# Patient Record
Sex: FEMALE | Race: WHITE | NOT HISPANIC OR LATINO | Employment: OTHER | URBAN - METROPOLITAN AREA
[De-identification: names, ages, dates, MRNs, and addresses within clinical notes are randomized per-mention and may not be internally consistent; named-entity substitution may affect disease eponyms.]

---

## 2017-03-09 ENCOUNTER — APPOINTMENT (OUTPATIENT)
Dept: WOMENS IMAGING | Facility: HOSPITAL | Age: 77
End: 2017-03-09

## 2017-03-09 PROCEDURE — G0202 SCR MAMMO BI INCL CAD: HCPCS | Performed by: RADIOLOGY

## 2017-03-09 PROCEDURE — 77063 BREAST TOMOSYNTHESIS BI: CPT | Performed by: RADIOLOGY

## 2018-03-13 ENCOUNTER — APPOINTMENT (OUTPATIENT)
Dept: WOMENS IMAGING | Facility: HOSPITAL | Age: 78
End: 2018-03-13

## 2018-03-13 PROCEDURE — 77063 BREAST TOMOSYNTHESIS BI: CPT | Performed by: RADIOLOGY

## 2018-03-13 PROCEDURE — 77067 SCR MAMMO BI INCL CAD: CPT | Performed by: RADIOLOGY

## 2019-03-25 ENCOUNTER — APPOINTMENT (OUTPATIENT)
Dept: WOMENS IMAGING | Facility: HOSPITAL | Age: 79
End: 2019-03-25

## 2019-03-25 PROCEDURE — 77067 SCR MAMMO BI INCL CAD: CPT | Performed by: RADIOLOGY

## 2019-03-25 PROCEDURE — 77063 BREAST TOMOSYNTHESIS BI: CPT | Performed by: RADIOLOGY

## 2019-10-22 ENCOUNTER — OFFICE VISIT (OUTPATIENT)
Dept: FAMILY MEDICINE CLINIC | Facility: CLINIC | Age: 79
End: 2019-10-22

## 2019-10-22 VITALS
WEIGHT: 145.5 LBS | OXYGEN SATURATION: 94 % | TEMPERATURE: 98 F | BODY MASS INDEX: 24.84 KG/M2 | DIASTOLIC BLOOD PRESSURE: 78 MMHG | SYSTOLIC BLOOD PRESSURE: 138 MMHG | HEIGHT: 64 IN | HEART RATE: 95 BPM

## 2019-10-22 DIAGNOSIS — J40 BRONCHITIS: Primary | ICD-10-CM

## 2019-10-22 PROCEDURE — 99214 OFFICE O/P EST MOD 30 MIN: CPT | Performed by: FAMILY MEDICINE

## 2019-10-22 RX ORDER — MULTIVITAMIN/IRON/FOLIC ACID 18MG-0.4MG
1 TABLET ORAL DAILY
COMMUNITY
End: 2019-11-20

## 2019-10-22 RX ORDER — PREDNISONE 20 MG/1
20 TABLET ORAL 2 TIMES DAILY
Qty: 10 TABLET | Refills: 0 | Status: SHIPPED | OUTPATIENT
Start: 2019-10-22 | End: 2019-10-27

## 2019-10-22 RX ORDER — IPRATROPIUM BROMIDE AND ALBUTEROL SULFATE 2.5; .5 MG/3ML; MG/3ML
3 SOLUTION RESPIRATORY (INHALATION) ONCE
Status: DISCONTINUED | OUTPATIENT
Start: 2019-10-22 | End: 2020-04-13

## 2019-10-22 RX ORDER — METOPROLOL SUCCINATE 25 MG/1
TABLET, EXTENDED RELEASE ORAL
COMMUNITY
Start: 2019-10-18 | End: 2020-08-14 | Stop reason: SDUPTHER

## 2019-10-22 RX ORDER — MULTIVIT-MINS NO.7/FOLIC ACID 1 MG
1 CAPSULE ORAL DAILY
Refills: 0 | COMMUNITY
Start: 2019-07-16 | End: 2019-11-20

## 2019-10-22 RX ORDER — CEFDINIR 300 MG/1
300 CAPSULE ORAL 2 TIMES DAILY
Qty: 20 CAPSULE | Refills: 0 | Status: SHIPPED | OUTPATIENT
Start: 2019-10-22 | End: 2019-11-20

## 2019-10-22 NOTE — PROGRESS NOTES
Subjective   Darlene Pfeiffer is a 79 y.o. female.     Chief Complaint   Patient presents with   • Cough     some productive        Here for cough and congestion.  She has a history of lung cancer and has been stable to follow-up.  She recently went to the [facility] for productive cough he was treated with doxycycline.  A couple days after finishing her antibiotic her cough became productive again.  She denies any fever or chills.  She still is not smoking.  She has an appointment to follow-up for mild office with her routine blood work soon.  Eyes any chest pain or shortness of breath.           The following portions of the patient's history were reviewed and updated as appropriate: allergies, current medications, past family history, past medical history, past social history, past surgical history and problem list.    Past Medical History:   Diagnosis Date   • Hypertension        Past Surgical History:   Procedure Laterality Date   • CHOLECYSTECTOMY     • LUNG SURGERY     • MOUTH SURGERY         Family History   Problem Relation Age of Onset   • No Known Problems Mother    • No Known Problems Father        Social History     Socioeconomic History   • Marital status: Unknown     Spouse name: Not on file   • Number of children: Not on file   • Years of education: Not on file   • Highest education level: Not on file   Tobacco Use   • Smoking status: Never Smoker   • Smokeless tobacco: Never Used   Substance and Sexual Activity   • Alcohol use: Yes     Comment: Socially    • Drug use: No   • Sexual activity: Defer         Current Outpatient Medications:   •  amLODIPine (NORVASC) 10 MG tablet, Take 5 mg by mouth., Disp: , Rfl:   •  cetirizine (ZyrTEC) 10 MG tablet, Take 10 mg by mouth., Disp: , Rfl:   •  cholecalciferol (VITAMIN D3) 1000 units tablet, Take 1,000 Units by mouth Daily., Disp: , Rfl:   •  hydroCHLOROthiazide (HYDRODIURIL) 25 MG tablet, , Disp: , Rfl:   •  metoprolol succinate XL (TOPROL-XL) 25 MG 24 hr  "tablet, , Disp: , Rfl:   •  Multiple Vitamins-Minerals (CENTRUM ADULTS) tablet, Take  by mouth., Disp: , Rfl:   •  Multiple Vitamins-Minerals (CENTRUM ADULTS) tablet, Take 1 tablet by mouth Daily., Disp: , Rfl:   •  Multiple Vitamins-Minerals (V-C FORTE) capsule, Take 1 capsule by mouth Daily., Disp: , Rfl: 0  •  omeprazole (PriLOSEC) 40 MG capsule, Take 40 mg by mouth., Disp: , Rfl:   •  pravastatin (PRAVACHOL) 80 MG tablet, Take 80 mg by mouth., Disp: , Rfl:   •  cefdinir (OMNICEF) 300 MG capsule, Take 1 capsule by mouth 2 (Two) Times a Day., Disp: 20 capsule, Rfl: 0  •  predniSONE (DELTASONE) 20 MG tablet, Take 1 tablet by mouth 2 (Two) Times a Day for 5 days., Disp: 10 tablet, Rfl: 0  •  tiotropium bromide-olodaterol (STIOLTO RESPIMAT) 2.5-2.5 MCG/ACT aerosol solution inhaler, Inhale 2 puffs Daily., Disp: 4 g, Rfl: 0    Current Facility-Administered Medications:   •  ipratropium-albuterol (DUO-NEB) nebulizer solution 3 mL, 3 mL, Nebulization, Once, Kehrer, Meredith Lea, MD    Review of Systems    Objective   Vitals:    10/22/19 1424   BP: 138/78   Pulse: 95   Temp: 98 °F (36.7 °C)   SpO2: 94%   Weight: 66 kg (145 lb 8 oz)   Height: 162.6 cm (64\")     Body mass index is 24.98 kg/m².  Physical Exam   Constitutional: She is oriented to person, place, and time. She appears well-developed and well-nourished.   HENT:   Head: Normocephalic and atraumatic.   Eyes: Conjunctivae and EOM are normal. Pupils are equal, round, and reactive to light.   Neck: Neck supple. No thyromegaly present.   Cardiovascular: Normal rate and regular rhythm.   No murmur heard.  Pulmonary/Chest: Effort normal. She has decreased breath sounds. She has no wheezes. She has rhonchi in the right upper field and the left upper field. She has no rales.   Abdominal: Soft.   Musculoskeletal: Normal range of motion.   Neurological: She is alert and oriented to person, place, and time. No cranial nerve deficit.   Skin: Skin is warm and dry. "   Psychiatric: She has a normal mood and affect.   Nursing note and vitals reviewed.        Assessment/Plan   Darlene was seen today for cough.    Diagnoses and all orders for this visit:    Bronchitis  -     XR Chest 2 View; Future  -     ipratropium-albuterol (DUO-NEB) nebulizer solution 3 mL  -     cefdinir (OMNICEF) 300 MG capsule; Take 1 capsule by mouth 2 (Two) Times a Day.  -     predniSONE (DELTASONE) 20 MG tablet; Take 1 tablet by mouth 2 (Two) Times a Day for 5 days.  -     tiotropium bromide-olodaterol (STIOLTO RESPIMAT) 2.5-2.5 MCG/ACT aerosol solution inhaler; Inhale 2 puffs Daily.    Other orders  -     Cancel: Fluarix/Fluzone/Afluria/FluLaval ([id number])      Patient had a little bit less rhonchi after her nebulizer treatment and said she felt better.         Patient Instructions   Keep using the albuterol as needed.  Make sure to use the alto every day until gone.  Follow-up pending results of chest x-ray and response to medication.

## 2019-10-22 NOTE — PATIENT INSTRUCTIONS
Keep using the albuterol as needed.  Make sure to use the alto every day until gone.  Follow-up pending results of chest x-ray and response to medication.

## 2019-10-24 DIAGNOSIS — J40 BRONCHITIS: ICD-10-CM

## 2019-11-20 ENCOUNTER — OFFICE VISIT (OUTPATIENT)
Dept: FAMILY MEDICINE CLINIC | Facility: CLINIC | Age: 79
End: 2019-11-20

## 2019-11-20 VITALS
BODY MASS INDEX: 24.38 KG/M2 | HEART RATE: 83 BPM | OXYGEN SATURATION: 96 % | WEIGHT: 142.8 LBS | TEMPERATURE: 98.5 F | HEIGHT: 64 IN | SYSTOLIC BLOOD PRESSURE: 130 MMHG | DIASTOLIC BLOOD PRESSURE: 72 MMHG

## 2019-11-20 DIAGNOSIS — R73.09 ABNORMAL GLUCOSE: ICD-10-CM

## 2019-11-20 DIAGNOSIS — J44.9 CHRONIC OBSTRUCTIVE PULMONARY DISEASE, UNSPECIFIED COPD TYPE (HCC): ICD-10-CM

## 2019-11-20 DIAGNOSIS — J40 BRONCHITIS: ICD-10-CM

## 2019-11-20 DIAGNOSIS — I10 ESSENTIAL HYPERTENSION: Primary | ICD-10-CM

## 2019-11-20 DIAGNOSIS — E78.5 HYPERLIPIDEMIA, UNSPECIFIED HYPERLIPIDEMIA TYPE: ICD-10-CM

## 2019-11-20 DIAGNOSIS — K21.9 GASTROESOPHAGEAL REFLUX DISEASE WITHOUT ESOPHAGITIS: ICD-10-CM

## 2019-11-20 LAB
ALBUMIN SERPL-MCNC: 4.5 G/DL (ref 3.5–5.2)
ALBUMIN/GLOB SERPL: 2.4 G/DL
ALP SERPL-CCNC: 69 U/L (ref 39–117)
ALT SERPL-CCNC: 15 U/L (ref 1–33)
AST SERPL-CCNC: 21 U/L (ref 1–32)
BASOPHILS # BLD AUTO: 0.03 10*3/MM3 (ref 0–0.2)
BASOPHILS NFR BLD AUTO: 0.4 % (ref 0–1.5)
BILIRUB SERPL-MCNC: 0.5 MG/DL (ref 0.2–1.2)
BUN SERPL-MCNC: 13 MG/DL (ref 8–23)
BUN/CREAT SERPL: 18.3 (ref 7–25)
CALCIUM SERPL-MCNC: 9.5 MG/DL (ref 8.6–10.5)
CHLORIDE SERPL-SCNC: 100 MMOL/L (ref 98–107)
CHOLEST SERPL-MCNC: 211 MG/DL (ref 0–200)
CO2 SERPL-SCNC: 31.6 MMOL/L (ref 22–29)
CREAT SERPL-MCNC: 0.71 MG/DL (ref 0.57–1)
EOSINOPHIL # BLD AUTO: 0 10*3/MM3 (ref 0–0.4)
EOSINOPHIL NFR BLD AUTO: 0 % (ref 0.3–6.2)
ERYTHROCYTE [DISTWIDTH] IN BLOOD BY AUTOMATED COUNT: 13.2 % (ref 12.3–15.4)
GLOBULIN SER CALC-MCNC: 1.9 GM/DL
GLUCOSE SERPL-MCNC: 107 MG/DL (ref 65–99)
HBA1C MFR BLD: 6.1 % (ref 4.8–5.6)
HCT VFR BLD AUTO: 43.1 % (ref 34–46.6)
HDLC SERPL-MCNC: 65 MG/DL (ref 40–60)
HGB BLD-MCNC: 14 G/DL (ref 12–15.9)
IMM GRANULOCYTES # BLD AUTO: 0.01 10*3/MM3 (ref 0–0.05)
IMM GRANULOCYTES NFR BLD AUTO: 0.1 % (ref 0–0.5)
LDLC SERPL CALC-MCNC: 125 MG/DL (ref 0–100)
LYMPHOCYTES # BLD AUTO: 2.56 10*3/MM3 (ref 0.7–3.1)
LYMPHOCYTES NFR BLD AUTO: 37.4 % (ref 19.6–45.3)
MCH RBC QN AUTO: 29.8 PG (ref 26.6–33)
MCHC RBC AUTO-ENTMCNC: 32.5 G/DL (ref 31.5–35.7)
MCV RBC AUTO: 91.7 FL (ref 79–97)
MONOCYTES # BLD AUTO: 0.4 10*3/MM3 (ref 0.1–0.9)
MONOCYTES NFR BLD AUTO: 5.8 % (ref 5–12)
NEUTROPHILS # BLD AUTO: 3.85 10*3/MM3 (ref 1.7–7)
NEUTROPHILS NFR BLD AUTO: 56.3 % (ref 42.7–76)
NRBC BLD AUTO-RTO: 0 /100 WBC (ref 0–0.2)
PLATELET # BLD AUTO: 311 10*3/MM3 (ref 140–450)
POTASSIUM SERPL-SCNC: 3.8 MMOL/L (ref 3.5–5.2)
PROT SERPL-MCNC: 6.4 G/DL (ref 6–8.5)
RBC # BLD AUTO: 4.7 10*6/MM3 (ref 3.77–5.28)
SODIUM SERPL-SCNC: 142 MMOL/L (ref 136–145)
TRIGL SERPL-MCNC: 104 MG/DL (ref 0–150)
VLDLC SERPL CALC-MCNC: 20.8 MG/DL
WBC # BLD AUTO: 6.85 10*3/MM3 (ref 3.4–10.8)

## 2019-11-20 PROCEDURE — 99214 OFFICE O/P EST MOD 30 MIN: CPT | Performed by: FAMILY MEDICINE

## 2019-11-20 RX ORDER — INFLUENZA A VIRUS A/MICHIGAN/45/2015 X-275 (H1N1) ANTIGEN (FORMALDEHYDE INACTIVATED), INFLUENZA A VIRUS A/SINGAPORE/INFIMH-16-0019/2016 IVR-186 (H3N2) ANTIGEN (FORMALDEHYDE INACTIVATED), AND INFLUENZA B VIRUS B/MARYLAND/15/2016 BX-69A (A B/COLORADO/6/2017-LIKE VIRUS) ANTIGEN (FORMALDEHYDE INACTIVATED) 60; 60; 60 UG/.5ML; UG/.5ML; UG/.5ML
INJECTION, SUSPENSION INTRAMUSCULAR
Refills: 0 | COMMUNITY
Start: 2019-10-22 | End: 2020-04-13

## 2019-11-20 NOTE — PROGRESS NOTES
Subjective   Darlene Pfeiffer is a 79 y.o. female.     Chief Complaint   Patient presents with   • Hypertension   • Hyperlipidemia        Patient here for fasting blood pressure and cholesterol check.  As far as her bronchitis last month she is feeling much better on the Stiolto and admits she wants to stay on it for her COPD.  She is compliant with her medications and has no complaints today.  She does continue with some cough but it is improved.           The following portions of the patient's history were reviewed and updated as appropriate: allergies, current medications, past family history, past medical history, past social history, past surgical history and problem list.    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 from [facility]   • History of squamous cell carcinoma     Tongue   • Hyperlipidemia    • Hyperpigmentation    • Hypertension     since early 60's   • Impacted cerumen of both ears    • Influenza    • Low vitamin B12 level    • Lower back pain    • Menopause    • Need for influenza vaccination    • Other screening mammogram    • Thyromegaly    • Urinary pain    • UTI (urinary tract infection)    • Vitamin D deficiency        Past Surgical History:   Procedure Laterality Date   • CHOLECYSTECTOMY  1999   • GLOSSECTOMY PARTIAL      less than one half tongue   • LUNG SURGERY     • ORAL LESION EXCISION/BIOPSY      June and August 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   • TUBAL ABDOMINAL LIGATION  1970       Family History   Problem Relation Age of Onset    • Ovarian cancer Mother          at age 27   • No Known Problems Father    • Ovarian cancer Sister    • Colon cancer Brother    • No Known Problems Other        Social History     Socioeconomic History   • Marital status: Unknown     Spouse name: Not on file   • Number of children: Not on file   • Years of education: Not on file   • Highest education level: Not on file   Tobacco Use   • Smoking status: Former Smoker     Last attempt to quit: 2015     Years since quittin.8   • Smokeless tobacco: Never Used   • Tobacco comment: less than a pack per day, no smoking since , Quit 2015   Substance and Sexual Activity   • Alcohol use: Yes     Comment: Socially -A few times a month   • Drug use: No   • Sexual activity: Defer         Current Outpatient Medications:   •  cetirizine (ZyrTEC) 10 MG tablet, Take 10 mg by mouth., Disp: , Rfl:   •  cholecalciferol (VITAMIN D3) 1000 units tablet, Take 1,000 Units by mouth Daily., Disp: , Rfl:   •  FLUZONE HIGH-DOSE 0.5 ML suspension prefilled syringe injection, ADM 0.5ML IM UTD, Disp: , Rfl: 0  •  hydroCHLOROthiazide (HYDRODIURIL) 25 MG tablet, , Disp: , Rfl:   •  magnesium oxide (MAGOX) 400 (241.3 Mg) MG tablet tablet, Take 400 mg by mouth Daily., Disp: , Rfl:   •  metoprolol succinate XL (TOPROL-XL) 25 MG 24 hr tablet, , Disp: , Rfl:   •  Multiple Vitamins-Minerals (CENTRUM ADULTS) tablet, Take  by mouth., Disp: , Rfl:   •  omeprazole (PriLOSEC) 40 MG capsule, Take 40 mg by mouth., Disp: , Rfl:   •  pravastatin (PRAVACHOL) 80 MG tablet, Take 80 mg by mouth., Disp: , Rfl:   •  tiotropium bromide-olodaterol (STIOLTO RESPIMAT) 2.5-2.5 MCG/ACT aerosol solution inhaler, Inhale 2 puffs Daily., Disp: 12 g, Rfl: 3  •  amLODIPine (NORVASC) 10 MG tablet, Take 5 mg by mouth., Disp: , Rfl:     Current Facility-Administered Medications:   •  ipratropium-albuterol (DUO-NEB) nebulizer solution 3 mL, 3 mL, Nebulization, Once, Kehrer, Meredith Lea, MD    Review of Systems  "  Constitutional: Negative for chills, fatigue and fever.   HENT: Negative for congestion, rhinorrhea and sore throat.    Respiratory: Positive for cough. Negative for shortness of breath.    Cardiovascular: Negative for chest pain and leg swelling.   Gastrointestinal: Negative for abdominal pain.   Endocrine: Negative for polydipsia and polyuria.   Genitourinary: Negative for dysuria.   Musculoskeletal: Negative for arthralgias and myalgias.   Skin: Negative for rash.   Neurological: Negative for dizziness.   Hematological: Does not bruise/bleed easily.   Psychiatric/Behavioral: Negative for sleep disturbance.       Objective   Vitals:    11/20/19 0855   BP: 130/72   Pulse: 83   Temp: 98.5 °F (36.9 °C)   SpO2: 96%   Weight: 64.8 kg (142 lb 12.8 oz)   Height: 162.6 cm (64\")     Body mass index is 24.51 kg/m².  Physical Exam   Constitutional: She is oriented to person, place, and time. She appears well-developed and well-nourished.   HENT:   Head: Normocephalic and atraumatic.   Eyes: Conjunctivae and EOM are normal. Pupils are equal, round, and reactive to light.   Neck: Neck supple. No thyromegaly present.   Cardiovascular: Normal rate and regular rhythm.   No murmur heard.  Pulmonary/Chest: Effort normal and breath sounds normal. She has no wheezes.   Abdominal: Soft.   Musculoskeletal: Normal range of motion.   Neurological: She is alert and oriented to person, place, and time. No cranial nerve deficit.   Skin: Skin is warm and dry.   Psychiatric: She has a normal mood and affect.         Assessment/Plan   Darlene was seen today for hypertension and hyperlipidemia.    Diagnoses and all orders for this visit:    Essential hypertension  -     CBC & Differential  -     Comprehensive Metabolic Panel    Hyperlipidemia, unspecified hyperlipidemia type  -     Lipid Panel    Gastroesophageal reflux disease without esophagitis    Bronchitis  -     tiotropium bromide-olodaterol (STIOLTO RESPIMAT) 2.5-2.5 MCG/ACT aerosol " solution inhaler; Inhale 2 puffs Daily.    Chronic obstructive pulmonary disease, unspecified COPD type (CMS/[facility])    Abnormal glucose  -     Hemoglobin A1c               There are no Patient Instructions on file for this visit.

## 2019-11-26 ENCOUNTER — TELEPHONE (OUTPATIENT)
Dept: FAMILY MEDICINE CLINIC | Facility: CLINIC | Age: 79
End: 2019-11-26

## 2019-11-26 RX ORDER — PREDNISONE 20 MG/1
20 TABLET ORAL 2 TIMES DAILY
Qty: 10 TABLET | Refills: 0 | Status: SHIPPED | OUTPATIENT
Start: 2019-11-26 | End: 2020-01-13

## 2019-11-26 NOTE — TELEPHONE ENCOUNTER
Patient called stated she was seen on 11/20/19. She has been having a severe cough, she has tried mucinex, and has been using her inhaler. She is taking zyrtec. She states the cough is horrible. She didn't know if something could be sent in or if she needed to come in. Please advise.

## 2019-11-26 NOTE — TELEPHONE ENCOUNTER
Called patient to discuss with her.  It seems more of just her COPD than infection.  I sent in a 5-day burst of prednisone for her to try and she will call me back with an update if she does not improve.

## 2019-12-19 ENCOUNTER — OFFICE VISIT (OUTPATIENT)
Dept: FAMILY MEDICINE CLINIC | Facility: CLINIC | Age: 79
End: 2019-12-19

## 2019-12-19 VITALS
TEMPERATURE: 98.4 F | BODY MASS INDEX: 24.48 KG/M2 | DIASTOLIC BLOOD PRESSURE: 74 MMHG | HEART RATE: 95 BPM | SYSTOLIC BLOOD PRESSURE: 130 MMHG | OXYGEN SATURATION: 95 % | HEIGHT: 64 IN | WEIGHT: 143.4 LBS

## 2019-12-19 DIAGNOSIS — H61.23 BILATERAL IMPACTED CERUMEN: ICD-10-CM

## 2019-12-19 DIAGNOSIS — J06.9 VIRAL UPPER RESPIRATORY TRACT INFECTION: Primary | ICD-10-CM

## 2019-12-19 DIAGNOSIS — R14.1 GAS PAIN: ICD-10-CM

## 2019-12-19 DIAGNOSIS — B35.1 ONYCHOMYCOSIS OF NAIL OF DIGIT OF HAND: ICD-10-CM

## 2019-12-19 DIAGNOSIS — J02.9 SORE THROAT: ICD-10-CM

## 2019-12-19 LAB
EXPIRATION DATE: NORMAL
EXPIRATION DATE: NORMAL
FLUAV AG NPH QL: NEGATIVE
FLUBV AG NPH QL: NEGATIVE
INTERNAL CONTROL: NORMAL
INTERNAL CONTROL: NORMAL
Lab: NORMAL
Lab: NORMAL
S PYO AG THROAT QL: NEGATIVE

## 2019-12-19 PROCEDURE — 99213 OFFICE O/P EST LOW 20 MIN: CPT | Performed by: FAMILY MEDICINE

## 2019-12-19 PROCEDURE — 87804 INFLUENZA ASSAY W/OPTIC: CPT | Performed by: FAMILY MEDICINE

## 2019-12-19 PROCEDURE — 87880 STREP A ASSAY W/OPTIC: CPT | Performed by: FAMILY MEDICINE

## 2019-12-19 RX ORDER — IPRATROPIUM BROMIDE 42 UG/1
2 SPRAY, METERED NASAL 4 TIMES DAILY
Qty: 15 ML | Refills: 0 | Status: SHIPPED | OUTPATIENT
Start: 2019-12-19 | End: 2022-06-27

## 2019-12-19 RX ORDER — GUAIFENESIN 600 MG/1
1200 TABLET, EXTENDED RELEASE ORAL 2 TIMES DAILY
COMMUNITY
End: 2020-09-28

## 2019-12-19 RX ORDER — TERBINAFINE HYDROCHLORIDE 250 MG/1
250 TABLET ORAL DAILY
Qty: 42 TABLET | Refills: 0 | Status: SHIPPED | OUTPATIENT
Start: 2019-12-19 | End: 2020-01-30

## 2019-12-19 RX ORDER — AMOXICILLIN 500 MG/1
1000 CAPSULE ORAL 2 TIMES DAILY
Qty: 28 CAPSULE | Refills: 0 | Status: SHIPPED | OUTPATIENT
Start: 2019-12-19 | End: 2020-01-13

## 2019-12-19 NOTE — PROGRESS NOTES
Subjective   Darlene Pfeiffer is a 79 y.o. female.     Chief Complaint   Patient presents with   • Sore Throat   • URI   • Nasal Congestion   • Ear Fullness   • Neck Pain        Patient presents with cold cough and congestion for the past couple days with sore throat.  She is worried getting worse.  She has ear pressure.  She does not feel like it is down in her chest yet.  She had manicure the other day and there is still worried about what looks like a fungal infection in her fingernail.  She wants to treated after the holidays so she can leave off her manicure for period of time.  She denies any other symptoms today.  Her LFTs were normal back in November.         The following portions of the patient's history were reviewed and updated as appropriate: allergies, current medications, past family history, past medical history, past social history, past surgical history and problem list.    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 from [facility]   • History of squamous cell carcinoma     Tongue   • Hyperlipidemia    • Hyperpigmentation    • Hypertension     since early 60's   • Impacted cerumen of both ears    • Influenza    • Low vitamin B12 level    • Lower back pain    • Menopause    • Need for influenza vaccination    • Other screening mammogram    • Thyromegaly    • Urinary pain    • UTI (urinary tract infection)    • Vitamin D deficiency        Past Surgical History:   Procedure Laterality Date   • CHOLECYSTECTOMY  1999   • GLOSSECTOMY PARTIAL       less than one half tongue   • LUNG SURGERY     • ORAL LESION EXCISION/BIOPSY       and 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   • TUBAL ABDOMINAL LIGATION  1970       Family History   Problem Relation Age of Onset   • Ovarian cancer Mother          at age 27   • No Known Problems Father    • Ovarian cancer Sister    • Colon cancer Brother    • No Known Problems Other        Social History     Socioeconomic History   • Marital status: Unknown     Spouse name: Not on file   • Number of children: Not on file   • Years of education: Not on file   • Highest education level: Not on file   Tobacco Use   • Smoking status: Former Smoker     Last attempt to quit: 2015     Years since quittin.8   • Smokeless tobacco: Never Used   • Tobacco comment: less than a pack per day, no smoking since , Quit 2015   Substance and Sexual Activity   • Alcohol use: Yes     Comment: Socially -A few times a month   • Drug use: No   • Sexual activity: Defer         Current Outpatient Medications:   •  amLODIPine (NORVASC) 10 MG tablet, Take 5 mg by mouth., Disp: , Rfl:   •  cetirizine (ZyrTEC) 10 MG tablet, Take 10 mg by mouth., Disp: , Rfl:   •  cholecalciferol (VITAMIN D3) 1000 units tablet, Take 1,000 Units by mouth Daily., Disp: , Rfl:   •  FLUZONE HIGH-DOSE 0.5 ML suspension prefilled syringe injection, ADM 0.5ML IM UTD, Disp: , Rfl: 0  •  guaiFENesin (MUCINEX) 600 MG 12 hr tablet, Take 1,200 mg by mouth 2 (Two) Times a Day., Disp: , Rfl:   •  hydroCHLOROthiazide (HYDRODIURIL) 25 MG tablet, , Disp: , Rfl:   •  magnesium oxide (MAGOX) 400 (241.3 Mg) MG tablet tablet, Take 400 mg by mouth Daily., Disp: , Rfl:   •  metoprolol succinate XL (TOPROL-XL) 25 MG 24 hr tablet, , Disp: , Rfl:   •  Multiple Vitamins-Minerals (CENTRUM ADULTS) tablet, Take  by mouth., Disp: , Rfl:   •  omeprazole (PriLOSEC) 40 MG capsule, Take 40 mg by mouth., Disp: , Rfl:   •  pravastatin (PRAVACHOL) 80 MG tablet, Take 80 mg by mouth., Disp: , Rfl:  "  •  predniSONE (DELTASONE) 20 MG tablet, Take 1 tablet by mouth 2 (Two) Times a Day., Disp: 10 tablet, Rfl: 0  •  tiotropium bromide-olodaterol (STIOLTO RESPIMAT) 2.5-2.5 MCG/ACT aerosol solution inhaler, Inhale 2 puffs Daily., Disp: 12 g, Rfl: 3  •  amoxicillin (AMOXIL) 500 MG capsule, Take 2 capsules by mouth 2 (Two) Times a Day., Disp: 28 capsule, Rfl: 0  •  ipratropium (ATROVENT) 0.06 % nasal spray, 2 sprays into the nostril(s) as directed by provider 4 (Four) Times a Day., Disp: 15 mL, Rfl: 0  •  terbinafine (lamiSIL) 250 MG tablet, Take 1 tablet by mouth Daily for 42 days., Disp: 42 tablet, Rfl: 0    Current Facility-Administered Medications:   •  ipratropium-albuterol (DUO-NEB) nebulizer solution 3 mL, 3 mL, Nebulization, Once, Kehrer, Meredith Lea, MD    Review of Systems   Constitutional: Negative for chills, fatigue and fever.   HENT: Positive for congestion, ear pain, rhinorrhea and sore throat.    Respiratory: Negative for cough and shortness of breath.    Cardiovascular: Negative for chest pain and leg swelling.   Gastrointestinal: Negative for abdominal pain.   Endocrine: Negative for polydipsia and polyuria.   Genitourinary: Negative for dysuria.   Musculoskeletal: Positive for neck pain. Negative for arthralgias and myalgias.   Skin: Negative for rash.   Neurological: Negative for dizziness.   Hematological: Does not bruise/bleed easily.   Psychiatric/Behavioral: Negative for sleep disturbance.       Objective   Vitals:    12/19/19 1357   BP: 130/74   Pulse: 95   Temp: 98.4 °F (36.9 °C)   SpO2: 95%   Weight: 65 kg (143 lb 6.4 oz)   Height: 162.6 cm (64\")     Body mass index is 24.61 kg/m².  Physical Exam   Constitutional: She is oriented to person, place, and time. She appears well-developed and well-nourished.   HENT:   Head: Normocephalic and atraumatic.   Right Ear: Tympanic membrane and ear canal normal. cerumen impaction is present.  Left Ear: Tympanic membrane and ear canal normal. An impacted " cerumen is present.  TMs normal after removing cerumen from bilateral ears.   Eyes: Pupils are equal, round, and reactive to light. Conjunctivae and EOM are normal.   Neck: Neck supple. No thyromegaly present.   Cardiovascular: Normal rate and regular rhythm.   No murmur heard.  Pulmonary/Chest: Effort normal and breath sounds normal. She has no wheezes.   Abdominal: Soft.   Musculoskeletal: Normal range of motion.   Neurological: She is alert and oriented to person, place, and time. No cranial nerve deficit.   Skin: Skin is warm and dry.   Psychiatric: She has a normal mood and affect.       Office Visit on 12/19/2019   Component Date Value Ref Range Status   • Rapid Strep A Screen 12/19/2019 Negative  Negative, VALID, INVALID, Not Performed Final   • Internal Control 12/19/2019 Passed  Passed Final   • Lot Number 12/19/2019 [id number]   Final   • Expiration Date 12/19/2019 03/27/2022   Final   • Rapid Influenza A Ag 12/19/2019 Negative  Negative Final   • Rapid Influenza B Ag 12/19/2019 Negative  Negative Final   • Internal Control 12/19/2019 Passed  Passed Final   • Lot Number 12/19/2019 [id number]   Final   • Expiration Date 12/19/2019 12/11/2021   Final          Assessment/Plan   Darlene was seen today for sore throat, uri, nasal congestion, ear fullness and neck pain.    Diagnoses and all orders for this visit:    Viral upper respiratory tract infection  -     amoxicillin (AMOXIL) 500 MG capsule; Take 2 capsules by mouth 2 (Two) Times a Day.  -     ipratropium (ATROVENT) 0.06 % nasal spray; 2 sprays into the nostril(s) as directed by provider 4 (Four) Times a Day.    Sore throat  -     POCT rapid strep A  -     POCT Influenza A/B    Bilateral impacted cerumen    Gas pain    Onychomycosis of nail of digit of hand  -     terbinafine (lamiSIL) 250 MG tablet; Take 1 tablet by mouth Daily for 42 days.               Patient Instructions   Try Phazyme, Beano or GasX for stomach.    Use Debrox every month for ears.

## 2020-01-13 ENCOUNTER — OFFICE VISIT (OUTPATIENT)
Dept: FAMILY MEDICINE CLINIC | Facility: CLINIC | Age: 80
End: 2020-01-13

## 2020-01-13 VITALS
TEMPERATURE: 98.2 F | WEIGHT: 145 LBS | DIASTOLIC BLOOD PRESSURE: 64 MMHG | HEIGHT: 64 IN | SYSTOLIC BLOOD PRESSURE: 128 MMHG | OXYGEN SATURATION: 94 % | BODY MASS INDEX: 24.75 KG/M2 | HEART RATE: 87 BPM

## 2020-01-13 DIAGNOSIS — J44.9 CHRONIC OBSTRUCTIVE PULMONARY DISEASE, UNSPECIFIED COPD TYPE (HCC): Primary | ICD-10-CM

## 2020-01-13 PROCEDURE — 99213 OFFICE O/P EST LOW 20 MIN: CPT | Performed by: FAMILY MEDICINE

## 2020-01-13 RX ORDER — PREDNISONE 20 MG/1
20 TABLET ORAL DAILY
Qty: 10 TABLET | Refills: 0 | Status: SHIPPED | OUTPATIENT
Start: 2020-01-13 | End: 2020-03-11 | Stop reason: SDUPTHER

## 2020-01-13 NOTE — PROGRESS NOTES
Carlos Pfeiffer is a 79 y.o. female.     Chief Complaint   Patient presents with   • Cough        Patient with COPD presents for persistent cough and wheezing after getting over an upper respiratory infection last month.  She is not had any fever or chills.  Her cough is productive with clear sputum.  She denies any chest pain or shortness of breath.         The following portions of the patient's history were reviewed and updated as appropriate: allergies, current medications, past family history, past medical history, past social history, past surgical history and problem list.    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 from [facility]   • History of squamous cell carcinoma     Tongue   • Hyperlipidemia    • Hyperpigmentation    • Hypertension     since early 60's   • Impacted cerumen of both ears    • Influenza    • Low vitamin B12 level    • Lower back pain    • Menopause    • Need for influenza vaccination    • Other screening mammogram    • Thyromegaly    • Urinary pain    • UTI (urinary tract infection)    • Vitamin D deficiency        Past Surgical History:   Procedure Laterality Date   • CHOLECYSTECTOMY     • GLOSSECTOMY PARTIAL      less than one half tongue   • LUNG SURGERY     • ORAL LESION EXCISION/BIOPSY       and 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   • TUBAL ABDOMINAL LIGATION         Family History   Problem Relation Age of Onset   • Ovarian cancer Mother          at age 27   • No Known Problems Father    •  Ovarian cancer Sister    • Colon cancer Brother    • No Known Problems Other        Social History     Socioeconomic History   • Marital status: Unknown     Spouse name: Not on file   • Number of children: Not on file   • Years of education: Not on file   • Highest education level: Not on file   Tobacco Use   • Smoking status: Former Smoker     Last attempt to quit: 2015     Years since quittin.9   • Smokeless tobacco: Never Used   • Tobacco comment: less than a pack per day, no smoking since , Quit 2015   Substance and Sexual Activity   • Alcohol use: Yes     Comment: Socially -A few times a month   • Drug use: No   • Sexual activity: Defer         Current Outpatient Medications:   •  amLODIPine (NORVASC) 10 MG tablet, Take 5 mg by mouth., Disp: , Rfl:   •  cetirizine (ZyrTEC) 10 MG tablet, Take 10 mg by mouth., Disp: , Rfl:   •  cholecalciferol (VITAMIN D3) 1000 units tablet, Take 1,000 Units by mouth Daily., Disp: , Rfl:   •  FLUZONE HIGH-DOSE 0.5 ML suspension prefilled syringe injection, ADM 0.5ML IM UTD, Disp: , Rfl: 0  •  guaiFENesin (MUCINEX) 600 MG 12 hr tablet, Take 1,200 mg by mouth 2 (Two) Times a Day., Disp: , Rfl:   •  hydroCHLOROthiazide (HYDRODIURIL) 25 MG tablet, , Disp: , Rfl:   •  ipratropium (ATROVENT) 0.06 % nasal spray, 2 sprays into the nostril(s) as directed by provider 4 (Four) Times a Day., Disp: 15 mL, Rfl: 0  •  magnesium oxide (MAGOX) 400 (241.3 Mg) MG tablet tablet, Take 400 mg by mouth Daily., Disp: , Rfl:   •  metoprolol succinate XL (TOPROL-XL) 25 MG 24 hr tablet, , Disp: , Rfl:   •  Multiple Vitamins-Minerals (CENTRUM ADULTS) tablet, Take  by mouth., Disp: , Rfl:   •  omeprazole (PriLOSEC) 40 MG capsule, Take 40 mg by mouth., Disp: , Rfl:   •  pravastatin (PRAVACHOL) 80 MG tablet, Take 80 mg by mouth., Disp: , Rfl:   •  terbinafine (lamiSIL) 250 MG tablet, Take 1 tablet by mouth Daily for 42 days., Disp: 42 tablet, Rfl: 0  •  tiotropium bromide-olodaterol (STIOLTO  "RESPIMAT) 2.5-2.5 MCG/ACT aerosol solution inhaler, Inhale 2 puffs Daily., Disp: 12 g, Rfl: 3  •  predniSONE (DELTASONE) 20 MG tablet, Take 1 tablet by mouth Daily., Disp: 10 tablet, Rfl: 0    Current Facility-Administered Medications:   •  ipratropium-albuterol (DUO-NEB) nebulizer solution 3 mL, 3 mL, Nebulization, Once, Kehrer, Meredith Lea, MD    Review of Systems   Constitutional: Negative for chills, fatigue and fever.   HENT: Negative for congestion, rhinorrhea and sore throat.    Respiratory: Positive for cough and wheezing. Negative for shortness of breath.    Cardiovascular: Negative for chest pain and leg swelling.   Gastrointestinal: Negative for abdominal pain.   Endocrine: Negative for polydipsia and polyuria.   Genitourinary: Negative for dysuria.   Musculoskeletal: Negative for arthralgias and myalgias.   Skin: Negative for rash.   Neurological: Negative for dizziness.   Hematological: Does not bruise/bleed easily.   Psychiatric/Behavioral: Negative for sleep disturbance.       Objective   Vitals:    01/13/20 1542   BP: 128/64   Pulse: 87   Temp: 98.2 °F (36.8 °C)   SpO2: 94%   Weight: 65.8 kg (145 lb)   Height: 162.6 cm (64\")     Body mass index is 24.89 kg/m².  Physical Exam   Constitutional: She is oriented to person, place, and time. She appears well-developed and well-nourished.   HENT:   Head: Normocephalic and atraumatic.   Eyes: Pupils are equal, round, and reactive to light. Conjunctivae and EOM are normal.   Neck: Neck supple. No thyromegaly present.   Cardiovascular: Normal rate and regular rhythm.   No murmur heard.  Pulmonary/Chest: Effort normal. She has decreased breath sounds. She has no wheezes. She has no rhonchi. She has no rales.   Abdominal: Soft.   Musculoskeletal: Normal range of motion.   Neurological: She is alert and oriented to person, place, and time. No cranial nerve deficit.   Skin: Skin is warm and dry.   Psychiatric: She has a normal mood and affect. "         Assessment/Plan   Darlene was seen today for cough.    Diagnoses and all orders for this visit:    Chronic obstructive pulmonary disease, unspecified COPD type (CMS/HCC)  -     predniSONE (DELTASONE) 20 MG tablet; Take 1 tablet by mouth Daily.               There are no Patient Instructions on file for this visit.

## 2020-03-11 ENCOUNTER — OFFICE VISIT (OUTPATIENT)
Dept: FAMILY MEDICINE CLINIC | Facility: CLINIC | Age: 80
End: 2020-03-11

## 2020-03-11 VITALS
SYSTOLIC BLOOD PRESSURE: 142 MMHG | OXYGEN SATURATION: 98 % | BODY MASS INDEX: 24.75 KG/M2 | WEIGHT: 145 LBS | TEMPERATURE: 98.3 F | HEART RATE: 68 BPM | HEIGHT: 64 IN | DIASTOLIC BLOOD PRESSURE: 68 MMHG

## 2020-03-11 DIAGNOSIS — J44.9 CHRONIC OBSTRUCTIVE PULMONARY DISEASE, UNSPECIFIED COPD TYPE (HCC): ICD-10-CM

## 2020-03-11 DIAGNOSIS — J30.1 SEASONAL ALLERGIC RHINITIS DUE TO POLLEN: Primary | ICD-10-CM

## 2020-03-11 PROCEDURE — 99213 OFFICE O/P EST LOW 20 MIN: CPT | Performed by: FAMILY MEDICINE

## 2020-03-11 RX ORDER — PREDNISONE 20 MG/1
20 TABLET ORAL 2 TIMES DAILY
Qty: 10 TABLET | Refills: 0 | Status: SHIPPED | OUTPATIENT
Start: 2020-03-11 | End: 2020-04-13

## 2020-03-11 RX ORDER — MONTELUKAST SODIUM 10 MG/1
10 TABLET ORAL NIGHTLY
Qty: 30 TABLET | Refills: 2 | Status: SHIPPED | OUTPATIENT
Start: 2020-03-11 | End: 2020-05-21 | Stop reason: SDUPTHER

## 2020-03-11 NOTE — PROGRESS NOTES
Subjective   Darlene Pfeiffer is a 79 y.o. female.     Chief Complaint   Patient presents with   • Cough     sneezing, watery eyes        Patient complains of increased congestion drainage and coughing over the past couple weeks.  She is taking all of her allergy medicines and is just not getting any relief.         The following portions of the patient's history were reviewed and updated as appropriate: allergies, current medications, past family history, past medical history, past social history, past surgical history and problem list.    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 from [facility]   • History of squamous cell carcinoma     Tongue   • Hyperlipidemia    • Hyperpigmentation    • Hypertension     since early 60's   • Impacted cerumen of both ears    • Influenza    • Low vitamin B12 level    • Lower back pain    • Menopause    • Need for influenza vaccination    • Other screening mammogram    • Thyromegaly    • Urinary pain    • UTI (urinary tract infection)    • Vitamin D deficiency        Past Surgical History:   Procedure Laterality Date   • CHOLECYSTECTOMY     • GLOSSECTOMY PARTIAL      less than one half tongue   • LUNG SURGERY     • ORAL LESION EXCISION/BIOPSY       and 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   • TUBAL ABDOMINAL LIGATION         Family History   Problem Relation Age of Onset   • Ovarian cancer Mother          at age 27   • No Known Problems Father    • Ovarian cancer Sister    • Colon cancer Brother    • No  Known Problems Other        Social History     Socioeconomic History   • Marital status:      Spouse name: Not on file   • Number of children: Not on file   • Years of education: Not on file   • Highest education level: Not on file   Tobacco Use   • Smoking status: Former Smoker     Last attempt to quit: 2015     Years since quittin.1   • Smokeless tobacco: Never Used   • Tobacco comment: less than a pack per day, no smoking since , Quit 2015   Substance and Sexual Activity   • Alcohol use: Yes     Comment: Socially -A few times a month   • Drug use: No   • Sexual activity: Defer         Current Outpatient Medications:   •  amLODIPine (NORVASC) 10 MG tablet, Take 5 mg by mouth., Disp: , Rfl:   •  cetirizine (ZyrTEC) 10 MG tablet, Take 10 mg by mouth., Disp: , Rfl:   •  cholecalciferol (VITAMIN D3) 1000 units tablet, Take 1,000 Units by mouth Daily., Disp: , Rfl:   •  FLUZONE HIGH-DOSE 0.5 ML suspension prefilled syringe injection, ADM 0.5ML IM UTD, Disp: , Rfl: 0  •  guaiFENesin (MUCINEX) 600 MG 12 hr tablet, Take 1,200 mg by mouth 2 (Two) Times a Day., Disp: , Rfl:   •  hydroCHLOROthiazide (HYDRODIURIL) 25 MG tablet, , Disp: , Rfl:   •  ipratropium (ATROVENT) 0.06 % nasal spray, 2 sprays into the nostril(s) as directed by provider 4 (Four) Times a Day., Disp: 15 mL, Rfl: 0  •  magnesium oxide (MAGOX) 400 (241.3 Mg) MG tablet tablet, Take 400 mg by mouth Daily., Disp: , Rfl:   •  metoprolol succinate XL (TOPROL-XL) 25 MG 24 hr tablet, , Disp: , Rfl:   •  Multiple Vitamins-Minerals (CENTRUM ADULTS) tablet, Take  by mouth., Disp: , Rfl:   •  omeprazole (PriLOSEC) 40 MG capsule, Take 40 mg by mouth., Disp: , Rfl:   •  pravastatin (PRAVACHOL) 80 MG tablet, Take 80 mg by mouth., Disp: , Rfl:   •  tiotropium bromide-olodaterol (STIOLTO RESPIMAT) 2.5-2.5 MCG/ACT aerosol solution inhaler, Inhale 2 puffs Daily., Disp: 12 g, Rfl: 3  •  montelukast (SINGULAIR) 10 MG tablet, Take 1 tablet by mouth Every  "Night., Disp: 30 tablet, Rfl: 2  •  predniSONE (DELTASONE) 20 MG tablet, Take 1 tablet by mouth 2 (Two) Times a Day., Disp: 10 tablet, Rfl: 0    Current Facility-Administered Medications:   •  ipratropium-albuterol (DUO-NEB) nebulizer solution 3 mL, 3 mL, Nebulization, Once, Kehrer, Meredith Lea, MD    Review of Systems   Constitutional: Negative for chills, fatigue and fever.   HENT: Positive for rhinorrhea. Negative for congestion and sore throat.    Eyes: Positive for discharge.   Respiratory: Positive for cough and wheezing. Negative for shortness of breath.    Cardiovascular: Negative for chest pain and leg swelling.   Gastrointestinal: Negative for abdominal pain.   Endocrine: Negative for polydipsia and polyuria.   Genitourinary: Negative for dysuria.   Musculoskeletal: Negative for arthralgias and myalgias.   Skin: Negative for rash.   Neurological: Negative for dizziness.   Hematological: Does not bruise/bleed easily.   Psychiatric/Behavioral: Negative for sleep disturbance.       Objective   Vitals:    03/11/20 1122   BP: 142/68   Pulse: 68   Temp: 98.3 °F (36.8 °C)   SpO2: 98%   Weight: 65.8 kg (145 lb)   Height: 162.6 cm (64\")     Body mass index is 24.89 kg/m².  Physical Exam   Constitutional: She is oriented to person, place, and time. She appears well-developed and well-nourished.   HENT:   Head: Normocephalic and atraumatic.   Right Ear: Tympanic membrane and ear canal normal.   Left Ear: Tympanic membrane and ear canal normal.   Nose: Rhinorrhea present. No mucosal edema.   Postnasal drainage and cobblestoning   Eyes: Pupils are equal, round, and reactive to light. Conjunctivae and EOM are normal.   Neck: Neck supple. No thyromegaly present.   Cardiovascular: Normal rate and regular rhythm.   No murmur heard.  Pulmonary/Chest: Effort normal. She has decreased breath sounds. She has wheezes.   Few end expiratory wheezes   Abdominal: Soft.   Musculoskeletal: Normal range of motion.   Neurological: She " is alert and oriented to person, place, and time. No cranial nerve deficit.   Skin: Skin is warm and dry.   Psychiatric: She has a normal mood and affect.         Assessment/Plan   Darlene was seen today for cough.    Diagnoses and all orders for this visit:    Seasonal allergic rhinitis due to pollen  -     montelukast (SINGULAIR) 10 MG tablet; Take 1 tablet by mouth Every Night.    Chronic obstructive pulmonary disease, unspecified COPD type (CMS/HCC)  -     predniSONE (DELTASONE) 20 MG tablet; Take 1 tablet by mouth 2 (Two) Times a Day.               Patient Instructions   Start the steroids and the montelukast today.  You will know after the steroids run out if the montelukast is helping your allergies along with the cetirizine.  Keep routine check up.    Allergic Rhinitis, Adult  Allergic rhinitis is an allergic reaction that affects the mucous membrane inside the nose. It causes sneezing, a runny or stuffy nose, and the feeling of mucus going down the back of the throat (postnasal drip). Allergic rhinitis can be mild to severe.  There are two types of allergic rhinitis:  · Seasonal. This type is also called hay fever. It happens only during certain seasons.  · Perennial. This type can happen at any time of the year.  What are the causes?  This condition happens when the body's defense system (immune system) responds to certain harmless substances called allergens as though they were germs.   Seasonal allergic rhinitis is triggered by pollen, which can come from grasses, trees, and weeds. Perennial allergic rhinitis may be caused by:  · House dust mites.  · Pet dander.  · Mold spores.  What are the signs or symptoms?  Symptoms of this condition include:  · Sneezing.  · Runny or stuffy nose (nasal congestion).  · Postnasal drip.  · Itchy nose.  · Tearing of the eyes.  · Trouble sleeping.  · Daytime sleepiness.  How is this diagnosed?  This condition may be diagnosed based on:  · Your medical history.  · A physical  exam.  · Tests to check for related conditions, such as:  ? Asthma.  ? Pink eye.  ? Ear infection.  ? Upper respiratory infection.  · Tests to find out which allergens trigger your symptoms. These may include skin or blood tests.  How is this treated?  There is no cure for this condition, but treatment can help control symptoms. Treatment may include:  · Taking medicines that block allergy symptoms, such as antihistamines. Medicine may be given as a shot, nasal spray, or pill.  · Avoiding the allergen.  · Desensitization. This treatment involves getting ongoing shots until your body becomes less sensitive to the allergen. This treatment may be done if other treatments do not help.  · If taking medicine and avoiding the allergen does not work, new, stronger medicines may be prescribed.  Follow these instructions at home:  · Find out what you are allergic to. Common allergens include smoke, dust, and pollen.  · Avoid the things you are allergic to. These are some things you can do to help avoid allergens:  ? Replace carpet with wood, tile, or vinyl emelia. Carpet can trap dander and dust.  ? Do not smoke. Do not allow smoking in your home.  ? Change your heating and air conditioning filter at least once a month.  ? During allergy season:  § Keep windows closed as much as possible.  § Plan outdoor activities when pollen counts are lowest. This is usually during the evening hours.  § When coming indoors, change clothing and shower before sitting on furniture or bedding.  · Take over-the-counter and prescription medicines only as told by your health care provider.  · Keep all follow-up visits as told by your health care provider. This is important.  Contact a health care provider if:  · You have a fever.  · You develop a persistent cough.  · You make whistling sounds when you breathe (you wheeze).  · Your symptoms interfere with your normal daily activities.  Get help right away if:  · You have shortness of  breath.  Summary  · This condition can be managed by taking medicines as directed and avoiding allergens.  · Contact your health care provider if you develop a persistent cough or fever.  · During allergy season, keep windows closed as much as possible.  This information is not intended to replace advice given to you by your health care provider. Make sure you discuss any questions you have with your health care provider.  Document Released: 09/12/2002 Document Revised: 01/25/2018 Document Reviewed: 01/25/2018  [employer] Interactive Patient Education © 2020 [employer]

## 2020-03-11 NOTE — PATIENT INSTRUCTIONS
Start the steroids and the montelukast today.  You will know after the steroids run out if the montelukast is helping your allergies along with the cetirizine.  Keep routine check up.    Allergic Rhinitis, Adult  Allergic rhinitis is an allergic reaction that affects the mucous membrane inside the nose. It causes sneezing, a runny or stuffy nose, and the feeling of mucus going down the back of the throat (postnasal drip). Allergic rhinitis can be mild to severe.  There are two types of allergic rhinitis:  · Seasonal. This type is also called hay fever. It happens only during certain seasons.  · Perennial. This type can happen at any time of the year.  What are the causes?  This condition happens when the body's defense system (immune system) responds to certain harmless substances called allergens as though they were germs.   Seasonal allergic rhinitis is triggered by pollen, which can come from grasses, trees, and weeds. Perennial allergic rhinitis may be caused by:  · House dust mites.  · Pet dander.  · Mold spores.  What are the signs or symptoms?  Symptoms of this condition include:  · Sneezing.  · Runny or stuffy nose (nasal congestion).  · Postnasal drip.  · Itchy nose.  · Tearing of the eyes.  · Trouble sleeping.  · Daytime sleepiness.  How is this diagnosed?  This condition may be diagnosed based on:  · Your medical history.  · A physical exam.  · Tests to check for related conditions, such as:  ? Asthma.  ? Pink eye.  ? Ear infection.  ? Upper respiratory infection.  · Tests to find out which allergens trigger your symptoms. These may include skin or blood tests.  How is this treated?  There is no cure for this condition, but treatment can help control symptoms. Treatment may include:  · Taking medicines that block allergy symptoms, such as antihistamines. Medicine may be given as a shot, nasal spray, or pill.  · Avoiding the allergen.  · Desensitization. This treatment involves getting ongoing shots until  your body becomes less sensitive to the allergen. This treatment may be done if other treatments do not help.  · If taking medicine and avoiding the allergen does not work, new, stronger medicines may be prescribed.  Follow these instructions at home:  · Find out what you are allergic to. Common allergens include smoke, dust, and pollen.  · Avoid the things you are allergic to. These are some things you can do to help avoid allergens:  ? Replace carpet with wood, tile, or vinyl emelia. Carpet can trap dander and dust.  ? Do not smoke. Do not allow smoking in your home.  ? Change your heating and air conditioning filter at least once a month.  ? During allergy season:  § Keep windows closed as much as possible.  § Plan outdoor activities when pollen counts are lowest. This is usually during the evening hours.  § When coming indoors, change clothing and shower before sitting on furniture or bedding.  · Take over-the-counter and prescription medicines only as told by your health care provider.  · Keep all follow-up visits as told by your health care provider. This is important.  Contact a health care provider if:  · You have a fever.  · You develop a persistent cough.  · You make whistling sounds when you breathe (you wheeze).  · Your symptoms interfere with your normal daily activities.  Get help right away if:  · You have shortness of breath.  Summary  · This condition can be managed by taking medicines as directed and avoiding allergens.  · Contact your health care provider if you develop a persistent cough or fever.  · During allergy season, keep windows closed as much as possible.  This information is not intended to replace advice given to you by your health care provider. Make sure you discuss any questions you have with your health care provider.  Document Released: 09/12/2002 Document Revised: 01/25/2018 Document Reviewed: 01/25/2018  [employer] Interactive Patient Education © 2020 [employer]

## 2020-04-13 ENCOUNTER — OFFICE VISIT (OUTPATIENT)
Dept: FAMILY MEDICINE CLINIC | Facility: CLINIC | Age: 80
End: 2020-04-13

## 2020-04-13 DIAGNOSIS — J44.1 COPD EXACERBATION (HCC): Primary | ICD-10-CM

## 2020-04-13 PROCEDURE — 99442 PR PHYS/QHP TELEPHONE EVALUATION 11-20 MIN: CPT | Performed by: FAMILY MEDICINE

## 2020-04-13 RX ORDER — DOXYCYCLINE HYCLATE 100 MG
100 TABLET ORAL 2 TIMES DAILY
Qty: 20 TABLET | Refills: 0 | Status: SHIPPED | OUTPATIENT
Start: 2020-04-13 | End: 2020-05-21

## 2020-04-13 RX ORDER — ALBUTEROL SULFATE 90 UG/1
2 AEROSOL, METERED RESPIRATORY (INHALATION) EVERY 4 HOURS PRN
COMMUNITY
End: 2020-08-24

## 2020-04-13 RX ORDER — BENZONATATE 200 MG/1
200 CAPSULE ORAL 3 TIMES DAILY PRN
Qty: 30 CAPSULE | Refills: 0 | Status: SHIPPED | OUTPATIENT
Start: 2020-04-13 | End: 2020-05-21 | Stop reason: SDUPTHER

## 2020-04-13 RX ORDER — PREDNISONE 20 MG/1
20 TABLET ORAL 2 TIMES DAILY
Qty: 10 TABLET | Refills: 0 | Status: SHIPPED | OUTPATIENT
Start: 2020-04-13 | End: 2020-05-21

## 2020-04-13 NOTE — PROGRESS NOTES
You have chosen to receive care through a telephone visit. Do you consent to use a telephone visit for your medical care today? YES  This visit has been rescheduled as a phone visit to comply with patient safety concerns in accordance with CDC recommendations. Total time of discussion was 15 minutes.    Carlos Pfeiffer is a 79 y.o. female.     Chief Complaint   Patient presents with   • Cough     X1 WEEK, CAUSES URINARY INCONTINENCE, NOT SLEEPING        Patient requests a telephone visit due to the global pandemic.  She has a history of COPD and lung cancer.  She is compliant with taking her Stiolto and using her albuterol as needed.  She has had increasing cough with clear sputum and increased wheezing for the past couple of weeks.  She has tried all her home medications and is just not getting any better.  She denies any fever or chills but is fatigued and the cough is interfering with her sleep.  Patient denies being around any ill contacts and has been practicing social isolation for the last several weeks.         The following portions of the patient's history were reviewed and updated as appropriate: allergies, current medications, past family history, past medical history, past social history, past surgical history and problem list.    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 from [facility]   • History of squamous cell carcinoma     Tongue   • Hyperlipidemia    • Hyperpigmentation    • Hypertension     since early 60's   • Impacted  cerumen of both ears    • Influenza    • Low vitamin B12 level    • Lower back pain    • Menopause    • Need for influenza vaccination    • Other screening mammogram    • Thyromegaly    • Urinary pain    • UTI (urinary tract infection)    • Vitamin D deficiency        Past Surgical History:   Procedure Laterality Date   • CHOLECYSTECTOMY     • GLOSSECTOMY PARTIAL      less than one half tongue   • LUNG SURGERY     • ORAL LESION EXCISION/BIOPSY       and 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   • TUBAL ABDOMINAL LIGATION         Family History   Problem Relation Age of Onset   • Ovarian cancer Mother          at age 27   • No Known Problems Father    • Ovarian cancer Sister    • Colon cancer Brother    • No Known Problems Other        Social History     Socioeconomic History   • Marital status:      Spouse name: Not on file   • Number of children: Not on file   • Years of education: Not on file   • Highest education level: Not on file   Tobacco Use   • Smoking status: Former Smoker     Last attempt to quit: 2015     Years since quittin.2   • Smokeless tobacco: Never Used   • Tobacco comment: less than a pack per day, no smoking since , Quit 2015   Substance and Sexual Activity   • Alcohol use: Yes     Comment: Socially -A few times a month   • Drug use: No   • Sexual activity: Defer         Current Outpatient Medications:   •  albuterol sulfate  (90 Base) MCG/ACT inhaler, Inhale 2 puffs Every 4 (Four) Hours As Needed., Disp: , Rfl:   •  amLODIPine (NORVASC) 5 MG tablet, Take 5 mg by mouth., Disp: , Rfl:   •  cetirizine (ZyrTEC) 10 MG tablet, Take 10 mg by mouth., Disp: , Rfl:   •  cholecalciferol (VITAMIN D3) 1000 units tablet, Take 1,000 Units by mouth Daily., Disp: , Rfl:   •  guaiFENesin (MUCINEX) 600 MG 12 hr tablet, Take 1,200 mg by mouth 2 (Two) Times a Day., Disp: , Rfl:   •  hydroCHLOROthiazide (HYDRODIURIL) 25 MG tablet, , Disp: , Rfl:   •  ipratropium (ATROVENT)  0.06 % nasal spray, 2 sprays into the nostril(s) as directed by provider 4 (Four) Times a Day., Disp: 15 mL, Rfl: 0  •  magnesium oxide (MAGOX) 400 (241.3 Mg) MG tablet tablet, Take 400 mg by mouth Daily., Disp: , Rfl:   •  metoprolol succinate XL (TOPROL-XL) 25 MG 24 hr tablet, , Disp: , Rfl:   •  montelukast (SINGULAIR) 10 MG tablet, Take 1 tablet by mouth Every Night., Disp: 30 tablet, Rfl: 2  •  Multiple Vitamins-Minerals (CENTRUM ADULTS) tablet, Take  by mouth., Disp: , Rfl:   •  omeprazole (PriLOSEC) 40 MG capsule, Take 40 mg by mouth., Disp: , Rfl:   •  pravastatin (PRAVACHOL) 80 MG tablet, Take 80 mg by mouth., Disp: , Rfl:   •  tiotropium bromide-olodaterol (STIOLTO RESPIMAT) 2.5-2.5 MCG/ACT aerosol solution inhaler, Inhale 2 puffs Daily., Disp: 12 g, Rfl: 3  No current facility-administered medications for this visit.     Review of Systems   Constitutional: Positive for fatigue. Negative for chills and fever.   HENT: Positive for postnasal drip. Negative for congestion, rhinorrhea and sore throat.    Respiratory: Positive for cough. Negative for shortness of breath.    Cardiovascular: Negative for chest pain and leg swelling.   Gastrointestinal: Negative for abdominal pain.   Endocrine: Negative for polydipsia and polyuria.   Genitourinary: Negative for dysuria.   Musculoskeletal: Negative for arthralgias and myalgias.   Skin: Negative for rash.   Neurological: Negative for dizziness.   Hematological: Does not bruise/bleed easily.   Psychiatric/Behavioral: Positive for sleep disturbance.       Objective   There were no vitals filed for this visit.  There is no height or weight on file to calculate BMI.  Physical Exam  Visit was conducted as a telephone visit.  Patient was clear in speech and did not sound in distress.    Assessment/Plan   Darlene was seen today for cough.    Diagnoses and all orders for this visit:    COPD exacerbation (CMS/[facility])               Patient Instructions   Let me know if you do not  get better in the next week or so.

## 2020-05-21 ENCOUNTER — OFFICE VISIT (OUTPATIENT)
Dept: FAMILY MEDICINE CLINIC | Facility: CLINIC | Age: 80
End: 2020-05-21

## 2020-05-21 VITALS
BODY MASS INDEX: 24.41 KG/M2 | OXYGEN SATURATION: 94 % | HEART RATE: 47 BPM | WEIGHT: 143 LBS | SYSTOLIC BLOOD PRESSURE: 134 MMHG | HEIGHT: 64 IN | DIASTOLIC BLOOD PRESSURE: 60 MMHG | TEMPERATURE: 96.6 F

## 2020-05-21 DIAGNOSIS — Z11.59 NEED FOR HEPATITIS C SCREENING TEST: ICD-10-CM

## 2020-05-21 DIAGNOSIS — I10 ESSENTIAL HYPERTENSION: Primary | ICD-10-CM

## 2020-05-21 DIAGNOSIS — J44.1 COPD EXACERBATION (HCC): ICD-10-CM

## 2020-05-21 DIAGNOSIS — R73.09 ABNORMAL GLUCOSE: ICD-10-CM

## 2020-05-21 DIAGNOSIS — J44.9 CHRONIC OBSTRUCTIVE PULMONARY DISEASE, UNSPECIFIED COPD TYPE (HCC): ICD-10-CM

## 2020-05-21 DIAGNOSIS — E78.5 HYPERLIPIDEMIA, UNSPECIFIED HYPERLIPIDEMIA TYPE: ICD-10-CM

## 2020-05-21 DIAGNOSIS — J30.1 SEASONAL ALLERGIC RHINITIS DUE TO POLLEN: ICD-10-CM

## 2020-05-21 DIAGNOSIS — Z12.31 VISIT FOR SCREENING MAMMOGRAM: ICD-10-CM

## 2020-05-21 DIAGNOSIS — Z85.118 H/O: LUNG CANCER: ICD-10-CM

## 2020-05-21 DIAGNOSIS — K21.9 GASTROESOPHAGEAL REFLUX DISEASE WITHOUT ESOPHAGITIS: ICD-10-CM

## 2020-05-21 PROCEDURE — G0439 PPPS, SUBSEQ VISIT: HCPCS | Performed by: FAMILY MEDICINE

## 2020-05-21 RX ORDER — BENZONATATE 200 MG/1
200 CAPSULE ORAL 3 TIMES DAILY PRN
Qty: 30 CAPSULE | Refills: 2 | Status: SHIPPED | OUTPATIENT
Start: 2020-05-21 | End: 2020-09-28

## 2020-05-21 RX ORDER — MONTELUKAST SODIUM 10 MG/1
10 TABLET ORAL NIGHTLY
Qty: 90 TABLET | Refills: 3 | OUTPATIENT
Start: 2020-05-21 | End: 2021-04-23

## 2020-05-21 NOTE — PROGRESS NOTES
The ABCs of the Annual Wellness Visit  Subsequent Medicare Wellness Visit    Chief Complaint   Patient presents with   • Medicare Wellness-subsequent     SHE DOES HER C-SCOPE WITH [employer], MAMMOGRAM 2019. SHE IS DUE FOR A CXR FOR FOLLOW UP LUNG CANCER OF THE LEFT LUNG       Subjective   History of Present Illness:  Darlene Pfeiffer is a 79 y.o. female who presents for a Subsequent Medicare Wellness Visit.  Patient presents for her Medicare wellness without complaints.  She is compliant with her blood pressure and cholesterol medicine.  Her COPD has been doing better since she started the Singulair.  Her inhaler use has been stable.    HEALTH RISK ASSESSMENT    Recent Hospitalizations:  No hospitalization(s) within the last year.    Current Medical Providers:  Patient Care Team:  Kehrer, Meredith Lea, MD as PCP - General (Family Medicine)    Smoking Status:  Social History     Tobacco Use   Smoking Status Former Smoker   • Last attempt to quit: 2015   • Years since quittin.3   Smokeless Tobacco Never Used   Tobacco Comment    less than a pack per day, no smoking since , Quit 2015       Alcohol Consumption:  Social History     Substance and Sexual Activity   Alcohol Use Yes    Comment: Socially -A few times a month       Depression Screen:   PHQ-2/PHQ-9 Depression Screening 2020   Little interest or pleasure in doing things 0   Feeling down, depressed, or hopeless 0   Trouble falling or staying asleep, or sleeping too much 1   Feeling tired or having little energy 1   Poor appetite or overeating 0   Feeling bad about yourself - or that you are a failure or have let yourself or your family down 0   Trouble concentrating on things, such as reading the newspaper or watching television 0   Moving or speaking so slowly that other people could have noticed. Or the opposite - being so fidgety or restless that you have been moving around a lot more than usual 0   Thoughts that you would be better off dead, or  of hurting yourself in some way 0   Total Score 2       Fall Risk Screen:  JOY Fall Risk Assessment was completed, and patient is at LOW risk for falls.Assessment completed on:5/21/2020    Health Habits and Functional and Cognitive Screening:  Functional & Cognitive Status 5/21/2020   Do you have difficulty preparing food and eating? No   Do you have difficulty bathing yourself, getting dressed or grooming yourself? No   Do you have difficulty using the toilet? No   Do you have difficulty moving around from place to place? No   Do you have trouble with steps or getting out of a bed or a chair? No   Current Diet Well Balanced Diet   Dental Exam Up to date   Eye Exam Up to date   Exercise (times per week) 4 times per week   Current Exercise Activities Include Gardening   Do you need help using the phone?  No   Are you deaf or do you have serious difficulty hearing?  No   Do you need help with transportation? No   Do you need help shopping? No   Do you need help preparing meals?  No   Do you need help with housework?  No   Do you need help with laundry? No   Do you need help taking your medications? No   Do you need help managing money? No   Do you ever drive or ride in a car without wearing a seat belt? No   Have you felt unusual stress, anger or loneliness in the last month? No   Who do you live with? Spouse   If you need help, do you have trouble finding someone available to you? No   Have you been bothered in the last four weeks by sexual problems? No   Do you have difficulty concentrating, remembering or making decisions? No         Does the patient have evidence of cognitive impairment? No    Asprin use counseling:Does not need ASA (and currently is not on it)    Age-appropriate Screening Schedule:  Refer to the list below for future screening recommendations based on patient's age, sex and/or medical conditions. Orders for these recommended tests are listed in the plan section. The patient has been provided  with a written plan.    Health Maintenance   Topic Date Due   • ZOSTER VACCINE (1 of 2) 07/07/1992   • INFLUENZA VACCINE  08/01/2020   • LIPID PANEL  11/20/2020   • COLONOSCOPY  03/19/2024   • TDAP/TD VACCINES (2 - Td) 11/10/2026          The following portions of the patient's history were reviewed and updated as appropriate: allergies, current medications, past family history, past medical history, past social history, past surgical history and problem list.    Outpatient Medications Prior to Visit   Medication Sig Dispense Refill   • albuterol sulfate  (90 Base) MCG/ACT inhaler Inhale 2 puffs Every 4 (Four) Hours As Needed.     • amLODIPine (NORVASC) 5 MG tablet Take 5 mg by mouth.     • cetirizine (ZyrTEC) 10 MG tablet Take 10 mg by mouth.     • cholecalciferol (VITAMIN D3) 1000 units tablet Take 1,000 Units by mouth Daily.     • guaiFENesin (MUCINEX) 600 MG 12 hr tablet Take 1,200 mg by mouth 2 (Two) Times a Day.     • hydroCHLOROthiazide (HYDRODIURIL) 25 MG tablet      • ipratropium (ATROVENT) 0.06 % nasal spray 2 sprays into the nostril(s) as directed by provider 4 (Four) Times a Day. 15 mL 0   • magnesium oxide (MAGOX) 400 (241.3 Mg) MG tablet tablet Take 400 mg by mouth Daily.     • metoprolol succinate XL (TOPROL-XL) 25 MG 24 hr tablet      • Multiple Vitamins-Minerals (CENTRUM ADULTS) tablet Take  by mouth.     • omeprazole (PriLOSEC) 40 MG capsule Take 40 mg by mouth.     • pravastatin (PRAVACHOL) 80 MG tablet Take 80 mg by mouth.     • tiotropium bromide-olodaterol (STIOLTO RESPIMAT) 2.5-2.5 MCG/ACT aerosol solution inhaler Inhale 2 puffs Daily. 12 g 3   • benzonatate (TESSALON) 200 MG capsule Take 1 capsule by mouth 3 (Three) Times a Day As Needed for Cough. 30 capsule 0   • montelukast (SINGULAIR) 10 MG tablet Take 1 tablet by mouth Every Night. 30 tablet 2   • doxycycline (VIBRAMYICN) 100 MG tablet Take 1 tablet by mouth 2 (Two) Times a Day. 20 tablet 0   • predniSONE (DELTASONE) 20 MG tablet  "Take 1 tablet by mouth 2 (Two) Times a Day. 10 tablet 0     No facility-administered medications prior to visit.        Patient Active Problem List   Diagnosis   • Hypertension   • Hyperlipidemia   • Esophageal reflux   • Chronic obstructive pulmonary disease (CMS/HCC)   • Seasonal allergic rhinitis due to pollen   • Abnormal glucose       Advanced Care Planning:  ACP discussion was held with the patient during this visit. Patient has an advance directive (not in EMR), copy requested.    Review of Systems   Constitutional: Negative for chills, fatigue and fever.   HENT: Negative for congestion, rhinorrhea and sore throat.    Respiratory: Positive for cough. Negative for shortness of breath.    Cardiovascular: Negative for chest pain and leg swelling.   Gastrointestinal: Negative for abdominal pain.   Endocrine: Negative for polydipsia and polyuria.   Genitourinary: Negative for dysuria.   Musculoskeletal: Negative for arthralgias and myalgias.   Skin: Negative for rash.   Neurological: Negative for dizziness.   Hematological: Does not bruise/bleed easily.   Psychiatric/Behavioral: Negative for sleep disturbance.       Compared to one year ago, the patient feels her physical health is the same.  Compared to one year ago, the patient feels her mental health is the same.    Reviewed chart for potential of high risk medication in the elderly: yes  Reviewed chart for potential of harmful drug interactions in the elderly:yes    Objective         Vitals:    05/21/20 0838   BP: 134/60   Pulse: (!) 47   Temp: 96.6 °F (35.9 °C)   SpO2: 94%   Weight: 64.9 kg (143 lb)   Height: 162.6 cm (64\")       Body mass index is 24.55 kg/m².  Discussed the patient's BMI with her. The BMI is in the acceptable range.    Physical Exam   Constitutional: She is oriented to person, place, and time. She appears well-developed and well-nourished.   HENT:   Head: Normocephalic and atraumatic.   Mouth/Throat: Oropharynx is clear and moist.   Eyes: " Pupils are equal, round, and reactive to light. EOM are normal.   Neck: Neck supple. No thyromegaly present.   Cardiovascular: Normal rate and regular rhythm.   No murmur heard.  Pulmonary/Chest: Effort normal. She has decreased breath sounds. She has no wheezes.   Abdominal: Soft. Bowel sounds are normal. There is no tenderness.   Musculoskeletal: Normal range of motion. She exhibits no edema.   Neurological: She is alert and oriented to person, place, and time.   Skin: Skin is warm and dry.   Psychiatric: She has a normal mood and affect.             Assessment/Plan   Medicare Risks and Personalized Health Plan  CMS Preventative Services Quick Reference      The above risks/problems have been discussed with the patient.  Pertinent information has been shared with the patient in the After Visit Summary.  Follow up plans and orders are seen below in the Assessment/Plan Section.    Diagnoses and all orders for this visit:    1. Essential hypertension (Primary)  -     CBC & Differential  -     Comprehensive Metabolic Panel  -     TSH    2. COPD exacerbation (CMS/HCC)  -     benzonatate (TESSALON) 200 MG capsule; Take 1 capsule by mouth 3 (Three) Times a Day As Needed for Cough.  Dispense: 30 capsule; Refill: 2    3. Hyperlipidemia, unspecified hyperlipidemia type  -     Lipid Panel    4. Chronic obstructive pulmonary disease, unspecified COPD type (CMS/HCC)    5. Seasonal allergic rhinitis due to pollen  -     montelukast (Singulair) 10 MG tablet; Take 1 tablet by mouth Every Night.  Dispense: 90 tablet; Refill: 3    6. Gastroesophageal reflux disease without esophagitis    7. Abnormal glucose  -     Hemoglobin A1c    8. Visit for screening mammogram  -     Mammo Screening Bilateral With CAD; Future    9. Need for hepatitis C screening test  -     Hepatitis C Antibody    10. H/O: lung cancer  -     XR Chest 2 View; Future      Follow Up:  No follow-ups on file.     An After Visit Summary and PPPS were given to the  patient.

## 2020-05-22 DIAGNOSIS — Z85.118 H/O: LUNG CANCER: ICD-10-CM

## 2020-05-22 DIAGNOSIS — Z12.31 VISIT FOR SCREENING MAMMOGRAM: ICD-10-CM

## 2020-05-22 LAB
ALBUMIN SERPL-MCNC: 4.5 G/DL (ref 3.5–5.2)
ALBUMIN/GLOB SERPL: 2.3 G/DL
ALP SERPL-CCNC: 76 U/L (ref 39–117)
ALT SERPL-CCNC: 14 U/L (ref 1–33)
AST SERPL-CCNC: 19 U/L (ref 1–32)
BASOPHILS # BLD AUTO: 0.03 10*3/MM3 (ref 0–0.2)
BASOPHILS NFR BLD AUTO: 0.4 % (ref 0–1.5)
BILIRUB SERPL-MCNC: 0.5 MG/DL (ref 0.2–1.2)
BUN SERPL-MCNC: 12 MG/DL (ref 8–23)
BUN/CREAT SERPL: 17.9 (ref 7–25)
CALCIUM SERPL-MCNC: 9.6 MG/DL (ref 8.6–10.5)
CHLORIDE SERPL-SCNC: 99 MMOL/L (ref 98–107)
CHOLEST SERPL-MCNC: 214 MG/DL (ref 0–200)
CO2 SERPL-SCNC: 34 MMOL/L (ref 22–29)
CREAT SERPL-MCNC: 0.67 MG/DL (ref 0.57–1)
EOSINOPHIL # BLD AUTO: 0 10*3/MM3 (ref 0–0.4)
EOSINOPHIL NFR BLD AUTO: 0 % (ref 0.3–6.2)
ERYTHROCYTE [DISTWIDTH] IN BLOOD BY AUTOMATED COUNT: 13 % (ref 12.3–15.4)
GLOBULIN SER CALC-MCNC: 2 GM/DL
GLUCOSE SERPL-MCNC: 110 MG/DL (ref 65–99)
HBA1C MFR BLD: 6.07 % (ref 4.8–5.6)
HCT VFR BLD AUTO: 42.2 % (ref 34–46.6)
HCV AB S/CO SERPL IA: <0.1 S/CO RATIO (ref 0–0.9)
HDLC SERPL-MCNC: 59 MG/DL (ref 40–60)
HGB BLD-MCNC: 14.2 G/DL (ref 12–15.9)
IMM GRANULOCYTES # BLD AUTO: 0.01 10*3/MM3 (ref 0–0.05)
IMM GRANULOCYTES NFR BLD AUTO: 0.1 % (ref 0–0.5)
LDLC SERPL CALC-MCNC: 128 MG/DL (ref 0–100)
LYMPHOCYTES # BLD AUTO: 2.56 10*3/MM3 (ref 0.7–3.1)
LYMPHOCYTES NFR BLD AUTO: 37.4 % (ref 19.6–45.3)
MCH RBC QN AUTO: 30.4 PG (ref 26.6–33)
MCHC RBC AUTO-ENTMCNC: 33.6 G/DL (ref 31.5–35.7)
MCV RBC AUTO: 90.4 FL (ref 79–97)
MONOCYTES # BLD AUTO: 0.5 10*3/MM3 (ref 0.1–0.9)
MONOCYTES NFR BLD AUTO: 7.3 % (ref 5–12)
NEUTROPHILS # BLD AUTO: 3.75 10*3/MM3 (ref 1.7–7)
NEUTROPHILS NFR BLD AUTO: 54.8 % (ref 42.7–76)
NRBC BLD AUTO-RTO: 0 /100 WBC (ref 0–0.2)
PLATELET # BLD AUTO: 252 10*3/MM3 (ref 140–450)
POTASSIUM SERPL-SCNC: 3.9 MMOL/L (ref 3.5–5.2)
PROT SERPL-MCNC: 6.5 G/DL (ref 6–8.5)
RBC # BLD AUTO: 4.67 10*6/MM3 (ref 3.77–5.28)
SODIUM SERPL-SCNC: 142 MMOL/L (ref 136–145)
TRIGL SERPL-MCNC: 134 MG/DL (ref 0–150)
TSH SERPL DL<=0.005 MIU/L-ACNC: 4.22 UIU/ML (ref 0.27–4.2)
VLDLC SERPL CALC-MCNC: 26.8 MG/DL (ref 5–40)
WBC # BLD AUTO: 6.85 10*3/MM3 (ref 3.4–10.8)

## 2020-05-27 ENCOUNTER — TELEPHONE (OUTPATIENT)
Dept: FAMILY MEDICINE CLINIC | Facility: CLINIC | Age: 80
End: 2020-05-27

## 2020-05-27 DIAGNOSIS — Z85.118 H/O: LUNG CANCER: Primary | ICD-10-CM

## 2020-05-27 DIAGNOSIS — R91.8 ABNORMAL FINDINGS ON DIAGNOSTIC IMAGING OF LUNG: ICD-10-CM

## 2020-05-27 NOTE — TELEPHONE ENCOUNTER
Geraldine nurse  with [facility] called stated that on the CXR order it was ordered with diagnosis Hx of lung cancer, they can not accept this diagnosis. They are unable to accept any diagnosis with history of. Can you please put a different diagnosis on the x-ray so it can be faxed to Geraldine at [phone]. Geraldine call back number is [phone].

## 2020-05-27 NOTE — TELEPHONE ENCOUNTER
After reviewing the CXR from 5/21/20 it was compared to a CXR the patient had 4/2019, and not to the CXR she had on 10/22/19. I spoke with [facility] they will do an addendum on it and should be completed by today.

## 2020-05-28 DIAGNOSIS — R05.9 COUGH: Primary | ICD-10-CM

## 2020-05-28 NOTE — TELEPHONE ENCOUNTER
Order printed.  It looks like she does need a CT scan of her chest for an evolving right lower lobe infiltrate versus scar tissue.  Please make sure where patient wants to have her CT scan done and I can put in an order.

## 2020-05-29 DIAGNOSIS — R05.9 COUGH: ICD-10-CM

## 2020-05-29 DIAGNOSIS — Z85.118 H/O: LUNG CANCER: ICD-10-CM

## 2020-05-29 DIAGNOSIS — R91.8 ABNORMAL FINDINGS ON DIAGNOSTIC IMAGING OF LUNG: ICD-10-CM

## 2020-06-02 ENCOUNTER — TELEPHONE (OUTPATIENT)
Dept: FAMILY MEDICINE CLINIC | Facility: CLINIC | Age: 80
End: 2020-06-02

## 2020-06-02 NOTE — TELEPHONE ENCOUNTER
PT CALLED ON THE ON CALL SERVICE AND STATED SHE IS SUPPOSE TO HAVE TEST DONE ON Thursday BUT HASN'T HEARD ANYTHING ABOUT IT. PLEASE ADVISE PT.

## 2020-06-02 NOTE — TELEPHONE ENCOUNTER
PATIENT CALLED AND STATES SHE IS TO HAVE A CT ON HER LUNGS AT [facility] AND NO ONE HAS CALLED HER WITH AN APPOINTMENT. PLEASE CALL AND ADVISE [phone].    SHE IS UPSET THAT NO ONE HAS CALLED HER YET  ADVISED HER TO CALL [facility]  AND SHE STATED THAT IS DR.KEHRER JOB. SHE WASN'T GOING TO CALL.

## 2020-06-08 ENCOUNTER — APPOINTMENT (OUTPATIENT)
Dept: WOMENS IMAGING | Facility: HOSPITAL | Age: 80
End: 2020-06-08

## 2020-06-08 PROCEDURE — 77067 SCR MAMMO BI INCL CAD: CPT | Performed by: RADIOLOGY

## 2020-06-08 PROCEDURE — 77063 BREAST TOMOSYNTHESIS BI: CPT | Performed by: RADIOLOGY

## 2020-08-14 RX ORDER — PRAVASTATIN SODIUM 80 MG/1
80 TABLET ORAL NIGHTLY
Qty: 90 TABLET | Refills: 0 | Status: SHIPPED | OUTPATIENT
Start: 2020-08-14 | End: 2020-08-26 | Stop reason: SDUPTHER

## 2020-08-14 RX ORDER — METOPROLOL SUCCINATE 25 MG/1
25 TABLET, EXTENDED RELEASE ORAL DAILY
Qty: 90 TABLET | Refills: 0 | Status: SHIPPED | OUTPATIENT
Start: 2020-08-14 | End: 2020-08-26 | Stop reason: SDUPTHER

## 2020-08-14 RX ORDER — HYDROCHLOROTHIAZIDE 25 MG/1
25 TABLET ORAL DAILY
Qty: 90 TABLET | Refills: 0 | Status: SHIPPED | OUTPATIENT
Start: 2020-08-14 | End: 2020-08-26 | Stop reason: SDUPTHER

## 2020-08-14 RX ORDER — PRAVASTATIN SODIUM 80 MG/1
TABLET ORAL
Qty: 90 TABLET | OUTPATIENT
Start: 2020-08-14

## 2020-08-14 RX ORDER — HYDROCHLOROTHIAZIDE 25 MG/1
TABLET ORAL
Qty: 90 TABLET | OUTPATIENT
Start: 2020-08-14

## 2020-08-14 RX ORDER — METOPROLOL SUCCINATE 25 MG/1
TABLET, EXTENDED RELEASE ORAL
Qty: 90 TABLET | OUTPATIENT
Start: 2020-08-14

## 2020-08-14 RX ORDER — AMLODIPINE BESYLATE 5 MG/1
5 TABLET ORAL DAILY
Qty: 90 TABLET | Refills: 0 | Status: SHIPPED | OUTPATIENT
Start: 2020-08-14 | End: 2020-08-26 | Stop reason: SDUPTHER

## 2020-08-14 RX ORDER — AMLODIPINE BESYLATE 10 MG/1
TABLET ORAL
Qty: 45 TABLET | OUTPATIENT
Start: 2020-08-14

## 2020-08-24 ENCOUNTER — OFFICE VISIT (OUTPATIENT)
Dept: FAMILY MEDICINE CLINIC | Facility: CLINIC | Age: 80
End: 2020-08-24

## 2020-08-24 VITALS
HEIGHT: 64 IN | HEART RATE: 81 BPM | OXYGEN SATURATION: 98 % | TEMPERATURE: 96.9 F | DIASTOLIC BLOOD PRESSURE: 66 MMHG | SYSTOLIC BLOOD PRESSURE: 120 MMHG | WEIGHT: 145.4 LBS | BODY MASS INDEX: 24.82 KG/M2

## 2020-08-24 DIAGNOSIS — M19.042 PRIMARY OSTEOARTHRITIS OF BOTH HANDS: ICD-10-CM

## 2020-08-24 DIAGNOSIS — J40 BRONCHITIS: ICD-10-CM

## 2020-08-24 DIAGNOSIS — M19.041 PRIMARY OSTEOARTHRITIS OF BOTH HANDS: ICD-10-CM

## 2020-08-24 DIAGNOSIS — J44.1 COPD EXACERBATION (HCC): Primary | ICD-10-CM

## 2020-08-24 PROCEDURE — 99214 OFFICE O/P EST MOD 30 MIN: CPT | Performed by: FAMILY MEDICINE

## 2020-08-24 RX ORDER — PREDNISONE 20 MG/1
20 TABLET ORAL 2 TIMES DAILY
Qty: 10 TABLET | Refills: 0 | Status: SHIPPED | OUTPATIENT
Start: 2020-08-24 | End: 2020-09-28

## 2020-08-24 RX ORDER — ALBUTEROL SULFATE 90 UG/1
2 AEROSOL, METERED RESPIRATORY (INHALATION) EVERY 4 HOURS PRN
Qty: 18 G
Start: 2020-08-24 | End: 2021-04-29 | Stop reason: SDUPTHER

## 2020-08-24 NOTE — PATIENT INSTRUCTIONS
Make sure to use the albuterol every 4 hours as needed for cough.  Let me know if the Voltaren helps and we can send it to [facility] mail-in pharmacy.

## 2020-08-26 RX ORDER — METOPROLOL SUCCINATE 25 MG/1
25 TABLET, EXTENDED RELEASE ORAL DAILY
Qty: 90 TABLET | Refills: 0 | Status: SHIPPED | OUTPATIENT
Start: 2020-08-26 | End: 2020-11-11

## 2020-08-26 RX ORDER — AMLODIPINE BESYLATE 5 MG/1
5 TABLET ORAL DAILY
Qty: 90 TABLET | Refills: 0 | Status: SHIPPED | OUTPATIENT
Start: 2020-08-26 | End: 2020-11-11

## 2020-08-26 RX ORDER — PRAVASTATIN SODIUM 80 MG/1
80 TABLET ORAL NIGHTLY
Qty: 90 TABLET | Refills: 0 | Status: SHIPPED | OUTPATIENT
Start: 2020-08-26 | End: 2020-11-11

## 2020-08-26 RX ORDER — HYDROCHLOROTHIAZIDE 25 MG/1
25 TABLET ORAL DAILY
Qty: 90 TABLET | Refills: 0 | Status: SHIPPED | OUTPATIENT
Start: 2020-08-26 | End: 2020-11-11

## 2020-09-28 ENCOUNTER — OFFICE VISIT (OUTPATIENT)
Dept: FAMILY MEDICINE CLINIC | Facility: CLINIC | Age: 80
End: 2020-09-28

## 2020-09-28 VITALS
HEART RATE: 78 BPM | SYSTOLIC BLOOD PRESSURE: 128 MMHG | OXYGEN SATURATION: 96 % | HEIGHT: 64 IN | WEIGHT: 146.8 LBS | DIASTOLIC BLOOD PRESSURE: 80 MMHG | TEMPERATURE: 96.8 F | BODY MASS INDEX: 25.06 KG/M2

## 2020-09-28 DIAGNOSIS — Z20.822 SUSPECTED COVID-19 VIRUS INFECTION: ICD-10-CM

## 2020-09-28 DIAGNOSIS — J18.9 PNEUMONIA OF LEFT LOWER LOBE DUE TO INFECTIOUS ORGANISM: Primary | ICD-10-CM

## 2020-09-28 DIAGNOSIS — J44.1 COPD EXACERBATION (HCC): ICD-10-CM

## 2020-09-28 PROCEDURE — 99214 OFFICE O/P EST MOD 30 MIN: CPT | Performed by: FAMILY MEDICINE

## 2020-09-28 RX ORDER — PREDNISONE 20 MG/1
20 TABLET ORAL 2 TIMES DAILY
Qty: 10 TABLET | Refills: 0 | Status: SHIPPED | OUTPATIENT
Start: 2020-09-28 | End: 2020-12-01

## 2020-09-28 RX ORDER — DOXYCYCLINE 100 MG/1
100 CAPSULE ORAL 2 TIMES DAILY
Qty: 14 CAPSULE | Refills: 0 | Status: SHIPPED | OUTPATIENT
Start: 2020-09-28 | End: 2020-12-21

## 2020-09-28 NOTE — PROGRESS NOTES
Subjective   Darlene Pfeiffer is a 80 y.o. female.     Chief Complaint   Patient presents with   • Cough     X 4 days   • Nasal Congestion   • Shortness of Breath        Sick for the past 4-5 days.  Getting worse.  Has had a lot of cough.  It is nonproductive.    She did go to a birthday party over the weekend.    No ill contacts that she is aware of.    She did get exposed to a lot of dust last week as well.    Cough  This is a chronic problem. Associated symptoms include postnasal drip, shortness of breath and wheezing. Pertinent negatives include no chest pain, chills, ear pain, fever, headaches, myalgias, rash, rhinorrhea or sore throat.   Shortness of Breath  Associated symptoms include wheezing. Pertinent negatives include no abdominal pain, chest pain, ear pain, fever, headaches, leg swelling, rash, rhinorrhea or sore throat.          The following portions of the patient's history were reviewed and updated as appropriate: allergies, current medications, past family history, past medical history, past social history, past surgical history and problem list.    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 from [facility]   • History of squamous cell carcinoma     Tongue   • Hyperlipidemia    • Hyperpigmentation    • Hypertension     since early 60's   • Impacted cerumen of both ears    • Influenza    • Low vitamin B12 level    • Lower back pain    • Menopause    • Need for influenza vaccination    • Other screening mammogram    • Thyromegaly    • Urinary  pain    • UTI (urinary tract infection)    • Vitamin D deficiency        Past Surgical History:   Procedure Laterality Date   • CHOLECYSTECTOMY     • GLOSSECTOMY PARTIAL      less than one half tongue   • LUNG SURGERY     • ORAL LESION EXCISION/BIOPSY       and 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   • TUBAL ABDOMINAL LIGATION         Family History   Problem Relation Age of Onset   • Ovarian cancer Mother          at age 27   • No Known Problems Father    • Ovarian cancer Sister    • Colon cancer Brother    • No Known Problems Other        Social History     Socioeconomic History   • Marital status:      Spouse name: Not on file   • Number of children: Not on file   • Years of education: Not on file   • Highest education level: Not on file   Tobacco Use   • Smoking status: Former Smoker     Quit date: 2015     Years since quittin.6   • Smokeless tobacco: Never Used   • Tobacco comment: less than a pack per day, no smoking since , Quit 2015   Substance and Sexual Activity   • Alcohol use: Yes     Comment: Socially -A few times a month   • Drug use: No   • Sexual activity: Defer         Current Outpatient Medications:   •  albuterol sulfate  (90 Base) MCG/ACT inhaler, Inhale 2 puffs Every 4 (Four) Hours As Needed for Wheezing., Disp: 18 g, Rfl:   •  amLODIPine (NORVASC) 5 MG tablet, Take 1 tablet by mouth Daily., Disp: 90 tablet, Rfl: 0  •  cetirizine (ZyrTEC) 10 MG tablet, Take 10 mg by mouth., Disp: , Rfl:   •  cholecalciferol (VITAMIN D3) 1000 units tablet, Take 1,000 Units by mouth Daily., Disp: , Rfl:   •  diclofenac (VOLTAREN) 1 % gel gel, Apply 4 g topically to the appropriate area as directed 4 (Four) Times a Day As Needed (pain)., Disp: 100 g, Rfl: 0  •  hydroCHLOROthiazide (HYDRODIURIL) 25 MG tablet, Take 1 tablet by mouth Daily., Disp: 90 tablet, Rfl: 0  •  ipratropium (ATROVENT) 0.06 % nasal spray, 2 sprays into the nostril(s) as directed by provider 4 (Four)  "Times a Day., Disp: 15 mL, Rfl: 0  •  metoprolol succinate XL (TOPROL-XL) 25 MG 24 hr tablet, Take 1 tablet by mouth Daily., Disp: 90 tablet, Rfl: 0  •  montelukast (Singulair) 10 MG tablet, Take 1 tablet by mouth Every Night., Disp: 90 tablet, Rfl: 3  •  Multiple Vitamins-Minerals (CENTRUM ADULTS) tablet, Take  by mouth., Disp: , Rfl:   •  omeprazole (PriLOSEC) 40 MG capsule, Take 40 mg by mouth., Disp: , Rfl:   •  pravastatin (PRAVACHOL) 80 MG tablet, Take 1 tablet by mouth Every Night., Disp: 90 tablet, Rfl: 0  •  tiotropium bromide-olodaterol (Stiolto Respimat) 2.5-2.5 MCG/ACT aerosol solution inhaler, Inhale 2 puffs Daily., Disp: 8 g, Rfl: 0  •  doxycycline (MONODOX) 100 MG capsule, Take 1 capsule by mouth 2 (Two) Times a Day., Disp: 14 capsule, Rfl: 0  •  predniSONE (DELTASONE) 20 MG tablet, Take 1 tablet by mouth 2 (Two) Times a Day., Disp: 10 tablet, Rfl: 0    Review of Systems   Constitutional: Negative for chills, fatigue and fever.   HENT: Positive for congestion and postnasal drip. Negative for ear pain, rhinorrhea and sore throat.    Respiratory: Positive for cough, shortness of breath and wheezing.    Cardiovascular: Negative for chest pain and leg swelling.   Gastrointestinal: Negative for abdominal pain.   Endocrine: Negative for polydipsia and polyuria.   Genitourinary: Negative for dysuria.   Musculoskeletal: Negative for arthralgias and myalgias.   Skin: Negative for rash.   Neurological: Negative for dizziness and headaches.   Hematological: Does not bruise/bleed easily.   Psychiatric/Behavioral: Negative for sleep disturbance.       Objective   Vitals:    09/28/20 1016   BP: 128/80   Pulse: 78   Temp: 96.8 °F (36 °C)   SpO2: 96%   Weight: 66.6 kg (146 lb 12.8 oz)   Height: 162.6 cm (64\")     Body mass index is 25.2 kg/m².  Physical Exam  Constitutional:       General: She is not in acute distress.     Appearance: She is well-developed.   HENT:      Head: Normocephalic and atraumatic.      Right " Ear: Tympanic membrane, ear canal and external ear normal.      Left Ear: Tympanic membrane, ear canal and external ear normal.      Nose: Congestion present.      Mouth/Throat:      Mouth: Mucous membranes are moist.      Pharynx: Oropharynx is clear. No oropharyngeal exudate or posterior oropharyngeal erythema.   Eyes:      Conjunctiva/sclera: Conjunctivae normal.      Pupils: Pupils are equal, round, and reactive to light.   Neck:      Musculoskeletal: Neck supple.      Thyroid: No thyromegaly.   Cardiovascular:      Rate and Rhythm: Normal rate and regular rhythm.      Heart sounds: No murmur.   Pulmonary:      Effort: Pulmonary effort is normal.      Breath sounds: Examination of the left-lower field reveals rhonchi and rales. Decreased breath sounds, rhonchi and rales present. No wheezing.   Abdominal:      General: Bowel sounds are normal.      Palpations: Abdomen is soft.      Tenderness: There is no abdominal tenderness.   Musculoskeletal: Normal range of motion.   Skin:     General: Skin is warm and dry.   Neurological:      Mental Status: She is alert and oriented to person, place, and time.           Assessment/Plan   Darlene was seen today for cough, nasal congestion and shortness of breath.    Diagnoses and all orders for this visit:    Pneumonia of left lower lobe due to infectious organism  -     CBC & Differential  -     Cancel: Basic Metabolic Panel  -     XR Chest 2 View; Future  -     COVID-19,[facility] ROUTINE, NP/OP SWAB IN TRANSPORT MEDIA OR ESWAB 72 HR TAT - Swab, Oropharynx; Future  -     doxycycline (MONODOX) 100 MG capsule; Take 1 capsule by mouth 2 (Two) Times a Day.  -     Comprehensive Metabolic Panel    COPD exacerbation (CMS/HCC)  -     XR Chest 2 View; Future  -     predniSONE (DELTASONE) 20 MG tablet; Take 1 tablet by mouth 2 (Two) Times a Day.    Suspected COVID-19 virus infection  -     CBC & Differential  -     Cancel: Basic Metabolic Panel  -     XR Chest 2 View; Future  -      COVID-19,[facility] ROUTINE, NP/OP SWAB IN TRANSPORT MEDIA OR ESWAB 72 HR TAT - Swab, Oropharynx; Future  -     QUESTIONNAIRE SERIES  -     Comprehensive Metabolic Panel               Patient Instructions   Push fluids and rest.  Follow up pending test results.

## 2020-09-29 DIAGNOSIS — J18.9 PNEUMONIA OF LEFT LOWER LOBE DUE TO INFECTIOUS ORGANISM: ICD-10-CM

## 2020-09-29 DIAGNOSIS — J44.1 COPD EXACERBATION (HCC): ICD-10-CM

## 2020-09-29 DIAGNOSIS — Z20.822 SUSPECTED COVID-19 VIRUS INFECTION: ICD-10-CM

## 2020-09-29 LAB
ALBUMIN SERPL-MCNC: 4.3 G/DL (ref 3.5–5.2)
ALBUMIN/GLOB SERPL: 2.5 G/DL
ALP SERPL-CCNC: 90 U/L (ref 39–117)
ALT SERPL-CCNC: 13 U/L (ref 1–33)
AST SERPL-CCNC: 19 U/L (ref 1–32)
BASOPHILS # BLD AUTO: 0.04 10*3/MM3 (ref 0–0.2)
BASOPHILS NFR BLD AUTO: 0.6 % (ref 0–1.5)
BILIRUB SERPL-MCNC: 0.2 MG/DL (ref 0–1.2)
BUN SERPL-MCNC: 16 MG/DL (ref 8–23)
BUN/CREAT SERPL: 20.8 (ref 7–25)
CALCIUM SERPL-MCNC: 9.1 MG/DL (ref 8.6–10.5)
CHLORIDE SERPL-SCNC: 98 MMOL/L (ref 98–107)
CO2 SERPL-SCNC: 30 MMOL/L (ref 22–29)
CREAT SERPL-MCNC: 0.77 MG/DL (ref 0.57–1)
EOSINOPHIL # BLD AUTO: 0 10*3/MM3 (ref 0–0.4)
EOSINOPHIL NFR BLD AUTO: 0 % (ref 0.3–6.2)
ERYTHROCYTE [DISTWIDTH] IN BLOOD BY AUTOMATED COUNT: 12.5 % (ref 12.3–15.4)
GLOBULIN SER CALC-MCNC: 1.7 GM/DL
GLUCOSE SERPL-MCNC: 104 MG/DL (ref 65–99)
HCT VFR BLD AUTO: 42.1 % (ref 34–46.6)
HGB BLD-MCNC: 13.9 G/DL (ref 12–15.9)
IMM GRANULOCYTES # BLD AUTO: 0.01 10*3/MM3 (ref 0–0.05)
IMM GRANULOCYTES NFR BLD AUTO: 0.2 % (ref 0–0.5)
LYMPHOCYTES # BLD AUTO: 2.02 10*3/MM3 (ref 0.7–3.1)
LYMPHOCYTES NFR BLD AUTO: 32.7 % (ref 19.6–45.3)
MCH RBC QN AUTO: 30.4 PG (ref 26.6–33)
MCHC RBC AUTO-ENTMCNC: 33 G/DL (ref 31.5–35.7)
MCV RBC AUTO: 92.1 FL (ref 79–97)
MONOCYTES # BLD AUTO: 0.46 10*3/MM3 (ref 0.1–0.9)
MONOCYTES NFR BLD AUTO: 7.5 % (ref 5–12)
NEUTROPHILS # BLD AUTO: 3.64 10*3/MM3 (ref 1.7–7)
NEUTROPHILS NFR BLD AUTO: 59 % (ref 42.7–76)
NRBC BLD AUTO-RTO: 0 /100 WBC (ref 0–0.2)
PLATELET # BLD AUTO: 240 10*3/MM3 (ref 140–450)
POTASSIUM SERPL-SCNC: 3.4 MMOL/L (ref 3.5–5.2)
PROT SERPL-MCNC: 6 G/DL (ref 6–8.5)
RBC # BLD AUTO: 4.57 10*6/MM3 (ref 3.77–5.28)
SODIUM SERPL-SCNC: 140 MMOL/L (ref 136–145)
WBC # BLD AUTO: 6.17 10*3/MM3 (ref 3.4–10.8)

## 2020-11-11 ENCOUNTER — LAB REQUISITION (OUTPATIENT)
Dept: LAB | Facility: HOSPITAL | Age: 80
End: 2020-11-11

## 2020-11-11 DIAGNOSIS — Z00.00 ENCOUNTER FOR GENERAL ADULT MEDICAL EXAMINATION WITHOUT ABNORMAL FINDINGS: ICD-10-CM

## 2020-11-11 RX ORDER — PRAVASTATIN SODIUM 80 MG/1
TABLET ORAL
Qty: 90 TABLET | Refills: 1 | Status: SHIPPED | OUTPATIENT
Start: 2020-11-11 | End: 2020-12-03

## 2020-11-11 RX ORDER — AMLODIPINE BESYLATE 5 MG/1
TABLET ORAL
Qty: 90 TABLET | Refills: 1 | Status: SHIPPED | OUTPATIENT
Start: 2020-11-11 | End: 2021-06-02

## 2020-11-11 RX ORDER — HYDROCHLOROTHIAZIDE 25 MG/1
TABLET ORAL
Qty: 90 TABLET | Refills: 1 | Status: SHIPPED | OUTPATIENT
Start: 2020-11-11 | End: 2021-06-02

## 2020-11-11 RX ORDER — METOPROLOL SUCCINATE 25 MG/1
TABLET, EXTENDED RELEASE ORAL
Qty: 90 TABLET | Refills: 1 | Status: SHIPPED | OUTPATIENT
Start: 2020-11-11 | End: 2021-06-02

## 2020-11-18 ENCOUNTER — LAB REQUISITION (OUTPATIENT)
Dept: LAB | Facility: HOSPITAL | Age: 80
End: 2020-11-18

## 2020-11-18 DIAGNOSIS — Z00.00 ENCOUNTER FOR GENERAL ADULT MEDICAL EXAMINATION WITHOUT ABNORMAL FINDINGS: ICD-10-CM

## 2020-11-18 PROCEDURE — U0004 COV-19 TEST NON-CDC HGH THRU: HCPCS | Performed by: OPHTHALMOLOGY

## 2020-11-19 LAB — SARS-COV-2 RNA RESP QL NAA+PROBE: NOT DETECTED

## 2020-12-01 ENCOUNTER — OFFICE VISIT (OUTPATIENT)
Dept: FAMILY MEDICINE CLINIC | Facility: CLINIC | Age: 80
End: 2020-12-01

## 2020-12-01 VITALS
HEIGHT: 64 IN | BODY MASS INDEX: 24.24 KG/M2 | SYSTOLIC BLOOD PRESSURE: 126 MMHG | OXYGEN SATURATION: 95 % | WEIGHT: 142 LBS | TEMPERATURE: 96.4 F | DIASTOLIC BLOOD PRESSURE: 78 MMHG | HEART RATE: 75 BPM

## 2020-12-01 DIAGNOSIS — J44.9 CHRONIC OBSTRUCTIVE PULMONARY DISEASE, UNSPECIFIED COPD TYPE (HCC): ICD-10-CM

## 2020-12-01 DIAGNOSIS — I10 ESSENTIAL HYPERTENSION: Primary | ICD-10-CM

## 2020-12-01 DIAGNOSIS — R79.89 ABNORMAL TSH: ICD-10-CM

## 2020-12-01 DIAGNOSIS — E78.5 HYPERLIPIDEMIA, UNSPECIFIED HYPERLIPIDEMIA TYPE: ICD-10-CM

## 2020-12-01 DIAGNOSIS — R73.09 ABNORMAL GLUCOSE: ICD-10-CM

## 2020-12-01 PROCEDURE — 99213 OFFICE O/P EST LOW 20 MIN: CPT | Performed by: FAMILY MEDICINE

## 2020-12-01 NOTE — PROGRESS NOTES
Subjective   Darlene Pfeiffer is a 80 y.o. female.     Chief Complaint   Patient presents with   • COPD   • Hypertension        Patient presents for routine checkup on her hypertension dyslipidemia and COPD.    She has not had any more exacerbations of her COPD since September.  She has not been exposed to Covid.    She has had some ill family members and has been able to keep her distance and not get sick.  She is compliant with her routine medications and is fasting this afternoon.  She has had some abnormal glucoses we have been following in the past and her last thyroid panel was abnormal.  She has no new complaints today.           The following portions of the patient's history were reviewed and updated as appropriate: allergies, current medications, past family history, past medical history, past social history, past surgical history and problem list.    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 from [facility]   • History of squamous cell carcinoma     Tongue   • Hyperlipidemia    • Hyperpigmentation    • Hypertension     since early 60's   • Impacted cerumen of both ears    • Influenza    • Low vitamin B12 level    • Lower back pain    • Menopause    • Need for influenza vaccination    • Other screening mammogram    • Thyromegaly    • Urinary pain    • UTI (urinary tract infection)    • Vitamin D deficiency        Past Surgical History:   Procedure Laterality Date   • CHOLECYSTECTOMY  1999   • EYE SURGERY  11/24/2020    Took a muscle out of right eye    • GLOSSECTOMY PARTIAL      less than one half tongue   • LUNG SURGERY     • ORAL LESION EXCISION/BIOPSY       and 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   • TUBAL ABDOMINAL LIGATION  1970       Family History   Problem Relation Age of Onset   • Ovarian cancer Mother          at age 27   • No Known Problems Father    • Ovarian cancer Sister    • Colon cancer Brother    • No Known Problems Other        Social History     Socioeconomic History   • Marital status:      Spouse name: Not on file   • Number of children: Not on file   • Years of education: Not on file   • Highest education level: Not on file   Tobacco Use   • Smoking status: Former Smoker     Quit date: 2015     Years since quittin.8   • Smokeless tobacco: Never Used   • Tobacco comment: less than a pack per day, no smoking since , Quit 2015   Substance and Sexual Activity   • Alcohol use: Yes     Comment: Socially -A few times a month   • Drug use: No   • Sexual activity: Defer         Current Outpatient Medications:   •  albuterol sulfate  (90 Base) MCG/ACT inhaler, Inhale 2 puffs Every 4 (Four) Hours As Needed for Wheezing., Disp: 18 g, Rfl:   •  amLODIPine (NORVASC) 5 MG tablet, TAKE 1 TABLET EVERY DAY, Disp: 90 tablet, Rfl: 1  •  cetirizine (ZyrTEC) 10 MG tablet, Take 10 mg by mouth., Disp: , Rfl:   •  cholecalciferol (VITAMIN D3) 1000 units tablet, Take 1,000 Units by mouth Daily., Disp: , Rfl:   •  diclofenac (VOLTAREN) 1 % gel gel, Apply 4 g topically to the appropriate area as directed 4 (Four) Times a Day As Needed (pain)., Disp: 100 g, Rfl: 0  •  doxycycline (MONODOX) 100 MG capsule, Take 1 capsule by mouth 2 (Two) Times a Day., Disp: 14 capsule, Rfl: 0  •  hydroCHLOROthiazide (HYDRODIURIL) 25 MG tablet, TAKE 1 TABLET EVERY DAY, Disp: 90 tablet, Rfl: 1  •  ipratropium (ATROVENT) 0.06 % nasal spray, 2 sprays into the nostril(s) as directed by provider 4 (Four) Times a Day., Disp: 15 mL, Rfl: 0  •  metoprolol  "succinate XL (TOPROL-XL) 25 MG 24 hr tablet, TAKE 1 TABLET EVERY DAY, Disp: 90 tablet, Rfl: 1  •  montelukast (Singulair) 10 MG tablet, Take 1 tablet by mouth Every Night., Disp: 90 tablet, Rfl: 3  •  Multiple Vitamins-Minerals (CENTRUM ADULTS) tablet, Take  by mouth., Disp: , Rfl:   •  omeprazole (PriLOSEC) 40 MG capsule, Take 40 mg by mouth., Disp: , Rfl:   •  pravastatin (PRAVACHOL) 80 MG tablet, TAKE 1 TABLET EVERY NIGHT, Disp: 90 tablet, Rfl: 1  •  tiotropium bromide-olodaterol (Stiolto Respimat) 2.5-2.5 MCG/ACT aerosol solution inhaler, Inhale 2 puffs Daily., Disp: 8 g, Rfl: 0    Review of Systems   Constitutional: Negative for chills, fatigue and fever.   HENT: Negative for congestion, rhinorrhea and sore throat.    Respiratory: Negative for cough and shortness of breath.    Cardiovascular: Negative for chest pain and leg swelling.   Gastrointestinal: Negative for abdominal pain.   Endocrine: Negative for polydipsia and polyuria.   Genitourinary: Negative for dysuria.   Musculoskeletal: Negative for arthralgias and myalgias.   Skin: Negative for rash.   Neurological: Negative for dizziness.   Hematological: Does not bruise/bleed easily.   Psychiatric/Behavioral: Negative for sleep disturbance.       Objective   Vitals:    12/01/20 1536   BP: 126/78   Pulse: 75   Temp: 96.4 °F (35.8 °C)   SpO2: 95%   Weight: 64.4 kg (142 lb)   Height: 162.6 cm (64\")     Body mass index is 24.37 kg/m².  Physical Exam  Constitutional:       General: She is not in acute distress.     Appearance: Normal appearance. She is well-developed.   HENT:      Head: Normocephalic and atraumatic.      Right Ear: Tympanic membrane, ear canal and external ear normal.      Left Ear: Tympanic membrane, ear canal and external ear normal.      Mouth/Throat:      Mouth: Mucous membranes are moist.      Pharynx: Oropharynx is clear.   Eyes:      Conjunctiva/sclera: Conjunctivae normal.      Pupils: Pupils are equal, round, and reactive to light. "   Neck:      Musculoskeletal: Neck supple.      Thyroid: No thyromegaly.   Cardiovascular:      Rate and Rhythm: Normal rate and regular rhythm.      Heart sounds: No murmur.   Pulmonary:      Effort: Pulmonary effort is normal.      Breath sounds: Decreased breath sounds present. No wheezing.   Abdominal:      General: Bowel sounds are normal.      Palpations: Abdomen is soft.      Tenderness: There is no abdominal tenderness.   Musculoskeletal: Normal range of motion.   Lymphadenopathy:      Cervical: No cervical adenopathy.   Skin:     General: Skin is warm and dry.   Neurological:      Mental Status: She is alert and oriented to person, place, and time.   Psychiatric:         Mood and Affect: Mood normal.         Behavior: Behavior normal.           Assessment/Plan   Diagnoses and all orders for this visit:    1. Essential hypertension (Primary)  -     CBC & Differential  -     TSH  -     T3, Free  -     T4, Free  -     Comprehensive Metabolic Panel    2. Hyperlipidemia, unspecified hyperlipidemia type  -     Lipid Panel    3. Chronic obstructive pulmonary disease, unspecified COPD type (CMS/[facility])    4. Abnormal glucose  -     Hemoglobin A1c    5. Abnormal TSH  -     TSH  -     T3, Free  -     T4, Free               There are no Patient Instructions on file for this visit.

## 2020-12-02 LAB
ALBUMIN SERPL-MCNC: 4.7 G/DL (ref 3.7–4.7)
ALBUMIN/GLOB SERPL: 2.2 {RATIO} (ref 1.2–2.2)
ALP SERPL-CCNC: 78 IU/L (ref 39–117)
ALT SERPL-CCNC: 13 IU/L (ref 0–32)
AST SERPL-CCNC: 20 IU/L (ref 0–40)
BASOPHILS # BLD AUTO: 0 X10E3/UL (ref 0–0.2)
BASOPHILS NFR BLD AUTO: 1 %
BILIRUB SERPL-MCNC: 0.5 MG/DL (ref 0–1.2)
BUN SERPL-MCNC: 17 MG/DL (ref 8–27)
BUN/CREAT SERPL: 26 (ref 12–28)
CALCIUM SERPL-MCNC: 10 MG/DL (ref 8.7–10.3)
CHLORIDE SERPL-SCNC: 99 MMOL/L (ref 96–106)
CHOLEST SERPL-MCNC: 254 MG/DL (ref 100–199)
CO2 SERPL-SCNC: 29 MMOL/L (ref 20–29)
CREAT SERPL-MCNC: 0.66 MG/DL (ref 0.57–1)
EOSINOPHIL # BLD AUTO: 0.2 X10E3/UL (ref 0–0.4)
EOSINOPHIL NFR BLD AUTO: 2 %
ERYTHROCYTE [DISTWIDTH] IN BLOOD BY AUTOMATED COUNT: 12.8 % (ref 11.7–15.4)
GLOBULIN SER CALC-MCNC: 2.1 G/DL (ref 1.5–4.5)
GLUCOSE SERPL-MCNC: 99 MG/DL (ref 65–99)
HBA1C MFR BLD: 6 % (ref 4.8–5.6)
HCT VFR BLD AUTO: 44.3 % (ref 34–46.6)
HDLC SERPL-MCNC: 66 MG/DL
HGB BLD-MCNC: 14.2 G/DL (ref 11.1–15.9)
IMM GRANULOCYTES # BLD AUTO: 0 X10E3/UL (ref 0–0.1)
IMM GRANULOCYTES NFR BLD AUTO: 0 %
LDLC SERPL CALC-MCNC: 173 MG/DL (ref 0–99)
LYMPHOCYTES # BLD AUTO: 3 X10E3/UL (ref 0.7–3.1)
LYMPHOCYTES NFR BLD AUTO: 45 %
MCH RBC QN AUTO: 29.1 PG (ref 26.6–33)
MCHC RBC AUTO-ENTMCNC: 32.1 G/DL (ref 31.5–35.7)
MCV RBC AUTO: 91 FL (ref 79–97)
MONOCYTES # BLD AUTO: 0.4 X10E3/UL (ref 0.1–0.9)
MONOCYTES NFR BLD AUTO: 6 %
NEUTROPHILS # BLD AUTO: 3.1 X10E3/UL (ref 1.4–7)
NEUTROPHILS NFR BLD AUTO: 46 %
PLATELET # BLD AUTO: 230 X10E3/UL (ref 150–450)
POTASSIUM SERPL-SCNC: 3.8 MMOL/L (ref 3.5–5.2)
PROT SERPL-MCNC: 6.8 G/DL (ref 6–8.5)
RBC # BLD AUTO: 4.88 X10E6/UL (ref 3.77–5.28)
SODIUM SERPL-SCNC: 142 MMOL/L (ref 134–144)
T3FREE SERPL-MCNC: 3.3 PG/ML (ref 2–4.4)
T4 FREE SERPL-MCNC: 1.13 NG/DL (ref 0.82–1.77)
TRIGL SERPL-MCNC: 88 MG/DL (ref 0–149)
TSH SERPL DL<=0.005 MIU/L-ACNC: 3.08 UIU/ML (ref 0.45–4.5)
VLDLC SERPL CALC-MCNC: 15 MG/DL (ref 5–40)
WBC # BLD AUTO: 6.7 X10E3/UL (ref 3.4–10.8)

## 2020-12-03 DIAGNOSIS — E78.5 HYPERLIPIDEMIA, UNSPECIFIED HYPERLIPIDEMIA TYPE: Primary | ICD-10-CM

## 2020-12-03 RX ORDER — ATORVASTATIN CALCIUM 20 MG/1
20 TABLET, FILM COATED ORAL NIGHTLY
Qty: 90 TABLET | Refills: 3 | Status: SHIPPED | OUTPATIENT
Start: 2020-12-03 | End: 2021-04-06 | Stop reason: SDUPTHER

## 2020-12-14 ENCOUNTER — OFFICE VISIT (OUTPATIENT)
Dept: FAMILY MEDICINE CLINIC | Facility: CLINIC | Age: 80
End: 2020-12-14

## 2020-12-14 VITALS
HEART RATE: 83 BPM | HEIGHT: 64 IN | DIASTOLIC BLOOD PRESSURE: 64 MMHG | SYSTOLIC BLOOD PRESSURE: 110 MMHG | OXYGEN SATURATION: 96 % | RESPIRATION RATE: 18 BRPM | WEIGHT: 142.2 LBS | TEMPERATURE: 96.9 F | BODY MASS INDEX: 24.28 KG/M2

## 2020-12-14 DIAGNOSIS — J44.1 COPD EXACERBATION (HCC): Primary | ICD-10-CM

## 2020-12-14 PROCEDURE — 99214 OFFICE O/P EST MOD 30 MIN: CPT | Performed by: FAMILY MEDICINE

## 2020-12-14 RX ORDER — DEXTROMETHORPHAN HYDROBROMIDE AND PROMETHAZINE HYDROCHLORIDE 15; 6.25 MG/5ML; MG/5ML
5 SYRUP ORAL 4 TIMES DAILY PRN
Qty: 180 ML | Refills: 0 | OUTPATIENT
Start: 2020-12-14 | End: 2021-04-23

## 2020-12-14 RX ORDER — AMOXICILLIN 500 MG/1
1000 CAPSULE ORAL 2 TIMES DAILY
Qty: 28 CAPSULE | Refills: 0 | Status: SHIPPED | OUTPATIENT
Start: 2020-12-14 | End: 2020-12-21

## 2020-12-14 RX ORDER — PREDNISONE 20 MG/1
20 TABLET ORAL 2 TIMES DAILY
Qty: 10 TABLET | Refills: 0 | Status: SHIPPED | OUTPATIENT
Start: 2020-12-14 | End: 2020-12-21

## 2020-12-14 NOTE — PATIENT INSTRUCTIONS
Push fluids and rest.  Use your inhalers as needed.  Make sure to call me if any severe headache, fever, chills, or shortness of breath.

## 2020-12-14 NOTE — PROGRESS NOTES
Subjective   Darlene Pfeiffer is a 80 y.o. female.     Chief Complaint   Patient presents with   • Cough        Patient presents complaining of some cough over the past several days.  She does have longstanding history of COPD.  She has not had any fever chills headaches or muscle aches.  She does have a little bit of an earache with it.  She denies any sore throat.  Her  came home with a cough a few days ago.    They do not know of any ill contacts.  He has not had any fever either.         The following portions of the patient's history were reviewed and updated as appropriate: allergies, current medications, past family history, past medical history, past social history, past surgical history and problem list.    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 from [facility]   • History of squamous cell carcinoma     Tongue   • Hyperlipidemia    • Hyperpigmentation    • Hypertension     since early 60's   • Impacted cerumen of both ears    • Influenza    • Low vitamin B12 level    • Lower back pain    • Menopause    • Need for influenza vaccination    • Other screening mammogram    • Thyromegaly    • Urinary pain    • UTI (urinary tract infection)    • Vitamin D deficiency        Past Surgical History:   Procedure Laterality Date   • CHOLECYSTECTOMY  1999   • EYE SURGERY  11/24/2020    Took a muscle out of right eye   • GLOSSECTOMY PARTIAL      less than one half tongue   • LUNG SURGERY     • ORAL LESION EXCISION/BIOPSY      June and August 2015-removal  of oral cancer   • TUBAL ABDOMINAL LIGATION         Family History   Problem Relation Age of Onset   • Ovarian cancer Mother          at age 27   • No Known Problems Father    • Ovarian cancer Sister    • Colon cancer Brother    • No Known Problems Other        Social History     Socioeconomic History   • Marital status:      Spouse name: Not on file   • Number of children: Not on file   • Years of education: Not on file   • Highest education level: Not on file   Tobacco Use   • Smoking status: Former Smoker     Quit date: 2015     Years since quittin.8   • Smokeless tobacco: Never Used   • Tobacco comment: less than a pack per day, no smoking since , Quit 2015   Substance and Sexual Activity   • Alcohol use: Yes     Comment: Socially -A few times a month   • Drug use: No   • Sexual activity: Defer         Current Outpatient Medications:   •  albuterol sulfate  (90 Base) MCG/ACT inhaler, Inhale 2 puffs Every 4 (Four) Hours As Needed for Wheezing., Disp: 18 g, Rfl:   •  amLODIPine (NORVASC) 5 MG tablet, TAKE 1 TABLET EVERY DAY, Disp: 90 tablet, Rfl: 1  •  atorvastatin (LIPITOR) 20 MG tablet, Take 1 tablet by mouth Every Night., Disp: 90 tablet, Rfl: 3  •  cetirizine (ZyrTEC) 10 MG tablet, Take 10 mg by mouth., Disp: , Rfl:   •  cholecalciferol (VITAMIN D3) 1000 units tablet, Take 1,000 Units by mouth Daily., Disp: , Rfl:   •  diclofenac (VOLTAREN) 1 % gel gel, Apply 4 g topically to the appropriate area as directed 4 (Four) Times a Day As Needed (pain)., Disp: 100 g, Rfl: 0  •  doxycycline (MONODOX) 100 MG capsule, Take 1 capsule by mouth 2 (Two) Times a Day., Disp: 14 capsule, Rfl: 0  •  hydroCHLOROthiazide (HYDRODIURIL) 25 MG tablet, TAKE 1 TABLET EVERY DAY, Disp: 90 tablet, Rfl: 1  •  ipratropium (ATROVENT) 0.06 % nasal spray, 2 sprays into the nostril(s) as directed by provider 4 (Four) Times a Day., Disp: 15 mL, Rfl: 0  •  metoprolol succinate XL (TOPROL-XL) 25 MG 24 hr  "tablet, TAKE 1 TABLET EVERY DAY, Disp: 90 tablet, Rfl: 1  •  montelukast (Singulair) 10 MG tablet, Take 1 tablet by mouth Every Night., Disp: 90 tablet, Rfl: 3  •  Multiple Vitamins-Minerals (CENTRUM ADULTS) tablet, Take  by mouth., Disp: , Rfl:   •  omeprazole (PriLOSEC) 40 MG capsule, Take 40 mg by mouth., Disp: , Rfl:   •  tiotropium bromide-olodaterol (Stiolto Respimat) 2.5-2.5 MCG/ACT aerosol solution inhaler, Inhale 2 puffs Daily., Disp: 8 g, Rfl: 0  •  amoxicillin (AMOXIL) 500 MG capsule, Take 2 capsules by mouth 2 (Two) Times a Day for 7 days., Disp: 28 capsule, Rfl: 0  •  predniSONE (DELTASONE) 20 MG tablet, Take 1 tablet by mouth 2 (Two) Times a Day., Disp: 10 tablet, Rfl: 0  •  promethazine-dextromethorphan (PROMETHAZINE-DM) 6.25-15 MG/5ML syrup, Take 5 mL by mouth 4 (Four) Times a Day As Needed for Cough., Disp: 180 mL, Rfl: 0    Review of Systems   Constitutional: Negative for chills, fatigue and fever.   HENT: Positive for ear pain. Negative for congestion, postnasal drip, rhinorrhea and sore throat.    Respiratory: Positive for cough and wheezing. Negative for shortness of breath.    Cardiovascular: Negative for chest pain and leg swelling.   Gastrointestinal: Negative for abdominal pain and diarrhea.   Endocrine: Negative for polydipsia and polyuria.   Genitourinary: Negative for dysuria.   Musculoskeletal: Negative for arthralgias and myalgias.   Skin: Negative for rash.   Neurological: Negative for dizziness and headaches.   Hematological: Does not bruise/bleed easily.   Psychiatric/Behavioral: Negative for sleep disturbance.       Objective   Vitals:    12/14/20 1442   BP: 110/64   BP Location: Left arm   Patient Position: Sitting   Cuff Size: Adult   Pulse: 83   Resp: 18   Temp: 96.9 °F (36.1 °C)   TempSrc: Temporal   SpO2: 96%   Weight: 64.5 kg (142 lb 3.2 oz)   Height: 162.6 cm (64.02\")     Body mass index is 24.4 kg/m².  Physical Exam  Constitutional:       General: She is not in acute " distress.     Appearance: Normal appearance. She is well-developed.   HENT:      Head: Normocephalic and atraumatic.      Right Ear: Tympanic membrane, ear canal and external ear normal.      Left Ear: Tympanic membrane, ear canal and external ear normal.      Mouth/Throat:      Mouth: Mucous membranes are moist.      Pharynx: Oropharynx is clear.   Eyes:      Conjunctiva/sclera: Conjunctivae normal.      Pupils: Pupils are equal, round, and reactive to light.   Neck:      Musculoskeletal: Neck supple.      Thyroid: No thyromegaly.   Cardiovascular:      Rate and Rhythm: Normal rate and regular rhythm.      Heart sounds: No murmur.   Pulmonary:      Effort: Pulmonary effort is normal.      Breath sounds: Decreased breath sounds and wheezing present. No rhonchi or rales.   Abdominal:      General: Bowel sounds are normal.      Palpations: Abdomen is soft.      Tenderness: There is no abdominal tenderness.   Musculoskeletal: Normal range of motion.   Lymphadenopathy:      Cervical: No cervical adenopathy.   Skin:     General: Skin is warm and dry.   Neurological:      Mental Status: She is alert and oriented to person, place, and time.   Psychiatric:         Mood and Affect: Mood normal.         Behavior: Behavior normal.           Assessment/Plan   Diagnoses and all orders for this visit:    1. COPD exacerbation (CMS/[facility]) (Primary)  -     predniSONE (DELTASONE) 20 MG tablet; Take 1 tablet by mouth 2 (Two) Times a Day.  Dispense: 10 tablet; Refill: 0  -     promethazine-dextromethorphan (PROMETHAZINE-DM) 6.25-15 MG/5ML syrup; Take 5 mL by mouth 4 (Four) Times a Day As Needed for Cough.  Dispense: 180 mL; Refill: 0  -     amoxicillin (AMOXIL) 500 MG capsule; Take 2 capsules by mouth 2 (Two) Times a Day for 7 days.  Dispense: 28 capsule; Refill: 0               Patient Instructions   Push fluids and rest.  Use your inhalers as needed.  Make sure to call me if any severe headache, fever, chills, or shortness of  breath.

## 2020-12-16 ENCOUNTER — TELEPHONE (OUTPATIENT)
Dept: FAMILY MEDICINE CLINIC | Facility: CLINIC | Age: 80
End: 2020-12-16

## 2020-12-16 DIAGNOSIS — E78.5 HYPERLIPIDEMIA, UNSPECIFIED HYPERLIPIDEMIA TYPE: ICD-10-CM

## 2020-12-16 RX ORDER — ATORVASTATIN CALCIUM 20 MG/1
20 TABLET, FILM COATED ORAL NIGHTLY
Qty: 90 TABLET | Refills: 3 | Status: CANCELLED | OUTPATIENT
Start: 2020-12-16

## 2020-12-16 NOTE — TELEPHONE ENCOUNTER
Called pt and let her know that she will let [facility] know when she needs a refill and they will send it to us electronically and then we will fill it from there. The voiced understanding.

## 2020-12-16 NOTE — TELEPHONE ENCOUNTER
Caller: Darlene Pfeiffer    Relationship: Self    Best call back number: [phone]   Medication needed:   Requested Prescriptions     Pending Prescriptions Disp Refills   • atorvastatin (LIPITOR) 20 MG tablet 90 tablet 3     Sig: Take 1 tablet by mouth Every Night.       When do you need the refill by: ROUTINE   What details did the patient provide when requesting the medication:  PATIENT IS ASKING THAT THIS BE FILLED AT [facility] IN THE FUTURE INSTEAD OF HER MAIL IN PHARMACY.DOES NOT NEED TO BE FILLED BUT PLEASE SEND  RX THERE.    Does the patient have less than a 3 day supply:  [] Yes  [x] No    What is the patient's preferred pharmacy: [facility] #[id number] - [facility] [address] - [phone]  - [phone] FX

## 2020-12-21 ENCOUNTER — OFFICE VISIT (OUTPATIENT)
Dept: FAMILY MEDICINE CLINIC | Facility: CLINIC | Age: 80
End: 2020-12-21

## 2020-12-21 ENCOUNTER — TRANSCRIBE ORDERS (OUTPATIENT)
Dept: ADMINISTRATIVE | Facility: HOSPITAL | Age: 80
End: 2020-12-21

## 2020-12-21 ENCOUNTER — TELEPHONE (OUTPATIENT)
Dept: FAMILY MEDICINE CLINIC | Facility: CLINIC | Age: 80
End: 2020-12-21

## 2020-12-21 DIAGNOSIS — U07.1 CLINICAL DIAGNOSIS OF SEVERE ACUTE RESPIRATORY SYNDROME CORONAVIRUS 2 (SARS-COV-2) DISEASE: Primary | ICD-10-CM

## 2020-12-21 DIAGNOSIS — J06.9 UPPER RESPIRATORY TRACT INFECTION DUE TO COVID-19 VIRUS: Primary | ICD-10-CM

## 2020-12-21 DIAGNOSIS — U07.1 UPPER RESPIRATORY TRACT INFECTION DUE TO COVID-19 VIRUS: Primary | ICD-10-CM

## 2020-12-21 DIAGNOSIS — J44.9 CHRONIC OBSTRUCTIVE PULMONARY DISEASE, UNSPECIFIED COPD TYPE (HCC): ICD-10-CM

## 2020-12-21 PROCEDURE — 99213 OFFICE O/P EST LOW 20 MIN: CPT | Performed by: FAMILY MEDICINE

## 2020-12-21 RX ORDER — EPINEPHRINE 1 MG/ML
0.3 INJECTION, SOLUTION, CONCENTRATE INTRAVENOUS AS NEEDED
Status: CANCELLED | OUTPATIENT
Start: 2020-12-22

## 2020-12-21 RX ORDER — METHYLPREDNISOLONE SODIUM SUCCINATE 125 MG/2ML
125 INJECTION, POWDER, LYOPHILIZED, FOR SOLUTION INTRAMUSCULAR; INTRAVENOUS AS NEEDED
Status: CANCELLED | OUTPATIENT
Start: 2020-12-22

## 2020-12-21 RX ORDER — DIPHENHYDRAMINE HYDROCHLORIDE 50 MG/ML
50 INJECTION INTRAMUSCULAR; INTRAVENOUS AS NEEDED
Status: CANCELLED | OUTPATIENT
Start: 2020-12-22

## 2020-12-21 RX ORDER — SODIUM CHLORIDE 9 MG/ML
250 INJECTION, SOLUTION INTRAVENOUS ONCE
Status: CANCELLED | OUTPATIENT
Start: 2020-12-22

## 2020-12-21 NOTE — TELEPHONE ENCOUNTER
[facility] pharmacy has the Covid monoclonal antibody infusion in stock,  You have to put an order in for this.  I called to schedule this but am waiting a return call because they have not been trained to schedule these.  BUT you have to put the order in first.

## 2020-12-21 NOTE — PATIENT INSTRUCTIONS
Will check on availability of monoclonal antibodies.  Verbal consent received from patient.  Form completed.

## 2020-12-21 NOTE — PROGRESS NOTES
This visit has been rescheduled as a phone visit to comply with patient safety concerns in accordance with CDC recommendations. Total time of discussion was 15 minutes.    Carlos Pfeiffer is a 80 y.o. female.     Chief Complaint   Patient presents with   • Covid-19 Home Monitoring Phone Call        Patient presents for a telephone visit during global pandemic.   Last week when I saw her she really felt like just a mild COPD exacerbation.  She does admit that 3 days prior to her office visit her  started with cold symptoms.    She started feeling bad the next day which would have been on December 12.  Patient started feeling a lot worse yesterday and so they went to the rapid testing site in [place] where she tested positive for Covid  She has finished up her steroids that I gave her last week and is still in the antibiotics.  She is just not feeling better.  She has had low-grade fever fatigue headache cough muscle aches.  They were wondering if there was any available preventative treatment and had seen a story about monoclonal antibodies.  Patient aware that this is not absolute and there is not great evidence for this.         The following portions of the patient's history were reviewed and updated as appropriate: allergies, current medications, past family history, past medical history, past social history, past surgical history and problem list.    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 from [facility]   • History of  squamous cell carcinoma     Tongue   • Hyperlipidemia    • Hyperpigmentation    • Hypertension     since early 60's   • Impacted cerumen of both ears    • Influenza    • Low vitamin B12 level    • Lower back pain    • Menopause    • Need for influenza vaccination    • Other screening mammogram    • Thyromegaly    • Urinary pain    • UTI (urinary tract infection)    • Vitamin D deficiency        Past Surgical History:   Procedure Laterality Date   • CHOLECYSTECTOMY     • EYE SURGERY  2020    Took a muscle out of right eye   • GLOSSECTOMY PARTIAL      less than one half tongue   • LUNG SURGERY     • ORAL LESION EXCISION/BIOPSY       and 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   • TUBAL ABDOMINAL LIGATION         Family History   Problem Relation Age of Onset   • Ovarian cancer Mother          at age 27   • No Known Problems Father    • Ovarian cancer Sister    • Colon cancer Brother    • No Known Problems Other        Social History     Socioeconomic History   • Marital status:      Spouse name: Not on file   • Number of children: Not on file   • Years of education: Not on file   • Highest education level: Not on file   Tobacco Use   • Smoking status: Former Smoker     Quit date: 2015     Years since quittin.8   • Smokeless tobacco: Never Used   • Tobacco comment: less than a pack per day, no smoking since , Quit 2015   Substance and Sexual Activity   • Alcohol use: Yes     Comment: Socially -A few times a month   • Drug use: No   • Sexual activity: Defer         Current Outpatient Medications:   •  albuterol sulfate  (90 Base) MCG/ACT inhaler, Inhale 2 puffs Every 4 (Four) Hours As Needed for Wheezing., Disp: 18 g, Rfl:   •  amLODIPine (NORVASC) 5 MG tablet, TAKE 1 TABLET EVERY DAY, Disp: 90 tablet, Rfl: 1  •  atorvastatin (LIPITOR) 20 MG tablet, Take 1 tablet by mouth Every Night., Disp: 90 tablet, Rfl: 3  •  cetirizine (ZyrTEC) 10 MG tablet, Take 10 mg by mouth.,  Disp: , Rfl:   •  cholecalciferol (VITAMIN D3) 1000 units tablet, Take 1,000 Units by mouth Daily., Disp: , Rfl:   •  diclofenac (VOLTAREN) 1 % gel gel, Apply 4 g topically to the appropriate area as directed 4 (Four) Times a Day As Needed (pain)., Disp: 100 g, Rfl: 0  •  hydroCHLOROthiazide (HYDRODIURIL) 25 MG tablet, TAKE 1 TABLET EVERY DAY, Disp: 90 tablet, Rfl: 1  •  ipratropium (ATROVENT) 0.06 % nasal spray, 2 sprays into the nostril(s) as directed by provider 4 (Four) Times a Day., Disp: 15 mL, Rfl: 0  •  metoprolol succinate XL (TOPROL-XL) 25 MG 24 hr tablet, TAKE 1 TABLET EVERY DAY, Disp: 90 tablet, Rfl: 1  •  montelukast (Singulair) 10 MG tablet, Take 1 tablet by mouth Every Night., Disp: 90 tablet, Rfl: 3  •  Multiple Vitamins-Minerals (CENTRUM ADULTS) tablet, Take  by mouth., Disp: , Rfl:   •  omeprazole (PriLOSEC) 40 MG capsule, Take 40 mg by mouth., Disp: , Rfl:   •  promethazine-dextromethorphan (PROMETHAZINE-DM) 6.25-15 MG/5ML syrup, Take 5 mL by mouth 4 (Four) Times a Day As Needed for Cough., Disp: 180 mL, Rfl: 0  •  tiotropium bromide-olodaterol (Stiolto Respimat) 2.5-2.5 MCG/ACT aerosol solution inhaler, Inhale 2 puffs Daily., Disp: 8 g, Rfl: 0  •  amoxicillin (AMOXIL) 500 MG capsule, Take 2 capsules by mouth 2 (Two) Times a Day for 7 days., Disp: 28 capsule, Rfl: 0    Review of Systems   Constitutional: Positive for fatigue and fever. Negative for chills.   HENT: Positive for congestion, postnasal drip and sore throat. Negative for rhinorrhea.    Respiratory: Positive for cough and wheezing. Negative for shortness of breath.    Cardiovascular: Negative for chest pain and leg swelling.   Gastrointestinal: Positive for nausea. Negative for abdominal pain and diarrhea.   Endocrine: Negative for polydipsia and polyuria.   Genitourinary: Negative for dysuria.   Musculoskeletal: Positive for myalgias. Negative for arthralgias.   Skin: Negative for rash.   Neurological: Positive for headaches. Negative  for dizziness.   Hematological: Does not bruise/bleed easily.   Psychiatric/Behavioral: Negative for sleep disturbance.       Objective   There were no vitals filed for this visit.  There is no height or weight on file to calculate BMI.  Physical Exam      Assessment/Plan   Diagnoses and all orders for this visit:    1. Upper respiratory tract infection due to COVID-19 virus (Primary)    2. Chronic obstructive pulmonary disease, unspecified COPD type (CMS/HCC)               Patient Instructions   Will check on availability of monoclonal antibodies.  Verbal consent received from patient.  Form completed.

## 2020-12-21 NOTE — TELEPHONE ENCOUNTER
Patient started feeling worse after her visit last week and tested positive for Covid yesterday Wilbert.  We need to get that positive test documented so I can try to get her monoclonal antibody infusion.  The phone number is [phone].  I already did a telephone consultation with the patient if you could put in an appointment somewhere on my schedule and arrive her so I can document.  Once we get this positive test results then I will have somebody call and check with the pharmacy to see if the drug is available.

## 2020-12-21 NOTE — TELEPHONE ENCOUNTER
I called pt and informed her that I am awaiting a return call from [facility] to schedule the covid monoclonal antibody infusion.  Will call pt back when it is scheduled

## 2020-12-22 ENCOUNTER — HOSPITAL ENCOUNTER (OUTPATIENT)
Dept: INFUSION THERAPY | Facility: HOSPITAL | Age: 80
Discharge: HOME | End: 2020-12-22
Admitting: FAMILY MEDICINE

## 2020-12-22 VITALS
DIASTOLIC BLOOD PRESSURE: 58 MMHG | RESPIRATION RATE: 20 BRPM | SYSTOLIC BLOOD PRESSURE: 133 MMHG | TEMPERATURE: 98.2 F | OXYGEN SATURATION: 97 % | HEART RATE: 77 BPM

## 2020-12-22 DIAGNOSIS — U07.1 CLINICAL DIAGNOSIS OF SEVERE ACUTE RESPIRATORY SYNDROME CORONAVIRUS 2 (SARS-COV-2) DISEASE: Primary | ICD-10-CM

## 2020-12-22 PROCEDURE — 96365 THER/PROPH/DIAG IV INF INIT: CPT

## 2020-12-22 PROCEDURE — M0239 BAMLANIVIMAB-XXXX INFUSION: HCPCS | Performed by: FAMILY MEDICINE

## 2020-12-22 PROCEDURE — 96366 THER/PROPH/DIAG IV INF ADDON: CPT

## 2020-12-22 PROCEDURE — 25010000006 BAMLANIVIMAB 700 MG/20ML SOLUTION 20 ML VIAL: Performed by: FAMILY MEDICINE

## 2020-12-22 RX ORDER — EPINEPHRINE 1 MG/ML
0.3 INJECTION, SOLUTION, CONCENTRATE INTRAVENOUS AS NEEDED
Status: DISCONTINUED | OUTPATIENT
Start: 2020-12-22 | End: 2020-12-24 | Stop reason: HOSPADM

## 2020-12-22 RX ORDER — METHYLPREDNISOLONE SODIUM SUCCINATE 125 MG/2ML
125 INJECTION, POWDER, LYOPHILIZED, FOR SOLUTION INTRAMUSCULAR; INTRAVENOUS AS NEEDED
Status: DISCONTINUED | OUTPATIENT
Start: 2020-12-22 | End: 2020-12-24 | Stop reason: HOSPADM

## 2020-12-22 RX ORDER — SODIUM CHLORIDE 9 MG/ML
250 INJECTION, SOLUTION INTRAVENOUS ONCE
Status: CANCELLED | OUTPATIENT
Start: 2020-12-22

## 2020-12-22 RX ORDER — DIPHENHYDRAMINE HYDROCHLORIDE 50 MG/ML
50 INJECTION INTRAMUSCULAR; INTRAVENOUS AS NEEDED
Status: CANCELLED | OUTPATIENT
Start: 2020-12-22

## 2020-12-22 RX ORDER — METHYLPREDNISOLONE SODIUM SUCCINATE 125 MG/2ML
125 INJECTION, POWDER, LYOPHILIZED, FOR SOLUTION INTRAMUSCULAR; INTRAVENOUS AS NEEDED
Status: CANCELLED | OUTPATIENT
Start: 2020-12-22

## 2020-12-22 RX ORDER — SODIUM CHLORIDE 9 MG/ML
250 INJECTION, SOLUTION INTRAVENOUS ONCE
Status: COMPLETED | OUTPATIENT
Start: 2020-12-22 | End: 2020-12-22

## 2020-12-22 RX ORDER — EPINEPHRINE 1 MG/ML
0.3 INJECTION, SOLUTION, CONCENTRATE INTRAVENOUS AS NEEDED
Status: CANCELLED | OUTPATIENT
Start: 2020-12-22

## 2020-12-22 RX ORDER — DIPHENHYDRAMINE HYDROCHLORIDE 50 MG/ML
50 INJECTION INTRAMUSCULAR; INTRAVENOUS AS NEEDED
Status: DISCONTINUED | OUTPATIENT
Start: 2020-12-22 | End: 2020-12-24 | Stop reason: HOSPADM

## 2020-12-22 RX ADMIN — SODIUM CHLORIDE 250 ML: 9 INJECTION, SOLUTION INTRAVENOUS at 10:16

## 2020-12-22 RX ADMIN — SODIUM CHLORIDE 700 MG: 9 INJECTION, SOLUTION INTRAVENOUS at 09:14

## 2020-12-22 NOTE — PROGRESS NOTES
Pt tolerated infusion with no adverse reaction. PT was given AVS. Educated about drug and covid follow up. Pt ambulated out of hospital with steady gait and no acute distress.

## 2020-12-29 ENCOUNTER — TELEPHONE (OUTPATIENT)
Dept: FAMILY MEDICINE CLINIC | Facility: CLINIC | Age: 80
End: 2020-12-29

## 2020-12-29 NOTE — TELEPHONE ENCOUNTER
DELETE AFTER REVIEWING: Telephone encounter to be sent to the clinical pool   Hub staff attempted to follow warm transfer process and was unsuccessful     Caller: Darlene Pfeiffer S    Relationship to patient: Self    Best call back number: [phone]    Patient is needing: patient called in stating she is congested and feels like her she is stopped in her head. Was wondering if Dr. Kehrer could suggest an over the counter cough medicine for he. Please call and advise.  Patient stated if she doesn't answer the phone to just please leave her a message.

## 2020-12-29 NOTE — TELEPHONE ENCOUNTER
Not sure she is wanting a cough medicine or a decongestant?  It was not really clear in the statement from the hub.  I see that she has a prescription cough syrup, promethazine DM, on her med list.  Therefore, if she is needing a decongestant I would suggest Flonase nasal spray.  She may also use Mucinex plain OTC.  Hope that helps??

## 2020-12-29 NOTE — TELEPHONE ENCOUNTER
Pt seen Dr. Kehrer on 12/21/2020 for URI. Please advise if there is something she can take OTC for cough.  Please advise

## 2021-01-13 ENCOUNTER — OFFICE VISIT (OUTPATIENT)
Dept: FAMILY MEDICINE CLINIC | Facility: CLINIC | Age: 81
End: 2021-01-13

## 2021-01-13 ENCOUNTER — TELEPHONE (OUTPATIENT)
Dept: FAMILY MEDICINE CLINIC | Facility: CLINIC | Age: 81
End: 2021-01-13

## 2021-01-13 VITALS
HEIGHT: 63 IN | HEART RATE: 60 BPM | TEMPERATURE: 97.5 F | DIASTOLIC BLOOD PRESSURE: 68 MMHG | OXYGEN SATURATION: 92 % | WEIGHT: 140.2 LBS | SYSTOLIC BLOOD PRESSURE: 124 MMHG | BODY MASS INDEX: 24.84 KG/M2

## 2021-01-13 DIAGNOSIS — J44.9 CHRONIC OBSTRUCTIVE PULMONARY DISEASE, UNSPECIFIED COPD TYPE: ICD-10-CM

## 2021-01-13 DIAGNOSIS — U07.1 COVID-19 VIRUS INFECTION: ICD-10-CM

## 2021-01-13 DIAGNOSIS — R05.9 COUGH: ICD-10-CM

## 2021-01-13 DIAGNOSIS — J44.9 CHRONIC OBSTRUCTIVE PULMONARY DISEASE, UNSPECIFIED COPD TYPE (HCC): ICD-10-CM

## 2021-01-13 DIAGNOSIS — U07.1 COVID-19 VIRUS INFECTION: Primary | ICD-10-CM

## 2021-01-13 PROCEDURE — 99214 OFFICE O/P EST MOD 30 MIN: CPT | Performed by: FAMILY MEDICINE

## 2021-01-13 RX ORDER — FLUTICASONE PROPIONATE 50 MCG
2 SPRAY, SUSPENSION (ML) NASAL AS NEEDED
COMMUNITY

## 2021-01-13 RX ORDER — GUAIFENESIN, PSEUDOEPHEDRINE HYDROCHLORIDE 600; 60 MG/1; MG/1
1 TABLET, EXTENDED RELEASE ORAL EVERY 12 HOURS
COMMUNITY
End: 2021-04-23

## 2021-01-13 NOTE — PATIENT INSTRUCTIONS
Expect cough to linger for a few more weeks.  Take the strong cough syrup at nighttime to get some sleep.    Use the other one during the day if needed.    Make sure to use your inhalers as needed.

## 2021-01-13 NOTE — PROGRESS NOTES
"Chief Complaint  COPD and Cough    Subjective          Darlene Pfeiffer presents to [facility] for   Lingering cough since having COVID.  No fever.  Had some fatigue at the beginning.   Diagnosed on 12/15/2020.  [place] steroids, ABx, and monoclonal abs.   Has been using his inhalers.      Objective   Vital Signs:   /68   Pulse 60   Temp 97.5 °F (36.4 °C)   Ht 160 cm (63\")   Wt 63.6 kg (140 lb 3.2 oz)   SpO2 92%   BMI 24.84 kg/m²     Physical Exam  Constitutional:       General: She is not in acute distress.     Appearance: Normal appearance. She is well-developed.   HENT:      Head: Normocephalic and atraumatic.      Right Ear: Tympanic membrane, ear canal and external ear normal.      Left Ear: Tympanic membrane, ear canal and external ear normal.      Mouth/Throat:      Mouth: Mucous membranes are moist.      Pharynx: Oropharynx is clear.   Eyes:      Conjunctiva/sclera: Conjunctivae normal.      Pupils: Pupils are equal, round, and reactive to light.   Neck:      Musculoskeletal: Neck supple.      Thyroid: No thyromegaly.   Cardiovascular:      Rate and Rhythm: Normal rate and regular rhythm.      Heart sounds: No murmur.   Pulmonary:      Effort: Pulmonary effort is normal.      Breath sounds: Normal breath sounds. No wheezing.   Abdominal:      General: Bowel sounds are normal.      Palpations: Abdomen is soft.      Tenderness: There is no abdominal tenderness.   Musculoskeletal: Normal range of motion.   Lymphadenopathy:      Cervical: No cervical adenopathy.   Skin:     General: Skin is warm and dry.   Neurological:      Mental Status: She is alert and oriented to person, place, and time.   Psychiatric:         Mood and Affect: Mood normal.         Behavior: Behavior normal.        Result Review :                 Assessment and Plan    Problem List Items Addressed This Visit        Pulmonary and Pneumonias    Chronic obstructive pulmonary disease (CMS/HCC)    Relevant " Medications    pseudoephedrine-guaifenesin (MUCINEX D)  MG per 12 hr tablet    fluticasone (FLONASE) 50 MCG/ACT nasal spray    HYDROcod Polst-CPM Polst ER (Tussionex Pennkinetic ER) 10-8 MG/5ML ER suspension      Other Visit Diagnoses     COVID-19 virus infection    -  Primary    Relevant Medications    HYDROcod Polst-CPM Polst ER (Tussionex Pennkinetic ER) 10-8 MG/5ML ER suspension    Cough        Relevant Medications    HYDROcod Polst-CPM Polst ER (Tussionex Pennkinetic ER) 10-8 MG/5ML ER suspension          Follow Up   No follow-ups on file.  Patient was given instructions and counseling regarding her condition or for health maintenance advice. Please see specific information pulled into the AVS if appropriate.     Expect cough to linger for a few more weeks.  Take the strong cough syrup at nighttime to get some sleep.    Use the other one during the day if needed.    Make sure to use your inhalers as needed.

## 2021-01-14 ENCOUNTER — TELEPHONE (OUTPATIENT)
Dept: FAMILY MEDICINE CLINIC | Facility: CLINIC | Age: 81
End: 2021-01-14

## 2021-01-14 NOTE — TELEPHONE ENCOUNTER
Pharmacy sent a fax stating pts HYDROCODONE/CHLORPHEN ER was denied.  I tried to run a PA, and it would not accept the medication.  No alternative was given from pharmacy  Please advise

## 2021-01-14 NOTE — TELEPHONE ENCOUNTER
A lot of Medicare plans do not pay for any cough and cold medicines.  Please see if hers cover any.  A less expensive option might be Phenergan with codeine.  I would be glad to send that in.

## 2021-02-17 ENCOUNTER — LAB REQUISITION (OUTPATIENT)
Dept: LAB | Facility: HOSPITAL | Age: 81
End: 2021-02-17

## 2021-02-17 DIAGNOSIS — Z00.00 ENCOUNTER FOR GENERAL ADULT MEDICAL EXAMINATION WITHOUT ABNORMAL FINDINGS: ICD-10-CM

## 2021-02-17 LAB — SARS-COV-2 ORF1AB RESP QL NAA+PROBE: NOT DETECTED

## 2021-02-17 PROCEDURE — U0005 INFEC AGEN DETEC AMPLI PROBE: HCPCS | Performed by: OPHTHALMOLOGY

## 2021-02-17 PROCEDURE — U0004 COV-19 TEST NON-CDC HGH THRU: HCPCS | Performed by: OPHTHALMOLOGY

## 2021-03-15 ENCOUNTER — BULK ORDERING (OUTPATIENT)
Dept: CASE MANAGEMENT | Facility: OTHER | Age: 81
End: 2021-03-15

## 2021-03-15 DIAGNOSIS — Z23 IMMUNIZATION DUE: ICD-10-CM

## 2021-04-06 DIAGNOSIS — E78.5 HYPERLIPIDEMIA, UNSPECIFIED HYPERLIPIDEMIA TYPE: ICD-10-CM

## 2021-04-06 RX ORDER — ATORVASTATIN CALCIUM 20 MG/1
20 TABLET, FILM COATED ORAL NIGHTLY
Qty: 90 TABLET | Refills: 3 | Status: SHIPPED | OUTPATIENT
Start: 2021-04-06 | End: 2022-04-28

## 2021-04-06 NOTE — TELEPHONE ENCOUNTER
Caller: Darlene Pfeiffer    Relationship: Self    Best call back number: [phone]    Medication needed:   Requested Prescriptions     Pending Prescriptions Disp Refills   • atorvastatin (LIPITOR) 20 MG tablet 90 tablet 3     Sig: Take 1 tablet by mouth Every Night.       When do you need the refill by: 4/9/2021  What additional details did the patient provide when requesting the medication: [facility]     Does the patient have less than a 3 day supply:  [] Yes  [x] No    What is the patient's preferred pharmacy: [facility] #[id number] [facility] [address] - [phone]  - [phone] FX

## 2021-04-29 ENCOUNTER — OFFICE VISIT (OUTPATIENT)
Dept: FAMILY MEDICINE CLINIC | Facility: CLINIC | Age: 81
End: 2021-04-29

## 2021-04-29 VITALS
HEART RATE: 79 BPM | TEMPERATURE: 97.8 F | WEIGHT: 136.2 LBS | SYSTOLIC BLOOD PRESSURE: 128 MMHG | HEIGHT: 63 IN | BODY MASS INDEX: 24.13 KG/M2 | OXYGEN SATURATION: 95 % | DIASTOLIC BLOOD PRESSURE: 74 MMHG

## 2021-04-29 DIAGNOSIS — J44.1 COPD EXACERBATION (HCC): Primary | ICD-10-CM

## 2021-04-29 DIAGNOSIS — J06.9 UPPER RESPIRATORY TRACT INFECTION, UNSPECIFIED TYPE: ICD-10-CM

## 2021-04-29 PROCEDURE — 99214 OFFICE O/P EST MOD 30 MIN: CPT | Performed by: FAMILY MEDICINE

## 2021-04-29 RX ORDER — ALBUTEROL SULFATE 90 UG/1
2 AEROSOL, METERED RESPIRATORY (INHALATION) EVERY 4 HOURS PRN
Qty: 18 G | Refills: 2 | Status: SHIPPED | OUTPATIENT
Start: 2021-04-29 | End: 2022-09-26 | Stop reason: SDUPTHER

## 2021-04-29 RX ORDER — DEXTROMETHORPHAN HYDROBROMIDE AND PROMETHAZINE HYDROCHLORIDE 15; 6.25 MG/5ML; MG/5ML
5 SYRUP ORAL 4 TIMES DAILY PRN
Qty: 180 ML | Refills: 0 | Status: SHIPPED | OUTPATIENT
Start: 2021-04-29 | End: 2021-12-08 | Stop reason: SDUPTHER

## 2021-04-29 RX ORDER — PREDNISONE 20 MG/1
20 TABLET ORAL 2 TIMES DAILY
Qty: 10 TABLET | Refills: 0 | Status: SHIPPED | OUTPATIENT
Start: 2021-04-29 | End: 2021-05-04

## 2021-04-29 NOTE — PROGRESS NOTES
"Chief Complaint  Cough    Subjective          Darlene Pfeiffer presents to [facility]  Started last Thursday with bad ST.  Went to urgent care.  Was given an antibiotic.  Had very bad runny nose.  Now with bad cough.  Unsure if abx if helping at all.  Throat feels fine now.        Objective   Vital Signs:   /74   Pulse 79   Temp 97.8 °F (36.6 °C)   Ht 160 cm (63\")   Wt 61.8 kg (136 lb 3.2 oz)   SpO2 95%   BMI 24.13 kg/m²     Physical Exam  Constitutional:       General: She is not in acute distress.     Appearance: Normal appearance. She is well-developed.   HENT:      Head: Normocephalic and atraumatic.      Right Ear: Tympanic membrane, ear canal and external ear normal.      Left Ear: Tympanic membrane, ear canal and external ear normal.      Mouth/Throat:      Mouth: Mucous membranes are moist.      Pharynx: Oropharynx is clear. Posterior oropharyngeal erythema present. No oropharyngeal exudate.   Eyes:      Conjunctiva/sclera: Conjunctivae normal.      Pupils: Pupils are equal, round, and reactive to light.   Neck:      Thyroid: No thyromegaly.   Cardiovascular:      Rate and Rhythm: Normal rate and regular rhythm.      Heart sounds: No murmur heard.     Pulmonary:      Effort: Pulmonary effort is normal.      Breath sounds: Decreased breath sounds and wheezing present. No rhonchi or rales.   Abdominal:      General: Bowel sounds are normal.      Palpations: Abdomen is soft.      Tenderness: There is no abdominal tenderness.   Musculoskeletal:         General: Normal range of motion.      Cervical back: Neck supple.   Lymphadenopathy:      Cervical: No cervical adenopathy.   Skin:     General: Skin is warm and dry.   Neurological:      Mental Status: She is alert and oriented to person, place, and time.   Psychiatric:         Mood and Affect: Mood normal.         Behavior: Behavior normal.        Result Review :                 Assessment and Plan    Diagnoses and all " orders for this visit:    1. COPD exacerbation (CMS/[facility]) (Primary)  -     predniSONE (DELTASONE) 20 MG tablet; Take 1 tablet by mouth 2 (Two) Times a Day for 5 days.  Dispense: 10 tablet; Refill: 0  -     albuterol sulfate  (90 Base) MCG/ACT inhaler; Inhale 2 puffs Every 4 (Four) Hours As Needed for Wheezing.  Dispense: 18 g; Refill: 2  -     promethazine-dextromethorphan (PROMETHAZINE-DM) 6.25-15 MG/5ML syrup; Take 5 mL by mouth 4 (Four) Times a Day As Needed for Cough.  Dispense: 180 mL; Refill: 0    2. Upper respiratory tract infection, unspecified type    COPD exacerbation-finish out amoxicillin and add in prednisone, use the albuterol as needed and get back on your Spiriva daily.  Samples were given today.  Follow-up if no improvement.    Follow Up   No follow-ups on file.  Patient was given instructions and counseling regarding her condition or for health maintenance advice. Please see specific information pulled into the AVS if appropriate.

## 2021-05-05 ENCOUNTER — OFFICE VISIT (OUTPATIENT)
Dept: FAMILY MEDICINE CLINIC | Facility: CLINIC | Age: 81
End: 2021-05-05

## 2021-05-05 ENCOUNTER — TELEPHONE (OUTPATIENT)
Dept: FAMILY MEDICINE CLINIC | Facility: CLINIC | Age: 81
End: 2021-05-05

## 2021-05-05 DIAGNOSIS — J44.1 COPD EXACERBATION (HCC): Primary | ICD-10-CM

## 2021-05-05 PROCEDURE — 99213 OFFICE O/P EST LOW 20 MIN: CPT | Performed by: FAMILY MEDICINE

## 2021-05-05 RX ORDER — PREDNISONE 20 MG/1
TABLET ORAL
Qty: 11 TABLET | Refills: 0 | Status: SHIPPED | OUTPATIENT
Start: 2021-05-05 | End: 2021-05-12

## 2021-05-05 NOTE — TELEPHONE ENCOUNTER
Caller: Darlene Pfeiffer    Relationship: Self    Best call back number: [phone]    What is the best time to reach you: ANYTIME    Who are you requesting to speak with (clinical staff, provider,  specific staff member): PROVIDER    What was the call regarding: PATIENT CALLED STATING SHE WAS IN LAST WEEK FOR SINUS INFECTION. PATIENT HAS COMPLETED HER ANTIBIOTICS AND STEROIDS AND PATIENT IS STILL COUGHING UP YELLOW AND GREEN CONGESTION. PATIENT WOULD LIKE A CALL BACK FROM DR. KEHRER.    Do you require a callback: YES

## 2021-05-05 NOTE — PROGRESS NOTES
You have chosen to receive care through a telephone visit. Do you consent to use a telephone visit for your medical care today? Yes    Chief Complaint  Cough    Subjective          Darlene Pfeiffer presents to [facility]  Patient presents for telephone visit to follow-up on her COPD exacerbation.  She did well with a 5-day burst of prednisone but then after doing some yard work over the weekend she started having bad coughing and wheezing again.  She denies any fever.  Cough is not productive.  She is using her inhalers as she should.    She denies any chest pain or worsening shortness of breath.      Objective   Vital Signs:   There were no vitals taken for this visit.    Physical Exam telephone visit  Result Review :                 Assessment and Plan    Diagnoses and all orders for this visit:    1. COPD exacerbation (CMS/[facility]) (Primary)  -     predniSONE (DELTASONE) 20 MG tablet; Take 1 tablet by mouth 2 (Two) Times a Day for 4 days, THEN 1 tablet Daily for 3 days.  Dispense: 11 tablet; Refill: 0    COPD exacerbation-we will do another week of steroids, make sure to let me know if symptoms worsen and will get chest x-ray    Follow Up   No follow-ups on file.  Patient was given instructions and counseling regarding her condition or for health maintenance advice. Please see specific information pulled into the AVS if appropriate.

## 2021-06-02 RX ORDER — HYDROCHLOROTHIAZIDE 25 MG/1
TABLET ORAL
Qty: 90 TABLET | Refills: 1 | Status: SHIPPED | OUTPATIENT
Start: 2021-06-02 | End: 2021-11-30

## 2021-06-02 RX ORDER — METOPROLOL SUCCINATE 25 MG/1
TABLET, EXTENDED RELEASE ORAL
Qty: 90 TABLET | Refills: 1 | Status: SHIPPED | OUTPATIENT
Start: 2021-06-02 | End: 2021-11-30

## 2021-06-02 RX ORDER — AMLODIPINE BESYLATE 5 MG/1
TABLET ORAL
Qty: 90 TABLET | Refills: 1 | Status: SHIPPED | OUTPATIENT
Start: 2021-06-02 | End: 2021-11-30

## 2021-06-08 ENCOUNTER — OFFICE VISIT (OUTPATIENT)
Dept: FAMILY MEDICINE CLINIC | Facility: CLINIC | Age: 81
End: 2021-06-08

## 2021-06-08 VITALS
HEART RATE: 75 BPM | BODY MASS INDEX: 24.27 KG/M2 | DIASTOLIC BLOOD PRESSURE: 64 MMHG | TEMPERATURE: 98 F | OXYGEN SATURATION: 95 % | SYSTOLIC BLOOD PRESSURE: 128 MMHG | HEIGHT: 63 IN | WEIGHT: 137 LBS

## 2021-06-08 DIAGNOSIS — I10 ESSENTIAL HYPERTENSION: Primary | Chronic | ICD-10-CM

## 2021-06-08 DIAGNOSIS — R73.09 ABNORMAL GLUCOSE: ICD-10-CM

## 2021-06-08 DIAGNOSIS — Z85.118 H/O: LUNG CANCER: ICD-10-CM

## 2021-06-08 DIAGNOSIS — J44.9 CHRONIC OBSTRUCTIVE PULMONARY DISEASE, UNSPECIFIED COPD TYPE (HCC): Chronic | ICD-10-CM

## 2021-06-08 DIAGNOSIS — E78.5 HYPERLIPIDEMIA, UNSPECIFIED HYPERLIPIDEMIA TYPE: Chronic | ICD-10-CM

## 2021-06-08 PROCEDURE — G0439 PPPS, SUBSEQ VISIT: HCPCS | Performed by: FAMILY MEDICINE

## 2021-06-08 PROCEDURE — 99213 OFFICE O/P EST LOW 20 MIN: CPT | Performed by: FAMILY MEDICINE

## 2021-06-08 RX ORDER — MONTELUKAST SODIUM 10 MG/1
10 TABLET ORAL NIGHTLY
COMMUNITY
End: 2021-12-08 | Stop reason: SDUPTHER

## 2021-06-08 RX ORDER — PREDNISONE 20 MG/1
20 TABLET ORAL 2 TIMES DAILY
Qty: 10 TABLET | Refills: 0 | Status: SHIPPED | OUTPATIENT
Start: 2021-06-08 | End: 2021-06-13

## 2021-06-08 RX ORDER — PREDNISONE 20 MG/1
20 TABLET ORAL 2 TIMES DAILY
Qty: 10 TABLET | Refills: 0 | Status: SHIPPED | OUTPATIENT
Start: 2021-06-08 | End: 2021-06-08 | Stop reason: SDUPTHER

## 2021-06-08 NOTE — PATIENT INSTRUCTIONS
Look into Shingrix (shingles vaccine) coverage at the pharmacy.  Make sure to get back on the montelukast and Spiriva for COPD maintenance.  We will do a burst of steroids today.        Medicare Wellness  Personal Prevention Plan of Service     Date of Office Visit:  2021  Encounter Provider:  Meredith Lea Kehrer, MD  Place of Service:  [facility]  Patient Name: Darlene Pfeiffer  :  1940    As part of the Medicare Wellness portion of your visit today, we are providing you with this personalized preventive plan of services (PPPS). This plan is based upon recommendations of the [place] Preventive Services Task Force (USPSTF) and the Advisory Committee on Immunization Practices (ACIP).    This lists the preventive care services that should be considered, and provides dates of when you are due. Items listed as completed are up-to-date and do not require any further intervention.    Health Maintenance   Topic Date Due   • ZOSTER VACCINE (1 of 2) Never done   • ANNUAL WELLNESS VISIT  2021   • DXA SCAN  2022 (Originally 2021)   • INFLUENZA VACCINE  2021   • LIPID PANEL  2021   • COLORECTAL CANCER SCREENING  2024   • TDAP/TD VACCINES (2 - Td or Tdap) 11/10/2026   • HEPATITIS C SCREENING  Completed   • COVID-19 Vaccine  Completed   • Pneumococcal Vaccine 65+  Completed       Orders Placed This Encounter   Procedures   • CT Chest With Contrast     Standing Status:   Future     Standing Expiration Date:   2022     Order Specific Question:   Release to patient     Answer:   Immediate   • TSH     Order Specific Question:   Release to patient     Answer:   Immediate   • Lipid Panel   • Comprehensive Metabolic Panel     Order Specific Question:   Release to patient     Answer:   Immediate   • POC Glycosylated Hemoglobin (Hb A1C)     Order Specific Question:   Release to patient     Answer:   Immediate   • CBC & Differential     Order Specific  Question:   Manual Differential     Answer:   No       No follow-ups on file.

## 2021-06-08 NOTE — PROGRESS NOTES
The ABCs of the Annual Wellness Visit  Subsequent Medicare Wellness Visit    Chief Complaint   Patient presents with   • Medicare Wellness-subsequent       Subjective   History of Present Illness:  Darlene Pfeiffer is a 80 y.o. female who presents for a Subsequent Medicare Wellness Visit. Has  Not been taking the spiriva or montelukast.  Needs a refill of the Tussionex to get some sleep at night occasionally.  Just finished last bowel from January few days ago.  Still coughing all night at times.  The Spiriva was really expensive so she did not stay on it.  She has not had any fever or chills recently.  Chest has a lot of chest congestion.  Last chest x-ray was negative.  She is due for CT scan to follow-up her lung cancer.  She is compliant with her blood pressure medication and cholesterol medication.    HEALTH RISK ASSESSMENT    Recent Hospitalizations:  No hospitalization(s) within the last year.    Current Medical Providers:  Patient Care Team:  Kehrer, Meredith Lea, MD as PCP - General (Family Medicine)    Smoking Status:  Social History     Tobacco Use   Smoking Status Former Smoker   • Quit date: 2015   • Years since quittin.3   Smokeless Tobacco Never Used   Tobacco Comment    less than a pack per day, no smoking since , Quit 2015       Alcohol Consumption:  Social History     Substance and Sexual Activity   Alcohol Use Yes    Comment: Socially -A few times a month       Depression Screen:   PHQ-2/PHQ-9 Depression Screening 2021   Little interest or pleasure in doing things 0   Feeling down, depressed, or hopeless 0   Trouble falling or staying asleep, or sleeping too much -   Feeling tired or having little energy -   Poor appetite or overeating -   Feeling bad about yourself - or that you are a failure or have let yourself or your family down -   Trouble concentrating on things, such as reading the newspaper or watching television -   Moving or speaking so slowly that other people could have  noticed. Or the opposite - being so fidgety or restless that you have been moving around a lot more than usual -   Thoughts that you would be better off dead, or of hurting yourself in some way -   Total Score 0       Fall Risk Screen:  JOY Fall Risk Assessment was completed, and patient is at LOW risk for falls.Assessment completed on:6/8/2021    Health Habits and Functional and Cognitive Screening:  Functional & Cognitive Status 6/8/2021   Do you have difficulty preparing food and eating? No   Do you have difficulty bathing yourself, getting dressed or grooming yourself? No   Do you have difficulty using the toilet? No   Do you have difficulty moving around from place to place? No   Do you have trouble with steps or getting out of a bed or a chair? No   Current Diet Well Balanced Diet   Dental Exam Up to date   Eye Exam Up to date   Exercise (times per week) 2 times per week   Current Exercise Activities Include Yard Work   Do you need help using the phone?  No   Are you deaf or do you have serious difficulty hearing?  No   Do you need help with transportation? No   Do you need help shopping? No   Do you need help preparing meals?  No   Do you need help with housework?  No   Do you need help with laundry? No   Do you need help taking your medications? No   Do you need help managing money? No   Do you ever drive or ride in a car without wearing a seat belt? No   Have you felt unusual stress, anger or loneliness in the last month? -   Who do you live with? -   If you need help, do you have trouble finding someone available to you? -   Have you been bothered in the last four weeks by sexual problems? -   Do you have difficulty concentrating, remembering or making decisions? -         Does the patient have evidence of cognitive impairment? No    Asprin use counseling:Does not need ASA (and currently is not on it)    Age-appropriate Screening Schedule:  Refer to the list below for future screening recommendations  based on patient's age, sex and/or medical conditions. Orders for these recommended tests are listed in the plan section. The patient has been provided with a written plan.    Health Maintenance   Topic Date Due   • ZOSTER VACCINE (1 of 2) Never done   • DXA SCAN  06/30/2022 (Originally 5/30/2021)   • INFLUENZA VACCINE  08/01/2021   • LIPID PANEL  12/01/2021   • TDAP/TD VACCINES (2 - Td or Tdap) 11/10/2026          The following portions of the patient's history were reviewed and updated as appropriate: allergies, current medications, past family history, past medical history, past social history, past surgical history and problem list.    Outpatient Medications Prior to Visit   Medication Sig Dispense Refill   • albuterol sulfate  (90 Base) MCG/ACT inhaler Inhale 2 puffs Every 4 (Four) Hours As Needed for Wheezing. 18 g 2   • amLODIPine (NORVASC) 5 MG tablet TAKE 1 TABLET EVERY DAY 90 tablet 1   • atorvastatin (LIPITOR) 20 MG tablet Take 1 tablet by mouth Every Night. 90 tablet 3   • B Complex-C-E-Zn (b complex-C-E-zinc) tablet Take 1 tablet by mouth Daily.     • cetirizine (ZyrTEC) 10 MG tablet Take 10 mg by mouth.     • cholecalciferol (VITAMIN D3) 1000 units tablet Take 1,000 Units by mouth Daily.     • fluticasone (FLONASE) 50 MCG/ACT nasal spray 2 sprays into the nostril(s) as directed by provider Daily.     • hydroCHLOROthiazide (HYDRODIURIL) 25 MG tablet TAKE 1 TABLET EVERY DAY 90 tablet 1   • metoprolol succinate XL (TOPROL-XL) 25 MG 24 hr tablet TAKE 1 TABLET EVERY DAY 90 tablet 1   • montelukast (SINGULAIR) 10 MG tablet Take 10 mg by mouth Every Night.     • Multiple Vitamins-Minerals (CENTRUM ADULTS) tablet Take  by mouth.     • omeprazole (PriLOSEC) 40 MG capsule Take 40 mg by mouth.     • promethazine-dextromethorphan (PROMETHAZINE-DM) 6.25-15 MG/5ML syrup Take 5 mL by mouth 4 (Four) Times a Day As Needed for Cough. 180 mL 0   • Tiotropium Bromide Monohydrate (SPIRIVA RESPIMAT) 1.25 MCG/ACT  aerosol solution inhaler Inhale 2 puffs Daily.     • ipratropium (ATROVENT) 0.06 % nasal spray 2 sprays into the nostril(s) as directed by provider 4 (Four) Times a Day. 15 mL 0     No facility-administered medications prior to visit.       Patient Active Problem List   Diagnosis   • Hypertension   • Hyperlipidemia   • Esophageal reflux   • Chronic obstructive pulmonary disease (CMS/HCC)   • Seasonal allergic rhinitis due to pollen   • Abnormal glucose   • Clinical diagnosis of severe acute respiratory syndrome coronavirus 2 (SARS-CoV-2) disease       Advanced Care Planning:  ACP discussion was held with the patient during this visit. Patient has an advance directive (not in EMR), copy requested.    Review of Systems   Constitutional: Negative for chills, fatigue and fever.   HENT: Negative for congestion, ear pain, rhinorrhea and sore throat.    Eyes: Negative for pain and redness.   Respiratory: Positive for cough. Negative for chest tightness and wheezing.    Cardiovascular: Negative for chest pain and leg swelling.   Gastrointestinal: Negative for abdominal pain, constipation, diarrhea, nausea and vomiting.   Endocrine: Negative for polydipsia and polyphagia.   Genitourinary: Negative for dysuria and hematuria.   Musculoskeletal: Negative for arthralgias and myalgias.   Skin: Negative for color change and rash.   Allergic/Immunologic: Negative.    Neurological: Negative for dizziness, weakness, light-headedness and headaches.   Hematological: Negative.    Psychiatric/Behavioral: Negative for confusion, dysphoric mood and sleep disturbance.       Compared to one year ago, the patient feels her physical health is the same.  Compared to one year ago, the patient feels her mental health is better.    Reviewed chart for potential of high risk medication in the elderly: yes  Reviewed chart for potential of harmful drug interactions in the elderly:yes    Objective         Vitals:    06/08/21 0837   BP: 128/64   Pulse:  "75   Temp: 98 °F (36.7 °C)   SpO2: 95%   Weight: 62.1 kg (137 lb)   Height: 160 cm (62.99\")       Body mass index is 24.27 kg/m².  Discussed the patient's BMI with her. The BMI is in the acceptable range.    Physical Exam  Vitals and nursing note reviewed.   Constitutional:       General: She is not in acute distress.     Appearance: Normal appearance. She is well-developed.   HENT:      Head: Normocephalic and atraumatic.      Right Ear: Tympanic membrane, ear canal and external ear normal.      Left Ear: Tympanic membrane, ear canal and external ear normal.      Nose: Nose normal.      Mouth/Throat:      Mouth: Mucous membranes are moist.      Pharynx: Oropharynx is clear. No oropharyngeal exudate or posterior oropharyngeal erythema.   Eyes:      Conjunctiva/sclera: Conjunctivae normal.      Pupils: Pupils are equal, round, and reactive to light.   Neck:      Thyroid: No thyromegaly.   Cardiovascular:      Rate and Rhythm: Normal rate and regular rhythm.      Heart sounds: No murmur heard.     Pulmonary:      Effort: Pulmonary effort is normal.      Breath sounds: Decreased breath sounds and wheezing present. No rhonchi or rales.      Comments: Few scattered wheezes  Abdominal:      General: Abdomen is flat. Bowel sounds are normal. There is no distension.      Palpations: Abdomen is soft. There is no mass.      Tenderness: There is no abdominal tenderness.      Hernia: No hernia is present.   Musculoskeletal:         General: No swelling. Normal range of motion.      Cervical back: Normal range of motion and neck supple.      Right lower leg: No edema.      Left lower leg: No edema.   Lymphadenopathy:      Cervical: No cervical adenopathy.   Skin:     General: Skin is warm and dry.      Capillary Refill: Capillary refill takes less than 2 seconds.      Findings: No rash.   Neurological:      General: No focal deficit present.      Mental Status: She is alert and oriented to person, place, and time.      Cranial " Nerves: No cranial nerve deficit.   Psychiatric:         Mood and Affect: Mood normal.         Behavior: Behavior normal.               Assessment/Plan   Medicare Risks and Personalized Health Plan  CMS Preventative Services Quick Reference  Immunizations Discussed/Encouraged (specific immunizations; Shingrix )  Lung Cancer Risk    The above risks/problems have been discussed with the patient.  Pertinent information has been shared with the patient in the After Visit Summary.  Follow up plans and orders are seen below in the Assessment/Plan Section.    Diagnoses and all orders for this visit:    1. Essential hypertension (Primary)  -     TSH  -     Lipid Panel  -     Comprehensive Metabolic Panel  -     CBC & Differential    2. Hyperlipidemia, unspecified hyperlipidemia type  -     Lipid Panel  -     Comprehensive Metabolic Panel    3. Chronic obstructive pulmonary disease, unspecified COPD type (CMS/[facility])  -     HYDROcod Polst-CPM Polst ER (Tussionex Pennkinetic ER) 10-8 MG/5ML ER suspension; Take 5 mL by mouth Every 12 (Twelve) Hours As Needed for Cough.  Dispense: 120 mL; Refill: 0    4. Abnormal glucose  -     Cancel: POC Glycosylated Hemoglobin (Hb A1C)    5. H/O: lung cancer  -     CT Chest With Contrast; Future    Other orders  -     Tiotropium Bromide Monohydrate (SPIRIVA RESPIMAT) 1.25 MCG/ACT aerosol solution inhaler; Inhale 2 puffs Daily.  Dispense: 5 each; Refill: 0  -     predniSONE (DELTASONE) 20 MG tablet; Take 1 tablet by mouth 2 (Two) Times a Day for 5 days.  Dispense: 10 tablet; Refill: 0    Hypertension-controlled, continue current medications get monitoring labs  Hyperlipidemia-monitoring labs today  COPD-slight exacerbation we will do a prednisone burst and refill the hydrocodone cough syrup for bad cough at night  Abnormal glucose A1c not obtained by staff  History of lung cancer we will get a CT scan of chest     follow Up:  Return in about 6 months (around 12/8/2021) for Recheck BP.     An  After Visit Summary and PPPS were given to the patient.                ambulatory

## 2021-06-08 NOTE — TELEPHONE ENCOUNTER
Was sent to [facility] pharmacy, please resend to [facility] [place].  Will cancel prescriptions at [facility]

## 2021-06-09 ENCOUNTER — APPOINTMENT (OUTPATIENT)
Dept: WOMENS IMAGING | Facility: HOSPITAL | Age: 81
End: 2021-06-09

## 2021-06-09 LAB
ALBUMIN SERPL-MCNC: 4.5 G/DL (ref 3.5–5.2)
ALBUMIN/GLOB SERPL: 2.4 G/DL
ALP SERPL-CCNC: 95 U/L (ref 39–117)
ALT SERPL-CCNC: 14 U/L (ref 1–33)
AST SERPL-CCNC: 19 U/L (ref 1–32)
BASOPHILS # BLD AUTO: 0.04 10*3/MM3 (ref 0–0.2)
BASOPHILS NFR BLD AUTO: 0.5 % (ref 0–1.5)
BILIRUB SERPL-MCNC: 0.5 MG/DL (ref 0–1.2)
BUN SERPL-MCNC: 14 MG/DL (ref 8–23)
BUN/CREAT SERPL: 23 (ref 7–25)
CALCIUM SERPL-MCNC: 9.9 MG/DL (ref 8.6–10.5)
CHLORIDE SERPL-SCNC: 103 MMOL/L (ref 98–107)
CHOLEST SERPL-MCNC: 182 MG/DL (ref 0–200)
CO2 SERPL-SCNC: 32.5 MMOL/L (ref 22–29)
CREAT SERPL-MCNC: 0.61 MG/DL (ref 0.57–1)
EOSINOPHIL # BLD AUTO: 0 10*3/MM3 (ref 0–0.4)
EOSINOPHIL NFR BLD AUTO: 0 % (ref 0.3–6.2)
ERYTHROCYTE [DISTWIDTH] IN BLOOD BY AUTOMATED COUNT: 13.3 % (ref 12.3–15.4)
GLOBULIN SER CALC-MCNC: 1.9 GM/DL
GLUCOSE SERPL-MCNC: 112 MG/DL (ref 65–99)
HCT VFR BLD AUTO: 46.1 % (ref 34–46.6)
HDLC SERPL-MCNC: 59 MG/DL (ref 40–60)
HGB BLD-MCNC: 15 G/DL (ref 12–15.9)
IMM GRANULOCYTES # BLD AUTO: 0.01 10*3/MM3 (ref 0–0.05)
IMM GRANULOCYTES NFR BLD AUTO: 0.1 % (ref 0–0.5)
LDLC SERPL CALC-MCNC: 102 MG/DL (ref 0–100)
LYMPHOCYTES # BLD AUTO: 3.12 10*3/MM3 (ref 0.7–3.1)
LYMPHOCYTES NFR BLD AUTO: 39.7 % (ref 19.6–45.3)
MCH RBC QN AUTO: 30.1 PG (ref 26.6–33)
MCHC RBC AUTO-ENTMCNC: 32.5 G/DL (ref 31.5–35.7)
MCV RBC AUTO: 92.4 FL (ref 79–97)
MONOCYTES # BLD AUTO: 0.56 10*3/MM3 (ref 0.1–0.9)
MONOCYTES NFR BLD AUTO: 7.1 % (ref 5–12)
NEUTROPHILS # BLD AUTO: 4.13 10*3/MM3 (ref 1.7–7)
NEUTROPHILS NFR BLD AUTO: 52.6 % (ref 42.7–76)
NRBC BLD AUTO-RTO: 0 /100 WBC (ref 0–0.2)
PLATELET # BLD AUTO: 289 10*3/MM3 (ref 140–450)
POTASSIUM SERPL-SCNC: 3.8 MMOL/L (ref 3.5–5.2)
PROT SERPL-MCNC: 6.4 G/DL (ref 6–8.5)
RBC # BLD AUTO: 4.99 10*6/MM3 (ref 3.77–5.28)
SODIUM SERPL-SCNC: 145 MMOL/L (ref 136–145)
TRIGL SERPL-MCNC: 120 MG/DL (ref 0–150)
TSH SERPL DL<=0.005 MIU/L-ACNC: 3.98 UIU/ML (ref 0.27–4.2)
VLDLC SERPL CALC-MCNC: 21 MG/DL (ref 5–40)
WBC # BLD AUTO: 7.86 10*3/MM3 (ref 3.4–10.8)

## 2021-06-09 PROCEDURE — 77067 SCR MAMMO BI INCL CAD: CPT | Performed by: RADIOLOGY

## 2021-06-09 PROCEDURE — 77063 BREAST TOMOSYNTHESIS BI: CPT | Performed by: RADIOLOGY

## 2021-06-16 DIAGNOSIS — Z85.118 H/O: LUNG CANCER: ICD-10-CM

## 2021-06-17 DIAGNOSIS — R91.1 LUNG NODULE: Primary | ICD-10-CM

## 2021-06-17 DIAGNOSIS — Z85.118 H/O: LUNG CANCER: ICD-10-CM

## 2021-07-01 ENCOUNTER — OFFICE VISIT (OUTPATIENT)
Dept: OTHER | Facility: HOSPITAL | Age: 81
End: 2021-07-01

## 2021-07-01 VITALS
HEART RATE: 104 BPM | WEIGHT: 136.5 LBS | TEMPERATURE: 97.1 F | BODY MASS INDEX: 24.19 KG/M2 | OXYGEN SATURATION: 96 % | DIASTOLIC BLOOD PRESSURE: 75 MMHG | SYSTOLIC BLOOD PRESSURE: 157 MMHG | RESPIRATION RATE: 18 BRPM

## 2021-07-01 DIAGNOSIS — R91.1 LUNG NODULE: Primary | ICD-10-CM

## 2021-07-01 PROCEDURE — 99204 OFFICE O/P NEW MOD 45 MIN: CPT | Performed by: THORACIC SURGERY (CARDIOTHORACIC VASCULAR SURGERY)

## 2021-07-01 NOTE — PROGRESS NOTES
Subjective   Patient ID: Darlene Pfeiffer is a 80 y.o. female is being seen for consultation today at the request of Meredith Lea Kehrer, MD    History of Present Illness  Dear Colleague,  Darlene Pfeiffer was seen in the lung care center [facility] July 1, 2021 as part of our multidisciplinary thoracic oncology clinic.  I have reviewed her history her x-rays and have examined her.  Thank you for asking us to participate in the care of Ms. Pfeiffer.    Patient is a 80-year-old female.  Patient has been smoking since age 18.  She is smoked at most 1 pack of cigarettes per day.  In May 2012 she underwent a left upper lobectomy for carcinoma of the lung.  In 2015 she was diagnosed with a carcinoma of the tongue and underwent radiation and surgical treatment.  She stopped smoking in 2015.  She continues to have problems with COPD and wheezing.  She has allergies with postnasal drip.  Recent x-rays show a new pulmonary nodule.  She has been referred here for further evaluation of this nodule.    The following portions of the patient's history were reviewed and updated as appropriate: allergies, current medications, past family history, past medical history, past social history, past surgical history and problem list.  Review of Systems   Constitutional: Negative.   HENT: Positive for sore throat.    Eyes: Negative.    Cardiovascular: Negative.    Respiratory: Positive for cough and wheezing.    Endocrine: Negative.    Hematologic/Lymphatic: Negative.    Skin: Negative.    Musculoskeletal: Positive for arthritis and muscle cramps.   Gastrointestinal: Positive for constipation and diarrhea.   Genitourinary: Positive for urgency.   Neurological: Negative.    Psychiatric/Behavioral: Negative.    Allergic/Immunologic: Positive for environmental allergies.   All other systems reviewed and are negative.    Patient Active Problem List   Diagnosis   • Hypertension   • Hyperlipidemia   • Esophageal reflux   •  Chronic obstructive pulmonary disease (CMS/HCC)   • Seasonal allergic rhinitis due to pollen   • Abnormal glucose   • Clinical diagnosis of severe acute respiratory syndrome coronavirus 2 (SARS-CoV-2) disease     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 from [facility]   • History of squamous cell carcinoma     Tongue   • Hyperlipidemia    • Hyperpigmentation    • Hypertension     since early 60s   • Impacted cerumen of both ears    • Influenza    • Low vitamin B12 level    • Lower back pain    • Lung cancer (CMS/HCC)    • Menopause    • Need for influenza vaccination    • Other screening mammogram    • Thyromegaly    • Urinary pain    • UTI (urinary tract infection)    • Vitamin D deficiency      Past Surgical History:   Procedure Laterality Date   • CHOLECYSTECTOMY     • EYE SURGERY  2020    Took a muscle out of right eye   • GLOSSECTOMY PARTIAL      less than one half tongue   • LUNG SURGERY     • ORAL LESION EXCISION/BIOPSY       and 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   • TUBAL ABDOMINAL LIGATION       Family History   Problem Relation Age of Onset   • Ovarian cancer Mother          at age 27   • No Known Problems Father    • Ovarian cancer Sister    • Colon cancer Brother    • No Known Problems Other      Social History     Socioeconomic History   • Marital status:      Spouse name: Not on file   • Number of children: Not on file   • Years of education: Not on file   • Highest education level: Not on file   Tobacco  Use   • Smoking status: Former Smoker     Quit date: 2015     Years since quittin.4   • Smokeless tobacco: Never Used   • Tobacco comment: less than a pack per day, no smoking since , Quit 2015   Substance and Sexual Activity   • Alcohol use: Yes     Comment: Socially -A few times a month   • Drug use: No   • Sexual activity: Defer       Current Outpatient Medications:   •  albuterol sulfate  (90 Base) MCG/ACT inhaler, Inhale 2 puffs Every 4 (Four) Hours As Needed for Wheezing., Disp: 18 g, Rfl: 2  •  amLODIPine (NORVASC) 5 MG tablet, TAKE 1 TABLET EVERY DAY, Disp: 90 tablet, Rfl: 1  •  atorvastatin (LIPITOR) 20 MG tablet, Take 1 tablet by mouth Every Night., Disp: 90 tablet, Rfl: 3  •  B Complex-C-E-Zn (b complex-C-E-zinc) tablet, Take 1 tablet by mouth Daily., Disp: , Rfl:   •  cetirizine (ZyrTEC) 10 MG tablet, Take 10 mg by mouth., Disp: , Rfl:   •  cholecalciferol (VITAMIN D3) 1000 units tablet, Take 1,000 Units by mouth Daily., Disp: , Rfl:   •  fluticasone (FLONASE) 50 MCG/ACT nasal spray, 2 sprays into the nostril(s) as directed by provider Daily., Disp: , Rfl:   •  hydroCHLOROthiazide (HYDRODIURIL) 25 MG tablet, TAKE 1 TABLET EVERY DAY, Disp: 90 tablet, Rfl: 1  •  HYDROcod Polst-CPM Polst ER (Tussionex Pennkinetic ER) 10-8 MG/5ML ER suspension, Take 5 mL by mouth Every 12 (Twelve) Hours As Needed for Cough., Disp: 120 mL, Rfl: 0  •  ipratropium (ATROVENT) 0.06 % nasal spray, 2 sprays into the nostril(s) as directed by provider 4 (Four) Times a Day., Disp: 15 mL, Rfl: 0  •  metoprolol succinate XL (TOPROL-XL) 25 MG 24 hr tablet, TAKE 1 TABLET EVERY DAY, Disp: 90 tablet, Rfl: 1  •  montelukast (SINGULAIR) 10 MG tablet, Take 10 mg by mouth Every Night., Disp: , Rfl:   •  Multiple Vitamins-Minerals (CENTRUM ADULTS) tablet, Take  by mouth., Disp: , Rfl:   •  omeprazole (PriLOSEC) 40 MG capsule, Take 40 mg by mouth., Disp: , Rfl:   •  promethazine-dextromethorphan (PROMETHAZINE-DM) 6.25-15  MG/5ML syrup, Take 5 mL by mouth 4 (Four) Times a Day As Needed for Cough., Disp: 180 mL, Rfl: 0  •  Tiotropium Bromide Monohydrate (SPIRIVA RESPIMAT) 1.25 MCG/ACT aerosol solution inhaler, Inhale 2 puffs Daily., Disp: 5 each, Rfl: 0  Allergies   Allergen Reactions   • Sulfa Antibiotics Rash        Objective   Vitals:    07/01/21 0851   BP: 157/75   Pulse: 104   Resp: 18   Temp: 97.1 °F (36.2 °C)   SpO2: 96%     Physical Exam  Constitutional:       Appearance: Normal appearance. She is well-developed.   HENT:      Head: Normocephalic.   Eyes:      General: Lids are normal.      Conjunctiva/sclera: Conjunctivae normal.      Pupils: Pupils are equal, round, and reactive to light.   Neck:      Thyroid: No thyroid mass or thyromegaly.      Vascular: No carotid bruit, hepatojugular reflux or JVD.      Trachea: Trachea normal.   Cardiovascular:      Rate and Rhythm: Normal rate and regular rhythm.  No extrasystoles are present.     Chest Wall: PMI is not displaced.      Pulses: Normal pulses.      Heart sounds: Normal heart sounds, S1 normal and S2 normal.   Pulmonary:      Effort: Pulmonary effort is normal.      Breath sounds: Normal breath sounds.   Abdominal:      General: Bowel sounds are normal.      Palpations: Abdomen is soft. There is no mass.      Tenderness: There is no abdominal tenderness.      Hernia: No hernia is present.   Musculoskeletal:         General: Normal range of motion.      Cervical back: Normal range of motion and neck supple.   Skin:     General: Skin is warm and dry.   Neurological:      Mental Status: She is alert and oriented to person, place, and time.      Cranial Nerves: No cranial nerve deficit.      Sensory: No sensory deficit.      Deep Tendon Reflexes: Reflexes are normal and symmetric.   Psychiatric:         Speech: Speech normal.         Behavior: Behavior normal.         Thought Content: Thought content normal.         Judgment: Judgment normal.       Independent Review of  Radiographic Studies:    CT scan of the chest performed at [facility] on June 16, 2021 was independently reviewed.  There are postsurgical changes in the left chest from the left upper lobectomy.  There is an 8 mm irregular nodule in the medial superior segment of the right lower lobe.  There are diffuse changes of emphysema with fibrotic changes in the periphery.  There are no suspicious hilar or mediastinal lymph nodes.  No pleural effusion.      Assessment/Plan   Assessment:  This lesion is suspicious for a new malignancy.  This could certainly represent a second lung primary or a metastatic lesion from her tongue cancer.  Patient appears to be a poor candidate for surgical resection.  She will need pulmonary function tests to assess her operability.  I have ordered a CT PET scan to better characterize this nodule and to plan for biopsy.  Once the studies are completed her case will be discussed in our multidisciplinary thoracic oncology conference.    Plan:  CT PET scan to complete staging.  Pulmonary function test to assess operability.  Case to be presented at multidisciplinary thoracic oncology conference once studies are completed.  I will keep you informed of her progress.  Thank you for allowing us to participate in the care of Ms. Pfeiffer.    Diagnoses and all orders for this visit:    Lung nodule  -     Pulmonary Function Test; Future

## 2021-07-07 NOTE — PATIENT INSTRUCTIONS
Pt seen by Dr. Hernandez and will be scheduled for PFTS, PET scan and will be discussed at thoracic conference. Pt given instructions for PET and PFTs. .  Pt instructed to call nurse navigator with any questions or concerns. Pt given contact cards for Dr. Hernandez and nurse navigator.

## 2021-07-13 ENCOUNTER — HOSPITAL ENCOUNTER (OUTPATIENT)
Dept: PET IMAGING | Facility: HOSPITAL | Age: 81
Discharge: HOME | End: 2021-07-13

## 2021-07-13 DIAGNOSIS — R91.1 LUNG NODULE: ICD-10-CM

## 2021-07-13 LAB — GLUCOSE BLDC GLUCOMTR-MCNC: 113 MG/DL (ref 70–130)

## 2021-07-13 PROCEDURE — 82962 GLUCOSE BLOOD TEST: CPT

## 2021-07-13 PROCEDURE — A9552 F18 FDG: HCPCS | Performed by: THORACIC SURGERY (CARDIOTHORACIC VASCULAR SURGERY)

## 2021-07-13 PROCEDURE — 0 FLUDEOXYGLUCOSE F18 SOLUTION: Performed by: THORACIC SURGERY (CARDIOTHORACIC VASCULAR SURGERY)

## 2021-07-13 PROCEDURE — 78815 PET IMAGE W/CT SKULL-THIGH: CPT

## 2021-07-13 RX ADMIN — FLUDEOXYGLUCOSE F18 1 DOSE: 300 INJECTION INTRAVENOUS at 08:51

## 2021-07-20 ENCOUNTER — MDT ASSESSMENT (OUTPATIENT)
Dept: OTHER | Facility: HOSPITAL | Age: 81
End: 2021-07-20

## 2021-07-20 NOTE — PROGRESS NOTES
MULTIDISCIPLINARY TREATMENT PLANNING CONFERENCE  DATE: 7/22/2021      SPECIALTY: Thoracic Conference    PRESENTER: Arias Hernandez MD    SITE: Right Lung         Please discuss clinical/working stage, TNM, Stage Group, National Treatment Guidelines, and Prognostic Indicators.       CONFERENCE SUMMARY:  CT PET scan was reviewed with Dr. Addison of radiology.  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  Nodular thickening along the left upper mediastinum in the bed of the previous left upper lobectomy is concerning for recurrent lung cancer.  Lesion in the right lower lobe is PET positive and could represent a new primary or metastases from either the lung or the tongue.  There is a suspicious area in the L1 vertebral body that needs to be evaluated further but could represent metastatic disease.    Based on these findings the patient will be referred back to Dr. Deon Caballero at the [facility] for evaluation of the abnormal PET scan of the tongue.  She will be scheduled for CT directed biopsy of the left mediastinal nodular area that is PET positive.  MRI of the lumbar spine has been ordered to evaluate the L1 lesion.  Once all of this information is complete her case will be represented to the conference for further discussion.                          AJ[facility] STAGE: Staging is pending further work-up        REFERRAL SUPPORT   Psychosocial Assessment:  []                Clinical Trials:  []                Genetic Testing:  [x]                Geriatric Assessment:  []                Smoking Cessation:  []                Nutrition Assessment:  []                Social Work Evaluation/Barriers to Care:  []                Behavioral Oncology Evaluation:  []                Palliative Care:  []        EVIDENCE BASED NATIONAL TREATMENT GUIDELINES:  []                   SOCIAL HISTORY:   reports that she quit smoking about 6 years ago. She has never  used smokeless tobacco. She reports current alcohol use. She reports that she does not use drugs.                  PAST MEDICAL HISTORY:   has a past medical history of Abnormal glucose, Abnormal TSH, Allergic rhinitis, Bilateral leg and foot pain, Dysuria, Esophageal reflux, Excessive cerumen in ear canal, Fever and chills, Hematuria, High risk medication use, History of adenocarcinoma of lung, History of anemia, History of carcinoma in situ of skin, History of lung cancer, History of mammogram, History of oral hairy leukoplakia, History of squamous cell carcinoma, Hyperlipidemia, Hyperpigmentation, Hypertension, Impacted cerumen of both ears, Influenza, Low vitamin B12 level, Lower back pain, Lung cancer (CMS/HCC), Menopause, Need for influenza vaccination, Other screening mammogram, Thyromegaly, Urinary pain, UTI (urinary tract infection), and Vitamin D deficiency.                  PAST SURGICAL HISTORY:  @                 IMAGING:  NM Pet Skull Base To Mid Thigh    Result Date: 7/14/2021  1.  Ill-defined intensely FDG avid nodular soft tissue thickening in the left aspect of the mediastinum within the operative bed of the left upper lobectomy. Additionally, there is a 1 cm hypermetabolic pulmonary nodule right lower lobe. While findings are likely malignant, patient history makes the etiology uncertain. While findings within the left aspect the mediastinum are most suggestive of malignant recurrence of the patient's prior primary lung neoplasm, the right lower lobe nodule may represent metastatic disease related to patient's prior oropharyngeal cancer, the patient's prior lung cancer versus a new primary lung malignancy. 2.  Asymmetric thickening and FDG uptake involving the right base of tongue which given patient history of right oropharyngeal cancer raises concern for neoplastic recurrence and direct visualization is recommended. 3.  Subtle focal area of increased FDG uptake within the L1 vertebral body which  is indeterminate. Further evaluation with lumbar spine MRI with and without contrast recommended to exclude metastatic disease. 4.  Sub-6 mm pulmonary nodules within the right lung and subcentimeter hypodense hepatic lesion which are below PET resolution and remain indeterminate. Continued attention on follow-up is recommended. 5.  Indeterminate left renal lesion measuring 1.2 cm. Follow-up with multiphase CT abdomen with and without contrast in 6 months is recommended to exclude neoplasm. 6.  Other findings as above.  This report was finalized on 7/14/2021 8:32 AM by Dr. Quintin Townsend M.D.                      SURGICAL PROCEDURE / PATHOLOGY:  CT directed biopsy of the left mediastinal mass has been scheduled.                 Labs & Biomarkers: Pending completion of biopsy    PFTs **PENDING**

## 2021-07-22 ENCOUNTER — OFFICE VISIT (OUTPATIENT)
Dept: OTHER | Facility: HOSPITAL | Age: 81
End: 2021-07-22

## 2021-07-22 ENCOUNTER — TRANSCRIBE ORDERS (OUTPATIENT)
Dept: ADMINISTRATIVE | Facility: HOSPITAL | Age: 81
End: 2021-07-22

## 2021-07-22 VITALS
RESPIRATION RATE: 16 BRPM | BODY MASS INDEX: 24.41 KG/M2 | HEIGHT: 63 IN | SYSTOLIC BLOOD PRESSURE: 146 MMHG | OXYGEN SATURATION: 95 % | DIASTOLIC BLOOD PRESSURE: 61 MMHG | WEIGHT: 137.8 LBS | HEART RATE: 79 BPM

## 2021-07-22 VITALS
HEIGHT: 63 IN | RESPIRATION RATE: 16 BRPM | SYSTOLIC BLOOD PRESSURE: 146 MMHG | HEART RATE: 79 BPM | WEIGHT: 137.8 LBS | BODY MASS INDEX: 24.41 KG/M2 | OXYGEN SATURATION: 95 % | DIASTOLIC BLOOD PRESSURE: 61 MMHG

## 2021-07-22 DIAGNOSIS — Z20.822 ENCOUNTER FOR PREPROCEDURE SCREENING LABORATORY TESTING FOR COVID-19: Primary | ICD-10-CM

## 2021-07-22 DIAGNOSIS — R94.8 ABNORMAL POSITRON EMISSION TOMOGRAPHY (PET) SCAN: ICD-10-CM

## 2021-07-22 DIAGNOSIS — R91.1 LUNG NODULE: Primary | ICD-10-CM

## 2021-07-22 DIAGNOSIS — C34.90 MALIGNANT NEOPLASM OF LUNG, UNSPECIFIED LATERALITY, UNSPECIFIED PART OF LUNG (HCC): Primary | ICD-10-CM

## 2021-07-22 DIAGNOSIS — Z01.812 ENCOUNTER FOR PREPROCEDURE SCREENING LABORATORY TESTING FOR COVID-19: Primary | ICD-10-CM

## 2021-07-22 PROCEDURE — 99214 OFFICE O/P EST MOD 30 MIN: CPT | Performed by: THORACIC SURGERY (CARDIOTHORACIC VASCULAR SURGERY)

## 2021-07-22 PROCEDURE — 99204 OFFICE O/P NEW MOD 45 MIN: CPT | Performed by: INTERNAL MEDICINE

## 2021-07-22 NOTE — PROGRESS NOTES
Subjective   Patient ID: Darlene Pfeiffer is a 81 y.o. female is here today for follow-up.    History of Present Illness  Dear Colleague,  Darlene Pfeiffer was seen in the lung care center at [facility] today July 22, 2021 as part of our multidisciplinary thoracic oncology clinic.  Since her initial evaluation she has undergone a CT PET scan and pulmonary function tests to evaluate a 8 mm nodule in the superior segment of the right lower lobe.  Patient is here today to discuss the results of the studies and to formulate a treatment plan.  Her case was discussed in detail during our multidisciplinary thoracic oncology conference this morning.    The following portions of the patient's history were reviewed and updated as appropriate: allergies, current medications, past family history, past medical history, past social history, past surgical history and problem list.  Review of Systems   Constitutional: Negative.   HENT: Positive for sore throat.    Eyes: Negative.    Cardiovascular: Negative.    Respiratory: Positive for cough and wheezing.    Endocrine: Negative.    Hematologic/Lymphatic: Negative.    Skin: Negative.    Musculoskeletal: Positive for arthritis and muscle cramps.   Gastrointestinal: Positive for constipation and diarrhea.   Genitourinary: Positive for urgency.   Neurological: Negative.    Psychiatric/Behavioral: Negative.    Allergic/Immunologic: Positive for environmental allergies.   All other systems reviewed and are negative.    Patient Active Problem List   Diagnosis   • Hypertension   • Hyperlipidemia   • Esophageal reflux   • Chronic obstructive pulmonary disease (CMS/HCC)   • Seasonal allergic rhinitis due to pollen   • Abnormal glucose   • Clinical diagnosis of severe acute respiratory syndrome coronavirus 2 (SARS-CoV-2) disease     Past Medical History:   Diagnosis Date   • Abnormal glucose    • Abnormal TSH    • Allergic rhinitis    • Bilateral leg and foot pain    • Dysuria    •  Esophageal reflux    • Excessive cerumen in ear canal    • Fever and chills    • Hematuria    • High risk medication use    • History of adenocarcinoma of lung    • History of anemia    • History of carcinoma in situ of skin    • History of lung cancer    • History of mammogram    • 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 from [facility]   • History of squamous cell carcinoma     Tongue   • Hyperlipidemia    • Hyperpigmentation    • Hypertension     since early 60's   • Impacted cerumen of both ears    • Influenza    • Low vitamin B12 level    • Lower back pain    • Lung cancer (CMS/HCC)    • Menopause    • Need for influenza vaccination    • Other screening mammogram    • Thyromegaly    • Urinary pain    • UTI (urinary tract infection)    • Vitamin D deficiency      Past Surgical History:   Procedure Laterality Date   • CHOLECYSTECTOMY     • EYE SURGERY  2020    Took a muscle out of right eye   • GLOSSECTOMY PARTIAL      less than one half tongue   • LUNG SURGERY     • ORAL LESION EXCISION/BIOPSY       and 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   • TUBAL ABDOMINAL LIGATION       Family History   Problem Relation Age of Onset   • Ovarian cancer Mother          at age 27   • No Known Problems Father    • Ovarian cancer Sister    • Colon cancer Brother    • No Known Problems Other      Social History     Socioeconomic History   • Marital status:      Spouse name: Not on file   • Number of children: Not on file   • Years of education: Not on file   • Highest education level: Not on file   Tobacco Use   • Smoking status: Former Smoker     Quit date: 2015     Years since quittin.4   • Smokeless tobacco: Never Used   • Tobacco comment: less than a pack per day, no smoking since , Quit 2015   Substance and Sexual Activity   • Alcohol use: Yes     Comment: Socially -A few times a month   • Drug use: No   • Sexual activity: Defer       Current  Outpatient Medications:   •  albuterol sulfate  (90 Base) MCG/ACT inhaler, Inhale 2 puffs Every 4 (Four) Hours As Needed for Wheezing., Disp: 18 g, Rfl: 2  •  amLODIPine (NORVASC) 5 MG tablet, TAKE 1 TABLET EVERY DAY, Disp: 90 tablet, Rfl: 1  •  atorvastatin (LIPITOR) 20 MG tablet, Take 1 tablet by mouth Every Night., Disp: 90 tablet, Rfl: 3  •  B Complex-C-E-Zn (b complex-C-E-zinc) tablet, Take 1 tablet by mouth Daily., Disp: , Rfl:   •  cetirizine (ZyrTEC) 10 MG tablet, Take 10 mg by mouth., Disp: , Rfl:   •  cholecalciferol (VITAMIN D3) 1000 units tablet, Take 1,000 Units by mouth Daily., Disp: , Rfl:   •  fluticasone (FLONASE) 50 MCG/ACT nasal spray, 2 sprays into the nostril(s) as directed by provider Daily., Disp: , Rfl:   •  hydroCHLOROthiazide (HYDRODIURIL) 25 MG tablet, TAKE 1 TABLET EVERY DAY, Disp: 90 tablet, Rfl: 1  •  HYDROcod Polst-CPM Polst ER (Tussionex Pennkinetic ER) 10-8 MG/5ML ER suspension, Take 5 mL by mouth Every 12 (Twelve) Hours As Needed for Cough., Disp: 120 mL, Rfl: 0  •  ipratropium (ATROVENT) 0.06 % nasal spray, 2 sprays into the nostril(s) as directed by provider 4 (Four) Times a Day., Disp: 15 mL, Rfl: 0  •  metoprolol succinate XL (TOPROL-XL) 25 MG 24 hr tablet, TAKE 1 TABLET EVERY DAY, Disp: 90 tablet, Rfl: 1  •  montelukast (SINGULAIR) 10 MG tablet, Take 10 mg by mouth Every Night., Disp: , Rfl:   •  Multiple Vitamins-Minerals (CENTRUM ADULTS) tablet, Take  by mouth., Disp: , Rfl:   •  omeprazole (PriLOSEC) 40 MG capsule, Take 40 mg by mouth., Disp: , Rfl:   •  promethazine-dextromethorphan (PROMETHAZINE-DM) 6.25-15 MG/5ML syrup, Take 5 mL by mouth 4 (Four) Times a Day As Needed for Cough., Disp: 180 mL, Rfl: 0  •  Tiotropium Bromide Monohydrate (SPIRIVA RESPIMAT) 1.25 MCG/ACT aerosol solution inhaler, Inhale 2 puffs Daily., Disp: 5 each, Rfl: 0  Allergies   Allergen Reactions   • Sulfa Antibiotics Rash        Objective   Vitals:    07/22/21 0809   BP: 146/61   Pulse: 79    Resp: 16   SpO2: 95%     Physical Exam  Constitutional:       Appearance: Normal appearance. She is well-developed.   HENT:      Head: Normocephalic.   Eyes:      General: Lids are normal.      Conjunctiva/sclera: Conjunctivae normal.      Pupils: Pupils are equal, round, and reactive to light.   Neck:      Thyroid: No thyroid mass or thyromegaly.      Vascular: No carotid bruit, hepatojugular reflux or JVD.      Trachea: Trachea normal.   Cardiovascular:      Rate and Rhythm: Normal rate and regular rhythm.  No extrasystoles are present.     Chest Wall: PMI is not displaced.      Pulses: Normal pulses.      Heart sounds: Normal heart sounds, S1 normal and S2 normal.   Pulmonary:      Effort: Pulmonary effort is normal.      Breath sounds: Normal breath sounds.   Abdominal:      General: Bowel sounds are normal.      Palpations: Abdomen is soft. There is no mass.      Tenderness: There is no abdominal tenderness.      Hernia: No hernia is present.   Musculoskeletal:         General: Normal range of motion.      Cervical back: Normal range of motion and neck supple.   Skin:     General: Skin is warm and dry.   Neurological:      Mental Status: She is alert and oriented to person, place, and time.      Cranial Nerves: No cranial nerve deficit.      Sensory: No sensory deficit.      Deep Tendon Reflexes: Reflexes are normal and symmetric.   Psychiatric:         Speech: Speech normal.         Behavior: Behavior normal.         Thought Content: Thought content normal.         Judgment: Judgment normal.       Independent Review of Radiographic Studies:    CT PET scan I was independently reviewed and discussed in detail with Dr. Addison of radiology during our multidisciplinary conference this morning.  There is an ill-defined nodular thickening along the left aspect of the mediastinum near the operative bed of the previous left upper lobectomy.  This is intensely FDG avid.  The 8 mm nodule in the superior segment of the  right lower lobe is also markedly hypermetabolic.  There is asymmetric thickening and increased uptake involving the right base of the tongue.  There is a subtle area of increased uptake within the L1 vertebral body.    Pulmonary function tests:    FVC is 2.10 which is 81% of predicted  FEV1 is 1.44 which is 75% of predicted  FEV1 FVC ratio 69  Diffusion capacity DLCO is 11.7 which is 54% of predicted    Assessment/Plan   Assessment/Plan  PET scan findings are concerning for recurrent tongue cancer which is unlikely and possible recurrent or metastatic lung cancer which is more likely.  Patient will be referred back to her ENT physician Dr. Deon Caballero at [facility] for his evaluation of the tongue abnormality.  She will have an MRI of the lumbar spine to further assess the uptake in the L1 vertebral body.  We will plan for a CT directed biopsy of the PET positive area in the left mediastinum.  Chose to biopsy this area because it is larger and not within the lung so the likelihood of pneumothorax would be less.  The 8 mm nodule in the right lower lobe has high potential for pneumothorax.  Once this information is available the case will be represented at our multidisciplinary conference for further discussion about treatment options.    I have discussed all this in detail with the patient and her .  I have answered all their questions to their satisfaction.  They understand this plan and agreed to proceed.      I spent 40minutes preparing for this session, reviewing studies, ordering tests and referrals and documenting this visit.    Diagnoses and all orders for this visit:    Lung nodule  -     CT Biopsy Lung Mediastinum Left; Future  -     Tissue Pathology Exam; Standing    Abnormal positron emission tomography (PET) scan  -     Ambulatory Referral to ENT (Otolaryngology)  -     MRI lumbar spine w wo contrast; Future  -     CT Biopsy Lung Mediastinum Left; Future

## 2021-07-22 NOTE — PROGRESS NOTES
Subjective Patient concerned about findings, hopes to start some type of treatment if needed.    REASON FOR CONSULTATION:  Provide an opinion on any further workup or treatment                             REQUESTING PHYSICIAN:      RECORDS OBTAINED:  Records of the patients history including those obtained from the referring provider were reviewed and summarized in detail.    HISTORY OF PRESENT ILLNESS:  The patient is a 81 y.o. year old female who is here for an opinion about the above issue.    History of Present Illness       The patient is a 81-year-old female with the below medical history including chronic obstructive pulmonary disease who was seen in the [facility] multidisciplinary thoracic oncology clinic July 1, 2021.  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      She had undergone a CT of the chest at [facility] 6/16/2021 showing postsurgical changes in the left chest from right upper lobectomy, 8 mm irregular nodule medial segment of the right lower lobe and diffuse changes of emphysema with fibrotic changes in the periphery, no suspicious hilar or mediastinal nodes, no pleural effusion.  New finding was suspicious for new malignancy with the patient felt to be a poor candidate for surgical resection.  A PET CT and PFTs were requested thereafter.  The PET/CT demonstrated ill-defined FDG avid soft tissue thickening left aspect mediastinum within the operative bed, 1 cm hypermetabolic pulmonary nodule right lower lobe and asymmetric thickening patchy uptake involving the right base of tongue.  There is subtle uptake within the L1 vertebral body which is indeterminate and a sub-6 mm pulmonary nodule of right lung and subcentimeter hypodense hepatic lesion which was below PET resolution.        The patient's case was discussed in thoracic conference 7/22/2021 with consideration of the patient undergoing L1 vertebral body MRI, confirmatory biopsy left mediastinal and assessment by ENT (Dr. Caballero) who had worked with the patient previously.  She, incidentally, indicates that radiation therapy was given apparently intraoperatively at her recent surgery?  She still has issues with difficulty chewing and swallowing post procedures and was to be followed up with Dr. Caballero in the near future as planned.    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 from [facility]   • History of squamous cell carcinoma     Tongue   • Hyperlipidemia    • Hyperpigmentation    • Hypertension     since early 60's   • Impacted cerumen of both ears    • Influenza    • Low vitamin B12 level    • Lower back pain    • Lung cancer (CMS/HCC)    • Menopause    • Need for influenza vaccination    • Other screening mammogram    • Thyromegaly    • Urinary pain    • UTI (urinary tract infection)    • Vitamin D deficiency         Past Surgical History:   Procedure Laterality Date   • CHOLECYSTECTOMY  1999   • EYE SURGERY  11/24/2020    Took a muscle out of right eye   • GLOSSECTOMY PARTIAL      less than one half tongue   • LUNG SURGERY     • ORAL LESION EXCISION/BIOPSY      June and August 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   • TUBAL ABDOMINAL LIGATION  1970        Current Outpatient Medications on File Prior to Visit   Medication Sig Dispense Refill   • albuterol sulfate  (90 Base)  MCG/ACT inhaler Inhale 2 puffs Every 4 (Four) Hours As Needed for Wheezing. 18 g 2   • amLODIPine (NORVASC) 5 MG tablet TAKE 1 TABLET EVERY DAY 90 tablet 1   • atorvastatin (LIPITOR) 20 MG tablet Take 1 tablet by mouth Every Night. 90 tablet 3   • B Complex-C-E-Zn (b complex-C-E-zinc) tablet Take 1 tablet by mouth Daily.     • cetirizine (ZyrTEC) 10 MG tablet Take 10 mg by mouth.     • cholecalciferol (VITAMIN D3) 1000 units tablet Take 1,000 Units by mouth Daily.     • fluticasone (FLONASE) 50 MCG/ACT nasal spray 2 sprays into the nostril(s) as directed by provider Daily.     • hydroCHLOROthiazide (HYDRODIURIL) 25 MG tablet TAKE 1 TABLET EVERY DAY 90 tablet 1   • HYDROcod Polst-CPM Polst ER (Tussionex Pennkinetic ER) 10-8 MG/5ML ER suspension Take 5 mL by mouth Every 12 (Twelve) Hours As Needed for Cough. 120 mL 0   • ipratropium (ATROVENT) 0.06 % nasal spray 2 sprays into the nostril(s) as directed by provider 4 (Four) Times a Day. 15 mL 0   • metoprolol succinate XL (TOPROL-XL) 25 MG 24 hr tablet TAKE 1 TABLET EVERY DAY 90 tablet 1   • montelukast (SINGULAIR) 10 MG tablet Take 10 mg by mouth Every Night.     • Multiple Vitamins-Minerals (CENTRUM ADULTS) tablet Take  by mouth.     • omeprazole (PriLOSEC) 40 MG capsule Take 40 mg by mouth.     • promethazine-dextromethorphan (PROMETHAZINE-DM) 6.25-15 MG/5ML syrup Take 5 mL by mouth 4 (Four) Times a Day As Needed for Cough. 180 mL 0   • Tiotropium Bromide Monohydrate (SPIRIVA RESPIMAT) 1.25 MCG/ACT aerosol solution inhaler Inhale 2 puffs Daily. 5 each 0     No current facility-administered medications on file prior to visit.        ALLERGIES:    Allergies   Allergen Reactions   • Sulfa Antibiotics Rash        Social History     Socioeconomic History   • Marital status:      Spouse name: Not on file   • Number of children: Not on file   • Years of education: Not on file   • Highest education level: Not on file   Tobacco Use   • Smoking status: Former Smoker     " Quit date: 2015     Years since quittin.4   • Smokeless tobacco: Never Used   • Tobacco comment: less than a pack per day, no smoking since , Quit 2015   Substance and Sexual Activity   • Alcohol use: Yes     Comment: Socially -A few times a month   • Drug use: No   • Sexual activity: Defer        Family History   Problem Relation Age of Onset   • Ovarian cancer Mother          at age 27   • No Known Problems Father    • Ovarian cancer Sister    • Colon cancer Brother    • No Known Problems Other         Review of Systems   Constitutional: Positive for fatigue.   HENT: Positive for dental problem and sore throat.    Eyes: Negative.    Respiratory: Positive for cough and wheezing.    Gastrointestinal: Positive for constipation and diarrhea.   Genitourinary: Positive for frequency and urgency.   Musculoskeletal: Positive for arthralgias and myalgias.   Allergic/Immunologic: Positive for environmental allergies.   Neurological: Negative.    Hematological: Negative.    Psychiatric/Behavioral: Negative.       Objective     Vitals:    21 0833   BP: 146/61   Pulse: 79   Resp: 16   SpO2: 95%  Comment: room air   Weight: 62.5 kg (137 lb 12.8 oz)   Height: 160 cm (62.99\")   PainSc: 0-No pain     No flowsheet data found.       Pulmonary function tests:     FVC is 2.10 which is 81% of predicted  FEV1 is 1.44 which is 75% of predicted  FEV1 FVC ratio 69  Diffusion capacity DLCO is 11.7 which is 54% of predicted      Physical Exam  Constitutional:       Appearance: Normal appearance. She is normal weight.   HENT:      Head: Normocephalic and atraumatic.      Nose: Nose normal.      Mouth/Throat:      Mouth: Mucous membranes are moist.      Pharynx: Oropharynx is clear.      Comments: Malocclusion noted  Eyes:      Extraocular Movements: Extraocular movements intact.   Cardiovascular:      Rate and Rhythm: Normal rate and regular rhythm.      Pulses: Normal pulses.      Heart sounds: Normal heart " sounds.   Pulmonary:      Effort: Pulmonary effort is normal.      Breath sounds: Normal breath sounds.      Comments: Distant breath sounds bilateral bases  Abdominal:      General: Abdomen is flat. Bowel sounds are normal.      Palpations: Abdomen is soft.   Musculoskeletal:         General: Normal range of motion.      Cervical back: Normal range of motion and neck supple.   Skin:     General: Skin is warm and dry.   Neurological:      General: No focal deficit present.      Mental Status: She is alert and oriented to person, place, and time.   Psychiatric:         Mood and Affect: Mood normal.         Behavior: Behavior normal.           RECENT LABS:  Hematology WBC   Date Value Ref Range Status   06/08/2021 7.86 3.40 - 10.80 10*3/mm3 Final     RBC   Date Value Ref Range Status   06/08/2021 4.99 3.77 - 5.28 10*6/mm3 Final     Hemoglobin   Date Value Ref Range Status   06/08/2021 15.0 12.0 - 15.9 g/dL Final     Hematocrit   Date Value Ref Range Status   06/08/2021 46.1 34.0 - 46.6 % Final     Platelets   Date Value Ref Range Status   06/08/2021 289 140 - 450 10*3/mm3 Final          Assessment/Plan      81-year-old female with medical history including COPD, long-term tobacco use, status post previous left upper lobectomy for carcinoma lung in May 2015, carcinoma tongue ongoing radiation therapy and surgical therapy through ENT-Dr. Caballero-including 2016 with wide local excision of buccal lesion.  During follow-up a CT scan of the chest June 16, 2021 demonstrates postsurgical changes, 8 mm irregular nodule medial segment of right lower lobe and diffuse changes of emphysema.  She is seen in thoracic clinic with findings suspicious for new malignancy and concern of the patient being a poor operative candidate.  Subsequent PET CT and PFTs demonstrated ill-defined avidity and soft tissue left aspect of the mediastinum, 1 cm hypermetabolic pulmonary nodule and asymmetric thickening involving the right base of tongue as  well as subtle uptake in the L1 vertebral body.  Patient's case was discussed in thoracic clinic and plans were made to request an MRI of the lumbar spine, obtain a biopsy of the mediastinal involvement of the left had the patient reviewed by ENT.  The patient is seen with her  7/22/2021 in thoracic clinic and we reviewed these findings as well as having her assessed via liquid biopsy to determine potential molecular status quickly.  She is agreeable to do so.    Plan:  *Ambulatory referral to ENT    *MRI lumbar spine with contrast    *CT biopsy lung mediastinum on the left    *Patient agreeable to present to office today for liquid biopsy-guardant 360    *MD follow-up 2-4 weeks

## 2021-07-24 ENCOUNTER — HOSPITAL ENCOUNTER (OUTPATIENT)
Dept: MRI IMAGING | Facility: HOSPITAL | Age: 81
Discharge: HOME | End: 2021-07-24
Admitting: THORACIC SURGERY (CARDIOTHORACIC VASCULAR SURGERY)

## 2021-07-24 DIAGNOSIS — R94.8 ABNORMAL POSITRON EMISSION TOMOGRAPHY (PET) SCAN: ICD-10-CM

## 2021-07-24 PROCEDURE — 82565 ASSAY OF CREATININE: CPT

## 2021-07-24 PROCEDURE — A9577 INJ MULTIHANCE: HCPCS | Performed by: THORACIC SURGERY (CARDIOTHORACIC VASCULAR SURGERY)

## 2021-07-24 PROCEDURE — 72158 MRI LUMBAR SPINE W/O & W/DYE: CPT

## 2021-07-24 PROCEDURE — 0 GADOBENATE DIMEGLUMINE 529 MG/ML SOLUTION: Performed by: THORACIC SURGERY (CARDIOTHORACIC VASCULAR SURGERY)

## 2021-07-24 RX ADMIN — GADOBENATE DIMEGLUMINE 12 ML: 529 INJECTION, SOLUTION INTRAVENOUS at 14:33

## 2021-07-28 ENCOUNTER — LAB (OUTPATIENT)
Dept: LAB | Facility: HOSPITAL | Age: 81
End: 2021-07-28

## 2021-07-28 DIAGNOSIS — Z20.822 ENCOUNTER FOR PREPROCEDURE SCREENING LABORATORY TESTING FOR COVID-19: ICD-10-CM

## 2021-07-28 DIAGNOSIS — Z01.812 ENCOUNTER FOR PREPROCEDURE SCREENING LABORATORY TESTING FOR COVID-19: ICD-10-CM

## 2021-07-28 LAB
CREAT BLDA-MCNC: 0.7 MG/DL (ref 0.6–1.3)
SARS-COV-2 ORF1AB RESP QL NAA+PROBE: DETECTED

## 2021-07-28 PROCEDURE — U0004 COV-19 TEST NON-CDC HGH THRU: HCPCS

## 2021-07-28 PROCEDURE — C9803 HOPD COVID-19 SPEC COLLECT: HCPCS

## 2021-07-28 PROCEDURE — U0005 INFEC AGEN DETEC AMPLI PROBE: HCPCS

## 2021-07-29 ENCOUNTER — TELEPHONE (OUTPATIENT)
Dept: FAMILY MEDICINE CLINIC | Facility: CLINIC | Age: 81
End: 2021-07-29

## 2021-07-29 ENCOUNTER — TRANSCRIBE ORDERS (OUTPATIENT)
Dept: ADMINISTRATIVE | Facility: HOSPITAL | Age: 81
End: 2021-07-29

## 2021-07-29 DIAGNOSIS — Z01.812 ENCOUNTER FOR PREPROCEDURE SCREENING LABORATORY TESTING FOR COVID-19: Primary | ICD-10-CM

## 2021-07-29 DIAGNOSIS — Z20.822 ENCOUNTER FOR PREPROCEDURE SCREENING LABORATORY TESTING FOR COVID-19: Primary | ICD-10-CM

## 2021-07-30 ENCOUNTER — HOSPITAL ENCOUNTER (OUTPATIENT)
Dept: CT IMAGING | Facility: HOSPITAL | Age: 81
Discharge: HOME | End: 2021-07-30

## 2021-08-06 ENCOUNTER — APPOINTMENT (OUTPATIENT)
Dept: LAB | Facility: HOSPITAL | Age: 81
End: 2021-08-06

## 2021-08-11 ENCOUNTER — LAB (OUTPATIENT)
Dept: LAB | Facility: HOSPITAL | Age: 81
End: 2021-08-11

## 2021-08-11 DIAGNOSIS — Z20.822 ENCOUNTER FOR PREPROCEDURE SCREENING LABORATORY TESTING FOR COVID-19: ICD-10-CM

## 2021-08-11 DIAGNOSIS — Z01.812 ENCOUNTER FOR PREPROCEDURE SCREENING LABORATORY TESTING FOR COVID-19: ICD-10-CM

## 2021-08-11 LAB — SARS-COV-2 ORF1AB RESP QL NAA+PROBE: NOT DETECTED

## 2021-08-11 PROCEDURE — C9803 HOPD COVID-19 SPEC COLLECT: HCPCS

## 2021-08-11 PROCEDURE — U0005 INFEC AGEN DETEC AMPLI PROBE: HCPCS

## 2021-08-11 PROCEDURE — U0004 COV-19 TEST NON-CDC HGH THRU: HCPCS

## 2021-08-13 ENCOUNTER — HOSPITAL ENCOUNTER (OUTPATIENT)
Dept: GENERAL RADIOLOGY | Facility: HOSPITAL | Age: 81
Discharge: HOME | End: 2021-08-13

## 2021-08-13 ENCOUNTER — HOSPITAL ENCOUNTER (OUTPATIENT)
Dept: CT IMAGING | Facility: HOSPITAL | Age: 81
Discharge: HOME | End: 2021-08-13

## 2021-08-13 VITALS
BODY MASS INDEX: 23.05 KG/M2 | TEMPERATURE: 98.2 F | RESPIRATION RATE: 16 BRPM | SYSTOLIC BLOOD PRESSURE: 120 MMHG | HEART RATE: 77 BPM | DIASTOLIC BLOOD PRESSURE: 52 MMHG | OXYGEN SATURATION: 96 % | HEIGHT: 64 IN | WEIGHT: 135 LBS

## 2021-08-13 DIAGNOSIS — R94.8 ABNORMAL POSITRON EMISSION TOMOGRAPHY (PET) SCAN: ICD-10-CM

## 2021-08-13 DIAGNOSIS — R91.1 LUNG NODULE: ICD-10-CM

## 2021-08-13 LAB
BASOPHILS # BLD AUTO: 0.02 10*3/MM3 (ref 0–0.2)
BASOPHILS NFR BLD AUTO: 0.4 % (ref 0–1.5)
DEPRECATED RDW RBC AUTO: 42.1 FL (ref 37–54)
EOSINOPHIL # BLD AUTO: 0 10*3/MM3 (ref 0–0.4)
EOSINOPHIL NFR BLD AUTO: 0 % (ref 0.3–6.2)
ERYTHROCYTE [DISTWIDTH] IN BLOOD BY AUTOMATED COUNT: 12.8 % (ref 12.3–15.4)
HCT VFR BLD AUTO: 41.5 % (ref 34–46.6)
HGB BLD-MCNC: 13.9 G/DL (ref 12–15.9)
IMM GRANULOCYTES # BLD AUTO: 0.01 10*3/MM3 (ref 0–0.05)
IMM GRANULOCYTES NFR BLD AUTO: 0.2 % (ref 0–0.5)
INR PPP: 1 (ref 0.8–1.2)
LYMPHOCYTES # BLD AUTO: 2.48 10*3/MM3 (ref 0.7–3.1)
LYMPHOCYTES NFR BLD AUTO: 43.7 % (ref 19.6–45.3)
MCH RBC QN AUTO: 30.5 PG (ref 26.6–33)
MCHC RBC AUTO-ENTMCNC: 33.5 G/DL (ref 31.5–35.7)
MCV RBC AUTO: 91 FL (ref 79–97)
MONOCYTES # BLD AUTO: 0.45 10*3/MM3 (ref 0.1–0.9)
MONOCYTES NFR BLD AUTO: 7.9 % (ref 5–12)
NEUTROPHILS NFR BLD AUTO: 2.72 10*3/MM3 (ref 1.7–7)
NEUTROPHILS NFR BLD AUTO: 47.8 % (ref 42.7–76)
NRBC BLD AUTO-RTO: 0 /100 WBC (ref 0–0.2)
PLATELET # BLD AUTO: 190 10*3/MM3 (ref 140–450)
PMV BLD AUTO: 10.4 FL (ref 6–12)
PROTHROMBIN TIME: 11.7 SECONDS (ref 12.8–15.2)
RBC # BLD AUTO: 4.56 10*6/MM3 (ref 3.77–5.28)
WBC # BLD AUTO: 5.68 10*3/MM3 (ref 3.4–10.8)

## 2021-08-13 PROCEDURE — 25010000002 FENTANYL CITRATE (PF) 50 MCG/ML SOLUTION: Performed by: RADIOLOGY

## 2021-08-13 PROCEDURE — 88342 IMHCHEM/IMCYTCHM 1ST ANTB: CPT | Performed by: THORACIC SURGERY (CARDIOTHORACIC VASCULAR SURGERY)

## 2021-08-13 PROCEDURE — 88341 IMHCHEM/IMCYTCHM EA ADD ANTB: CPT

## 2021-08-13 PROCEDURE — 85025 COMPLETE CBC W/AUTO DIFF WBC: CPT | Performed by: RADIOLOGY

## 2021-08-13 PROCEDURE — 88360 TUMOR IMMUNOHISTOCHEM/MANUAL: CPT

## 2021-08-13 PROCEDURE — 71045 X-RAY EXAM CHEST 1 VIEW: CPT

## 2021-08-13 PROCEDURE — 25010000002 MIDAZOLAM PER 1 MG: Performed by: RADIOLOGY

## 2021-08-13 PROCEDURE — 88305 TISSUE EXAM BY PATHOLOGIST: CPT | Performed by: THORACIC SURGERY (CARDIOTHORACIC VASCULAR SURGERY)

## 2021-08-13 PROCEDURE — 25010000003 LIDOCAINE 1 % SOLUTION: Performed by: RADIOLOGY

## 2021-08-13 PROCEDURE — 85610 PROTHROMBIN TIME: CPT

## 2021-08-13 PROCEDURE — 88341 IMHCHEM/IMCYTCHM EA ADD ANTB: CPT | Performed by: THORACIC SURGERY (CARDIOTHORACIC VASCULAR SURGERY)

## 2021-08-13 PROCEDURE — 99152 MOD SED SAME PHYS/QHP 5/>YRS: CPT

## 2021-08-13 RX ORDER — SODIUM CHLORIDE 9 MG/ML
25 INJECTION, SOLUTION INTRAVENOUS ONCE
Status: COMPLETED | OUTPATIENT
Start: 2021-08-13 | End: 2021-08-13

## 2021-08-13 RX ORDER — SODIUM CHLORIDE 0.9 % (FLUSH) 0.9 %
10 SYRINGE (ML) INJECTION AS NEEDED
Status: DISCONTINUED | OUTPATIENT
Start: 2021-08-13 | End: 2021-08-14 | Stop reason: HOSPADM

## 2021-08-13 RX ORDER — LIDOCAINE HYDROCHLORIDE 10 MG/ML
20 INJECTION, SOLUTION INFILTRATION; PERINEURAL ONCE
Status: COMPLETED | OUTPATIENT
Start: 2021-08-13 | End: 2021-08-13

## 2021-08-13 RX ORDER — MIDAZOLAM HYDROCHLORIDE 1 MG/ML
INJECTION INTRAMUSCULAR; INTRAVENOUS
Status: COMPLETED | OUTPATIENT
Start: 2021-08-13 | End: 2021-08-13

## 2021-08-13 RX ORDER — FENTANYL CITRATE 50 UG/ML
INJECTION, SOLUTION INTRAMUSCULAR; INTRAVENOUS
Status: COMPLETED | OUTPATIENT
Start: 2021-08-13 | End: 2021-08-13

## 2021-08-13 RX ORDER — SODIUM CHLORIDE 0.9 % (FLUSH) 0.9 %
3 SYRINGE (ML) INJECTION EVERY 12 HOURS SCHEDULED
Status: DISCONTINUED | OUTPATIENT
Start: 2021-08-13 | End: 2021-08-14 | Stop reason: HOSPADM

## 2021-08-13 RX ADMIN — FENTANYL CITRATE 50 MCG: 50 INJECTION INTRAMUSCULAR; INTRAVENOUS at 08:12

## 2021-08-13 RX ADMIN — Medication 10 ML: at 07:59

## 2021-08-13 RX ADMIN — LIDOCAINE HYDROCHLORIDE 20 ML: 10 INJECTION, SOLUTION INFILTRATION; PERINEURAL at 08:09

## 2021-08-13 RX ADMIN — FENTANYL CITRATE 50 MCG: 50 INJECTION INTRAMUSCULAR; INTRAVENOUS at 08:06

## 2021-08-13 RX ADMIN — SODIUM CHLORIDE 25 ML/HR: 9 INJECTION, SOLUTION INTRAVENOUS at 07:58

## 2021-08-13 RX ADMIN — MIDAZOLAM 1 MG: 1 INJECTION INTRAMUSCULAR; INTRAVENOUS at 08:12

## 2021-08-13 RX ADMIN — MIDAZOLAM 1 MG: 1 INJECTION INTRAMUSCULAR; INTRAVENOUS at 08:05

## 2021-08-13 NOTE — NURSING NOTE
Patient taken by wheelchair to patient discharge to meet her .    Protective goggles and mask in place with all patient interactions today

## 2021-08-13 NOTE — NURSING NOTE
Patient arrived for CT Left Lung Mediastinum Biopsy.    Protective goggles and mask in place with all patient interactions today

## 2021-08-13 NOTE — POST-PROCEDURE NOTE
POST PROCEDURE NOTE    Procedure: ct mediastinal lesion biopsy    Pre-Procedure Diagnosis: mediastinal lesion    Post-procedure Diagnosis: same    Findings: technically successful ct guided left mediastinal lesion biopsy    Complications: no immediate    Blood loss: none    Specimen Removed: 18 gauge core    Disposition:   Expected discharge home

## 2021-08-13 NOTE — NURSING NOTE
Dr. Gonzalez called and chest xray looks okay. Patient may leave. Called her  to arrange .      Protective goggles and mask in place with all patient interactions today

## 2021-08-13 NOTE — DISCHARGE INSTRUCTIONS
Discharge Instructions after receiving Moderate Sedation  These instructions provide you with information about caring for yourself after your procedure. Your health care provider may also give you more specific instructions. Your treatment has been planned according to current medical practices, but problems sometimes occur. Call your physican or the Radiology Nurses ([phone]) if you have any problems or questions after your procedure.    What can I expect after the procedure?  After your procedure, you may:  Feel sleepy or light headed for several hours.  Feel clumsy, dizzy or have poor balance for several hours.  Feel forgetful about what happened after the procedure.  Have poor judgment for several hours.  Feel nauseous or vomit.    For at least 24 hours after the procedure:  Have a responsible adult stay with you or frequently check on you until you are awake and alert.   Rest as needed.    Do not:  Participate in activities in which you could fall or become injured.  Drive or operate heavy machinery  Take sleeping pills or medicines that cause drowsiness.  Make important decisions or sign legal documents.  Take care of children on your own.    Eating and drinking: Clear liquids then progress slowly to a normal diet.  If you vomit, drink water, juice, or soup when you can drink without vomiting.  Make sure you have little or no nausea before eating solid foods.    General instructions: Take over-the-counter and prescription medicines only as told by your health care provider .If you have sleep apnea, surgery and certain medicines can increase your risk for breathing problems. Follow instructions from your health care provider about wearing your sleep device: If you smoke, do not smoke without supervision.  Keep all follow-up visits as told by your health care provider. This is important.    Contact your physician if:  You continue to keep feeling nauseous or you keep vomiting. You continue to feel  light-headed, develop a fever or a rash.  Get help right away if you have trouble breathing    I acknowledge the above instructions:    Patient/ Responsible person _________________________________Date ____________Time ____________    Witnessed by____________________________________________ Date_____________ Time____________                EDUCATION /DISCHARGE INSTRUCTIONS  CT/US guided biopsy:  A biopsy is a procedure done to remove tissue for further analysis.  Before images are taken to locate the target area.  Images can be obtained using ultrasound, CT or MRI.  A physician will clean your skin with antiseptic soap, place a sterile towel around the site and administer a local anesthetic to numb the area.  The physician will then insert a special needle.  Sometimes images are taken of the needle after it is inserted to ensure the needle is in the correct area to be biopsied.   A sample is obtained and sent to the laboratory for study.  Occasionally the laboratory is unable to make a diagnosis from the sample and the procedure may need to be repeated.  Within a week the radiologist will send a report to your physician.  A pathologist will also examine the tissue and send a report.    Risks of the procedure include but are not limited to:   *  Bleeding    *  Infection   *  Puncture of surrounding organs *  Death     *  Lung collapse if the biopsy is near the chest which may require insertion of a      chest tube to re-inflate the lung if severe.    Benefits of the procedure:  Using x-ray helps to locate the area that requires a biopsy. The procedure is less invasive than a surgical procedure, there are no large incisions and it does not require anesthesia.    Alternatives to the procedure:  A biopsy can be performed surgically.  Risks of a surgical biopsy include exposure to anesthesia, infection, excessive bleeding and injury to abdominal organs.  A benefit of surgical biopsy is the ability to see the area to be  biopsied and remove of a larger piece of tissue.    THIS EDUCATION INFORMATION WAS REVIEWED PRIOR TO PROCEDURE AND CONSENT. Patient initials__________________Time___________________    Post Procedure:    *  Expect the biopsy site may be tender up to one week.    *  Rest today (no pushing pulling or straining).   *  Slowly increase activity tomorrow.    *  If you received sedation do not drive for 24 hours.   *  Keep dressing clean and dry.   *  Leave dressing on puncture site for 24 hours.    *  You may shower when dressing removed.  Call your doctor if experiencing:   *  Signs of infection such as redness, swelling, excessive pain and / or foul        smelling drainage from the puncture site.   *  Chills or fever over 101 degrees (by mouth).   *  Unrelieved pain.   *  Any new or severe symptoms.   *  If experiencing sudden / severe shortness of breath or chest pain go to the       nearest emergency room.   Following the procedure:     Follow-up with the ordering physician as directed.    Continue to take other medications as directed by your physician unless    otherwise instructed.       If you have any concerns please call the Radiology Nurses Desk at [phone]. 7am-10pm  You are the most important factor in your recovery.  Follow the above instructions carefully.

## 2021-08-17 ENCOUNTER — MDT ASSESSMENT (OUTPATIENT)
Dept: OTHER | Facility: HOSPITAL | Age: 81
End: 2021-08-17

## 2021-08-17 DIAGNOSIS — C34.90 MALIGNANT NEOPLASM OF LUNG, UNSPECIFIED LATERALITY, UNSPECIFIED PART OF LUNG (HCC): Primary | ICD-10-CM

## 2021-08-17 NOTE — PROGRESS NOTES
MULTIDISCIPLINARY TREATMENT PLANNING CONFERENCE  DATE: 8/19/2021       SPECIALTY: Thoracic Conference    PRESENTER: Arias Hernandez MD    SITE: Left Lung         Please discuss clinical/working stage, TNM, Stage Group, National Treatment Guidelines, and Prognostic Indicators.       CONFERENCE SUMMARY:  Case was represented to conference after further studies.  MRI of the lumbar spine showed no evidence of metastatic disease.  CT directed biopsy of the soft tissue mass in the left mediastinum showed invasive moderately differentiated adenocarcinoma.  Extensive discussion about the staging of the patient's cancer.  Final consensus was that she has a primary lung cancer in the superior segment of the right lower lobe which is stage I.  The lesion in the left mediastinum showed no evidence of yesy tissue and therefore was considered to be a stage I lung cancer.  It appears that this lady has to primary lung cancers.  Both cancers will be treated with stereotactic radiation therapy.                          AJCC STAGE: Right lower lobe lesion is a stage I.  Left chest lesion is a stage I.        REFERRAL SUPPORT   Psychosocial Assessment:  []                Clinical Trials:  []                Genetic Testing:  []                Geriatric Assessment:  []                Smoking Cessation:  []                Nutrition Assessment:  []                Social Work Evaluation/Barriers to Care:  []                Behavioral Oncology Evaluation:  []                Palliative Care:  []      EVIDENCE BASED NATIONAL TREATMENT GUIDELINES:  [x]                   SOCIAL HISTORY:   reports that she quit smoking about 6 years ago. She has never used smokeless tobacco. She reports current alcohol use. She reports that she does not use drugs.                  PAST MEDICAL HISTORY:   has a past medical history of Abnormal glucose, Abnormal TSH, Allergic rhinitis, Bilateral leg and foot pain, Dysuria, Esophageal reflux, Excessive cerumen in  ear canal, Fever and chills, Hematuria, High risk medication use, History of adenocarcinoma of lung, History of anemia, History of carcinoma in situ of skin, History of lung cancer, History of mammogram, History of oral hairy leukoplakia, History of squamous cell carcinoma, Hyperlipidemia, Hyperpigmentation, Hypertension, Impacted cerumen of both ears, Influenza, Low vitamin B12 level, Lower back pain, Lung cancer (CMS/HCC), Menopause, Need for influenza vaccination, Other screening mammogram, Thyromegaly, Urinary pain, UTI (urinary tract infection), and Vitamin D deficiency.                  PAST SURGICAL HISTORY:  @                 IMAGING:  XR Chest 1 View    Result Date: 8/13/2021  No evidence of pneumothorax  This report was finalized on 8/13/2021 9:50 AM by Dr. Kodak Gonzalez M.D.      CT Biopsy Lung Mediastinum    Result Date: 8/13/2021  Technically successful CT guided mediastinal nodule biopsy.  Moderate sedation was provided under my direct supervision using 2 mg IV Versed and 100 mcg IV Fentanyl. The patient was independently monitored by a trained Department of Radiology RN using automated blood pressure, EKG, and pulse oximetry. My total intra-service time was 13 minutes.      Radiation dose reduction techniques were utilized, including automated exposure control and exposure modulation based on body size.  This report was finalized on 8/13/2021 8:46 AM by Dr. Kodak Gonzalez M.D.      NM Pet Skull Base To Mid Thigh    Result Date: 7/14/2021  1.  Ill-defined intensely FDG avid nodular soft tissue thickening in the left aspect of the mediastinum within the operative bed of the left upper lobectomy. Additionally, there is a 1 cm hypermetabolic pulmonary nodule right lower lobe. While findings are likely malignant, patient history makes the etiology uncertain. While findings within the left aspect the mediastinum are most suggestive of malignant recurrence of the patient's prior primary lung neoplasm, the  right lower lobe nodule may represent metastatic disease related to patient's prior oropharyngeal cancer, the patient's prior lung cancer versus a new primary lung malignancy. 2.  Asymmetric thickening and FDG uptake involving the right base of tongue which given patient history of right oropharyngeal cancer raises concern for neoplastic recurrence and direct visualization is recommended. 3.  Subtle focal area of increased FDG uptake within the L1 vertebral body which is indeterminate. Further evaluation with lumbar spine MRI with and without contrast recommended to exclude metastatic disease. 4.  Sub-6 mm pulmonary nodules within the right lung and subcentimeter hypodense hepatic lesion which are below PET resolution and remain indeterminate. Continued attention on follow-up is recommended. 5.  Indeterminate left renal lesion measuring 1.2 cm. Follow-up with multiphase CT abdomen with and without contrast in 6 months is recommended to exclude neoplasm. 6.  Other findings as above.  This report was finalized on 7/14/2021 8:32 AM by Dr. Quintin Townsend M.D.      MRI Lumbar Spine With & Without Contrast    Result Date: 7/26/2021  1. No evidence of metastatic disease. 2. Multilevel degenerative disease as described with no evidence of a disc herniation or compression fracture. Degenerative disease consists predominantly of facet degenerative disease at L3-L4, L4-L5, and loss of disc height and disc desiccation at L5-S1, and to a lesser extent L4-L5. Mild neural foraminal compromise is present on the left at L3-L4 secondary to facet hypertrophy and extension of a small disc osteophyte complex into the neural foramen. See above.  This report was finalized on 7/26/2021 10:59 AM by Dr. Jordan Merritt M.D.                      SURGICAL PROCEDURE / PATHOLOGY:      8/13/2021: [id number] ([facility])    Final Diagnosis  1. Lung/Mediastinum, Left Upper, Core Biopsy:  A. Invasive moderately differentiated adenocarcinoma.                    Labs & Biomarkers:    PFTs    FVC is 2.10 which is 81% of predicted  FEV1 is 1.44 which is 75% of predicted  FEV1 FVC ratio is 69  DLCO is 11.7 which is 54% of predicted

## 2021-08-19 ENCOUNTER — APPOINTMENT (OUTPATIENT)
Dept: LAB | Facility: HOSPITAL | Age: 81
End: 2021-08-19

## 2021-08-19 ENCOUNTER — OFFICE VISIT (OUTPATIENT)
Dept: SURGERY | Facility: CLINIC | Age: 81
End: 2021-08-19

## 2021-08-19 ENCOUNTER — TELEPHONE (OUTPATIENT)
Dept: ONCOLOGY | Facility: CLINIC | Age: 81
End: 2021-08-19

## 2021-08-19 VITALS
SYSTOLIC BLOOD PRESSURE: 122 MMHG | OXYGEN SATURATION: 96 % | HEART RATE: 68 BPM | BODY MASS INDEX: 23.22 KG/M2 | HEIGHT: 64 IN | WEIGHT: 136 LBS | DIASTOLIC BLOOD PRESSURE: 72 MMHG

## 2021-08-19 DIAGNOSIS — C34.31 MALIGNANT NEOPLASM OF LOWER LOBE OF RIGHT LUNG (HCC): Primary | ICD-10-CM

## 2021-08-19 PROCEDURE — 99213 OFFICE O/P EST LOW 20 MIN: CPT | Performed by: THORACIC SURGERY (CARDIOTHORACIC VASCULAR SURGERY)

## 2021-08-19 NOTE — TELEPHONE ENCOUNTER
Caller: BAKARI STAHL    Relationship: SELF    Best call back number: [phone]    What is the best time to reach you: ANY    Who are you requesting to speak with (clinical staff, provider,  specific staff member): MATT    Do you know the name of the person who called: MATT    What was the call regarding: PT MISSED A CALL FROM MATT ASKING HER TO COME IN FOR HER LABS TODAY OR TOMORROW INSTEAD OF Monday. PT IS CALLING BACK    Do you require a callback: YES

## 2021-08-19 NOTE — PROGRESS NOTES
"Chief Complaint  No chief complaint on file.     Lung nodule    Carlos Pfeiffer presents to [facility] THORACIC SURGERY  History of Present Illness     81-year-old  female seen in the lung care center at [facility] today August 19, 2021 as part of our multidisciplinary thoracic oncology clinic.  Since her initial evaluation she is undergone an MRI of her lumbar spine and a CT directed needle biopsy of a left lung mass.  She tolerated the biopsy without problems.  There were no complications.  She has had no pleuritic pain and no shortness of breath and no hemoptysis.  Her case was discussed once again in detail during our multidisciplinary thoracic oncology conference this morning.  She is here today to discuss the results of her studies and to formulate a treatment plan.    Objective   Vital Signs:   /72 (BP Location: Left arm, Patient Position: Sitting, Cuff Size: Adult)   Pulse 68   Ht 162.6 cm (64\")   Wt 61.7 kg (136 lb)   SpO2 96%   BMI 23.34 kg/m²     Physical Exam     Neck: There is no cervical supraclavicular adenopathy.    Pulmonary: Lungs are clear with equal breath sounds bilaterally.  Good air movement in all lung fields.    Cardiac: Regular rate and rhythm.  No murmur or gallop.  No dependent edema.      Result Review :   The following data was reviewed by: Arias Hernandez III, MD on 08/19/2021:    MRI of the lumbar spine performed July 22, 2021 was independently reviewed.  There is diffuse degenerative changes throughout the entire lumbar spine.  There is no evidence of metastatic disease.    Pathology:          Clinical Information    Tissue diagnosis PET positive lesion in patient with previous lobectomy for lung cancer   Final Diagnosis   1. Lung/Mediastinum, Left Upper, Core Biopsy:                 A. Invasive moderately differentiated adenocarcinoma.     [facility]/kds   Electronically signed by Newton Rivera MD on 8/16/2021 at " 1241   Comment    The patient's chart is reviewed.  The history of a tongue cancer unspecified type is noted.  The morphology and immunoprofile consistent with an adenocarcinoma of lung primary.  PD-L1 testing will be ordered and that result will follow in an addendum.     [facility]/s       Assessment and Plan    Diagnoses and all orders for this visit:    1. Malignant neoplasm of lower lobe of right lung (CMS/HCC) (Primary)  -     Ambulatory Referral to Radiation Oncology      I spent 25 minutes caring for Darlene on this date of service. This time includes time spent by me in the following activities:preparing for the visit, reviewing tests, performing a medically appropriate examination and/or evaluation , counseling and educating the patient/family/caregiver, ordering medications, tests, or procedures, referring and communicating with other health care professionals  and documenting information in the medical record  Follow Up     Extensive discussion of the work-up thus far shows 2 separate lesions.  A nodule in the superior segment of the right lower lobe and a mass in the upper portion of the left chest.  The mass in the upper portion of the left chest has been biopsy is proven to be an adenocarcinoma.  The consensus opinion was that these 2 lesions represent synchronous new primaries.  These are both stage I lung cancers.  Given the patient's age and previous pulmonary resection did not feel that further surgery was in her best interest.  We have recommended primary stereotactic radiation therapy to each of these lesions.  I have discussed this with the patient and her  in detail.  I have answered all of their questions.  She understands this plan and agrees.    Patient will be referred to radiation oncology for their evaluation and treatment.  We will keep you informed of her progress.  Thank you for allowing us to participate in the care of Ms. Pfeiffer.      Return for Patient will follow up with  oncology.  Patient was given instructions and counseling regarding her condition or for health maintenance advice. Please see specific information pulled into the AVS if appropriate.

## 2021-08-21 NOTE — PROGRESS NOTES
Subjective     Arias Hernandez III, MD     Diagnosis Plan   1. Malignant neoplasm of upper lobe of left lung (CMS/HCC)       Chief complaint: solitary hypermetabolic lung nodules, 1 in each lung, left lung nodule positive for adenocarcinoma.     I am seeing the patient today at the request of Dr. Arias Hernandez to evaluate the patient for SBRT to a solitary lung nodule in each lung.                             Ms. Pfeiffer is a very pleasant 80-year-old white female from [facility] who has a history of a left upper lobectomy in May 2012 for lung cancer.  She has been followed since that time with iterative imaging and on a recent scan 6/16/2021, a CT scan of the chest done at the [facility], demonstrated a new 8 mm nodule in the superior segment of the right lower lobe compared to 1 year prior.    PET scan on 7/13/2021  described 2 lesions, the first being 1.8 x 2.0 cm with an SUV of 10.9 in the left upper lobe abutting the lumpectomy bed.  The second nodule was in the contralateral right lower lobe, superior aspect, measuring 1.0 cm with an SUV of 3.6.  There  was also described sub-6 mm nodules in the right upper and right lower lobes, indeterminate.  Note was made  a subtle focal area of uptake in L1, which on subsequent MRI of the L-spine was seen to be free of evidence for metastatic disease.    Diagnosis was established at a CT-guided biopsy of the left lung lesion on 8/13/2021 returning positive for invasive grade 2 adenocarcinoma with with a PD-L1 of 40%.  The patient has a prior history of squamous cell carcinoma the head neck and after discussion it was proposed that these are likely synchronous stage I primary lung cancers  We are asked to evaluate the patient for SBRT to these 2 lesions.    Review of Systems   Constitutional: Negative.    Respiratory: Positive for cough. Negative for shortness of breath, wheezing and stridor.    Cardiovascular: Negative.    Gastrointestinal: Negative.     Endocrine: Negative.    Genitourinary: Negative.    Neurological: Negative.          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 [employer]   • History of snoring    • History of squamous cell carcinoma     Tongue   • Hyperlipidemia    • Hyperpigmentation    • Hypertension     since early 60s   • Impacted cerumen of both ears    • Influenza    • Low vitamin B12 level    • Lower back pain    • Lung cancer (CMS/HCC)    • Menopause    • Need for influenza vaccination    • Other screening mammogram    • Thyromegaly    • Urinary pain    • UTI (urinary tract infection)    • Vitamin D deficiency          Past Surgical History:   Procedure Laterality Date   • CHOLECYSTECTOMY     • COLONOSCOPY     • EYE SURGERY  2020    Took a muscle out of right eye   • GLOSSECTOMY PARTIAL      less than one half tongue   • LUNG SURGERY     • ORAL LESION EXCISION/BIOPSY       and 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   • TUBAL ABDOMINAL LIGATION           Social History     Socioeconomic History   • Marital status:      Spouse name: Not on file   • Number of children: Not on file   • Years of education: Not on file   • Highest education level: Not on file   Tobacco Use   • Smoking status: Former Smoker     Quit date: 2015     Years since quittin.5   • Smokeless tobacco: Never Used   • Tobacco comment: less than a pack per day, no smoking since , Quit 2015   Substance and Sexual Activity   •  Alcohol use: Yes     Comment: Socially -A few times a month   • Drug use: No   • Sexual activity: Defer         Family History   Problem Relation Age of Onset   • Ovarian cancer Mother          at age 27   • No Known Problems Father    • Ovarian cancer Sister    • Colon cancer Brother    • No Known Problems Other           Objective    Physical Exam Awake, alert, spontaneous, no apparent distress. Moving air well.    Current Outpatient Medications on File Prior to Visit   Medication Sig Dispense Refill   • albuterol sulfate  (90 Base) MCG/ACT inhaler Inhale 2 puffs Every 4 (Four) Hours As Needed for Wheezing. 18 g 2   • amLODIPine (NORVASC) 5 MG tablet TAKE 1 TABLET EVERY DAY 90 tablet 1   • atorvastatin (LIPITOR) 20 MG tablet Take 1 tablet by mouth Every Night. 90 tablet 3   • B Complex-C-E-Zn (b complex-C-E-zinc) tablet Take 1 tablet by mouth Daily.     • cetirizine (ZyrTEC) 10 MG tablet Take 10 mg by mouth.     • cholecalciferol (VITAMIN D3) 1000 units tablet Take 1,000 Units by mouth Daily.     • fluticasone (FLONASE) 50 MCG/ACT nasal spray 2 sprays into the nostril(s) as directed by provider Daily.     • hydroCHLOROthiazide (HYDRODIURIL) 25 MG tablet TAKE 1 TABLET EVERY DAY 90 tablet 1   • HYDROcod Polst-CPM Polst ER (Tussionex Pennkinetic ER) 10-8 MG/5ML ER suspension Take 5 mL by mouth Every 12 (Twelve) Hours As Needed for Cough. 120 mL 0   • ipratropium (ATROVENT) 0.06 % nasal spray 2 sprays into the nostril(s) as directed by provider 4 (Four) Times a Day. 15 mL 0   • metoprolol succinate XL (TOPROL-XL) 25 MG 24 hr tablet TAKE 1 TABLET EVERY DAY 90 tablet 1   • montelukast (SINGULAIR) 10 MG tablet Take 10 mg by mouth Every Night.     • Multiple Vitamins-Minerals (CENTRUM ADULTS) tablet Take  by mouth.     • omeprazole (PriLOSEC) 40 MG capsule Take 40 mg by mouth.     • promethazine-dextromethorphan (PROMETHAZINE-DM) 6.25-15 MG/5ML syrup Take 5 mL by mouth 4 (Four) Times a Day As Needed for  Cough. 180 mL 0   • Tiotropium Bromide Monohydrate (SPIRIVA RESPIMAT) 1.25 MCG/ACT aerosol solution inhaler Inhale 2 puffs Daily. 5 each 0     No current facility-administered medications on file prior to visit.       ALLERGIES:    Allergies   Allergen Reactions   • Sulfa Antibiotics Rash       LMP  (LMP Unknown)      No flowsheet data found.      Assessment/Plan   We will plan a likely dose of 50 Gray in 5 fractions on a QOD basis to each lesion with SBRT.  I went over the details of the proposed course and they had many good questions which I believe were answered to their satisfaction.  Planning was started with immobilization CT simulation in our department today we should be able to get her course of therapy started in approximately 1 week.      [facility] ECOG Status: (0) Fully active, able to carry on all predisease performance without restriction       I spent greater than 45 minutes in face-to-face time with the patient to include simulation and 30 minutes that time was spent in counseling and coordination of care to include discussion of indications, goals, logistics, alternatives,  risks both common and rare, as well as surveillance and potential outcomes.      Moise Mcdonough MD

## 2021-08-23 ENCOUNTER — LAB (OUTPATIENT)
Dept: LAB | Facility: HOSPITAL | Age: 81
End: 2021-08-23

## 2021-08-23 ENCOUNTER — CONSULT (OUTPATIENT)
Dept: RADIATION ONCOLOGY | Facility: HOSPITAL | Age: 81
End: 2021-08-23

## 2021-08-23 ENCOUNTER — APPOINTMENT (OUTPATIENT)
Dept: RADIATION ONCOLOGY | Facility: HOSPITAL | Age: 81
End: 2021-08-23

## 2021-08-23 ENCOUNTER — OFFICE VISIT (OUTPATIENT)
Dept: ONCOLOGY | Facility: CLINIC | Age: 81
End: 2021-08-23

## 2021-08-23 VITALS
DIASTOLIC BLOOD PRESSURE: 76 MMHG | SYSTOLIC BLOOD PRESSURE: 177 MMHG | BODY MASS INDEX: 23.17 KG/M2 | WEIGHT: 135 LBS | OXYGEN SATURATION: 95 % | HEART RATE: 64 BPM

## 2021-08-23 VITALS
WEIGHT: 137.8 LBS | DIASTOLIC BLOOD PRESSURE: 74 MMHG | BODY MASS INDEX: 23.52 KG/M2 | HEIGHT: 64 IN | RESPIRATION RATE: 18 BRPM | SYSTOLIC BLOOD PRESSURE: 154 MMHG | HEART RATE: 77 BPM | OXYGEN SATURATION: 96 %

## 2021-08-23 DIAGNOSIS — C34.90 MALIGNANT NEOPLASM OF LUNG, UNSPECIFIED LATERALITY, UNSPECIFIED PART OF LUNG (HCC): ICD-10-CM

## 2021-08-23 DIAGNOSIS — C34.12 MALIGNANT NEOPLASM OF UPPER LOBE OF LEFT LUNG (HCC): Primary | ICD-10-CM

## 2021-08-23 LAB
ALBUMIN SERPL-MCNC: 4.2 G/DL (ref 3.5–5.2)
ALBUMIN/GLOB SERPL: 2.2 G/DL (ref 1.1–2.4)
ALP SERPL-CCNC: 70 U/L (ref 38–116)
ALT SERPL W P-5'-P-CCNC: 12 U/L (ref 0–33)
ANION GAP SERPL CALCULATED.3IONS-SCNC: 12.1 MMOL/L (ref 5–15)
AST SERPL-CCNC: 21 U/L (ref 0–32)
BASOPHILS # BLD AUTO: 0.03 10*3/MM3 (ref 0–0.2)
BASOPHILS NFR BLD AUTO: 0.5 % (ref 0–1.5)
BILIRUB SERPL-MCNC: 0.3 MG/DL (ref 0.2–1.2)
BUN SERPL-MCNC: 12 MG/DL (ref 6–20)
BUN/CREAT SERPL: 17.6 (ref 7.3–30)
CALCIUM SPEC-SCNC: 9.3 MG/DL (ref 8.5–10.2)
CHLORIDE SERPL-SCNC: 102 MMOL/L (ref 98–107)
CO2 SERPL-SCNC: 28.9 MMOL/L (ref 22–29)
CREAT SERPL-MCNC: 0.68 MG/DL (ref 0.6–1.1)
DEPRECATED RDW RBC AUTO: 42.6 FL (ref 37–54)
EOSINOPHIL # BLD AUTO: 0 10*3/MM3 (ref 0–0.4)
EOSINOPHIL NFR BLD AUTO: 0 % (ref 0.3–6.2)
ERYTHROCYTE [DISTWIDTH] IN BLOOD BY AUTOMATED COUNT: 12.9 % (ref 12.3–15.4)
GFR SERPL CREATININE-BSD FRML MDRD: 83 ML/MIN/1.73
GLOBULIN UR ELPH-MCNC: 1.9 GM/DL (ref 1.8–3.5)
GLUCOSE SERPL-MCNC: 94 MG/DL (ref 74–124)
HCT VFR BLD AUTO: 42.5 % (ref 34–46.6)
HGB BLD-MCNC: 13.8 G/DL (ref 12–15.9)
IMM GRANULOCYTES # BLD AUTO: 0.01 10*3/MM3 (ref 0–0.05)
IMM GRANULOCYTES NFR BLD AUTO: 0.2 % (ref 0–0.5)
LYMPHOCYTES # BLD AUTO: 2.57 10*3/MM3 (ref 0.7–3.1)
LYMPHOCYTES NFR BLD AUTO: 46.1 % (ref 19.6–45.3)
MCH RBC QN AUTO: 29.8 PG (ref 26.6–33)
MCHC RBC AUTO-ENTMCNC: 32.5 G/DL (ref 31.5–35.7)
MCV RBC AUTO: 91.8 FL (ref 79–97)
MONOCYTES # BLD AUTO: 0.47 10*3/MM3 (ref 0.1–0.9)
MONOCYTES NFR BLD AUTO: 8.4 % (ref 5–12)
NEUTROPHILS NFR BLD AUTO: 2.5 10*3/MM3 (ref 1.7–7)
NEUTROPHILS NFR BLD AUTO: 44.8 % (ref 42.7–76)
NRBC BLD AUTO-RTO: 0 /100 WBC (ref 0–0.2)
PLATELET # BLD AUTO: 201 10*3/MM3 (ref 140–450)
PMV BLD AUTO: 9.7 FL (ref 6–12)
POTASSIUM SERPL-SCNC: 3.3 MMOL/L (ref 3.5–4.7)
PROT SERPL-MCNC: 6.1 G/DL (ref 6.3–8)
RBC # BLD AUTO: 4.63 10*6/MM3 (ref 3.77–5.28)
SODIUM SERPL-SCNC: 143 MMOL/L (ref 134–145)
WBC # BLD AUTO: 5.58 10*3/MM3 (ref 3.4–10.8)

## 2021-08-23 PROCEDURE — 99204 OFFICE O/P NEW MOD 45 MIN: CPT | Performed by: RADIOLOGY

## 2021-08-23 PROCEDURE — 80053 COMPREHEN METABOLIC PANEL: CPT

## 2021-08-23 PROCEDURE — 99214 OFFICE O/P EST MOD 30 MIN: CPT | Performed by: INTERNAL MEDICINE

## 2021-08-23 PROCEDURE — 85025 COMPLETE CBC W/AUTO DIFF WBC: CPT

## 2021-08-23 PROCEDURE — 36415 COLL VENOUS BLD VENIPUNCTURE: CPT

## 2021-08-23 PROCEDURE — 77334 RADIATION TREATMENT AID(S): CPT

## 2021-08-23 PROCEDURE — 77263 THER RADIOLOGY TX PLNG CPLX: CPT | Performed by: RADIOLOGY

## 2021-08-23 NOTE — PROGRESS NOTES
Subjective Discussed plans overall beginning with radiation therapy                                                                                                              REASON FOR FOLLOW-UP: Potential recurrent lung cancer.    History of Present Illness       The patient is a 81-year-old female with the below medical history including chronic obstructive pulmonary disease who was seen in the Psychiatric multidisciplinary thoracic oncology clinic July 1, 2021.  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      She had undergone a CT of the chest at [facility] 6/16/2021 showing postsurgical changes in the left chest from right upper lobectomy, 8 mm irregular nodule medial segment of the right lower lobe and diffuse changes of emphysema with fibrotic changes in the periphery, no suspicious hilar or mediastinal nodes, no pleural effusion.  New finding was suspicious for new malignancy with the patient felt to be a poor candidate for surgical resection.  A PET CT and PFTs were requested thereafter.  The PET/CT demonstrated ill-defined FDG avid soft tissue thickening left aspect mediastinum within the operative bed, 1 cm hypermetabolic pulmonary nodule right lower lobe and asymmetric thickening patchy uptake involving the right base of tongue.  There is subtle uptake within the L1 vertebral body which is indeterminate and a sub-6 mm pulmonary nodule of right lung and subcentimeter hypodense hepatic lesion which was below PET resolution.       The patient's case was discussed in thoracic conference 7/22/2021 with consideration of the patient undergoing L1 vertebral body MRI, confirmatory biopsy left mediastinal and assessment by ENT (Dr. Caballero) who had worked with the patient previously.  She,  incidentally, indicates that radiation therapy was given apparently intraoperatively at her recent surgery?  She still has issues with difficulty chewing and swallowing post procedures and was to be followed up with Dr. Caballero in the near future as planned.                                                            She was seen in the thoracic clinic on the same day with plans to proceed with MRI of the lumbar spine, biopsy left mediastinal nodular area of potential disease and follow-up of her ENT assessment as well as undergo a guardant 360 assessment in our office.  She underwent the biopsy 8/13/2021 found to be consistent with invasive moderately differentiated adenocarcinoma with PD-L1 TPS score 40%.  MRI of the lumbar spine revealed no evidence of metastatic disease though the patient did have multilevel degenerative disease throughout the lumbar spine.  She had an office follow-up with Dr. 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  She underwent laryngoscopy 8/18/2021 with right base of tongue without evidence of lesions or masses in the region.     She was seen by Dr. Hernandez 8/19/2021 and, her findings, had been presented in lung conference.  Her findings were consistent with 2 separate lesions including a nodule in the superior segment of the right lower lobe and a mass in the upper portion of the left chest with the left chest lesion biopsy positive for adenocarcinoma.  The consensus was that these were to separate-synchronous primaries.  Stereotactic radiation each lesions were felt to be the appropriate way to proceed and the patient was referred to radiation therapy.     The patient is seen in office 8/23/2021 agreeable to the radiation therapy as well as additional assessments including Caris molecular assessment of her biopsy.  Again her guardant 360 is currently pending and we would  follow her after she completes radiation therapy with subsequent scans.            Past Medical History:   Diagnosis Date   • Abnormal glucose    • Abnormal TSH    • Allergic rhinitis    • Bilateral leg and foot pain    • Cancer of floor of mouth (CMS/HCC) 2014   • Dysuria    • Esophageal reflux    • Excessive cerumen in ear canal    • Fever and chills    • Hematuria    • High risk medication use    • History of adenocarcinoma of lung    • History of anemia    • History of carcinoma in situ of skin    • History of lung cancer    • History of mammogram    • 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 from [facility]   • History of snoring    • History of squamous cell carcinoma     Tongue   • Hyperlipidemia    • Hyperpigmentation    • Hypertension     since early 60s   • Impacted cerumen of both ears    • Influenza    • Low vitamin B12 level    • Lower back pain    • Lung cancer (CMS/HCC)    • Menopause    • Need for influenza vaccination    • Other screening mammogram    • Thyromegaly    • Urinary pain    • UTI (urinary tract infection)    • Vitamin D deficiency         Past Surgical History:   Procedure Laterality Date   • CHOLECYSTECTOMY  1999   • COLONOSCOPY     • EYE SURGERY  11/24/2020    Took a muscle out of right eye   • GLOSSECTOMY PARTIAL      less than one half tongue   • LUNG SURGERY  2012   • ORAL LESION EXCISION/BIOPSY      June and August 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   • TUBAL ABDOMINAL LIGATION  1970        Current Outpatient Medications on File Prior to Visit   Medication Sig Dispense Refill   • albuterol sulfate  (90 Base) MCG/ACT inhaler Inhale 2 puffs Every 4 (Four) Hours As Needed for Wheezing. 18 g 2   • amLODIPine (NORVASC) 5 MG tablet TAKE 1 TABLET EVERY DAY 90 tablet 1   • atorvastatin (LIPITOR) 20 MG tablet Take 1 tablet by mouth Every Night. 90 tablet 3   • B Complex-C-E-Zn (b complex-C-E-zinc) tablet Take 1 tablet by mouth Daily.     • cetirizine  (ZyrTEC) 10 MG tablet Take 10 mg by mouth.     • cholecalciferol (VITAMIN D3) 1000 units tablet Take 1,000 Units by mouth Daily.     • fluticasone (FLONASE) 50 MCG/ACT nasal spray 2 sprays into the nostril(s) as directed by provider Daily.     • hydroCHLOROthiazide (HYDRODIURIL) 25 MG tablet TAKE 1 TABLET EVERY DAY 90 tablet 1   • HYDROcod Polst-CPM Polst ER (Tussionex Pennkinetic ER) 10-8 MG/5ML ER suspension Take 5 mL by mouth Every 12 (Twelve) Hours As Needed for Cough. 120 mL 0   • ipratropium (ATROVENT) 0.06 % nasal spray 2 sprays into the nostril(s) as directed by provider 4 (Four) Times a Day. 15 mL 0   • metoprolol succinate XL (TOPROL-XL) 25 MG 24 hr tablet TAKE 1 TABLET EVERY DAY 90 tablet 1   • montelukast (SINGULAIR) 10 MG tablet Take 10 mg by mouth Every Night.     • Multiple Vitamins-Minerals (CENTRUM ADULTS) tablet Take  by mouth.     • omeprazole (PriLOSEC) 40 MG capsule Take 40 mg by mouth.     • promethazine-dextromethorphan (PROMETHAZINE-DM) 6.25-15 MG/5ML syrup Take 5 mL by mouth 4 (Four) Times a Day As Needed for Cough. 180 mL 0   • Tiotropium Bromide Monohydrate (SPIRIVA RESPIMAT) 1.25 MCG/ACT aerosol solution inhaler Inhale 2 puffs Daily. 5 each 0     No current facility-administered medications on file prior to visit.        ALLERGIES:    Allergies   Allergen Reactions   • Sulfa Antibiotics Rash        Social History     Socioeconomic History   • Marital status:      Spouse name: Not on file   • Number of children: Not on file   • Years of education: Not on file   • Highest education level: Not on file   Tobacco Use   • Smoking status: Former Smoker     Quit date: 2015     Years since quittin.5   • Smokeless tobacco: Never Used   • Tobacco comment: less than a pack per day, no smoking since , Quit 2015   Substance and Sexual Activity   • Alcohol use: Yes     Comment: Socially -A few times a month   • Drug use: No   • Sexual activity: Defer     Family History   Problem  "Relation Age of Onset   • Ovarian cancer Mother          at age 27   • No Known Problems Father    • Ovarian cancer Sister    • Colon cancer Brother    • No Known Problems Other         Review of Systems   Constitutional: Positive for fatigue.   HENT: Positive for dental problem and sore throat.    Eyes: Negative.    Respiratory: Positive for cough and wheezing.    Gastrointestinal: Positive for constipation and diarrhea.   Genitourinary: Positive for frequency and urgency.   Musculoskeletal: Positive for arthralgias and myalgias.   Allergic/Immunologic: Positive for environmental allergies.   Neurological: Negative.    Hematological: Negative.    Psychiatric/Behavioral: Negative.       Objective     Vitals:    21 0843   BP: 154/74   Pulse: 77   Resp: 18   SpO2: 96%   Weight: 62.5 kg (137 lb 12.8 oz)   Height: 162.6 cm (64\")   PainSc: 0-No pain     Current Status 2021   ECOG score 0          Pulmonary function tests:     FVC is 2.10 which is 81% of predicted  FEV1 is 1.44 which is 75% of predicted  FEV1 FVC ratio 69  Diffusion capacity DLCO is 11.7 which is 54% of predicted      Physical Exam  Constitutional:       Appearance: Normal appearance. She is normal weight.   HENT:      Head: Normocephalic and atraumatic.      Nose: Nose normal.      Mouth/Throat:      Mouth: Mucous membranes are moist.      Pharynx: Oropharynx is clear.      Comments: Malocclusion noted  Eyes:      Extraocular Movements: Extraocular movements intact.   Cardiovascular:      Rate and Rhythm: Normal rate and regular rhythm.      Pulses: Normal pulses.      Heart sounds: Normal heart sounds.   Pulmonary:      Effort: Pulmonary effort is normal.      Breath sounds: Normal breath sounds.      Comments: Distant breath sounds bilateral bases  Abdominal:      General: Abdomen is flat. Bowel sounds are normal.      Palpations: Abdomen is soft.   Musculoskeletal:         General: Normal range of motion.      Cervical back: Normal " range of motion and neck supple.   Skin:     General: Skin is warm and dry.   Neurological:      General: No focal deficit present.      Mental Status: She is alert and oriented to person, place, and time.   Psychiatric:         Mood and Affect: Mood normal.         Behavior: Behavior normal.           RECENT LABS:  Hematology WBC   Date Value Ref Range Status   08/23/2021 5.58 3.40 - 10.80 10*3/mm3 Final   06/08/2021 7.86 3.40 - 10.80 10*3/mm3 Final     RBC   Date Value Ref Range Status   08/23/2021 4.63 3.77 - 5.28 10*6/mm3 Final   06/08/2021 4.99 3.77 - 5.28 10*6/mm3 Final     Hemoglobin   Date Value Ref Range Status   08/23/2021 13.8 12.0 - 15.9 g/dL Final     Hematocrit   Date Value Ref Range Status   08/23/2021 42.5 34.0 - 46.6 % Final     Platelets   Date Value Ref Range Status   08/23/2021 201 140 - 450 10*3/mm3 Final          Assessment/Plan      81-year-old female with medical history including COPD, long-term tobacco use, status post previous left upper lobectomy for carcinoma lung in May 2015, carcinoma tongue ongoing radiation therapy and surgical therapy through ENT-Dr. Caballero-including 2016 with wide local excision of buccal lesion.  During follow-up a CT scan of the chest June 16, 2021 demonstrates postsurgical changes, 8 mm irregular nodule medial segment of right lower lobe and diffuse changes of emphysema.  She is seen in thoracic clinic with findings suspicious for new malignancy and concern of the patient being a poor operative candidate.  Subsequent PET CT and PFTs demonstrated ill-defined avidity and soft tissue left aspect of the mediastinum, 1 cm hypermetabolic pulmonary nodule and asymmetric thickening involving the right base of tongue as well as subtle uptake in the L1 vertebral body.  This patient's case was discussed in thoracic clinic and plans were made to request an MRI of the lumbar spine, obtain a biopsy of the mediastinal involvement of the left had the patient reviewed by  ENT.  The patient is seen with her  7/22/2021 in thoracic clinic and we reviewed these findings as well as having her assessed via liquid biopsy to determine potential molecular status quickly.           She underwent the biopsy 8/13/2021 found to be consistent with invasive moderately differentiated adenocarcinoma with PD-L1 TPS score 40%.  MRI of the lumbar spine revealed no evidence of metastatic disease though the patient did have multilevel degenerative disease throughout the lumbar spine.  She had an office follow-up with Dr. Caballero-history of a [id number] SCCa of the rightt retromolar trigone who is s/p WLE, buccal fat pad flap and SLN biopsy that was negative, margins were negative.  She underwent laryngoscopy 8/18/2021 with right base of tongue without evidence of lesions or masses in the region.     She was seen by Dr. Hernandez 8/19/2021 and, her findings, had been presented in lung conference.  Her findings were consistent with 2 separate lesions including a nodule in the superior segment of the right lower lobe and a mass in the upper portion of the left chest with the left chest lesion biopsy positive for adenocarcinoma.  The consensus was that these were to separate-synchronous primaries.  Stereotactic radiation each lesions were felt to be the appropriate way to proceed and the patient was referred to radiation therapy.      At the time of this dictation her guardant 360 is not yet available.  These issues are discussed with the patient in some detail 8/23/2021 so that we can have an overall longer-term plan.  This includes a Caris molecular assessment of her recent biopsy as well as her PD-L1 positive findings.    Plan:  *Caris molecular intelligence ([facility]) of her recent biopsy    *Await guardant 360 results    *Patient will be seen by radiation therapy with consultation now scheduled 8/23/2021    *MD follow-up 4 weeks to review above and likely schedule for subsequent scans if not already  scheduled.

## 2021-08-23 NOTE — PATIENT INSTRUCTIONS
Pt seen by Dr. Hernandez and was referred to radiation. She has appointment with Dr. Escobar and Dr. Mcdonough on 08/23. Pt instructed to call nurse navigator with any questions or concerns. Pt given contact cards for Dr. Hernandez and nurse navigator.

## 2021-08-27 PROCEDURE — 77470 SPECIAL RADIATION TREATMENT: CPT | Performed by: RADIOLOGY

## 2021-08-30 PROCEDURE — 77301 RADIOTHERAPY DOSE PLAN IMRT: CPT | Performed by: RADIOLOGY

## 2021-08-30 PROCEDURE — 77300 RADIATION THERAPY DOSE PLAN: CPT | Performed by: RADIOLOGY

## 2021-08-30 PROCEDURE — 77338 DESIGN MLC DEVICE FOR IMRT: CPT | Performed by: RADIOLOGY

## 2021-08-30 PROCEDURE — 77293 RESPIRATOR MOTION MGMT SIMUL: CPT | Performed by: RADIOLOGY

## 2021-08-31 ENCOUNTER — RADIATION ONCOLOGY WEEKLY ASSESSMENT (OUTPATIENT)
Dept: RADIATION ONCOLOGY | Facility: HOSPITAL | Age: 81
End: 2021-08-31

## 2021-08-31 DIAGNOSIS — C34.12 MALIGNANT NEOPLASM OF UPPER LOBE OF LEFT LUNG (HCC): Primary | ICD-10-CM

## 2021-08-31 PROCEDURE — FACE2FACE: Performed by: RADIOLOGY

## 2021-08-31 PROCEDURE — 77373 STRTCTC BDY RAD THER TX DLVR: CPT | Performed by: RADIOLOGY

## 2021-08-31 PROCEDURE — 77435 SBRT MANAGEMENT: CPT | Performed by: RADIOLOGY

## 2021-08-31 RX ORDER — HYDROCODONE BITARTRATE AND ACETAMINOPHEN 5; 325 MG/1; MG/1
1 TABLET ORAL EVERY 6 HOURS PRN
Qty: 60 TABLET | Refills: 0 | Status: SHIPPED | OUTPATIENT
Start: 2021-08-31 | End: 2022-04-06

## 2021-08-31 NOTE — PROGRESS NOTES
Physician Stereotactic Management Note    Diagnosis:   Malignant neoplasm of upper lobe of left lung (CMS/HCC)    Doing well pretreatment, no problems.    Treatment Number and Dose:      Treatment #1/5      Dose: 10/50 Gy       Treating 2 solitary lesions, left upper lobe and right lower lobe    Notes on treatment course, films, medical progress and plan:    Patient was placed in treatment position and CBCT images were acquired for target localization. Adjustments were made to ensure the set up agreed with the computerized stereotactic plan. All images were approved prior to treatment starting.     The patient did well, tolerated treatment without difficulty. No problems or questions.     Subjective     I was personally present at the machine for the set up and delivery of the above treatment. The Medical Physicist was also in attendance during patient set up, verification and treatment delivery to ensure the parameters agreed with the treatment plan.     I provided direct supervision of the patient's set up, treatment parameters and image guidance. I provided corrections and approval of the above prior to the treatment, in real time. I was present for any adjustments required due to patient motion, target movement or equipment issues.    The ongoing images for localization, tumor tracking, gating and beam's eye view localization images will also be approved.     Problem added:  None  Medications added:   None  Ancillary referrals made: None    I have reviewed the current medications, allergies and problem list in the patients EMR.

## 2021-09-01 ENCOUNTER — APPOINTMENT (OUTPATIENT)
Dept: RADIATION ONCOLOGY | Facility: HOSPITAL | Age: 81
End: 2021-09-01

## 2021-09-01 DIAGNOSIS — C34.12 MALIGNANT NEOPLASM OF UPPER LOBE OF LEFT LUNG (HCC): Primary | ICD-10-CM

## 2021-09-01 RX ORDER — HYDROCODONE BITARTRATE AND ACETAMINOPHEN 5; 325 MG/1; MG/1
1 TABLET ORAL EVERY 6 HOURS PRN
Qty: 30 TABLET | Refills: 0 | Status: SHIPPED | OUTPATIENT
Start: 2021-09-01 | End: 2022-04-06

## 2021-09-02 LAB
LAB AP CASE REPORT: NORMAL
LAB AP CLINICAL INFORMATION: NORMAL
LAB AP DIAGNOSIS COMMENT: NORMAL
LAB AP SPECIAL STAINS: NORMAL
Lab: NORMAL
Lab: NORMAL
PATH REPORT.ADDENDUM SPEC: NORMAL
PATH REPORT.FINAL DX SPEC: NORMAL
PATH REPORT.GROSS SPEC: NORMAL
REF LAB TEST METHOD: NORMAL

## 2021-09-03 ENCOUNTER — RADIATION ONCOLOGY WEEKLY ASSESSMENT (OUTPATIENT)
Dept: RADIATION ONCOLOGY | Facility: HOSPITAL | Age: 81
End: 2021-09-03

## 2021-09-03 DIAGNOSIS — C34.12 MALIGNANT NEOPLASM OF UPPER LOBE OF LEFT LUNG (HCC): Primary | ICD-10-CM

## 2021-09-03 PROCEDURE — 77373 STRTCTC BDY RAD THER TX DLVR: CPT | Performed by: RADIOLOGY

## 2021-09-03 NOTE — PROGRESS NOTES
Physician Stereotactic Management Note    Diagnosis:   Malignant neoplasm of upper lobe of left lung (CMS/HCC)    Doing well pretreatment, no problems.    Treatment Number and Dose:fx 2/5 to left upper lobe 20/50Gy  fx 2/5 left lower lobe 20/50Gy    Notes on treatment course, films, medical progress and plan:    Patient was placed in treatment position and CBCT images were acquired for target localization. Adjustments were made to ensure the set up agreed with the computerized stereotactic plan. All images were approved prior to treatment starting.     The patient did well, tolerated treatment without difficulty. No problems or questions.     Subjective     I was personally present at the machine for the set up and delivery of the above treatment. The Medical Physicist was also in attendance during patient set up, verification and treatment delivery to ensure the parameters agreed with the treatment plan.     I provided direct supervision of the patient's set up, treatment parameters and image guidance. I provided corrections and approval of the above prior to the treatment, in real time. I was present for any adjustments required due to patient motion, target movement or equipment issues.    The ongoing images for localization, tumor tracking, gating and beam's eye view localization images will also be approved.     Problem added:  None  Medications added:   None  Ancillary referrals made: None    I have reviewed the current medications, allergies and problem list in the patients EMR.

## 2021-09-07 ENCOUNTER — RADIATION ONCOLOGY WEEKLY ASSESSMENT (OUTPATIENT)
Dept: RADIATION ONCOLOGY | Facility: HOSPITAL | Age: 81
End: 2021-09-07

## 2021-09-07 DIAGNOSIS — C34.12 MALIGNANT NEOPLASM OF UPPER LOBE OF LEFT LUNG (HCC): Primary | ICD-10-CM

## 2021-09-07 PROCEDURE — 77373 STRTCTC BDY RAD THER TX DLVR: CPT | Performed by: RADIOLOGY

## 2021-09-07 PROCEDURE — 77336 RADIATION PHYSICS CONSULT: CPT | Performed by: RADIOLOGY

## 2021-09-07 NOTE — PROGRESS NOTES
Physician Weekly Management Note    Diagnosis:     Diagnosis Plan   1. Malignant neoplasm of upper lobe of left lung (CMS/HCC)         RT Details:  Treatment #3/5    SBRT to solitary nodule in each lung    Notes on Treatment course, Films, Medical progress:  Demonstrating good tolerance, denies cough, shortness of breath, or chest pain.  Plan follow-up PET scan in 10 weeks      [facility] ECOG Status: (0) Fully active, able to carry on all predisease performance without restriction      Weekly Management:  Medication reviewed?   Yes  New medications given?   No  Problemlist reviewed?   Yes  Problem added?   No      Technical aspects reviewed:  Weekly OBI approved?   Yes  Weekly port films approved?   Yes  Change requests noted on port film?   No  Patient setup and plan reviewed?   Yes    Chart Reviewed:  Continue current treatment plan?   Yes  Treatment plan change requested?   No  CBC reviewed?   No  Concurrent Chemo?   No    Objective     Toxicities:   As above     Review of Systems   As above    There were no vitals filed for this visit.    Current Status 8/23/2021   ECOG score 0       Physical Exam  As above      Problem Summary List    Diagnosis:     Diagnosis Plan   1. Malignant neoplasm of upper lobe of left lung (CMS/HCC)       Pathology:       Past Medical History:   Diagnosis Date   • Abnormal glucose    • Abnormal TSH    • Allergic rhinitis    • Bilateral leg and foot pain    • Cancer of floor of mouth (CMS/HCC) 2014   • Dysuria    • Esophageal reflux    • Excessive cerumen in ear canal    • Fever and chills    • Hematuria    • High risk medication use    • History of adenocarcinoma of lung    • History of anemia    • History of carcinoma in situ of skin    • History of lung cancer    • History of mammogram    • 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 from [facility]   • History of snoring    • History of squamous cell carcinoma     Tongue   • Hyperlipidemia    •  Hyperpigmentation    • Hypertension     since early 60s   • Impacted cerumen of both ears    • Influenza    • Low vitamin B12 level    • Lower back pain    • Lung cancer (CMS/HCC)    • Menopause    • Need for influenza vaccination    • Other screening mammogram    • Thyromegaly    • Urinary pain    • UTI (urinary tract infection)    • Vitamin D deficiency          Past Surgical History:   Procedure Laterality Date   • CHOLECYSTECTOMY  1999   • COLONOSCOPY     • EYE SURGERY  11/24/2020    Took a muscle out of right eye   • GLOSSECTOMY PARTIAL      less than one half tongue   • LUNG SURGERY  2012   • ORAL LESION EXCISION/BIOPSY      June and August 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   • TUBAL ABDOMINAL LIGATION  1970         Current Outpatient Medications on File Prior to Visit   Medication Sig Dispense Refill   • albuterol sulfate  (90 Base) MCG/ACT inhaler Inhale 2 puffs Every 4 (Four) Hours As Needed for Wheezing. 18 g 2   • amLODIPine (NORVASC) 5 MG tablet TAKE 1 TABLET EVERY DAY 90 tablet 1   • atorvastatin (LIPITOR) 20 MG tablet Take 1 tablet by mouth Every Night. 90 tablet 3   • B Complex-C-E-Zn (b complex-C-E-zinc) tablet Take 1 tablet by mouth Daily.     • cetirizine (ZyrTEC) 10 MG tablet Take 10 mg by mouth.     • cholecalciferol (VITAMIN D3) 1000 units tablet Take 1,000 Units by mouth Daily.     • fluticasone (FLONASE) 50 MCG/ACT nasal spray 2 sprays into the nostril(s) as directed by provider Daily.     • hydroCHLOROthiazide (HYDRODIURIL) 25 MG tablet TAKE 1 TABLET EVERY DAY 90 tablet 1   • HYDROcod Polst-CPM Polst ER (Tussionex Pennkinetic ER) 10-8 MG/5ML ER suspension Take 5 mL by mouth Every 12 (Twelve) Hours As Needed for Cough. 120 mL 0   • HYDROcodone-acetaminophen (Norco) 5-325 MG per tablet Take 1 tablet by mouth Every 6 (Six) Hours As Needed for Moderate Pain  (1-2 p.o. every 4 to 6 hours). 60 tablet 0   • HYDROcodone-acetaminophen (Norco) 5-325 MG per tablet Take 1 tablet by mouth Every 6 (Six)  Hours As Needed for Moderate Pain . 30 tablet 0   • ipratropium (ATROVENT) 0.06 % nasal spray 2 sprays into the nostril(s) as directed by provider 4 (Four) Times a Day. 15 mL 0   • metoprolol succinate XL (TOPROL-XL) 25 MG 24 hr tablet TAKE 1 TABLET EVERY DAY 90 tablet 1   • montelukast (SINGULAIR) 10 MG tablet Take 10 mg by mouth Every Night.     • Multiple Vitamins-Minerals (CENTRUM ADULTS) tablet Take  by mouth.     • omeprazole (PriLOSEC) 40 MG capsule Take 40 mg by mouth.     • promethazine-dextromethorphan (PROMETHAZINE-DM) 6.25-15 MG/5ML syrup Take 5 mL by mouth 4 (Four) Times a Day As Needed for Cough. 180 mL 0   • Tiotropium Bromide Monohydrate (SPIRIVA RESPIMAT) 1.25 MCG/ACT aerosol solution inhaler Inhale 2 puffs Daily. 5 each 0     No current facility-administered medications on file prior to visit.       Allergies   Allergen Reactions   • Sulfa Antibiotics Rash         Primary care MD:    Kehrer, Meredith Lea, MD    Oncologist:  Patient Care Team:  Henry Escobar MD as Consulting Physician (Hematology and Oncology)  Moise Mcdonough MD as Consulting Physician (Radiation Oncology)       Seen and approved by:  Moise Mcdonough MD  09/07/2021

## 2021-09-10 ENCOUNTER — RADIATION ONCOLOGY WEEKLY ASSESSMENT (OUTPATIENT)
Dept: RADIATION ONCOLOGY | Facility: HOSPITAL | Age: 81
End: 2021-09-10

## 2021-09-10 DIAGNOSIS — C34.12 MALIGNANT NEOPLASM OF UPPER LOBE OF LEFT LUNG (HCC): Primary | ICD-10-CM

## 2021-09-10 PROCEDURE — 77373 STRTCTC BDY RAD THER TX DLVR: CPT | Performed by: RADIOLOGY

## 2021-09-10 PROCEDURE — FACE2FACE: Performed by: RADIOLOGY

## 2021-09-10 NOTE — PROGRESS NOTES
Physician Stereotactic Management Note    Diagnosis:   Malignant neoplasm of upper lobe of left lung (CMS/HCC)    Doing well pretreatment, no problems.    Treatment Number and Dose:   SBRT to solitary lesions, 1 in each lung.     Treatment #4/5     Notes on treatment course, films, medical progress and plan:    Patient was placed in treatment position and CBCT images were acquired for target localization. Adjustments were made to ensure the set up agreed with the computerized stereotactic plan. All images were approved prior to treatment starting.     The patient did well, tolerated treatment without difficulty. No problems or questions.     Subjective     I was personally present at the machine for the set up and delivery of the above treatment. The Medical Physicist was also in attendance during patient set up, verification and treatment delivery to ensure the parameters agreed with the treatment plan.     I provided direct supervision of the patient's set up, treatment parameters and image guidance. I provided corrections and approval of the above prior to the treatment, in real time. I was present for any adjustments required due to patient motion, target movement or equipment issues.    The ongoing images for localization, tumor tracking, gating and beam's eye view localization images will also be approved.     Problem added:  None  Medications added:   None  Ancillary referrals made: None    I have reviewed the current medications, allergies and problem list in the patients EMR.

## 2021-09-13 ENCOUNTER — RADIATION ONCOLOGY WEEKLY ASSESSMENT (OUTPATIENT)
Dept: RADIATION ONCOLOGY | Facility: HOSPITAL | Age: 81
End: 2021-09-13

## 2021-09-13 ENCOUNTER — DOCUMENTATION (OUTPATIENT)
Dept: RADIATION ONCOLOGY | Facility: HOSPITAL | Age: 81
End: 2021-09-13

## 2021-09-13 DIAGNOSIS — C34.12 MALIGNANT NEOPLASM OF UPPER LOBE OF LEFT LUNG (HCC): Primary | ICD-10-CM

## 2021-09-13 PROCEDURE — 77373 STRTCTC BDY RAD THER TX DLVR: CPT | Performed by: RADIOLOGY

## 2021-09-13 NOTE — PROGRESS NOTES
Physician Stereotactic Management Note    Diagnosis:   No diagnosis found.    Doing well pretreatment, no problems.    Treatment Number and Dose: Treatment #5/5.  Plan repeat PET scan in 10 weeks.    Notes on treatment course, films, medical progress and plan:    Patient was placed in treatment position and CBCT images were acquired for target localization. Adjustments were made to ensure the set up agreed with the computerized stereotactic plan. All images were approved prior to treatment starting.     The patient did well, tolerated treatment without difficulty. No problems or questions.     Subjective     I was personally present at the machine for the set up and delivery of the above treatment. The Medical Physicist was also in attendance during patient set up, verification and treatment delivery to ensure the parameters agreed with the treatment plan.     I provided direct supervision of the patient's set up, treatment parameters and image guidance. I provided corrections and approval of the above prior to the treatment, in real time. I was present for any adjustments required due to patient motion, target movement or equipment issues.    The ongoing images for localization, tumor tracking, gating and beam's eye view localization images will also be approved.     Problem added:  None  Medications added:   None  Ancillary referrals made: None    I have reviewed the current medications, allergies and problem list in the patients EMR.

## 2021-09-13 NOTE — PROGRESS NOTES
Physician Weekly Management Note    Diagnosis:     Diagnosis Plan   1. Malignant neoplasm of upper lobe of left lung (CMS/HCC)         RT Details:  Treatment #5/5          SBRT to solitary lesion, 1 in each lung    Notes on Treatment course, Films, Medical progress:  Demonstrating good tolerance.  Finishes today.  We will see back in follow-up with a PET scan in approximately 10 weeks.      [facility] Status: (0) Fully active, able to carry on all predisease performance without restriction      Weekly Management:  Medication reviewed?   Yes  New medications given?   No  Problemlist reviewed?   Yes  Problem added?   No      Technical aspects reviewed:  Weekly OBI approved?   Yes  Weekly port films approved?   Yes  Change requests noted on port film?   No  Patient setup and plan reviewed?   Yes    Chart Reviewed:  Continue current treatment plan?   Yes  Treatment plan change requested?   No  CBC reviewed?   No  Concurrent Chemo?   No    Objective     Toxicities:   As above     Review of Systems   As above    There were no vitals filed for this visit.    Current Status 8/23/2021   ECOG score 0       Physical Exam  As above      Problem Summary List    Diagnosis:     Diagnosis Plan   1. Malignant neoplasm of upper lobe of left lung (CMS/HCC)       Pathology:       Past Medical History:   Diagnosis Date   • Abnormal glucose    • Abnormal TSH    • Allergic rhinitis    • Bilateral leg and foot pain    • Cancer of floor of mouth (CMS/HCC) 2014   • Dysuria    • Esophageal reflux    • Excessive cerumen in ear canal    • Fever and chills    • Hematuria    • High risk medication use    • History of adenocarcinoma of lung    • History of anemia    • History of carcinoma in situ of skin    • History of lung cancer    • History of mammogram    • 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 from [facility]   • History of snoring    • History of squamous cell carcinoma     Tongue   •  Hyperlipidemia    • Hyperpigmentation    • Hypertension     since early 60s   • Impacted cerumen of both ears    • Influenza    • Low vitamin B12 level    • Lower back pain    • Lung cancer (CMS/HCC)    • Menopause    • Need for influenza vaccination    • Other screening mammogram    • Thyromegaly    • Urinary pain    • UTI (urinary tract infection)    • Vitamin D deficiency          Past Surgical History:   Procedure Laterality Date   • CHOLECYSTECTOMY  1999   • COLONOSCOPY     • EYE SURGERY  11/24/2020    Took a muscle out of right eye   • GLOSSECTOMY PARTIAL      less than one half tongue   • LUNG SURGERY  2012   • ORAL LESION EXCISION/BIOPSY      June and August 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   • TUBAL ABDOMINAL LIGATION  1970         Current Outpatient Medications on File Prior to Visit   Medication Sig Dispense Refill   • albuterol sulfate  (90 Base) MCG/ACT inhaler Inhale 2 puffs Every 4 (Four) Hours As Needed for Wheezing. 18 g 2   • amLODIPine (NORVASC) 5 MG tablet TAKE 1 TABLET EVERY DAY 90 tablet 1   • atorvastatin (LIPITOR) 20 MG tablet Take 1 tablet by mouth Every Night. 90 tablet 3   • B Complex-C-E-Zn (b complex-C-E-zinc) tablet Take 1 tablet by mouth Daily.     • cetirizine (ZyrTEC) 10 MG tablet Take 10 mg by mouth.     • cholecalciferol (VITAMIN D3) 1000 units tablet Take 1,000 Units by mouth Daily.     • fluticasone (FLONASE) 50 MCG/ACT nasal spray 2 sprays into the nostril(s) as directed by provider Daily.     • hydroCHLOROthiazide (HYDRODIURIL) 25 MG tablet TAKE 1 TABLET EVERY DAY 90 tablet 1   • HYDROcod Polst-CPM Polst ER (Tussionex Pennkinetic ER) 10-8 MG/5ML ER suspension Take 5 mL by mouth Every 12 (Twelve) Hours As Needed for Cough. 120 mL 0   • HYDROcodone-acetaminophen (Norco) 5-325 MG per tablet Take 1 tablet by mouth Every 6 (Six) Hours As Needed for Moderate Pain  (1-2 p.o. every 4 to 6 hours). 60 tablet 0   • HYDROcodone-acetaminophen (Norco) 5-325 MG per tablet Take 1 tablet by  mouth Every 6 (Six) Hours As Needed for Moderate Pain . 30 tablet 0   • ipratropium (ATROVENT) 0.06 % nasal spray 2 sprays into the nostril(s) as directed by provider 4 (Four) Times a Day. 15 mL 0   • metoprolol succinate XL (TOPROL-XL) 25 MG 24 hr tablet TAKE 1 TABLET EVERY DAY 90 tablet 1   • montelukast (SINGULAIR) 10 MG tablet Take 10 mg by mouth Every Night.     • Multiple Vitamins-Minerals (CENTRUM ADULTS) tablet Take  by mouth.     • omeprazole (PriLOSEC) 40 MG capsule Take 40 mg by mouth.     • promethazine-dextromethorphan (PROMETHAZINE-DM) 6.25-15 MG/5ML syrup Take 5 mL by mouth 4 (Four) Times a Day As Needed for Cough. 180 mL 0   • Tiotropium Bromide Monohydrate (SPIRIVA RESPIMAT) 1.25 MCG/ACT aerosol solution inhaler Inhale 2 puffs Daily. 5 each 0     No current facility-administered medications on file prior to visit.       Allergies   Allergen Reactions   • Sulfa Antibiotics Rash        Primary care MD:    Kehrer, Meredith Lea, MD    Oncologist:  Patient Care Team:  Henry Escobar MD as Consulting Physician (Hematology and Oncology)  Moise Mcdonough MD as Consulting Physician (Radiation Oncology)       Seen and approved by:  Moise Mcdonough MD  09/13/2021

## 2021-09-14 NOTE — PROGRESS NOTES
RADIATION THERAPY TREATMENT SUMMARY  Patient: Darlene Pfeiffer  YOB: 1940  Diagnosis:  Malignant neoplasm of upper lobe of left lung (CMS/HCC)       Darlene Pfeiffer has recently completed the course of radiation therapy prescribed for the above-mentioned diagnosis.  Below, please find the specifics of the course of treatment delivered:    Radiation Details:    Tx Start Date  8/31/2021   Tx End date  9/13/2021   Intent   Curative  Total Treatments 5    Prescription Name SBRT R LUNG50  Site   Lung, Right  Primary/Boost  Primary  Dose Per Fraction 1000 cGy/Frac  Number of Fractions 5  Prescribe To  Volume PTV R LUNG  5000 cGy  1000 cGy/Frac  Mode    Photon  Technique  SBRT VMAT  Frequency  3 TIMES A WEEK  Energy   6FFF        Tx Start Date  8/31/2021   Tx End date  9/13/2021   Intent   Curative  Total Treatments 5    Prescription Name SBRT L LUNG50  Site   Lung, Left  Primary/Boost  Primary  Dose Per Fraction 1000 cGy/Frac  Number of Fractions 5  Prescribe To  Volume PTV LLUNG2  5000 cGy  1000 cGy/Frac  Mode    Photon  Technique  SBRT VMAT  Frequency  3 TIMES A WEEK  Energy   6FFF             Tolerance and Toxicities: she tolerated the treatments well , requiring only the planned breaks between treatments. she completed the treatments in 13 elapsed days.    Follow-up Plans: We will see back in follow-up with a PET scan in approximately 10 weeks. I have also made a referral to our Survivorship Clinic.

## 2021-09-23 ENCOUNTER — LAB (OUTPATIENT)
Dept: LAB | Facility: HOSPITAL | Age: 81
End: 2021-09-23

## 2021-09-23 ENCOUNTER — OFFICE VISIT (OUTPATIENT)
Dept: ONCOLOGY | Facility: CLINIC | Age: 81
End: 2021-09-23

## 2021-09-23 VITALS
SYSTOLIC BLOOD PRESSURE: 130 MMHG | BODY MASS INDEX: 23.29 KG/M2 | TEMPERATURE: 97.8 F | OXYGEN SATURATION: 97 % | RESPIRATION RATE: 18 BRPM | HEART RATE: 76 BPM | WEIGHT: 136.4 LBS | DIASTOLIC BLOOD PRESSURE: 70 MMHG | HEIGHT: 64 IN

## 2021-09-23 DIAGNOSIS — C34.90 MALIGNANT NEOPLASM OF LUNG, UNSPECIFIED LATERALITY, UNSPECIFIED PART OF LUNG (HCC): Primary | ICD-10-CM

## 2021-09-23 DIAGNOSIS — C34.12 MALIGNANT NEOPLASM OF UPPER LOBE OF LEFT LUNG (HCC): Primary | ICD-10-CM

## 2021-09-23 LAB
BASOPHILS # BLD AUTO: 0.04 10*3/MM3 (ref 0–0.2)
BASOPHILS NFR BLD AUTO: 0.4 % (ref 0–1.5)
DEPRECATED RDW RBC AUTO: 45.5 FL (ref 37–54)
EOSINOPHIL # BLD AUTO: 0 10*3/MM3 (ref 0–0.4)
EOSINOPHIL NFR BLD AUTO: 0 % (ref 0.3–6.2)
ERYTHROCYTE [DISTWIDTH] IN BLOOD BY AUTOMATED COUNT: 13.9 % (ref 12.3–15.4)
HCT VFR BLD AUTO: 41.5 % (ref 34–46.6)
HGB BLD-MCNC: 13.7 G/DL (ref 12–15.9)
IMM GRANULOCYTES # BLD AUTO: 0.1 10*3/MM3 (ref 0–0.05)
IMM GRANULOCYTES NFR BLD AUTO: 1.1 % (ref 0–0.5)
LYMPHOCYTES # BLD AUTO: 1.68 10*3/MM3 (ref 0.7–3.1)
LYMPHOCYTES NFR BLD AUTO: 18.5 % (ref 19.6–45.3)
MCH RBC QN AUTO: 29.8 PG (ref 26.6–33)
MCHC RBC AUTO-ENTMCNC: 33 G/DL (ref 31.5–35.7)
MCV RBC AUTO: 90.4 FL (ref 79–97)
MONOCYTES # BLD AUTO: 0.57 10*3/MM3 (ref 0.1–0.9)
MONOCYTES NFR BLD AUTO: 6.3 % (ref 5–12)
NEUTROPHILS NFR BLD AUTO: 6.69 10*3/MM3 (ref 1.7–7)
NEUTROPHILS NFR BLD AUTO: 73.7 % (ref 42.7–76)
NRBC BLD AUTO-RTO: 0 /100 WBC (ref 0–0.2)
PLATELET # BLD AUTO: 186 10*3/MM3 (ref 140–450)
PMV BLD AUTO: 9.8 FL (ref 6–12)
RBC # BLD AUTO: 4.59 10*6/MM3 (ref 3.77–5.28)
WBC # BLD AUTO: 9.08 10*3/MM3 (ref 3.4–10.8)

## 2021-09-23 PROCEDURE — 85025 COMPLETE CBC W/AUTO DIFF WBC: CPT

## 2021-09-23 PROCEDURE — 36415 COLL VENOUS BLD VENIPUNCTURE: CPT

## 2021-09-23 PROCEDURE — 99214 OFFICE O/P EST MOD 30 MIN: CPT | Performed by: INTERNAL MEDICINE

## 2021-09-23 NOTE — PROGRESS NOTES
Subjective Patient status post radiation therapy, feeling well                                                                                                       REASON FOR FOLLOW-UP: Potential recurrent lung cancer.    History of Present Illness       The patient is a 81-year-old female with the below medical history including chronic obstructive pulmonary disease who was seen in the [facility] multidisciplinary thoracic oncology clinic July 1, 2021.  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      She had undergone a CT of the chest at [facility] 6/16/2021 showing postsurgical changes in the left chest from right upper lobectomy, 8 mm irregular nodule medial segment of the right lower lobe and diffuse changes of emphysema with fibrotic changes in the periphery, no suspicious hilar or mediastinal nodes, no pleural effusion.  New finding was suspicious for new malignancy with the patient felt to be a poor candidate for surgical resection.  A PET CT and PFTs were requested thereafter.  The PET/CT demonstrated ill-defined FDG avid soft tissue thickening left aspect mediastinum within the operative bed, 1 cm hypermetabolic pulmonary nodule right lower lobe and asymmetric thickening patchy uptake involving the right base of tongue.  There is subtle uptake within the L1 vertebral body which is indeterminate and a sub-6 mm pulmonary nodule of right lung and subcentimeter hypodense hepatic lesion which was below PET resolution.       The patient's case was discussed in thoracic conference 7/22/2021 with consideration of the patient undergoing L1 vertebral body MRI, confirmatory biopsy left mediastinal and assessment by ENT (Dr. Caballero) who had worked with the patient previously.  She, incidentally,  indicates that radiation therapy was given apparently intraoperatively at her recent surgery?  She still has issues with difficulty chewing and swallowing post procedures and was to be followed up with Dr. Caballero in the near future as planned.                                                            She was seen in the thoracic clinic on the same day with plans to proceed with MRI of the lumbar spine, biopsy left mediastinal nodular area of potential disease and follow-up of her ENT assessment as well as undergo a guardant 360 assessment in our office.  She underwent the biopsy 8/13/2021 found to be consistent with invasive moderately differentiated adenocarcinoma with PD-L1 TPS score 40%.  MRI of the lumbar spine revealed no evidence of metastatic disease though the patient did have multilevel degenerative disease throughout the lumbar spine.  She had an office follow-up with Dr. 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  She underwent laryngoscopy 8/18/2021 with right base of tongue without evidence of lesions or masses in the region.     She was seen by Dr. Hernandez 8/19/2021 and, her findings, had been presented in lung conference.  Her findings were consistent with 2 separate lesions including a nodule in the superior segment of the right lower lobe and a mass in the upper portion of the left chest with the left chest lesion biopsy positive for adenocarcinoma.  The consensus was that these were to separate-synchronous primaries.  Stereotactic radiation each lesions were felt to be the appropriate way to proceed and the patient was referred to radiation therapy.     The patient is seen in office 8/23/2021 agreeable to the radiation therapy as well as additional assessments including Luis Armandois molecular assessment of her biopsy.  Again her guardant 360 is currently pending and we would follow her after  she completes radiation therapy with subsequent scans.      She was able to proceed with SBRT to the right lung at primary #1 with 5 treatments at 1000 cGy per fraction in the left lung primary similarly at 1000 cGy per fraction x5.  Her treatment ranged between 8/31-9/13/2021.  She is now scheduled for follow-up 11/23/2021.    The patient subsequent genomic testing is discussed with her as she is is seen back 9/23/2021.  Her guardant 360 did not determine any new abnormalities but her Caris-MI profile-does reveal sensitivity to immunotherapy as well as a BRAF mutation.  This could be extremely important as we follow the patient from this point.  Fortunately she is feeling extremely well and quite pleased about her treatments.      Past Medical History:   Diagnosis Date   • Abnormal glucose    • Abnormal TSH    • Allergic rhinitis    • Bilateral leg and foot pain    • Cancer of floor of mouth (CMS/HCC) 2014   • Dysuria    • Esophageal reflux    • Excessive cerumen in ear canal    • Fever and chills    • Hematuria    • High risk medication use    • History of adenocarcinoma of lung    • History of anemia    • History of carcinoma in situ of skin    • History of lung cancer    • History of mammogram    • 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 from [facility]   • History of snoring    • History of squamous cell carcinoma     Tongue   • Hyperlipidemia    • Hyperpigmentation    • Hypertension     since early 60s   • Impacted cerumen of both ears    • Influenza    • Low vitamin B12 level    • Lower back pain    • Lung cancer (CMS/HCC)    • Menopause    • Need for influenza vaccination    • Other screening mammogram    • Thyromegaly    • Urinary pain    • UTI (urinary tract infection)    • Vitamin D deficiency         Past Surgical History:   Procedure Laterality Date   • CHOLECYSTECTOMY  1999   • COLONOSCOPY     • EYE SURGERY  11/24/2020    Took a muscle out of right eye   • GLOSSECTOMY  PARTIAL      less than one half tongue   • LUNG SURGERY  2012   • ORAL LESION EXCISION/BIOPSY      June and August 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   • TUBAL ABDOMINAL LIGATION  1970        Current Outpatient Medications on File Prior to Visit   Medication Sig Dispense Refill   • albuterol sulfate  (90 Base) MCG/ACT inhaler Inhale 2 puffs Every 4 (Four) Hours As Needed for Wheezing. 18 g 2   • amLODIPine (NORVASC) 5 MG tablet TAKE 1 TABLET EVERY DAY 90 tablet 1   • atorvastatin (LIPITOR) 20 MG tablet Take 1 tablet by mouth Every Night. 90 tablet 3   • B Complex-C-E-Zn (b complex-C-E-zinc) tablet Take 1 tablet by mouth Daily.     • cetirizine (ZyrTEC) 10 MG tablet Take 10 mg by mouth.     • cholecalciferol (VITAMIN D3) 1000 units tablet Take 1,000 Units by mouth Daily.     • fluticasone (FLONASE) 50 MCG/ACT nasal spray 2 sprays into the nostril(s) as directed by provider Daily.     • hydroCHLOROthiazide (HYDRODIURIL) 25 MG tablet TAKE 1 TABLET EVERY DAY 90 tablet 1   • HYDROcod Polst-CPM Polst ER (Tussionex Pennkinetic ER) 10-8 MG/5ML ER suspension Take 5 mL by mouth Every 12 (Twelve) Hours As Needed for Cough. 120 mL 0   • HYDROcodone-acetaminophen (Norco) 5-325 MG per tablet Take 1 tablet by mouth Every 6 (Six) Hours As Needed for Moderate Pain  (1-2 p.o. every 4 to 6 hours). 60 tablet 0   • HYDROcodone-acetaminophen (Norco) 5-325 MG per tablet Take 1 tablet by mouth Every 6 (Six) Hours As Needed for Moderate Pain . 30 tablet 0   • ipratropium (ATROVENT) 0.06 % nasal spray 2 sprays into the nostril(s) as directed by provider 4 (Four) Times a Day. 15 mL 0   • metoprolol succinate XL (TOPROL-XL) 25 MG 24 hr tablet TAKE 1 TABLET EVERY DAY 90 tablet 1   • montelukast (SINGULAIR) 10 MG tablet Take 10 mg by mouth Every Night.     • Multiple Vitamins-Minerals (CENTRUM ADULTS) tablet Take  by mouth.     • omeprazole (PriLOSEC) 40 MG capsule Take 40 mg by mouth.     • promethazine-dextromethorphan (PROMETHAZINE-DM)  "6.25-15 MG/5ML syrup Take 5 mL by mouth 4 (Four) Times a Day As Needed for Cough. 180 mL 0   • Tiotropium Bromide Monohydrate (SPIRIVA RESPIMAT) 1.25 MCG/ACT aerosol solution inhaler Inhale 2 puffs Daily. 5 each 0     No current facility-administered medications on file prior to visit.        ALLERGIES:    Allergies   Allergen Reactions   • Sulfa Antibiotics Rash        Social History     Socioeconomic History   • Marital status:      Spouse name: Not on file   • Number of children: Not on file   • Years of education: Not on file   • Highest education level: Not on file   Tobacco Use   • Smoking status: Former Smoker     Quit date: 2015     Years since quittin.6   • Smokeless tobacco: Never Used   • Tobacco comment: less than a pack per day, no smoking since , Quit 2015   Substance and Sexual Activity   • Alcohol use: Yes     Comment: Socially -A few times a month   • Drug use: No   • Sexual activity: Defer     Family History   Problem Relation Age of Onset   • Ovarian cancer Mother          at age 27   • No Known Problems Father    • Ovarian cancer Sister    • Colon cancer Brother    • No Known Problems Other         Review of Systems   HENT: Positive for dental problem and sore throat.    Eyes: Negative.    Respiratory: Negative for cough and wheezing.    Gastrointestinal: Positive for constipation and diarrhea.   Genitourinary: Positive for frequency and urgency.   Musculoskeletal: Positive for arthralgias and myalgias.   Allergic/Immunologic: Positive for environmental allergies.   Neurological: Negative.    Hematological: Negative.    Psychiatric/Behavioral: Negative.       Objective     Vitals:    21 1551   BP: 130/70   Pulse: 76   Resp: 18   Temp: 97.8 °F (36.6 °C)   TempSrc: Temporal   SpO2: 97%   Weight: 61.9 kg (136 lb 6.4 oz)   Height: 162.6 cm (64\")   PainSc: 0-No pain     Current Status 2021   ECOG score 0          Pulmonary function tests:     FVC is 2.10 which is " 81% of predicted  FEV1 is 1.44 which is 75% of predicted  FEV1 FVC ratio 69  Diffusion capacity DLCO is 11.7 which is 54% of predicted      Physical Exam  Constitutional:       Appearance: Normal appearance. She is normal weight.   HENT:      Head: Normocephalic and atraumatic.      Nose: Nose normal.      Mouth/Throat:      Mouth: Mucous membranes are moist.      Pharynx: Oropharynx is clear.      Comments: Malocclusion noted  Eyes:      Extraocular Movements: Extraocular movements intact.   Cardiovascular:      Rate and Rhythm: Normal rate and regular rhythm.      Pulses: Normal pulses.      Heart sounds: Normal heart sounds.   Pulmonary:      Effort: Pulmonary effort is normal.      Breath sounds: Normal breath sounds.      Comments: Distant breath sounds bilateral bases  Abdominal:      General: Abdomen is flat. Bowel sounds are normal.      Palpations: Abdomen is soft.   Musculoskeletal:         General: Normal range of motion.      Cervical back: Normal range of motion and neck supple.   Skin:     General: Skin is warm and dry.   Neurological:      General: No focal deficit present.      Mental Status: She is alert and oriented to person, place, and time.   Psychiatric:         Mood and Affect: Mood normal.         Behavior: Behavior normal.           RECENT LABS:  Hematology WBC   Date Value Ref Range Status   09/23/2021 9.08 3.40 - 10.80 10*3/mm3 Final   06/08/2021 7.86 3.40 - 10.80 10*3/mm3 Final     RBC   Date Value Ref Range Status   09/23/2021 4.59 3.77 - 5.28 10*6/mm3 Final   06/08/2021 4.99 3.77 - 5.28 10*6/mm3 Final     Hemoglobin   Date Value Ref Range Status   09/23/2021 13.7 12.0 - 15.9 g/dL Final     Hematocrit   Date Value Ref Range Status   09/23/2021 41.5 34.0 - 46.6 % Final     Platelets   Date Value Ref Range Status   09/23/2021 186 140 - 450 10*3/mm3 Final          Assessment/Plan      81-year-old female with medical history including COPD, long-term tobacco use, status post previous left  upper lobectomy for carcinoma lung in May 2015, carcinoma tongue ongoing radiation therapy and surgical therapy through ENT-Dr. Caballero-including 2016 with wide local excision of buccal lesion.  During follow-up a CT scan of the chest June 16, 2021 demonstrates postsurgical changes, 8 mm irregular nodule medial segment of right lower lobe and diffuse changes of emphysema.  She is seen in thoracic clinic with findings suspicious for new malignancy and concern of the patient being a poor operative candidate.  Subsequent PET CT and PFTs demonstrated ill-defined avidity and soft tissue left aspect of the mediastinum, 1 cm hypermetabolic pulmonary nodule and asymmetric thickening involving the right base of tongue as well as subtle uptake in the L1 vertebral body.  This patient's case was discussed in thoracic clinic and plans were made to request an MRI of the lumbar spine, obtain a biopsy of the mediastinal involvement of the left had the patient reviewed by ENT.  The patient is seen with her  7/22/2021 in thoracic clinic and we reviewed these findings as well as having her assessed via liquid biopsy to determine potential molecular status quickly.           She underwent the biopsy 8/13/2021 found to be consistent with invasive moderately differentiated adenocarcinoma with PD-L1 TPS score 40%.  MRI of the lumbar spine revealed no evidence of metastatic disease though the patient did have multilevel degenerative disease throughout the lumbar spine.  She had an office follow-up with Dr. Caballero-history of a [id number] SCCa of the rightt retromolar trigone who is s/p WLE, buccal fat pad flap and SLN biopsy that was negative, margins were negative.  She underwent laryngoscopy 8/18/2021 with right base of tongue without evidence of lesions or masses in the region.     She was seen by Dr. Hernandez 8/19/2021 and, her findings, had been presented in lung conference.  Her findings were consistent with 2 separate lesions  including a nodule in the superior segment of the right lower lobe and a mass in the upper portion of the left chest with the left chest lesion biopsy positive for adenocarcinoma.  The consensus was that these were to separate-synchronous primaries.  Stereotactic radiation each lesions were felt to be the appropriate way to proceed and the patient was referred to radiation therapy.      At the time of this dictation her guardant 360 is not yet available.  These issues are discussed with the patient in some detail 8/23/2021 so that we can have an overall longer-term plan.  This includes a Caris molecular assessment of her recent biopsy as well as her PD-L1 positive findings.    The patient was referred for radiation therapy and she was able to proceed with SBRT to the right lung at primary #1 with 5 treatments at 1000 cGy per fraction in the left lung primary similarly at 1000 cGy per fraction x5.  Her treatment ranged between 8/31-9/13/2021.  She is now scheduled for follow-up 11/23/2021.    Additionally genomic testing is discussed with her as she is is seen back 9/23/2021.  Her guardant 360 did not determine any new abnormalities but her Caris-MI profile-does reveal sensitivity to immunotherapy as well as a BRAF mutation.    Plan:  *Plan reassessment here at [facility] just after PET/CT is performed in late November.  *Patient also wishes an additional dental assessment?  This had previously been done at [facility] and perhaps we can have this further assessed at [facility] or further tertiary center.

## 2021-11-23 ENCOUNTER — HOSPITAL ENCOUNTER (OUTPATIENT)
Dept: PET IMAGING | Facility: HOSPITAL | Age: 81
Discharge: HOME | End: 2021-11-23

## 2021-11-23 DIAGNOSIS — C34.12 MALIGNANT NEOPLASM OF UPPER LOBE OF LEFT LUNG (HCC): ICD-10-CM

## 2021-11-23 LAB — GLUCOSE BLDC GLUCOMTR-MCNC: 103 MG/DL (ref 70–130)

## 2021-11-23 PROCEDURE — 0 FLUDEOXYGLUCOSE F18 SOLUTION: Performed by: RADIOLOGY

## 2021-11-23 PROCEDURE — 82962 GLUCOSE BLOOD TEST: CPT

## 2021-11-23 PROCEDURE — A9552 F18 FDG: HCPCS | Performed by: RADIOLOGY

## 2021-11-23 PROCEDURE — 78815 PET IMAGE W/CT SKULL-THIGH: CPT

## 2021-11-23 RX ADMIN — FLUDEOXYGLUCOSE F18 1 DOSE: 300 INJECTION INTRAVENOUS at 08:40

## 2021-11-24 DIAGNOSIS — C34.12 MALIGNANT NEOPLASM OF UPPER LOBE OF LEFT LUNG (HCC): Primary | ICD-10-CM

## 2021-11-30 RX ORDER — AMLODIPINE BESYLATE 5 MG/1
TABLET ORAL
Qty: 90 TABLET | Refills: 1 | Status: SHIPPED | OUTPATIENT
Start: 2021-11-30 | End: 2022-06-22

## 2021-11-30 RX ORDER — HYDROCHLOROTHIAZIDE 25 MG/1
TABLET ORAL
Qty: 90 TABLET | Refills: 1 | Status: SHIPPED | OUTPATIENT
Start: 2021-11-30 | End: 2022-06-22

## 2021-11-30 RX ORDER — METOPROLOL SUCCINATE 25 MG/1
TABLET, EXTENDED RELEASE ORAL
Qty: 90 TABLET | Refills: 1 | Status: SHIPPED | OUTPATIENT
Start: 2021-11-30 | End: 2022-06-22

## 2021-12-01 ENCOUNTER — LAB (OUTPATIENT)
Dept: LAB | Facility: HOSPITAL | Age: 81
End: 2021-12-01

## 2021-12-01 ENCOUNTER — OFFICE VISIT (OUTPATIENT)
Dept: ONCOLOGY | Facility: CLINIC | Age: 81
End: 2021-12-01

## 2021-12-01 VITALS
TEMPERATURE: 97.5 F | SYSTOLIC BLOOD PRESSURE: 165 MMHG | RESPIRATION RATE: 18 BRPM | OXYGEN SATURATION: 93 % | BODY MASS INDEX: 23.12 KG/M2 | WEIGHT: 135.4 LBS | DIASTOLIC BLOOD PRESSURE: 66 MMHG | HEART RATE: 78 BPM | HEIGHT: 64 IN

## 2021-12-01 DIAGNOSIS — C34.12 MALIGNANT NEOPLASM OF UPPER LOBE OF LEFT LUNG (HCC): ICD-10-CM

## 2021-12-01 DIAGNOSIS — C34.12 MALIGNANT NEOPLASM OF UPPER LOBE OF LEFT LUNG (HCC): Primary | ICD-10-CM

## 2021-12-01 LAB
ALBUMIN SERPL-MCNC: 4.3 G/DL (ref 3.5–5.2)
ALBUMIN/GLOB SERPL: 1.8 G/DL (ref 1.1–2.4)
ALP SERPL-CCNC: 83 U/L (ref 38–116)
ALT SERPL W P-5'-P-CCNC: 11 U/L (ref 0–33)
ANION GAP SERPL CALCULATED.3IONS-SCNC: 9.9 MMOL/L (ref 5–15)
AST SERPL-CCNC: 19 U/L (ref 0–32)
BASOPHILS # BLD AUTO: 0.03 10*3/MM3 (ref 0–0.2)
BASOPHILS NFR BLD AUTO: 0.6 % (ref 0–1.5)
BILIRUB SERPL-MCNC: 0.4 MG/DL (ref 0.2–1.2)
BUN SERPL-MCNC: 18 MG/DL (ref 6–20)
BUN/CREAT SERPL: 25.7 (ref 7.3–30)
CALCIUM SPEC-SCNC: 9.9 MG/DL (ref 8.5–10.2)
CHLORIDE SERPL-SCNC: 101 MMOL/L (ref 98–107)
CO2 SERPL-SCNC: 31.1 MMOL/L (ref 22–29)
CREAT SERPL-MCNC: 0.7 MG/DL (ref 0.6–1.1)
DEPRECATED RDW RBC AUTO: 44.7 FL (ref 37–54)
EOSINOPHIL # BLD AUTO: 0 10*3/MM3 (ref 0–0.4)
EOSINOPHIL NFR BLD AUTO: 0 % (ref 0.3–6.2)
ERYTHROCYTE [DISTWIDTH] IN BLOOD BY AUTOMATED COUNT: 13.2 % (ref 12.3–15.4)
GFR SERPL CREATININE-BSD FRML MDRD: 81 ML/MIN/1.73
GLOBULIN UR ELPH-MCNC: 2.4 GM/DL (ref 1.8–3.5)
GLUCOSE SERPL-MCNC: 104 MG/DL (ref 74–124)
HCT VFR BLD AUTO: 40.5 % (ref 34–46.6)
HGB BLD-MCNC: 13.3 G/DL (ref 12–15.9)
IMM GRANULOCYTES # BLD AUTO: 0.01 10*3/MM3 (ref 0–0.05)
IMM GRANULOCYTES NFR BLD AUTO: 0.2 % (ref 0–0.5)
LYMPHOCYTES # BLD AUTO: 1.42 10*3/MM3 (ref 0.7–3.1)
LYMPHOCYTES NFR BLD AUTO: 26.6 % (ref 19.6–45.3)
MCH RBC QN AUTO: 30 PG (ref 26.6–33)
MCHC RBC AUTO-ENTMCNC: 32.8 G/DL (ref 31.5–35.7)
MCV RBC AUTO: 91.4 FL (ref 79–97)
MONOCYTES # BLD AUTO: 0.47 10*3/MM3 (ref 0.1–0.9)
MONOCYTES NFR BLD AUTO: 8.8 % (ref 5–12)
NEUTROPHILS NFR BLD AUTO: 3.41 10*3/MM3 (ref 1.7–7)
NEUTROPHILS NFR BLD AUTO: 63.8 % (ref 42.7–76)
NRBC BLD AUTO-RTO: 0 /100 WBC (ref 0–0.2)
PLATELET # BLD AUTO: 199 10*3/MM3 (ref 140–450)
PMV BLD AUTO: 9.9 FL (ref 6–12)
POTASSIUM SERPL-SCNC: 3.8 MMOL/L (ref 3.5–4.7)
PROT SERPL-MCNC: 6.7 G/DL (ref 6.3–8)
RBC # BLD AUTO: 4.43 10*6/MM3 (ref 3.77–5.28)
SODIUM SERPL-SCNC: 142 MMOL/L (ref 134–145)
WBC NRBC COR # BLD: 5.34 10*3/MM3 (ref 3.4–10.8)

## 2021-12-01 PROCEDURE — 85025 COMPLETE CBC W/AUTO DIFF WBC: CPT

## 2021-12-01 PROCEDURE — 36415 COLL VENOUS BLD VENIPUNCTURE: CPT

## 2021-12-01 PROCEDURE — 80053 COMPREHEN METABOLIC PANEL: CPT

## 2021-12-01 PROCEDURE — 99214 OFFICE O/P EST MOD 30 MIN: CPT | Performed by: INTERNAL MEDICINE

## 2021-12-01 RX ORDER — CLOBETASOL PROPIONATE 0.5 MG/G
CREAM TOPICAL
COMMUNITY
Start: 2021-10-05 | End: 2023-03-08

## 2021-12-01 NOTE — PROGRESS NOTES
Subjective Patient generally feeling improved                                                                                                                               REASON FOR FOLLOW-UP: Potential recurrent lung cancer.    History of Present Illness       The patient is a 81-year-old female with the below medical history including chronic obstructive pulmonary disease who was seen in the [facility] multidisciplinary thoracic oncology clinic July 1, 2021.  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      She had undergone a CT of the chest at [facility] 6/16/2021 showing postsurgical changes in the left chest from right upper lobectomy, 8 mm irregular nodule medial segment of the right lower lobe and diffuse changes of emphysema with fibrotic changes in the periphery, no suspicious hilar or mediastinal nodes, no pleural effusion.  New finding was suspicious for new malignancy with the patient felt to be a poor candidate for surgical resection.  A PET CT and PFTs were requested thereafter.  The PET/CT demonstrated ill-defined FDG avid soft tissue thickening left aspect mediastinum within the operative bed, 1 cm hypermetabolic pulmonary nodule right lower lobe and asymmetric thickening patchy uptake involving the right base of tongue.  There is subtle uptake within the L1 vertebral body which is indeterminate and a sub-6 mm pulmonary nodule of right lung and subcentimeter hypodense hepatic lesion which was below PET resolution.       The patient's case was discussed in thoracic conference 7/22/2021 with consideration of the patient undergoing L1 vertebral body MRI, confirmatory biopsy left mediastinal and assessment by ENT (Dr. Caballero) who had worked with the patient previously.  She, incidentally,  indicates that radiation therapy was given apparently intraoperatively at her recent surgery?  She still has issues with difficulty chewing and swallowing post procedures and was to be followed up with Dr. Caballero in the near future as planned.                                                            She was seen in the thoracic clinic on the same day with plans to proceed with MRI of the lumbar spine, biopsy left mediastinal nodular area of potential disease and follow-up of her ENT assessment as well as undergo a guardant 360 assessment in our office.  She underwent the biopsy 8/13/2021 found to be consistent with invasive moderately differentiated adenocarcinoma with PD-L1 TPS score 40%.  MRI of the lumbar spine revealed no evidence of metastatic disease though the patient did have multilevel degenerative disease throughout the lumbar spine.  She had an office follow-up with Dr. 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  She underwent laryngoscopy 8/18/2021 with right base of tongue without evidence of lesions or masses in the region.     She was seen by Dr. Hernandez 8/19/2021 and, her findings, had been presented in lung conference.  Her findings were consistent with 2 separate lesions including a nodule in the superior segment of the right lower lobe and a mass in the upper portion of the left chest with the left chest lesion biopsy positive for adenocarcinoma.  The consensus was that these were to separate-synchronous primaries.  Stereotactic radiation each lesions were felt to be the appropriate way to proceed and the patient was referred to radiation therapy.     The patient is seen in office 8/23/2021 agreeable to the radiation therapy as well as additional assessments including Luis Armandois molecular assessment of her biopsy.  Again her guardant 360 is currently pending and we would follow her after  she completes radiation therapy with subsequent scans.      She was able to proceed with SBRT to the right lung at primary #1 with 5 treatments at 1000 cGy per fraction in the left lung primary similarly at 1000 cGy per fraction x5.  Her treatment ranged between 8/31-9/13/2021.  She is now scheduled for follow-up 11/23/2021.    The patient subsequent genomic testing is discussed with her as she is is seen back 9/23/2021.  Her guardant 360 did not determine any new abnormalities but her Caris-MI profile-does reveal sensitivity to immunotherapy as well as a BRAF mutation.  This could be extremely important as we follow the patient from this point.  Fortunately she is feeling extremely well and quite pleased about her treatments.    Patient had subsequent PET/CT 11/23/2021 demonstrates decrease in size and avidity of the previously noted intensely FDG avid nodules left lobectomy operative site and right lower lobe.  Surrounding groundglass and pulmonary opacification is consistent with postradiation changes, a few new subcentimeter pulmonary nodules are present in the right upper lobe and indeterminant, stable subcentimeter hepatic lesion is below PET resolution no other findings of FDG-avid disease are seen in neck abdomen or pelvis.  The patient seen in office 12/1/2021 with a relatively good performance status stating she is not having any new symptoms and very pleased about her results on her PET/CT.  She realizes we'll have to follow this further but subsequent scans in 6 months.  She is also hoping to see an oral surgeon that previously helped her-Dr. Wu.  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 from [facility]   • History of snoring    • History of squamous cell carcinoma     Tongue   • Hyperlipidemia    • Hyperpigmentation    • Hypertension     since early 60s   • Impacted cerumen of both ears    • Influenza    • Low vitamin B12 level    • Lower back pain    • Lung cancer (HCC)    • Menopause    • Need for influenza vaccination    • Other screening mammogram    • Thyromegaly    • Urinary pain    • UTI (urinary tract infection)    • Vitamin D deficiency         Past Surgical History:   Procedure Laterality Date   • CHOLECYSTECTOMY  1999   • COLONOSCOPY     • EYE SURGERY  11/24/2020    Took a muscle out of right eye   • GLOSSECTOMY PARTIAL      less than one half tongue   • LUNG SURGERY  2012   • ORAL LESION EXCISION/BIOPSY      June and August 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   • TUBAL ABDOMINAL LIGATION  1970        Current Outpatient Medications on File Prior to Visit   Medication Sig Dispense Refill   • albuterol sulfate  (90 Base) MCG/ACT inhaler Inhale 2 puffs Every 4 (Four) Hours As Needed for Wheezing. 18 g 2   • amLODIPine (NORVASC) 5 MG tablet TAKE 1 TABLET EVERY DAY 90 tablet 1   • atorvastatin (LIPITOR) 20 MG tablet Take 1 tablet by mouth Every Night. 90 tablet 3   • B Complex-C-E-Zn (b complex-C-E-zinc) tablet Take 1 tablet by mouth Daily.     • cetirizine (ZyrTEC) 10 MG tablet Take 10 mg by mouth.     • cholecalciferol (VITAMIN D3) 1000 units tablet Take 1,000 Units by mouth Daily.     • ciclopirox (LOPROX) 0.77 % cream APPLY SPARINGLY TO THE CRUSTED AREAS TWICE DAILY     • clobetasol (TEMOVATE) 0.05 % cream APPLY TOPICALLY TO THE AFFECTED AREA TWICE DAILY AS NEEDED     • fluticasone (FLONASE) 50 MCG/ACT nasal spray 2 sprays into the nostril(s) as directed by provider Daily.     • hydroCHLOROthiazide  (HYDRODIURIL) 25 MG tablet TAKE 1 TABLET EVERY DAY 90 tablet 1   • HYDROcod Polst-CPM Polst ER (Tussionex Pennkinetic ER) 10-8 MG/5ML ER suspension Take 5 mL by mouth Every 12 (Twelve) Hours As Needed for Cough. 120 mL 0   • HYDROcodone-acetaminophen (Norco) 5-325 MG per tablet Take 1 tablet by mouth Every 6 (Six) Hours As Needed for Moderate Pain  (1-2 p.o. every 4 to 6 hours). 60 tablet 0   • HYDROcodone-acetaminophen (Norco) 5-325 MG per tablet Take 1 tablet by mouth Every 6 (Six) Hours As Needed for Moderate Pain . 30 tablet 0   • ipratropium (ATROVENT) 0.06 % nasal spray 2 sprays into the nostril(s) as directed by provider 4 (Four) Times a Day. 15 mL 0   • metoprolol succinate XL (TOPROL-XL) 25 MG 24 hr tablet TAKE 1 TABLET EVERY DAY 90 tablet 1   • montelukast (SINGULAIR) 10 MG tablet Take 10 mg by mouth Every Night.     • Multiple Vitamins-Minerals (CENTRUM ADULTS) tablet Take  by mouth.     • omeprazole (PriLOSEC) 40 MG capsule Take 40 mg by mouth.     • promethazine-dextromethorphan (PROMETHAZINE-DM) 6.25-15 MG/5ML syrup Take 5 mL by mouth 4 (Four) Times a Day As Needed for Cough. 180 mL 0   • Tiotropium Bromide Monohydrate (SPIRIVA RESPIMAT) 1.25 MCG/ACT aerosol solution inhaler Inhale 2 puffs Daily. 5 each 0     No current facility-administered medications on file prior to visit.        ALLERGIES:    Allergies   Allergen Reactions   • Sulfa Antibiotics Rash        Social History     Socioeconomic History   • Marital status:    Tobacco Use   • Smoking status: Former Smoker     Quit date: 2015     Years since quittin.8   • Smokeless tobacco: Never Used   • Tobacco comment: less than a pack per day, no smoking since , Quit 2015   Substance and Sexual Activity   • Alcohol use: Yes     Comment: Socially -A few times a month   • Drug use: No   • Sexual activity: Defer     Family History   Problem Relation Age of Onset   • Ovarian cancer Mother          at age 27   • No Known Problems  "Father    • Ovarian cancer Sister    • Colon cancer Brother    • No Known Problems Other         Review of Systems   HENT: Positive for dental problem and sore throat.    Eyes: Negative.    Respiratory: Negative for cough and wheezing.    Gastrointestinal: Positive for constipation and diarrhea.   Genitourinary: Positive for frequency and urgency.   Musculoskeletal: Positive for arthralgias and myalgias.   Allergic/Immunologic: Positive for environmental allergies.   Neurological: Negative.    Hematological: Negative.    Psychiatric/Behavioral: Negative.       Objective     Vitals:    12/01/21 1512   BP: 165/66   Pulse: 78   Resp: 18   Temp: 97.5 °F (36.4 °C)   TempSrc: Temporal   SpO2: 93%   Weight: 61.4 kg (135 lb 6.4 oz)   Height: 162.6 cm (64.02\")   PainSc: 0-No pain     Current Status 12/1/2021   ECOG score 0          Pulmonary function tests:     FVC is 2.10 which is 81% of predicted  FEV1 is 1.44 which is 75% of predicted  FEV1 FVC ratio 69  Diffusion capacity DLCO is 11.7 which is 54% of predicted      Physical Exam  Constitutional:       Appearance: Normal appearance. She is normal weight.   HENT:      Head: Normocephalic and atraumatic.      Nose: Nose normal.      Mouth/Throat:      Mouth: Mucous membranes are moist.      Pharynx: Oropharynx is clear.      Comments: Malocclusion noted  Eyes:      Extraocular Movements: Extraocular movements intact.   Cardiovascular:      Rate and Rhythm: Normal rate and regular rhythm.      Pulses: Normal pulses.      Heart sounds: Normal heart sounds.   Pulmonary:      Effort: Pulmonary effort is normal.      Breath sounds: Normal breath sounds.      Comments: Distant breath sounds bilateral bases  Abdominal:      General: Abdomen is flat. Bowel sounds are normal.      Palpations: Abdomen is soft.   Musculoskeletal:         General: Normal range of motion.      Cervical back: Normal range of motion and neck supple.   Skin:     General: Skin is warm and dry. "   Neurological:      General: No focal deficit present.      Mental Status: She is alert and oriented to person, place, and time.   Psychiatric:         Mood and Affect: Mood normal.         Behavior: Behavior normal.           RECENT LABS:  Hematology WBC   Date Value Ref Range Status   12/01/2021 5.34 3.40 - 10.80 10*3/mm3 Final   06/08/2021 7.86 3.40 - 10.80 10*3/mm3 Final     RBC   Date Value Ref Range Status   12/01/2021 4.43 3.77 - 5.28 10*6/mm3 Final   06/08/2021 4.99 3.77 - 5.28 10*6/mm3 Final     Hemoglobin   Date Value Ref Range Status   12/01/2021 13.3 12.0 - 15.9 g/dL Final     Hematocrit   Date Value Ref Range Status   12/01/2021 40.5 34.0 - 46.6 % Final     Platelets   Date Value Ref Range Status   12/01/2021 199 140 - 450 10*3/mm3 Final          Assessment/Plan      81-year-old female with medical history including COPD, long-term tobacco use, status post previous left upper lobectomy for carcinoma lung in May 2015, carcinoma tongue ongoing radiation therapy and surgical therapy through ENT-Dr. Caballero-including 2016 with wide local excision of buccal lesion.  During follow-up a CT scan of the chest June 16, 2021 demonstrates postsurgical changes, 8 mm irregular nodule medial segment of right lower lobe and diffuse changes of emphysema.  She is seen in thoracic clinic with findings suspicious for new malignancy and concern of the patient being a poor operative candidate.  Subsequent PET CT and PFTs demonstrated ill-defined avidity and soft tissue left aspect of the mediastinum, 1 cm hypermetabolic pulmonary nodule and asymmetric thickening involving the right base of tongue as well as subtle uptake in the L1 vertebral body.  This patient's case was discussed in thoracic clinic and plans were made to request an MRI of the lumbar spine, obtain a biopsy of the mediastinal involvement of the left had the patient reviewed by ENT.  The patient is seen with her  7/22/2021 in thoracic clinic and we  reviewed these findings as well as having her assessed via liquid biopsy to determine potential molecular status quickly.           She underwent the biopsy 8/13/2021 found to be consistent with invasive moderately differentiated adenocarcinoma with PD-L1 TPS score 40%.  MRI of the lumbar spine revealed no evidence of metastatic disease though the patient did have multilevel degenerative disease throughout the lumbar spine.  She had an office follow-up with Dr. Caballero-history of a [id number] SCCa of the rightt retromolar trigone who is s/p WLE, buccal fat pad flap and SLN biopsy that was negative, margins were negative.  She underwent laryngoscopy 8/18/2021 with right base of tongue without evidence of lesions or masses in the region.     She was seen by Dr. Hernandez 8/19/2021 and, her findings, had been presented in lung conference.  Her findings were consistent with 2 separate lesions including a nodule in the superior segment of the right lower lobe and a mass in the upper portion of the left chest with the left chest lesion biopsy positive for adenocarcinoma.  The consensus was that these were to separate-synchronous primaries.  Stereotactic radiation each lesions were felt to be the appropriate way to proceed and the patient was referred to radiation therapy.      At the time of this dictation her guardant 360 is not yet available.  These issues are discussed with the patient in some detail 8/23/2021 so that we can have an overall longer-term plan.  This includes a Caris molecular assessment of her recent biopsy as well as her PD-L1 positive findings.    The patient was referred for radiation therapy and she was able to proceed with SBRT to the right lung at primary #1 with 5 treatments at 1000 cGy per fraction in the left lung primary similarly at 1000 cGy per fraction x5.  Her treatment ranged between 8/31-9/13/2021.  She is now scheduled for follow-up 11/23/2021.    Additionally genomic testing is discussed with  her as she is is seen back 9/23/2021.  Her guardant 360 did not determine any new abnormalities but her Caris-MI profile-does reveal sensitivity to immunotherapy as well as a BRAF mutation.    Patient had subsequent PET/CT 11/23/2021 demonstrates decrease in size and avidity of the previously noted intensely FDG avid nodules left lobectomy operative site and right lower lobe.  Surrounding groundglass and pulmonary opacification is consistent with postradiation changes, a few new subcentimeter pulmonary nodules are present in the right upper lobe and indeterminant, stable subcentimeter hepatic lesion is below PET resolution no other findings of FDG-avid disease are seen in neck abdomen or pelvis.  The patient seen in office 12/1/2021 with a relatively good performance status stating she is not having any new symptoms and very pleased about her results on her PET/CT.  She realizes we'll have to follow this further but subsequent scans in 6 months.  She is also hoping to see an oral surgeon that previously helped her-Dr. Wu.    Plan:  *At this point follow-up will be necessary with CT chest abdomen pelvis in 23 weeks, CBC CMP    *24 weeks MD    *Request follow-up with Dr. Wu ongoing.  A copy of this note will be sent to to him.

## 2021-12-08 ENCOUNTER — OFFICE VISIT (OUTPATIENT)
Dept: FAMILY MEDICINE CLINIC | Facility: CLINIC | Age: 81
End: 2021-12-08

## 2021-12-08 VITALS — SYSTOLIC BLOOD PRESSURE: 140 MMHG | DIASTOLIC BLOOD PRESSURE: 68 MMHG

## 2021-12-08 DIAGNOSIS — I10 ESSENTIAL HYPERTENSION: Primary | ICD-10-CM

## 2021-12-08 DIAGNOSIS — J44.9 CHRONIC OBSTRUCTIVE PULMONARY DISEASE, UNSPECIFIED COPD TYPE (HCC): ICD-10-CM

## 2021-12-08 DIAGNOSIS — E78.5 HYPERLIPIDEMIA, UNSPECIFIED HYPERLIPIDEMIA TYPE: ICD-10-CM

## 2021-12-08 PROCEDURE — 99214 OFFICE O/P EST MOD 30 MIN: CPT | Performed by: FAMILY MEDICINE

## 2021-12-08 RX ORDER — MONTELUKAST SODIUM 10 MG/1
10 TABLET ORAL NIGHTLY
Qty: 90 TABLET | Refills: 3 | Status: SHIPPED | OUTPATIENT
Start: 2021-12-08 | End: 2022-07-05 | Stop reason: SDUPTHER

## 2021-12-08 RX ORDER — DEXTROMETHORPHAN HYDROBROMIDE AND PROMETHAZINE HYDROCHLORIDE 15; 6.25 MG/5ML; MG/5ML
5 SYRUP ORAL 4 TIMES DAILY PRN
Qty: 180 ML | Refills: 1 | Status: SHIPPED | OUTPATIENT
Start: 2021-12-08 | End: 2022-07-21

## 2021-12-08 NOTE — PROGRESS NOTES
Chief Complaint  Hypertension and Hyperlipidemia    Subjective          Darlene Pfeiffer presents to [facility]  Patient presents for routine follow-up on her hypertension and dyslipidemia.  She is compliant with her medications and has no significant complaints today.  Her COPD has been stable and she needs refill medication.  She likes to have the hydrocodone cough syrup at home for when she has severe cough and the promethazine helps as well.  She has done well after her radiation and biopsy of her last lung cancer.    She is up-to-date with her oncologist.        Objective   Vital Signs:   /68     Physical Exam  Constitutional:       General: She is not in acute distress.     Appearance: Normal appearance. She is well-developed.   HENT:      Head: Normocephalic and atraumatic.      Right Ear: Tympanic membrane, ear canal and external ear normal.      Left Ear: Tympanic membrane, ear canal and external ear normal.      Mouth/Throat:      Mouth: Mucous membranes are moist.      Pharynx: Oropharynx is clear.   Eyes:      Conjunctiva/sclera: Conjunctivae normal.      Pupils: Pupils are equal, round, and reactive to light.   Neck:      Thyroid: No thyromegaly.   Cardiovascular:      Rate and Rhythm: Normal rate and regular rhythm.      Heart sounds: No murmur heard.      Pulmonary:      Effort: Pulmonary effort is normal.      Breath sounds: Normal breath sounds. No wheezing.   Abdominal:      General: Bowel sounds are normal.      Palpations: Abdomen is soft.      Tenderness: There is no abdominal tenderness.   Musculoskeletal:         General: Normal range of motion.      Cervical back: Neck supple.   Lymphadenopathy:      Cervical: No cervical adenopathy.   Skin:     General: Skin is warm and dry.   Neurological:      Mental Status: She is alert and oriented to person, place, and time.   Psychiatric:         Mood and Affect: Mood normal.         Behavior: Behavior normal.         Result Review :                 Assessment and Plan    Diagnoses and all orders for this visit:    1. Essential hypertension (Primary)  -     Comprehensive Metabolic Panel    2. Hyperlipidemia, unspecified hyperlipidemia type  -     Lipid Panel  -     Comprehensive Metabolic Panel    3. Chronic obstructive pulmonary disease, unspecified COPD type (HCC)  -     montelukast (SINGULAIR) 10 MG tablet; Take 1 tablet by mouth Every Night.  Dispense: 90 tablet; Refill: 3  -     HYDROcod Polst-CPM Polst ER (Tussionex Pennkinetic ER) 10-8 MG/5ML ER suspension; Take 5 mL by mouth Every 12 (Twelve) Hours As Needed for Cough.  Dispense: 120 mL; Refill: 0  -     promethazine-dextromethorphan (PROMETHAZINE-DM) 6.25-15 MG/5ML syrup; Take 5 mL by mouth 4 (Four) Times a Day As Needed for Cough.  Dispense: 180 mL; Refill: 1        Follow Up   Return in about 6 months (around 6/8/2022) for Medicare Wellness.  Patient was given instructions and counseling regarding her condition or for health maintenance advice. Please see specific information pulled into the AVS if appropriate.

## 2021-12-09 LAB
ALBUMIN SERPL-MCNC: 4.4 G/DL (ref 3.7–4.7)
ALBUMIN/GLOB SERPL: 2.2 {RATIO} (ref 1.2–2.2)
ALP SERPL-CCNC: 101 IU/L (ref 44–121)
ALT SERPL-CCNC: 12 IU/L (ref 0–32)
AST SERPL-CCNC: 21 IU/L (ref 0–40)
BILIRUB SERPL-MCNC: 0.8 MG/DL (ref 0–1.2)
BUN SERPL-MCNC: 18 MG/DL (ref 8–27)
BUN/CREAT SERPL: 28 (ref 12–28)
CALCIUM SERPL-MCNC: 9.5 MG/DL (ref 8.7–10.3)
CHLORIDE SERPL-SCNC: 100 MMOL/L (ref 96–106)
CHOLEST SERPL-MCNC: 171 MG/DL (ref 100–199)
CO2 SERPL-SCNC: 27 MMOL/L (ref 20–29)
CREAT SERPL-MCNC: 0.65 MG/DL (ref 0.57–1)
GLOBULIN SER CALC-MCNC: 2 G/DL (ref 1.5–4.5)
GLUCOSE SERPL-MCNC: 100 MG/DL (ref 65–99)
HDLC SERPL-MCNC: 54 MG/DL
LDLC SERPL CALC-MCNC: 99 MG/DL (ref 0–99)
POTASSIUM SERPL-SCNC: 3.6 MMOL/L (ref 3.5–5.2)
PROT SERPL-MCNC: 6.4 G/DL (ref 6–8.5)
SODIUM SERPL-SCNC: 143 MMOL/L (ref 134–144)
TRIGL SERPL-MCNC: 101 MG/DL (ref 0–149)
VLDLC SERPL CALC-MCNC: 18 MG/DL (ref 5–40)

## 2021-12-13 ENCOUNTER — TELEPHONE (OUTPATIENT)
Dept: FAMILY MEDICINE CLINIC | Facility: CLINIC | Age: 81
End: 2021-12-13

## 2021-12-13 DIAGNOSIS — J44.9 CHRONIC OBSTRUCTIVE PULMONARY DISEASE, UNSPECIFIED COPD TYPE (HCC): Primary | ICD-10-CM

## 2021-12-13 RX ORDER — PREDNISONE 20 MG/1
20 TABLET ORAL 2 TIMES DAILY
Qty: 10 TABLET | Refills: 0 | Status: SHIPPED | OUTPATIENT
Start: 2021-12-13 | End: 2021-12-18

## 2021-12-13 NOTE — TELEPHONE ENCOUNTER
Patient is stating that she is able to sleep when she takes the cough meds at night.  She is stating that her nose is running and she can feel the drainage in the back of her throat.  She is stating that her eyes are watering and coughs all day long even with the cough meds and just needs something called in to take care of it.

## 2021-12-13 NOTE — TELEPHONE ENCOUNTER
Please clarify.  I refilled her cough medicine last week so she would have to call every time she needed it.  Does she feel like she is having an exacerbation of her COPD?  I am fine giving her a little burst of steroids and a short course of antibiotics if that is the case.  She is just getting off finishing lung cancer treatment.

## 2021-12-13 NOTE — TELEPHONE ENCOUNTER
Caller: Darlene Pfeiffer    Relationship: Self    Best call back number:  [phone] (H)    What medication are you requesting: SOMETHING TO TREAT THE FOLLOWING     What are your current symptoms: PROGRESSING COUGH, DRAINAGE, CONGESTION    How long have you been experiencing symptoms: SINCE LAST WEEK     Have you had these symptoms before:  [x] Yes  [] No    Have you been treated for these symptoms before:  [x] Yes  [] No    If a prescription is needed, what is your preferred pharmacy and phone number:      [facility] #[id number] - [place] [address] - [phone]  - [phone]     Additional notes:      PATIENT WAS IN OFFICE LAST WEEK 12/08/21 TO GET MEDICATION FOR THESE SYMPTOMS AND SHE NEEDS SOMETHING DIFFERENT CALLED IN SINCE HER SYMPTOMS ARE PROGRESSING.    PLEASE ADVISE PATIENT

## 2022-02-15 ENCOUNTER — TELEPHONE (OUTPATIENT)
Dept: FAMILY MEDICINE CLINIC | Facility: CLINIC | Age: 82
End: 2022-02-15

## 2022-02-15 NOTE — PROGRESS NOTES
Subjective   Darlene Pfeiffer is a 80 y.o. female.     Chief Complaint   Patient presents with   • Cough     Ongoing, never goes away        Patient presents complaining of increased cough and wheezing over the past few days.  She denies any fever chills nausea vomiting.  She has not been using the Stiolto every day.  Has the albuterol to use if needed.  She stopped the Stiolto because of cough.         The following portions of the patient's history were reviewed and updated as appropriate: allergies, current medications, past family history, past medical history, past social history, past surgical history and problem list.    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 from [facility]   • History of squamous cell carcinoma     Tongue   • Hyperlipidemia    • Hyperpigmentation    • Hypertension     since early 60's   • Impacted cerumen of both ears    • Influenza    • Low vitamin B12 level    • Lower back pain    • Menopause    • Need for influenza vaccination    • Other screening mammogram    • Thyromegaly    • Urinary pain    • UTI (urinary tract infection)    • Vitamin D deficiency        Past Surgical History:   Procedure Laterality Date   • CHOLECYSTECTOMY     • GLOSSECTOMY PARTIAL      less than one half tongue   • LUNG SURGERY     • ORAL LESION EXCISION/BIOPSY       and 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   • TUBAL ABDOMINAL LIGATION         Family History   Problem Relation Age of Onset   • Ovarian cancer Mother          at age 27   •  No Known Problems Father    • Ovarian cancer Sister    • Colon cancer Brother    • No Known Problems Other        Social History     Socioeconomic History   • Marital status:      Spouse name: Not on file   • Number of children: Not on file   • Years of education: Not on file   • Highest education level: Not on file   Tobacco Use   • Smoking status: Former Smoker     Last attempt to quit: 2015     Years since quittin.5   • Smokeless tobacco: Never Used   • Tobacco comment: less than a pack per day, no smoking since , Quit 2015   Substance and Sexual Activity   • Alcohol use: Yes     Comment: Socially -A few times a month   • Drug use: No   • Sexual activity: Defer         Current Outpatient Medications:   •  albuterol sulfate  (90 Base) MCG/ACT inhaler, Inhale 2 puffs Every 4 (Four) Hours As Needed for Wheezing., Disp: 18 g, Rfl:   •  amLODIPine (NORVASC) 5 MG tablet, Take 1 tablet by mouth Daily., Disp: 90 tablet, Rfl: 0  •  benzonatate (TESSALON) 200 MG capsule, Take 1 capsule by mouth 3 (Three) Times a Day As Needed for Cough., Disp: 30 capsule, Rfl: 2  •  cetirizine (ZyrTEC) 10 MG tablet, Take 10 mg by mouth., Disp: , Rfl:   •  cholecalciferol (VITAMIN D3) 1000 units tablet, Take 1,000 Units by mouth Daily., Disp: , Rfl:   •  guaiFENesin (MUCINEX) 600 MG 12 hr tablet, Take 1,200 mg by mouth 2 (Two) Times a Day., Disp: , Rfl:   •  hydroCHLOROthiazide (HYDRODIURIL) 25 MG tablet, Take 1 tablet by mouth Daily., Disp: 90 tablet, Rfl: 0  •  ipratropium (ATROVENT) 0.06 % nasal spray, 2 sprays into the nostril(s) as directed by provider 4 (Four) Times a Day., Disp: 15 mL, Rfl: 0  •  magnesium oxide (MAGOX) 400 (241.3 Mg) MG tablet tablet, Take 400 mg by mouth Daily., Disp: , Rfl:   •  metoprolol succinate XL (TOPROL-XL) 25 MG 24 hr tablet, Take 1 tablet by mouth Daily., Disp: 90 tablet, Rfl: 0  •  montelukast (Singulair) 10 MG tablet, Take 1 tablet by mouth Every Night., Disp: 90 tablet, Rfl:  "3  •  Multiple Vitamins-Minerals (CENTRUM ADULTS) tablet, Take  by mouth., Disp: , Rfl:   •  omeprazole (PriLOSEC) 40 MG capsule, Take 40 mg by mouth., Disp: , Rfl:   •  pravastatin (PRAVACHOL) 80 MG tablet, Take 1 tablet by mouth Every Night., Disp: 90 tablet, Rfl: 0  •  tiotropium bromide-olodaterol (Stiolto Respimat) 2.5-2.5 MCG/ACT aerosol solution inhaler, Inhale 2 puffs Daily., Disp: 8 g, Rfl: 0  •  diclofenac (VOLTAREN) 1 % gel gel, Apply 4 g topically to the appropriate area as directed 4 (Four) Times a Day As Needed (pain)., Disp: 100 g, Rfl: 0  •  predniSONE (DELTASONE) 20 MG tablet, Take 1 tablet by mouth 2 (Two) Times a Day., Disp: 10 tablet, Rfl: 0    Review of Systems   Constitutional: Negative for chills, fatigue and fever.   HENT: Negative for congestion, rhinorrhea and sore throat.    Respiratory: Positive for cough and wheezing. Negative for shortness of breath.    Cardiovascular: Negative for chest pain and leg swelling.   Gastrointestinal: Negative for abdominal pain.   Endocrine: Negative for polydipsia and polyuria.   Genitourinary: Negative for dysuria.   Musculoskeletal: Negative for arthralgias and myalgias.   Skin: Negative for rash.   Neurological: Negative for dizziness.   Hematological: Does not bruise/bleed easily.   Psychiatric/Behavioral: Negative for sleep disturbance.       Objective   Vitals:    08/24/20 1005   BP: 120/66   Pulse: 81   Temp: 96.9 °F (36.1 °C)   SpO2: 98%   Weight: 66 kg (145 lb 6.4 oz)   Height: 162.6 cm (64\")     Body mass index is 24.96 kg/m².  Physical Exam   Constitutional: She is oriented to person, place, and time. She appears well-developed and well-nourished.   HENT:   Head: Normocephalic and atraumatic.   Eyes: Pupils are equal, round, and reactive to light. Conjunctivae and EOM are normal.   Neck: Neck supple. No thyromegaly present.   Cardiovascular: Normal rate and regular rhythm.   No murmur heard.  Pulmonary/Chest: Effort normal. She has decreased " breath sounds. She has wheezes.   Abdominal: Soft.   Musculoskeletal: Normal range of motion.   Neurological: She is alert and oriented to person, place, and time. No cranial nerve deficit.   Skin: Skin is warm and dry.   Psychiatric: She has a normal mood and affect.         Assessment/Plan   Darlene was seen today for cough.    Diagnoses and all orders for this visit:    COPD exacerbation (CMS/[facility])  -     predniSONE (DELTASONE) 20 MG tablet; Take 1 tablet by mouth 2 (Two) Times a Day.  -     albuterol sulfate  (90 Base) MCG/ACT inhaler; Inhale 2 puffs Every 4 (Four) Hours As Needed for Wheezing.    Bronchitis  -     tiotropium bromide-olodaterol (Stiolto Respimat) 2.5-2.5 MCG/ACT aerosol solution inhaler; Inhale 2 puffs Daily.    Primary osteoarthritis of both hands  -     diclofenac (VOLTAREN) 1 % gel gel; Apply 4 g topically to the appropriate area as directed 4 (Four) Times a Day As Needed (pain).               Patient Instructions   Make sure to use the albuterol every 4 hours as needed for cough.  Let me know if the Voltaren helps and we can send it to [facility] mail-in pharmacy.     Dosing Month 2 (Required For Cumulative Dosing): 60mg Daily

## 2022-02-15 NOTE — TELEPHONE ENCOUNTER
PATIENT CALLED STATING THAT SHE STARTED COUGHING OVER THE WEEKEND, AND IS COUGHING UP YELLOW MUCUS.    SHE ISN'T SURE IF DR. KEHRER WOULD WANT TO SEE HER FOR AN APPOINTMENT, OR IF SHE'D BE WILLING TO SEND A MEDICATION OVER.    PLEASE ADVISE  [phone]     [facility] #[id number] - [facility] [address] - [phone]  - [phone]   [phone]

## 2022-02-16 ENCOUNTER — OFFICE VISIT (OUTPATIENT)
Dept: FAMILY MEDICINE CLINIC | Facility: CLINIC | Age: 82
End: 2022-02-16

## 2022-02-16 VITALS
SYSTOLIC BLOOD PRESSURE: 130 MMHG | BODY MASS INDEX: 22.47 KG/M2 | HEART RATE: 100 BPM | OXYGEN SATURATION: 98 % | TEMPERATURE: 98.6 F | DIASTOLIC BLOOD PRESSURE: 76 MMHG | HEIGHT: 64 IN | WEIGHT: 131.6 LBS

## 2022-02-16 DIAGNOSIS — J06.9 UPPER RESPIRATORY TRACT INFECTION, UNSPECIFIED TYPE: ICD-10-CM

## 2022-02-16 DIAGNOSIS — J44.1 COPD WITH EXACERBATION: Primary | ICD-10-CM

## 2022-02-16 PROCEDURE — 99214 OFFICE O/P EST MOD 30 MIN: CPT | Performed by: FAMILY MEDICINE

## 2022-02-16 RX ORDER — BUDESONIDE, GLYCOPYRROLATE, AND FORMOTEROL FUMARATE 160; 9; 4.8 UG/1; UG/1; UG/1
2 AEROSOL, METERED RESPIRATORY (INHALATION) 2 TIMES DAILY
Qty: 1 EACH | Refills: 0 | COMMUNITY
Start: 2022-02-16 | End: 2022-04-06

## 2022-02-16 RX ORDER — DOXYCYCLINE HYCLATE 100 MG/1
100 CAPSULE ORAL 2 TIMES DAILY
Qty: 20 CAPSULE | Refills: 0 | Status: SHIPPED | OUTPATIENT
Start: 2022-02-16 | End: 2022-04-06

## 2022-02-16 NOTE — PROGRESS NOTES
"Chief Complaint  Cough    Subjective          Darlene Pfeiffer presents to [facility]  Patient of Dr. Kehrer's is here today with a new problem.  She started about 4 days ago with cough and increased mucus production.  She has noticed some yellow sputum.  She has not had a fever chills.  She has dyspnea or chest pain.  She has had intermittent coughing in the past but not for a while.  She has had COVID 2 years ago.  And she is fully immunized.      Objective   Vital Signs:   /76   Pulse 100   Temp 98.6 °F (37 °C)   Ht 162.6 cm (64\")   Wt 59.7 kg (131 lb 9.6 oz)   SpO2 98%   BMI 22.59 kg/m²     Physical Exam  Vitals and nursing note reviewed.   Constitutional:       Appearance: Normal appearance. She is well-developed and normal weight.   HENT:      Head: Normocephalic and atraumatic.      Right Ear: External ear normal.      Left Ear: External ear normal.      Nose: Nose normal.      Mouth/Throat:      Pharynx: Posterior oropharyngeal erythema present.   Eyes:      General: No scleral icterus.     Conjunctiva/sclera: Conjunctivae normal.   Cardiovascular:      Rate and Rhythm: Normal rate and regular rhythm.      Heart sounds: Normal heart sounds.   Pulmonary:      Effort: Pulmonary effort is normal.      Breath sounds: Rhonchi (Throughout) present.      Comments: Good air movement throughout  Musculoskeletal:      Cervical back: Normal range of motion and neck supple.      Right lower leg: No edema.      Left lower leg: No edema.   Lymphadenopathy:      Cervical: No cervical adenopathy.   Skin:     General: Skin is warm and dry.      Findings: No rash.   Neurological:      Mental Status: She is alert and oriented to person, place, and time.   Psychiatric:         Mood and Affect: Mood normal.         Behavior: Behavior normal.         Thought Content: Thought content normal.         Judgment: Judgment normal.        Result Review :   The following data was reviewed by: Sharon" Fanny Foy,  on 02/16/2022:  Common labs    Common Labsle 9/23/21 12/1/21 12/1/21 12/8/21 12/8/21     1501 1501 0940 0940   Glucose   104  100 (A)   BUN   18  18   Creatinine   0.70  0.65   eGFR Non  Am   81  85   eGFR African Am     98   Sodium   142  143   Potassium   3.8  3.6   Chloride   101  100   Calcium   9.9  9.5   Total Protein     6.4   Albumin   4.30  4.4   Total Bilirubin   0.4  0.8   Alkaline Phosphatase   83  101   AST (SGOT)   19  21   ALT (SGPT)   11  12   WBC 9.08 5.34      Hemoglobin 13.7 13.3      Hematocrit 41.5 40.5      Platelets 186 199      Total Cholesterol    171    Triglycerides    101    HDL Cholesterol    54    LDL Cholesterol     99    (A) Abnormal value       Comments are available for some flowsheets but are not being displayed.           TSH    TSH 6/8/21   TSH 3.980                     Assessment and Plan    Diagnoses and all orders for this visit:    1. COPD with exacerbation (HCC) (Primary)  -     Budeson-Glycopyrrol-Formoterol (Breztri Aerosphere) 160-9-4.8 MCG/ACT aerosol inhaler; Inhale 2 puffs 2 (Two) Times a Day.  Dispense: 1 each; Refill: 0  -     doxycycline (VIBRAMYCIN) 100 MG capsule; Take 1 capsule by mouth 2 (Two) Times a Day.  Dispense: 20 capsule; Refill: 0    2. Upper respiratory tract infection, unspecified type    Patient of Dr. Kehrer's is here today with a acute COPD exacerbation and URI.  I have advised her to stop the Spiriva and use the Breztri in its place until she is feeling better.  A sample was dispensed.  Patient was advised to rinse her mouth after use to prevent thrush.  Doxycycline also prescribed.  Patient was advised to monitor her symptoms closely and return if symptoms worsen or persist longer than 2 weeks.      Follow Up   Return if symptoms worsen or fail to improve.  Patient was given instructions and counseling regarding her condition or for health maintenance advice. Please see specific information pulled into the AVS if appropriate.

## 2022-04-06 ENCOUNTER — OFFICE VISIT (OUTPATIENT)
Dept: FAMILY MEDICINE CLINIC | Facility: CLINIC | Age: 82
End: 2022-04-06

## 2022-04-06 VITALS
HEART RATE: 79 BPM | SYSTOLIC BLOOD PRESSURE: 124 MMHG | DIASTOLIC BLOOD PRESSURE: 60 MMHG | WEIGHT: 130.6 LBS | TEMPERATURE: 98.1 F | OXYGEN SATURATION: 97 % | BODY MASS INDEX: 22.3 KG/M2 | HEIGHT: 64 IN

## 2022-04-06 DIAGNOSIS — H61.23 BILATERAL IMPACTED CERUMEN: ICD-10-CM

## 2022-04-06 DIAGNOSIS — M19.041 PRIMARY OSTEOARTHRITIS OF BOTH HANDS: ICD-10-CM

## 2022-04-06 DIAGNOSIS — M19.042 PRIMARY OSTEOARTHRITIS OF BOTH HANDS: ICD-10-CM

## 2022-04-06 DIAGNOSIS — J44.1 COPD WITH EXACERBATION: Primary | ICD-10-CM

## 2022-04-06 PROCEDURE — 99214 OFFICE O/P EST MOD 30 MIN: CPT | Performed by: FAMILY MEDICINE

## 2022-04-06 RX ORDER — PREDNISONE 20 MG/1
20 TABLET ORAL 2 TIMES DAILY
Qty: 10 TABLET | Refills: 0 | Status: SHIPPED | OUTPATIENT
Start: 2022-04-06 | End: 2022-04-11

## 2022-04-06 NOTE — PROGRESS NOTES
"Chief Complaint  Cough and Hand Pain    Subjective          Darlene Pfeiffer presents to [facility]  History of Present Illness  COPD  The patient had an appointment with Dr. Foy approximately 7 weeks ago for COPD exacerbation and states she still has a cough. She denies wheezing. Darlene states she is using her inhalers. She states Mucinex helps, but she ran out. She denies feeling better after finishing her antibiotic course prescribed by Dr. Foy. The patient takes Singulair and Zyrtec. Darlene denies known ill contacts.     Bilateral hand pain  The patient is complaining about finger pain on her bilateral hands. She also complains of her fingers swelling. Darlene states the Voltaren gel only slightly helps her pain. She denies taking pain pills.     Review of systems:  A review of systems has been completed and positive findings are noted in the HPI.     Objective   Vital Signs:   /60   Pulse 79   Temp 98.1 °F (36.7 °C)   Ht 162.6 cm (64\")   Wt 59.2 kg (130 lb 9.6 oz)   SpO2 97%   BMI 22.42 kg/m²     BMI is within normal parameters. No follow-up required.      Physical Exam  Constitutional:       General: She is not in acute distress.     Appearance: Normal appearance. She is well-developed.   HENT:      Head: Normocephalic and atraumatic.      Right Ear: Tympanic membrane, ear canal and external ear normal. There is impacted cerumen.      Left Ear: Tympanic membrane, ear canal and external ear normal. There is impacted cerumen.      Ears:      Comments: Moderate cerumen impaction in bilateral TMS, greater on the right.      Mouth/Throat:      Mouth: Mucous membranes are moist.      Pharynx: Oropharynx is clear.   Eyes:      Conjunctiva/sclera: Conjunctivae normal.      Pupils: Pupils are equal, round, and reactive to light.   Neck:      Thyroid: No thyromegaly.   Cardiovascular:      Rate and Rhythm: Normal rate and regular rhythm.      Heart sounds: No murmur " heard.  Pulmonary:      Effort: Pulmonary effort is normal.      Breath sounds: Decreased air movement present. Wheezing present. No rales.   Abdominal:      General: Bowel sounds are normal.      Palpations: Abdomen is soft.      Tenderness: There is no abdominal tenderness.   Musculoskeletal:         General: Normal range of motion.      Right hand: Swelling present.      Left hand: Swelling present.      Cervical back: Neck supple.      Comments: Arthritic swelling in bilateral fingers.   Lymphadenopathy:      Cervical: No cervical adenopathy.   Skin:     General: Skin is warm and dry.   Neurological:      Mental Status: She is alert and oriented to person, place, and time.   Psychiatric:         Mood and Affect: Mood normal.         Behavior: Behavior normal.        Result Review :                 Assessment and Plan    Diagnoses and all orders for this visit:    1. COPD with exacerbation (HCC) (Primary)  -     predniSONE (DELTASONE) 20 MG tablet; Take 1 tablet by mouth 2 (Two) Times a Day for 5 days.  Dispense: 10 tablet; Refill: 0    2. Primary osteoarthritis of both hands    3. Bilateral impacted cerumen    1. COPD with exacerbation  • I do not believe she needs antibiotics at this current time. I prescribed a 5 day course of prednisone.     2. Osteoarthritis of bilateral hands  • Recommendations discussed.     3. Bilateral impacted cerumen.   • Ear irrigation performed in office today.       Follow Up   No follow-ups on file.  Patient was given instructions and counseling regarding her condition or for health maintenance advice. Please see specific information pulled into the AVS if appropriate.     Transcribed from ambient dictation for Meredith Lea Kehrer, MD by JAIME ALVAREZ.  04/06/22   17:57 EDT    Patient verbalized consent to the visit recording.  I have personally performed the services described in this document as transcribed by the above individual, and it is both accurate and complete.   Meredith Lea Kehrer, MD  4/7/2022  12:22 EDT

## 2022-04-28 DIAGNOSIS — E78.5 HYPERLIPIDEMIA, UNSPECIFIED HYPERLIPIDEMIA TYPE: ICD-10-CM

## 2022-04-28 RX ORDER — ATORVASTATIN CALCIUM 20 MG/1
20 TABLET, FILM COATED ORAL NIGHTLY
Qty: 90 TABLET | Refills: 3 | Status: SHIPPED | OUTPATIENT
Start: 2022-04-28 | End: 2022-07-05 | Stop reason: SDUPTHER

## 2022-06-08 ENCOUNTER — HOSPITAL ENCOUNTER (OUTPATIENT)
Dept: PET IMAGING | Facility: HOSPITAL | Age: 82
Discharge: HOME | End: 2022-06-08
Admitting: INTERNAL MEDICINE

## 2022-06-08 DIAGNOSIS — C34.12 MALIGNANT NEOPLASM OF UPPER LOBE OF LEFT LUNG: ICD-10-CM

## 2022-06-08 LAB — CREAT BLDA-MCNC: 0.6 MG/DL (ref 0.6–1.3)

## 2022-06-08 PROCEDURE — 71260 CT THORAX DX C+: CPT

## 2022-06-08 PROCEDURE — 0 DIATRIZOATE MEGLUMINE & SODIUM PER 1 ML: Performed by: INTERNAL MEDICINE

## 2022-06-08 PROCEDURE — 25010000002 IOPAMIDOL 61 % SOLUTION: Performed by: INTERNAL MEDICINE

## 2022-06-08 PROCEDURE — 70491 CT SOFT TISSUE NECK W/DYE: CPT

## 2022-06-08 PROCEDURE — 74177 CT ABD & PELVIS W/CONTRAST: CPT

## 2022-06-08 PROCEDURE — 82565 ASSAY OF CREATININE: CPT

## 2022-06-08 RX ADMIN — IOPAMIDOL 85 ML: 612 INJECTION, SOLUTION INTRAVENOUS at 09:04

## 2022-06-08 RX ADMIN — DIATRIZOATE MEGLUMINE AND DIATRIZOATE SODIUM 30 ML: 660; 100 LIQUID ORAL; RECTAL at 08:41

## 2022-06-09 ENCOUNTER — TELEPHONE (OUTPATIENT)
Dept: ONCOLOGY | Facility: CLINIC | Age: 82
End: 2022-06-09

## 2022-06-09 ENCOUNTER — TELEPHONE (OUTPATIENT)
Dept: NEUROSURGERY | Facility: CLINIC | Age: 82
End: 2022-06-09

## 2022-06-09 DIAGNOSIS — I67.1 SACCULAR ANEURYSM: ICD-10-CM

## 2022-06-09 DIAGNOSIS — C34.12 MALIGNANT NEOPLASM OF UPPER LOBE OF LEFT LUNG: Primary | ICD-10-CM

## 2022-06-09 NOTE — TELEPHONE ENCOUNTER
Referral placed to Neurosurgery.       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  She sees Dr. Escobar on 6/15/2022 to discuss the scans.Maybe we can order and schedule and discuss with patient when she comes in.     Thank you

## 2022-06-10 ENCOUNTER — TELEPHONE (OUTPATIENT)
Dept: NEUROSURGERY | Facility: CLINIC | Age: 82
End: 2022-06-10

## 2022-06-10 ENCOUNTER — OFFICE VISIT (OUTPATIENT)
Dept: FAMILY MEDICINE CLINIC | Facility: CLINIC | Age: 82
End: 2022-06-10

## 2022-06-10 VITALS
HEART RATE: 74 BPM | OXYGEN SATURATION: 97 % | SYSTOLIC BLOOD PRESSURE: 124 MMHG | HEIGHT: 64 IN | BODY MASS INDEX: 22.06 KG/M2 | WEIGHT: 129.2 LBS | DIASTOLIC BLOOD PRESSURE: 72 MMHG | TEMPERATURE: 98.1 F

## 2022-06-10 DIAGNOSIS — J44.9 CHRONIC OBSTRUCTIVE PULMONARY DISEASE, UNSPECIFIED COPD TYPE: ICD-10-CM

## 2022-06-10 DIAGNOSIS — I67.1 SACCULAR ANEURYSM: ICD-10-CM

## 2022-06-10 DIAGNOSIS — Z00.00 MEDICARE ANNUAL WELLNESS VISIT, SUBSEQUENT: Primary | ICD-10-CM

## 2022-06-10 DIAGNOSIS — E78.5 HYPERLIPIDEMIA, UNSPECIFIED HYPERLIPIDEMIA TYPE: ICD-10-CM

## 2022-06-10 DIAGNOSIS — I10 ESSENTIAL HYPERTENSION: ICD-10-CM

## 2022-06-10 DIAGNOSIS — R91.1 LUNG NODULE: ICD-10-CM

## 2022-06-10 PROCEDURE — 1170F FXNL STATUS ASSESSED: CPT | Performed by: FAMILY MEDICINE

## 2022-06-10 PROCEDURE — 1159F MED LIST DOCD IN RCRD: CPT | Performed by: FAMILY MEDICINE

## 2022-06-10 PROCEDURE — G0439 PPPS, SUBSEQ VISIT: HCPCS | Performed by: FAMILY MEDICINE

## 2022-06-10 RX ORDER — CHLORHEXIDINE GLUCONATE 0.12 MG/ML
15 RINSE ORAL 2 TIMES DAILY
COMMUNITY
Start: 2022-05-12

## 2022-06-10 RX ORDER — HYDROCODONE BITARTRATE AND ACETAMINOPHEN 5; 325 MG/1; MG/1
1 TABLET ORAL EVERY 6 HOURS PRN
COMMUNITY
Start: 2022-02-21 | End: 2022-06-27

## 2022-06-10 NOTE — PROGRESS NOTES
The ABCs of the Annual Wellness Visit  Subsequent Medicare Wellness Visit    Chief Complaint   Patient presents with   • Medicare Wellness-subsequent   • Hypertension   • COPD      Subjective    History of Present Illness:  Darlene Pfeiffer is a 81 y.o. female who presents for a Subsequent Medicare Wellness Visit.  Patient presents for her Medicare wellness visit as well as follow-up on her hypertension dyslipidemia and COPD.  She is up-to-date with her oncologist and just had a scan this week.  They saw a new aneurysm in her brain and referred her to neurosurgery.  She thought it was the oncology office calling yesterday and did not answer the phone.  We will give her the information and help her get set up with that.  She has no symptoms.  She has follow-up with her oncologist next week.  She has no acute complaints today.    The following portions of the patient's history were reviewed and   updated as appropriate: allergies, current medications, past family history, past medical history, past social history, past surgical history and problem list.    Compared to one year ago, the patient feels her physical   health is better.    Compared to one year ago, the patient feels her mental   health is the same.    Recent Hospitalizations:  She was not admitted to the hospital during the last year.       Current Medical Providers:  Patient Care Team:  Kehrer, Meredith Lea, MD as PCP - General (Family Medicine)  Linker, Arias BROWN III, MD as Referring Physician (Thoracic Surgery)  Henry Escobar MD as Consulting Physician (Hematology and Oncology)  Moise Mcdonough MD as Consulting Physician (Radiation Oncology)    Outpatient Medications Prior to Visit   Medication Sig Dispense Refill   • albuterol sulfate  (90 Base) MCG/ACT inhaler Inhale 2 puffs Every 4 (Four) Hours As Needed for Wheezing. 18 g 2   • amLODIPine (NORVASC) 5 MG tablet TAKE 1 TABLET EVERY DAY 90 tablet 1   • atorvastatin (LIPITOR) 20 MG tablet  TAKE 1 TABLET BY MOUTH EVERY NIGHT 90 tablet 3   • B Complex-C-E-Zn (b complex-C-E-zinc) tablet Take 1 tablet by mouth Daily.     • cetirizine (ZyrTEC) 10 MG tablet Take 10 mg by mouth.     • chlorhexidine (PERIDEX) 0.12 % solution RINSE WITH 15 ML BY MOUTH FOR 30 SECONDS AND SPIT OUT EVERY MORNING AND EVERY EVENING AFTER BRUSHING     • cholecalciferol (VITAMIN D3) 1000 units tablet Take 1,000 Units by mouth Daily.     • ciclopirox (LOPROX) 0.77 % cream APPLY SPARINGLY TO THE CRUSTED AREAS TWICE DAILY     • clobetasol (TEMOVATE) 0.05 % cream APPLY TOPICALLY TO THE AFFECTED AREA TWICE DAILY AS NEEDED     • hydroCHLOROthiazide (HYDRODIURIL) 25 MG tablet TAKE 1 TABLET EVERY DAY 90 tablet 1   • HYDROcod Polst-CPM Polst ER (Tussionex Pennkinetic ER) 10-8 MG/5ML ER suspension Take 5 mL by mouth Every 12 (Twelve) Hours As Needed for Cough. 120 mL 0   • HYDROcodone-acetaminophen (NORCO) 5-325 MG per tablet Take 1 tablet by mouth Every 6 (Six) Hours As Needed.     • metoprolol succinate XL (TOPROL-XL) 25 MG 24 hr tablet TAKE 1 TABLET EVERY DAY 90 tablet 1   • montelukast (SINGULAIR) 10 MG tablet Take 1 tablet by mouth Every Night. 90 tablet 3   • promethazine-dextromethorphan (PROMETHAZINE-DM) 6.25-15 MG/5ML syrup Take 5 mL by mouth 4 (Four) Times a Day As Needed for Cough. 180 mL 1   • Tiotropium Bromide Monohydrate (SPIRIVA RESPIMAT) 1.25 MCG/ACT aerosol solution inhaler Inhale 2 puffs Daily. 5 each 0   • omeprazole (PriLOSEC) 40 MG capsule Take 40 mg by mouth.     • fluticasone (FLONASE) 50 MCG/ACT nasal spray 2 sprays into the nostril(s) as directed by provider Daily.     • ipratropium (ATROVENT) 0.06 % nasal spray 2 sprays into the nostril(s) as directed by provider 4 (Four) Times a Day. 15 mL 0   • Multiple Vitamins-Minerals (CENTRUM ADULTS) tablet Take  by mouth.       No facility-administered medications prior to visit.       Opioid medication/s are on active medication list.  and I have evaluated her active  "treatment plan and pain score trends (see table).  There were no vitals filed for this visit.  I have reviewed the chart for potential of high risk medication and harmful drug interactions in the elderly.            Aspirin is not on active medication list.  Aspirin use is not indicated based on review of current medical condition/s. Risk of harm outweighs potential benefits.  .    Patient Active Problem List   Diagnosis   • Hypertension   • Hyperlipidemia   • Esophageal reflux   • Chronic obstructive pulmonary disease (HCC)   • Seasonal allergic rhinitis due to pollen   • Abnormal glucose   • Clinical diagnosis of severe acute respiratory syndrome coronavirus 2 (SARS-CoV-2) disease   • Lung cancer (HCC)     Advance Care Planning  Advance Directive is not on file.  ACP discussion was held with the patient during this visit. Patient does not have an advance directive, declines further assistance.    Review of Systems   Constitutional: Negative for chills, fatigue and fever.   HENT: Negative for congestion, ear pain and sore throat.    Eyes: Negative for visual disturbance.   Respiratory: Negative for cough and shortness of breath.    Cardiovascular: Negative for chest pain, palpitations and leg swelling.   Gastrointestinal: Negative for diarrhea, nausea and vomiting.   Endocrine: Negative.    Genitourinary: Negative for dysuria and frequency.   Skin: Negative.    Psychiatric/Behavioral: Negative for dysphoric mood and sleep disturbance. The patient is not nervous/anxious.         Objective    Vitals:    06/10/22 0831   BP: 124/72   Pulse: 74   Temp: 98.1 °F (36.7 °C)   SpO2: 97%   Weight: 58.6 kg (129 lb 3.2 oz)   Height: 162.6 cm (64\")     Estimated body mass index is 22.18 kg/m² as calculated from the following:    Height as of this encounter: 162.6 cm (64\").    Weight as of this encounter: 58.6 kg (129 lb 3.2 oz).    BMI is within normal parameters. No other follow-up for BMI required.      Does the patient have " evidence of cognitive impairment? No    Physical Exam            HEALTH RISK ASSESSMENT    Smoking Status:  Social History     Tobacco Use   Smoking Status Former Smoker   • Quit date: 2015   • Years since quittin.3   Smokeless Tobacco Never Used   Tobacco Comment    less than a pack per day, no smoking since , Quit 2015     Alcohol Consumption:  Social History     Substance and Sexual Activity   Alcohol Use Yes    Comment: Socially -A few times a month     Fall Risk Screen:    JOY Fall Risk Assessment was completed, and patient is at LOW risk for falls.Assessment completed on:6/10/2022    Depression Screening:  PHQ-2/PHQ-9 Depression Screening 6/10/2022   Retired PHQ-9 Total Score -   Retired Total Score -   Little Interest or Pleasure in Doing Things 0-->not at all   Feeling Down, Depressed or Hopeless 0-->not at all   PHQ-9: Brief Depression Severity Measure Score 0       Health Habits and Functional and Cognitive Screening:  Functional & Cognitive Status 6/10/2022   Do you have difficulty preparing food and eating? No   Do you have difficulty bathing yourself, getting dressed or grooming yourself? No   Do you have difficulty using the toilet? No   Do you have difficulty moving around from place to place? No   Do you have trouble with steps or getting out of a bed or a chair? No   Current Diet Well Balanced Diet   Dental Exam Up to date   Eye Exam Up to date   Exercise (times per week) 3 times per week   Current Exercises Include Walking;Gardening   Current Exercise Activities Include -   Do you need help using the phone?  No   Are you deaf or do you have serious difficulty hearing?  No   Do you need help with transportation? No   Do you need help shopping? No   Do you need help preparing meals?  No   Do you need help with housework?  No   Do you need help with laundry? No   Do you need help taking your medications? No   Do you need help managing money? No   Do you ever drive or ride in a car  without wearing a seat belt? No   Have you felt unusual stress, anger or loneliness in the last month? -   Who do you live with? -   If you need help, do you have trouble finding someone available to you? -   Have you been bothered in the last four weeks by sexual problems? -   Do you have difficulty concentrating, remembering or making decisions? -       Age-appropriate Screening Schedule:  Refer to the list below for future screening recommendations based on patient's age, sex and/or medical conditions. Orders for these recommended tests are listed in the plan section. The patient has been provided with a written plan.    Health Maintenance   Topic Date Due   • ZOSTER VACCINE (1 of 2) Never done   • DXA SCAN  06/30/2022 (Originally 5/30/2021)   • INFLUENZA VACCINE  08/01/2022   • LIPID PANEL  12/08/2022   • TDAP/TD VACCINES (2 - Td or Tdap) 11/10/2026              Assessment & Plan   CMS Preventative Services Quick Reference  Risk Factors Identified During Encounter  None Identified  The above risks/problems have been discussed with the patient.  Follow up actions/plans if indicated are seen below in the Assessment/Plan Section.  Pertinent information has been shared with the patient in the After Visit Summary.    Diagnoses and all orders for this visit:    1. Medicare annual wellness visit, subsequent (Primary)    2. Essential hypertension  -     TSH  -     Lipid Panel  -     CBC & Differential  -     Comprehensive Metabolic Panel    3. Hyperlipidemia, unspecified hyperlipidemia type  -     Lipid Panel  -     Comprehensive Metabolic Panel    4. Chronic obstructive pulmonary disease, unspecified COPD type (HCC)    5. Lung nodule    6. Saccular aneurysm    Hypertension-controlled, get monitoring labs today  Hyperlipidemia-check labs today  COPD-stable  Lung nodule- continue follow up  Saccular aneurysm-we will give her information that is in her chart for her neurosurgery referral    Follow Up:   Return in about 6  months (around 12/10/2022) for Recheck BP check.     An After Visit Summary and PPPS were made available to the patient.

## 2022-06-10 NOTE — PROGRESS NOTES
Subjective   Patient ID: Darlene Pfeiffer is a 81 y.o. female is being seen for consultation today at the request of Henry Escobar MD for a saccular aneurysm.   CTA neck done on 6/8/22.  Today pt reports occasional headaches, light headed-ness and dizziness.  She aslo feels off balance at times.    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  She has no family history of cerebral aneurysms or subarachnoid hemorrhage.  She was a previous smoker but quit in 2015.  She reports no significant headaches at this time.      The following portions of the patient's history were reviewed and updated as appropriate:   She  has a past medical history of Abnormal glucose, Abnormal TSH, Allergic rhinitis, Bilateral leg and foot pain, Cancer of floor of mouth (HCC) (2014), Dysuria, Esophageal reflux, Excessive cerumen in ear canal, Fever and chills, Hematuria, High risk medication use, History of adenocarcinoma of lung, History of anemia, History of carcinoma in situ of skin, History of lung cancer, History of mammogram, History of oral hairy leukoplakia, History of snoring, History of squamous cell carcinoma, Hyperlipidemia, Hyperpigmentation, Hypertension, Impacted cerumen of both ears, Influenza, Low vitamin B12 level, Lower back pain, Lung cancer (HCC), Menopause, Need for influenza vaccination, Other screening mammogram, Thyromegaly, Urinary pain, UTI (urinary tract infection), and Vitamin D deficiency.  She does not have any pertinent problems on file.  She  has a past surgical history that includes Lung surgery (2012); Cholecystectomy (1999); Oral Lesion Excision/Biopsy; Partial glossectomy; Tubal ligation (1970); Eye surgery (11/24/2020); and Colonoscopy.  Her family history includes Colon  cancer in her brother; No Known Problems in her father and another family member; Ovarian cancer in her mother and sister.  She  reports that she quit smoking about 7 years ago. She has never used smokeless tobacco. She reports current alcohol use. She reports that she does not use drugs.  Current Outpatient Medications   Medication Sig Dispense Refill   • albuterol sulfate  (90 Base) MCG/ACT inhaler Inhale 2 puffs Every 4 (Four) Hours As Needed for Wheezing. 18 g 2   • amLODIPine (NORVASC) 5 MG tablet TAKE 1 TABLET EVERY DAY 90 tablet 1   • atorvastatin (LIPITOR) 20 MG tablet TAKE 1 TABLET BY MOUTH EVERY NIGHT 90 tablet 3   • B Complex-C-E-Zn (b complex-C-E-zinc) tablet Take 1 tablet by mouth Daily.     • cetirizine (ZyrTEC) 10 MG tablet Take 10 mg by mouth.     • chlorhexidine (PERIDEX) 0.12 % solution RINSE WITH 15 ML BY MOUTH FOR 30 SECONDS AND SPIT OUT EVERY MORNING AND EVERY EVENING AFTER BRUSHING     • cholecalciferol (VITAMIN D3) 1000 units tablet Take 1,000 Units by mouth Daily.     • ciclopirox (LOPROX) 0.77 % cream APPLY SPARINGLY TO THE CRUSTED AREAS TWICE DAILY     • clobetasol (TEMOVATE) 0.05 % cream APPLY TOPICALLY TO THE AFFECTED AREA TWICE DAILY AS NEEDED     • fluticasone (FLONASE) 50 MCG/ACT nasal spray 2 sprays into the nostril(s) as directed by provider Daily.     • hydroCHLOROthiazide (HYDRODIURIL) 25 MG tablet TAKE 1 TABLET EVERY DAY 90 tablet 1   • HYDROcod Polst-CPM Polst ER (Tussionex Pennkinetic ER) 10-8 MG/5ML ER suspension Take 5 mL by mouth Every 12 (Twelve) Hours As Needed for Cough. 120 mL 0   • HYDROcodone-acetaminophen (NORCO) 5-325 MG per tablet Take 1 tablet by mouth Every 6 (Six) Hours As Needed.     • ipratropium (ATROVENT) 0.06 % nasal spray 2 sprays into the nostril(s) as directed by provider 4 (Four) Times a Day. 15 mL 0   • metoprolol succinate XL (TOPROL-XL) 25 MG 24 hr tablet TAKE 1 TABLET EVERY DAY 90 tablet 1   • montelukast (SINGULAIR) 10 MG tablet Take 1 tablet  by mouth Every Night. 90 tablet 3   • promethazine-dextromethorphan (PROMETHAZINE-DM) 6.25-15 MG/5ML syrup Take 5 mL by mouth 4 (Four) Times a Day As Needed for Cough. 180 mL 1   • Tiotropium Bromide Monohydrate (SPIRIVA RESPIMAT) 1.25 MCG/ACT aerosol solution inhaler Inhale 2 puffs Daily. 5 each 0   • clopidogrel (Plavix) 75 MG tablet Take 1 tablet by mouth Daily for 210 days. Start 5 days before procedure. 30 tablet 6     No current facility-administered medications for this visit.     She is allergic to sulfa antibiotics..    Review of Systems   Constitutional: Negative for chills and fever.   HENT: Positive for tinnitus. Negative for ear pain.    Eyes: Negative for pain and visual disturbance.   Respiratory: Positive for cough and shortness of breath.    Cardiovascular: Negative for chest pain and palpitations.   Gastrointestinal: Negative for abdominal pain and nausea.   Genitourinary: Negative for difficulty urinating and enuresis.   Musculoskeletal: Positive for gait problem (off balance).   Skin: Negative for rash.   Neurological: Positive for dizziness (occasional), weakness (occasional) and light-headedness. Negative for tremors, seizures, syncope, speech difficulty, numbness and headaches.   Psychiatric/Behavioral: Negative for confusion and decreased concentration.       Objective    Vitals:    06/23/22 0943   BP: 150/76   Pulse: 82   Resp: 16   Temp: 97.2 °F (36.2 °C)   SpO2: 92%     Body mass index is 22.83 kg/m².    Physical Exam  Vitals and nursing note reviewed.   Constitutional:       General: She is not in acute distress.     Appearance: She is normal weight.   HENT:      Head: Normocephalic.      Nose: Nose normal.      Mouth/Throat:      Mouth: Mucous membranes are moist.   Eyes:      Extraocular Movements: Extraocular movements intact.      Conjunctiva/sclera: Conjunctivae normal.      Pupils: Pupils are equal, round, and reactive to light.   Cardiovascular:      Rate and Rhythm: Normal rate.    Pulmonary:      Effort: Pulmonary effort is normal.   Musculoskeletal:         General: Normal range of motion.      Cervical back: Normal range of motion.   Skin:     General: Skin is warm and dry.   Neurological:      General: No focal deficit present.      Mental Status: She is alert and oriented to person, place, and time.      Cranial Nerves: No cranial nerve deficit.      Sensory: No sensory deficit.      Motor: No weakness.      Coordination: Coordination normal.      Gait: Gait normal.   Psychiatric:         Mood and Affect: Mood normal.         Behavior: Behavior normal.         Thought Content: Thought content normal.         Judgment: Judgment normal.       Neurologic Exam     Mental Status   Oriented to person, place, and time.     Cranial Nerves     CN III, IV, VI   Pupils are equal, round, and reactive to light.      Assessment & Plan   Independent Review of Radiographic Studies:   The CT of the neck demonstrates an 11 mm likely right posterior communicating artery aneurysm.  No evidence to suggest any rupture or subarachnoid hemorrhage.  Medical Decision Makin-year-old female who was incidentally found to have an 11 mm left posterior communicating artery aneurysm which is of significant concern and has a 5-year rupture risk of 18% by the PHASES score.  Endovascular treatment was discussed with the patient including the risks, alternatives and procedure.  Patient expressed understanding and wishes to proceed with treatment.  She was advised that the choice of flow diversion versus coil or WEB embolization would not be able to be determined until conventional angiographic images available.  She will be started on Plavix and aspirin in case of needing stent placement.  She is to begin taking her antiplatelets 5 days before her procedure at which time a P2Y12 will be assessed.  She is aware that general endotracheal anesthesia is required.  All questions were answered and the patient wishes to  proceed as soon as possible.  Patient is to continue her Lipitor as well as blood pressure control  Diagnoses and all orders for this visit:    1. Cerebral aneurysm, nonruptured (Primary)  -     Case Request; Standing  -     COVID PRE-OP / PRE-PROCEDURE SCREENING ORDER (NO ISOLATION) - Swab, Nasopharynx; Future  -     CBC & Differential; Future  -     Basic Metabolic Panel; Future  -     P2Y12 Platelet Inhibition; Future  -     Case Request    2. Primary hypertension    3. Hyperlipidemia, unspecified hyperlipidemia type    Other orders  -     Follow Anesthesia Guidelines / Protocol; Future  -     Obtain Informed Consent; Future  -     Provide NPO Instructions to Patient; Future  -     clopidogrel (Plavix) 75 MG tablet; Take 1 tablet by mouth Daily for 210 days. Start 5 days before procedure.  Dispense: 30 tablet; Refill: 6      Return in about 2 weeks (around 7/7/2022) for Cerebral Angiogram (DSA) with possible embolization.

## 2022-06-11 LAB
ALBUMIN SERPL-MCNC: 4.3 G/DL (ref 3.7–4.7)
ALBUMIN/GLOB SERPL: 2.3 {RATIO} (ref 1.2–2.2)
ALP SERPL-CCNC: 82 IU/L (ref 44–121)
ALT SERPL-CCNC: 8 IU/L (ref 0–32)
AST SERPL-CCNC: 17 IU/L (ref 0–40)
BASOPHILS # BLD AUTO: 0 X10E3/UL (ref 0–0.2)
BASOPHILS NFR BLD AUTO: 0 %
BILIRUB SERPL-MCNC: 0.7 MG/DL (ref 0–1.2)
BUN SERPL-MCNC: 15 MG/DL (ref 8–27)
BUN/CREAT SERPL: 25 (ref 12–28)
CALCIUM SERPL-MCNC: 9.4 MG/DL (ref 8.7–10.3)
CHLORIDE SERPL-SCNC: 105 MMOL/L (ref 96–106)
CHOLEST SERPL-MCNC: 210 MG/DL (ref 100–199)
CO2 SERPL-SCNC: 26 MMOL/L (ref 20–29)
CREAT SERPL-MCNC: 0.6 MG/DL (ref 0.57–1)
EGFRCR SERPLBLD CKD-EPI 2021: 91 ML/MIN/1.73
EOSINOPHIL # BLD AUTO: 0 X10E3/UL (ref 0–0.4)
EOSINOPHIL NFR BLD AUTO: 0 %
ERYTHROCYTE [DISTWIDTH] IN BLOOD BY AUTOMATED COUNT: 13.1 % (ref 11.7–15.4)
GLOBULIN SER CALC-MCNC: 1.9 G/DL (ref 1.5–4.5)
GLUCOSE SERPL-MCNC: 97 MG/DL (ref 65–99)
HCT VFR BLD AUTO: 43.3 % (ref 34–46.6)
HDLC SERPL-MCNC: 57 MG/DL
HGB BLD-MCNC: 14.2 G/DL (ref 11.1–15.9)
IMM GRANULOCYTES # BLD AUTO: 0 X10E3/UL (ref 0–0.1)
IMM GRANULOCYTES NFR BLD AUTO: 0 %
LDLC SERPL CALC-MCNC: 140 MG/DL (ref 0–99)
LYMPHOCYTES # BLD AUTO: 1.8 X10E3/UL (ref 0.7–3.1)
LYMPHOCYTES NFR BLD AUTO: 33 %
MCH RBC QN AUTO: 29.6 PG (ref 26.6–33)
MCHC RBC AUTO-ENTMCNC: 32.8 G/DL (ref 31.5–35.7)
MCV RBC AUTO: 90 FL (ref 79–97)
MONOCYTES # BLD AUTO: 0.4 X10E3/UL (ref 0.1–0.9)
MONOCYTES NFR BLD AUTO: 7 %
NEUTROPHILS # BLD AUTO: 3.2 X10E3/UL (ref 1.4–7)
NEUTROPHILS NFR BLD AUTO: 60 %
PLATELET # BLD AUTO: 217 X10E3/UL (ref 150–450)
POTASSIUM SERPL-SCNC: 3.9 MMOL/L (ref 3.5–5.2)
PROT SERPL-MCNC: 6.2 G/DL (ref 6–8.5)
RBC # BLD AUTO: 4.8 X10E6/UL (ref 3.77–5.28)
SODIUM SERPL-SCNC: 143 MMOL/L (ref 134–144)
TRIGL SERPL-MCNC: 75 MG/DL (ref 0–149)
TSH SERPL DL<=0.005 MIU/L-ACNC: 4.06 UIU/ML (ref 0.45–4.5)
VLDLC SERPL CALC-MCNC: 13 MG/DL (ref 5–40)
WBC # BLD AUTO: 5.4 X10E3/UL (ref 3.4–10.8)

## 2022-06-15 ENCOUNTER — OFFICE VISIT (OUTPATIENT)
Dept: ONCOLOGY | Facility: CLINIC | Age: 82
End: 2022-06-15

## 2022-06-15 ENCOUNTER — LAB (OUTPATIENT)
Dept: LAB | Facility: HOSPITAL | Age: 82
End: 2022-06-15

## 2022-06-15 VITALS
TEMPERATURE: 97.8 F | HEIGHT: 64 IN | RESPIRATION RATE: 16 BRPM | OXYGEN SATURATION: 95 % | WEIGHT: 130.4 LBS | SYSTOLIC BLOOD PRESSURE: 137 MMHG | DIASTOLIC BLOOD PRESSURE: 76 MMHG | HEART RATE: 70 BPM | BODY MASS INDEX: 22.26 KG/M2

## 2022-06-15 DIAGNOSIS — C34.12 MALIGNANT NEOPLASM OF UPPER LOBE OF LEFT LUNG: ICD-10-CM

## 2022-06-15 DIAGNOSIS — C34.12 MALIGNANT NEOPLASM OF UPPER LOBE OF LEFT LUNG: Primary | ICD-10-CM

## 2022-06-15 LAB
ALBUMIN SERPL-MCNC: 4.3 G/DL (ref 3.5–5.2)
ALBUMIN/GLOB SERPL: 2 G/DL
ALP SERPL-CCNC: 78 U/L (ref 39–117)
ALT SERPL W P-5'-P-CCNC: 12 U/L (ref 1–33)
ANION GAP SERPL CALCULATED.3IONS-SCNC: 9 MMOL/L (ref 5–15)
AST SERPL-CCNC: 14 U/L (ref 1–32)
BASOPHILS # BLD AUTO: 0.03 10*3/MM3 (ref 0–0.2)
BASOPHILS NFR BLD AUTO: 0.4 % (ref 0–1.5)
BILIRUB SERPL-MCNC: 0.4 MG/DL (ref 0–1.2)
BUN SERPL-MCNC: 18 MG/DL (ref 8–23)
BUN/CREAT SERPL: 28.6 (ref 7–25)
CALCIUM SPEC-SCNC: 9.7 MG/DL (ref 8.6–10.5)
CHLORIDE SERPL-SCNC: 103 MMOL/L (ref 98–107)
CO2 SERPL-SCNC: 31 MMOL/L (ref 22–29)
CREAT SERPL-MCNC: 0.63 MG/DL (ref 0.57–1)
DEPRECATED RDW RBC AUTO: 45.7 FL (ref 37–54)
EGFRCR SERPLBLD CKD-EPI 2021: 90.4 ML/MIN/1.73
EOSINOPHIL # BLD AUTO: 0 10*3/MM3 (ref 0–0.4)
EOSINOPHIL NFR BLD AUTO: 0 % (ref 0.3–6.2)
ERYTHROCYTE [DISTWIDTH] IN BLOOD BY AUTOMATED COUNT: 13.4 % (ref 12.3–15.4)
GLOBULIN UR ELPH-MCNC: 2.1 GM/DL
GLUCOSE SERPL-MCNC: 88 MG/DL (ref 65–99)
HCT VFR BLD AUTO: 41.9 % (ref 34–46.6)
HGB BLD-MCNC: 13.6 G/DL (ref 12–15.9)
IMM GRANULOCYTES # BLD AUTO: 0.01 10*3/MM3 (ref 0–0.05)
IMM GRANULOCYTES NFR BLD AUTO: 0.1 % (ref 0–0.5)
LYMPHOCYTES # BLD AUTO: 1.72 10*3/MM3 (ref 0.7–3.1)
LYMPHOCYTES NFR BLD AUTO: 24.9 % (ref 19.6–45.3)
MCH RBC QN AUTO: 30.1 PG (ref 26.6–33)
MCHC RBC AUTO-ENTMCNC: 32.5 G/DL (ref 31.5–35.7)
MCV RBC AUTO: 92.7 FL (ref 79–97)
MONOCYTES # BLD AUTO: 0.45 10*3/MM3 (ref 0.1–0.9)
MONOCYTES NFR BLD AUTO: 6.5 % (ref 5–12)
NEUTROPHILS NFR BLD AUTO: 4.7 10*3/MM3 (ref 1.7–7)
NEUTROPHILS NFR BLD AUTO: 68.1 % (ref 42.7–76)
NRBC BLD AUTO-RTO: 0 /100 WBC (ref 0–0.2)
PLATELET # BLD AUTO: 188 10*3/MM3 (ref 140–450)
PMV BLD AUTO: 10 FL (ref 6–12)
POTASSIUM SERPL-SCNC: 4.1 MMOL/L (ref 3.5–5.2)
PROT SERPL-MCNC: 6.4 G/DL (ref 6–8.5)
RBC # BLD AUTO: 4.52 10*6/MM3 (ref 3.77–5.28)
SODIUM SERPL-SCNC: 143 MMOL/L (ref 136–145)
WBC NRBC COR # BLD: 6.91 10*3/MM3 (ref 3.4–10.8)

## 2022-06-15 PROCEDURE — 80053 COMPREHEN METABOLIC PANEL: CPT | Performed by: INTERNAL MEDICINE

## 2022-06-15 PROCEDURE — 99214 OFFICE O/P EST MOD 30 MIN: CPT | Performed by: INTERNAL MEDICINE

## 2022-06-15 PROCEDURE — 36415 COLL VENOUS BLD VENIPUNCTURE: CPT

## 2022-06-15 PROCEDURE — 85025 COMPLETE CBC W/AUTO DIFF WBC: CPT

## 2022-06-15 NOTE — PROGRESS NOTES
Subjective Patient generally feeling improved                                                                                                                                                              REASON FOR FOLLOW-UP: Potential recurrent lung cancer.    History of Present Illness       The patient is a 81-year-old female with the below medical history including chronic obstructive pulmonary disease who was seen in the [facility] multidisciplinary thoracic oncology clinic July 1, 2021.  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      She had undergone a CT of the chest at [place] 6/16/2021 showing postsurgical changes in the left chest from right upper lobectomy, 8 mm irregular nodule medial segment of the right lower lobe and diffuse changes of emphysema with fibrotic changes in the periphery, no suspicious hilar or mediastinal nodes, no pleural effusion.  New finding was suspicious for new malignancy with the patient felt to be a poor candidate for surgical resection.  A PET CT and PFTs were requested thereafter.  The PET/CT demonstrated ill-defined FDG avid soft tissue thickening left aspect mediastinum within the operative bed, 1 cm hypermetabolic pulmonary nodule right lower lobe and asymmetric thickening patchy uptake involving the right base of tongue.  There is subtle uptake within the L1 vertebral body which is indeterminate and a sub-6 mm pulmonary nodule of right lung and subcentimeter hypodense hepatic lesion which was below PET resolution.       The patient's case was discussed in thoracic conference 7/22/2021 with consideration of the patient undergoing L1 vertebral body MRI, confirmatory biopsy left mediastinal and assessment by ENT (Dr. Caballero) who had worked with the patient  previously.  She, incidentally, indicates that radiation therapy was given apparently intraoperatively at her recent surgery?  She still has issues with difficulty chewing and swallowing post procedures and was to be followed up with Dr. Caballero in the near future as planned.                                                            She was seen in the thoracic clinic on the same day with plans to proceed with MRI of the lumbar spine, biopsy left mediastinal nodular area of potential disease and follow-up of her ENT assessment as well as undergo a guardant 360 assessment in our office.  She underwent the biopsy 8/13/2021 found to be consistent with invasive moderately differentiated adenocarcinoma with PD-L1 TPS score 40%.  MRI of the lumbar spine revealed no evidence of metastatic disease though the patient did have multilevel degenerative disease throughout the lumbar spine.  She had an office follow-up with Dr. 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  She underwent laryngoscopy 8/18/2021 with right base of tongue without evidence of lesions or masses in the region.     She was seen by Dr. Hernandez 8/19/2021 and, her findings, had been presented in lung conference.  Her findings were consistent with 2 separate lesions including a nodule in the superior segment of the right lower lobe and a mass in the upper portion of the left chest with the left chest lesion biopsy positive for adenocarcinoma.  The consensus was that these were to separate-synchronous primaries.  Stereotactic radiation each lesions were felt to be the appropriate way to proceed and the patient was referred to radiation therapy.     The patient is seen in office 8/23/2021 agreeable to the radiation therapy as well as additional assessments including Caris molecular assessment of her biopsy.  Again her guardant 360 is currently  pending and we would follow her after she completes radiation therapy with subsequent scans.      She was able to proceed with SBRT to the right lung at primary #1 with 5 treatments at 1000 cGy per fraction in the left lung primary similarly at 1000 cGy per fraction x5.  Her treatment ranged between 8/31-9/13/2021.  She is now scheduled for follow-up 11/23/2021.    The patient subsequent genomic testing is discussed with her as she is is seen back 9/23/2021.  Her guardant 360 did not determine any new abnormalities but her Caris-MI profile-does reveal sensitivity to immunotherapy as well as a BRAF mutation.  This could be extremely important as we follow the patient from this point.  Fortunately she is feeling extremely well and quite pleased about her treatments.    Patient had subsequent PET/CT 11/23/2021 demonstrates decrease in size and avidity of the previously noted intensely FDG avid nodules left lobectomy operative site and right lower lobe.  Surrounding groundglass and pulmonary opacification is consistent with postradiation changes, a few new subcentimeter pulmonary nodules are present in the right upper lobe and indeterminant, stable subcentimeter hepatic lesion is below PET resolution no other findings of FDG-avid disease are seen in neck abdomen or pelvis.  The patient seen in office 12/1/2021 with a relatively good performance status stating she is not having any new symptoms and very pleased about her results on her PET/CT.  She realizes we'll have to follow this further but subsequent scans in 6 months.  She is also hoping to see an oral surgeon that previously helped her-Dr. Wu.    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  There is evolution of presumed postradiation changes in the right midlung with soft tissue mass within the  left lumpectomy operative bed minimally decreased in size.  There is a sub-6 mm nodule in the right upper lobe not definitively seen, indeterminate and an indeterminate left renal lesion at 1.5 cm unchanged from 7/13/2021.  The patient is currently scheduled to see Dr. Dowell on 6/23/2022.  She is feeling well without any additional symptoms.    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 from [facility]   • History of snoring    • History of squamous cell carcinoma     Tongue   • Hyperlipidemia    • Hyperpigmentation    • Hypertension     since early 60s   • Impacted cerumen of both ears    • Influenza    • Low vitamin B12 level    • Lower back pain    • Lung cancer (HCC)    • Menopause    • Need for influenza vaccination    • Other screening mammogram    • Thyromegaly    • Urinary pain    • UTI (urinary tract infection)    • Vitamin D deficiency         Past Surgical History:   Procedure Laterality Date   • CHOLECYSTECTOMY  1999   • COLONOSCOPY     • EYE SURGERY  11/24/2020    Took a muscle out of right eye   • GLOSSECTOMY PARTIAL      less than one half tongue   • LUNG SURGERY  2012   • ORAL LESION EXCISION/BIOPSY      June and August 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   • TUBAL ABDOMINAL LIGATION  1970        Current Outpatient Medications on File Prior to Visit   Medication Sig Dispense Refill   • albuterol sulfate  (90 Base) MCG/ACT inhaler Inhale 2 puffs Every 4 (Four) Hours As Needed for Wheezing. 18 g 2   •  amLODIPine (NORVASC) 5 MG tablet TAKE 1 TABLET EVERY DAY 90 tablet 1   • atorvastatin (LIPITOR) 20 MG tablet TAKE 1 TABLET BY MOUTH EVERY NIGHT 90 tablet 3   • B Complex-C-E-Zn (b complex-C-E-zinc) tablet Take 1 tablet by mouth Daily.     • cetirizine (ZyrTEC) 10 MG tablet Take 10 mg by mouth.     • chlorhexidine (PERIDEX) 0.12 % solution RINSE WITH 15 ML BY MOUTH FOR 30 SECONDS AND SPIT OUT EVERY MORNING AND EVERY EVENING AFTER BRUSHING     • cholecalciferol (VITAMIN D3) 1000 units tablet Take 1,000 Units by mouth Daily.     • ciclopirox (LOPROX) 0.77 % cream APPLY SPARINGLY TO THE CRUSTED AREAS TWICE DAILY     • clobetasol (TEMOVATE) 0.05 % cream APPLY TOPICALLY TO THE AFFECTED AREA TWICE DAILY AS NEEDED     • fluticasone (FLONASE) 50 MCG/ACT nasal spray 2 sprays into the nostril(s) as directed by provider Daily.     • hydroCHLOROthiazide (HYDRODIURIL) 25 MG tablet TAKE 1 TABLET EVERY DAY 90 tablet 1   • HYDROcod Polst-CPM Polst ER (Tussionex Pennkinetic ER) 10-8 MG/5ML ER suspension Take 5 mL by mouth Every 12 (Twelve) Hours As Needed for Cough. 120 mL 0   • HYDROcodone-acetaminophen (NORCO) 5-325 MG per tablet Take 1 tablet by mouth Every 6 (Six) Hours As Needed.     • ipratropium (ATROVENT) 0.06 % nasal spray 2 sprays into the nostril(s) as directed by provider 4 (Four) Times a Day. 15 mL 0   • metoprolol succinate XL (TOPROL-XL) 25 MG 24 hr tablet TAKE 1 TABLET EVERY DAY 90 tablet 1   • montelukast (SINGULAIR) 10 MG tablet Take 1 tablet by mouth Every Night. 90 tablet 3   • Multiple Vitamins-Minerals (CENTRUM ADULTS) tablet Take  by mouth.     • promethazine-dextromethorphan (PROMETHAZINE-DM) 6.25-15 MG/5ML syrup Take 5 mL by mouth 4 (Four) Times a Day As Needed for Cough. 180 mL 1   • Tiotropium Bromide Monohydrate (SPIRIVA RESPIMAT) 1.25 MCG/ACT aerosol solution inhaler Inhale 2 puffs Daily. 5 each 0     No current facility-administered medications on file prior to visit.        ALLERGIES:    Allergies    Allergen Reactions   • Sulfa Antibiotics Rash        Social History     Socioeconomic History   • Marital status:    Tobacco Use   • Smoking status: Former Smoker     Quit date: 2015     Years since quittin.3   • Smokeless tobacco: Never Used   • Tobacco comment: less than a pack per day, no smoking since , Quit 2015   Substance and Sexual Activity   • Alcohol use: Yes     Comment: Socially -A few times a month   • Drug use: No   • Sexual activity: Defer     Family History   Problem Relation Age of Onset   • Ovarian cancer Mother          at age 27   • No Known Problems Father    • Ovarian cancer Sister    • Colon cancer Brother    • No Known Problems Other         Review of Systems   HENT: Positive for dental problem and sore throat.    Eyes: Negative.    Respiratory: Negative for cough and wheezing.    Gastrointestinal: Positive for constipation and diarrhea.   Genitourinary: Positive for frequency and urgency.   Musculoskeletal: Positive for arthralgias and myalgias.   Allergic/Immunologic: Positive for environmental allergies.   Neurological: Negative.    Hematological: Negative.    Psychiatric/Behavioral: Negative.       Objective     There were no vitals filed for this visit.  Current Status 2021   ECOG score 0          Pulmonary function tests:     FVC is 2.10 which is 81% of predicted  FEV1 is 1.44 which is 75% of predicted  FEV1 FVC ratio 69  Diffusion capacity DLCO is 11.7 which is 54% of predicted      Physical Exam  Constitutional:       Appearance: Normal appearance. She is normal weight.   HENT:      Head: Normocephalic and atraumatic.      Nose: Nose normal.      Mouth/Throat:      Mouth: Mucous membranes are moist.      Pharynx: Oropharynx is clear.      Comments: Malocclusion noted  Eyes:      Extraocular Movements: Extraocular movements intact.   Cardiovascular:      Rate and Rhythm: Normal rate and regular rhythm.      Pulses: Normal pulses.      Heart sounds:  Normal heart sounds.   Pulmonary:      Effort: Pulmonary effort is normal.      Breath sounds: Normal breath sounds.      Comments: Distant breath sounds bilateral bases  Abdominal:      General: Abdomen is flat. Bowel sounds are normal.      Palpations: Abdomen is soft.   Musculoskeletal:         General: Normal range of motion.      Cervical back: Normal range of motion and neck supple.   Skin:     General: Skin is warm and dry.   Neurological:      General: No focal deficit present.      Mental Status: She is alert and oriented to person, place, and time.   Psychiatric:         Mood and Affect: Mood normal.         Behavior: Behavior normal.           RECENT LABS:  Hematology WBC   Date Value Ref Range Status   06/10/2022 5.4 3.4 - 10.8 x10E3/uL Final     RBC   Date Value Ref Range Status   06/10/2022 4.80 3.77 - 5.28 x10E6/uL Final     Hemoglobin   Date Value Ref Range Status   06/10/2022 14.2 11.1 - 15.9 g/dL Final   12/01/2021 13.3 12.0 - 15.9 g/dL Final     Hematocrit   Date Value Ref Range Status   06/10/2022 43.3 34.0 - 46.6 % Final   12/01/2021 40.5 34.0 - 46.6 % Final     Platelets   Date Value Ref Range Status   06/10/2022 217 150 - 450 x10E3/uL Final   12/01/2021 199 140 - 450 10*3/mm3 Final          Assessment & Plan      81-year-old female with medical history including COPD, long-term tobacco use, status post previous left upper lobectomy for carcinoma lung in May 2015, carcinoma tongue ongoing radiation therapy and surgical therapy through ENT-Dr. Caballero-including 2016 with wide local excision of buccal lesion.  During follow-up a CT scan of the chest June 16, 2021 demonstrates postsurgical changes, 8 mm irregular nodule medial segment of right lower lobe and diffuse changes of emphysema.  She is seen in thoracic clinic with findings suspicious for new malignancy and concern of the patient being a poor operative candidate.  Subsequent PET CT and PFTs demonstrated ill-defined avidity and soft tissue  left aspect of the mediastinum, 1 cm hypermetabolic pulmonary nodule and asymmetric thickening involving the right base of tongue as well as subtle uptake in the L1 vertebral body.  This patient's case was discussed in thoracic clinic and plans were made to request an MRI of the lumbar spine, obtain a biopsy of the mediastinal involvement of the left had the patient reviewed by ENT.  The patient is seen with her  7/22/2021 in thoracic clinic and we reviewed these findings as well as having her assessed via liquid biopsy to determine potential molecular status quickly.           She underwent the biopsy 8/13/2021 found to be consistent with invasive moderately differentiated adenocarcinoma with PD-L1 TPS score 40%.  MRI of the lumbar spine revealed no evidence of metastatic disease though the patient did have multilevel degenerative disease throughout the lumbar spine.  She had an office follow-up with Dr. Caballero-history of a [id number] SCCa of the rightt retromolar trigone who is s/p WLE, buccal fat pad flap and SLN biopsy that was negative, margins were negative.  She underwent laryngoscopy 8/18/2021 with right base of tongue without evidence of lesions or masses in the region.     She was seen by Dr. Hernandez 8/19/2021 and, her findings, had been presented in lung conference.  Her findings were consistent with 2 separate lesions including a nodule in the superior segment of the right lower lobe and a mass in the upper portion of the left chest with the left chest lesion biopsy positive for adenocarcinoma.  The consensus was that these were to separate-synchronous primaries.  Stereotactic radiation each lesions were felt to be the appropriate way to proceed and the patient was referred to radiation therapy.      At the time of this dictation her guardant 360 is not yet available.  These issues are discussed with the patient in some detail 8/23/2021 so that we can have an overall longer-term plan.  This includes a  David molecular assessment of her recent biopsy as well as her PD-L1 positive findings.    The patient was referred for radiation therapy and she was able to proceed with SBRT to the right lung at primary #1 with 5 treatments at 1000 cGy per fraction in the left lung primary similarly at 1000 cGy per fraction x5.  Her treatment ranged between 8/31-9/13/2021.  She is now scheduled for follow-up 11/23/2021.    Additionally genomic testing is discussed with her as she is is seen back 9/23/2021.  Her guardant 360 did not determine any new abnormalities but her Caris-MI profile-does reveal sensitivity to immunotherapy as well as a BRAF mutation.    Patient had subsequent PET/CT 11/23/2021 demonstrates decrease in size and avidity of the previously noted intensely FDG avid nodules left lobectomy operative site and right lower lobe.  Surrounding groundglass and pulmonary opacification is consistent with postradiation changes, a few new subcentimeter pulmonary nodules are present in the right upper lobe and indeterminant, stable subcentimeter hepatic lesion is below PET resolution no other findings of FDG-avid disease are seen in neck abdomen or pelvis.  The patient seen in office 12/1/2021 with a relatively good performance status stating she is not having any new symptoms and very pleased about her results on her PET/CT.  She realizes we'll have to follow this further but subsequent scans in 6 months.  She is also hoping to see an oral surgeon that previously helped her-Dr. Wu.    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  There is evolution of presumed postradiation changes in the right midlung with soft tissue mass within the left lumpectomy operative bed minimally decreased in size.  There is a sub-6 mm nodule in the right upper lobe not  definitively seen, indeterminate and an indeterminate left renal lesion at 1.5 cm unchanged from 7/13/2021.  The patient is currently scheduled to see Dr. Dowell on 6/23/2022.  She is feeling well without any additional symptoms.  Yes yes with      Plan:  *At this point follow-up will be necessary with CT chest abdomen pelvis in 23 weeks, CBC CMP    *24 weeks MD    *Neurosurgical follow-up now scheduled    *Patient agreeable with this plan and follow-up

## 2022-06-22 RX ORDER — HYDROCHLOROTHIAZIDE 25 MG/1
TABLET ORAL
Qty: 90 TABLET | Refills: 1 | Status: ON HOLD | OUTPATIENT
Start: 2022-06-22 | End: 2022-12-01 | Stop reason: SDUPTHER

## 2022-06-22 RX ORDER — METOPROLOL SUCCINATE 25 MG/1
TABLET, EXTENDED RELEASE ORAL
Qty: 90 TABLET | Refills: 1 | Status: SHIPPED | OUTPATIENT
Start: 2022-06-22

## 2022-06-22 RX ORDER — AMLODIPINE BESYLATE 5 MG/1
TABLET ORAL
Qty: 90 TABLET | Refills: 1 | Status: SHIPPED | OUTPATIENT
Start: 2022-06-22 | End: 2022-10-18 | Stop reason: HOSPADM

## 2022-06-23 ENCOUNTER — TELEPHONE (OUTPATIENT)
Dept: NEUROSURGERY | Facility: CLINIC | Age: 82
End: 2022-06-23

## 2022-06-23 ENCOUNTER — OFFICE VISIT (OUTPATIENT)
Dept: NEUROSURGERY | Facility: CLINIC | Age: 82
End: 2022-06-23

## 2022-06-23 VITALS
OXYGEN SATURATION: 92 % | WEIGHT: 133 LBS | RESPIRATION RATE: 16 BRPM | SYSTOLIC BLOOD PRESSURE: 150 MMHG | DIASTOLIC BLOOD PRESSURE: 76 MMHG | HEART RATE: 82 BPM | BODY MASS INDEX: 22.71 KG/M2 | HEIGHT: 64 IN | TEMPERATURE: 97.2 F

## 2022-06-23 DIAGNOSIS — I10 PRIMARY HYPERTENSION: ICD-10-CM

## 2022-06-23 DIAGNOSIS — I67.1 CEREBRAL ANEURYSM, NONRUPTURED: Primary | ICD-10-CM

## 2022-06-23 DIAGNOSIS — E78.5 HYPERLIPIDEMIA, UNSPECIFIED HYPERLIPIDEMIA TYPE: ICD-10-CM

## 2022-06-23 PROCEDURE — 99204 OFFICE O/P NEW MOD 45 MIN: CPT | Performed by: RADIOLOGY

## 2022-06-23 RX ORDER — CLOPIDOGREL BISULFATE 75 MG/1
75 TABLET ORAL DAILY
Qty: 30 TABLET | Refills: 6 | Status: ON HOLD | OUTPATIENT
Start: 2022-06-23 | End: 2022-06-29

## 2022-06-27 ENCOUNTER — PRE-ADMISSION TESTING (OUTPATIENT)
Dept: PREADMISSION TESTING | Facility: HOSPITAL | Age: 82
End: 2022-06-27

## 2022-06-27 ENCOUNTER — TELEPHONE (OUTPATIENT)
Dept: NEUROSURGERY | Facility: CLINIC | Age: 82
End: 2022-06-27

## 2022-06-27 VITALS
RESPIRATION RATE: 16 BRPM | HEIGHT: 63 IN | HEART RATE: 75 BPM | WEIGHT: 131.8 LBS | DIASTOLIC BLOOD PRESSURE: 77 MMHG | BODY MASS INDEX: 23.35 KG/M2 | SYSTOLIC BLOOD PRESSURE: 168 MMHG | TEMPERATURE: 97.6 F | OXYGEN SATURATION: 97 %

## 2022-06-27 DIAGNOSIS — I67.1 CEREBRAL ANEURYSM, NONRUPTURED: ICD-10-CM

## 2022-06-27 LAB
ANION GAP SERPL CALCULATED.3IONS-SCNC: 9.1 MMOL/L (ref 5–15)
BASOPHILS # BLD AUTO: 0.03 10*3/MM3 (ref 0–0.2)
BASOPHILS NFR BLD AUTO: 0.5 % (ref 0–1.5)
BUN SERPL-MCNC: 19 MG/DL (ref 8–23)
BUN/CREAT SERPL: 28.8 (ref 7–25)
CALCIUM SPEC-SCNC: 9.3 MG/DL (ref 8.6–10.5)
CHLORIDE SERPL-SCNC: 103 MMOL/L (ref 98–107)
CO2 SERPL-SCNC: 31.9 MMOL/L (ref 22–29)
CREAT SERPL-MCNC: 0.66 MG/DL (ref 0.57–1)
DEPRECATED RDW RBC AUTO: 42.7 FL (ref 37–54)
EGFRCR SERPLBLD CKD-EPI 2021: 89.4 ML/MIN/1.73
EOSINOPHIL # BLD AUTO: 0.15 10*3/MM3 (ref 0–0.4)
EOSINOPHIL NFR BLD AUTO: 2.5 % (ref 0.3–6.2)
ERYTHROCYTE [DISTWIDTH] IN BLOOD BY AUTOMATED COUNT: 13 % (ref 12.3–15.4)
GLUCOSE SERPL-MCNC: 108 MG/DL (ref 65–99)
HCT VFR BLD AUTO: 42.6 % (ref 34–46.6)
HGB BLD-MCNC: 14 G/DL (ref 12–15.9)
IMM GRANULOCYTES # BLD AUTO: 0.01 10*3/MM3 (ref 0–0.05)
IMM GRANULOCYTES NFR BLD AUTO: 0.2 % (ref 0–0.5)
LYMPHOCYTES # BLD AUTO: 1.87 10*3/MM3 (ref 0.7–3.1)
LYMPHOCYTES NFR BLD AUTO: 31.4 % (ref 19.6–45.3)
MCH RBC QN AUTO: 29.6 PG (ref 26.6–33)
MCHC RBC AUTO-ENTMCNC: 32.9 G/DL (ref 31.5–35.7)
MCV RBC AUTO: 90.1 FL (ref 79–97)
MONOCYTES # BLD AUTO: 0.46 10*3/MM3 (ref 0.1–0.9)
MONOCYTES NFR BLD AUTO: 7.7 % (ref 5–12)
NEUTROPHILS NFR BLD AUTO: 3.43 10*3/MM3 (ref 1.7–7)
NEUTROPHILS NFR BLD AUTO: 57.7 % (ref 42.7–76)
NRBC BLD AUTO-RTO: 0 /100 WBC (ref 0–0.2)
PA ADP PRP-ACNC: 193 PRU (ref 194–418)
PLATELET # BLD AUTO: 203 10*3/MM3 (ref 140–450)
PMV BLD AUTO: 10.2 FL (ref 6–12)
POTASSIUM SERPL-SCNC: 3.9 MMOL/L (ref 3.5–5.2)
QT INTERVAL: 386 MS
RBC # BLD AUTO: 4.73 10*6/MM3 (ref 3.77–5.28)
SARS-COV-2 ORF1AB RESP QL NAA+PROBE: NOT DETECTED
SODIUM SERPL-SCNC: 144 MMOL/L (ref 136–145)
WBC NRBC COR # BLD: 5.95 10*3/MM3 (ref 3.4–10.8)

## 2022-06-27 PROCEDURE — 93010 ELECTROCARDIOGRAM REPORT: CPT | Performed by: INTERNAL MEDICINE

## 2022-06-27 PROCEDURE — 85576 BLOOD PLATELET AGGREGATION: CPT

## 2022-06-27 PROCEDURE — 93005 ELECTROCARDIOGRAM TRACING: CPT

## 2022-06-27 PROCEDURE — C9803 HOPD COVID-19 SPEC COLLECT: HCPCS

## 2022-06-27 PROCEDURE — U0004 COV-19 TEST NON-CDC HGH THRU: HCPCS

## 2022-06-27 PROCEDURE — U0005 INFEC AGEN DETEC AMPLI PROBE: HCPCS

## 2022-06-27 PROCEDURE — 36415 COLL VENOUS BLD VENIPUNCTURE: CPT

## 2022-06-27 PROCEDURE — 85025 COMPLETE CBC W/AUTO DIFF WBC: CPT

## 2022-06-27 PROCEDURE — 80048 BASIC METABOLIC PNL TOTAL CA: CPT

## 2022-06-27 RX ORDER — OMEPRAZOLE 40 MG/1
40 CAPSULE, DELAYED RELEASE ORAL DAILY
COMMUNITY
Start: 2022-06-23

## 2022-06-27 NOTE — TELEPHONE ENCOUNTER
Pt is having a C-angio on 6/29/22.  Will she be able to fly on 7/18/22 to a wedding?  [phone] (Mobile)

## 2022-06-29 ENCOUNTER — ANESTHESIA (OUTPATIENT)
Dept: PERIOP | Facility: HOSPITAL | Age: 82
End: 2022-06-29

## 2022-06-29 ENCOUNTER — APPOINTMENT (OUTPATIENT)
Dept: GENERAL RADIOLOGY | Facility: HOSPITAL | Age: 82
End: 2022-06-29

## 2022-06-29 ENCOUNTER — APPOINTMENT (OUTPATIENT)
Dept: CT IMAGING | Facility: HOSPITAL | Age: 82
End: 2022-06-29

## 2022-06-29 ENCOUNTER — HOSPITAL ENCOUNTER (INPATIENT)
Facility: HOSPITAL | Age: 82
LOS: 1 days | Discharge: HOME | End: 2022-06-30
Attending: RADIOLOGY | Admitting: RADIOLOGY

## 2022-06-29 ENCOUNTER — ANESTHESIA EVENT (OUTPATIENT)
Dept: PERIOP | Facility: HOSPITAL | Age: 82
End: 2022-06-29

## 2022-06-29 PROBLEM — I67.1 CEREBRAL ANEURYSM WITHOUT RUPTURE: Status: ACTIVE | Noted: 2022-06-29

## 2022-06-29 LAB
GLUCOSE BLDC GLUCOMTR-MCNC: 118 MG/DL (ref 70–130)
PA ADP PRP-ACNC: 182 PRU (ref 194–418)

## 2022-06-29 PROCEDURE — C1894 INTRO/SHEATH, NON-LASER: HCPCS | Performed by: RADIOLOGY

## 2022-06-29 PROCEDURE — 0 IODIXANOL PER 1 ML: Performed by: RADIOLOGY

## 2022-06-29 PROCEDURE — 85347 COAGULATION TIME ACTIVATED: CPT

## 2022-06-29 PROCEDURE — C1884 EMBOLIZATION PROTECT SYST: HCPCS | Performed by: RADIOLOGY

## 2022-06-29 PROCEDURE — 85576 BLOOD PLATELET AGGREGATION: CPT | Performed by: RADIOLOGY

## 2022-06-29 PROCEDURE — 25010000002 HEPARIN (PORCINE) PER 1000 UNITS: Performed by: ANESTHESIOLOGY

## 2022-06-29 PROCEDURE — 25010000002 NEOSTIGMINE 10 MG/10ML SOLUTION: Performed by: NURSE ANESTHETIST, CERTIFIED REGISTERED

## 2022-06-29 PROCEDURE — 25010000002 DEXAMETHASONE PER 1 MG: Performed by: ANESTHESIOLOGY

## 2022-06-29 PROCEDURE — 25010000002 HEPARIN (PORCINE) PER 1000 UNITS: Performed by: RADIOLOGY

## 2022-06-29 PROCEDURE — 03VG3DZ RESTRICTION OF INTRACRANIAL ARTERY WITH INTRALUMINAL DEVICE, PERCUTANEOUS APPROACH: ICD-10-PCS | Performed by: RADIOLOGY

## 2022-06-29 PROCEDURE — 75898 FOLLOW-UP ANGIOGRAPHY: CPT

## 2022-06-29 PROCEDURE — 99222 1ST HOSP IP/OBS MODERATE 55: CPT | Performed by: PSYCHIATRY & NEUROLOGY

## 2022-06-29 PROCEDURE — C1887 CATHETER, GUIDING: HCPCS | Performed by: RADIOLOGY

## 2022-06-29 PROCEDURE — 25010000002 ONDANSETRON PER 1 MG: Performed by: NURSE ANESTHETIST, CERTIFIED REGISTERED

## 2022-06-29 PROCEDURE — 70450 CT HEAD/BRAIN W/O DYE: CPT

## 2022-06-29 PROCEDURE — C1769 GUIDE WIRE: HCPCS | Performed by: RADIOLOGY

## 2022-06-29 PROCEDURE — 36224 PLACE CATH CAROTD ART: CPT | Performed by: RADIOLOGY

## 2022-06-29 PROCEDURE — 75894 X-RAYS TRANSCATH THERAPY: CPT | Performed by: RADIOLOGY

## 2022-06-29 PROCEDURE — 25010000002 PROTAMINE SULFATE PER 10 MG: Performed by: ANESTHESIOLOGY

## 2022-06-29 PROCEDURE — 25010000002 PHENYLEPHRINE 10 MG/ML SOLUTION: Performed by: ANESTHESIOLOGY

## 2022-06-29 PROCEDURE — 25010000002 FENTANYL CITRATE (PF) 50 MCG/ML SOLUTION: Performed by: ANESTHESIOLOGY

## 2022-06-29 PROCEDURE — 36226 PLACE CATH VERTEBRAL ART: CPT | Performed by: RADIOLOGY

## 2022-06-29 PROCEDURE — 82962 GLUCOSE BLOOD TEST: CPT

## 2022-06-29 PROCEDURE — 25010000002 PROPOFOL 10 MG/ML EMULSION: Performed by: ANESTHESIOLOGY

## 2022-06-29 PROCEDURE — 25010000002 FENTANYL CITRATE (PF) 50 MCG/ML SOLUTION: Performed by: STUDENT IN AN ORGANIZED HEALTH CARE EDUCATION/TRAINING PROGRAM

## 2022-06-29 PROCEDURE — 25010000002 PHENYLEPHRINE 10 MG/ML SOLUTION 5 ML VIAL: Performed by: ANESTHESIOLOGY

## 2022-06-29 PROCEDURE — 75898 FOLLOW-UP ANGIOGRAPHY: CPT | Performed by: RADIOLOGY

## 2022-06-29 PROCEDURE — 75894 X-RAYS TRANSCATH THERAPY: CPT

## 2022-06-29 PROCEDURE — 61624 TCAT PERM OCCLS/EMBOLJ CNS: CPT | Performed by: RADIOLOGY

## 2022-06-29 PROCEDURE — C1760 CLOSURE DEV, VASC: HCPCS | Performed by: RADIOLOGY

## 2022-06-29 DEVICE — IMPLANTABLE DEVICE: Type: IMPLANTABLE DEVICE | Site: ARTERIAL | Status: FUNCTIONAL

## 2022-06-29 RX ORDER — LIDOCAINE HYDROCHLORIDE 10 MG/ML
0.5 INJECTION, SOLUTION EPIDURAL; INFILTRATION; INTRACAUDAL; PERINEURAL ONCE AS NEEDED
Status: DISCONTINUED | OUTPATIENT
Start: 2022-06-29 | End: 2022-06-29 | Stop reason: HOSPADM

## 2022-06-29 RX ORDER — CALCIUM GLUCONATE 20 MG/ML
1 INJECTION, SOLUTION INTRAVENOUS AS NEEDED
Status: DISCONTINUED | OUTPATIENT
Start: 2022-06-29 | End: 2022-06-30 | Stop reason: HOSPADM

## 2022-06-29 RX ORDER — MONTELUKAST SODIUM 10 MG/1
10 TABLET ORAL NIGHTLY
Status: DISCONTINUED | OUTPATIENT
Start: 2022-06-29 | End: 2022-06-30 | Stop reason: HOSPADM

## 2022-06-29 RX ORDER — SODIUM CHLORIDE 0.9 % (FLUSH) 0.9 %
3 SYRINGE (ML) INJECTION EVERY 12 HOURS SCHEDULED
Status: DISCONTINUED | OUTPATIENT
Start: 2022-06-29 | End: 2022-06-29 | Stop reason: HOSPADM

## 2022-06-29 RX ORDER — POTASSIUM CHLORIDE 750 MG/1
40 TABLET, FILM COATED, EXTENDED RELEASE ORAL AS NEEDED
Status: DISCONTINUED | OUTPATIENT
Start: 2022-06-29 | End: 2022-06-30 | Stop reason: HOSPADM

## 2022-06-29 RX ORDER — LABETALOL HYDROCHLORIDE 5 MG/ML
20 INJECTION, SOLUTION INTRAVENOUS
Status: DISCONTINUED | OUTPATIENT
Start: 2022-06-29 | End: 2022-06-30 | Stop reason: HOSPADM

## 2022-06-29 RX ORDER — FENTANYL CITRATE 50 UG/ML
25 INJECTION, SOLUTION INTRAMUSCULAR; INTRAVENOUS
Status: DISCONTINUED | OUTPATIENT
Start: 2022-06-29 | End: 2022-06-29 | Stop reason: HOSPADM

## 2022-06-29 RX ORDER — DIPHENHYDRAMINE HYDROCHLORIDE 50 MG/ML
6.25 INJECTION INTRAMUSCULAR; INTRAVENOUS
Status: DISCONTINUED | OUTPATIENT
Start: 2022-06-29 | End: 2022-06-29 | Stop reason: HOSPADM

## 2022-06-29 RX ORDER — FENTANYL CITRATE 50 UG/ML
50 INJECTION, SOLUTION INTRAMUSCULAR; INTRAVENOUS
Status: DISCONTINUED | OUTPATIENT
Start: 2022-06-29 | End: 2022-06-29 | Stop reason: HOSPADM

## 2022-06-29 RX ORDER — FENTANYL CITRATE 50 UG/ML
INJECTION, SOLUTION INTRAMUSCULAR; INTRAVENOUS
Status: COMPLETED | OUTPATIENT
Start: 2022-06-29 | End: 2022-06-29

## 2022-06-29 RX ORDER — ONDANSETRON 2 MG/ML
4 INJECTION INTRAMUSCULAR; INTRAVENOUS ONCE AS NEEDED
Status: DISCONTINUED | OUTPATIENT
Start: 2022-06-29 | End: 2022-06-29 | Stop reason: HOSPADM

## 2022-06-29 RX ORDER — HYDROCHLOROTHIAZIDE 25 MG/1
25 TABLET ORAL DAILY
Status: DISCONTINUED | OUTPATIENT
Start: 2022-06-29 | End: 2022-06-30 | Stop reason: HOSPADM

## 2022-06-29 RX ORDER — AMLODIPINE BESYLATE 5 MG/1
5 TABLET ORAL DAILY
Status: DISCONTINUED | OUTPATIENT
Start: 2022-06-29 | End: 2022-06-30 | Stop reason: HOSPADM

## 2022-06-29 RX ORDER — SODIUM CHLORIDE 0.9 % (FLUSH) 0.9 %
3-10 SYRINGE (ML) INJECTION AS NEEDED
Status: DISCONTINUED | OUTPATIENT
Start: 2022-06-29 | End: 2022-06-29 | Stop reason: HOSPADM

## 2022-06-29 RX ORDER — HYDROMORPHONE HYDROCHLORIDE 1 MG/ML
0.2 INJECTION, SOLUTION INTRAMUSCULAR; INTRAVENOUS; SUBCUTANEOUS
Status: DISCONTINUED | OUTPATIENT
Start: 2022-06-29 | End: 2022-06-29 | Stop reason: HOSPADM

## 2022-06-29 RX ORDER — DEXTROMETHORPHAN HYDROBROMIDE AND PROMETHAZINE HYDROCHLORIDE 15; 6.25 MG/5ML; MG/5ML
5 SYRUP ORAL 4 TIMES DAILY PRN
Status: DISCONTINUED | OUTPATIENT
Start: 2022-06-29 | End: 2022-06-30 | Stop reason: HOSPADM

## 2022-06-29 RX ORDER — ACETAMINOPHEN 325 MG/1
650 TABLET ORAL EVERY 4 HOURS PRN
Status: DISCONTINUED | OUTPATIENT
Start: 2022-06-29 | End: 2022-06-30 | Stop reason: HOSPADM

## 2022-06-29 RX ORDER — ASPIRIN 81 MG/1
81 TABLET, CHEWABLE ORAL DAILY
Status: DISCONTINUED | OUTPATIENT
Start: 2022-06-29 | End: 2022-06-30 | Stop reason: HOSPADM

## 2022-06-29 RX ORDER — SODIUM CHLORIDE, SODIUM LACTATE, POTASSIUM CHLORIDE, CALCIUM CHLORIDE 600; 310; 30; 20 MG/100ML; MG/100ML; MG/100ML; MG/100ML
75 INJECTION, SOLUTION INTRAVENOUS CONTINUOUS
Status: DISCONTINUED | OUTPATIENT
Start: 2022-06-29 | End: 2022-06-30

## 2022-06-29 RX ORDER — HEPARIN SODIUM 1000 [USP'U]/ML
INJECTION, SOLUTION INTRAVENOUS; SUBCUTANEOUS AS NEEDED
Status: DISCONTINUED | OUTPATIENT
Start: 2022-06-29 | End: 2022-06-29 | Stop reason: SURG

## 2022-06-29 RX ORDER — FLUMAZENIL 0.1 MG/ML
0.2 INJECTION INTRAVENOUS AS NEEDED
Status: DISCONTINUED | OUTPATIENT
Start: 2022-06-29 | End: 2022-06-29 | Stop reason: HOSPADM

## 2022-06-29 RX ORDER — LIDOCAINE HYDROCHLORIDE 20 MG/ML
INJECTION, SOLUTION INFILTRATION; PERINEURAL AS NEEDED
Status: DISCONTINUED | OUTPATIENT
Start: 2022-06-29 | End: 2022-06-29 | Stop reason: SURG

## 2022-06-29 RX ORDER — BISACODYL 10 MG
10 SUPPOSITORY, RECTAL RECTAL DAILY PRN
Status: DISCONTINUED | OUTPATIENT
Start: 2022-06-29 | End: 2022-06-30 | Stop reason: HOSPADM

## 2022-06-29 RX ORDER — PHENYLEPHRINE HYDROCHLORIDE 10 MG/ML
INJECTION INTRAVENOUS AS NEEDED
Status: DISCONTINUED | OUTPATIENT
Start: 2022-06-29 | End: 2022-06-29 | Stop reason: SURG

## 2022-06-29 RX ORDER — NICARDIPINE HYDROCHLORIDE 2.5 MG/ML
INJECTION INTRAVENOUS AS NEEDED
Status: DISCONTINUED | OUTPATIENT
Start: 2022-06-29 | End: 2022-06-29 | Stop reason: HOSPADM

## 2022-06-29 RX ORDER — POTASSIUM CHLORIDE 7.45 MG/ML
10 INJECTION INTRAVENOUS
Status: DISCONTINUED | OUTPATIENT
Start: 2022-06-29 | End: 2022-06-30 | Stop reason: HOSPADM

## 2022-06-29 RX ORDER — FLUTICASONE PROPIONATE 50 MCG
2 SPRAY, SUSPENSION (ML) NASAL DAILY
Status: DISCONTINUED | OUTPATIENT
Start: 2022-06-29 | End: 2022-06-30 | Stop reason: HOSPADM

## 2022-06-29 RX ORDER — HYDRALAZINE HYDROCHLORIDE 20 MG/ML
10 INJECTION INTRAMUSCULAR; INTRAVENOUS
Status: DISCONTINUED | OUTPATIENT
Start: 2022-06-29 | End: 2022-06-29 | Stop reason: HOSPADM

## 2022-06-29 RX ORDER — PROPOFOL 10 MG/ML
VIAL (ML) INTRAVENOUS AS NEEDED
Status: DISCONTINUED | OUTPATIENT
Start: 2022-06-29 | End: 2022-06-29 | Stop reason: SURG

## 2022-06-29 RX ORDER — HYDROMORPHONE HYDROCHLORIDE 1 MG/ML
0.25 INJECTION, SOLUTION INTRAMUSCULAR; INTRAVENOUS; SUBCUTANEOUS
Status: DISCONTINUED | OUTPATIENT
Start: 2022-06-29 | End: 2022-06-29 | Stop reason: HOSPADM

## 2022-06-29 RX ORDER — SODIUM CHLORIDE, SODIUM LACTATE, POTASSIUM CHLORIDE, CALCIUM CHLORIDE 600; 310; 30; 20 MG/100ML; MG/100ML; MG/100ML; MG/100ML
9 INJECTION, SOLUTION INTRAVENOUS CONTINUOUS
Status: DISCONTINUED | OUTPATIENT
Start: 2022-06-29 | End: 2022-06-30

## 2022-06-29 RX ORDER — SODIUM CHLORIDE 0.9 % (FLUSH) 0.9 %
10 SYRINGE (ML) INJECTION EVERY 12 HOURS SCHEDULED
Status: DISCONTINUED | OUTPATIENT
Start: 2022-06-29 | End: 2022-06-30 | Stop reason: HOSPADM

## 2022-06-29 RX ORDER — FAMOTIDINE 10 MG/ML
20 INJECTION, SOLUTION INTRAVENOUS ONCE
Status: COMPLETED | OUTPATIENT
Start: 2022-06-29 | End: 2022-06-29

## 2022-06-29 RX ORDER — NALOXONE HCL 0.4 MG/ML
0.4 VIAL (ML) INJECTION AS NEEDED
Status: DISCONTINUED | OUTPATIENT
Start: 2022-06-29 | End: 2022-06-29 | Stop reason: HOSPADM

## 2022-06-29 RX ORDER — DEXAMETHASONE SODIUM PHOSPHATE 10 MG/ML
INJECTION INTRAMUSCULAR; INTRAVENOUS AS NEEDED
Status: DISCONTINUED | OUTPATIENT
Start: 2022-06-29 | End: 2022-06-29 | Stop reason: SURG

## 2022-06-29 RX ORDER — PROTAMINE SULFATE 10 MG/ML
INJECTION, SOLUTION INTRAVENOUS AS NEEDED
Status: DISCONTINUED | OUTPATIENT
Start: 2022-06-29 | End: 2022-06-29 | Stop reason: SURG

## 2022-06-29 RX ORDER — MELATONIN
1000 DAILY
Status: DISCONTINUED | OUTPATIENT
Start: 2022-06-29 | End: 2022-06-30 | Stop reason: HOSPADM

## 2022-06-29 RX ORDER — METOPROLOL SUCCINATE 25 MG/1
25 TABLET, EXTENDED RELEASE ORAL DAILY
Status: DISCONTINUED | OUTPATIENT
Start: 2022-06-29 | End: 2022-06-30 | Stop reason: HOSPADM

## 2022-06-29 RX ORDER — SODIUM CHLORIDE 0.9 % (FLUSH) 0.9 %
10 SYRINGE (ML) INJECTION AS NEEDED
Status: DISCONTINUED | OUTPATIENT
Start: 2022-06-29 | End: 2022-06-30 | Stop reason: HOSPADM

## 2022-06-29 RX ORDER — POTASSIUM CHLORIDE 1.5 G/1.77G
40 POWDER, FOR SOLUTION ORAL AS NEEDED
Status: DISCONTINUED | OUTPATIENT
Start: 2022-06-29 | End: 2022-06-30 | Stop reason: HOSPADM

## 2022-06-29 RX ORDER — ONDANSETRON 2 MG/ML
INJECTION INTRAMUSCULAR; INTRAVENOUS AS NEEDED
Status: DISCONTINUED | OUTPATIENT
Start: 2022-06-29 | End: 2022-06-29 | Stop reason: SURG

## 2022-06-29 RX ORDER — MIDAZOLAM HYDROCHLORIDE 1 MG/ML
0.5 INJECTION INTRAMUSCULAR; INTRAVENOUS
Status: DISCONTINUED | OUTPATIENT
Start: 2022-06-29 | End: 2022-06-29 | Stop reason: HOSPADM

## 2022-06-29 RX ORDER — PANTOPRAZOLE SODIUM 40 MG/1
40 TABLET, DELAYED RELEASE ORAL EVERY MORNING
Status: DISCONTINUED | OUTPATIENT
Start: 2022-06-30 | End: 2022-06-30 | Stop reason: HOSPADM

## 2022-06-29 RX ORDER — ROCURONIUM BROMIDE 10 MG/ML
INJECTION, SOLUTION INTRAVENOUS AS NEEDED
Status: DISCONTINUED | OUTPATIENT
Start: 2022-06-29 | End: 2022-06-29 | Stop reason: SURG

## 2022-06-29 RX ORDER — MULTIPLE VITAMINS W/ MINERALS TAB 9MG-400MCG
1 TAB ORAL DAILY
Status: DISCONTINUED | OUTPATIENT
Start: 2022-06-29 | End: 2022-06-30 | Stop reason: HOSPADM

## 2022-06-29 RX ORDER — POLYETHYLENE GLYCOL 3350 17 G/17G
17 POWDER, FOR SOLUTION ORAL DAILY PRN
Status: DISCONTINUED | OUTPATIENT
Start: 2022-06-29 | End: 2022-06-30 | Stop reason: HOSPADM

## 2022-06-29 RX ORDER — ONDANSETRON 2 MG/ML
4 INJECTION INTRAMUSCULAR; INTRAVENOUS EVERY 6 HOURS PRN
Status: DISCONTINUED | OUTPATIENT
Start: 2022-06-29 | End: 2022-06-30 | Stop reason: HOSPADM

## 2022-06-29 RX ORDER — VASOPRESSIN 20 U/ML
INJECTION PARENTERAL AS NEEDED
Status: DISCONTINUED | OUTPATIENT
Start: 2022-06-29 | End: 2022-06-29 | Stop reason: SURG

## 2022-06-29 RX ORDER — ATORVASTATIN CALCIUM 20 MG/1
20 TABLET, FILM COATED ORAL NIGHTLY
Status: DISCONTINUED | OUTPATIENT
Start: 2022-06-29 | End: 2022-06-30 | Stop reason: HOSPADM

## 2022-06-29 RX ORDER — CHLORHEXIDINE GLUCONATE 0.12 MG/ML
15 RINSE ORAL 2 TIMES DAILY
Status: DISCONTINUED | OUTPATIENT
Start: 2022-06-29 | End: 2022-06-30 | Stop reason: HOSPADM

## 2022-06-29 RX ORDER — IODIXANOL 320 MG/ML
400 INJECTION, SOLUTION INTRAVASCULAR
Status: COMPLETED | OUTPATIENT
Start: 2022-06-29 | End: 2022-06-29

## 2022-06-29 RX ORDER — AMOXICILLIN 250 MG
2 CAPSULE ORAL 2 TIMES DAILY
Status: DISCONTINUED | OUTPATIENT
Start: 2022-06-29 | End: 2022-06-30 | Stop reason: HOSPADM

## 2022-06-29 RX ORDER — ACETAMINOPHEN 650 MG/1
650 SUPPOSITORY RECTAL EVERY 4 HOURS PRN
Status: DISCONTINUED | OUTPATIENT
Start: 2022-06-29 | End: 2022-06-30 | Stop reason: HOSPADM

## 2022-06-29 RX ORDER — LABETALOL HYDROCHLORIDE 5 MG/ML
5 INJECTION, SOLUTION INTRAVENOUS
Status: DISCONTINUED | OUTPATIENT
Start: 2022-06-29 | End: 2022-06-29 | Stop reason: HOSPADM

## 2022-06-29 RX ORDER — MAGNESIUM SULFATE HEPTAHYDRATE 40 MG/ML
2 INJECTION, SOLUTION INTRAVENOUS AS NEEDED
Status: DISCONTINUED | OUTPATIENT
Start: 2022-06-29 | End: 2022-06-30 | Stop reason: HOSPADM

## 2022-06-29 RX ORDER — OXYCODONE HYDROCHLORIDE AND ACETAMINOPHEN 5; 325 MG/1; MG/1
1 TABLET ORAL ONCE AS NEEDED
Status: DISCONTINUED | OUTPATIENT
Start: 2022-06-29 | End: 2022-06-29 | Stop reason: HOSPADM

## 2022-06-29 RX ORDER — FENTANYL CITRATE 50 UG/ML
INJECTION, SOLUTION INTRAMUSCULAR; INTRAVENOUS AS NEEDED
Status: DISCONTINUED | OUTPATIENT
Start: 2022-06-29 | End: 2022-06-29 | Stop reason: SURG

## 2022-06-29 RX ORDER — EPHEDRINE SULFATE 50 MG/ML
5 INJECTION, SOLUTION INTRAVENOUS ONCE AS NEEDED
Status: DISCONTINUED | OUTPATIENT
Start: 2022-06-29 | End: 2022-06-29 | Stop reason: HOSPADM

## 2022-06-29 RX ORDER — NEOSTIGMINE METHYLSULFATE 1 MG/ML
INJECTION, SOLUTION INTRAVENOUS AS NEEDED
Status: DISCONTINUED | OUTPATIENT
Start: 2022-06-29 | End: 2022-06-29 | Stop reason: SURG

## 2022-06-29 RX ORDER — ALBUTEROL SULFATE 90 UG/1
2 AEROSOL, METERED RESPIRATORY (INHALATION) EVERY 4 HOURS PRN
Status: DISCONTINUED | OUTPATIENT
Start: 2022-06-29 | End: 2022-06-30 | Stop reason: HOSPADM

## 2022-06-29 RX ORDER — ASPIRIN 81 MG/1
81 TABLET, CHEWABLE ORAL DAILY
COMMUNITY
End: 2022-12-01 | Stop reason: HOSPADM

## 2022-06-29 RX ORDER — MAGNESIUM SULFATE HEPTAHYDRATE 40 MG/ML
4 INJECTION, SOLUTION INTRAVENOUS AS NEEDED
Status: DISCONTINUED | OUTPATIENT
Start: 2022-06-29 | End: 2022-06-30 | Stop reason: HOSPADM

## 2022-06-29 RX ORDER — BISACODYL 5 MG/1
5 TABLET, DELAYED RELEASE ORAL DAILY PRN
Status: DISCONTINUED | OUTPATIENT
Start: 2022-06-29 | End: 2022-06-30 | Stop reason: HOSPADM

## 2022-06-29 RX ORDER — HYDROCODONE BITARTRATE AND ACETAMINOPHEN 5; 325 MG/1; MG/1
1 TABLET ORAL ONCE AS NEEDED
Status: DISCONTINUED | OUTPATIENT
Start: 2022-06-29 | End: 2022-06-29 | Stop reason: HOSPADM

## 2022-06-29 RX ADMIN — ASPIRIN 81 MG: 81 TABLET, CHEWABLE ORAL at 19:08

## 2022-06-29 RX ADMIN — PHENYLEPHRINE HYDROCHLORIDE 100 MCG: 10 INJECTION, SOLUTION INTRAVENOUS at 11:17

## 2022-06-29 RX ADMIN — IODIXANOL 310 ML: 320 INJECTION, SOLUTION INTRAVASCULAR at 13:15

## 2022-06-29 RX ADMIN — PROTAMINE SULFATE 50 MG: 10 INJECTION, SOLUTION INTRAVENOUS at 12:40

## 2022-06-29 RX ADMIN — ATORVASTATIN CALCIUM 20 MG: 20 TABLET, FILM COATED ORAL at 20:15

## 2022-06-29 RX ADMIN — VASOPRESSIN 2 UNITS: 20 INJECTION INTRAVENOUS at 12:10

## 2022-06-29 RX ADMIN — DEXAMETHASONE SODIUM PHOSPHATE 6 MG: 10 INJECTION INTRAMUSCULAR; INTRAVENOUS at 10:48

## 2022-06-29 RX ADMIN — FENTANYL CITRATE 50 MCG: 50 INJECTION INTRAMUSCULAR; INTRAVENOUS at 10:41

## 2022-06-29 RX ADMIN — LIDOCAINE HYDROCHLORIDE 60 MG: 20 INJECTION, SOLUTION INFILTRATION; PERINEURAL at 10:42

## 2022-06-29 RX ADMIN — PROPOFOL 150 MG: 10 INJECTION, EMULSION INTRAVENOUS at 10:42

## 2022-06-29 RX ADMIN — ROCURONIUM BROMIDE 10 MG: 50 INJECTION INTRAVENOUS at 11:14

## 2022-06-29 RX ADMIN — CHLORHEXIDINE GLUCONATE 15 ML: 1.2 SOLUTION ORAL at 20:17

## 2022-06-29 RX ADMIN — PHENYLEPHRINE HYDROCHLORIDE 100 MCG: 10 INJECTION, SOLUTION INTRAVENOUS at 11:13

## 2022-06-29 RX ADMIN — VASOPRESSIN 2 UNITS: 20 INJECTION INTRAVENOUS at 11:58

## 2022-06-29 RX ADMIN — GLYCOPYRROLATE 0.2 MG: 0.2 INJECTION, SOLUTION INTRAMUSCULAR; INTRAVITREAL at 12:07

## 2022-06-29 RX ADMIN — VASOPRESSIN 2 UNITS: 20 INJECTION INTRAVENOUS at 11:52

## 2022-06-29 RX ADMIN — PHENYLEPHRINE HYDROCHLORIDE 0.5 MCG/KG/MIN: 10 INJECTION INTRAVENOUS at 11:26

## 2022-06-29 RX ADMIN — FAMOTIDINE 20 MG: 10 INJECTION INTRAVENOUS at 09:53

## 2022-06-29 RX ADMIN — PHENYLEPHRINE HYDROCHLORIDE 200 MCG: 10 INJECTION, SOLUTION INTRAVENOUS at 11:30

## 2022-06-29 RX ADMIN — PHENYLEPHRINE HYDROCHLORIDE 200 MCG: 10 INJECTION, SOLUTION INTRAVENOUS at 10:57

## 2022-06-29 RX ADMIN — ACETAMINOPHEN 650 MG: 325 TABLET, FILM COATED ORAL at 20:13

## 2022-06-29 RX ADMIN — MONTELUKAST SODIUM 10 MG: 10 TABLET, FILM COATED ORAL at 20:15

## 2022-06-29 RX ADMIN — Medication 10 ML: at 20:16

## 2022-06-29 RX ADMIN — PHENYLEPHRINE HYDROCHLORIDE 200 MCG: 10 INJECTION, SOLUTION INTRAVENOUS at 11:04

## 2022-06-29 RX ADMIN — ONDANSETRON 4 MG: 2 INJECTION INTRAMUSCULAR; INTRAVENOUS at 12:50

## 2022-06-29 RX ADMIN — FENTANYL CITRATE 50 MCG: 50 INJECTION INTRAMUSCULAR; INTRAVENOUS at 12:57

## 2022-06-29 RX ADMIN — GLYCOPYRROLATE 0.2 MG: 0.2 INJECTION, SOLUTION INTRAMUSCULAR; INTRAVITREAL at 12:02

## 2022-06-29 RX ADMIN — NEOSTIGMINE METHYLSULFATE 3 MG: 1 INJECTION INTRAVENOUS at 12:50

## 2022-06-29 RX ADMIN — PHENYLEPHRINE HYDROCHLORIDE 100 MCG: 10 INJECTION, SOLUTION INTRAVENOUS at 11:22

## 2022-06-29 RX ADMIN — HEPARIN SODIUM 6000 UNITS: 1000 INJECTION INTRAVENOUS; SUBCUTANEOUS at 11:41

## 2022-06-29 RX ADMIN — LABETALOL HYDROCHLORIDE 20 MG: 5 INJECTION, SOLUTION INTRAVENOUS at 16:25

## 2022-06-29 RX ADMIN — ROCURONIUM BROMIDE 40 MG: 50 INJECTION INTRAVENOUS at 10:43

## 2022-06-29 RX ADMIN — PHENYLEPHRINE HYDROCHLORIDE 200 MCG: 10 INJECTION, SOLUTION INTRAVENOUS at 10:52

## 2022-06-29 RX ADMIN — GLYCOPYRROLATE 0.6 MG: 0.2 INJECTION, SOLUTION INTRAMUSCULAR; INTRAVITREAL at 12:50

## 2022-06-29 RX ADMIN — SODIUM CHLORIDE, POTASSIUM CHLORIDE, SODIUM LACTATE AND CALCIUM CHLORIDE 75 ML/HR: 600; 310; 30; 20 INJECTION, SOLUTION INTRAVENOUS at 20:16

## 2022-06-29 RX ADMIN — PHENYLEPHRINE HYDROCHLORIDE 200 MCG: 10 INJECTION, SOLUTION INTRAVENOUS at 11:02

## 2022-06-29 RX ADMIN — FLUTICASONE PROPIONATE 2 SPRAY: 50 SPRAY, METERED NASAL at 19:09

## 2022-06-29 RX ADMIN — SODIUM CHLORIDE, POTASSIUM CHLORIDE, SODIUM LACTATE AND CALCIUM CHLORIDE 9 ML/HR: 600; 310; 30; 20 INJECTION, SOLUTION INTRAVENOUS at 09:38

## 2022-06-29 RX ADMIN — FENTANYL CITRATE 50 MCG: 50 INJECTION INTRAMUSCULAR; INTRAVENOUS at 10:19

## 2022-06-29 NOTE — ANESTHESIA PROCEDURE NOTES
Arterial Line    Pre-sedation assessment completed: 6/29/2022 10:19 AM    Patient reassessed immediately prior to procedure    Patient location during procedure: pre-op  Start time: 6/29/2022 10:20 AM  Stop Time:6/29/2022 10:23 AM       Line placed for hemodynamic monitoring and ABGs/Labs/ISTAT.  Performed By   Anesthesiologist: Saúl Mckeon MD  Preanesthetic Checklist  Completed: patient identified, IV checked, site marked, risks and benefits discussed, surgical consent, monitors and equipment checked, pre-op evaluation and timeout performed  Arterial Line Prep   Sterile Tech: gloves, mask, cap and sterile barriers  Prep: ChloraPrep  Patient monitoring: blood pressure monitoring, continuous pulse oximetry and EKG  Arterial Line Procedure   Laterality:right  Location:  radial artery  Catheter size: 20 G   Guidance: ultrasound guided  PROCEDURE NOTE/ULTRASOUND INTERPRETATION.  Using ultrasound guidance the potential vascular sites for insertion of the catheter were visualized to determine the patency of the vessel to be used for vascular access.  After selecting the appropriate site for insertion, the needle was visualized under ultrasound being inserted into the radial artery, followed by ultrasound confirmation of wire and catheter placement. There were no abnormalities seen on ultrasound; an image was taken; and the patient tolerated the procedure with no complications.   Number of attempts: 1  Successful placement: yes  Post Assessment   Dressing Type: occlusive dressing applied, secured with tape and wrist guard applied.   Complications no  Circ/Move/Sens Assessment: unchanged.   Patient Tolerance: patient tolerated the procedure well with no apparent complications

## 2022-06-29 NOTE — ANESTHESIA PREPROCEDURE EVALUATION
Anesthesia Evaluation     NPO Solid Status: > 8 hours             Airway   Mallampati: III  TM distance: >3 FB  Neck ROM: full  Possible difficult intubation  Dental          Pulmonary - normal exam   (+) lung cancer, COPD mild,   Cardiovascular - normal exam    (+) hypertension, hyperlipidemia,       Neuro/Psych    ROS Comment: Unruptured left PCOM aneurysm  GI/Hepatic/Renal/Endo    (+)  GERD,      Musculoskeletal     Abdominal    Substance History      OB/GYN          Other      history of cancer (history of mouth cancer, has had some buccal and lingual tissue removed)                    Anesthesia Plan    ASA 3     general     (Arterial line)    Anesthetic plan, risks, benefits, and alternatives have been provided, discussed and informed consent has been obtained with: patient.        CODE STATUS:

## 2022-06-29 NOTE — ANESTHESIA POSTPROCEDURE EVALUATION
"Patient: Darlene Pfeiffer    Procedure Summary     Date: 06/29/22 Room / [facility]    Anesthesia Start: 1034 Anesthesia Stop: 1310    Procedure: 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 (Left ) Diagnosis:       Cerebral aneurysm, nonruptured      (Cerebral aneurysm, nonruptured [I67.1])    Surgeons: Bradley Dowell MD Provider: Kodak Montes De Oca MD    Anesthesia Type: general ASA Status: 3          Anesthesia Type: general    Vitals  Vitals Value Taken Time   /46 06/29/22 1406   Temp 36.6 °C (97.8 °F) 06/29/22 1308   Pulse 71 06/29/22 1416   Resp 14 06/29/22 1350   SpO2 94 % 06/29/22 1416   Vitals shown include unvalidated device data.        Post Anesthesia Care and Evaluation    Patient location during evaluation: bedside  Patient participation: complete - patient participated  Level of consciousness: awake and alert  Pain management: adequate    Airway patency: patent  Anesthetic complications: No anesthetic complications    Cardiovascular status: acceptable  Respiratory status: acceptable  Hydration status: acceptable    Comments: /47   Pulse 66   Temp 36.6 °C (97.8 °F) (Oral)   Resp 14   Ht 157.5 cm (62\")   Wt 58.9 kg (129 lb 12.8 oz)   LMP  (LMP Unknown)   SpO2 98%   BMI 23.74 kg/m²           "

## 2022-06-29 NOTE — ANESTHESIA PROCEDURE NOTES
Airway  Urgency: elective    Date/Time: 6/29/2022 10:47 AM  Airway not difficult    General Information and Staff    Patient location during procedure: OR  Anesthesiologist: Kodak Montes De Oca MD    Indications and Patient Condition  Indications for airway management: airway protection    Preoxygenated: yes  Mask difficulty assessment: 1 - vent by mask    Final Airway Details  Final airway type: endotracheal airway      Successful airway: ETT  Cuffed: yes   Successful intubation technique: direct laryngoscopy  Facilitating devices/methods: Bougie  Endotracheal tube insertion site: oral  Blade: Zoya  Blade size: 3  ETT size (mm): 7.0  Cormack-Lehane Classification: grade III - view of epiglottis only  Placement verified by: chest auscultation and capnometry   Measured from: lips  ETT/EBT  to lips (cm): 20  Number of attempts at approach: 2  Assessment: lips, teeth, and gum same as pre-op and atraumatic intubation

## 2022-06-30 ENCOUNTER — TELEPHONE (OUTPATIENT)
Dept: FAMILY MEDICINE CLINIC | Facility: CLINIC | Age: 82
End: 2022-06-30

## 2022-06-30 ENCOUNTER — APPOINTMENT (OUTPATIENT)
Dept: CT IMAGING | Facility: HOSPITAL | Age: 82
End: 2022-06-30

## 2022-06-30 ENCOUNTER — READMISSION MANAGEMENT (OUTPATIENT)
Dept: CALL CENTER | Facility: HOSPITAL | Age: 82
End: 2022-06-30

## 2022-06-30 VITALS
SYSTOLIC BLOOD PRESSURE: 120 MMHG | RESPIRATION RATE: 16 BRPM | DIASTOLIC BLOOD PRESSURE: 82 MMHG | HEART RATE: 82 BPM | BODY MASS INDEX: 23.89 KG/M2 | HEIGHT: 62 IN | OXYGEN SATURATION: 93 % | WEIGHT: 129.8 LBS | TEMPERATURE: 97.8 F

## 2022-06-30 LAB
ACT BLD: 126 SECONDS (ref 82–152)
ACT BLD: 242 SECONDS (ref 82–152)
ANION GAP SERPL CALCULATED.3IONS-SCNC: 12 MMOL/L (ref 5–15)
BUN SERPL-MCNC: 10 MG/DL (ref 8–23)
BUN/CREAT SERPL: 18.5 (ref 7–25)
CALCIUM SPEC-SCNC: 8.6 MG/DL (ref 8.6–10.5)
CHLORIDE SERPL-SCNC: 102 MMOL/L (ref 98–107)
CO2 SERPL-SCNC: 24 MMOL/L (ref 22–29)
CREAT SERPL-MCNC: 0.54 MG/DL (ref 0.57–1)
DEPRECATED RDW RBC AUTO: 41.4 FL (ref 37–54)
EGFRCR SERPLBLD CKD-EPI 2021: 93.8 ML/MIN/1.73
ERYTHROCYTE [DISTWIDTH] IN BLOOD BY AUTOMATED COUNT: 12.8 % (ref 12.3–15.4)
GLUCOSE SERPL-MCNC: 135 MG/DL (ref 65–99)
HCT VFR BLD AUTO: 34.9 % (ref 34–46.6)
HGB BLD-MCNC: 11.7 G/DL (ref 12–15.9)
MCH RBC QN AUTO: 29.8 PG (ref 26.6–33)
MCHC RBC AUTO-ENTMCNC: 33.5 G/DL (ref 31.5–35.7)
MCV RBC AUTO: 88.8 FL (ref 79–97)
PLATELET # BLD AUTO: 179 10*3/MM3 (ref 140–450)
PMV BLD AUTO: 10.6 FL (ref 6–12)
POTASSIUM SERPL-SCNC: 3.2 MMOL/L (ref 3.5–5.2)
RBC # BLD AUTO: 3.93 10*6/MM3 (ref 3.77–5.28)
SODIUM SERPL-SCNC: 138 MMOL/L (ref 136–145)
WBC NRBC COR # BLD: 6.93 10*3/MM3 (ref 3.4–10.8)

## 2022-06-30 PROCEDURE — 70450 CT HEAD/BRAIN W/O DYE: CPT

## 2022-06-30 PROCEDURE — 94664 DEMO&/EVAL PT USE INHALER: CPT

## 2022-06-30 PROCEDURE — 80048 BASIC METABOLIC PNL TOTAL CA: CPT | Performed by: INTERNAL MEDICINE

## 2022-06-30 PROCEDURE — 94640 AIRWAY INHALATION TREATMENT: CPT

## 2022-06-30 PROCEDURE — 94799 UNLISTED PULMONARY SVC/PX: CPT

## 2022-06-30 PROCEDURE — 99239 HOSP IP/OBS DSCHRG MGMT >30: CPT | Performed by: NURSE PRACTITIONER

## 2022-06-30 PROCEDURE — 85027 COMPLETE CBC AUTOMATED: CPT | Performed by: INTERNAL MEDICINE

## 2022-06-30 RX ORDER — ACETAMINOPHEN 325 MG/1
650 TABLET ORAL EVERY 4 HOURS PRN
COMMUNITY
Start: 2022-06-30 | End: 2022-12-12 | Stop reason: HOSPADM

## 2022-06-30 RX ADMIN — POTASSIUM CHLORIDE 40 MEQ: 750 TABLET, EXTENDED RELEASE ORAL at 06:04

## 2022-06-30 RX ADMIN — TIOTROPIUM BROMIDE INHALATION SPRAY 1 PUFF: 3.12 SPRAY, METERED RESPIRATORY (INHALATION) at 07:43

## 2022-06-30 RX ADMIN — MULTIPLE VITAMINS W/ MINERALS TAB 1 TABLET: TAB at 10:30

## 2022-06-30 RX ADMIN — Medication 1000 UNITS: at 08:23

## 2022-06-30 RX ADMIN — POTASSIUM CHLORIDE 40 MEQ: 750 TABLET, EXTENDED RELEASE ORAL at 09:55

## 2022-06-30 RX ADMIN — SODIUM CHLORIDE, POTASSIUM CHLORIDE, SODIUM LACTATE AND CALCIUM CHLORIDE 75 ML/HR: 600; 310; 30; 20 INJECTION, SOLUTION INTRAVENOUS at 02:12

## 2022-06-30 RX ADMIN — PANTOPRAZOLE SODIUM 40 MG: 40 TABLET, DELAYED RELEASE ORAL at 06:04

## 2022-06-30 RX ADMIN — ASPIRIN 81 MG: 81 TABLET, CHEWABLE ORAL at 09:55

## 2022-06-30 RX ADMIN — FLUTICASONE PROPIONATE 2 SPRAY: 50 SPRAY, METERED NASAL at 08:24

## 2022-06-30 RX ADMIN — AMLODIPINE BESYLATE 5 MG: 5 TABLET ORAL at 08:22

## 2022-06-30 RX ADMIN — METOPROLOL SUCCINATE 25 MG: 25 TABLET, EXTENDED RELEASE ORAL at 08:23

## 2022-06-30 NOTE — OUTREACH NOTE
Prep Survey    Flowsheet Row Responses   [facility] patient discharged from? [facility]   Is LACE score < 7 ? No   Emergency Room discharge w/ pulse ox? No   Eligibility [facility]   Date of Admission 06/29/22   Date of Discharge 06/30/22   Discharge Disposition Home or Self Care   Discharge diagnosis 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   Does the patient have one of the following disease processes/diagnoses(primary or secondary)? Other   Does the patient have Home health ordered? No   Is there a DME ordered? No   Prep survey completed? Yes          URIEL EWING - Registered Nurse

## 2022-06-30 NOTE — TELEPHONE ENCOUNTER
Caller: Darlene Pfeiffer    Relationship: Self    Best call back number: [phone]    What is the best time to reach you: ANY     Who are you requesting to speak with (clinical staff, provider,  specific staff member): CLINICAL     What was the call regarding:PATIENT IS CALLING IN TO ADVISE THAT ALL FUTURE REFILLS BE CALLED IN TO THE [facility]     Do you require a callback: NO

## 2022-07-01 ENCOUNTER — TRANSITIONAL CARE MANAGEMENT TELEPHONE ENCOUNTER (OUTPATIENT)
Dept: CALL CENTER | Facility: HOSPITAL | Age: 82
End: 2022-07-01

## 2022-07-01 NOTE — CASE MANAGEMENT/SOCIAL WORK
Case Management Discharge Note      Final Note: Home and denies needs         Selected Continued Care - Discharged on 6/30/2022 Admission date: 6/29/2022 - Discharge disposition: Home or Self Care    Destination    No services have been selected for the patient.              Durable Medical Equipment    No services have been selected for the patient.              Dialysis/Infusion    No services have been selected for the patient.              Home Medical Care    No services have been selected for the patient.              Therapy    No services have been selected for the patient.              Community Resources    No services have been selected for the patient.              Community & DME    No services have been selected for the patient.                  Transportation Services  Private: Car    Final Discharge Disposition Code: 01 - home or self-care

## 2022-07-01 NOTE — OUTREACH NOTE
Call Center TCM Note    Flowsheet Row Responses   [facility] patient discharged from? [place]   Does the patient have one of the following disease processes/diagnoses(primary or secondary)? Other   TCM attempt successful? No   Unsuccessful attempts Attempt 2          Ximena Valencia RN    7/1/2022, 14:05 EDT

## 2022-07-01 NOTE — OUTREACH NOTE
Call Center TCM Note    Flowsheet Row Responses   [facility] [place] by [facility] patient discharged from? [place]   Does the patient have one of the following disease processes/diagnoses(primary or secondary)? Other   TCM attempt successful? No   Unsuccessful attempts Attempt 1          Ximena Valencia RN    7/1/2022, 11:28 EDT

## 2022-07-04 ENCOUNTER — TRANSITIONAL CARE MANAGEMENT TELEPHONE ENCOUNTER (OUTPATIENT)
Dept: CALL CENTER | Facility: HOSPITAL | Age: 82
End: 2022-07-04

## 2022-07-04 NOTE — OUTREACH NOTE
Call Center TCM Note    Flowsheet Row Responses   [facility] patient discharged from? [place]   Does the patient have one of the following disease processes/diagnoses(primary or secondary)? Other   TCM attempt successful? Yes   Call start time 0832   Call end time 0835   Discharge diagnosis 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   Person spoke with today (if not patient) and relationship Selvin-spouse   Meds reviewed with patient/caregiver? Yes   Is the patient having any side effects they believe may be caused by any medication additions or changes? No   Does the patient have all medications ordered at discharge? Yes   Is the patient taking all medications as directed (includes completed medication regime)? Yes   Comments regarding appointments Neuro appt on 7/21/22   Does the patient have a primary care provider?  Yes   Does the patient have an appointment with their PCP within 7 days of discharge? No   What is preventing the patient from scheduling follow up appointments within 7 days of discharge? --  [spouse declined scheduling a hospital d/c f/u appt at this time]   Nursing Interventions Educated patient on importance of making appointment   Has the patient kept scheduled appointments due by today? N/A   Has home health visited the patient within 72 hours of discharge? N/A   Psychosocial issues? No   Did the patient receive a copy of their discharge instructions? Yes   Nursing interventions Reviewed instructions with patient   What is the patient's perception of their health status since discharge? Improving   Is the patient/caregiver able to teach back the hierarchy of who to call/visit for symptoms/problems? PCP, Specialist, Home health nurse, Urgent Care, ED, 911 Yes   If the patient is a current smoker, are they able to teach back resources for cessation? Not a smoker   TCM call completed? Yes          GABRIELLA BROOKS RN    7/4/2022, 08:37 EDT

## 2022-07-05 DIAGNOSIS — J44.9 CHRONIC OBSTRUCTIVE PULMONARY DISEASE, UNSPECIFIED COPD TYPE: ICD-10-CM

## 2022-07-05 DIAGNOSIS — E78.5 HYPERLIPIDEMIA, UNSPECIFIED HYPERLIPIDEMIA TYPE: ICD-10-CM

## 2022-07-05 RX ORDER — MONTELUKAST SODIUM 10 MG/1
10 TABLET ORAL NIGHTLY
Qty: 90 TABLET | Refills: 3 | Status: SHIPPED | OUTPATIENT
Start: 2022-07-05

## 2022-07-05 RX ORDER — ATORVASTATIN CALCIUM 20 MG/1
20 TABLET, FILM COATED ORAL NIGHTLY
Qty: 90 TABLET | Refills: 3 | Status: SHIPPED | OUTPATIENT
Start: 2022-07-05

## 2022-07-11 NOTE — PROGRESS NOTES
Subjective   Patient ID: Darlene Pfeiffer is a 82 y.o. female is here today for follow-up of a C-angio done on 6/29/22.  HX of a cerebral aneurysm.  Today pt reports some feeling of being off balance when she gets up in the middle of the night.    82-year-old female with incidental finding of a significant cerebral aneurysm while undergoing routine imaging for lung cancer follow-up. Imaging revealed 11 mm likely right posterior communicating artery aneurysm.  No evidence to suggest any rupture or subarachnoid hemorrhage.  She underwent successful endovascular occlusion with a WEB device on 6/29/2022 with a good result.  No complications were encountered.  Patient has done well postoperatively with no complaints of any residual groin puncture site complications.  Patient is currently on a baby aspirin as well as Lipitor.  She is a former smoker but quit in 2015.  She does have underlying fibromuscular dysplasia which is the likely source of her aneurysm.  A mirror image infundibulum is seen on the right.  She presents today to review her surgical procedure and aneurysm occlusion.      The following portions of the patient's history were reviewed and updated as appropriate:   She  has a past medical history of Allergic rhinitis, Asthma, Cancer of floor of mouth (HCC) (2014), Cerebral aneurysm, COPD (chronic obstructive pulmonary disease) (HCC), COVID (2020), Esophageal reflux, History of adenocarcinoma of lung, History of anemia, History of carcinoma in situ of skin, History of lung cancer, History of oral hairy leukoplakia, History of squamous cell carcinoma, Hyperlipidemia, Hypertension, Low vitamin B12 level, Lung cancer (HCC), Thyromegaly, UTI (urinary tract infection), and Vitamin D deficiency.  She does not have any pertinent problems on file.  She  has a past surgical history that includes Lung surgery (2012); Cholecystectomy (1999); Oral Lesion Excision/Biopsy; Partial glossectomy; Tubal ligation (1970); Eye  surgery (11/24/2020); Colonoscopy; and embolization cerebral (Left, 6/29/2022).  Her family history includes Colon cancer in her brother; No Known Problems in her father and another family member; Ovarian cancer in her mother and sister.  She  reports that she quit smoking about 7 years ago. She has never used smokeless tobacco. She reports current alcohol use. She reports that she does not use drugs.  Current Outpatient Medications   Medication Sig Dispense Refill   • acetaminophen (TYLENOL) 325 MG tablet Take 2 tablets by mouth Every 4 (Four) Hours As Needed for Mild Pain .     • albuterol sulfate  (90 Base) MCG/ACT inhaler Inhale 2 puffs Every 4 (Four) Hours As Needed for Wheezing. 18 g 2   • amLODIPine (NORVASC) 5 MG tablet TAKE 1 TABLET EVERY DAY (Patient taking differently: Take 5 mg by mouth Daily.) 90 tablet 1   • aspirin 81 MG chewable tablet Chew 81 mg Daily.     • atorvastatin (LIPITOR) 20 MG tablet Take 1 tablet by mouth Every Night. 90 tablet 3   • B Complex-C-E-Zn (b complex-C-E-zinc) tablet Take 1 tablet by mouth Daily. HOLDING FOR DOS     • cetirizine (ZyrTEC) 10 MG tablet Take 10 mg by mouth Daily.     • chlorhexidine (PERIDEX) 0.12 % solution Apply 15 mL to the mouth or throat 2 (Two) Times a Day.     • cholecalciferol (VITAMIN D3) 1000 units tablet Take 1,000 Units by mouth Daily. HOLDING FOR DOS     • ciclopirox (LOPROX) 0.77 % cream Apply 1 application topically to the appropriate area as directed 2 (Two) Times a Day.     • clobetasol (TEMOVATE) 0.05 % cream APPLY TOPICALLY TO THE AFFECTED AREA TWICE DAILY AS NEEDED     • fluticasone (FLONASE) 50 MCG/ACT nasal spray 2 sprays into the nostril(s) as directed by provider Daily.     • hydroCHLOROthiazide (HYDRODIURIL) 25 MG tablet TAKE 1 TABLET EVERY DAY (Patient taking differently: Take 25 mg by mouth Daily.) 90 tablet 1   • HYDROcod Polst-CPM Polst ER (Tussionex Pennkinetic ER) 10-8 MG/5ML ER suspension Take 5 mL by mouth Every 12 (Twelve)  Hours As Needed for Cough. 120 mL 0   • metoprolol succinate XL (TOPROL-XL) 25 MG 24 hr tablet TAKE 1 TABLET EVERY DAY (Patient taking differently: Take 25 mg by mouth Daily.) 90 tablet 1   • montelukast (SINGULAIR) 10 MG tablet Take 1 tablet by mouth Every Night. 90 tablet 3   • omeprazole (priLOSEC) 40 MG capsule Take 1 capsule by mouth Daily.     • Tiotropium Bromide Monohydrate (SPIRIVA RESPIMAT) 1.25 MCG/ACT aerosol solution inhaler Inhale 2 puffs Daily. 5 each 0     No current facility-administered medications for this visit.     Current Outpatient Medications on File Prior to Visit   Medication Sig   • acetaminophen (TYLENOL) 325 MG tablet Take 2 tablets by mouth Every 4 (Four) Hours As Needed for Mild Pain .   • albuterol sulfate  (90 Base) MCG/ACT inhaler Inhale 2 puffs Every 4 (Four) Hours As Needed for Wheezing.   • amLODIPine (NORVASC) 5 MG tablet TAKE 1 TABLET EVERY DAY (Patient taking differently: Take 5 mg by mouth Daily.)   • aspirin 81 MG chewable tablet Chew 81 mg Daily.   • atorvastatin (LIPITOR) 20 MG tablet Take 1 tablet by mouth Every Night.   • B Complex-C-E-Zn (b complex-C-E-zinc) tablet Take 1 tablet by mouth Daily. HOLDING FOR DOS   • cetirizine (ZyrTEC) 10 MG tablet Take 10 mg by mouth Daily.   • chlorhexidine (PERIDEX) 0.12 % solution Apply 15 mL to the mouth or throat 2 (Two) Times a Day.   • cholecalciferol (VITAMIN D3) 1000 units tablet Take 1,000 Units by mouth Daily. HOLDING FOR DOS   • ciclopirox (LOPROX) 0.77 % cream Apply 1 application topically to the appropriate area as directed 2 (Two) Times a Day.   • clobetasol (TEMOVATE) 0.05 % cream APPLY TOPICALLY TO THE AFFECTED AREA TWICE DAILY AS NEEDED   • fluticasone (FLONASE) 50 MCG/ACT nasal spray 2 sprays into the nostril(s) as directed by provider Daily.   • hydroCHLOROthiazide (HYDRODIURIL) 25 MG tablet TAKE 1 TABLET EVERY DAY (Patient taking differently: Take 25 mg by mouth Daily.)   • HYDROcod Polst-CPM Polst ER  (Tussionex Pennkinetic ER) 10-8 MG/5ML ER suspension Take 5 mL by mouth Every 12 (Twelve) Hours As Needed for Cough.   • metoprolol succinate XL (TOPROL-XL) 25 MG 24 hr tablet TAKE 1 TABLET EVERY DAY (Patient taking differently: Take 25 mg by mouth Daily.)   • montelukast (SINGULAIR) 10 MG tablet Take 1 tablet by mouth Every Night.   • omeprazole (priLOSEC) 40 MG capsule Take 1 capsule by mouth Daily.   • Tiotropium Bromide Monohydrate (SPIRIVA RESPIMAT) 1.25 MCG/ACT aerosol solution inhaler Inhale 2 puffs Daily.   • [DISCONTINUED] promethazine-dextromethorphan (PROMETHAZINE-DM) 6.25-15 MG/5ML syrup Take 5 mL by mouth 4 (Four) Times a Day As Needed for Cough.     No current facility-administered medications on file prior to visit.     She is allergic to sulfa antibiotics..    Review of Systems   Eyes: Negative for visual disturbance.   Gastrointestinal: Negative for nausea and vomiting.   Musculoskeletal: Positive for gait problem (OFF BALANCE).   Neurological: Negative for dizziness, tremors, seizures, syncope, speech difficulty, weakness, light-headedness, numbness and headaches.   Psychiatric/Behavioral: Negative for confusion and decreased concentration.       Objective    Vitals:    07/21/22 1321   BP: 140/70   Pulse: 88   Resp: 16   Temp: 96.9 °F (36.1 °C)   SpO2: 95%     Body mass index is 23.59 kg/m².    Physical Exam  Vitals and nursing note reviewed.   Constitutional:       General: She is not in acute distress.     Appearance: Normal appearance. She is normal weight.   HENT:      Head: Normocephalic.      Nose: Nose normal.      Mouth/Throat:      Mouth: Mucous membranes are moist.   Eyes:      Extraocular Movements: Extraocular movements intact.      Conjunctiva/sclera: Conjunctivae normal.      Pupils: Pupils are equal, round, and reactive to light.   Cardiovascular:      Rate and Rhythm: Normal rate.   Pulmonary:      Effort: Pulmonary effort is normal.   Musculoskeletal:      Cervical back: Normal  range of motion.        Legs:    Skin:     General: Skin is warm and dry.   Neurological:      General: No focal deficit present.      Mental Status: She is alert and oriented to person, place, and time.      Cranial Nerves: No cranial nerve deficit.      Sensory: No sensory deficit.      Motor: No weakness.      Coordination: Coordination normal.      Gait: Gait normal.   Psychiatric:         Mood and Affect: Mood normal.         Behavior: Behavior normal.         Thought Content: Thought content normal.         Judgment: Judgment normal.       Neurologic Exam     Mental Status   Oriented to person, place, and time.     Cranial Nerves     CN III, IV, VI   Pupils are equal, round, and reactive to light.      Assessment & Plan   Independent Review of Radiographic Studies:    The cerebral embolization dated 2022 was reviewed with the patient and shows the successful placement of a WEB device in the left posterior communicating artery aneurysm with good stagnation of flow achieved.  On the right there is an infundibulum at the posterior communicating artery location.  Medical Decision Makin-year-old female with an 11 mm left posterior communicating artery aneurysm now status post successful WEB occlusion through endovascular techniques.  No complication encountered and the patient is doing well postoperatively.  A follow-up angiogram in 1 year will be obtained to confirm aneurysm occlusion.  Patient was on a baby aspirin immediately postop and can now discontinue this as no antiplatelet agents are required.  Patient has no new neurologic complaints and has had the groin puncture site heal without difficulty.  Patient will continue to manage her hyperlipidemia and hypertension medically.  She is a former smoker and reminded not to consume tobacco products.  Diagnoses and all orders for this visit:    1. Cerebral aneurysm, nonruptured (Primary)    2. Primary hypertension    3. Hyperlipidemia, unspecified  hyperlipidemia type      Return in about 1 year (around 7/21/2023) for Recheck, Cerebral Angiogram (DSA).

## 2022-07-21 ENCOUNTER — OFFICE VISIT (OUTPATIENT)
Dept: NEUROSURGERY | Facility: CLINIC | Age: 82
End: 2022-07-21

## 2022-07-21 VITALS
BODY MASS INDEX: 23.74 KG/M2 | RESPIRATION RATE: 16 BRPM | DIASTOLIC BLOOD PRESSURE: 70 MMHG | OXYGEN SATURATION: 95 % | WEIGHT: 129 LBS | HEART RATE: 88 BPM | HEIGHT: 62 IN | TEMPERATURE: 96.9 F | SYSTOLIC BLOOD PRESSURE: 140 MMHG

## 2022-07-21 DIAGNOSIS — I67.1 CEREBRAL ANEURYSM, NONRUPTURED: Primary | ICD-10-CM

## 2022-07-21 DIAGNOSIS — E78.5 HYPERLIPIDEMIA, UNSPECIFIED HYPERLIPIDEMIA TYPE: ICD-10-CM

## 2022-07-21 DIAGNOSIS — I10 PRIMARY HYPERTENSION: ICD-10-CM

## 2022-07-21 PROCEDURE — 99214 OFFICE O/P EST MOD 30 MIN: CPT | Performed by: RADIOLOGY

## 2022-08-26 ENCOUNTER — TELEPHONE (OUTPATIENT)
Dept: FAMILY MEDICINE CLINIC | Facility: CLINIC | Age: 82
End: 2022-08-26

## 2022-08-26 NOTE — TELEPHONE ENCOUNTER
Caller: Darlene Pfeiffer    Relationship to patient: Self    Best call back number: [phone]    Chief complaint: ONGOING DIARRHEA, LOSS OF APPETITE    Type of visit: OFFICE VISIT    Requested date: 08/29/2022    If rescheduling, when is the original appointment: N/A - PATIENT REFUSED TO SCHEDULE UNLESS IT WAS ON Monday, 08/829/2022, WITH DR. KEHRER    Additional notes: THE OFFICE OPENED UP A SLOT FOR THE PATIENT TO BE SEEN TODAY, 08/26, AT 3 P.M. WITH DR. MEANS. HOWEVER, THE PATIENT DENIED THIS APPOINTMENT AND STATES THAT SHE ONLY WANTS TO BE SEEN WITH HER PCP ON THE DATE LISTED ABOVE.

## 2022-08-26 NOTE — TELEPHONE ENCOUNTER
Informed pt that she would need to call on Monday morning for a same day and if she got worse to go to the er or uc

## 2022-08-29 ENCOUNTER — OFFICE VISIT (OUTPATIENT)
Dept: FAMILY MEDICINE CLINIC | Facility: CLINIC | Age: 82
End: 2022-08-29

## 2022-08-29 VITALS
OXYGEN SATURATION: 97 % | BODY MASS INDEX: 23.22 KG/M2 | DIASTOLIC BLOOD PRESSURE: 70 MMHG | WEIGHT: 126.2 LBS | SYSTOLIC BLOOD PRESSURE: 122 MMHG | TEMPERATURE: 98.7 F | HEIGHT: 62 IN | HEART RATE: 76 BPM

## 2022-08-29 DIAGNOSIS — R10.13 EPIGASTRIC ABDOMINAL PAIN: ICD-10-CM

## 2022-08-29 DIAGNOSIS — E86.0 DEHYDRATION: ICD-10-CM

## 2022-08-29 DIAGNOSIS — R19.7 DIARRHEA, UNSPECIFIED TYPE: Primary | ICD-10-CM

## 2022-08-29 PROCEDURE — 99214 OFFICE O/P EST MOD 30 MIN: CPT | Performed by: FAMILY MEDICINE

## 2022-08-29 NOTE — PROGRESS NOTES
"Chief Complaint  Diarrhea (For 8 days )    Carlos Pfeiffer presents to [facility]  History of Present Illness    Diarrhea  She has been experiencing diarrhea for the past 8 days. She describes the stool as brown in color. She denies any hematochezia, abdominal pain, fever, chills, or recent travel. She reports abdominal cramping. She has not been drinking from any fresh water sources. The patient feels like she does not have any strength and is \" totally wiped out. \" She has been taking Imodium as needed which seems to help. She has not had a bowel movement today. Her last bowel movement was yesterday morning at 4:30 AM and it was all water. The patient has not had another bowel movement since then. Last night she ate 3/4 of a hamburger and a bun and was able to keep that down. This morning she had regular breakfast. She has occasional nausea but denies any vomiting. She has not had any recent antibiotics. The patient has not been urinating much due a loss of fluids.    Right index finger   She has a bruise on her right index finger at the PIP joint.    Objective   Vital Signs:  /70   Pulse 76   Temp 98.7 °F (37.1 °C)   Ht 157.5 cm (62\")   Wt 57.2 kg (126 lb 3.2 oz)   SpO2 97%   BMI 23.08 kg/m²   Estimated body mass index is 23.08 kg/m² as calculated from the following:    Height as of this encounter: 157.5 cm (62\").    Weight as of this encounter: 57.2 kg (126 lb 3.2 oz).    BMI is within normal parameters. No other follow-up for BMI required.      Physical Exam  Constitutional:       General: She is not in acute distress.     Appearance: Normal appearance. She is well-developed.   HENT:      Head: Normocephalic and atraumatic.      Right Ear: Tympanic membrane, ear canal and external ear normal.      Left Ear: Tympanic membrane, ear canal and external ear normal.      Mouth/Throat:      Mouth: Mucous membranes are moist.      Pharynx: Oropharynx is clear. "      Comments: Oral mucosa slightly dry    Eyes:      Conjunctiva/sclera: Conjunctivae normal.      Pupils: Pupils are equal, round, and reactive to light.   Neck:      Thyroid: No thyromegaly.   Cardiovascular:      Rate and Rhythm: Normal rate and regular rhythm.      Heart sounds: No murmur heard.  Pulmonary:      Effort: Pulmonary effort is normal.      Breath sounds: Normal breath sounds. No wheezing.   Abdominal:      General: Bowel sounds are normal.      Palpations: Abdomen is soft.      Tenderness: There is abdominal tenderness.      Comments: Mild epigastric tenderness without any guarding or rebound.   Musculoskeletal:         General: Normal range of motion.      Cervical back: Neck supple.   Lymphadenopathy:      Cervical: No cervical adenopathy.   Skin:     General: Skin is warm and dry.   Neurological:      Mental Status: She is alert and oriented to person, place, and time.   Psychiatric:         Mood and Affect: Mood normal.         Behavior: Behavior normal.        Result Review :                Assessment and Plan   Diagnoses and all orders for this visit:    1. Diarrhea, unspecified type (Primary)  -     Clostridioides difficile Toxin, PCR - Stool, Per Rectum; Future  -     Amylase  -     CBC & Differential  -     Comprehensive Metabolic Panel    2. Dehydration  -     Amylase  -     CBC & Differential  -     Comprehensive Metabolic Panel    3. Epigastric abdominal pain  -     Amylase  -     CBC & Differential  -     Comprehensive Metabolic Panel    Patient advised to push fluid.  We will follow-up pending results of test.         Follow Up   No follow-ups on file.  Patient was given instructions and counseling regarding her condition or for health maintenance advice. Please see specific information pulled into the AVS if appropriate.     Transcribed from ambient dictation for Meredith Lea Kehrer, MD by Bharati Calvillo.  08/29/22   16:33 EDT    Patient verbalized consent to the visit recording.

## 2022-08-30 LAB
ALBUMIN SERPL-MCNC: 4.2 G/DL (ref 3.7–4.7)
ALBUMIN/GLOB SERPL: 1.8 {RATIO} (ref 1.2–2.2)
ALP SERPL-CCNC: 131 IU/L (ref 44–121)
ALT SERPL-CCNC: 13 IU/L (ref 0–32)
AMYLASE SERPL-CCNC: 46 U/L (ref 31–110)
AST SERPL-CCNC: 16 IU/L (ref 0–40)
BASOPHILS # BLD AUTO: 0 X10E3/UL (ref 0–0.2)
BASOPHILS NFR BLD AUTO: 1 %
BILIRUB SERPL-MCNC: 0.3 MG/DL (ref 0–1.2)
BUN SERPL-MCNC: 19 MG/DL (ref 8–27)
BUN/CREAT SERPL: 22 (ref 12–28)
CALCIUM SERPL-MCNC: 9.4 MG/DL (ref 8.7–10.3)
CHLORIDE SERPL-SCNC: 99 MMOL/L (ref 96–106)
CO2 SERPL-SCNC: 25 MMOL/L (ref 20–29)
CREAT SERPL-MCNC: 0.86 MG/DL (ref 0.57–1)
EGFRCR-CYS SERPLBLD CKD-EPI 2021: 68 ML/MIN/1.73
EOSINOPHIL # BLD AUTO: 0.2 X10E3/UL (ref 0–0.4)
EOSINOPHIL NFR BLD AUTO: 3 %
ERYTHROCYTE [DISTWIDTH] IN BLOOD BY AUTOMATED COUNT: 12.6 % (ref 11.7–15.4)
GLOBULIN SER CALC-MCNC: 2.3 G/DL (ref 1.5–4.5)
GLUCOSE SERPL-MCNC: 101 MG/DL (ref 65–99)
HCT VFR BLD AUTO: 41.5 % (ref 34–46.6)
HGB BLD-MCNC: 13.7 G/DL (ref 11.1–15.9)
IMM GRANULOCYTES # BLD AUTO: 0 X10E3/UL (ref 0–0.1)
IMM GRANULOCYTES NFR BLD AUTO: 0 %
LYMPHOCYTES # BLD AUTO: 1.8 X10E3/UL (ref 0.7–3.1)
LYMPHOCYTES NFR BLD AUTO: 30 %
MCH RBC QN AUTO: 29.6 PG (ref 26.6–33)
MCHC RBC AUTO-ENTMCNC: 33 G/DL (ref 31.5–35.7)
MCV RBC AUTO: 90 FL (ref 79–97)
MONOCYTES # BLD AUTO: 0.5 X10E3/UL (ref 0.1–0.9)
MONOCYTES NFR BLD AUTO: 9 %
NEUTROPHILS # BLD AUTO: 3.5 X10E3/UL (ref 1.4–7)
NEUTROPHILS NFR BLD AUTO: 57 %
PLATELET # BLD AUTO: 232 X10E3/UL (ref 150–450)
POTASSIUM SERPL-SCNC: 3.7 MMOL/L (ref 3.5–5.2)
PROT SERPL-MCNC: 6.5 G/DL (ref 6–8.5)
RBC # BLD AUTO: 4.63 X10E6/UL (ref 3.77–5.28)
SODIUM SERPL-SCNC: 141 MMOL/L (ref 134–144)
WBC # BLD AUTO: 6.1 X10E3/UL (ref 3.4–10.8)

## 2022-09-02 DIAGNOSIS — R19.7 DIARRHEA, UNSPECIFIED TYPE: ICD-10-CM

## 2022-09-03 LAB — C DIFF TOX GENS STL QL NAA+PROBE: NEGATIVE

## 2022-09-26 ENCOUNTER — OFFICE VISIT (OUTPATIENT)
Dept: FAMILY MEDICINE CLINIC | Facility: CLINIC | Age: 82
End: 2022-09-26

## 2022-09-26 VITALS
SYSTOLIC BLOOD PRESSURE: 128 MMHG | OXYGEN SATURATION: 98 % | HEART RATE: 72 BPM | TEMPERATURE: 98.5 F | BODY MASS INDEX: 23.3 KG/M2 | WEIGHT: 126.6 LBS | DIASTOLIC BLOOD PRESSURE: 70 MMHG | HEIGHT: 62 IN

## 2022-09-26 DIAGNOSIS — S16.1XXA STRAIN OF NECK MUSCLE, INITIAL ENCOUNTER: ICD-10-CM

## 2022-09-26 DIAGNOSIS — J44.1 COPD EXACERBATION: ICD-10-CM

## 2022-09-26 DIAGNOSIS — J44.1 COPD WITH EXACERBATION: Primary | ICD-10-CM

## 2022-09-26 PROCEDURE — 99214 OFFICE O/P EST MOD 30 MIN: CPT | Performed by: FAMILY MEDICINE

## 2022-09-26 RX ORDER — PREDNISONE 20 MG/1
20 TABLET ORAL 2 TIMES DAILY
Qty: 10 TABLET | Refills: 0 | Status: SHIPPED | OUTPATIENT
Start: 2022-09-26 | End: 2022-10-01

## 2022-09-26 RX ORDER — ALBUTEROL SULFATE 90 UG/1
2 AEROSOL, METERED RESPIRATORY (INHALATION) EVERY 4 HOURS PRN
Qty: 18 G | Refills: 2 | Status: SHIPPED | OUTPATIENT
Start: 2022-09-26

## 2022-09-26 RX ORDER — TIZANIDINE 4 MG/1
2-4 TABLET ORAL EVERY 8 HOURS PRN
Qty: 30 TABLET | Refills: 1 | Status: SHIPPED | OUTPATIENT
Start: 2022-09-26 | End: 2022-12-08

## 2022-09-26 NOTE — PROGRESS NOTES
"Chief Complaint  Cough, Nasal Congestion, and Shoulder Pain (Left shoulder )    Carlos Pfeiffer presents to [facility]  History of Present Illness    Post nasal drip  On Wednesday, 09/21/2022, she developed post nasal drip and cough. The post nasal drip has progressively worsened. She has been taking cough medicine during the day and at night to help her sleep.     Left trapezius muscle pain  Approximately 3 to 4 weeks ago, she hit the back of her head on a cabinet. Since then, she has been experiencing pain in her left trapezius muscle that radiates into her left ear that is worsening. She denies any headache or neurologic symptoms. She has been applying a topical cream to the affected area, which helps her sleep. She has not tried any neck exercises or stretching. The patient denies any numbness or tingling radiating down her arms.    COPD  She has been sleeping better the last couple of nights, but she continues to have fatigue, loss of appetite, and feels helpless. The patient is currently using a Stiolto inhaler. She has not tried Trelegy in the past.    Allergies  She has allergies this time of year. She has not been around anyone sick recently.    Objective   Vital Signs:  /70   Pulse 72   Temp 98.5 °F (36.9 °C)   Ht 157.5 cm (62\")   Wt 57.4 kg (126 lb 9.6 oz)   SpO2 98%   BMI 23.16 kg/m²   Estimated body mass index is 23.16 kg/m² as calculated from the following:    Height as of this encounter: 157.5 cm (62\").    Weight as of this encounter: 57.4 kg (126 lb 9.6 oz).    BMI is within normal parameters. No other follow-up for BMI required.      Physical Exam  Constitutional:       General: She is not in acute distress.     Appearance: Normal appearance. She is well-developed.   HENT:      Head: Normocephalic and atraumatic.      Right Ear: Tympanic membrane, ear canal and external ear normal.      Left Ear: Tympanic membrane, ear canal and external ear " normal.      Mouth/Throat:      Mouth: Mucous membranes are moist.      Pharynx: Oropharynx is clear.   Eyes:      Conjunctiva/sclera: Conjunctivae normal.      Pupils: Pupils are equal, round, and reactive to light.   Neck:      Thyroid: No thyromegaly.   Cardiovascular:      Rate and Rhythm: Normal rate and regular rhythm.      Heart sounds: Normal heart sounds. No murmur heard.  Pulmonary:      Effort: Pulmonary effort is normal.      Breath sounds: Normal breath sounds. No wheezing.      Comments: Lungs decreased with faint wheezing.    Abdominal:      General: Bowel sounds are normal.      Palpations: Abdomen is soft.      Tenderness: There is no abdominal tenderness.   Musculoskeletal:         General: Normal range of motion.      Cervical back: Neck supple.      Comments: Mild kyphosis.,  Some stiffness in paracervical muscles and trapezius.   Lymphadenopathy:      Cervical: No cervical adenopathy.   Skin:     General: Skin is warm and dry.   Neurological:      Mental Status: She is alert and oriented to person, place, and time.   Psychiatric:         Mood and Affect: Mood normal.         Behavior: Behavior normal.        Result Review :                Assessment and Plan   Diagnoses and all orders for this visit:    1. COPD with exacerbation (HCC) (Primary)  -     predniSONE (DELTASONE) 20 MG tablet; Take 1 tablet by mouth 2 (Two) Times a Day for 5 days.  Dispense: 10 tablet; Refill: 0  -     Fluticasone-Umeclidin-Vilant (TRELEGY) 100-62.5-25 MCG/INH inhaler; Inhale 1 puff Daily.  Dispense: 60 each; Refill: 2  -     albuterol sulfate  (90 Base) MCG/ACT inhaler; Inhale 2 puffs Every 4 (Four) Hours As Needed for Wheezing.  Dispense: 18 g; Refill: 2    2. Strain of neck muscle, initial encounter  -     tiZANidine (ZANAFLEX) 4 MG tablet; Take 0.5-1 tablets by mouth Every 8 (Eight) Hours As Needed for Muscle Spasms.  Dispense: 30 tablet; Refill: 1    3. COPD exacerbation (HCC)             Follow Up   No  follow-ups on file.  Patient was given instructions and counseling regarding her condition or for health maintenance advice. Please see specific information pulled into the AVS if appropriate.     Transcribed from ambient dictation for Meredith Lea Kehrer, MD by Bharati Calvillo.  09/26/22   16:26 EDT    Patient verbalized consent to the visit recording.

## 2022-10-05 ENCOUNTER — TELEPHONE (OUTPATIENT)
Dept: FAMILY MEDICINE CLINIC | Facility: CLINIC | Age: 82
End: 2022-10-05

## 2022-10-05 ENCOUNTER — OFFICE VISIT (OUTPATIENT)
Dept: FAMILY MEDICINE CLINIC | Facility: CLINIC | Age: 82
End: 2022-10-05

## 2022-10-05 VITALS
SYSTOLIC BLOOD PRESSURE: 140 MMHG | WEIGHT: 124.4 LBS | DIASTOLIC BLOOD PRESSURE: 84 MMHG | HEIGHT: 62 IN | OXYGEN SATURATION: 98 % | HEART RATE: 88 BPM | BODY MASS INDEX: 22.89 KG/M2 | TEMPERATURE: 97.8 F

## 2022-10-05 DIAGNOSIS — J40 BRONCHITIS: ICD-10-CM

## 2022-10-05 DIAGNOSIS — J44.1 COPD WITH EXACERBATION: ICD-10-CM

## 2022-10-05 DIAGNOSIS — J44.1 COPD WITH EXACERBATION: Primary | ICD-10-CM

## 2022-10-05 PROCEDURE — 99214 OFFICE O/P EST MOD 30 MIN: CPT | Performed by: FAMILY MEDICINE

## 2022-10-05 RX ORDER — PREDNISONE 20 MG/1
TABLET ORAL
Qty: 10 TABLET | Refills: 0 | Status: SHIPPED | OUTPATIENT
Start: 2022-10-05 | End: 2022-10-05 | Stop reason: SDUPTHER

## 2022-10-05 RX ORDER — PREDNISONE 20 MG/1
TABLET ORAL
Qty: 10 TABLET | Refills: 0 | Status: CANCELLED | OUTPATIENT
Start: 2022-10-05 | End: 2022-10-12

## 2022-10-05 RX ORDER — DOXYCYCLINE HYCLATE 100 MG/1
100 CAPSULE ORAL 2 TIMES DAILY
Qty: 14 CAPSULE | Refills: 0 | Status: SHIPPED | OUTPATIENT
Start: 2022-10-05 | End: 2022-10-12

## 2022-10-05 RX ORDER — PREDNISONE 20 MG/1
TABLET ORAL
Qty: 10 TABLET | Refills: 0 | Status: SHIPPED | OUTPATIENT
Start: 2022-10-05 | End: 2022-10-12

## 2022-10-05 RX ORDER — DOXYCYCLINE HYCLATE 100 MG/1
100 CAPSULE ORAL 2 TIMES DAILY
Qty: 14 CAPSULE | Refills: 0 | Status: CANCELLED | OUTPATIENT
Start: 2022-10-05 | End: 2022-10-12

## 2022-10-05 RX ORDER — DOXYCYCLINE HYCLATE 100 MG/1
100 CAPSULE ORAL 2 TIMES DAILY
Qty: 14 CAPSULE | Refills: 0 | Status: SHIPPED | OUTPATIENT
Start: 2022-10-05 | End: 2022-10-05 | Stop reason: SDUPTHER

## 2022-10-05 NOTE — TELEPHONE ENCOUNTER
PATIENT CALLED STATING THAT THE TWO MEDICATIONS THAT WERE SENT ON 10/05/22 WERE SENT TO THE WRONG PHARMACY.    doxycycline (VIBRAMYCIN) 100 MG capsule    predniSONE (DELTASONE) 20 MG tablet      SHE WOULD LIKE TO HAVE THEM SENT TO:    [id number] - [place], [place]  Critical access hospital[address] - [phone]  - [phone]   [phone]

## 2022-10-05 NOTE — TELEPHONE ENCOUNTER
Caller: Darlene Pfeiffer    Relationship: Self    Best call back number: [phone]    Requested Prescriptions:   Requested Prescriptions     Pending Prescriptions Disp Refills   • predniSONE (DELTASONE) 20 MG tablet 10 tablet 0     Sig: Take 1 tablet by mouth 2 (Two) Times a Day for 3 days, THEN 1 tablet Daily for 4 days.   • doxycycline (VIBRAMYCIN) 100 MG capsule 14 capsule 0     Sig: Take 1 capsule by mouth 2 (Two) Times a Day for 7 days.        Pharmacy where request should be sent: [id number] - [place] [id number] - [phone]  - [phone] FX     Additional details provided by patient: PATIENT IS REQUESTING MEDICATION TO GO TO LOCAL [facility] INSTEAD OF MAIL ORDER.    Does the patient have less than a 3 day supply:  [x] Yes  [] No    Charlene Zimmerman Rep   10/05/22 13:45 EDT

## 2022-10-05 NOTE — PROGRESS NOTES
"    Chief Complaint  Cough (Seen on 9/26, finished steroids, not any better /Chest hurting )    Subjective        Darlene Pfeiffer presents to [facility]  History of Present Illness  Darlene Pfeiffer is an 82-year-old female who presents today for follow-up of cough. She was last seen on 09/26/2022 and was prescribed prednisone, which she reports did not provide any relief.     She states that her symptoms progressively worsened after taking the prednisone. She is unable to eat or sleep and states that she began coughing constantly and feels that yesterday, 10/04/2022, she coughed for \"15 straight hours\".  She has been taking cough medicine, but reports that this only provides relief for approximately 1 hour before the cough returns. She has been using the albuterol inhaler as she discontinued the Trelegy inhaler. She states that the Trelegy inhaler caused her tongue to swell. She does not feel that her cough has worsened, but it has not improved either.     She reports having something similar to goosebumps underneath her skin. She denies any sore throat. Her oxygen level is within normal limits today. She denies any chills. She experiences dizziness during coughing episodes and feels that she may pass out.      She states that ragweed has been causing her eyes to water and nose to run. She has nasal spray at home that she uses twice a day. She states that it is mildly helpful.     She reports feeling swelling on the left side of her sternum. Her last surgery was left upper lobe surgery. Her last imaging was performed in 06/2022. She reports that she is scheduled for follow up with Dr. Kothari in 11/2022. .    Review of Systems:  A review of systems was performed, and pertinent findings are noted in the HPI.     Objective   Vital Signs:  /84   Pulse 88   Temp 97.8 °F (36.6 °C)   Ht 157.5 cm (62\")   Wt 56.4 kg (124 lb 6.4 oz)   SpO2 98%   BMI 22.75 kg/m²   Estimated body mass index " "is 22.75 kg/m² as calculated from the following:    Height as of this encounter: 157.5 cm (62\").    Weight as of this encounter: 56.4 kg (124 lb 6.4 oz).    BMI is within normal parameters. No other follow-up for BMI required.      Physical Exam  Constitutional:       General: She is not in acute distress.     Appearance: Normal appearance. She is well-developed.   HENT:      Head: Normocephalic and atraumatic.      Right Ear: Tympanic membrane, ear canal and external ear normal.      Left Ear: Tympanic membrane, ear canal and external ear normal.      Mouth/Throat:      Mouth: Mucous membranes are moist.      Pharynx: Oropharynx is clear.   Eyes:      Conjunctiva/sclera: Conjunctivae normal.      Pupils: Pupils are equal, round, and reactive to light.   Neck:      Thyroid: No thyromegaly.   Cardiovascular:      Rate and Rhythm: Normal rate and regular rhythm.      Heart sounds: No murmur heard.  Pulmonary:      Effort: Pulmonary effort is normal.      Comments: Her lung sounds are more coarse on the right with some end expiratory wheezes.  Chest:      Comments: Prominent left sternoccostal joint.   Abdominal:      General: Bowel sounds are normal.      Palpations: Abdomen is soft.      Tenderness: There is no abdominal tenderness.   Musculoskeletal:         General: Normal range of motion.      Cervical back: Neck supple.   Lymphadenopathy:      Cervical: No cervical adenopathy.   Skin:     General: Skin is warm and dry.   Neurological:      Mental Status: She is alert and oriented to person, place, and time.   Psychiatric:         Mood and Affect: Mood normal.         Behavior: Behavior normal.        Result Review :                Assessment and Plan {CC Problem List  Visit Diagnosis   ROS  Review (Popup)  Health Maintenance  Quality  BestPractice  Medications  SmartSets  SnapShot Encounters  Media :23}  Diagnoses and all orders for this visit:    1. COPD with exacerbation (HCC) (Primary)  -     XR " Chest 2 View; Future  -     doxycycline (VIBRAMYCIN) 100 MG capsule; Take 1 capsule by mouth 2 (Two) Times a Day for 7 days.  Dispense: 14 capsule; Refill: 0  -     predniSONE (DELTASONE) 20 MG tablet; Take 1 tablet by mouth 2 (Two) Times a Day for 3 days, THEN 1 tablet Daily for 4 days.  Dispense: 10 tablet; Refill: 0    2. Bronchitis  -     XR Chest 2 View; Future    1. COPD with exacerbation  ? The patient was provided an additional course of prednisone and also started on doxycycline.     2. Bronchitis  ? On exam, pneumonia was not heard; however, lung sounds were more coarse on the right with some end expiratory wheezes. Bronchitis is suspected rather than pneumonia.     3. Prominent left sternocostal joint  ? The area around the sternum that the patient points out as being swollen is likely arthritis. The frequency of her cough has likely made this more noticeable.         Follow Up   No follow-ups on file.  Patient was given instructions and counseling regarding her condition or for health maintenance advice. Please see specific information pulled into the AVS if appropriate.     Transcribed from ambient dictation for Meredith Lea Kehrer, MD by Sia Morse Quality .  10/05/22   13:50 EDT    Patient verbalized consent to the visit recording.

## 2022-10-13 ENCOUNTER — TELEPHONE (OUTPATIENT)
Dept: FAMILY MEDICINE CLINIC | Facility: CLINIC | Age: 82
End: 2022-10-13

## 2022-10-13 DIAGNOSIS — J44.9 CHRONIC OBSTRUCTIVE PULMONARY DISEASE, UNSPECIFIED COPD TYPE: ICD-10-CM

## 2022-10-13 NOTE — TELEPHONE ENCOUNTER
Caller: Darlene Stahl    Relationship: Self    Best call back number: [phone] (Mobile)    What is the best time to reach you: ANYTIME, ASAP    Who are you requesting to speak with (clinical staff, provider,  specific staff member): CLINICAL STAFF/ DR KEHRER'S ASSISTANT    Do you know the name of the person who called: DARLENE STAHL    What was the call regarding: PATIENT STATES SHE IS SUFFERING AND NEEDS SOMETHING TO SLEEP AND FOR SEVERE COUGH AND CANNOT GET IN TO SEE THE DOCTOR FOR TWO WEEKS- NEEDS SENT TO [facility] IN [place] ASAP    PATIENT STATES SHE JUST FINISHED HER MEDICATION SHE WAS GIVEN YESTERDAY AND IS STILL NOT FEELING BETTER BUT HAS NO FEVER AND THAT DR KEHRER WOULD KNOW MORE ABOUT THE SITUATION    PLEASE ADVISE PATIENT IF SOMETHING CAN BE SENT TO THE PHARMACY [facility]    Do you require a callback: YES, ASAP

## 2022-10-13 NOTE — TELEPHONE ENCOUNTER
[facility]    PT IS STATING THAT THE TUSSIONEX DID NOTHING FOR HER SHE WAS WONDERING ABOUT A SLEEPING PILL?  BUT SHE WILL TAKE IT IF THAT IS ALL YOU WILL GIVE HER SHE IS MISERABLE.      SHE IS GOING ON THE 28TH FOR HER CT SCAN

## 2022-10-14 ENCOUNTER — APPOINTMENT (OUTPATIENT)
Dept: CT IMAGING | Facility: HOSPITAL | Age: 82
End: 2022-10-14

## 2022-10-14 ENCOUNTER — HOSPITAL ENCOUNTER (INPATIENT)
Facility: HOSPITAL | Age: 82
LOS: 3 days | Discharge: HOME | End: 2022-10-18
Attending: EMERGENCY MEDICINE | Admitting: INTERNAL MEDICINE

## 2022-10-14 DIAGNOSIS — E83.42 HYPOMAGNESEMIA: ICD-10-CM

## 2022-10-14 DIAGNOSIS — C34.12 MALIGNANT NEOPLASM OF UPPER LOBE OF LEFT LUNG: ICD-10-CM

## 2022-10-14 DIAGNOSIS — E87.6 HYPOKALEMIA: ICD-10-CM

## 2022-10-14 DIAGNOSIS — R06.02 SHORTNESS OF BREATH: Primary | ICD-10-CM

## 2022-10-14 DIAGNOSIS — R93.89 ABNORMAL CT OF THE CHEST: ICD-10-CM

## 2022-10-14 DIAGNOSIS — R05.1 ACUTE COUGH: ICD-10-CM

## 2022-10-14 LAB
ALBUMIN SERPL-MCNC: 3.3 G/DL (ref 3.5–5.2)
ALBUMIN/GLOB SERPL: 1.2 G/DL
ALP SERPL-CCNC: 79 U/L (ref 39–117)
ALT SERPL W P-5'-P-CCNC: 18 U/L (ref 1–33)
ANION GAP SERPL CALCULATED.3IONS-SCNC: 10 MMOL/L (ref 5–15)
AST SERPL-CCNC: 16 U/L (ref 1–32)
B PARAPERT DNA SPEC QL NAA+PROBE: NOT DETECTED
B PERT DNA SPEC QL NAA+PROBE: NOT DETECTED
BASOPHILS # BLD AUTO: 0.01 10*3/MM3 (ref 0–0.2)
BASOPHILS NFR BLD AUTO: 0.1 % (ref 0–1.5)
BILIRUB SERPL-MCNC: 1 MG/DL (ref 0–1.2)
BUN SERPL-MCNC: 17 MG/DL (ref 8–23)
BUN/CREAT SERPL: 25.4 (ref 7–25)
C PNEUM DNA NPH QL NAA+NON-PROBE: NOT DETECTED
CALCIUM SPEC-SCNC: 8.8 MG/DL (ref 8.6–10.5)
CHLORIDE SERPL-SCNC: 95 MMOL/L (ref 98–107)
CO2 SERPL-SCNC: 34 MMOL/L (ref 22–29)
CREAT SERPL-MCNC: 0.67 MG/DL (ref 0.57–1)
D-LACTATE SERPL-SCNC: 1.3 MMOL/L (ref 0.5–2)
DEPRECATED RDW RBC AUTO: 40.9 FL (ref 37–54)
EGFRCR SERPLBLD CKD-EPI 2021: 88.5 ML/MIN/1.73
EOSINOPHIL # BLD AUTO: 0 10*3/MM3 (ref 0–0.4)
EOSINOPHIL NFR BLD AUTO: 0 % (ref 0.3–6.2)
ERYTHROCYTE [DISTWIDTH] IN BLOOD BY AUTOMATED COUNT: 13.1 % (ref 12.3–15.4)
FLUAV SUBTYP SPEC NAA+PROBE: NOT DETECTED
FLUBV RNA ISLT QL NAA+PROBE: NOT DETECTED
GLOBULIN UR ELPH-MCNC: 2.7 GM/DL
GLUCOSE SERPL-MCNC: 126 MG/DL (ref 65–99)
HADV DNA SPEC NAA+PROBE: NOT DETECTED
HCOV 229E RNA SPEC QL NAA+PROBE: NOT DETECTED
HCOV HKU1 RNA SPEC QL NAA+PROBE: NOT DETECTED
HCOV NL63 RNA SPEC QL NAA+PROBE: NOT DETECTED
HCOV OC43 RNA SPEC QL NAA+PROBE: NOT DETECTED
HCT VFR BLD AUTO: 37.1 % (ref 34–46.6)
HGB BLD-MCNC: 12.7 G/DL (ref 12–15.9)
HMPV RNA NPH QL NAA+NON-PROBE: NOT DETECTED
HPIV1 RNA ISLT QL NAA+PROBE: NOT DETECTED
HPIV2 RNA SPEC QL NAA+PROBE: NOT DETECTED
HPIV3 RNA NPH QL NAA+PROBE: NOT DETECTED
HPIV4 P GENE NPH QL NAA+PROBE: NOT DETECTED
IMM GRANULOCYTES # BLD AUTO: 0.06 10*3/MM3 (ref 0–0.05)
IMM GRANULOCYTES NFR BLD AUTO: 0.4 % (ref 0–0.5)
LYMPHOCYTES # BLD AUTO: 2.06 10*3/MM3 (ref 0.7–3.1)
LYMPHOCYTES NFR BLD AUTO: 13.8 % (ref 19.6–45.3)
M PNEUMO IGG SER IA-ACNC: NOT DETECTED
MAGNESIUM SERPL-MCNC: 1.4 MG/DL (ref 1.6–2.4)
MCH RBC QN AUTO: 29.7 PG (ref 26.6–33)
MCHC RBC AUTO-ENTMCNC: 34.2 G/DL (ref 31.5–35.7)
MCV RBC AUTO: 86.7 FL (ref 79–97)
MONOCYTES # BLD AUTO: 0.82 10*3/MM3 (ref 0.1–0.9)
MONOCYTES NFR BLD AUTO: 5.5 % (ref 5–12)
NEUTROPHILS NFR BLD AUTO: 11.95 10*3/MM3 (ref 1.7–7)
NEUTROPHILS NFR BLD AUTO: 80.2 % (ref 42.7–76)
NRBC BLD AUTO-RTO: 0 /100 WBC (ref 0–0.2)
NT-PROBNP SERPL-MCNC: 1234 PG/ML (ref 0–1800)
PLATELET # BLD AUTO: 295 10*3/MM3 (ref 140–450)
PMV BLD AUTO: 9.7 FL (ref 6–12)
POTASSIUM SERPL-SCNC: 2.7 MMOL/L (ref 3.5–5.2)
PROCALCITONIN SERPL-MCNC: 0.09 NG/ML (ref 0–0.25)
PROT SERPL-MCNC: 6 G/DL (ref 6–8.5)
QT INTERVAL: 375 MS
RBC # BLD AUTO: 4.28 10*6/MM3 (ref 3.77–5.28)
RHINOVIRUS RNA SPEC NAA+PROBE: NOT DETECTED
RSV RNA NPH QL NAA+NON-PROBE: NOT DETECTED
SARS-COV-2 RNA NPH QL NAA+NON-PROBE: NOT DETECTED
SODIUM SERPL-SCNC: 139 MMOL/L (ref 136–145)
WBC NRBC COR # BLD: 14.9 10*3/MM3 (ref 3.4–10.8)

## 2022-10-14 PROCEDURE — 93005 ELECTROCARDIOGRAM TRACING: CPT

## 2022-10-14 PROCEDURE — 63710000001 ONDANSETRON ODT 4 MG TABLET DISPERSIBLE: Performed by: NURSE PRACTITIONER

## 2022-10-14 PROCEDURE — 80053 COMPREHEN METABOLIC PANEL: CPT | Performed by: NURSE PRACTITIONER

## 2022-10-14 PROCEDURE — 93010 ELECTROCARDIOGRAM REPORT: CPT | Performed by: INTERNAL MEDICINE

## 2022-10-14 PROCEDURE — 93005 ELECTROCARDIOGRAM TRACING: CPT | Performed by: EMERGENCY MEDICINE

## 2022-10-14 PROCEDURE — G0378 HOSPITAL OBSERVATION PER HR: HCPCS

## 2022-10-14 PROCEDURE — 71250 CT THORAX DX C-: CPT

## 2022-10-14 PROCEDURE — 83605 ASSAY OF LACTIC ACID: CPT | Performed by: EMERGENCY MEDICINE

## 2022-10-14 PROCEDURE — 25010000002 MAGNESIUM SULFATE 2 GM/50ML SOLUTION: Performed by: NURSE PRACTITIONER

## 2022-10-14 PROCEDURE — 0202U NFCT DS 22 TRGT SARS-COV-2: CPT | Performed by: NURSE PRACTITIONER

## 2022-10-14 PROCEDURE — 99285 EMERGENCY DEPT VISIT HI MDM: CPT

## 2022-10-14 PROCEDURE — 36415 COLL VENOUS BLD VENIPUNCTURE: CPT

## 2022-10-14 PROCEDURE — 94760 N-INVAS EAR/PLS OXIMETRY 1: CPT

## 2022-10-14 PROCEDURE — 94761 N-INVAS EAR/PLS OXIMETRY MLT: CPT

## 2022-10-14 PROCEDURE — 85025 COMPLETE CBC W/AUTO DIFF WBC: CPT | Performed by: NURSE PRACTITIONER

## 2022-10-14 PROCEDURE — 83735 ASSAY OF MAGNESIUM: CPT | Performed by: EMERGENCY MEDICINE

## 2022-10-14 PROCEDURE — 87040 BLOOD CULTURE FOR BACTERIA: CPT | Performed by: EMERGENCY MEDICINE

## 2022-10-14 PROCEDURE — 84145 PROCALCITONIN (PCT): CPT | Performed by: NURSE PRACTITIONER

## 2022-10-14 PROCEDURE — 94640 AIRWAY INHALATION TREATMENT: CPT

## 2022-10-14 PROCEDURE — 25010000002 AZITHROMYCIN PER 500 MG: Performed by: INTERNAL MEDICINE

## 2022-10-14 PROCEDURE — 83880 ASSAY OF NATRIURETIC PEPTIDE: CPT | Performed by: NURSE PRACTITIONER

## 2022-10-14 PROCEDURE — 25010000002 METHYLPREDNISOLONE PER 40 MG: Performed by: INTERNAL MEDICINE

## 2022-10-14 PROCEDURE — 94799 UNLISTED PULMONARY SVC/PX: CPT

## 2022-10-14 PROCEDURE — 25010000002 CEFTRIAXONE PER 250 MG: Performed by: NURSE PRACTITIONER

## 2022-10-14 PROCEDURE — 25010000002 CEFTRIAXONE PER 250 MG: Performed by: INTERNAL MEDICINE

## 2022-10-14 RX ORDER — POTASSIUM CHLORIDE 750 MG/1
40 TABLET, FILM COATED, EXTENDED RELEASE ORAL AS NEEDED
Status: DISCONTINUED | OUTPATIENT
Start: 2022-10-14 | End: 2022-10-18 | Stop reason: HOSPADM

## 2022-10-14 RX ORDER — CETIRIZINE HYDROCHLORIDE 10 MG/1
10 TABLET ORAL DAILY
Status: DISCONTINUED | OUTPATIENT
Start: 2022-10-15 | End: 2022-10-18 | Stop reason: HOSPADM

## 2022-10-14 RX ORDER — AMLODIPINE BESYLATE 5 MG/1
5 TABLET ORAL DAILY
Status: DISCONTINUED | OUTPATIENT
Start: 2022-10-15 | End: 2022-10-15

## 2022-10-14 RX ORDER — MAGNESIUM SULFATE HEPTAHYDRATE 40 MG/ML
2 INJECTION, SOLUTION INTRAVENOUS AS NEEDED
Status: DISCONTINUED | OUTPATIENT
Start: 2022-10-14 | End: 2022-10-18 | Stop reason: HOSPADM

## 2022-10-14 RX ORDER — ONDANSETRON 4 MG/1
4 TABLET, ORALLY DISINTEGRATING ORAL ONCE
Status: COMPLETED | OUTPATIENT
Start: 2022-10-14 | End: 2022-10-14

## 2022-10-14 RX ORDER — MONTELUKAST SODIUM 10 MG/1
10 TABLET ORAL NIGHTLY
Status: DISCONTINUED | OUTPATIENT
Start: 2022-10-14 | End: 2022-10-18 | Stop reason: HOSPADM

## 2022-10-14 RX ORDER — MAGNESIUM SULFATE HEPTAHYDRATE 40 MG/ML
2 INJECTION, SOLUTION INTRAVENOUS ONCE
Status: COMPLETED | OUTPATIENT
Start: 2022-10-14 | End: 2022-10-14

## 2022-10-14 RX ORDER — POTASSIUM CHLORIDE 1.5 G/1.77G
40 POWDER, FOR SOLUTION ORAL AS NEEDED
Status: DISCONTINUED | OUTPATIENT
Start: 2022-10-14 | End: 2022-10-18 | Stop reason: HOSPADM

## 2022-10-14 RX ORDER — SODIUM CHLORIDE 0.9 % (FLUSH) 0.9 %
10 SYRINGE (ML) INJECTION AS NEEDED
Status: DISCONTINUED | OUTPATIENT
Start: 2022-10-14 | End: 2022-10-18 | Stop reason: HOSPADM

## 2022-10-14 RX ORDER — PANTOPRAZOLE SODIUM 40 MG/1
40 TABLET, DELAYED RELEASE ORAL EVERY MORNING
Status: DISCONTINUED | OUTPATIENT
Start: 2022-10-15 | End: 2022-10-18 | Stop reason: HOSPADM

## 2022-10-14 RX ORDER — ASPIRIN 81 MG/1
81 TABLET, CHEWABLE ORAL DAILY
Status: DISCONTINUED | OUTPATIENT
Start: 2022-10-15 | End: 2022-10-18 | Stop reason: HOSPADM

## 2022-10-14 RX ORDER — ACETAMINOPHEN 325 MG/1
650 TABLET ORAL EVERY 4 HOURS PRN
Status: DISCONTINUED | OUTPATIENT
Start: 2022-10-14 | End: 2022-10-18 | Stop reason: HOSPADM

## 2022-10-14 RX ORDER — ONDANSETRON 2 MG/ML
4 INJECTION INTRAMUSCULAR; INTRAVENOUS EVERY 6 HOURS PRN
Status: DISCONTINUED | OUTPATIENT
Start: 2022-10-14 | End: 2022-10-18 | Stop reason: HOSPADM

## 2022-10-14 RX ORDER — NITROGLYCERIN 0.4 MG/1
0.4 TABLET SUBLINGUAL
Status: DISCONTINUED | OUTPATIENT
Start: 2022-10-14 | End: 2022-10-18 | Stop reason: HOSPADM

## 2022-10-14 RX ORDER — UREA 10 %
3 LOTION (ML) TOPICAL NIGHTLY PRN
Status: DISCONTINUED | OUTPATIENT
Start: 2022-10-14 | End: 2022-10-18 | Stop reason: HOSPADM

## 2022-10-14 RX ORDER — MAGNESIUM SULFATE HEPTAHYDRATE 40 MG/ML
4 INJECTION, SOLUTION INTRAVENOUS AS NEEDED
Status: DISCONTINUED | OUTPATIENT
Start: 2022-10-14 | End: 2022-10-18 | Stop reason: HOSPADM

## 2022-10-14 RX ORDER — TIZANIDINE 4 MG/1
2 TABLET ORAL EVERY 8 HOURS PRN
Status: DISCONTINUED | OUTPATIENT
Start: 2022-10-14 | End: 2022-10-18 | Stop reason: HOSPADM

## 2022-10-14 RX ORDER — POTASSIUM CHLORIDE 7.45 MG/ML
10 INJECTION INTRAVENOUS
Status: DISCONTINUED | OUTPATIENT
Start: 2022-10-14 | End: 2022-10-18 | Stop reason: HOSPADM

## 2022-10-14 RX ORDER — MULTIPLE VITAMINS W/ MINERALS TAB 9MG-400MCG
1 TAB ORAL DAILY
Status: DISCONTINUED | OUTPATIENT
Start: 2022-10-15 | End: 2022-10-18 | Stop reason: HOSPADM

## 2022-10-14 RX ORDER — BENZONATATE 100 MG/1
200 CAPSULE ORAL 3 TIMES DAILY PRN
Status: DISCONTINUED | OUTPATIENT
Start: 2022-10-14 | End: 2022-10-18 | Stop reason: HOSPADM

## 2022-10-14 RX ORDER — POTASSIUM CHLORIDE 750 MG/1
40 TABLET, FILM COATED, EXTENDED RELEASE ORAL ONCE
Status: COMPLETED | OUTPATIENT
Start: 2022-10-14 | End: 2022-10-14

## 2022-10-14 RX ORDER — ONDANSETRON 4 MG/1
4 TABLET, FILM COATED ORAL EVERY 6 HOURS PRN
Status: DISCONTINUED | OUTPATIENT
Start: 2022-10-14 | End: 2022-10-18 | Stop reason: HOSPADM

## 2022-10-14 RX ORDER — ATORVASTATIN CALCIUM 20 MG/1
20 TABLET, FILM COATED ORAL NIGHTLY
Status: DISCONTINUED | OUTPATIENT
Start: 2022-10-14 | End: 2022-10-18 | Stop reason: HOSPADM

## 2022-10-14 RX ORDER — HYDROCODONE BITARTRATE AND ACETAMINOPHEN 7.5; 325 MG/1; MG/1
1 TABLET ORAL ONCE
Status: COMPLETED | OUTPATIENT
Start: 2022-10-14 | End: 2022-10-14

## 2022-10-14 RX ORDER — METOPROLOL SUCCINATE 25 MG/1
25 TABLET, EXTENDED RELEASE ORAL DAILY
Status: DISCONTINUED | OUTPATIENT
Start: 2022-10-14 | End: 2022-10-18 | Stop reason: HOSPADM

## 2022-10-14 RX ORDER — ALBUTEROL SULFATE 2.5 MG/3ML
2.5 SOLUTION RESPIRATORY (INHALATION) EVERY 6 HOURS PRN
Status: DISCONTINUED | OUTPATIENT
Start: 2022-10-14 | End: 2022-10-18 | Stop reason: HOSPADM

## 2022-10-14 RX ORDER — METHYLPREDNISOLONE SODIUM SUCCINATE 40 MG/ML
40 INJECTION, POWDER, LYOPHILIZED, FOR SOLUTION INTRAMUSCULAR; INTRAVENOUS EVERY 8 HOURS
Status: DISCONTINUED | OUTPATIENT
Start: 2022-10-14 | End: 2022-10-15

## 2022-10-14 RX ORDER — MELATONIN
1000 DAILY
Status: DISCONTINUED | OUTPATIENT
Start: 2022-10-15 | End: 2022-10-18 | Stop reason: HOSPADM

## 2022-10-14 RX ORDER — IPRATROPIUM BROMIDE AND ALBUTEROL SULFATE 2.5; .5 MG/3ML; MG/3ML
3 SOLUTION RESPIRATORY (INHALATION)
Status: DISCONTINUED | OUTPATIENT
Start: 2022-10-14 | End: 2022-10-16

## 2022-10-14 RX ORDER — CHLORHEXIDINE GLUCONATE 0.12 MG/ML
15 RINSE ORAL 2 TIMES DAILY
Status: DISCONTINUED | OUTPATIENT
Start: 2022-10-14 | End: 2022-10-17

## 2022-10-14 RX ADMIN — ATORVASTATIN CALCIUM 20 MG: 20 TABLET, FILM COATED ORAL at 21:56

## 2022-10-14 RX ADMIN — MONTELUKAST SODIUM 10 MG: 10 TABLET, FILM COATED ORAL at 23:46

## 2022-10-14 RX ADMIN — ACETAMINOPHEN 650 MG: 325 TABLET, FILM COATED ORAL at 23:46

## 2022-10-14 RX ADMIN — POTASSIUM CHLORIDE 40 MEQ: 750 TABLET, EXTENDED RELEASE ORAL at 14:27

## 2022-10-14 RX ADMIN — CEFTRIAXONE SODIUM 1 G: 1 INJECTION, POWDER, FOR SOLUTION INTRAMUSCULAR; INTRAVENOUS at 14:52

## 2022-10-14 RX ADMIN — IPRATROPIUM BROMIDE AND ALBUTEROL SULFATE 3 ML: 2.5; .5 SOLUTION RESPIRATORY (INHALATION) at 20:04

## 2022-10-14 RX ADMIN — HYDROCODONE POLISTIREX AND CHLORPHENIRAMINE POLISTIREX 5 ML: 10; 8 SUSPENSION, EXTENDED RELEASE ORAL at 21:56

## 2022-10-14 RX ADMIN — HYDROCODONE BITARTRATE AND ACETAMINOPHEN 1 TABLET: 7.5; 325 TABLET ORAL at 13:25

## 2022-10-14 RX ADMIN — MAGNESIUM SULFATE HEPTAHYDRATE 2 G: 2 INJECTION, SOLUTION INTRAVENOUS at 15:28

## 2022-10-14 RX ADMIN — CHLORHEXIDINE GLUCONATE 15 ML: 1.2 SOLUTION ORAL at 23:46

## 2022-10-14 RX ADMIN — METOPROLOL SUCCINATE 25 MG: 25 TABLET, EXTENDED RELEASE ORAL at 23:46

## 2022-10-14 RX ADMIN — AZITHROMYCIN MONOHYDRATE 500 MG: 500 INJECTION, POWDER, LYOPHILIZED, FOR SOLUTION INTRAVENOUS at 23:46

## 2022-10-14 RX ADMIN — METHYLPREDNISOLONE SODIUM SUCCINATE 40 MG: 40 INJECTION, POWDER, FOR SOLUTION INTRAMUSCULAR; INTRAVENOUS at 21:56

## 2022-10-14 RX ADMIN — CEFTRIAXONE SODIUM 1 G: 1 INJECTION, POWDER, FOR SOLUTION INTRAMUSCULAR; INTRAVENOUS at 21:55

## 2022-10-14 RX ADMIN — ONDANSETRON 4 MG: 4 TABLET, ORALLY DISINTEGRATING ORAL at 13:22

## 2022-10-14 NOTE — ED PROVIDER NOTES
EMERGENCY DEPARTMENT ENCOUNTER    Room Number:  30/30  Date seen:  10/14/2022  Time seen: 12:58 EDT  PCP: Kehrer, Meredith Lea, MD  Historian: castro    HPI:  Chief complaint:cough and shortness of breath  A complete HPI/ROS/PMH/PSH/SH/FH are unobtainable due to:   Context:Darlene Pfeiffer is a 82 y.o. female with history of hypertension, hyperlipidemia, COPD and lung cancer who presents to the ED with c/o moderate cough that is been going on for the past month.  The cough is moderate and affecting her ability to sleep.  She has been on a prescribed course of doxycycline and prednisone and Tessalon Perles and nothing has helped.  She has mild shortness of breath.  She reports to me that she had a chest x-ray which shows some fluid in the left side of her lung and she also has a history of left-sided lung cancer in the past.  She denies any weight loss, nausea or vomiting.  She has no fever or chills.  She is vaccinated against COVID-19 and has not been around any exposures    Patient was placed in face mask in first look. Patient was wearing facemask when I entered the room and throughout our encounter. I wore full protective equipment throughout this patient encounter including a N95 face mask, eye shield and gloves. Hand hygiene/washing of hands was performed before donning protective equipment and after removal when leaving the room.    MEDICAL RECORD REVIEW  Reviewed patient's recent office visit dated October 5 of this year.  She was prescribed doxycycline and prednisone at that time.  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  She also had carcinoma of the tongue that is being treated per Dr. Caballero at     ALLERGIES  Sulfa antibiotics    PAST MEDICAL HISTORY  Active Ambulatory Problems     Diagnosis  Date Noted   • Hypertension 2019   • Hyperlipidemia 2019   • Esophageal reflux 2019   • Chronic obstructive pulmonary disease (HCC) 2020   • Seasonal allergic rhinitis due to pollen 2020   • Abnormal glucose 2020   • Clinical diagnosis of severe acute respiratory syndrome coronavirus 2 (SARS-CoV-2) disease 2020   • Lung cancer (formerly [facility])    • Cerebral aneurysm, nonruptured 2022   • Cerebral aneurysm without rupture 2022     Resolved Ambulatory Problems     Diagnosis Date Noted   • No Resolved Ambulatory Problems     Past Medical History:   Diagnosis Date   • Allergic rhinitis    • Asthma    • 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[facility])    • Cerebral aneurysm    • COPD (chronic obstructive pulmonary disease) (formerly [facility])    • COVID    • History of adenocarcinoma of lung    • History of anemia    • History of carcinoma in situ of skin    • History of lung cancer    • History of oral hairy leukoplakia    • History of squamous cell carcinoma    • Low vitamin B12 level    • Thyromegaly    • UTI (urinary tract infection)    • Vitamin D deficiency        PAST SURGICAL HISTORY  Past Surgical History:   Procedure Laterality Date   • CHOLECYSTECTOMY     • COLONOSCOPY     • EMBOLIZATION CEREBRAL Left 2022    Procedure: 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;  Surgeon: Bradley Dowell MD;  [facility] ;  Service: Interventional Radiology;  Laterality: Left;   • EYE SURGERY  2020    Took a muscle out of right eye   • GLOSSECTOMY PARTIAL      less than one half tongue   • LUNG SURGERY     • ORAL LESION EXCISION/BIOPSY       and 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   • TUBAL ABDOMINAL LIGATION         FAMILY HISTORY  Family History   Problem Relation Age of Onset   • Ovarian cancer Mother          at age 27   • No Known Problems Father    • Ovarian cancer Sister    • Colon cancer Brother    • No Known Problems  Other    • Malig Hyperthermia Neg Hx        SOCIAL HISTORY  Social History     Socioeconomic History   • Marital status:    Tobacco Use   • Smoking status: Former     Types: Cigarettes     Quit date: 2015     Years since quittin.7   • Smokeless tobacco: Never   • Tobacco comments:     less than a pack per day, no smoking since , Quit 2015   Vaping Use   • Vaping Use: Never used   Substance and Sexual Activity   • Alcohol use: Yes     Comment: Socially -A few times a month   • Drug use: No   • Sexual activity: Defer       REVIEW OF SYSTEMS  Review of Systems    All systems reviewed and negative except for those discussed in HPI.     PHYSICAL EXAM    ED Triage Vitals [10/14/22 1245]   Temp Heart Rate Resp BP SpO2   98.9 °F (37.2 °C) 106 20 -- 90 %      Temp src Heart Rate Source Patient Position BP Location FiO2 (%)   -- -- -- -- --     Physical Exam    I have reviewed the triage vital signs and nursing notes.      GENERAL: not distressed, chronically ill appearing  HENT: nares patent  EYES: no scleral icterus  NECK: no ROM limitations  CV: regular rhythm, regular rate, no murmur  RESPIRATORY: normal effort, diminished L>R, no audible wheezing. No rales.  She is able to speak in full sentences  ABDOMEN: soft  : deferred  MUSCULOSKELETAL: no deformity  NEURO: alert, moves all extremities, follows commands  SKIN: warm, dry    LAB RESULTS  Recent Results (from the past 24 hour(s))   ECG 12 Lead    Collection Time: 10/14/22 12:55 PM   Result Value Ref Range    QT Interval 375 ms   Comprehensive Metabolic Panel    Collection Time: 10/14/22  1:15 PM    Specimen: Blood   Result Value Ref Range    Glucose 126 (H) 65 - 99 mg/dL    BUN 17 8 - 23 mg/dL    Creatinine 0.67 0.57 - 1.00 mg/dL    Sodium 139 136 - 145 mmol/L    Potassium 2.7 (L) 3.5 - 5.2 mmol/L    Chloride 95 (L) 98 - 107 mmol/L    CO2 34.0 (H) 22.0 - 29.0 mmol/L    Calcium 8.8 8.6 - 10.5 mg/dL    Total Protein 6.0 6.0 - 8.5 g/dL    Albumin 3.30  (L) 3.50 - 5.20 g/dL    ALT (SGPT) 18 1 - 33 U/L    AST (SGOT) 16 1 - 32 U/L    Alkaline Phosphatase 79 39 - 117 U/L    Total Bilirubin 1.0 0.0 - 1.2 mg/dL    Globulin 2.7 gm/dL    A/G Ratio 1.2 g/dL    BUN/Creatinine Ratio 25.4 (H) 7.0 - 25.0    Anion Gap 10.0 5.0 - 15.0 mmol/L    eGFR 88.5 >60.0 mL/min/1.73   BNP    Collection Time: 10/14/22  1:15 PM    Specimen: Blood   Result Value Ref Range    proBNP 1,234.0 0.0 - 1,800.0 pg/mL   Procalcitonin    Collection Time: 10/14/22  1:15 PM    Specimen: Blood   Result Value Ref Range    Procalcitonin 0.09 0.00 - 0.25 ng/mL   CBC Auto Differential    Collection Time: 10/14/22  1:15 PM    Specimen: Blood   Result Value Ref Range    WBC 14.90 (H) 3.40 - 10.80 10*3/mm3    RBC 4.28 3.77 - 5.28 10*6/mm3    Hemoglobin 12.7 12.0 - 15.9 g/dL    Hematocrit 37.1 34.0 - 46.6 %    MCV 86.7 79.0 - 97.0 fL    MCH 29.7 26.6 - 33.0 pg    MCHC 34.2 31.5 - 35.7 g/dL    RDW 13.1 12.3 - 15.4 %    RDW-SD 40.9 37.0 - 54.0 fl    MPV 9.7 6.0 - 12.0 fL    Platelets 295 140 - 450 10*3/mm3    Neutrophil % 80.2 (H) 42.7 - 76.0 %    Lymphocyte % 13.8 (L) 19.6 - 45.3 %    Monocyte % 5.5 5.0 - 12.0 %    Eosinophil % 0.0 (L) 0.3 - 6.2 %    Basophil % 0.1 0.0 - 1.5 %    Immature Grans % 0.4 0.0 - 0.5 %    Neutrophils, Absolute 11.95 (H) 1.70 - 7.00 10*3/mm3    Lymphocytes, Absolute 2.06 0.70 - 3.10 10*3/mm3    Monocytes, Absolute 0.82 0.10 - 0.90 10*3/mm3    Eosinophils, Absolute 0.00 0.00 - 0.40 10*3/mm3    Basophils, Absolute 0.01 0.00 - 0.20 10*3/mm3    Immature Grans, Absolute 0.06 (H) 0.00 - 0.05 10*3/mm3    nRBC 0.0 0.0 - 0.2 /100 WBC   Magnesium    Collection Time: 10/14/22  1:15 PM    Specimen: Blood   Result Value Ref Range    Magnesium 1.4 (L) 1.6 - 2.4 mg/dL   Respiratory Panel PCR w/COVID-19(SARS-CoV-2) JOSHUA/NUZHAT/JOHANA/PAD/COR/MAD/ROSEMARY In-House, NP Swab in [facility], 3-4 HR TAT - Swab, Nasopharynx    Collection Time: 10/14/22  1:17 PM    Specimen: Nasopharynx; Swab   Result Value Ref Range     ADENOVIRUS, PCR Not Detected Not Detected    Coronavirus 229E Not Detected Not Detected    Coronavirus HKU1 Not Detected Not Detected    Coronavirus NL63 Not Detected Not Detected    Coronavirus OC43 Not Detected Not Detected    COVID19 Not Detected Not Detected - Ref. Range    Human Metapneumovirus Not Detected Not Detected    Human Rhinovirus/Enterovirus Not Detected Not Detected    Influenza A PCR Not Detected Not Detected    Influenza B PCR Not Detected Not Detected    Parainfluenza Virus 1 Not Detected Not Detected    Parainfluenza Virus 2 Not Detected Not Detected    Parainfluenza Virus 3 Not Detected Not Detected    Parainfluenza Virus 4 Not Detected Not Detected    RSV, PCR Not Detected Not Detected    Bordetella pertussis pcr Not Detected Not Detected    Bordetella parapertussis PCR Not Detected Not Detected    Chlamydophila pneumoniae PCR Not Detected Not Detected    Mycoplasma pneumo by PCR Not Detected Not Detected   Lactic Acid, Plasma    Collection Time: 10/14/22  2:37 PM    Specimen: Blood   Result Value Ref Range    Lactate 1.3 0.5 - 2.0 mmol/L         RADIOLOGY RESULTS  CT Chest Without Contrast Diagnostic    Result Date: 10/14/2022  CT CHEST WO CONTRAST DIAGNOSTIC-  HISTORY:  Cough for several weeks, history of lung cancer.  TECHNIQUE: CT of the chest was performed without intravenous contrast. Reformatted images were reviewed. Radiation dose reduction techniques were utilized, including automated exposure control and exposure modulation based on body size.  COMPARISON:  CT chest with contrast 6/3/2022   FINDINGS: There is a partially imaged subcentimeter hypodense right thyroid nodule. Heart size is normal. There is no pericardial effusion. There is calcific coronary artery atherosclerosis. There is advanced calcific aortic atherosclerosis. Great vessels are normal in caliber. There is no significant pleural fluid. Limited imaging through the upper abdomen demonstrates a hypodense focus in the  right hepatic lobe, not significantly changed. There are surgical clips at the gallbladder fossa. There is calcific atherosclerosis of the visualized upper abdominal aorta and its branch vessels. There is a superior left renal cyst and a 1.5 cm superior left renal lesion, which is indeterminate but not significantly changed in size. There is asymmetric elevation of the left hemidiaphragm. There is multilevel degenerative disc disease. There is diffuse osseous demineralization. The trachea is clear. There is emphysema. There is curvilinear atelectasis and/or scarring in the right lung. There are post surgical changes from left upper lobectomy. Soft tissue density within the operative bed is difficult to accurately measure and compare but grossly similar to 6/20/2022. However, dense consolidation in the upper left lung has become more dense compared to 6/8/2022. There is new masslike consolidation measuring approximately 6.7 x 4.3 cm in the anterior inferior left lung with a corresponding small focus of air and mild surrounding groundglass opacity. A few scattered pulmonary nodules measuring up to 0.6 cm in the posterior right lower lobe are not significantly changed.        Post surgical changes from left upper lobectomy with increased dense consolidation in the upper left lung, which may reflect continued fibrosis/scarring and volume loss, however, continued follow-up imaging is recommended. A new approximately 6.7 cm masslike area of consolidation in the anterior left lung could reflect pneumonia in the appropriate clinical setting but overall raises concern for possible malignancy given the masslike configuration. Recommend further evaluation with PET/CT and/or tissue sampling or at minimum short-term follow-up contrast-enhanced CT chest and 3 months. Additional findings, as above.  Discussed with DEJAN Young at 2:27 PM.  This report was finalized on 10/14/2022 2:27 PM by Dr. Bethany Hernandez M.D.            PROGRESS, DATA ANALYSIS, CONSULTS AND MEDICAL DECISION MAKING  All labs have been independently reviewed by me.  All radiology studies have been reviewed by me and discussed with radiologist dictating the report.  EKG's independently viewed and interpreted by me unless stated otherwise. Discussion below represents my analysis of pertinent findings related to patient's condition, differential diagnosis, treatment plan and final disposition.     ED Course as of 10/14/22 1726   Fri Oct 14, 2022   1345 EKG independently viewed and contemporaneously interpreted by ED physician. Time: 12:55 PM.  Rate 107.  Interpretation: Sinus tachycardia, normal axis, normal QRS, no acute ST changes, multiple PVCs present.  No contiguous PVCs. [JG]   1435 I discussed CT chest with Dr. Hernandez.  The patient has a new 7 cm mass in left lung which could be a pneumonia but given pt's history of left lung cancer could be recurrence.  Dr. Hernandez advised either PET scan or tissue sampling.   [EW]   1438 Magnesium(!): 1.4 [EW]   1438 Potassium(!): 2.7  I have replaced electrolytes.  She does have mild leukocytosis and I ordered dose of Rocephin for a pneumonia.  She will need to be seen by heme/onc for the lung mass vs pneumonia.  I have discussed admission and she is in agreement.  Since she has taken the Hydrocodone the cough has improved significantly.  Her viral RVP negative.  [EW]   1506 Phone call with CHELSI Veras.  Discussed the patient, relevant history, exam, diagnostics, ED findings/progress, and concerns. She agrees to admit.    [EW]      ED Course User Index  [EW] Bethany Campbell APRN  [JG] Martín Sigala MD     DDX: pneumonia, allergic rhinitis, viral URI, covid       Reviewed pt's history and workup with Dr. Sigala.  After a bedside evaluation, Dr. Sigala agrees with the plan of care.    Based on the patient's lab findings and presenting symptoms, the doctor and I feel it is appropriate to admit the patient for  "further management, evaluation, and treatment.  I have discussed this with the admitting team.  I have also discussed this with the patient/family.  They are in agreement with admission.          Disposition vitals:  /46   Pulse 62   Temp 98.9 °F (37.2 °C)   Resp 20   Ht 157.5 cm (62\")   Wt 56.4 kg (124 lb 5.4 oz)   LMP  (LMP Unknown)   SpO2 93%   BMI 22.74 kg/m²       DIAGNOSIS  Final diagnoses:   Shortness of breath   Acute cough   Hypokalemia   Hypomagnesemia   Abnormal CT of the chest       Admission     Bethany Campbell, APRN  10/14/22 8727    "

## 2022-10-14 NOTE — ED NOTES
"Nursing report ED to floor  Darlene Pfeiffer  82 y.o.  female    HPI :   Chief Complaint   Patient presents with    Shortness of Breath       Admitting doctor:   Lorna Escamilla MD    Admitting diagnosis:   The primary encounter diagnosis was Shortness of breath. Diagnoses of Acute cough, Hypokalemia, Hypomagnesemia, and Abnormal CT of the chest were also pertinent to this visit.    Code status:   Current Code Status       Date Active Code Status Order ID Comments User Context       Prior            Allergies:   Sulfa antibiotics    Isolation:   Enhanced Droplet/Contact     Intake and Output    Intake/Output Summary (Last 24 hours) at 10/14/2022 1540  Last data filed at 10/14/2022 1528  Gross per 24 hour   Intake 100 ml   Output --   Net 100 ml       Weight:       10/14/22  1308   Weight: 56.4 kg (124 lb 5.4 oz)       Most recent vitals:   Vitals:    10/14/22 1308 10/14/22 1401 10/14/22 1402 10/14/22 1501   BP:  117/54  103/48   Pulse:  79 84 70   Resp:       Temp:       SpO2:   95% 95%   Weight: 56.4 kg (124 lb 5.4 oz)      Height: 157.5 cm (62\")          Active LDAs/IV Access:   Lines, Drains & Airways       Active LDAs       Name Placement date Placement time Site Days    Peripheral IV 10/14/22 1315 Right Antecubital 10/14/22  1315  Antecubital  less than 1                    Labs (abnormal labs have a star):   Labs Reviewed   COMPREHENSIVE METABOLIC PANEL - Abnormal; Notable for the following components:       Result Value    Glucose 126 (*)     Potassium 2.7 (*)     Chloride 95 (*)     CO2 34.0 (*)     Albumin 3.30 (*)     BUN/Creatinine Ratio 25.4 (*)     All other components within normal limits    Narrative:     GFR Normal >60  Chronic Kidney Disease <60  Kidney Failure <15     CBC WITH AUTO DIFFERENTIAL - Abnormal; Notable for the following components:    WBC 14.90 (*)     Neutrophil % 80.2 (*)     Lymphocyte % 13.8 (*)     Eosinophil % 0.0 (*)     Neutrophils, Absolute 11.95 (*)     Immature Grans, " "Absolute 0.06 (*)     All other components within normal limits   MAGNESIUM - Abnormal; Notable for the following components:    Magnesium 1.4 (*)     All other components within normal limits   RESPIRATORY PANEL PCR W/ COVID-19 (SARS-COV-2) [facility] IN-HOUSE, NP SWAB IN UTM/VTP, 3-4 HR TAT - Normal    Narrative:     In the setting of a positive respiratory panel with a viral infection PLUS a negative procalcitonin without other underlying concern for bacterial infection, consider observing off antibiotics or discontinuation of antibiotics and continue supportive care. If the respiratory panel is positive for atypical bacterial infection (Bordetella pertussis, Chlamydophila pneumoniae, or Mycoplasma pneumoniae), consider antibiotic de-escalation to target atypical bacterial infection.   BNP (IN-HOUSE) - Normal    Narrative:     Among patients with dyspnea, NT-proBNP is highly sensitive for the detection of acute congestive heart failure. In addition NT-proBNP of <300 pg/ml effectively rules out acute congestive heart failure with 99% negative predictive value.    Results may be falsely decreased if patient taking Biotin.     PROCALCITONIN - Normal    Narrative:     As a Marker for Sepsis (Non-Neonates):    1. <0.5 ng/mL represents a low risk of severe sepsis and/or septic shock.  2. >2 ng/mL represents a high risk of severe sepsis and/or septic shock.    As a Marker for Lower Respiratory Tract Infections that require antibiotic therapy:    PCT on Admission    Antibiotic Therapy       6-12 Hrs later    >0.5                Strongly Recommended  >0.25 - <0.5        Recommended   0.1 - 0.25          Discouraged              Remeasure/reassess PCT  <0.1                Strongly Discouraged     Remeasure/reassess PCT    As 28 day mortality risk marker: \"Change in Procalcitonin Result\" (>80% or <=80%) if Day 0 (or Day 1) and Day 4 values are available. Refer to " [email or web address]-pct-[email or web address]    Change in PCT <=80%  A decrease of PCT levels below or equal to 80% defines a positive change in PCT test result representing a higher risk for 28-day all-cause mortality of patients diagnosed with severe sepsis for septic shock.    Change in PCT >80%  A decrease of PCT levels of more than 80% defines a negative change in PCT result representing a lower risk for 28-day all-cause mortality of patients diagnosed with severe sepsis or septic shock.      LACTIC ACID, PLASMA - Normal   BLOOD CULTURE   BLOOD CULTURE   CBC AND DIFFERENTIAL    Narrative:     The following orders were created for panel order CBC & Differential.  Procedure                               Abnormality         Status                     ---------                               -----------         ------                     CBC Auto Differential[[id number]]        Abnormal            Final result                 Please view results for these tests on the individual orders.       EKG:   ECG 12 Lead   Preliminary Result   HEART RATE= 107  bpm   RR Interval= 561  ms   IL Interval= 140  ms   P Horizontal Axis= 31  deg   P Front Axis= 67  deg   QRSD Interval= 88  ms   QT Interval= 375  ms   QRS Axis= 19  deg   T Wave Axis=   deg   - ABNORMAL ECG -   Sinus tachycardia   Ventricular bigeminy   Borderline repolarization abnormality   Baseline wander in lead(s) I,II,III,aVL,aVF,V3,V6   Electronically Signed By:    Date and Time of Study: 2022-10-14 12:55:09          Meds given in ED:   Medications   sodium chloride 0.9 % flush 10 mL (has no administration in time range)   HYDROcodone-acetaminophen (NORCO) 7.5-325 MG per tablet 1 tablet (1 tablet Oral Given 10/14/22 1325)   ondansetron ODT (ZOFRAN-ODT) disintegrating tablet 4 mg (4 mg Oral Given 10/14/22 1322)   potassium chloride (K-DUR,KLOR-CON) ER tablet 40 mEq (40 mEq Oral Given 10/14/22 1427)   magnesium sulfate 2g/50 mL (PREMIX) infusion (2 g Intravenous New Bag  10/14/22 1528)   cefTRIAXone (ROCEPHIN) 1 g in sodium chloride 0.9 % 100 mL IVPB-VTB (0 g Intravenous Stopped 10/14/22 1528)       Imaging results:  CT Chest Without Contrast Diagnostic    Result Date: 10/14/2022   Post surgical changes from left upper lobectomy with increased dense consolidation in the upper left lung, which may reflect continued fibrosis/scarring and volume loss, however, continued follow-up imaging is recommended. A new approximately 6.7 cm masslike area of consolidation in the anterior left lung could reflect pneumonia in the appropriate clinical setting but overall raises concern for possible malignancy given the masslike configuration. Recommend further evaluation with PET/CT and/or tissue sampling or at minimum short-term follow-up contrast-enhanced CT chest and 3 months. Additional findings, as above.  Discussed with DEJAN Young at 2:27 PM.  This report was finalized on 10/14/2022 2:27 PM by Dr. Bethany Hernandez M.D.       Ambulatory status:   -up with assist x1    Social issues:   Social History     Socioeconomic History    Marital status:    Tobacco Use    Smoking status: Former     Types: Cigarettes     Quit date: 2015     Years since quittin.7    Smokeless tobacco: Never    Tobacco comments:     less than a pack per day, no smoking since , Quit 2015   Vaping Use    Vaping Use: Never used   Substance and Sexual Activity    Alcohol use: Yes     Comment: Socially -A few times a month    Drug use: No    Sexual activity: Defer       NIH Stroke Scale:         Soledad Vega RN  10/14/22 15:40 EDT

## 2022-10-14 NOTE — ED TRIAGE NOTES
Pt arrived via PV . Pt saw PCP last week, dx w/ fluid in Lt lung ,pt c/o cough,SOA, wheezing . Pt has prescribed medications she takes for symptoms but only has relief for a few hours. pt has next appt 10/28 but pt cannot wait that long.

## 2022-10-14 NOTE — ED PROVIDER NOTES
MD ATTESTATION NOTE  I wore full protective equipment throughout this patient encounter including an N95 face mask, googles, gown and gloves. Hand hygiene was performed before donning protective equipment and after removal when leaving the room.    The WALKER and I have discussed this patient's history, physical exam, and treatment plan. I have reviewed the documentation and personally had a face to face interaction with the patient. I affirm the WALKER documentation and agree with their diagnostics, findings, treatment, plan, and disposition.    I provided a substantive portion of the care of this patient.  I personally performed the physical exam, in its entirety.  The attached note describes my personal findings.    Darlene Pfeiffer is a 82 y.o. female who presents to the ED c/o cough.  Patient complains of cough for the last month.  Patient reports that she has a history of COPD, not on oxygen, normally gets a cough at this time of year but cough is usually short-lived and productive, this cough is not going away nonproductive.  Patient denies any worsening of her cough.  Patient endorses pain in her left inferior ribs occurs with coughing, denies any other chest pain.  Patient Dors that shortness of breath occurs with coughing, no other shortness of breath.  Patient denies any nausea vomiting, has had diarrhea, diarrhea has no blood or mucus.  Patient denies any fever shakes chills or night sweats.  Patient reports chronic decrease in sense of smell and taste, denies any recent change.  Patient has been vaccinated against COVID-19, denies any recent sick contacts.  Patient reports that she was seen by primary care provider twice since this is been going on, was prescribed an antibiotic as well as steroid, finished up antibiotic without any improvement.  Patient reports history of lung cancer in the past, did have surgery on her lung as well as radiation, no chemotherapy, not currently being treated for cancer.    On  exam:  General: NAD.  Head: NCAT.  ENT: nares patent, no scleral icterus  Neck: Supple, trachea midline.  Cardiac: regular rate and rhythm.  Lungs: normal effort, no accessory muscle use, no wheezing or rails, extremely diminished at left lung with absent breath sounds at left lung base.  Abdomen: Soft, NTTP.   Extremities: Moves all extremities well, no peripheral edema  Neuro: alert, MAEW, follows commands  Psych: calm, cooperative  Skin: Warm, dry.    Medical Decision Making:  After the initial H&P, I discussed pertinent information from history and physical exam with patient/family.  Discussed differential diagnosis.  Discussed plan for ED evaluation/work-up/treatment.  All questions answered.  Patient/family is agreeable with plan.    ED Course as of 10/14/22 1550   Fri Oct 14, 2022   1345 EKG independently viewed and contemporaneously interpreted by ED physician. Time: 12:55 PM.  Rate 107.  Interpretation: Sinus tachycardia, normal axis, normal QRS, no acute ST changes, multiple PVCs present.  No contiguous PVCs. [JG]   143 I discussed CT chest with Dr. Hernandez.  The patient has a new 7 cm mass in left lung which could be a pneumonia but given pt's history of left lung cancer could be recurrence.  Dr. Hernandez advised either PET scan or tissue sampling.   [EW]   1438 Magnesium(!): 1.4 [EW]   1438 Potassium(!): 2.7  I have replaced electrolytes.  She does have mild leukocytosis and I ordered dose of Rocephin for a pneumonia.  She will need to be seen by heme/onc for the lung mass vs pneumonia.  I have discussed admission and she is in agreement.  Since she has taken the Hydrocodone the cough has improved significantly.  Her viral RVP negative.  [EW]   1506 Phone call with Dr. Escamilla MARISA.  Discussed the patient, relevant history, exam, diagnostics, ED findings/progress, and concerns. She agrees to admit.    [EW]      ED Course User Index  [EW] Bethany Campbell APRN  [JG] Martín Sigala MD        Diagnosis  Final diagnoses:   Shortness of breath   Acute cough   Hypokalemia   Hypomagnesemia   Abnormal CT of the chest        Martín Sigala MD  10/14/22 6713

## 2022-10-14 NOTE — PLAN OF CARE
Goal Outcome Evaluation:  Plan of Care Reviewed With: patient        Progress: no change  Outcome Evaluation: Patient arrived to unit around 1750. A & O x 4.  Lungs are diminished. Continues with IV atb therapy. Up with stand by assist. No c/o pain. No s/s of distress noted.

## 2022-10-14 NOTE — H&P
HISTORY AND PHYSICAL   [facility]        Date of Admission: 10/14/2022  Patient Identification:  Name: Dalrene Pfeiffer  Age: 82 y.o.  Sex: female  :  1940  MRN: [id number]                     Primary Care Physician: Kehrer, Meredith Lea, MD    Chief Complaint:  82 year old female who presented to the emergency room with shortness of breath, cough which has been present for the last month; the cough is not productive; she has been taking oral antibiotics but has not improved; she denies sick contacts    History of Present Illness:   As above    Past Medical History:  Past Medical History:   Diagnosis Date   • Allergic rhinitis    • Asthma    • Cancer of floor of mouth (HCC)    • Cerebral aneurysm    • COPD (chronic obstructive pulmonary disease) (HCC)    • COVID    • Esophageal reflux    • History of adenocarcinoma of lung    • History of anemia    • History of carcinoma in situ of skin    • History of lung cancer    • 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 from [facility]   • History of squamous cell carcinoma     Tongue   • Hyperlipidemia    • Hypertension     since early 60s   • Low vitamin B12 level    • Lung cancer (HCC)    • Thyromegaly    • UTI (urinary tract infection)    • Vitamin D deficiency      Past Surgical History:  Past Surgical History:   Procedure Laterality Date   • CHOLECYSTECTOMY     • COLONOSCOPY     • EMBOLIZATION CEREBRAL Left 2022    Procedure: 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;  Surgeon: Bradley Dowell MD;  Location: [facility] ;  Service: Interventional Radiology;  Laterality: Left;   • EYE SURGERY  2020    Took a muscle out of right eye   • GLOSSECTOMY PARTIAL      less than one half tongue   • LUNG SURGERY     • ORAL LESION EXCISION/BIOPSY       and 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   • TUBAL ABDOMINAL LIGATION        Home Meds:  Medications Prior to  Admission   Medication Sig Dispense Refill Last Dose   • albuterol sulfate  (90 Base) MCG/ACT inhaler Inhale 2 puffs Every 4 (Four) Hours As Needed for Wheezing. 18 g 2 10/13/2022   • amLODIPine (NORVASC) 5 MG tablet TAKE 1 TABLET EVERY DAY (Patient taking differently: Take 1 tablet by mouth Daily.) 90 tablet 1 10/14/2022   • aspirin 81 MG chewable tablet Chew 81 mg Daily.   Past Week   • atorvastatin (LIPITOR) 20 MG tablet Take 1 tablet by mouth Every Night. 90 tablet 3 10/13/2022   • B Complex-C-E-Zn (b complex-C-E-zinc) tablet Take 1 tablet by mouth Daily. HOLDING FOR DOS   10/14/2022   • cetirizine (ZyrTEC) 10 MG tablet Take 10 mg by mouth Daily.   10/14/2022   • chlorhexidine (PERIDEX) 0.12 % solution Apply 15 mL to the mouth or throat 2 (Two) Times a Day.   10/14/2022   • cholecalciferol (VITAMIN D3) 1000 units tablet Take 1,000 Units by mouth Daily. HOLDING FOR DOS   10/14/2022   • ciclopirox (LOPROX) 0.77 % cream Apply 1 application topically to the appropriate area as directed 2 (Two) Times a Day.   Past Week   • clobetasol (TEMOVATE) 0.05 % cream APPLY TOPICALLY TO THE AFFECTED AREA TWICE DAILY AS NEEDED   Past Week   • fluticasone (FLONASE) 50 MCG/ACT nasal spray 2 sprays into the nostril(s) as directed by provider Daily.   10/14/2022   • hydroCHLOROthiazide (HYDRODIURIL) 25 MG tablet TAKE 1 TABLET EVERY DAY (Patient taking differently: Take 1 tablet by mouth Daily.) 90 tablet 1 10/14/2022   • HYDROcod Polst-CPM Polst ER (Tussionex Pennkinetic ER) 10-8 MG/5ML ER suspension Take 5 mL by mouth Every 12 (Twelve) Hours As Needed for Cough. 120 mL 0 10/13/2022   • metoprolol succinate XL (TOPROL-XL) 25 MG 24 hr tablet TAKE 1 TABLET EVERY DAY (Patient taking differently: Take 1 tablet by mouth Daily.) 90 tablet 1 10/13/2022   • montelukast (SINGULAIR) 10 MG tablet Take 1 tablet by mouth Every Night. 90 tablet 3 Past Week   • omeprazole (priLOSEC) 40 MG capsule Take 1 capsule by mouth Daily.   10/13/2022    • acetaminophen (TYLENOL) 325 MG tablet Take 2 tablets by mouth Every 4 (Four) Hours As Needed for Mild Pain . (Patient not taking: Reported on 10/14/2022)   Not Taking   • tiZANidine (ZANAFLEX) 4 MG tablet Take 0.5-1 tablets by mouth Every 8 (Eight) Hours As Needed for Muscle Spasms. (Patient not taking: Reported on 10/14/2022) 30 tablet 1 Not Taking       Allergies:  Allergies   Allergen Reactions   • Sulfa Antibiotics Rash     Immunizations:  Immunization History   Administered Date(s) Administered   • COVID-19 (PFIZER) PURPLE CAP 2021, 2021, 2021   • Covid-19 (Pfizer) Gray Cap 2022   • FLUAD TRI 65YR+ 2018   • Fluzone High Dose =>65 Years ([employer] ONLY) 2018, 10/22/2019   • Fluzone High-Dose 65+yrs 2020, 2021   • Influenza, Unspecified 2020   • Pneumococcal Conjugate 13-Valent (PCV13) 2015   • Pneumococcal Polysaccharide (PPSV23) 2012   • Tdap 11/10/2016     Social History:   Social History     Social History Narrative   • Not on file     Social History     Socioeconomic History   • Marital status:    Tobacco Use   • Smoking status: Former     Types: Cigarettes     Quit date: 2015     Years since quittin.7   • Smokeless tobacco: Never   • Tobacco comments:     less than a pack per day, no smoking since , Quit 2015   Vaping Use   • Vaping Use: Never used   Substance and Sexual Activity   • Alcohol use: Yes     Comment: Socially -A few times a month   • Drug use: No   • Sexual activity: Defer       Family History:  Family History   Problem Relation Age of Onset   • Ovarian cancer Mother          at age 27   • No Known Problems Father    • Ovarian cancer Sister    • Colon cancer Brother    • No Known Problems Other    • Malig Hyperthermia Neg Hx         Review of Systems  See history of present illness and past medical history.  Patient denies headache, dizziness, syncope, falls, trauma, change in vision, change in  "hearing, change in taste, changes in weight, changes in appetite, focal weakness, numbness, or paresthesia.  Patient denies chest pain, palpitations, PND,  sinus pressure, rhinorrhea, epistaxis, hemoptysis, nausea, vomiting,hematemesis, diarrhea, constipation or hematchezia.  Denies cold or heat intolerance, polydipsia, polyuria, polyphagia. Denies hematuria, pyuria, dysuria, hesitancy, frequency or urgency. Denies consumption of raw and under cooked meats foods or change in water source.  Denies fever, chills, sweats, night sweats.  Denies missing any routine medications. Remainder of ROS is negative.    Objective:  T Max 24 hrs: Temp (24hrs), Av.5 °F (36.9 °C), Min:98.1 °F (36.7 °C), Max:98.9 °F (37.2 °C)    Vitals Ranges:   Temp:  [98.1 °F (36.7 °C)-98.9 °F (37.2 °C)] 98.1 °F (36.7 °C)  Heart Rate:  [] 81  Resp:  [18-20] 18  BP: (103-138)/(46-66) 119/55      Exam:  /55 (BP Location: Left arm, Patient Position: Lying)   Pulse 81   Temp 98.1 °F (36.7 °C) (Oral)   Resp 18   Ht 157.5 cm (62\")   Wt 54.3 kg (119 lb 11.2 oz)   LMP  (LMP Unknown)   SpO2 94%   BMI 21.89 kg/m²     General Appearance:    Alert, cooperative, no distress, appears stated age   Head:    Normocephalic, without obvious abnormality, atraumatic   Eyes:    PERRL, conjunctivae/corneas clear, EOM's intact, both eyes   Ears:    Normal external ear canals, both ears   Nose:   Nares normal, septum midline, mucosa normal, no drainage    or sinus tenderness   Throat:   Lips, mucosa, and tongue normal   Neck:   Supple, symmetrical, trachea midline, no adenopathy;     thyroid:  no enlargement/tenderness/nodules; no carotid    bruit or JVD   Back:     Symmetric, no curvature, ROM normal, no CVA tenderness   Lungs:     Decreased breath sounds bilaterally, respirations unlabored   Chest Wall:    No tenderness or deformity    Heart:    Regular rate and rhythm, S1 and S2 normal, no murmur, rub   or gallop   Abdomen:     Soft, nontender, " bowel sounds active all four quadrants,     no masses, no hepatomegaly, no splenomegaly   Extremities:   Extremities normal, atraumatic, no cyanosis or edema   Pulses:   2+ and symmetric all extremities   Skin:   Skin color, texture, turgor normal, no rashes or lesions    .    Data Review:  Labs in chart were reviewed.  WBC   Date Value Ref Range Status   10/14/2022 14.90 (H) 3.40 - 10.80 10*3/mm3 Final     Hemoglobin   Date Value Ref Range Status   10/14/2022 12.7 12.0 - 15.9 g/dL Final     Hematocrit   Date Value Ref Range Status   10/14/2022 37.1 34.0 - 46.6 % Final     Platelets   Date Value Ref Range Status   10/14/2022 295 140 - 450 10*3/mm3 Final     Sodium   Date Value Ref Range Status   10/14/2022 139 136 - 145 mmol/L Final     Potassium   Date Value Ref Range Status   10/14/2022 2.7 (L) 3.5 - 5.2 mmol/L Final     Chloride   Date Value Ref Range Status   10/14/2022 95 (L) 98 - 107 mmol/L Final     CO2   Date Value Ref Range Status   10/14/2022 34.0 (H) 22.0 - 29.0 mmol/L Final     BUN   Date Value Ref Range Status   10/14/2022 17 8 - 23 mg/dL Final     Creatinine   Date Value Ref Range Status   10/14/2022 0.67 0.57 - 1.00 mg/dL Final     Glucose   Date Value Ref Range Status   10/14/2022 126 (H) 65 - 99 mg/dL Final     Calcium   Date Value Ref Range Status   10/14/2022 8.8 8.6 - 10.5 mg/dL Final     Magnesium   Date Value Ref Range Status   10/14/2022 1.4 (L) 1.6 - 2.4 mg/dL Final     AST (SGOT)   Date Value Ref Range Status   10/14/2022 16 1 - 32 U/L Final     ALT (SGPT)   Date Value Ref Range Status   10/14/2022 18 1 - 33 U/L Final     Alkaline Phosphatase   Date Value Ref Range Status   10/14/2022 79 39 - 117 U/L Final     No results found for: APTT, INR             Imaging Results (All)     Procedure Component Value Units Date/Time    CT Chest Without Contrast Diagnostic [[id number]] Collected: 10/14/22 1407     Updated: 10/14/22 1435    Narrative:      CT CHEST WO CONTRAST DIAGNOSTIC-     HISTORY:   Cough for several weeks, history of lung cancer.     TECHNIQUE: CT of the chest was performed without intravenous contrast.  Reformatted images were reviewed. Radiation dose reduction techniques  were utilized, including automated exposure control and exposure  modulation based on body size.     COMPARISON:  CT chest with contrast 6/3/2022        FINDINGS: There is a partially imaged subcentimeter hypodense right  thyroid nodule. Heart size is normal. There is no pericardial effusion.  There is calcific coronary artery atherosclerosis. There is advanced  calcific aortic atherosclerosis. Great vessels are normal in caliber.  There is no significant pleural fluid.  Limited imaging through the upper abdomen demonstrates a hypodense focus  in the right hepatic lobe, not significantly changed. There are surgical  clips at the gallbladder fossa. There is calcific atherosclerosis of the  visualized upper abdominal aorta and its branch vessels. There is a  superior left renal cyst and a 1.5 cm superior left renal lesion, which  is indeterminate but not significantly changed in size. There is  asymmetric elevation of the left hemidiaphragm. There is multilevel  degenerative disc disease. There is diffuse osseous demineralization.  The trachea is clear. There is emphysema. There is curvilinear  atelectasis and/or scarring in the right lung. There are post surgical  changes from left upper lobectomy. Soft tissue density within the  operative bed is difficult to accurately measure and compare but grossly  similar to 6/20/2022. However, dense consolidation in the upper left  lung has become more dense compared to 6/8/2022. There is new masslike  consolidation measuring approximately 6.7 x 4.3 cm in the anterior  inferior left lung with a corresponding small focus of air and mild  surrounding groundglass opacity. A few scattered pulmonary nodules  measuring up to 0.6 cm in the posterior right lower lobe are not  significantly  changed.          Impression:         Post surgical changes from left upper lobectomy with increased dense  consolidation in the upper left lung, which may reflect continued  fibrosis/scarring and volume loss, however, continued follow-up imaging  is recommended. A new approximately 6.7 cm masslike area of  consolidation in the anterior left lung could reflect pneumonia in the  appropriate clinical setting but overall raises concern for possible  malignancy given the masslike configuration. Recommend further  evaluation with PET/CT and/or tissue sampling or at minimum short-term  follow-up contrast-enhanced CT chest and 3 months.  Additional findings, as above.     Discussed with DEJAN Young at 2:27 PM.     This report was finalized on 10/14/2022 2:27 PM by Dr. Bethany Hernandez M.D.               Assessment:  Active Hospital Problems    Diagnosis  POA   • **Shortness of breath [R06.02]  Yes      Resolved Hospital Problems   No resolved problems to display.   copd  Hypertension  Lung mass  Pneumonia  Hyperglycemia  Hypokalemia      Plan:  Replace potassium  Ask thoracic surgery to see her  Antibiotics for pneumonia  Mininebs, steroids  Monitor on telemetry  Trend blood sugar  D.w patient and ED provider    Lorna Escamilla MD  10/14/2022  19:31 EDT

## 2022-10-15 PROBLEM — E87.6 HYPOKALEMIA: Status: ACTIVE | Noted: 2022-10-15

## 2022-10-15 LAB
ANION GAP SERPL CALCULATED.3IONS-SCNC: 11.5 MMOL/L (ref 5–15)
BUN SERPL-MCNC: 19 MG/DL (ref 8–23)
BUN/CREAT SERPL: 27.1 (ref 7–25)
CALCIUM SPEC-SCNC: 8.6 MG/DL (ref 8.6–10.5)
CHLORIDE SERPL-SCNC: 95 MMOL/L (ref 98–107)
CO2 SERPL-SCNC: 31.5 MMOL/L (ref 22–29)
CREAT SERPL-MCNC: 0.7 MG/DL (ref 0.57–1)
DEPRECATED RDW RBC AUTO: 38.9 FL (ref 37–54)
EGFRCR SERPLBLD CKD-EPI 2021: 87.6 ML/MIN/1.73
ERYTHROCYTE [DISTWIDTH] IN BLOOD BY AUTOMATED COUNT: 12.8 % (ref 12.3–15.4)
GLUCOSE BLDC GLUCOMTR-MCNC: 192 MG/DL (ref 70–130)
GLUCOSE BLDC GLUCOMTR-MCNC: 237 MG/DL (ref 70–130)
GLUCOSE SERPL-MCNC: 149 MG/DL (ref 65–99)
HCT VFR BLD AUTO: 35.8 % (ref 34–46.6)
HGB BLD-MCNC: 12.1 G/DL (ref 12–15.9)
MAGNESIUM SERPL-MCNC: 2.2 MG/DL (ref 1.6–2.4)
MCH RBC QN AUTO: 28.9 PG (ref 26.6–33)
MCHC RBC AUTO-ENTMCNC: 33.8 G/DL (ref 31.5–35.7)
MCV RBC AUTO: 85.4 FL (ref 79–97)
PHOSPHATE SERPL-MCNC: 4.4 MG/DL (ref 2.5–4.5)
PLATELET # BLD AUTO: 312 10*3/MM3 (ref 140–450)
PMV BLD AUTO: 9.7 FL (ref 6–12)
POTASSIUM SERPL-SCNC: 3.3 MMOL/L (ref 3.5–5.2)
RBC # BLD AUTO: 4.19 10*6/MM3 (ref 3.77–5.28)
SODIUM SERPL-SCNC: 138 MMOL/L (ref 136–145)
WBC NRBC COR # BLD: 16.1 10*3/MM3 (ref 3.4–10.8)

## 2022-10-15 PROCEDURE — 83735 ASSAY OF MAGNESIUM: CPT | Performed by: STUDENT IN AN ORGANIZED HEALTH CARE EDUCATION/TRAINING PROGRAM

## 2022-10-15 PROCEDURE — 25010000002 AZITHROMYCIN PER 500 MG: Performed by: INTERNAL MEDICINE

## 2022-10-15 PROCEDURE — 82962 GLUCOSE BLOOD TEST: CPT

## 2022-10-15 PROCEDURE — 94799 UNLISTED PULMONARY SVC/PX: CPT

## 2022-10-15 PROCEDURE — 25010000002 CEFTRIAXONE PER 250 MG: Performed by: INTERNAL MEDICINE

## 2022-10-15 PROCEDURE — 94761 N-INVAS EAR/PLS OXIMETRY MLT: CPT

## 2022-10-15 PROCEDURE — 80048 BASIC METABOLIC PNL TOTAL CA: CPT | Performed by: INTERNAL MEDICINE

## 2022-10-15 PROCEDURE — 94664 DEMO&/EVAL PT USE INHALER: CPT

## 2022-10-15 PROCEDURE — 36415 COLL VENOUS BLD VENIPUNCTURE: CPT | Performed by: INTERNAL MEDICINE

## 2022-10-15 PROCEDURE — 85027 COMPLETE CBC AUTOMATED: CPT | Performed by: INTERNAL MEDICINE

## 2022-10-15 PROCEDURE — 99221 1ST HOSP IP/OBS SF/LOW 40: CPT | Performed by: THORACIC SURGERY (CARDIOTHORACIC VASCULAR SURGERY)

## 2022-10-15 PROCEDURE — 94760 N-INVAS EAR/PLS OXIMETRY 1: CPT

## 2022-10-15 PROCEDURE — 63710000001 INSULIN LISPRO (HUMAN) PER 5 UNITS: Performed by: STUDENT IN AN ORGANIZED HEALTH CARE EDUCATION/TRAINING PROGRAM

## 2022-10-15 PROCEDURE — 25010000002 METHYLPREDNISOLONE PER 40 MG: Performed by: INTERNAL MEDICINE

## 2022-10-15 PROCEDURE — 84100 ASSAY OF PHOSPHORUS: CPT | Performed by: STUDENT IN AN ORGANIZED HEALTH CARE EDUCATION/TRAINING PROGRAM

## 2022-10-15 RX ORDER — DEXTROSE MONOHYDRATE 25 G/50ML
25 INJECTION, SOLUTION INTRAVENOUS
Status: DISCONTINUED | OUTPATIENT
Start: 2022-10-15 | End: 2022-10-18 | Stop reason: HOSPADM

## 2022-10-15 RX ORDER — NICOTINE POLACRILEX 4 MG
15 LOZENGE BUCCAL
Status: DISCONTINUED | OUTPATIENT
Start: 2022-10-15 | End: 2022-10-18 | Stop reason: HOSPADM

## 2022-10-15 RX ORDER — INSULIN LISPRO 100 [IU]/ML
0-7 INJECTION, SOLUTION INTRAVENOUS; SUBCUTANEOUS
Status: DISCONTINUED | OUTPATIENT
Start: 2022-10-15 | End: 2022-10-18 | Stop reason: HOSPADM

## 2022-10-15 RX ADMIN — POTASSIUM CHLORIDE 40 MEQ: 750 TABLET, EXTENDED RELEASE ORAL at 14:01

## 2022-10-15 RX ADMIN — AMLODIPINE BESYLATE 5 MG: 5 TABLET ORAL at 09:28

## 2022-10-15 RX ADMIN — IPRATROPIUM BROMIDE AND ALBUTEROL SULFATE 3 ML: 2.5; .5 SOLUTION RESPIRATORY (INHALATION) at 12:28

## 2022-10-15 RX ADMIN — METOPROLOL SUCCINATE 25 MG: 25 TABLET, EXTENDED RELEASE ORAL at 09:28

## 2022-10-15 RX ADMIN — CETIRIZINE HYDROCHLORIDE 10 MG: 10 TABLET ORAL at 09:28

## 2022-10-15 RX ADMIN — METHYLPREDNISOLONE SODIUM SUCCINATE 40 MG: 40 INJECTION, POWDER, FOR SOLUTION INTRAMUSCULAR; INTRAVENOUS at 05:37

## 2022-10-15 RX ADMIN — IPRATROPIUM BROMIDE AND ALBUTEROL SULFATE 3 ML: 2.5; .5 SOLUTION RESPIRATORY (INHALATION) at 08:02

## 2022-10-15 RX ADMIN — ATORVASTATIN CALCIUM 20 MG: 20 TABLET, FILM COATED ORAL at 21:39

## 2022-10-15 RX ADMIN — MULTIPLE VITAMINS W/ MINERALS TAB 1 TABLET: TAB at 09:28

## 2022-10-15 RX ADMIN — INSULIN LISPRO 3 UNITS: 100 INJECTION, SOLUTION INTRAVENOUS; SUBCUTANEOUS at 17:20

## 2022-10-15 RX ADMIN — MONTELUKAST SODIUM 10 MG: 10 TABLET, FILM COATED ORAL at 21:40

## 2022-10-15 RX ADMIN — ASPIRIN 81 MG: 81 TABLET, CHEWABLE ORAL at 09:28

## 2022-10-15 RX ADMIN — IPRATROPIUM BROMIDE AND ALBUTEROL SULFATE 3 ML: 2.5; .5 SOLUTION RESPIRATORY (INHALATION) at 19:57

## 2022-10-15 RX ADMIN — CEFTRIAXONE SODIUM 1 G: 1 INJECTION, POWDER, FOR SOLUTION INTRAMUSCULAR; INTRAVENOUS at 21:38

## 2022-10-15 RX ADMIN — IPRATROPIUM BROMIDE AND ALBUTEROL SULFATE 3 ML: 2.5; .5 SOLUTION RESPIRATORY (INHALATION) at 15:42

## 2022-10-15 RX ADMIN — PANTOPRAZOLE SODIUM 40 MG: 40 TABLET, DELAYED RELEASE ORAL at 05:37

## 2022-10-15 RX ADMIN — Medication 1000 UNITS: at 09:28

## 2022-10-15 RX ADMIN — AZITHROMYCIN MONOHYDRATE 500 MG: 500 INJECTION, POWDER, LYOPHILIZED, FOR SOLUTION INTRAVENOUS at 21:39

## 2022-10-15 NOTE — CONSULTS
Initial Hospital Consult    Reason for consult: History of lung cancer, left lung consolidation  Requesting physician: Dr. Escamilla    Chief complaint: Persistent cough    Subjective     History of Present Illness  Ms. Vazquez is a pleasant 82-year-old lady presented to the hospital with complaints of a 1 month history of a persistent cough and significant shortness of breath.  She states that her cough is nonproductive.  She has a history of multiple lung cancers and is status post left upper lobectomy in 2012 for a stage I non-small cell.  In 2021 she presented with what was called to synchronous primaries of the right lower lobe superior segment as well as the left lower lobe.  She was not a candidate for surgical resection either these lesions secondary to her COPD.  She was treated with stereotactic radiation to both of these lesions.  She has been followed with Dr. Escobar and had a CT of the chest in June which demonstrated scarring secondary to her SBRT and prior surgical resection with no concerning features.  Approximately a month ago, she developed a persistent cough and states that it has been worsening over that time.  She completed a round of antibiotics and still had a cough.  She describes her cough is dry and nonproductive.  She came to the hospital secondary to inability to sleep secondary to cough.  Review of Systems   Constitutional: Positive for activity change.   HENT: Negative.    Eyes: Negative.    Respiratory: Positive for cough and shortness of breath.    Cardiovascular: Negative.    Gastrointestinal: Negative.    Endocrine: Negative.    Genitourinary: Negative.    Musculoskeletal: Negative.    Skin: Negative.    Allergic/Immunologic: Negative.    Neurological: Negative.    Hematological: Negative.    Psychiatric/Behavioral: Negative.         Past Medical History:   Diagnosis Date   • Allergic rhinitis    • Asthma    • Cancer of floor of mouth (HCC) 2014   • Cerebral aneurysm    • COPD  (chronic obstructive pulmonary disease) (HCC)    • COVID    • Esophageal reflux    • History of adenocarcinoma of lung    • History of anemia    • History of carcinoma in situ of skin    • History of lung cancer    • 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 from [facility]   • History of squamous cell carcinoma     Tongue   • Hyperlipidemia    • Hypertension     since early 60s   • Low vitamin B12 level    • Lung cancer (HCC)    • Thyromegaly    • UTI (urinary tract infection)    • Vitamin D deficiency    ,   Past Surgical History:   Procedure Laterality Date   • CHOLECYSTECTOMY     • COLONOSCOPY     • EMBOLIZATION CEREBRAL Left 2022    Procedure: 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;  Surgeon: Bradley Dowell MD;  Location: [facility] ;  Service: Interventional Radiology;  Laterality: Left;   • EYE SURGERY  2020    Took a muscle out of right eye   • GLOSSECTOMY PARTIAL      less than one half tongue   • LUNG SURGERY     • ORAL LESION EXCISION/BIOPSY       and 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   • TUBAL ABDOMINAL LIGATION  1970   ,   Family History   Problem Relation Age of Onset   • Ovarian cancer Mother          at age 27   • No Known Problems Father    • Ovarian cancer Sister    • Colon cancer Brother    • No Known Problems Other    • Malig Hyperthermia Neg Hx    ,   Social History     Socioeconomic History   • Marital status:    Tobacco Use   • Smoking status: Former     Types: Cigarettes     Quit date: 2015     Years since quittin.7   • Smokeless tobacco: Never   • Tobacco comments:     less than a pack per day, no smoking since , Quit 2015   Vaping Use   • Vaping Use: Never used   Substance and Sexual Activity   • Alcohol use: Yes     Comment: Socially -A few times a month   • Drug use: No   • Sexual activity: Defer     E-cigarette/Vaping   • E-cigarette/Vaping Use Never User       E-cigarette/Vaping Substances     E-cigarette/Vaping Devices       ,   Medications Prior to Admission   Medication Sig Dispense Refill Last Dose   • albuterol sulfate  (90 Base) MCG/ACT inhaler Inhale 2 puffs Every 4 (Four) Hours As Needed for Wheezing. 18 g 2 10/13/2022   • amLODIPine (NORVASC) 5 MG tablet TAKE 1 TABLET EVERY DAY (Patient taking differently: Take 1 tablet by mouth Daily.) 90 tablet 1 10/14/2022   • aspirin 81 MG chewable tablet Chew 81 mg Daily.   Past Week   • atorvastatin (LIPITOR) 20 MG tablet Take 1 tablet by mouth Every Night. 90 tablet 3 10/13/2022   • B Complex-C-E-Zn (b complex-C-E-zinc) tablet Take 1 tablet by mouth Daily. HOLDING FOR DOS   10/14/2022   • cetirizine (ZyrTEC) 10 MG tablet Take 10 mg by mouth Daily.   10/14/2022   • chlorhexidine (PERIDEX) 0.12 % solution Apply 15 mL to the mouth or throat 2 (Two) Times a Day.   10/14/2022   • cholecalciferol (VITAMIN D3) 1000 units tablet Take 1,000 Units by mouth Daily. HOLDING FOR DOS   10/14/2022   • ciclopirox (LOPROX) 0.77 % cream Apply 1 application topically to the appropriate area as directed 2 (Two) Times a Day.   Past Week   • clobetasol (TEMOVATE) 0.05 % cream APPLY TOPICALLY TO THE AFFECTED AREA TWICE DAILY AS NEEDED   Past Week   • fluticasone (FLONASE) 50 MCG/ACT nasal spray 2 sprays into the nostril(s) as directed by provider Daily.   10/14/2022   • hydroCHLOROthiazide (HYDRODIURIL) 25 MG tablet TAKE 1 TABLET EVERY DAY (Patient taking differently: Take 1 tablet by mouth Daily.) 90 tablet 1 10/14/2022   • HYDROcod Polst-CPM Polst ER (Tussionex Pennkinetic ER) 10-8 MG/5ML ER suspension Take 5 mL by mouth Every 12 (Twelve) Hours As Needed for Cough. 120 mL 0 10/13/2022   • metoprolol succinate XL (TOPROL-XL) 25 MG 24 hr tablet TAKE 1 TABLET EVERY DAY (Patient taking differently: Take 1 tablet by mouth Daily.) 90 tablet 1 10/13/2022   • montelukast (SINGULAIR) 10 MG tablet Take 1 tablet by mouth Every Night. 90 tablet  3 Past Week   • omeprazole (priLOSEC) 40 MG capsule Take 1 capsule by mouth Daily.   10/13/2022   • acetaminophen (TYLENOL) 325 MG tablet Take 2 tablets by mouth Every 4 (Four) Hours As Needed for Mild Pain . (Patient not taking: Reported on 10/14/2022)   Not Taking   • tiZANidine (ZANAFLEX) 4 MG tablet Take 0.5-1 tablets by mouth Every 8 (Eight) Hours As Needed for Muscle Spasms. (Patient not taking: Reported on 10/14/2022) 30 tablet 1 Not Taking    and Allergies:  Sulfa antibiotics    Objective      Vital Signs  Temp:  [97.5 °F (36.4 °C)-100.4 °F (38 °C)] 97.8 °F (36.6 °C)  Heart Rate:  [62-91] 82  Resp:  [16-20] 17  BP: ()/(45-64) 95/45    Physical Exam  Vitals and nursing note reviewed.   Constitutional:       Appearance: She is well-developed.   HENT:      Head: Normocephalic and atraumatic.      Nose: Nose normal.   Eyes:      Conjunctiva/sclera: Conjunctivae normal.   Cardiovascular:      Rate and Rhythm: Normal rate.   Pulmonary:      Effort: Pulmonary effort is normal.   Abdominal:      Palpations: Abdomen is soft.   Musculoskeletal:      Cervical back: Neck supple.   Skin:     General: Skin is warm and dry.   Neurological:      Mental Status: She is alert and oriented to person, place, and time.   Psychiatric:         Behavior: Behavior normal.         Thought Content: Thought content normal.         Judgment: Judgment normal.         Results Review:    I reviewed the patient's new clinical results.  I reviewed the patient's new imaging results and agree with the interpretation.  I reviewed the patient's other test results and agree with the interpretation    Lab Results:                WBC   Date Value Ref Range Status   10/15/2022 16.10 (H) 3.40 - 10.80 10*3/mm3 Final     Hemoglobin   Date Value Ref Range Status   10/15/2022 12.1 12.0 - 15.9 g/dL Final     Hematocrit   Date Value Ref Range Status   10/15/2022 35.8 34.0 - 46.6 % Final     Platelets   Date Value Ref Range Status   10/15/2022 312 140  - 450 10*3/mm3 Final     Sodium   Date Value Ref Range Status   10/15/2022 138 136 - 145 mmol/L Final     Potassium   Date Value Ref Range Status   10/15/2022 3.3 (L) 3.5 - 5.2 mmol/L Final     Chloride   Date Value Ref Range Status   10/15/2022 95 (L) 98 - 107 mmol/L Final     CO2   Date Value Ref Range Status   10/15/2022 31.5 (H) 22.0 - 29.0 mmol/L Final     BUN   Date Value Ref Range Status   10/15/2022 19 8 - 23 mg/dL Final     Creatinine   Date Value Ref Range Status   10/15/2022 0.70 0.57 - 1.00 mg/dL Final     Glucose   Date Value Ref Range Status   10/15/2022 149 (H) 65 - 99 mg/dL Final         Assessment & Plan       Shortness of breath    Hypertension    Hyperlipidemia    Chronic obstructive pulmonary disease (HCC)    Hypokalemia    History of lung cancer      Assessment & Plan  Ms. Vazquez is a pleasant 82-year-old lady with a history of 3 primary lung cancers, the latest treated with SBRT in 2021.  She presents with a new 6.5 cm left lower lobe mass/consolidation.  This is new since her prior CT scan in June.  While this could certainly represent a pneumonia, this is concerning for malignancy as well.  I would recommend bronchoscopy with biopsy of this lesion as well as PET CT scan as an outpatient.  I have placed a consult for pulmonology to consider bronchoscopy.  She is not a candidate for any surgical intervention given her poor pulmonary function.  I discussed the patients findings and my recommendations with patient    Thank you for this consult and allowing us to participate in the care of your patient.  We will follow along with you during this hospitalization.       Jenny Strong MD  [facility]  10/15/22  14:52 EDT

## 2022-10-15 NOTE — CONSULTS
Referring Provider: Dr. Wright  Reason for Consultation: Lung mass    Patient Care Team:  Kehrer, Meredith Lea, MD as PCP - General (Family Medicine)  Mary, Arias BROWN III, MD as Referring Physician (Thoracic Surgery)  Henry Escobar MD as Consulting Physician (Hematology and Oncology)  Moise Mcdonough MD as Consulting Physician (Radiation Oncology)    Chief complaint:   Cough    History of present illness:    Subjective   This is an 82-year-old female patient with history of lung cancer s/p left upper lobectomy in 2012.  She had recurrence in 2021 in the right lower lobe and left lower lobe but she was not a surgical candidate and was treated with 5 sessions of stereotactic radiation which she finished last year    She presents to the hospital today for cough.  This started in September about a month ago.  She had no response to outpatient antibiotic therapy (she does not recall the name) and steroid which she finished not too long ago.  Apparently she was prescribed codeine cough medicine as well and she had mild relief only for 2 hours after taking it..  Her cough is mostly dry but today she was able to produce very small amount of tan phlegm..  Cough is severe at times and prevents her from sleeping.  She reported no exacerbating factors.  However, she is she has been admitted here she stated that she has not coughed much at all.    She had a low-grade temperature of 100.4 on admission.    She carries a diagnosis of COPD.  PFT is reported below.  She uses albuterol inhaler as needed for shortness of breath but does not aquatic a lot and does not follow with a pulmonologist.    Labs reviewed:  Bicarb 31; WBC 14 on 10/14 then 16 today; neutrophils 80%; blood culture negative; Pro-Elliot normal.    Review of Systems  Constitutional: No fever or chills.  Weight loss of 5 pounds over the last few days  ENMT: No sinus congestion  Cardiovascular: No chest pain, palpitation or legs swelling.     Respiratory: See above  Gastrointestinal: No constipation, diarrhea or abdominal pain   Neurology: No headache, weakness, numbness or dizziness.   Musculoskeletal: No joints pain, stiffness or swelling.   Psychiatry: No depression.  Genitourinary: No dysuria or frequent urination  Endo: No weight changes. No cold or warm intolerance.  Lymphatic: No swollen glands.  Integumentary: No rash.    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 from [facility]   • History of squamous cell carcinoma     Tongue   • Hyperlipidemia    • Hypertension     since early 60s   • Low vitamin B12 level    • Lung cancer (Formerly [facility])    • Thyromegaly    • UTI (urinary tract infection)    • Vitamin D deficiency    ,   Past Surgical History:   Procedure Laterality Date   • CHOLECYSTECTOMY     • COLONOSCOPY     • EMBOLIZATION CEREBRAL Left 2022    Procedure: 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;  Surgeon: Bradley Dowell MD;  Location: [facility] ;  Service: Interventional Radiology;  Laterality: Left;   • EYE SURGERY  2020    Took a muscle out of right eye   • GLOSSECTOMY PARTIAL      less than one half tongue   • LUNG SURGERY     • ORAL LESION EXCISION/BIOPSY       and 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   • TUBAL ABDOMINAL LIGATION     ,   Family History   Problem Relation Age of Onset   • Ovarian cancer Mother          at age 27   • No Known Problems Father    • Ovarian cancer Sister    • Colon cancer Brother    • No Known Problems Other    • Malig Hyperthermia Neg Hx     ,   Social History     Socioeconomic History   • Marital status:    Tobacco Use   • Smoking status: Former     Types: Cigarettes     Quit date: 2015     Years since quittin.7   • Smokeless tobacco: Never   • Tobacco comments:     less than a pack per day, no smoking since , Quit 2015   Vaping Use   • Vaping Use: Never used   Substance and Sexual Activity   • Alcohol use: Yes     Comment: Socially -A few times a month   • Drug use: No   • Sexual activity: Defer     E-cigarette/Vaping   • E-cigarette/Vaping Use Never User      E-cigarette/Vaping Substances     E-cigarette/Vaping Devices       ,   Medications Prior to Admission   Medication Sig Dispense Refill Last Dose   • albuterol sulfate  (90 Base) MCG/ACT inhaler Inhale 2 puffs Every 4 (Four) Hours As Needed for Wheezing. 18 g 2 10/13/2022   • amLODIPine (NORVASC) 5 MG tablet TAKE 1 TABLET EVERY DAY (Patient taking differently: Take 1 tablet by mouth Daily.) 90 tablet 1 10/14/2022   • aspirin 81 MG chewable tablet Chew 81 mg Daily.   Past Week   • atorvastatin (LIPITOR) 20 MG tablet Take 1 tablet by mouth Every Night. 90 tablet 3 10/13/2022   • B Complex-C-E-Zn (b complex-C-E-zinc) tablet Take 1 tablet by mouth Daily. HOLDING FOR DOS   10/14/2022   • cetirizine (ZyrTEC) 10 MG tablet Take 10 mg by mouth Daily.   10/14/2022   • chlorhexidine (PERIDEX) 0.12 % solution Apply 15 mL to the mouth or throat 2 (Two) Times a Day.   10/14/2022   • cholecalciferol (VITAMIN D3) 1000 units tablet Take 1,000 Units by mouth Daily. HOLDING FOR DOS   10/14/2022   • ciclopirox (LOPROX) 0.77 % cream Apply 1 application topically to the appropriate area as directed 2 (Two) Times a Day.   Past Week   • clobetasol (TEMOVATE) 0.05 % cream APPLY TOPICALLY TO THE AFFECTED AREA TWICE DAILY AS NEEDED   Past Week   • fluticasone (FLONASE) 50 MCG/ACT nasal spray 2 sprays into the nostril(s) as directed by provider Daily.   10/14/2022   • hydroCHLOROthiazide  (HYDRODIURIL) 25 MG tablet TAKE 1 TABLET EVERY DAY (Patient taking differently: Take 1 tablet by mouth Daily.) 90 tablet 1 10/14/2022   • HYDROcod Polst-CPM Polst ER (Tussionex Pennkinetic ER) 10-8 MG/5ML ER suspension Take 5 mL by mouth Every 12 (Twelve) Hours As Needed for Cough. 120 mL 0 10/13/2022   • metoprolol succinate XL (TOPROL-XL) 25 MG 24 hr tablet TAKE 1 TABLET EVERY DAY (Patient taking differently: Take 1 tablet by mouth Daily.) 90 tablet 1 10/13/2022   • montelukast (SINGULAIR) 10 MG tablet Take 1 tablet by mouth Every Night. 90 tablet 3 Past Week   • omeprazole (priLOSEC) 40 MG capsule Take 1 capsule by mouth Daily.   10/13/2022   • acetaminophen (TYLENOL) 325 MG tablet Take 2 tablets by mouth Every 4 (Four) Hours As Needed for Mild Pain . (Patient not taking: Reported on 10/14/2022)   Not Taking   • tiZANidine (ZANAFLEX) 4 MG tablet Take 0.5-1 tablets by mouth Every 8 (Eight) Hours As Needed for Muscle Spasms. (Patient not taking: Reported on 10/14/2022) 30 tablet 1 Not Taking   , Scheduled Meds:  aspirin, 81 mg, Oral, Daily  atorvastatin, 20 mg, Oral, Nightly  azithromycin, 500 mg, Intravenous, Q24H  cefTRIAXone, 1 g, Intravenous, Q24H  cetirizine, 10 mg, Oral, Daily  chlorhexidine, 15 mL, Mouth/Throat, BID  cholecalciferol, 1,000 Units, Oral, Daily  insulin lispro, 0-7 Units, Subcutaneous, TID AC  ipratropium-albuterol, 3 mL, Nebulization, 4x Daily - RT  metoprolol succinate XL, 25 mg, Oral, Daily  montelukast, 10 mg, Oral, Nightly  multivitamin with minerals, 1 tablet, Oral, Daily  pantoprazole, 40 mg, Oral, QAM    , Continuous Infusions:   , PRN Meds:  •  acetaminophen  •  albuterol  •  benzonatate  •  dextrose  •  dextrose  •  glucagon (human recombinant)  •  HYDROcod Polst-CPM Polst ER  •  magnesium sulfate **OR** magnesium sulfate **OR** magnesium sulfate  •  melatonin  •  nitroglycerin  •  ondansetron **OR** ondansetron  •  potassium chloride **OR** potassium chloride **OR** potassium  chloride  •  potassium chloride **OR** potassium chloride **OR** potassium chloride  •  [COMPLETED] Insert peripheral IV **AND** sodium chloride  •  tiZANidine and Allergies:  Sulfa antibiotics    Objective     Vital Signs   Temp:  [97.5 °F (36.4 °C)-100.4 °F (38 °C)] 97.5 °F (36.4 °C)  Heart Rate:  [62-91] 63  Resp:  [16-20] 18  BP: ()/(46-64) 99/52    PPE used per hospital policy    Physical Exam:  Constitutional: Not in acute distress.  Eyes: Injected conjunctivae, EOMI. pupils equal reactive to light.  ENMT: Mendez 3.  Moist tongue.  External ears normal.  Neck: Trachea midline. No thyromegaly  Heart: RRR, no murmur  Lungs: Equal but diminished air entry throughout.  Mild crackles in the right lower lobe.  No wheezing.  No use of accessory muscles.  Abdomen:  Soft. No tenderness or dullness. No HSM.  Extremities: No cyanosis, clubbing or pitting edema.  Warm extremities and well-perfused.  Neuro: Conscious, alert, oriented x3.  Strength 5/5 in arms.  Psych: Appropriate mood and affect.    Integumentary: No rash.  Normal skin turgor  Lymphatic: No palpable cervical or supraclavicular lymph nodes.      Diagnostic imaging:  I personally and independently reviewed the following images:   CT chest 10/14/2022 compared to June 2021: New development of masslike consolidation in the left upper lobe.  Persistent pulmonary fibrosis the left upper lobe.  Emphysema.  No evidence of distant Elma adenopathies.            Assessment     1. 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  2. 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 New lung mass left upper lobe/lingula.  Concerning for malignancy.  Other possibility include mild obstructive pneumonia  4. COPD, no current exacerbation (PFT 7/1/2021 FEV1 71%; DLCO 54%)    Recommendations:    · Discussed bronchoscopy with the patient and transbronchial biopsies.  She seems to be reluctant, not quite sure why.  I told her  alternatively we can get a CT of the chest in 4-6 weeks and if she has persistent mass that she would require bronchoscopy.  She said that she had a CT scheduled by Dr. Martinez on 10/25 and therefore she would have to cancel the CT and due to length later may be around the middle or the end of next month.      · Continue DuoNeb 4 times a day and can be discharged on home nebulizer therapy.  · Antibiotics agree with Rocephin and azithromycin however if discharged then she can use Augmentin and azithromycin to cover potential postobstructive pneumonia  · Again if discharged and we would recommend obtaining a CT of the chest in 1-1.5-month.  If she changes her mind and she wants to proceed with bronchoscopy then we can perform the on Monday.  · If discharged then can use the same prescription of the cough medicine prescribed over here as outpatient since she may have responded very well to it.    Thank you for the consultation.  We will follow along.      India Flores MD  10/15/22  14:10 EDT

## 2022-10-15 NOTE — PLAN OF CARE
Goal Outcome Evaluation:  Plan of Care Reviewed With: patient        Progress: improving  Outcome Evaluation: VSS. Telemetry monitor, SR. Alert and oriented x4, room air. Up with standby assist. Bronch with biopsies planned tent for Monday. Will cont to monitor.

## 2022-10-15 NOTE — PLAN OF CARE
Goal Outcome Evaluation:  Plan of Care Reviewed With: patient        Progress: improving  Outcome Evaluation: Patient remains alert and oriented x 4, SATs in mid 90s on 2L NC, SR to ST on monitor. Stand by assist to bathroom. Patient had a temp of 101.4. Administered tylenol. Patient rested well throughout the shift. Will continue to monitor.

## 2022-10-15 NOTE — PROGRESS NOTES
Name: Darlene Pfeiffer ADMIT: 10/14/2022   : 1940  PCP: Kehrer, Meredith Lea, MD    MRN: [id number] LOS: 0 days   AGE/SEX: 82 y.o. female  ROOM: [facility]     Subjective   Subjective   Clinics out of the bed, eating breakfast.  Feeling better.  Shortness of breath improved.  No chest pain or palpitations.    Review of Systems   As above  Objective   Objective   Vital Signs  Temp:  [97.5 °F (36.4 °C)-100.4 °F (38 °C)] 97.5 °F (36.4 °C)  Heart Rate:  [] 68  Resp:  [16-20] 17  BP: ()/(46-66) 99/52  SpO2:  [90 %-98 %] 98 %  on  Flow (L/min):  [2] 2;   Device (Oxygen Therapy): nasal cannula  Body mass index is 21.89 kg/m².  Physical Exam    General: Alert and oriented x3, no acute distress  HEENT: Normocephalic, atraumatic  CV: Regular rate and rhythm, no murmurs rubs or gallops  Lungs diminished breath sounds left lung, nonlabored breathing, no wheezing, on 2 L nasal cannula  Abdomen: Soft, nontender, nondistended  Extremities: No significant peripheral edema , no cyanosis     Results Review     I reviewed the patient's new clinical results.  Results from last 7 days   Lab Units 10/15/22  0815 10/14/22  1315   WBC 10*3/mm3 16.10* 14.90*   HEMOGLOBIN g/dL 12.1 12.7   PLATELETS 10*3/mm3 312 295     Results from last 7 days   Lab Units 10/15/22  0815 10/14/22  1315   SODIUM mmol/L 138 139   POTASSIUM mmol/L 3.3* 2.7*   CHLORIDE mmol/L 95* 95*   CO2 mmol/L 31.5* 34.0*   BUN mg/dL 19 17   CREATININE mg/dL 0.70 0.67   GLUCOSE mg/dL 149* 126*   Estimated Creatinine Clearance: 54.9 mL/min (by C-G formula based on SCr of 0.7 mg/dL).  Results from last 7 days   Lab Units 10/14/22  1315   ALBUMIN g/dL 3.30*   BILIRUBIN mg/dL 1.0   ALK PHOS U/L 79   AST (SGOT) U/L 16   ALT (SGPT) U/L 18     Results from last 7 days   Lab Units 10/15/22  0815 10/14/22  1315   CALCIUM mg/dL 8.6 8.8   ALBUMIN g/dL  --  3.30*   MAGNESIUM mg/dL  --  1.4*     Results from last 7 days   Lab Units 10/14/22  1437 10/14/22  1315    PROCALCITONIN ng/mL  --  0.09   LACTATE mmol/L 1.3  --      COVID19   Date Value Ref Range Status   10/14/2022 Not Detected Not Detected - Ref. Range Final   06/27/2022 Not Detected Not Detected - Ref. Range Final     No results found for: HGBA1C, POCGLU    CT Chest Without Contrast Diagnostic  Narrative: CT CHEST WO CONTRAST DIAGNOSTIC-     HISTORY:  Cough for several weeks, history of lung cancer.     TECHNIQUE: CT of the chest was performed without intravenous contrast.  Reformatted images were reviewed. Radiation dose reduction techniques  were utilized, including automated exposure control and exposure  modulation based on body size.     COMPARISON:  CT chest with contrast 6/3/2022        FINDINGS: There is a partially imaged subcentimeter hypodense right  thyroid nodule. Heart size is normal. There is no pericardial effusion.  There is calcific coronary artery atherosclerosis. There is advanced  calcific aortic atherosclerosis. Great vessels are normal in caliber.  There is no significant pleural fluid.  Limited imaging through the upper abdomen demonstrates a hypodense focus  in the right hepatic lobe, not significantly changed. There are surgical  clips at the gallbladder fossa. There is calcific atherosclerosis of the  visualized upper abdominal aorta and its branch vessels. There is a  superior left renal cyst and a 1.5 cm superior left renal lesion, which  is indeterminate but not significantly changed in size. There is  asymmetric elevation of the left hemidiaphragm. There is multilevel  degenerative disc disease. There is diffuse osseous demineralization.  The trachea is clear. There is emphysema. There is curvilinear  atelectasis and/or scarring in the right lung. There are post surgical  changes from left upper lobectomy. Soft tissue density within the  operative bed is difficult to accurately measure and compare but grossly  similar to 6/20/2022. However, dense consolidation in the upper left  lung  has become more dense compared to 6/8/2022. There is new masslike  consolidation measuring approximately 6.7 x 4.3 cm in the anterior  inferior left lung with a corresponding small focus of air and mild  surrounding groundglass opacity. A few scattered pulmonary nodules  measuring up to 0.6 cm in the posterior right lower lobe are not  significantly changed.        Impression:    Post surgical changes from left upper lobectomy with increased dense  consolidation in the upper left lung, which may reflect continued  fibrosis/scarring and volume loss, however, continued follow-up imaging  is recommended. A new approximately 6.7 cm masslike area of  consolidation in the anterior left lung could reflect pneumonia in the  appropriate clinical setting but overall raises concern for possible  malignancy given the masslike configuration. Recommend further  evaluation with PET/CT and/or tissue sampling or at minimum short-term  follow-up contrast-enhanced CT chest and 3 months.  Additional findings, as above.     Discussed with DEJAN Young at 2:27 PM.     This report was finalized on 10/14/2022 2:27 PM by Dr. Bethany Hernandez M.D.       Scheduled Medications  amLODIPine, 5 mg, Oral, Daily  aspirin, 81 mg, Oral, Daily  atorvastatin, 20 mg, Oral, Nightly  azithromycin, 500 mg, Intravenous, Q24H  cefTRIAXone, 1 g, Intravenous, Q24H  cetirizine, 10 mg, Oral, Daily  chlorhexidine, 15 mL, Mouth/Throat, BID  cholecalciferol, 1,000 Units, Oral, Daily  insulin lispro, 0-7 Units, Subcutaneous, TID AC  ipratropium-albuterol, 3 mL, Nebulization, 4x Daily - RT  metoprolol succinate XL, 25 mg, Oral, Daily  montelukast, 10 mg, Oral, Nightly  multivitamin with minerals, 1 tablet, Oral, Daily  pantoprazole, 40 mg, Oral, QAM    Infusions   Diet  Diet Regular; Cardiac       Assessment/Plan     Active Hospital Problems    Diagnosis  POA   • **Shortness of breath [R06.02]  Yes   • Hypokalemia [E87.6]  Unknown   • History of lung cancer  [Z85.118]  Not Applicable   • Chronic obstructive pulmonary disease (HCC) [J44.9]  Yes   • Hyperlipidemia [E78.5]  Yes   • Hypertension [I10]  Yes      Resolved Hospital Problems   No resolved problems to display.       82 y.o. female admitted with Shortness of breath.    Shortness of breath/lung mass versus pneumonia?  CT of the chest  without contrast showed showed 6.7 cm masslike area ofconsolidation in the anterior left lung could reflect pneumonia versus lung mass  -Continue with IV ceftriaxone azithromycin for pneumonia  -Consult thoracic surgery pulmonology        Hypokalemia/hypomagnesemia  - Potassium was 2.7 on admission, repleted.  Potassium this morning 3.3.  Continue to replace per protocol.  Monitor daily.  -Magnesium was 1.4, pending from this morning.  Follow-up esults and replete as indicated.      Essential hypertension: BP on the softer side.  Hold amlodipine.  Continue to monitor and restart as indicated.      DVT prophylaxis; SCDs  CODE STATUS: Full code  Disposition: To be determined    I wore protective equipment throughout this patient encounter including a face mask, gloves and protective eyewear.  Hand hygiene was performed before donning protective equipment and after removal when leaving the room.      Dictated utilizing Dragon dictation        Perla Wright MD  [facility]  10/15/22  12:24 EDT

## 2022-10-16 LAB
ANION GAP SERPL CALCULATED.3IONS-SCNC: 5 MMOL/L (ref 5–15)
BUN SERPL-MCNC: 21 MG/DL (ref 8–23)
BUN/CREAT SERPL: 36.2 (ref 7–25)
CALCIUM SPEC-SCNC: 8.6 MG/DL (ref 8.6–10.5)
CHLORIDE SERPL-SCNC: 99 MMOL/L (ref 98–107)
CO2 SERPL-SCNC: 33 MMOL/L (ref 22–29)
CREAT SERPL-MCNC: 0.58 MG/DL (ref 0.57–1)
DEPRECATED RDW RBC AUTO: 40 FL (ref 37–54)
EGFRCR SERPLBLD CKD-EPI 2021: 91.6 ML/MIN/1.73
ERYTHROCYTE [DISTWIDTH] IN BLOOD BY AUTOMATED COUNT: 13 % (ref 12.3–15.4)
GLUCOSE BLDC GLUCOMTR-MCNC: 129 MG/DL (ref 70–130)
GLUCOSE BLDC GLUCOMTR-MCNC: 136 MG/DL (ref 70–130)
GLUCOSE BLDC GLUCOMTR-MCNC: 143 MG/DL (ref 70–130)
GLUCOSE BLDC GLUCOMTR-MCNC: 144 MG/DL (ref 70–130)
GLUCOSE SERPL-MCNC: 134 MG/DL (ref 65–99)
HCT VFR BLD AUTO: 30.3 % (ref 34–46.6)
HGB BLD-MCNC: 10.2 G/DL (ref 12–15.9)
MCH RBC QN AUTO: 29.1 PG (ref 26.6–33)
MCHC RBC AUTO-ENTMCNC: 33.7 G/DL (ref 31.5–35.7)
MCV RBC AUTO: 86.3 FL (ref 79–97)
PLATELET # BLD AUTO: 275 10*3/MM3 (ref 140–450)
PMV BLD AUTO: 9.9 FL (ref 6–12)
POTASSIUM SERPL-SCNC: 3.9 MMOL/L (ref 3.5–5.2)
RBC # BLD AUTO: 3.51 10*6/MM3 (ref 3.77–5.28)
SODIUM SERPL-SCNC: 137 MMOL/L (ref 136–145)
WBC NRBC COR # BLD: 18.22 10*3/MM3 (ref 3.4–10.8)

## 2022-10-16 PROCEDURE — 85027 COMPLETE CBC AUTOMATED: CPT | Performed by: STUDENT IN AN ORGANIZED HEALTH CARE EDUCATION/TRAINING PROGRAM

## 2022-10-16 PROCEDURE — 94761 N-INVAS EAR/PLS OXIMETRY MLT: CPT

## 2022-10-16 PROCEDURE — 94664 DEMO&/EVAL PT USE INHALER: CPT

## 2022-10-16 PROCEDURE — 94760 N-INVAS EAR/PLS OXIMETRY 1: CPT

## 2022-10-16 PROCEDURE — 25010000002 CEFTRIAXONE PER 250 MG: Performed by: INTERNAL MEDICINE

## 2022-10-16 PROCEDURE — 99232 SBSQ HOSP IP/OBS MODERATE 35: CPT | Performed by: THORACIC SURGERY (CARDIOTHORACIC VASCULAR SURGERY)

## 2022-10-16 PROCEDURE — 94799 UNLISTED PULMONARY SVC/PX: CPT

## 2022-10-16 PROCEDURE — 99221 1ST HOSP IP/OBS SF/LOW 40: CPT | Performed by: INTERNAL MEDICINE

## 2022-10-16 PROCEDURE — 25010000002 AZITHROMYCIN PER 500 MG: Performed by: INTERNAL MEDICINE

## 2022-10-16 PROCEDURE — 80048 BASIC METABOLIC PNL TOTAL CA: CPT | Performed by: STUDENT IN AN ORGANIZED HEALTH CARE EDUCATION/TRAINING PROGRAM

## 2022-10-16 PROCEDURE — 82962 GLUCOSE BLOOD TEST: CPT

## 2022-10-16 RX ADMIN — MULTIPLE VITAMINS W/ MINERALS TAB 1 TABLET: TAB at 09:16

## 2022-10-16 RX ADMIN — BENZONATATE 200 MG: 100 CAPSULE ORAL at 14:39

## 2022-10-16 RX ADMIN — METOPROLOL SUCCINATE 25 MG: 25 TABLET, EXTENDED RELEASE ORAL at 09:17

## 2022-10-16 RX ADMIN — ASPIRIN 81 MG: 81 TABLET, CHEWABLE ORAL at 09:16

## 2022-10-16 RX ADMIN — HYDROCODONE POLISTIREX AND CHLORPHENIRAMINE POLISTIREX 5 ML: 10; 8 SUSPENSION, EXTENDED RELEASE ORAL at 00:00

## 2022-10-16 RX ADMIN — AZITHROMYCIN MONOHYDRATE 500 MG: 500 INJECTION, POWDER, LYOPHILIZED, FOR SOLUTION INTRAVENOUS at 21:34

## 2022-10-16 RX ADMIN — BENZONATATE 200 MG: 100 CAPSULE ORAL at 23:57

## 2022-10-16 RX ADMIN — IPRATROPIUM BROMIDE AND ALBUTEROL SULFATE 3 ML: 2.5; .5 SOLUTION RESPIRATORY (INHALATION) at 11:23

## 2022-10-16 RX ADMIN — IPRATROPIUM BROMIDE AND ALBUTEROL SULFATE 3 ML: 2.5; .5 SOLUTION RESPIRATORY (INHALATION) at 08:19

## 2022-10-16 RX ADMIN — ATORVASTATIN CALCIUM 20 MG: 20 TABLET, FILM COATED ORAL at 21:33

## 2022-10-16 RX ADMIN — MONTELUKAST SODIUM 10 MG: 10 TABLET, FILM COATED ORAL at 21:33

## 2022-10-16 RX ADMIN — CEFTRIAXONE SODIUM 1 G: 1 INJECTION, POWDER, FOR SOLUTION INTRAMUSCULAR; INTRAVENOUS at 21:34

## 2022-10-16 RX ADMIN — Medication 1000 UNITS: at 09:17

## 2022-10-16 RX ADMIN — PANTOPRAZOLE SODIUM 40 MG: 40 TABLET, DELAYED RELEASE ORAL at 09:16

## 2022-10-16 RX ADMIN — CETIRIZINE HYDROCHLORIDE 10 MG: 10 TABLET ORAL at 09:16

## 2022-10-16 NOTE — PLAN OF CARE
Goal Outcome Evaluation:  Plan of Care Reviewed With: patient        Progress: improving  Outcome Evaluation: VSS. Telemetry monitor, SR. Alert and oriented x4, room air. Up ad nani, up to chair. Npo after midnight, bronch tomorrow. Will cont to monitor.

## 2022-10-16 NOTE — PLAN OF CARE
Problem: Adult Inpatient Plan of Care  Goal: Plan of Care Review  Flowsheets (Taken 10/16/2022 0402)  Progress: improving  Plan of Care Reviewed With: patient  Outcome Evaluation: No acute changes. VSS. NSR on tele. AOx4. RA O2. No c/o pain or discomfort. Makes needs known. Bed locked and in lowest position. Side rails up x2. Call light within reach. Will continue to assess.  Goal: Absence of Hospital-Acquired Illness or Injury  Intervention: Identify and Manage Fall Risk  Flowsheets  Taken 10/16/2022 0300 by Lyly Hughes PCT  Safety Promotion/Fall Prevention:   safety round/check completed   room organization consistent   patient off unit   assistive device/personal items within reach  Taken 10/16/2022 0232 by Thomas House, RN  Safety Promotion/Fall Prevention:   activity supervised   assistive device/personal items within reach   clutter free environment maintained   fall prevention program maintained   lighting adjusted   nonskid shoes/slippers when out of bed   room organization consistent   safety round/check completed  Taken 10/16/2022 0020 by Thomas House, RN  Safety Promotion/Fall Prevention:   activity supervised   clutter free environment maintained   assistive device/personal items within reach   fall prevention program maintained   lighting adjusted   nonskid shoes/slippers when out of bed   room organization consistent   safety round/check completed  Taken 10/15/2022 2234 by Thomas House, RN  Safety Promotion/Fall Prevention:   activity supervised   assistive device/personal items within reach   clutter free environment maintained   fall prevention program maintained   lighting adjusted   nonskid shoes/slippers when out of bed   safety round/check completed   room organization consistent  Taken 10/15/2022 2015 by Thomas House, RN  Safety Promotion/Fall Prevention:   activity supervised   assistive device/personal items within reach   clutter free environment maintained   fall prevention program  maintained   lighting adjusted   nonskid shoes/slippers when out of bed   room organization consistent   safety round/check completed  Intervention: Prevent Skin Injury  Flowsheets  Taken 10/16/2022 0300 by Lyly Hughes PCT  Body Position: position changed independently  Taken 10/16/2022 0232 by Thomas House RN  Body Position: position changed independently  Taken 10/16/2022 0020 by Thomas House RN  Body Position: position changed independently  Taken 10/15/2022 2234 by Thomas House RN  Body Position: position changed independently  Taken 10/15/2022 2015 by Thomas House RN  Body Position: position changed independently  Intervention: Prevent and Manage VTE (Venous Thromboembolism) Risk  Flowsheets  Taken 10/16/2022 0300 by Lyly Hughes PCT  Activity Management: activity adjusted per tolerance  Taken 10/16/2022 0232 by Thomas House RN  Activity Management:   activity adjusted per tolerance   activity encouraged  Taken 10/16/2022 0020 by Thomas House RN  Activity Management:   activity adjusted per tolerance   activity encouraged  VTE Prevention/Management:   bilateral   sequential compression devices on  Taken 10/15/2022 2234 by Thomas House RN  Activity Management:   activity adjusted per tolerance   activity encouraged  Taken 10/15/2022 2015 by Thomas House RN  Activity Management:   activity adjusted per tolerance   activity encouraged  Taken 10/14/2022 2000 by Titus Austin RN  Range of Motion: ROM (range of motion) performed  Intervention: Prevent Infection  Flowsheets (Taken 10/16/2022 0100 by Lyly Hughes PCT)  Infection Prevention: single patient room provided  Goal: Optimal Comfort and Wellbeing  Intervention: Provide Person-Centered Care  Flowsheets  Taken 10/16/2022 0020  Trust Relationship/Rapport:   care explained   choices provided   emotional support provided   empathic listening provided   questions encouraged   questions answered   reassurance provided   thoughts/feelings  acknowledged  Taken 10/15/2022 2015  Trust Relationship/Rapport:   care explained   choices provided   emotional support provided   empathic listening provided   questions answered   questions encouraged   reassurance provided   thoughts/feelings acknowledged  Goal: Readiness for Transition of Care  Intervention: Mutually Develop Transition Plan  Flowsheets  Taken 10/14/2022 1810 by Nancy Mcelroy, RN  Transportation Anticipated: family or friend will provide  Patient/Family Anticipated Services at Transition:   Patient/Family Anticipates Transition to: home with family  Taken 10/14/2022 1809 by Nancy Mcelroy RN  Equipment Currently Used at Home: none     Problem: Airway Clearance Ineffective  Goal: Effective Airway Clearance  Intervention: Promote Airway Secretion Clearance  Flowsheets  Taken 10/16/2022 0300 by Lyly Hughes PCT  Activity Management: activity adjusted per tolerance  Taken 10/16/2022 0232 by Thomas House RN  Activity Management:   activity adjusted per tolerance   activity encouraged  Taken 10/16/2022 0020 by Thomas House RN  Activity Management:   activity adjusted per tolerance   activity encouraged  Taken 10/15/2022 2234 by Thomas House RN  Activity Management:   activity adjusted per tolerance   activity encouraged  Taken 10/15/2022 2015 by Thomas House RN  Activity Management:   activity adjusted per tolerance   activity encouraged  Taken 10/15/2022 1420 by Abeba Sousa, RN  Cough And Deep Breathing: done with encouragement   Goal Outcome Evaluation:  Plan of Care Reviewed With: patient        Progress: improving  Outcome Evaluation: No acute changes. VSS. NSR on tele. AOx4. RA O2. No c/o pain or discomfort. Makes needs known. Bed locked and in lowest position. Side rails up x2. Call light within reach. Will continue to assess.

## 2022-10-16 NOTE — PROGRESS NOTES
"    Chief Complaint: Left lower lobe mass    Subjective  No complaints today.   Vital Signs:  Temp:  [97.8 °F (36.6 °C)-98.6 °F (37 °C)] 97.9 °F (36.6 °C)  Heart Rate:  [68-92] 68  Resp:  [17-20] 18  BP: ()/(45-55) 111/55    Intake & Output (last day)       10/15 0701  10/16 0700 10/16 0701  10/17 0700    P.O. 360     I.V. (mL/kg)      IV Piggyback      Total Intake(mL/kg) 360 (6.4)     Net +360           Urine Unmeasured Occurrence 1 x           Objective:  General Appearance:  Comfortable, well-appearing and in no acute distress.    Vital signs: (most recent): Blood pressure 111/55, pulse 68, temperature 97.9 °F (36.6 °C), temperature source Oral, resp. rate 18, height 157.5 cm (62\"), weight 56.6 kg (124 lb 11.2 oz), SpO2 99 %, not currently breastfeeding.  Vital signs are normal.    HEENT: Normal HEENT exam.    Lungs:  Normal effort.    Heart: Normal rate.    Abdomen: Abdomen is soft.    Neurological: Patient is alert and oriented to person, place and time.    Skin:  Warm and dry.              Results Review:     I reviewed the patient's new clinical results.  I reviewed the patient's new imaging results and agree with the interpretation.  I reviewed the patient's other test results and agree with the interpretation    Imaging Results (Last 24 Hours)     ** No results found for the last 24 hours. **          Lab Results:     Lab Results (last 24 hours)     Procedure Component Value Units Date/Time    POC Glucose Once [[id number]]  (Abnormal) Collected: 10/16/22 1102    Specimen: Blood Updated: 10/16/22 1104     Glucose 143 mg/dL      Comment: Meter: [id number] : [id number] Linda IGNACIO       Basic Metabolic Panel [[id number]]  (Abnormal) Collected: 10/16/22 0632    Specimen: Blood Updated: 10/16/22 0813     Glucose 134 mg/dL      BUN 21 mg/dL      Creatinine 0.58 mg/dL      Sodium 137 mmol/L      Potassium 3.9 mmol/L      Chloride 99 mmol/L      CO2 33.0 mmol/L      Calcium 8.6 mg/dL      " BUN/Creatinine Ratio 36.2     Anion Gap 5.0 mmol/L      eGFR 91.6 mL/min/1.73      Comment: National Kidney Foundation and American Society of Nephrology (ASN) Task Force recommended calculation based on the Chronic Kidney Disease Epidemiology Collaboration (CKD-EPI) equation refit without adjustment for race.       Narrative:      GFR Normal >60  Chronic Kidney Disease <60  Kidney Failure <15      CBC (No Diff) [[id number]]  (Abnormal) Collected: 10/16/22 0632    Specimen: Blood Updated: 10/16/22 0709     WBC 18.22 10*3/mm3      RBC 3.51 10*6/mm3      Hemoglobin 10.2 g/dL      Hematocrit 30.3 %      MCV 86.3 fL      MCH 29.1 pg      MCHC 33.7 g/dL      RDW 13.0 %      RDW-SD 40.0 fl      MPV 9.9 fL      Platelets 275 10*3/mm3     POC Glucose Once [[id number]]  (Abnormal) Collected: 10/16/22 0546    Specimen: Blood Updated: 10/16/22 0553     Glucose 144 mg/dL      Comment: Meter: [id number] : [id number] [employer] NA       POC Glucose Once [[id number]]  (Abnormal) Collected: 10/15/22 2138    Specimen: Blood Updated: 10/15/22 2141     Glucose 192 mg/dL      Comment: Meter: [id number] : [id number] [employer] NA       POC Glucose Once [[id number]]  (Abnormal) Collected: 10/15/22 1614    Specimen: Blood Updated: 10/15/22 1616     Glucose 237 mg/dL      Comment: Meter: [id number] : [id number] Linda Samuel NA       Blood Culture - Blood, Arm, Left [[id number]]  (Normal) Collected: 10/14/22 1437    Specimen: Blood from Arm, Left Updated: 10/15/22 1501     Blood Culture No growth at 24 hours    Blood Culture - Blood, Arm, Left [[id number]]  (Normal) Collected: 10/14/22 1446    Specimen: Blood from Arm, Left Updated: 10/15/22 1501     Blood Culture No growth at 24 hours    Magnesium [[id number]]  (Normal) Collected: 10/15/22 0815    Specimen: Blood Updated: 10/15/22 1355     Magnesium 2.2 mg/dL     Phosphorus [[id number]]  (Normal) Collected: 10/15/22 0815    Specimen: Blood Updated: 10/15/22 9576      Phosphorus 4.4 mg/dL            Assessment & Plan       Shortness of breath    Hypertension    Hyperlipidemia    Chronic obstructive pulmonary disease (HCC)    Hypokalemia    History of lung cancer       Assessment & Plan  Ms. Vazquez is a pleasant 82-year-old lady with a past history of 3 primary lung cancers and now with a left lower lobe 6 x 5 cm mass concerning for bronchogenic malignancy.  Plan for bronchoscopy tomorrow with Dr. Flores.  We will for any other plans until pathology back.  Jenny Strong MD  [facility]  10/16/22  12:41 EDT

## 2022-10-16 NOTE — PROGRESS NOTES
"                                              LOS: 1 day   Patient Care Team:  Kehrer, Meredith Lea, MD as PCP - General (Family Medicine)  Mary, Arias BROWN III, MD as Referring Physician (Thoracic Surgery)  Henry Escobar MD as Consulting Physician (Hematology and Oncology)  Moise Mcdonough MD as Consulting Physician (Radiation Oncology)    Chief Complaint:  F/up lung mass, cough    Subjective   Interval History  Had mild to moderate cough today without sputum production.  No dyspnea at rest.  On RA.    REVIEW OF SYSTEMS:   CARDIOVASCULAR: No chest pain, chest pressure or chest discomfort. No palpitations or edema.   CONSTITUTIONAL: No fever or chills.     Ventilator/Non-Invasive Ventilation Settings (From admission, onward)    None                Physical Exam:     Vital Signs  Temp:  [97.9 °F (36.6 °C)-98.6 °F (37 °C)] 98.1 °F (36.7 °C)  Heart Rate:  [68-89] 87  Resp:  [17-20] 18  BP: (100-113)/(48-60) 100/60    Intake/Output Summary (Last 24 hours) at 10/16/2022 1637  Last data filed at 10/15/2022 1700  Gross per 24 hour   Intake 360 ml   Output --   Net 360 ml     Flowsheet Rows    Flowsheet Row First Filed Value   Admission Height 157.5 cm (62\") Documented at 10/14/2022 1308   Admission Weight 56.4 kg (124 lb 5.4 oz) Documented at 10/14/2022 1308          PPE used per hospital policy    General Appearance:   Alert, cooperative, in no acute distress   ENMT:  Mallampati score 3, moist mucous membrane   Eyes:  Pupils equal and reactive to light. EOMI   Neck:    Trachea midline. No thyromegaly.   Lungs:    Equal but diminished air entry at the bases especially on the left base.  No crackles or wheezing.  Nonlabored breathing    Heart:   Regular rhythm and normal rate, normal S1 and S2, no         murmur   Skin:   No rash or ecchymosis   Abdomen:    Soft. No tenderness. No HSM.   Neuro/psych:  Conscious, alert, oriented x3. Strength 5/5 in upper and lower  ext.  Appropriate mood and affect   Extremities:  No " cyanosis, clubbing or edema.  Warm extremities and well-perfused          Results Review:        Results from last 7 days   Lab Units 10/16/22  0632 10/15/22  0815 10/14/22  1315   SODIUM mmol/L 137 138 139   POTASSIUM mmol/L 3.9 3.3* 2.7*   CHLORIDE mmol/L 99 95* 95*   CO2 mmol/L 33.0* 31.5* 34.0*   BUN mg/dL 21 19 17   CREATININE mg/dL 0.58 0.70 0.67   GLUCOSE mg/dL 134* 149* 126*   CALCIUM mg/dL 8.6 8.6 8.8         Results from last 7 days   Lab Units 10/16/22  0632 10/15/22  0815 10/14/22  1315   WBC 10*3/mm3 18.22* 16.10* 14.90*   HEMOGLOBIN g/dL 10.2* 12.1 12.7   HEMATOCRIT % 30.3* 35.8 37.1   PLATELETS 10*3/mm3 275 312 295         Results from last 7 days   Lab Units 10/14/22  1315   PROBNP pg/mL 1,234.0       I reviewed the patient's new clinical results.        Medication Review:   aspirin, 81 mg, Oral, Daily  atorvastatin, 20 mg, Oral, Nightly  azithromycin, 500 mg, Intravenous, Q24H  cefTRIAXone, 1 g, Intravenous, Q24H  cetirizine, 10 mg, Oral, Daily  chlorhexidine, 15 mL, Mouth/Throat, BID  cholecalciferol, 1,000 Units, Oral, Daily  insulin lispro, 0-7 Units, Subcutaneous, TID AC  metoprolol succinate XL, 25 mg, Oral, Daily  montelukast, 10 mg, Oral, Nightly  multivitamin with minerals, 1 tablet, Oral, Daily  pantoprazole, 40 mg, Oral, QAM            Assessment     1. 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  2. 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 New lung mass left upper lobe/lingula.  Concerning for malignancy.    4. COPD, no current exacerbation (PFT 7/1/2021 FEV1 71%; DLCO 54%)      Plan       · Consent and n.p.o. after midnight.  Patient is agreeable for bronchoscopy now.  Procedure, risks and alternatives were explained  · Bronc in a.m.  · Empiric Rocephin and azithromycin  · Hydrocodone and Mucinex as needed for cough      India Flores MD  10/16/22  16:37 EDT        This note was dictated utilizing Dragon dictation

## 2022-10-16 NOTE — PROGRESS NOTES
"DAILY PROGRESS NOTE  Psychiatric    Patient Identification:  Name: Darlene Pfeiffer  Age: 82 y.o.  Sex: female  :  1940  MRN: [id number]         Primary Care Physician: Kehrer, Meredith Lea, MD    Subjective:  Interval History:She complains of cough.    Objective:    Scheduled Meds:aspirin, 81 mg, Oral, Daily  atorvastatin, 20 mg, Oral, Nightly  azithromycin, 500 mg, Intravenous, Q24H  cefTRIAXone, 1 g, Intravenous, Q24H  cetirizine, 10 mg, Oral, Daily  chlorhexidine, 15 mL, Mouth/Throat, BID  cholecalciferol, 1,000 Units, Oral, Daily  insulin lispro, 0-7 Units, Subcutaneous, TID AC  ipratropium-albuterol, 3 mL, Nebulization, 4x Daily - RT  metoprolol succinate XL, 25 mg, Oral, Daily  montelukast, 10 mg, Oral, Nightly  multivitamin with minerals, 1 tablet, Oral, Daily  pantoprazole, 40 mg, Oral, QAM      Continuous Infusions:     Vital signs in last 24 hours:  Temp:  [97.9 °F (36.6 °C)-98.6 °F (37 °C)] 97.9 °F (36.6 °C)  Heart Rate:  [68-92] 68  Resp:  [17-20] 18  BP: (111-113)/(48-55) 111/55    Intake/Output:    Intake/Output Summary (Last 24 hours) at 10/16/2022 1453  Last data filed at 10/15/2022 1700  Gross per 24 hour   Intake 360 ml   Output --   Net 360 ml       Exam:  /55 (BP Location: Left arm, Patient Position: Sitting)   Pulse 68   Temp 97.9 °F (36.6 °C) (Oral)   Resp 18   Ht 157.5 cm (62\")   Wt 56.6 kg (124 lb 11.2 oz)   LMP  (LMP Unknown)   SpO2 99%   BMI 22.81 kg/m²     General Appearance:    Alert, cooperative, no distress   Head:    Normocephalic, without obvious abnormality, atraumatic   Eyes:       Throat:   Lips, tongue, gums normal   Neck:   Supple, symmetrical, trachea midline, no JVD   Lungs:     Clear to auscultation bilaterally, respirations unlabored   Chest Wall:    No tenderness or deformity    Heart:    Regular rate and rhythm, S1 and S2 normal, no murmur,no  Rub or gallop   Abdomen:     Soft, nontender, bowel sounds active, no masses, no organomegaly  "   Extremities:   Extremities normal, atraumatic, no cyanosis or edema   Pulses:      Skin:   Skin is warm and dry,  no rashes or palpable lesions   Neurologic:   no focal deficits noted      Lab Results (last 72 hours)     Procedure Component Value Units Date/Time    POC Glucose Once [[id number]]  (Abnormal) Collected: 10/16/22 1102    Specimen: Blood Updated: 10/16/22 1104     Glucose 143 mg/dL      Comment: Meter: [id number] : [id number] Linda Samuel SANDEE       Basic Metabolic Panel [[id number]]  (Abnormal) Collected: 10/16/22 0632    Specimen: Blood Updated: 10/16/22 0813     Glucose 134 mg/dL      BUN 21 mg/dL      Creatinine 0.58 mg/dL      Sodium 137 mmol/L      Potassium 3.9 mmol/L      Chloride 99 mmol/L      CO2 33.0 mmol/L      Calcium 8.6 mg/dL      BUN/Creatinine Ratio 36.2     Anion Gap 5.0 mmol/L      eGFR 91.6 mL/min/1.73      Comment: National Kidney Foundation and American Society of Nephrology (ASN) Task Force recommended calculation based on the Chronic Kidney Disease Epidemiology Collaboration (CKD-EPI) equation refit without adjustment for race.       Narrative:      GFR Normal >60  Chronic Kidney Disease <60  Kidney Failure <15      CBC (No Diff) [[id number]]  (Abnormal) Collected: 10/16/22 0632    Specimen: Blood Updated: 10/16/22 0709     WBC 18.22 10*3/mm3      RBC 3.51 10*6/mm3      Hemoglobin 10.2 g/dL      Hematocrit 30.3 %      MCV 86.3 fL      MCH 29.1 pg      MCHC 33.7 g/dL      RDW 13.0 %      RDW-SD 40.0 fl      MPV 9.9 fL      Platelets 275 10*3/mm3     POC Glucose Once [[id number]]  (Abnormal) Collected: 10/16/22 0546    Specimen: Blood Updated: 10/16/22 0553     Glucose 144 mg/dL      Comment: Meter: [id number] : [id number] Saul Lyly SANDEE       POC Glucose Once [[id number]]  (Abnormal) Collected: 10/15/22 2138    Specimen: Blood Updated: 10/15/22 2141     Glucose 192 mg/dL      Comment: Meter: [id number] : [id number] Saul Lyly NA       POC Glucose Once  [[id number]]  (Abnormal) Collected: 10/15/22 1614    Specimen: Blood Updated: 10/15/22 1616     Glucose 237 mg/dL      Comment: Meter: [id number] : [id number] Linda IGNACIO       Blood Culture - Blood, Arm, Left [[id number]]  (Normal) Collected: 10/14/22 1437    Specimen: Blood from Arm, Left Updated: 10/15/22 1501     Blood Culture No growth at 24 hours    Blood Culture - Blood, Arm, Left [[id number]]  (Normal) Collected: 10/14/22 1446    Specimen: Blood from Arm, Left Updated: 10/15/22 1501     Blood Culture No growth at 24 hours    Magnesium [[id number]]  (Normal) Collected: 10/15/22 0815    Specimen: Blood Updated: 10/15/22 1355     Magnesium 2.2 mg/dL     Phosphorus [[id number]]  (Normal) Collected: 10/15/22 0815    Specimen: Blood Updated: 10/15/22 1338     Phosphorus 4.4 mg/dL     Basic Metabolic Panel [[id number]]  (Abnormal) Collected: 10/15/22 0815    Specimen: Blood Updated: 10/15/22 1152     Glucose 149 mg/dL      BUN 19 mg/dL      Creatinine 0.70 mg/dL      Sodium 138 mmol/L      Potassium 3.3 mmol/L      Chloride 95 mmol/L      CO2 31.5 mmol/L      Calcium 8.6 mg/dL      BUN/Creatinine Ratio 27.1     Anion Gap 11.5 mmol/L      eGFR 87.6 mL/min/1.73      Comment: National Kidney Foundation and American Society of Nephrology (ASN) Task Force recommended calculation based on the Chronic Kidney Disease Epidemiology Collaboration (CKD-EPI) equation refit without adjustment for race.       Narrative:      GFR Normal >60  Chronic Kidney Disease <60  Kidney Failure <15      CBC (No Diff) [[id number]]  (Abnormal) Collected: 10/15/22 0815    Specimen: Blood Updated: 10/15/22 0931     WBC 16.10 10*3/mm3      RBC 4.19 10*6/mm3      Hemoglobin 12.1 g/dL      Hematocrit 35.8 %      MCV 85.4 fL      MCH 28.9 pg      MCHC 33.8 g/dL      RDW 12.8 %      RDW-SD 38.9 fl      MPV 9.7 fL      Platelets 312 10*3/mm3     Lactic Acid, Plasma [[id number]]  (Normal) Collected: 10/14/22 1437    Specimen: Blood Updated: 10/14/22  1523     Lactate 1.3 mmol/L     Magnesium [[id number]]  (Abnormal) Collected: 10/14/22 1315    Specimen: Blood Updated: 10/14/22 1431     Magnesium 1.4 mg/dL     Respiratory Panel PCR w/COVID-19(SARS-CoV-2) JOSHUA/NUZHAT/JOHANA/PAD/COR/MAD/ROSEMARY In-House, NP Swab in UTM/VTM, 3-4 HR TAT - Swab, Nasopharynx [[id number]]  (Normal) Collected: 10/14/22 1317    Specimen: Swab from Nasopharynx Updated: 10/14/22 1415     ADENOVIRUS, PCR Not Detected     Coronavirus 229E Not Detected     Coronavirus HKU1 Not Detected     Coronavirus NL63 Not Detected     Coronavirus OC43 Not Detected     COVID19 Not Detected     Human Metapneumovirus Not Detected     Human Rhinovirus/Enterovirus Not Detected     Influenza A PCR Not Detected     Influenza B PCR Not Detected     Parainfluenza Virus 1 Not Detected     Parainfluenza Virus 2 Not Detected     Parainfluenza Virus 3 Not Detected     Parainfluenza Virus 4 Not Detected     RSV, PCR Not Detected     Bordetella pertussis pcr Not Detected     Bordetella parapertussis PCR Not Detected     Chlamydophila pneumoniae PCR Not Detected     Mycoplasma pneumo by PCR Not Detected    Narrative:      In the setting of a positive respiratory panel with a viral infection PLUS a negative procalcitonin without other underlying concern for bacterial infection, consider observing off antibiotics or discontinuation of antibiotics and continue supportive care. If the respiratory panel is positive for atypical bacterial infection (Bordetella pertussis, Chlamydophila pneumoniae, or Mycoplasma pneumoniae), consider antibiotic de-escalation to target atypical bacterial infection.    BNP [[id number]]  (Normal) Collected: 10/14/22 1315    Specimen: Blood Updated: 10/14/22 1355     proBNP 1,234.0 pg/mL     Narrative:      Among patients with dyspnea, NT-proBNP is highly sensitive for the detection of acute congestive heart failure. In addition NT-proBNP of <300 pg/ml effectively rules out acute congestive heart failure with 99%  "negative predictive value.    Results may be falsely decreased if patient taking Biotin.      Procalcitonin [[id number]]  (Normal) Collected: 10/14/22 1315    Specimen: Blood Updated: 10/14/22 1355     Procalcitonin 0.09 ng/mL     Narrative:      As a Marker for Sepsis (Non-Neonates):    1. <0.5 ng/mL represents a low risk of severe sepsis and/or septic shock.  2. >2 ng/mL represents a high risk of severe sepsis and/or septic shock.    As a Marker for Lower Respiratory Tract Infections that require antibiotic therapy:    PCT on Admission    Antibiotic Therapy       6-12 Hrs later    >0.5                Strongly Recommended  >0.25 - <0.5        Recommended   0.1 - 0.25          Discouraged              Remeasure/reassess PCT  <0.1                Strongly Discouraged     Remeasure/reassess PCT    As 28 day mortality risk marker: \"Change in Procalcitonin Result\" (>80% or <=80%) if Day 0 (or Day 1) and Day 4 values are available. Refer to http://[email or web address]-pct-calculator.[email or web address]    Change in PCT <=80%  A decrease of PCT levels below or equal to 80% defines a positive change in PCT test result representing a higher risk for 28-day all-cause mortality of patients diagnosed with severe sepsis for septic shock.    Change in PCT >80%  A decrease of PCT levels of more than 80% defines a negative change in PCT result representing a lower risk for 28-day all-cause mortality of patients diagnosed with severe sepsis or septic shock.       Comprehensive Metabolic Panel [[id number]]  (Abnormal) Collected: 10/14/22 1315    Specimen: Blood Updated: 10/14/22 1349     Glucose 126 mg/dL      BUN 17 mg/dL      Creatinine 0.67 mg/dL      Sodium 139 mmol/L      Potassium 2.7 mmol/L      Chloride 95 mmol/L      CO2 34.0 mmol/L      Calcium 8.8 mg/dL      Total Protein 6.0 g/dL      Albumin 3.30 g/dL      ALT (SGPT) 18 U/L      AST (SGOT) 16 U/L      Alkaline Phosphatase 79 U/L      Total Bilirubin 1.0 mg/dL      Globulin 2.7 gm/dL      A/G Ratio " 1.2 g/dL      BUN/Creatinine Ratio 25.4     Anion Gap 10.0 mmol/L      eGFR 88.5 mL/min/1.73      Comment: National Kidney Foundation and American Society of Nephrology (ASN) Task Force recommended calculation based on the Chronic Kidney Disease Epidemiology Collaboration (CKD-EPI) equation refit without adjustment for race.       Narrative:      GFR Normal >60  Chronic Kidney Disease <60  Kidney Failure <15      CBC & Differential [[id number]]  (Abnormal) Collected: 10/14/22 1315    Specimen: Blood Updated: 10/14/22 1331    Narrative:      The following orders were created for panel order CBC & Differential.  Procedure                               Abnormality         Status                     ---------                               -----------         ------                     CBC Auto Differential[[id number]]        Abnormal            Final result                 Please view results for these tests on the individual orders.    CBC Auto Differential [[id number]]  (Abnormal) Collected: 10/14/22 1315    Specimen: Blood Updated: 10/14/22 1331     WBC 14.90 10*3/mm3      RBC 4.28 10*6/mm3      Hemoglobin 12.7 g/dL      Hematocrit 37.1 %      MCV 86.7 fL      MCH 29.7 pg      MCHC 34.2 g/dL      RDW 13.1 %      RDW-SD 40.9 fl      MPV 9.7 fL      Platelets 295 10*3/mm3      Neutrophil % 80.2 %      Lymphocyte % 13.8 %      Monocyte % 5.5 %      Eosinophil % 0.0 %      Basophil % 0.1 %      Immature Grans % 0.4 %      Neutrophils, Absolute 11.95 10*3/mm3      Lymphocytes, Absolute 2.06 10*3/mm3      Monocytes, Absolute 0.82 10*3/mm3      Eosinophils, Absolute 0.00 10*3/mm3      Basophils, Absolute 0.01 10*3/mm3      Immature Grans, Absolute 0.06 10*3/mm3      nRBC 0.0 /100 WBC         Data Review:  Results from last 7 days   Lab Units 10/16/22  0632 10/15/22  0815 10/14/22  1315   SODIUM mmol/L 137 138 139   POTASSIUM mmol/L 3.9 3.3* 2.7*   CHLORIDE mmol/L 99 95* 95*   CO2 mmol/L 33.0* 31.5* 34.0*   BUN mg/dL 21 19 17    CREATININE mg/dL 0.58 0.70 0.67   GLUCOSE mg/dL 134* 149* 126*   CALCIUM mg/dL 8.6 8.6 8.8     Results from last 7 days   Lab Units 10/16/22  0632 10/15/22  0815 10/14/22  1315   WBC 10*3/mm3 18.22* 16.10* 14.90*   HEMOGLOBIN g/dL 10.2* 12.1 12.7   HEMATOCRIT % 30.3* 35.8 37.1   PLATELETS 10*3/mm3 275 312 295             No results found for: TROPONINT      Results from last 7 days   Lab Units 10/14/22  1315   ALK PHOS U/L 79   BILIRUBIN mg/dL 1.0   ALT (SGPT) U/L 18   AST (SGOT) U/L 16             Glucose   Date/Time Value Ref Range Status   10/16/2022 1102 143 (H) 70 - 130 mg/dL Final     Comment:     Meter: [id number] : [id number] Linda Samuel NA   10/16/2022 0546 144 (H) 70 - 130 mg/dL Final     Comment:     Meter: [id number] : [id number] Saul Desouza NA   10/15/2022 2138 192 (H) 70 - 130 mg/dL Final     Comment:     Meter: [id number] : [id number] Saul Lyly NA   10/15/2022 1614 237 (H) 70 - 130 mg/dL Final     Comment:     Meter: [id number] : [id number] Linda Becerraa NA           Past Medical History:   Diagnosis Date   • Allergic rhinitis    • Asthma    • Cancer of floor of mouth (HCC) 2014   • Cerebral aneurysm    • COPD (chronic obstructive pulmonary disease) ([facility])    • COVID 2020   • Esophageal reflux    • History of adenocarcinoma of lung    • History of anemia    • History of carcinoma in situ of skin    • History of lung cancer    • 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 from [facility]   • History of squamous cell carcinoma     Tongue   • Hyperlipidemia    • Hypertension     since early 60s   • Low vitamin B12 level    • Lung cancer (HCC)    • Thyromegaly    • UTI (urinary tract infection)    • Vitamin D deficiency        Assessment:  Active Hospital Problems    Diagnosis  POA   • **Shortness of breath [R06.02]  Yes   • Hypokalemia [E87.6]  Unknown   • History of lung cancer [Z85.118]  Not Applicable   • Chronic obstructive pulmonary  disease (HCC) [J44.9]  Yes   • Hyperlipidemia [E78.5]  Yes   • Hypertension [I10]  Yes      Resolved Hospital Problems   No resolved problems to display.       Plan:  Bronchoscopy tomorrow.  Follow lab. Pulmonary and thoracic surgery consults noted.  Antibiotics and nebs.    Dalton Sloan MD  10/16/2022  14:53 EDT

## 2022-10-16 NOTE — NURSING NOTE
Pt refusing to wear O2 sensor @ this time. Said she will put it back on at the beginning of the next shift. Educated pt on importance of wearing sensor. Pt verbalizes understanding. Will continue to assess.

## 2022-10-16 NOTE — CONSULTS
.     REASON FOR CONSULTATION:     Provide an opinion on any further workup or treatment                             REQUESTING PHYSICIAN: Lorna Escamilla MD  RECORDS OBTAINED:  Records of the patient's history including those obtained from the referring provider were reviewed and summarized in detail.    HISTORY OF PRESENT ILLNESS:  The patient is a 82 y.o. year old female  who is here for follow-up with the above-mentioned history.  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  This was PD-L1 positive TPS score 40%.  She had 2 separate nodules 1 in the superior segment of the Right lower lobe and a mass in the upper portion of the left chest with left chest lesion biopsy positive for adenocarcinoma.  She was treated with radiation to these 2 sites as they were considered to be 2 synchronous primaries.  There was no evidence of metastatic disease to any other sites.   She has had subsequent imaging for follow-up.  6/8/2022-CT of the chest abdomen and pelvis as well as neck.  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 neurosurgical referral was recommended.  Continued evaluation of postradiation changes in the right midlung and left upper lung.  Soft tissue mass within the left lobectomy operative bed has decreased in size.  Sub-6 mm pulmonary nodule in the right upper lobe not definitely seen on prior CT.  0.6 cm nodule in the right lower lobe is grossly unchanged.  Findings remain indeterminate and repeat scans in 3 months recommended.   Indeterminant left renal lesion measuring 1.5 cm grossly unchanged from July 2021    She has been admitted to the hospital for worsening dyspnea  cough which has not been productive.    CT of the chest 10/14/2022-postsurgical changes from left upper lobe  with increased dense consolidation in the left upper lung which may represent continued fibrosis or scarring or volume loss.  Continued follow-up recommended.  New approximately 6.7 cm masslike area of consolidation in the anterior left lung, lower lobe could represent pneumonia but overall raises concern for possible malignancy given the masslike configuration.  PET/CT evaluation or tissue sampling recommended.    We have been consulted for recommendations on the masslike area in the left lung    Noted to have leukocytosis with a WBC count of 15.22, mild anemia with a hemoglobin of 10.2 and platelet count normal at 275.  CMP with normal LFTs.      Past Medical History:   Diagnosis Date   • Allergic rhinitis    • Asthma    • Cancer of floor of mouth (HCC) 2014   • Cerebral aneurysm    • COPD (chronic obstructive pulmonary disease) (HCC)    • COVID 2020   • Esophageal reflux    • History of adenocarcinoma of lung    • History of anemia    • History of carcinoma in situ of skin    • History of lung cancer    • 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 from [facility]   • History of squamous cell carcinoma     Tongue   • Hyperlipidemia    • Hypertension     since early 60s   • Low vitamin B12 level    • Lung cancer (HCC)    • Thyromegaly    • UTI (urinary tract infection)    • Vitamin D deficiency      Past Surgical History:   Procedure Laterality Date   • CHOLECYSTECTOMY  1999   • COLONOSCOPY     • EMBOLIZATION CEREBRAL Left 6/29/2022    Procedure: 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;  Surgeon: Bradley Dowell MD;  Location: [facility] 18/19;  Service: Interventional Radiology;  Laterality: Left;   • EYE SURGERY  11/24/2020    Took a muscle out of right eye   • GLOSSECTOMY PARTIAL      less than one half tongue   • LUNG SURGERY  2012   •  ORAL LESION EXCISION/BIOPSY      June and August 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   • TUBAL ABDOMINAL LIGATION  1970       MEDICATIONS    Current Facility-Administered Medications:   •  acetaminophen (TYLENOL) tablet 650 mg, 650 mg, Oral, Q4H PRN, Lorna Escamilla MD, 650 mg at 10/14/22 2346  •  albuterol (PROVENTIL) nebulizer solution 0.083% 2.5 mg/3mL, 2.5 mg, Nebulization, Q6H PRN, Lorna Escamilla MD  •  aspirin chewable tablet 81 mg, 81 mg, Oral, Daily, Lorna Escamilla MD, 81 mg at 10/16/22 0916  •  atorvastatin (LIPITOR) tablet 20 mg, 20 mg, Oral, Nightly, Lorna Escamilla MD, 20 mg at 10/15/22 2139  •  azithromycin (ZITHROMAX) 500 mg in sodium chloride 0.9 % 250 mL IVPB-VTB, 500 mg, Intravenous, Q24H, Lorna Escamilla MD, 500 mg at 10/15/22 2139  •  benzonatate (TESSALON) capsule 200 mg, 200 mg, Oral, TID PRN, Lorna Escamilla MD  •  cefTRIAXone (ROCEPHIN) 1 g in sodium chloride 0.9 % 100 mL IVPB-VTB, 1 g, Intravenous, Q24H, Lorna Escamilla MD, Last Rate: 200 mL/hr at 10/15/22 2138, 1 g at 10/15/22 2138  •  cetirizine (zyrTEC) tablet 10 mg, 10 mg, Oral, Daily, Lorna Escamilla MD, 10 mg at 10/16/22 0916  •  chlorhexidine (PERIDEX) 0.12 % solution 15 mL, 15 mL, Mouth/Throat, BID, Lorna Escamilla MD, 15 mL at 10/14/22 2346  •  cholecalciferol (VITAMIN D3) tablet 1,000 Units, 1,000 Units, Oral, Daily, Lorna Escamilla MD, 1,000 Units at 10/16/22 0917  •  dextrose (D50W) (25 g/50 mL) IV injection 25 g, 25 g, Intravenous, Q15 Min PRN, Perla Wright MD  •  dextrose (GLUTOSE) oral gel 15 g, 15 g, Oral, Q15 Min PRN, Perla Wright MD  •  glucagon (human recombinant) (GLUCAGEN DIAGNOSTIC) injection 1 mg, 1 mg, Intramuscular, Q15 Min PRN, Perla Wright MD  •  HYDROcod Polst-CPM Polst ER (TUSSIONEX PENNKINETIC) 10-8 MG/5ML ER suspension 5 mL, 5 mL, Oral, Q12H PRN, Lorna Escamilla MD, 5 mL at 10/16/22 0000  •  insulin lispro (ADMELOG)  injection 0-7 Units, 0-7 Units, Subcutaneous, TID AC, Perla Wright MD, 3 Units at 10/15/22 1720  •  ipratropium-albuterol (DUO-NEB) nebulizer solution 3 mL, 3 mL, Nebulization, 4x Daily - RT, Lorna Escamilla MD, 3 mL at 10/16/22 1123  •  Magnesium Sulfate 2 gram Bolus, followed by 8 gram infusion (total Mg dose 10 grams)- Mg less than or equal to 1mg/dL, 2 g, Intravenous, PRN **OR** Magnesium Sulfate 2 gram / 50mL Infusion (GIVE X 3 BAGS TO EQUAL 6GM TOTAL DOSE) - Mg 1.1 - 1.5 mg/dl, 2 g, Intravenous, PRN **OR** Magnesium Sulfate 4 gram infusion- Mg 1.6-1.9 mg/dL, 4 g, Intravenous, PRN, Lorna Escamilla MD  •  melatonin tablet 3 mg, 3 mg, Oral, Nightly PRN, Lorna Escamilla MD  •  metoprolol succinate XL (TOPROL-XL) 24 hr tablet 25 mg, 25 mg, Oral, Daily, Lorna Escamilla MD, 25 mg at 10/16/22 0917  •  montelukast (SINGULAIR) tablet 10 mg, 10 mg, Oral, Nightly, Lorna Escamilla MD, 10 mg at 10/15/22 2140  •  multivitamin with minerals 1 tablet, 1 tablet, Oral, Daily, Lorna Escamilla MD, 1 tablet at 10/16/22 0916  •  nitroglycerin (NITROSTAT) SL tablet 0.4 mg, 0.4 mg, Sublingual, Q5 Min PRN, Lorna Escamilla MD  •  ondansetron (ZOFRAN) tablet 4 mg, 4 mg, Oral, Q6H PRN **OR** ondansetron (ZOFRAN) injection 4 mg, 4 mg, Intravenous, Q6H PRN, Lorna Escamilla MD  •  pantoprazole (PROTONIX) EC tablet 40 mg, 40 mg, Oral, QAM, Lorna Escamilla MD, 40 mg at 10/16/22 0916  •  potassium chloride (K-DUR,KLOR-CON) ER tablet 40 mEq, 40 mEq, Oral, PRN **OR** potassium chloride (KLOR-CON) packet 40 mEq, 40 mEq, Oral, PRN **OR** potassium chloride 10 mEq in 100 mL IVPB, 10 mEq, Intravenous, Q1H PRN, StingLorna guerin MD  •  potassium chloride (K-DUR,KLOR-CON) ER tablet 40 mEq, 40 mEq, Oral, PRN, 40 mEq at 10/15/22 1401 **OR** potassium chloride (KLOR-CON) packet 40 mEq, 40 mEq, Oral, PRN **OR** potassium chloride 10 mEq in 100 mL IVPB, 10 mEq, Intravenous, Q1H PRN,  Lorna Escamilla MD  •  [COMPLETED] Insert peripheral IV, , , Once **AND** sodium chloride 0.9 % flush 10 mL, 10 mL, Intravenous, PRN, Bethany Campbell APRN  •  tiZANidine (ZANAFLEX) tablet 2 mg, 2 mg, Oral, Q8H PRN, Lorna Escamilla MD    ALLERGIES:     Allergies   Allergen Reactions   • Sulfa Antibiotics Rash       SOCIAL HISTORY:       Social History     Socioeconomic History   • Marital status:    Tobacco Use   • Smoking status: Former     Types: Cigarettes     Quit date: 2015     Years since quittin.7   • Smokeless tobacco: Never   • Tobacco comments:     less than a pack per day, no smoking since , Quit 2015   Vaping Use   • Vaping Use: Never used   Substance and Sexual Activity   • Alcohol use: Yes     Comment: Socially -A few times a month   • Drug use: No   • Sexual activity: Defer         FAMILY HISTORY:  Family History   Problem Relation Age of Onset   • Ovarian cancer Mother          at age 27   • No Known Problems Father    • Ovarian cancer Sister    • Colon cancer Brother    • No Known Problems Other    • Malig Hyperthermia Neg Hx        REVIEW OF SYSTEMS:  Review of Systems   Constitutional: Positive for activity change and appetite change.   HENT: Negative.    Eyes: Negative.    Respiratory: Positive for cough and shortness of breath.    Cardiovascular: Negative.    Gastrointestinal: Negative.    Endocrine: Negative.    Genitourinary: Negative.    Musculoskeletal: Negative.    Skin: Negative.    Allergic/Immunologic: Negative.    Neurological: Negative.    Hematological: Negative.    Psychiatric/Behavioral: Negative.               Vitals:    10/16/22 0500 10/16/22 0708 10/16/22 0819 10/16/22 0826   BP:  111/55     BP Location:  Left arm     Patient Position:  Sitting     Pulse:  83 78 79   Resp:  17 20 18   Temp:  97.9 °F (36.6 °C)     TempSrc:  Oral     SpO2:   95% 99%   Weight: 56.6 kg (124 lb 11.2 oz)      Height:         Current Status 6/15/2022   ECOG  score 0      PHYSICAL EXAM:      CONSTITUTIONAL:  Vital signs reviewed.  No distress, looks comfortable.  EYES:  Conjunctivae and lids unremarkable.  PERRLA  EARS,NOSE,MOUTH,THROAT:  Ears and nose appear unremarkable.  Lips, teeth, gums appear unremarkable.  RESPIRATORY:  Normal respiratory effort.  Diminished breath sounds and rhonchi left lower lobe  CARDIOVASCULAR:  Normal S1, S2.  No murmurs rubs or gallops.  No significant lower extremity edema.  GASTROINTESTINAL: Abdomen appears unremarkable.  Nontender.  No hepatomegaly.  No splenomegaly.  LYMPHATIC:  No cervical, supraclavicular, axillary lymphadenopathy.  MUSCULOSKELETAL:  Unremarkable gait and station.  Unremarkable digits/nails.  No cyanosis or clubbing.  SKIN:  Warm.  No rashes.  PSYCHIATRIC:  Normal judgment and insight.  Normal mood and affect.      RECENT LABS:        WBC   Date Value Ref Range Status   10/16/2022 18.22 (H) 3.40 - 10.80 10*3/mm3 Final   10/15/2022 16.10 (H) 3.40 - 10.80 10*3/mm3 Final   10/14/2022 14.90 (H) 3.40 - 10.80 10*3/mm3 Final     Hemoglobin   Date Value Ref Range Status   10/16/2022 10.2 (L) 12.0 - 15.9 g/dL Final   10/15/2022 12.1 12.0 - 15.9 g/dL Final   10/14/2022 12.7 12.0 - 15.9 g/dL Final     Platelets   Date Value Ref Range Status   10/16/2022 275 140 - 450 10*3/mm3 Final   10/15/2022 312 140 - 450 10*3/mm3 Final   10/14/2022 295 140 - 450 10*3/mm3 Final       Assessment & Plan   [unfilled]      [facility]     *Masslike consolidation of the left lung  · Patient with previous history of adenocarcinoma of the lung which is PD-L1 +40% treated with radiation to 2 separate areas in 2021  · Prior to that she has had a diagnosis of stage I non-small cell lung cancer in 2012 treated with left upper lobectomy  · Most recent CT scans from June 2022 showed stability of the areas with some new subcentimeter pulmonary nodules.  · CT of the chest 10/14/2022-new area of masslike consolidation in the left lung.  · This is  definitely concerning for malignancy given her previous history  · We will obtain an outpatient PET/CT  · She has been evaluated by cardiothoracic surgery and bronchoscopy and biopsy has been recommended which I agree with    *Previous history of head and neck cancer  · Treated with surgical resection and radiation by Dr. Tavarez    *Cough and dyspnea  · Possible pneumonia   · Continue azithromycin  · Does have leukocytosis     *Hyperlipidemia-continue atorvastatin      *Anemia  · Mild  · Monitor    Recommendations  · PET to be performed outpatient  · Bronchoscopy and biopsy per cardiothoracic surgery  · Follow-up in clinic with Dr. Escobar after discharge from the hospital  · Continue treatment for pneumonia

## 2022-10-17 ENCOUNTER — ANESTHESIA EVENT (OUTPATIENT)
Dept: GASTROENTEROLOGY | Facility: HOSPITAL | Age: 82
End: 2022-10-17

## 2022-10-17 ENCOUNTER — ANESTHESIA (OUTPATIENT)
Dept: GASTROENTEROLOGY | Facility: HOSPITAL | Age: 82
End: 2022-10-17

## 2022-10-17 ENCOUNTER — APPOINTMENT (OUTPATIENT)
Dept: GENERAL RADIOLOGY | Facility: HOSPITAL | Age: 82
End: 2022-10-17

## 2022-10-17 LAB
ANION GAP SERPL CALCULATED.3IONS-SCNC: 5.6 MMOL/L (ref 5–15)
BASOPHILS # BLD AUTO: 0.01 10*3/MM3 (ref 0–0.2)
BASOPHILS NFR BLD AUTO: 0.1 % (ref 0–1.5)
BUN SERPL-MCNC: 18 MG/DL (ref 8–23)
BUN/CREAT SERPL: 32.1 (ref 7–25)
CALCIUM SPEC-SCNC: 8.5 MG/DL (ref 8.6–10.5)
CHLORIDE SERPL-SCNC: 101 MMOL/L (ref 98–107)
CO2 SERPL-SCNC: 34.4 MMOL/L (ref 22–29)
CREAT SERPL-MCNC: 0.56 MG/DL (ref 0.57–1)
DEPRECATED RDW RBC AUTO: 41.2 FL (ref 37–54)
EGFRCR SERPLBLD CKD-EPI 2021: 92.4 ML/MIN/1.73
EOSINOPHIL # BLD AUTO: 0 10*3/MM3 (ref 0–0.4)
EOSINOPHIL NFR BLD AUTO: 0 % (ref 0.3–6.2)
ERYTHROCYTE [DISTWIDTH] IN BLOOD BY AUTOMATED COUNT: 12.8 % (ref 12.3–15.4)
GLUCOSE BLDC GLUCOMTR-MCNC: 107 MG/DL (ref 70–130)
GLUCOSE BLDC GLUCOMTR-MCNC: 81 MG/DL (ref 70–130)
GLUCOSE BLDC GLUCOMTR-MCNC: 90 MG/DL (ref 70–130)
GLUCOSE SERPL-MCNC: 89 MG/DL (ref 65–99)
HCT VFR BLD AUTO: 32.2 % (ref 34–46.6)
HGB BLD-MCNC: 10.2 G/DL (ref 12–15.9)
IMM GRANULOCYTES # BLD AUTO: 0.02 10*3/MM3 (ref 0–0.05)
IMM GRANULOCYTES NFR BLD AUTO: 0.2 % (ref 0–0.5)
LYMPHOCYTES # BLD AUTO: 2.23 10*3/MM3 (ref 0.7–3.1)
LYMPHOCYTES NFR BLD AUTO: 25.9 % (ref 19.6–45.3)
MCH RBC QN AUTO: 28.2 PG (ref 26.6–33)
MCHC RBC AUTO-ENTMCNC: 31.7 G/DL (ref 31.5–35.7)
MCV RBC AUTO: 89 FL (ref 79–97)
MONOCYTES # BLD AUTO: 0.62 10*3/MM3 (ref 0.1–0.9)
MONOCYTES NFR BLD AUTO: 7.2 % (ref 5–12)
NEUTROPHILS NFR BLD AUTO: 5.73 10*3/MM3 (ref 1.7–7)
NEUTROPHILS NFR BLD AUTO: 66.6 % (ref 42.7–76)
NRBC BLD AUTO-RTO: 0 /100 WBC (ref 0–0.2)
PLATELET # BLD AUTO: 261 10*3/MM3 (ref 140–450)
PMV BLD AUTO: 9.5 FL (ref 6–12)
POTASSIUM SERPL-SCNC: 4.4 MMOL/L (ref 3.5–5.2)
RBC # BLD AUTO: 3.62 10*6/MM3 (ref 3.77–5.28)
SODIUM SERPL-SCNC: 141 MMOL/L (ref 136–145)
WBC NRBC COR # BLD: 8.61 10*3/MM3 (ref 3.4–10.8)

## 2022-10-17 PROCEDURE — 99232 SBSQ HOSP IP/OBS MODERATE 35: CPT | Performed by: INTERNAL MEDICINE

## 2022-10-17 PROCEDURE — 87205 SMEAR GRAM STAIN: CPT | Performed by: INTERNAL MEDICINE

## 2022-10-17 PROCEDURE — 76000 FLUOROSCOPY <1 HR PHYS/QHP: CPT | Performed by: INTERNAL MEDICINE

## 2022-10-17 PROCEDURE — 88305 TISSUE EXAM BY PATHOLOGIST: CPT | Performed by: INTERNAL MEDICINE

## 2022-10-17 PROCEDURE — 88312 SPECIAL STAINS GROUP 1: CPT | Performed by: INTERNAL MEDICINE

## 2022-10-17 PROCEDURE — 82962 GLUCOSE BLOOD TEST: CPT

## 2022-10-17 PROCEDURE — 89051 BODY FLUID CELL COUNT: CPT | Performed by: INTERNAL MEDICINE

## 2022-10-17 PROCEDURE — 85025 COMPLETE CBC W/AUTO DIFF WBC: CPT | Performed by: HOSPITALIST

## 2022-10-17 PROCEDURE — 25010000002 CEFTRIAXONE PER 250 MG: Performed by: INTERNAL MEDICINE

## 2022-10-17 PROCEDURE — 88112 CYTOPATH CELL ENHANCE TECH: CPT | Performed by: INTERNAL MEDICINE

## 2022-10-17 PROCEDURE — 87102 FUNGUS ISOLATION CULTURE: CPT | Performed by: INTERNAL MEDICINE

## 2022-10-17 PROCEDURE — 80048 BASIC METABOLIC PNL TOTAL CA: CPT | Performed by: HOSPITALIST

## 2022-10-17 PROCEDURE — 71045 X-RAY EXAM CHEST 1 VIEW: CPT

## 2022-10-17 PROCEDURE — 87071 CULTURE AEROBIC QUANT OTHER: CPT | Performed by: INTERNAL MEDICINE

## 2022-10-17 PROCEDURE — 25010000002 PROPOFOL 10 MG/ML EMULSION: Performed by: ANESTHESIOLOGY

## 2022-10-17 PROCEDURE — 0 LIDOCAINE 1 % SOLUTION: Performed by: INTERNAL MEDICINE

## 2022-10-17 PROCEDURE — 0BBJ8ZX EXCISION OF LEFT LOWER LUNG LOBE, VIA NATURAL OR ARTIFICIAL OPENING ENDOSCOPIC, DIAGNOSTIC: ICD-10-PCS | Performed by: INTERNAL MEDICINE

## 2022-10-17 PROCEDURE — 71045 X-RAY EXAM CHEST 1 VIEW: CPT | Performed by: INTERNAL MEDICINE

## 2022-10-17 PROCEDURE — 0B9J8ZX DRAINAGE OF LEFT LOWER LUNG LOBE, VIA NATURAL OR ARTIFICIAL OPENING ENDOSCOPIC, DIAGNOSTIC: ICD-10-PCS | Performed by: INTERNAL MEDICINE

## 2022-10-17 RX ORDER — PROPOFOL 10 MG/ML
VIAL (ML) INTRAVENOUS AS NEEDED
Status: DISCONTINUED | OUTPATIENT
Start: 2022-10-17 | End: 2022-10-17 | Stop reason: SURG

## 2022-10-17 RX ORDER — PROPOFOL 10 MG/ML
VIAL (ML) INTRAVENOUS CONTINUOUS PRN
Status: DISCONTINUED | OUTPATIENT
Start: 2022-10-17 | End: 2022-10-17 | Stop reason: SURG

## 2022-10-17 RX ORDER — LIDOCAINE HYDROCHLORIDE 20 MG/ML
INJECTION, SOLUTION EPIDURAL; INFILTRATION; INTRACAUDAL; PERINEURAL AS NEEDED
Status: DISCONTINUED | OUTPATIENT
Start: 2022-10-17 | End: 2022-10-17 | Stop reason: HOSPADM

## 2022-10-17 RX ORDER — LIDOCAINE HYDROCHLORIDE AND EPINEPHRINE 10; 10 MG/ML; UG/ML
INJECTION, SOLUTION INFILTRATION; PERINEURAL AS NEEDED
Status: DISCONTINUED | OUTPATIENT
Start: 2022-10-17 | End: 2022-10-17 | Stop reason: HOSPADM

## 2022-10-17 RX ORDER — SODIUM CHLORIDE 9 MG/ML
INJECTION, SOLUTION INTRAVENOUS CONTINUOUS PRN
Status: DISCONTINUED | OUTPATIENT
Start: 2022-10-17 | End: 2022-10-17 | Stop reason: SURG

## 2022-10-17 RX ORDER — LIDOCAINE HYDROCHLORIDE AND EPINEPHRINE BITARTRATE 20; .01 MG/ML; MG/ML
INJECTION, SOLUTION SUBCUTANEOUS AS NEEDED
Status: DISCONTINUED | OUTPATIENT
Start: 2022-10-17 | End: 2022-10-17 | Stop reason: HOSPADM

## 2022-10-17 RX ORDER — LIDOCAINE HYDROCHLORIDE 20 MG/ML
INJECTION, SOLUTION INFILTRATION; PERINEURAL AS NEEDED
Status: DISCONTINUED | OUTPATIENT
Start: 2022-10-17 | End: 2022-10-17 | Stop reason: SURG

## 2022-10-17 RX ORDER — LIDOCAINE HYDROCHLORIDE 10 MG/ML
INJECTION, SOLUTION INFILTRATION; PERINEURAL AS NEEDED
Status: DISCONTINUED | OUTPATIENT
Start: 2022-10-17 | End: 2022-10-17 | Stop reason: HOSPADM

## 2022-10-17 RX ADMIN — PROPOFOL 150 MG: 10 INJECTION, EMULSION INTRAVENOUS at 17:27

## 2022-10-17 RX ADMIN — SODIUM CHLORIDE: 9 INJECTION, SOLUTION INTRAVENOUS at 17:08

## 2022-10-17 RX ADMIN — MONTELUKAST SODIUM 10 MG: 10 TABLET, FILM COATED ORAL at 20:25

## 2022-10-17 RX ADMIN — Medication 200 MCG/KG/MIN: at 17:28

## 2022-10-17 RX ADMIN — HYDROCODONE POLISTIREX AND CHLORPHENIRAMINE POLISTIREX 5 ML: 10; 8 SUSPENSION, EXTENDED RELEASE ORAL at 03:54

## 2022-10-17 RX ADMIN — ATORVASTATIN CALCIUM 20 MG: 20 TABLET, FILM COATED ORAL at 20:25

## 2022-10-17 RX ADMIN — CEFTRIAXONE SODIUM 1 G: 1 INJECTION, POWDER, FOR SOLUTION INTRAMUSCULAR; INTRAVENOUS at 20:25

## 2022-10-17 RX ADMIN — LIDOCAINE HYDROCHLORIDE 60 MG: 20 INJECTION, SOLUTION INFILTRATION; PERINEURAL at 17:27

## 2022-10-17 RX ADMIN — METOPROLOL SUCCINATE 25 MG: 25 TABLET, EXTENDED RELEASE ORAL at 08:32

## 2022-10-17 NOTE — ANESTHESIA POSTPROCEDURE EVALUATION
"Patient: Darlene Pfeiffer    Procedure Summary     Date: 10/17/22 Room / Location:  [facility]    Anesthesia Start: 1707 Anesthesia Stop: 1814    Procedure: BRONCHOSCOPY WITH FLUORO with biopsy and BAL (Bilateral: Bronchus) Diagnosis:       Malignant neoplasm of upper lobe of left lung (HCC)      (Malignant neoplasm of upper lobe of left lung (HCC) [C34.12])    Surgeons: India Flores MD Provider: Jason Reyna MD    Anesthesia Type: MAC ASA Status: 3          Anesthesia Type: MAC    Vitals  Vitals Value Taken Time   /60 10/17/22 1810   Temp     Pulse 85 10/17/22 1810   Resp 24 10/17/22 1810   SpO2 98 % 10/17/22 1810           Post Anesthesia Care and Evaluation    Patient location during evaluation: bedside  Patient participation: complete - patient participated  Level of consciousness: awake and alert  Pain management: adequate    Airway patency: patent  Anesthetic complications: No anesthetic complications    Cardiovascular status: acceptable  Respiratory status: acceptable  Hydration status: acceptable    Comments: /60 (BP Location: Right arm, Patient Position: Lying)   Pulse 85   Temp 36.9 °C (98.5 °F) (Oral)   Resp 24   Ht 157.5 cm (62\")   Wt 56.3 kg (124 lb 1.9 oz)   LMP  (LMP Unknown)   SpO2 98%   BMI 22.70 kg/m²       "

## 2022-10-17 NOTE — ANESTHESIA PREPROCEDURE EVALUATION
Anesthesia Evaluation     Patient summary reviewed and Nursing notes reviewed   no history of anesthetic complications:  NPO Solid Status: > 8 hours  NPO Liquid Status: > 8 hours           Airway   Mallampati: II  Dental      Pulmonary - normal exam   (+) lung cancer, COPD, asthma,shortness of breath,   Cardiovascular - normal exam    (+) hypertension 2 medications or greater, hyperlipidemia,       Neuro/Psych- negative ROS  GI/Hepatic/Renal/Endo    (+)  GERD,  thyroid problem     Musculoskeletal     Abdominal    Substance History      OB/GYN          Other      history of cancer remission                    Anesthesia Plan    ASA 3     MAC     intravenous induction     Anesthetic plan, risks, benefits, and alternatives have been provided, discussed and informed consent has been obtained with: patient.        CODE STATUS:    Code Status (Patient has no pulse and is not breathing): CPR (Attempt to Resuscitate)  Medical Interventions (Patient has pulse or is breathing): Full

## 2022-10-17 NOTE — NURSING NOTE
"Pt tearful at beginning of shift and frustrated over \"all the dinging and bells\". She was refusing to put on her finger probe because of it. I explained to her that the dings are telling us something and according to her history on the dinging it is from her oxygen being too low. I told her she may need to put on some oxygen. This frustrated her more. I explained to her that she is here d/t SOB and at the moment her body is in need of oxygen so the nasal cannula is a must. Also, the finger probe is a doctors order and we must be able to monitor her oxygen level at all times. She allowed me to put on the finger probe and explained that she just wanted to get some sleep. She was fine without oxygen around 93% until she fell asleep. While asleep, her sats dropped to 87-89%. I went back in and she was cooperative with me putting oxygen on her.   "

## 2022-10-17 NOTE — BRIEF OP NOTE
BRONCHOSCOPY BIOPSY WITH FLUORO AND ANESTHESIA  Progress Note    Darlene Conleys  10/17/2022    Pre-op Diagnosis:   Malignant neoplasm of upper lobe of left lung (HCC) [C34.12]       Post-Op Diagnosis Codes:     * Malignant neoplasm of upper lobe of left lung (HCC) [C34.12]    Procedure/CPT® Codes:        Procedure(s):  BRONCHOSCOPY WITH FLUORO with biopsy and BAL        Surgeon(s):  India Flores MD    Anesthesia: Monitored Anesthesia Care    Staff:   Endo Technician: Kimberly Hess RN  Endo Nurse: Sofya Alfonso RN         Estimated Blood Loss: minimal    Urine Voided: * No values recorded between 10/17/2022  4:59 PM and 10/17/2022  5:50 PM *    Specimens:                Specimens     ID Source Type Tests Collected By Collected At Frozen?    A Lung, Left Lower Lobe Tissue · TISSUE PATHOLOGY EXAM   India Flores MD 10/17/22 6103     B Lung, Left Lower Lobe Lavage · NON-GYNECOLOGIC CYTOLOGY  · BODY FLUID CELL COUNT WITH DIFFERENTIAL  · FUNGAL CULTURE  · BAL CULTURE, QUANTITATIVE   India Flores MD 10/17/22 4870                 Drains: * No LDAs found *    Findings:   No endobronchial lesions identified. Stump of the RUL segment was clean.   The right lower lobe site of the mass on CT was examined carefully with no evidence of endobronchial lesion.  BAL was performed from that location and sent for cytology and culture.  1 subsegment was noted to be narrow.  Random 5 fluoroscopy guided transbronchial biopsies were performed from the segments that are most medial including the narrow segment mentioned above.        Complications: None          India Flores MD     Date: 10/17/2022  Time: 18:01 EDT

## 2022-10-17 NOTE — PLAN OF CARE
Problem: Adult Inpatient Plan of Care  Goal: Plan of Care Review  Outcome: Ongoing, Progressing  Flowsheets (Taken 10/17/2022 0749)  Progress: no change  Plan of Care Reviewed With: patient  Outcome Evaluation:   VSS   SR on monitor   up ad nani   2L NC O2 needed overnight while sleeping   NPO since MN for bronch today   pt upset/tearful at beginning of night d/t tired and aggravated by beeping and dinging, tried refusing to wear O2 finger sensor, ed on O2 needs   will cont to monitor   Goal Outcome Evaluation:  Plan of Care Reviewed With: patient        Progress: no change  Outcome Evaluation: VSS; SR on monitor; up ad nani; 2L NC O2 needed overnight while sleeping; NPO since MN for bronch today; pt upset/tearful at beginning of night d/t tired and aggravated by beeping and dinging, tried refusing to wear O2 finger sensor, ed on O2 needs; will cont to monitor

## 2022-10-17 NOTE — CASE MANAGEMENT/SOCIAL WORK
Discharge Planning Assessment  [facility]     Patient Name: Darlene Pfeiffer  MRN: [id number]  Today's Date: 10/17/2022    Admit Date: 10/14/2022    Plan: Home with    Discharge Needs Assessment     Row Name 10/17/22 1554       Living Environment    People in Home spouse    Name(s) of People in Home Selvin    Current Living Arrangements home    Primary Care Provided by self    Family Caregiver if Needed spouse    Quality of Family Relationships involved;helpful    Able to Return to Prior Arrangements yes       Resource/Environmental Concerns    Resource/Environmental Concerns none       Transition Planning    Patient/Family Anticipates Transition to home with family    Patient/Family Anticipated Services at Transition none    Transportation Anticipated family or friend will provide;car, drives self       Discharge Needs Assessment    Readmission Within the Last 30 Days no previous admission in last 30 days    Equipment Currently Used at Home none    Concerns to be Addressed adjustment to diagnosis/illness    Anticipated Changes Related to Illness none    Equipment Needed After Discharge none               Discharge Plan     Row Name 10/17/22 1554       Plan    Plan Home with     Patient/Family in Agreement with Plan yes    Plan Comments Spoke with pt. at bedside. Introduced self and explained CCP role. Confirmed face sheet and pharmacy information. Permission granted to speak to her  Selvin, they live together in 1 level home with no steps to enter, pt is IADL’s, and drives, she uses no medical equipment, denies need for any, no HH hx but has been to SNF in [place], she would return if needed. She plans home with Selvin to transport, denies d/c needs at this time.  CCP will continue to follow. - Pam LUCERO              Continued Care and Services - Admitted Since 10/14/2022    Coordination has not been started for this encounter.       Expected Discharge Date and Time     Expected Discharge Date  Expected Discharge Time    Oct 19, 2022          Demographic Summary     Row Name 10/17/22 1554       General Information    Admission Type inpatient               Functional Status     Row Name 10/17/22 1554       Functional Status    Usual Activity Tolerance excellent    Current Activity Tolerance good       Assessment of Health Literacy    Health Literacy Excellent       Functional Status, IADL    Medications independent    Meal Preparation independent    Housekeeping independent    Laundry independent    Shopping independent       Mental Status    General Appearance WDL WDL       Mental Status Summary    Recent Changes in Mental Status/Cognitive Functioning no changes               Psychosocial    No documentation.                Abuse/Neglect    No documentation.                Legal     Row Name 10/17/22 1554       Financial/Legal    Who Manages Finances if Patient Unable  Selvin               Substance Abuse    No documentation.                Patient Forms    No documentation.                   Pam Pastrana RN

## 2022-10-17 NOTE — PLAN OF CARE
Goal Outcome Evaluation:  Plan of Care Reviewed With: patient        Progress: no change  Outcome Evaluation: VSS. Telemetry monitor, SR. Alert and oriented x4, up ad nani. 2 lpm via nc- room air. NPO for bronch today. Will cont to monitor.

## 2022-10-17 NOTE — PROGRESS NOTES
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  This was PD-L1 positive TPS score 40%.  She had 2 separate nodules 1 in the superior segment of the Right lower lobe and a mass in the upper portion of the left chest with left chest lesion biopsy positive for adenocarcinoma.  She was treated with radiation to these 2 sites as they were considered to be 2 synchronous primaries.  There was no evidence of metastatic disease to any other sites.     She was able to proceed with SBRT to the right lung at primary #1 with 5 treatments at 1000 cGy per fraction in the left lung primary similarly at 1000 cGy per fraction x5.  Her treatment ranged between 8/31-9/13/2021.  She is now scheduled for follow-up 11/23/2021.     The patient subsequent genomic testing is discussed with her as she is is seen back 9/23/2021.  Her guardant 360 did not determine any new abnormalities but her Caris-MI profile-does reveal sensitivity to immunotherapy as well as a BRAF mutation.  This could be extremely important as we follow the patient from this point.    She has had subsequent imaging for follow-up.  6/8/2022-CT of the chest abdomen and pelvis as well as neck.  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 neurosurgical referral was recommended.  Continued evaluation of postradiation changes in the right midlung and left upper lung.  Soft tissue mass within the left lobectomy operative bed has decreased in size.  Sub-6 mm pulmonary nodule in the right upper lobe not definitely seen on prior CT.  0.6  cm nodule in the right lower lobe is grossly unchanged.  Findings remain indeterminate and repeat scans in 3 months recommended.   Indeterminant left renal lesion measuring 1.5 cm grossly unchanged from July 2021     She has been admitted to the hospital for worsening dyspnea cough which has not been productive.     CT of the chest 10/14/2022-postsurgical changes from left upper lobe  with increased dense consolidation in the left upper lung which may represent continued fibrosis or scarring or volume loss.  Continued follow-up recommended.  New approximately 6.7 cm masslike area of consolidation in the anterior left lung, lower lobe could represent pneumonia but overall raises concern for possible malignancy given the masslike configuration.  PET/CT evaluation or tissue sampling recommended.     We have been consulted this admission for recommendations on the masslike area in the left lung     Noted to have leukocytosis with a WBC count of 15.22, mild anemia with a hemoglobin of 10.2 and platelet count normal at 275.  CMP with normal LFTs.       Interval history:     10/17/2022  T98.6, pulse 76, respirations 16, /57, SPO2 of 98%  Again record reviewed with patient having 3 primary lung cancers and now with a left lower lobe 6 x 5 cm mass concerning for bronchogenic malignancy.  Bronchoscopy scheduled 10/17/2022 with pulmonary medicine.      Medications:  The current medication list was reviewed in the EMR.    Allergies:    Allergies   Allergen Reactions   • Sulfa Antibiotics Rash       Objective      Vitals:    10/17/22 0700   BP: 130/57   Pulse: 76   Resp: 16   Temp: 98 °F (36.7 °C)   SpO2: 98%    :   Physical Exam        RECENT LABS:    Results from last 7 days   Lab Units 10/16/22  0632 10/15/22  0815 10/14/22  1315   WBC 10*3/mm3 18.22* 16.10* 14.90*   HEMOGLOBIN g/dL 10.2* 12.1 12.7   HEMATOCRIT % 30.3* 35.8 37.1   PLATELETS 10*3/mm3 275 312 295     Results from last 7 days   Lab Units 10/16/22  0632  10/15/22  0815 10/14/22  1315   SODIUM mmol/L 137 138 139   POTASSIUM mmol/L 3.9 3.3* 2.7*   CHLORIDE mmol/L 99 95* 95*   CO2 mmol/L 33.0* 31.5* 34.0*   BUN mg/dL 21 19 17   CREATININE mg/dL 0.58 0.70 0.67   CALCIUM mg/dL 8.6 8.6 8.8   BILIRUBIN mg/dL  --   --  1.0   ALK PHOS U/L  --   --  79   ALT (SGPT) U/L  --   --  18   AST (SGOT) U/L  --   --  16   GLUCOSE mg/dL 134* 149* 126*     Results from last 7 days   Lab Units 10/15/22  0815 10/14/22  1315   MAGNESIUM mg/dL 2.2 1.4*       Assessment & Plan   10/14/2022 12.7 12.0 - 15.9 g/dL Final            Platelets   Date Value Ref Range Status   10/16/2022 275 140 - 450 10*3/mm3 Final   10/15/2022 312 140 - 450 10*3/mm3 Final   10/14/2022 295 140 - 450 10*3/mm3 Final               Assessment & Plan     Darlene S Pfeiffer      *Current masslike consolidation of the left lung      · Diagnosis of stage I non-small cell lung cancer in 2012 treated with left upper lobectomy  • Patient with previous history of adenocarcinoma of the lung which is PD-L1 +40% treated with radiation to 2 separate areas in 2021   • Follow-up PET/CT 11/23/2021 with decrease in the previously seen avid nodules in left lobectomy operative bed and right lower lobe, new few subcentimeter pulmonary nodules right upper lobe and indeterminate  • Most recent CT scans from June 2022 showed stability of the areas with some new subcentimeter pulmonary nodules.  • CT of the chest 10/14/2022-new area of masslike consolidation in the left lung.  • This is definitely concerning for malignancy given her previous history  • We will obtain an outpatient PET/CT  • She has been evaluated by cardiothoracic surgery and bronchoscopy and biopsy has been recommended which was agreed with now scheduled 10/17/2022.     *Previous history of head and neck cancer  • Treated with surgical resection and radiation by Dr. Tavarez     *Current cough and dyspnea  • Possible pneumonia, leukocytosis  • Continue azithromycin  • Patient  reviewed by pulmonary medicine-COPD, no current exacerbation (PFT 7/1/2021 FEV1 71%; DLCO 54%  • Bronchoscopy anticipated 10/17/2022     *Hyperlipidemia-continue atorvastatin        *Anemia  • Mild  • Monitor     Recommendations  • PET to be performed outpatient  • Bronchoscopy and biopsy per cardiothoracic surgery  • Follow-up in clinic with Dr. Escobar after discharge from the hospital  • Continue treatment for pneumonia                  Henry Escobar MD  10/17/2022  07:51 EDT

## 2022-10-17 NOTE — PROGRESS NOTES
"                                              LOS: 2 days   Patient Care Team:  Kehrer, Meredith Lea, MD as PCP - General (Family Medicine)  Mary, Arias BROWN III, MD as Referring Physician (Thoracic Surgery)  Henry Escobar MD as Consulting Physician (Hematology and Oncology)  Moise Mcdonough MD as Consulting Physician (Radiation Oncology)    Chief Complaint:  F/up lung mass, cough    Subjective   Interval History  Had mild to moderate cough. Still no sputum production.  No dyspnea at rest.  On RA.    REVIEW OF SYSTEMS:   CARDIOVASCULAR: No chest pain, chest pressure or chest discomfort. No palpitations or edema.   CONSTITUTIONAL: No fever or chills.     Ventilator/Non-Invasive Ventilation Settings (From admission, onward)    None                Physical Exam:     Vital Signs  Temp:  [98 °F (36.7 °C)-98.5 °F (36.9 °C)] 98.5 °F (36.9 °C)  Heart Rate:  [69-80] 75  Resp:  [16-19] 18  BP: (111-163)/(51-74) 163/74  No intake or output data in the 24 hours ending 10/17/22 1726  Flowsheet Rows    Flowsheet Row First Filed Value   Admission Height 157.5 cm (62\") Documented at 10/14/2022 1308   Admission Weight 56.4 kg (124 lb 5.4 oz) Documented at 10/14/2022 1308          PPE used per hospital policy    General Appearance:   Alert, cooperative, in no acute distress   ENMT:  Mallampati score 3, moist mucous membrane   Eyes:  Pupils equal and reactive to light. EOMI   Neck:    Trachea midline. No thyromegaly.   Lungs:    Equal but diminished air entry at the bases especially on the left base.  No crackles or wheezing.  Nonlabored breathing    Heart:   Regular rhythm and normal rate, normal S1 and S2, no         murmur   Skin:   No rash or ecchymosis   Abdomen:    Soft. No tenderness. No HSM.   Neuro/psych:  Conscious, alert, oriented x3. Strength 5/5 in upper and lower  ext.  Appropriate mood and affect   Extremities:  No cyanosis, clubbing or edema.  Warm extremities and well-perfused          Results Review:  "       Results from last 7 days   Lab Units 10/17/22  0849 10/16/22  0632 10/15/22  0815   SODIUM mmol/L 141 137 138   POTASSIUM mmol/L 4.4 3.9 3.3*   CHLORIDE mmol/L 101 99 95*   CO2 mmol/L 34.4* 33.0* 31.5*   BUN mg/dL 18 21 19   CREATININE mg/dL 0.56* 0.58 0.70   GLUCOSE mg/dL 89 134* 149*   CALCIUM mg/dL 8.5* 8.6 8.6         Results from last 7 days   Lab Units 10/17/22  0849 10/16/22  0632 10/15/22  0815   WBC 10*3/mm3 8.61 18.22* 16.10*   HEMOGLOBIN g/dL 10.2* 10.2* 12.1   HEMATOCRIT % 32.2* 30.3* 35.8   PLATELETS 10*3/mm3 261 275 312         Results from last 7 days   Lab Units 10/14/22  1315   PROBNP pg/mL 1,234.0       I reviewed the patient's new clinical results.        Medication Review:   aspirin, 81 mg, Oral, Daily  atorvastatin, 20 mg, Oral, Nightly  cefTRIAXone, 1 g, Intravenous, Q24H  cetirizine, 10 mg, Oral, Daily  cholecalciferol, 1,000 Units, Oral, Daily  insulin lispro, 0-7 Units, Subcutaneous, TID AC  metoprolol succinate XL, 25 mg, Oral, Daily  montelukast, 10 mg, Oral, Nightly  multivitamin with minerals, 1 tablet, Oral, Daily  pantoprazole, 40 mg, Oral, QAM            Assessment     1. 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  2. 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 New lung mass left upper lobe/lingula.  Concerning for malignancy.    4. COPD, no current exacerbation (PFT 7/1/2021 FEV1 71%; DLCO 54%)  5. Allergy      Plan         · Empiric Rocephin. Stop azithromycin  · Hydrocodone and Mucinex as needed for cough. Can bed discharged on that.   · Bronch today  · Montelukast      India Flores MD  10/17/22  17:26 EDT        This note was dictated utilizing Dragon dictation

## 2022-10-17 NOTE — PROGRESS NOTES
Nutrition Services    Patient Name:  Darlene Pfeiffer  YOB: 1940  MRN: [id number]  Admit Date:  10/14/2022      82 y.o. female w/hx of cancer admitted for SOA and cough x 1 month. Nutrition following for reported wt loss and decreased po intake. Visited pt at bedside, pt was polite and eager to go home. Reported poor po intake eating 2 small meals most days and confirmed wt loss but unable to provide further details.  Expressed difficulty with chewing and loss of taste over the years. Preformed NFPE, patient meets criteria for : Moderate (non-severe) Malnutrition according to ASPEN/AND guidelines for chronic illness based on the following: po intake, muscle wasting and fat loss. Pt agreed to a supplement once diet is advanced. Will continue to follow.       CLINICAL NUTRITION ASSESSMENT      Reason for Assessment MST score 2+     Diagnosis/Problem   CC: lung mass, cough  Dx:SOA, HLD, HTN   Medical/Surgical History Past Medical History:   Diagnosis Date   • Allergic rhinitis    • Asthma    • Cancer of floor of mouth (HCC) 2014   • Cerebral aneurysm    • COPD (chronic obstructive pulmonary disease) (HCC)    • COVID 2020   • Esophageal reflux    • History of adenocarcinoma of lung    • History of anemia    • History of carcinoma in situ of skin    • History of lung cancer    • 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 from [facility]   • History of squamous cell carcinoma     Tongue   • Hyperlipidemia    • Hypertension     since early 60s   • Low vitamin B12 level    • Lung cancer (HCC)    • Thyromegaly    • UTI (urinary tract infection)    • Vitamin D deficiency        Past Surgical History:   Procedure Laterality Date   • CHOLECYSTECTOMY  1999   • COLONOSCOPY     • EMBOLIZATION CEREBRAL Left 6/29/2022    Procedure: 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;  Surgeon: Bradley Dowell MD;  Location: [facility] OR 18/19;  Service: Interventional  "Radiology;  Laterality: Left;   • EYE SURGERY  11/24/2020    Took a muscle out of right eye   • GLOSSECTOMY PARTIAL      less than one half tongue   • LUNG SURGERY  2012   • ORAL LESION EXCISION/BIOPSY      June and August 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   • TUBAL ABDOMINAL LIGATION  1970        Encounter Information        Nutrition/Diet History:  Reported poor po intake eating 2 small meals most days and confirmed wt loss but unable to provide further details.  Expressed difficulty with chewing and loss of taste over the years.    Preformed NFPE, patient meets criteria for : Moderate (non-severe) Malnutrition according to ASPEN/AND guidelines for chronic illness based on the following: po intake, muscle wasting and fat loss.   Food Preferences:    Supplements:    Factors Affecting Intake: chewing difficulty, early satiety , fatigue, pain issues, taste changes     Anthropometrics        Current Height  Current Weight  BMI kg/m2 Height: 157.5 cm (62\")  Weight: 56.3 kg (124 lb 1.9 oz) (10/17/22 0500)  Body mass index is 22.7 kg/m².       Admission Weight 124#   Ideal Body Weight (IBW) 110#   Usual Body Weight (UBW)    Weight Change/Trend 9# (6.8%) wt loss in 3 months.        Weight History Wt Readings from Last 30 Encounters:   10/17/22 0500 56.3 kg (124 lb 1.9 oz)   10/16/22 0500 56.6 kg (124 lb 11.2 oz)   10/14/22 1752 54.3 kg (119 lb 11.2 oz)   10/14/22 1308 56.4 kg (124 lb 5.4 oz)   10/05/22 1120 56.4 kg (124 lb 6.4 oz)   09/26/22 1442 57.4 kg (126 lb 9.6 oz)   08/29/22 1514 57.2 kg (126 lb 3.2 oz)   07/21/22 1321 58.5 kg (129 lb)   06/29/22 0927 58.9 kg (129 lb 12.8 oz)   06/27/22 0710 59.8 kg (131 lb 12.8 oz)   06/23/22 0943 60.3 kg (133 lb)   06/15/22 1323 59.1 kg (130 lb 6.4 oz)   06/10/22 0831 58.6 kg (129 lb 3.2 oz)   04/06/22 1606 59.2 kg (130 lb 9.6 oz)   02/16/22 1149 59.7 kg (131 lb 9.6 oz)   12/01/21 1512 61.4 kg (135 lb 6.4 oz)   09/23/21 1551 61.9 kg (136 lb 6.4 oz)   08/23/21 1026 61.2 kg (135 lb) "   08/23/21 0843 62.5 kg (137 lb 12.8 oz)   08/19/21 1034 61.7 kg (136 lb)   08/13/21 0646 61.2 kg (135 lb)   07/22/21 0833 62.5 kg (137 lb 12.8 oz)   07/22/21 0809 62.5 kg (137 lb 12.8 oz)   07/01/21 0851 61.9 kg (136 lb 8 oz)   06/08/21 0837 62.1 kg (137 lb)   04/29/21 0819 61.8 kg (136 lb 3.2 oz)   04/23/21 1157 61.2 kg (135 lb)   01/13/21 1318 63.6 kg (140 lb 3.2 oz)   01/02/21 1125 62.1 kg (137 lb)   12/14/20 1442 64.5 kg (142 lb 3.2 oz)   12/01/20 1536 64.4 kg (142 lb)   09/28/20 1016 66.6 kg (146 lb 12.8 oz)   08/24/20 1005 66 kg (145 lb 6.4 oz)        Tests/Procedures        Tests/Procedures CT scan     Labs       Pertinent Labs    Results from last 7 days   Lab Units 10/16/22  0632 10/15/22  0815 10/14/22  1315   SODIUM mmol/L 137 138 139   POTASSIUM mmol/L 3.9 3.3* 2.7*   CHLORIDE mmol/L 99 95* 95*   CO2 mmol/L 33.0* 31.5* 34.0*   BUN mg/dL 21 19 17   CREATININE mg/dL 0.58 0.70 0.67   CALCIUM mg/dL 8.6 8.6 8.8   BILIRUBIN mg/dL  --   --  1.0   ALK PHOS U/L  --   --  79   ALT (SGPT) U/L  --   --  18   AST (SGOT) U/L  --   --  16   GLUCOSE mg/dL 134* 149* 126*     Results from last 7 days   Lab Units 10/16/22  0632 10/15/22  0815 10/14/22  1315   MAGNESIUM mg/dL  --  2.2 1.4*   PHOSPHORUS mg/dL  --  4.4  --    HEMOGLOBIN g/dL 10.2* 12.1 12.7   HEMATOCRIT % 30.3* 35.8 37.1   WBC 10*3/mm3 18.22* 16.10* 14.90*   ALBUMIN g/dL  --   --  3.30*     Results from last 7 days   Lab Units 10/16/22  0632 10/15/22  0815 10/14/22  1315   PLATELETS 10*3/mm3 275 312 295     COVID19   Date Value Ref Range Status   10/14/2022 Not Detected Not Detected - Ref. Range Final     Lab Results   Component Value Date    HGBA1C 6.0 (H) 12/01/2020          Medications           Scheduled Medications aspirin, 81 mg, Oral, Daily  atorvastatin, 20 mg, Oral, Nightly  cefTRIAXone, 1 g, Intravenous, Q24H  cetirizine, 10 mg, Oral, Daily  chlorhexidine, 15 mL, Mouth/Throat, BID  cholecalciferol, 1,000 Units, Oral, Daily  insulin lispro, 0-7  Units, Subcutaneous, TID AC  metoprolol succinate XL, 25 mg, Oral, Daily  montelukast, 10 mg, Oral, Nightly  multivitamin with minerals, 1 tablet, Oral, Daily  pantoprazole, 40 mg, Oral, QAM       Infusions     PRN Medications •  acetaminophen  •  albuterol  •  benzonatate  •  dextrose  •  dextrose  •  glucagon (human recombinant)  •  HYDROcod Polst-CPM Polst ER  •  magnesium sulfate **OR** magnesium sulfate **OR** magnesium sulfate  •  melatonin  •  nitroglycerin  •  ondansetron **OR** ondansetron  •  potassium chloride **OR** potassium chloride **OR** potassium chloride  •  potassium chloride **OR** potassium chloride **OR** potassium chloride  •  [COMPLETED] Insert peripheral IV **AND** sodium chloride  •  tiZANidine     Physical Findings        Physical Appearance alert, oriented     NFPE Consented to exam   --  Edema  no edema   Gastrointestinal hypoactive bowel sounds, last bowel movement: 10/15   Tubes/Drains none   Oral/Mouth Cavity dentures, tooth/gum pain (previous oral surgery)    Skin bruising, pale   --  Malnutrition Severity Assessment      Patient meets criteria for : Moderate (non-severe) Malnutrition (according to ASPEN/AND guidelines for chronic illness based on the following: po intake, muscle wasting and fat loss.)  Malnutrition Type (last 8 hours)     Malnutrition Severity Assessment     Row Name 10/17/22 1311       Malnutrition Severity Assessment    Malnutrition Type Chronic Disease - Related Malnutrition    Row Name 10/17/22 1311       Insufficient Energy Intake     Insufficient Energy Intake Findings Severe    Insufficient Energy Intake  <75% of est. energy requirement for > or equal to 3 months    Row Name 10/17/22 1311       Unintentional Weight Loss     Unintentional Weight Loss Findings Moderate    Unintentional Weight Loss  Weight loss of 7.5% in three months  9# (6.8%) wt loss in 3 months.    Row Name 10/17/22 1311       Muscle Loss    Loss of Muscle Mass Findings Moderate    [place]  "Region Moderate - slight depression    Clavicle Bone Region Moderate - some protrusion in females, visible in males    Acromion Bone Region Moderate - acromion may slightly protrude    Patellar Region Moderate - patella more prominent, less muscle definition around patella    Anterior Thigh Region Moderate - mild depression on inner thigh    Posterior Calf Region Moderate - some roundness, slight firmness    Row Name 10/17/22 1311       Fat Loss    Subcutaneous Fat Loss Findings Moderate    Orbital Region  Moderate -  somewhat hollowness, slightly dark circles    Upper Arm Region Severe - mostly skin, very little space between folds, fingers touch    Row Name 10/17/22 1311       Criteria Met (Must meet criteria for severity in at least 2 of these categories: M Wasting, Fat Loss, Fluid, Secondary Signs, Wt. Status, Intake)    Patient meets criteria for  Moderate (non-severe) Malnutrition  according to ASPEN/AND guidelines for chronic illness based on the following: po intake, muscle wasting and fat loss.                   Current Nutrition Orders & Evaluation of Intake       Oral Nutrition     Food Allergies NKFA   Current PO Diet NPO Diet NPO Type: Strict NPO   Supplement n/a   PO Evaluation     Trending % PO Intake 50% x 1 meal       --  Estimated/Assessed Needs       Energy Requirements    Height for Calculation  Height: 157.5 cm (62\")   Weight for Calculation 56.3 kg (current)   Method for Estimation  30 kcal/kg, 35 kcal/kg   EST Needs (kcal/day) 1689 - 1971 kcal/day       Protein Requirements    Weight for Calculation 56.3 kg (current)   EST Protein Needs (g/kg) 1.2 gm/kg   EST Daily Needs (g/day)  68 gm Pro/day       Fluid Requirements     Method for Estimation 1 mL/kcal    Estimated Needs (mL/day) 1689 mL/day     PES STATEMENT / NUTRITION DIAGNOSIS      Nutrition Dx Problem  Problem: Malnutrition  Etiology: Factors Affecting Nutrition  Signs/Symptoms: Report of Minimal PO Intake and Unintended Weight " Change    Comment:    --  NUTRITION INTERVENTION      Intervention Goal(s) Meet estimated needs, Initiate feeding/diet, Maintain weight and No significant weight loss         RD Intervention/Action Interview for preferences, Follow Tx Progress and Care plan reviewed         Prescription/Orders:       PO Diet Soft texture, ground meats      Supplements Boost Plus BID       Enteral Nutrition       Parenteral Nutrition    New Prescription Ordered? No, npo   --      Monitor/Evaluation Per protocol, Pertinent labs, Weight   Education Will instruct as appropriate   --    RD to follow per protocol.      Electronically signed by:  Mattie Dsouza RD  10/17/22 09:03 EDT

## 2022-10-17 NOTE — PROGRESS NOTES
"DAILY PROGRESS NOTE  [facility]    Patient Identification:  Name: Darlene Pfeiffer  Age: 82 y.o.  Sex: female  :  1940  MRN: [id number]         Primary Care Physician: Kehrer, Meredith Lea, MD    Subjective:  Interval History:She complains of cough.    Objective:    Scheduled Meds:aspirin, 81 mg, Oral, Daily  atorvastatin, 20 mg, Oral, Nightly  cefTRIAXone, 1 g, Intravenous, Q24H  cetirizine, 10 mg, Oral, Daily  chlorhexidine, 15 mL, Mouth/Throat, BID  cholecalciferol, 1,000 Units, Oral, Daily  insulin lispro, 0-7 Units, Subcutaneous, TID AC  metoprolol succinate XL, 25 mg, Oral, Daily  montelukast, 10 mg, Oral, Nightly  multivitamin with minerals, 1 tablet, Oral, Daily  pantoprazole, 40 mg, Oral, QAM      Continuous Infusions:     Vital signs in last 24 hours:  Temp:  [98 °F (36.7 °C)-98.4 °F (36.9 °C)] 98 °F (36.7 °C)  Heart Rate:  [76-87] 76  Resp:  [16-19] 16  BP: (100-133)/(51-60) 130/57    Intake/Output:  No intake or output data in the 24 hours ending 10/17/22 1155    Exam:  /57 (BP Location: Left arm, Patient Position: Lying)   Pulse 76   Temp 98 °F (36.7 °C) (Oral)   Resp 16   Ht 157.5 cm (62\")   Wt 56.3 kg (124 lb 1.9 oz)   LMP  (LMP Unknown)   SpO2 98%   BMI 22.70 kg/m²     General Appearance:    Alert, cooperative, no distress   Head:    Normocephalic, without obvious abnormality, atraumatic   Eyes:       Throat:   Lips, tongue, gums normal   Neck:   Supple, symmetrical, trachea midline, no JVD   Lungs:     Clear to auscultation bilaterally, respirations unlabored   Chest Wall:    No tenderness or deformity    Heart:    Regular rate and rhythm, S1 and S2 normal, no murmur,no  Rub or gallop   Abdomen:     Soft, nontender, bowel sounds active, no masses, no organomegaly    Extremities:   Extremities normal, atraumatic, no cyanosis or edema   Pulses:      Skin:   Skin is warm and dry,  no rashes or palpable lesions   Neurologic:   no focal deficits noted      Lab Results " (last 72 hours)     Procedure Component Value Units Date/Time    POC Glucose Once [[id number]]  (Abnormal) Collected: 10/16/22 1102    Specimen: Blood Updated: 10/16/22 1104     Glucose 143 mg/dL      Comment: Meter: [id number] : [id number] Linda IGNACIO       Basic Metabolic Panel [[id number]]  (Abnormal) Collected: 10/16/22 0632    Specimen: Blood Updated: 10/16/22 0813     Glucose 134 mg/dL      BUN 21 mg/dL      Creatinine 0.58 mg/dL      Sodium 137 mmol/L      Potassium 3.9 mmol/L      Chloride 99 mmol/L      CO2 33.0 mmol/L      Calcium 8.6 mg/dL      BUN/Creatinine Ratio 36.2     Anion Gap 5.0 mmol/L      eGFR 91.6 mL/min/1.73      Comment: National Kidney Foundation and American Society of Nephrology (ASN) Task Force recommended calculation based on the Chronic Kidney Disease Epidemiology Collaboration (CKD-EPI) equation refit without adjustment for race.       Narrative:      GFR Normal >60  Chronic Kidney Disease <60  Kidney Failure <15      CBC (No Diff) [[id number]]  (Abnormal) Collected: 10/16/22 0632    Specimen: Blood Updated: 10/16/22 0709     WBC 18.22 10*3/mm3      RBC 3.51 10*6/mm3      Hemoglobin 10.2 g/dL      Hematocrit 30.3 %      MCV 86.3 fL      MCH 29.1 pg      MCHC 33.7 g/dL      RDW 13.0 %      RDW-SD 40.0 fl      MPV 9.9 fL      Platelets 275 10*3/mm3     POC Glucose Once [[id number]]  (Abnormal) Collected: 10/16/22 0546    Specimen: Blood Updated: 10/16/22 0553     Glucose 144 mg/dL      Comment: Meter: [id number] : [id number] Saul Lyly SANDEE       POC Glucose Once [[id number]]  (Abnormal) Collected: 10/15/22 2138    Specimen: Blood Updated: 10/15/22 2141     Glucose 192 mg/dL      Comment: Meter: [id number] : [id number] Asul Lyly NA       POC Glucose Once [[id number]]  (Abnormal) Collected: 10/15/22 1614    Specimen: Blood Updated: 10/15/22 1616     Glucose 237 mg/dL      Comment: Meter: [id number] : [id number] Linda IGNACIO       Blood Culture - Blood,  Arm, Left [[id number]]  (Normal) Collected: 10/14/22 1437    Specimen: Blood from Arm, Left Updated: 10/15/22 1501     Blood Culture No growth at 24 hours    Blood Culture - Blood, Arm, Left [[id number]]  (Normal) Collected: 10/14/22 1446    Specimen: Blood from Arm, Left Updated: 10/15/22 1501     Blood Culture No growth at 24 hours    Magnesium [[id number]]  (Normal) Collected: 10/15/22 0815    Specimen: Blood Updated: 10/15/22 1355     Magnesium 2.2 mg/dL     Phosphorus [[id number]]  (Normal) Collected: 10/15/22 0815    Specimen: Blood Updated: 10/15/22 1338     Phosphorus 4.4 mg/dL     Basic Metabolic Panel [[id number]]  (Abnormal) Collected: 10/15/22 0815    Specimen: Blood Updated: 10/15/22 1152     Glucose 149 mg/dL      BUN 19 mg/dL      Creatinine 0.70 mg/dL      Sodium 138 mmol/L      Potassium 3.3 mmol/L      Chloride 95 mmol/L      CO2 31.5 mmol/L      Calcium 8.6 mg/dL      BUN/Creatinine Ratio 27.1     Anion Gap 11.5 mmol/L      eGFR 87.6 mL/min/1.73      Comment: National Kidney Foundation and American Society of Nephrology (ASN) Task Force recommended calculation based on the Chronic Kidney Disease Epidemiology Collaboration (CKD-EPI) equation refit without adjustment for race.       Narrative:      GFR Normal >60  Chronic Kidney Disease <60  Kidney Failure <15      CBC (No Diff) [[id number]]  (Abnormal) Collected: 10/15/22 0815    Specimen: Blood Updated: 10/15/22 0931     WBC 16.10 10*3/mm3      RBC 4.19 10*6/mm3      Hemoglobin 12.1 g/dL      Hematocrit 35.8 %      MCV 85.4 fL      MCH 28.9 pg      MCHC 33.8 g/dL      RDW 12.8 %      RDW-SD 38.9 fl      MPV 9.7 fL      Platelets 312 10*3/mm3     Lactic Acid, Plasma [[id number]]  (Normal) Collected: 10/14/22 1437    Specimen: Blood Updated: 10/14/22 1523     Lactate 1.3 mmol/L     Magnesium [[id number]]  (Abnormal) Collected: 10/14/22 1315    Specimen: Blood Updated: 10/14/22 1431     Magnesium 1.4 mg/dL     Respiratory Panel PCR w/COVID-19(SARS-CoV-2)  JOSHUA/NUZHAT/JOHANA/PAD/COR/MAD/ROSEMARY In-House, NP Swab in UTM/VTM, 3-4 HR TAT - Swab, Nasopharynx [[id number]]  (Normal) Collected: 10/14/22 1317    Specimen: Swab from Nasopharynx Updated: 10/14/22 1415     ADENOVIRUS, PCR Not Detected     Coronavirus 229E Not Detected     Coronavirus HKU1 Not Detected     Coronavirus NL63 Not Detected     Coronavirus OC43 Not Detected     COVID19 Not Detected     Human Metapneumovirus Not Detected     Human Rhinovirus/Enterovirus Not Detected     Influenza A PCR Not Detected     Influenza B PCR Not Detected     Parainfluenza Virus 1 Not Detected     Parainfluenza Virus 2 Not Detected     Parainfluenza Virus 3 Not Detected     Parainfluenza Virus 4 Not Detected     RSV, PCR Not Detected     Bordetella pertussis pcr Not Detected     Bordetella parapertussis PCR Not Detected     Chlamydophila pneumoniae PCR Not Detected     Mycoplasma pneumo by PCR Not Detected    Narrative:      In the setting of a positive respiratory panel with a viral infection PLUS a negative procalcitonin without other underlying concern for bacterial infection, consider observing off antibiotics or discontinuation of antibiotics and continue supportive care. If the respiratory panel is positive for atypical bacterial infection (Bordetella pertussis, Chlamydophila pneumoniae, or Mycoplasma pneumoniae), consider antibiotic de-escalation to target atypical bacterial infection.    BNP [[id number]]  (Normal) Collected: 10/14/22 1315    Specimen: Blood Updated: 10/14/22 1355     proBNP 1,234.0 pg/mL     Narrative:      Among patients with dyspnea, NT-proBNP is highly sensitive for the detection of acute congestive heart failure. In addition NT-proBNP of <300 pg/ml effectively rules out acute congestive heart failure with 99% negative predictive value.    Results may be falsely decreased if patient taking Biotin.      Procalcitonin [[id number]]  (Normal) Collected: 10/14/22 1315    Specimen: Blood Updated: 10/14/22 1355      "Procalcitonin 0.09 ng/mL     Narrative:      As a Marker for Sepsis (Non-Neonates):    1. <0.5 ng/mL represents a low risk of severe sepsis and/or septic shock.  2. >2 ng/mL represents a high risk of severe sepsis and/or septic shock.    As a Marker for Lower Respiratory Tract Infections that require antibiotic therapy:    PCT on Admission    Antibiotic Therapy       6-12 Hrs later    >0.5                Strongly Recommended  >0.25 - <0.5        Recommended   0.1 - 0.25          Discouraged              Remeasure/reassess PCT  <0.1                Strongly Discouraged     Remeasure/reassess PCT    As 28 day mortality risk marker: \"Change in Procalcitonin Result\" (>80% or <=80%) if Day 0 (or Day 1) and Day 4 values are available. Refer to [email or web address]    Change in PCT <=80%  A decrease of PCT levels below or equal to 80% defines a positive change in PCT test result representing a higher risk for 28-day all-cause mortality of patients diagnosed with severe sepsis for septic shock.    Change in PCT >80%  A decrease of PCT levels of more than 80% defines a negative change in PCT result representing a lower risk for 28-day all-cause mortality of patients diagnosed with severe sepsis or septic shock.       Comprehensive Metabolic Panel [[id number]]  (Abnormal) Collected: 10/14/22 1315    Specimen: Blood Updated: 10/14/22 1349     Glucose 126 mg/dL      BUN 17 mg/dL      Creatinine 0.67 mg/dL      Sodium 139 mmol/L      Potassium 2.7 mmol/L      Chloride 95 mmol/L      CO2 34.0 mmol/L      Calcium 8.8 mg/dL      Total Protein 6.0 g/dL      Albumin 3.30 g/dL      ALT (SGPT) 18 U/L      AST (SGOT) 16 U/L      Alkaline Phosphatase 79 U/L      Total Bilirubin 1.0 mg/dL      Globulin 2.7 gm/dL      A/G Ratio 1.2 g/dL      BUN/Creatinine Ratio 25.4     Anion Gap 10.0 mmol/L      eGFR 88.5 mL/min/1.73      Comment: National Kidney Foundation and American Society of Nephrology (ASN) Task Force recommended " calculation based on the Chronic Kidney Disease Epidemiology Collaboration (CKD-EPI) equation refit without adjustment for race.       Narrative:      GFR Normal >60  Chronic Kidney Disease <60  Kidney Failure <15      CBC & Differential [[id number]]  (Abnormal) Collected: 10/14/22 1315    Specimen: Blood Updated: 10/14/22 1331    Narrative:      The following orders were created for panel order CBC & Differential.  Procedure                               Abnormality         Status                     ---------                               -----------         ------                     CBC Auto Differential[[id number]]        Abnormal            Final result                 Please view results for these tests on the individual orders.    CBC Auto Differential [[id number]]  (Abnormal) Collected: 10/14/22 1315    Specimen: Blood Updated: 10/14/22 1331     WBC 14.90 10*3/mm3      RBC 4.28 10*6/mm3      Hemoglobin 12.7 g/dL      Hematocrit 37.1 %      MCV 86.7 fL      MCH 29.7 pg      MCHC 34.2 g/dL      RDW 13.1 %      RDW-SD 40.9 fl      MPV 9.7 fL      Platelets 295 10*3/mm3      Neutrophil % 80.2 %      Lymphocyte % 13.8 %      Monocyte % 5.5 %      Eosinophil % 0.0 %      Basophil % 0.1 %      Immature Grans % 0.4 %      Neutrophils, Absolute 11.95 10*3/mm3      Lymphocytes, Absolute 2.06 10*3/mm3      Monocytes, Absolute 0.82 10*3/mm3      Eosinophils, Absolute 0.00 10*3/mm3      Basophils, Absolute 0.01 10*3/mm3      Immature Grans, Absolute 0.06 10*3/mm3      nRBC 0.0 /100 WBC         Data Review:  Results from last 7 days   Lab Units 10/17/22  0849 10/16/22  0632 10/15/22  0815   SODIUM mmol/L 141 137 138   POTASSIUM mmol/L 4.4 3.9 3.3*   CHLORIDE mmol/L 101 99 95*   CO2 mmol/L 34.4* 33.0* 31.5*   BUN mg/dL 18 21 19   CREATININE mg/dL 0.56* 0.58 0.70   GLUCOSE mg/dL 89 134* 149*   CALCIUM mg/dL 8.5* 8.6 8.6     Results from last 7 days   Lab Units 10/17/22  0849 10/16/22  0632 10/15/22  0815   WBC 10*3/mm3 8.61  18.22* 16.10*   HEMOGLOBIN g/dL 10.2* 10.2* 12.1   HEMATOCRIT % 32.2* 30.3* 35.8   PLATELETS 10*3/mm3 261 275 312             No results found for: TROPONINT      Results from last 7 days   Lab Units 10/14/22  1315   ALK PHOS U/L 79   BILIRUBIN mg/dL 1.0   ALT (SGPT) U/L 18   AST (SGOT) U/L 16             Glucose   Date/Time Value Ref Range Status   10/17/2022 1138 81 70 - 130 mg/dL Final     Comment:     Meter: [id number] : [id number] [place] Darleen   10/17/2022 0523 107 70 - 130 mg/dL Final     Comment:     Meter: [id number] : [id number] Saul Lyly NA   10/16/2022 2006 129 70 - 130 mg/dL Final     Comment:     Meter: [id number] : [id number] Saul Lyly NA   10/16/2022 1603 136 (H) 70 - 130 mg/dL Final     Comment:     Meter: [id number] : [id number] Linda Lizzie NA   10/16/2022 1102 143 (H) 70 - 130 mg/dL Final     Comment:     Meter: [id number] : [id number] Linda Lizzie NA   10/16/2022 0546 144 (H) 70 - 130 mg/dL Final     Comment:     Meter: [id number] : [id number] Saul Lyly NA   10/15/2022 2138 192 (H) 70 - 130 mg/dL Final     Comment:     Meter: [id number] : [id number] Saul Martinezberly NA   10/15/2022 1614 237 (H) 70 - 130 mg/dL Final     Comment:     Meter: [id number] : [id number] Linda Lizzie NA           Past Medical History:   Diagnosis Date   • Allergic rhinitis    • Asthma    • Cancer of floor of mouth (HCC) 2014   • Cerebral aneurysm    • COPD (chronic obstructive pulmonary disease) (HCC)    • COVID 2020   • Esophageal reflux    • History of adenocarcinoma of lung    • History of anemia    • History of carcinoma in situ of skin    • History of lung cancer    • 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 from [facility]   • History of squamous cell carcinoma     Tongue   • Hyperlipidemia    • Hypertension     since early 60s   • Low vitamin B12 level    • Lung cancer (HCC)    • Thyromegaly    • UTI (urinary tract  infection)    • Vitamin D deficiency        Assessment:  Active Hospital Problems    Diagnosis  POA   • **Shortness of breath [R06.02]  Yes   • Hypokalemia [E87.6]  Unknown   • History of lung cancer [Z85.118]  Not Applicable   • Lung cancer (HCC) [C34.90]  Unknown   • Chronic obstructive pulmonary disease (HCC) [J44.9]  Yes   • Hyperlipidemia [E78.5]  Yes   • Hypertension [I10]  Yes      Resolved Hospital Problems   No resolved problems to display.       Plan:  Bronchoscopy today.  Follow lab. Pulmonary and thoracic surgery consults noted.  Antibiotics and nebs.    Dalton Sloan MD  10/17/2022  11:55 EDT

## 2022-10-18 ENCOUNTER — TELEPHONE (OUTPATIENT)
Dept: ONCOLOGY | Facility: CLINIC | Age: 82
End: 2022-10-18

## 2022-10-18 ENCOUNTER — READMISSION MANAGEMENT (OUTPATIENT)
Dept: CALL CENTER | Facility: HOSPITAL | Age: 82
End: 2022-10-18

## 2022-10-18 VITALS
WEIGHT: 124.12 LBS | DIASTOLIC BLOOD PRESSURE: 61 MMHG | RESPIRATION RATE: 18 BRPM | SYSTOLIC BLOOD PRESSURE: 131 MMHG | TEMPERATURE: 98.4 F | HEART RATE: 94 BPM | OXYGEN SATURATION: 92 % | BODY MASS INDEX: 22.84 KG/M2 | HEIGHT: 62 IN

## 2022-10-18 DIAGNOSIS — Z85.118 HISTORY OF LUNG CANCER: ICD-10-CM

## 2022-10-18 DIAGNOSIS — C34.12 MALIGNANT NEOPLASM OF UPPER LOBE OF LEFT LUNG: Primary | ICD-10-CM

## 2022-10-18 LAB
ANION GAP SERPL CALCULATED.3IONS-SCNC: 12 MMOL/L (ref 5–15)
APPEARANCE FLD: ABNORMAL
BASOPHILS # BLD AUTO: 0.02 10*3/MM3 (ref 0–0.2)
BASOPHILS NFR BLD AUTO: 0.2 % (ref 0–1.5)
BUN SERPL-MCNC: 11 MG/DL (ref 8–23)
BUN/CREAT SERPL: 23.9 (ref 7–25)
CALCIUM SPEC-SCNC: 8.3 MG/DL (ref 8.6–10.5)
CHLORIDE SERPL-SCNC: 102 MMOL/L (ref 98–107)
CO2 SERPL-SCNC: 28 MMOL/L (ref 22–29)
COLOR FLD: COLORLESS
CREAT SERPL-MCNC: 0.46 MG/DL (ref 0.57–1)
DEPRECATED RDW RBC AUTO: 41.9 FL (ref 37–54)
EGFRCR SERPLBLD CKD-EPI 2021: 96.9 ML/MIN/1.73
EOSINOPHIL # BLD AUTO: 0.25 10*3/MM3 (ref 0–0.4)
EOSINOPHIL NFR BLD AUTO: 2.9 % (ref 0.3–6.2)
ERYTHROCYTE [DISTWIDTH] IN BLOOD BY AUTOMATED COUNT: 12.9 % (ref 12.3–15.4)
GLUCOSE BLDC GLUCOMTR-MCNC: 85 MG/DL (ref 70–130)
GLUCOSE SERPL-MCNC: 73 MG/DL (ref 65–99)
HCT VFR BLD AUTO: 36.4 % (ref 34–46.6)
HGB BLD-MCNC: 11.5 G/DL (ref 12–15.9)
IMM GRANULOCYTES # BLD AUTO: 0.05 10*3/MM3 (ref 0–0.05)
IMM GRANULOCYTES NFR BLD AUTO: 0.6 % (ref 0–0.5)
LYMPHOCYTES # BLD AUTO: 1.56 10*3/MM3 (ref 0.7–3.1)
LYMPHOCYTES NFR BLD AUTO: 17.9 % (ref 19.6–45.3)
LYMPHOCYTES NFR FLD MANUAL: 2 %
MCH RBC QN AUTO: 28 PG (ref 26.6–33)
MCHC RBC AUTO-ENTMCNC: 31.6 G/DL (ref 31.5–35.7)
MCV RBC AUTO: 88.8 FL (ref 79–97)
METHOD: ABNORMAL
MONOCYTES # BLD AUTO: 0.48 10*3/MM3 (ref 0.1–0.9)
MONOCYTES NFR BLD AUTO: 5.5 % (ref 5–12)
MONOS+MACROS NFR FLD: 16 %
NEUTROPHILS NFR BLD AUTO: 6.34 10*3/MM3 (ref 1.7–7)
NEUTROPHILS NFR BLD AUTO: 72.9 % (ref 42.7–76)
NEUTROPHILS NFR FLD MANUAL: 82 %
NRBC BLD AUTO-RTO: 0 /100 WBC (ref 0–0.2)
NUC CELL # FLD: 298 /MM3
OTHER CELLS FLUID PER 100/WBCS: 2 /100 WBCS
PLATELET # BLD AUTO: 278 10*3/MM3 (ref 140–450)
PMV BLD AUTO: 9.9 FL (ref 6–12)
POTASSIUM SERPL-SCNC: 3.6 MMOL/L (ref 3.5–5.2)
RBC # BLD AUTO: 4.1 10*6/MM3 (ref 3.77–5.28)
RBC # FLD AUTO: 94 /MM3
SODIUM SERPL-SCNC: 142 MMOL/L (ref 136–145)
WBC NRBC COR # BLD: 8.7 10*3/MM3 (ref 3.4–10.8)

## 2022-10-18 PROCEDURE — 94618 PULMONARY STRESS TESTING: CPT

## 2022-10-18 PROCEDURE — 85025 COMPLETE CBC W/AUTO DIFF WBC: CPT | Performed by: HOSPITALIST

## 2022-10-18 PROCEDURE — 99232 SBSQ HOSP IP/OBS MODERATE 35: CPT | Performed by: NURSE PRACTITIONER

## 2022-10-18 PROCEDURE — 82962 GLUCOSE BLOOD TEST: CPT

## 2022-10-18 PROCEDURE — 80048 BASIC METABOLIC PNL TOTAL CA: CPT | Performed by: HOSPITALIST

## 2022-10-18 PROCEDURE — 99232 SBSQ HOSP IP/OBS MODERATE 35: CPT | Performed by: INTERNAL MEDICINE

## 2022-10-18 RX ORDER — CEFDINIR 300 MG/1
300 CAPSULE ORAL 2 TIMES DAILY
Qty: 8 CAPSULE | Refills: 0 | Status: SHIPPED | OUTPATIENT
Start: 2022-10-18 | End: 2022-10-22

## 2022-10-18 RX ADMIN — Medication 1000 UNITS: at 08:17

## 2022-10-18 RX ADMIN — METOPROLOL SUCCINATE 25 MG: 25 TABLET, EXTENDED RELEASE ORAL at 08:17

## 2022-10-18 RX ADMIN — CETIRIZINE HYDROCHLORIDE 10 MG: 10 TABLET ORAL at 08:17

## 2022-10-18 RX ADMIN — PANTOPRAZOLE SODIUM 40 MG: 40 TABLET, DELAYED RELEASE ORAL at 08:17

## 2022-10-18 RX ADMIN — ASPIRIN 81 MG: 81 TABLET, CHEWABLE ORAL at 08:17

## 2022-10-18 RX ADMIN — MULTIPLE VITAMINS W/ MINERALS TAB 1 TABLET: TAB at 08:17

## 2022-10-18 NOTE — PLAN OF CARE
Problem: Adult Inpatient Plan of Care  Goal: Plan of Care Review  Outcome: Ongoing, Progressing  Flowsheets (Taken 10/18/2022 0621)  Progress: improving  Plan of Care Reviewed With: patient  Outcome Evaluation:   VSS   SR on monitor   2L NC O2   c/o sore throat overnight   very very eager to go home   will cont to monitor   Goal Outcome Evaluation:  Plan of Care Reviewed With: patient        Progress: improving  Outcome Evaluation: VSS; SR on monitor; 2L NC O2; c/o sore throat overnight; very very eager to go home; will cont to monitor

## 2022-10-18 NOTE — NURSING NOTE
Patient very agitated and frustrated, more so than last night. She is threatening to rip everything off of her if she continues to hear beeping, and when I go into room to assess her, her nasal cannula is off of her. She is VERY agitated that I have to put it back on her. She sats at 86-88% without oxygen. She is stating that at 0600 she is taking everything off and will be ready to go home then. I mentioned she may need a walking ox before leaving to see if she needs oxygen at home and she adamantly stated she will NOT be wearing oxygen at home.

## 2022-10-18 NOTE — PLAN OF CARE
Goal Outcome Evaluation:  Plan of Care Reviewed With: patient        Progress: improving  Outcome Evaluation: VSS. Telemetry monitor, SR. Alert and oriented x4, room air. To be discharged, awaiting transport from .

## 2022-10-18 NOTE — PROGRESS NOTES
"    Chief Complaint: Left lower lobe mass, history of lung cancer    Subjective:  Symptoms:  Stable.    Activity level: Returning to normal.    Pain:  She reports no pain.    No complaints today. Eager to discharge home.    Vital Signs:  Temp:  [98.1 °F (36.7 °C)-99.8 °F (37.7 °C)] 98.4 °F (36.9 °C)  Heart Rate:  [69-94] 94  Resp:  [16-24] 18  BP: (114-163)/(52-78) 131/61    Intake & Output (last day)       10/17 0701  10/18 0700 10/18 0701  10/19 0700          Urine Unmeasured Occurrence 3 x           Objective:  General Appearance:  Comfortable, well-appearing and in no acute distress.    Vital signs: (most recent): Blood pressure 131/61, pulse 94, temperature 98.4 °F (36.9 °C), temperature source Oral, resp. rate 18, height 157.5 cm (62\"), weight 56.3 kg (124 lb 1.9 oz), SpO2 91 %, not currently breastfeeding.  Vital signs are normal.    HEENT: Normal HEENT exam.    Lungs:  Normal effort.    Heart: Normal rate.    Abdomen: Abdomen is soft.  There is no abdominal tenderness.     Neurological: Patient is alert and oriented to person, place and time.    Skin:  Warm and dry.              Results Review:     I reviewed the patient's new clinical results.  I reviewed the patient's new imaging results and agree with the interpretation.  I reviewed the patient's other test results and agree with the interpretation  Discussed with patient, RN and Dr. Mills.    Imaging Results (Last 24 Hours)     Procedure Component Value Units Date/Time    XR Chest 1 View [[id number]] Collected: 10/17/22 2144     Updated: 10/17/22 2148    Narrative:      SINGLE VIEW OF THE CHEST     HISTORY: Subarachnoid.     COMPARISON: 10/14/2022     FINDINGS:  Cardiac silhouette is poorly assessed due to extensive opacification  throughout the left hemithorax. Appearance may have worsened slightly  when compared to the exam from 10/14/2022. However appearance difference  may be related to differences in technique. No pneumothorax is seen  following " biopsy. There is extensive calcification of the aorta.  Additional stable consolidation is seen within the right upper lobe.  There are background changes of COPD.       Impression:      No evidence of pneumothorax following left lung biopsy.      This report was finalized on 10/17/2022 9:45 PM by Dr. Ewelina Edwards M.D.       XR Chest 1 View [[id number]] Collected: 10/17/22 2101     Updated: 10/17/22 2105    Narrative:      SINGLE VIEW OF THE CHEST     HISTORY: Bronchoscopy     COMPARISON: 10/14/2022     FINDINGS:  Single fluoroscopic image from the endoscopy suite demonstrates biopsy  of an area of consolidation within the lingula.       Impression:      Intraoperative findings, as noted above. Please see the surgeon's report  for full description of procedure and findings. Total fluoroscopy time  was 29 seconds     This report was finalized on 10/17/2022 9:02 PM by Dr. Ewelina Edwards M.D.       FL C Arm During Surgery [[id number]] Resulted: 10/17/22 1803     Updated: 10/17/22 1803    Narrative:      This procedure was auto-finalized with no dictation required.          Lab Results:     Lab Results (last 24 hours)     Procedure Component Value Units Date/Time    POC Glucose Once [[id number]]  (Normal) Collected: 10/18/22 1115    Specimen: Blood Updated: 10/18/22 1117     Glucose 85 mg/dL      Comment: Meter: [id number] : [id number] Meet Anabela SANDEE       Basic Metabolic Panel [[id number]]  (Abnormal) Collected: 10/18/22 0751    Specimen: Blood Updated: 10/18/22 0921     Glucose 73 mg/dL      BUN 11 mg/dL      Creatinine 0.46 mg/dL      Sodium 142 mmol/L      Potassium 3.6 mmol/L      Chloride 102 mmol/L      CO2 28.0 mmol/L      Calcium 8.3 mg/dL      BUN/Creatinine Ratio 23.9     Anion Gap 12.0 mmol/L      eGFR 96.9 mL/min/1.73      Comment: National Kidney Foundation and American Society of Nephrology (ASN) Task Force recommended calculation based on the Chronic Kidney Disease Epidemiology Collaboration  (CKD-EPI) equation refit without adjustment for race.       Narrative:      GFR Normal >60  Chronic Kidney Disease <60  Kidney Failure <15      CBC & Differential [[id number]]  (Abnormal) Collected: 10/18/22 0751    Specimen: Blood Updated: 10/18/22 0859    Narrative:      The following orders were created for panel order CBC & Differential.  Procedure                               Abnormality         Status                     ---------                               -----------         ------                     CBC Auto Differential[[id number]]        Abnormal            Final result                 Please view results for these tests on the individual orders.    CBC Auto Differential [[id number]]  (Abnormal) Collected: 10/18/22 0751    Specimen: Blood Updated: 10/18/22 0859     WBC 8.70 10*3/mm3      RBC 4.10 10*6/mm3      Hemoglobin 11.5 g/dL      Hematocrit 36.4 %      MCV 88.8 fL      MCH 28.0 pg      MCHC 31.6 g/dL      RDW 12.9 %      RDW-SD 41.9 fl      MPV 9.9 fL      Platelets 278 10*3/mm3      Neutrophil % 72.9 %      Lymphocyte % 17.9 %      Monocyte % 5.5 %      Eosinophil % 2.9 %      Basophil % 0.2 %      Immature Grans % 0.6 %      Neutrophils, Absolute 6.34 10*3/mm3      Lymphocytes, Absolute 1.56 10*3/mm3      Monocytes, Absolute 0.48 10*3/mm3      Eosinophils, Absolute 0.25 10*3/mm3      Basophils, Absolute 0.02 10*3/mm3      Immature Grans, Absolute 0.05 10*3/mm3      nRBC 0.0 /100 WBC     Tissue Pathology Exam [[id number]] Collected: 10/17/22 1743    Specimen: Tissue from Lung, Left Lower Lobe Updated: 10/17/22 2155    POC Glucose Once [[id number]]  (Normal) Collected: 10/17/22 1559    Specimen: Blood Updated: 10/17/22 1601     Glucose 90 mg/dL      Comment: Meter: [id number] : [id number] Irineo DIOP       Blood Culture - Blood, Arm, Left [[id number]]  (Normal) Collected: 10/14/22 1446    Specimen: Blood from Arm, Left Updated: 10/17/22 1501     Blood Culture No growth at 3 days    Blood  Culture - Blood, Arm, Left [[id number]]  (Normal) Collected: 10/14/22 1437    Specimen: Blood from Arm, Left Updated: 10/17/22 1501     Blood Culture No growth at 3 days           Assessment & Plan       Shortness of breath    Hypertension    Hyperlipidemia    Chronic obstructive pulmonary disease (HCC)    Lung cancer (HCC)    Hypokalemia    History of lung cancer       Assessment & Plan    Ms. Vazquez is a pleasant 82-year-old lady with a past history of 3 primary lung cancers and now with a left lower lobe 6 x 5 cm mass concerning for bronchogenic malignancy.   She underwent bronchoscopy yesterday by Dr. Flores.  There was no evidence of endobronchial lesion.  Plan is for outpatient PET scan and follow-up with CT-guided biopsy versus navigational bronchoscopy for diagnosis.  Given her poor pulmonary function, she is not a surgical candidate.  Discussed with Dr. Mills who concurs.   We will see as needed.    DEJAN Del Cid  [facility]  10/18/22  11:43 EDT    Patient was seen and assessed while wearing personal protective equipment including facemask, protective eyewear and gloves.  Hand hygiene performed prior to entering the room and upon exiting with doffing of gloves.

## 2022-10-18 NOTE — PROGRESS NOTES
[facility] INPATIENT PROGRESS NOTE  Subjective     CC: History of non-small cell lung cancer      HISTORY OF PRESENT ILLNESS:         The patient is an 82-year-old with COPD, long-term tobacco use, left upper lobectomy for carcinoma of the lung in May 2012 for stage I non-small cell lung, carcinoma of the tongue pT2 N0 MX treated with surgery and radiation in 2016 diagnosed with invasive moderately differentiated adenocarcinoma of the lung on 8/13/2021.  This was PD-L1 positive TPS score 40%.  She had 2 separate nodules 1 in the superior segment of the Right lower lobe and a mass in the upper portion of the left chest with left chest lesion biopsy positive for adenocarcinoma.  She was treated with radiation to these 2 sites as they were considered to be 2 synchronous primaries.  There was no evidence of metastatic disease to any other sites.     She was able to proceed with SBRT to the right lung at primary #1 with 5 treatments at 1000 cGy per fraction in the left lung primary similarly at 1000 cGy per fraction x5.  Her treatment ranged between 8/31-9/13/2021.  She is now scheduled for follow-up 11/23/2021.     The patient subsequent genomic testing is discussed with her as she is is seen back 9/23/2021.  Her guardant 360 did not determine any new abnormalities but her Caris-MI profile-does reveal sensitivity to immunotherapy as well as a BRAF mutation.  This could be extremely important as we follow the patient from this point.    She has had subsequent imaging for follow-up.  6/8/2022-CT of the chest abdomen and pelvis as well as neck.  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 neurosurgical referral was recommended.  Continued evaluation of postradiation changes in the right midlung and left upper lung.  Soft tissue mass within the left lobectomy operative bed has decreased in size.  Sub-6 mm pulmonary nodule in the right upper lobe  not definitely seen on prior CT.  0.6 cm nodule in the right lower lobe is grossly unchanged.  Findings remain indeterminate and repeat scans in 3 months recommended.   Indeterminant left renal lesion measuring 1.5 cm grossly unchanged from July 2021     She has been admitted to the hospital for worsening dyspnea cough which has not been productive.     CT of the chest 10/14/2022-postsurgical changes from left upper lobe  with increased dense consolidation in the left upper lung which may represent continued fibrosis or scarring or volume loss.  Continued follow-up recommended.  New approximately 6.7 cm masslike area of consolidation in the anterior left lung, lower lobe could represent pneumonia but overall raises concern for possible malignancy given the masslike configuration.  PET/CT evaluation or tissue sampling recommended.     We have been consulted this admission for recommendations on the masslike area in the left lung     Noted to have leukocytosis with a WBC count of 15.22, mild anemia with a hemoglobin of 10.2 and platelet count normal at 275.  CMP with normal LFTs.       Interval history:     10/17/2022  T98.6, pulse 76, respirations 16, /57, SPO2 of 98%  Again record reviewed with patient having 3 primary lung cancers and now with a left lower lobe 6 x 5 cm mass concerning for bronchogenic malignancy.  Bronchoscopy scheduled 10/17/2022 with pulmonary medicine.    10/18/2022  T98.4, pulse 87, respirations 18, /61, SPO2 of 90%  Review of bronchoscopy reveals no endobronchial lesions, stump of right upper lobe segment clean, right lower lobe site with no evidence of endobronchial lesion, BAL performed, 1 subsegment noted to be narrow and random 5 fluoroscopy-guided transbronchial biopsies performed with pathology pending.  Patient hopes for discharge today.      Medications:  The current medication list was reviewed in the EMR.    Allergies:    Allergies   Allergen Reactions   • Sulfa  Antibiotics Rash       Objective      Vitals:    10/18/22 0753   BP: 131/61   Pulse: 87   Resp: 18   Temp: 98.4 °F (36.9 °C)   SpO2: 92%    :   Physical Exam  Constitutional:       Appearance: She is normal weight.   HENT:      Head: Normocephalic and atraumatic.      Nose: Nose normal.      Mouth/Throat:      Mouth: Mucous membranes are moist.      Pharynx: Oropharynx is clear.   Eyes:      Extraocular Movements: Extraocular movements intact.      Conjunctiva/sclera: Conjunctivae normal.      Pupils: Pupils are equal, round, and reactive to light.   Cardiovascular:      Rate and Rhythm: Normal rate and regular rhythm.      Pulses: Normal pulses.      Heart sounds: Normal heart sounds.   Pulmonary:      Effort: Pulmonary effort is normal.      Comments: Decreased breath sounds right upper lung field  Abdominal:      General: Bowel sounds are normal.      Palpations: Abdomen is soft.   Musculoskeletal:         General: Normal range of motion.      Cervical back: Normal range of motion and neck supple.   Skin:     General: Skin is warm and dry.   Neurological:      General: No focal deficit present.      Mental Status: She is alert and oriented to person, place, and time.             RECENT LABS:    Results from last 7 days   Lab Units 10/17/22  0849 10/16/22  0632 10/15/22  0815   WBC 10*3/mm3 8.61 18.22* 16.10*   HEMOGLOBIN g/dL 10.2* 10.2* 12.1   HEMATOCRIT % 32.2* 30.3* 35.8   PLATELETS 10*3/mm3 261 275 312     Results from last 7 days   Lab Units 10/17/22  0849 10/16/22  0632 10/15/22  0815 10/14/22  1315   SODIUM mmol/L 141 137 138 139   POTASSIUM mmol/L 4.4 3.9 3.3* 2.7*   CHLORIDE mmol/L 101 99 95* 95*   CO2 mmol/L 34.4* 33.0* 31.5* 34.0*   BUN mg/dL 18 21 19 17   CREATININE mg/dL 0.56* 0.58 0.70 0.67   CALCIUM mg/dL 8.5* 8.6 8.6 8.8   BILIRUBIN mg/dL  --   --   --  1.0   ALK PHOS U/L  --   --   --  79   ALT (SGPT) U/L  --   --   --  18   AST (SGOT) U/L  --   --   --  16   GLUCOSE mg/dL 89 134* 149* 126*      Results from last 7 days   Lab Units 10/15/22  0815 10/14/22  1315   MAGNESIUM mg/dL 2.2 1.4*       Assessment & Plan   10/14/2022 12.7 12.0 - 15.9 g/dL Final            Platelets   Date Value Ref Range Status   10/16/2022 275 140 - 450 10*3/mm3 Final   10/15/2022 312 140 - 450 10*3/mm3 Final   10/14/2022 295 140 - 450 10*3/mm3 Final               Assessment & Plan     Darlene Pfeiffer      *Current masslike consolidation of the left lung      · Diagnosis of stage I non-small cell lung cancer in 2012 treated with left upper lobectomy  • Patient with previous history of adenocarcinoma of the lung which is PD-L1 +40% treated with radiation to 2 separate areas in 2021   • Follow-up PET/CT 11/23/2021 with decrease in the previously seen avid nodules in left lobectomy operative bed and right lower lobe, new few subcentimeter pulmonary nodules right upper lobe and indeterminate  • Most recent CT scans from June 2022 showed stability of the areas with some new subcentimeter pulmonary nodules.  • CT of the chest 10/14/2022-new area of masslike consolidation in the left lung.  • This is definitely concerning for malignancy given her previous history  • We will obtain an outpatient PET/CT  • She has been evaluated by cardiothoracic surgery and bronchoscopy and biopsy has been recommended which was agreed with not completed 10/17/2022 with results pending.     *Previous history of head and neck cancer  • Treated with surgical resection and radiation by Dr. Tavarez     *Current cough and dyspnea  • Possible pneumonia, leukocytosis  • Continue azithromycin  • Patient reviewed by pulmonary medicine-COPD, no current exacerbation (PFT 7/1/2021 FEV1 71%; DLCO 54%  • Bronchoscopy completed 10/17/2022     *Hyperlipidemia-continue atorvastatin        *Anemia  • Mild  • Monitor     Recommendations   · Patient could be discharged from our standpoint  • PET to be performed outpatient which we are going scheduled for her.  • Bronchoscopy  and biopsy per cardiothoracic surgery  • Follow-up in clinic with Dr. Escobar-subsequent office visit to be made prior to discharge today.  • Continue treatment for pneumonia                  Henry Escobar MD  10/18/2022  08:01 EDT

## 2022-10-18 NOTE — PROGRESS NOTES
"                                              LOS: 3 days   Patient Care Team:  Kehrer, Meredith Lea, MD as PCP - General (Family Medicine)  Mary, Arias BROWN III, MD as Referring Physician (Thoracic Surgery)  Henry Escobar MD as Consulting Physician (Hematology and Oncology)  Moise Mcdonough MD as Consulting Physician (Radiation Oncology)    Chief Complaint:  F/up lung mass, cough    Subjective   Interval History  Cough improved since presentation but remains about the same since yesterday.  No significant sputum production.    No hemoptysis.  No dyspnea    REVIEW OF SYSTEMS:   CARDIOVASCULAR: No chest pain, chest pressure or chest discomfort. No palpitations or edema.   CONSTITUTIONAL: No fever or chills.  Cardiovascular: No chest pain or palpitation.    Ventilator/Non-Invasive Ventilation Settings (From admission, onward)    None                Physical Exam:     Vital Signs  Temp:  [98.1 °F (36.7 °C)-99.8 °F (37.7 °C)] 98.4 °F (36.9 °C)  Heart Rate:  [69-94] 94  Resp:  [16-24] 18  BP: (114-163)/(52-78) 131/61  No intake or output data in the 24 hours ending 10/18/22 1054  Flowsheet Rows    Flowsheet Row First Filed Value   Admission Height 157.5 cm (62\") Documented at 10/14/2022 1308   Admission Weight 56.4 kg (124 lb 5.4 oz) Documented at 10/14/2022 1308          PPE used per hospital policy    General Appearance:   Alert, cooperative, in no acute distress   ENMT:  Mallampati score 3, moist mucous membrane   Eyes:  Pupils equal and reactive to light. EOMI   Neck:    Trachea midline. No thyromegaly.   Lungs:    Equal but diminished air entry at the bases especially on the left base.  No crackles or wheezing.  Nonlabored breathing    Heart:   Regular rhythm and normal rate, normal S1 and S2, no         murmur   Skin:   No rash or ecchymosis   Abdomen:    Soft. No tenderness. No HSM.   Neuro/psych:  Conscious, alert, oriented x3. Strength 5/5 in upper and lower  ext.  Appropriate mood and affect "   Extremities:  No cyanosis, clubbing or edema.  Warm extremities and well-perfused          Results Review:        Results from last 7 days   Lab Units 10/18/22  0751 10/17/22  0849 10/16/22  0632   SODIUM mmol/L 142 141 137   POTASSIUM mmol/L 3.6 4.4 3.9   CHLORIDE mmol/L 102 101 99   CO2 mmol/L 28.0 34.4* 33.0*   BUN mg/dL 11 18 21   CREATININE mg/dL 0.46* 0.56* 0.58   GLUCOSE mg/dL 73 89 134*   CALCIUM mg/dL 8.3* 8.5* 8.6         Results from last 7 days   Lab Units 10/18/22  0751 10/17/22  0849 10/16/22  0632   WBC 10*3/mm3 8.70 8.61 18.22*   HEMOGLOBIN g/dL 11.5* 10.2* 10.2*   HEMATOCRIT % 36.4 32.2* 30.3*   PLATELETS 10*3/mm3 278 261 275         Results from last 7 days   Lab Units 10/14/22  1315   PROBNP pg/mL 1,234.0       I reviewed the patient's new clinical results.        Medication Review:   aspirin, 81 mg, Oral, Daily  atorvastatin, 20 mg, Oral, Nightly  cefTRIAXone, 1 g, Intravenous, Q24H  cetirizine, 10 mg, Oral, Daily  cholecalciferol, 1,000 Units, Oral, Daily  insulin lispro, 0-7 Units, Subcutaneous, TID AC  metoprolol succinate XL, 25 mg, Oral, Daily  montelukast, 10 mg, Oral, Nightly  multivitamin with minerals, 1 tablet, Oral, Daily  pantoprazole, 40 mg, Oral, QAM      Diagnostic imaging:  I personally and Independently reviewed and interpreted the following images:  CXR 10/17/2022: Diffuse opacities in the left.  No pneumothorax.      Assessment     1. 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  2. 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 New lung mass left upper lobe/lingula.  Concerning for malignancy.    S/p bronchoscopy 10/17.  No endobronchial lesions  4. COPD, no current exacerbation (PFT 7/1/2021 FEV1 71%; DLCO 54%)  5. Mild anemia  6. Allergy      Plan         · Empiric Rocephin. Stop azithromycin  · BAL culture, cytology and transbronchial biopsies 10/17 pending  · Hydrocodone and Mucinex as needed for cough. Can bed discharged on  that.  · As needed bronchodilators  · PFT as outpatient  · Montelukast    Okay to discharge home.  Agree with PET scan as outpatient.  We will follow on that and consider CT-guided biopsy versus repeat bronc with navigational assistance as there was no evidence of endobronchial lesion on her bronchoscopy performed 10/17 and the biopsies performed were taken randomly.    India Flores MD  10/18/22  10:54 EDT        This note was dictated utilizing [employer] dictation

## 2022-10-18 NOTE — NURSING NOTE
Patient refusing to be stuck for blood glucose stating she is not diabetic and does not see the need.

## 2022-10-18 NOTE — TELEPHONE ENCOUNTER
Order placed and message sent to scheduling.     ----- Message from Henry Escobar MD sent at 10/18/2022 10:32 AM EDT -----  Please schedule PET/CT for this patient in the next 2 weeks, MD follow-up 7 to 10 days later.  Thanks, GLENN

## 2022-10-18 NOTE — DISCHARGE SUMMARY
PHYSICIAN DISCHARGE SUMMARY                                                                        [facility]    Patient Identification:  Name: Darlene Pfeiffer  Age: 82 y.o.  Sex: female  :  1940  MRN: [id number]  Primary Care Physician: Kehrer, Meredith Lea, MD    Admit date: 10/14/2022  Discharge date and time:10/18/2022  Discharged Condition: good    Discharge Diagnoses:  Active Hospital Problems    Diagnosis  POA    **Shortness of breath [R06.02]  Yes    Hypokalemia [E87.6]  Yes    History of lung cancer [Z85.118]  Not Applicable    Lung cancer (HCC) [C34.90]  Yes    Chronic obstructive pulmonary disease (HCC) [J44.9]  Yes    Hyperlipidemia [E78.5]  Yes    Hypertension [I10]  Yes      Resolved Hospital Problems   No resolved problems to display.    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[facility])     Cerebral aneurysm     COPD (chronic obstructive pulmonary disease) ([facility])     COVID 2020    Esophageal reflux     History of adenocarcinoma of lung     History of anemia     History of carcinoma in situ of skin     History of lung cancer     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 from [facility]    History of squamous cell carcinoma     Tongue    Hyperlipidemia     Hypertension     since early 60s    Low vitamin B12 level     Lung cancer (HCC)     Thyromegaly     UTI (urinary tract infection)     Vitamin D deficiency      PSHX:   Past Surgical History:   Procedure Laterality Date    BRONCHOSCOPY Bilateral 10/17/2022    Procedure: BRONCHOSCOPY WITH FLUORO with biopsy and BAL;  Surgeon: India Flores MD;  Location: [facility] ENDOSCOPY;  Service: Pulmonary;  Laterality: Bilateral;  PRE/POST - lung mass    CHOLECYSTECTOMY      COLONOSCOPY      EMBOLIZATION CEREBRAL Left 2022    Procedure:  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;  Surgeon: Bradley Dowell MD;  Location: [facility] OR 18/19;  Service: Interventional Radiology;  Laterality: Left;    EYE SURGERY  11/24/2020    Took a muscle out of right eye    GLOSSECTOMY PARTIAL      less than one half tongue    LUNG SURGERY  2012    ORAL LESION EXCISION/BIOPSY      June and August 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    TUBAL ABDOMINAL LIGATION  1970       Hospital Course: Darlene Pfeiffer  Is a 82 year old female with history of non-small cell lung cancer who presented to the emergency room with shortness of breath, cough which has been present for the last month; the cough is not productive; she has been taking oral antibiotics but has not improved; she denies sick contacts .  The patient was admitted to hospital and treated with antibiotics for pneumonia.  The patient was seen by pulmonary and oncology.  The patient had bronchoscopy and path reports pending at discharge.  The patient was feeling better and felt well enough to go home.  She will finish a few more days of oral antibiotics and follow-up with her oncologist and follow-up with pulmonary.  She will also follow-up with her primary care provider.      Consults:     Consults       Date and Time Order Name Status Description    10/15/2022  2:52 PM Inpatient Pulmonology Consult      10/15/2022  2:13 PM Hematology & Oncology Inpatient Consult Completed     10/15/2022 11:51 AM Inpatient Pulmonology Consult Completed     10/14/2022  7:38 PM Inpatient Thoracic Surgery Consult Completed     10/14/2022  2:38 PM [facility] (on-call MD unless specified) Details            Results from last 7 days   Lab Units 10/18/22  0751   WBC 10*3/mm3 8.70   HEMOGLOBIN g/dL 11.5*   HEMATOCRIT % 36.4   PLATELETS 10*3/mm3 278     Results from last 7 days   Lab Units 10/18/22  0751   SODIUM mmol/L 142   POTASSIUM mmol/L 3.6   CHLORIDE mmol/L 102   CO2 mmol/L 28.0   BUN mg/dL 11   CREATININE mg/dL 0.46*    GLUCOSE mg/dL 73   CALCIUM mg/dL 8.3*     Significant Diagnostic Studies:   WBC   Date Value Ref Range Status   10/18/2022 8.70 3.40 - 10.80 10*3/mm3 Final     Hemoglobin   Date Value Ref Range Status   10/18/2022 11.5 (L) 12.0 - 15.9 g/dL Final     Hematocrit   Date Value Ref Range Status   10/18/2022 36.4 34.0 - 46.6 % Final     Platelets   Date Value Ref Range Status   10/18/2022 278 140 - 450 10*3/mm3 Final     Sodium   Date Value Ref Range Status   10/18/2022 142 136 - 145 mmol/L Final     Potassium   Date Value Ref Range Status   10/18/2022 3.6 3.5 - 5.2 mmol/L Final     Chloride   Date Value Ref Range Status   10/18/2022 102 98 - 107 mmol/L Final     CO2   Date Value Ref Range Status   10/18/2022 28.0 22.0 - 29.0 mmol/L Final     BUN   Date Value Ref Range Status   10/18/2022 11 8 - 23 mg/dL Final     Creatinine   Date Value Ref Range Status   10/18/2022 0.46 (L) 0.57 - 1.00 mg/dL Final     Glucose   Date Value Ref Range Status   10/18/2022 73 65 - 99 mg/dL Final     Calcium   Date Value Ref Range Status   10/18/2022 8.3 (L) 8.6 - 10.5 mg/dL Final     No results found for: AST, ALT, ALKPHOS  No results found for: APTT, INR  No results found for: COLORU, CLARITYU, SPECGRAV, PHUR, PROTEINUR, GLUCOSEU, KETONESU, BLOODU, NITRITE, LEUKOCYTESUR, BILIRUBINUR, UROBILINOGEN, RBCUA, WBCUA, BACTERIA, UACOMMENT  No results found for: TROPONINT, TROPONINI, BNP  No components found for: HGBA1C;2  No components found for: TSH;2  Imaging Results (All)       Procedure Component Value Units Date/Time    XR Chest 1 View [[id number]] Collected: 10/17/22 2144     Updated: 10/17/22 2148    Narrative:      SINGLE VIEW OF THE CHEST     HISTORY: Subarachnoid.     COMPARISON: 10/14/2022     FINDINGS:  Cardiac silhouette is poorly assessed due to extensive opacification  throughout the left hemithorax. Appearance may have worsened slightly  when compared to the exam from 10/14/2022. However appearance difference  may be related to  differences in technique. No pneumothorax is seen  following biopsy. There is extensive calcification of the aorta.  Additional stable consolidation is seen within the right upper lobe.  There are background changes of COPD.       Impression:      No evidence of pneumothorax following left lung biopsy.      This report was finalized on 10/17/2022 9:45 PM by Dr. Ewelina Edwards M.D.       XR Chest 1 View [[id number]] Collected: 10/17/22 2101     Updated: 10/17/22 2105    Narrative:      SINGLE VIEW OF THE CHEST     HISTORY: Bronchoscopy     COMPARISON: 10/14/2022     FINDINGS:  Single fluoroscopic image from the endoscopy suite demonstrates biopsy  of an area of consolidation within the lingula.       Impression:      Intraoperative findings, as noted above. Please see the surgeon's report  for full description of procedure and findings. Total fluoroscopy time  was 29 seconds     This report was finalized on 10/17/2022 9:02 PM by Dr. Ewelina Edwards M.D.       FL C Arm During Surgery [[id number]] Resulted: 10/17/22 1803     Updated: 10/17/22 1803    Narrative:      This procedure was auto-finalized with no dictation required.    CT Chest Without Contrast Diagnostic [[id number]] Collected: 10/14/22 1407     Updated: 10/14/22 1430    Narrative:      CT CHEST WO CONTRAST DIAGNOSTIC-     HISTORY:  Cough for several weeks, history of lung cancer.     TECHNIQUE: CT of the chest was performed without intravenous contrast.  Reformatted images were reviewed. Radiation dose reduction techniques  were utilized, including automated exposure control and exposure  modulation based on body size.     COMPARISON:  CT chest with contrast 6/3/2022        FINDINGS: There is a partially imaged subcentimeter hypodense right  thyroid nodule. Heart size is normal. There is no pericardial effusion.  There is calcific coronary artery atherosclerosis. There is advanced  calcific aortic atherosclerosis. Great vessels are normal in  caliber.  There is no significant pleural fluid.  Limited imaging through the upper abdomen demonstrates a hypodense focus  in the right hepatic lobe, not significantly changed. There are surgical  clips at the gallbladder fossa. There is calcific atherosclerosis of the  visualized upper abdominal aorta and its branch vessels. There is a  superior left renal cyst and a 1.5 cm superior left renal lesion, which  is indeterminate but not significantly changed in size. There is  asymmetric elevation of the left hemidiaphragm. There is multilevel  degenerative disc disease. There is diffuse osseous demineralization.  The trachea is clear. There is emphysema. There is curvilinear  atelectasis and/or scarring in the right lung. There are post surgical  changes from left upper lobectomy. Soft tissue density within the  operative bed is difficult to accurately measure and compare but grossly  similar to 6/20/2022. However, dense consolidation in the upper left  lung has become more dense compared to 6/8/2022. There is new masslike  consolidation measuring approximately 6.7 x 4.3 cm in the anterior  inferior left lung with a corresponding small focus of air and mild  surrounding groundglass opacity. A few scattered pulmonary nodules  measuring up to 0.6 cm in the posterior right lower lobe are not  significantly changed.          Impression:         Post surgical changes from left upper lobectomy with increased dense  consolidation in the upper left lung, which may reflect continued  fibrosis/scarring and volume loss, however, continued follow-up imaging  is recommended. A new approximately 6.7 cm masslike area of  consolidation in the anterior left lung could reflect pneumonia in the  appropriate clinical setting but overall raises concern for possible  malignancy given the masslike configuration. Recommend further  evaluation with PET/CT and/or tissue sampling or at minimum short-term  follow-up contrast-enhanced CT chest  and 3 months.  Additional findings, as above.     Discussed with DEJAN Young at 2:27 PM.     This report was finalized on 10/14/2022 2:27 PM by Dr. Bethany Hernandez M.D.             Lab Results (last 7 days)       Procedure Component Value Units Date/Time    POC Glucose Once [[id number]]  (Normal) Collected: 10/18/22 1115    Specimen: Blood Updated: 10/18/22 1117     Glucose 85 mg/dL      Comment: Meter: [id number] : [id number] Meet IGNACIO       Basic Metabolic Panel [[id number]]  (Abnormal) Collected: 10/18/22 0751    Specimen: Blood Updated: 10/18/22 0921     Glucose 73 mg/dL      BUN 11 mg/dL      Creatinine 0.46 mg/dL      Sodium 142 mmol/L      Potassium 3.6 mmol/L      Chloride 102 mmol/L      CO2 28.0 mmol/L      Calcium 8.3 mg/dL      BUN/Creatinine Ratio 23.9     Anion Gap 12.0 mmol/L      eGFR 96.9 mL/min/1.73      Comment: National Kidney Foundation and American Society of Nephrology (ASN) Task Force recommended calculation based on the Chronic Kidney Disease Epidemiology Collaboration (CKD-EPI) equation refit without adjustment for race.       Narrative:      GFR Normal >60  Chronic Kidney Disease <60  Kidney Failure <15      CBC & Differential [[id number]]  (Abnormal) Collected: 10/18/22 0751    Specimen: Blood Updated: 10/18/22 0859    Narrative:      The following orders were created for panel order CBC & Differential.  Procedure                               Abnormality         Status                     ---------                               -----------         ------                     CBC Auto Differential[[id number]]        Abnormal            Final result                 Please view results for these tests on the individual orders.    CBC Auto Differential [[id number]]  (Abnormal) Collected: 10/18/22 0751    Specimen: Blood Updated: 10/18/22 0859     WBC 8.70 10*3/mm3      RBC 4.10 10*6/mm3      Hemoglobin 11.5 g/dL      Hematocrit 36.4 %      MCV 88.8 fL      MCH 28.0 pg      MCHC 31.6  g/dL      RDW 12.9 %      RDW-SD 41.9 fl      MPV 9.9 fL      Platelets 278 10*3/mm3      Neutrophil % 72.9 %      Lymphocyte % 17.9 %      Monocyte % 5.5 %      Eosinophil % 2.9 %      Basophil % 0.2 %      Immature Grans % 0.6 %      Neutrophils, Absolute 6.34 10*3/mm3      Lymphocytes, Absolute 1.56 10*3/mm3      Monocytes, Absolute 0.48 10*3/mm3      Eosinophils, Absolute 0.25 10*3/mm3      Basophils, Absolute 0.02 10*3/mm3      Immature Grans, Absolute 0.05 10*3/mm3      nRBC 0.0 /100 WBC     Tissue Pathology Exam [[id number]] Collected: 10/17/22 1743    Specimen: Tissue from Lung, Left Lower Lobe Updated: 10/17/22 2155    POC Glucose Once [[id number]]  (Normal) Collected: 10/17/22 1559    Specimen: Blood Updated: 10/17/22 1601     Glucose 90 mg/dL      Comment: Meter: [id number] : [id number] Irineo DIOP       Blood Culture - Blood, Arm, Left [[id number]]  (Normal) Collected: 10/14/22 1446    Specimen: Blood from Arm, Left Updated: 10/17/22 1501     Blood Culture No growth at 3 days    Blood Culture - Blood, Arm, Left [[id number]]  (Normal) Collected: 10/14/22 1437    Specimen: Blood from Arm, Left Updated: 10/17/22 1501     Blood Culture No growth at 3 days    POC Glucose Once [[id number]]  (Normal) Collected: 10/17/22 1138    Specimen: Blood Updated: 10/17/22 1139     Glucose 81 mg/dL      Comment: Meter: [id number] : [id number] Jaylene Morejon       CBC & Differential [[id number]]  (Abnormal) Collected: 10/17/22 0849    Specimen: Blood Updated: 10/17/22 0956    Narrative:      The following orders were created for panel order CBC & Differential.  Procedure                               Abnormality         Status                     ---------                               -----------         ------                     CBC Auto Differential[[id number]]        Abnormal            Final result                 Please view results for these tests on the individual orders.    CBC Auto Differential [[id number]]   (Abnormal) Collected: 10/17/22 0849    Specimen: Blood Updated: 10/17/22 0956     WBC 8.61 10*3/mm3      RBC 3.62 10*6/mm3      Hemoglobin 10.2 g/dL      Hematocrit 32.2 %      MCV 89.0 fL      MCH 28.2 pg      MCHC 31.7 g/dL      RDW 12.8 %      RDW-SD 41.2 fl      MPV 9.5 fL      Platelets 261 10*3/mm3      Neutrophil % 66.6 %      Lymphocyte % 25.9 %      Monocyte % 7.2 %      Eosinophil % 0.0 %      Basophil % 0.1 %      Immature Grans % 0.2 %      Neutrophils, Absolute 5.73 10*3/mm3      Lymphocytes, Absolute 2.23 10*3/mm3      Monocytes, Absolute 0.62 10*3/mm3      Eosinophils, Absolute 0.00 10*3/mm3      Basophils, Absolute 0.01 10*3/mm3      Immature Grans, Absolute 0.02 10*3/mm3      nRBC 0.0 /100 WBC     Basic Metabolic Panel [[id number]]  (Abnormal) Collected: 10/17/22 0849    Specimen: Blood Updated: 10/17/22 0944     Glucose 89 mg/dL      BUN 18 mg/dL      Creatinine 0.56 mg/dL      Sodium 141 mmol/L      Potassium 4.4 mmol/L      Chloride 101 mmol/L      CO2 34.4 mmol/L      Calcium 8.5 mg/dL      BUN/Creatinine Ratio 32.1     Anion Gap 5.6 mmol/L      eGFR 92.4 mL/min/1.73      Comment: National Kidney Foundation and American Society of Nephrology (ASN) Task Force recommended calculation based on the Chronic Kidney Disease Epidemiology Collaboration (CKD-EPI) equation refit without adjustment for race.       Narrative:      GFR Normal >60  Chronic Kidney Disease <60  Kidney Failure <15      POC Glucose Once [[id number]]  (Normal) Collected: 10/17/22 0523    Specimen: Blood Updated: 10/17/22 0535     Glucose 107 mg/dL      Comment: Meter: [id number] : [id number] 4meee       POC Glucose Once [[id number]]  (Normal) Collected: 10/16/22 2006    Specimen: Blood Updated: 10/16/22 2018     Glucose 129 mg/dL      Comment: Meter: [id number] : [id number] [employer] NA       POC Glucose Once [[id number]]  (Abnormal) Collected: 10/16/22 1603    Specimen: Blood Updated: 10/16/22 1601      Glucose 136 mg/dL      Comment: Meter: [id number] : [id number] Linda Lizzie NA       POC Glucose Once [[id number]]  (Abnormal) Collected: 10/16/22 1102    Specimen: Blood Updated: 10/16/22 1104     Glucose 143 mg/dL      Comment: Meter: [id number] : [id number] Linda Lizzie NA       Basic Metabolic Panel [[id number]]  (Abnormal) Collected: 10/16/22 0632    Specimen: Blood Updated: 10/16/22 0813     Glucose 134 mg/dL      BUN 21 mg/dL      Creatinine 0.58 mg/dL      Sodium 137 mmol/L      Potassium 3.9 mmol/L      Chloride 99 mmol/L      CO2 33.0 mmol/L      Calcium 8.6 mg/dL      BUN/Creatinine Ratio 36.2     Anion Gap 5.0 mmol/L      eGFR 91.6 mL/min/1.73      Comment: National Kidney Foundation and American Society of Nephrology (ASN) Task Force recommended calculation based on the Chronic Kidney Disease Epidemiology Collaboration (CKD-EPI) equation refit without adjustment for race.       Narrative:      GFR Normal >60  Chronic Kidney Disease <60  Kidney Failure <15      CBC (No Diff) [[id number]]  (Abnormal) Collected: 10/16/22 0632    Specimen: Blood Updated: 10/16/22 0709     WBC 18.22 10*3/mm3      RBC 3.51 10*6/mm3      Hemoglobin 10.2 g/dL      Hematocrit 30.3 %      MCV 86.3 fL      MCH 29.1 pg      MCHC 33.7 g/dL      RDW 13.0 %      RDW-SD 40.0 fl      MPV 9.9 fL      Platelets 275 10*3/mm3     POC Glucose Once [[id number]]  (Abnormal) Collected: 10/16/22 0546    Specimen: Blood Updated: 10/16/22 0553     Glucose 144 mg/dL      Comment: Meter: [id number] : [id number] [employer] NA       POC Glucose Once [[id number]]  (Abnormal) Collected: 10/15/22 2138    Specimen: Blood Updated: 10/15/22 2141     Glucose 192 mg/dL      Comment: Meter: [id number] : [id number] Saul Lyly NA       POC Glucose Once [[id number]]  (Abnormal) Collected: 10/15/22 1614    Specimen: Blood Updated: 10/15/22 1616     Glucose 237 mg/dL      Comment: Meter: [id number] : [id number] Linda IGNACIO        Magnesium [[id number]]  (Normal) Collected: 10/15/22 0815    Specimen: Blood Updated: 10/15/22 1355     Magnesium 2.2 mg/dL     Phosphorus [[id number]]  (Normal) Collected: 10/15/22 0815    Specimen: Blood Updated: 10/15/22 1338     Phosphorus 4.4 mg/dL     Basic Metabolic Panel [[id number]]  (Abnormal) Collected: 10/15/22 0815    Specimen: Blood Updated: 10/15/22 1152     Glucose 149 mg/dL      BUN 19 mg/dL      Creatinine 0.70 mg/dL      Sodium 138 mmol/L      Potassium 3.3 mmol/L      Chloride 95 mmol/L      CO2 31.5 mmol/L      Calcium 8.6 mg/dL      BUN/Creatinine Ratio 27.1     Anion Gap 11.5 mmol/L      eGFR 87.6 mL/min/1.73      Comment: National Kidney Foundation and American Society of Nephrology (ASN) Task Force recommended calculation based on the Chronic Kidney Disease Epidemiology Collaboration (CKD-EPI) equation refit without adjustment for race.       Narrative:      GFR Normal >60  Chronic Kidney Disease <60  Kidney Failure <15      CBC (No Diff) [[id number]]  (Abnormal) Collected: 10/15/22 0815    Specimen: Blood Updated: 10/15/22 0931     WBC 16.10 10*3/mm3      RBC 4.19 10*6/mm3      Hemoglobin 12.1 g/dL      Hematocrit 35.8 %      MCV 85.4 fL      MCH 28.9 pg      MCHC 33.8 g/dL      RDW 12.8 %      RDW-SD 38.9 fl      MPV 9.7 fL      Platelets 312 10*3/mm3     Lactic Acid, Plasma [[id number]]  (Normal) Collected: 10/14/22 1437    Specimen: Blood Updated: 10/14/22 1523     Lactate 1.3 mmol/L     Magnesium [[id number]]  (Abnormal) Collected: 10/14/22 1315    Specimen: Blood Updated: 10/14/22 1431     Magnesium 1.4 mg/dL     Respiratory Panel PCR w/COVID-19(SARS-CoV-2) JOSHUA/NUZHAT/JOHANA/PAD/COR/MAD/ROSEMARY In-House, NP Swab in UTM/VTM, 3-4 HR TAT - Swab, Nasopharynx [[id number]]  (Normal) Collected: 10/14/22 1317    Specimen: Swab from Nasopharynx Updated: 10/14/22 1415     ADENOVIRUS, PCR Not Detected     Coronavirus 229E Not Detected     Coronavirus HKU1 Not Detected     Coronavirus NL63 Not Detected      Coronavirus OC43 Not Detected     COVID19 Not Detected     Human Metapneumovirus Not Detected     Human Rhinovirus/Enterovirus Not Detected     Influenza A PCR Not Detected     Influenza B PCR Not Detected     Parainfluenza Virus 1 Not Detected     Parainfluenza Virus 2 Not Detected     Parainfluenza Virus 3 Not Detected     Parainfluenza Virus 4 Not Detected     RSV, PCR Not Detected     Bordetella pertussis pcr Not Detected     Bordetella parapertussis PCR Not Detected     Chlamydophila pneumoniae PCR Not Detected     Mycoplasma pneumo by PCR Not Detected    Narrative:      In the setting of a positive respiratory panel with a viral infection PLUS a negative procalcitonin without other underlying concern for bacterial infection, consider observing off antibiotics or discontinuation of antibiotics and continue supportive care. If the respiratory panel is positive for atypical bacterial infection (Bordetella pertussis, Chlamydophila pneumoniae, or Mycoplasma pneumoniae), consider antibiotic de-escalation to target atypical bacterial infection.    BNP [[id number]]  (Normal) Collected: 10/14/22 1315    Specimen: Blood Updated: 10/14/22 1355     proBNP 1,234.0 pg/mL     Narrative:      Among patients with dyspnea, NT-proBNP is highly sensitive for the detection of acute congestive heart failure. In addition NT-proBNP of <300 pg/ml effectively rules out acute congestive heart failure with 99% negative predictive value.    Results may be falsely decreased if patient taking Biotin.      Procalcitonin [[id number]]  (Normal) Collected: 10/14/22 1315    Specimen: Blood Updated: 10/14/22 1355     Procalcitonin 0.09 ng/mL     Narrative:      As a Marker for Sepsis (Non-Neonates):    1. <0.5 ng/mL represents a low risk of severe sepsis and/or septic shock.  2. >2 ng/mL represents a high risk of severe sepsis and/or septic shock.    As a Marker for Lower Respiratory Tract Infections that require antibiotic therapy:    PCT on  "Admission    Antibiotic Therapy       6-12 Hrs later    >0.5                Strongly Recommended  >0.25 - <0.5        Recommended   0.1 - 0.25          Discouraged              Remeasure/reassess PCT  <0.1                Strongly Discouraged     Remeasure/reassess PCT    As 28 day mortality risk marker: \"Change in Procalcitonin Result\" (>80% or <=80%) if Day 0 (or Day 1) and Day 4 values are available. Refer to [email or web address]    Change in PCT <=80%  A decrease of PCT levels below or equal to 80% defines a positive change in PCT test result representing a higher risk for 28-day all-cause mortality of patients diagnosed with severe sepsis for septic shock.    Change in PCT >80%  A decrease of PCT levels of more than 80% defines a negative change in PCT result representing a lower risk for 28-day all-cause mortality of patients diagnosed with severe sepsis or septic shock.       Comprehensive Metabolic Panel [[id number]]  (Abnormal) Collected: 10/14/22 1315    Specimen: Blood Updated: 10/14/22 1349     Glucose 126 mg/dL      BUN 17 mg/dL      Creatinine 0.67 mg/dL      Sodium 139 mmol/L      Potassium 2.7 mmol/L      Chloride 95 mmol/L      CO2 34.0 mmol/L      Calcium 8.8 mg/dL      Total Protein 6.0 g/dL      Albumin 3.30 g/dL      ALT (SGPT) 18 U/L      AST (SGOT) 16 U/L      Alkaline Phosphatase 79 U/L      Total Bilirubin 1.0 mg/dL      Globulin 2.7 gm/dL      A/G Ratio 1.2 g/dL      BUN/Creatinine Ratio 25.4     Anion Gap 10.0 mmol/L      eGFR 88.5 mL/min/1.73      Comment: National Kidney Foundation and American Society of Nephrology (ASN) Task Force recommended calculation based on the Chronic Kidney Disease Epidemiology Collaboration (CKD-EPI) equation refit without adjustment for race.       Narrative:      GFR Normal >60  Chronic Kidney Disease <60  Kidney Failure <15      CBC & Differential [[id number]]  (Abnormal) Collected: 10/14/22 1315    Specimen: Blood Updated: 10/14/22 1331    " "Narrative:      The following orders were created for panel order CBC & Differential.  Procedure                               Abnormality         Status                     ---------                               -----------         ------                     CBC Auto Differential[[id number]]        Abnormal            Final result                 Please view results for these tests on the individual orders.    CBC Auto Differential [[id number]]  (Abnormal) Collected: 10/14/22 1315    Specimen: Blood Updated: 10/14/22 1331     WBC 14.90 10*3/mm3      RBC 4.28 10*6/mm3      Hemoglobin 12.7 g/dL      Hematocrit 37.1 %      MCV 86.7 fL      MCH 29.7 pg      MCHC 34.2 g/dL      RDW 13.1 %      RDW-SD 40.9 fl      MPV 9.7 fL      Platelets 295 10*3/mm3      Neutrophil % 80.2 %      Lymphocyte % 13.8 %      Monocyte % 5.5 %      Eosinophil % 0.0 %      Basophil % 0.1 %      Immature Grans % 0.4 %      Neutrophils, Absolute 11.95 10*3/mm3      Lymphocytes, Absolute 2.06 10*3/mm3      Monocytes, Absolute 0.82 10*3/mm3      Eosinophils, Absolute 0.00 10*3/mm3      Basophils, Absolute 0.01 10*3/mm3      Immature Grans, Absolute 0.06 10*3/mm3      nRBC 0.0 /100 WBC           /61 (BP Location: Left arm, Patient Position: Lying)   Pulse 94   Temp 98.4 °F (36.9 °C) (Oral)   Resp 18   Ht 157.5 cm (62\")   Wt 56.3 kg (124 lb 1.9 oz)   LMP  (LMP Unknown)   SpO2 91%   BMI 22.70 kg/m²     Discharge Exam:  General Appearance:    Alert, cooperative, no distress                          Head:    Normocephalic, without obvious abnormality, atraumatic                          Eyes:                            Throat:   Lips, tongue, gums normal                          Neck:   Supple, symmetrical, trachea midline, no JVD                        Lungs:     Clear to auscultation bilaterally, respirations unlabored                Chest Wall:    No tenderness or deformity                        Heart:    Regular rate and rhythm, S1 " and S2 normal, no murmur,no  Rub  or gallop                  Abdomen:     Soft, non-tender, bowel sounds active, no masses, no organomegaly                  Extremities:   Extremities normal, atraumatic, no cyanosis or edema                             Skin:   Skin is warm and dry,  no rashes or palpable lesions                  Neurologic:   no focal deficits noted     Disposition:  Home    Activity as tolerated    Diet as tolerated  Diet Order   Procedures    Diet Regular; Cardiac       Patient Instructions:      Discharge Medications        New Medications        Instructions Start Date   cefdinir 300 MG capsule  Commonly known as: OMNICEF   300 mg, Oral, 2 Times Daily             Continue These Medications        Instructions Start Date   acetaminophen 325 MG tablet  Commonly known as: TYLENOL   650 mg, Oral, Every 4 Hours PRN      albuterol sulfate  (90 Base) MCG/ACT inhaler  Commonly known as: PROVENTIL HFA;VENTOLIN HFA;PROAIR HFA   2 puffs, Inhalation, Every 4 Hours PRN      aspirin 81 MG chewable tablet   81 mg, Oral, Daily      atorvastatin 20 MG tablet  Commonly known as: LIPITOR   20 mg, Oral, Nightly      b complex-C-E-zinc tablet   1 tablet, Oral, Daily, HOLDING FOR DOS      cetirizine 10 MG tablet  Commonly known as: zyrTEC   10 mg, Oral, Daily      chlorhexidine 0.12 % solution  Commonly known as: PERIDEX   15 mL, Mouth/Throat, 2 Times Daily      cholecalciferol 25 MCG (1000 UT) tablet  Commonly known as: VITAMIN D3   1,000 Units, Oral, Daily, HOLDING FOR DOS      ciclopirox 0.77 % cream  Commonly known as: LOPROX   1 application, Topical, 2 Times Daily      clobetasol 0.05 % cream  Commonly known as: TEMOVATE   APPLY TOPICALLY TO THE AFFECTED AREA TWICE DAILY AS NEEDED      fluticasone 50 MCG/ACT nasal spray  Commonly known as: FLONASE   2 sprays, Nasal, Daily      hydroCHLOROthiazide 25 MG tablet  Commonly known as: HYDRODIURIL   TAKE 1 TABLET EVERY DAY      HYDROcod Polst-CPM Polst ER 10-8  MG/5ML ER suspension  Commonly known as: Tussionex Pennkinetic ER   5 mL, Oral, Every 12 Hours PRN      metoprolol succinate XL 25 MG 24 hr tablet  Commonly known as: TOPROL-XL   TAKE 1 TABLET EVERY DAY      montelukast 10 MG tablet  Commonly known as: SINGULAIR   10 mg, Oral, Nightly      omeprazole 40 MG capsule  Commonly known as: priLOSEC   1 capsule, Oral, Daily      tiZANidine 4 MG tablet  Commonly known as: ZANAFLEX   2-4 mg, Oral, Every 8 Hours PRN             Stop These Medications      amLODIPine 5 MG tablet  Commonly known as: NORVASC            Future Appointments   Date Time Provider Department Center   10/28/2022  5:30 PM [facility] 1 BH [facility]   11/1/2022  2:00 PM VITALS ONLY [facility]   11/1/2022  2:20 PM Henry Escobar MD [facility]   12/12/2022  8:00 AM Kehrer, Meredith Lea, MD [facility]   7/13/2023 12:30 PM Bradley Dowell MD [facility] NS L[facility]      Follow-up Information       India Flores MD. Schedule an appointment as soon as possible for a visit in 2 week(s).    Specialties: Pulmonary Disease, Sleep Medicine  Contact information:  [address] [facility] [address]  [place] [id number] [phone]               Henry Escobar MD Follow up.    Specialties: Hematology and Oncology, Oncology, Hematology  Contact information:  [address] [facility] [address]  [place] [id number] [phone]               Kehrer, Meredith Lea, MD .    Specialty: Family Medicine  Contact information:  [address]  [facility] [address]  [facility] [id number] [phone]                           Discharge Order (From admission, onward)       Start     Ordered    10/18/22 1152  Discharge patient  Once        Expected Discharge Date: 10/18/22    Discharge Disposition: Home or Self Care    Physician of Record for Attribution - Please select from Treatment Team: TATA KNOTT [[id number]]    Review needed by CMO to determine Physician of Record: No       Question Answer Comment   Physician of  Record for Attribution - Please select from Treatment Team DALTON SLOAN    Review needed by CMO to determine Physician of Record No        10/18/22 4850                    Total time spent discharging patient including evaluation,post hospitalization follow up,  medication and post hospitalization instructions and education total time exceeds 30 minutes.    Signed:  Dalton Sloan MD  10/18/2022  11:58 EDT

## 2022-10-18 NOTE — PROGRESS NOTES
Exercise Oximetry    Patient Name:Darlene Pfeiffer   MRN: [id number]   Date: 10/18/22             ROOM AIR BASELINE   SpO2% 92   Heart Rate 86   Blood Pressure      EXERCISE ON ROOM AIR SpO2% EXERCISE ON O2 @  LPM SpO2%   1 MINUTE 91 1 MINUTE    2 MINUTES 91 2 MINUTES    3 MINUTES 90 3 MINUTES    4 MINUTES 90 4 MINUTES    5 MINUTES 91 5 MINUTES    6 MINUTES 90 6 MINUTES               Distance Walked  2 laps around nursing station Distance Walked   Dyspnea (Aishwarya Scale)   Dyspnea (Aishwarya Scale)   Fatigue (Aishwarya Scale)   Fatigue (Aishwarya Scale)   SpO2% Post Exercise  90 SpO2% Post Exercise   HR Post Exercise  95 HR Post Exercise   Time to Recovery  Few minutes Time to Recovery     Comments: Patient walked without assistance and no C/O pain or SOB. Patient returned to bed without O2 Spo2 90%

## 2022-10-19 ENCOUNTER — TRANSITIONAL CARE MANAGEMENT TELEPHONE ENCOUNTER (OUTPATIENT)
Dept: CALL CENTER | Facility: HOSPITAL | Age: 82
End: 2022-10-19

## 2022-10-19 LAB
BACTERIA SPEC AEROBE CULT: NORMAL
BACTERIA SPEC AEROBE CULT: NORMAL
CYTO UR: NORMAL
LAB AP CASE REPORT: NORMAL
PATH REPORT.FINAL DX SPEC: NORMAL
PATH REPORT.GROSS SPEC: NORMAL

## 2022-10-19 NOTE — OUTREACH NOTE
Prep Survey    Flowsheet Row Responses   [facility] patient discharged from? [place]   Is LACE score < 7 ? No   Emergency Room discharge w/ pulse ox? No   Eligibility [facility]   Date of Admission 10/14/22   Date of Discharge 10/18/22   Discharge Disposition Home or Self Care   Discharge diagnosis shortness of breath   Does the patient have one of the following disease processes/diagnoses(primary or secondary)? Other   Does the patient have Home health ordered? No   Is there a DME ordered? No   Prep survey completed? Yes          LEORA JENNINGS - Registered Nurse

## 2022-10-19 NOTE — OUTREACH NOTE
Call Center TCM Note    Flowsheet Row Responses   [facility] patient discharged from? [place]   Does the patient have one of the following disease processes/diagnoses(primary or secondary)? Other   TCM attempt successful? Yes   Call start time 1313   Call end time 1315   Discharge diagnosis shortness of breath   Is patient permission given to speak with other caregiver? Yes   List who call center can speak with spouse- Selvin   Person spoke with today (if not patient) and relationship spouse   Does the patient have all medications ordered at discharge? Yes   Is the patient taking all medications as directed (includes completed medication regime)? Yes   Comments Patient will call to make her f/u appts   Does the patient have an appointment with their PCP within 7 days of discharge? No   Has home health visited the patient within 72 hours of discharge? N/A   Psychosocial issues? No   Did the patient receive a copy of their discharge instructions? Yes   What is the patient's perception of their health status since discharge? Improving   Is the patient/caregiver able to teach back the hierarchy of who to call/visit for symptoms/problems? PCP, Specialist, Home health nurse, Urgent Care, ED, 911 Yes   TCM call completed? Yes   Wrap up additional comments Per spouse, patient is doing well, she will call to make her f/u appts.   Call end time 1315   Would this patient benefit from a Referral to Amb Social Work? No   Is the patient interested in additional calls from an ambulatory ?  NOTE:  applies to high risk patients requiring additional follow-up. No          Ximena Valencia RN    10/19/2022, 13:15 EDT

## 2022-10-20 LAB
BACTERIA SPEC AEROBE CULT: NORMAL
GRAM STN SPEC: NORMAL
LAB AP CASE REPORT: NORMAL
LAB AP DIAGNOSIS COMMENT: NORMAL
PATH REPORT.FINAL DX SPEC: NORMAL
PATH REPORT.GROSS SPEC: NORMAL

## 2022-10-28 ENCOUNTER — APPOINTMENT (OUTPATIENT)
Dept: CT IMAGING | Facility: HOSPITAL | Age: 82
End: 2022-10-28

## 2022-11-01 ENCOUNTER — APPOINTMENT (OUTPATIENT)
Dept: PET IMAGING | Facility: HOSPITAL | Age: 82
End: 2022-11-01

## 2022-11-09 ENCOUNTER — HOSPITAL ENCOUNTER (OUTPATIENT)
Dept: PET IMAGING | Facility: HOSPITAL | Age: 82
Discharge: HOME | End: 2022-11-09

## 2022-11-09 DIAGNOSIS — Z85.118 HISTORY OF LUNG CANCER: ICD-10-CM

## 2022-11-09 DIAGNOSIS — C34.12 MALIGNANT NEOPLASM OF UPPER LOBE OF LEFT LUNG: ICD-10-CM

## 2022-11-09 LAB — GLUCOSE BLDC GLUCOMTR-MCNC: 105 MG/DL (ref 70–130)

## 2022-11-09 PROCEDURE — 0 FLUDEOXYGLUCOSE F18 SOLUTION: Performed by: INTERNAL MEDICINE

## 2022-11-09 PROCEDURE — 82962 GLUCOSE BLOOD TEST: CPT

## 2022-11-09 PROCEDURE — A9552 F18 FDG: HCPCS | Performed by: INTERNAL MEDICINE

## 2022-11-09 PROCEDURE — 78815 PET IMAGE W/CT SKULL-THIGH: CPT

## 2022-11-09 RX ADMIN — FLUDEOXYGLUCOSE F18 1 DOSE: 300 INJECTION INTRAVENOUS at 08:00

## 2022-11-11 DIAGNOSIS — C34.12 MALIGNANT NEOPLASM OF UPPER LOBE OF LEFT LUNG: Primary | ICD-10-CM

## 2022-11-15 ENCOUNTER — LAB (OUTPATIENT)
Dept: OTHER | Facility: HOSPITAL | Age: 82
End: 2022-11-15

## 2022-11-15 ENCOUNTER — OFFICE VISIT (OUTPATIENT)
Dept: ONCOLOGY | Facility: CLINIC | Age: 82
End: 2022-11-15

## 2022-11-15 VITALS
DIASTOLIC BLOOD PRESSURE: 65 MMHG | BODY MASS INDEX: 22.89 KG/M2 | SYSTOLIC BLOOD PRESSURE: 159 MMHG | WEIGHT: 124.4 LBS | OXYGEN SATURATION: 95 % | RESPIRATION RATE: 18 BRPM | HEIGHT: 62 IN | TEMPERATURE: 98.2 F | HEART RATE: 98 BPM

## 2022-11-15 DIAGNOSIS — C34.12 MALIGNANT NEOPLASM OF UPPER LOBE OF LEFT LUNG: ICD-10-CM

## 2022-11-15 DIAGNOSIS — Z85.118 HISTORY OF LUNG CANCER: Primary | ICD-10-CM

## 2022-11-15 DIAGNOSIS — Z79.899 LONG-TERM USE OF HIGH-RISK MEDICATION: ICD-10-CM

## 2022-11-15 LAB
BASOPHILS # BLD AUTO: 0.03 10*3/MM3 (ref 0–0.2)
BASOPHILS NFR BLD AUTO: 0.4 % (ref 0–1.5)
DEPRECATED RDW RBC AUTO: 47.6 FL (ref 37–54)
EOSINOPHIL # BLD AUTO: 0.82 10*3/MM3 (ref 0–0.4)
EOSINOPHIL NFR BLD AUTO: 11.5 % (ref 0.3–6.2)
ERYTHROCYTE [DISTWIDTH] IN BLOOD BY AUTOMATED COUNT: 14.9 % (ref 12.3–15.4)
FUNGUS WND CULT: NORMAL
HCT VFR BLD AUTO: 35.5 % (ref 34–46.6)
HGB BLD-MCNC: 11.5 G/DL (ref 12–15.9)
IMM GRANULOCYTES # BLD AUTO: 0.05 10*3/MM3 (ref 0–0.05)
IMM GRANULOCYTES NFR BLD AUTO: 0.7 % (ref 0–0.5)
LYMPHOCYTES # BLD AUTO: 1.69 10*3/MM3 (ref 0.7–3.1)
LYMPHOCYTES NFR BLD AUTO: 23.8 % (ref 19.6–45.3)
MCH RBC QN AUTO: 28 PG (ref 26.6–33)
MCHC RBC AUTO-ENTMCNC: 32.4 G/DL (ref 31.5–35.7)
MCV RBC AUTO: 86.6 FL (ref 79–97)
MONOCYTES # BLD AUTO: 0.37 10*3/MM3 (ref 0.1–0.9)
MONOCYTES NFR BLD AUTO: 5.2 % (ref 5–12)
NEUTROPHILS NFR BLD AUTO: 4.15 10*3/MM3 (ref 1.7–7)
NEUTROPHILS NFR BLD AUTO: 58.4 % (ref 42.7–76)
NRBC BLD AUTO-RTO: 0 /100 WBC (ref 0–0.2)
PLAT MORPH BLD: NORMAL
PLATELET # BLD AUTO: 230 10*3/MM3 (ref 140–450)
PMV BLD AUTO: 9.9 FL (ref 6–12)
RBC # BLD AUTO: 4.1 10*6/MM3 (ref 3.77–5.28)
RBC MORPH BLD: NORMAL
WBC MORPH BLD: NORMAL
WBC NRBC COR # BLD: 7.11 10*3/MM3 (ref 3.4–10.8)

## 2022-11-15 PROCEDURE — 36415 COLL VENOUS BLD VENIPUNCTURE: CPT

## 2022-11-15 PROCEDURE — 85025 COMPLETE CBC W/AUTO DIFF WBC: CPT | Performed by: INTERNAL MEDICINE

## 2022-11-15 PROCEDURE — 85007 BL SMEAR W/DIFF WBC COUNT: CPT | Performed by: INTERNAL MEDICINE

## 2022-11-15 PROCEDURE — 99215 OFFICE O/P EST HI 40 MIN: CPT | Performed by: INTERNAL MEDICINE

## 2022-11-15 RX ORDER — HYDROCODONE BITARTRATE AND ACETAMINOPHEN 5; 325 MG/1; MG/1
1 TABLET ORAL EVERY 4 HOURS PRN
Qty: 100 TABLET | Refills: 0 | Status: SHIPPED | OUTPATIENT
Start: 2022-11-15 | End: 2023-01-09 | Stop reason: SDUPTHER

## 2022-11-15 NOTE — PROGRESS NOTES
Subjective Discussed patient's hospitalization                                                                                                                                                              REASON FOR FOLLOW-UP: Potential recurrent lung cancer.    History of Present Illness       The patient is an 82-year-old female with the below medical history including chronic obstructive pulmonary disease who was seen in the [facility] multidisciplinary thoracic oncology clinic July 1, 2021.  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      She had undergone a CT of the chest at [facility] 6/16/2021 showing postsurgical changes in the left chest from right upper lobectomy, 8 mm irregular nodule medial segment of the right lower lobe and diffuse changes of emphysema with fibrotic changes in the periphery, no suspicious hilar or mediastinal nodes, no pleural effusion.  New finding was suspicious for new malignancy with the patient felt to be a poor candidate for surgical resection.  A PET CT and PFTs were requested thereafter.  The PET/CT demonstrated ill-defined FDG avid soft tissue thickening left aspect mediastinum within the operative bed, 1 cm hypermetabolic pulmonary nodule right lower lobe and asymmetric thickening patchy uptake involving the right base of tongue.  There is subtle uptake within the L1 vertebral body which is indeterminate and a sub-6 mm pulmonary nodule of right lung and subcentimeter hypodense hepatic lesion which was below PET resolution.       The patient's case was discussed in thoracic conference 7/22/2021 with consideration of the patient undergoing L1 vertebral body MRI, confirmatory biopsy left mediastinal and assessment by ENT (Dr. Caballero) who had worked with the patient  previously.  She, incidentally, indicates that radiation therapy was given apparently intraoperatively at her recent surgery?  She still has issues with difficulty chewing and swallowing post procedures and was to be followed up with Dr. Caballero in the near future as planned.                                                            She was seen in the thoracic clinic on the same day with plans to proceed with MRI of the lumbar spine, biopsy left mediastinal nodular area of potential disease and follow-up of her ENT assessment as well as undergo a guardant 360 assessment in our office.  She underwent the biopsy 8/13/2021 found to be consistent with invasive moderately differentiated adenocarcinoma with PD-L1 TPS score 40%.  MRI of the lumbar spine revealed no evidence of metastatic disease though the patient did have multilevel degenerative disease throughout the lumbar spine.  She had an office follow-up with Dr. 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  She underwent laryngoscopy 8/18/2021 with right base of tongue without evidence of lesions or masses in the region.     She was seen by Dr. Hernandez 8/19/2021 and, her findings, had been presented in lung conference.  Her findings were consistent with 2 separate lesions including a nodule in the superior segment of the right lower lobe and a mass in the upper portion of the left chest with the left chest lesion biopsy positive for adenocarcinoma.  The consensus was that these were to separate-synchronous primaries.  Stereotactic radiation each lesions were felt to be the appropriate way to proceed and the patient was referred to radiation therapy.     The patient is seen in office 8/23/2021 agreeable to the radiation therapy as well as additional assessments including Caris molecular assessment of her biopsy.  Again her guardant 360 is currently  pending and we would follow her after she completes radiation therapy with subsequent scans.      She was able to proceed with SBRT to the right lung at primary #1 with 5 treatments at 1000 cGy per fraction in the left lung primary similarly at 1000 cGy per fraction x5.  Her treatment ranged between 8/31-9/13/2021.  She is now scheduled for follow-up 11/23/2021.    The patient subsequent genomic testing is discussed with her as she is is seen back 9/23/2021.  Her guardant 360 did not determine any new abnormalities but her Caris-MI profile-does reveal sensitivity to immunotherapy as well as a BRAF mutation.  This could be extremely important as we follow the patient from this point.  Fortunately she is feeling extremely well and quite pleased about her treatments.    Patient had subsequent PET/CT 11/23/2021 demonstrates decrease in size and avidity of the previously noted intensely FDG avid nodules left lobectomy operative site and right lower lobe.  Surrounding groundglass and pulmonary opacification is consistent with postradiation changes, a few new subcentimeter pulmonary nodules are present in the right upper lobe and indeterminant, stable subcentimeter hepatic lesion is below PET resolution no other findings of FDG-avid disease are seen in neck abdomen or pelvis.  The patient seen in office 12/1/2021 with a relatively good performance status stating she is not having any new symptoms and very pleased about her results on her PET/CT.  She realizes we'll have to follow this further but subsequent scans in 6 months.  She is also hoping to see an oral surgeon that previously helped her-Dr. Wu.    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  There is evolution of presumed postradiation changes in the right midlung with soft tissue mass within the  left lumpectomy operative bed minimally decreased in size.  There is a sub-6 mm nodule in the right upper lobe not definitively seen, indeterminate and an indeterminate left renal lesion at 1.5 cm unchanged from 7/13/2021.  The patient is currently scheduled to see Dr. Dowell on 6/23/2022.  She is feeling well without any additional symptoms.    The patient required admission 10/14 - 10/18/2022 presenting with shortness of breath and cough that was nonproductive.  She had been on oral antibiotics and did not improved and was admitted for therapy for apparent pneumonia.  This eventually led to bronchoscopy after initial CT revealed postsurgical changes, new masslike 6.7 cm area of consolidation anterior left lung raising concern for potential malignancy and a follow-up PET/CT planned.  Bronchoscopy and biopsy left lower lung failed to show evidence of malignancy.  The patient's PET/CT, however, demonstrated an irregular pleural-based soft tissue density thickening in the left upper lobe that was intensely FDG avid new from 6/8/2022 thought to be a malignant recurrence with spread into the left lung parenchyma.  There is intensely pleural-based avidity within the left lower lobe thought to be metastatic disease with postradiation of the left apex as well.  There is a small focus of uptake in the right hilum grossly unchanged in size and post radiation changes in the right lower lobe.  There is indeterminate density left renal that was grossly unchanged.  The patient is seen back in office indicating that she still has chest discomfort that is slowly worsening and that she has a chronic paroxysmal cough but has not improved.  We discussed that she very likely has recurrent disease and plan to proceed with a guardant 360 examination initially as we determine whether we should try to proceed with therapy particularly immunotherapy versus targeted therapy recognizing the above findings.    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 from [facility]   • History of squamous cell carcinoma     Tongue   • Hyperlipidemia    • Hypertension     since early 60s   • Low vitamin B12 level    • Lung cancer ([facility])    • Thyromegaly    • UTI (urinary tract infection)    • Vitamin D deficiency         Past Surgical History:   Procedure Laterality Date   • BRONCHOSCOPY Bilateral 10/17/2022    Procedure: BRONCHOSCOPY WITH FLUORO with biopsy and BAL;  Surgeon: India Flores MD;  Location: [facility] ENDOSCOPY;  Service: Pulmonary;  Laterality: Bilateral;  PRE/POST - lung mass   • CHOLECYSTECTOMY  1999   • COLONOSCOPY     • EMBOLIZATION CEREBRAL Left 6/29/2022    Procedure: 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;  Surgeon: Bradley Dowell MD;  Location: [facility] HYBRID OR 18/19;  Service: Interventional Radiology;  Laterality: Left;   • EYE SURGERY  11/24/2020    Took a muscle out of right eye   • GLOSSECTOMY PARTIAL      less than one half tongue   • LUNG SURGERY  2012   • ORAL LESION EXCISION/BIOPSY      June and August 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   • TUBAL ABDOMINAL LIGATION  1970        Current Outpatient Medications on File Prior to Visit   Medication Sig Dispense Refill   • acetaminophen (TYLENOL) 325 MG tablet Take 2 tablets by mouth Every 4 (Four) Hours As Needed for Mild Pain .     • albuterol sulfate  (90 Base) MCG/ACT inhaler Inhale 2 puffs Every 4 (Four) Hours As Needed for Wheezing. 18 g 2   • aspirin 81 MG chewable tablet Chew 81 mg Daily.     • atorvastatin (LIPITOR) 20 MG tablet Take 1 tablet by mouth  Every Night. 90 tablet 3   • B Complex-C-E-Zn (b complex-C-E-zinc) tablet Take 1 tablet by mouth Daily. HOLDING FOR DOS     • cetirizine (ZyrTEC) 10 MG tablet Take 10 mg by mouth Daily.     • chlorhexidine (PERIDEX) 0.12 % solution Apply 15 mL to the mouth or throat 2 (Two) Times a Day.     • cholecalciferol (VITAMIN D3) 1000 units tablet Take 1,000 Units by mouth Daily. HOLDING FOR DOS     • ciclopirox (LOPROX) 0.77 % cream Apply 1 application topically to the appropriate area as directed 2 (Two) Times a Day.     • clobetasol (TEMOVATE) 0.05 % cream APPLY TOPICALLY TO THE AFFECTED AREA TWICE DAILY AS NEEDED     • fluticasone (FLONASE) 50 MCG/ACT nasal spray 2 sprays into the nostril(s) as directed by provider Daily.     • hydroCHLOROthiazide (HYDRODIURIL) 25 MG tablet TAKE 1 TABLET EVERY DAY (Patient taking differently: Take 25 mg by mouth Daily.) 90 tablet 1   • HYDROcod Polst-CPM Polst ER (Tussionex Pennkinetic ER) 10-8 MG/5ML ER suspension Take 5 mL by mouth Every 12 (Twelve) Hours As Needed for Cough. 120 mL 0   • metoprolol succinate XL (TOPROL-XL) 25 MG 24 hr tablet TAKE 1 TABLET EVERY DAY (Patient taking differently: Take 25 mg by mouth Daily.) 90 tablet 1   • montelukast (SINGULAIR) 10 MG tablet Take 1 tablet by mouth Every Night. 90 tablet 3   • omeprazole (priLOSEC) 40 MG capsule Take 1 capsule by mouth Daily.     • tiZANidine (ZANAFLEX) 4 MG tablet Take 0.5-1 tablets by mouth Every 8 (Eight) Hours As Needed for Muscle Spasms. 30 tablet 1     No current facility-administered medications on file prior to visit.        ALLERGIES:    Allergies   Allergen Reactions   • Sulfa Antibiotics Rash        Social History     Socioeconomic History   • Marital status:    Tobacco Use   • Smoking status: Former     Types: Cigarettes     Quit date: 2015     Years since quittin.7   • Smokeless tobacco: Never   • Tobacco comments:     less than a pack per day, no smoking since , Quit 2015   Vaping Use  "  • Vaping Use: Never used   Substance and Sexual Activity   • Alcohol use: Yes     Comment: Socially -A few times a month   • Drug use: No   • Sexual activity: Defer     Family History   Problem Relation Age of Onset   • Ovarian cancer Mother          at age 27   • No Known Problems Father    • Ovarian cancer Sister    • Colon cancer Brother    • No Known Problems Other    • Malig Hyperthermia Neg Hx         Review of Systems   HENT: Positive for dental problem and sore throat.    Eyes: Negative.    Respiratory: Negative for cough and wheezing.    Gastrointestinal: Positive for constipation and diarrhea.   Genitourinary: Positive for frequency and urgency.   Musculoskeletal: Positive for arthralgias and myalgias.   Allergic/Immunologic: Positive for environmental allergies.   Neurological: Negative.    Hematological: Negative.    Psychiatric/Behavioral: Negative.       Objective     Vitals:    11/15/22 1408   BP: 159/65   Pulse: 98   Resp: 18   Temp: 98.2 °F (36.8 °C)   TempSrc: Temporal   SpO2: 95%   Weight: 56.4 kg (124 lb 6.4 oz)   Height: 157.5 cm (62.01\")   PainSc:   4   PainLoc: Back     Current Status 11/15/2022   ECOG score 0          Pulmonary function tests:     FVC is 2.10 which is 81% of predicted  FEV1 is 1.44 which is 75% of predicted  FEV1 FVC ratio 69  Diffusion capacity DLCO is 11.7 which is 54% of predicted      Physical Exam  Constitutional:       Appearance: Normal appearance. She is normal weight.   HENT:      Head: Normocephalic and atraumatic.      Nose: Nose normal.      Mouth/Throat:      Mouth: Mucous membranes are moist.      Pharynx: Oropharynx is clear.      Comments: Malocclusion noted  Eyes:      Extraocular Movements: Extraocular movements intact.   Cardiovascular:      Rate and Rhythm: Normal rate and regular rhythm.      Pulses: Normal pulses.      Heart sounds: Normal heart sounds.   Pulmonary:      Effort: Pulmonary effort is normal.      Breath sounds: Normal breath " sounds.      Comments: Distant breath sounds bilateral bases  Abdominal:      General: Abdomen is flat. Bowel sounds are normal.      Palpations: Abdomen is soft.   Musculoskeletal:         General: Normal range of motion.      Cervical back: Normal range of motion and neck supple.   Skin:     General: Skin is warm and dry.   Neurological:      General: No focal deficit present.      Mental Status: She is alert and oriented to person, place, and time.   Psychiatric:         Mood and Affect: Mood normal.         Behavior: Behavior normal.           RECENT LABS:  Hematology WBC   Date Value Ref Range Status   11/15/2022 7.11 3.40 - 10.80 10*3/mm3 Final   08/29/2022 6.1 3.4 - 10.8 x10E3/uL Final     RBC   Date Value Ref Range Status   11/15/2022 4.10 3.77 - 5.28 10*6/mm3 Final   08/29/2022 4.63 3.77 - 5.28 x10E6/uL Final     Hemoglobin   Date Value Ref Range Status   11/15/2022 11.5 (L) 12.0 - 15.9 g/dL Final     Hematocrit   Date Value Ref Range Status   11/15/2022 35.5 34.0 - 46.6 % Final     Platelets   Date Value Ref Range Status   11/15/2022 230 140 - 450 10*3/mm3 Final          Assessment & Plan      82-year-old female with medical history including COPD, long-term tobacco use, status post previous left upper lobectomy for carcinoma lung in May 2015, carcinoma tongue ongoing radiation therapy and surgical therapy through ENT-Dr. Caballero-including 2016 with wide local excision of buccal lesion.  During follow-up a CT scan of the chest June 16, 2021 demonstrates postsurgical changes, 8 mm irregular nodule medial segment of right lower lobe and diffuse changes of emphysema.  She is seen in thoracic clinic with findings suspicious for new malignancy and concern of the patient being a poor operative candidate.  Subsequent PET CT and PFTs demonstrated ill-defined avidity and soft tissue left aspect of the mediastinum, 1 cm hypermetabolic pulmonary nodule and asymmetric thickening involving the right base of tongue as  well as subtle uptake in the L1 vertebral body.  This patient's case was discussed in thoracic clinic and plans were made to request an MRI of the lumbar spine, obtain a biopsy of the mediastinal involvement of the left had the patient reviewed by ENT.  The patient is seen with her  7/22/2021 in thoracic clinic and we reviewed these findings as well as having her assessed via liquid biopsy to determine potential molecular status quickly.           She underwent the biopsy 8/13/2021 found to be consistent with invasive moderately differentiated adenocarcinoma with PD-L1 TPS score 40%.  MRI of the lumbar spine revealed no evidence of metastatic disease though the patient did have multilevel degenerative disease throughout the lumbar spine.  She had an office follow-up with Dr. Caballero-history of a [id number] SCCa of the rightt retromolar trigone who is s/p WLE, buccal fat pad flap and SLN biopsy that was negative, margins were negative.  She underwent laryngoscopy 8/18/2021 with right base of tongue without evidence of lesions or masses in the region.     She was seen by Dr. Hernandez 8/19/2021 and, her findings, had been presented in lung conference.  Her findings were consistent with 2 separate lesions including a nodule in the superior segment of the right lower lobe and a mass in the upper portion of the left chest with the left chest lesion biopsy positive for adenocarcinoma.  The consensus was that these were to separate-synchronous primaries.  Stereotactic radiation each lesions were felt to be the appropriate way to proceed and the patient was referred to radiation therapy.      At the time of this dictation her guardant 360 is not yet available.  These issues are discussed with the patient in some detail 8/23/2021 so that we can have an overall longer-term plan.  This includes a Caris molecular assessment of her recent biopsy as well as her PD-L1 positive findings.    The patient was referred for radiation  therapy and she was able to proceed with SBRT to the right lung at primary #1 with 5 treatments at 1000 cGy per fraction in the left lung primary similarly at 1000 cGy per fraction x5.  Her treatment ranged between 8/31-9/13/2021.  She is now scheduled for follow-up 11/23/2021.    Additionally genomic testing is discussed with her as she is is seen back 9/23/2021.  Her guardant 360 did not determine any new abnormalities but her Caris-MI profile-does reveal sensitivity to immunotherapy as well as a BRAF mutation.    Patient had subsequent PET/CT 11/23/2021 demonstrates decrease in size and avidity of the previously noted intensely FDG avid nodules left lobectomy operative site and right lower lobe.  Surrounding groundglass and pulmonary opacification is consistent with postradiation changes, a few new subcentimeter pulmonary nodules are present in the right upper lobe and indeterminant, stable subcentimeter hepatic lesion is below PET resolution no other findings of FDG-avid disease are seen in neck abdomen or pelvis.  The patient seen in office 12/1/2021 with a relatively good performance status stating she is not having any new symptoms and very pleased about her results on her PET/CT.  She realizes we'll have to follow this further but subsequent scans in 6 months.  She is also hoping to see an oral surgeon that previously helped her-Dr. Wu.    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  There is evolution of presumed postradiation changes in the right midlung with soft tissue mass within the left lumpectomy operative bed minimally decreased in size.  There is a sub-6 mm nodule in the right upper lobe not definitively seen, indeterminate and an indeterminate left renal lesion at 1.5 cm unchanged from 7/13/2021.  The patient is currently  scheduled to see Dr. Dowell on 6/23/2022.  She is feeling well without any additional symptoms.     The patient required admission 10/14 - 10/18/2022 presenting with shortness of breath and cough that was nonproductive.  She had been on oral antibiotics and did not improved and was admitted for therapy for apparent pneumonia.  This eventually led to bronchoscopy after initial CT revealed postsurgical changes, new masslike 6.7 cm area of consolidation anterior left lung raising concern for potential malignancy and a follow-up PET/CT planned.  Bronchoscopy and biopsy left lower lung failed to show evidence of malignancy.  The patient's PET/CT, however, demonstrated an irregular pleural-based soft tissue density thickening in the left upper lobe that was intensely FDG avid new from 6/8/2022 thought to be a malignant recurrence with spread into the left lung parenchyma.  There is intensely pleural-based avidity within the left lower lobe thought to be metastatic disease with postradiation of the left apex as well.  There is a small focus of uptake in the right hilum grossly unchanged in size and post radiation changes in the right lower lobe.  There is indeterminate density left renal that was grossly unchanged.  The patient is seen back in office indicating that she still has chest discomfort that is slowly worsening and that she has a chronic paroxysmal cough but has not improved.  We discussed that she very likely has recurrent disease and plan to proceed with a guardant 360 examination initially as we determine whether we should try to proceed with therapy particularly immunotherapy versus targeted therapy recognizing the above findings.        Plan:  *Guardant Reveal will be requested today    *Teaching within a week for [employer]    *Referral for vascular surgery has port placement    *3 to 4 weeks MD, [employer]tr[facility]    I [id number] caring for Darlene on this date of service. This time includes time spent by me in the  following activities: preparing for the visit, reviewing tests, obtaining and/or reviewing a separately obtained history, performing a medically appropriate examination and/or evaluation, counseling and educating the patient/family/caregiver, ordering medications, tests, or procedures, referring and communicating with other health care professionals, documenting information in the medical record, independently interpreting results and communicating that information with the patient/family/caregiver and care coordination.

## 2022-11-16 ENCOUNTER — SPECIALTY PHARMACY (OUTPATIENT)
Dept: PHARMACY | Facility: HOSPITAL | Age: 82
End: 2022-11-16

## 2022-11-16 ENCOUNTER — TELEPHONE (OUTPATIENT)
Dept: ONCOLOGY | Facility: CLINIC | Age: 82
End: 2022-11-16

## 2022-11-16 PROBLEM — Z79.899 LONG-TERM USE OF HIGH-RISK MEDICATION: Status: ACTIVE | Noted: 2022-11-16

## 2022-11-16 NOTE — TELEPHONE ENCOUNTER
Provider: DR BHATIA  Caller: BAKARI  Relationship to Patient: SELF  Pharmacy: [facility]  Phone Number: [phone]    Reason for Call: BAKARI STATES THAT THE MEDICATION HYDROcodone-acetaminophen (NORCO) 5-325 MG  IS NEEDING A PA   SHE STATES [employer] GAVE HER A 5 DAY SUPPLY AND TO GET A REFILL IT WILL NEED A PA       PLEASE ADVISE

## 2022-11-16 NOTE — PROGRESS NOTES
Received request for a PA on Norco. I attempted a PA thru [facility].  The PA request came back as PA not required.  I called the pharmacy to see what the problem was.  The pt had already picked up the medication and it had gone thru insurance.

## 2022-11-23 ENCOUNTER — OFFICE VISIT (OUTPATIENT)
Dept: FAMILY MEDICINE CLINIC | Facility: CLINIC | Age: 82
End: 2022-11-23

## 2022-11-23 VITALS
OXYGEN SATURATION: 97 % | SYSTOLIC BLOOD PRESSURE: 154 MMHG | BODY MASS INDEX: 22.52 KG/M2 | WEIGHT: 122.4 LBS | HEIGHT: 62 IN | TEMPERATURE: 98.9 F | HEART RATE: 73 BPM | DIASTOLIC BLOOD PRESSURE: 88 MMHG

## 2022-11-23 DIAGNOSIS — I10 ESSENTIAL HYPERTENSION: ICD-10-CM

## 2022-11-23 DIAGNOSIS — L29.9 PRURITIC CONDITION: ICD-10-CM

## 2022-11-23 DIAGNOSIS — C34.12 MALIGNANT NEOPLASM OF UPPER LOBE OF LEFT LUNG: Primary | ICD-10-CM

## 2022-11-23 PROCEDURE — 99214 OFFICE O/P EST MOD 30 MIN: CPT | Performed by: FAMILY MEDICINE

## 2022-11-23 RX ORDER — AMLODIPINE BESYLATE 5 MG/1
5 TABLET ORAL DAILY
Qty: 90 TABLET | Refills: 0 | Status: SHIPPED | OUTPATIENT
Start: 2022-11-23 | End: 2023-02-27

## 2022-11-23 NOTE — PROGRESS NOTES
"Chief Complaint  Neck Pain    Subjective    {Problem List  Visit Diagnosis   Encounters  Notes  Medications  Labs  Result Review Imaging  Media :23}    Darlene Pfeiffer presents to [facility] PRIMARY [facility]  History of Present Illness    Objective   Vital Signs:  /88   Pulse 73   Temp 98.9 °F (37.2 °C)   Ht 157.5 cm (62\")   Wt 55.5 kg (122 lb 6.4 oz)   SpO2 97%   BMI 22.39 kg/m²   Estimated body mass index is 22.39 kg/m² as calculated from the following:    Height as of this encounter: 157.5 cm (62\").    Weight as of this encounter: 55.5 kg (122 lb 6.4 oz).    BMI is within normal parameters. No other follow-up for BMI required.      Physical Exam   Result Review :{Labs  Result Review  Imaging  Med Tab  Media  Procedures  :23}  {The following data was reviewed by (Optional):81867}  {Ambulatory Labs (Optional):59127}  {Data reviewed (Optional):29086:::1}          Assessment and Plan {CC Problem List  Visit Diagnosis   ROS  Review (Popup)  Health Maintenance  Quality  BestPractice  Medications  SmartSets  SnapShot Encounters  Media :23}  Diagnoses and all orders for this visit:    1. 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 Pruritic condition    3. Essential hypertension  -     amLODIPine (NORVASC) 5 MG tablet; Take 1 tablet by mouth Daily.  Dispense: 90 tablet; Refill: 0           {Time Spent (Optional):52729}  Follow Up {Instructions Charge Capture  Follow-up Communications :23}  Return in about 6 weeks (around 1/4/2023) for Recheck HTn.  Patient was given instructions and counseling regarding her condition or for health maintenance advice. Please see specific information pulled into the AVS if appropriate.       "

## 2022-11-23 NOTE — PROGRESS NOTES
"Chief Complaint  Neck Pain    Carlos Pfeiffer presents to [facility] PRIMARY [facility]ARE  History of Present Illness    The patient states she was discharged from the hospital and was seen by Dr. Honeycutt who ordered a PET scan and blood work to evaluate for cancer cells in her blood. She states that she thought the PET scan did not show anything abnormal. She states she went to the hospital for 4 days and when she left, they did not know if she had cancer or not. She states she was told by Dr. Honeycutt that her blood work was normal. She states he wants her to get a port and get infusions 3 different times of Keytruda. She states she is unsure if it is far or why she is going to get the port. She states she is going to see a nurse practitioner to discuss it. She states the pain starts at the base of the neck and goes down around the shoulder blade to the axilla and up to the shoulder. She states the pain is intermittent. She states she does not take the hydrocodone during the day because she does not want to drive while taking it. She states the pain is not as bad when she is up and distracted, but it is mainly at night. She states she is not taking the muscle relaxer at bedtime because it did not help. She states she has some tizanidine at home. She states she has an appointment to talk to an NP about the Keytruda. She states she was told the only way to know if it cancer is to open up her lungs. She states she was down to 112 pounds because she could not eat. She states she had a scope in her throat and he did not find anything. She states they did not take off any fluids off her chest while she was in the hospital.  Her cough is finally better however.  Told her to stop her amlodipine when she was in the hospital but she does not know why.  The patient blood pressure is 154/88 mmHg today.    Objective   Vital Signs:  /88   Pulse 73   Temp 98.9 °F (37.2 °C)   Ht 157.5 cm (62\")   " "Wt 55.5 kg (122 lb 6.4 oz)   SpO2 97%   BMI 22.39 kg/m²   Estimated body mass index is 22.39 kg/m² as calculated from the following:    Height as of this encounter: 157.5 cm (62\").    Weight as of this encounter: 55.5 kg (122 lb 6.4 oz).    BMI is within normal parameters. No other follow-up for BMI required.      Physical Exam  Constitutional:       General: She is not in acute distress.     Appearance: Normal appearance. She is well-developed.   HENT:      Head: Normocephalic and atraumatic.      Right Ear: Tympanic membrane, ear canal and external ear normal.      Left Ear: Tympanic membrane, ear canal and external ear normal.      Mouth/Throat:      Mouth: Mucous membranes are moist.      Pharynx: Oropharynx is clear.   Eyes:      Conjunctiva/sclera: Conjunctivae normal.      Pupils: Pupils are equal, round, and reactive to light.   Neck:      Thyroid: No thyromegaly.   Cardiovascular:      Rate and Rhythm: Normal rate and regular rhythm.      Heart sounds: No murmur heard.  Pulmonary:      Effort: Pulmonary effort is normal.      Breath sounds: Normal breath sounds. No wheezing.   Abdominal:      General: Bowel sounds are normal.      Palpations: Abdomen is soft.      Tenderness: There is no abdominal tenderness.   Musculoskeletal:         General: Normal range of motion.      Cervical back: Neck supple.   Lymphadenopathy:      Cervical: No cervical adenopathy.   Skin:     General: Skin is warm and dry.   Neurological:      Mental Status: She is alert and oriented to person, place, and time.   Psychiatric:         Mood and Affect: Mood normal.         Behavior: Behavior normal.        Result Review :           NM PET/CT Skull Base to Mid Thigh (11/09/2022 09:09)  CT Chest Without Contrast Diagnostic (10/14/2022 13:40)       Assessment and Plan   Diagnoses and all orders for this visit:    1. 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 Pruritic condition    3. Essential hypertension  -  "    amLODIPine (NORVASC) 5 MG tablet; Take 1 tablet by mouth Daily.  Dispense: 90 tablet; Refill: 0      1. Left upper lung cancer most likely based on PET scan and CT.  -The patient will take hydrocodone with tizanidine.  This is likely the cause of her pain.  Explained with patient at length.  If she keeps having the itching, we may try something like gabapentin.  Keep follow-up with oncology.  Patient advised to bring one of her family members to the appointment with the nurse practitioner to explain the to Keytruda.    2. Hypertension  -The patient will resume taking amlodipine.  Posterior follow-up for her blood pressure out until January.         Follow Up   Return in about 6 weeks (around 1/4/2023) for Recheck HTn.  Patient was given instructions and counseling regarding her condition or for health maintenance advice. Please see specific information pulled into the AVS if appropriate.     Transcribed from ambient dictation for Meredith Lea Kehrer, MD by Kallie Cary.  11/23/22   12:36 EST    Patient or patient representative verbalized consent to the visit recording.

## 2022-11-28 ENCOUNTER — TELEPHONE (OUTPATIENT)
Dept: ONCOLOGY | Facility: CLINIC | Age: 82
End: 2022-11-28

## 2022-11-28 ENCOUNTER — APPOINTMENT (OUTPATIENT)
Dept: CT IMAGING | Facility: HOSPITAL | Age: 82
End: 2022-11-28

## 2022-11-28 ENCOUNTER — TELEPHONE (OUTPATIENT)
Dept: FAMILY MEDICINE CLINIC | Facility: CLINIC | Age: 82
End: 2022-11-28

## 2022-11-28 ENCOUNTER — HOSPITAL ENCOUNTER (OUTPATIENT)
Facility: HOSPITAL | Age: 82
Setting detail: OBSERVATION
Discharge: HOME | End: 2022-12-01
Attending: EMERGENCY MEDICINE | Admitting: STUDENT IN AN ORGANIZED HEALTH CARE EDUCATION/TRAINING PROGRAM

## 2022-11-28 DIAGNOSIS — C34.12 MALIGNANT NEOPLASM OF UPPER LOBE OF LEFT LUNG: Primary | ICD-10-CM

## 2022-11-28 DIAGNOSIS — M54.9 INTRACTABLE BACK PAIN: Primary | ICD-10-CM

## 2022-11-28 DIAGNOSIS — S22.000A COMPRESSION FRACTURE OF BODY OF THORACIC VERTEBRA: ICD-10-CM

## 2022-11-28 DIAGNOSIS — Z85.118 HISTORY OF LUNG CANCER: ICD-10-CM

## 2022-11-28 LAB
ALBUMIN SERPL-MCNC: 4.6 G/DL (ref 3.5–5.2)
ALBUMIN/GLOB SERPL: 1.4 G/DL
ALP SERPL-CCNC: 99 U/L (ref 39–117)
ALT SERPL W P-5'-P-CCNC: 9 U/L (ref 1–33)
ANION GAP SERPL CALCULATED.3IONS-SCNC: 12.8 MMOL/L (ref 5–15)
AST SERPL-CCNC: 20 U/L (ref 1–32)
BACTERIA UR QL AUTO: ABNORMAL /HPF
BASOPHILS # BLD AUTO: 0.02 10*3/MM3 (ref 0–0.2)
BASOPHILS NFR BLD AUTO: 0.3 % (ref 0–1.5)
BILIRUB SERPL-MCNC: 0.7 MG/DL (ref 0–1.2)
BILIRUB UR QL STRIP: NEGATIVE
BUN SERPL-MCNC: 12 MG/DL (ref 8–23)
BUN/CREAT SERPL: 17.9 (ref 7–25)
CALCIUM SPEC-SCNC: 9.9 MG/DL (ref 8.6–10.5)
CHLORIDE SERPL-SCNC: 95 MMOL/L (ref 98–107)
CLARITY UR: CLEAR
CO2 SERPL-SCNC: 30.2 MMOL/L (ref 22–29)
COLOR UR: YELLOW
CREAT SERPL-MCNC: 0.67 MG/DL (ref 0.57–1)
DEPRECATED RDW RBC AUTO: 43.4 FL (ref 37–54)
EGFRCR SERPLBLD CKD-EPI 2021: 88.5 ML/MIN/1.73
EOSINOPHIL # BLD AUTO: 0 10*3/MM3 (ref 0–0.4)
EOSINOPHIL NFR BLD AUTO: 0 % (ref 0.3–6.2)
ERYTHROCYTE [DISTWIDTH] IN BLOOD BY AUTOMATED COUNT: 13.7 % (ref 12.3–15.4)
GLOBULIN UR ELPH-MCNC: 3.2 GM/DL
GLUCOSE SERPL-MCNC: 108 MG/DL (ref 65–99)
GLUCOSE UR STRIP-MCNC: NEGATIVE MG/DL
HCT VFR BLD AUTO: 40.5 % (ref 34–46.6)
HGB BLD-MCNC: 13.2 G/DL (ref 12–15.9)
HGB UR QL STRIP.AUTO: ABNORMAL
HYALINE CASTS UR QL AUTO: ABNORMAL /LPF
IMM GRANULOCYTES # BLD AUTO: 0.02 10*3/MM3 (ref 0–0.05)
IMM GRANULOCYTES NFR BLD AUTO: 0.3 % (ref 0–0.5)
KETONES UR QL STRIP: ABNORMAL
LEUKOCYTE ESTERASE UR QL STRIP.AUTO: ABNORMAL
LYMPHOCYTES # BLD AUTO: 1.78 10*3/MM3 (ref 0.7–3.1)
LYMPHOCYTES NFR BLD AUTO: 23.9 % (ref 19.6–45.3)
MCH RBC QN AUTO: 28.6 PG (ref 26.6–33)
MCHC RBC AUTO-ENTMCNC: 32.6 G/DL (ref 31.5–35.7)
MCV RBC AUTO: 87.7 FL (ref 79–97)
MONOCYTES # BLD AUTO: 0.47 10*3/MM3 (ref 0.1–0.9)
MONOCYTES NFR BLD AUTO: 6.3 % (ref 5–12)
NEUTROPHILS NFR BLD AUTO: 5.16 10*3/MM3 (ref 1.7–7)
NEUTROPHILS NFR BLD AUTO: 69.2 % (ref 42.7–76)
NITRITE UR QL STRIP: NEGATIVE
NRBC BLD AUTO-RTO: 0 /100 WBC (ref 0–0.2)
PH UR STRIP.AUTO: 5.5 [PH] (ref 5–8)
PLATELET # BLD AUTO: 276 10*3/MM3 (ref 140–450)
PMV BLD AUTO: 9.7 FL (ref 6–12)
POTASSIUM SERPL-SCNC: 3.5 MMOL/L (ref 3.5–5.2)
PROT SERPL-MCNC: 7.8 G/DL (ref 6–8.5)
PROT UR QL STRIP: ABNORMAL
RBC # BLD AUTO: 4.62 10*6/MM3 (ref 3.77–5.28)
RBC # UR STRIP: ABNORMAL /HPF
REF LAB TEST METHOD: ABNORMAL
SODIUM SERPL-SCNC: 138 MMOL/L (ref 136–145)
SP GR UR STRIP: 1.01 (ref 1–1.03)
SQUAMOUS #/AREA URNS HPF: ABNORMAL /HPF
TROPONIN T SERPL-MCNC: <0.01 NG/ML (ref 0–0.03)
UROBILINOGEN UR QL STRIP: ABNORMAL
WBC # UR STRIP: ABNORMAL /HPF
WBC NRBC COR # BLD: 7.45 10*3/MM3 (ref 3.4–10.8)

## 2022-11-28 PROCEDURE — 0 IOPAMIDOL PER 1 ML: Performed by: EMERGENCY MEDICINE

## 2022-11-28 PROCEDURE — 84484 ASSAY OF TROPONIN QUANT: CPT | Performed by: PHYSICIAN ASSISTANT

## 2022-11-28 PROCEDURE — 96374 THER/PROPH/DIAG INJ IV PUSH: CPT

## 2022-11-28 PROCEDURE — 72129 CT CHEST SPINE W/DYE: CPT

## 2022-11-28 PROCEDURE — 96376 TX/PRO/DX INJ SAME DRUG ADON: CPT

## 2022-11-28 PROCEDURE — 96375 TX/PRO/DX INJ NEW DRUG ADDON: CPT

## 2022-11-28 PROCEDURE — 25010000002 HYDROMORPHONE PER 4 MG: Performed by: EMERGENCY MEDICINE

## 2022-11-28 PROCEDURE — 99284 EMERGENCY DEPT VISIT MOD MDM: CPT

## 2022-11-28 PROCEDURE — 93010 ELECTROCARDIOGRAM REPORT: CPT | Performed by: INTERNAL MEDICINE

## 2022-11-28 PROCEDURE — 93005 ELECTROCARDIOGRAM TRACING: CPT | Performed by: PHYSICIAN ASSISTANT

## 2022-11-28 PROCEDURE — 25010000002 ONDANSETRON PER 1 MG: Performed by: EMERGENCY MEDICINE

## 2022-11-28 PROCEDURE — 71275 CT ANGIOGRAPHY CHEST: CPT

## 2022-11-28 PROCEDURE — 81001 URINALYSIS AUTO W/SCOPE: CPT | Performed by: EMERGENCY MEDICINE

## 2022-11-28 PROCEDURE — 85025 COMPLETE CBC W/AUTO DIFF WBC: CPT

## 2022-11-28 PROCEDURE — 80053 COMPREHEN METABOLIC PANEL: CPT

## 2022-11-28 RX ORDER — ONDANSETRON 2 MG/ML
4 INJECTION INTRAMUSCULAR; INTRAVENOUS ONCE
Status: COMPLETED | OUTPATIENT
Start: 2022-11-28 | End: 2022-11-28

## 2022-11-28 RX ORDER — HYDROMORPHONE HYDROCHLORIDE 1 MG/ML
0.5 INJECTION, SOLUTION INTRAMUSCULAR; INTRAVENOUS; SUBCUTANEOUS ONCE
Status: COMPLETED | OUTPATIENT
Start: 2022-11-28 | End: 2022-11-28

## 2022-11-28 RX ORDER — HYDROMORPHONE HYDROCHLORIDE 1 MG/ML
0.5 INJECTION, SOLUTION INTRAMUSCULAR; INTRAVENOUS; SUBCUTANEOUS AS NEEDED
Status: COMPLETED | OUTPATIENT
Start: 2022-11-28 | End: 2022-11-29

## 2022-11-28 RX ADMIN — HYDROMORPHONE HYDROCHLORIDE 0.5 MG: 1 INJECTION, SOLUTION INTRAMUSCULAR; INTRAVENOUS; SUBCUTANEOUS at 22:18

## 2022-11-28 RX ADMIN — HYDROMORPHONE HYDROCHLORIDE 0.5 MG: 1 INJECTION, SOLUTION INTRAMUSCULAR; INTRAVENOUS; SUBCUTANEOUS at 23:54

## 2022-11-28 RX ADMIN — ONDANSETRON 4 MG: 2 INJECTION INTRAMUSCULAR; INTRAVENOUS at 22:18

## 2022-11-28 RX ADMIN — SODIUM CHLORIDE 500 ML: 9 INJECTION, SOLUTION INTRAVENOUS at 22:19

## 2022-11-28 RX ADMIN — IOPAMIDOL 95 ML: 755 INJECTION, SOLUTION INTRAVENOUS at 23:08

## 2022-11-28 NOTE — TELEPHONE ENCOUNTER
Patient texted me this morning that she had uncontrolled pain all weekend in her left upper chest.  This was a pain we talked about last week.  I sent Dr. Kothari a message and asked her to reach out to him as well.  I told her to let me know if she did not hear from them.  I am sending this note so that you all are in the loop if she contacts us.

## 2022-11-28 NOTE — TELEPHONE ENCOUNTER
Per Dr. Escobar, after conversation with Dr. Kehrer, it was felt that pt should go to the ER for better pain control. Patient has been having increased pain under her left breast, to her arm and around to her back. The pain started about a month ago but has been progressively getting worse. Pt needs further evaluation to see why her pain is increasing and possibly admission for pain control. Informed pt and she v/u. She said she will go later on today.

## 2022-11-29 ENCOUNTER — APPOINTMENT (OUTPATIENT)
Dept: MRI IMAGING | Facility: HOSPITAL | Age: 82
End: 2022-11-29

## 2022-11-29 PROBLEM — M54.9 INTRACTABLE BACK PAIN: Status: ACTIVE | Noted: 2022-11-29

## 2022-11-29 LAB
ANION GAP SERPL CALCULATED.3IONS-SCNC: 13 MMOL/L (ref 5–15)
BUN SERPL-MCNC: 10 MG/DL (ref 8–23)
BUN/CREAT SERPL: 18.2 (ref 7–25)
CALCIUM SPEC-SCNC: 9.1 MG/DL (ref 8.6–10.5)
CHLORIDE SERPL-SCNC: 102 MMOL/L (ref 98–107)
CO2 SERPL-SCNC: 26 MMOL/L (ref 22–29)
CREAT SERPL-MCNC: 0.55 MG/DL (ref 0.57–1)
DEPRECATED RDW RBC AUTO: 43.9 FL (ref 37–54)
EGFRCR SERPLBLD CKD-EPI 2021: 92.8 ML/MIN/1.73
ERYTHROCYTE [DISTWIDTH] IN BLOOD BY AUTOMATED COUNT: 13.6 % (ref 12.3–15.4)
GLUCOSE SERPL-MCNC: 91 MG/DL (ref 65–99)
HCT VFR BLD AUTO: 37.5 % (ref 34–46.6)
HGB BLD-MCNC: 11.9 G/DL (ref 12–15.9)
MCH RBC QN AUTO: 27.9 PG (ref 26.6–33)
MCHC RBC AUTO-ENTMCNC: 31.7 G/DL (ref 31.5–35.7)
MCV RBC AUTO: 88 FL (ref 79–97)
PLATELET # BLD AUTO: 240 10*3/MM3 (ref 140–450)
PMV BLD AUTO: 9.8 FL (ref 6–12)
POTASSIUM SERPL-SCNC: 3.6 MMOL/L (ref 3.5–5.2)
QT INTERVAL: 342 MS
RBC # BLD AUTO: 4.26 10*6/MM3 (ref 3.77–5.28)
SODIUM SERPL-SCNC: 141 MMOL/L (ref 136–145)
WBC NRBC COR # BLD: 6.05 10*3/MM3 (ref 3.4–10.8)

## 2022-11-29 PROCEDURE — G0378 HOSPITAL OBSERVATION PER HR: HCPCS

## 2022-11-29 PROCEDURE — 25010000002 HYDROMORPHONE PER 4 MG: Performed by: EMERGENCY MEDICINE

## 2022-11-29 PROCEDURE — 85027 COMPLETE CBC AUTOMATED: CPT | Performed by: NURSE PRACTITIONER

## 2022-11-29 PROCEDURE — 36415 COLL VENOUS BLD VENIPUNCTURE: CPT | Performed by: NURSE PRACTITIONER

## 2022-11-29 PROCEDURE — 80048 BASIC METABOLIC PNL TOTAL CA: CPT | Performed by: NURSE PRACTITIONER

## 2022-11-29 PROCEDURE — 99214 OFFICE O/P EST MOD 30 MIN: CPT | Performed by: NURSE PRACTITIONER

## 2022-11-29 PROCEDURE — 99214 OFFICE O/P EST MOD 30 MIN: CPT | Performed by: INTERNAL MEDICINE

## 2022-11-29 PROCEDURE — 96376 TX/PRO/DX INJ SAME DRUG ADON: CPT

## 2022-11-29 RX ORDER — MORPHINE SULFATE 2 MG/ML
2 INJECTION, SOLUTION INTRAMUSCULAR; INTRAVENOUS
Status: DISCONTINUED | OUTPATIENT
Start: 2022-11-29 | End: 2022-12-01 | Stop reason: HOSPADM

## 2022-11-29 RX ORDER — ATORVASTATIN CALCIUM 20 MG/1
20 TABLET, FILM COATED ORAL NIGHTLY
Status: DISCONTINUED | OUTPATIENT
Start: 2022-11-29 | End: 2022-12-01 | Stop reason: HOSPADM

## 2022-11-29 RX ORDER — SODIUM CHLORIDE 0.9 % (FLUSH) 0.9 %
10 SYRINGE (ML) INJECTION AS NEEDED
Status: DISCONTINUED | OUTPATIENT
Start: 2022-11-29 | End: 2022-12-01 | Stop reason: HOSPADM

## 2022-11-29 RX ORDER — HYDROMORPHONE HYDROCHLORIDE 1 MG/ML
0.5 INJECTION, SOLUTION INTRAMUSCULAR; INTRAVENOUS; SUBCUTANEOUS ONCE
Status: COMPLETED | OUTPATIENT
Start: 2022-11-29 | End: 2022-11-29

## 2022-11-29 RX ORDER — ACETAMINOPHEN 325 MG/1
650 TABLET ORAL EVERY 4 HOURS PRN
Status: DISCONTINUED | OUTPATIENT
Start: 2022-11-29 | End: 2022-12-01 | Stop reason: HOSPADM

## 2022-11-29 RX ORDER — PANTOPRAZOLE SODIUM 40 MG/1
40 TABLET, DELAYED RELEASE ORAL EVERY MORNING
Status: DISCONTINUED | OUTPATIENT
Start: 2022-11-29 | End: 2022-12-01 | Stop reason: HOSPADM

## 2022-11-29 RX ORDER — CALCIUM CARBONATE 200(500)MG
2 TABLET,CHEWABLE ORAL 2 TIMES DAILY PRN
Status: DISCONTINUED | OUTPATIENT
Start: 2022-11-29 | End: 2022-12-01 | Stop reason: HOSPADM

## 2022-11-29 RX ORDER — METOPROLOL SUCCINATE 25 MG/1
25 TABLET, EXTENDED RELEASE ORAL DAILY
Status: DISCONTINUED | OUTPATIENT
Start: 2022-11-29 | End: 2022-12-01 | Stop reason: HOSPADM

## 2022-11-29 RX ORDER — CLOBETASOL PROPIONATE 0.5 MG/G
CREAM TOPICAL EVERY 12 HOURS SCHEDULED
Status: DISCONTINUED | OUTPATIENT
Start: 2022-11-29 | End: 2022-12-01 | Stop reason: HOSPADM

## 2022-11-29 RX ORDER — AMLODIPINE BESYLATE 5 MG/1
5 TABLET ORAL DAILY
Status: DISCONTINUED | OUTPATIENT
Start: 2022-11-29 | End: 2022-12-01 | Stop reason: HOSPADM

## 2022-11-29 RX ORDER — CETIRIZINE HYDROCHLORIDE 10 MG/1
10 TABLET ORAL DAILY
Status: DISCONTINUED | OUTPATIENT
Start: 2022-11-29 | End: 2022-12-01 | Stop reason: HOSPADM

## 2022-11-29 RX ORDER — SODIUM CHLORIDE 9 MG/ML
40 INJECTION, SOLUTION INTRAVENOUS AS NEEDED
Status: DISCONTINUED | OUTPATIENT
Start: 2022-11-29 | End: 2022-12-01 | Stop reason: HOSPADM

## 2022-11-29 RX ORDER — FLUTICASONE PROPIONATE 50 MCG
2 SPRAY, SUSPENSION (ML) NASAL DAILY
Status: DISCONTINUED | OUTPATIENT
Start: 2022-11-29 | End: 2022-12-01 | Stop reason: HOSPADM

## 2022-11-29 RX ORDER — HYDROCHLOROTHIAZIDE 25 MG/1
25 TABLET ORAL DAILY
Status: DISCONTINUED | OUTPATIENT
Start: 2022-11-29 | End: 2022-12-01 | Stop reason: HOSPADM

## 2022-11-29 RX ORDER — TIZANIDINE 4 MG/1
2 TABLET ORAL EVERY 8 HOURS PRN
Status: DISCONTINUED | OUTPATIENT
Start: 2022-11-29 | End: 2022-12-01 | Stop reason: HOSPADM

## 2022-11-29 RX ORDER — ONDANSETRON 2 MG/ML
4 INJECTION INTRAMUSCULAR; INTRAVENOUS EVERY 6 HOURS PRN
Status: DISCONTINUED | OUTPATIENT
Start: 2022-11-29 | End: 2022-12-01 | Stop reason: HOSPADM

## 2022-11-29 RX ORDER — ACETAMINOPHEN 160 MG/5ML
650 SOLUTION ORAL EVERY 4 HOURS PRN
Status: DISCONTINUED | OUTPATIENT
Start: 2022-11-29 | End: 2022-12-01 | Stop reason: HOSPADM

## 2022-11-29 RX ORDER — LORAZEPAM 1 MG/1
1 TABLET ORAL ONCE
Status: COMPLETED | OUTPATIENT
Start: 2022-11-29 | End: 2022-11-29

## 2022-11-29 RX ORDER — ASPIRIN 81 MG/1
81 TABLET, CHEWABLE ORAL DAILY
Status: DISCONTINUED | OUTPATIENT
Start: 2022-11-29 | End: 2022-12-01

## 2022-11-29 RX ORDER — ACETAMINOPHEN 650 MG/1
650 SUPPOSITORY RECTAL EVERY 4 HOURS PRN
Status: DISCONTINUED | OUTPATIENT
Start: 2022-11-29 | End: 2022-12-01 | Stop reason: HOSPADM

## 2022-11-29 RX ORDER — ONDANSETRON 4 MG/1
4 TABLET, FILM COATED ORAL EVERY 6 HOURS PRN
Status: DISCONTINUED | OUTPATIENT
Start: 2022-11-29 | End: 2022-12-01 | Stop reason: HOSPADM

## 2022-11-29 RX ORDER — SODIUM CHLORIDE 0.9 % (FLUSH) 0.9 %
10 SYRINGE (ML) INJECTION EVERY 12 HOURS SCHEDULED
Status: DISCONTINUED | OUTPATIENT
Start: 2022-11-29 | End: 2022-12-01 | Stop reason: HOSPADM

## 2022-11-29 RX ORDER — ALBUTEROL SULFATE 2.5 MG/3ML
2.5 SOLUTION RESPIRATORY (INHALATION) EVERY 6 HOURS PRN
Status: DISCONTINUED | OUTPATIENT
Start: 2022-11-29 | End: 2022-12-01 | Stop reason: HOSPADM

## 2022-11-29 RX ORDER — HYDROCODONE BITARTRATE AND ACETAMINOPHEN 5; 325 MG/1; MG/1
1 TABLET ORAL EVERY 6 HOURS PRN
Status: DISCONTINUED | OUTPATIENT
Start: 2022-11-29 | End: 2022-12-01 | Stop reason: HOSPADM

## 2022-11-29 RX ORDER — MONTELUKAST SODIUM 10 MG/1
10 TABLET ORAL NIGHTLY
Status: DISCONTINUED | OUTPATIENT
Start: 2022-11-29 | End: 2022-12-01 | Stop reason: HOSPADM

## 2022-11-29 RX ORDER — LORAZEPAM 1 MG/1
1 TABLET ORAL ONCE
Status: DISCONTINUED | OUTPATIENT
Start: 2022-11-29 | End: 2022-12-01 | Stop reason: HOSPADM

## 2022-11-29 RX ADMIN — FLUTICASONE PROPIONATE 2 SPRAY: 50 SPRAY, METERED NASAL at 09:50

## 2022-11-29 RX ADMIN — LORAZEPAM 1 MG: 1 TABLET ORAL at 06:39

## 2022-11-29 RX ADMIN — ASPIRIN 81 MG: 81 TABLET, CHEWABLE ORAL at 09:49

## 2022-11-29 RX ADMIN — HYDROMORPHONE HYDROCHLORIDE 0.5 MG: 1 INJECTION, SOLUTION INTRAMUSCULAR; INTRAVENOUS; SUBCUTANEOUS at 01:28

## 2022-11-29 RX ADMIN — ATORVASTATIN CALCIUM 20 MG: 20 TABLET, FILM COATED ORAL at 21:42

## 2022-11-29 RX ADMIN — Medication 10 ML: at 09:52

## 2022-11-29 RX ADMIN — CLOBETASOL PROPIONATE: 0.5 CREAM TOPICAL at 09:50

## 2022-11-29 RX ADMIN — MONTELUKAST SODIUM 10 MG: 10 TABLET, FILM COATED ORAL at 21:42

## 2022-11-29 RX ADMIN — HYDROCODONE BITARTRATE AND ACETAMINOPHEN 1 TABLET: 5; 325 TABLET ORAL at 22:43

## 2022-11-29 RX ADMIN — HYDROMORPHONE HYDROCHLORIDE 0.5 MG: 1 INJECTION, SOLUTION INTRAMUSCULAR; INTRAVENOUS; SUBCUTANEOUS at 02:44

## 2022-11-29 RX ADMIN — PANTOPRAZOLE SODIUM 40 MG: 40 TABLET, DELAYED RELEASE ORAL at 06:39

## 2022-11-29 RX ADMIN — CETIRIZINE HYDROCHLORIDE 10 MG: 10 TABLET ORAL at 09:49

## 2022-11-29 RX ADMIN — Medication 10 ML: at 02:46

## 2022-11-29 RX ADMIN — HYDROCHLOROTHIAZIDE 25 MG: 25 TABLET ORAL at 09:49

## 2022-11-29 RX ADMIN — HYDROCODONE BITARTRATE AND ACETAMINOPHEN 1 TABLET: 5; 325 TABLET ORAL at 15:51

## 2022-11-29 RX ADMIN — Medication 10 ML: at 21:43

## 2022-11-29 RX ADMIN — AMLODIPINE BESYLATE 5 MG: 5 TABLET ORAL at 09:49

## 2022-11-29 RX ADMIN — METOPROLOL SUCCINATE 25 MG: 25 TABLET, EXTENDED RELEASE ORAL at 09:49

## 2022-11-29 RX ADMIN — HYDROCODONE BITARTRATE AND ACETAMINOPHEN 1 TABLET: 5; 325 TABLET ORAL at 02:44

## 2022-11-29 RX ADMIN — Medication 10 ML: at 01:28

## 2022-11-30 ENCOUNTER — APPOINTMENT (OUTPATIENT)
Dept: MRI IMAGING | Facility: HOSPITAL | Age: 82
End: 2022-11-30

## 2022-11-30 PROBLEM — E87.6 HYPOKALEMIA: Status: ACTIVE | Noted: 2022-11-30

## 2022-11-30 LAB
ANION GAP SERPL CALCULATED.3IONS-SCNC: 10.3 MMOL/L (ref 5–15)
BUN SERPL-MCNC: 12 MG/DL (ref 8–23)
BUN/CREAT SERPL: 26.1 (ref 7–25)
CALCIUM SPEC-SCNC: 9.2 MG/DL (ref 8.6–10.5)
CHLORIDE SERPL-SCNC: 98 MMOL/L (ref 98–107)
CO2 SERPL-SCNC: 29.7 MMOL/L (ref 22–29)
CREAT SERPL-MCNC: 0.46 MG/DL (ref 0.57–1)
DEPRECATED RDW RBC AUTO: 44 FL (ref 37–54)
EGFRCR SERPLBLD CKD-EPI 2021: 96.9 ML/MIN/1.73
ERYTHROCYTE [DISTWIDTH] IN BLOOD BY AUTOMATED COUNT: 13.7 % (ref 12.3–15.4)
GLUCOSE SERPL-MCNC: 96 MG/DL (ref 65–99)
HCT VFR BLD AUTO: 37.7 % (ref 34–46.6)
HGB BLD-MCNC: 11.9 G/DL (ref 12–15.9)
MAGNESIUM SERPL-MCNC: 1.8 MG/DL (ref 1.6–2.4)
MCH RBC QN AUTO: 27.5 PG (ref 26.6–33)
MCHC RBC AUTO-ENTMCNC: 31.6 G/DL (ref 31.5–35.7)
MCV RBC AUTO: 87.3 FL (ref 79–97)
PLATELET # BLD AUTO: 238 10*3/MM3 (ref 140–450)
PMV BLD AUTO: 10.1 FL (ref 6–12)
POTASSIUM SERPL-SCNC: 3.2 MMOL/L (ref 3.5–5.2)
POTASSIUM SERPL-SCNC: 4 MMOL/L (ref 3.5–5.2)
RBC # BLD AUTO: 4.32 10*6/MM3 (ref 3.77–5.28)
SODIUM SERPL-SCNC: 138 MMOL/L (ref 136–145)
WBC NRBC COR # BLD: 5.79 10*3/MM3 (ref 3.4–10.8)

## 2022-11-30 PROCEDURE — G0378 HOSPITAL OBSERVATION PER HR: HCPCS

## 2022-11-30 PROCEDURE — A9577 INJ MULTIHANCE: HCPCS | Performed by: HOSPITALIST

## 2022-11-30 PROCEDURE — 0 GADOBENATE DIMEGLUMINE 529 MG/ML SOLUTION: Performed by: HOSPITALIST

## 2022-11-30 PROCEDURE — 84132 ASSAY OF SERUM POTASSIUM: CPT | Performed by: HOSPITALIST

## 2022-11-30 PROCEDURE — 96376 TX/PRO/DX INJ SAME DRUG ADON: CPT

## 2022-11-30 PROCEDURE — 25010000002 MORPHINE PER 10 MG: Performed by: NURSE PRACTITIONER

## 2022-11-30 PROCEDURE — 25010000002 LORAZEPAM PER 2 MG: Performed by: HOSPITALIST

## 2022-11-30 PROCEDURE — 85027 COMPLETE CBC AUTOMATED: CPT | Performed by: HOSPITALIST

## 2022-11-30 PROCEDURE — 72157 MRI CHEST SPINE W/O & W/DYE: CPT

## 2022-11-30 PROCEDURE — 72156 MRI NECK SPINE W/O & W/DYE: CPT

## 2022-11-30 PROCEDURE — 96375 TX/PRO/DX INJ NEW DRUG ADDON: CPT

## 2022-11-30 PROCEDURE — 99213 OFFICE O/P EST LOW 20 MIN: CPT | Performed by: INTERNAL MEDICINE

## 2022-11-30 PROCEDURE — 83735 ASSAY OF MAGNESIUM: CPT | Performed by: HOSPITALIST

## 2022-11-30 PROCEDURE — 80048 BASIC METABOLIC PNL TOTAL CA: CPT | Performed by: HOSPITALIST

## 2022-11-30 RX ORDER — LORAZEPAM 2 MG/ML
1 INJECTION INTRAMUSCULAR ONCE
Status: COMPLETED | OUTPATIENT
Start: 2022-11-30 | End: 2022-11-30

## 2022-11-30 RX ORDER — POTASSIUM CHLORIDE 1.5 G/1.77G
40 POWDER, FOR SOLUTION ORAL AS NEEDED
Status: DISCONTINUED | OUTPATIENT
Start: 2022-11-30 | End: 2022-12-01 | Stop reason: HOSPADM

## 2022-11-30 RX ORDER — POTASSIUM CHLORIDE 750 MG/1
40 TABLET, FILM COATED, EXTENDED RELEASE ORAL AS NEEDED
Status: DISCONTINUED | OUTPATIENT
Start: 2022-11-30 | End: 2022-12-01 | Stop reason: HOSPADM

## 2022-11-30 RX ADMIN — GADOBENATE DIMEGLUMINE 10 ML: 529 INJECTION, SOLUTION INTRAVENOUS at 14:46

## 2022-11-30 RX ADMIN — HYDROCODONE BITARTRATE AND ACETAMINOPHEN 1 TABLET: 5; 325 TABLET ORAL at 04:45

## 2022-11-30 RX ADMIN — LORAZEPAM 1 MG: 2 INJECTION INTRAMUSCULAR; INTRAVENOUS at 13:21

## 2022-11-30 RX ADMIN — METOPROLOL SUCCINATE 25 MG: 25 TABLET, EXTENDED RELEASE ORAL at 08:56

## 2022-11-30 RX ADMIN — HYDROCODONE BITARTRATE AND ACETAMINOPHEN 1 TABLET: 5; 325 TABLET ORAL at 17:45

## 2022-11-30 RX ADMIN — HYDROCHLOROTHIAZIDE 25 MG: 25 TABLET ORAL at 08:56

## 2022-11-30 RX ADMIN — Medication 10 ML: at 15:21

## 2022-11-30 RX ADMIN — MORPHINE SULFATE 2 MG: 2 INJECTION, SOLUTION INTRAMUSCULAR; INTRAVENOUS at 15:21

## 2022-11-30 RX ADMIN — PANTOPRAZOLE SODIUM 40 MG: 40 TABLET, DELAYED RELEASE ORAL at 08:56

## 2022-11-30 RX ADMIN — MORPHINE SULFATE 2 MG: 2 INJECTION, SOLUTION INTRAMUSCULAR; INTRAVENOUS at 21:09

## 2022-11-30 RX ADMIN — POTASSIUM CHLORIDE 40 MEQ: 750 TABLET, EXTENDED RELEASE ORAL at 09:02

## 2022-11-30 RX ADMIN — MONTELUKAST SODIUM 10 MG: 10 TABLET, FILM COATED ORAL at 21:08

## 2022-11-30 RX ADMIN — ATORVASTATIN CALCIUM 20 MG: 20 TABLET, FILM COATED ORAL at 21:08

## 2022-11-30 RX ADMIN — MORPHINE SULFATE 2 MG: 2 INJECTION, SOLUTION INTRAMUSCULAR; INTRAVENOUS at 08:56

## 2022-11-30 RX ADMIN — POTASSIUM CHLORIDE 40 MEQ: 750 TABLET, EXTENDED RELEASE ORAL at 13:05

## 2022-11-30 RX ADMIN — HYDROCODONE BITARTRATE AND ACETAMINOPHEN 1 TABLET: 5; 325 TABLET ORAL at 10:37

## 2022-11-30 RX ADMIN — AMLODIPINE BESYLATE 5 MG: 5 TABLET ORAL at 08:56

## 2022-11-30 RX ADMIN — Medication 10 ML: at 21:11

## 2022-11-30 RX ADMIN — CETIRIZINE HYDROCHLORIDE 10 MG: 10 TABLET ORAL at 08:56

## 2022-12-01 ENCOUNTER — READMISSION MANAGEMENT (OUTPATIENT)
Dept: CALL CENTER | Facility: HOSPITAL | Age: 82
End: 2022-12-01

## 2022-12-01 VITALS
TEMPERATURE: 98.1 F | SYSTOLIC BLOOD PRESSURE: 127 MMHG | HEART RATE: 85 BPM | DIASTOLIC BLOOD PRESSURE: 83 MMHG | OXYGEN SATURATION: 92 % | BODY MASS INDEX: 21.62 KG/M2 | HEIGHT: 63 IN | RESPIRATION RATE: 18 BRPM | WEIGHT: 122 LBS

## 2022-12-01 LAB
ANION GAP SERPL CALCULATED.3IONS-SCNC: 9.9 MMOL/L (ref 5–15)
BUN SERPL-MCNC: 13 MG/DL (ref 8–23)
BUN/CREAT SERPL: 26.5 (ref 7–25)
CALCIUM SPEC-SCNC: 8.9 MG/DL (ref 8.6–10.5)
CHLORIDE SERPL-SCNC: 103 MMOL/L (ref 98–107)
CO2 SERPL-SCNC: 28.1 MMOL/L (ref 22–29)
CREAT SERPL-MCNC: 0.49 MG/DL (ref 0.57–1)
DEPRECATED RDW RBC AUTO: 42.8 FL (ref 37–54)
EGFRCR SERPLBLD CKD-EPI 2021: 95.4 ML/MIN/1.73
ERYTHROCYTE [DISTWIDTH] IN BLOOD BY AUTOMATED COUNT: 13.6 % (ref 12.3–15.4)
GLUCOSE SERPL-MCNC: 101 MG/DL (ref 65–99)
HCT VFR BLD AUTO: 35 % (ref 34–46.6)
HGB BLD-MCNC: 11.2 G/DL (ref 12–15.9)
MAGNESIUM SERPL-MCNC: 1.8 MG/DL (ref 1.6–2.4)
MCH RBC QN AUTO: 28 PG (ref 26.6–33)
MCHC RBC AUTO-ENTMCNC: 32 G/DL (ref 31.5–35.7)
MCV RBC AUTO: 87.5 FL (ref 79–97)
PLATELET # BLD AUTO: 237 10*3/MM3 (ref 140–450)
PMV BLD AUTO: 10.4 FL (ref 6–12)
POTASSIUM SERPL-SCNC: 3.9 MMOL/L (ref 3.5–5.2)
RBC # BLD AUTO: 4 10*6/MM3 (ref 3.77–5.28)
SODIUM SERPL-SCNC: 141 MMOL/L (ref 136–145)
WBC NRBC COR # BLD: 6.09 10*3/MM3 (ref 3.4–10.8)

## 2022-12-01 PROCEDURE — 83735 ASSAY OF MAGNESIUM: CPT | Performed by: HOSPITALIST

## 2022-12-01 PROCEDURE — G0378 HOSPITAL OBSERVATION PER HR: HCPCS

## 2022-12-01 PROCEDURE — 85027 COMPLETE CBC AUTOMATED: CPT | Performed by: STUDENT IN AN ORGANIZED HEALTH CARE EDUCATION/TRAINING PROGRAM

## 2022-12-01 PROCEDURE — 80048 BASIC METABOLIC PNL TOTAL CA: CPT | Performed by: STUDENT IN AN ORGANIZED HEALTH CARE EDUCATION/TRAINING PROGRAM

## 2022-12-01 PROCEDURE — 99214 OFFICE O/P EST MOD 30 MIN: CPT | Performed by: NURSE PRACTITIONER

## 2022-12-01 PROCEDURE — 99214 OFFICE O/P EST MOD 30 MIN: CPT | Performed by: INTERNAL MEDICINE

## 2022-12-01 RX ORDER — METHYLPREDNISOLONE 4 MG/1
TABLET ORAL
Qty: 21 TABLET | Refills: 0 | Status: SHIPPED | OUTPATIENT
Start: 2022-12-01 | End: 2022-12-08

## 2022-12-01 RX ORDER — OXYCODONE HCL 20 MG/1
20 TABLET, FILM COATED, EXTENDED RELEASE ORAL EVERY 12 HOURS SCHEDULED
Qty: 10 TABLET | Refills: 0 | Status: SHIPPED | OUTPATIENT
Start: 2022-12-01 | End: 2022-12-01 | Stop reason: HOSPADM

## 2022-12-01 RX ORDER — POLYETHYLENE GLYCOL 3350 17 G/17G
17 POWDER, FOR SOLUTION ORAL DAILY
Status: DISCONTINUED | OUTPATIENT
Start: 2022-12-01 | End: 2022-12-01 | Stop reason: HOSPADM

## 2022-12-01 RX ORDER — MORPHINE SULFATE 15 MG/1
15 TABLET, FILM COATED, EXTENDED RELEASE ORAL 2 TIMES DAILY
Qty: 8 TABLET | Refills: 0 | Status: SHIPPED | OUTPATIENT
Start: 2022-12-01 | End: 2022-12-05 | Stop reason: SDUPTHER

## 2022-12-01 RX ORDER — HYDROCHLOROTHIAZIDE 25 MG/1
12.5 TABLET ORAL DAILY
Start: 2022-12-01 | End: 2023-03-08

## 2022-12-01 RX ORDER — OXYCODONE HCL 20 MG/1
20 TABLET, FILM COATED, EXTENDED RELEASE ORAL EVERY 12 HOURS SCHEDULED
Status: DISCONTINUED | OUTPATIENT
Start: 2022-12-01 | End: 2022-12-01 | Stop reason: HOSPADM

## 2022-12-01 RX ORDER — AMOXICILLIN 250 MG
2 CAPSULE ORAL 2 TIMES DAILY
Status: DISCONTINUED | OUTPATIENT
Start: 2022-12-01 | End: 2022-12-01 | Stop reason: HOSPADM

## 2022-12-01 RX ORDER — AMOXICILLIN 250 MG
2 CAPSULE ORAL 2 TIMES DAILY
Qty: 120 TABLET | Refills: 0 | Status: SHIPPED | OUTPATIENT
Start: 2022-12-01 | End: 2022-12-31

## 2022-12-01 RX ADMIN — CETIRIZINE HYDROCHLORIDE 10 MG: 10 TABLET ORAL at 08:37

## 2022-12-01 RX ADMIN — OXYCODONE HYDROCHLORIDE 20 MG: 20 TABLET, FILM COATED, EXTENDED RELEASE ORAL at 14:47

## 2022-12-01 RX ADMIN — AMLODIPINE BESYLATE 5 MG: 5 TABLET ORAL at 08:37

## 2022-12-01 RX ADMIN — HYDROCODONE BITARTRATE AND ACETAMINOPHEN 1 TABLET: 5; 325 TABLET ORAL at 08:37

## 2022-12-01 RX ADMIN — POLYETHYLENE GLYCOL 3350 17 G: 17 POWDER, FOR SOLUTION ORAL at 13:38

## 2022-12-01 RX ADMIN — Medication 10 ML: at 08:42

## 2022-12-01 RX ADMIN — DOCUSATE SODIUM 50MG AND SENNOSIDES 8.6MG 2 TABLET: 8.6; 5 TABLET, FILM COATED ORAL at 13:38

## 2022-12-01 RX ADMIN — PANTOPRAZOLE SODIUM 40 MG: 40 TABLET, DELAYED RELEASE ORAL at 06:48

## 2022-12-01 RX ADMIN — HYDROCODONE BITARTRATE AND ACETAMINOPHEN 1 TABLET: 5; 325 TABLET ORAL at 03:13

## 2022-12-01 RX ADMIN — HYDROCHLOROTHIAZIDE 25 MG: 25 TABLET ORAL at 08:37

## 2022-12-01 RX ADMIN — METOPROLOL SUCCINATE 25 MG: 25 TABLET, EXTENDED RELEASE ORAL at 08:37

## 2022-12-01 NOTE — PLAN OF CARE
Goal Outcome Evaluation:  Plan of Care Reviewed With: patient        Progress: improving  Outcome Evaluation: Pt. slept for most of the night undisturbed. C/o pain twice and PRN medication given with good effect. 2L O2 via nasal cannula while asleep.

## 2022-12-01 NOTE — PROGRESS NOTES
[facility] NEUROSURGERY PROGRESS NOTE    PATIENT IDENTIFICATION:   Name:  Darlene Pfeiffer      MRN:  [id number]     82 y.o.  female               CC: left neck/arm/thoracic pain      Subjective     Interval History: Patient continues to have discomfort in the left neck, shoulder, arm and thorax.  Not quite as significant as initial presentation.  MRI cervical and thoracic spine.  Denies weakness.    ROS:  Constitutional: No fever, chills  Musculoskeletal: Neck and thoracic pain  GI: No nausea, vomiting, no swallow difficulties  Neuro: No numbness, tingling, or weakness, no speech difficulties, no balance difficulties    Objective     Vital signs in last 24 hours:  Temp:  [97 °F (36.1 °C)-98.2 °F (36.8 °C)] 98.1 °F (36.7 °C)  Heart Rate:  [78-86] 85  Resp:  [16-18] 18  BP: (121-134)/(54-83) 127/83      Intake/Output this shift:  No intake/output data recorded.      Intake/Output last 3 shifts:  I/O last 3 completed shifts:  In: 600 [P.O.:600]  Out: -     LABS:  Results from last 7 days   Lab Units 12/01/22  0607 11/30/22  0509 11/29/22  0534   WBC 10*3/mm3 6.09 5.79 6.05   HEMOGLOBIN g/dL 11.2* 11.9* 11.9*   HEMATOCRIT % 35.0 37.7 37.5   PLATELETS 10*3/mm3 237 238 240     Results from last 7 days   Lab Units 12/01/22  0607 11/30/22  1809 11/30/22  0509 11/29/22  0534 11/28/22 2025   SODIUM mmol/L 141  --  138 141 138   POTASSIUM mmol/L 3.9 4.0 3.2* 3.6 3.5   CHLORIDE mmol/L 103  --  98 102 95*   CO2 mmol/L 28.1  --  29.7* 26.0 30.2*   BUN mg/dL 13  --  12 10 12   CREATININE mg/dL 0.49*  --  0.46* 0.55* 0.67   CALCIUM mg/dL 8.9  --  9.2 9.1 9.9   BILIRUBIN mg/dL  --   --   --   --  0.7   ALK PHOS U/L  --   --   --   --  99   ALT (SGPT) U/L  --   --   --   --  9   AST (SGOT) U/L  --   --   --   --  20   GLUCOSE mg/dL 101*  --  96 91 108*       IMAGING STUDIES:  MRI cervical and thoracic spine-no evidence of osseous metastasis.  Multilevel foraminal stenosis throughout the cervical spine most predominant to the left at  C4/5, C3/4, C5/6 and C6/7.  Normal cord signal.  No significant canal stenosis.  Chronic compression for his at T2, T3, T8, T10.  Per radiology report there are postsurgical changes left upper lobe as well as extensive extraspinal abnormalities within the left hemithorax consistent with suspected malignant recurrence and metastasis of lung CA.    I personally viewed and interpreted the patient's MRI cervical and thoracic spine.  Also reviewed by and discussed with Dr. Altamirano and Dr. Carranza.    Meds reviewed/changed: Yes    Current Facility-Administered Medications:   •  acetaminophen (TYLENOL) tablet 650 mg, 650 mg, Oral, Q4H PRN **OR** acetaminophen (TYLENOL) 160 MG/5ML solution 650 mg, 650 mg, Oral, Q4H PRN **OR** acetaminophen (TYLENOL) suppository 650 mg, 650 mg, Rectal, Q4H PRN, Nena Back APRN  •  albuterol (PROVENTIL) nebulizer solution 0.083% 2.5 mg/3mL, 2.5 mg, Nebulization, Q6H PRN, Nena Back APRN  •  amLODIPine (NORVASC) tablet 5 mg, 5 mg, Oral, Daily, Nena Back APRN, 5 mg at 12/01/22 0837  •  atorvastatin (LIPITOR) tablet 20 mg, 20 mg, Oral, Nightly, Nena Back APRN, 20 mg at 11/30/22 2108  •  calcium carbonate (TUMS) chewable tablet 500 mg (200 mg elemental), 2 tablet, Oral, BID PRN, Nena Back APRN  •  cetirizine (zyrTEC) tablet 10 mg, 10 mg, Oral, Daily, Nena Back APRN, 10 mg at 12/01/22 0837  •  clobetasol (TEMOVATE) 0.05 % cream, , Topical, Q12H, Nena Back APRN, Given at 11/29/22 0950  •  fluticasone (FLONASE) 50 MCG/ACT nasal spray 2 spray, 2 spray, Nasal, Daily, Nena Back APRN, 2 spray at 11/29/22 0950  •  hydroCHLOROthiazide (HYDRODIURIL) tablet 25 mg, 25 mg, Oral, Daily, Nena Back APRN, 25 mg at 12/01/22 0837  •  HYDROcodone-acetaminophen (NORCO) 5-325 MG per tablet 1 tablet, 1 tablet, Oral, Q6H PRN, Nena Back APRN, 1 tablet at 12/01/22 0837  •  LORazepam (ATIVAN) tablet 1 mg,  1 mg, Oral, Once, Selvin Waterman MD  •  metoprolol succinate XL (TOPROL-XL) 24 hr tablet 25 mg, 25 mg, Oral, Daily, Nena Back APRN, 25 mg at 12/01/22 0837  •  montelukast (SINGULAIR) tablet 10 mg, 10 mg, Oral, Nightly, Nena Back APRN, 10 mg at 11/30/22 2108  •  morphine injection 2 mg, 2 mg, Intravenous, Q3H PRN, Nena Back APRN, 2 mg at 11/30/22 2109  •  ondansetron (ZOFRAN) tablet 4 mg, 4 mg, Oral, Q6H PRN **OR** ondansetron (ZOFRAN) injection 4 mg, 4 mg, Intravenous, Q6H PRN, Nena Back APRN  •  oxyCODONE ER (oxyCONTIN) 12 hr tablet 20 mg, 20 mg, Oral, Q12H, Moise Stacy MD  •  pantoprazole (PROTONIX) EC tablet 40 mg, 40 mg, Oral, QAM, Nena Back APRN, 40 mg at 12/01/22 0648  •  polyethylene glycol (MIRALAX) packet 17 g, 17 g, Oral, Daily, Perla Wright MD, 17 g at 12/01/22 1338  •  potassium chloride (K-DUR,KLOR-CON) ER tablet 40 mEq, 40 mEq, Oral, PRN, Selvin Waterman MD, 40 mEq at 11/30/22 1305  •  potassium chloride (KLOR-CON) packet 40 mEq, 40 mEq, Oral, PRN, Selvin Waterman MD  •  sennosides-docusate (PERICOLACE) 8.6-50 MG per tablet 2 tablet, 2 tablet, Oral, BID, Perla Wright MD, 2 tablet at 12/01/22 1338  •  sodium chloride 0.9 % flush 10 mL, 10 mL, Intravenous, Q12H, Nena Back APRN, 10 mL at 12/01/22 0842  •  sodium chloride 0.9 % flush 10 mL, 10 mL, Intravenous, PRN, Nena Back APRN, 10 mL at 11/29/22 0128  •  sodium chloride 0.9 % infusion 40 mL, 40 mL, Intravenous, PRN, Nena Back APRN  •  tiZANidine (ZANAFLEX) tablet 2 mg, 2 mg, Oral, Q8H PRN, Nena Back APRN      Physical Exam:    General:   Awake, alert, oriented x3. Speech clear with no aphasia  Neck:    Supple.  Normal range of motion.  Nontender.  Back:    Negative straight leg raise    Motor: Normal muscle strength, bulk and tone in upper and lower extremities.    Reflexes: No clonus  Sensation: Normal to light touch; no  "extinction  Station and Gait: Normal gait and station.      Assessment & Plan     ASSESSMENT:      Intractable back pain    Hypertension    Hyperlipidemia    Chronic obstructive pulmonary disease (HCC)    Lung cancer (HCC)    Hypokalemia    Patient with recurrent lung cancer and local spread into the left thorax.  She presented with left-sided thoracic pain as well as left neck shoulder, arm pain.  MRI cervical shows some mild to moderate foraminal degenerative changes which could cause some radiculopathy, but no acute changes seen.  In the thoracic spine there is no evidence of acute compression fracture.  There is no evidence of osseous metastasis or intraspinal metastasis; however there is extraspinal extensive abnormalities within the left hemithorax which are likely responsible for the patient's discomfort.  She may have some radiating or neuropathic pain from these findings.  She has no focal deficits on exam.  Discussed with Dr. Stacy.  We will give her a trial of Medrol dose pack.  He states patient will be getting Keytruda and cannot be on long-term antibiotic suppression, but could try a short-term to see if this helps some of her discomfort.  If it does not, I recommended they consider gabapentin as a possible treatment for her pain is this may be neuropraxia.  No plans for surgical intervention; therefore, patient does not need routine follow-up with our office but will happy to see her back if changes warrant.    PLAN:   No SOLANGE intervention   Medrol Dosepak-patient on PPI based  Recommend consider Gabapentin if radicular feature persists  F/u SOLANGE PRN    I discussed the patient's findings and my recommendations with patient, nursing staff and Dr. brandt and Dr. Stacy       LOS: 0 days       Isabel Young, APRN  12/1/2022  14:18 EST    \"Dictated utilizing Dragon dictation\".      "

## 2022-12-01 NOTE — PROGRESS NOTES
Continued Stay Note  [facility]     Patient Name: Darlene Pfeiffer  MRN: [id number]  Today's Date: 12/1/2022    Admit Date: 11/28/2022        Discharge Plan     Row Name 12/01/22 1333       Plan    Final Discharge Disposition Code 01 - home or self-care    Final Note D/c home               Discharge Codes    No documentation.               Expected Discharge Date and Time     Expected Discharge Date Expected Discharge Time    Dec 1, 2022             Johanna Markham RN

## 2022-12-01 NOTE — DISCHARGE SUMMARY
Patient Name: Darlene Pfeiffer  : 1940  MRN: [id number]    Date of Admission: 2022  Date of Discharge:  2022  Primary Care Physician: Kehrer, Meredith Lea, MD      Chief Complaint:   Back Pain      Discharge Diagnoses     Active Hospital Problems    Diagnosis  POA   • **Intractable back pain [M54.9]  Yes   • Hypokalemia [E87.6]  No   • Lung cancer (HCC) [C34.90]  Yes   • Chronic obstructive pulmonary disease (HCC) [J44.9]  Yes   • Hypertension [I10]  Yes   • Hyperlipidemia [E78.5]  Yes      Resolved Hospital Problems   No resolved problems to display.        Hospital Course     83yo woman with h/o COPD, left-sided lung CA, HTN, and SCCA of tongue, who presented to ER with c/o 2 months of worsening back pain that starts in neck and radiates into upper back and left breast area. Worse with certain mvmts. Has been seen for this by her Oncologist who directed her to ER.     Thoracic/neck pain/left upper back pain.  Initial CT did not show any acute changes.  Neurosurgery was consulted, MRI cervical and thoracic spine was obtained, did not show evidence of metastatic disease, but did show mild to moderate degenerative changes which could cause radiculopathy.  Left upper back pain could also be related secondary to lung cancer, as PET scan 2020 for progression of the disease with pleural-based mass in the left upper lobe.  Hematology oncology evaluated, recommended to discharge patient on long-acting OxyContin 20 mg every 12 hours in addition to home as needed Norco. Prescribed to the pharmacy she requested, however it was not covered by her insurance, and it was too expensive.  Changed to morphine extended release 15 mg every 12.  Patient was also discharged on Medrol dose pack per neurosurgery.  Follow-up with PCP/oncology for further pain management.          At the time of discharge patient was told to take all medications as prescribed, keep all follow-up appointments, and call their doctor or  return to the hospital with any worsening or concerning symptoms.               Day of Discharge     Subjective:  No acute events overnight.  Laying in bed.  Back pain is better, currently primarily located in the left upper back.  Currently minimal as she has received pain medication.    Physical Exam:  Temp:  [97.8 °F (36.6 °C)-98.2 °F (36.8 °C)] 98.1 °F (36.7 °C)  Heart Rate:  [78-85] 85  Resp:  [16-18] 18  BP: (121-134)/(54-83) 127/83  Body mass index is 21.61 kg/m².  Physical Exam    General: Alert ,, no acute distress  HEENT: Normocephalic, atraumatic  CV: Regular rate and rhythm, no murmurs rubs or gallops  Lungs: Clear to auscultation bilaterally, no crackles or wheezes  Abdomen: Soft, nontender, nondistended  Extremities: No significant peripheral edema , no cyanosis     Consultants     Consult Orders (all) (From admission, onward)     Start     Ordered    11/29/22 0056  Hematology & Oncology Inpatient Consult  Once,   Status:  Canceled        Specialty:  Hematology and Oncology  Provider:  Moise Stacy MD    11/29/22 0056 11/29/22 0055  Inpatient Neurosurgery Consult  Once        Specialty:  Neurosurgery  Provider:  Polo Altamirano MD    11/29/22 0056 11/29/22 0018  [facility] (on-call MD unless specified) Details  Once,   Status:  Canceled        Specialty:  Hospitalist  Provider:  (Not yet assigned)    11/29/22 0017              Procedures     * Surgery not found *      Imaging Results (All)     Procedure Component Value Units Date/Time    MRI Cervical Spine With & Without Contrast [[id number]] Collected: 11/30/22 1646     Updated: 12/01/22 0704    Narrative:      MRI OF THE CERVICAL AND THORACIC SPINE WITH AND WITHOUT CONTRAST     CLINICAL HISTORY: Back pain for the last 2 months. Pain radiates from  back around to under the left breast. History of lung cancer.     TECHNIQUE: MRI of the cervical spine was obtained with sagittal T1,  postgadolinium fat-saturated T1, gradient echo, and T2-weighted  images.  Additionally, there are axial pre and postgadolinium T1, gradient echo,  and T2-weighted images through the cervical spine.     FINDINGS:     At C2-3, there is no significant canal or foraminal stenosis.     At C3-4, there is a disc osteophyte complex which mildly indents the  ventral subarachnoid space. There is moderate left and mild-to-moderate  right foraminal narrowing secondary to a combination of uncovertebral  joint hypertrophy and facet arthrosis.     At C4-5, there is a disc osteophyte complex mildly indenting the ventral  subarachnoid space. There is prominent left facet hypertrophic change.  There is a moderate-to-severe degree of left foraminal narrowing  secondary to a combination of uncovertebral joint hypertrophy and facet  arthrosis. There is no significant degree of right foraminal stenosis.     At C5-6, there is a disc osteophyte complex which mild to moderately  narrows the spinal canal. There is moderate left foraminal narrowing  secondary to uncovertebral joint hypertrophy. A mild degree of right  foraminal narrowing is also seen secondary to uncovertebral joint  hypertrophy.     At C6-7, there is mild-to-moderate left foraminal narrowing secondary to  uncovertebral joint hypertrophy. There is no significant degree of right  foraminal narrowing. A disc osteophyte complex mildly indents the  ventral subarachnoid space.     At C7-T1, there is degenerative anterior spondylolisthesis of C7 on T1  by approximately 3 mm.     Within the visualized upper thoracic spine, there is degenerative  anterior spondylolisthesis of T2 on T3 by approximately 2 mm and  degenerative anterior spondylolisthesis of T3 on T4 by approximately 2  mm. There is mild chronic vertebral body height loss at the T2 and T3  levels with approximately 10% loss of vertebral body height within the  maximally compressed portions of the vertebrae.     The cervical spinal cord has normal signal intensity. The  visualized  contents of the cranial vault are unremarkable with the exception of  prominent susceptibility artifact obscuring portions of the visualized  left cerebral hemisphere.     There are no pathological areas of marrow replacement identified to  suggest metastatic disease.       Impression:         A disc osteophyte complex at the C5-6 level results in a  mild-to-moderate degree of canal stenosis. Lesser degrees of canal  narrowing are seen elsewhere within the cervical spine as discussed in  detail above.     Multilevel foraminal stenotic changes are identified within the cervical  spine and include moderate left C3-4, mild-to-moderate right C3-4,  moderate-to-severe left C4-5, moderate left C5-C6, mild right C5-6, and  mild-to-moderate left C6-7 foraminal stenosis.     The remaining degenerative phenomena within the cervical spine as well  as the visualized upper thoracic spine are as discussed in detail above.     Mild degrees of chronic vertebral body height loss are noted at the T2  and T3 levels.        TECHNIQUE: MRI of the thoracic spine was obtained with sagittal T1,  postgadolinium fat-saturated T1, proton-density, and T2-weighted images.  Additionally, there are axial pre and postgadolinium T1 and T2-weighted  images through the thoracic spine.     FINDINGS:     There are mild degrees of chronic vertebral body height loss seen at the  T2, T3, T8, and T10 levels. There is no evidence to suggest an acute or  subacute compression fracture within the thoracic spine. There is no  significant degree of canal or foraminal stenosis within the thoracic  spine.     There are no pathological areas of marrow replacement to suggest osseous  metastatic disease to the thoracic spine..     Postsurgical changes are seen from a left upper lobectomy. Foci of  consolidation are noted within the left upper lung zone. Areas of  fibrosis/scarring are noted throughout the left lung. Additionally,  there are loculated  areas of pleural fluid within the left hemithorax.  Additionally, there are pleural-based areas of signal intensity that  were apparently intensely FDG avid on prior PET/CT that were felt to  likely represent malignant recurrence with apparent spread into the  adjacent left lung parenchyma. Please refer to the prior PET/CT report  of 11/09/2022 and prior chest CT report of 11/28/2022 for further  details regarding these extraspinal abnormalities.     IMPRESSION:     There are mild chronic compression deformities seen at the T2, T3, T8,  and T10 levels. There is no evidence for an acute or subacute  compression fracture.     Extensive extraspinal abnormalities within the left hemithorax are noted  as discussed above. Please refer to the prior chest CT report of  11/28/2022 and prior PET/CT report of 11/09/2022 for further details  regarding these abnormalities.     This report was finalized on 12/1/2022 7:01 AM by Dr. Geovanni Carranza M.D.       MRI Thoracic Spine With & Without Contrast [[id number]] Collected: 11/30/22 1646     Updated: 12/01/22 0704    Narrative:      MRI OF THE CERVICAL AND THORACIC SPINE WITH AND WITHOUT CONTRAST     CLINICAL HISTORY: Back pain for the last 2 months. Pain radiates from  back around to under the left breast. History of lung cancer.     TECHNIQUE: MRI of the cervical spine was obtained with sagittal T1,  postgadolinium fat-saturated T1, gradient echo, and T2-weighted images.  Additionally, there are axial pre and postgadolinium T1, gradient echo,  and T2-weighted images through the cervical spine.     FINDINGS:     At C2-3, there is no significant canal or foraminal stenosis.     At C3-4, there is a disc osteophyte complex which mildly indents the  ventral subarachnoid space. There is moderate left and mild-to-moderate  right foraminal narrowing secondary to a combination of uncovertebral  joint hypertrophy and facet arthrosis.     At C4-5, there is a disc osteophyte complex mildly  indenting the ventral  subarachnoid space. There is prominent left facet hypertrophic change.  There is a moderate-to-severe degree of left foraminal narrowing  secondary to a combination of uncovertebral joint hypertrophy and facet  arthrosis. There is no significant degree of right foraminal stenosis.     At C5-6, there is a disc osteophyte complex which mild to moderately  narrows the spinal canal. There is moderate left foraminal narrowing  secondary to uncovertebral joint hypertrophy. A mild degree of right  foraminal narrowing is also seen secondary to uncovertebral joint  hypertrophy.     At C6-7, there is mild-to-moderate left foraminal narrowing secondary to  uncovertebral joint hypertrophy. There is no significant degree of right  foraminal narrowing. A disc osteophyte complex mildly indents the  ventral subarachnoid space.     At C7-T1, there is degenerative anterior spondylolisthesis of C7 on T1  by approximately 3 mm.     Within the visualized upper thoracic spine, there is degenerative  anterior spondylolisthesis of T2 on T3 by approximately 2 mm and  degenerative anterior spondylolisthesis of T3 on T4 by approximately 2  mm. There is mild chronic vertebral body height loss at the T2 and T3  levels with approximately 10% loss of vertebral body height within the  maximally compressed portions of the vertebrae.     The cervical spinal cord has normal signal intensity. The visualized  contents of the cranial vault are unremarkable with the exception of  prominent susceptibility artifact obscuring portions of the visualized  left cerebral hemisphere.     There are no pathological areas of marrow replacement identified to  suggest metastatic disease.       Impression:         A disc osteophyte complex at the C5-6 level results in a  mild-to-moderate degree of canal stenosis. Lesser degrees of canal  narrowing are seen elsewhere within the cervical spine as discussed in  detail above.     Multilevel  foraminal stenotic changes are identified within the cervical  spine and include moderate left C3-4, mild-to-moderate right C3-4,  moderate-to-severe left C4-5, moderate left C5-C6, mild right C5-6, and  mild-to-moderate left C6-7 foraminal stenosis.     The remaining degenerative phenomena within the cervical spine as well  as the visualized upper thoracic spine are as discussed in detail above.     Mild degrees of chronic vertebral body height loss are noted at the T2  and T3 levels.        TECHNIQUE: MRI of the thoracic spine was obtained with sagittal T1,  postgadolinium fat-saturated T1, proton-density, and T2-weighted images.  Additionally, there are axial pre and postgadolinium T1 and T2-weighted  images through the thoracic spine.     FINDINGS:     There are mild degrees of chronic vertebral body height loss seen at the  T2, T3, T8, and T10 levels. There is no evidence to suggest an acute or  subacute compression fracture within the thoracic spine. There is no  significant degree of canal or foraminal stenosis within the thoracic  spine.     There are no pathological areas of marrow replacement to suggest osseous  metastatic disease to the thoracic spine..     Postsurgical changes are seen from a left upper lobectomy. Foci of  consolidation are noted within the left upper lung zone. Areas of  fibrosis/scarring are noted throughout the left lung. Additionally,  there are loculated areas of pleural fluid within the left hemithorax.  Additionally, there are pleural-based areas of signal intensity that  were apparently intensely FDG avid on prior PET/CT that were felt to  likely represent malignant recurrence with apparent spread into the  adjacent left lung parenchyma. Please refer to the prior PET/CT report  of 11/09/2022 and prior chest CT report of 11/28/2022 for further  details regarding these extraspinal abnormalities.     IMPRESSION:     There are mild chronic compression deformities seen at the T2, T3,  T8,  and T10 levels. There is no evidence for an acute or subacute  compression fracture.     Extensive extraspinal abnormalities within the left hemithorax are noted  as discussed above. Please refer to the prior chest CT report of  11/28/2022 and prior PET/CT report of 11/09/2022 for further details  regarding these abnormalities.     This report was finalized on 12/1/2022 7:01 AM by Dr. Geovanni Carranza M.D.       CT Angiogram Chest [[id number]] Collected: 11/28/22 2339     Updated: 11/29/22 0010    Narrative:      CT ANGIOGRAM OF THE CHEST; CT OF THE THORACIC SPINE     HISTORY: Lung cancer. Pain.     COMPARISON: 11/09/2022     TECHNIQUE: Axial CT imaging was obtained through the thorax. IV contrast  was administered. Three-D reformatted images were obtained. Axial CT  imaging was obtained through the thoracic spine. Coronal and sagittal  reformatted images were obtained.     FINDINGS:  CT OF THE CHEST: No acute pulmonary thromboembolus is seen. Thoracic  aorta is normal in caliber. There is no evidence of dissection, although  there is some atherosclerotic involvement of the thoracic aorta and  coronary arteries. The thyroid gland appears somewhat heterogeneous.  Trachea and esophagus are within normal limits. On prior examination,  the patient was noted to have hypermetabolic pleural thickening at the  left lung apex. This is again seen on the current examination. There is  also a hypermetabolic pleural-based nodule which measures up to 1.1 cm.  This measures up to 1.5 cm on the current exam. This is probably similar  to the prior study. Full comparison is limited due to differences in  technique. Cavitary masslike consolidation within the lingula appears  more collapsed on the current study. There are background emphysematous  changes. There is chronic scarring noted at the right lung base. There  is a 6 mm nodule within the right lower lobe which appears stable when  compared to prior imaging. No definite new  pulmonary nodules or masses  are seen. Invasion into the left pericardium and AP window appears  stable. Previously measured is hypermetabolic 6 mm right hilar node  appears stable images through the upper abdomen do not demonstrate any  acute abnormalities. No displaced rib fractures are seen.     THORACIC SPINE: The patient has stable compression deformity is noted at  T2, T3, T8, and T10. These were all present on prior CT from 10/14/2022.  No new fractures are seen. No definite aggressive osseous abnormalities  are identified, although MRI would be more sensitive for assessment.  Patient does appear to have some mild-to-moderate canal stenosis at  T7-T8, due to some posterior calcification.       Impression:         1. No acute pulmonary thromboembolus seen.  2. Previously described hypermetabolic pleural-based thickening is again  noted at the left lung apex, with extension into the AP window and  pericardium. It is grossly stable when compared to prior exam, as is a  previously described pleural-based nodule within the left lower lobe.  2. No acute fracture or subluxation of the thoracic spine is seen. No  definite aggressive osseous abnormalities are identified.     Radiation dose reduction techniques were utilized, including automated  exposure control and exposure modulation based on body size.     This report was finalized on 11/29/2022 12:07 AM by Dr. Ewelina Edwards M.D.       CT Thoracic Spine With Contrast [[id number]] Collected: 11/28/22 2339     Updated: 11/29/22 0010    Narrative:      CT ANGIOGRAM OF THE CHEST; CT OF THE THORACIC SPINE     HISTORY: Lung cancer. Pain.     COMPARISON: 11/09/2022     TECHNIQUE: Axial CT imaging was obtained through the thorax. IV contrast  was administered. Three-D reformatted images were obtained. Axial CT  imaging was obtained through the thoracic spine. Coronal and sagittal  reformatted images were obtained.     FINDINGS:  CT OF THE CHEST: No acute pulmonary  thromboembolus is seen. Thoracic  aorta is normal in caliber. There is no evidence of dissection, although  there is some atherosclerotic involvement of the thoracic aorta and  coronary arteries. The thyroid gland appears somewhat heterogeneous.  Trachea and esophagus are within normal limits. On prior examination,  the patient was noted to have hypermetabolic pleural thickening at the  left lung apex. This is again seen on the current examination. There is  also a hypermetabolic pleural-based nodule which measures up to 1.1 cm.  This measures up to 1.5 cm on the current exam. This is probably similar  to the prior study. Full comparison is limited due to differences in  technique. Cavitary masslike consolidation within the lingula appears  more collapsed on the current study. There are background emphysematous  changes. There is chronic scarring noted at the right lung base. There  is a 6 mm nodule within the right lower lobe which appears stable when  compared to prior imaging. No definite new pulmonary nodules or masses  are seen. Invasion into the left pericardium and AP window appears  stable. Previously measured is hypermetabolic 6 mm right hilar node  appears stable images through the upper abdomen do not demonstrate any  acute abnormalities. No displaced rib fractures are seen.     THORACIC SPINE: The patient has stable compression deformity is noted at  T2, T3, T8, and T10. These were all present on prior CT from 10/14/2022.  No new fractures are seen. No definite aggressive osseous abnormalities  are identified, although MRI would be more sensitive for assessment.  Patient does appear to have some mild-to-moderate canal stenosis at  T7-T8, due to some posterior calcification.       Impression:         1. No acute pulmonary thromboembolus seen.  2. Previously described hypermetabolic pleural-based thickening is again  noted at the left lung apex, with extension into the AP window and  pericardium. It is  grossly stable when compared to prior exam, as is a  previously described pleural-based nodule within the left lower lobe.  2. No acute fracture or subluxation of the thoracic spine is seen. No  definite aggressive osseous abnormalities are identified.     Radiation dose reduction techniques were utilized, including automated  exposure control and exposure modulation based on body size.     This report was finalized on 11/29/2022 12:07 AM by Dr. Ewelina Edwards M.D.               Pertinent Labs     Results from last 7 days   Lab Units 12/01/22  0607 11/30/22  0509 11/29/22 0534 11/28/22 2025   WBC 10*3/mm3 6.09 5.79 6.05 7.45   HEMOGLOBIN g/dL 11.2* 11.9* 11.9* 13.2   PLATELETS 10*3/mm3 237 238 240 276     Results from last 7 days   Lab Units 12/01/22  0607 11/30/22  1809 11/30/22  0509 11/29/22 0534 11/28/22 2025   SODIUM mmol/L 141  --  138 141 138   POTASSIUM mmol/L 3.9 4.0 3.2* 3.6 3.5   CHLORIDE mmol/L 103  --  98 102 95*   CO2 mmol/L 28.1  --  29.7* 26.0 30.2*   BUN mg/dL 13  --  12 10 12   CREATININE mg/dL 0.49*  --  0.46* 0.55* 0.67   GLUCOSE mg/dL 101*  --  96 91 108*   Estimated Creatinine Clearance: 79.9 mL/min (A) (by C-G formula based on SCr of 0.49 mg/dL (L)).  Results from last 7 days   Lab Units 11/28/22 2025   ALBUMIN g/dL 4.60   BILIRUBIN mg/dL 0.7   ALK PHOS U/L 99   AST (SGOT) U/L 20   ALT (SGPT) U/L 9     Results from last 7 days   Lab Units 12/01/22  0607 11/30/22  0509 11/29/22 0534 11/28/22 2025   CALCIUM mg/dL 8.9 9.2 9.1 9.9   ALBUMIN g/dL  --   --   --  4.60   MAGNESIUM mg/dL 1.8 1.8  --   --        Results from last 7 days   Lab Units 11/28/22 2025   TROPONIN T ng/mL <0.010           Invalid input(s): LDLCALC          Test Results Pending at Discharge       Discharge Details        Discharge Medications      New Medications      Instructions Start Date   methylPREDNISolone 4 MG dose pack  Commonly known as: MEDROL  Notes to patient: TAKE AS INSTRUCTIONS READ   Take as directed  on package instructions.      Morphine 15 MG 12 hr tablet  Commonly known as: MS CONTIN  Notes to patient: 12/2/22 NEXT DOSE MAY BE TAKEN ANYTIME AFTER 2:45 AM   15 mg, Oral, 2 Times Daily      sennosides-docusate 8.6-50 MG per tablet  Commonly known as: PERICOLACE  Notes to patient: 12/1/22 PM   2 tablets, Oral, 2 Times Daily         Changes to Medications      Instructions Start Date   hydroCHLOROthiazide 25 MG tablet  Commonly known as: HYDRODIURIL  What changed: how much to take  Notes to patient: 12/2/22   12.5 mg, Oral, Daily         Continue These Medications      Instructions Start Date   acetaminophen 325 MG tablet  Commonly known as: TYLENOL  Notes to patient: 12/1/22 1ST DOSE ANYTIME   650 mg, Oral, Every 4 Hours PRN      albuterol sulfate  (90 Base) MCG/ACT inhaler  Commonly known as: PROVENTIL HFA;VENTOLIN HFA;PROAIR HFA   2 puffs, Inhalation, Every 4 Hours PRN      amLODIPine 5 MG tablet  Commonly known as: NORVASC  Notes to patient: 12/2/22   5 mg, Oral, Daily      atorvastatin 20 MG tablet  Commonly known as: LIPITOR  Notes to patient: 12/1/22 PM   20 mg, Oral, Nightly      b complex-C-E-zinc tablet  Notes to patient: 12/2/22   1 tablet, Oral, Daily, HOLDING FOR DOS      cetirizine 10 MG tablet  Commonly known as: zyrTEC  Notes to patient: 12/2/22   10 mg, Oral, Daily      chlorhexidine 0.12 % solution  Commonly known as: PERIDEX  Notes to patient: 12/1/22 PM   15 mL, Mouth/Throat, 2 Times Daily      cholecalciferol 25 MCG (1000 UT) tablet  Commonly known as: VITAMIN D3  Notes to patient: 12/2/22   1,000 Units, Oral, Daily, HOLDING FOR DOS      ciclopirox 0.77 % cream  Commonly known as: LOPROX  Notes to patient: 12/1/22 PM   1 application, Topical, 2 Times Daily      clobetasol 0.05 % cream  Commonly known as: TEMOVATE   APPLY TOPICALLY TO THE AFFECTED AREA TWICE DAILY AS NEEDED      fluticasone 50 MCG/ACT nasal spray  Commonly known as: FLONASE  Notes to patient: 12/2/22   2 sprays, Nasal,  Daily      HYDROcod Polst-CPM Polst ER 10-8 MG/5ML ER suspension  Commonly known as: Tussionex Pennkinetic ER   5 mL, Oral, Every 12 Hours PRN      HYDROcodone-acetaminophen 5-325 MG per tablet  Commonly known as: NORCO   1 tablet, Oral, Every 4 Hours PRN      metoprolol succinate XL 25 MG 24 hr tablet  Commonly known as: TOPROL-XL  Notes to patient: 12/2/22   TAKE 1 TABLET EVERY DAY      montelukast 10 MG tablet  Commonly known as: SINGULAIR  Notes to patient: 12/1/22 PM   10 mg, Oral, Nightly      omeprazole 40 MG capsule  Commonly known as: priLOSEC  Notes to patient: 12/2/22   1 capsule, Oral, Daily      tiZANidine 4 MG tablet  Commonly known as: ZANAFLEX   2-4 mg, Oral, Every 8 Hours PRN         Stop These Medications    aspirin 81 MG chewable tablet            Allergies   Allergen Reactions   • Sulfa Antibiotics Rash       Discharge Disposition:  Home or Self Care      Discharge Diet:  No active diet order      Discharge Activity:       CODE STATUS:    Code Status and Medical Interventions:   Ordered at: 11/29/22 0056     Code Status (Patient has no pulse and is not breathing):    CPR (Attempt to Resuscitate)     Medical Interventions (Patient has pulse or is breathing):    Full Support       Future Appointments   Date Time Provider Department Center   12/2/2022  2:40 PM Martha Rebolledo APRN [facility] [place]   12[facility]14/2022  [facility]AIR  INF[facility]   12/14/2022  9:40 AM Henry Escobar MD [facility] [place]   12/14/2022 10:00 AM CHAIR  THIEN Jaime MD  INFUS K[facility]   1/9/2023  8:15 AM Kehrer, Meredith Lea, MD [facility] [place]   7/13/2023 12:30 PM Bradley Dowell MD K NS LOU41 [place]      Follow-up Information     Kehrer, Meredith Lea, MD Follow up in 1 week(s).    Specialty: Family Medicine  Contact information:  [address]  [address]  [facility] [address]  [phone]                         Time Spent on Discharge:  Greater than 30 minutes      Perla Wright  MD [facility]  12/01/22  18:52 EST

## 2022-12-01 NOTE — PROGRESS NOTES
CHIEF COMPLAINT: Back pain and side pain    Interval history:   The patient is doing about the same still having some pain along the upper back and radiating around the left side.    HISTORY OF PRESENT ILLNESS:    this is an 82-year-old woman followed by Dr. Escobar in our clinic.  She has history of tobacco use and COPD.  She underwent a left upper lobectomy in 2015 for lung cancer.  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  A follow-up CT of the chest June 2021 showed an 8 mm irregular nodule in the medial segment of the right lower lobe and diffuse emphysema.  A PET scan showed ill-defined activity and soft tissue in the left aspect of the mediastinum, 1 cm hypermetabolic pulmonary nodule and asymmetric thickening right base of tongue and subtle uptake L1 vertebral body.  Work-up included biopsy of the lung nodule 8/13/2021 showing moderately differentiated adenocarcinoma PD-L1 TPS 40%.  MRI of the lumbar spine was negative for metastatic disease but showed multilevel degenerative disc disease.  She underwent evaluation of the base of tongue and laryngoscopy with Dr. Caballero showing no lesions.  She was presented at lung cancer conference and felt to have 2 separate possible malignancies 1 in the superior segment of the right lower lobe of the lung and a mass in the left chest biopsy-positive adenocarcinoma.  She was treated with SBRT to the right lung and also left lung between 8/31/2021 and 9/13/2021.  Follow-up PET scan 11/23/2021 showed decreased size and activity of the 2 sites of treated disease.  There were a few new subcentimeter pulmonary nodules in the right upper lobe indeterminant.     The patient was admitted October 2022 with shortness of breath and nonproductive cough.  CT scan showed a new masslike area of consolidation in the left lung; bronchoscopy and biopsy of the left lower lung was negative.  A PET scan was performed 11/9/2022 which showed  postsurgical changes involving a left posterior communicating artery aneurysm clip, no cervical lymphadenopathy, asymmetric atrophy of the right parotid and submandibular glands, mild fullness in the right tonsil stable compared to 7/13/2021.  There was a small focus of uptake in the right hilum SUV 3.2 new since 11/23/2021 corresponding to a lymph node 0.7 cm grossly unchanged in size since 11/23/2021.  There was development of an irregular soft tissue thickening involving the pleura of the left lung apex since 6/8/2022 measuring 1.5 cm in thickness SUV 10.4 which extended posterior and laterally along the left pleura and pericardium.  There was a new area of uptake along the pleura left lower lobe 1.1 cm in the previous area of consolidation was now cavitary with irregular thick walls 4.4 x 2.1 cm with no uptake above blood pool.     A guardant 360 peripheral blood molecular study was sent on 11/15/2022 and pending.  If no target gene mutations identified plan is to begin the patient on Keytruda.     The patient presented to the ER with complaints of progressive thoracic back pain over a 2-month period but particularly worse previous 3 to 4 days not controlled with home dose of Norco which was 5/325 1 p.o. every 4 hours as needed pain.  Pain described as constant in the neck and upper back radiating anteriorly to the left breast.  Pain worse with movement and cough and lying on the left side.  A CT angiogram of the chest in the ER showed no pulmonary embolism, pleural-based mass in the left lung apex with extension into the AP window and pericardium stable to prior exams.  CT of the thoracic spine showed stable compression deformities of T2, T3, T8 and T10 with no new fractures and no obvious bone metastases.  MRI of the thoracic spine is pending.     Medical History           Past Medical History, Past Surgical History, Social History, Family History have been reviewed and are without significant changes except  as mentioned.    Review of Systems   Constitutional: Negative.    HENT: Negative.    Respiratory: Negative.    Cardiovascular: Negative.    Gastrointestinal: Negative.    Musculoskeletal: Positive for back pain and gait problem.   Hematological: Negative.    Psychiatric/Behavioral: The patient is nervous/anxious.    ROS unchanged- 12/1/2022      Medications:  The current medication list was reviewed in the EMR    ALLERGIES:    Allergies   Allergen Reactions   • Sulfa Antibiotics Rash       Objective      Vitals:    12/01/22 1042   BP: 127/83   Pulse: 85   Resp: 18   Temp: 98.1 °F (36.7 °C)   SpO2: 92%          Physical Exam    CONSTITUTIONAL: pleasant well-developed adult woman  sitting up to edge of bed no distress  HEENT: no icterus, no thrush, moist membranes  LYMPH: no cervical or supraclavicular lad  CV: RRR, S1S2, no murmur  RESP: cta bilat, no wheezing, no rales  GI: soft, non-tender, no splenomegaly, +bs  MUSC: no edema, normal gait  NEURO: alert and oriented x3, normal strength  PSYCH: normal mood and affect    RECENT LABS:  Hematology Results from last 7 days   Lab Units 12/01/22  0607 11/30/22  0509 11/29/22  0534   WBC 10*3/mm3 6.09 5.79 6.05   HEMOGLOBIN g/dL 11.2* 11.9* 11.9*   HEMATOCRIT % 35.0 37.7 37.5   PLATELETS 10*3/mm3 237 238 240     2  Lab Results   Component Value Date    GLUCOSE 101 (H) 12/01/2022    BUN 13 12/01/2022    CREATININE 0.49 (L) 12/01/2022    EGFRIFNONA 85 12/08/2021    EGFRIFAFRI 98 12/08/2021    BCR 26.5 (H) 12/01/2022    CO2 28.1 12/01/2022    CALCIUM 8.9 12/01/2022    PROTENTOTREF 6.5 08/29/2022    ALBUMIN 4.60 11/28/2022    LABIL2 1.8 08/29/2022    AST 20 11/28/2022    ALT 9 11/28/2022       No results found for: IRON, TIBC, FERRITIN    No results found for: [id number]    No results found for: FOLATE   MRI cervical and thoracic spine reviewed- multiple findings but no evidence of metastatic disease    Assessment & Plan   *Progressive non-small cell lung cancer  • Initial  left upper lobectomy 2015  • New primary right lung treated with SBRT August 2021; new primary left lung treated with SBRT August 2021  • PET scan 11/9/2022 shows progression of disease with a pleural-based mass left upper lobe FDG avid new since 6/8/2022, new pleural-based nodule left lower lobe intensely avid, stable right hilar uptake favored to be benign, postradiation changes right lower lobe  • Guardant 360 peripheral blood molecular profile pending from 11/15/2022  • If no target gene mutations identified, plan is to treat the patient with immunotherapy/Keytruda     *Thoracic/left side back pain  • CT angiogram on admission showed no pulmonary embolism and stable malignancy findings compared to PET scan 11/9/2022  • CT thoracic spine on admission showed stable compression deformities T2, T3, T8, T10, with mild to moderate canal stenosis at T7-T8 due to calcifications, no obvious aggressive metastatic bone lesions seen  • MRI cervical and thoracic spine  -no evidence of metastatic disease to the spine     *History of head and neck cancer treated with surgery and radiation therapy     *Mild anemia-hemoglobin stable 11.9      oncology plan/recommendations:  1. Unclear if the patient's pain is related to nonmalignant back disease or possibly secondary to pleural/chest wall involvement on the left.  At home she states she was taking Norco 5/325 2 tablets every 6 hours as needed pain with poor control.  She could not sleep.  Recommended to begin long-acting pain medicine with OxyContin 20 mg every 12 hours scheduled, continue Norco 5/325 1-2 every 6 hours as needed breakthrough pain.  Pain control can be reassessed outpatient at the time of her follow-up.  2. I recommended to continue Senokot S2 at bid for narcotic induced constipation  3. Treatment of the patient's malignancy planned outpatient pending [employer] sequencing results  4.  No opposition to discharge from oncology perspective; she has oncology follow-up  scheduled.                  12/1/2022      CC:

## 2022-12-02 ENCOUNTER — APPOINTMENT (OUTPATIENT)
Dept: LAB | Facility: HOSPITAL | Age: 82
End: 2022-12-02
Payer: MEDICARE

## 2022-12-02 ENCOUNTER — TRANSITIONAL CARE MANAGEMENT TELEPHONE ENCOUNTER (OUTPATIENT)
Dept: CALL CENTER | Facility: HOSPITAL | Age: 82
End: 2022-12-02

## 2022-12-02 ENCOUNTER — OFFICE VISIT (OUTPATIENT)
Dept: ONCOLOGY | Facility: CLINIC | Age: 82
End: 2022-12-02

## 2022-12-02 VITALS
SYSTOLIC BLOOD PRESSURE: 152 MMHG | BODY MASS INDEX: 21.84 KG/M2 | WEIGHT: 118.7 LBS | TEMPERATURE: 97.1 F | HEART RATE: 89 BPM | OXYGEN SATURATION: 94 % | RESPIRATION RATE: 16 BRPM | HEIGHT: 62 IN | DIASTOLIC BLOOD PRESSURE: 68 MMHG

## 2022-12-02 DIAGNOSIS — C34.12 MALIGNANT NEOPLASM OF UPPER LOBE OF LEFT LUNG: Primary | ICD-10-CM

## 2022-12-02 DIAGNOSIS — Z79.899 LONG-TERM USE OF HIGH-RISK MEDICATION: ICD-10-CM

## 2022-12-02 PROCEDURE — 99215 OFFICE O/P EST HI 40 MIN: CPT | Performed by: NURSE PRACTITIONER

## 2022-12-02 RX ORDER — ONDANSETRON HYDROCHLORIDE 8 MG/1
8 TABLET, FILM COATED ORAL 3 TIMES DAILY PRN
Qty: 30 TABLET | Refills: 5 | Status: SHIPPED | OUTPATIENT
Start: 2022-12-02

## 2022-12-02 RX ORDER — LIDOCAINE AND PRILOCAINE 25; 25 MG/G; MG/G
CREAM TOPICAL
Qty: 30 G | Refills: 1 | Status: SHIPPED | OUTPATIENT
Start: 2022-12-02

## 2022-12-02 NOTE — OUTREACH NOTE
Prep Survey    Flowsheet Row Responses   [facility] patient discharged from? [place]   Is LACE score < 7 ? No   Emergency Room discharge w/ pulse ox? No   Eligibility [facility]   Date of Admission 11/28/22   Date of Discharge 12/01/22   Discharge Disposition Home or Self Care   Discharge diagnosis Intractable back pain,  COPD   Does the patient have one of the following disease processes/diagnoses(primary or secondary)? COPD   Does the patient have Home health ordered? No   Is there a DME ordered? No   Prep survey completed? Yes          LEORA JENNINGS - Registered Nurse

## 2022-12-02 NOTE — PROGRESS NOTES
[facility] Hematology/Oncology Treatment Plan Summary    Name: Darlene Pfeiffer  [facility]# [id number]  MD: Dr. Escobar    Diagnosis:     ICD-10-CM ICD-9-CM   1. Malignant neoplasm of upper lobe of left lung (HCC)  C34.12 162.3     Goal of treatment: disease control    Treatment Medication(s):   1. Keytruda    Frequency: every 21 days    Number of cycles: TBD    Starting on: 12/14/2022    Repeat after TBD cycles: CT Scan and PET Scan    Items for home use: Senokot-S (for constipation), Imodium AD (for diarrhea) and Thermometer    Rx written for: [x] Nausea    [] Pre-Treatment   EMLA cream to port site 30 minutes prior to access and ondansetron 8 mg by mouth every 8 hours as needed for nausea    Notes:     Next Steps: PORT     Completing Provider: DEJAN Del Cid           Date/time: 12/02/2022      Please note: You will be seen by a provider frequently with your treatment plan. This plan may change depending on many factors, if so, this will be discussed with you by your physician.  Last update 03/2022.

## 2022-12-02 NOTE — OUTREACH NOTE
Call Center TCM Note    Flowsheet Row Responses   [facility] patient discharged from? [place]   Does the patient have one of the following disease processes/diagnoses(primary or secondary)? COPD   TCM attempt successful? Yes  [Selvin and Santhosh]   Call start time 1417   Call end time 1419   Discharge diagnosis Intractable back pain,  COPD   Person spoke with today (if not patient) and relationship spouse   Meds reviewed with patient/caregiver? Yes   Is the patient having any side effects they believe may be caused by any medication additions or changes? No   Does the patient have all medications ordered at discharge? Yes   Is the patient taking all medications as directed (includes completed medication regime)? Yes   Comments Pt was on her way to appt at time of call   Does the patient have an appointment with their PCP within 7 days of discharge? No   Nursing Interventions --  [Spouse did not want to schedule hospital d/c f/u appt for patient. ]   Pulse Ox monitoring Intermittent   O2 Sat comments Unsure   Psychosocial issues? No   Did the patient receive a copy of their discharge instructions? Yes   Nursing interventions Reviewed instructions with patient   What is the patient's perception of their health status since discharge? Improving   Are the patient's immunizations up to date?  No   TCM call completed? Yes   Call end time 1419   Would this patient benefit from a Referral to Amb Social Work? No   Is the patient interested in additional calls from an ambulatory ?  NOTE:  applies to high risk patients requiring additional follow-up. No          Cecy Gil RN    12/2/2022, 14:20 EST

## 2022-12-02 NOTE — PROGRESS NOTES
TREATMENT  PREPARATION    Darlene Pfeiffer  [id number]  1940    Chief Complaint: Treatment preparation and needs assessment    History of present illness:  Darlene Pfeiffer is a 82 y.o. year old female who is here today for treatment preparation and needs assessment.  The patient has been diagnosed with   Encounter Diagnosis   Name Primary?   • Malignant neoplasm of upper lobe of left lung (HCC) Yes    and is scheduled to begin treatment with:     Oncology History:    Oncology/Hematology History   Lung cancer (HCC)   7/22/2021 Initial Diagnosis    Lung cancer (HCC)     12/13/2022 -  Chemotherapy    OP LUNG Pembrolizumab 200 mg         The current medication list and allergy list were reviewed and reconciled.     Past Medical History, Past Surgical History, Social History, Family History have been reviewed and are without significant changes except as mentioned.    Physical Exam:    Vitals:    12/02/22 1513   BP: 152/68   Pulse: 89   Resp: 16   Temp: 97.1 °F (36.2 °C)   SpO2: 94%     Vitals:    12/02/22 1513   PainSc: 0-No pain        ECOG score: 0         Physical Exam  Vitals reviewed.   Constitutional:       Appearance: She is normal weight.   HENT:      Head: Normocephalic.      Nose: Nose normal.      Mouth/Throat:      Mouth: Mucous membranes are moist.      Pharynx: Oropharynx is clear.   Eyes:      Conjunctiva/sclera: Conjunctivae normal.      Pupils: Pupils are equal, round, and reactive to light.   Cardiovascular:      Rate and Rhythm: Normal rate.      Heart sounds: Normal heart sounds.   Pulmonary:      Effort: Pulmonary effort is normal.   Abdominal:      General: Bowel sounds are normal.   Skin:     General: Skin is warm and dry.      Findings: No rash.   Neurological:      General: No focal deficit present.      Mental Status: She is alert and oriented to person, place, and time. Mental status is at baseline.      Motor: No weakness.   Psychiatric:         Mood and Affect: Mood normal.         Behavior:  Behavior normal.         Thought Content: Thought content normal.         Judgment: Judgment normal.           NEEDS ASSESSMENTS    Genetics  The patient's new diagnosis and family history have been reviewed for genetic counseling needs. A genetic referral is not recommended.     Psychosocial and Barriers to care  The patient has completed a PHQ-9 Depression Screening and the Distress Thermometer (DT) today.  PHQ-9 results show PHQ-2 Total Score: 0 PHQ-9 Total Score: PHQ-9 Total Score: 0     The patient scored their distress today as Distress Level: 0-No distress on a scale of 0-10 with 0 being no distress and 10 being extreme distress. Problems marked by the patient as being an issue for them within the last week include Physical concerns  Fatigue: Yes  Pain: Yes  Sleep: Yes .      Results were reviewed along with psychosocial resources offered by our cancer center.  Our Supportive Oncology team will be flagged for a score of 4 or above, and a same day call will be made for a score of 9 or 10.  A mental health referral is offered at that time. Patients who score less than 4 have been educated on our support services and can be referred to our  upon request.  The patient will not be referred to our .       Nutrition  The patient has completed the malnutrition screening today. They scored Malnutrition Screening Tool  Have you recently lost weight without trying?  If yes, how much weight have you lost?: 1--> Yes, 2-13 lbs  Have you been eating poorly because of a decreased appetite?: 1--> Yes  MST score: 2   with a score of 0-1 meaning not at risk in a score of 2 or greater meaning at risk.  Patients with a score of 3 or higher will be referred to our oncology dietitian for support. Patients beginning at risk treatment regimens or who have dietary concerns will also be referred to our oncology dietitian. The patient will be referred.     Functional Assessment  Persons who are age 70 or  greater will be screened for qualification of a comprehensive geriatric assessment by our survivorship nurse practitioner.  Older adults with cancer face unique challenges. These may include an increased risk of drug reactions, financial burdens, and caregiver stress. The patient scored G8 Questionnaire  Has food intake declined over the past 3 months due to loss of appetite, digestive problems, chewing or swallowing difficulties?: Moderate decrease in food intake  Weight loss during the last 3 months: Weight loss between 1 kg and 3 kg / 2.2 lbs and 6.6 lbs  Mobility: Goes out  Neuropsychological Problems: No psychological problems  Body Mass Index (BMI (weight in kg) / (height in m2)): BMI 19 to BMI < 21  Takes more than 3 medications a day: Yes, takes more than 3 prescription drugs per day  In comparison with other people of the same age, how does the patient consider his/her health status?: As good  Age: 80 to 85  Total Score: 10 . Patients scoring 14 or lower will referred for an older adult functional assessment with the survivorship advanced practice registered nurse to ensure all needed support is provided as patients plan for their treatments. The patient will not be referred.    Intravenous Access Assessment  The patient and I discussed planned intravenous chemo/biotherapy as well as other IV treatments that are often needed throughout the course of treatment. These may include, but are not limited to blood transfusions, antibiotics, and IV hydration. Discussed that depending on selected treatment and vein assessment, patient may require venous access device (VAD) which could include but not limited to a Mediport or PICC line. Risks and benefits of VADs reviewed. The patient will be treated via Port.    Reproductive/Sexual Activity   People should avoid becoming pregnant and should not get a partner pregnant while undergoing chemo/biotherapy.  People of childbearing age should use effective contraception  "during active therapy. The best recommendation for all people is to use a barrier method for a minimum of 1 week after the last infusion of chemo/biotherapy to prevent your partner being exposed to byproducts from treatment medications in bodily fluids. Effective contraception should be discussed with your oncology team to make sure it is safe to take based on your diagnosis. Possible options include oral contraceptives, barrier methods. Chemo/biotherapy can change your ability to reproduce children in the future.  There are options for fertility preservation. The patient will not be referred.    Advanced Care Planning  Advance Care Planning   The patient and I discussed advanced care planning, \"Conversations that Matter\".   This service is offered for development of advance directives with a certified ACP facilitator.  The patient does not have an up-to-date advanced directive. This document is not on file with our office. The patient is not interested in an appointment with one of our facilitators to create or update their advanced directives.     Smoking cessation  Tobacco Use: Medium Risk   • Smoking Tobacco Use: Former   • Smokeless Tobacco Use: Never   • Passive Exposure: Not on file       Patient and I discussed their tobacco use history. Referral will not be made for smoking cessation.      Palliative Care  When appropriate, the patient and I discussed the availability palliative care services and when appropriate Hospice care. Palliative care is not the same as Hospice care which was explained to the patient.  The patient is not interested in additional information from our  on these services.     Survivorship   When appropriate, we discussed that we will refer the patient to survivorship clinic to discuss next steps following completion of planned treatment.  Reviewed this visit will include assessment of your physical, psychological, functional, and spiritual needs as a survivor and the need " at attend this visit when scheduled.    TREATMENT EDUCATION    Today I met with the patient to discuss the chemo/biotherapy regimen recommended for treatment of Malignant neoplasm of upper lobe of left lung (HCC)  .  The patient was given explanation of treatment premed side effects including office policy that prohibits patients to drive if sedating medications are administered, MD explanation given regarding benefits, side effects, toxicities and goals of treatment.  The patient received a Chemotherapy/Biotherapy Plan Summary including diagnosis and explanation of specific treatment plan.    SIDE EFFECTS:  Common side effects were discussed with the patient and/or significant other.  Discussion included where applicable hair loss/discoloration, anemia/fatigue, infection/chills/fever, appetite, bleeding risk/precautions, constipation, diarrhea, mouth sores, taste alteration, loss of appetite, nausea/vomiting, peripheral neuropathy, skin/nail changes, rash, muscle aches/weakness, photosensitivity, weight gain/loss, hearing loss, dizziness, menopausal symptoms, menstrual irregularity, sterility, high blood pressure, heart damage, liver damage, lung damage, kidney damage, DVT/PE risk, fluid retention, pleural/pericardial effusion, somnolence, electrolyte/LFT imbalance, vein exercises and/or the possible need for vascular access/port placement.  The patient was advised that although uncommon, leakage of an infused medication from the vein or venous access device may lead to skin breakdown and/or other tissue damage.  The patient was advised that he/she may have pain, bleeding, and/or bruising from the insertion of a needle in their vein or venous access device (port).  The patient was further advised that, in spite of proper technique, infection with redness and irritation may rarely occur at the site where the needle was inserted.  The patient was advised that if complications occur, additional medical treatment is  available.  Finally, where applicable we have reviewed rare but potential immune mediated side effects including shortness of breath, cough, chest pain (pneumonitis), abdominal pain, diarrhea (colitis), thyroiditis (hypothyroid or hyperthyroid), hepatitis and liver dysfunction, nephritis and renal dysfunction.    Discussion also included side effects specific to drugs in the treatment plan, specifically:    Treatment Plans     Name Type Hold Status Plan Dates Plan Provider       Active    OP LUNG Pembrolizumab 200 mg ONCOLOGY TREATMENT On Automatic Hold  12/12/2022 - Present Henry Escobar MD                   Questions answered and additional information discussed on topics including:  Anemia and fatigue, immune mediated reactions       Assessment and Plan:    Diagnoses and all orders for this visit:    1. Malignant neoplasm of upper lobe of left lung (HCC) (Primary)      No orders of the defined types were placed in this encounter.        1. The patient and I have reviewed their diagnosis and scheduled treatment plan. Needs assessment was completed where applicable including genetics, psychosocial needs, barriers to care, VAD evaluation, advanced care planning, survivorship, and palliative care services where indicated. Referrals have been ordered as appropriate based upon evaluation today and patient desires.   2. Chemo/biotherapy teaching was completed today and consent obtained. See separate documentation for further details.  3. Adequate time was given to answer questions.  Patient made aware of their care team members and contact information if they have questions or problems throughout the treatment course.  4. Discussion held and written information provided describing frequency of office visits and ongoing monitoring throughout the treatment plan.     5. Reviewed with patient any prescribed medication sent to pharmacy.  Education provided regarding proper storage, safe handling, and proper disposal of  unused medication.  6. Proper handling of body fluids and waste discussed and written information provided.  7. If appropriate, patient had pretreatment labs drawn today.    Learning assessment completed at initial patient encounter. See separate flowsheet. Chemo/biotherapy education comprehension assessed at today's visit.    I spent 50 minutes caring for Darlene on this date of service. This time includes time spent by me in the following activities: preparing for the visit, reviewing tests, obtaining and/or reviewing a separately obtained history, performing a medically appropriate examination and/or evaluation, counseling and educating the patient/family/caregiver, ordering medications, tests, or procedures, documenting information in the medical record and care coordination.     Martha Rebolledo, APRN   12/02/22

## 2022-12-05 DIAGNOSIS — M54.9 INTRACTABLE BACK PAIN: ICD-10-CM

## 2022-12-05 RX ORDER — MORPHINE SULFATE 15 MG/1
15 TABLET, FILM COATED, EXTENDED RELEASE ORAL 2 TIMES DAILY
Qty: 60 TABLET | Refills: 0 | Status: SHIPPED | OUTPATIENT
Start: 2022-12-05 | End: 2022-12-08

## 2022-12-08 ENCOUNTER — PRE-ADMISSION TESTING (OUTPATIENT)
Dept: PREADMISSION TESTING | Facility: HOSPITAL | Age: 82
End: 2022-12-08

## 2022-12-08 VITALS
BODY MASS INDEX: 20.84 KG/M2 | SYSTOLIC BLOOD PRESSURE: 148 MMHG | RESPIRATION RATE: 16 BRPM | WEIGHT: 117.6 LBS | HEART RATE: 84 BPM | OXYGEN SATURATION: 96 % | TEMPERATURE: 98.5 F | DIASTOLIC BLOOD PRESSURE: 66 MMHG | HEIGHT: 63 IN

## 2022-12-08 LAB — REF LAB TEST METHOD: NORMAL

## 2022-12-08 RX ORDER — CHLORHEXIDINE GLUCONATE 500 MG/1
CLOTH TOPICAL
COMMUNITY
End: 2022-12-12 | Stop reason: HOSPADM

## 2022-12-08 RX ORDER — UMECLIDINIUM BROMIDE AND VILANTEROL TRIFENATATE 62.5; 25 UG/1; UG/1
1 POWDER RESPIRATORY (INHALATION) DAILY PRN
COMMUNITY

## 2022-12-08 NOTE — DISCHARGE INSTRUCTIONS
Take the following medications the morning of surgery:    METOPROLOL  INHALER AS NEEDED    ARRIVE AT 0630.      If you are on prescription narcotic pain medication to control your pain you may also take that medication the morning of surgery.    General Instructions:  Do not eat solid food after midnight the night before surgery.  You may drink clear liquids day of surgery but must stop at least one hour before your hospital arrival time.  It is beneficial for you to have a clear drink that contains carbohydrates the day of surgery.  We suggest a 12 to 20 ounce bottle of Gatorade or Powerade for non-diabetic patients or a 12 to 20 ounce bottle of G2 or Powerade Zero for diabetic patients. (Pediatric patients, are not advised to drink a 12 to 20 ounce carbohydrate drink)    Clear liquids are liquids you can see through.  Nothing red in color.     Plain water                               Sports drinks  Sodas                                   Gelatin (Jell-O)  Fruit juices without pulp such as white grape juice and apple juice  Popsicles that contain no fruit or yogurt  Tea or coffee (no cream or milk added)  Gatorade / Powerade  G2 / Powerade Zero    Infants may have breast milk up to four hours before surgery.  Infants drinking formula may drink formula up to six hours before surgery.   Patients who avoid smoking, chewing tobacco and alcohol for 4 weeks prior to surgery have a reduced risk of post-operative complications.  Quit smoking as many days before surgery as you can.  Do not smoke, use chewing tobacco or drink alcohol the day of surgery.   If applicable bring your C-PAP/ BI-PAP machine.  Bring any papers given to you in the doctor’s office.  Wear clean comfortable clothes.  Do not wear contact lenses, false eyelashes or make-up.  Bring a case for your glasses.   Bring crutches or walker if applicable.  Remove all piercings.  Leave jewelry and any other valuables at home.  Hair extensions with metal clips  must be removed prior to surgery.  The Pre-Admission Testing nurse will instruct you to bring medications if unable to obtain an accurate list in Pre-Admission Testing.        If you were given a blood bank ID arm band remember to bring it with you the day of surgery.    Preventing a Surgical Site Infection:  For 2 to 3 days before surgery, avoid shaving with a razor because the razor can irritate skin and make it easier to develop an infection.    Any areas of open skin can increase the risk of a post-operative wound infection by allowing bacteria to enter and travel throughout the body.  Notify your surgeon if you have any skin wounds / rashes even if it is not near the expected surgical site.  The area will need assessed to determine if surgery should be delayed until it is healed.  The night prior to surgery shower using a fresh bar of anti-bacterial soap (such as Dial) and clean washcloth.  Sleep in a clean bed with clean clothing.  Do not allow pets to sleep with you.  Shower on the morning of surgery using a fresh bar of anti-bacterial soap (such as Dial) and clean washcloth.  Dry with a clean towel and dress in clean clothing.  Ask your surgeon if you will be receiving antibiotics prior to surgery.  Make sure you, your family, and all healthcare providers clean their hands with soap and water or an alcohol based hand  before caring for you or your wound.    Day of surgery:  Your arrival time is approximately two hours before your scheduled surgery time.  Upon arrival, a Pre-op nurse and Anesthesiologist will review your health history, obtain vital signs, and answer questions you may have.  The only belongings needed at this time will be a list of your home medications and if applicable your C-PAP/BI-PAP machine.  A Pre-op nurse will start an IV and you may receive medication in preparation for surgery, including something to help you relax.     Please be aware that surgery does come with  discomfort.  We want to make every effort to control your discomfort so please discuss any uncontrolled symptoms with your nurse.   Your doctor will most likely have prescribed pain medications.      If you are going home after surgery you will receive individualized written care instructions before being discharged.  A responsible adult must drive you to and from the hospital on the day of your surgery and stay with you for 24 hours.  Discharge prescriptions can be filled by the hospital pharmacy during regular pharmacy hours.  If you are having surgery late in the day/evening your prescription may be e-prescribed to your pharmacy.  Please verify your pharmacy hours or chose a 24 hour pharmacy to avoid not having access to your prescription because your pharmacy has closed for the day.    If you are staying overnight following surgery, you will be transported to your hospital room following the recovery period.  [facility] has all private rooms.    If you have any questions please call Pre-Admission Testing at [phone].  Deductibles and co-payments are collected on the day of service. Please be prepared to pay the required co-pay, deductible or deposit on the day of service as defined by your plan.    Call your surgeon immediately if you experience any of the following symptoms:  Sore Throat  Shortness of Breath or difficulty breathing  Cough  Chills  Body soreness or muscle pain  Headache  Fever  New loss of taste or smell  Do not arrive for your surgery ill.  Your procedure will need to be rescheduled to another time.  You will need to call your physician before the day of surgery to avoid any unnecessary exposure to hospital staff as well as other patients.      CHLORHEXIDINE CLOTH INSTRUCTIONS  The morning of surgery follow these instructions using the Chlorhexidine cloths you've been given.  These steps reduce bacteria on the body.  Do not use the cloths near your eyes, ears mouth,  genitalia or on open wounds.  Throw the cloths away after use but do not try to flush them down a toilet.      Open and remove one cloth at a time from the package.    Leave the cloth unfolded and begin the bathing.  Massage the skin with the cloths using gentle pressure to remove bacteria.  Do not scrub harshly.   Follow the steps below with one 2% CHG cloth per area (6 total cloths).  One cloth for neck, shoulders and chest.  One cloth for both arms, hands, fingers and underarms (do underarms last).  One cloth for the abdomen followed by groin.  One cloth for right leg and foot including between the toes.  One cloth for left leg and foot including between the toes.  The last cloth is to be used for the back of the neck, back and buttocks.    Allow the CHG to air dry 3 minutes on the skin which will give it time to work and decrease the chance of irritation.  The skin may feel sticky until it is dry.  Do not rinse with water or any other liquid or you will lose the beneficial effects of the CHG.  If mild skin irritation occurs, do rinse the skin to remove the CHG.  Report this to the nurse at time of admission.  Do not apply lotions, creams, ointments, deodorants or perfumes after using the clothes. Dress in clean clothes before coming to the hospital.

## 2022-12-12 ENCOUNTER — APPOINTMENT (OUTPATIENT)
Dept: GENERAL RADIOLOGY | Facility: HOSPITAL | Age: 82
End: 2022-12-12

## 2022-12-12 ENCOUNTER — ANESTHESIA (OUTPATIENT)
Dept: PERIOP | Facility: HOSPITAL | Age: 82
End: 2022-12-12

## 2022-12-12 ENCOUNTER — READMISSION MANAGEMENT (OUTPATIENT)
Dept: CALL CENTER | Facility: HOSPITAL | Age: 82
End: 2022-12-12

## 2022-12-12 ENCOUNTER — ANESTHESIA EVENT (OUTPATIENT)
Dept: PERIOP | Facility: HOSPITAL | Age: 82
End: 2022-12-12

## 2022-12-12 ENCOUNTER — HOSPITAL ENCOUNTER (OUTPATIENT)
Facility: HOSPITAL | Age: 82
Setting detail: HOSPITAL OUTPATIENT SURGERY
Discharge: HOME | End: 2022-12-12
Attending: SURGERY | Admitting: SURGERY

## 2022-12-12 VITALS
TEMPERATURE: 97.7 F | DIASTOLIC BLOOD PRESSURE: 65 MMHG | OXYGEN SATURATION: 97 % | WEIGHT: 117.6 LBS | SYSTOLIC BLOOD PRESSURE: 160 MMHG | HEIGHT: 63 IN | HEART RATE: 71 BPM | BODY MASS INDEX: 20.84 KG/M2 | RESPIRATION RATE: 18 BRPM

## 2022-12-12 PROCEDURE — 25010000002 CEFAZOLIN IN DEXTROSE 2-4 GM/100ML-% SOLUTION: Performed by: STUDENT IN AN ORGANIZED HEALTH CARE EDUCATION/TRAINING PROGRAM

## 2022-12-12 PROCEDURE — 0 LIDOCAINE 1 % SOLUTION 20 ML VIAL: Performed by: SURGERY

## 2022-12-12 PROCEDURE — 25010000002 ONDANSETRON PER 1 MG: Performed by: NURSE ANESTHETIST, CERTIFIED REGISTERED

## 2022-12-12 PROCEDURE — C1788 PORT, INDWELLING, IMP: HCPCS | Performed by: SURGERY

## 2022-12-12 PROCEDURE — 25010000002 HEPARIN (PORCINE) PER 1000 UNITS: Performed by: SURGERY

## 2022-12-12 PROCEDURE — 25010000002 FENTANYL CITRATE (PF) 50 MCG/ML SOLUTION: Performed by: ANESTHESIOLOGY

## 2022-12-12 PROCEDURE — 76000 FLUOROSCOPY <1 HR PHYS/QHP: CPT

## 2022-12-12 PROCEDURE — 25010000002 PROPOFOL 500 MG/50ML EMULSION: Performed by: NURSE ANESTHETIST, CERTIFIED REGISTERED

## 2022-12-12 PROCEDURE — 25010000002 FENTANYL CITRATE (PF) 50 MCG/ML SOLUTION: Performed by: NURSE ANESTHETIST, CERTIFIED REGISTERED

## 2022-12-12 DEVICE — IMPLANTABLE DEVICE: Type: IMPLANTABLE DEVICE | Site: CHEST | Status: FUNCTIONAL

## 2022-12-12 RX ORDER — MIDAZOLAM HYDROCHLORIDE 1 MG/ML
0.5 INJECTION INTRAMUSCULAR; INTRAVENOUS
Status: DISCONTINUED | OUTPATIENT
Start: 2022-12-12 | End: 2022-12-12 | Stop reason: HOSPADM

## 2022-12-12 RX ORDER — HYDRALAZINE HYDROCHLORIDE 20 MG/ML
5 INJECTION INTRAMUSCULAR; INTRAVENOUS
Status: DISCONTINUED | OUTPATIENT
Start: 2022-12-12 | End: 2022-12-12 | Stop reason: HOSPADM

## 2022-12-12 RX ORDER — FENTANYL CITRATE 50 UG/ML
INJECTION, SOLUTION INTRAMUSCULAR; INTRAVENOUS AS NEEDED
Status: DISCONTINUED | OUTPATIENT
Start: 2022-12-12 | End: 2022-12-12 | Stop reason: SURG

## 2022-12-12 RX ORDER — NALOXONE HCL 0.4 MG/ML
0.2 VIAL (ML) INJECTION AS NEEDED
Status: DISCONTINUED | OUTPATIENT
Start: 2022-12-12 | End: 2022-12-12 | Stop reason: HOSPADM

## 2022-12-12 RX ORDER — PROMETHAZINE HYDROCHLORIDE 25 MG/1
25 SUPPOSITORY RECTAL ONCE AS NEEDED
Status: DISCONTINUED | OUTPATIENT
Start: 2022-12-12 | End: 2022-12-12 | Stop reason: HOSPADM

## 2022-12-12 RX ORDER — PROMETHAZINE HYDROCHLORIDE 25 MG/1
25 TABLET ORAL ONCE AS NEEDED
Status: DISCONTINUED | OUTPATIENT
Start: 2022-12-12 | End: 2022-12-12 | Stop reason: HOSPADM

## 2022-12-12 RX ORDER — HYDROMORPHONE HYDROCHLORIDE 1 MG/ML
0.5 INJECTION, SOLUTION INTRAMUSCULAR; INTRAVENOUS; SUBCUTANEOUS
Status: DISCONTINUED | OUTPATIENT
Start: 2022-12-12 | End: 2022-12-12 | Stop reason: HOSPADM

## 2022-12-12 RX ORDER — ONDANSETRON 2 MG/ML
4 INJECTION INTRAMUSCULAR; INTRAVENOUS ONCE AS NEEDED
Status: DISCONTINUED | OUTPATIENT
Start: 2022-12-12 | End: 2022-12-12 | Stop reason: HOSPADM

## 2022-12-12 RX ORDER — ONDANSETRON 2 MG/ML
INJECTION INTRAMUSCULAR; INTRAVENOUS AS NEEDED
Status: DISCONTINUED | OUTPATIENT
Start: 2022-12-12 | End: 2022-12-12 | Stop reason: SURG

## 2022-12-12 RX ORDER — CEFAZOLIN SODIUM 2 G/100ML
2 INJECTION, SOLUTION INTRAVENOUS ONCE
Status: COMPLETED | OUTPATIENT
Start: 2022-12-12 | End: 2022-12-12

## 2022-12-12 RX ORDER — LABETALOL HYDROCHLORIDE 5 MG/ML
5 INJECTION, SOLUTION INTRAVENOUS
Status: DISCONTINUED | OUTPATIENT
Start: 2022-12-12 | End: 2022-12-12 | Stop reason: HOSPADM

## 2022-12-12 RX ORDER — DIPHENHYDRAMINE HYDROCHLORIDE 50 MG/ML
12.5 INJECTION INTRAMUSCULAR; INTRAVENOUS
Status: DISCONTINUED | OUTPATIENT
Start: 2022-12-12 | End: 2022-12-12 | Stop reason: HOSPADM

## 2022-12-12 RX ORDER — SODIUM CHLORIDE 0.9 % (FLUSH) 0.9 %
3 SYRINGE (ML) INJECTION EVERY 12 HOURS SCHEDULED
Status: DISCONTINUED | OUTPATIENT
Start: 2022-12-12 | End: 2022-12-12 | Stop reason: HOSPADM

## 2022-12-12 RX ORDER — HEPARIN SODIUM 1000 [USP'U]/ML
INJECTION, SOLUTION INTRAVENOUS; SUBCUTANEOUS AS NEEDED
Status: DISCONTINUED | OUTPATIENT
Start: 2022-12-12 | End: 2022-12-12 | Stop reason: HOSPADM

## 2022-12-12 RX ORDER — FENTANYL CITRATE 50 UG/ML
50 INJECTION, SOLUTION INTRAMUSCULAR; INTRAVENOUS
Status: DISCONTINUED | OUTPATIENT
Start: 2022-12-12 | End: 2022-12-12 | Stop reason: HOSPADM

## 2022-12-12 RX ORDER — SODIUM CHLORIDE 0.9 % (FLUSH) 0.9 %
3-10 SYRINGE (ML) INJECTION AS NEEDED
Status: DISCONTINUED | OUTPATIENT
Start: 2022-12-12 | End: 2022-12-12 | Stop reason: HOSPADM

## 2022-12-12 RX ORDER — PROPOFOL 10 MG/ML
INJECTION, EMULSION INTRAVENOUS CONTINUOUS PRN
Status: DISCONTINUED | OUTPATIENT
Start: 2022-12-12 | End: 2022-12-12 | Stop reason: SURG

## 2022-12-12 RX ORDER — LIDOCAINE HYDROCHLORIDE 20 MG/ML
INJECTION, SOLUTION INFILTRATION; PERINEURAL AS NEEDED
Status: DISCONTINUED | OUTPATIENT
Start: 2022-12-12 | End: 2022-12-12 | Stop reason: SURG

## 2022-12-12 RX ORDER — OXYCODONE AND ACETAMINOPHEN 7.5; 325 MG/1; MG/1
1 TABLET ORAL EVERY 4 HOURS PRN
Status: DISCONTINUED | OUTPATIENT
Start: 2022-12-12 | End: 2022-12-12 | Stop reason: HOSPADM

## 2022-12-12 RX ORDER — FAMOTIDINE 10 MG/ML
20 INJECTION, SOLUTION INTRAVENOUS ONCE
Status: COMPLETED | OUTPATIENT
Start: 2022-12-12 | End: 2022-12-12

## 2022-12-12 RX ORDER — EPHEDRINE SULFATE 50 MG/ML
5 INJECTION, SOLUTION INTRAVENOUS ONCE AS NEEDED
Status: DISCONTINUED | OUTPATIENT
Start: 2022-12-12 | End: 2022-12-12 | Stop reason: HOSPADM

## 2022-12-12 RX ORDER — HYDROCODONE BITARTRATE AND ACETAMINOPHEN 7.5; 325 MG/1; MG/1
1 TABLET ORAL ONCE AS NEEDED
Status: DISCONTINUED | OUTPATIENT
Start: 2022-12-12 | End: 2022-12-12 | Stop reason: HOSPADM

## 2022-12-12 RX ORDER — LIDOCAINE HYDROCHLORIDE 10 MG/ML
0.5 INJECTION, SOLUTION EPIDURAL; INFILTRATION; INTRACAUDAL; PERINEURAL ONCE AS NEEDED
Status: DISCONTINUED | OUTPATIENT
Start: 2022-12-12 | End: 2022-12-12 | Stop reason: HOSPADM

## 2022-12-12 RX ORDER — SODIUM CHLORIDE, SODIUM LACTATE, POTASSIUM CHLORIDE, CALCIUM CHLORIDE 600; 310; 30; 20 MG/100ML; MG/100ML; MG/100ML; MG/100ML
9 INJECTION, SOLUTION INTRAVENOUS CONTINUOUS
Status: DISCONTINUED | OUTPATIENT
Start: 2022-12-12 | End: 2022-12-12 | Stop reason: HOSPADM

## 2022-12-12 RX ORDER — DIPHENHYDRAMINE HCL 25 MG
25 CAPSULE ORAL
Status: DISCONTINUED | OUTPATIENT
Start: 2022-12-12 | End: 2022-12-12 | Stop reason: HOSPADM

## 2022-12-12 RX ADMIN — FENTANYL CITRATE 25 MCG: 0.05 INJECTION, SOLUTION INTRAMUSCULAR; INTRAVENOUS at 10:24

## 2022-12-12 RX ADMIN — FAMOTIDINE 20 MG: 10 INJECTION INTRAVENOUS at 08:09

## 2022-12-12 RX ADMIN — ONDANSETRON 4 MG: 2 INJECTION INTRAMUSCULAR; INTRAVENOUS at 10:51

## 2022-12-12 RX ADMIN — FENTANYL CITRATE 50 MCG: 50 INJECTION INTRAMUSCULAR; INTRAVENOUS at 08:09

## 2022-12-12 RX ADMIN — SODIUM CHLORIDE, POTASSIUM CHLORIDE, SODIUM LACTATE AND CALCIUM CHLORIDE 9 ML/HR: 600; 310; 30; 20 INJECTION, SOLUTION INTRAVENOUS at 08:09

## 2022-12-12 RX ADMIN — CEFAZOLIN SODIUM 2 G: 2 INJECTION, SOLUTION INTRAVENOUS at 10:03

## 2022-12-12 RX ADMIN — PROPOFOL 100 MCG/KG/MIN: 10 INJECTION, EMULSION INTRAVENOUS at 10:24

## 2022-12-12 RX ADMIN — LIDOCAINE HYDROCHLORIDE 60 MG: 20 INJECTION, SOLUTION INFILTRATION; PERINEURAL at 10:21

## 2022-12-12 RX ADMIN — FENTANYL CITRATE 25 MCG: 0.05 INJECTION, SOLUTION INTRAMUSCULAR; INTRAVENOUS at 10:21

## 2022-12-12 NOTE — ANESTHESIA PREPROCEDURE EVALUATION
Anesthesia Evaluation     Patient summary reviewed and Nursing notes reviewed   no history of anesthetic complications:  NPO Solid Status: > 8 hours  NPO Liquid Status: > 8 hours           Airway   Mallampati: II  Dental      Pulmonary - normal exam   (+) lung cancer, COPD, asthma,shortness of breath,   Cardiovascular - normal exam    (+) hypertension 2 medications or greater, hyperlipidemia,       Neuro/Psych- negative ROS  GI/Hepatic/Renal/Endo    (+)  GERD,  thyroid problem     Musculoskeletal     Abdominal    Substance History      OB/GYN          Other      history of cancer remission                      Anesthesia Plan    ASA 3     MAC     intravenous induction     Anesthetic plan, risks, benefits, and alternatives have been provided, discussed and informed consent has been obtained with: patient.    Plan discussed with CRNA.        CODE STATUS:

## 2022-12-12 NOTE — ANESTHESIA POSTPROCEDURE EVALUATION
Patient: Darlene Pfeiffer    Procedure Summary     Date: 12/12/22 Room / Location: [facility] OR  / [facility] MAIN OR    Anesthesia Start: 1013 Anesthesia Stop: 1115    Procedure: MEDIPORT PLACEMENT Diagnosis:     Surgeons: Jason Villaseñor MD Provider: Nando Garcia MD    Anesthesia Type: MAC ASA Status: 3          Anesthesia Type: MAC    Vitals  Vitals Value Taken Time   /90 12/12/22 1115   Temp 36.5 °C (97.7 °F) 12/12/22 1115   Pulse 72 12/12/22 1123   Resp 18 12/12/22 1115   SpO2 96 % 12/12/22 1123   Vitals shown include unvalidated device data.        Post Anesthesia Care and Evaluation    Patient location during evaluation: PACU  Patient participation: complete - patient participated  Level of consciousness: awake and alert  Pain management: adequate    Airway patency: patent  Anesthetic complications: No anesthetic complications    Cardiovascular status: acceptable  Respiratory status: acceptable  Hydration status: acceptable    Comments: ---------------------------               12/12/22                      1115         ---------------------------   BP:          115/90         Pulse:         81           Resp:          18           Temp:   36.5 °C (97.7 °F)   SpO2:          99%         ---------------------------

## 2022-12-12 NOTE — OUTREACH NOTE
COPD/PN Week 2 Survey    Flowsheet Row Responses   [facility] facility patient discharged from? [place]   Does the patient have one of the following disease processes/diagnoses(primary or secondary)? COPD   Week 2 attempt successful? No   Unsuccessful attempts Attempt 1   Revoke Readmitted          DHARA H - Registered Nurse

## 2022-12-12 NOTE — DISCHARGE INSTRUCTIONS
[facility]  Ebenezer Bolivar, Kasi Montes De Oca Scherrer, [place], [place]  [address]  [place] [id number] [phone]  Discharge Instructions for Port Placement    Go home, rest and take it easy today.    You may experience some dizziness or memory loss from the anesthesia.  This may last for the next 24 hours.  Someone should plan on staying with you for the first 24 hours for your safety.  Do not make any important legal decisions or sign any legal papers for the next 24 hours.    Eat and drink lightly today.  Start off with liquids, jello, soup, crackers or other bland foods at first. You may advance your diet tomorrow as tolerated as long as you do not experience any nausea or vomiting.   If skin glue was used, your incision will be open to air.  No care is required.  If you have a dressing, you may remove it in 2-3 days or until your first treatment whichever comes first. If you have the little white tapes known as steri-strips, leave them alone.  They usually fall off in 1-2 weeks.  Do not worry if they come off sooner.   You may notice some bleeding/drainage. A little bloody drainage is normal.  Some bruising is also normal.  You may shower tomorrow.  No tub baths until your incisions are completely healed.  You have received a prescription for a narcotic pain medicine, as you may have some pain/discomfort following surgery.   You will not be totally pain free, but your pain medicine should make the pain tolerable.  Please take your pain medicine as prescribed and always take your pills with food to prevent nausea. If you are having severe pain that cannot be controlled by the pain medicine, please contact me.  If the pain is such that narcotic pain medicine is not required, you may take Tylenol or Ibuprofen as directed unless indicated otherwise.    No driving for 24 hours and for as long as you are taking your prescription pain medicine.      Remember to contact me for  any of the following:    Fever> 101 degrees  Severe pain that cannot be controlled by taking your pain pills  Severe nausea or vomiting   Significant bleeding from your incision  Drainage that has a bad smell or is yellow or green in appearance  Any other questions or concerns

## 2022-12-12 NOTE — H&P
Name: Darlene Pfeiffer ADMIT: 2022   : 1940  PCP: Kehrer, Meredith Lea, MD    MRN: [id number] LOS: 0 days   AGE/SEX: 82 y.o. female  ROOM: [facility]    Pre-operative H&P    Patient Care Team:  Kehrer, Meredith Lea, MD as PCP - General (Family Medicine)  Linker, Arias BROWN III, MD as Referring Physician (Thoracic Surgery)  Henry Escobar MD as Consulting Physician (Hematology and Oncology)  Moise Mcdonough MD as Consulting Physician (Radiation Oncology)  No chief complaint on file.    CC: Recurrent lung cancer    History of Present Illness  82-year-old woman with prior history of tongue cancer and subsequent history of left lung cancer , now with recurrence.  [place] to require Mediport for IV access for chemotherapy.    Review of Systems   Respiratory: Positive for shortness of breath.    Cardiovascular: Negative.    Musculoskeletal: Positive for arthralgias, back pain, myalgias, neck pain and neck stiffness.   Neurological:        Mild radicular symptoms left upper extremity   Hematological: Negative.    All other systems reviewed and are negative.    Past Medical History:   Diagnosis Date   • Allergic rhinitis    • Asthma    • Cancer of floor of mouth (HCC)    • Cerebral aneurysm    • COPD (chronic obstructive pulmonary disease) (HCC)    • COVID    • Esophageal reflux    • History of adenocarcinoma of lung    • History of anemia    • History of carcinoma in situ of skin    • History of lung cancer    • 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 from [facility]   • History of squamous cell carcinoma     Tongue   • Hyperlipidemia    • Hypertension     since early 60s   • Low vitamin B12 level    • Lung cancer (HCC)    • Thyromegaly    • UTI (urinary tract infection)    • Vitamin D deficiency      Past Surgical History:   Procedure Laterality Date   • BRONCHOSCOPY Bilateral 10/17/2022    Procedure: BRONCHOSCOPY WITH FLUORO with biopsy  and BAL;  Surgeon: India Flores MD;  Location: [facility] ENDOSCOPY;  Service: Pulmonary;  Laterality: Bilateral;  PRE/POST - lung mass   • CHOLECYSTECTOMY     • COLONOSCOPY     • EMBOLIZATION CEREBRAL Left 2022    Procedure: 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;  Surgeon: Bradley Dowell MD;  Location: [facility] HYBRID OR ;  Service: Interventional Radiology;  Laterality: Left;   • EYE SURGERY  2020    Took a muscle out of right eye   • GLOSSECTOMY PARTIAL      less than one half tongue   • LUNG SURGERY     • ORAL LESION EXCISION/BIOPSY       and 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   • TUBAL ABDOMINAL LIGATION       Family History   Problem Relation Age of Onset   • Ovarian cancer Mother          at age 27   • No Known Problems Father    • Ovarian cancer Sister    • Colon cancer Brother    • No Known Problems Other    • Malig Hyperthermia Neg Hx        Social History     Tobacco Use   • Smoking status: Former     Types: Cigarettes     Quit date: 2015     Years since quittin.8   • Smokeless tobacco: Never   • Tobacco comments:     less than a pack per day, no smoking since , Quit 2015   Vaping Use   • Vaping Use: Never used   Substance Use Topics   • Alcohol use: Yes     Comment: Socially -A few times a month   • Drug use: No     Medications Prior to Admission   Medication Sig Dispense Refill Last Dose   • amLODIPine (NORVASC) 5 MG tablet Take 1 tablet by mouth Daily. 90 tablet 0 2022 at 0700   • atorvastatin (LIPITOR) 20 MG tablet Take 1 tablet by mouth Every Night. 90 tablet 3 2022 at 2000   • B Complex-C-E-Zn (b complex-C-E-zinc) tablet Take 1 tablet by mouth Daily. HOLDING FOR DOS   2022 at 0700   • cetirizine (ZyrTEC) 10 MG tablet Take 10 mg by mouth Daily.   2022 at 0700   • chlorhexidine (PERIDEX) 0.12 % solution Apply 15 mL to the mouth or throat 2 (Two) Times a Day.   2022 at 2000   • Chlorhexidine  Gluconate Cloth 2 % pads Apply  topically.   12/12/2022   • cholecalciferol (VITAMIN D3) 1000 units tablet Take 1,000 Units by mouth Daily. HOLDING FOR DOS   12/11/2022 at 0700   • clobetasol (TEMOVATE) 0.05 % cream APPLY TOPICALLY TO THE AFFECTED AREA TWICE DAILY AS NEEDED   Past Month   • fluticasone (FLONASE) 50 MCG/ACT nasal spray 2 sprays into the nostril(s) as directed by provider As Needed.   Past Month   • hydroCHLOROthiazide (HYDRODIURIL) 25 MG tablet Take 0.5 tablets by mouth Daily.   12/11/2022 at 0700`   • HYDROcodone-acetaminophen (NORCO) 5-325 MG per tablet Take 1 tablet by mouth Every 4 (Four) Hours As Needed for Moderate Pain. 100 tablet 0 12/12/2022 at 0000   • Ibuprofen (ADVIL PO) Take 400 mg by mouth Daily As Needed. HOLD PER MD KAYE   Past Month   • metoprolol succinate XL (TOPROL-XL) 25 MG 24 hr tablet TAKE 1 TABLET EVERY DAY (Patient taking differently: Take 25 mg by mouth Daily.) 90 tablet 1 12/12/2022 at 0430   • montelukast (SINGULAIR) 10 MG tablet Take 1 tablet by mouth Every Night. 90 tablet 3 12/11/2022 at 2000   • omeprazole (priLOSEC) 40 MG capsule Take 40 mg by mouth Daily.   12/11/2022 at 0700`   • acetaminophen (TYLENOL) 325 MG tablet Take 2 tablets by mouth Every 4 (Four) Hours As Needed for Mild Pain .      • albuterol sulfate  (90 Base) MCG/ACT inhaler Inhale 2 puffs Every 4 (Four) Hours As Needed for Wheezing. 18 g 2 More than a month   • HYDROcod Polst-CPM Polst ER (Tussionex Pennkinetic ER) 10-8 MG/5ML ER suspension Take 5 mL by mouth Every 12 (Twelve) Hours As Needed for Cough. 120 mL 0 More than a month   • lidocaine-prilocaine (EMLA) 2.5-2.5 % cream Apply nickel size amount to port site 30 min before appt time do not rub in cover with plastic wrap (Patient taking differently: 1 application As Needed. Apply nickel size amount to port site 30 min before appt time do not rub in cover with plastic wrap) 30 g 1    • ondansetron (ZOFRAN) 8 MG tablet Take 1 tablet by mouth  3 (Three) Times a Day As Needed for Nausea or Vomiting. 30 tablet 5    • sennosides-docusate (PERICOLACE) 8.6-50 MG per tablet Take 2 tablets by mouth 2 (Two) Times a Day for 30 days. 120 tablet 0    • Umeclidinium-Vilanterol (Anoro Ellipta) 62.5-25 MCG/ACT aerosol powder  inhaler Inhale 1 puff Daily As Needed.   More than a month     ceFAZolin, 2 g, Intravenous, Once  sodium chloride, 3 mL, Intravenous, Q12H    Sulfa antibiotics    Objective     Physical Exam:  Physical Exam  Vitals reviewed.   Constitutional:       General: She is not in acute distress.     Appearance: Normal appearance. She is normal weight. She is not ill-appearing, toxic-appearing or diaphoretic.   HENT:      Head: Normocephalic and atraumatic.      Right Ear: External ear normal.      Left Ear: External ear normal.      Nose: Nose normal.      Mouth/Throat:      Mouth: Mucous membranes are dry.   Eyes:      Extraocular Movements: Extraocular movements intact.      Conjunctiva/sclera: Conjunctivae normal.      Pupils: Pupils are equal, round, and reactive to light.   Neck:      Vascular: No carotid bruit.   Cardiovascular:      Rate and Rhythm: Normal rate and regular rhythm.      Heart sounds: Normal heart sounds.   Pulmonary:      Effort: Pulmonary effort is normal.      Breath sounds: Normal breath sounds.   Abdominal:      General: There is no distension.      Palpations: Abdomen is soft.      Tenderness: There is no abdominal tenderness. There is no guarding.   Musculoskeletal:         General: Normal range of motion.      Cervical back: Normal range of motion and neck supple. No rigidity.   Skin:     General: Skin is warm and dry.      Capillary Refill: Capillary refill takes less than 2 seconds.      Coloration: Skin is not jaundiced.      Findings: No bruising.   Neurological:      General: No focal deficit present.      Mental Status: She is alert and oriented to person, place, and time.      Cranial Nerves: No cranial nerve deficit.  "  Psychiatric:         Mood and Affect: Mood normal.         Behavior: Behavior normal.         Thought Content: Thought content normal.         Judgment: Judgment normal.     Vital Signs and Labs:  Vital Signs   Patient Vitals for the past 24 hrs:   BP Temp Temp src Pulse Resp SpO2 Height Weight   12/12/22 0739 167/67 97.8 °F (36.6 °C) Oral 72 16 96 % 160 cm (62.99\") 53.3 kg (117 lb 9.6 oz)     BMI:  Body mass index is 20.84 kg/m².    CBC      BMP     Cr Clearance Estimated Creatinine Clearance: 77 mL/min (A) (by C-G formula based on SCr of 0.49 mg/dL (L)).    Active Hospital Problems    Diagnosis  POA   • History of lung cancer [Z85.118]  Not Applicable   • Lung cancer (HCC) [C34.90]  Yes   • Chronic obstructive pulmonary disease (HCC) [J44.9]  Yes      Resolved Hospital Problems   No resolved problems to display.     Assessment & Plan       Chronic obstructive pulmonary disease (HCC)    Lung cancer (HCC)    History of lung cancer    82 y.o. female with recurrent lung cancer, felt to require Mediport placement.  Discussed nature of Mediport and its placement, including benefits, risks and alternatives.  Questions answered.    I discussed the patients findings and my recommendations with patient and family.    Jason Villaseñor MD  12/12/22  10:09 EST    Please call my office with any question: [phone]    "

## 2022-12-12 NOTE — OP NOTE
Operative Note  Location: [facility]  Date of Admission:  12/12/2022  OR Date: 12/12/2022    Pre-op Diagnosis:  Lung cancer    Post-op Diagnosis:  Lung cancer    Procedure:   1.  Ultrasound-guided access to the right internal jugular vein  2.  Placement of right jugular Mediport  3.  Radiologic supervision and interpretation of catheter tip placement    Surgeon: Jason Villaseñor MD    Assistant: Torri Haddad RN    Anesthesia: Monitored Anesthesia Care with local    Staff:   Circulator: Jayda Rizvi RN  Radiology Technologist: Pam Abreu  Scrub Person: Monika Rizvi  Assistant: Torri Haddad    Estimated Blood Loss: 0 mL    Specimen: None    Complications: None    Findings: Right internal jugular vein is patent and compressible.  Successful placement of jugular vein Mediport.  Catheter aspirates and flushes easily.  No pneumothorax.    Implants:   Implant Name Type Inv. Item Serial No.  Lot No. LRB No. Used Action   PRT INTRO VLV VORTEX NON/FILL/SUT/HL ATT/POLYURET/CATH 8F - [id number] Implant PRT INTRO VLV VORTEX NON/FILL/SUT/HL ATT/POLYURET/CATH 8F  [employer] [id number] N/A 1 Implanted     Indications: 82-year-old woman identified with recurrent lung cancer.  Felt to require a Mediport for central venous access.       Procedure:  The patient was given Kefzol IV.  She was transferred to the operating room and positioned supine the operating table.  IV sedation was administered.  The right internal jugular vein was interrogated with B-mode ultrasound and seen to be patent and compressible.  The neck and chest were then prepared with ChloraPrep and draped in a sterile fashion.  After infiltrating the overlying skin with local anesthetic, the right internal jugular vein was cannulated under ultrasound guidance.  Dark venous blood was aspirated.  Micropuncture wire was passed through the needle and then the wire was used to exchange the needle for a micropuncture catheter.  A standard guidewire was  passed through the micropuncture catheter and its position in the superior vena cava was confirmed with fluoroscopy.  A subcutaneous pocket was planned, infiltrated with local anesthetic, and created with a small transverse right pectoral incision.  The pocket was seen to accommodate the reservoir well.  A subcutaneous tunnel was proposed, infiltrated and created.  The reservoir was positioned in the pocket, and catheter length was estimated.  The catheter was trimmed.  The dilator and sheath were passed over the wire and the wire and dilator were removed.  The catheter was passed through the sheath and the sheath was withdrawn.  The reservoir was cannulated with a noncoring needle.  Dark venous blood was aspirated and the reservoir was flushed first with heparinized saline and then with locking heparin.  The reservoir was sutured to the underlying tissue with Prolene sutures.  Hemostasis was seen to be complete.  The wounds were closed in layers with Vicryl sutures.  Dermal adhesive was applied.  Final fluoroscopic survey documented satisfactory catheter position and careful evaluation of the lung field showed no pneumothorax.  At its conclusion the patient had tolerated the procedure well, and without apparent complications.  Sponge and needle counts were correct.  The patient was taken to the recovery area in stable condition.    Jason Villaseñor MD     Date: 12/12/2022  Time: 11:13 EST

## 2022-12-13 PROBLEM — Z45.2 FITTING AND ADJUSTMENT OF VASCULAR CATHETER: Status: ACTIVE | Noted: 2022-12-13

## 2022-12-13 RX ORDER — SODIUM CHLORIDE 0.9 % (FLUSH) 0.9 %
10 SYRINGE (ML) INJECTION AS NEEDED
Status: CANCELLED | OUTPATIENT
Start: 2022-12-13

## 2022-12-13 RX ORDER — HEPARIN SODIUM (PORCINE) LOCK FLUSH IV SOLN 100 UNIT/ML 100 UNIT/ML
500 SOLUTION INTRAVENOUS AS NEEDED
Status: CANCELLED | OUTPATIENT
Start: 2022-12-13

## 2022-12-14 ENCOUNTER — OFFICE VISIT (OUTPATIENT)
Dept: ONCOLOGY | Facility: CLINIC | Age: 82
End: 2022-12-14

## 2022-12-14 ENCOUNTER — INFUSION (OUTPATIENT)
Dept: ONCOLOGY | Facility: HOSPITAL | Age: 82
End: 2022-12-14
Payer: MEDICARE

## 2022-12-14 ENCOUNTER — CLINICAL SUPPORT (OUTPATIENT)
Dept: OTHER | Facility: HOSPITAL | Age: 82
End: 2022-12-14

## 2022-12-14 VITALS
RESPIRATION RATE: 16 BRPM | WEIGHT: 115.2 LBS | OXYGEN SATURATION: 96 % | TEMPERATURE: 97.8 F | HEIGHT: 63 IN | HEART RATE: 74 BPM | DIASTOLIC BLOOD PRESSURE: 69 MMHG | SYSTOLIC BLOOD PRESSURE: 143 MMHG | BODY MASS INDEX: 20.41 KG/M2

## 2022-12-14 VITALS — HEIGHT: 63 IN | BODY MASS INDEX: 20.41 KG/M2

## 2022-12-14 DIAGNOSIS — C34.12 MALIGNANT NEOPLASM OF UPPER LOBE OF LEFT LUNG: ICD-10-CM

## 2022-12-14 DIAGNOSIS — Z79.899 LONG-TERM USE OF HIGH-RISK MEDICATION: ICD-10-CM

## 2022-12-14 DIAGNOSIS — Z45.2 FITTING AND ADJUSTMENT OF VASCULAR CATHETER: ICD-10-CM

## 2022-12-14 DIAGNOSIS — Z79.899 LONG-TERM USE OF HIGH-RISK MEDICATION: Primary | ICD-10-CM

## 2022-12-14 DIAGNOSIS — Z85.118 HISTORY OF LUNG CANCER: Primary | ICD-10-CM

## 2022-12-14 LAB
ALBUMIN SERPL-MCNC: 3.8 G/DL (ref 3.5–5.2)
ALBUMIN/GLOB SERPL: 1.5 G/DL (ref 1.1–2.4)
ALP SERPL-CCNC: 71 U/L (ref 38–116)
ALT SERPL W P-5'-P-CCNC: 5 U/L (ref 0–33)
ANION GAP SERPL CALCULATED.3IONS-SCNC: 13.1 MMOL/L (ref 5–15)
AST SERPL-CCNC: 17 U/L (ref 0–32)
BASOPHILS # BLD AUTO: 0.02 10*3/MM3 (ref 0–0.2)
BASOPHILS NFR BLD AUTO: 0.3 % (ref 0–1.5)
BILIRUB SERPL-MCNC: 0.5 MG/DL (ref 0.2–1.2)
BUN SERPL-MCNC: 12 MG/DL (ref 6–20)
BUN/CREAT SERPL: 18.8 (ref 7.3–30)
CALCIUM SPEC-SCNC: 9.7 MG/DL (ref 8.5–10.2)
CHLORIDE SERPL-SCNC: 100 MMOL/L (ref 98–107)
CO2 SERPL-SCNC: 27.9 MMOL/L (ref 22–29)
CREAT SERPL-MCNC: 0.64 MG/DL (ref 0.6–1.1)
DEPRECATED RDW RBC AUTO: 48.2 FL (ref 37–54)
EGFRCR SERPLBLD CKD-EPI 2021: 89.5 ML/MIN/1.73
EOSINOPHIL # BLD AUTO: 0 10*3/MM3 (ref 0–0.4)
EOSINOPHIL NFR BLD AUTO: 0 % (ref 0.3–6.2)
ERYTHROCYTE [DISTWIDTH] IN BLOOD BY AUTOMATED COUNT: 14.6 % (ref 12.3–15.4)
GLOBULIN UR ELPH-MCNC: 2.5 GM/DL (ref 1.8–3.5)
GLUCOSE SERPL-MCNC: 123 MG/DL (ref 74–124)
HCT VFR BLD AUTO: 38.3 % (ref 34–46.6)
HGB BLD-MCNC: 12 G/DL (ref 12–15.9)
IMM GRANULOCYTES # BLD AUTO: 0.02 10*3/MM3 (ref 0–0.05)
IMM GRANULOCYTES NFR BLD AUTO: 0.3 % (ref 0–0.5)
LYMPHOCYTES # BLD AUTO: 1.49 10*3/MM3 (ref 0.7–3.1)
LYMPHOCYTES NFR BLD AUTO: 21.5 % (ref 19.6–45.3)
MCH RBC QN AUTO: 28.2 PG (ref 26.6–33)
MCHC RBC AUTO-ENTMCNC: 31.3 G/DL (ref 31.5–35.7)
MCV RBC AUTO: 90.1 FL (ref 79–97)
MONOCYTES # BLD AUTO: 0.39 10*3/MM3 (ref 0.1–0.9)
MONOCYTES NFR BLD AUTO: 5.6 % (ref 5–12)
NEUTROPHILS NFR BLD AUTO: 5.02 10*3/MM3 (ref 1.7–7)
NEUTROPHILS NFR BLD AUTO: 72.3 % (ref 42.7–76)
NRBC BLD AUTO-RTO: 0 /100 WBC (ref 0–0.2)
PLATELET # BLD AUTO: 226 10*3/MM3 (ref 140–450)
PMV BLD AUTO: 9.6 FL (ref 6–12)
POTASSIUM SERPL-SCNC: 3.1 MMOL/L (ref 3.5–4.7)
PROT SERPL-MCNC: 6.3 G/DL (ref 6.3–8)
RBC # BLD AUTO: 4.25 10*6/MM3 (ref 3.77–5.28)
SODIUM SERPL-SCNC: 141 MMOL/L (ref 134–145)
T4 FREE SERPL-MCNC: 1.4 NG/DL (ref 0.93–1.7)
TSH SERPL DL<=0.05 MIU/L-ACNC: 3.23 UIU/ML (ref 0.27–4.2)
WBC NRBC COR # BLD: 6.94 10*3/MM3 (ref 3.4–10.8)

## 2022-12-14 PROCEDURE — 96413 CHEMO IV INFUSION 1 HR: CPT

## 2022-12-14 PROCEDURE — 99215 OFFICE O/P EST HI 40 MIN: CPT | Performed by: INTERNAL MEDICINE

## 2022-12-14 PROCEDURE — 84439 ASSAY OF FREE THYROXINE: CPT | Performed by: INTERNAL MEDICINE

## 2022-12-14 PROCEDURE — 25010000002 HEPARIN LOCK FLUSH PER 10 UNITS: Performed by: INTERNAL MEDICINE

## 2022-12-14 PROCEDURE — 84443 ASSAY THYROID STIM HORMONE: CPT | Performed by: INTERNAL MEDICINE

## 2022-12-14 PROCEDURE — 85025 COMPLETE CBC W/AUTO DIFF WBC: CPT

## 2022-12-14 PROCEDURE — 25010000002 PEMBROLIZUMAB 100 MG/4ML SOLUTION 4 ML VIAL: Performed by: INTERNAL MEDICINE

## 2022-12-14 PROCEDURE — 80053 COMPREHEN METABOLIC PANEL: CPT

## 2022-12-14 RX ORDER — MORPHINE SULFATE 15 MG/1
15 TABLET, FILM COATED, EXTENDED RELEASE ORAL 2 TIMES DAILY
Qty: 60 TABLET | Refills: 0 | Status: SHIPPED | OUTPATIENT
Start: 2022-12-14 | End: 2023-01-09 | Stop reason: SDUPTHER

## 2022-12-14 RX ORDER — HEPARIN SODIUM (PORCINE) LOCK FLUSH IV SOLN 100 UNIT/ML 100 UNIT/ML
500 SOLUTION INTRAVENOUS AS NEEDED
Status: CANCELLED | OUTPATIENT
Start: 2022-12-14

## 2022-12-14 RX ORDER — SODIUM CHLORIDE 0.9 % (FLUSH) 0.9 %
10 SYRINGE (ML) INJECTION AS NEEDED
Status: CANCELLED | OUTPATIENT
Start: 2022-12-14

## 2022-12-14 RX ORDER — SODIUM CHLORIDE 0.9 % (FLUSH) 0.9 %
10 SYRINGE (ML) INJECTION AS NEEDED
Status: DISCONTINUED | OUTPATIENT
Start: 2022-12-14 | End: 2022-12-14 | Stop reason: HOSPADM

## 2022-12-14 RX ORDER — HEPARIN SODIUM (PORCINE) LOCK FLUSH IV SOLN 100 UNIT/ML 100 UNIT/ML
500 SOLUTION INTRAVENOUS AS NEEDED
Status: DISCONTINUED | OUTPATIENT
Start: 2022-12-14 | End: 2022-12-14 | Stop reason: HOSPADM

## 2022-12-14 RX ORDER — MORPHINE SULFATE 15 MG/1
15 TABLET, FILM COATED, EXTENDED RELEASE ORAL 2 TIMES DAILY
Qty: 60 TABLET | Refills: 0 | Status: SHIPPED | OUTPATIENT
Start: 2022-12-14 | End: 2022-12-14 | Stop reason: SDUPTHER

## 2022-12-14 RX ORDER — SODIUM CHLORIDE 9 MG/ML
250 INJECTION, SOLUTION INTRAVENOUS ONCE
Status: COMPLETED | OUTPATIENT
Start: 2022-12-14 | End: 2022-12-14

## 2022-12-14 RX ORDER — SODIUM CHLORIDE 9 MG/ML
250 INJECTION, SOLUTION INTRAVENOUS ONCE
Status: CANCELLED | OUTPATIENT
Start: 2022-12-14

## 2022-12-14 RX ORDER — MORPHINE SULFATE 15 MG/1
TABLET, FILM COATED, EXTENDED RELEASE ORAL
COMMUNITY
Start: 2022-12-08 | End: 2022-12-14 | Stop reason: SDUPTHER

## 2022-12-14 RX ADMIN — Medication 10 ML: at 11:58

## 2022-12-14 RX ADMIN — SODIUM CHLORIDE 200 MG: 9 INJECTION, SOLUTION INTRAVENOUS at 11:27

## 2022-12-14 RX ADMIN — Medication 500 UNITS: at 11:58

## 2022-12-14 RX ADMIN — SODIUM CHLORIDE 250 ML: 9 INJECTION, SOLUTION INTRAVENOUS at 11:09

## 2022-12-14 NOTE — PROGRESS NOTES
"Outpatient Oncology Nutrition  Assessment/PES    Patient Name:  Darlene Pfeiffer  YOB: 1940  MRN: [id number]    Assessment Date:  12/14/2022    Comments:  Met patient in infusion today.Receiving Keytruda for recurrent lung cancer. The patient reports a weight loss of about 10 lb in the last month. Encouraged high calorie high protein foods, small frequent meals. Provided a list and samples of Ensure complete. Will follow prn and be available for any questions.      General Info     Row Name 12/14/22 1641       Today's Session    Person(s) attending today's session Patient;Spouse       General Information    Oncology patient? yes  recurrent lung cancer       Oncology Specific Assessment    Type of treatment Other (comment)    Frequency of treatment keytruda    Goal of treatment Palliative    Reported symptoms Weight loss;Anorexia                     Estimated/Assessed Needs - Anthropometrics     Row Name 12/14/22 1642          Anthropometrics    Height 160 cm (62.99\")     Weight for Calculation 52.2 kg (115 lb)     Additional Documentation Usual Body Weight (UBW) (Group)        Usual Body Weight (UBW)    Usual Body Weight 57.2 kg (126 lb)     Weight Change (Amount and Duration) 10-11 lb, x 1 month        Estimated/Assessed Needs    Additional Documentation Protein Requirements (Group);KCAL/KG (Group);Fluid Requirements (Group)        KCAL/KG    KCAL/KG 25 Kcal/Kg (kcal);30 Kcal/Kg (kcal)     25 Kcal/Kg (kcal) 1304.1     30 Kcal/Kg (kcal) 1564.92        Protein Requirements    Weight Used For Protein Calculations 52.2 kg (115 lb)     Est Protein Requirement Amount (gms/kg) 1.2 gm protein     Estimated Protein Requirements (gms/day) 62.6        Fluid Requirements    Fluid Requirements (mL/day) 1500     Estimated Fluid Requirement Method RDA Method     RDA Method (mL) 1500                Labs/Tests/Procedures/Meds     Row Name 12/14/22 2822          Labs/Procedures/Meds    Lab Results Reviewed reviewed     "    Diagnostic Tests/Procedures    Diagnostic Test/Procedure Reviewed reviewed        Medications    Pertinent Medications Reviewed reviewed     Pertinent Medications Comments lipitor, norvasc, B complex, vitamin D, norco, zofran, pericolace                   Problem/Interventions:   Problem 1     Row Name 12/14/22 1644          Nutrition Diagnoses Problem 1    Etiology (related to) Medical Diagnosis;Factors Affecting Nutrition     Oncology Lung cancer     Reported/Observed By Patient     Appetite Fair;Early Satiety     Signs/Symptoms (evidenced by) Unintended Weight Change     Unintended Weight Change Loss     Number of Pounds Lost 10     Weight loss time period 1 month                        Intervention Goal     Row Name 12/14/22 1647          Intervention Goal    General Meet nutritional needs for age/condition;Disease management/therapy;Provide information regarding MNT for treatment/condition     PO Tolerate PO;Increase intake;Meet estimated needs     Weight Maintain weight                  Nutrition Prescription     Row Name 12/14/22 1647          Nutrition Prescription PO    PO Prescription Begin/change supplement     Supplement Ensure Complete     Supplement Frequency 2 times a day     Other Modifiers High protein/high calorie;Small feedings                Education/Evaluation     Row Name 12/14/22 1647          Education    Education Advised regarding habits/behavior;Provided education regarding;Education topics     Education Topics Weight gain strategy     Advised Regarding Habits/Behavior Weight gain strategy;Use supplement;Eating pattern;Increased nutrient density        Monitor/Evaluation    Education Follow-up Reinforce PRN                 Electronically signed by:  Zoila Clintno RD, SALONI  12/14/22 16:48 EST

## 2022-12-14 NOTE — PROGRESS NOTES
Subjective Discussed patient's hospitalization, rationale for initiating Keytruda                                                                                                                                                              REASON FOR FOLLOW-UP: Potential recurrent lung cancer.    History of Present Illness       The patient is an 82-year-old female with the below medical history including chronic obstructive pulmonary disease who was seen in the [facility] multidisciplinary thoracic oncology clinic July 1, 2021.  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      She had undergone a CT of the chest at [facility] 6/16/2021 showing postsurgical changes in the left chest from right upper lobectomy, 8 mm irregular nodule medial segment of the right lower lobe and diffuse changes of emphysema with fibrotic changes in the periphery, no suspicious hilar or mediastinal nodes, no pleural effusion.  New finding was suspicious for new malignancy with the patient felt to be a poor candidate for surgical resection.  A PET CT and PFTs were requested thereafter.  The PET/CT demonstrated ill-defined FDG avid soft tissue thickening left aspect mediastinum within the operative bed, 1 cm hypermetabolic pulmonary nodule right lower lobe and asymmetric thickening patchy uptake involving the right base of tongue.  There is subtle uptake within the L1 vertebral body which is indeterminate and a sub-6 mm pulmonary nodule of right lung and subcentimeter hypodense hepatic lesion which was below PET resolution.       The patient's case was discussed in thoracic conference 7/22/2021 with consideration of the patient undergoing L1 vertebral body MRI, confirmatory biopsy left mediastinal and assessment by ENT (  Eneida who had worked with the patient previously.  She, incidentally, indicates that radiation therapy was given apparently intraoperatively at her recent surgery?  She still has issues with difficulty chewing and swallowing post procedures and was to be followed up with Dr. Caballero in the near future as planned.                                                            She was seen in the thoracic clinic on the same day with plans to proceed with MRI of the lumbar spine, biopsy left mediastinal nodular area of potential disease and follow-up of her ENT assessment as well as undergo a guardant 360 assessment in our office.  She underwent the biopsy 8/13/2021 found to be consistent with invasive moderately differentiated adenocarcinoma with PD-L1 TPS score 40%.  MRI of the lumbar spine revealed no evidence of metastatic disease though the patient did have multilevel degenerative disease throughout the lumbar spine.  She had an office follow-up with Dr. 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  She underwent laryngoscopy 8/18/2021 with right base of tongue without evidence of lesions or masses in the region.     She was seen by Dr. Hernandez 8/19/2021 and, her findings, had been presented in lung conference.  Her findings were consistent with 2 separate lesions including a nodule in the superior segment of the right lower lobe and a mass in the upper portion of the left chest with the left chest lesion biopsy positive for adenocarcinoma.  The consensus was that these were to separate-synchronous primaries.  Stereotactic radiation each lesions were felt to be the appropriate way to proceed and the patient was referred to radiation therapy.     The patient is seen in office 8/23/2021 agreeable to the radiation therapy as well as additional assessments including Caris molecular assessment of her biopsy.   Again her guardant 360 is currently pending and we would follow her after she completes radiation therapy with subsequent scans.      She was able to proceed with SBRT to the right lung at primary #1 with 5 treatments at 1000 cGy per fraction in the left lung primary similarly at 1000 cGy per fraction x5.  Her treatment ranged between 8/31-9/13/2021.  She is now scheduled for follow-up 11/23/2021.    The patient subsequent genomic testing is discussed with her as she is is seen back 9/23/2021.  Her guardant 360 did not determine any new abnormalities but her Caris-MI profile-does reveal sensitivity to immunotherapy as well as a BRAF mutation.  This could be extremely important as we follow the patient from this point.  Fortunately she is feeling extremely well and quite pleased about her treatments.    Patient had subsequent PET/CT 11/23/2021 demonstrates decrease in size and avidity of the previously noted intensely FDG avid nodules left lobectomy operative site and right lower lobe.  Surrounding groundglass and pulmonary opacification is consistent with postradiation changes, a few new subcentimeter pulmonary nodules are present in the right upper lobe and indeterminant, stable subcentimeter hepatic lesion is below PET resolution no other findings of FDG-avid disease are seen in neck abdomen or pelvis.  The patient seen in office 12/1/2021 with a relatively good performance status stating she is not having any new symptoms and very pleased about her results on her PET/CT.  She realizes we'll have to follow this further but subsequent scans in 6 months.  She is also hoping to see an oral surgeon that previously helped her-Dr. Wu.    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  There is evolution of presumed postradiation changes in the right  midlung with soft tissue mass within the left lumpectomy operative bed minimally decreased in size.  There is a sub-6 mm nodule in the right upper lobe not definitively seen, indeterminate and an indeterminate left renal lesion at 1.5 cm unchanged from 7/13/2021.  The patient is currently scheduled to see Dr. Dowell on 6/23/2022.  She is feeling well without any additional symptoms.    The patient required admission 10/14 - 10/18/2022 presenting with shortness of breath and cough that was nonproductive.  She had been on oral antibiotics and did not improved and was admitted for therapy for apparent pneumonia.  This eventually led to bronchoscopy after initial CT revealed postsurgical changes, new masslike 6.7 cm area of consolidation anterior left lung raising concern for potential malignancy and a follow-up PET/CT planned.  Bronchoscopy and biopsy left lower lung failed to show evidence of malignancy.  The patient's PET/CT, however, demonstrated an irregular pleural-based soft tissue density thickening in the left upper lobe that was intensely FDG avid new from 6/8/2022 thought to be a malignant recurrence with spread into the left lung parenchyma.  There is intensely pleural-based avidity within the left lower lobe thought to be metastatic disease with postradiation of the left apex as well.  There is a small focus of uptake in the right hilum grossly unchanged in size and post radiation changes in the right lower lobe.  There is indeterminate density left renal that was grossly unchanged.  The patient is seen back in office 11/15/2022 indicating that she still has chest discomfort that is slowly worsening and that she has a chronic paroxysmal cough but has not improved.  We discussed that she very likely has recurrent disease and plan to proceed with a guardant 360 examination initially as we determine whether we should try to proceed with therapy particularly immunotherapy versus targeted therapy recognizing  the above findings.    Patient's guardant 360 returned as again significant for BRAF V600E and the potential use of BRAF inhibitor/MET inhibitor dabrafenib and trametinib.  Additional information PIK3CA and use of alpelisib.    Unfortunately she required admission 11/28-12/1 with worsening back pain.  Fortunately she did not demonstrate evidence of metastatic disease but did have moderate degenerative changes which could cause radiculopathy.  Medications were altered to include subsequently MSR 15 mg p.o. every 12 hours, Norco as needed.    The patient now presents back 12/14/2022 to initiate therapy- initially with immunotherapy considering Caris study with PD-L1 TPS of 70% on 22 C3 and 28-8 assays.    The patient is seen with her  and we discussed pain medications and adjustments thereafter and that she stopped taking MS IR having only a short supply and having to use Norco more frequently.  She is willing to initiate Keytruda today we have discussed that considering her genomics treatment versus her BRAF mutation is also an option.    Past Medical History:   Diagnosis Date   • Allergic rhinitis    • Asthma    • Cancer of floor of mouth (HCC) 2014   • Cerebral aneurysm    • COPD (chronic obstructive pulmonary disease) (HCC)    • COVID 2020   • Esophageal reflux    • History of adenocarcinoma of lung    • History of anemia    • History of carcinoma in situ of skin    • History of lung cancer    • 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 from [facility]   • History of squamous cell carcinoma     Tongue   • Hyperlipidemia    • Hypertension     since early 60s   • Low vitamin B12 level    • Lung cancer (HCC)    • Thyromegaly    • UTI (urinary tract infection)    • Vitamin D deficiency         Past Surgical History:   Procedure Laterality Date   • BRONCHOSCOPY Bilateral 10/17/2022    Procedure: BRONCHOSCOPY WITH FLUORO with biopsy and BAL;  Surgeon: India Flores MD;  Location:  [facility] ENDOSCOPY;  Service: Pulmonary;  Laterality: Bilateral;  PRE/POST - lung mass   • CHOLECYSTECTOMY  1999   • COLONOSCOPY     • EMBOLIZATION CEREBRAL Left 6/29/2022    Procedure: 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;  Surgeon: Bradley Dowell MD;  Location: [facility] HYBRID OR 18/19;  Service: Interventional Radiology;  Laterality: Left;   • EYE SURGERY  11/24/2020    Took a muscle out of right eye   • GLOSSECTOMY PARTIAL      less than one half tongue   • LUNG SURGERY  2012   • ORAL LESION EXCISION/BIOPSY      June and August 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   • TUBAL ABDOMINAL LIGATION  1970   • VENOUS ACCESS DEVICE (PORT) INSERTION N/A 12/12/2022    Procedure: MEDIPORT PLACEMENT;  Surgeon: Jason Villaseñor MD;  Location: [facility] MAIN OR;  Service: Vascular;  Laterality: N/A;        Current Outpatient Medications on File Prior to Visit   Medication Sig Dispense Refill   • albuterol sulfate  (90 Base) MCG/ACT inhaler Inhale 2 puffs Every 4 (Four) Hours As Needed for Wheezing. 18 g 2   • amLODIPine (NORVASC) 5 MG tablet Take 1 tablet by mouth Daily. 90 tablet 0   • atorvastatin (LIPITOR) 20 MG tablet Take 1 tablet by mouth Every Night. 90 tablet 3   • B Complex-C-E-Zn (b complex-C-E-zinc) tablet Take 1 tablet by mouth Daily. HOLDING FOR DOS     • cetirizine (ZyrTEC) 10 MG tablet Take 10 mg by mouth Daily.     • chlorhexidine (PERIDEX) 0.12 % solution Apply 15 mL to the mouth or throat 2 (Two) Times a Day.     • cholecalciferol (VITAMIN D3) 1000 units tablet Take 1,000 Units by mouth Daily. HOLDING FOR DOS     • clobetasol (TEMOVATE) 0.05 % cream APPLY TOPICALLY TO THE AFFECTED AREA TWICE DAILY AS NEEDED     • fluticasone (FLONASE) 50 MCG/ACT nasal spray 2 sprays into the nostril(s) as directed by provider As Needed.     • hydroCHLOROthiazide (HYDRODIURIL) 25 MG tablet Take 0.5 tablets by mouth Daily.     • HYDROcod Polst-CPM Polst ER (Tussionex Pennkinetic ER) 10-8 MG/5ML ER  suspension Take 5 mL by mouth Every 12 (Twelve) Hours As Needed for Cough. 120 mL 0   • HYDROcodone-acetaminophen (NORCO) 5-325 MG per tablet Take 1 tablet by mouth Every 4 (Four) Hours As Needed for Moderate Pain. 100 tablet 0   • Ibuprofen (ADVIL PO) Take 400 mg by mouth Daily As Needed. HOLD PER MD INSTR     • lidocaine-prilocaine (EMLA) 2.5-2.5 % cream Apply nickel size amount to port site 30 min before appt time do not rub in cover with plastic wrap (Patient taking differently: 1 application As Needed. Apply nickel size amount to port site 30 min before appt time do not rub in cover with plastic wrap) 30 g 1   • metoprolol succinate XL (TOPROL-XL) 25 MG 24 hr tablet TAKE 1 TABLET EVERY DAY (Patient taking differently: Take 25 mg by mouth Daily.) 90 tablet 1   • montelukast (SINGULAIR) 10 MG tablet Take 1 tablet by mouth Every Night. 90 tablet 3   • omeprazole (priLOSEC) 40 MG capsule Take 40 mg by mouth Daily.     • ondansetron (ZOFRAN) 8 MG tablet Take 1 tablet by mouth 3 (Three) Times a Day As Needed for Nausea or Vomiting. 30 tablet 5   • sennosides-docusate (PERICOLACE) 8.6-50 MG per tablet Take 2 tablets by mouth 2 (Two) Times a Day for 30 days. 120 tablet 0   • Umeclidinium-Vilanterol (Anoro Ellipta) 62.5-25 MCG/ACT aerosol powder  inhaler Inhale 1 puff Daily As Needed.     • [DISCONTINUED] Morphine (MS CONTIN) 15 MG 12 hr tablet        No current facility-administered medications on file prior to visit.        ALLERGIES:    Allergies   Allergen Reactions   • Sulfa Antibiotics Rash        Social History     Socioeconomic History   • Marital status:    Tobacco Use   • Smoking status: Former     Types: Cigarettes     Quit date: 2015     Years since quittin.8   • Smokeless tobacco: Never   • Tobacco comments:     less than a pack per day, no smoking since , Quit 2015   Vaping Use   • Vaping Use: Never used   Substance and Sexual Activity   • Alcohol use: Yes     Comment: Socially -A few  "times a month   • Drug use: No   • Sexual activity: Defer     Family History   Problem Relation Age of Onset   • Ovarian cancer Mother          at age 27   • No Known Problems Father    • Ovarian cancer Sister    • Colon cancer Brother    • No Known Problems Other    • Malig Hyperthermia Neg Hx         Review of Systems   HENT: Positive for dental problem and sore throat.    Eyes: Negative.    Respiratory: Negative for cough and wheezing.    Gastrointestinal: Positive for constipation and diarrhea.   Genitourinary: Negative for frequency and urgency.   Musculoskeletal: Positive for arthralgias, back pain and myalgias.   Allergic/Immunologic: Positive for environmental allergies.   Neurological: Negative.    Hematological: Negative.    Psychiatric/Behavioral: Negative.       Objective     Vitals:    22 1019   BP: 143/69   Pulse: 74   Resp: 16   Temp: 97.8 °F (36.6 °C)   TempSrc: Temporal   SpO2: 96%   Weight: 52.3 kg (115 lb 3.2 oz)   Height: 160 cm (62.99\")   PainSc:   6   PainLoc: Back  Comment: upper left back     Current Status 2022   ECOG score 0          Pulmonary function tests:     FVC is 2.10 which is 81% of predicted  FEV1 is 1.44 which is 75% of predicted  FEV1 FVC ratio 69  Diffusion capacity DLCO is 11.7 which is 54% of predicted      Physical Exam  Constitutional:       Appearance: Normal appearance. She is normal weight.   HENT:      Head: Normocephalic and atraumatic.      Nose: Nose normal.      Mouth/Throat:      Mouth: Mucous membranes are moist.      Pharynx: Oropharynx is clear.      Comments: Malocclusion noted  Eyes:      Extraocular Movements: Extraocular movements intact.   Cardiovascular:      Rate and Rhythm: Normal rate and regular rhythm.      Pulses: Normal pulses.      Heart sounds: Normal heart sounds.   Pulmonary:      Effort: Pulmonary effort is normal.      Breath sounds: Normal breath sounds.      Comments: Distant breath sounds bilateral bases  Abdominal:      " General: Abdomen is flat. Bowel sounds are normal.      Palpations: Abdomen is soft.   Musculoskeletal:         General: Normal range of motion.      Cervical back: Normal range of motion and neck supple.   Skin:     General: Skin is warm and dry.   Neurological:      General: No focal deficit present.      Mental Status: She is alert and oriented to person, place, and time.   Psychiatric:         Mood and Affect: Mood normal.         Behavior: Behavior normal.           RECENT LABS:  Hematology WBC   Date Value Ref Range Status   12/14/2022 6.94 3.40 - 10.80 10*3/mm3 Final   08/29/2022 6.1 3.4 - 10.8 x10E3/uL Final     RBC   Date Value Ref Range Status   12/14/2022 4.25 3.77 - 5.28 10*6/mm3 Final   08/29/2022 4.63 3.77 - 5.28 x10E6/uL Final     Hemoglobin   Date Value Ref Range Status   12/14/2022 12.0 12.0 - 15.9 g/dL Final     Hematocrit   Date Value Ref Range Status   12/14/2022 38.3 34.0 - 46.6 % Final     Platelets   Date Value Ref Range Status   12/14/2022 226 140 - 450 10*3/mm3 Final          Assessment & Plan      82-year-old female with medical history including COPD, long-term tobacco use, status post previous left upper lobectomy for carcinoma lung in May 2015, carcinoma tongue ongoing radiation therapy and surgical therapy through ENT-Dr. Caballero-including 2016 with wide local excision of buccal lesion.  During follow-up a CT scan of the chest June 16, 2021 demonstrates postsurgical changes, 8 mm irregular nodule medial segment of right lower lobe and diffuse changes of emphysema.  She is seen in thoracic clinic with findings suspicious for new malignancy and concern of the patient being a poor operative candidate.  Subsequent PET CT and PFTs demonstrated ill-defined avidity and soft tissue left aspect of the mediastinum, 1 cm hypermetabolic pulmonary nodule and asymmetric thickening involving the right base of tongue as well as subtle uptake in the L1 vertebral body.  This patient's case was discussed  in thoracic clinic and plans were made to request an MRI of the lumbar spine, obtain a biopsy of the mediastinal involvement of the left had the patient reviewed by ENT.  The patient is seen with her  7/22/2021 in thoracic clinic and we reviewed these findings as well as having her assessed via liquid biopsy to determine potential molecular status quickly.           She underwent the biopsy 8/13/2021 found to be consistent with invasive moderately differentiated adenocarcinoma with PD-L1 TPS score 40%.  MRI of the lumbar spine revealed no evidence of metastatic disease though the patient did have multilevel degenerative disease throughout the lumbar spine.  She had an office follow-up with Dr. Caballero-history of a [id number] SCCa of the rightt retromolar trigone who is s/p WLE, buccal fat pad flap and SLN biopsy that was negative, margins were negative.  She underwent laryngoscopy 8/18/2021 with right base of tongue without evidence of lesions or masses in the region.     She was seen by Dr. Hernandez 8/19/2021 and, her findings, had been presented in lung conference.  Her findings were consistent with 2 separate lesions including a nodule in the superior segment of the right lower lobe and a mass in the upper portion of the left chest with the left chest lesion biopsy positive for adenocarcinoma.  The consensus was that these were to separate-synchronous primaries.  Stereotactic radiation each lesions were felt to be the appropriate way to proceed and the patient was referred to radiation therapy.      At the time of this dictation her guardant 360 is not yet available.  These issues are discussed with the patient in some detail 8/23/2021 so that we can have an overall longer-term plan.  This includes a Caris molecular assessment of her recent biopsy as well as her PD-L1 positive findings.    The patient was referred for radiation therapy and she was able to proceed with SBRT to the right lung at primary #1 with 5  treatments at 1000 cGy per fraction in the left lung primary similarly at 1000 cGy per fraction x5.  Her treatment ranged between 8/31-9/13/2021.  She is now scheduled for follow-up 11/23/2021.    Additionally genomic testing is discussed with her as she is is seen back 9/23/2021.  Her guardant 360 did not determine any new abnormalities but her Caris-MI profile-does reveal sensitivity to immunotherapy as well as a BRAF mutation.    Patient had subsequent PET/CT 11/23/2021 demonstrates decrease in size and avidity of the previously noted intensely FDG avid nodules left lobectomy operative site and right lower lobe.  Surrounding groundglass and pulmonary opacification is consistent with postradiation changes, a few new subcentimeter pulmonary nodules are present in the right upper lobe and indeterminant, stable subcentimeter hepatic lesion is below PET resolution no other findings of FDG-avid disease are seen in neck abdomen or pelvis.  The patient seen in office 12/1/2021 with a relatively good performance status stating she is not having any new symptoms and very pleased about her results on her PET/CT.  She realizes we'll have to follow this further but subsequent scans in 6 months.  She is also hoping to see an oral surgeon that previously helped her-Dr. Wu.    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  There is evolution of presumed postradiation changes in the right midlung with soft tissue mass within the left lumpectomy operative bed minimally decreased in size.  There is a sub-6 mm nodule in the right upper lobe not definitively seen, indeterminate and an indeterminate left renal lesion at 1.5 cm unchanged from 7/13/2021.  The patient is currently scheduled to see Dr. Dowell on 6/23/2022.  She is feeling well without any additional  symptoms.     The patient required admission 10/14 - 10/18/2022 presenting with shortness of breath and cough that was nonproductive.  She had been on oral antibiotics and did not improved and was admitted for therapy for apparent pneumonia.  This eventually led to bronchoscopy after initial CT revealed postsurgical changes, new masslike 6.7 cm area of consolidation anterior left lung raising concern for potential malignancy and a follow-up PET/CT planned.  Bronchoscopy and biopsy left lower lung failed to show evidence of malignancy.  The patient's PET/CT, however, demonstrated an irregular pleural-based soft tissue density thickening in the left upper lobe that was intensely FDG avid new from 6/8/2022 thought to be a malignant recurrence with spread into the left lung parenchyma.  There is intensely pleural-based avidity within the left lower lobe thought to be metastatic disease with postradiation of the left apex as well.  There is a small focus of uptake in the right hilum grossly unchanged in size and post radiation changes in the right lower lobe.  There is indeterminate density left renal that was grossly unchanged.  The patient is seen back in office indicating that she still has chest discomfort that is slowly worsening and that she has a chronic paroxysmal cough but has not improved.  We discussed that she very likely has recurrent disease and plan to proceed with a guardant 360 examination initially as we determine whether we should try to proceed with therapy particularly immunotherapy versus targeted therapy recognizing the above findings.    Patient's guardant 360 returned as again significant for BRAF V600E and the potential use of BRAF inhibitor/MET inhibitor dabrafenib and trametinib.  Additional information PIK3CA and use of alpelisib.    Unfortunately she required admission 11/28-12/1 with worsening back pain.  Fortunately she did not demonstrate evidence of metastatic disease but did have  moderate degenerative changes which could cause radiculopathy.  Medications were altered to include subsequently MSR 15 mg p.o. every 12 hours, Norco as needed.    The patient now presents back 12/14/2022 to initiate therapy- initially with immunotherapy considering Caris study with PD-L1 TPS of 70% on 22 C3 and 28-8 assays.    The patient is seen with her  and we discussed pain medications and adjustments thereafter and that she stopped taking MS IR having only a short supply and having to use Norco more frequently.  She is willing to initiate Keytruda today we have discussed that considering her genomics treatment versus her BRAF mutation is also an option.          Plan:    *Proceed with Keytruda today cycle 1 day 1    *Pain medications include MS Contin 15 mg p.o. every 12 hours E scribed to pharmacy, [place] 5/325 1-2 p.o. every 4 hours as needed for breakthrough  *Daily laxative of choice with patient using Senokot S    *3 weeks NP, Keytruda    *Patient to proceed to 3 cycles Keytruda before repeat scans    *Additional options for therapy include BRAF inhibitors, chemotherapy directed for histology lung adenocarcinoma    I spent 45 minutes caring for Darlene on this date of service. This time includes time spent by me in the following activities: preparing for the visit, reviewing tests, obtaining and/or reviewing a separately obtained history, performing a medically appropriate examination and/or evaluation, counseling and educating the patient/family/caregiver, ordering medications, tests, or procedures, referring and communicating with other health care professionals, documenting information in the medical record, independently interpreting results and communicating that information with the patient/family/caregiver and care coordination.

## 2023-01-04 ENCOUNTER — INFUSION (OUTPATIENT)
Dept: ONCOLOGY | Facility: HOSPITAL | Age: 83
End: 2023-01-04
Payer: MEDICARE

## 2023-01-04 ENCOUNTER — OFFICE VISIT (OUTPATIENT)
Dept: ONCOLOGY | Facility: CLINIC | Age: 83
End: 2023-01-04
Payer: MEDICARE

## 2023-01-04 VITALS
DIASTOLIC BLOOD PRESSURE: 57 MMHG | BODY MASS INDEX: 19.14 KG/M2 | WEIGHT: 108 LBS | HEART RATE: 84 BPM | HEIGHT: 63 IN | OXYGEN SATURATION: 97 % | SYSTOLIC BLOOD PRESSURE: 102 MMHG | TEMPERATURE: 97.1 F | RESPIRATION RATE: 16 BRPM

## 2023-01-04 DIAGNOSIS — C34.12 MALIGNANT NEOPLASM OF UPPER LOBE OF LEFT LUNG: Primary | ICD-10-CM

## 2023-01-04 DIAGNOSIS — K59.03 DRUG-INDUCED CONSTIPATION: ICD-10-CM

## 2023-01-04 DIAGNOSIS — Z79.899 LONG-TERM USE OF HIGH-RISK MEDICATION: ICD-10-CM

## 2023-01-04 DIAGNOSIS — B37.0 ORAL CANDIDIASIS: ICD-10-CM

## 2023-01-04 DIAGNOSIS — Z79.899 LONG-TERM USE OF HIGH-RISK MEDICATION: Primary | ICD-10-CM

## 2023-01-04 DIAGNOSIS — C34.12 MALIGNANT NEOPLASM OF UPPER LOBE OF LEFT LUNG: ICD-10-CM

## 2023-01-04 DIAGNOSIS — Z45.2 FITTING AND ADJUSTMENT OF VASCULAR CATHETER: ICD-10-CM

## 2023-01-04 LAB
ALBUMIN SERPL-MCNC: 3.8 G/DL (ref 3.5–5.2)
ALBUMIN/GLOB SERPL: 1.3 G/DL (ref 1.1–2.4)
ALP SERPL-CCNC: 70 U/L (ref 38–116)
ALT SERPL W P-5'-P-CCNC: 6 U/L (ref 0–33)
ANION GAP SERPL CALCULATED.3IONS-SCNC: 16.6 MMOL/L (ref 5–15)
AST SERPL-CCNC: 16 U/L (ref 0–32)
BASOPHILS # BLD AUTO: 0.02 10*3/MM3 (ref 0–0.2)
BASOPHILS NFR BLD AUTO: 0.2 % (ref 0–1.5)
BILIRUB SERPL-MCNC: 0.5 MG/DL (ref 0.2–1.2)
BUN SERPL-MCNC: 24 MG/DL (ref 6–20)
BUN/CREAT SERPL: 27 (ref 7.3–30)
CALCIUM SPEC-SCNC: 9.8 MG/DL (ref 8.5–10.2)
CHLORIDE SERPL-SCNC: 98 MMOL/L (ref 98–107)
CO2 SERPL-SCNC: 27.4 MMOL/L (ref 22–29)
CREAT SERPL-MCNC: 0.89 MG/DL (ref 0.6–1.1)
DEPRECATED RDW RBC AUTO: 45 FL (ref 37–54)
EGFRCR SERPLBLD CKD-EPI 2021: 65.6 ML/MIN/1.73
EOSINOPHIL # BLD AUTO: 0.01 10*3/MM3 (ref 0–0.4)
EOSINOPHIL NFR BLD AUTO: 0.1 % (ref 0.3–6.2)
ERYTHROCYTE [DISTWIDTH] IN BLOOD BY AUTOMATED COUNT: 13.9 % (ref 12.3–15.4)
GLOBULIN UR ELPH-MCNC: 3 GM/DL (ref 1.8–3.5)
GLUCOSE SERPL-MCNC: 108 MG/DL (ref 74–124)
HCT VFR BLD AUTO: 39.6 % (ref 34–46.6)
HGB BLD-MCNC: 12.3 G/DL (ref 12–15.9)
IMM GRANULOCYTES # BLD AUTO: 0.01 10*3/MM3 (ref 0–0.05)
IMM GRANULOCYTES NFR BLD AUTO: 0.1 % (ref 0–0.5)
LYMPHOCYTES # BLD AUTO: 1.65 10*3/MM3 (ref 0.7–3.1)
LYMPHOCYTES NFR BLD AUTO: 20.6 % (ref 19.6–45.3)
MCH RBC QN AUTO: 27.8 PG (ref 26.6–33)
MCHC RBC AUTO-ENTMCNC: 31.1 G/DL (ref 31.5–35.7)
MCV RBC AUTO: 89.6 FL (ref 79–97)
MONOCYTES # BLD AUTO: 0.49 10*3/MM3 (ref 0.1–0.9)
MONOCYTES NFR BLD AUTO: 6.1 % (ref 5–12)
NEUTROPHILS NFR BLD AUTO: 5.83 10*3/MM3 (ref 1.7–7)
NEUTROPHILS NFR BLD AUTO: 72.9 % (ref 42.7–76)
NRBC BLD AUTO-RTO: 0 /100 WBC (ref 0–0.2)
PLATELET # BLD AUTO: 291 10*3/MM3 (ref 140–450)
PMV BLD AUTO: 9.8 FL (ref 6–12)
POTASSIUM SERPL-SCNC: 3.4 MMOL/L (ref 3.5–4.7)
PROT SERPL-MCNC: 6.8 G/DL (ref 6.3–8)
RBC # BLD AUTO: 4.42 10*6/MM3 (ref 3.77–5.28)
SODIUM SERPL-SCNC: 142 MMOL/L (ref 134–145)
WBC NRBC COR # BLD: 8.01 10*3/MM3 (ref 3.4–10.8)

## 2023-01-04 PROCEDURE — 25010000002 PEMBROLIZUMAB 100 MG/4ML SOLUTION 4 ML VIAL: Performed by: NURSE PRACTITIONER

## 2023-01-04 PROCEDURE — 80053 COMPREHEN METABOLIC PANEL: CPT

## 2023-01-04 PROCEDURE — 96413 CHEMO IV INFUSION 1 HR: CPT

## 2023-01-04 PROCEDURE — 99215 OFFICE O/P EST HI 40 MIN: CPT | Performed by: NURSE PRACTITIONER

## 2023-01-04 PROCEDURE — 1126F AMNT PAIN NOTED NONE PRSNT: CPT | Performed by: NURSE PRACTITIONER

## 2023-01-04 PROCEDURE — 1160F RVW MEDS BY RX/DR IN RCRD: CPT | Performed by: NURSE PRACTITIONER

## 2023-01-04 PROCEDURE — 85025 COMPLETE CBC W/AUTO DIFF WBC: CPT

## 2023-01-04 PROCEDURE — 1159F MED LIST DOCD IN RCRD: CPT | Performed by: NURSE PRACTITIONER

## 2023-01-04 PROCEDURE — 25010000002 HEPARIN LOCK FLUSH PER 10 UNITS: Performed by: INTERNAL MEDICINE

## 2023-01-04 RX ORDER — HEPARIN SODIUM (PORCINE) LOCK FLUSH IV SOLN 100 UNIT/ML 100 UNIT/ML
500 SOLUTION INTRAVENOUS AS NEEDED
Status: CANCELLED | OUTPATIENT
Start: 2023-01-04

## 2023-01-04 RX ORDER — SODIUM CHLORIDE 0.9 % (FLUSH) 0.9 %
10 SYRINGE (ML) INJECTION AS NEEDED
Status: CANCELLED | OUTPATIENT
Start: 2023-01-04

## 2023-01-04 RX ORDER — SODIUM CHLORIDE 9 MG/ML
250 INJECTION, SOLUTION INTRAVENOUS ONCE
Status: COMPLETED | OUTPATIENT
Start: 2023-01-04 | End: 2023-01-04

## 2023-01-04 RX ORDER — CLOTRIMAZOLE 10 MG/1
10 LOZENGE ORAL; TOPICAL
Qty: 70 TABLET | Refills: 1 | Status: SHIPPED | OUTPATIENT
Start: 2023-01-04 | End: 2023-03-08

## 2023-01-04 RX ORDER — SODIUM CHLORIDE 9 MG/ML
250 INJECTION, SOLUTION INTRAVENOUS ONCE
Status: CANCELLED | OUTPATIENT
Start: 2023-01-04

## 2023-01-04 RX ORDER — PREDNISONE 10 MG/1
10 TABLET ORAL DAILY
Qty: 30 TABLET | Refills: 2 | Status: SHIPPED | OUTPATIENT
Start: 2023-01-04 | End: 2023-04-05

## 2023-01-04 RX ORDER — HEPARIN SODIUM (PORCINE) LOCK FLUSH IV SOLN 100 UNIT/ML 100 UNIT/ML
500 SOLUTION INTRAVENOUS AS NEEDED
Status: DISCONTINUED | OUTPATIENT
Start: 2023-01-04 | End: 2023-01-04 | Stop reason: HOSPADM

## 2023-01-04 RX ORDER — SODIUM CHLORIDE 0.9 % (FLUSH) 0.9 %
10 SYRINGE (ML) INJECTION AS NEEDED
Status: DISCONTINUED | OUTPATIENT
Start: 2023-01-04 | End: 2023-01-04 | Stop reason: HOSPADM

## 2023-01-04 RX ADMIN — SODIUM CHLORIDE 250 ML: 9 INJECTION, SOLUTION INTRAVENOUS at 11:29

## 2023-01-04 RX ADMIN — Medication 500 UNITS: at 12:01

## 2023-01-04 RX ADMIN — Medication 10 ML: at 12:01

## 2023-01-04 RX ADMIN — SODIUM CHLORIDE 200 MG: 9 INJECTION, SOLUTION INTRAVENOUS at 11:30

## 2023-01-04 NOTE — PROGRESS NOTES
Subjective                                                                                                                  REASON FOR FOLLOW-UP: Recurrent lung cancer.    History of Present Illness       The patient is an 82-year-old female with the below medical history including chronic obstructive pulmonary disease who was seen in the [facility] multidisciplinary thoracic oncology clinic July 1, 2021.  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      She had undergone a CT of the chest at [facility] 6/16/2021 showing postsurgical changes in the left chest from right upper lobectomy, 8 mm irregular nodule medial segment of the right lower lobe and diffuse changes of emphysema with fibrotic changes in the periphery, no suspicious hilar or mediastinal nodes, no pleural effusion.  New finding was suspicious for new malignancy with the patient felt to be a poor candidate for surgical resection.  A PET CT and PFTs were requested thereafter.  The PET/CT demonstrated ill-defined FDG avid soft tissue thickening left aspect mediastinum within the operative bed, 1 cm hypermetabolic pulmonary nodule right lower lobe and asymmetric thickening patchy uptake involving the right base of tongue.  There is subtle uptake within the L1 vertebral body which is indeterminate and a sub-6 mm pulmonary nodule of right lung and subcentimeter hypodense hepatic lesion which was below PET resolution.       The patient's case was discussed in thoracic conference 7/22/2021 with consideration of the patient undergoing L1 vertebral body MRI, confirmatory biopsy left mediastinal and assessment by ENT (Dr. Caballero) who had worked with the patient previously.  She, incidentally, indicates that radiation therapy was given apparently  intraoperatively at her recent surgery?  She still has issues with difficulty chewing and swallowing post procedures and was to be followed up with Dr. Caballero in the near future as planned.                                                            She was seen in the thoracic clinic on the same day with plans to proceed with MRI of the lumbar spine, biopsy left mediastinal nodular area of potential disease and follow-up of her ENT assessment as well as undergo a guardant 360 assessment in our office.  She underwent the biopsy 8/13/2021 found to be consistent with invasive moderately differentiated adenocarcinoma with PD-L1 TPS score 40%.  MRI of the lumbar spine revealed no evidence of metastatic disease though the patient did have multilevel degenerative disease throughout the lumbar spine.  She had an office follow-up with Dr. 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  She underwent laryngoscopy 8/18/2021 with right base of tongue without evidence of lesions or masses in the region.     She was seen by Dr. Hernandez 8/19/2021 and, her findings, had been presented in lung conference.  Her findings were consistent with 2 separate lesions including a nodule in the superior segment of the right lower lobe and a mass in the upper portion of the left chest with the left chest lesion biopsy positive for adenocarcinoma.  The consensus was that these were to separate-synchronous primaries.  Stereotactic radiation each lesions were felt to be the appropriate way to proceed and the patient was referred to radiation therapy.     The patient is seen in office 8/23/2021 agreeable to the radiation therapy as well as additional assessments including Caris molecular assessment of her biopsy.  Again her guardant 360 is currently pending and we would follow her after she completes radiation therapy with subsequent  scans.      She was able to proceed with SBRT to the right lung at primary #1 with 5 treatments at 1000 cGy per fraction in the left lung primary similarly at 1000 cGy per fraction x5.  Her treatment ranged between 8/31-9/13/2021.  She is now scheduled for follow-up 11/23/2021.    The patient subsequent genomic testing is discussed with her as she is is seen back 9/23/2021.  Her guardant 360 did not determine any new abnormalities but her Caris-MI profile-does reveal sensitivity to immunotherapy as well as a BRAF mutation.  This could be extremely important as we follow the patient from this point.  Fortunately she is feeling extremely well and quite pleased about her treatments.    Patient had subsequent PET/CT 11/23/2021 demonstrates decrease in size and avidity of the previously noted intensely FDG avid nodules left lobectomy operative site and right lower lobe.  Surrounding groundglass and pulmonary opacification is consistent with postradiation changes, a few new subcentimeter pulmonary nodules are present in the right upper lobe and indeterminant, stable subcentimeter hepatic lesion is below PET resolution no other findings of FDG-avid disease are seen in neck abdomen or pelvis.  The patient seen in office 12/1/2021 with a relatively good performance status stating she is not having any new symptoms and very pleased about her results on her PET/CT.  She realizes we'll have to follow this further but subsequent scans in 6 months.  She is also hoping to see an oral surgeon that previously helped her-Dr. Wu.    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  There is evolution of presumed postradiation changes in the right midlung with soft tissue mass within the left lumpectomy operative bed minimally decreased in size.  There is a sub-6 mm  nodule in the right upper lobe not definitively seen, indeterminate and an indeterminate left renal lesion at 1.5 cm unchanged from 7/13/2021.  The patient is currently scheduled to see Dr. Dowell on 6/23/2022.  She is feeling well without any additional symptoms.    The patient required admission 10/14 - 10/18/2022 presenting with shortness of breath and cough that was nonproductive.  She had been on oral antibiotics and did not improved and was admitted for therapy for apparent pneumonia.  This eventually led to bronchoscopy after initial CT revealed postsurgical changes, new masslike 6.7 cm area of consolidation anterior left lung raising concern for potential malignancy and a follow-up PET/CT planned.  Bronchoscopy and biopsy left lower lung failed to show evidence of malignancy.  The patient's PET/CT, however, demonstrated an irregular pleural-based soft tissue density thickening in the left upper lobe that was intensely FDG avid new from 6/8/2022 thought to be a malignant recurrence with spread into the left lung parenchyma.  There is intensely pleural-based avidity within the left lower lobe thought to be metastatic disease with postradiation of the left apex as well.  There is a small focus of uptake in the right hilum grossly unchanged in size and post radiation changes in the right lower lobe.  There is indeterminate density left renal that was grossly unchanged.  The patient is seen back in office 11/15/2022 indicating that she still has chest discomfort that is slowly worsening and that she has a chronic paroxysmal cough but has not improved.  We discussed that she very likely has recurrent disease and plan to proceed with a guardant 360 examination initially as we determine whether we should try to proceed with therapy particularly immunotherapy versus targeted therapy recognizing the above findings.    Patient's guardant 360 returned as again significant for BRAF V600E and the potential use of BRAF  inhibitor/MET inhibitor dabrafenib and trametinib.  Additional information PIK3CA and use of alpelisib.    Unfortunately she required admission 11/28-12/1 with worsening back pain.  Fortunately she did not demonstrate evidence of metastatic disease but did have moderate degenerative changes which could cause radiculopathy.  Medications were altered to include subsequently MSR 15 mg p.o. every 12 hours, Norco as needed.    The patient now presents back 12/14/2022 to initiate therapy- initially with immunotherapy considering Caris study with PD-L1 TPS of 70% on 22 C3 and 28-8 assays.    Patient seen with her  and we discussed pain medications and adjustments thereafter and that she stopped taking MSIR having only a short supply and having to use Norco more frequently.  She is willing to initiate Keytruda today we have discussed that considering her genomics treatment versus her BRAF mutation is also an option.    C1 Keytruda 12/14/2022    INTERVAL FOLLOW-UP:  Patient is seen back today in follow-up due for cycle 2 Keytruda.  Patient states that since receiving her first cycle she has had decreased appetite and decreased energy.  She has not been able to bring herself to eat much and she has notably lost 7 pounds.  She has also been struggling with constipation in relation to ongoing narcotics for pain control.  She has been taking senna S2 tabs twice a day.  We discussed adding daily MiraLAX.    Patient did just last week meet with dietitian, Zoila Rizo, to discuss ways to improve caloric intake.  She has not been able to implement any of these things yet though.    Past Medical History:   Diagnosis Date   • Allergic rhinitis    • Asthma    • Cancer of floor of mouth (HCC) 2014   • Cerebral aneurysm    • COPD (chronic obstructive pulmonary disease) (HCC)    • COVID 2020   • Esophageal reflux    • History of adenocarcinoma of lung    • History of anemia    • History of carcinoma in situ of skin    • History of  lung cancer    • 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 from [facility]   • History of squamous cell carcinoma     Tongue   • Hyperlipidemia    • Hypertension     since early 60s   • Low vitamin B12 level    • Lung cancer (HCC)    • Thyromegaly    • UTI (urinary tract infection)    • Vitamin D deficiency         Past Surgical History:   Procedure Laterality Date   • BRONCHOSCOPY Bilateral 10/17/2022    Procedure: BRONCHOSCOPY WITH FLUORO with biopsy and BAL;  Surgeon: India Flores MD;  Location: [facility] ENDOSCOPY;  Service: Pulmonary;  Laterality: Bilateral;  PRE/POST - lung mass   • CHOLECYSTECTOMY  1999   • COLONOSCOPY     • EMBOLIZATION CEREBRAL Left 6/29/2022    Procedure: 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;  Surgeon: Bradley Dowell MD;  Location: [facility] HYBRID OR 18/19;  Service: Interventional Radiology;  Laterality: Left;   • EYE SURGERY  11/24/2020    Took a muscle out of right eye   • GLOSSECTOMY PARTIAL      less than one half tongue   • LUNG SURGERY  2012   • ORAL LESION EXCISION/BIOPSY      June and August 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   • TUBAL ABDOMINAL LIGATION  1970   • VENOUS ACCESS DEVICE (PORT) INSERTION N/A 12/12/2022    Procedure: MEDIPORT PLACEMENT;  Surgeon: Jason Villaseñor MD;  Location: [facility] MAIN OR;  Service: Vascular;  Laterality: N/A;        Current Outpatient Medications on File Prior to Visit   Medication Sig Dispense Refill   • albuterol sulfate  (90 Base) MCG/ACT inhaler Inhale 2 puffs Every 4 (Four) Hours As Needed for Wheezing. 18 g 2   • amLODIPine (NORVASC) 5 MG tablet Take 1 tablet by mouth Daily. 90 tablet 0   • atorvastatin (LIPITOR) 20 MG tablet Take 1 tablet by mouth Every Night. 90 tablet 3   • B Complex-C-E-Zn (b complex-C-E-zinc) tablet Take 1 tablet by mouth Daily. HOLDING FOR DOS     • cetirizine (ZyrTEC) 10 MG tablet Take 10 mg by mouth Daily.     • chlorhexidine (PERIDEX) 0.12 %  solution Apply 15 mL to the mouth or throat 2 (Two) Times a Day.     • cholecalciferol (VITAMIN D3) 1000 units tablet Take 1,000 Units by mouth Daily. HOLDING FOR DOS     • clobetasol (TEMOVATE) 0.05 % cream APPLY TOPICALLY TO THE AFFECTED AREA TWICE DAILY AS NEEDED     • fluticasone (FLONASE) 50 MCG/ACT nasal spray 2 sprays into the nostril(s) as directed by provider As Needed.     • hydroCHLOROthiazide (HYDRODIURIL) 25 MG tablet Take 0.5 tablets by mouth Daily.     • HYDROcod Polst-CPM Polst ER (Tussionex Pennkinetic ER) 10-8 MG/5ML ER suspension Take 5 mL by mouth Every 12 (Twelve) Hours As Needed for Cough. 120 mL 0   • HYDROcodone-acetaminophen (NORCO) 5-325 MG per tablet Take 1 tablet by mouth Every 4 (Four) Hours As Needed for Moderate Pain. 100 tablet 0   • Ibuprofen (ADVIL PO) Take 400 mg by mouth Daily As Needed. HOLD PER MD INSTR     • lidocaine-prilocaine (EMLA) 2.5-2.5 % cream Apply nickel size amount to port site 30 min before appt time do not rub in cover with plastic wrap (Patient taking differently: 1 application As Needed. Apply nickel size amount to port site 30 min before appt time do not rub in cover with plastic wrap) 30 g 1   • metoprolol succinate XL (TOPROL-XL) 25 MG 24 hr tablet TAKE 1 TABLET EVERY DAY (Patient taking differently: Take 25 mg by mouth Daily.) 90 tablet 1   • montelukast (SINGULAIR) 10 MG tablet Take 1 tablet by mouth Every Night. 90 tablet 3   • Morphine (MS CONTIN) 15 MG 12 hr tablet Take 1 tablet by mouth 2 (Two) Times a Day. 60 tablet 0   • omeprazole (priLOSEC) 40 MG capsule Take 40 mg by mouth Daily.     • ondansetron (ZOFRAN) 8 MG tablet Take 1 tablet by mouth 3 (Three) Times a Day As Needed for Nausea or Vomiting. 30 tablet 5   • Umeclidinium-Vilanterol (Anoro Ellipta) 62.5-25 MCG/ACT aerosol powder  inhaler Inhale 1 puff Daily As Needed.       No current facility-administered medications on file prior to visit.        ALLERGIES:    Allergies   Allergen Reactions   •  Sulfa Antibiotics Rash        Social History     Socioeconomic History   • Marital status:    Tobacco Use   • Smoking status: Former     Types: Cigarettes     Quit date: 2015     Years since quittin.9   • Smokeless tobacco: Never   • Tobacco comments:     less than a pack per day, no smoking since , Quit 2015   Vaping Use   • Vaping Use: Never used   Substance and Sexual Activity   • Alcohol use: Yes     Comment: Socially -A few times a month   • Drug use: No   • Sexual activity: Defer     Family History   Problem Relation Age of Onset   • Ovarian cancer Mother          at age 27   • No Known Problems Father    • Ovarian cancer Sister    • Colon cancer Brother    • No Known Problems Other    • Malig Hyperthermia Neg Hx         Review of Systems   HENT: Positive for dental problem.    Eyes: Negative.    Respiratory: Negative for cough and wheezing.    Gastrointestinal: Positive for constipation.   Genitourinary: Negative for frequency and urgency.   Musculoskeletal: Positive for arthralgias, back pain and myalgias.   Neurological: Negative.    Hematological: Negative.    Psychiatric/Behavioral: Negative.       Objective     Vitals:    23 1026   BP: 102/57   Pulse: 84   Resp: 16   Temp: 97.1 °F (36.2 °C)   TempSrc: Temporal   SpO2: 97%   Weight: 49 kg (108 lb)   Height: 160 cm (62.99\")   PainSc: 0-No pain     Current Status 2023   ECOG score 0          Pulmonary function tests:     FVC is 2.10 which is 81% of predicted  FEV1 is 1.44 which is 75% of predicted  FEV1 FVC ratio 69  Diffusion capacity DLCO is 11.7 which is 54% of predicted      Physical Exam  Constitutional:       Appearance: Normal appearance. She is normal weight.   HENT:      Head: Normocephalic and atraumatic.      Nose: Nose normal.      Mouth/Throat:      Mouth: Mucous membranes are moist. Oral lesions (thrush along right side of tongue) present.      Pharynx: Oropharynx is clear.      Comments: Malocclusion  noted  Eyes:      Extraocular Movements: Extraocular movements intact.   Cardiovascular:      Rate and Rhythm: Normal rate and regular rhythm.      Pulses: Normal pulses.      Heart sounds: Normal heart sounds.   Pulmonary:      Effort: Pulmonary effort is normal.      Breath sounds: Normal breath sounds.      Comments: Distant breath sounds bilateral bases  Abdominal:      General: Abdomen is flat. Bowel sounds are normal.      Palpations: Abdomen is soft.   Musculoskeletal:         General: Normal range of motion.      Cervical back: Normal range of motion and neck supple.   Skin:     General: Skin is warm and dry.   Neurological:      General: No focal deficit present.      Mental Status: She is alert and oriented to person, place, and time.   Psychiatric:         Mood and Affect: Mood normal.         Behavior: Behavior normal.           RECENT LABS:  Results from last 7 days   Lab Units 01/04/23  0954   WBC 10*3/mm3 8.01   NEUTROS ABS 10*3/mm3 5.83   HEMOGLOBIN g/dL 12.3   HEMATOCRIT % 39.6   PLATELETS 10*3/mm3 291     Results from last 7 days   Lab Units 01/04/23  0954   SODIUM mmol/L 142   POTASSIUM mmol/L 3.4*   CHLORIDE mmol/L 98   CO2 mmol/L 27.4   BUN mg/dL 24*   CREATININE mg/dL 0.89   CALCIUM mg/dL 9.8   ALBUMIN g/dL 3.8   BILIRUBIN mg/dL 0.5   ALK PHOS U/L 70   ALT (SGPT) U/L 6   AST (SGOT) U/L 16   GLUCOSE mg/dL 108               Assessment & Plan      82-year-old female with medical history including COPD, long-term tobacco use, status post previous left upper lobectomy for carcinoma lung in May 2015, carcinoma tongue ongoing radiation therapy and surgical therapy through ENT-Dr. Caballero-including 2016 with wide local excision of buccal lesion.  During follow-up a CT scan of the chest June 16, 2021 demonstrates postsurgical changes, 8 mm irregular nodule medial segment of right lower lobe and diffuse changes of emphysema.  She is seen in thoracic clinic with findings suspicious for new malignancy and  concern of the patient being a poor operative candidate.  Subsequent PET CT and PFTs demonstrated ill-defined avidity and soft tissue left aspect of the mediastinum, 1 cm hypermetabolic pulmonary nodule and asymmetric thickening involving the right base of tongue as well as subtle uptake in the L1 vertebral body.  This patient's case was discussed in thoracic clinic and plans were made to request an MRI of the lumbar spine, obtain a biopsy of the mediastinal involvement of the left had the patient reviewed by ENT.  The patient is seen with her  7/22/2021 in thoracic clinic and we reviewed these findings as well as having her assessed via liquid biopsy to determine potential molecular status quickly.           She underwent the biopsy 8/13/2021 found to be consistent with invasive moderately differentiated adenocarcinoma with PD-L1 TPS score 40%.  MRI of the lumbar spine revealed no evidence of metastatic disease though the patient did have multilevel degenerative disease throughout the lumbar spine.  She had an office follow-up with Dr. Caballero-history of a [id number] SCCa of the rightt retromolar trigone who is s/p WLE, buccal fat pad flap and SLN biopsy that was negative, margins were negative.  She underwent laryngoscopy 8/18/2021 with right base of tongue without evidence of lesions or masses in the region.     She was seen by Dr. Hernandez 8/19/2021 and, her findings, had been presented in lung conference.  Her findings were consistent with 2 separate lesions including a nodule in the superior segment of the right lower lobe and a mass in the upper portion of the left chest with the left chest lesion biopsy positive for adenocarcinoma.  The consensus was that these were to separate-synchronous primaries.  Stereotactic radiation each lesions were felt to be the appropriate way to proceed and the patient was referred to radiation therapy.      At the time of this dictation her guardant 360 is not yet  available.  These issues are discussed with the patient in some detail 8/23/2021 so that we can have an overall longer-term plan.  This includes a Caris molecular assessment of her recent biopsy as well as her PD-L1 positive findings.    The patient was referred for radiation therapy and she was able to proceed with SBRT to the right lung at primary #1 with 5 treatments at 1000 cGy per fraction in the left lung primary similarly at 1000 cGy per fraction x5.  Her treatment ranged between 8/31-9/13/2021.  She is now scheduled for follow-up 11/23/2021.    Additionally genomic testing is discussed with her as she is is seen back 9/23/2021.  Her guardant 360 did not determine any new abnormalities but her Caris-MI profile-does reveal sensitivity to immunotherapy as well as a BRAF mutation.    Patient had subsequent PET/CT 11/23/2021 demonstrates decrease in size and avidity of the previously noted intensely FDG avid nodules left lobectomy operative site and right lower lobe.  Surrounding groundglass and pulmonary opacification is consistent with postradiation changes, a few new subcentimeter pulmonary nodules are present in the right upper lobe and indeterminant, stable subcentimeter hepatic lesion is below PET resolution no other findings of FDG-avid disease are seen in neck abdomen or pelvis.  The patient seen in office 12/1/2021 with a relatively good performance status stating she is not having any new symptoms and very pleased about her results on her PET/CT.  She realizes we'll have to follow this further but subsequent scans in 6 months.  She is also hoping to see an oral surgeon that previously helped her-Dr. Wu.    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  There is evolution of presumed postradiation changes in the right midlung with soft  tissue mass within the left lumpectomy operative bed minimally decreased in size.  There is a sub-6 mm nodule in the right upper lobe not definitively seen, indeterminate and an indeterminate left renal lesion at 1.5 cm unchanged from 7/13/2021.  The patient is currently scheduled to see Dr. Dowell on 6/23/2022.  She is feeling well without any additional symptoms.     The patient required admission 10/14 - 10/18/2022 presenting with shortness of breath and cough that was nonproductive.  She had been on oral antibiotics and did not improved and was admitted for therapy for apparent pneumonia.  This eventually led to bronchoscopy after initial CT revealed postsurgical changes, new masslike 6.7 cm area of consolidation anterior left lung raising concern for potential malignancy and a follow-up PET/CT planned.  Bronchoscopy and biopsy left lower lung failed to show evidence of malignancy.  The patient's PET/CT, however, demonstrated an irregular pleural-based soft tissue density thickening in the left upper lobe that was intensely FDG avid new from 6/8/2022 thought to be a malignant recurrence with spread into the left lung parenchyma.  There is intensely pleural-based avidity within the left lower lobe thought to be metastatic disease with postradiation of the left apex as well.  There is a small focus of uptake in the right hilum grossly unchanged in size and post radiation changes in the right lower lobe.  There is indeterminate density left renal that was grossly unchanged.  The patient is seen back in office indicating that she still has chest discomfort that is slowly worsening and that she has a chronic paroxysmal cough but has not improved.  We discussed that she very likely has recurrent disease and plan to proceed with a guardant 360 examination initially as we determine whether we should try to proceed with therapy particularly immunotherapy versus targeted therapy recognizing the above  findings.    Patient's guardant 360 returned as again significant for BRAF V600E and the potential use of BRAF inhibitor/MET inhibitor dabrafenib and trametinib.  Additional information PIK3CA and use of alpelisib.    Unfortunately she required admission 11/28-12/1/2022 with worsening back pain.  Fortunately she did not demonstrate evidence of metastatic disease but did have moderate degenerative changes which could cause radiculopathy.  Medications were altered to include subsequently MSR 15 mg p.o. every 12 hours, Norco as needed.    The patient now presents back 12/14/2022 to initiate therapy- initially with immunotherapy considering Caris study with PD-L1 TPS of 70% on 22 C3 and 28-8 assays.    12/14/2022 The patient is seen with her  and we discussed pain medications and adjustments thereafter and that she stopped taking MS IR having only a short supply and having to use Norco more frequently.  She is willing to initiate Keytruda today we have discussed that considering her genomics treatment versus her BRAF mutation is also an option.    1/4/2023 patient reviewed back today due for cycle 2 Keytruda.  Patient experiencing decreased appetite and energy following for cycle of Keytruda.  She has lost 7 pounds.  Per discussion with Dr. Escobar we will start the patient on prednisone 10 mg daily which is acceptable to continue with immunotherapy.  She also has noted thrush along the right side of her tongue where her tongue is rubbing against her teeth (chronic issue reportedly).  We will also prescribe Mycelex for this.  Patient has had some increasing constipation in relation to narcotics we discussed senna S tabs along with adding MiraLAX.  Plan repeat scans after 3 cycles.      Plan:    *Proceed with Keytruda today cycle 2    *Continue pain medications including MS Contin 15 mg p.o. every 12 hours, Norco 5/325 1-2 p.o. every 4 hours as needed for breakthrough    *Begin prednisone 10 mg daily.  Prescription  sent in    *Begin Mycelex 5 times a day for oral thrush.  Once resolved she can back off to a few times a day as maintenance in the setting of steroids    *Continue Senokot S 2 tabs twice a day and add daily MiraLAX.    *3 weeks Dr. Escobar and cycle 3 Keytruda.    *Plan repeat imaging after 3 cycles.    *Additional options for therapy include BRAF inhibitors, chemotherapy directed for histology lung adenocarcinoma    This patient is on high risk drug therapy requiring intensive monitoring for toxicity.      I spent 55 minutes caring for Darlene on this date of service. This time includes time spent by me in the following activities: preparing for the visit, reviewing tests, obtaining and/or reviewing a separately obtained history, performing a medically appropriate examination and/or evaluation, counseling and educating the patient/family/caregiver, ordering medications, tests, or procedures, referring and communicating with other health care professionals, documenting information in the medical record and care coordination

## 2023-01-09 ENCOUNTER — OFFICE VISIT (OUTPATIENT)
Dept: FAMILY MEDICINE CLINIC | Facility: CLINIC | Age: 83
End: 2023-01-09
Payer: MEDICARE

## 2023-01-09 VITALS
WEIGHT: 110.4 LBS | DIASTOLIC BLOOD PRESSURE: 82 MMHG | HEIGHT: 63 IN | BODY MASS INDEX: 19.56 KG/M2 | OXYGEN SATURATION: 97 % | HEART RATE: 74 BPM | TEMPERATURE: 97.7 F | SYSTOLIC BLOOD PRESSURE: 144 MMHG

## 2023-01-09 DIAGNOSIS — Z85.118 HISTORY OF LUNG CANCER: ICD-10-CM

## 2023-01-09 DIAGNOSIS — E78.5 HYPERLIPIDEMIA, UNSPECIFIED HYPERLIPIDEMIA TYPE: ICD-10-CM

## 2023-01-09 DIAGNOSIS — R73.09 ABNORMAL GLUCOSE: ICD-10-CM

## 2023-01-09 DIAGNOSIS — J44.9 CHRONIC OBSTRUCTIVE PULMONARY DISEASE, UNSPECIFIED COPD TYPE: ICD-10-CM

## 2023-01-09 DIAGNOSIS — I10 ESSENTIAL HYPERTENSION: Primary | ICD-10-CM

## 2023-01-09 DIAGNOSIS — C34.12 MALIGNANT NEOPLASM OF UPPER LOBE OF LEFT LUNG: ICD-10-CM

## 2023-01-09 DIAGNOSIS — Z92.241 HISTORY OF RECENT STEROID USE: ICD-10-CM

## 2023-01-09 PROCEDURE — 99214 OFFICE O/P EST MOD 30 MIN: CPT | Performed by: FAMILY MEDICINE

## 2023-01-09 RX ORDER — HYDROCODONE BITARTRATE AND ACETAMINOPHEN 5; 325 MG/1; MG/1
1 TABLET ORAL EVERY 4 HOURS PRN
Qty: 100 TABLET | Refills: 0 | Status: SHIPPED | OUTPATIENT
Start: 2023-01-09

## 2023-01-09 RX ORDER — MORPHINE SULFATE 15 MG/1
15 TABLET, FILM COATED, EXTENDED RELEASE ORAL 2 TIMES DAILY
Qty: 60 TABLET | Refills: 0 | Status: SHIPPED | OUTPATIENT
Start: 2023-01-09 | End: 2023-02-13 | Stop reason: SDUPTHER

## 2023-01-09 NOTE — TELEPHONE ENCOUNTER
Caller: Darlene Pfeiffer S    Relationship: Self    Best call back number: [phone]    Requested Prescriptions:   Requested Prescriptions     Pending Prescriptions Disp Refills   • HYDROcodone-acetaminophen (NORCO) 5-325 MG per tablet 100 tablet 0     Sig: Take 1 tablet by mouth Every 4 (Four) Hours As Needed for Moderate Pain.   • Morphine (MS CONTIN) 15 MG 12 hr tablet 60 tablet 0     Sig: Take 1 tablet by mouth 2 (Two) Times a Day.        Pharmacy where request should be sent: [id number] - [place] [id number] - [phone]  - [phone]     Additional details provided by patient:     HAS 3 DAYS LEFT OF THE MORPHINE     Does the patient have less than a 3 day supply:  [] Yes  [x] No    Would you like a call back once the refill request has been completed: [] Yes [x] No    If the office needs to give you a call back, can they leave a voicemail: [x] Yes [] No

## 2023-01-09 NOTE — PROGRESS NOTES
Chief Complaint  Med Refill and Hypertension    Subjective        Darlene Pfeiffer presents to [facility]  History of Present Illness  The patient presents today for a routine 6-month follow-up on her blood pressure.    Hypertension  Her blood pressure is slightly elevated today compared to previous.  The oncology office did put her on steroids last week.    Chronic Pain  She is currently taking morphine, and hydrocodone as needed for pain from her oncologist. Patient takes morphine twice a day, and hydrocodone in between as needed.  She states her pain is not keeping her awake at night. She states she is tired all day and napping during the day.      Recent steroid use   Ms. Pfeiffer states she has had 2 rounds of Keytruda in the summer. She states she was put on prednisone on 01/04/2023, due to lack of energy and decreased appetite. She denies any increased thirst or urination. Patient reports she has difficulty sleeping. She states she has not been eating much. She states she has been taking lozenges for thrush. Patient has lost 19 pounds since 06/2022. However, she has gained 2 pounds since last week. Patient reports that her port is working well.     Objective    A review of systems was performed, and the pertinent positives are noted in the HPI.  Vital Signs:  /82   Pulse 74   Temp 97.7 °F (36.5 °C)   Ht 160 cm (63\")   Wt 50.1 kg (110 lb 6.4 oz)   SpO2 97%   BMI 19.56 kg/m²   Estimated body mass index is 19.56 kg/m² as calculated from the following:    Height as of this encounter: 160 cm (63\").    Weight as of this encounter: 50.1 kg (110 lb 6.4 oz).    BMI is within normal parameters. No other follow-up for BMI required.      Physical Exam  Constitutional:       General: She is not in acute distress.     Appearance: Normal appearance. She is well-developed.   HENT:      Head: Normocephalic and atraumatic.      Right Ear: Tympanic membrane, ear canal and external ear  normal.      Left Ear: Tympanic membrane, ear canal and external ear normal.      Mouth/Throat:      Mouth: Mucous membranes are moist.      Pharynx: Oropharynx is clear.   Eyes:      Conjunctiva/sclera: Conjunctivae normal.      Pupils: Pupils are equal, round, and reactive to light.   Neck:      Thyroid: No thyromegaly.   Cardiovascular:      Rate and Rhythm: Normal rate and regular rhythm.      Heart sounds: No murmur heard.  Pulmonary:      Effort: Pulmonary effort is normal.      Breath sounds: Normal breath sounds. No wheezing.   Abdominal:      General: Bowel sounds are normal.      Palpations: Abdomen is soft.      Tenderness: There is no abdominal tenderness.   Musculoskeletal:         General: Normal range of motion.      Cervical back: Neck supple.   Lymphadenopathy:      Cervical: No cervical adenopathy.   Skin:     General: Skin is warm and dry.   Neurological:      Mental Status: She is alert and oriented to person, place, and time.   Psychiatric:         Mood and Affect: Mood normal.         Behavior: Behavior normal.        Result Review :                Assessment and Plan   Diagnoses and all orders for this visit:    1. Essential hypertension (Primary)  -     CBC & Differential  -     Comprehensive Metabolic Panel  -     Lipid Panel  -     TSH    2. Hyperlipidemia, unspecified hyperlipidemia type  -     Lipid Panel    3. Malignant neoplasm of upper lobe of left lung (HCC)    4. Chronic obstructive pulmonary disease, unspecified COPD type (HCC)    5. History of recent steroid use  -     CBC & Differential  -     Comprehensive Metabolic Panel  -     Hemoglobin A1c    6. Abnormal glucose  -     Hemoglobin A1c             Follow Up   Return in about 6 months (around 7/9/2023) for Medicare Wellness.  Patient was given instructions and counseling regarding her condition or for health maintenance advice. Please see specific information pulled into the AVS if appropriate.     Transcribed from ambient  dictation for Meredith Lea Kehrer, MD by Noemi Avalos.  01/09/23   10:30 EST    Patient or patient representative verbalized consent to the visit recording.  I have personally performed the services described in this document as transcribed by the above individual, and it is both accurate and complete.

## 2023-01-10 LAB
ALBUMIN SERPL-MCNC: 4.1 G/DL (ref 3.5–5.2)
ALBUMIN/GLOB SERPL: 2 G/DL
ALP SERPL-CCNC: 63 U/L (ref 39–117)
ALT SERPL-CCNC: 6 U/L (ref 1–33)
AST SERPL-CCNC: 16 U/L (ref 1–32)
BASOPHILS # BLD AUTO: 0.05 10*3/MM3 (ref 0–0.2)
BASOPHILS NFR BLD AUTO: 0.7 % (ref 0–1.5)
BILIRUB SERPL-MCNC: 0.4 MG/DL (ref 0–1.2)
BUN SERPL-MCNC: 21 MG/DL (ref 8–23)
BUN/CREAT SERPL: 28.8 (ref 7–25)
CALCIUM SERPL-MCNC: 9.5 MG/DL (ref 8.6–10.5)
CHLORIDE SERPL-SCNC: 98 MMOL/L (ref 98–107)
CHOLEST SERPL-MCNC: 203 MG/DL (ref 0–200)
CO2 SERPL-SCNC: 34.2 MMOL/L (ref 22–29)
CREAT SERPL-MCNC: 0.73 MG/DL (ref 0.57–1)
EGFRCR SERPLBLD CKD-EPI 2021: 83.3 ML/MIN/1.73
EOSINOPHIL # BLD AUTO: 0 10*3/MM3 (ref 0–0.4)
EOSINOPHIL NFR BLD AUTO: 0 % (ref 0.3–6.2)
ERYTHROCYTE [DISTWIDTH] IN BLOOD BY AUTOMATED COUNT: 13.1 % (ref 12.3–15.4)
GLOBULIN SER CALC-MCNC: 2.1 GM/DL
GLUCOSE SERPL-MCNC: 95 MG/DL (ref 65–99)
HBA1C MFR BLD: 5.8 % (ref 4.8–5.6)
HCT VFR BLD AUTO: 40.6 % (ref 34–46.6)
HDLC SERPL-MCNC: 46 MG/DL (ref 40–60)
HGB BLD-MCNC: 13.3 G/DL (ref 12–15.9)
IMM GRANULOCYTES # BLD AUTO: 0.01 10*3/MM3 (ref 0–0.05)
IMM GRANULOCYTES NFR BLD AUTO: 0.1 % (ref 0–0.5)
LDLC SERPL CALC-MCNC: 133 MG/DL (ref 0–100)
LYMPHOCYTES # BLD AUTO: 2.78 10*3/MM3 (ref 0.7–3.1)
LYMPHOCYTES NFR BLD AUTO: 37.4 % (ref 19.6–45.3)
MCH RBC QN AUTO: 28.7 PG (ref 26.6–33)
MCHC RBC AUTO-ENTMCNC: 32.8 G/DL (ref 31.5–35.7)
MCV RBC AUTO: 87.5 FL (ref 79–97)
MONOCYTES # BLD AUTO: 0.55 10*3/MM3 (ref 0.1–0.9)
MONOCYTES NFR BLD AUTO: 7.4 % (ref 5–12)
NEUTROPHILS # BLD AUTO: 4.05 10*3/MM3 (ref 1.7–7)
NEUTROPHILS NFR BLD AUTO: 54.4 % (ref 42.7–76)
NRBC BLD AUTO-RTO: 0 /100 WBC (ref 0–0.2)
PLATELET # BLD AUTO: 287 10*3/MM3 (ref 140–450)
POTASSIUM SERPL-SCNC: 3.9 MMOL/L (ref 3.5–5.2)
PROT SERPL-MCNC: 6.2 G/DL (ref 6–8.5)
RBC # BLD AUTO: 4.64 10*6/MM3 (ref 3.77–5.28)
SODIUM SERPL-SCNC: 142 MMOL/L (ref 136–145)
TRIGL SERPL-MCNC: 135 MG/DL (ref 0–150)
TSH SERPL DL<=0.005 MIU/L-ACNC: 3.29 UIU/ML (ref 0.27–4.2)
VLDLC SERPL CALC-MCNC: 24 MG/DL (ref 5–40)
WBC # BLD AUTO: 7.44 10*3/MM3 (ref 3.4–10.8)

## 2023-01-24 NOTE — PROGRESS NOTES
Subjective  Patient feeling significantly improved.                                                                                                                                                          REASON FOR FOLLOW-UP: Recurrent lung cancer.    History of Present Illness       The patient is an 82-year-old female with the below medical history including chronic obstructive pulmonary disease who was seen in the [facility] multidisciplinary thoracic oncology clinic July 1, 2021.  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      She had undergone a CT of the chest at [facility] 6/16/2021 showing postsurgical changes in the left chest from right upper lobectomy, 8 mm irregular nodule medial segment of the right lower lobe and diffuse changes of emphysema with fibrotic changes in the periphery, no suspicious hilar or mediastinal nodes, no pleural effusion.  New finding was suspicious for new malignancy with the patient felt to be a poor candidate for surgical resection.  A PET CT and PFTs were requested thereafter.  The PET/CT demonstrated ill-defined FDG avid soft tissue thickening left aspect mediastinum within the operative bed, 1 cm hypermetabolic pulmonary nodule right lower lobe and asymmetric thickening patchy uptake involving the right base of tongue.  There is subtle uptake within the L1 vertebral body which is indeterminate and a sub-6 mm pulmonary nodule of right lung and subcentimeter hypodense hepatic lesion which was below PET resolution.       The patient's case was discussed in thoracic conference 7/22/2021 with consideration of the patient undergoing L1 vertebral body MRI, confirmatory biopsy left mediastinal and assessment by ENT (Dr. Caballero) who had worked with the patient  previously.  She, incidentally, indicates that radiation therapy was given apparently intraoperatively at her recent surgery?  She still has issues with difficulty chewing and swallowing post procedures and was to be followed up with Dr. Caballero in the near future as planned.                                                            She was seen in the thoracic clinic on the same day with plans to proceed with MRI of the lumbar spine, biopsy left mediastinal nodular area of potential disease and follow-up of her ENT assessment as well as undergo a guardant 360 assessment in our office.  She underwent the biopsy 8/13/2021 found to be consistent with invasive moderately differentiated adenocarcinoma with PD-L1 TPS score 40%.  MRI of the lumbar spine revealed no evidence of metastatic disease though the patient did have multilevel degenerative disease throughout the lumbar spine.  She had an office follow-up with Dr. 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  She underwent laryngoscopy 8/18/2021 with right base of tongue without evidence of lesions or masses in the region.     She was seen by Dr. Hernandez 8/19/2021 and, her findings, had been presented in lung conference.  Her findings were consistent with 2 separate lesions including a nodule in the superior segment of the right lower lobe and a mass in the upper portion of the left chest with the left chest lesion biopsy positive for adenocarcinoma.  The consensus was that these were to separate-synchronous primaries.  Stereotactic radiation each lesions were felt to be the appropriate way to proceed and the patient was referred to radiation therapy.     The patient is seen in office 8/23/2021 agreeable to the radiation therapy as well as additional assessments including Caris molecular assessment of her biopsy.  Again her guardant 360 is currently  pending and we would follow her after she completes radiation therapy with subsequent scans.      She was able to proceed with SBRT to the right lung at primary #1 with 5 treatments at 1000 cGy per fraction in the left lung primary similarly at 1000 cGy per fraction x5.  Her treatment ranged between 8/31-9/13/2021.  She is now scheduled for follow-up 11/23/2021.    The patient subsequent genomic testing is discussed with her as she is is seen back 9/23/2021.  Her guardant 360 did not determine any new abnormalities but her Caris-MI profile-does reveal sensitivity to immunotherapy as well as a BRAF mutation.  This could be extremely important as we follow the patient from this point.  Fortunately she is feeling extremely well and quite pleased about her treatments.    Patient had subsequent PET/CT 11/23/2021 demonstrates decrease in size and avidity of the previously noted intensely FDG avid nodules left lobectomy operative site and right lower lobe.  Surrounding groundglass and pulmonary opacification is consistent with postradiation changes, a few new subcentimeter pulmonary nodules are present in the right upper lobe and indeterminant, stable subcentimeter hepatic lesion is below PET resolution no other findings of FDG-avid disease are seen in neck abdomen or pelvis.  The patient seen in office 12/1/2021 with a relatively good performance status stating she is not having any new symptoms and very pleased about her results on her PET/CT.  She realizes we'll have to follow this further but subsequent scans in 6 months.  She is also hoping to see an oral surgeon that previously helped her-Dr. Wu.    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  There is evolution of presumed postradiation changes in the right midlung with soft tissue mass within the  left lumpectomy operative bed minimally decreased in size.  There is a sub-6 mm nodule in the right upper lobe not definitively seen, indeterminate and an indeterminate left renal lesion at 1.5 cm unchanged from 7/13/2021.  The patient is currently scheduled to see Dr. Dowell on 6/23/2022.  She is feeling well without any additional symptoms.    The patient required admission 10/14 - 10/18/2022 presenting with shortness of breath and cough that was nonproductive.  She had been on oral antibiotics and did not improved and was admitted for therapy for apparent pneumonia.  This eventually led to bronchoscopy after initial CT revealed postsurgical changes, new masslike 6.7 cm area of consolidation anterior left lung raising concern for potential malignancy and a follow-up PET/CT planned.  Bronchoscopy and biopsy left lower lung failed to show evidence of malignancy.  The patient's PET/CT, however, demonstrated an irregular pleural-based soft tissue density thickening in the left upper lobe that was intensely FDG avid new from 6/8/2022 thought to be a malignant recurrence with spread into the left lung parenchyma.  There is intensely pleural-based avidity within the left lower lobe thought to be metastatic disease with postradiation of the left apex as well.  There is a small focus of uptake in the right hilum grossly unchanged in size and post radiation changes in the right lower lobe.  There is indeterminate density left renal that was grossly unchanged.  The patient is seen back in office 11/15/2022 indicating that she still has chest discomfort that is slowly worsening and that she has a chronic paroxysmal cough but has not improved.  We discussed that she very likely has recurrent disease and plan to proceed with a guardant 360 examination initially as we determine whether we should try to proceed with therapy particularly immunotherapy versus targeted therapy recognizing the above findings.    Patient's guardant  360 returned as again significant for BRAF V600E and the potential use of BRAF inhibitor/MET inhibitor dabrafenib and trametinib.  Additional information [id number] and use of alpelisib.    Unfortunately she required admission 11/28-12/1 with worsening back pain.  Fortunately she did not demonstrate evidence of metastatic disease but did have moderate degenerative changes which could cause radiculopathy.  Medications were altered to include subsequently MSR 15 mg p.o. every 12 hours, Norco as needed.    The patient now presents back 12/14/2022 to initiate therapy- initially with immunotherapy considering Caris study with PD-L1 TPS of 70% on 22 C3 and 28-8 assays.    Patient seen with her  and we discussed pain medications and adjustments thereafter and that she stopped taking MSIR having only a short supply and having to use Norco more frequently.  She is willing to initiate Keytruda today we have discussed that considering her genomics treatment versus her BRAF mutation is also an option.    C1 Keytruda 12/14/2022    Patient is seen back 1/4/2023 in follow-up due for cycle 2 Keytruda.  Patient states that since receiving her first cycle she has had decreased appetite and decreased energy.  She has not been able to bring herself to eat much and she has notably lost 7 pounds.  She has also been struggling with constipation in relation to ongoing narcotics for pain control.  She has been taking senna S2 tabs twice a day.  We discussed adding daily MiraLAX.    Patient did just last week meet with dietitian, Zoila Clinton, to discuss ways to improve caloric intake.  She has not been able to implement any of these things yet though.    The patient is next seen 1/25/2023 with mild weight gain.  She is seen with family member both indicating that she has actually felt somewhat better in terms of performance status and general function.  We have discussed proceeding with a third cycle treatment and reevaluating by scan in  2 weeks.    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   • Cerebral aneurysm    • COPD (chronic obstructive pulmonary disease) ([facility])    • COVID 2020   • Esophageal reflux    • History of adenocarcinoma of lung    • History of anemia    • History of carcinoma in situ of skin    • History of lung cancer    • 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 from [facility]   • History of squamous cell carcinoma     Tongue   • Hyperlipidemia    • Hypertension     since early 60s   • Low vitamin B12 level    • Lung cancer ([facility])    • Thyromegaly    • UTI (urinary tract infection)    • Vitamin D deficiency         Past Surgical History:   Procedure Laterality Date   • BRONCHOSCOPY Bilateral 10/17/2022    Procedure: BRONCHOSCOPY WITH FLUORO with biopsy and BAL;  Surgeon: India Flores MD;  Location: [facility] ENDOSCOPY;  Service: Pulmonary;  Laterality: Bilateral;  PRE/POST - lung mass   • CHOLECYSTECTOMY  1999   • COLONOSCOPY     • EMBOLIZATION CEREBRAL Left 6/29/2022    Procedure: 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;  Surgeon: Bradley Dowell MD;  Location: [facility] OR 18/19;  Service: Interventional Radiology;  Laterality: Left;   • EYE SURGERY  11/24/2020    Took a muscle out of right eye   • GLOSSECTOMY PARTIAL      less than one half tongue   • LUNG SURGERY  2012   • ORAL LESION EXCISION/BIOPSY      June and August 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   • TUBAL ABDOMINAL LIGATION  1970   • VENOUS ACCESS DEVICE (PORT) INSERTION N/A 12/12/2022    Procedure: MEDIPORT PLACEMENT;  Surgeon: Jason Villaseñor MD;  Location: [facility] OR;  Service: Vascular;  Laterality: N/A;        Current Outpatient Medications on File Prior to Visit   Medication Sig Dispense Refill   • albuterol sulfate  (90 Base) MCG/ACT inhaler Inhale 2 puffs Every 4 (Four) Hours As Needed for Wheezing. 18 g 2   •  amLODIPine (NORVASC) 5 MG tablet Take 1 tablet by mouth Daily. 90 tablet 0   • atorvastatin (LIPITOR) 20 MG tablet Take 1 tablet by mouth Every Night. 90 tablet 3   • B Complex-C-E-Zn (b complex-C-E-zinc) tablet Take 1 tablet by mouth Daily. HOLDING FOR DOS     • cetirizine (ZyrTEC) 10 MG tablet Take 10 mg by mouth Daily.     • chlorhexidine (PERIDEX) 0.12 % solution Apply 15 mL to the mouth or throat 2 (Two) Times a Day.     • cholecalciferol (VITAMIN D3) 1000 units tablet Take 1,000 Units by mouth Daily. HOLDING FOR DOS     • clobetasol (TEMOVATE) 0.05 % cream APPLY TOPICALLY TO THE AFFECTED AREA TWICE DAILY AS NEEDED     • clotrimazole (MYCELEX) 10 MG danilo Take 1 tablet by mouth 5 (Five) Times a Day. 70 tablet 1   • fluticasone (FLONASE) 50 MCG/ACT nasal spray 2 sprays into the nostril(s) as directed by provider As Needed.     • hydroCHLOROthiazide (HYDRODIURIL) 25 MG tablet Take 0.5 tablets by mouth Daily.     • HYDROcod Polst-CPM Polst ER (Tussionex Pennkinetic ER) 10-8 MG/5ML ER suspension Take 5 mL by mouth Every 12 (Twelve) Hours As Needed for Cough. 120 mL 0   • HYDROcodone-acetaminophen (NORCO) 5-325 MG per tablet Take 1 tablet by mouth Every 4 (Four) Hours As Needed for Moderate Pain. 100 tablet 0   • Ibuprofen (ADVIL PO) Take 400 mg by mouth Daily As Needed. HOLD PER MD INSTR     • lidocaine-prilocaine (EMLA) 2.5-2.5 % cream Apply nickel size amount to port site 30 min before appt time do not rub in cover with plastic wrap (Patient taking differently: 1 application As Needed. Apply nickel size amount to port site 30 min before appt time do not rub in cover with plastic wrap) 30 g 1   • metoprolol succinate XL (TOPROL-XL) 25 MG 24 hr tablet TAKE 1 TABLET EVERY DAY (Patient taking differently: Take 25 mg by mouth Daily.) 90 tablet 1   • montelukast (SINGULAIR) 10 MG tablet Take 1 tablet by mouth Every Night. 90 tablet 3   • Morphine (MS CONTIN) 15 MG 12 hr tablet Take 1 tablet by mouth 2 (Two) Times a  "Day. 60 tablet 0   • omeprazole (priLOSEC) 40 MG capsule Take 40 mg by mouth Daily.     • ondansetron (ZOFRAN) 8 MG tablet Take 1 tablet by mouth 3 (Three) Times a Day As Needed for Nausea or Vomiting. 30 tablet 5   • predniSONE (DELTASONE) 10 MG tablet Take 1 tablet by mouth Daily. 30 tablet 2   • Umeclidinium-Vilanterol (Anoro Ellipta) 62.5-25 MCG/ACT aerosol powder  inhaler Inhale 1 puff Daily As Needed.       No current facility-administered medications on file prior to visit.        ALLERGIES:    Allergies   Allergen Reactions   • Sulfa Antibiotics Rash        Social History     Socioeconomic History   • Marital status:    Tobacco Use   • Smoking status: Former     Types: Cigarettes     Quit date: 2015     Years since quittin.9   • Smokeless tobacco: Never   • Tobacco comments:     less than a pack per day, no smoking since , Quit 2015   Vaping Use   • Vaping Use: Never used   Substance and Sexual Activity   • Alcohol use: Yes     Comment: Socially -A few times a month   • Drug use: No   • Sexual activity: Defer     Family History   Problem Relation Age of Onset   • Ovarian cancer Mother          at age 27   • No Known Problems Father    • Ovarian cancer Sister    • Colon cancer Brother    • No Known Problems Other    • Malig Hyperthermia Neg Hx         Review of Systems   HENT: Positive for dental problem.    Eyes: Negative.    Respiratory: Negative for cough and wheezing.    Gastrointestinal: Positive for constipation.   Genitourinary: Negative for frequency and urgency.   Musculoskeletal: Positive for arthralgias, back pain and myalgias.   Neurological: Negative.    Hematological: Negative.    Psychiatric/Behavioral: Negative.       Objective     Vitals:    23 0843   BP: 114/52   Pulse: 71   Resp: 18   Temp: 97.7 °F (36.5 °C)   TempSrc: Temporal   SpO2: 95%   Weight: 47.8 kg (105 lb 6.4 oz)   Height: 160 cm (62.99\")   PainSc: 0-No pain     Current Status 2023   ECOG " score 0          Pulmonary function tests:     FVC is 2.10 which is 81% of predicted  FEV1 is 1.44 which is 75% of predicted  FEV1 FVC ratio 69  Diffusion capacity DLCO is 11.7 which is 54% of predicted      Physical Exam  Constitutional:       Appearance: Normal appearance. She is normal weight.   HENT:      Head: Normocephalic and atraumatic.      Nose: Nose normal.      Mouth/Throat:      Mouth: Mucous membranes are moist. No oral lesions.      Pharynx: Oropharynx is clear.      Comments: Malocclusion noted  Eyes:      Extraocular Movements: Extraocular movements intact.   Cardiovascular:      Rate and Rhythm: Normal rate and regular rhythm.      Pulses: Normal pulses.      Heart sounds: Normal heart sounds.   Pulmonary:      Effort: Pulmonary effort is normal.      Breath sounds: Normal breath sounds.      Comments: Distant breath sounds bilateral bases  Abdominal:      General: Abdomen is flat. Bowel sounds are normal.      Palpations: Abdomen is soft.   Musculoskeletal:         General: Normal range of motion.      Cervical back: Normal range of motion and neck supple.   Skin:     General: Skin is warm and dry.   Neurological:      General: No focal deficit present.      Mental Status: She is alert and oriented to person, place, and time.   Psychiatric:         Mood and Affect: Mood normal.         Behavior: Behavior normal.           RECENT LABS:  Results from last 7 days   Lab Units 01/25/23  0825   WBC 10*3/mm3 10.81*   NEUTROS ABS 10*3/mm3 7.35*   HEMOGLOBIN g/dL 12.6   HEMATOCRIT % 39.2   PLATELETS 10*3/mm3 195     Results from last 7 days   Lab Units 01/25/23  0825   SODIUM mmol/L 142   POTASSIUM mmol/L 3.0*   CHLORIDE mmol/L 99   CO2 mmol/L 31.9*   BUN mg/dL 23*   CREATININE mg/dL 0.74   CALCIUM mg/dL 9.5   ALBUMIN g/dL 3.9   BILIRUBIN mg/dL 0.6   ALK PHOS U/L 58   ALT (SGPT) U/L 10   AST (SGOT) U/L 16   GLUCOSE mg/dL 106               Assessment & Plan      82-year-old female with medical history  including COPD, long-term tobacco use, status post previous left upper lobectomy for carcinoma lung in May 2015, carcinoma tongue ongoing radiation therapy and surgical therapy through ENT-Dr. Caballero-including 2016 with wide local excision of buccal lesion.  During follow-up a CT scan of the chest June 16, 2021 demonstrates postsurgical changes, 8 mm irregular nodule medial segment of right lower lobe and diffuse changes of emphysema.  She is seen in thoracic clinic with findings suspicious for new malignancy and concern of the patient being a poor operative candidate.  Subsequent PET CT and PFTs demonstrated ill-defined avidity and soft tissue left aspect of the mediastinum, 1 cm hypermetabolic pulmonary nodule and asymmetric thickening involving the right base of tongue as well as subtle uptake in the L1 vertebral body.  This patient's case was discussed in thoracic clinic and plans were made to request an MRI of the lumbar spine, obtain a biopsy of the mediastinal involvement of the left had the patient reviewed by ENT.  The patient is seen with her  7/22/2021 in thoracic clinic and we reviewed these findings as well as having her assessed via liquid biopsy to determine potential molecular status quickly.           She underwent the biopsy 8/13/2021 found to be consistent with invasive moderately differentiated adenocarcinoma with PD-L1 TPS score 40%.  MRI of the lumbar spine revealed no evidence of metastatic disease though the patient did have multilevel degenerative disease throughout the lumbar spine.  She had an office follow-up with Dr. Caballero-history of a [id number] SCCa of the rightt retromolar trigone who is s/p WLE, buccal fat pad flap and SLN biopsy that was negative, margins were negative.  She underwent laryngoscopy 8/18/2021 with right base of tongue without evidence of lesions or masses in the region.     She was seen by Dr. Hernandez 8/19/2021 and, her findings, had been presented in lung  conference.  Her findings were consistent with 2 separate lesions including a nodule in the superior segment of the right lower lobe and a mass in the upper portion of the left chest with the left chest lesion biopsy positive for adenocarcinoma.  The consensus was that these were to separate-synchronous primaries.  Stereotactic radiation each lesions were felt to be the appropriate way to proceed and the patient was referred to radiation therapy.      At the time of this dictation her guardant 360 is not yet available.  These issues are discussed with the patient in some detail 8/23/2021 so that we can have an overall longer-term plan.  This includes a Caris molecular assessment of her recent biopsy as well as her PD-L1 positive findings.    The patient was referred for radiation therapy and she was able to proceed with SBRT to the right lung at primary #1 with 5 treatments at 1000 cGy per fraction in the left lung primary similarly at 1000 cGy per fraction x5.  Her treatment ranged between 8/31-9/13/2021.  She is now scheduled for follow-up 11/23/2021.    Additionally genomic testing is discussed with her as she is is seen back 9/23/2021.  Her guardant 360 did not determine any new abnormalities but her Caris-MI profile-does reveal sensitivity to immunotherapy as well as a BRAF mutation.    Patient had subsequent PET/CT 11/23/2021 demonstrates decrease in size and avidity of the previously noted intensely FDG avid nodules left lobectomy operative site and right lower lobe.  Surrounding groundglass and pulmonary opacification is consistent with postradiation changes, a few new subcentimeter pulmonary nodules are present in the right upper lobe and indeterminant, stable subcentimeter hepatic lesion is below PET resolution no other findings of FDG-avid disease are seen in neck abdomen or pelvis.  The patient seen in office 12/1/2021 with a relatively good performance status stating she is not having any new symptoms  and very pleased about her results on her PET/CT.  She realizes we'll have to follow this further but subsequent scans in 6 months.  She is also hoping to see an oral surgeon that previously helped her-Dr. Wu.    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  There is evolution of presumed postradiation changes in the right midlung with soft tissue mass within the left lumpectomy operative bed minimally decreased in size.  There is a sub-6 mm nodule in the right upper lobe not definitively seen, indeterminate and an indeterminate left renal lesion at 1.5 cm unchanged from 7/13/2021.  The patient is currently scheduled to see Dr. Dowell on 6/23/2022.  She is feeling well without any additional symptoms.     The patient required admission 10/14 - 10/18/2022 presenting with shortness of breath and cough that was nonproductive.  She had been on oral antibiotics and did not improved and was admitted for therapy for apparent pneumonia.  This eventually led to bronchoscopy after initial CT revealed postsurgical changes, new masslike 6.7 cm area of consolidation anterior left lung raising concern for potential malignancy and a follow-up PET/CT planned.  Bronchoscopy and biopsy left lower lung failed to show evidence of malignancy.  The patient's PET/CT, however, demonstrated an irregular pleural-based soft tissue density thickening in the left upper lobe that was intensely FDG avid new from 6/8/2022 thought to be a malignant recurrence with spread into the left lung parenchyma.  There is intensely pleural-based avidity within the left lower lobe thought to be metastatic disease with postradiation of the left apex as well.  There is a small focus of uptake in the right hilum grossly unchanged in size and post radiation changes in the right lower lobe.   There is indeterminate density left renal that was grossly unchanged.  The patient is seen back in office indicating that she still has chest discomfort that is slowly worsening and that she has a chronic paroxysmal cough but has not improved.  We discussed that she very likely has recurrent disease and plan to proceed with a guardant 360 examination initially as we determine whether we should try to proceed with therapy particularly immunotherapy versus targeted therapy recognizing the above findings.    Patient's guardant 360 returned as again significant for BRAF V600E and the potential use of BRAF inhibitor/MET inhibitor dabrafenib and trametinib.  Additional information [id number] and use of alpelisib.    Unfortunately she required admission 11/28-12/1/2022 with worsening back pain.  Fortunately she did not demonstrate evidence of metastatic disease but did have moderate degenerative changes which could cause radiculopathy.  Medications were altered to include subsequently MSR 15 mg p.o. every 12 hours, Norco as needed.    The patient now presents back 12/14/2022 to initiate therapy- initially with immunotherapy considering Caris study with PD-L1 TPS of 70% on 22 C3 and 28-8 assays.    12/14/2022 The patient is seen with her  and we discussed pain medications and adjustments thereafter and that she stopped taking MS IR having only a short supply and having to use Norco more frequently.  She is willing to initiate Keytruda today we have discussed that considering her genomics treatment versus her BRAF mutation is also an option.    1/4/2023 patient reviewed back today due for cycle 2 Keytruda.  Patient experiencing decreased appetite and energy following for cycle of Keytruda.  She has lost 7 pounds.  Per discussion with Dr. Escobar we will start the patient on prednisone 10 mg daily which is acceptable to continue with immunotherapy.  She also has noted thrush along the right side of her tongue where her  tongue is rubbing against her teeth (chronic issue reportedly).  We will also prescribe Mycelex for this.  Patient has had some increasing constipation in relation to narcotics we discussed senna S tabs along with adding MiraLAX.  Plan repeat scans after 3 cycles.  We proceeded with second cycle Keytruda and low-dose prednisone which was quite helpful in the patient's performance status.  At this point her oral thrush has improved as has her performance status.  After discussion-      Plan:    *Proceed with Keytruda today cycle 3    *Taper pain medications including MS Contin 15 mg p.o. every 12 hours moving to nightly only, [place] 5/325 1-2 p.o. every 4 hours as needed for breakthrough    *Continue prednisone 10 mg daily.    *Continue Senokot S 2 tabs twice a day and add daily MiraLAX.    *CT chest abdomen pelvis in 2 weeks    *3 weeks MD follow-up, potential for cycle #4 Keytruda    *Additional options for therapy include BRAF inhibitors, chemotherapy directed for histology lung adenocarcinoma    This patient is on high risk drug therapy requiring intensive monitoring for toxicity.

## 2023-01-25 ENCOUNTER — OFFICE VISIT (OUTPATIENT)
Dept: ONCOLOGY | Facility: CLINIC | Age: 83
End: 2023-01-25
Payer: MEDICARE

## 2023-01-25 ENCOUNTER — INFUSION (OUTPATIENT)
Dept: ONCOLOGY | Facility: HOSPITAL | Age: 83
End: 2023-01-25
Payer: MEDICARE

## 2023-01-25 VITALS
HEART RATE: 71 BPM | BODY MASS INDEX: 18.68 KG/M2 | HEIGHT: 63 IN | WEIGHT: 105.4 LBS | SYSTOLIC BLOOD PRESSURE: 114 MMHG | RESPIRATION RATE: 18 BRPM | DIASTOLIC BLOOD PRESSURE: 52 MMHG | OXYGEN SATURATION: 95 % | TEMPERATURE: 97.7 F

## 2023-01-25 DIAGNOSIS — C34.12 MALIGNANT NEOPLASM OF UPPER LOBE OF LEFT LUNG: ICD-10-CM

## 2023-01-25 DIAGNOSIS — Z79.899 LONG-TERM USE OF HIGH-RISK MEDICATION: Primary | ICD-10-CM

## 2023-01-25 DIAGNOSIS — Z79.899 LONG-TERM USE OF HIGH-RISK MEDICATION: ICD-10-CM

## 2023-01-25 DIAGNOSIS — C34.12 MALIGNANT NEOPLASM OF UPPER LOBE OF LEFT LUNG: Primary | ICD-10-CM

## 2023-01-25 LAB
ALBUMIN SERPL-MCNC: 3.9 G/DL (ref 3.5–5.2)
ALBUMIN/GLOB SERPL: 1.7 G/DL (ref 1.1–2.4)
ALP SERPL-CCNC: 58 U/L (ref 38–116)
ALT SERPL W P-5'-P-CCNC: 10 U/L (ref 0–33)
ANION GAP SERPL CALCULATED.3IONS-SCNC: 11.1 MMOL/L (ref 5–15)
AST SERPL-CCNC: 16 U/L (ref 0–32)
BASOPHILS # BLD AUTO: 0.03 10*3/MM3 (ref 0–0.2)
BASOPHILS NFR BLD AUTO: 0.3 % (ref 0–1.5)
BILIRUB SERPL-MCNC: 0.6 MG/DL (ref 0.2–1.2)
BUN SERPL-MCNC: 23 MG/DL (ref 6–20)
BUN/CREAT SERPL: 31.1 (ref 7.3–30)
CALCIUM SPEC-SCNC: 9.5 MG/DL (ref 8.5–10.2)
CHLORIDE SERPL-SCNC: 99 MMOL/L (ref 98–107)
CO2 SERPL-SCNC: 31.9 MMOL/L (ref 22–29)
CREAT SERPL-MCNC: 0.74 MG/DL (ref 0.6–1.1)
DEPRECATED RDW RBC AUTO: 48.4 FL (ref 37–54)
EGFRCR SERPLBLD CKD-EPI 2021: 81.9 ML/MIN/1.73
EOSINOPHIL # BLD AUTO: 0 10*3/MM3 (ref 0–0.4)
EOSINOPHIL NFR BLD AUTO: 0 % (ref 0.3–6.2)
ERYTHROCYTE [DISTWIDTH] IN BLOOD BY AUTOMATED COUNT: 15.1 % (ref 12.3–15.4)
GLOBULIN UR ELPH-MCNC: 2.3 GM/DL (ref 1.8–3.5)
GLUCOSE SERPL-MCNC: 106 MG/DL (ref 74–124)
HCT VFR BLD AUTO: 39.2 % (ref 34–46.6)
HGB BLD-MCNC: 12.6 G/DL (ref 12–15.9)
IMM GRANULOCYTES # BLD AUTO: 0.03 10*3/MM3 (ref 0–0.05)
IMM GRANULOCYTES NFR BLD AUTO: 0.3 % (ref 0–0.5)
LYMPHOCYTES # BLD AUTO: 2.76 10*3/MM3 (ref 0.7–3.1)
LYMPHOCYTES NFR BLD AUTO: 25.5 % (ref 19.6–45.3)
MCH RBC QN AUTO: 28.2 PG (ref 26.6–33)
MCHC RBC AUTO-ENTMCNC: 32.1 G/DL (ref 31.5–35.7)
MCV RBC AUTO: 87.7 FL (ref 79–97)
MONOCYTES # BLD AUTO: 0.64 10*3/MM3 (ref 0.1–0.9)
MONOCYTES NFR BLD AUTO: 5.9 % (ref 5–12)
NEUTROPHILS NFR BLD AUTO: 68 % (ref 42.7–76)
NEUTROPHILS NFR BLD AUTO: 7.35 10*3/MM3 (ref 1.7–7)
NRBC BLD AUTO-RTO: 0 /100 WBC (ref 0–0.2)
PLATELET # BLD AUTO: 195 10*3/MM3 (ref 140–450)
PMV BLD AUTO: 9.5 FL (ref 6–12)
POTASSIUM SERPL-SCNC: 3 MMOL/L (ref 3.5–4.7)
PROT SERPL-MCNC: 6.2 G/DL (ref 6.3–8)
RBC # BLD AUTO: 4.47 10*6/MM3 (ref 3.77–5.28)
SODIUM SERPL-SCNC: 142 MMOL/L (ref 134–145)
T4 FREE SERPL-MCNC: 1.25 NG/DL (ref 0.93–1.7)
TSH SERPL DL<=0.05 MIU/L-ACNC: 2.95 UIU/ML (ref 0.27–4.2)
WBC NRBC COR # BLD: 10.81 10*3/MM3 (ref 3.4–10.8)

## 2023-01-25 PROCEDURE — 84443 ASSAY THYROID STIM HORMONE: CPT | Performed by: INTERNAL MEDICINE

## 2023-01-25 PROCEDURE — 99214 OFFICE O/P EST MOD 30 MIN: CPT | Performed by: INTERNAL MEDICINE

## 2023-01-25 PROCEDURE — 85025 COMPLETE CBC W/AUTO DIFF WBC: CPT

## 2023-01-25 PROCEDURE — 96413 CHEMO IV INFUSION 1 HR: CPT

## 2023-01-25 PROCEDURE — 84439 ASSAY OF FREE THYROXINE: CPT | Performed by: INTERNAL MEDICINE

## 2023-01-25 PROCEDURE — 80053 COMPREHEN METABOLIC PANEL: CPT

## 2023-01-25 PROCEDURE — 25010000002 PEMBROLIZUMAB 100 MG/4ML SOLUTION 4 ML VIAL: Performed by: INTERNAL MEDICINE

## 2023-01-25 RX ORDER — SODIUM CHLORIDE 9 MG/ML
250 INJECTION, SOLUTION INTRAVENOUS ONCE
Status: COMPLETED | OUTPATIENT
Start: 2023-01-25 | End: 2023-01-25

## 2023-01-25 RX ORDER — SODIUM CHLORIDE 9 MG/ML
250 INJECTION, SOLUTION INTRAVENOUS ONCE
Status: CANCELLED | OUTPATIENT
Start: 2023-01-25

## 2023-01-25 RX ORDER — POTASSIUM CHLORIDE 20 MEQ/1
20 TABLET, EXTENDED RELEASE ORAL ONCE
Status: COMPLETED | OUTPATIENT
Start: 2023-01-25 | End: 2023-01-25

## 2023-01-25 RX ADMIN — POTASSIUM CHLORIDE 20 MEQ: 20 TABLET, EXTENDED RELEASE ORAL at 10:20

## 2023-01-25 RX ADMIN — SODIUM CHLORIDE 250 ML: 9 INJECTION, SOLUTION INTRAVENOUS at 10:02

## 2023-01-25 RX ADMIN — SODIUM CHLORIDE 200 MG: 9 INJECTION, SOLUTION INTRAVENOUS at 10:03

## 2023-01-25 NOTE — NURSING NOTE
Pt to infusion room for keytruda  Labs noted.  K 3.0  . Notified Dr Escobar , orders received  Pt given 20meq oral potassium and education sheet printed off and given to patient with list of foods high in potassium  Pt V/U

## 2023-02-08 ENCOUNTER — HOSPITAL ENCOUNTER (OUTPATIENT)
Dept: CT IMAGING | Facility: HOSPITAL | Age: 83
Discharge: HOME | End: 2023-02-08
Admitting: INTERNAL MEDICINE
Payer: MEDICARE

## 2023-02-08 DIAGNOSIS — C34.12 MALIGNANT NEOPLASM OF UPPER LOBE OF LEFT LUNG: ICD-10-CM

## 2023-02-08 PROCEDURE — 74177 CT ABD & PELVIS W/CONTRAST: CPT

## 2023-02-08 PROCEDURE — 71260 CT THORAX DX C+: CPT

## 2023-02-08 PROCEDURE — 0 DIATRIZOATE MEGLUMINE & SODIUM PER 1 ML: Performed by: INTERNAL MEDICINE

## 2023-02-08 PROCEDURE — 25010000002 IOPAMIDOL 61 % SOLUTION: Performed by: INTERNAL MEDICINE

## 2023-02-08 RX ADMIN — IOPAMIDOL 85 ML: 612 INJECTION, SOLUTION INTRAVENOUS at 11:01

## 2023-02-08 RX ADMIN — DIATRIZOATE MEGLUMINE AND DIATRIZOATE SODIUM 30 ML: 660; 100 LIQUID ORAL; RECTAL at 11:01

## 2023-02-13 DIAGNOSIS — Z85.118 HISTORY OF LUNG CANCER: ICD-10-CM

## 2023-02-13 RX ORDER — MORPHINE SULFATE 15 MG/1
15 TABLET, FILM COATED, EXTENDED RELEASE ORAL 2 TIMES DAILY
Qty: 60 TABLET | Refills: 0 | Status: SHIPPED | OUTPATIENT
Start: 2023-02-13 | End: 2023-03-24 | Stop reason: SDUPTHER

## 2023-02-13 NOTE — TELEPHONE ENCOUNTER
Caller: Tressa Pfeifferyuriy LINDSEY    Relationship: Self    Best call back number: [phone]     Requested Prescriptions:   Requested Prescriptions     Pending Prescriptions Disp Refills   • Morphine (MS CONTIN) 15 MG 12 hr tablet 60 tablet 0     Sig: Take 1 tablet by mouth 2 (Two) Times a Day.        Pharmacy where request should be sent: [id number] - [address] - [phone]  - [phone]     Additional details provided by patient:     Does the patient have less than a 3 day supply:  [x] Yes  [] No    Would you like a call back once the refill request has been completed: [] Yes [x] No    If the office needs to give you a call back, can they leave a voicemail: [] Yes [x] No

## 2023-02-14 NOTE — PROGRESS NOTES
Subjective  Patient, again, feeling significantly improved.  She is seen with her daughter.                                                                                                                                                          REASON FOR FOLLOW-UP: Recurrent lung cancer.    History of Present Illness       The patient is an 82-year-old female with the below medical history including chronic obstructive pulmonary disease who was seen in the [facility] multidisciplinary thoracic oncology clinic July 1, 2021.  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      She had undergone a CT of the chest at [facility] 6/16/2021 showing postsurgical changes in the left chest from right upper lobectomy, 8 mm irregular nodule medial segment of the right lower lobe and diffuse changes of emphysema with fibrotic changes in the periphery, no suspicious hilar or mediastinal nodes, no pleural effusion.  New finding was suspicious for new malignancy with the patient felt to be a poor candidate for surgical resection.  A PET CT and PFTs were requested thereafter.  The PET/CT demonstrated ill-defined FDG avid soft tissue thickening left aspect mediastinum within the operative bed, 1 cm hypermetabolic pulmonary nodule right lower lobe and asymmetric thickening patchy uptake involving the right base of tongue.  There is subtle uptake within the L1 vertebral body which is indeterminate and a sub-6 mm pulmonary nodule of right lung and subcentimeter hypodense hepatic lesion which was below PET resolution.       The patient's case was discussed in thoracic conference 7/22/2021 with consideration of the patient undergoing L1 vertebral body MRI, confirmatory biopsy left mediastinal and assessment by ENT (Dr. Caballero)  who had worked with the patient previously.  She, incidentally, indicates that radiation therapy was given apparently intraoperatively at her recent surgery?  She still has issues with difficulty chewing and swallowing post procedures and was to be followed up with Dr. Caballero in the near future as planned.                                                            She was seen in the thoracic clinic on the same day with plans to proceed with MRI of the lumbar spine, biopsy left mediastinal nodular area of potential disease and follow-up of her ENT assessment as well as undergo a guardant 360 assessment in our office.  She underwent the biopsy 8/13/2021 found to be consistent with invasive moderately differentiated adenocarcinoma with PD-L1 TPS score 40%.  MRI of the lumbar spine revealed no evidence of metastatic disease though the patient did have multilevel degenerative disease throughout the lumbar spine.  She had an office follow-up with Dr. 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  She underwent laryngoscopy 8/18/2021 with right base of tongue without evidence of lesions or masses in the region.     She was seen by Dr. Hernandez 8/19/2021 and, her findings, had been presented in lung conference.  Her findings were consistent with 2 separate lesions including a nodule in the superior segment of the right lower lobe and a mass in the upper portion of the left chest with the left chest lesion biopsy positive for adenocarcinoma.  The consensus was that these were to separate-synchronous primaries.  Stereotactic radiation each lesions were felt to be the appropriate way to proceed and the patient was referred to radiation therapy.     The patient is seen in office 8/23/2021 agreeable to the radiation therapy as well as additional assessments including Caris molecular assessment of her biopsy.  Again her  guardant 360 is currently pending and we would follow her after she completes radiation therapy with subsequent scans.      She was able to proceed with SBRT to the right lung at primary #1 with 5 treatments at 1000 cGy per fraction in the left lung primary similarly at 1000 cGy per fraction x5.  Her treatment ranged between 8/31-9/13/2021.  She is now scheduled for follow-up 11/23/2021.    The patient subsequent genomic testing is discussed with her as she is is seen back 9/23/2021.  Her guardant 360 did not determine any new abnormalities but her Caris-MI profile-does reveal sensitivity to immunotherapy as well as a BRAF mutation.  This could be extremely important as we follow the patient from this point.  Fortunately she is feeling extremely well and quite pleased about her treatments.    Patient had subsequent PET/CT 11/23/2021 demonstrates decrease in size and avidity of the previously noted intensely FDG avid nodules left lobectomy operative site and right lower lobe.  Surrounding groundglass and pulmonary opacification is consistent with postradiation changes, a few new subcentimeter pulmonary nodules are present in the right upper lobe and indeterminant, stable subcentimeter hepatic lesion is below PET resolution no other findings of FDG-avid disease are seen in neck abdomen or pelvis.  The patient seen in office 12/1/2021 with a relatively good performance status stating she is not having any new symptoms and very pleased about her results on her PET/CT.  She realizes we'll have to follow this further but subsequent scans in 6 months.  She is also hoping to see an oral surgeon that previously helped her-Dr. Wu.    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  There is evolution of presumed postradiation changes in the right midlung with  soft tissue mass within the left lumpectomy operative bed minimally decreased in size.  There is a sub-6 mm nodule in the right upper lobe not definitively seen, indeterminate and an indeterminate left renal lesion at 1.5 cm unchanged from 7/13/2021.  The patient is currently scheduled to see Dr. Dowell on 6/23/2022.  She is feeling well without any additional symptoms.    The patient required admission 10/14 - 10/18/2022 presenting with shortness of breath and cough that was nonproductive.  She had been on oral antibiotics and did not improved and was admitted for therapy for apparent pneumonia.  This eventually led to bronchoscopy after initial CT revealed postsurgical changes, new masslike 6.7 cm area of consolidation anterior left lung raising concern for potential malignancy and a follow-up PET/CT planned.  Bronchoscopy and biopsy left lower lung failed to show evidence of malignancy.  The patient's PET/CT, however, demonstrated an irregular pleural-based soft tissue density thickening in the left upper lobe that was intensely FDG avid new from 6/8/2022 thought to be a malignant recurrence with spread into the left lung parenchyma.  There is intensely pleural-based avidity within the left lower lobe thought to be metastatic disease with postradiation of the left apex as well.  There is a small focus of uptake in the right hilum grossly unchanged in size and post radiation changes in the right lower lobe.  There is indeterminate density left renal that was grossly unchanged.  The patient is seen back in office 11/15/2022 indicating that she still has chest discomfort that is slowly worsening and that she has a chronic paroxysmal cough but has not improved.  We discussed that she very likely has recurrent disease and plan to proceed with a guardant 360 examination initially as we determine whether we should try to proceed with therapy particularly immunotherapy versus targeted therapy recognizing the above  findings.    Patient's guardant 360 returned as again significant for BRAF V600E and the potential use of BRAF inhibitor/MET inhibitor dabrafenib and trametinib.  Additional information PIK3CA and use of alpelisib.    Unfortunately she required admission 11/28-12/1 with worsening back pain.  Fortunately she did not demonstrate evidence of metastatic disease but did have moderate degenerative changes which could cause radiculopathy.  Medications were altered to include subsequently MSR 15 mg p.o. every 12 hours, Norco as needed.    The patient now presents back 12/14/2022 to initiate therapy- initially with immunotherapy considering Caris study with PD-L1 TPS of 70% on 22 C3 and 28-8 assays.    Patient seen with her  and we discussed pain medications and adjustments thereafter and that she stopped taking MSIR having only a short supply and having to use Norco more frequently.  She is willing to initiate Keytruda today we have discussed that considering her genomics treatment versus her BRAF mutation is also an option.    C1 Keytruda 12/14/2022    Patient is seen back 1/4/2023 in follow-up due for cycle 2 Keytruda.  Patient states that since receiving her first cycle she has had decreased appetite and decreased energy.  She has not been able to bring herself to eat much and she has notably lost 7 pounds.  She has also been struggling with constipation in relation to ongoing narcotics for pain control.  She has been taking senna S2 tabs twice a day.  We discussed adding daily MiraLAX.    Patient did just last week meet with dietitian, Zoila Clinton, to discuss ways to improve caloric intake.  She has not been able to implement any of these things yet though.    The patient is next seen 1/25/2023 with mild weight gain.  She is seen with family member both indicating that she has actually felt somewhat better in terms of performance status and general function.  We have discussed proceeding with a third cycle  treatment and reevaluating by scan in 2 weeks.    The patient proceeded with treatment 1/25/2023 and underwent follow-up CT chest abdomen pelvis 2/8/2023 demonstrating small pericardial effusion that is decreased in size from 11/20/2022, ill-defined consolidation and pleural-based thickening in the left apex and upper lung has reduced slightly, additional index nodule soft tissue in the aspect the pericardium has not changed substantially, no evidence of metastatic disease in the abdomen pelvis.    The patient is seen with her daughter 2/15/2023 both indicating that she has definitely improved, stable weight, appetite and performance status.  She is also using her pain medication much less frequently and wonders whether she might begin to taper from her twice a day MS Contin.  Past Medical History:   Diagnosis Date   • Allergic rhinitis    • Asthma    • Cancer of floor of mouth (HCC) 2014   • Cerebral aneurysm    • COPD (chronic obstructive pulmonary disease) ([facility])    • COVID 2020   • Esophageal reflux    • History of adenocarcinoma of lung    • History of anemia    • History of carcinoma in situ of skin    • History of lung cancer    • 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 from [facility]   • History of squamous cell carcinoma     Tongue   • Hyperlipidemia    • Hypertension     since early 60s   • Low vitamin B12 level    • Lung cancer (HCC)    • Thyromegaly    • UTI (urinary tract infection)    • Vitamin D deficiency         Past Surgical History:   Procedure Laterality Date   • BRONCHOSCOPY Bilateral 10/17/2022    Procedure: BRONCHOSCOPY WITH FLUORO with biopsy and BAL;  Surgeon: India Flores MD;  Location: [facility];  Service: Pulmonary;  Laterality: Bilateral;  PRE/POST - lung mass   • CHOLECYSTECTOMY  1999   • COLONOSCOPY     • EMBOLIZATION CEREBRAL Left 6/29/2022    Procedure: 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;  Surgeon: Magalys  MD Bradley;  Location: [facility] OR 18/19;  Service: Interventional Radiology;  Laterality: Left;   • EYE SURGERY  11/24/2020    Took a muscle out of right eye   • GLOSSECTOMY PARTIAL      less than one half tongue   • LUNG SURGERY  2012   • ORAL LESION EXCISION/BIOPSY      June and August 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   • TUBAL ABDOMINAL LIGATION  1970   • VENOUS ACCESS DEVICE (PORT) INSERTION N/A 12/12/2022    Procedure: MEDIPORT PLACEMENT;  Surgeon: Jason Villaseñor MD;  Location: [facility] OR;  Service: Vascular;  Laterality: N/A;        Current Outpatient Medications on File Prior to Visit   Medication Sig Dispense Refill   • albuterol sulfate  (90 Base) MCG/ACT inhaler Inhale 2 puffs Every 4 (Four) Hours As Needed for Wheezing. 18 g 2   • amLODIPine (NORVASC) 5 MG tablet Take 1 tablet by mouth Daily. 90 tablet 0   • atorvastatin (LIPITOR) 20 MG tablet Take 1 tablet by mouth Every Night. 90 tablet 3   • B Complex-C-E-Zn (b complex-C-E-zinc) tablet Take 1 tablet by mouth Daily. HOLDING FOR DOS     • cetirizine (ZyrTEC) 10 MG tablet Take 10 mg by mouth Daily.     • chlorhexidine (PERIDEX) 0.12 % solution Apply 15 mL to the mouth or throat 2 (Two) Times a Day.     • cholecalciferol (VITAMIN D3) 1000 units tablet Take 1,000 Units by mouth Daily. HOLDING FOR DOS     • clobetasol (TEMOVATE) 0.05 % cream APPLY TOPICALLY TO THE AFFECTED AREA TWICE DAILY AS NEEDED     • clotrimazole (MYCELEX) 10 MG danilo Take 1 tablet by mouth 5 (Five) Times a Day. 70 tablet 1   • fluticasone (FLONASE) 50 MCG/ACT nasal spray 2 sprays into the nostril(s) as directed by provider As Needed.     • hydroCHLOROthiazide (HYDRODIURIL) 25 MG tablet Take 0.5 tablets by mouth Daily.     • HYDROcod Polst-CPM Polst ER (Tussionex Pennkinetic ER) 10-8 MG/5ML ER suspension Take 5 mL by mouth Every 12 (Twelve) Hours As Needed for Cough. 120 mL 0   • HYDROcodone-acetaminophen (NORCO) 5-325 MG per tablet Take 1 tablet by mouth Every 4  (Four) Hours As Needed for Moderate Pain. 100 tablet 0   • Ibuprofen (ADVIL PO) Take 400 mg by mouth Daily As Needed. HOLD PER MD INSTR     • lidocaine-prilocaine (EMLA) 2.5-2.5 % cream Apply nickel size amount to port site 30 min before appt time do not rub in cover with plastic wrap (Patient taking differently: 1 application As Needed. Apply nickel size amount to port site 30 min before appt time do not rub in cover with plastic wrap) 30 g 1   • metoprolol succinate XL (TOPROL-XL) 25 MG 24 hr tablet TAKE 1 TABLET EVERY DAY (Patient taking differently: Take 25 mg by mouth Daily.) 90 tablet 1   • montelukast (SINGULAIR) 10 MG tablet Take 1 tablet by mouth Every Night. 90 tablet 3   • Morphine (MS CONTIN) 15 MG 12 hr tablet Take 1 tablet by mouth 2 (Two) Times a Day. 60 tablet 0   • omeprazole (priLOSEC) 40 MG capsule Take 40 mg by mouth Daily.     • ondansetron (ZOFRAN) 8 MG tablet Take 1 tablet by mouth 3 (Three) Times a Day As Needed for Nausea or Vomiting. 30 tablet 5   • predniSONE (DELTASONE) 10 MG tablet Take 1 tablet by mouth Daily. 30 tablet 2   • Umeclidinium-Vilanterol (Anoro Ellipta) 62.5-25 MCG/ACT aerosol powder  inhaler Inhale 1 puff Daily As Needed.       No current facility-administered medications on file prior to visit.        ALLERGIES:    Allergies   Allergen Reactions   • Sulfa Antibiotics Rash        Social History     Socioeconomic History   • Marital status:    Tobacco Use   • Smoking status: Former     Types: Cigarettes     Quit date: 2015     Years since quittin.0   • Smokeless tobacco: Never   • Tobacco comments:     less than a pack per day, no smoking since , Quit 2015   Vaping Use   • Vaping Use: Never used   Substance and Sexual Activity   • Alcohol use: Yes     Comment: Socially -A few times a month   • Drug use: No   • Sexual activity: Defer     Family History   Problem Relation Age of Onset   • Ovarian cancer Mother          at age 27   • No Known  "Problems Father    • Ovarian cancer Sister    • Colon cancer Brother    • No Known Problems Other    • Malig Hyperthermia Neg Hx         Review of Systems   HENT: Positive for dental problem.    Eyes: Negative.    Respiratory: Negative for cough and wheezing.    Gastrointestinal: Positive for constipation.   Genitourinary: Negative for frequency and urgency.   Musculoskeletal: Positive for arthralgias, back pain and myalgias.   Neurological: Negative.    Hematological: Negative.    Psychiatric/Behavioral: Negative.       Objective     Vitals:    02/15/23 1131   BP: 149/60   Pulse: 70   Resp: 16   Temp: 98 °F (36.7 °C)   TempSrc: Temporal   SpO2: 95%   Weight: 48 kg (105 lb 12.8 oz)   Height: 160 cm (62.99\")   PainSc: 0-No pain     Current Status 2/15/2023   ECOG score 0          Pulmonary function tests:     FVC is 2.10 which is 81% of predicted  FEV1 is 1.44 which is 75% of predicted  FEV1 FVC ratio 69  Diffusion capacity DLCO is 11.7 which is 54% of predicted      Physical Exam  Constitutional:       Appearance: Normal appearance. She is normal weight.   HENT:      Head: Normocephalic and atraumatic.      Nose: Nose normal.      Mouth/Throat:      Mouth: Mucous membranes are moist. No oral lesions.      Pharynx: Oropharynx is clear.      Comments: Malocclusion noted  Eyes:      Extraocular Movements: Extraocular movements intact.   Cardiovascular:      Rate and Rhythm: Normal rate and regular rhythm.      Pulses: Normal pulses.      Heart sounds: Normal heart sounds.   Pulmonary:      Effort: Pulmonary effort is normal.      Breath sounds: Normal breath sounds.      Comments: Distant breath sounds bilateral bases  Abdominal:      General: Abdomen is flat. Bowel sounds are normal.      Palpations: Abdomen is soft.   Musculoskeletal:         General: Normal range of motion.      Cervical back: Normal range of motion and neck supple.   Skin:     General: Skin is warm and dry.   Neurological:      General: No focal " deficit present.      Mental Status: She is alert and oriented to person, place, and time.   Psychiatric:         Mood and Affect: Mood normal.         Behavior: Behavior normal.           RECENT LABS:  Results from last 7 days   Lab Units 02/15/23  1055   WBC 10*3/mm3 9.47   NEUTROS ABS 10*3/mm3 6.17   HEMOGLOBIN g/dL 12.6   HEMATOCRIT % 38.7   PLATELETS 10*3/mm3 234     Results from last 7 days   Lab Units 02/15/23  1055   SODIUM mmol/L 142   POTASSIUM mmol/L 3.2*   CHLORIDE mmol/L 100   CO2 mmol/L 29.7*   BUN mg/dL 21*   CREATININE mg/dL 0.71   CALCIUM mg/dL 9.6   ALBUMIN g/dL 3.8   BILIRUBIN mg/dL 0.2   ALK PHOS U/L 62   ALT (SGPT) U/L 11   AST (SGOT) U/L 16   GLUCOSE mg/dL 95               Assessment & Plan      82-year-old female with medical history including COPD, long-term tobacco use, status post previous left upper lobectomy for carcinoma lung in May 2015, carcinoma tongue ongoing radiation therapy and surgical therapy through ENT-Dr. Caballero-including 2016 with wide local excision of buccal lesion.  During follow-up a CT scan of the chest June 16, 2021 demonstrates postsurgical changes, 8 mm irregular nodule medial segment of right lower lobe and diffuse changes of emphysema.  She is seen in thoracic clinic with findings suspicious for new malignancy and concern of the patient being a poor operative candidate.  Subsequent PET CT and PFTs demonstrated ill-defined avidity and soft tissue left aspect of the mediastinum, 1 cm hypermetabolic pulmonary nodule and asymmetric thickening involving the right base of tongue as well as subtle uptake in the L1 vertebral body.  This patient's case was discussed in thoracic clinic and plans were made to request an MRI of the lumbar spine, obtain a biopsy of the mediastinal involvement of the left had the patient reviewed by ENT.  The patient is seen with her  7/22/2021 in thoracic clinic and we reviewed these findings as well as having her assessed via liquid  biopsy to determine potential molecular status quickly.           She underwent the biopsy 8/13/2021 found to be consistent with invasive moderately differentiated adenocarcinoma with PD-L1 TPS score 40%.  MRI of the lumbar spine revealed no evidence of metastatic disease though the patient did have multilevel degenerative disease throughout the lumbar spine.  She had an office follow-up with Dr. Caballero-history of a [id number] SCCa of the rightt retromolar trigone who is s/p WLE, buccal fat pad flap and SLN biopsy that was negative, margins were negative.  She underwent laryngoscopy 8/18/2021 with right base of tongue without evidence of lesions or masses in the region.     She was seen by Dr. Hernandez 8/19/2021 and, her findings, had been presented in lung conference.  Her findings were consistent with 2 separate lesions including a nodule in the superior segment of the right lower lobe and a mass in the upper portion of the left chest with the left chest lesion biopsy positive for adenocarcinoma.  The consensus was that these were to separate-synchronous primaries.  Stereotactic radiation each lesions were felt to be the appropriate way to proceed and the patient was referred to radiation therapy.      At the time of this dictation her guardant 360 is not yet available.  These issues are discussed with the patient in some detail 8/23/2021 so that we can have an overall longer-term plan.  This includes a Caris molecular assessment of her recent biopsy as well as her PD-L1 positive findings.    The patient was referred for radiation therapy and she was able to proceed with SBRT to the right lung at primary #1 with 5 treatments at 1000 cGy per fraction in the left lung primary similarly at 1000 cGy per fraction x5.  Her treatment ranged between 8/31-9/13/2021.  She is now scheduled for follow-up 11/23/2021.    Additionally genomic testing is discussed with her as she is is seen back 9/23/2021.  Her guardant 360 did not  determine any new abnormalities but her Caris-MI profile-does reveal sensitivity to immunotherapy as well as a BRAF mutation.    Patient had subsequent PET/CT 11/23/2021 demonstrates decrease in size and avidity of the previously noted intensely FDG avid nodules left lobectomy operative site and right lower lobe.  Surrounding groundglass and pulmonary opacification is consistent with postradiation changes, a few new subcentimeter pulmonary nodules are present in the right upper lobe and indeterminant, stable subcentimeter hepatic lesion is below PET resolution no other findings of FDG-avid disease are seen in neck abdomen or pelvis.  The patient seen in office 12/1/2021 with a relatively good performance status stating she is not having any new symptoms and very pleased about her results on her PET/CT.  She realizes we'll have to follow this further but subsequent scans in 6 months.  She is also hoping to see an oral surgeon that previously helped her-Dr. Wu.    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  There is evolution of presumed postradiation changes in the right midlung with soft tissue mass within the left lumpectomy operative bed minimally decreased in size.  There is a sub-6 mm nodule in the right upper lobe not definitively seen, indeterminate and an indeterminate left renal lesion at 1.5 cm unchanged from 7/13/2021.  The patient is currently scheduled to see Dr. Dowell on 6/23/2022.  She is feeling well without any additional symptoms.     The patient required admission 10/14 - 10/18/2022 presenting with shortness of breath and cough that was nonproductive.  She had been on oral antibiotics and did not improved and was admitted for therapy for apparent pneumonia.  This eventually led to bronchoscopy after initial CT revealed  postsurgical changes, new masslike 6.7 cm area of consolidation anterior left lung raising concern for potential malignancy and a follow-up PET/CT planned.  Bronchoscopy and biopsy left lower lung failed to show evidence of malignancy.  The patient's PET/CT, however, demonstrated an irregular pleural-based soft tissue density thickening in the left upper lobe that was intensely FDG avid new from 6/8/2022 thought to be a malignant recurrence with spread into the left lung parenchyma.  There is intensely pleural-based avidity within the left lower lobe thought to be metastatic disease with postradiation of the left apex as well.  There is a small focus of uptake in the right hilum grossly unchanged in size and post radiation changes in the right lower lobe.  There is indeterminate density left renal that was grossly unchanged.  The patient is seen back in office indicating that she still has chest discomfort that is slowly worsening and that she has a chronic paroxysmal cough but has not improved.  We discussed that she very likely has recurrent disease and plan to proceed with a guardant 360 examination initially as we determine whether we should try to proceed with therapy particularly immunotherapy versus targeted therapy recognizing the above findings.    Patient's guardant 360 returned as again significant for BRAF V600E and the potential use of BRAF inhibitor/MET inhibitor dabrafenib and trametinib.  Additional information [id number] and use of alpelisib.    Unfortunately she required admission 11/28-12/1/2022 with worsening back pain.  Fortunately she did not demonstrate evidence of metastatic disease but did have moderate degenerative changes which could cause radiculopathy.  Medications were altered to include subsequently MSR 15 mg p.o. every 12 hours, Norco as needed.    The patient now presents back 12/14/2022 to initiate therapy- initially with immunotherapy considering Caris study with PD-L1 TPS of 70% on 22  C3 and 28-8 assays.    12/14/2022 The patient is seen with her  and we discussed pain medications and adjustments thereafter and that she stopped taking MS IR having only a short supply and having to use Norco more frequently.  She is willing to initiate Keytruda today we have discussed that considering her genomics treatment versus her BRAF mutation is also an option.    1/4/2023 patient reviewed back today due for cycle 2 Keytruda.  Patient experiencing decreased appetite and energy following for cycle of Keytruda.  She has lost 7 pounds.  Per discussion with Dr. Escobar we will start the patient on prednisone 10 mg daily which is acceptable to continue with immunotherapy.  She also has noted thrush along the right side of her tongue where her tongue is rubbing against her teeth (chronic issue reportedly).  We will also prescribe Mycelex for this.  Patient has had some increasing constipation in relation to narcotics we discussed senna S tabs along with adding MiraLAX.  Plan repeat scans after 3 cycles.  We proceeded with second cycle Keytruda and low-dose prednisone which was quite helpful in the patient's performance status.  At this point her oral thrush has improved as has her performance status.  After discussion we proceeded with additional chemotherapy, started to taper pain medication and had undergo repeat CT chest abdomen pelvis.    These are reviewed with her 2/15/2023 have not been done 2/8/2023 with consolidation and masslike pleural thickening in the left apex and upper lung index areas have decreased somewhat.  There is no evidence of metastatic disease otherwise and a few indeterminate left renal lesions are stable.  After lengthy discussion with the patient and her daughter as to the stability to mild improvement with immunotherapy it is agreed that we would continue treatment at this point.      Plan:    *Proceed with Keytruda today cycle 4    *Again, Taper pain medications including MS  Contin 15 mg p.o. every 12 hours moving to nightly only, [place] 5/325 1-2 p.o. every 4 hours as needed for breakthrough    *Continue prednisone 10 mg daily though this could move to every other day    *Continue Senokot S 2 tabs twice a day and add daily MiraLAX.      *3 weeks NP follow-up, potential for cycle # 5 Keytruda    *Additional options for therapy include BRAF inhibitors, chemotherapy directed for histology lung adenocarcinoma    This patient is on high risk drug therapy requiring intensive monitoring for toxicity.

## 2023-02-15 ENCOUNTER — OFFICE VISIT (OUTPATIENT)
Dept: ONCOLOGY | Facility: CLINIC | Age: 83
End: 2023-02-15
Payer: MEDICARE

## 2023-02-15 ENCOUNTER — INFUSION (OUTPATIENT)
Dept: ONCOLOGY | Facility: HOSPITAL | Age: 83
End: 2023-02-15
Payer: MEDICARE

## 2023-02-15 VITALS
BODY MASS INDEX: 18.75 KG/M2 | OXYGEN SATURATION: 95 % | WEIGHT: 105.8 LBS | DIASTOLIC BLOOD PRESSURE: 60 MMHG | SYSTOLIC BLOOD PRESSURE: 149 MMHG | HEART RATE: 70 BPM | RESPIRATION RATE: 16 BRPM | HEIGHT: 63 IN | TEMPERATURE: 98 F

## 2023-02-15 DIAGNOSIS — Z79.899 LONG-TERM USE OF HIGH-RISK MEDICATION: ICD-10-CM

## 2023-02-15 DIAGNOSIS — Z79.899 LONG-TERM USE OF HIGH-RISK MEDICATION: Primary | ICD-10-CM

## 2023-02-15 DIAGNOSIS — C34.12 MALIGNANT NEOPLASM OF UPPER LOBE OF LEFT LUNG: ICD-10-CM

## 2023-02-15 DIAGNOSIS — C34.12 MALIGNANT NEOPLASM OF UPPER LOBE OF LEFT LUNG: Primary | ICD-10-CM

## 2023-02-15 DIAGNOSIS — Z45.2 FITTING AND ADJUSTMENT OF VASCULAR CATHETER: ICD-10-CM

## 2023-02-15 LAB
ALBUMIN SERPL-MCNC: 3.8 G/DL (ref 3.5–5.2)
ALBUMIN/GLOB SERPL: 1.8 G/DL (ref 1.1–2.4)
ALP SERPL-CCNC: 62 U/L (ref 38–116)
ALT SERPL W P-5'-P-CCNC: 11 U/L (ref 0–33)
ANION GAP SERPL CALCULATED.3IONS-SCNC: 12.3 MMOL/L (ref 5–15)
AST SERPL-CCNC: 16 U/L (ref 0–32)
BASOPHILS # BLD AUTO: 0.04 10*3/MM3 (ref 0–0.2)
BASOPHILS NFR BLD AUTO: 0.4 % (ref 0–1.5)
BILIRUB SERPL-MCNC: 0.2 MG/DL (ref 0.2–1.2)
BUN SERPL-MCNC: 21 MG/DL (ref 6–20)
BUN/CREAT SERPL: 29.6 (ref 7.3–30)
CALCIUM SPEC-SCNC: 9.6 MG/DL (ref 8.5–10.2)
CHLORIDE SERPL-SCNC: 100 MMOL/L (ref 98–107)
CO2 SERPL-SCNC: 29.7 MMOL/L (ref 22–29)
CREAT SERPL-MCNC: 0.71 MG/DL (ref 0.6–1.1)
DEPRECATED RDW RBC AUTO: 53.3 FL (ref 37–54)
EGFRCR SERPLBLD CKD-EPI 2021: 86.1 ML/MIN/1.73
EOSINOPHIL # BLD AUTO: 0 10*3/MM3 (ref 0–0.4)
EOSINOPHIL NFR BLD AUTO: 0 % (ref 0.3–6.2)
ERYTHROCYTE [DISTWIDTH] IN BLOOD BY AUTOMATED COUNT: 16.3 % (ref 12.3–15.4)
GLOBULIN UR ELPH-MCNC: 2.1 GM/DL (ref 1.8–3.5)
GLUCOSE SERPL-MCNC: 95 MG/DL (ref 74–124)
HCT VFR BLD AUTO: 38.7 % (ref 34–46.6)
HGB BLD-MCNC: 12.6 G/DL (ref 12–15.9)
IMM GRANULOCYTES # BLD AUTO: 0.02 10*3/MM3 (ref 0–0.05)
IMM GRANULOCYTES NFR BLD AUTO: 0.2 % (ref 0–0.5)
LYMPHOCYTES # BLD AUTO: 2.64 10*3/MM3 (ref 0.7–3.1)
LYMPHOCYTES NFR BLD AUTO: 27.9 % (ref 19.6–45.3)
MCH RBC QN AUTO: 29 PG (ref 26.6–33)
MCHC RBC AUTO-ENTMCNC: 32.6 G/DL (ref 31.5–35.7)
MCV RBC AUTO: 89 FL (ref 79–97)
MONOCYTES # BLD AUTO: 0.6 10*3/MM3 (ref 0.1–0.9)
MONOCYTES NFR BLD AUTO: 6.3 % (ref 5–12)
NEUTROPHILS NFR BLD AUTO: 6.17 10*3/MM3 (ref 1.7–7)
NEUTROPHILS NFR BLD AUTO: 65.2 % (ref 42.7–76)
NRBC BLD AUTO-RTO: 0 /100 WBC (ref 0–0.2)
PLATELET # BLD AUTO: 234 10*3/MM3 (ref 140–450)
PMV BLD AUTO: 9.6 FL (ref 6–12)
POTASSIUM SERPL-SCNC: 3.2 MMOL/L (ref 3.5–4.7)
PROT SERPL-MCNC: 5.9 G/DL (ref 6.3–8)
RBC # BLD AUTO: 4.35 10*6/MM3 (ref 3.77–5.28)
SODIUM SERPL-SCNC: 142 MMOL/L (ref 134–145)
WBC NRBC COR # BLD: 9.47 10*3/MM3 (ref 3.4–10.8)

## 2023-02-15 PROCEDURE — 25010000002 HEPARIN LOCK FLUSH PER 10 UNITS: Performed by: INTERNAL MEDICINE

## 2023-02-15 PROCEDURE — 85025 COMPLETE CBC W/AUTO DIFF WBC: CPT

## 2023-02-15 PROCEDURE — 25010000002 PEMBROLIZUMAB 100 MG/4ML SOLUTION 4 ML VIAL: Performed by: INTERNAL MEDICINE

## 2023-02-15 PROCEDURE — 96413 CHEMO IV INFUSION 1 HR: CPT

## 2023-02-15 PROCEDURE — 99214 OFFICE O/P EST MOD 30 MIN: CPT | Performed by: INTERNAL MEDICINE

## 2023-02-15 PROCEDURE — 80053 COMPREHEN METABOLIC PANEL: CPT

## 2023-02-15 RX ORDER — SODIUM CHLORIDE 0.9 % (FLUSH) 0.9 %
10 SYRINGE (ML) INJECTION AS NEEDED
Status: DISCONTINUED | OUTPATIENT
Start: 2023-02-15 | End: 2023-02-15 | Stop reason: HOSPADM

## 2023-02-15 RX ORDER — SODIUM CHLORIDE 9 MG/ML
250 INJECTION, SOLUTION INTRAVENOUS ONCE
Status: CANCELLED | OUTPATIENT
Start: 2023-02-15

## 2023-02-15 RX ORDER — HEPARIN SODIUM (PORCINE) LOCK FLUSH IV SOLN 100 UNIT/ML 100 UNIT/ML
500 SOLUTION INTRAVENOUS AS NEEDED
Status: DISCONTINUED | OUTPATIENT
Start: 2023-02-15 | End: 2023-02-15 | Stop reason: HOSPADM

## 2023-02-15 RX ORDER — SODIUM CHLORIDE 9 MG/ML
250 INJECTION, SOLUTION INTRAVENOUS ONCE
Status: COMPLETED | OUTPATIENT
Start: 2023-02-15 | End: 2023-02-15

## 2023-02-15 RX ORDER — SODIUM CHLORIDE 0.9 % (FLUSH) 0.9 %
10 SYRINGE (ML) INJECTION AS NEEDED
Status: CANCELLED | OUTPATIENT
Start: 2023-02-15

## 2023-02-15 RX ORDER — HEPARIN SODIUM (PORCINE) LOCK FLUSH IV SOLN 100 UNIT/ML 100 UNIT/ML
500 SOLUTION INTRAVENOUS AS NEEDED
Status: CANCELLED | OUTPATIENT
Start: 2023-02-15

## 2023-02-15 RX ADMIN — Medication 10 ML: at 12:47

## 2023-02-15 RX ADMIN — Medication 500 UNITS: at 12:47

## 2023-02-15 RX ADMIN — SODIUM CHLORIDE 250 ML: 9 INJECTION, SOLUTION INTRAVENOUS at 12:20

## 2023-02-15 RX ADMIN — SODIUM CHLORIDE 200 MG: 9 INJECTION, SOLUTION INTRAVENOUS at 12:20

## 2023-02-15 NOTE — NURSING NOTE
Potassium 3.2 today. Per Dr. Escobar, no supplementation needed, increase Potassium rich foods in diet.    Lab Results   Component Value Date    GLUCOSE 95 02/15/2023    BUN 21 (H) 02/15/2023    CREATININE 0.71 02/15/2023    EGFRRESULT 83.3 01/09/2023    EGFR 86.1 02/15/2023    BCR 29.6 02/15/2023    K 3.2 (L) 02/15/2023    CO2 29.7 (H) 02/15/2023    CALCIUM 9.6 02/15/2023    PROTENTOTREF 6.2 01/09/2023    ALBUMIN 3.8 02/15/2023    LABIL2 2.0 01/09/2023    AST 16 02/15/2023    ALT 11 02/15/2023

## 2023-02-26 DIAGNOSIS — I10 ESSENTIAL HYPERTENSION: ICD-10-CM

## 2023-02-27 RX ORDER — AMLODIPINE BESYLATE 5 MG/1
5 TABLET ORAL DAILY
Qty: 90 TABLET | Refills: 0 | Status: SHIPPED | OUTPATIENT
Start: 2023-02-27

## 2023-03-08 ENCOUNTER — INFUSION (OUTPATIENT)
Dept: ONCOLOGY | Facility: HOSPITAL | Age: 83
End: 2023-03-08
Payer: MEDICARE

## 2023-03-08 ENCOUNTER — OFFICE VISIT (OUTPATIENT)
Dept: ONCOLOGY | Facility: CLINIC | Age: 83
End: 2023-03-08
Payer: MEDICARE

## 2023-03-08 VITALS
TEMPERATURE: 98.2 F | RESPIRATION RATE: 16 BRPM | OXYGEN SATURATION: 97 % | DIASTOLIC BLOOD PRESSURE: 65 MMHG | WEIGHT: 106.6 LBS | HEART RATE: 60 BPM | BODY MASS INDEX: 18.89 KG/M2 | SYSTOLIC BLOOD PRESSURE: 151 MMHG | HEIGHT: 63 IN

## 2023-03-08 DIAGNOSIS — C34.12 MALIGNANT NEOPLASM OF UPPER LOBE OF LEFT LUNG: ICD-10-CM

## 2023-03-08 DIAGNOSIS — Z79.899 LONG-TERM USE OF HIGH-RISK MEDICATION: ICD-10-CM

## 2023-03-08 DIAGNOSIS — Z79.899 LONG-TERM USE OF HIGH-RISK MEDICATION: Primary | ICD-10-CM

## 2023-03-08 DIAGNOSIS — C34.11 MALIGNANT NEOPLASM OF UPPER LOBE OF RIGHT LUNG: ICD-10-CM

## 2023-03-08 DIAGNOSIS — Z45.2 FITTING AND ADJUSTMENT OF VASCULAR CATHETER: ICD-10-CM

## 2023-03-08 LAB
ALBUMIN SERPL-MCNC: 3.8 G/DL (ref 3.5–5.2)
ALBUMIN/GLOB SERPL: 1.7 G/DL (ref 1.1–2.4)
ALP SERPL-CCNC: 60 U/L (ref 38–116)
ALT SERPL W P-5'-P-CCNC: 12 U/L (ref 0–33)
ANION GAP SERPL CALCULATED.3IONS-SCNC: 12 MMOL/L (ref 5–15)
AST SERPL-CCNC: 16 U/L (ref 0–32)
BASOPHILS # BLD AUTO: 0.03 10*3/MM3 (ref 0–0.2)
BASOPHILS NFR BLD AUTO: 0.2 % (ref 0–1.5)
BILIRUB SERPL-MCNC: 0.3 MG/DL (ref 0.2–1.2)
BUN SERPL-MCNC: 19 MG/DL (ref 6–20)
BUN/CREAT SERPL: 28.4 (ref 7.3–30)
CALCIUM SPEC-SCNC: 9.5 MG/DL (ref 8.5–10.2)
CHLORIDE SERPL-SCNC: 99 MMOL/L (ref 98–107)
CO2 SERPL-SCNC: 29 MMOL/L (ref 22–29)
CREAT SERPL-MCNC: 0.67 MG/DL (ref 0.6–1.1)
DEPRECATED RDW RBC AUTO: 54.3 FL (ref 37–54)
EGFRCR SERPLBLD CKD-EPI 2021: 88.5 ML/MIN/1.73
EOSINOPHIL # BLD AUTO: 0 10*3/MM3 (ref 0–0.4)
EOSINOPHIL NFR BLD AUTO: 0 % (ref 0.3–6.2)
ERYTHROCYTE [DISTWIDTH] IN BLOOD BY AUTOMATED COUNT: 16.4 % (ref 12.3–15.4)
GLOBULIN UR ELPH-MCNC: 2.3 GM/DL (ref 1.8–3.5)
GLUCOSE SERPL-MCNC: 116 MG/DL (ref 74–124)
HCT VFR BLD AUTO: 41.3 % (ref 34–46.6)
HGB BLD-MCNC: 12.8 G/DL (ref 12–15.9)
IMM GRANULOCYTES # BLD AUTO: 0.03 10*3/MM3 (ref 0–0.05)
IMM GRANULOCYTES NFR BLD AUTO: 0.2 % (ref 0–0.5)
LYMPHOCYTES # BLD AUTO: 1.05 10*3/MM3 (ref 0.7–3.1)
LYMPHOCYTES NFR BLD AUTO: 8.6 % (ref 19.6–45.3)
MCH RBC QN AUTO: 28 PG (ref 26.6–33)
MCHC RBC AUTO-ENTMCNC: 31 G/DL (ref 31.5–35.7)
MCV RBC AUTO: 90.4 FL (ref 79–97)
MONOCYTES # BLD AUTO: 0.24 10*3/MM3 (ref 0.1–0.9)
MONOCYTES NFR BLD AUTO: 2 % (ref 5–12)
NEUTROPHILS NFR BLD AUTO: 10.87 10*3/MM3 (ref 1.7–7)
NEUTROPHILS NFR BLD AUTO: 89 % (ref 42.7–76)
NRBC BLD AUTO-RTO: 0 /100 WBC (ref 0–0.2)
PLATELET # BLD AUTO: 216 10*3/MM3 (ref 140–450)
PMV BLD AUTO: 9.7 FL (ref 6–12)
POTASSIUM SERPL-SCNC: 3.6 MMOL/L (ref 3.5–4.7)
PROT SERPL-MCNC: 6.1 G/DL (ref 6.3–8)
RBC # BLD AUTO: 4.57 10*6/MM3 (ref 3.77–5.28)
SODIUM SERPL-SCNC: 140 MMOL/L (ref 134–145)
T4 FREE SERPL-MCNC: 1.23 NG/DL (ref 0.93–1.7)
TSH SERPL DL<=0.05 MIU/L-ACNC: 2.79 UIU/ML (ref 0.27–4.2)
WBC NRBC COR # BLD: 12.22 10*3/MM3 (ref 3.4–10.8)

## 2023-03-08 PROCEDURE — 25010000002 PEMBROLIZUMAB 100 MG/4ML SOLUTION 4 ML VIAL: Performed by: NURSE PRACTITIONER

## 2023-03-08 PROCEDURE — 99214 OFFICE O/P EST MOD 30 MIN: CPT | Performed by: NURSE PRACTITIONER

## 2023-03-08 PROCEDURE — 84443 ASSAY THYROID STIM HORMONE: CPT | Performed by: INTERNAL MEDICINE

## 2023-03-08 PROCEDURE — 80053 COMPREHEN METABOLIC PANEL: CPT

## 2023-03-08 PROCEDURE — 25010000002 HEPARIN LOCK FLUSH PER 10 UNITS: Performed by: INTERNAL MEDICINE

## 2023-03-08 PROCEDURE — 96413 CHEMO IV INFUSION 1 HR: CPT

## 2023-03-08 PROCEDURE — 85025 COMPLETE CBC W/AUTO DIFF WBC: CPT

## 2023-03-08 PROCEDURE — 84439 ASSAY OF FREE THYROXINE: CPT | Performed by: INTERNAL MEDICINE

## 2023-03-08 RX ORDER — SODIUM CHLORIDE 9 MG/ML
250 INJECTION, SOLUTION INTRAVENOUS ONCE
Status: COMPLETED | OUTPATIENT
Start: 2023-03-08 | End: 2023-03-08

## 2023-03-08 RX ORDER — HEPARIN SODIUM (PORCINE) LOCK FLUSH IV SOLN 100 UNIT/ML 100 UNIT/ML
500 SOLUTION INTRAVENOUS AS NEEDED
Status: CANCELLED | OUTPATIENT
Start: 2023-03-08

## 2023-03-08 RX ORDER — HEPARIN SODIUM (PORCINE) LOCK FLUSH IV SOLN 100 UNIT/ML 100 UNIT/ML
500 SOLUTION INTRAVENOUS AS NEEDED
Status: DISCONTINUED | OUTPATIENT
Start: 2023-03-08 | End: 2023-03-08 | Stop reason: HOSPADM

## 2023-03-08 RX ORDER — SODIUM CHLORIDE 9 MG/ML
250 INJECTION, SOLUTION INTRAVENOUS ONCE
Status: CANCELLED | OUTPATIENT
Start: 2023-03-08

## 2023-03-08 RX ORDER — SODIUM CHLORIDE 0.9 % (FLUSH) 0.9 %
10 SYRINGE (ML) INJECTION AS NEEDED
Status: CANCELLED | OUTPATIENT
Start: 2023-03-08

## 2023-03-08 RX ORDER — SODIUM CHLORIDE 0.9 % (FLUSH) 0.9 %
10 SYRINGE (ML) INJECTION AS NEEDED
Status: DISCONTINUED | OUTPATIENT
Start: 2023-03-08 | End: 2023-03-08 | Stop reason: HOSPADM

## 2023-03-08 RX ADMIN — SODIUM CHLORIDE 200 MG: 9 INJECTION, SOLUTION INTRAVENOUS at 11:48

## 2023-03-08 RX ADMIN — SODIUM CHLORIDE 250 ML: 9 INJECTION, SOLUTION INTRAVENOUS at 11:40

## 2023-03-08 RX ADMIN — Medication 10 ML: at 12:24

## 2023-03-08 RX ADMIN — Medication 500 UNITS: at 12:24

## 2023-03-08 NOTE — PROGRESS NOTES
Subjective                                                                                                                              REASON FOR FOLLOW-UP: Recurrent lung cancer.    History of Present Illness       The patient is an 82-year-old female with the below medical history including chronic obstructive pulmonary disease who was seen in the [facility] multidisciplinary thoracic oncology clinic July 1, 2021.  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      She had undergone a CT of the chest at [facility] 6/16/2021 showing postsurgical changes in the left chest from right upper lobectomy, 8 mm irregular nodule medial segment of the right lower lobe and diffuse changes of emphysema with fibrotic changes in the periphery, no suspicious hilar or mediastinal nodes, no pleural effusion.  New finding was suspicious for new malignancy with the patient felt to be a poor candidate for surgical resection.  A PET CT and PFTs were requested thereafter.  The PET/CT demonstrated ill-defined FDG avid soft tissue thickening left aspect mediastinum within the operative bed, 1 cm hypermetabolic pulmonary nodule right lower lobe and asymmetric thickening patchy uptake involving the right base of tongue.  There is subtle uptake within the L1 vertebral body which is indeterminate and a sub-6 mm pulmonary nodule of right lung and subcentimeter hypodense hepatic lesion which was below PET resolution.       The patient's case was discussed in thoracic conference 7/22/2021 with consideration of the patient undergoing L1 vertebral body MRI, confirmatory biopsy left mediastinal and assessment by ENT (Dr. Caballero) who had worked with the patient previously.  She, incidentally, indicates that radiation therapy was given  apparently intraoperatively at her recent surgery?  She still has issues with difficulty chewing and swallowing post procedures and was to be followed up with Dr. aCballero in the near future as planned.                                                            She was seen in the thoracic clinic on the same day with plans to proceed with MRI of the lumbar spine, biopsy left mediastinal nodular area of potential disease and follow-up of her ENT assessment as well as undergo a guardant 360 assessment in our office.  She underwent the biopsy 8/13/2021 found to be consistent with invasive moderately differentiated adenocarcinoma with PD-L1 TPS score 40%.  MRI of the lumbar spine revealed no evidence of metastatic disease though the patient did have multilevel degenerative disease throughout the lumbar spine.  She had an office follow-up with Dr. 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  She underwent laryngoscopy 8/18/2021 with right base of tongue without evidence of lesions or masses in the region.     She was seen by Dr. Hernandez 8/19/2021 and, her findings, had been presented in lung conference.  Her findings were consistent with 2 separate lesions including a nodule in the superior segment of the right lower lobe and a mass in the upper portion of the left chest with the left chest lesion biopsy positive for adenocarcinoma.  The consensus was that these were to separate-synchronous primaries.  Stereotactic radiation each lesions were felt to be the appropriate way to proceed and the patient was referred to radiation therapy.     The patient is seen in office 8/23/2021 agreeable to the radiation therapy as well as additional assessments including Caris molecular assessment of her biopsy.  Again her guardant 360 is currently pending and we would follow her after she completes radiation therapy with  subsequent scans.      She was able to proceed with SBRT to the right lung at primary #1 with 5 treatments at 1000 cGy per fraction in the left lung primary similarly at 1000 cGy per fraction x5.  Her treatment ranged between 8/31-9/13/2021.  She is now scheduled for follow-up 11/23/2021.    The patient subsequent genomic testing is discussed with her as she is is seen back 9/23/2021.  Her guardant 360 did not determine any new abnormalities but her Caris-MI profile-does reveal sensitivity to immunotherapy as well as a BRAF mutation.  This could be extremely important as we follow the patient from this point.  Fortunately she is feeling extremely well and quite pleased about her treatments.    Patient had subsequent PET/CT 11/23/2021 demonstrates decrease in size and avidity of the previously noted intensely FDG avid nodules left lobectomy operative site and right lower lobe.  Surrounding groundglass and pulmonary opacification is consistent with postradiation changes, a few new subcentimeter pulmonary nodules are present in the right upper lobe and indeterminant, stable subcentimeter hepatic lesion is below PET resolution no other findings of FDG-avid disease are seen in neck abdomen or pelvis.  The patient seen in office 12/1/2021 with a relatively good performance status stating she is not having any new symptoms and very pleased about her results on her PET/CT.  She realizes we'll have to follow this further but subsequent scans in 6 months.  She is also hoping to see an oral surgeon that previously helped her-Dr. Wu.    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  There is evolution of presumed postradiation changes in the right midlung with soft tissue mass within the left lumpectomy operative bed minimally decreased in size.  There is a  sub-6 mm nodule in the right upper lobe not definitively seen, indeterminate and an indeterminate left renal lesion at 1.5 cm unchanged from 7/13/2021.  The patient is currently scheduled to see Dr. Dowell on 6/23/2022.  She is feeling well without any additional symptoms.    The patient required admission 10/14 - 10/18/2022 presenting with shortness of breath and cough that was nonproductive.  She had been on oral antibiotics and did not improved and was admitted for therapy for apparent pneumonia.  This eventually led to bronchoscopy after initial CT revealed postsurgical changes, new masslike 6.7 cm area of consolidation anterior left lung raising concern for potential malignancy and a follow-up PET/CT planned.  Bronchoscopy and biopsy left lower lung failed to show evidence of malignancy.  The patient's PET/CT, however, demonstrated an irregular pleural-based soft tissue density thickening in the left upper lobe that was intensely FDG avid new from 6/8/2022 thought to be a malignant recurrence with spread into the left lung parenchyma.  There is intensely pleural-based avidity within the left lower lobe thought to be metastatic disease with postradiation of the left apex as well.  There is a small focus of uptake in the right hilum grossly unchanged in size and post radiation changes in the right lower lobe.  There is indeterminate density left renal that was grossly unchanged.  The patient is seen back in office 11/15/2022 indicating that she still has chest discomfort that is slowly worsening and that she has a chronic paroxysmal cough but has not improved.  We discussed that she very likely has recurrent disease and plan to proceed with a guardant 360 examination initially as we determine whether we should try to proceed with therapy particularly immunotherapy versus targeted therapy recognizing the above findings.    Patient's guardant 360 returned as again significant for BRAF V600E and the potential use  of BRAF inhibitor/MET inhibitor dabrafenib and trametinib.  Additional information [id number] and use of alpelisib.    Unfortunately she required admission 11/28-12/1 with worsening back pain.  Fortunately she did not demonstrate evidence of metastatic disease but did have moderate degenerative changes which could cause radiculopathy.  Medications were altered to include subsequently MSR 15 mg p.o. every 12 hours, Norco as needed.    The patient now presents back 12/14/2022 to initiate therapy- initially with immunotherapy considering Caris study with PD-L1 TPS of 70% on 22 C3 and 28-8 assays.    Patient seen with her  and we discussed pain medications and adjustments thereafter and that she stopped taking MSIR having only a short supply and having to use Norco more frequently.  She is willing to initiate Keytruda today we have discussed that considering her genomics treatment versus her BRAF mutation is also an option.    C1 Keytruda 12/14/2022    Patient is seen back 1/4/2023 in follow-up due for cycle 2 Keytruda.  Patient states that since receiving her first cycle she has had decreased appetite and decreased energy.  She has not been able to bring herself to eat much and she has notably lost 7 pounds.  She has also been struggling with constipation in relation to ongoing narcotics for pain control.  She has been taking senna S2 tabs twice a day.  We discussed adding daily MiraLAX.    Patient did just last week meet with dietitian, Zoila Clinton, to discuss ways to improve caloric intake.  She has not been able to implement any of these things yet though.    The patient is next seen 1/25/2023 with mild weight gain.  She is seen with family member both indicating that she has actually felt somewhat better in terms of performance status and general function.  We have discussed proceeding with a third cycle treatment and reevaluating by scan in 2 weeks.    The patient proceeded with treatment 1/25/2023 and  underwent follow-up CT chest abdomen pelvis 2/8/2023 demonstrating small pericardial effusion that is decreased in size from 11/20/2022, ill-defined consolidation and pleural-based thickening in the left apex and upper lung has reduced slightly, additional index nodule soft tissue in the aspect the pericardium has not changed substantially, no evidence of metastatic disease in the abdomen pelvis.    The patient is seen with her daughter 2/15/2023 both indicating that she has definitely improved, stable weight, appetite and performance status.  She is also using her pain medication much less frequently and wonders whether she might begin to taper from her twice a day MS Contin.    Interval Follow-Up:  Patient is seen back today due for ongoing pembrolizumab.  She continues on low-dose prednisone 10 mg daily and has noted significant improvement in her energy and appetite with this.  She is reluctant to taper this any further we discussed that it is fine for her to stay on daily dosing.    She did try to taper her pain medication going down to just MS Contin 15 mg every 12 hours while holding her hydrocodone.  However over the last few days she has had some intermittent pain in the left upper back alleviated with hydrocodone 2-3 times a day.  She currently is denying any pain.  Monitor.    Past Medical History:   Diagnosis Date   • Allergic rhinitis    • Asthma    • Cancer of floor of mouth (HCC) 2014   • Cerebral aneurysm    • COPD (chronic obstructive pulmonary disease) (HCC)    • COVID 2020   • Esophageal reflux    • History of adenocarcinoma of lung    • History of anemia    • History of carcinoma in situ of skin    • History of lung cancer    • 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 from [facility]   • History of squamous cell carcinoma     Tongue   • Hyperlipidemia    • Hypertension     since early 60s   • Low vitamin B12 level    • Lung cancer (HCC)    • Thyromegaly    • UTI  (urinary tract infection)    • Vitamin D deficiency         Past Surgical History:   Procedure Laterality Date   • BRONCHOSCOPY Bilateral 10/17/2022    Procedure: BRONCHOSCOPY WITH FLUORO with biopsy and BAL;  Surgeon: India Flores MD;  Location: [facility] ENDOSCOPY;  Service: Pulmonary;  Laterality: Bilateral;  PRE/POST - lung mass   • CHOLECYSTECTOMY  1999   • COLONOSCOPY     • EMBOLIZATION CEREBRAL Left 6/29/2022    Procedure: 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;  Surgeon: Bradley Dowell MD;  Location: [facility] HYBRID OR 18/19;  Service: Interventional Radiology;  Laterality: Left;   • EYE SURGERY  11/24/2020    Took a muscle out of right eye   • GLOSSECTOMY PARTIAL      less than one half tongue   • LUNG SURGERY  2012   • ORAL LESION EXCISION/BIOPSY      June and August 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   • TUBAL ABDOMINAL LIGATION  1970   • VENOUS ACCESS DEVICE (PORT) INSERTION N/A 12/12/2022    Procedure: MEDIPORT PLACEMENT;  Surgeon: Jason Villaseñor MD;  Location: [facility] MAIN OR;  Service: Vascular;  Laterality: N/A;        Current Outpatient Medications on File Prior to Visit   Medication Sig Dispense Refill   • albuterol sulfate  (90 Base) MCG/ACT inhaler Inhale 2 puffs Every 4 (Four) Hours As Needed for Wheezing. 18 g 2   • amLODIPine (NORVASC) 5 MG tablet TAKE 1 TABLET BY MOUTH DAILY 90 tablet 0   • atorvastatin (LIPITOR) 20 MG tablet Take 1 tablet by mouth Every Night. 90 tablet 3   • cetirizine (ZyrTEC) 10 MG tablet Take 1 tablet by mouth Daily.     • chlorhexidine (PERIDEX) 0.12 % solution Apply 15 mL to the mouth or throat 2 (Two) Times a Day.     • cholecalciferol (VITAMIN D3) 1000 units tablet Take 1 tablet by mouth Daily. HOLDING FOR DOS     • Cyanocobalamin (VITAMIN B12 PO) Take  by mouth.     • Diclofenac Sodium (VOLTAREN) 1 % gel gel Apply 4 g topically to the appropriate area as directed 4 (Four) Times a Day As Needed.     • fluticasone (FLONASE) 50  MCG/ACT nasal spray 2 sprays into the nostril(s) as directed by provider As Needed.     • HYDROcodone-acetaminophen (NORCO) 5-325 MG per tablet Take 1 tablet by mouth Every 4 (Four) Hours As Needed for Moderate Pain. 100 tablet 0   • Ibuprofen (ADVIL PO) Take 400 mg by mouth Daily As Needed. HOLD PER MD INSTR     • lidocaine-prilocaine (EMLA) 2.5-2.5 % cream Apply nickel size amount to port site 30 min before appt time do not rub in cover with plastic wrap (Patient taking differently: 1 application As Needed. Apply nickel size amount to port site 30 min before appt time do not rub in cover with plastic wrap) 30 g 1   • metoprolol succinate XL (TOPROL-XL) 25 MG 24 hr tablet TAKE 1 TABLET EVERY DAY (Patient taking differently: Take 1 tablet by mouth Daily.) 90 tablet 1   • montelukast (SINGULAIR) 10 MG tablet Take 1 tablet by mouth Every Night. 90 tablet 3   • Morphine (MS CONTIN) 15 MG 12 hr tablet Take 1 tablet by mouth 2 (Two) Times a Day. 60 tablet 0   • omeprazole (priLOSEC) 40 MG capsule Take 1 capsule by mouth Daily.     • ondansetron (ZOFRAN) 8 MG tablet Take 1 tablet by mouth 3 (Three) Times a Day As Needed for Nausea or Vomiting. 30 tablet 5   • predniSONE (DELTASONE) 10 MG tablet Take 1 tablet by mouth Daily. 30 tablet 2   • Umeclidinium-Vilanterol (Anoro Ellipta) 62.5-25 MCG/ACT aerosol powder  inhaler Inhale 1 puff Daily As Needed.     • [DISCONTINUED] B Complex-C-E-Zn (b complex-C-E-zinc) tablet Take 1 tablet by mouth Daily. HOLDING FOR DOS     • [DISCONTINUED] clobetasol (TEMOVATE) 0.05 % cream APPLY TOPICALLY TO THE AFFECTED AREA TWICE DAILY AS NEEDED     • [DISCONTINUED] clotrimazole (MYCELEX) 10 MG danilo Take 1 tablet by mouth 5 (Five) Times a Day. 70 tablet 1   • [DISCONTINUED] hydroCHLOROthiazide (HYDRODIURIL) 25 MG tablet Take 0.5 tablets by mouth Daily.     • [DISCONTINUED] HYDROcod Polst-CPM Polst ER (Tussionex Pennkinetic ER) 10-8 MG/5ML ER suspension Take 5 mL by mouth Every 12 (Twelve) Hours  "As Needed for Cough. 120 mL 0     No current facility-administered medications on file prior to visit.        ALLERGIES:    Allergies   Allergen Reactions   • Sulfa Antibiotics Rash        Social History     Socioeconomic History   • Marital status:    Tobacco Use   • Smoking status: Former     Types: Cigarettes     Quit date: 2015     Years since quittin.1   • Smokeless tobacco: Never   • Tobacco comments:     less than a pack per day, no smoking since , Quit 2015   Vaping Use   • Vaping Use: Never used   Substance and Sexual Activity   • Alcohol use: Yes     Comment: Socially -A few times a month   • Drug use: No   • Sexual activity: Defer     Family History   Problem Relation Age of Onset   • Ovarian cancer Mother          at age 27   • No Known Problems Father    • Ovarian cancer Sister    • Colon cancer Brother    • No Known Problems Other    • Malig Hyperthermia Neg Hx         Review of Systems   HENT: Positive for dental problem.    Eyes: Negative.    Respiratory: Negative for cough and wheezing.    Gastrointestinal: Positive for constipation.   Genitourinary: Negative for frequency and urgency.   Musculoskeletal: Positive for arthralgias, back pain and myalgias.   Neurological: Negative.    Hematological: Negative.    Psychiatric/Behavioral: Negative.       Objective     Vitals:    23 1042   BP: 151/65   Pulse: 60   Resp: 16   Temp: 98.2 °F (36.8 °C)   TempSrc: Temporal   SpO2: 97%   Weight: 48.4 kg (106 lb 9.6 oz)   Height: 160 cm (62.99\")   PainSc:   4     Current Status 3/8/2023   ECOG score 0          Pulmonary function tests:     FVC is 2.10 which is 81% of predicted  FEV1 is 1.44 which is 75% of predicted  FEV1 FVC ratio 69  Diffusion capacity DLCO is 11.7 which is 54% of predicted      Physical Exam  Constitutional:       Appearance: Normal appearance. She is normal weight.   HENT:      Head: Normocephalic and atraumatic.      Nose: Nose normal.      Mouth/Throat:     "  Mouth: Mucous membranes are moist. No oral lesions.      Pharynx: Oropharynx is clear.      Comments: Malocclusion noted  Eyes:      Extraocular Movements: Extraocular movements intact.   Cardiovascular:      Rate and Rhythm: Normal rate and regular rhythm.      Pulses: Normal pulses.      Heart sounds: Normal heart sounds.   Pulmonary:      Effort: Pulmonary effort is normal.      Breath sounds: Normal breath sounds.      Comments: Distant breath sounds bilateral bases  Abdominal:      General: Abdomen is flat. Bowel sounds are normal.      Palpations: Abdomen is soft.   Musculoskeletal:         General: Normal range of motion.      Cervical back: Normal range of motion and neck supple.   Skin:     General: Skin is warm and dry.   Neurological:      General: No focal deficit present.      Mental Status: She is alert and oriented to person, place, and time.   Psychiatric:         Mood and Affect: Mood normal.         Behavior: Behavior normal.           RECENT LABS:  Results from last 7 days   Lab Units 03/08/23  1030   WBC 10*3/mm3 12.22*   NEUTROS ABS 10*3/mm3 10.87*   HEMOGLOBIN g/dL 12.8   HEMATOCRIT % 41.3   PLATELETS 10*3/mm3 216     Results from last 7 days   Lab Units 03/08/23  1030   SODIUM mmol/L 140   POTASSIUM mmol/L 3.6   CHLORIDE mmol/L 99   CO2 mmol/L 29.0   BUN mg/dL 19   CREATININE mg/dL 0.67   CALCIUM mg/dL 9.5   ALBUMIN g/dL 3.8   BILIRUBIN mg/dL 0.3   ALK PHOS U/L 60   ALT (SGPT) U/L 12   AST (SGOT) U/L 16   GLUCOSE mg/dL 116               Assessment & Plan      82-year-old female with medical history including COPD, long-term tobacco use, status post previous left upper lobectomy for carcinoma lung in May 2015, carcinoma tongue ongoing radiation therapy and surgical therapy through ENT-Dr. Caballero-including 2016 with wide local excision of buccal lesion.    During follow-up a CT scan of the chest June 16, 2021 demonstrates postsurgical changes, 8 mm irregular nodule medial segment of right lower  lobe and diffuse changes of emphysema.  She is seen in thoracic clinic with findings suspicious for new malignancy and concern of the patient being a poor operative candidate.  Subsequent PET CT and PFTs demonstrated ill-defined avidity and soft tissue left aspect of the mediastinum, 1 cm hypermetabolic pulmonary nodule and asymmetric thickening involving the right base of tongue as well as subtle uptake in the L1 vertebral body.  This patient's case was discussed in thoracic clinic and plans were made to request an MRI of the lumbar spine, obtain a biopsy of the mediastinal involvement of the left had the patient reviewed by ENT.  The patient is seen with her  7/22/2021 in thoracic clinic and we reviewed these findings as well as having her assessed via liquid biopsy to determine potential molecular status quickly.           She underwent the biopsy 8/13/2021 found to be consistent with invasive moderately differentiated adenocarcinoma with PD-L1 TPS score 40%.  MRI of the lumbar spine revealed no evidence of metastatic disease though the patient did have multilevel degenerative disease throughout the lumbar spine.  She had an office follow-up with Dr. Caballero-history of a [id number] SCCa of the rightt retromolar trigone who is s/p WLE, buccal fat pad flap and SLN biopsy that was negative, margins were negative.  She underwent laryngoscopy 8/18/2021 with right base of tongue without evidence of lesions or masses in the region.     She was seen by Dr. Hernandez 8/19/2021 and, her findings, had been presented in lung conference.  Her findings were consistent with 2 separate lesions including a nodule in the superior segment of the right lower lobe and a mass in the upper portion of the left chest with the left chest lesion biopsy positive for adenocarcinoma.  The consensus was that these were to separate-synchronous primaries.  Stereotactic radiation each lesions were felt to be the appropriate way to proceed and the  patient was referred to radiation therapy.    At the time of this dictation her guardant 360 is not yet available.  These issues are discussed with the patient in some detail 8/23/2021 so that we can have an overall longer-term plan.  This includes a Caris molecular assessment of her recent biopsy as well as her PD-L1 positive findings.    The patient was referred for radiation therapy and she was able to proceed with SBRT to the right lung at primary #1 with 5 treatments at 1000 cGy per fraction in the left lung primary similarly at 1000 cGy per fraction x5.  Her treatment ranged between 8/31-9/13/2021.  She is now scheduled for follow-up 11/23/2021.    Additionally genomic testing is discussed with her as she is is seen back 9/23/2021.  Her guardant 360 did not determine any new abnormalities but her Caris-MI profile-does reveal sensitivity to immunotherapy as well as a BRAF mutation.    Patient had subsequent PET/CT 11/23/2021 demonstrates decrease in size and avidity of the previously noted intensely FDG avid nodules left lobectomy operative site and right lower lobe.  Surrounding groundglass and pulmonary opacification is consistent with postradiation changes, a few new subcentimeter pulmonary nodules are present in the right upper lobe and indeterminant, stable subcentimeter hepatic lesion is below PET resolution no other findings of FDG-avid disease are seen in neck abdomen or pelvis.  The patient seen in office 12/1/2021 with a relatively good performance status stating she is not having any new symptoms and very pleased about her results on her PET/CT.  She realizes we'll have to follow this further but subsequent scans in 6 months.  She is also hoping to see an oral surgeon that previously helped her-Dr. Wu.    We elected to have her undergo additional CTs of the neck, chest, abdomen and pelvis demonstrating, especially, and intracerebral saccular aneurysm arising off the left posterior communicating  artery at 1.1 cm.  There is evolution of presumed postradiation changes in the right midlung with soft tissue mass within the left lumpectomy operative bed minimally decreased in size.  There is a sub-6 mm nodule in the right upper lobe not definitively seen, indeterminate and an indeterminate left renal lesion at 1.5 cm unchanged from 7/13/2021.  The patient is currently scheduled to see Dr. Dowell on 6/23/2022.  She is feeling well without any additional symptoms.     The patient required admission 10/14 - 10/18/2022 presenting with shortness of breath and cough that was nonproductive.  She had been on oral antibiotics and did not improved and was admitted for therapy for apparent pneumonia.  This eventually led to bronchoscopy after initial CT revealed postsurgical changes, new masslike 6.7 cm area of consolidation anterior left lung raising concern for potential malignancy and a follow-up PET/CT planned.  Bronchoscopy and biopsy left lower lung failed to show evidence of malignancy.  The patient's PET/CT, however, demonstrated an irregular pleural-based soft tissue density thickening in the left upper lobe that was intensely FDG avid new from 6/8/2022 thought to be a malignant recurrence with spread into the left lung parenchyma.  There is intensely pleural-based avidity within the left lower lobe thought to be metastatic disease with postradiation of the left apex as well.  There is a small focus of uptake in the right hilum grossly unchanged in size and post radiation changes in the right lower lobe.  There is indeterminate density left renal that was grossly unchanged.  The patient is seen back in office indicating that she still has chest discomfort that is slowly worsening and that she has a chronic paroxysmal cough but has not improved.  We discussed that she very likely has recurrent disease and plan to proceed with a guardant 360 examination initially as we determine whether we should try to proceed  with therapy particularly immunotherapy versus targeted therapy recognizing the above findings.    Patient's guardant 360 returned as again significant for BRAF V600E and the potential use of BRAF inhibitor/MET inhibitor dabrafenib and trametinib.  Additional information PIK3CA and use of alpelisib.    Unfortunately she required admission 11/28-12/1/2022 with worsening back pain.  Fortunately she did not demonstrate evidence of metastatic disease but did have moderate degenerative changes which could cause radiculopathy.  Medications were altered to include subsequently MSR 15 mg p.o. every 12 hours, Norco as needed.    The patient now presents back 12/14/2022 to initiate therapy- initially with immunotherapy considering Caris study with PD-L1 TPS of 70% on 22 C3 and 28-8 assays.    12/14/2022 The patient is seen with her  and we discussed pain medications and adjustments thereafter and that she stopped taking MS IR having only a short supply and having to use Norco more frequently.  She is willing to initiate Keytruda today we have discussed that considering her genomics treatment versus her BRAF mutation is also an option.    1/4/2023 patient reviewed back today due for cycle 2 Keytruda.  Patient experiencing decreased appetite and energy following for cycle of Keytruda.  She has lost 7 pounds.  Per discussion with Dr. Escobar we will start the patient on prednisone 10 mg daily which is acceptable to continue with immunotherapy.  She also has noted thrush along the right side of her tongue where her tongue is rubbing against her teeth (chronic issue reportedly).  We will also prescribe Mycelex for this.  Patient has had some increasing constipation in relation to narcotics we discussed senna S tabs along with adding MiraLAX.  Plan repeat scans after 3 cycles.  We proceeded with second cycle Keytruda and low-dose prednisone which was quite helpful in the patient's performance status.  At this point her oral  thrush has improved as has her performance status.  After discussion we proceeded with additional chemotherapy, started to taper pain medication and had undergo repeat CT chest abdomen pelvis.    These are reviewed with her 2/15/2023 have not been done 2/8/2023 with consolidation and masslike pleural thickening in the left apex and upper lung index areas have decreased somewhat.  There is no evidence of metastatic disease otherwise and a few indeterminate left renal lesions are stable.  After lengthy discussion with the patient and her daughter as to the stability to mild improvement with immunotherapy it is agreed that we would continue treatment at this point.    Continuing with good tolerance to pembrolizumab.  Continuing on low-dose prednisone with good appetite and energy.  Continue current plan of care.    Plan:    *Proceed with Keytruda today cycle 5    *Continue prednisone 10 mg daily.  Patient reluctant to taper this any further as she feels very good on this current dose.    *Continue MS Contin 15 mg every 12 hour.  Patient is using less Norco 5/325 overall but still occasionally taking 1-2 tabs a day.    *Return in 3 and 6 weeks for follow-up with MD or NP and additional Keytruda.    *Additional options for therapy include BRAF inhibitors, chemotherapy directed for histology lung adenocarcinoma    This patient is on high risk drug therapy requiring intensive monitoring for toxicity.

## 2023-03-17 ENCOUNTER — TELEPHONE (OUTPATIENT)
Dept: ONCOLOGY | Facility: CLINIC | Age: 83
End: 2023-03-17
Payer: MEDICARE

## 2023-03-17 NOTE — TELEPHONE ENCOUNTER
Patient has infection in one of her teeth and needs to take an antibiotic and wants to make sure it is okay to take it

## 2023-03-24 ENCOUNTER — TELEPHONE (OUTPATIENT)
Dept: ONCOLOGY | Facility: CLINIC | Age: 83
End: 2023-03-24
Payer: MEDICARE

## 2023-03-24 DIAGNOSIS — Z85.118 HISTORY OF LUNG CANCER: ICD-10-CM

## 2023-03-24 DIAGNOSIS — C34.12 MALIGNANT NEOPLASM OF UPPER LOBE OF LEFT LUNG: Primary | ICD-10-CM

## 2023-03-24 RX ORDER — MORPHINE SULFATE 15 MG/1
15 TABLET, FILM COATED, EXTENDED RELEASE ORAL 2 TIMES DAILY
Qty: 60 TABLET | Refills: 0 | Status: CANCELLED | OUTPATIENT
Start: 2023-03-24

## 2023-03-24 RX ORDER — MORPHINE SULFATE 15 MG/1
15 TABLET, FILM COATED, EXTENDED RELEASE ORAL 2 TIMES DAILY
Qty: 60 TABLET | Refills: 0 | Status: SHIPPED | OUTPATIENT
Start: 2023-03-24 | End: 2023-03-29 | Stop reason: SDUPTHER

## 2023-03-24 NOTE — TELEPHONE ENCOUNTER
Caller: Darlene Pfeiffer S    Relationship: Self    Best call back number: [phone]    Requested Prescriptions:   Requested Prescriptions     Pending Prescriptions Disp Refills   • Morphine (MS CONTIN) 15 MG 12 hr tablet 60 tablet 0     Sig: Take 1 tablet by mouth 2 (Two) Times a Day.        Pharmacy where request should be sent: [id number] - [address] - [phone]  - [phone]     Last office visit with prescribing clinician: 2/15/2023   Last telemedicine visit with prescribing clinician: 3/29/2023   Next office visit with prescribing clinician: 3/29/2023     Additional details provided by patient:PATIENT IS HAS 1 DAY LEFT    Does the patient have less than a 3 day supply:  [x] Yes  [] No    Would you like a call back once the refill request has been completed: [] Yes [x] No    If the office needs to give you a call back, can they leave a voicemail: [] Yes [x] No    Charlene Albarado Rep   03/24/23 12:38 EDT

## 2023-03-28 NOTE — PROGRESS NOTES
REASON FOR FOLLOW-UP: Recurrent lung cancer.    History of Present Illness       The patient is an 82-year-old female with the below medical history including chronic obstructive pulmonary disease who was seen in the [facility] multidisciplinary thoracic oncology clinic July 1, 2021.  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      She had undergone a CT of the chest at [facility] 6/16/2021 showing postsurgical changes in the left chest from right upper lobectomy, 8 mm irregular nodule medial segment of the right lower lobe and diffuse changes of emphysema with fibrotic changes in the periphery, no suspicious hilar or mediastinal nodes, no pleural effusion.  New finding was suspicious for new malignancy with the patient felt to be a poor candidate for surgical resection.  A PET CT and PFTs were requested thereafter.  The PET/CT demonstrated ill-defined FDG avid soft tissue thickening left aspect mediastinum within the operative bed, 1 cm hypermetabolic pulmonary nodule right lower lobe and asymmetric thickening patchy uptake involving the right base of tongue.  There is subtle uptake within the L1 vertebral body which is indeterminate and a sub-6 mm pulmonary nodule of right lung and subcentimeter hypodense hepatic lesion which was below PET resolution.       The patient's case was discussed in thoracic conference 7/22/2021 with consideration of the patient undergoing L1 vertebral body MRI, confirmatory biopsy left mediastinal and assessment by ENT (Dr. Caballero) who had worked with the patient previously.  She, incidentally, indicates that radiation  therapy was given apparently intraoperatively at her recent surgery?  She still has issues with difficulty chewing and swallowing post procedures and was to be followed up with Dr. Caballero in the near future as planned.                                                            She was seen in the thoracic clinic on the same day with plans to proceed with MRI of the lumbar spine, biopsy left mediastinal nodular area of potential disease and follow-up of her ENT assessment as well as undergo a guardant 360 assessment in our office.  She underwent the biopsy 8/13/2021 found to be consistent with invasive moderately differentiated adenocarcinoma with PD-L1 TPS score 40%.  MRI of the lumbar spine revealed no evidence of metastatic disease though the patient did have multilevel degenerative disease throughout the lumbar spine.  She had an office follow-up with Dr. 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  She underwent laryngoscopy 8/18/2021 with right base of tongue without evidence of lesions or masses in the region.     She was seen by Dr. Hernandez 8/19/2021 and, her findings, had been presented in lung conference.  Her findings were consistent with 2 separate lesions including a nodule in the superior segment of the right lower lobe and a mass in the upper portion of the left chest with the left chest lesion biopsy positive for adenocarcinoma.  The consensus was that these were to separate-synchronous primaries.  Stereotactic radiation each lesions were felt to be the appropriate way to proceed and the patient was referred to radiation therapy.     The patient is seen in office 8/23/2021 agreeable to the radiation therapy as well as additional assessments including Luis Armandois molecular assessment of her biopsy.  Again her guardant 360 is currently pending and we would follow her after she completes radiation  therapy with subsequent scans.      She was able to proceed with SBRT to the right lung at primary #1 with 5 treatments at 1000 cGy per fraction in the left lung primary similarly at 1000 cGy per fraction x5.  Her treatment ranged between 8/31-9/13/2021.  She is now scheduled for follow-up 11/23/2021.    The patient subsequent genomic testing is discussed with her as she is is seen back 9/23/2021.  Her guardant 360 did not determine any new abnormalities but her Caris-MI profile-does reveal sensitivity to immunotherapy as well as a BRAF mutation.  This could be extremely important as we follow the patient from this point.  Fortunately she is feeling extremely well and quite pleased about her treatments.    Patient had subsequent PET/CT 11/23/2021 demonstrates decrease in size and avidity of the previously noted intensely FDG avid nodules left lobectomy operative site and right lower lobe.  Surrounding groundglass and pulmonary opacification is consistent with postradiation changes, a few new subcentimeter pulmonary nodules are present in the right upper lobe and indeterminant, stable subcentimeter hepatic lesion is below PET resolution no other findings of FDG-avid disease are seen in neck abdomen or pelvis.  The patient seen in office 12/1/2021 with a relatively good performance status stating she is not having any new symptoms and very pleased about her results on her PET/CT.  She realizes we'll have to follow this further but subsequent scans in 6 months.  She is also hoping to see an oral surgeon that previously helped her-Dr. Wu.    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  There is evolution of presumed postradiation changes in the right midlung with soft tissue mass within the left lumpectomy operative bed minimally decreased in size.   There is a sub-6 mm nodule in the right upper lobe not definitively seen, indeterminate and an indeterminate left renal lesion at 1.5 cm unchanged from 7/13/2021.  The patient is currently scheduled to see Dr. Dowell on 6/23/2022.  She is feeling well without any additional symptoms.    The patient required admission 10/14 - 10/18/2022 presenting with shortness of breath and cough that was nonproductive.  She had been on oral antibiotics and did not improved and was admitted for therapy for apparent pneumonia.  This eventually led to bronchoscopy after initial CT revealed postsurgical changes, new masslike 6.7 cm area of consolidation anterior left lung raising concern for potential malignancy and a follow-up PET/CT planned.  Bronchoscopy and biopsy left lower lung failed to show evidence of malignancy.  The patient's PET/CT, however, demonstrated an irregular pleural-based soft tissue density thickening in the left upper lobe that was intensely FDG avid new from 6/8/2022 thought to be a malignant recurrence with spread into the left lung parenchyma.  There is intensely pleural-based avidity within the left lower lobe thought to be metastatic disease with postradiation of the left apex as well.  There is a small focus of uptake in the right hilum grossly unchanged in size and post radiation changes in the right lower lobe.  There is indeterminate density left renal that was grossly unchanged.  The patient is seen back in office 11/15/2022 indicating that she still has chest discomfort that is slowly worsening and that she has a chronic paroxysmal cough but has not improved.  We discussed that she very likely has recurrent disease and plan to proceed with a guardant 360 examination initially as we determine whether we should try to proceed with therapy particularly immunotherapy versus targeted therapy recognizing the above findings.    Patient's guardant 360 returned as again significant for BRAF V600E and the  potential use of BRAF inhibitor/MET inhibitor dabrafenib and trametinib.  Additional information [id number] and use of alpelisib.    Unfortunately she required admission 11/28-12/1 with worsening back pain.  Fortunately she did not demonstrate evidence of metastatic disease but did have moderate degenerative changes which could cause radiculopathy.  Medications were altered to include subsequently MSR 15 mg p.o. every 12 hours, Norco as needed.    The patient now presents back 12/14/2022 to initiate therapy- initially with immunotherapy considering Caris study with PD-L1 TPS of 70% on 22 C3 and 28-8 assays.    Patient seen with her  and we discussed pain medications and adjustments thereafter and that she stopped taking MSIR having only a short supply and having to use Norco more frequently.  She is willing to initiate Keytruda today we have discussed that considering her genomics treatment versus her BRAF mutation is also an option.    C1 Keytruda 12/14/2022    Patient is seen back 1/4/2023 in follow-up due for cycle 2 Keytruda.  Patient states that since receiving her first cycle she has had decreased appetite and decreased energy.  She has not been able to bring herself to eat much and she has notably lost 7 pounds.  She has also been struggling with constipation in relation to ongoing narcotics for pain control.  She has been taking senna S2 tabs twice a day.  We discussed adding daily MiraLAX.    Patient did just last week meet with dietitian, Zoila Clinton, to discuss ways to improve caloric intake.  She has not been able to implement any of these things yet though.    The patient is next seen 1/25/2023 with mild weight gain.  She is seen with family member both indicating that she has actually felt somewhat better in terms of performance status and general function.  We have discussed proceeding with a third cycle treatment and reevaluating by scan in 2 weeks.    The patient proceeded with treatment  1/25/2023 and underwent follow-up CT chest abdomen pelvis 2/8/2023 demonstrating small pericardial effusion that is decreased in size from 11/20/2022, ill-defined consolidation and pleural-based thickening in the left apex and upper lung has reduced slightly, additional index nodule soft tissue in the aspect the pericardium has not changed substantially, no evidence of metastatic disease in the abdomen pelvis.    The patient is seen with her daughter 2/15/2023 both indicating that she has definitely improved, stable weight, appetite and performance status.  She is also using her pain medication much less frequently and wonders whether she might begin to taper from her twice a day MS Contin.    Patient is seen back 3/8/2023 due for ongoing pembrolizumab.  She continues on low-dose prednisone 10 mg daily and has noted significant improvement in her energy and appetite with this.  She is reluctant to taper this any further we discussed that it is fine for her to stay on daily dosing.    She did try to taper her pain medication going down to just MS Contin 15 mg every 12 hours while holding her hydrocodone.  However over the last few days she has had some intermittent pain in the left upper back alleviated with hydrocodone 2-3 times a day.  She currently is denying any pain.  Monitor.    She is next evaluated 3/29/2023 for ongoing Keytruda.  Evidently her pain medication was sent to the wrong pharmacy and will need to be represcribed today-long-acting MS Contin.  Otherwise she is doing reasonably well at present.    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   • Cerebral aneurysm    • COPD (chronic obstructive pulmonary disease) ([facility])    • COVID 2020   • Esophageal reflux    • History of adenocarcinoma of lung    • History of anemia    • History of carcinoma in situ of skin    • History of lung cancer    • History of oral hairy leukoplakia      History of oral leukoplakia-Abstracted from [facility]   • History of squamous cell carcinoma     Tongue   • Hyperlipidemia    • Hypertension     since early 60s   • Low vitamin B12 level    • Lung cancer (HCC)    • Thyromegaly    • UTI (urinary tract infection)    • Vitamin D deficiency         Past Surgical History:   Procedure Laterality Date   • BRONCHOSCOPY Bilateral 10/17/2022    Procedure: BRONCHOSCOPY WITH FLUORO with biopsy and BAL;  Surgeon: India Flores MD;  Location: [facility] ENDOSCOPY;  Service: Pulmonary;  Laterality: Bilateral;  PRE/POST - lung mass   • CHOLECYSTECTOMY  1999   • COLONOSCOPY     • EMBOLIZATION CEREBRAL Left 6/29/2022    Procedure: 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;  Surgeon: Bradley Dowell MD;  Location: [facility] HYBRID OR 18/19;  Service: Interventional Radiology;  Laterality: Left;   • EYE SURGERY  11/24/2020    Took a muscle out of right eye   • GLOSSECTOMY PARTIAL      less than one half tongue   • LUNG SURGERY  2012   • ORAL LESION EXCISION/BIOPSY      June and August 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   • TUBAL ABDOMINAL LIGATION  1970   • VENOUS ACCESS DEVICE (PORT) INSERTION N/A 12/12/2022    Procedure: MEDIPORT PLACEMENT;  Surgeon: Jason Villaseñor MD;  Location: [facility] MAIN OR;  Service: Vascular;  Laterality: N/A;        Current Outpatient Medications on File Prior to Visit   Medication Sig Dispense Refill   • albuterol sulfate  (90 Base) MCG/ACT inhaler Inhale 2 puffs Every 4 (Four) Hours As Needed for Wheezing. 18 g 2   • amLODIPine (NORVASC) 5 MG tablet TAKE 1 TABLET BY MOUTH DAILY 90 tablet 0   • atorvastatin (LIPITOR) 20 MG tablet Take 1 tablet by mouth Every Night. 90 tablet 3   • cetirizine (ZyrTEC) 10 MG tablet Take 1 tablet by mouth Daily.     • chlorhexidine (PERIDEX) 0.12 % solution Apply 15 mL to the mouth or throat 2 (Two) Times a Day.     • cholecalciferol (VITAMIN D3) 1000 units tablet Take 1 tablet by mouth Daily. HOLDING  FOR DOS     • Cyanocobalamin (VITAMIN B12 PO) Take  by mouth.     • Diclofenac Sodium (VOLTAREN) 1 % gel gel Apply 4 g topically to the appropriate area as directed 4 (Four) Times a Day As Needed.     • fluticasone (FLONASE) 50 MCG/ACT nasal spray 2 sprays into the nostril(s) as directed by provider As Needed.     • HYDROcodone-acetaminophen (NORCO) 5-325 MG per tablet Take 1 tablet by mouth Every 4 (Four) Hours As Needed for Moderate Pain. 100 tablet 0   • Ibuprofen (ADVIL PO) Take 400 mg by mouth Daily As Needed. HOLD PER MD INSTR     • lidocaine-prilocaine (EMLA) 2.5-2.5 % cream Apply nickel size amount to port site 30 min before appt time do not rub in cover with plastic wrap (Patient taking differently: 1 application As Needed. Apply nickel size amount to port site 30 min before appt time do not rub in cover with plastic wrap) 30 g 1   • metoprolol succinate XL (TOPROL-XL) 25 MG 24 hr tablet TAKE 1 TABLET EVERY DAY (Patient taking differently: Take 1 tablet by mouth Daily.) 90 tablet 1   • montelukast (SINGULAIR) 10 MG tablet Take 1 tablet by mouth Every Night. 90 tablet 3   • omeprazole (priLOSEC) 40 MG capsule Take 1 capsule by mouth Daily.     • ondansetron (ZOFRAN) 8 MG tablet Take 1 tablet by mouth 3 (Three) Times a Day As Needed for Nausea or Vomiting. 30 tablet 5   • predniSONE (DELTASONE) 10 MG tablet Take 1 tablet by mouth Daily. 30 tablet 2   • Umeclidinium-Vilanterol (Anoro Ellipta) 62.5-25 MCG/ACT aerosol powder  inhaler Inhale 1 puff Daily As Needed.     • [DISCONTINUED] Morphine (MS CONTIN) 15 MG 12 hr tablet Take 1 tablet by mouth 2 (Two) Times a Day. 60 tablet 0     No current facility-administered medications on file prior to visit.        ALLERGIES:    Allergies   Allergen Reactions   • Sulfa Antibiotics Rash        Social History     Socioeconomic History   • Marital status:    Tobacco Use   • Smoking status: Former     Types: Cigarettes     Quit date: 02/2015     Years since quitting:  "8.1   • Smokeless tobacco: Never   • Tobacco comments:     less than a pack per day, no smoking since , Quit 2015   Vaping Use   • Vaping Use: Never used   Substance and Sexual Activity   • Alcohol use: Yes     Comment: Socially -A few times a month   • Drug use: No   • Sexual activity: Defer     Family History   Problem Relation Age of Onset   • Ovarian cancer Mother          at age 27   • No Known Problems Father    • Ovarian cancer Sister    • Colon cancer Brother    • No Known Problems Other    • Malig Hyperthermia Neg Hx         Review of Systems   HENT: Positive for dental problem.    Eyes: Negative.    Respiratory: Negative for cough and wheezing.    Gastrointestinal: Positive for constipation.   Genitourinary: Negative for frequency and urgency.   Musculoskeletal: Positive for arthralgias, back pain and myalgias.   Neurological: Negative.    Hematological: Negative.    Psychiatric/Behavioral: Negative.       Objective     Vitals:    23 1026   BP: 143/61   Pulse: 76   Resp: 16   Temp: 96.8 °F (36 °C)   TempSrc: Temporal   SpO2: 96%   Weight: 48.5 kg (106 lb 14.4 oz)   Height: 160 cm (62.99\")   PainSc:   6   PainLoc: Shoulder  Comment: Left shoulder pain that runs down to her left arm     Current Status 3/29/2023   ECOG score 0          Pulmonary function tests:     FVC is 2.10 which is 81% of predicted  FEV1 is 1.44 which is 75% of predicted  FEV1 FVC ratio 69  Diffusion capacity DLCO is 11.7 which is 54% of predicted      Physical Exam  Constitutional:       Appearance: Normal appearance. She is normal weight.   HENT:      Head: Normocephalic and atraumatic.      Nose: Nose normal.      Mouth/Throat:      Mouth: Mucous membranes are moist. No oral lesions.      Pharynx: Oropharynx is clear.      Comments: Malocclusion noted  Eyes:      Extraocular Movements: Extraocular movements intact.   Cardiovascular:      Rate and Rhythm: Normal rate and regular rhythm.      Pulses: Normal pulses.    "   Heart sounds: Normal heart sounds.   Pulmonary:      Effort: Pulmonary effort is normal.      Breath sounds: Normal breath sounds.      Comments: Distant breath sounds bilateral bases  Abdominal:      General: Abdomen is flat. Bowel sounds are normal.      Palpations: Abdomen is soft.   Musculoskeletal:         General: Normal range of motion.      Cervical back: Normal range of motion and neck supple.   Skin:     General: Skin is warm and dry.   Neurological:      General: No focal deficit present.      Mental Status: She is alert and oriented to person, place, and time.   Psychiatric:         Mood and Affect: Mood normal.         Behavior: Behavior normal.           RECENT LABS:  Results from last 7 days   Lab Units 03/29/23  1015   WBC 10*3/mm3 12.70*   NEUTROS ABS 10*3/mm3 10.82*   HEMOGLOBIN g/dL 13.2   HEMATOCRIT % 41.2   PLATELETS 10*3/mm3 224     Results from last 7 days   Lab Units 03/29/23  1015   SODIUM mmol/L 141   POTASSIUM mmol/L 3.5   CHLORIDE mmol/L 99   CO2 mmol/L 29.6*   BUN mg/dL 17   CREATININE mg/dL 0.61   CALCIUM mg/dL 9.8   ALBUMIN g/dL 4.0   BILIRUBIN mg/dL 0.4   ALK PHOS U/L 62   ALT (SGPT) U/L 16   AST (SGOT) U/L 21   GLUCOSE mg/dL 189*               Assessment & Plan      82-year-old female with medical history including COPD, long-term tobacco use, status post previous left upper lobectomy for carcinoma lung in May 2015, carcinoma tongue ongoing radiation therapy and surgical therapy through ENT-Dr. Caballero-including 2016 with wide local excision of buccal lesion.    During follow-up a CT scan of the chest June 16, 2021 demonstrates postsurgical changes, 8 mm irregular nodule medial segment of right lower lobe and diffuse changes of emphysema.  She is seen in thoracic clinic with findings suspicious for new malignancy and concern of the patient being a poor operative candidate.  Subsequent PET CT and PFTs demonstrated ill-defined avidity and soft tissue left aspect of the mediastinum, 1  cm hypermetabolic pulmonary nodule and asymmetric thickening involving the right base of tongue as well as subtle uptake in the L1 vertebral body.  This patient's case was discussed in thoracic clinic and plans were made to request an MRI of the lumbar spine, obtain a biopsy of the mediastinal involvement of the left had the patient reviewed by ENT.  The patient is seen with her  7/22/2021 in thoracic clinic and we reviewed these findings as well as having her assessed via liquid biopsy to determine potential molecular status quickly.           She underwent the biopsy 8/13/2021 found to be consistent with invasive moderately differentiated adenocarcinoma with PD-L1 TPS score 40%.  MRI of the lumbar spine revealed no evidence of metastatic disease though the patient did have multilevel degenerative disease throughout the lumbar spine.  She had an office follow-up with Dr. Caballero-history of a [id number] SCCa of the rightt retromolar trigone who is s/p WLE, buccal fat pad flap and SLN biopsy that was negative, margins were negative.  She underwent laryngoscopy 8/18/2021 with right base of tongue without evidence of lesions or masses in the region.     She was seen by Dr. Hernandez 8/19/2021 and, her findings, had been presented in lung conference.  Her findings were consistent with 2 separate lesions including a nodule in the superior segment of the right lower lobe and a mass in the upper portion of the left chest with the left chest lesion biopsy positive for adenocarcinoma.  The consensus was that these were to separate-synchronous primaries.  Stereotactic radiation each lesions were felt to be the appropriate way to proceed and the patient was referred to radiation therapy.    At the time of this dictation her guardant 360 is not yet available.  These issues are discussed with the patient in some detail 8/23/2021 so that we can have an overall longer-term plan.  This includes a Caris molecular assessment of her  recent biopsy as well as her PD-L1 positive findings.    The patient was referred for radiation therapy and she was able to proceed with SBRT to the right lung at primary #1 with 5 treatments at 1000 cGy per fraction in the left lung primary similarly at 1000 cGy per fraction x5.  Her treatment ranged between 8/31-9/13/2021.  She is now scheduled for follow-up 11/23/2021.    Additionally genomic testing is discussed with her as she is is seen back 9/23/2021.  Her guardant 360 did not determine any new abnormalities but her Caris-MI profile-does reveal sensitivity to immunotherapy as well as a BRAF mutation.    Patient had subsequent PET/CT 11/23/2021 demonstrates decrease in size and avidity of the previously noted intensely FDG avid nodules left lobectomy operative site and right lower lobe.  Surrounding groundglass and pulmonary opacification is consistent with postradiation changes, a few new subcentimeter pulmonary nodules are present in the right upper lobe and indeterminant, stable subcentimeter hepatic lesion is below PET resolution no other findings of FDG-avid disease are seen in neck abdomen or pelvis.  The patient seen in office 12/1/2021 with a relatively good performance status stating she is not having any new symptoms and very pleased about her results on her PET/CT.  She realizes we'll have to follow this further but subsequent scans in 6 months.  She is also hoping to see an oral surgeon that previously helped her-Dr. Wu.    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  There is evolution of presumed postradiation changes in the right midlung with soft tissue mass within the left lumpectomy operative bed minimally decreased in size.  There is a sub-6 mm nodule in the right upper lobe not definitively seen, indeterminate and an  indeterminate left renal lesion at 1.5 cm unchanged from 7/13/2021.  The patient is currently scheduled to see Dr. Dowell on 6/23/2022.  She is feeling well without any additional symptoms.     The patient required admission 10/14 - 10/18/2022 presenting with shortness of breath and cough that was nonproductive.  She had been on oral antibiotics and did not improved and was admitted for therapy for apparent pneumonia.  This eventually led to bronchoscopy after initial CT revealed postsurgical changes, new masslike 6.7 cm area of consolidation anterior left lung raising concern for potential malignancy and a follow-up PET/CT planned.  Bronchoscopy and biopsy left lower lung failed to show evidence of malignancy.  The patient's PET/CT, however, demonstrated an irregular pleural-based soft tissue density thickening in the left upper lobe that was intensely FDG avid new from 6/8/2022 thought to be a malignant recurrence with spread into the left lung parenchyma.  There is intensely pleural-based avidity within the left lower lobe thought to be metastatic disease with postradiation of the left apex as well.  There is a small focus of uptake in the right hilum grossly unchanged in size and post radiation changes in the right lower lobe.  There is indeterminate density left renal that was grossly unchanged.  The patient is seen back in office indicating that she still has chest discomfort that is slowly worsening and that she has a chronic paroxysmal cough but has not improved.  We discussed that she very likely has recurrent disease and plan to proceed with a guardant 360 examination initially as we determine whether we should try to proceed with therapy particularly immunotherapy versus targeted therapy recognizing the above findings.    Patient's guardant 360 returned as again significant for BRAF V600E and the potential use of BRAF inhibitor/MET inhibitor dabrafenib and trametinib.  Additional information [id number] and  use of alpelisib.    Unfortunately she required admission 11/28-12/1/2022 with worsening back pain.  Fortunately she did not demonstrate evidence of metastatic disease but did have moderate degenerative changes which could cause radiculopathy.  Medications were altered to include subsequently MSR 15 mg p.o. every 12 hours, Norco as needed.    The patient now presents back 12/14/2022 to initiate therapy- initially with immunotherapy considering Caris study with PD-L1 TPS of 70% on 22 C3 and 28-8 assays.    12/14/2022 The patient is seen with her  and we discussed pain medications and adjustments thereafter and that she stopped taking MS IR having only a short supply and having to use Norco more frequently.  She is willing to initiate Keytruda today we have discussed that considering her genomics treatment versus her BRAF mutation is also an option.    1/4/2023 patient reviewed back today due for cycle 2 Keytruda.  Patient experiencing decreased appetite and energy following for cycle of Keytruda.  She has lost 7 pounds.  Per discussion with Dr. Escobar we will start the patient on prednisone 10 mg daily which is acceptable to continue with immunotherapy.  She also has noted thrush along the right side of her tongue where her tongue is rubbing against her teeth (chronic issue reportedly).  We will also prescribe Mycelex for this.  Patient has had some increasing constipation in relation to narcotics we discussed senna S tabs along with adding MiraLAX.  Plan repeat scans after 3 cycles.  We proceeded with second cycle Keytruda and low-dose prednisone which was quite helpful in the patient's performance status.  At this point her oral thrush has improved as has her performance status.  After discussion we proceeded with additional chemotherapy, started to taper pain medication and had undergo repeat CT chest abdomen pelvis.    These are reviewed with her 2/15/2023 have not been done 2/8/2023 with consolidation and  masslike pleural thickening in the left apex and upper lung index areas have decreased somewhat.  There is no evidence of metastatic disease otherwise and a few indeterminate left renal lesions are stable.  After lengthy discussion with the patient and her daughter as to the stability to mild improvement with immunotherapy it is agreed that we would continue treatment at this point.    Continuing with good tolerance to pembrolizumab.  Continuing on low-dose prednisone with good appetite and energy.  We made plans to continue current care and the patient proceeded with with Keytruda and low-dose prednisone as well as MS Contin dosing.  She is seen 3/29/2023 and after careful review we will continue her treatment continue current plan of care plan to restudy likely in mid to late May 2023.    Plan:    *Proceed with Keytruda today cycle 6    *Continue prednisone 10 mg daily.  Patient reluctant to taper this any further as she feels very good on this current dose.    *Continue MS Contin 15 mg every 12 hour.  Patient is using less Norco 5/325 overall but still occasionally taking 1-2 tabs a day.  This has been E scribed to her [facility] pharmacy    *Return in 3 weeks for NP assessment Keytruda    *Considering her excellent tolerance and improving performance status we will plan to restudy her approximately 3 months from her last which would be mid May 2023.    *Additional options for therapy include BRAF inhibitors, chemotherapy directed for histology lung adenocarcinoma    This patient is on high risk drug therapy requiring intensive monitoring for toxicity.

## 2023-03-29 ENCOUNTER — INFUSION (OUTPATIENT)
Dept: ONCOLOGY | Facility: HOSPITAL | Age: 83
End: 2023-03-29
Payer: MEDICARE

## 2023-03-29 ENCOUNTER — OFFICE VISIT (OUTPATIENT)
Dept: ONCOLOGY | Facility: CLINIC | Age: 83
End: 2023-03-29
Payer: MEDICARE

## 2023-03-29 VITALS
TEMPERATURE: 96.8 F | SYSTOLIC BLOOD PRESSURE: 143 MMHG | WEIGHT: 106.9 LBS | RESPIRATION RATE: 16 BRPM | HEIGHT: 63 IN | BODY MASS INDEX: 18.94 KG/M2 | DIASTOLIC BLOOD PRESSURE: 61 MMHG | HEART RATE: 76 BPM | OXYGEN SATURATION: 96 %

## 2023-03-29 DIAGNOSIS — Z79.899 LONG-TERM USE OF HIGH-RISK MEDICATION: ICD-10-CM

## 2023-03-29 DIAGNOSIS — Z79.899 LONG-TERM USE OF HIGH-RISK MEDICATION: Primary | ICD-10-CM

## 2023-03-29 DIAGNOSIS — C34.12 MALIGNANT NEOPLASM OF UPPER LOBE OF LEFT LUNG: ICD-10-CM

## 2023-03-29 DIAGNOSIS — Z85.118 HISTORY OF LUNG CANCER: Primary | ICD-10-CM

## 2023-03-29 DIAGNOSIS — Z45.2 FITTING AND ADJUSTMENT OF VASCULAR CATHETER: ICD-10-CM

## 2023-03-29 LAB
ALBUMIN SERPL-MCNC: 4 G/DL (ref 3.5–5.2)
ALBUMIN/GLOB SERPL: 1.7 G/DL (ref 1.1–2.4)
ALP SERPL-CCNC: 62 U/L (ref 38–116)
ALT SERPL W P-5'-P-CCNC: 16 U/L (ref 0–33)
ANION GAP SERPL CALCULATED.3IONS-SCNC: 12.4 MMOL/L (ref 5–15)
AST SERPL-CCNC: 21 U/L (ref 0–32)
BASOPHILS # BLD AUTO: 0.04 10*3/MM3 (ref 0–0.2)
BASOPHILS NFR BLD AUTO: 0.3 % (ref 0–1.5)
BILIRUB SERPL-MCNC: 0.4 MG/DL (ref 0.2–1.2)
BUN SERPL-MCNC: 17 MG/DL (ref 6–20)
BUN/CREAT SERPL: 27.9 (ref 7.3–30)
CALCIUM SPEC-SCNC: 9.8 MG/DL (ref 8.5–10.2)
CHLORIDE SERPL-SCNC: 99 MMOL/L (ref 98–107)
CO2 SERPL-SCNC: 29.6 MMOL/L (ref 22–29)
CREAT SERPL-MCNC: 0.61 MG/DL (ref 0.6–1.1)
DEPRECATED RDW RBC AUTO: 56.4 FL (ref 37–54)
EGFRCR SERPLBLD CKD-EPI 2021: 90.5 ML/MIN/1.73
EOSINOPHIL # BLD AUTO: 0 10*3/MM3 (ref 0–0.4)
EOSINOPHIL NFR BLD AUTO: 0 % (ref 0.3–6.2)
ERYTHROCYTE [DISTWIDTH] IN BLOOD BY AUTOMATED COUNT: 16.5 % (ref 12.3–15.4)
GLOBULIN UR ELPH-MCNC: 2.3 GM/DL (ref 1.8–3.5)
GLUCOSE SERPL-MCNC: 189 MG/DL (ref 74–124)
HCT VFR BLD AUTO: 41.2 % (ref 34–46.6)
HGB BLD-MCNC: 13.2 G/DL (ref 12–15.9)
IMM GRANULOCYTES # BLD AUTO: 0.05 10*3/MM3 (ref 0–0.05)
IMM GRANULOCYTES NFR BLD AUTO: 0.4 % (ref 0–0.5)
LYMPHOCYTES # BLD AUTO: 1.4 10*3/MM3 (ref 0.7–3.1)
LYMPHOCYTES NFR BLD AUTO: 11 % (ref 19.6–45.3)
MCH RBC QN AUTO: 29.6 PG (ref 26.6–33)
MCHC RBC AUTO-ENTMCNC: 32 G/DL (ref 31.5–35.7)
MCV RBC AUTO: 92.4 FL (ref 79–97)
MONOCYTES # BLD AUTO: 0.39 10*3/MM3 (ref 0.1–0.9)
MONOCYTES NFR BLD AUTO: 3.1 % (ref 5–12)
NEUTROPHILS NFR BLD AUTO: 10.82 10*3/MM3 (ref 1.7–7)
NEUTROPHILS NFR BLD AUTO: 85.2 % (ref 42.7–76)
NRBC BLD AUTO-RTO: 0 /100 WBC (ref 0–0.2)
PLATELET # BLD AUTO: 224 10*3/MM3 (ref 140–450)
PMV BLD AUTO: 9.6 FL (ref 6–12)
POTASSIUM SERPL-SCNC: 3.5 MMOL/L (ref 3.5–4.7)
PROT SERPL-MCNC: 6.3 G/DL (ref 6.3–8)
RBC # BLD AUTO: 4.46 10*6/MM3 (ref 3.77–5.28)
SODIUM SERPL-SCNC: 141 MMOL/L (ref 134–145)
WBC NRBC COR # BLD: 12.7 10*3/MM3 (ref 3.4–10.8)

## 2023-03-29 PROCEDURE — 96413 CHEMO IV INFUSION 1 HR: CPT

## 2023-03-29 PROCEDURE — 99214 OFFICE O/P EST MOD 30 MIN: CPT | Performed by: INTERNAL MEDICINE

## 2023-03-29 PROCEDURE — 3077F SYST BP >= 140 MM HG: CPT | Performed by: INTERNAL MEDICINE

## 2023-03-29 PROCEDURE — 80053 COMPREHEN METABOLIC PANEL: CPT

## 2023-03-29 PROCEDURE — 25010000002 HEPARIN LOCK FLUSH PER 10 UNITS: Performed by: INTERNAL MEDICINE

## 2023-03-29 PROCEDURE — 25010000002 PEMBROLIZUMAB 100 MG/4ML SOLUTION 4 ML VIAL: Performed by: INTERNAL MEDICINE

## 2023-03-29 PROCEDURE — 1125F AMNT PAIN NOTED PAIN PRSNT: CPT | Performed by: INTERNAL MEDICINE

## 2023-03-29 PROCEDURE — 3078F DIAST BP <80 MM HG: CPT | Performed by: INTERNAL MEDICINE

## 2023-03-29 PROCEDURE — 85025 COMPLETE CBC W/AUTO DIFF WBC: CPT

## 2023-03-29 RX ORDER — HEPARIN SODIUM (PORCINE) LOCK FLUSH IV SOLN 100 UNIT/ML 100 UNIT/ML
500 SOLUTION INTRAVENOUS AS NEEDED
OUTPATIENT
Start: 2023-03-29

## 2023-03-29 RX ORDER — HEPARIN SODIUM (PORCINE) LOCK FLUSH IV SOLN 100 UNIT/ML 100 UNIT/ML
500 SOLUTION INTRAVENOUS AS NEEDED
Status: DISCONTINUED | OUTPATIENT
Start: 2023-03-29 | End: 2023-03-29 | Stop reason: HOSPADM

## 2023-03-29 RX ORDER — SODIUM CHLORIDE 0.9 % (FLUSH) 0.9 %
10 SYRINGE (ML) INJECTION AS NEEDED
Status: DISCONTINUED | OUTPATIENT
Start: 2023-03-29 | End: 2023-03-29 | Stop reason: HOSPADM

## 2023-03-29 RX ORDER — MORPHINE SULFATE 15 MG/1
15 TABLET, FILM COATED, EXTENDED RELEASE ORAL 2 TIMES DAILY
Qty: 60 TABLET | Refills: 0 | Status: SHIPPED | OUTPATIENT
Start: 2023-03-29

## 2023-03-29 RX ORDER — SODIUM CHLORIDE 9 MG/ML
250 INJECTION, SOLUTION INTRAVENOUS ONCE
Status: CANCELLED | OUTPATIENT
Start: 2023-03-29

## 2023-03-29 RX ORDER — SODIUM CHLORIDE 9 MG/ML
250 INJECTION, SOLUTION INTRAVENOUS ONCE
Status: COMPLETED | OUTPATIENT
Start: 2023-03-29 | End: 2023-03-29

## 2023-03-29 RX ORDER — SODIUM CHLORIDE 0.9 % (FLUSH) 0.9 %
10 SYRINGE (ML) INJECTION AS NEEDED
OUTPATIENT
Start: 2023-03-29

## 2023-03-29 RX ADMIN — SODIUM CHLORIDE 250 ML: 9 INJECTION, SOLUTION INTRAVENOUS at 11:38

## 2023-03-29 RX ADMIN — SODIUM CHLORIDE 200 MG: 9 INJECTION, SOLUTION INTRAVENOUS at 11:39

## 2023-03-29 RX ADMIN — Medication 500 UNITS: at 12:10

## 2023-03-29 RX ADMIN — Medication 10 ML: at 12:10

## 2023-03-30 ENCOUNTER — DOCUMENTATION (OUTPATIENT)
Dept: ONCOLOGY | Facility: CLINIC | Age: 83
End: 2023-03-30
Payer: MEDICARE

## 2023-03-30 NOTE — PROGRESS NOTES
Received call from [facility] pharmacy. Pt's morphine was called to mail order and cannot be cancelled because it is in route to pt. [facility] received a script yesterday. Authorized a partial fill of Morphine until her mail order can be delivered (3 day supply). Pharmacist v/u.

## 2023-04-05 RX ORDER — PREDNISONE 10 MG/1
10 TABLET ORAL DAILY
Qty: 30 TABLET | Refills: 2 | Status: SHIPPED | OUTPATIENT
Start: 2023-04-05

## 2023-04-17 RX ORDER — HYDROCHLOROTHIAZIDE 25 MG/1
TABLET ORAL
Qty: 90 TABLET | Refills: 1 | Status: SHIPPED | OUTPATIENT
Start: 2023-04-17

## 2023-04-17 RX ORDER — METOPROLOL SUCCINATE 25 MG/1
TABLET, EXTENDED RELEASE ORAL
Qty: 90 TABLET | Refills: 1 | Status: SHIPPED | OUTPATIENT
Start: 2023-04-17

## 2023-04-17 NOTE — TELEPHONE ENCOUNTER
Spoke with Pt annalise she is still taking the HCTZ and does know why the cancer dr would take her off of it she has been taking it daily and now needs a refill

## 2023-04-19 ENCOUNTER — INFUSION (OUTPATIENT)
Dept: ONCOLOGY | Facility: HOSPITAL | Age: 83
End: 2023-04-19
Payer: MEDICARE

## 2023-04-19 ENCOUNTER — OFFICE VISIT (OUTPATIENT)
Dept: ONCOLOGY | Facility: CLINIC | Age: 83
End: 2023-04-19
Payer: MEDICARE

## 2023-04-19 VITALS
OXYGEN SATURATION: 95 % | DIASTOLIC BLOOD PRESSURE: 81 MMHG | BODY MASS INDEX: 18.84 KG/M2 | SYSTOLIC BLOOD PRESSURE: 142 MMHG | RESPIRATION RATE: 16 BRPM | HEIGHT: 63 IN | WEIGHT: 106.3 LBS | HEART RATE: 79 BPM | TEMPERATURE: 97.7 F

## 2023-04-19 DIAGNOSIS — C34.11 MALIGNANT NEOPLASM OF UPPER LOBE OF RIGHT LUNG: ICD-10-CM

## 2023-04-19 DIAGNOSIS — Z79.899 LONG-TERM USE OF HIGH-RISK MEDICATION: Primary | ICD-10-CM

## 2023-04-19 DIAGNOSIS — C34.12 MALIGNANT NEOPLASM OF UPPER LOBE OF LEFT LUNG: ICD-10-CM

## 2023-04-19 DIAGNOSIS — Z79.899 HIGH RISK MEDICATION USE: ICD-10-CM

## 2023-04-19 DIAGNOSIS — C34.12 MALIGNANT NEOPLASM OF UPPER LOBE OF LEFT LUNG: Primary | ICD-10-CM

## 2023-04-19 DIAGNOSIS — Z79.899 LONG-TERM USE OF HIGH-RISK MEDICATION: ICD-10-CM

## 2023-04-19 LAB
ALBUMIN SERPL-MCNC: 3.9 G/DL (ref 3.5–5.2)
ALBUMIN/GLOB SERPL: 1.6 G/DL (ref 1.1–2.4)
ALP SERPL-CCNC: 70 U/L (ref 38–116)
ALT SERPL W P-5'-P-CCNC: 14 U/L (ref 0–33)
ANION GAP SERPL CALCULATED.3IONS-SCNC: 11.5 MMOL/L (ref 5–15)
AST SERPL-CCNC: 20 U/L (ref 0–32)
BASOPHILS # BLD AUTO: 0.04 10*3/MM3 (ref 0–0.2)
BASOPHILS NFR BLD AUTO: 0.4 % (ref 0–1.5)
BILIRUB SERPL-MCNC: 0.6 MG/DL (ref 0.2–1.2)
BUN SERPL-MCNC: 19 MG/DL (ref 6–20)
BUN/CREAT SERPL: 28.8 (ref 7.3–30)
CALCIUM SPEC-SCNC: 9.8 MG/DL (ref 8.5–10.2)
CHLORIDE SERPL-SCNC: 98 MMOL/L (ref 98–107)
CO2 SERPL-SCNC: 31.5 MMOL/L (ref 22–29)
CREAT SERPL-MCNC: 0.66 MG/DL (ref 0.6–1.1)
DEPRECATED RDW RBC AUTO: 53.3 FL (ref 37–54)
EGFRCR SERPLBLD CKD-EPI 2021: 88.8 ML/MIN/1.73
EOSINOPHIL # BLD AUTO: 0 10*3/MM3 (ref 0–0.4)
EOSINOPHIL NFR BLD AUTO: 0 % (ref 0.3–6.2)
ERYTHROCYTE [DISTWIDTH] IN BLOOD BY AUTOMATED COUNT: 15.3 % (ref 12.3–15.4)
GLOBULIN UR ELPH-MCNC: 2.4 GM/DL (ref 1.8–3.5)
GLUCOSE SERPL-MCNC: 134 MG/DL (ref 74–124)
HCT VFR BLD AUTO: 41.4 % (ref 34–46.6)
HGB BLD-MCNC: 13.1 G/DL (ref 12–15.9)
IMM GRANULOCYTES # BLD AUTO: 0.03 10*3/MM3 (ref 0–0.05)
IMM GRANULOCYTES NFR BLD AUTO: 0.3 % (ref 0–0.5)
LYMPHOCYTES # BLD AUTO: 1.42 10*3/MM3 (ref 0.7–3.1)
LYMPHOCYTES NFR BLD AUTO: 13.3 % (ref 19.6–45.3)
MCH RBC QN AUTO: 29.9 PG (ref 26.6–33)
MCHC RBC AUTO-ENTMCNC: 31.6 G/DL (ref 31.5–35.7)
MCV RBC AUTO: 94.5 FL (ref 79–97)
MONOCYTES # BLD AUTO: 0.5 10*3/MM3 (ref 0.1–0.9)
MONOCYTES NFR BLD AUTO: 4.7 % (ref 5–12)
NEUTROPHILS NFR BLD AUTO: 8.71 10*3/MM3 (ref 1.7–7)
NEUTROPHILS NFR BLD AUTO: 81.3 % (ref 42.7–76)
NRBC BLD AUTO-RTO: 0 /100 WBC (ref 0–0.2)
PLATELET # BLD AUTO: 213 10*3/MM3 (ref 140–450)
PMV BLD AUTO: 9.6 FL (ref 6–12)
POTASSIUM SERPL-SCNC: 3.4 MMOL/L (ref 3.5–4.7)
PROT SERPL-MCNC: 6.3 G/DL (ref 6.3–8)
RBC # BLD AUTO: 4.38 10*6/MM3 (ref 3.77–5.28)
SODIUM SERPL-SCNC: 141 MMOL/L (ref 134–145)
T4 FREE SERPL-MCNC: 1.19 NG/DL (ref 0.93–1.7)
TSH SERPL DL<=0.05 MIU/L-ACNC: 2.7 UIU/ML (ref 0.27–4.2)
WBC NRBC COR # BLD: 10.7 10*3/MM3 (ref 3.4–10.8)

## 2023-04-19 PROCEDURE — 84439 ASSAY OF FREE THYROXINE: CPT | Performed by: INTERNAL MEDICINE

## 2023-04-19 PROCEDURE — 80053 COMPREHEN METABOLIC PANEL: CPT

## 2023-04-19 PROCEDURE — 85025 COMPLETE CBC W/AUTO DIFF WBC: CPT

## 2023-04-19 PROCEDURE — 96413 CHEMO IV INFUSION 1 HR: CPT

## 2023-04-19 PROCEDURE — 25010000002 PEMBROLIZUMAB 100 MG/4ML SOLUTION 4 ML VIAL: Performed by: NURSE PRACTITIONER

## 2023-04-19 PROCEDURE — 84443 ASSAY THYROID STIM HORMONE: CPT | Performed by: INTERNAL MEDICINE

## 2023-04-19 RX ORDER — SODIUM CHLORIDE 9 MG/ML
250 INJECTION, SOLUTION INTRAVENOUS ONCE
Status: CANCELLED | OUTPATIENT
Start: 2023-04-19

## 2023-04-19 RX ORDER — SODIUM CHLORIDE 9 MG/ML
250 INJECTION, SOLUTION INTRAVENOUS ONCE
Status: COMPLETED | OUTPATIENT
Start: 2023-04-19 | End: 2023-04-19

## 2023-04-19 RX ADMIN — SODIUM CHLORIDE 200 MG: 9 INJECTION, SOLUTION INTRAVENOUS at 11:35

## 2023-04-19 RX ADMIN — SODIUM CHLORIDE 250 ML: 9 INJECTION, SOLUTION INTRAVENOUS at 11:35

## 2023-04-19 NOTE — PROGRESS NOTES
REASON FOR FOLLOW-UP: Recurrent lung cancer.    History of Present Illness    The patient is a 82 y.o. female with the above-mentioned history, who returns the office today in anticipation of pembrolizumab which she continues to receive every 3 weeks.  She is due today for her seventh cycle.  Thankfully, she continues to have excellent tolerance to ongoing immunotherapy.  She denies any new or worsening shortness of breath.  She has no chest pain.  Her oxygen needs are unchanged.  She has no rash.  Her bowels are moving normally.  She is eating and drinking without difficulty.  She is overall feeling very well.  Her energy is adequate and she can accomplish activities that she desires.    ONCOLOGY HISTORY:      The patient is an 82-year-old female with the below medical history including chronic obstructive pulmonary disease who was seen in the [facility] multidisciplinary thoracic oncology clinic July 1, 2021.  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      She had undergone a CT of the chest at [facility] 6/16/2021 showing postsurgical changes in the left chest from right upper lobectomy, 8 mm irregular nodule medial segment of the right lower lobe and diffuse changes of emphysema with fibrotic changes in the periphery, no suspicious hilar or mediastinal nodes, no pleural effusion.  New finding was suspicious for new malignancy with the patient felt to be a poor candidate for surgical resection.  A PET CT and PFTs were requested thereafter.  The PET/CT demonstrated ill-defined FDG avid soft tissue thickening left aspect mediastinum within the operative bed, 1 cm hypermetabolic pulmonary nodule right lower lobe and asymmetric thickening patchy uptake involving the right base of tongue.  There is  subtle uptake within the L1 vertebral body which is indeterminate and a sub-6 mm pulmonary nodule of right lung and subcentimeter hypodense hepatic lesion which was below PET resolution.       The patient's case was discussed in thoracic conference 7/22/2021 with consideration of the patient undergoing L1 vertebral body MRI, confirmatory biopsy left mediastinal and assessment by ENT (Dr. Caballero) who had worked with the patient previously.  She, incidentally, indicates that radiation therapy was given apparently intraoperatively at her recent surgery?  She still has issues with difficulty chewing and swallowing post procedures and was to be followed up with Dr. Caballero in the near future as planned.                                                            She was seen in the thoracic clinic on the same day with plans to proceed with MRI of the lumbar spine, biopsy left mediastinal nodular area of potential disease and follow-up of her ENT assessment as well as undergo a guardant 360 assessment in our office.  She underwent the biopsy 8/13/2021 found to be consistent with invasive moderately differentiated adenocarcinoma with PD-L1 TPS score 40%.  MRI of the lumbar spine revealed no evidence of metastatic disease though the patient did have multilevel degenerative disease throughout the lumbar spine.  She had an office follow-up with Dr. 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  She underwent laryngoscopy 8/18/2021 with right base of tongue without evidence of lesions or masses in the region.     She was seen by Dr. Hernandez 8/19/2021 and, her findings, had been presented in lung conference.  Her findings were consistent with 2 separate lesions including a nodule in the superior segment of the right lower lobe and a mass in the upper portion of the left chest with the left chest lesion biopsy positive  for adenocarcinoma.  The consensus was that these were to separate-synchronous primaries.  Stereotactic radiation each lesions were felt to be the appropriate way to proceed and the patient was referred to radiation therapy.     The patient is seen in office 8/23/2021 agreeable to the radiation therapy as well as additional assessments including Caris molecular assessment of her biopsy.  Again her guardant 360 is currently pending and we would follow her after she completes radiation therapy with subsequent scans.      She was able to proceed with SBRT to the right lung at primary #1 with 5 treatments at 1000 cGy per fraction in the left lung primary similarly at 1000 cGy per fraction x5.  Her treatment ranged between 8/31-9/13/2021.  She is now scheduled for follow-up 11/23/2021.    The patient subsequent genomic testing is discussed with her as she is is seen back 9/23/2021.  Her guardant 360 did not determine any new abnormalities but her Caris-MI profile-does reveal sensitivity to immunotherapy as well as a BRAF mutation.  This could be extremely important as we follow the patient from this point.  Fortunately she is feeling extremely well and quite pleased about her treatments.    Patient had subsequent PET/CT 11/23/2021 demonstrates decrease in size and avidity of the previously noted intensely FDG avid nodules left lobectomy operative site and right lower lobe.  Surrounding groundglass and pulmonary opacification is consistent with postradiation changes, a few new subcentimeter pulmonary nodules are present in the right upper lobe and indeterminant, stable subcentimeter hepatic lesion is below PET resolution no other findings of FDG-avid disease are seen in neck abdomen or pelvis.  The patient seen in office 12/1/2021 with a relatively good performance status stating she is not having any new symptoms and very pleased about her results on her PET/CT.  She realizes we'll have to follow this further but  subsequent scans in 6 months.  She is also hoping to see an oral surgeon that previously helped her-Dr. Wu.    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  There is evolution of presumed postradiation changes in the right midlung with soft tissue mass within the left lumpectomy operative bed minimally decreased in size.  There is a sub-6 mm nodule in the right upper lobe not definitively seen, indeterminate and an indeterminate left renal lesion at 1.5 cm unchanged from 7/13/2021.  The patient is currently scheduled to see Dr. Dowell on 6/23/2022.  She is feeling well without any additional symptoms.    The patient required admission 10/14 - 10/18/2022 presenting with shortness of breath and cough that was nonproductive.  She had been on oral antibiotics and did not improved and was admitted for therapy for apparent pneumonia.  This eventually led to bronchoscopy after initial CT revealed postsurgical changes, new masslike 6.7 cm area of consolidation anterior left lung raising concern for potential malignancy and a follow-up PET/CT planned.  Bronchoscopy and biopsy left lower lung failed to show evidence of malignancy.  The patient's PET/CT, however, demonstrated an irregular pleural-based soft tissue density thickening in the left upper lobe that was intensely FDG avid new from 6/8/2022 thought to be a malignant recurrence with spread into the left lung parenchyma.  There is intensely pleural-based avidity within the left lower lobe thought to be metastatic disease with postradiation of the left apex as well.  There is a small focus of uptake in the right hilum grossly unchanged in size and post radiation changes in the right lower lobe.  There is indeterminate density left renal that was grossly unchanged.  The patient is seen back in office  11/15/2022 indicating that she still has chest discomfort that is slowly worsening and that she has a chronic paroxysmal cough but has not improved.  We discussed that she very likely has recurrent disease and plan to proceed with a guardant 360 examination initially as we determine whether we should try to proceed with therapy particularly immunotherapy versus targeted therapy recognizing the above findings.    Patient's guardant 360 returned as again significant for BRAF V600E and the potential use of BRAF inhibitor/MET inhibitor dabrafenib and trametinib.  Additional information PIK3CA and use of alpelisib.    Unfortunately she required admission 11/28-12/1 with worsening back pain.  Fortunately she did not demonstrate evidence of metastatic disease but did have moderate degenerative changes which could cause radiculopathy.  Medications were altered to include subsequently MSR 15 mg p.o. every 12 hours, Norco as needed.    The patient now presents back 12/14/2022 to initiate therapy- initially with immunotherapy considering Caris study with PD-L1 TPS of 70% on 22 C3 and 28-8 assays.    Patient seen with her  and we discussed pain medications and adjustments thereafter and that she stopped taking MSIR having only a short supply and having to use Norco more frequently.  She is willing to initiate Keytruda today we have discussed that considering her genomics treatment versus her BRAF mutation is also an option.    C1 Keytruda 12/14/2022    Patient is seen back 1/4/2023 in follow-up due for cycle 2 Keytruda.  Patient states that since receiving her first cycle she has had decreased appetite and decreased energy.  She has not been able to bring herself to eat much and she has notably lost 7 pounds.  She has also been struggling with constipation in relation to ongoing narcotics for pain control.  She has been taking senna S2 tabs twice a day.  We discussed adding daily MiraLAX.    Patient did just last week  meet with dietitian, Zoila Clinton, to discuss ways to improve caloric intake.  She has not been able to implement any of these things yet though.    The patient is next seen 1/25/2023 with mild weight gain.  She is seen with family member both indicating that she has actually felt somewhat better in terms of performance status and general function.  We have discussed proceeding with a third cycle treatment and reevaluating by scan in 2 weeks.    The patient proceeded with treatment 1/25/2023 and underwent follow-up CT chest abdomen pelvis 2/8/2023 demonstrating small pericardial effusion that is decreased in size from 11/20/2022, ill-defined consolidation and pleural-based thickening in the left apex and upper lung has reduced slightly, additional index nodule soft tissue in the aspect the pericardium has not changed substantially, no evidence of metastatic disease in the abdomen pelvis.    The patient is seen with her daughter 2/15/2023 both indicating that she has definitely improved, stable weight, appetite and performance status.  She is also using her pain medication much less frequently and wonders whether she might begin to taper from her twice a day MS Contin.    Patient is seen back 3/8/2023 due for ongoing pembrolizumab.  She continues on low-dose prednisone 10 mg daily and has noted significant improvement in her energy and appetite with this.  She is reluctant to taper this any further we discussed that it is fine for her to stay on daily dosing.    She did try to taper her pain medication going down to just MS Contin 15 mg every 12 hours while holding her hydrocodone.  However over the last few days she has had some intermittent pain in the left upper back alleviated with hydrocodone 2-3 times a day.  She currently is denying any pain.  Monitor.    She is next evaluated 3/29/2023 for ongoing Keytruda.  Evidently her pain medication was sent to the wrong pharmacy and will need to be represcribed  today-long-acting MS Contin.  Otherwise she is doing reasonably well at present.    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 [facility]   • History of squamous cell carcinoma     Tongue   • Hyperlipidemia    • Hypertension     since early 60s   • Low vitamin B12 level    • Lung cancer    • Thyromegaly    • UTI (urinary tract infection)    • Vitamin D deficiency         Past Surgical History:   Procedure Laterality Date   • BRONCHOSCOPY Bilateral 10/17/2022    Procedure: BRONCHOSCOPY WITH FLUORO with biopsy and BAL;  Surgeon: India Flores MD;  Location: [facility] ENDOSCOPY;  Service: Pulmonary;  Laterality: Bilateral;  PRE/POST - lung mass   • CHOLECYSTECTOMY  1999   • COLONOSCOPY     • EMBOLIZATION CEREBRAL Left 6/29/2022    Procedure: 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;  Surgeon: Bradley Dowell MD;  Location: [facility] HYBRID OR 18/19;  Service: Interventional Radiology;  Laterality: Left;   • EYE SURGERY  11/24/2020    Took a muscle out of right eye   • GLOSSECTOMY PARTIAL      less than one half tongue   • LUNG SURGERY  2012   • ORAL LESION EXCISION/BIOPSY      June and August 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   • TUBAL ABDOMINAL LIGATION  1970   • VENOUS ACCESS DEVICE (PORT) INSERTION N/A 12/12/2022    Procedure: MEDIPORT PLACEMENT;  Surgeon: Jason Villaseñor MD;  Location: [facility] MAIN OR;  Service: Vascular;  Laterality: N/A;        Current Outpatient Medications on File Prior to Visit   Medication Sig Dispense Refill   • albuterol sulfate  (90 Base) MCG/ACT inhaler Inhale 2 puffs Every  4 (Four) Hours As Needed for Wheezing. 18 g 2   • amLODIPine (NORVASC) 5 MG tablet TAKE 1 TABLET BY MOUTH DAILY 90 tablet 0   • atorvastatin (LIPITOR) 20 MG tablet Take 1 tablet by mouth Every Night. 90 tablet 3   • cetirizine (ZyrTEC) 10 MG tablet Take 1 tablet by mouth Daily.     • chlorhexidine (PERIDEX) 0.12 % solution Apply 15 mL to the mouth or throat 2 (Two) Times a Day.     • cholecalciferol (VITAMIN D3) 1000 units tablet Take 1 tablet by mouth Daily. HOLDING FOR DOS     • Cyanocobalamin (VITAMIN B12 PO) Take  by mouth.     • Diclofenac Sodium (VOLTAREN) 1 % gel gel Apply 4 g topically to the appropriate area as directed 4 (Four) Times a Day As Needed.     • fluticasone (FLONASE) 50 MCG/ACT nasal spray 2 sprays into the nostril(s) as directed by provider As Needed.     • hydroCHLOROthiazide (HYDRODIURIL) 25 MG tablet TAKE 1 TABLET EVERY DAY 90 tablet 1   • HYDROcodone-acetaminophen (NORCO) 5-325 MG per tablet Take 1 tablet by mouth Every 4 (Four) Hours As Needed for Moderate Pain. 100 tablet 0   • Ibuprofen (ADVIL PO) Take 400 mg by mouth Daily As Needed. HOLD PER MD INSTR     • lidocaine-prilocaine (EMLA) 2.5-2.5 % cream Apply nickel size amount to port site 30 min before appt time do not rub in cover with plastic wrap (Patient taking differently: 1 application As Needed. Apply nickel size amount to port site 30 min before appt time do not rub in cover with plastic wrap) 30 g 1   • metoprolol succinate XL (TOPROL-XL) 25 MG 24 hr tablet TAKE 1 TABLET EVERY DAY 90 tablet 1   • montelukast (SINGULAIR) 10 MG tablet Take 1 tablet by mouth Every Night. 90 tablet 3   • Morphine (MS CONTIN) 15 MG 12 hr tablet Take 1 tablet by mouth 2 (Two) Times a Day. 60 tablet 0   • omeprazole (priLOSEC) 40 MG capsule Take 1 capsule by mouth Daily.     • ondansetron (ZOFRAN) 8 MG tablet Take 1 tablet by mouth 3 (Three) Times a Day As Needed for Nausea or Vomiting. 30 tablet 5   • predniSONE (DELTASONE) 10 MG tablet TAKE 1 TABLET  "BY MOUTH DAILY 30 tablet 2   • Umeclidinium-Vilanterol (Anoro Ellipta) 62.5-25 MCG/ACT aerosol powder  inhaler Inhale 1 puff Daily As Needed.       No current facility-administered medications on file prior to visit.        ALLERGIES:    Allergies   Allergen Reactions   • Sulfa Antibiotics Rash        Social History     Socioeconomic History   • Marital status:    Tobacco Use   • Smoking status: Former     Types: Cigarettes     Quit date: 2015     Years since quittin.2   • Smokeless tobacco: Never   • Tobacco comments:     less than a pack per day, no smoking since , Quit 2015   Vaping Use   • Vaping Use: Never used   Substance and Sexual Activity   • Alcohol use: Yes     Comment: Socially -A few times a month   • Drug use: No   • Sexual activity: Defer     Family History   Problem Relation Age of Onset   • Ovarian cancer Mother          at age 27   • No Known Problems Father    • Ovarian cancer Sister    • Colon cancer Brother    • No Known Problems Other    • Malig Hyperthermia Neg Hx         Review of Systems   ROS as per HPI     Objective     Vitals:    23 1037   BP: 142/81   Pulse: 79   Resp: 16   Temp: 97.7 °F (36.5 °C)   TempSrc: Temporal   SpO2: 95%   Weight: 48.2 kg (106 lb 4.8 oz)   Height: 160 cm (62.99\")   PainSc: 0-No pain         2023    10:33 AM   Current Status   ECOG score 0        Physical Exam  Constitutional:       Appearance: Normal appearance. She is normal weight.   HENT:      Head: Normocephalic and atraumatic.      Nose: Nose normal.      Mouth/Throat:      Mouth: Mucous membranes are moist. No oral lesions.      Pharynx: Oropharynx is clear.   Eyes:      Extraocular Movements: Extraocular movements intact.   Cardiovascular:      Rate and Rhythm: Normal rate and regular rhythm.      Pulses: Normal pulses.      Heart sounds: Normal heart sounds.   Pulmonary:      Effort: Pulmonary effort is normal.      Breath sounds: Normal breath sounds.      Comments: " Distant breath sounds bilateral bases  Abdominal:      General: Abdomen is flat. Bowel sounds are normal.      Palpations: Abdomen is soft.   Musculoskeletal:         General: Normal range of motion.      Cervical back: Normal range of motion and neck supple.   Skin:     General: Skin is warm and dry.   Neurological:      General: No focal deficit present.      Mental Status: She is alert and oriented to person, place, and time.   Psychiatric:         Mood and Affect: Mood normal.         Behavior: Behavior normal.         I have reexamined the patient and the results are consistent with the previously documented exam. DEJAN Turner      RECENT LABS:  Results from last 7 days   Lab Units 04/19/23  1004   WBC 10*3/mm3 10.70   NEUTROS ABS 10*3/mm3 8.71*   HEMOGLOBIN g/dL 13.1   HEMATOCRIT % 41.4   PLATELETS 10*3/mm3 213     Results from last 7 days   Lab Units 04/19/23  1004   SODIUM mmol/L 141   POTASSIUM mmol/L 3.4*   CHLORIDE mmol/L 98   CO2 mmol/L 31.5*   BUN mg/dL 19   CREATININE mg/dL 0.66   CALCIUM mg/dL 9.8   ALBUMIN g/dL 3.9   BILIRUBIN mg/dL 0.6   ALK PHOS U/L 70   ALT (SGPT) U/L 14   AST (SGOT) U/L 20   GLUCOSE mg/dL 134*               Assessment & Plan     1.  Carcinoma of the lung  · May 2015, status post previous left upper lobectomy for carcinoma of the lung.    2.  Carcinoma of the tongue  · history of a [id number] SCCa of the rightt retromolar trigone   · 2016 s/p WLE, buccal fat pad flap and SLN biopsy that was negative, margins were negative.   · She underwent laryngoscopy 8/18/2021 with right base of tongue without evidence of lesions or masses in the region.    3.  Moderately differentiated adenocarcinoma of the lung.  · CT of the chest 6/16/2021 demonstrated postsurgical changes, 8 mm irregular nodule medial segment of right lower lobe and diffuse changes of emphysema.    · She is seen in thoracic clinic with findings suspicious for new malignancy and concern of the patient being a  poor operative candidate.    · 7/13/2021 PET CT demonstrated ill-defined avidity and soft tissue left aspect of the mediastinum, 1 cm hypermetabolic pulmonary nodule and asymmetric thickening involving the right base of tongue as well as subtle uptake in the L1 vertebral body.    · This patient's case was discussed in thoracic clinic and plans were made to request an MRI of the lumbar spine, obtain a biopsy of the mediastinal involvement of the left had the patient reviewed by ENT.  · Biopsy 8/13/2021 found to be consistent with invasive moderately differentiated adenocarcinoma with PD-L1 TPS score 40%.    · 7/24/2021 MRI of the lumbar spine revealed no evidence of metastatic disease though the patient did have multilevel degenerative disease throughout the lumbar spine.    · Her findings were consistent with 2 separate lesions including a nodule in the superior segment of the right lower lobe and a mass in the upper portion of the left chest with the left chest lesion biopsy positive for adenocarcinoma.  The consensus was that these were to separate-synchronous primaries.  Stereotactic radiation each lesions were felt to be the appropriate way to proceed and the patient was referred to radiation therapy.  · The patient was referred for radiation therapy and she was able to proceed with SBRT to the right lung at primary #1 with 5 treatments at 1000 cGy per fraction in the left lung primary similarly at 1000 cGy per fraction x5.  Her treatment ranged between 8/31-9/13/2021.    · Her guardant 360 did not determine any new abnormalities but her Caris-MI profile-does reveal sensitivity to immunotherapy as well as a BRAF mutation.  · PET/CT 11/23/2021 demonstrates decrease in size and avidity of the previously noted intensely FDG avid nodules left lobectomy operative site and right lower lobe.  Surrounding groundglass and pulmonary opacification is consistent with postradiation changes, a few new subcentimeter pulmonary  nodules are present in the right upper lobe and indeterminant, stable subcentimeter hepatic lesion is below PET resolution no other findings of FDG-avid disease are seen in neck abdomen or pelvis.  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  There is evolution of presumed postradiation changes in the right midlung with soft tissue mass within the left lumpectomy operative bed minimally decreased in size.  There is a sub-6 mm nodule in the right upper lobe not definitively seen, indeterminate and an indeterminate left renal lesion at 1.5 cm unchanged from 7/13/2021.  · Admission 10/14 - 10/18/2022 presenting with shortness of breath and cough that was nonproductive.  She had been on oral antibiotics and did not improved and was admitted for therapy for apparent pneumonia.  This eventually led to bronchoscopy after initial CT revealed postsurgical changes, new masslike 6.7 cm area of consolidation anterior left lung raising concern for potential malignancy and a follow-up PET/CT planned.   · Bronchoscopy and biopsy left lower lung failed to show evidence of malignancy.    · 11/9/2022 PET/CT, demonstrated an irregular pleural-based soft tissue density thickening in the left upper lobe that was intensely FDG avid new from 6/8/2022 thought to be a malignant recurrence with spread into the left lung parenchyma.  There is intensely pleural-based avidity within the left lower lobe thought to be metastatic disease with postradiation of the left apex as well.  There is a small focus of uptake in the right hilum grossly unchanged in size and post radiation changes in the right lower lobe.  There is indeterminate density left renal that was grossly unchanged.    · Patient's guardant 360 returned as again significant for BRAF V600E and the potential use of BRAF inhibitor/MET inhibitor dabrafenib and trametinib.   Additional information PIK3CA and use of alpelisib.  · The patient now presents back 12/14/2022 to initiate therapy- initially with immunotherapy considering Caris study with PD-L1 TPS of 70% on 22 C3 and 28-8 assays.  · Single agent Keytruda initiated 12/14/2022  · 2/8/2023 CT of the chest, abdomen pelviswith consolidation and masslike pleural thickening in the left apex and upper lung index areas have decreased somewhat.  There is no evidence of metastatic disease otherwise and a few indeterminate left renal lesions are stable.  After lengthy discussion with the patient and her daughter as to the stability to mild improvement with immunotherapy it is agreed that we would continue treatment at this point.  · Proceed today, 4/19/2023 with cycle 7 pembrolizumab which is continued every 3 weeks    4.  Worsening back pain  · Admission 11/28/2022 through 12/1/2022.  · MRI of the cervical and thoracic spine fortunately failed to demonstrate metastatic disease.  Moderate degenerative changes noted which could cause radiculopathy.  · Medication altered to include MS Contin 15 mg every 12 hours and Norco for breakthrough pain  · She reports today her pain is adequately controlled    5.  Poor appetite and malnutrition  · Placed on prednisone 10 mg daily 1/4/2023 with significant improvement in her symptoms  · Weight is stable today at 106 pound      PLAN:  1. Proceed today with cycle 7 pembrolizumab which is continued every 3 weeks  2. Continue prednisone 10 mg daily  3. Continue MS Contin 15 mg every 12 hours.  4. Continue Norco 5/325 1 to 2 tablets daily as needed for breakthrough pain  5. Return in 3 weeks for CBC, CMP, cycle 8 pembrolizumab  6. In 5 weeks, the patient will undergo CT of the chest, abdomen and pelvis to evaluate her disease state  7. MD follow-up in 6 weeks with CBC, CMP, CT scan review and possible cycle 8 pembrolizumab pending CT scan results  8. Additional treatment options include BRAF inhibitor,  chemotherapy directed for histology lung adenocarcinoma    This patient is on high risk drug therapy requiring intensive monitoring for toxicity.      Dianna Boyer, APRN  04/19/2023

## 2023-04-24 DIAGNOSIS — M54.9 INTRACTABLE BACK PAIN: Primary | ICD-10-CM

## 2023-04-24 DIAGNOSIS — Z85.118 HISTORY OF LUNG CANCER: ICD-10-CM

## 2023-04-24 NOTE — TELEPHONE ENCOUNTER
Caller: Pfeiffer Darlene S    Relationship: Self    Best call back number: [phone]    Requested Prescriptions:   Requested Prescriptions     Pending Prescriptions Disp Refills   • HYDROcodone-acetaminophen (NORCO) 5-325 MG per tablet 100 tablet 0     Sig: Take 1 tablet by mouth Every 4 (Four) Hours As Needed for Moderate Pain.        Pharm[facility] where request should be sent: [id number] - [place] [id number] - [phone]  - [phone] FX     Last office visit with prescribing clinician: 3/29/2023   Last telemedicine visit with prescribing clinician: 5/31/2023   Next office visit with prescribing clinician: 5/31/2023         Does the patient have less than a 3 day supply:  [x] Yes  [] No    Charlene Harris Rep   04/24/23 15:50 EDT

## 2023-04-25 RX ORDER — HYDROCODONE BITARTRATE AND ACETAMINOPHEN 5; 325 MG/1; MG/1
1 TABLET ORAL EVERY 4 HOURS PRN
Qty: 100 TABLET | Refills: 0 | Status: SHIPPED | OUTPATIENT
Start: 2023-04-25

## 2023-05-01 DIAGNOSIS — C34.12 MALIGNANT NEOPLASM OF UPPER LOBE OF LEFT LUNG: ICD-10-CM

## 2023-05-01 DIAGNOSIS — Z85.118 HISTORY OF LUNG CANCER: ICD-10-CM

## 2023-05-01 RX ORDER — MORPHINE SULFATE 15 MG/1
15 TABLET, FILM COATED, EXTENDED RELEASE ORAL 2 TIMES DAILY
Qty: 60 TABLET | Refills: 0 | Status: SHIPPED | OUTPATIENT
Start: 2023-05-01

## 2023-05-01 NOTE — TELEPHONE ENCOUNTER
Caller: Darlene Pfeiffer    Relationship: Self    Best call back number: [phone]    Requested Prescriptions:   Requested Prescriptions     Pending Prescriptions Disp Refills   • Morphine (MS CONTIN) 15 MG 12 hr tablet 60 tablet 0     Sig: Take 1 tablet by mouth 2 (Two) Times a Day.        Pharmacy where request should be sent: [id number] - [address] - [phone]  - [phone]     Last office visit with prescribing clinician: 3/29/2023   Last telemedicine visit with prescribing clinician: 5/31/2023   Next office visit with prescribing clinician: 5/31/2023     Additional details provided by patient: PT ONLY HAS 2 PILLS LEFT    Does the patient have less than a 3 day supply:  [x] Yes  [] No    Would you like a call back once the refill request has been completed: [] Yes [x] No    If the office needs to give you a call back, can they leave a voicemail: [] Yes [x] No    Charlene Kilgore Rep   05/01/23 10:01 EDT

## 2023-05-10 ENCOUNTER — INFUSION (OUTPATIENT)
Dept: ONCOLOGY | Facility: HOSPITAL | Age: 83
End: 2023-05-10
Payer: MEDICARE

## 2023-05-10 VITALS
OXYGEN SATURATION: 94 % | RESPIRATION RATE: 16 BRPM | WEIGHT: 106.4 LBS | BODY MASS INDEX: 18.85 KG/M2 | HEART RATE: 85 BPM | SYSTOLIC BLOOD PRESSURE: 124 MMHG | DIASTOLIC BLOOD PRESSURE: 66 MMHG | TEMPERATURE: 97.5 F

## 2023-05-10 DIAGNOSIS — Z79.899 LONG-TERM USE OF HIGH-RISK MEDICATION: Primary | ICD-10-CM

## 2023-05-10 DIAGNOSIS — C34.12 MALIGNANT NEOPLASM OF UPPER LOBE OF LEFT LUNG: ICD-10-CM

## 2023-05-10 LAB
ALBUMIN SERPL-MCNC: 3.9 G/DL (ref 3.5–5.2)
ALBUMIN/GLOB SERPL: 1.5 G/DL (ref 1.1–2.4)
ALP SERPL-CCNC: 66 U/L (ref 38–116)
ALT SERPL W P-5'-P-CCNC: 10 U/L (ref 0–33)
ANION GAP SERPL CALCULATED.3IONS-SCNC: 13.2 MMOL/L (ref 5–15)
AST SERPL-CCNC: 18 U/L (ref 0–32)
BASOPHILS # BLD AUTO: 0.01 10*3/MM3 (ref 0–0.2)
BASOPHILS NFR BLD AUTO: 0.1 % (ref 0–1.5)
BILIRUB SERPL-MCNC: 0.4 MG/DL (ref 0.2–1.2)
BUN SERPL-MCNC: 16 MG/DL (ref 6–20)
BUN/CREAT SERPL: 27.1 (ref 7.3–30)
CALCIUM SPEC-SCNC: 10.1 MG/DL (ref 8.5–10.2)
CHLORIDE SERPL-SCNC: 98 MMOL/L (ref 98–107)
CO2 SERPL-SCNC: 28.8 MMOL/L (ref 22–29)
CREAT SERPL-MCNC: 0.59 MG/DL (ref 0.6–1.1)
DEPRECATED RDW RBC AUTO: 48.7 FL (ref 37–54)
EGFRCR SERPLBLD CKD-EPI 2021: 91.2 ML/MIN/1.73
EOSINOPHIL # BLD AUTO: 0 10*3/MM3 (ref 0–0.4)
EOSINOPHIL NFR BLD AUTO: 0 % (ref 0.3–6.2)
ERYTHROCYTE [DISTWIDTH] IN BLOOD BY AUTOMATED COUNT: 13.7 % (ref 12.3–15.4)
GLOBULIN UR ELPH-MCNC: 2.6 GM/DL (ref 1.8–3.5)
GLUCOSE SERPL-MCNC: 126 MG/DL (ref 74–124)
HCT VFR BLD AUTO: 42.5 % (ref 34–46.6)
HGB BLD-MCNC: 13.4 G/DL (ref 12–15.9)
IMM GRANULOCYTES # BLD AUTO: 0.03 10*3/MM3 (ref 0–0.05)
IMM GRANULOCYTES NFR BLD AUTO: 0.3 % (ref 0–0.5)
LYMPHOCYTES # BLD AUTO: 0.84 10*3/MM3 (ref 0.7–3.1)
LYMPHOCYTES NFR BLD AUTO: 8.9 % (ref 19.6–45.3)
MCH RBC QN AUTO: 30.5 PG (ref 26.6–33)
MCHC RBC AUTO-ENTMCNC: 31.5 G/DL (ref 31.5–35.7)
MCV RBC AUTO: 96.6 FL (ref 79–97)
MONOCYTES # BLD AUTO: 0.2 10*3/MM3 (ref 0.1–0.9)
MONOCYTES NFR BLD AUTO: 2.1 % (ref 5–12)
NEUTROPHILS NFR BLD AUTO: 8.39 10*3/MM3 (ref 1.7–7)
NEUTROPHILS NFR BLD AUTO: 88.6 % (ref 42.7–76)
NRBC BLD AUTO-RTO: 0 /100 WBC (ref 0–0.2)
PLATELET # BLD AUTO: 239 10*3/MM3 (ref 140–450)
PMV BLD AUTO: 9.1 FL (ref 6–12)
POTASSIUM SERPL-SCNC: 3.7 MMOL/L (ref 3.5–4.7)
PROT SERPL-MCNC: 6.5 G/DL (ref 6.3–8)
RBC # BLD AUTO: 4.4 10*6/MM3 (ref 3.77–5.28)
SODIUM SERPL-SCNC: 140 MMOL/L (ref 134–145)
WBC NRBC COR # BLD: 9.47 10*3/MM3 (ref 3.4–10.8)

## 2023-05-10 PROCEDURE — 85025 COMPLETE CBC W/AUTO DIFF WBC: CPT

## 2023-05-10 PROCEDURE — 96413 CHEMO IV INFUSION 1 HR: CPT

## 2023-05-10 PROCEDURE — 80053 COMPREHEN METABOLIC PANEL: CPT

## 2023-05-10 PROCEDURE — 25010000002 PEMBROLIZUMAB 100 MG/4ML SOLUTION 4 ML VIAL: Performed by: NURSE PRACTITIONER

## 2023-05-10 RX ORDER — SODIUM CHLORIDE 9 MG/ML
250 INJECTION, SOLUTION INTRAVENOUS ONCE
Status: COMPLETED | OUTPATIENT
Start: 2023-05-10 | End: 2023-05-10

## 2023-05-10 RX ADMIN — SODIUM CHLORIDE 200 MG: 9 INJECTION, SOLUTION INTRAVENOUS at 15:16

## 2023-05-10 RX ADMIN — SODIUM CHLORIDE 250 ML: 9 INJECTION, SOLUTION INTRAVENOUS at 15:16

## 2023-05-24 ENCOUNTER — HOSPITAL ENCOUNTER (OUTPATIENT)
Dept: CT IMAGING | Facility: HOSPITAL | Age: 83
Discharge: HOME | End: 2023-05-24
Payer: MEDICARE

## 2023-05-24 DIAGNOSIS — C34.12 MALIGNANT NEOPLASM OF UPPER LOBE OF LEFT LUNG: ICD-10-CM

## 2023-05-24 DIAGNOSIS — C34.11 MALIGNANT NEOPLASM OF UPPER LOBE OF RIGHT LUNG: ICD-10-CM

## 2023-05-24 PROCEDURE — 25510000001 IOPAMIDOL 61 % SOLUTION: Performed by: NURSE PRACTITIONER

## 2023-05-24 PROCEDURE — 0 DIATRIZOATE MEGLUMINE & SODIUM PER 1 ML: Performed by: NURSE PRACTITIONER

## 2023-05-24 PROCEDURE — 71260 CT THORAX DX C+: CPT

## 2023-05-24 PROCEDURE — 74177 CT ABD & PELVIS W/CONTRAST: CPT

## 2023-05-24 RX ADMIN — DIATRIZOATE MEGLUMINE AND DIATRIZOATE SODIUM 30 ML: 660; 100 LIQUID ORAL; RECTAL at 08:30

## 2023-05-24 RX ADMIN — IOPAMIDOL 85 ML: 612 INJECTION, SOLUTION INTRAVENOUS at 09:35

## 2023-05-30 NOTE — PROGRESS NOTES
REASON FOR FOLLOW-UP: Recurrent lung cancer.    History of Present Illness    The patient is a 82 y.o. female with the above-mentioned history, who returns the office today in anticipation of pembrolizumab which she continues to receive every 3 weeks.  She is due today for her ninth cycle.  Thankfully, she continues to have excellent tolerance to ongoing immunotherapy.  She denies any new or worsening shortness of breath.  She has no chest pain.  Her oxygen needs are unchanged.  She has no rash.  Her bowels are moving normally.  She is eating and drinking without difficulty.  She is overall feeling very well.  Her energy is adequate and she, again can accomplish activities that she desires.      She is, however,, beginning to note left shoulder and scapular pain.  This has been present over the last 2 to 3 weeks and CT of chest, abdomen pelvis performed 5/24/2023 were performed now revealing dense consolidation left apex along the left margin of the aortic arch increased to more consolidated measuring 4.9 x 5.0 cm previously 4.4 x 4.0 cm with increase sclerosis of the anterior left second rib.  Extension soft tissue density anterior to the left first and second ribs represents a change adversely with index area measuring 3.7 x 4.4 previously 1.8 x 2.9.  This may be post radiation this is concerning for progression of disease and would explain the patient's progressive symptoms.  We have discussed that she should undergo a PET/CT to help clarify these issues.  We will continue, at this point, her Keytruda as well as adjust her pain medications.    ONCOLOGY HISTORY:      The patient is an 82-year-old female with the below medical history including chronic obstructive pulmonary disease who was seen in the [facility] multidisciplinary thoracic oncology clinic July 1, 2021.  She had a long history of smoking having undergone, previously a left upper lobectomy for carcinoma lung in May 2012, 2015 diagnosed with  carcinoma the tongue undergoing radiation therapy and surgical therapy and eventual discontinue smoking in 2015.      She had undergone a CT of the chest at [facility] 6/16/2021 showing postsurgical changes in the left chest from right upper lobectomy, 8 mm irregular nodule medial segment of the right lower lobe and diffuse changes of emphysema with fibrotic changes in the periphery, no suspicious hilar or mediastinal nodes, no pleural effusion.  New finding was suspicious for new malignancy with the patient felt to be a poor candidate for surgical resection.  A PET CT and PFTs were requested thereafter.  The PET/CT demonstrated ill-defined FDG avid soft tissue thickening left aspect mediastinum within the operative bed, 1 cm hypermetabolic pulmonary nodule right lower lobe and asymmetric thickening patchy uptake involving the right base of tongue.  There is subtle uptake within the L1 vertebral body which is indeterminate and a sub-6 mm pulmonary nodule of right lung and subcentimeter hypodense hepatic lesion which was below PET resolution.       The patient's case was discussed in thoracic conference 7/22/2021 with consideration of the patient undergoing L1 vertebral body MRI, confirmatory biopsy left mediastinal and assessment by ENT (Dr. Caballero) who had worked with the patient previously.  She, incidentally, indicates that radiation therapy was given apparently intraoperatively at her recent surgery?  She still has issues with difficulty chewing and swallowing post procedures and was to be followed up with Dr. Caballero in the near future as planned.                                                            She was seen in the thoracic clinic on the same day with plans to proceed with MRI of the lumbar spine, biopsy left mediastinal nodular area of potential disease and follow-up of her ENT assessment as well as undergo a guardant 360 assessment in our office.  She underwent the biopsy 8/13/2021 found to be  consistent with invasive moderately differentiated adenocarcinoma with PD-L1 TPS score 40%.  MRI of the lumbar spine revealed no evidence of metastatic disease though the patient did have multilevel degenerative disease throughout the lumbar spine.  She had an office follow-up with Dr. 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  She underwent laryngoscopy 8/18/2021 with right base of tongue without evidence of lesions or masses in the region.     She was seen by Dr. Hernandez 8/19/2021 and, her findings, had been presented in lung conference.  Her findings were consistent with 2 separate lesions including a nodule in the superior segment of the right lower lobe and a mass in the upper portion of the left chest with the left chest lesion biopsy positive for adenocarcinoma.  The consensus was that these were to separate-synchronous primaries.  Stereotactic radiation each lesions were felt to be the appropriate way to proceed and the patient was referred to radiation therapy.     The patient is seen in office 8/23/2021 agreeable to the radiation therapy as well as additional assessments including Caris molecular assessment of her biopsy.  Again her guardant 360 is currently pending and we would follow her after she completes radiation therapy with subsequent scans.      She was able to proceed with SBRT to the right lung at primary #1 with 5 treatments at 1000 cGy per fraction in the left lung primary similarly at 1000 cGy per fraction x5.  Her treatment ranged between 8/31-9/13/2021.  She is now scheduled for follow-up 11/23/2021.    The patient subsequent genomic testing is discussed with her as she is is seen back 9/23/2021.  Her guardant 360 did not determine any new abnormalities but her Caris-MI profile-does reveal sensitivity to immunotherapy as well as a BRAF mutation.  This could be extremely  important as we follow the patient from this point.  Fortunately she is feeling extremely well and quite pleased about her treatments.    Patient had subsequent PET/CT 11/23/2021 demonstrates decrease in size and avidity of the previously noted intensely FDG avid nodules left lobectomy operative site and right lower lobe.  Surrounding groundglass and pulmonary opacification is consistent with postradiation changes, a few new subcentimeter pulmonary nodules are present in the right upper lobe and indeterminant, stable subcentimeter hepatic lesion is below PET resolution no other findings of FDG-avid disease are seen in neck abdomen or pelvis.  The patient seen in office 12/1/2021 with a relatively good performance status stating she is not having any new symptoms and very pleased about her results on her PET/CT.  She realizes we'll have to follow this further but subsequent scans in 6 months.  She is also hoping to see an oral surgeon that previously helped her-Dr. Wu.    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  There is evolution of presumed postradiation changes in the right midlung with soft tissue mass within the left lumpectomy operative bed minimally decreased in size.  There is a sub-6 mm nodule in the right upper lobe not definitively seen, indeterminate and an indeterminate left renal lesion at 1.5 cm unchanged from 7/13/2021.  The patient is currently scheduled to see Dr. Dowell on 6/23/2022.  She is feeling well without any additional symptoms.    The patient required admission 10/14 - 10/18/2022 presenting with shortness of breath and cough that was nonproductive.  She had been on oral antibiotics and did not improved and was admitted for therapy for apparent pneumonia.  This eventually led to bronchoscopy after initial CT revealed  postsurgical changes, new masslike 6.7 cm area of consolidation anterior left lung raising concern for potential malignancy and a follow-up PET/CT planned.  Bronchoscopy and biopsy left lower lung failed to show evidence of malignancy.  The patient's PET/CT, however, demonstrated an irregular pleural-based soft tissue density thickening in the left upper lobe that was intensely FDG avid new from 6/8/2022 thought to be a malignant recurrence with spread into the left lung parenchyma.  There is intensely pleural-based avidity within the left lower lobe thought to be metastatic disease with postradiation of the left apex as well.  There is a small focus of uptake in the right hilum grossly unchanged in size and post radiation changes in the right lower lobe.  There is indeterminate density left renal that was grossly unchanged.  The patient is seen back in office 11/15/2022 indicating that she still has chest discomfort that is slowly worsening and that she has a chronic paroxysmal cough but has not improved.  We discussed that she very likely has recurrent disease and plan to proceed with a guardant 360 examination initially as we determine whether we should try to proceed with therapy particularly immunotherapy versus targeted therapy recognizing the above findings.    Patient's guardant 360 returned as again significant for BRAF V600E and the potential use of BRAF inhibitor/MET inhibitor dabrafenib and trametinib.  Additional information [id number] and use of alpelisib.    Unfortunately she required admission 11/28-12/1 with worsening back pain.  Fortunately she did not demonstrate evidence of metastatic disease but did have moderate degenerative changes which could cause radiculopathy.  Medications were altered to include subsequently MSR 15 mg p.o. every 12 hours, Norco as needed.    The patient now presents back 12/14/2022 to initiate therapy- initially with immunotherapy considering Caris study with PD-L1 TPS of 70%  on 22 C3 and 28-8 assays.    Patient seen with her  and we discussed pain medications and adjustments thereafter and that she stopped taking MSIR having only a short supply and having to use Norco more frequently.  She is willing to initiate Keytruda today we have discussed that considering her genomics treatment versus her BRAF mutation is also an option.    C1 Keytruda 12/14/2022    Patient is seen back 1/4/2023 in follow-up due for cycle 2 Keytruda.  Patient states that since receiving her first cycle she has had decreased appetite and decreased energy.  She has not been able to bring herself to eat much and she has notably lost 7 pounds.  She has also been struggling with constipation in relation to ongoing narcotics for pain control.  She has been taking senna S2 tabs twice a day.  We discussed adding daily MiraLAX.    Patient did just last week meet with dietitian, Zoila Clinton, to discuss ways to improve caloric intake.  She has not been able to implement any of these things yet though.    The patient is next seen 1/25/2023 with mild weight gain.  She is seen with family member both indicating that she has actually felt somewhat better in terms of performance status and general function.  We have discussed proceeding with a third cycle treatment and reevaluating by scan in 2 weeks.    The patient proceeded with treatment 1/25/2023 and underwent follow-up CT chest abdomen pelvis 2/8/2023 demonstrating small pericardial effusion that is decreased in size from 11/20/2022, ill-defined consolidation and pleural-based thickening in the left apex and upper lung has reduced slightly, additional index nodule soft tissue in the aspect the pericardium has not changed substantially, no evidence of metastatic disease in the abdomen pelvis.    The patient is seen with her daughter 2/15/2023 both indicating that she has definitely improved, stable weight, appetite and performance status.  She is also using her pain  medication much less frequently and wonders whether she might begin to taper from her twice a day MS Contin.    Patient is seen back 3/8/2023 due for ongoing pembrolizumab.  She continues on low-dose prednisone 10 mg daily and has noted significant improvement in her energy and appetite with this.  She is reluctant to taper this any further we discussed that it is fine for her to stay on daily dosing.    She did try to taper her pain medication going down to just MS Contin 15 mg every 12 hours while holding her hydrocodone.  However over the last few days she has had some intermittent pain in the left upper back alleviated with hydrocodone 2-3 times a day.  She currently is denying any pain.  Monitor.    She is next evaluated 3/29/2023 for ongoing Keytruda.  Evidently her pain medication was sent to the wrong pharmacy and will need to be represcribed today-long-acting MS Contin.  Otherwise she is doing reasonably well at present.    Patient seen 5/31/2023 with gradual development of left shoulder and left scapular pain.  Concurrent CT chest, abdomen and pelvis demonstrate interval increasing consolidation left upper lobe with extension anterior to the left upper ribs with sclerosis anterior left second rib.  This is suspicious for worsening malignancy.  Plans were made for subsequent PET/CT where the patient's pain medications were adjusted.    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 [facility]   • History of squamous cell carcinoma     Tongue   • Hyperlipidemia    • Hypertension     since early 60s   • Low vitamin B12 level     • Lung cancer    • Thyromegaly    • UTI (urinary tract infection)    • Vitamin D deficiency         Past Surgical History:   Procedure Laterality Date   • BRONCHOSCOPY Bilateral 10/17/2022    Procedure: BRONCHOSCOPY WITH FLUORO with biopsy and BAL;  Surgeon: India Flores MD;  Location: [facility] ENDOSCOPY;  Service: Pulmonary;  Laterality: Bilateral;  PRE/POST - lung mass   • CHOLECYSTECTOMY  1999   • COLONOSCOPY     • EMBOLIZATION CEREBRAL Left 6/29/2022    Procedure: 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;  Surgeon: Bradley Dowell MD;  Location: [facility] HYBRID OR 18/19;  Service: Interventional Radiology;  Laterality: Left;   • EYE SURGERY  11/24/2020    Took a muscle out of right eye   • GLOSSECTOMY PARTIAL      less than one half tongue   • LUNG SURGERY  2012   • ORAL LESION EXCISION/BIOPSY      June and August 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   • TUBAL ABDOMINAL LIGATION  1970   • VENOUS ACCESS DEVICE (PORT) INSERTION N/A 12/12/2022    Procedure: MEDIPORT PLACEMENT;  Surgeon: Jason Villaseñor MD;  Location: [facility] MAIN OR;  Service: Vascular;  Laterality: N/A;        Current Outpatient Medications on File Prior to Visit   Medication Sig Dispense Refill   • albuterol sulfate  (90 Base) MCG/ACT inhaler Inhale 2 puffs Every 4 (Four) Hours As Needed for Wheezing. 18 g 2   • amLODIPine (NORVASC) 5 MG tablet TAKE 1 TABLET BY MOUTH DAILY 90 tablet 0   • atorvastatin (LIPITOR) 20 MG tablet Take 1 tablet by mouth Every Night. 90 tablet 3   • cephalexin (KEFLEX) 500 MG capsule TAKE 1 CAPSULE BY MOUTH FOUR TIMES DAILY FOR 5 DAYS     • cetirizine (ZyrTEC) 10 MG tablet Take 1 tablet by mouth Daily.     • chlorhexidine (PERIDEX) 0.12 % solution Apply 15 mL to the mouth or throat 2 (Two) Times a Day.     • cholecalciferol (VITAMIN D3) 1000 units tablet Take 1 tablet by mouth Daily. HOLDING FOR DOS     • Cyanocobalamin (VITAMIN B12 PO) Take  by mouth.     • Diclofenac Sodium (VOLTAREN) 1  % gel gel Apply 4 g topically to the appropriate area as directed 4 (Four) Times a Day As Needed.     • fluticasone (FLONASE) 50 MCG/ACT nasal spray 2 sprays into the nostril(s) as directed by provider As Needed.     • hydroCHLOROthiazide (HYDRODIURIL) 25 MG tablet TAKE 1 TABLET EVERY DAY 90 tablet 1   • Ibuprofen (ADVIL PO) Take 400 mg by mouth Daily As Needed. HOLD PER MD INSTR     • lidocaine-prilocaine (EMLA) 2.5-2.5 % cream Apply nickel size amount to port site 30 min before appt time do not rub in cover with plastic wrap (Patient taking differently: 1 application As Needed. Apply nickel size amount to port site 30 min before appt time do not rub in cover with plastic wrap) 30 g 1   • metoprolol succinate XL (TOPROL-XL) 25 MG 24 hr tablet TAKE 1 TABLET EVERY DAY 90 tablet 1   • montelukast (SINGULAIR) 10 MG tablet Take 1 tablet by mouth Every Night. 90 tablet 3   • Morphine (MS CONTIN) 15 MG 12 hr tablet Take 1 tablet by mouth 2 (Two) Times a Day. 60 tablet 0   • omeprazole (priLOSEC) 40 MG capsule Take 1 capsule by mouth Daily.     • ondansetron (ZOFRAN) 8 MG tablet Take 1 tablet by mouth 3 (Three) Times a Day As Needed for Nausea or Vomiting. 30 tablet 5   • predniSONE (DELTASONE) 10 MG tablet TAKE 1 TABLET BY MOUTH DAILY 30 tablet 2   • Umeclidinium-Vilanterol (Anoro Ellipta) 62.5-25 MCG/ACT aerosol powder  inhaler Inhale 1 puff Daily As Needed.     • [DISCONTINUED] HYDROcodone-acetaminophen (NORCO) 5-325 MG per tablet Take 1 tablet by mouth Every 4 (Four) Hours As Needed for Moderate Pain. 100 tablet 0     No current facility-administered medications on file prior to visit.        ALLERGIES:    Allergies   Allergen Reactions   • Sulfa Antibiotics Rash        Social History     Socioeconomic History   • Marital status:    Tobacco Use   • Smoking status: Former     Types: Cigarettes     Quit date: 2015     Years since quittin.3   • Smokeless tobacco: Never   • Tobacco comments:     less than a  "pack per day, no smoking since , Quit 2015   Vaping Use   • Vaping Use: Never used   Substance and Sexual Activity   • Alcohol use: Yes     Comment: Socially -A few times a month   • Drug use: No   • Sexual activity: Defer     Family History   Problem Relation Age of Onset   • Ovarian cancer Mother          at age 27   • No Known Problems Father    • Ovarian cancer Sister    • Colon cancer Brother    • No Known Problems Other    • Malig Hyperthermia Neg Hx         Review of SystemsROS as per HPI     Objective     Vitals:    23 1131   BP: (!) 87/45   Pulse: 75   Resp: 16   Temp: 97.7 °F (36.5 °C)   TempSrc: Temporal   SpO2: 97%   Weight: 48 kg (105 lb 14.4 oz)   Height: 160 cm (62.99\")   PainSc:   6   PainLoc: Shoulder  Comment: left shoulder and arm pain         2023    11:33 AM   Current Status   ECOG score 0        Physical Exam  Constitutional:       Appearance: Normal appearance. She is normal weight.   HENT:      Head: Normocephalic and atraumatic.      Nose: Nose normal.      Mouth/Throat:      Mouth: Mucous membranes are moist. No oral lesions.      Pharynx: Oropharynx is clear.   Eyes:      Extraocular Movements: Extraocular movements intact.   Cardiovascular:      Rate and Rhythm: Normal rate and regular rhythm.      Pulses: Normal pulses.      Heart sounds: Normal heart sounds.   Pulmonary:      Effort: Pulmonary effort is normal.      Breath sounds: Normal breath sounds.      Comments: Distant breath sounds bilateral bases  Abdominal:      General: Abdomen is flat. Bowel sounds are normal.      Palpations: Abdomen is soft.   Musculoskeletal:         General: Normal range of motion.      Cervical back: Normal range of motion and neck supple.   Skin:     General: Skin is warm and dry.   Neurological:      General: No focal deficit present.      Mental Status: She is alert and oriented to person, place, and time.   Psychiatric:         Mood and Affect: Mood normal.         Behavior: " Behavior normal.         I have reexamined the patient and the results are consistent with the previously documented exam. Henry Escobar MD      RECENT LABS:  Results from last 7 days   Lab Units 05/31/23  1107   WBC 10*3/mm3 12.68*   NEUTROS ABS 10*3/mm3 10.85*   HEMOGLOBIN g/dL 13.5   HEMATOCRIT % 42.6   PLATELETS 10*3/mm3 258     Results from last 7 days   Lab Units 05/31/23  1107   SODIUM mmol/L 139   POTASSIUM mmol/L 3.7   CHLORIDE mmol/L 97*   CO2 mmol/L 28.2   BUN mg/dL 14   CREATININE mg/dL 0.62   CALCIUM mg/dL 9.6   ALBUMIN g/dL 3.8   BILIRUBIN mg/dL 0.2   ALK PHOS U/L 62   ALT (SGPT) U/L 9   AST (SGOT) U/L 18   GLUCOSE mg/dL 127*               Assessment & Plan     1.  Carcinoma of the lung  · May 2015, status post previous left upper lobectomy for carcinoma of the lung.    2.  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   · She underwent laryngoscopy 8/18/2021 with right base of tongue without evidence of lesions or masses in the region.    3.  Moderately differentiated adenocarcinoma of the lung.  · CT of the chest 6/16/2021 demonstrated postsurgical changes, 8 mm irregular nodule medial segment of right lower lobe and diffuse changes of emphysema.    · She is seen in thoracic clinic with findings suspicious for new malignancy and concern of the patient being a poor operative candidate.    · 7/13/2021 PET CT demonstrated ill-defined avidity and soft tissue left aspect of the mediastinum, 1 cm hypermetabolic pulmonary nodule and asymmetric thickening involving the right base of tongue as well as subtle uptake in the L1 vertebral body.    · This patient's case was discussed in thoracic clinic and plans were made to request an MRI of the lumbar spine, obtain a biopsy of the mediastinal involvement of the left had the patient reviewed by ENT.  · Biopsy 8/13/2021 found  to be consistent with invasive moderately differentiated adenocarcinoma with PD-L1 TPS score 40%.    · 7/24/2021 MRI of the lumbar spine revealed no evidence of metastatic disease though the patient did have multilevel degenerative disease throughout the lumbar spine.    · Her findings were consistent with 2 separate lesions including a nodule in the superior segment of the right lower lobe and a mass in the upper portion of the left chest with the left chest lesion biopsy positive for adenocarcinoma.  The consensus was that these were to separate-synchronous primaries.  Stereotactic radiation each lesions were felt to be the appropriate way to proceed and the patient was referred to radiation therapy.  · The patient was referred for radiation therapy and she was able to proceed with SBRT to the right lung at primary #1 with 5 treatments at 1000 cGy per fraction in the left lung primary similarly at 1000 cGy per fraction x5.  Her treatment ranged between 8/31-9/13/2021.    · Her guardant 360 did not determine any new abnormalities but her Caris-MI profile-does reveal sensitivity to immunotherapy as well as a BRAF mutation.  · PET/CT 11/23/2021 demonstrates decrease in size and avidity of the previously noted intensely FDG avid nodules left lobectomy operative site and right lower lobe.  Surrounding groundglass and pulmonary opacification is consistent with postradiation changes, a few new subcentimeter pulmonary nodules are present in the right upper lobe and indeterminant, stable subcentimeter hepatic lesion is below PET resolution no other findings of FDG-avid disease are seen in neck abdomen or pelvis.  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  There is evolution of presumed postradiation changes in the right midlung with soft tissue mass within the left lumpectomy operative bed minimally  decreased in size.  There is a sub-6 mm nodule in the right upper lobe not definitively seen, indeterminate and an indeterminate left renal lesion at 1.5 cm unchanged from 7/13/2021.  · Admission 10/14 - 10/18/2022 presenting with shortness of breath and cough that was nonproductive.  She had been on oral antibiotics and did not improved and was admitted for therapy for apparent pneumonia.  This eventually led to bronchoscopy after initial CT revealed postsurgical changes, new masslike 6.7 cm area of consolidation anterior left lung raising concern for potential malignancy and a follow-up PET/CT planned.   · Bronchoscopy and biopsy left lower lung failed to show evidence of malignancy.    · 11/9/2022 PET/CT, demonstrated an irregular pleural-based soft tissue density thickening in the left upper lobe that was intensely FDG avid new from 6/8/2022 thought to be a malignant recurrence with spread into the left lung parenchyma.  There is intensely pleural-based avidity within the left lower lobe thought to be metastatic disease with postradiation of the left apex as well.  There is a small focus of uptake in the right hilum grossly unchanged in size and post radiation changes in the right lower lobe.  There is indeterminate density left renal that was grossly unchanged.    · Patient's guardant 360 returned as again significant for BRAF V600E and the potential use of BRAF inhibitor/MET inhibitor dabrafenib and trametinib.  Additional information [id number] and use of alpelisib.  · The patient now presents back 12/14/2022 to initiate therapy- initially with immunotherapy considering Caris study with PD-L1 TPS of 70% on 22 C3 and 28-8 assays.  · Single agent Keytruda initiated 12/14/2022  · 2/8/2023 CT of the chest, abdomen pelviswith consolidation and masslike pleural thickening in the left apex and upper lung index areas have decreased somewhat.  There is no evidence of metastatic disease otherwise and a few indeterminate  left renal lesions are stable.  After lengthy discussion with the patient and her daughter as to the stability to mild improvement with immunotherapy it is agreed that we would continue treatment at this point.  · Proceed today, 4/19/2023 with cycle 7 pembrolizumab which is continued every 3 weeks  · The patient proceeded to 8 cycles of pembrolizumab and underwent follow-up chest CT chest, abdomen pelvis revealing evolution of dense consolidation left upper lobe and extension of soft tissue mass anterior to left upper ribs with increase sclerosis anterior left second rib.  This was concerning for progression of disease versus radiation change and the patient was scheduled for subsequent PET/CT.    4.  Worsening back pain  · Admission 11/28/2022 through 12/1/2022.  · MRI of the cervical and thoracic spine fortunately failed to demonstrate metastatic disease.  Moderate degenerative changes noted which could cause radiculopathy.  · Medication altered to include MS Contin 15 mg every 12 hours and Norco for breakthrough pain  · She reports today her pain is adequately controlled  · Patient with increasing pain 5/31/2023 with pain level of 6.  MS Contin was increased to 50 mg p.o. every 8 hours and Norco 10/325 #101 p.o. every 6 hours to move to every 4 hours as needed-E scribed to pharmacy    5.  Poor appetite and malnutrition  · Placed on prednisone 10 mg daily 1/4/2023 with significant improvement in her symptoms  · Weight is stable today at just below 106 pounds      PLAN:  1. Proceed today with cycle 9 pembrolizumab which is continued every 3 weeks  2. Schedule PET/CT next week  3. Continue prednisone 10 mg daily  4. Continue MS Contin 15 mg every 8 hours  5. Continue Norco increased to 10/325 1 tablet every 6 hours as needed breakthrough pain, E scribed to pharmacy  6. Return in 2 weeks MD  7. Additional treatment options include BRAF inhibitor, chemotherapy directed for histology lung adenocarcinoma    This patient  is on high risk drug therapy requiring intensive monitoring for toxicity.      I spent 48 minutes caring for Darlene on this date of service. This time includes time spent by me in the following activities: preparing for the visit, reviewing tests, obtaining and/or reviewing a separately obtained history, performing a medically appropriate examination and/or evaluation, counseling and educating the patient/family/caregiver, ordering medications, tests, or procedures, referring and communicating with other health care professionals, documenting information in the medical record, independently interpreting results and communicating that information with the patient/family/caregiver and care coordination.       Henry Escobar MD  05/31/2023

## 2023-05-31 ENCOUNTER — OFFICE VISIT (OUTPATIENT)
Dept: ONCOLOGY | Facility: CLINIC | Age: 83
End: 2023-05-31

## 2023-05-31 ENCOUNTER — INFUSION (OUTPATIENT)
Dept: ONCOLOGY | Facility: HOSPITAL | Age: 83
End: 2023-05-31

## 2023-05-31 VITALS
RESPIRATION RATE: 16 BRPM | DIASTOLIC BLOOD PRESSURE: 45 MMHG | BODY MASS INDEX: 18.77 KG/M2 | HEIGHT: 63 IN | TEMPERATURE: 97.7 F | OXYGEN SATURATION: 97 % | SYSTOLIC BLOOD PRESSURE: 87 MMHG | WEIGHT: 105.9 LBS | HEART RATE: 75 BPM

## 2023-05-31 VITALS — HEART RATE: 76 BPM | DIASTOLIC BLOOD PRESSURE: 58 MMHG | SYSTOLIC BLOOD PRESSURE: 127 MMHG

## 2023-05-31 DIAGNOSIS — Z79.899 LONG-TERM USE OF HIGH-RISK MEDICATION: ICD-10-CM

## 2023-05-31 DIAGNOSIS — Z79.899 LONG-TERM USE OF HIGH-RISK MEDICATION: Primary | ICD-10-CM

## 2023-05-31 DIAGNOSIS — Z85.118 HISTORY OF LUNG CANCER: Primary | ICD-10-CM

## 2023-05-31 DIAGNOSIS — C34.12 MALIGNANT NEOPLASM OF UPPER LOBE OF LEFT LUNG: ICD-10-CM

## 2023-05-31 DIAGNOSIS — C34.11 MALIGNANT NEOPLASM OF UPPER LOBE OF RIGHT LUNG: ICD-10-CM

## 2023-05-31 LAB
ALBUMIN SERPL-MCNC: 3.8 G/DL (ref 3.5–5.2)
ALBUMIN/GLOB SERPL: 1.4 G/DL (ref 1.1–2.4)
ALP SERPL-CCNC: 62 U/L (ref 38–116)
ALT SERPL W P-5'-P-CCNC: 9 U/L (ref 0–33)
ANION GAP SERPL CALCULATED.3IONS-SCNC: 13.8 MMOL/L (ref 5–15)
AST SERPL-CCNC: 18 U/L (ref 0–32)
BASOPHILS # BLD AUTO: 0.04 10*3/MM3 (ref 0–0.2)
BASOPHILS NFR BLD AUTO: 0.3 % (ref 0–1.5)
BILIRUB SERPL-MCNC: 0.2 MG/DL (ref 0.2–1.2)
BUN SERPL-MCNC: 14 MG/DL (ref 6–20)
BUN/CREAT SERPL: 22.6 (ref 7.3–30)
CALCIUM SPEC-SCNC: 9.6 MG/DL (ref 8.5–10.2)
CHLORIDE SERPL-SCNC: 97 MMOL/L (ref 98–107)
CO2 SERPL-SCNC: 28.2 MMOL/L (ref 22–29)
CREAT SERPL-MCNC: 0.62 MG/DL (ref 0.6–1.1)
DEPRECATED RDW RBC AUTO: 47.7 FL (ref 37–54)
EGFRCR SERPLBLD CKD-EPI 2021: 90.2 ML/MIN/1.73
EOSINOPHIL # BLD AUTO: 0 10*3/MM3 (ref 0–0.4)
EOSINOPHIL NFR BLD AUTO: 0 % (ref 0.3–6.2)
ERYTHROCYTE [DISTWIDTH] IN BLOOD BY AUTOMATED COUNT: 13.3 % (ref 12.3–15.4)
GLOBULIN UR ELPH-MCNC: 2.7 GM/DL (ref 1.8–3.5)
GLUCOSE SERPL-MCNC: 127 MG/DL (ref 74–124)
HCT VFR BLD AUTO: 42.6 % (ref 34–46.6)
HGB BLD-MCNC: 13.5 G/DL (ref 12–15.9)
IMM GRANULOCYTES # BLD AUTO: 0.06 10*3/MM3 (ref 0–0.05)
IMM GRANULOCYTES NFR BLD AUTO: 0.5 % (ref 0–0.5)
LYMPHOCYTES # BLD AUTO: 1.37 10*3/MM3 (ref 0.7–3.1)
LYMPHOCYTES NFR BLD AUTO: 10.8 % (ref 19.6–45.3)
MCH RBC QN AUTO: 30.5 PG (ref 26.6–33)
MCHC RBC AUTO-ENTMCNC: 31.7 G/DL (ref 31.5–35.7)
MCV RBC AUTO: 96.2 FL (ref 79–97)
MONOCYTES # BLD AUTO: 0.36 10*3/MM3 (ref 0.1–0.9)
MONOCYTES NFR BLD AUTO: 2.8 % (ref 5–12)
NEUTROPHILS NFR BLD AUTO: 10.85 10*3/MM3 (ref 1.7–7)
NEUTROPHILS NFR BLD AUTO: 85.6 % (ref 42.7–76)
NRBC BLD AUTO-RTO: 0 /100 WBC (ref 0–0.2)
PLATELET # BLD AUTO: 258 10*3/MM3 (ref 140–450)
PMV BLD AUTO: 9.5 FL (ref 6–12)
POTASSIUM SERPL-SCNC: 3.7 MMOL/L (ref 3.5–4.7)
PROT SERPL-MCNC: 6.5 G/DL (ref 6.3–8)
RBC # BLD AUTO: 4.43 10*6/MM3 (ref 3.77–5.28)
SODIUM SERPL-SCNC: 139 MMOL/L (ref 134–145)
T4 FREE SERPL-MCNC: 1.18 NG/DL (ref 0.93–1.7)
TSH SERPL DL<=0.05 MIU/L-ACNC: 3.82 UIU/ML (ref 0.27–4.2)
WBC NRBC COR # BLD: 12.68 10*3/MM3 (ref 3.4–10.8)

## 2023-05-31 PROCEDURE — 96413 CHEMO IV INFUSION 1 HR: CPT

## 2023-05-31 PROCEDURE — 84439 ASSAY OF FREE THYROXINE: CPT | Performed by: INTERNAL MEDICINE

## 2023-05-31 PROCEDURE — 85025 COMPLETE CBC W/AUTO DIFF WBC: CPT

## 2023-05-31 PROCEDURE — 84443 ASSAY THYROID STIM HORMONE: CPT | Performed by: INTERNAL MEDICINE

## 2023-05-31 PROCEDURE — 96361 HYDRATE IV INFUSION ADD-ON: CPT

## 2023-05-31 PROCEDURE — 80053 COMPREHEN METABOLIC PANEL: CPT

## 2023-05-31 PROCEDURE — 25010000002 PEMBROLIZUMAB 100 MG/4ML SOLUTION 4 ML VIAL: Performed by: INTERNAL MEDICINE

## 2023-05-31 RX ORDER — SODIUM CHLORIDE 9 MG/ML
250 INJECTION, SOLUTION INTRAVENOUS ONCE
Status: CANCELLED | OUTPATIENT
Start: 2023-05-31

## 2023-05-31 RX ORDER — SODIUM CHLORIDE 9 MG/ML
500 INJECTION, SOLUTION INTRAVENOUS ONCE
Status: COMPLETED | OUTPATIENT
Start: 2023-05-31 | End: 2023-05-31

## 2023-05-31 RX ORDER — HYDROCODONE BITARTRATE AND ACETAMINOPHEN 10; 325 MG/1; MG/1
1 TABLET ORAL EVERY 6 HOURS PRN
Qty: 100 TABLET | Refills: 0 | Status: SHIPPED | OUTPATIENT
Start: 2023-05-31

## 2023-05-31 RX ORDER — CEPHALEXIN 500 MG/1
CAPSULE ORAL
COMMUNITY
Start: 2023-05-26

## 2023-05-31 RX ORDER — SODIUM CHLORIDE 9 MG/ML
250 INJECTION, SOLUTION INTRAVENOUS ONCE
Status: COMPLETED | OUTPATIENT
Start: 2023-05-31 | End: 2023-05-31

## 2023-05-31 RX ADMIN — SODIUM CHLORIDE 200 MG: 9 INJECTION, SOLUTION INTRAVENOUS at 12:57

## 2023-05-31 RX ADMIN — SODIUM CHLORIDE 500 ML/HR: 9 INJECTION, SOLUTION INTRAVENOUS at 12:23

## 2023-05-31 RX ADMIN — SODIUM CHLORIDE 250 ML: 9 INJECTION, SOLUTION INTRAVENOUS at 12:57

## 2023-05-31 NOTE — LETTER
May 31, 2023     Meredith Lea Kehrer, MD  [address]  [address]  [facility] [address]    Patient: Darlene Pfeiffer   YOB: 1940   Date of Visit: 5/31/2023       Dear Dr. Kehrer, MD:    Thank you for referring Darlene Pfeiffer to me for evaluation. Below are the relevant portions of my assessment and plan of care.    If you have questions, please do not hesitate to call me. I look forward to following Darlene along with you.         Sincerely,        Henry Escobar MD        CC: No Recipients    Henry Escobar MD  05/31/23 2046  Signed      REASON FOR FOLLOW-UP: Recurrent lung cancer.    History of Present Illness    The patient is a 82 y.o. female with the above-mentioned history, who returns the office today in anticipation of pembrolizumab which she continues to receive every 3 weeks.  She is due today for her ninth cycle.  Thankfully, she continues to have excellent tolerance to ongoing immunotherapy.  She denies any new or worsening shortness of breath.  She has no chest pain.  Her oxygen needs are unchanged.  She has no rash.  Her bowels are moving normally.  She is eating and drinking without difficulty.  She is overall feeling very well.  Her energy is adequate and she, again can accomplish activities that she desires.      She is, however,, beginning to note left shoulder and scapular pain.  This has been present over the last 2 to 3 weeks and CT of chest, abdomen pelvis performed 5/24/2023 were performed now revealing dense consolidation left apex along the left margin of the aortic arch increased to more consolidated measuring 4.9 x 5.0 cm previously 4.4 x 4.0 cm with increase sclerosis of the anterior left second rib.  Extension soft tissue density anterior to the left first and second ribs represents a change adversely with index area measuring 3.7 x 4.4 previously 1.8 x 2.9.  This may be post radiation this is concerning for progression of disease and would explain the patient's  progressive symptoms.  We have discussed that she should undergo a PET/CT to help clarify these issues.  We will continue, at this point, her Keytruda as well as adjust her pain medications.    ONCOLOGY HISTORY:      The patient is an 82-year-old female with the below medical history including chronic obstructive pulmonary disease who was seen in the [facility] multidisciplinary thoracic oncology clinic July 1, 2021.  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      She had undergone a CT of the chest at [facility] 6/16/2021 showing postsurgical changes in the left chest from right upper lobectomy, 8 mm irregular nodule medial segment of the right lower lobe and diffuse changes of emphysema with fibrotic changes in the periphery, no suspicious hilar or mediastinal nodes, no pleural effusion.  New finding was suspicious for new malignancy with the patient felt to be a poor candidate for surgical resection.  A PET CT and PFTs were requested thereafter.  The PET/CT demonstrated ill-defined FDG avid soft tissue thickening left aspect mediastinum within the operative bed, 1 cm hypermetabolic pulmonary nodule right lower lobe and asymmetric thickening patchy uptake involving the right base of tongue.  There is subtle uptake within the L1 vertebral body which is indeterminate and a sub-6 mm pulmonary nodule of right lung and subcentimeter hypodense hepatic lesion which was below PET resolution.       The patient's case was discussed in thoracic conference 7/22/2021 with consideration of the patient undergoing L1 vertebral body MRI, confirmatory biopsy left mediastinal and assessment by ENT (Dr. Caballero) who had worked with the patient previously.  She, incidentally, indicates that radiation therapy was given  apparently intraoperatively at her recent surgery?  She still has issues with difficulty chewing and swallowing post procedures and was to be followed up with Dr. Caballero in the near future as planned.                                                            She was seen in the thoracic clinic on the same day with plans to proceed with MRI of the lumbar spine, biopsy left mediastinal nodular area of potential disease and follow-up of her ENT assessment as well as undergo a guardant 360 assessment in our office.  She underwent the biopsy 8/13/2021 found to be consistent with invasive moderately differentiated adenocarcinoma with PD-L1 TPS score 40%.  MRI of the lumbar spine revealed no evidence of metastatic disease though the patient did have multilevel degenerative disease throughout the lumbar spine.  She had an office follow-up with Dr. 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  She underwent laryngoscopy 8/18/2021 with right base of tongue without evidence of lesions or masses in the region.     She was seen by Dr. Hernandez 8/19/2021 and, her findings, had been presented in lung conference.  Her findings were consistent with 2 separate lesions including a nodule in the superior segment of the right lower lobe and a mass in the upper portion of the left chest with the left chest lesion biopsy positive for adenocarcinoma.  The consensus was that these were to separate-synchronous primaries.  Stereotactic radiation each lesions were felt to be the appropriate way to proceed and the patient was referred to radiation therapy.     The patient is seen in office 8/23/2021 agreeable to the radiation therapy as well as additional assessments including Caris molecular assessment of her biopsy.  Again her guardant 360 is currently pending and we would follow her after she completes radiation therapy with  subsequent scans.      She was able to proceed with SBRT to the right lung at primary #1 with 5 treatments at 1000 cGy per fraction in the left lung primary similarly at 1000 cGy per fraction x5.  Her treatment ranged between 8/31-9/13/2021.  She is now scheduled for follow-up 11/23/2021.    The patient subsequent genomic testing is discussed with her as she is is seen back 9/23/2021.  Her guardant 360 did not determine any new abnormalities but her Caris-MI profile-does reveal sensitivity to immunotherapy as well as a BRAF mutation.  This could be extremely important as we follow the patient from this point.  Fortunately she is feeling extremely well and quite pleased about her treatments.    Patient had subsequent PET/CT 11/23/2021 demonstrates decrease in size and avidity of the previously noted intensely FDG avid nodules left lobectomy operative site and right lower lobe.  Surrounding groundglass and pulmonary opacification is consistent with postradiation changes, a few new subcentimeter pulmonary nodules are present in the right upper lobe and indeterminant, stable subcentimeter hepatic lesion is below PET resolution no other findings of FDG-avid disease are seen in neck abdomen or pelvis.  The patient seen in office 12/1/2021 with a relatively good performance status stating she is not having any new symptoms and very pleased about her results on her PET/CT.  She realizes we'll have to follow this further but subsequent scans in 6 months.  She is also hoping to see an oral surgeon that previously helped her-Dr. Wu.    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  There is evolution of presumed postradiation changes in the right midlung with soft tissue mass within the left lumpectomy operative bed minimally decreased in size.  There is a  sub-6 mm nodule in the right upper lobe not definitively seen, indeterminate and an indeterminate left renal lesion at 1.5 cm unchanged from 7/13/2021.  The patient is currently scheduled to see Dr. Dowell on 6/23/2022.  She is feeling well without any additional symptoms.    The patient required admission 10/14 - 10/18/2022 presenting with shortness of breath and cough that was nonproductive.  She had been on oral antibiotics and did not improved and was admitted for therapy for apparent pneumonia.  This eventually led to bronchoscopy after initial CT revealed postsurgical changes, new masslike 6.7 cm area of consolidation anterior left lung raising concern for potential malignancy and a follow-up PET/CT planned.  Bronchoscopy and biopsy left lower lung failed to show evidence of malignancy.  The patient's PET/CT, however, demonstrated an irregular pleural-based soft tissue density thickening in the left upper lobe that was intensely FDG avid new from 6/8/2022 thought to be a malignant recurrence with spread into the left lung parenchyma.  There is intensely pleural-based avidity within the left lower lobe thought to be metastatic disease with postradiation of the left apex as well.  There is a small focus of uptake in the right hilum grossly unchanged in size and post radiation changes in the right lower lobe.  There is indeterminate density left renal that was grossly unchanged.  The patient is seen back in office 11/15/2022 indicating that she still has chest discomfort that is slowly worsening and that she has a chronic paroxysmal cough but has not improved.  We discussed that she very likely has recurrent disease and plan to proceed with a guardant 360 examination initially as we determine whether we should try to proceed with therapy particularly immunotherapy versus targeted therapy recognizing the above findings.    Patient's guardant 360 returned as again significant for BRAF V600E and the potential use  of BRAF inhibitor/MET inhibitor dabrafenib and trametinib.  Additional information PIK3CA and use of alpelisib.    Unfortunately she required admission 11/28-12/1 with worsening back pain.  Fortunately she did not demonstrate evidence of metastatic disease but did have moderate degenerative changes which could cause radiculopathy.  Medications were altered to include subsequently MSR 15 mg p.o. every 12 hours, Norco as needed.    The patient now presents back 12/14/2022 to initiate therapy- initially with immunotherapy considering Caris study with PD-L1 TPS of 70% on 22 C3 and 28-8 assays.    Patient seen with her  and we discussed pain medications and adjustments thereafter and that she stopped taking MSIR having only a short supply and having to use Norco more frequently.  She is willing to initiate Keytruda today we have discussed that considering her genomics treatment versus her BRAF mutation is also an option.    C1 Keytruda 12/14/2022    Patient is seen back 1/4/2023 in follow-up due for cycle 2 Keytruda.  Patient states that since receiving her first cycle she has had decreased appetite and decreased energy.  She has not been able to bring herself to eat much and she has notably lost 7 pounds.  She has also been struggling with constipation in relation to ongoing narcotics for pain control.  She has been taking senna S2 tabs twice a day.  We discussed adding daily MiraLAX.    Patient did just last week meet with dietitian, Zoila Clinton, to discuss ways to improve caloric intake.  She has not been able to implement any of these things yet though.    The patient is next seen 1/25/2023 with mild weight gain.  She is seen with family member both indicating that she has actually felt somewhat better in terms of performance status and general function.  We have discussed proceeding with a third cycle treatment and reevaluating by scan in 2 weeks.    The patient proceeded with treatment 1/25/2023 and  underwent follow-up CT chest abdomen pelvis 2/8/2023 demonstrating small pericardial effusion that is decreased in size from 11/20/2022, ill-defined consolidation and pleural-based thickening in the left apex and upper lung has reduced slightly, additional index nodule soft tissue in the aspect the pericardium has not changed substantially, no evidence of metastatic disease in the abdomen pelvis.    The patient is seen with her daughter 2/15/2023 both indicating that she has definitely improved, stable weight, appetite and performance status.  She is also using her pain medication much less frequently and wonders whether she might begin to taper from her twice a day MS Contin.    Patient is seen back 3/8/2023 due for ongoing pembrolizumab.  She continues on low-dose prednisone 10 mg daily and has noted significant improvement in her energy and appetite with this.  She is reluctant to taper this any further we discussed that it is fine for her to stay on daily dosing.    She did try to taper her pain medication going down to just MS Contin 15 mg every 12 hours while holding her hydrocodone.  However over the last few days she has had some intermittent pain in the left upper back alleviated with hydrocodone 2-3 times a day.  She currently is denying any pain.  Monitor.    She is next evaluated 3/29/2023 for ongoing Keytruda.  Evidently her pain medication was sent to the wrong pharmacy and will need to be represcribed today-long-acting MS Contin.  Otherwise she is doing reasonably well at present.    Patient seen 5/31/2023 with gradual development of left shoulder and left scapular pain.  Concurrent CT chest, abdomen and pelvis demonstrate interval increasing consolidation left upper lobe with extension anterior to the left upper ribs with sclerosis anterior left second rib.  This is suspicious for worsening malignancy.  Plans were made for subsequent PET/CT where the patient's pain medications were adjusted.    Past  Medical History:   Diagnosis Date   • Allergic rhinitis    • Asthma    • Cancer of floor of mouth 2014   • Cerebral aneurysm    • COPD (chronic obstructive pulmonary disease)    • COVID 2020   • Esophageal reflux    • History of adenocarcinoma of lung    • History of anemia    • History of carcinoma in situ of skin    • History of lung cancer    • 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 from [facility]   • History of squamous cell carcinoma     Tongue   • Hyperlipidemia    • Hypertension     since early 60s   • Low vitamin B12 level    • Lung cancer    • Thyromegaly    • UTI (urinary tract infection)    • Vitamin D deficiency         Past Surgical History:   Procedure Laterality Date   • BRONCHOSCOPY Bilateral 10/17/2022    Procedure: BRONCHOSCOPY WITH FLUORO with biopsy and BAL;  Surgeon: India Flores MD;  Location: [facility] ENDOSCOPY;  Service: Pulmonary;  Laterality: Bilateral;  PRE/POST - lung mass   • CHOLECYSTECTOMY  1999   • COLONOSCOPY     • EMBOLIZATION CEREBRAL Left 6/29/2022    Procedure: 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;  Surgeon: Bradley Dowell MD;  Location: [facility] HYBRID OR 18/19;  Service: Interventional Radiology;  Laterality: Left;   • EYE SURGERY  11/24/2020    Took a muscle out of right eye   • GLOSSECTOMY PARTIAL      less than one half tongue   • LUNG SURGERY  2012   • ORAL LESION EXCISION/BIOPSY      June and August 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   • TUBAL ABDOMINAL LIGATION  1970   • VENOUS ACCESS DEVICE (PORT) INSERTION N/A 12/12/2022    Procedure: MEDIPORT PLACEMENT;  Surgeon: Jason Villaseñor MD;  Location: [facility] MAIN OR;  Service: Vascular;  Laterality: N/A;        Current Outpatient Medications on File Prior to Visit   Medication Sig Dispense Refill   • albuterol sulfate  (90 Base) MCG/ACT inhaler Inhale 2 puffs Every 4 (Four) Hours As Needed for Wheezing. 18 g 2   • amLODIPine (NORVASC) 5 MG tablet TAKE 1  TABLET BY MOUTH DAILY 90 tablet 0   • atorvastatin (LIPITOR) 20 MG tablet Take 1 tablet by mouth Every Night. 90 tablet 3   • cephalexin (KEFLEX) 500 MG capsule TAKE 1 CAPSULE BY MOUTH FOUR TIMES DAILY FOR 5 DAYS     • cetirizine (ZyrTEC) 10 MG tablet Take 1 tablet by mouth Daily.     • chlorhexidine (PERIDEX) 0.12 % solution Apply 15 mL to the mouth or throat 2 (Two) Times a Day.     • cholecalciferol (VITAMIN D3) 1000 units tablet Take 1 tablet by mouth Daily. HOLDING FOR DOS     • Cyanocobalamin (VITAMIN B12 PO) Take  by mouth.     • Diclofenac Sodium (VOLTAREN) 1 % gel gel Apply 4 g topically to the appropriate area as directed 4 (Four) Times a Day As Needed.     • fluticasone (FLONASE) 50 MCG/ACT nasal spray 2 sprays into the nostril(s) as directed by provider As Needed.     • hydroCHLOROthiazide (HYDRODIURIL) 25 MG tablet TAKE 1 TABLET EVERY DAY 90 tablet 1   • Ibuprofen (ADVIL PO) Take 400 mg by mouth Daily As Needed. HOLD PER MD INSTR     • lidocaine-prilocaine (EMLA) 2.5-2.5 % cream Apply nickel size amount to port site 30 min before appt time do not rub in cover with plastic wrap (Patient taking differently: 1 application As Needed. Apply nickel size amount to port site 30 min before appt time do not rub in cover with plastic wrap) 30 g 1   • metoprolol succinate XL (TOPROL-XL) 25 MG 24 hr tablet TAKE 1 TABLET EVERY DAY 90 tablet 1   • montelukast (SINGULAIR) 10 MG tablet Take 1 tablet by mouth Every Night. 90 tablet 3   • Morphine (MS CONTIN) 15 MG 12 hr tablet Take 1 tablet by mouth 2 (Two) Times a Day. 60 tablet 0   • omeprazole (priLOSEC) 40 MG capsule Take 1 capsule by mouth Daily.     • ondansetron (ZOFRAN) 8 MG tablet Take 1 tablet by mouth 3 (Three) Times a Day As Needed for Nausea or Vomiting. 30 tablet 5   • predniSONE (DELTASONE) 10 MG tablet TAKE 1 TABLET BY MOUTH DAILY 30 tablet 2   • Umeclidinium-Vilanterol (Anoro Ellipta) 62.5-25 MCG/ACT aerosol powder  inhaler Inhale 1 puff Daily As Needed.  "    • [DISCONTINUED] HYDROcodone-acetaminophen (NORCO) 5-325 MG per tablet Take 1 tablet by mouth Every 4 (Four) Hours As Needed for Moderate Pain. 100 tablet 0     No current facility-administered medications on file prior to visit.        ALLERGIES:    Allergies   Allergen Reactions   • Sulfa Antibiotics Rash        Social History     Socioeconomic History   • Marital status:    Tobacco Use   • Smoking status: Former     Types: Cigarettes     Quit date: 2015     Years since quittin.3   • Smokeless tobacco: Never   • Tobacco comments:     less than a pack per day, no smoking since , Quit 2015   Vaping Use   • Vaping Use: Never used   Substance and Sexual Activity   • Alcohol use: Yes     Comment: Socially -A few times a month   • Drug use: No   • Sexual activity: Defer     Family History   Problem Relation Age of Onset   • Ovarian cancer Mother          at age 27   • No Known Problems Father    • Ovarian cancer Sister    • Colon cancer Brother    • No Known Problems Other    • Malig Hyperthermia Neg Hx         Review of SystemsROS as per HPI     Objective     Vitals:    23 1131   BP: (!) 87/45   Pulse: 75   Resp: 16   Temp: 97.7 °F (36.5 °C)   TempSrc: Temporal   SpO2: 97%   Weight: 48 kg (105 lb 14.4 oz)   Height: 160 cm (62.99\")   PainSc:   6   PainLoc: Shoulder  Comment: left shoulder and arm pain         2023    11:33 AM   Current Status   ECOG score 0        Physical Exam  Constitutional:       Appearance: Normal appearance. She is normal weight.   HENT:      Head: Normocephalic and atraumatic.      Nose: Nose normal.      Mouth/Throat:      Mouth: Mucous membranes are moist. No oral lesions.      Pharynx: Oropharynx is clear.   Eyes:      Extraocular Movements: Extraocular movements intact.   Cardiovascular:      Rate and Rhythm: Normal rate and regular rhythm.      Pulses: Normal pulses.      Heart sounds: Normal heart sounds.   Pulmonary:      Effort: Pulmonary effort " is normal.      Breath sounds: Normal breath sounds.      Comments: Distant breath sounds bilateral bases  Abdominal:      General: Abdomen is flat. Bowel sounds are normal.      Palpations: Abdomen is soft.   Musculoskeletal:         General: Normal range of motion.      Cervical back: Normal range of motion and neck supple.   Skin:     General: Skin is warm and dry.   Neurological:      General: No focal deficit present.      Mental Status: She is alert and oriented to person, place, and time.   Psychiatric:         Mood and Affect: Mood normal.         Behavior: Behavior normal.         I have reexamined the patient and the results are consistent with the previously documented exam. Henry Escobar MD      RECENT LABS:  Results from last 7 days   Lab Units 05/31/23  1107   WBC 10*3/mm3 12.68*   NEUTROS ABS 10*3/mm3 10.85*   HEMOGLOBIN g/dL 13.5   HEMATOCRIT % 42.6   PLATELETS 10*3/mm3 258     Results from last 7 days   Lab Units 05/31/23  1107   SODIUM mmol/L 139   POTASSIUM mmol/L 3.7   CHLORIDE mmol/L 97*   CO2 mmol/L 28.2   BUN mg/dL 14   CREATININE mg/dL 0.62   CALCIUM mg/dL 9.6   ALBUMIN g/dL 3.8   BILIRUBIN mg/dL 0.2   ALK PHOS U/L 62   ALT (SGPT) U/L 9   AST (SGOT) U/L 18   GLUCOSE mg/dL 127*               Assessment & Plan     1.  Carcinoma of the lung  · May 2015, status post previous left upper lobectomy for carcinoma of the lung.    2.  Carcinoma of the tongue  · history of a [id number] SCCa of the rightt retromolar trigone   · 2016 s/p WLE, buccal fat pad flap and SLN biopsy that was negative, margins were negative.   · She underwent laryngoscopy 8/18/2021 with right base of tongue without evidence of lesions or masses in the region.    3.  Moderately differentiated adenocarcinoma of the lung.  · CT of the chest 6/16/2021 demonstrated postsurgical changes, 8 mm irregular nodule medial segment of right lower lobe and diffuse changes of emphysema.    · She is seen in thoracic clinic with findings  suspicious for new malignancy and concern of the patient being a poor operative candidate.    · 7/13/2021 PET CT demonstrated ill-defined avidity and soft tissue left aspect of the mediastinum, 1 cm hypermetabolic pulmonary nodule and asymmetric thickening involving the right base of tongue as well as subtle uptake in the L1 vertebral body.    · This patient's case was discussed in thoracic clinic and plans were made to request an MRI of the lumbar spine, obtain a biopsy of the mediastinal involvement of the left had the patient reviewed by ENT.  · Biopsy 8/13/2021 found to be consistent with invasive moderately differentiated adenocarcinoma with PD-L1 TPS score 40%.    · 7/24/2021 MRI of the lumbar spine revealed no evidence of metastatic disease though the patient did have multilevel degenerative disease throughout the lumbar spine.    · Her findings were consistent with 2 separate lesions including a nodule in the superior segment of the right lower lobe and a mass in the upper portion of the left chest with the left chest lesion biopsy positive for adenocarcinoma.  The consensus was that these were to separate-synchronous primaries.  Stereotactic radiation each lesions were felt to be the appropriate way to proceed and the patient was referred to radiation therapy.  · The patient was referred for radiation therapy and she was able to proceed with SBRT to the right lung at primary #1 with 5 treatments at 1000 cGy per fraction in the left lung primary similarly at 1000 cGy per fraction x5.  Her treatment ranged between 8/31-9/13/2021.    · Her guardant 360 did not determine any new abnormalities but her Caris-MI profile-does reveal sensitivity to immunotherapy as well as a BRAF mutation.  · PET/CT 11/23/2021 demonstrates decrease in size and avidity of the previously noted intensely FDG avid nodules left lobectomy operative site and right lower lobe.  Surrounding groundglass and pulmonary opacification is  consistent with postradiation changes, a few new subcentimeter pulmonary nodules are present in the right upper lobe and indeterminant, stable subcentimeter hepatic lesion is below PET resolution no other findings of FDG-avid disease are seen in neck abdomen or pelvis.  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  There is evolution of presumed postradiation changes in the right midlung with soft tissue mass within the left lumpectomy operative bed minimally decreased in size.  There is a sub-6 mm nodule in the right upper lobe not definitively seen, indeterminate and an indeterminate left renal lesion at 1.5 cm unchanged from 7/13/2021.  · Admission 10/14 - 10/18/2022 presenting with shortness of breath and cough that was nonproductive.  She had been on oral antibiotics and did not improved and was admitted for therapy for apparent pneumonia.  This eventually led to bronchoscopy after initial CT revealed postsurgical changes, new masslike 6.7 cm area of consolidation anterior left lung raising concern for potential malignancy and a follow-up PET/CT planned.   · Bronchoscopy and biopsy left lower lung failed to show evidence of malignancy.    · 11/9/2022 PET/CT, demonstrated an irregular pleural-based soft tissue density thickening in the left upper lobe that was intensely FDG avid new from 6/8/2022 thought to be a malignant recurrence with spread into the left lung parenchyma.  There is intensely pleural-based avidity within the left lower lobe thought to be metastatic disease with postradiation of the left apex as well.  There is a small focus of uptake in the right hilum grossly unchanged in size and post radiation changes in the right lower lobe.  There is indeterminate density left renal that was grossly unchanged.    · Patient's guardant 360 returned as again significant for BRAF V600E and the  potential use of BRAF inhibitor/MET inhibitor dabrafenib and trametinib.  Additional information PIK3CA and use of alpelisib.  · The patient now presents back 12/14/2022 to initiate therapy- initially with immunotherapy considering Caris study with PD-L1 TPS of 70% on 22 C3 and 28-8 assays.  · Single agent Keytruda initiated 12/14/2022  · 2/8/2023 CT of the chest, abdomen pelviswith consolidation and masslike pleural thickening in the left apex and upper lung index areas have decreased somewhat.  There is no evidence of metastatic disease otherwise and a few indeterminate left renal lesions are stable.  After lengthy discussion with the patient and her daughter as to the stability to mild improvement with immunotherapy it is agreed that we would continue treatment at this point.  · Proceed today, 4/19/2023 with cycle 7 pembrolizumab which is continued every 3 weeks  · The patient proceeded to 8 cycles of pembrolizumab and underwent follow-up chest CT chest, abdomen pelvis revealing evolution of dense consolidation left upper lobe and extension of soft tissue mass anterior to left upper ribs with increase sclerosis anterior left second rib.  This was concerning for progression of disease versus radiation change and the patient was scheduled for subsequent PET/CT.    4.  Worsening back pain  · Admission 11/28/2022 through 12/1/2022.  · MRI of the cervical and thoracic spine fortunately failed to demonstrate metastatic disease.  Moderate degenerative changes noted which could cause radiculopathy.  · Medication altered to include MS Contin 15 mg every 12 hours and Norco for breakthrough pain  · She reports today her pain is adequately controlled  · Patient with increasing pain 5/31/2023 with pain level of 6.  MS Contin was increased to 50 mg p.o. every 8 hours and Norco 10/325 #101 p.o. every 6 hours to move to every 4 hours as needed-E scribed to pharmacy    5.  Poor appetite and malnutrition  · Placed on prednisone 10  mg daily 1/4/2023 with significant improvement in her symptoms  · Weight is stable today at just below 106 pounds      PLAN:  1. Proceed today with cycle 9 pembrolizumab which is continued every 3 weeks  2. Schedule PET/CT next week  3. Continue prednisone 10 mg daily  4. Continue MS Contin 15 mg every 8 hours  5. Continue Norco increased to 10/325 1 tablet every 6 hours as needed breakthrough pain, E scribed to pharmacy  6. Return in 2 weeks MD  7. Additional treatment options include BRAF inhibitor, chemotherapy directed for histology lung adenocarcinoma    This patient is on high risk drug therapy requiring intensive monitoring for toxicity.      I spent 48 minutes caring for Darlene on this date of service. This time includes time spent by me in the following activities: preparing for the visit, reviewing tests, obtaining and/or reviewing a separately obtained history, performing a medically appropriate examination and/or evaluation, counseling and educating the patient/family/caregiver, ordering medications, tests, or procedures, referring and communicating with other health care professionals, documenting information in the medical record, independently interpreting results and communicating that information with the patient/family/caregiver and care coordination.       Henry Escobar MD  05/31/2023

## 2023-06-01 DIAGNOSIS — C34.12 MALIGNANT NEOPLASM OF UPPER LOBE OF LEFT LUNG: ICD-10-CM

## 2023-06-01 DIAGNOSIS — Z85.118 HISTORY OF LUNG CANCER: ICD-10-CM

## 2023-06-01 NOTE — TELEPHONE ENCOUNTER
Caller: Darlene Pfeiffer S    Relationship: Self    Best call back number: [phone]    Requested Prescriptions:   Requested Prescriptions     Pending Prescriptions Disp Refills   • Morphine (MS CONTIN) 15 MG 12 hr tablet 60 tablet 0     Sig: Take 1 tablet by mouth 2 (Two) Times a Day.        Pharmacy where request should be sent: [id number] - [address] - [phone]  - [phone]     Last office visit with prescribing clinician: 5/31/2023   Last telemedicine visit with prescribing clinician: Visit date not found   Next office visit with prescribing clinician: Visit date not found       Does the patient have less than a 3 day supply:  [] Yes  [x] No    Would you like a call back once the refill request has been completed: [] Yes [x] No    If the office needs to give you a call back, can they leave a voicemail: [x] Yes [] No

## 2023-06-05 ENCOUNTER — HOSPITAL ENCOUNTER (OUTPATIENT)
Dept: PET IMAGING | Facility: HOSPITAL | Age: 83
Discharge: HOME | End: 2023-06-05
Payer: MEDICARE

## 2023-06-05 ENCOUNTER — TELEPHONE (OUTPATIENT)
Dept: ONCOLOGY | Facility: CLINIC | Age: 83
End: 2023-06-05
Payer: MEDICARE

## 2023-06-05 DIAGNOSIS — Z85.118 HISTORY OF LUNG CANCER: ICD-10-CM

## 2023-06-05 DIAGNOSIS — C34.11 MALIGNANT NEOPLASM OF UPPER LOBE OF RIGHT LUNG: ICD-10-CM

## 2023-06-05 LAB — GLUCOSE BLDC GLUCOMTR-MCNC: 88 MG/DL (ref 70–130)

## 2023-06-05 PROCEDURE — A9552 F18 FDG: HCPCS | Performed by: INTERNAL MEDICINE

## 2023-06-05 PROCEDURE — 78815 PET IMAGE W/CT SKULL-THIGH: CPT

## 2023-06-05 PROCEDURE — 82948 REAGENT STRIP/BLOOD GLUCOSE: CPT

## 2023-06-05 PROCEDURE — 0 FLUDEOXYGLUCOSE F18 SOLUTION: Performed by: INTERNAL MEDICINE

## 2023-06-05 RX ORDER — MORPHINE SULFATE 15 MG/1
15 TABLET, FILM COATED, EXTENDED RELEASE ORAL 2 TIMES DAILY
Qty: 60 TABLET | Refills: 0 | Status: SHIPPED | OUTPATIENT
Start: 2023-06-05

## 2023-06-05 RX ADMIN — FLUDEOXYGLUCOSE F18 1 DOSE: 300 INJECTION INTRAVENOUS at 08:39

## 2023-06-05 NOTE — TELEPHONE ENCOUNTER
I called yesterday to get my morphine prescription refilled for the weekend because I am out until Monday and I have not received it, and they are supposed to call [facility] to get it filled. I just called, they have not received it yet.  06/02/2023 05:06 PM

## 2023-06-05 NOTE — TELEPHONE ENCOUNTER
Call to Ms. Pfeiffer to let her know the prescription will be routed to Dr. Escobar.  Patient verbalized understanding and thanks.

## 2023-06-10 NOTE — PROGRESS NOTES
REASON FOR FOLLOW-UP: Recurrent lung cancer.    History of Present Illness    The patient is a 82 y.o. female with the above-mentioned history, who returns the office today in anticipation of pembrolizumab which she continues to receive every 3 weeks.  She is due today for her ninth cycle.  Thankfully, she continues to have excellent tolerance to ongoing immunotherapy.  She denies any new or worsening shortness of breath.  She has no chest pain.  Her oxygen needs are unchanged.  She has no rash.  Her bowels are moving normally.  She is eating and drinking without difficulty.  She is overall feeling very well.  Her energy is adequate and she, again can accomplish activities that she desires.      She is, however,, beginning to note left shoulder and scapular pain.  This has been present over the last 2 to 3 weeks and CT of chest, abdomen pelvis performed 5/24/2023 were performed now revealing dense consolidation left apex along the left margin of the aortic arch increased to more consolidated measuring 4.9 x 5.0 cm previously 4.4 x 4.0 cm with increase sclerosis of the anterior left second rib.  Extension soft tissue density anterior to the left first and second ribs represents a change adversely with index area measuring 3.7 x 4.4 previously 1.8 x 2.9.  This may be post radiation this is concerning for progression of disease and would explain the patient's progressive symptoms.  We have discussed that she should undergo a PET/CT to help clarify these issues.  We will continue, at this point, her Keytruda as well as adjust her pain medications.    Follow-up PET/CT now available reveals progression of disease in left upper thorax, left supraclavicular adenopathy new metastasis left posterior fourth ribs, no evidence of metastatic disease in the abdomen or pelvis.    The patient is seen with her son and daughter 6/12/2023 and it is clear that she is not developing a Pancoast like syndrome with progression of her  malignancy in the left upper thorax and associated symptoms.  We have discussed discontinuance of her immunotherapy and moving to targeted therapy with dabrafenib and trametinib as we also request radiation therapy repeat consultation and try to manage her pain more effectively.    ONCOLOGY HISTORY:      The patient is an 82-year-old female with the below medical history including chronic obstructive pulmonary disease who was seen in the [facility] multidisciplinary thoracic oncology clinic July 1, 2021.  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      She had undergone a CT of the chest at [facility] 6/16/2021 showing postsurgical changes in the left chest from right upper lobectomy, 8 mm irregular nodule medial segment of the right lower lobe and diffuse changes of emphysema with fibrotic changes in the periphery, no suspicious hilar or mediastinal nodes, no pleural effusion.  New finding was suspicious for new malignancy with the patient felt to be a poor candidate for surgical resection.  A PET CT and PFTs were requested thereafter.  The PET/CT demonstrated ill-defined FDG avid soft tissue thickening left aspect mediastinum within the operative bed, 1 cm hypermetabolic pulmonary nodule right lower lobe and asymmetric thickening patchy uptake involving the right base of tongue.  There is subtle uptake within the L1 vertebral body which is indeterminate and a sub-6 mm pulmonary nodule of right lung and subcentimeter hypodense hepatic lesion which was below PET resolution.       The patient's case was discussed in thoracic conference 7/22/2021 with consideration of the patient undergoing L1 vertebral body MRI, confirmatory biopsy left mediastinal and assessment by ENT (Dr. Caballero) who had worked with the  patient previously.  She, incidentally, indicates that radiation therapy was given apparently intraoperatively at her recent surgery?  She still has issues with difficulty chewing and swallowing post procedures and was to be followed up with Dr. Caballero in the near future as planned.                                                            She was seen in the thoracic clinic on the same day with plans to proceed with MRI of the lumbar spine, biopsy left mediastinal nodular area of potential disease and follow-up of her ENT assessment as well as undergo a guardant 360 assessment in our office.  She underwent the biopsy 8/13/2021 found to be consistent with invasive moderately differentiated adenocarcinoma with PD-L1 TPS score 40%.  MRI of the lumbar spine revealed no evidence of metastatic disease though the patient did have multilevel degenerative disease throughout the lumbar spine.  She had an office follow-up with Dr. 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  She underwent laryngoscopy 8/18/2021 with right base of tongue without evidence of lesions or masses in the region.     She was seen by Dr. Hernandez 8/19/2021 and, her findings, had been presented in lung conference.  Her findings were consistent with 2 separate lesions including a nodule in the superior segment of the right lower lobe and a mass in the upper portion of the left chest with the left chest lesion biopsy positive for adenocarcinoma.  The consensus was that these were to separate-synchronous primaries.  Stereotactic radiation each lesions were felt to be the appropriate way to proceed and the patient was referred to radiation therapy.     The patient is seen in office 8/23/2021 agreeable to the radiation therapy as well as additional assessments including Caris molecular assessment of her biopsy.  Again her guardant 360 is  currently pending and we would follow her after she completes radiation therapy with subsequent scans.      She was able to proceed with SBRT to the right lung at primary #1 with 5 treatments at 1000 cGy per fraction in the left lung primary similarly at 1000 cGy per fraction x5.  Her treatment ranged between 8/31-9/13/2021.  She is now scheduled for follow-up 11/23/2021.    The patient subsequent genomic testing is discussed with her as she is is seen back 9/23/2021.  Her guardant 360 did not determine any new abnormalities but her Caris-MI profile-does reveal sensitivity to immunotherapy as well as a BRAF mutation.  This could be extremely important as we follow the patient from this point.  Fortunately she is feeling extremely well and quite pleased about her treatments.    Patient had subsequent PET/CT 11/23/2021 demonstrates decrease in size and avidity of the previously noted intensely FDG avid nodules left lobectomy operative site and right lower lobe.  Surrounding groundglass and pulmonary opacification is consistent with postradiation changes, a few new subcentimeter pulmonary nodules are present in the right upper lobe and indeterminant, stable subcentimeter hepatic lesion is below PET resolution no other findings of FDG-avid disease are seen in neck abdomen or pelvis.  The patient seen in office 12/1/2021 with a relatively good performance status stating she is not having any new symptoms and very pleased about her results on her PET/CT.  She realizes we'll have to follow this further but subsequent scans in 6 months.  She is also hoping to see an oral surgeon that previously helped her-Dr. Wu.    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  There is evolution of presumed postradiation changes in the right midlung with soft tissue mass  within the left lumpectomy operative bed minimally decreased in size.  There is a sub-6 mm nodule in the right upper lobe not definitively seen, indeterminate and an indeterminate left renal lesion at 1.5 cm unchanged from 7/13/2021.  The patient is currently scheduled to see Dr. Dowell on 6/23/2022.  She is feeling well without any additional symptoms.    The patient required admission 10/14 - 10/18/2022 presenting with shortness of breath and cough that was nonproductive.  She had been on oral antibiotics and did not improved and was admitted for therapy for apparent pneumonia.  This eventually led to bronchoscopy after initial CT revealed postsurgical changes, new masslike 6.7 cm area of consolidation anterior left lung raising concern for potential malignancy and a follow-up PET/CT planned.  Bronchoscopy and biopsy left lower lung failed to show evidence of malignancy.  The patient's PET/CT, however, demonstrated an irregular pleural-based soft tissue density thickening in the left upper lobe that was intensely FDG avid new from 6/8/2022 thought to be a malignant recurrence with spread into the left lung parenchyma.  There is intensely pleural-based avidity within the left lower lobe thought to be metastatic disease with postradiation of the left apex as well.  There is a small focus of uptake in the right hilum grossly unchanged in size and post radiation changes in the right lower lobe.  There is indeterminate density left renal that was grossly unchanged.  The patient is seen back in office 11/15/2022 indicating that she still has chest discomfort that is slowly worsening and that she has a chronic paroxysmal cough but has not improved.  We discussed that she very likely has recurrent disease and plan to proceed with a guardant 360 examination initially as we determine whether we should try to proceed with therapy particularly immunotherapy versus targeted therapy recognizing the above findings.    Patient's  guardant 360 returned as again significant for BRAF V600E and the potential use of BRAF inhibitor/MET inhibitor dabrafenib and trametinib.  Additional information [id number] and use of alpelisib.    Unfortunately she required admission 11/28-12/1 with worsening back pain.  Fortunately she did not demonstrate evidence of metastatic disease but did have moderate degenerative changes which could cause radiculopathy.  Medications were altered to include subsequently MSR 15 mg p.o. every 12 hours, Norco as needed.    The patient now presents back 12/14/2022 to initiate therapy- initially with immunotherapy considering Caris study with PD-L1 TPS of 70% on 22 C3 and 28-8 assays.    Patient seen with her  and we discussed pain medications and adjustments thereafter and that she stopped taking MSIR having only a short supply and having to use Norco more frequently.  She is willing to initiate Keytruda today we have discussed that considering her genomics treatment versus her BRAF mutation is also an option.    C1 Keytruda 12/14/2022    Patient is seen back 1/4/2023 in follow-up due for cycle 2 Keytruda.  Patient states that since receiving her first cycle she has had decreased appetite and decreased energy.  She has not been able to bring herself to eat much and she has notably lost 7 pounds.  She has also been struggling with constipation in relation to ongoing narcotics for pain control.  She has been taking senna S2 tabs twice a day.  We discussed adding daily MiraLAX.    Patient did just last week meet with dietitian, Zoila Clinton, to discuss ways to improve caloric intake.  She has not been able to implement any of these things yet though.    The patient is next seen 1/25/2023 with mild weight gain.  She is seen with family member both indicating that she has actually felt somewhat better in terms of performance status and general function.  We have discussed proceeding with a third cycle treatment and reevaluating  by scan in 2 weeks.    The patient proceeded with treatment 1/25/2023 and underwent follow-up CT chest abdomen pelvis 2/8/2023 demonstrating small pericardial effusion that is decreased in size from 11/20/2022, ill-defined consolidation and pleural-based thickening in the left apex and upper lung has reduced slightly, additional index nodule soft tissue in the aspect the pericardium has not changed substantially, no evidence of metastatic disease in the abdomen pelvis.    The patient is seen with her daughter 2/15/2023 both indicating that she has definitely improved, stable weight, appetite and performance status.  She is also using her pain medication much less frequently and wonders whether she might begin to taper from her twice a day MS Contin.    Patient is seen back 3/8/2023 due for ongoing pembrolizumab.  She continues on low-dose prednisone 10 mg daily and has noted significant improvement in her energy and appetite with this.  She is reluctant to taper this any further we discussed that it is fine for her to stay on daily dosing.    She did try to taper her pain medication going down to just MS Contin 15 mg every 12 hours while holding her hydrocodone.  However over the last few days she has had some intermittent pain in the left upper back alleviated with hydrocodone 2-3 times a day.  She currently is denying any pain.  Monitor.    She is next evaluated 3/29/2023 for ongoing Keytruda.  Evidently her pain medication was sent to the wrong pharmacy and will need to be represcribed today-long-acting MS Contin.  Otherwise she is doing reasonably well at present.    Patient seen 5/31/2023 with gradual development of left shoulder and left scapular pain.  Concurrent CT chest, abdomen and pelvis demonstrate interval increasing consolidation left upper lobe with extension anterior to the left upper ribs with sclerosis anterior left second rib.  This is suspicious for worsening malignancy.  Plans were made for  subsequent PET/CT where the patient's pain medications were adjusted.PET/CT 6/5/2023 reveals progression of disease in left upper thorax, left supraclavicular adenopathy new metastasis left posterior fourth ribs, no evidence of metastatic disease in the abdomen or pelvis.    The patient is seen with her son and daughter 6/12/2023 and it is clear that she is not developing a Pancoast like syndrome with progression of her malignancy in the left upper thorax and associated symptoms.  We have discussed discontinuance of her immunotherapy and moving to targeted therapy with dabrafenib and trametinib as we also request radiation therapy repeat consultation and try to manage her pain more effectively.    Past Medical History:   Diagnosis Date    Allergic rhinitis     Asthma     Cancer of floor of mouth 2014    Cerebral aneurysm     COPD (chronic obstructive pulmonary disease)     COVID 2020    Esophageal reflux     History of adenocarcinoma of lung     History of anemia     History of carcinoma in situ of skin     History of lung cancer     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 from [facility]    History of squamous cell carcinoma     Tongue    Hyperlipidemia     Hypertension     since early 60s    Low vitamin B12 level     Lung cancer     Thyromegaly     UTI (urinary tract infection)     Vitamin D deficiency         Past Surgical History:   Procedure Laterality Date    BRONCHOSCOPY Bilateral 10/17/2022    Procedure: BRONCHOSCOPY WITH FLUORO with biopsy and BAL;  Surgeon: India Flores MD;  Location: [facility] ENDOSCOPY;  Service: Pulmonary;  Laterality: Bilateral;  PRE/POST - lung mass    CHOLECYSTECTOMY  1999    COLONOSCOPY      EMBOLIZATION CEREBRAL Left 6/29/2022    Procedure: 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;  Surgeon: Bradley Dowell MD;  Location: [facility] HYBRID OR 18/19;  Service: Interventional Radiology;  Laterality: Left;    EYE SURGERY   11/24/2020    Took a muscle out of right eye    GLOSSECTOMY PARTIAL      less than one half tongue    LUNG SURGERY  2012    ORAL LESION EXCISION/BIOPSY      June and August 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    TUBAL ABDOMINAL LIGATION  1970    VENOUS ACCESS DEVICE (PORT) INSERTION N/A 12/12/2022    Procedure: MEDIPORT PLACEMENT;  Surgeon: Jason Villaseñor MD;  Location: [facility] MAIN OR;  Service: Vascular;  Laterality: N/A;        Current Outpatient Medications on File Prior to Visit   Medication Sig Dispense Refill    albuterol sulfate  (90 Base) MCG/ACT inhaler Inhale 2 puffs Every 4 (Four) Hours As Needed for Wheezing. 18 g 2    amLODIPine (NORVASC) 5 MG tablet TAKE 1 TABLET BY MOUTH DAILY 90 tablet 0    atorvastatin (LIPITOR) 20 MG tablet Take 1 tablet by mouth Every Night. 90 tablet 3    cephalexin (KEFLEX) 500 MG capsule TAKE 1 CAPSULE BY MOUTH FOUR TIMES DAILY FOR 5 DAYS      cetirizine (ZyrTEC) 10 MG tablet Take 1 tablet by mouth Daily.      chlorhexidine (PERIDEX) 0.12 % solution Apply 15 mL to the mouth or throat 2 (Two) Times a Day.      cholecalciferol (VITAMIN D3) 1000 units tablet Take 1 tablet by mouth Daily. HOLDING FOR DOS      Cyanocobalamin (VITAMIN B12 PO) Take  by mouth.      Diclofenac Sodium (VOLTAREN) 1 % gel gel Apply 4 g topically to the appropriate area as directed 4 (Four) Times a Day As Needed.      fluticasone (FLONASE) 50 MCG/ACT nasal spray 2 sprays into the nostril(s) as directed by provider As Needed.      hydroCHLOROthiazide (HYDRODIURIL) 25 MG tablet TAKE 1 TABLET EVERY DAY 90 tablet 1    HYDROcodone-acetaminophen (NORCO)  MG per tablet Take 1 tablet by mouth Every 6 (Six) Hours As Needed for Moderate Pain. 100 tablet 0    Ibuprofen (ADVIL PO) Take 400 mg by mouth Daily As Needed. HOLD PER MD RESTREPO      lidocaine-prilocaine (EMLA) 2.5-2.5 % cream Apply nickel size amount to port site 30 min before appt time do not rub in cover with plastic wrap (Patient taking  "differently: 1 application As Needed. Apply nickel size amount to port site 30 min before appt time do not rub in cover with plastic wrap) 30 g 1    metoprolol succinate XL (TOPROL-XL) 25 MG 24 hr tablet TAKE 1 TABLET EVERY DAY 90 tablet 1    montelukast (SINGULAIR) 10 MG tablet Take 1 tablet by mouth Every Night. 90 tablet 3    Morphine (MS CONTIN) 15 MG 12 hr tablet Take 1 tablet by mouth 2 (Two) Times a Day. 60 tablet 0    omeprazole (priLOSEC) 40 MG capsule Take 1 capsule by mouth Daily.      ondansetron (ZOFRAN) 8 MG tablet Take 1 tablet by mouth 3 (Three) Times a Day As Needed for Nausea or Vomiting. 30 tablet 5    predniSONE (DELTASONE) 10 MG tablet TAKE 1 TABLET BY MOUTH DAILY 30 tablet 2    Umeclidinium-Vilanterol (Anoro Ellipta) 62.5-25 MCG/ACT aerosol powder  inhaler Inhale 1 puff Daily As Needed.       No current facility-administered medications on file prior to visit.        ALLERGIES:    Allergies   Allergen Reactions    Sulfa Antibiotics Rash        Social History     Socioeconomic History    Marital status:    Tobacco Use    Smoking status: Former     Types: Cigarettes     Quit date: 2015     Years since quittin.3    Smokeless tobacco: Never    Tobacco comments:     less than a pack per day, no smoking since , Quit 2015   Vaping Use    Vaping Use: Never used   Substance and Sexual Activity    Alcohol use: Yes     Comment: Socially -A few times a month    Drug use: No    Sexual activity: Defer     Family History   Problem Relation Age of Onset    Ovarian cancer Mother          at age 27    No Known Problems Father     Ovarian cancer Sister     Colon cancer Brother     No Known Problems Other     Malig Hyperthermia Neg Hx         Review of SystemsROS as per HPI     Objective     Vitals:    23 1343   BP: 155/73   Pulse: 83   Resp: 16   Temp: 98 °F (36.7 °C)   TempSrc: Tympanic   SpO2: 95%   Weight: 47.3 kg (104 lb 4.8 oz)   Height: 160 cm (62.99\")   PainSc:   8 "   PainLoc: Generalized         6/12/2023     1:45 PM   Current Status   ECOG score 0        Physical Exam  Constitutional:       Appearance: Normal appearance. She is normal weight.   HENT:      Head: Normocephalic and atraumatic.      Nose: Nose normal.      Mouth/Throat:      Mouth: Mucous membranes are moist. No oral lesions.      Pharynx: Oropharynx is clear.   Eyes:      Extraocular Movements: Extraocular movements intact.   Cardiovascular:      Rate and Rhythm: Normal rate and regular rhythm.      Pulses: Normal pulses.      Heart sounds: Normal heart sounds.   Pulmonary:      Effort: Pulmonary effort is normal.      Breath sounds: Normal breath sounds.      Comments: Distant breath sounds bilateral bases  Abdominal:      General: Abdomen is flat. Bowel sounds are normal.      Palpations: Abdomen is soft.   Musculoskeletal:         General: Normal range of motion.      Cervical back: Normal range of motion and neck supple.   Skin:     General: Skin is warm and dry.   Neurological:      General: No focal deficit present.      Mental Status: She is alert and oriented to person, place, and time.   Psychiatric:         Mood and Affect: Mood normal.         Behavior: Behavior normal.       I have reexamined the patient and the results are consistent with the previously documented exam. Henry Escobar MD      RECENT LABS:  Results from last 7 days   Lab Units 06/12/23  1330   WBC 10*3/mm3 9.09   NEUTROS ABS 10*3/mm3 7.62*   HEMOGLOBIN g/dL 13.4   HEMATOCRIT % 41.5   PLATELETS 10*3/mm3 262                     Assessment & Plan     1.  Carcinoma of the lung  May 2015, status post previous left upper lobectomy for carcinoma of the lung.    2.  Carcinoma of the tongue  history of a [id number] SCCa of the rightt retromolar trigone   2016 s/p WLE, buccal fat pad flap and SLN biopsy that was negative, margins were negative.   She underwent laryngoscopy 8/18/2021 with right base of tongue without evidence of lesions or  masses in the region.    3.  Moderately differentiated adenocarcinoma of the lung.  CT of the chest 6/16/2021 demonstrated postsurgical changes, 8 mm irregular nodule medial segment of right lower lobe and diffuse changes of emphysema.    She is seen in thoracic clinic with findings suspicious for new malignancy and concern of the patient being a poor operative candidate.    7/13/2021 PET CT demonstrated ill-defined avidity and soft tissue left aspect of the mediastinum, 1 cm hypermetabolic pulmonary nodule and asymmetric thickening involving the right base of tongue as well as subtle uptake in the L1 vertebral body.    This patient's case was discussed in thoracic clinic and plans were made to request an MRI of the lumbar spine, obtain a biopsy of the mediastinal involvement of the left had the patient reviewed by ENT.  Biopsy 8/13/2021 found to be consistent with invasive moderately differentiated adenocarcinoma with PD-L1 TPS score 40%.    7/24/2021 MRI of the lumbar spine revealed no evidence of metastatic disease though the patient did have multilevel degenerative disease throughout the lumbar spine.    Her findings were consistent with 2 separate lesions including a nodule in the superior segment of the right lower lobe and a mass in the upper portion of the left chest with the left chest lesion biopsy positive for adenocarcinoma.  The consensus was that these were to separate-synchronous primaries.  Stereotactic radiation each lesions were felt to be the appropriate way to proceed and the patient was referred to radiation therapy.  The patient was referred for radiation therapy and she was able to proceed with SBRT to the right lung at primary #1 with 5 treatments at 1000 cGy per fraction in the left lung primary similarly at 1000 cGy per fraction x5.  Her treatment ranged between 8/31-9/13/2021.    Her guardant 360 did not determine any new abnormalities but her Caris-MI profile-does reveal sensitivity to  immunotherapy as well as a BRAF mutation.  PET/CT 11/23/2021 demonstrates decrease in size and avidity of the previously noted intensely FDG avid nodules left lobectomy operative site and right lower lobe.  Surrounding groundglass and pulmonary opacification is consistent with postradiation changes, a few new subcentimeter pulmonary nodules are present in the right upper lobe and indeterminant, stable subcentimeter hepatic lesion is below PET resolution no other findings of FDG-avid disease are seen in neck abdomen or pelvis.  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  There is evolution of presumed postradiation changes in the right midlung with soft tissue mass within the left lumpectomy operative bed minimally decreased in size.  There is a sub-6 mm nodule in the right upper lobe not definitively seen, indeterminate and an indeterminate left renal lesion at 1.5 cm unchanged from 7/13/2021.  Admission 10/14 - 10/18/2022 presenting with shortness of breath and cough that was nonproductive.  She had been on oral antibiotics and did not improved and was admitted for therapy for apparent pneumonia.  This eventually led to bronchoscopy after initial CT revealed postsurgical changes, new masslike 6.7 cm area of consolidation anterior left lung raising concern for potential malignancy and a follow-up PET/CT planned.   Bronchoscopy and biopsy left lower lung failed to show evidence of malignancy.    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  There is intensely pleural-based avidity within the left lower lobe thought to be metastatic disease with postradiation of the left apex as well.  There is a small focus of  uptake in the right hilum grossly unchanged in size and post radiation changes in the right lower lobe.  There is indeterminate density left renal that was grossly unchanged.    Patient's guardant 360 returned as again significant for BRAF V600E and the potential use of BRAF inhibitor/MET inhibitor dabrafenib and trametinib.  Additional information [id number] and use of alpelisib.  The patient now presents back 12/14/2022 to initiate therapy- initially with immunotherapy considering Caris study with PD-L1 TPS of 70% on 22 C3 and 28-8 assays.  Single agent Keytruda initiated 12/14/2022 2/8/2023 CT of the chest, abdomen pelviswith consolidation and masslike pleural thickening in the left apex and upper lung index areas have decreased somewhat.  There is no evidence of metastatic disease otherwise and a few indeterminate left renal lesions are stable.  After lengthy discussion with the patient and her daughter as to the stability to mild improvement with immunotherapy it is agreed that we would continue treatment at this point.  Proceed today, 4/19/2023 with cycle 7 pembrolizumab which is continued every 3 weeks  The patient proceeded to 8 cycles of pembrolizumab and underwent follow-up chest CT chest, abdomen pelvis revealing evolution of dense consolidation left upper lobe and extension of soft tissue mass anterior to left upper ribs with increase sclerosis anterior left second rib.  This was concerning for progression of disease versus radiation change and the patient was scheduled for subsequent PET/CT.  PET/CT 6/5/2023  reveals progression of disease in left upper thorax, left supraclavicular adenopathy new metastasis left posterior fourth ribs, no evidence of metastatic disease in the abdomen or pelvis.  Patient seen 6/12/2023 with plans to move her Norco to every 4 hours, discontinue Keytruda, make application for dabrafenib and trametinib at 150 mg p.o. every 12 hours and 2 mg p.o. daily respectively.  Patient  referred for radiation therapy consultation concurrently.    4.  Worsening back pain  Admission 11/28/2022 through 12/1/2022.  MRI of the cervical and thoracic spine fortunately failed to demonstrate metastatic disease.  Moderate degenerative changes noted which could cause radiculopathy.  Medication altered to include MS Contin 15 mg every 12 hours and Norco for breakthrough pain  She reports today her pain is adequately controlled  Patient with increasing pain 5/31/2023 with pain level of 6.  MS Contin was increased to 50 mg p.o. every 8 hours and Norco 10/325 #101 p.o. every 6 hours to move to every 4 hours as needed-E scribed to pharmacy  Patient seen 6/12/2023 with Norco moved to every 4 hours.    5.  Poor appetite and malnutrition  Placed on prednisone 10 mg daily 1/4/2023 with significant improvement in her symptoms  Weight is stable today at just below 106 pounds  Patient assessed 6 X12/23 with possible gummy therapy.      PLAN:  Discontinue pembrolizumab  Patient does not use MS Contin consistently and has discontinued it.  Norco 10/325 moved to every 4 hours  Radiation therapy consultation next available  [facility] pharmacy contacted for the use of dabrafenib at 150 mg p.o. twice daily, trametinib 2 mg p.o. daily  Patient requested to contact us about pain control in the next 2 days with a change in Norco, Benadryl containing sleep medications?  Discontinuance of prednisone, THC use  MD follow-up 2 to 3 weeks.    This patient is on high risk drug therapy requiring intensive monitoring for toxicity.    I spent 68 minutes caring for Darlene on this date of service. This time includes time spent by me in the following activities: preparing for the visit, reviewing tests, obtaining and/or reviewing a separately obtained history, performing a medically appropriate examination and/or evaluation, counseling and educating the patient/family/caregiver, ordering medications, tests, or procedures, referring and communicating  with other health care professionals, documenting information in the medical record, independently interpreting results and communicating that information with the patient/family/caregiver, and care coordination.       Henry Escobar MD  06/12/2023

## 2023-06-12 ENCOUNTER — INFUSION (OUTPATIENT)
Dept: ONCOLOGY | Facility: HOSPITAL | Age: 83
End: 2023-06-12
Payer: MEDICARE

## 2023-06-12 ENCOUNTER — OFFICE VISIT (OUTPATIENT)
Dept: ONCOLOGY | Facility: CLINIC | Age: 83
End: 2023-06-12
Payer: MEDICARE

## 2023-06-12 ENCOUNTER — SPECIALTY PHARMACY (OUTPATIENT)
Dept: PHARMACY | Facility: HOSPITAL | Age: 83
End: 2023-06-12
Payer: MEDICARE

## 2023-06-12 VITALS
BODY MASS INDEX: 18.48 KG/M2 | WEIGHT: 104.3 LBS | HEIGHT: 63 IN | OXYGEN SATURATION: 95 % | SYSTOLIC BLOOD PRESSURE: 155 MMHG | TEMPERATURE: 98 F | RESPIRATION RATE: 16 BRPM | DIASTOLIC BLOOD PRESSURE: 73 MMHG | HEART RATE: 83 BPM

## 2023-06-12 DIAGNOSIS — Z45.2 FITTING AND ADJUSTMENT OF VASCULAR CATHETER: Primary | ICD-10-CM

## 2023-06-12 DIAGNOSIS — Z85.118 HISTORY OF LUNG CANCER: ICD-10-CM

## 2023-06-12 DIAGNOSIS — C34.11 MALIGNANT NEOPLASM OF UPPER LOBE OF RIGHT LUNG: Primary | ICD-10-CM

## 2023-06-12 DIAGNOSIS — C34.11 MALIGNANT NEOPLASM OF UPPER LOBE OF RIGHT LUNG: ICD-10-CM

## 2023-06-12 LAB
BASOPHILS # BLD AUTO: 0.01 10*3/MM3 (ref 0–0.2)
BASOPHILS NFR BLD AUTO: 0.1 % (ref 0–1.5)
DEPRECATED RDW RBC AUTO: 45.1 FL (ref 37–54)
EOSINOPHIL # BLD AUTO: 0 10*3/MM3 (ref 0–0.4)
EOSINOPHIL NFR BLD AUTO: 0 % (ref 0.3–6.2)
ERYTHROCYTE [DISTWIDTH] IN BLOOD BY AUTOMATED COUNT: 13.2 % (ref 12.3–15.4)
HCT VFR BLD AUTO: 41.5 % (ref 34–46.6)
HGB BLD-MCNC: 13.4 G/DL (ref 12–15.9)
IMM GRANULOCYTES # BLD AUTO: 0.02 10*3/MM3 (ref 0–0.05)
IMM GRANULOCYTES NFR BLD AUTO: 0.2 % (ref 0–0.5)
LYMPHOCYTES # BLD AUTO: 1.07 10*3/MM3 (ref 0.7–3.1)
LYMPHOCYTES NFR BLD AUTO: 11.8 % (ref 19.6–45.3)
MCH RBC QN AUTO: 30.3 PG (ref 26.6–33)
MCHC RBC AUTO-ENTMCNC: 32.3 G/DL (ref 31.5–35.7)
MCV RBC AUTO: 93.9 FL (ref 79–97)
MONOCYTES # BLD AUTO: 0.37 10*3/MM3 (ref 0.1–0.9)
MONOCYTES NFR BLD AUTO: 4.1 % (ref 5–12)
NEUTROPHILS NFR BLD AUTO: 7.62 10*3/MM3 (ref 1.7–7)
NEUTROPHILS NFR BLD AUTO: 83.8 % (ref 42.7–76)
NRBC BLD AUTO-RTO: 0 /100 WBC (ref 0–0.2)
PLATELET # BLD AUTO: 262 10*3/MM3 (ref 140–450)
PMV BLD AUTO: 9.3 FL (ref 6–12)
RBC # BLD AUTO: 4.42 10*6/MM3 (ref 3.77–5.28)
WBC NRBC COR # BLD: 9.09 10*3/MM3 (ref 3.4–10.8)

## 2023-06-12 PROCEDURE — 36591 DRAW BLOOD OFF VENOUS DEVICE: CPT

## 2023-06-12 PROCEDURE — 25010000002 HEPARIN LOCK FLUSH PER 10 UNITS: Performed by: INTERNAL MEDICINE

## 2023-06-12 PROCEDURE — 85025 COMPLETE CBC W/AUTO DIFF WBC: CPT

## 2023-06-12 RX ORDER — SODIUM CHLORIDE 0.9 % (FLUSH) 0.9 %
10 SYRINGE (ML) INJECTION AS NEEDED
OUTPATIENT
Start: 2023-06-12

## 2023-06-12 RX ORDER — SODIUM CHLORIDE 0.9 % (FLUSH) 0.9 %
10 SYRINGE (ML) INJECTION AS NEEDED
Status: DISCONTINUED | OUTPATIENT
Start: 2023-06-12 | End: 2023-06-12 | Stop reason: HOSPADM

## 2023-06-12 RX ORDER — HEPARIN SODIUM (PORCINE) LOCK FLUSH IV SOLN 100 UNIT/ML 100 UNIT/ML
500 SOLUTION INTRAVENOUS AS NEEDED
Status: DISCONTINUED | OUTPATIENT
Start: 2023-06-12 | End: 2023-06-12 | Stop reason: HOSPADM

## 2023-06-12 RX ORDER — HEPARIN SODIUM (PORCINE) LOCK FLUSH IV SOLN 100 UNIT/ML 100 UNIT/ML
500 SOLUTION INTRAVENOUS AS NEEDED
OUTPATIENT
Start: 2023-06-12

## 2023-06-12 RX ADMIN — Medication 500 UNITS: at 13:36

## 2023-06-12 RX ADMIN — Medication 10 ML: at 13:36

## 2023-06-12 NOTE — LETTER
June 12, 2023     Meredith Lea Kehrer, MD  [address]  [address]  [facility] [address]    Patient: Darlene Pfeiffer   YOB: 1940   Date of Visit: 6/12/2023       Dear Dr. Kehrer, MD:    Thank you for referring Darlene Pfeiffer to me for evaluation. Below are the relevant portions of my assessment and plan of care.    If you have questions, please do not hesitate to call me. I look forward to following Darlene along with you.         Sincerely,        Henry Escobar MD        CC: No Recipients    Henry Escobar MD  06/12/23 1540  Sign when Signing Visit      REASON FOR FOLLOW-UP: Recurrent lung cancer.    History of Present Illness    The patient is a 82 y.o. female with the above-mentioned history, who returns the office today in anticipation of pembrolizumab which she continues to receive every 3 weeks.  She is due today for her ninth cycle.  Thankfully, she continues to have excellent tolerance to ongoing immunotherapy.  She denies any new or worsening shortness of breath.  She has no chest pain.  Her oxygen needs are unchanged.  She has no rash.  Her bowels are moving normally.  She is eating and drinking without difficulty.  She is overall feeling very well.  Her energy is adequate and she, again can accomplish activities that she desires.      She is, however,, beginning to note left shoulder and scapular pain.  This has been present over the last 2 to 3 weeks and CT of chest, abdomen pelvis performed 5/24/2023 were performed now revealing dense consolidation left apex along the left margin of the aortic arch increased to more consolidated measuring 4.9 x 5.0 cm previously 4.4 x 4.0 cm with increase sclerosis of the anterior left second rib.  Extension soft tissue density anterior to the left first and second ribs represents a change adversely with index area measuring 3.7 x 4.4 previously 1.8 x 2.9.  This may be post radiation this is concerning for progression of disease and would explain the  patient's progressive symptoms.  We have discussed that she should undergo a PET/CT to help clarify these issues.  We will continue, at this point, her Keytruda as well as adjust her pain medications.    Follow-up PET/CT now available reveals progression of disease in left upper thorax, left supraclavicular adenopathy new metastasis left posterior fourth ribs, no evidence of metastatic disease in the abdomen or pelvis.    The patient is seen with her son and daughter 6/12/2023 and it is clear that she is not developing a Pancoast like syndrome with progression of her malignancy in the left upper thorax and associated symptoms.  We have discussed discontinuance of her immunotherapy and moving to targeted therapy with dabrafenib and trametinib as we also request radiation therapy repeat consultation and try to manage her pain more effectively.    ONCOLOGY HISTORY:      The patient is an 82-year-old female with the below medical history including chronic obstructive pulmonary disease who was seen in the [facility] multidisciplinary thoracic oncology clinic July 1, 2021.  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      She had undergone a CT of the chest at [facility] 6/16/2021 showing postsurgical changes in the left chest from right upper lobectomy, 8 mm irregular nodule medial segment of the right lower lobe and diffuse changes of emphysema with fibrotic changes in the periphery, no suspicious hilar or mediastinal nodes, no pleural effusion.  New finding was suspicious for new malignancy with the patient felt to be a poor candidate for surgical resection.  A PET CT and PFTs were requested thereafter.  The PET/CT demonstrated ill-defined FDG avid soft tissue thickening left aspect mediastinum within the  operative bed, 1 cm hypermetabolic pulmonary nodule right lower lobe and asymmetric thickening patchy uptake involving the right base of tongue.  There is subtle uptake within the L1 vertebral body which is indeterminate and a sub-6 mm pulmonary nodule of right lung and subcentimeter hypodense hepatic lesion which was below PET resolution.       The patient's case was discussed in thoracic conference 7/22/2021 with consideration of the patient undergoing L1 vertebral body MRI, confirmatory biopsy left mediastinal and assessment by ENT (Dr. Caballero) who had worked with the patient previously.  She, incidentally, indicates that radiation therapy was given apparently intraoperatively at her recent surgery?  She still has issues with difficulty chewing and swallowing post procedures and was to be followed up with Dr. Caballero in the near future as planned.                                                            She was seen in the thoracic clinic on the same day with plans to proceed with MRI of the lumbar spine, biopsy left mediastinal nodular area of potential disease and follow-up of her ENT assessment as well as undergo a guardant 360 assessment in our office.  She underwent the biopsy 8/13/2021 found to be consistent with invasive moderately differentiated adenocarcinoma with PD-L1 TPS score 40%.  MRI of the lumbar spine revealed no evidence of metastatic disease though the patient did have multilevel degenerative disease throughout the lumbar spine.  She had an office follow-up with Dr. 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  She underwent laryngoscopy 8/18/2021 with right base of tongue without evidence of lesions or masses in the region.     She was seen by Dr. Hernandez 8/19/2021 and, her findings, had been presented in lung conference.  Her findings were consistent with 2 separate lesions  including a nodule in the superior segment of the right lower lobe and a mass in the upper portion of the left chest with the left chest lesion biopsy positive for adenocarcinoma.  The consensus was that these were to separate-synchronous primaries.  Stereotactic radiation each lesions were felt to be the appropriate way to proceed and the patient was referred to radiation therapy.     The patient is seen in office 8/23/2021 agreeable to the radiation therapy as well as additional assessments including Caris molecular assessment of her biopsy.  Again her guardant 360 is currently pending and we would follow her after she completes radiation therapy with subsequent scans.      She was able to proceed with SBRT to the right lung at primary #1 with 5 treatments at 1000 cGy per fraction in the left lung primary similarly at 1000 cGy per fraction x5.  Her treatment ranged between 8/31-9/13/2021.  She is now scheduled for follow-up 11/23/2021.    The patient subsequent genomic testing is discussed with her as she is is seen back 9/23/2021.  Her guardant 360 did not determine any new abnormalities but her Caris-MI profile-does reveal sensitivity to immunotherapy as well as a BRAF mutation.  This could be extremely important as we follow the patient from this point.  Fortunately she is feeling extremely well and quite pleased about her treatments.    Patient had subsequent PET/CT 11/23/2021 demonstrates decrease in size and avidity of the previously noted intensely FDG avid nodules left lobectomy operative site and right lower lobe.  Surrounding groundglass and pulmonary opacification is consistent with postradiation changes, a few new subcentimeter pulmonary nodules are present in the right upper lobe and indeterminant, stable subcentimeter hepatic lesion is below PET resolution no other findings of FDG-avid disease are seen in neck abdomen or pelvis.  The patient seen in office 12/1/2021 with a relatively good performance  status stating she is not having any new symptoms and very pleased about her results on her PET/CT.  She realizes we'll have to follow this further but subsequent scans in 6 months.  She is also hoping to see an oral surgeon that previously helped her-Dr. Wu.    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  There is evolution of presumed postradiation changes in the right midlung with soft tissue mass within the left lumpectomy operative bed minimally decreased in size.  There is a sub-6 mm nodule in the right upper lobe not definitively seen, indeterminate and an indeterminate left renal lesion at 1.5 cm unchanged from 7/13/2021.  The patient is currently scheduled to see Dr. Dowell on 6/23/2022.  She is feeling well without any additional symptoms.    The patient required admission 10/14 - 10/18/2022 presenting with shortness of breath and cough that was nonproductive.  She had been on oral antibiotics and did not improved and was admitted for therapy for apparent pneumonia.  This eventually led to bronchoscopy after initial CT revealed postsurgical changes, new masslike 6.7 cm area of consolidation anterior left lung raising concern for potential malignancy and a follow-up PET/CT planned.  Bronchoscopy and biopsy left lower lung failed to show evidence of malignancy.  The patient's PET/CT, however, demonstrated an irregular pleural-based soft tissue density thickening in the left upper lobe that was intensely FDG avid new from 6/8/2022 thought to be a malignant recurrence with spread into the left lung parenchyma.  There is intensely pleural-based avidity within the left lower lobe thought to be metastatic disease with postradiation of the left apex as well.  There is a small focus of uptake in the right hilum grossly unchanged in size and post  radiation changes in the right lower lobe.  There is indeterminate density left renal that was grossly unchanged.  The patient is seen back in office 11/15/2022 indicating that she still has chest discomfort that is slowly worsening and that she has a chronic paroxysmal cough but has not improved.  We discussed that she very likely has recurrent disease and plan to proceed with a guardant 360 examination initially as we determine whether we should try to proceed with therapy particularly immunotherapy versus targeted therapy recognizing the above findings.    Patient's guardant 360 returned as again significant for BRAF V600E and the potential use of BRAF inhibitor/MET inhibitor dabrafenib and trametinib.  Additional information [id number] and use of alpelisib.    Unfortunately she required admission 11/28-12/1 with worsening back pain.  Fortunately she did not demonstrate evidence of metastatic disease but did have moderate degenerative changes which could cause radiculopathy.  Medications were altered to include subsequently MSR 15 mg p.o. every 12 hours, Norco as needed.    The patient now presents back 12/14/2022 to initiate therapy- initially with immunotherapy considering Caris study with PD-L1 TPS of 70% on 22 C3 and 28-8 assays.    Patient seen with her  and we discussed pain medications and adjustments thereafter and that she stopped taking MSIR having only a short supply and having to use Norco more frequently.  She is willing to initiate Keytruda today we have discussed that considering her genomics treatment versus her BRAF mutation is also an option.    C1 Keytruda 12/14/2022    Patient is seen back 1/4/2023 in follow-up due for cycle 2 Keytruda.  Patient states that since receiving her first cycle she has had decreased appetite and decreased energy.  She has not been able to bring herself to eat much and she has notably lost 7 pounds.  She has also been struggling with constipation in relation to  ongoing narcotics for pain control.  She has been taking senna S2 tabs twice a day.  We discussed adding daily MiraLAX.    Patient did just last week meet with dietitian, Zoila Clinton, to discuss ways to improve caloric intake.  She has not been able to implement any of these things yet though.    The patient is next seen 1/25/2023 with mild weight gain.  She is seen with family member both indicating that she has actually felt somewhat better in terms of performance status and general function.  We have discussed proceeding with a third cycle treatment and reevaluating by scan in 2 weeks.    The patient proceeded with treatment 1/25/2023 and underwent follow-up CT chest abdomen pelvis 2/8/2023 demonstrating small pericardial effusion that is decreased in size from 11/20/2022, ill-defined consolidation and pleural-based thickening in the left apex and upper lung has reduced slightly, additional index nodule soft tissue in the aspect the pericardium has not changed substantially, no evidence of metastatic disease in the abdomen pelvis.    The patient is seen with her daughter 2/15/2023 both indicating that she has definitely improved, stable weight, appetite and performance status.  She is also using her pain medication much less frequently and wonders whether she might begin to taper from her twice a day MS Contin.    Patient is seen back 3/8/2023 due for ongoing pembrolizumab.  She continues on low-dose prednisone 10 mg daily and has noted significant improvement in her energy and appetite with this.  She is reluctant to taper this any further we discussed that it is fine for her to stay on daily dosing.    She did try to taper her pain medication going down to just MS Contin 15 mg every 12 hours while holding her hydrocodone.  However over the last few days she has had some intermittent pain in the left upper back alleviated with hydrocodone 2-3 times a day.  She currently is denying any pain.  Monitor.    She is  next evaluated 3/29/2023 for ongoing Keytruda.  Evidently her pain medication was sent to the wrong pharmacy and will need to be represcribed today-long-acting MS Contin.  Otherwise she is doing reasonably well at present.    Patient seen 5/31/2023 with gradual development of left shoulder and left scapular pain.  Concurrent CT chest, abdomen and pelvis demonstrate interval increasing consolidation left upper lobe with extension anterior to the left upper ribs with sclerosis anterior left second rib.  This is suspicious for worsening malignancy.  Plans were made for subsequent PET/CT where the patient's pain medications were adjusted.PET/CT 6/5/2023 reveals progression of disease in left upper thorax, left supraclavicular adenopathy new metastasis left posterior fourth ribs, no evidence of metastatic disease in the abdomen or pelvis.    The patient is seen with her son and daughter 6/12/2023 and it is clear that she is not developing a Pancoast like syndrome with progression of her malignancy in the left upper thorax and associated symptoms.  We have discussed discontinuance of her immunotherapy and moving to targeted therapy with dabrafenib and trametinib as we also request radiation therapy repeat consultation and try to manage her pain more effectively.    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 [facility]   • History of squamous cell carcinoma     Tongue   • Hyperlipidemia    • Hypertension     since early 60s   • Low vitamin B12 level    • Lung cancer    • Thyromegaly    • UTI (urinary tract infection)    • Vitamin D  deficiency         Past Surgical History:   Procedure Laterality Date   • BRONCHOSCOPY Bilateral 10/17/2022    Procedure: BRONCHOSCOPY WITH FLUORO with biopsy and BAL;  Surgeon: India Flores MD;  Location: [facility] ENDOSCOPY;  Service: Pulmonary;  Laterality: Bilateral;  PRE/POST - lung mass   • CHOLECYSTECTOMY  1999   • COLONOSCOPY     • EMBOLIZATION CEREBRAL Left 6/29/2022    Procedure: 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;  Surgeon: Bradley Dowell MD;  Location: [facility] HYBRID OR 18/19;  Service: Interventional Radiology;  Laterality: Left;   • EYE SURGERY  11/24/2020    Took a muscle out of right eye   • GLOSSECTOMY PARTIAL      less than one half tongue   • LUNG SURGERY  2012   • ORAL LESION EXCISION/BIOPSY      June and August 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   • TUBAL ABDOMINAL LIGATION  1970   • VENOUS ACCESS DEVICE (PORT) INSERTION N/A 12/12/2022    Procedure: MEDIPORT PLACEMENT;  Surgeon: Jason Villaseñor MD;  Location: [facility] MAIN OR;  Service: Vascular;  Laterality: N/A;        Current Outpatient Medications on File Prior to Visit   Medication Sig Dispense Refill   • albuterol sulfate  (90 Base) MCG/ACT inhaler Inhale 2 puffs Every 4 (Four) Hours As Needed for Wheezing. 18 g 2   • amLODIPine (NORVASC) 5 MG tablet TAKE 1 TABLET BY MOUTH DAILY 90 tablet 0   • atorvastatin (LIPITOR) 20 MG tablet Take 1 tablet by mouth Every Night. 90 tablet 3   • cephalexin (KEFLEX) 500 MG capsule TAKE 1 CAPSULE BY MOUTH FOUR TIMES DAILY FOR 5 DAYS     • cetirizine (ZyrTEC) 10 MG tablet Take 1 tablet by mouth Daily.     • chlorhexidine (PERIDEX) 0.12 % solution Apply 15 mL to the mouth or throat 2 (Two) Times a Day.     • cholecalciferol (VITAMIN D3) 1000 units tablet Take 1 tablet by mouth Daily. HOLDING FOR DOS     • Cyanocobalamin (VITAMIN B12 PO) Take  by mouth.     • Diclofenac Sodium (VOLTAREN) 1 % gel gel Apply 4 g topically to the appropriate area as directed 4 (Four) Times a  Day As Needed.     • fluticasone (FLONASE) 50 MCG/ACT nasal spray 2 sprays into the nostril(s) as directed by provider As Needed.     • hydroCHLOROthiazide (HYDRODIURIL) 25 MG tablet TAKE 1 TABLET EVERY DAY 90 tablet 1   • HYDROcodone-acetaminophen (NORCO)  MG per tablet Take 1 tablet by mouth Every 6 (Six) Hours As Needed for Moderate Pain. 100 tablet 0   • Ibuprofen (ADVIL PO) Take 400 mg by mouth Daily As Needed. HOLD PER MD INSTR     • lidocaine-prilocaine (EMLA) 2.5-2.5 % cream Apply nickel size amount to port site 30 min before appt time do not rub in cover with plastic wrap (Patient taking differently: 1 application As Needed. Apply nickel size amount to port site 30 min before appt time do not rub in cover with plastic wrap) 30 g 1   • metoprolol succinate XL (TOPROL-XL) 25 MG 24 hr tablet TAKE 1 TABLET EVERY DAY 90 tablet 1   • montelukast (SINGULAIR) 10 MG tablet Take 1 tablet by mouth Every Night. 90 tablet 3   • Morphine (MS CONTIN) 15 MG 12 hr tablet Take 1 tablet by mouth 2 (Two) Times a Day. 60 tablet 0   • omeprazole (priLOSEC) 40 MG capsule Take 1 capsule by mouth Daily.     • ondansetron (ZOFRAN) 8 MG tablet Take 1 tablet by mouth 3 (Three) Times a Day As Needed for Nausea or Vomiting. 30 tablet 5   • predniSONE (DELTASONE) 10 MG tablet TAKE 1 TABLET BY MOUTH DAILY 30 tablet 2   • Umeclidinium-Vilanterol (Anoro Ellipta) 62.5-25 MCG/ACT aerosol powder  inhaler Inhale 1 puff Daily As Needed.       No current facility-administered medications on file prior to visit.        ALLERGIES:    Allergies   Allergen Reactions   • Sulfa Antibiotics Rash        Social History     Socioeconomic History   • Marital status:    Tobacco Use   • Smoking status: Former     Types: Cigarettes     Quit date: 2015     Years since quittin.3   • Smokeless tobacco: Never   • Tobacco comments:     less than a pack per day, no smoking since , Quit 2015   Vaping Use   • Vaping Use: Never used  "  Substance and Sexual Activity   • Alcohol use: Yes     Comment: Socially -A few times a month   • Drug use: No   • Sexual activity: Defer     Family History   Problem Relation Age of Onset   • Ovarian cancer Mother          at age 27   • No Known Problems Father    • Ovarian cancer Sister    • Colon cancer Brother    • No Known Problems Other    • Malig Hyperthermia Neg Hx         Review of SystemsROS as per HPI     Objective    Vitals:    23 1343   BP: 155/73   Pulse: 83   Resp: 16   Temp: 98 °F (36.7 °C)   TempSrc: Tympanic   SpO2: 95%   Weight: 47.3 kg (104 lb 4.8 oz)   Height: 160 cm (62.99\")   PainSc:   8   PainLoc: Generalized         2023     1:45 PM   Current Status   ECOG score 0        Physical Exam  Constitutional:       Appearance: Normal appearance. She is normal weight.   HENT:      Head: Normocephalic and atraumatic.      Nose: Nose normal.      Mouth/Throat:      Mouth: Mucous membranes are moist. No oral lesions.      Pharynx: Oropharynx is clear.   Eyes:      Extraocular Movements: Extraocular movements intact.   Cardiovascular:      Rate and Rhythm: Normal rate and regular rhythm.      Pulses: Normal pulses.      Heart sounds: Normal heart sounds.   Pulmonary:      Effort: Pulmonary effort is normal.      Breath sounds: Normal breath sounds.      Comments: Distant breath sounds bilateral bases  Abdominal:      General: Abdomen is flat. Bowel sounds are normal.      Palpations: Abdomen is soft.   Musculoskeletal:         General: Normal range of motion.      Cervical back: Normal range of motion and neck supple.   Skin:     General: Skin is warm and dry.   Neurological:      General: No focal deficit present.      Mental Status: She is alert and oriented to person, place, and time.   Psychiatric:         Mood and Affect: Mood normal.         Behavior: Behavior normal.       I have reexamined the patient and the results are consistent with the previously documented exam. Henry " ISAIAH Escobar MD      RECENT LABS:  Results from last 7 days   Lab Units 06/12/23  1330   WBC 10*3/mm3 9.09   NEUTROS ABS 10*3/mm3 7.62*   HEMOGLOBIN g/dL 13.4   HEMATOCRIT % 41.5   PLATELETS 10*3/mm3 262                     Assessment & Plan    1.  Carcinoma of the lung  May 2015, status post previous left upper lobectomy for carcinoma of the lung.    2.  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   She underwent laryngoscopy 8/18/2021 with right base of tongue without evidence of lesions or masses in the region.    3.  Moderately differentiated adenocarcinoma of the lung.  CT of the chest 6/16/2021 demonstrated postsurgical changes, 8 mm irregular nodule medial segment of right lower lobe and diffuse changes of emphysema.    She is seen in thoracic clinic with findings suspicious for new malignancy and concern of the patient being a poor operative candidate.    7/13/2021 PET CT demonstrated ill-defined avidity and soft tissue left aspect of the mediastinum, 1 cm hypermetabolic pulmonary nodule and asymmetric thickening involving the right base of tongue as well as subtle uptake in the L1 vertebral body.    This patient's case was discussed in thoracic clinic and plans were made to request an MRI of the lumbar spine, obtain a biopsy of the mediastinal involvement of the left had the patient reviewed by ENT.  Biopsy 8/13/2021 found to be consistent with invasive moderately differentiated adenocarcinoma with PD-L1 TPS score 40%.    7/24/2021 MRI of the lumbar spine revealed no evidence of metastatic disease though the patient did have multilevel degenerative disease throughout the lumbar spine.    Her findings were consistent with 2 separate lesions including a nodule in the superior segment of the right lower lobe and a mass in the upper portion of the left chest with the left chest  lesion biopsy positive for adenocarcinoma.  The consensus was that these were to separate-synchronous primaries.  Stereotactic radiation each lesions were felt to be the appropriate way to proceed and the patient was referred to radiation therapy.  The patient was referred for radiation therapy and she was able to proceed with SBRT to the right lung at primary #1 with 5 treatments at 1000 cGy per fraction in the left lung primary similarly at 1000 cGy per fraction x5.  Her treatment ranged between 8/31-9/13/2021.    Her guardant 360 did not determine any new abnormalities but her Caris-MI profile-does reveal sensitivity to immunotherapy as well as a BRAF mutation.  PET/CT 11/23/2021 demonstrates decrease in size and avidity of the previously noted intensely FDG avid nodules left lobectomy operative site and right lower lobe.  Surrounding groundglass and pulmonary opacification is consistent with postradiation changes, a few new subcentimeter pulmonary nodules are present in the right upper lobe and indeterminant, stable subcentimeter hepatic lesion is below PET resolution no other findings of FDG-avid disease are seen in neck abdomen or pelvis.  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  There is evolution of presumed postradiation changes in the right midlung with soft tissue mass within the left lumpectomy operative bed minimally decreased in size.  There is a sub-6 mm nodule in the right upper lobe not definitively seen, indeterminate and an indeterminate left renal lesion at 1.5 cm unchanged from 7/13/2021.  Admission 10/14 - 10/18/2022 presenting with shortness of breath and cough that was nonproductive.  She had been on oral antibiotics and did not improved and was admitted for therapy for apparent pneumonia.  This eventually led to bronchoscopy after initial CT revealed postsurgical changes,  new masslike 6.7 cm area of consolidation anterior left lung raising concern for potential malignancy and a follow-up PET/CT planned.   Bronchoscopy and biopsy left lower lung failed to show evidence of malignancy.    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  There is intensely pleural-based avidity within the left lower lobe thought to be metastatic disease with postradiation of the left apex as well.  There is a small focus of uptake in the right hilum grossly unchanged in size and post radiation changes in the right lower lobe.  There is indeterminate density left renal that was grossly unchanged.    Patient's guardant 360 returned as again significant for BRAF V600E and the potential use of BRAF inhibitor/MET inhibitor dabrafenib and trametinib.  Additional information [id number] and use of alpelisib.  The patient now presents back 12/14/2022 to initiate therapy- initially with immunotherapy considering Caris study with PD-L1 TPS of 70% on 22 C3 and 28-8 assays.  Single agent Keytruda initiated 12/14/2022 2/8/2023 CT of the chest, abdomen pelviswith consolidation and masslike pleural thickening in the left apex and upper lung index areas have decreased somewhat.  There is no evidence of metastatic disease otherwise and a few indeterminate left renal lesions are stable.  After lengthy discussion with the patient and her daughter as to the stability to mild improvement with immunotherapy it is agreed that we would continue treatment at this point.  Proceed today, 4/19/2023 with cycle 7 pembrolizumab which is continued every 3 weeks  The patient proceeded to 8 cycles of pembrolizumab and underwent follow-up chest CT chest, abdomen pelvis revealing evolution of dense consolidation left upper lobe and extension of soft tissue  mass anterior to left upper ribs with increase sclerosis anterior left second rib.  This was concerning for progression of disease versus radiation change and the patient was scheduled for subsequent PET/CT.  PET/CT 6/5/2023  reveals progression of disease in left upper thorax, left supraclavicular adenopathy new metastasis left posterior fourth ribs, no evidence of metastatic disease in the abdomen or pelvis.  Patient seen 6/12/2023 with plans to move her Norco to every 4 hours, discontinue Keytruda, make application for dabrafenib and trametinib at 150 mg p.o. every 12 hours and 2 mg p.o. daily respectively.  Patient referred for radiation therapy consultation concurrently.    4.  Worsening back pain  Admission 11/28/2022 through 12/1/2022.  MRI of the cervical and thoracic spine fortunately failed to demonstrate metastatic disease.  Moderate degenerative changes noted which could cause radiculopathy.  Medication altered to include MS Contin 15 mg every 12 hours and Norco for breakthrough pain  She reports today her pain is adequately controlled  Patient with increasing pain 5/31/2023 with pain level of 6.  MS Contin was increased to 50 mg p.o. every 8 hours and Norco 10/325 #101 p.o. every 6 hours to move to every 4 hours as needed-E scribed to pharmacy  Patient seen 6/12/2023 with Norco moved to every 4 hours.    5.  Poor appetite and malnutrition  Placed on prednisone 10 mg daily 1/4/2023 with significant improvement in her symptoms  Weight is stable today at just below 106 pounds  Patient assessed 6 X12/23 with possible gummy therapy.      PLAN:  Discontinue pembrolizumab  Patient does not use MS Contin consistently and has discontinued it.  Norco 10/325 moved to every 4 hours  Radiation therapy consultation next available  [facility] pharmacy contacted for the use of dabrafenib at 150 mg p.o. twice daily, trametinib 2 mg p.o. daily  Patient requested to contact us about pain control in the next 2 days with a change  in Norco, Benadryl containing sleep medications?  Discontinuance of prednisone, THC use  MD follow-up 2 to 3 weeks.    This patient is on high risk drug therapy requiring intensive monitoring for toxicity.    I spent 68 minutes caring for Darlene on this date of service. This time includes time spent by me in the following activities: preparing for the visit, reviewing tests, obtaining and/or reviewing a separately obtained history, performing a medically appropriate examination and/or evaluation, counseling and educating the patient/family/caregiver, ordering medications, tests, or procedures, referring and communicating with other health care professionals, documenting information in the medical record, independently interpreting results and communicating that information with the patient/family/caregiver, and care coordination.       Henry Escobar MD  06/12/2023

## 2023-06-12 NOTE — PROGRESS NOTES
PA's sent for Tafinlar and Mekinist.  Waiting on responses.    Pinky Walter  Specialty Pharmacy Technician

## 2023-06-13 ENCOUNTER — SPECIALTY PHARMACY (OUTPATIENT)
Dept: PHARMACY | Facility: HOSPITAL | Age: 83
End: 2023-06-13
Payer: MEDICARE

## 2023-06-13 DIAGNOSIS — C34.11 MALIGNANT NEOPLASM OF UPPER LOBE OF RIGHT LUNG: Primary | ICD-10-CM

## 2023-06-13 NOTE — PROGRESS NOTES
PA for Tafinlar and Mekinist have been approved thru insurance.  Both cost over $3000.  We will need to look into free drug for both of them.     Pinky Walter  Specialty Pharmacy Technician

## 2023-06-15 ENCOUNTER — TELEPHONE (OUTPATIENT)
Dept: ONCOLOGY | Facility: CLINIC | Age: 83
End: 2023-06-15
Payer: MEDICARE

## 2023-06-15 DIAGNOSIS — C34.11 MALIGNANT NEOPLASM OF UPPER LOBE OF RIGHT LUNG: Primary | ICD-10-CM

## 2023-06-15 DIAGNOSIS — M54.9 INTRACTABLE BACK PAIN: ICD-10-CM

## 2023-06-15 RX ORDER — OXYCODONE HYDROCHLORIDE 20 MG/1
20 TABLET ORAL
Qty: 120 TABLET | Refills: 0 | Status: SHIPPED | OUTPATIENT
Start: 2023-06-15

## 2023-06-15 NOTE — TELEPHONE ENCOUNTER
Pt states she does not feel that her Norco is working anymore. She still has pain in her chest, back and neck shortly after she takes it. D/W Dr. Escobar. We will prescribe Oxy IR 20mg 1 q 3hrs to see if this helps as opposed to the Norco. Pt knows to stop the Norco and take Oxy instead. Script routed to Dr. Escobar for signature.

## 2023-07-10 ENCOUNTER — HOSPITAL ENCOUNTER (OUTPATIENT)
Dept: RADIATION ONCOLOGY | Facility: HOSPITAL | Age: 83
Setting detail: RADIATION/ONCOLOGY SERIES
End: 2023-07-10
Payer: MEDICARE

## 2023-07-17 PROCEDURE — 77014 CHG CT GUIDANCE RADIATION THERAPY FLDS PLACEMENT: CPT | Performed by: STUDENT IN AN ORGANIZED HEALTH CARE EDUCATION/TRAINING PROGRAM

## 2023-07-17 PROCEDURE — 77386: CPT | Performed by: STUDENT IN AN ORGANIZED HEALTH CARE EDUCATION/TRAINING PROGRAM

## 2023-07-17 PROCEDURE — 77427 RADIATION TX MANAGEMENT X5: CPT | Performed by: STUDENT IN AN ORGANIZED HEALTH CARE EDUCATION/TRAINING PROGRAM

## 2023-07-18 PROCEDURE — 77014 CHG CT GUIDANCE RADIATION THERAPY FLDS PLACEMENT: CPT | Performed by: STUDENT IN AN ORGANIZED HEALTH CARE EDUCATION/TRAINING PROGRAM

## 2023-07-18 PROCEDURE — 77386: CPT | Performed by: STUDENT IN AN ORGANIZED HEALTH CARE EDUCATION/TRAINING PROGRAM

## 2023-07-19 PROCEDURE — 77014 CHG CT GUIDANCE RADIATION THERAPY FLDS PLACEMENT: CPT | Performed by: STUDENT IN AN ORGANIZED HEALTH CARE EDUCATION/TRAINING PROGRAM

## 2023-07-19 PROCEDURE — 77386: CPT | Performed by: STUDENT IN AN ORGANIZED HEALTH CARE EDUCATION/TRAINING PROGRAM

## 2023-07-19 PROCEDURE — 77336 RADIATION PHYSICS CONSULT: CPT | Performed by: STUDENT IN AN ORGANIZED HEALTH CARE EDUCATION/TRAINING PROGRAM

## 2023-07-20 ENCOUNTER — DOCUMENTATION (OUTPATIENT)
Dept: ONCOLOGY | Facility: CLINIC | Age: 83
End: 2023-07-20
Payer: MEDICARE

## 2023-07-20 PROCEDURE — 77014 CHG CT GUIDANCE RADIATION THERAPY FLDS PLACEMENT: CPT | Performed by: STUDENT IN AN ORGANIZED HEALTH CARE EDUCATION/TRAINING PROGRAM

## 2023-07-20 PROCEDURE — 77386: CPT | Performed by: STUDENT IN AN ORGANIZED HEALTH CARE EDUCATION/TRAINING PROGRAM

## 2023-07-21 PROCEDURE — 77014 CHG CT GUIDANCE RADIATION THERAPY FLDS PLACEMENT: CPT | Performed by: STUDENT IN AN ORGANIZED HEALTH CARE EDUCATION/TRAINING PROGRAM

## 2023-07-21 PROCEDURE — 77386: CPT | Performed by: STUDENT IN AN ORGANIZED HEALTH CARE EDUCATION/TRAINING PROGRAM

## 2023-07-21 RX ORDER — POTASSIUM CHLORIDE 750 MG/1
20 TABLET, EXTENDED RELEASE ORAL DAILY
Qty: 180 TABLET | OUTPATIENT
Start: 2023-07-21

## 2023-07-24 ENCOUNTER — HOSPITAL ENCOUNTER (OUTPATIENT)
Dept: RADIATION ONCOLOGY | Facility: HOSPITAL | Age: 83
Discharge: HOME | End: 2023-07-24
Payer: MEDICARE

## 2023-07-24 ENCOUNTER — RADIATION ONCOLOGY WEEKLY ASSESSMENT (OUTPATIENT)
Dept: RADIATION ONCOLOGY | Facility: HOSPITAL | Age: 83
End: 2023-07-24
Payer: MEDICARE

## 2023-07-24 VITALS
OXYGEN SATURATION: 97 % | SYSTOLIC BLOOD PRESSURE: 108 MMHG | HEART RATE: 85 BPM | BODY MASS INDEX: 16.55 KG/M2 | WEIGHT: 93.4 LBS | DIASTOLIC BLOOD PRESSURE: 63 MMHG

## 2023-07-24 DIAGNOSIS — C34.12 MALIGNANT NEOPLASM OF UPPER LOBE OF LEFT LUNG: Primary | ICD-10-CM

## 2023-07-24 LAB
RAD ONC ARIA COURSE ID: NORMAL
RAD ONC ARIA COURSE INTENT: NORMAL
RAD ONC ARIA COURSE LAST TREATMENT DATE: NORMAL
RAD ONC ARIA COURSE START DATE: NORMAL
RAD ONC ARIA COURSE TREATMENT ELAPSED DAYS: 7
RAD ONC ARIA FIRST TREATMENT DATE: NORMAL
RAD ONC ARIA PLAN FRACTIONS TREATED TO DATE: 6
RAD ONC ARIA PLAN ID: NORMAL
RAD ONC ARIA PLAN PRESCRIBED DOSE PER FRACTION: 2.5 GY
RAD ONC ARIA PLAN PRIMARY REFERENCE POINT: NORMAL
RAD ONC ARIA PLAN TOTAL FRACTIONS PRESCRIBED: 10
RAD ONC ARIA PLAN TOTAL PRESCRIBED DOSE: 2500 CGY
RAD ONC ARIA REFERENCE POINT DOSAGE GIVEN TO DATE: 15 GY
RAD ONC ARIA REFERENCE POINT ID: NORMAL
RAD ONC ARIA REFERENCE POINT SESSION DOSAGE GIVEN: 2.5 GY

## 2023-07-24 PROCEDURE — 77427 RADIATION TX MANAGEMENT X5: CPT | Performed by: STUDENT IN AN ORGANIZED HEALTH CARE EDUCATION/TRAINING PROGRAM

## 2023-07-24 PROCEDURE — 77014 CHG CT GUIDANCE RADIATION THERAPY FLDS PLACEMENT: CPT | Performed by: STUDENT IN AN ORGANIZED HEALTH CARE EDUCATION/TRAINING PROGRAM

## 2023-07-24 PROCEDURE — 77386: CPT | Performed by: STUDENT IN AN ORGANIZED HEALTH CARE EDUCATION/TRAINING PROGRAM

## 2023-07-24 NOTE — PROGRESS NOTES
Radiation Oncology  On-Treatment Note      Patient: Darlene Pfeiffer    MRN: [id number]    Attending Physician: Nguyễn Bran MD     Diagnosis:     ICD-10-CM ICD-9-CM   1. Malignant neoplasm of upper lobe of left lung  C34.12 162.3       Radiation Therapy Visit:  Continue radiation therapy, Dosimetry plan remains acceptable, Films reviewed and remains acceptable, Pain assessed, Pain management planned, Radiation dose schedule reviewed and remains acceptable, Radiation technique remains acceptable, and Symptoms within expected range    Radiation Treatments       Active   Plans   Lt CW+Lung   Most recent treatment: Dose planned: 250 cGy (fraction 6 on 7/24/2023)   Total: Dose planned: 2,500 cGy (10 fractions)   Elapsed Days: 7      Reference Points   RX: L CW+Lung   Most recent treatment: Dose given: 250 cGy (on 7/24/2023)   Total: Dose given: 1,500 cGy   Elapsed Days: 7                      Physical Examination:  Vitals: Blood pressure 108/63, pulse 85, weight 42.4 kg (93 lb 6.4 oz), SpO2 97 %, not currently breastfeeding.  Pain Score    07/24/23 1134   PainSc: 0-No pain       Ambulatory and capable of all selfcare but unable to carry out any work activities; up and about more than 50% of waking hours = 2    We examined the relevant areas: yes  Findings are within the expected range for this stage of treatment: yes  -------------------------------------------------------------------------------------------------------------------    ACTION ITEMS:  Patient tolerating treatment well and as expected for this stage in their treatment and Continue radiation therapy as planned    Estimated Completion Date: 7/28/2023    Follow up MARLENI Bran MD  Radiation Oncology

## 2023-07-25 ENCOUNTER — APPOINTMENT (OUTPATIENT)
Dept: GENERAL RADIOLOGY | Facility: HOSPITAL | Age: 83
End: 2023-07-25
Payer: MEDICARE

## 2023-07-25 ENCOUNTER — APPOINTMENT (OUTPATIENT)
Dept: CT IMAGING | Facility: HOSPITAL | Age: 83
End: 2023-07-25
Payer: MEDICARE

## 2023-07-25 ENCOUNTER — HOSPITAL ENCOUNTER (OUTPATIENT)
Facility: HOSPITAL | Age: 83
Setting detail: OBSERVATION
Discharge: HOME | End: 2023-07-28
Attending: EMERGENCY MEDICINE | Admitting: INTERNAL MEDICINE
Payer: MEDICARE

## 2023-07-25 DIAGNOSIS — R53.1 GENERALIZED WEAKNESS: Primary | ICD-10-CM

## 2023-07-25 DIAGNOSIS — C34.12 MALIGNANT NEOPLASM OF UPPER LOBE OF LEFT LUNG: ICD-10-CM

## 2023-07-25 DIAGNOSIS — R62.7 FAILURE TO THRIVE IN ADULT: ICD-10-CM

## 2023-07-25 LAB
ALBUMIN SERPL-MCNC: 3.1 G/DL (ref 3.5–5.2)
ALBUMIN/GLOB SERPL: 1.6 G/DL
ALP SERPL-CCNC: 68 U/L (ref 39–117)
ALT SERPL W P-5'-P-CCNC: 38 U/L (ref 1–33)
ANION GAP SERPL CALCULATED.3IONS-SCNC: 10.1 MMOL/L (ref 5–15)
AST SERPL-CCNC: 39 U/L (ref 1–32)
BACTERIA UR QL AUTO: ABNORMAL /HPF
BASOPHILS # BLD AUTO: 0.02 10*3/MM3 (ref 0–0.2)
BASOPHILS NFR BLD AUTO: 0.3 % (ref 0–1.5)
BILIRUB SERPL-MCNC: 0.7 MG/DL (ref 0–1.2)
BILIRUB UR QL STRIP: NEGATIVE
BUN SERPL-MCNC: 19 MG/DL (ref 8–23)
BUN/CREAT SERPL: 26 (ref 7–25)
CALCIUM SPEC-SCNC: 8.6 MG/DL (ref 8.6–10.5)
CHLORIDE SERPL-SCNC: 95 MMOL/L (ref 98–107)
CLARITY UR: CLEAR
CO2 SERPL-SCNC: 31.9 MMOL/L (ref 22–29)
COLOR UR: ABNORMAL
CREAT SERPL-MCNC: 0.73 MG/DL (ref 0.57–1)
DEPRECATED RDW RBC AUTO: 39.6 FL (ref 37–54)
EGFRCR SERPLBLD CKD-EPI 2021: 82.7 ML/MIN/1.73
EOSINOPHIL # BLD AUTO: 0 10*3/MM3 (ref 0–0.4)
EOSINOPHIL NFR BLD AUTO: 0 % (ref 0.3–6.2)
ERYTHROCYTE [DISTWIDTH] IN BLOOD BY AUTOMATED COUNT: 12.4 % (ref 12.3–15.4)
GEN 5 2HR TROPONIN T REFLEX: 89 NG/L
GLOBULIN UR ELPH-MCNC: 2 GM/DL
GLUCOSE BLDC GLUCOMTR-MCNC: 113 MG/DL (ref 70–130)
GLUCOSE SERPL-MCNC: 111 MG/DL (ref 65–99)
GLUCOSE UR STRIP-MCNC: NEGATIVE MG/DL
HCT VFR BLD AUTO: 38.8 % (ref 34–46.6)
HGB BLD-MCNC: 12.6 G/DL (ref 12–15.9)
HGB UR QL STRIP.AUTO: ABNORMAL
HOLD SPECIMEN: NORMAL
HOLD SPECIMEN: NORMAL
HYALINE CASTS UR QL AUTO: ABNORMAL /LPF
IMM GRANULOCYTES # BLD AUTO: 0.02 10*3/MM3 (ref 0–0.05)
IMM GRANULOCYTES NFR BLD AUTO: 0.3 % (ref 0–0.5)
KETONES UR QL STRIP: ABNORMAL
LEUKOCYTE ESTERASE UR QL STRIP.AUTO: ABNORMAL
LYMPHOCYTES # BLD AUTO: 1.48 10*3/MM3 (ref 0.7–3.1)
LYMPHOCYTES NFR BLD AUTO: 25.3 % (ref 19.6–45.3)
MAGNESIUM SERPL-MCNC: 1.6 MG/DL (ref 1.6–2.4)
MCH RBC QN AUTO: 28.4 PG (ref 26.6–33)
MCHC RBC AUTO-ENTMCNC: 32.5 G/DL (ref 31.5–35.7)
MCV RBC AUTO: 87.6 FL (ref 79–97)
MONOCYTES # BLD AUTO: 0.47 10*3/MM3 (ref 0.1–0.9)
MONOCYTES NFR BLD AUTO: 8 % (ref 5–12)
NEUTROPHILS NFR BLD AUTO: 3.85 10*3/MM3 (ref 1.7–7)
NEUTROPHILS NFR BLD AUTO: 66.1 % (ref 42.7–76)
NITRITE UR QL STRIP: NEGATIVE
NRBC BLD AUTO-RTO: 0 /100 WBC (ref 0–0.2)
PH UR STRIP.AUTO: 6.5 [PH] (ref 5–8)
PLATELET # BLD AUTO: 176 10*3/MM3 (ref 140–450)
PMV BLD AUTO: 9.3 FL (ref 6–12)
POTASSIUM SERPL-SCNC: 3.5 MMOL/L (ref 3.5–5.2)
PROT SERPL-MCNC: 5.1 G/DL (ref 6–8.5)
PROT UR QL STRIP: ABNORMAL
RBC # BLD AUTO: 4.43 10*6/MM3 (ref 3.77–5.28)
RBC # UR STRIP: ABNORMAL /HPF
REF LAB TEST METHOD: ABNORMAL
SODIUM SERPL-SCNC: 137 MMOL/L (ref 136–145)
SP GR UR STRIP: 1.02 (ref 1–1.03)
SQUAMOUS #/AREA URNS HPF: ABNORMAL /HPF
TROPONIN T DELTA: 0 NG/L
TROPONIN T SERPL HS-MCNC: 89 NG/L
UROBILINOGEN UR QL STRIP: ABNORMAL
WBC # UR STRIP: ABNORMAL /HPF
WBC NRBC COR # BLD: 5.84 10*3/MM3 (ref 3.4–10.8)
WHOLE BLOOD HOLD COAG: NORMAL
WHOLE BLOOD HOLD SPECIMEN: NORMAL

## 2023-07-25 PROCEDURE — 83605 ASSAY OF LACTIC ACID: CPT | Performed by: INTERNAL MEDICINE

## 2023-07-25 PROCEDURE — 96375 TX/PRO/DX INJ NEW DRUG ADDON: CPT

## 2023-07-25 PROCEDURE — 99284 EMERGENCY DEPT VISIT MOD MDM: CPT

## 2023-07-25 PROCEDURE — 70450 CT HEAD/BRAIN W/O DYE: CPT

## 2023-07-25 PROCEDURE — 84484 ASSAY OF TROPONIN QUANT: CPT

## 2023-07-25 PROCEDURE — 87086 URINE CULTURE/COLONY COUNT: CPT | Performed by: INTERNAL MEDICINE

## 2023-07-25 PROCEDURE — 83735 ASSAY OF MAGNESIUM: CPT

## 2023-07-25 PROCEDURE — 85025 COMPLETE CBC W/AUTO DIFF WBC: CPT

## 2023-07-25 PROCEDURE — 25010000002 ONDANSETRON PER 1 MG: Performed by: EMERGENCY MEDICINE

## 2023-07-25 PROCEDURE — 0 CEFEPIME PER 500 MG: Performed by: INTERNAL MEDICINE

## 2023-07-25 PROCEDURE — G0378 HOSPITAL OBSERVATION PER HR: HCPCS

## 2023-07-25 PROCEDURE — 71045 X-RAY EXAM CHEST 1 VIEW: CPT

## 2023-07-25 PROCEDURE — 36415 COLL VENOUS BLD VENIPUNCTURE: CPT

## 2023-07-25 PROCEDURE — 93005 ELECTROCARDIOGRAM TRACING: CPT

## 2023-07-25 PROCEDURE — 87040 BLOOD CULTURE FOR BACTERIA: CPT | Performed by: INTERNAL MEDICINE

## 2023-07-25 PROCEDURE — 81001 URINALYSIS AUTO W/SCOPE: CPT

## 2023-07-25 PROCEDURE — 82948 REAGENT STRIP/BLOOD GLUCOSE: CPT

## 2023-07-25 PROCEDURE — 93010 ELECTROCARDIOGRAM REPORT: CPT | Performed by: INTERNAL MEDICINE

## 2023-07-25 PROCEDURE — 25010000002 MORPHINE PER 10 MG: Performed by: EMERGENCY MEDICINE

## 2023-07-25 PROCEDURE — 80053 COMPREHEN METABOLIC PANEL: CPT

## 2023-07-25 RX ORDER — OLOPATADINE HYDROCHLORIDE 1 MG/ML
1 SOLUTION/ DROPS OPHTHALMIC 2 TIMES DAILY
COMMUNITY

## 2023-07-25 RX ORDER — SODIUM CHLORIDE 9 MG/ML
40 INJECTION, SOLUTION INTRAVENOUS AS NEEDED
Status: DISCONTINUED | OUTPATIENT
Start: 2023-07-25 | End: 2023-07-28 | Stop reason: HOSPADM

## 2023-07-25 RX ORDER — SODIUM CHLORIDE 0.9 % (FLUSH) 0.9 %
20 SYRINGE (ML) INJECTION AS NEEDED
Status: DISCONTINUED | OUTPATIENT
Start: 2023-07-25 | End: 2023-07-28 | Stop reason: HOSPADM

## 2023-07-25 RX ORDER — ACETAMINOPHEN 160 MG/5ML
650 SOLUTION ORAL EVERY 4 HOURS PRN
Status: DISCONTINUED | OUTPATIENT
Start: 2023-07-25 | End: 2023-07-28 | Stop reason: HOSPADM

## 2023-07-25 RX ORDER — AMOXICILLIN 250 MG
2 CAPSULE ORAL 2 TIMES DAILY
Status: DISCONTINUED | OUTPATIENT
Start: 2023-07-25 | End: 2023-07-28 | Stop reason: HOSPADM

## 2023-07-25 RX ORDER — ACETAMINOPHEN 650 MG/1
650 SUPPOSITORY RECTAL EVERY 4 HOURS PRN
Status: DISCONTINUED | OUTPATIENT
Start: 2023-07-25 | End: 2023-07-28 | Stop reason: HOSPADM

## 2023-07-25 RX ORDER — CHOLECALCIFEROL (VITAMIN D3) 125 MCG
1000 CAPSULE ORAL DAILY
Status: DISCONTINUED | OUTPATIENT
Start: 2023-07-26 | End: 2023-07-28 | Stop reason: HOSPADM

## 2023-07-25 RX ORDER — SODIUM CHLORIDE 0.9 % (FLUSH) 0.9 %
10 SYRINGE (ML) INJECTION AS NEEDED
Status: DISCONTINUED | OUTPATIENT
Start: 2023-07-25 | End: 2023-07-28 | Stop reason: HOSPADM

## 2023-07-25 RX ORDER — HEPARIN SODIUM (PORCINE) LOCK FLUSH IV SOLN 100 UNIT/ML 100 UNIT/ML
5 SOLUTION INTRAVENOUS AS NEEDED
Status: DISCONTINUED | OUTPATIENT
Start: 2023-07-25 | End: 2023-07-28 | Stop reason: HOSPADM

## 2023-07-25 RX ORDER — MELATONIN
1000 DAILY
Status: DISCONTINUED | OUTPATIENT
Start: 2023-07-26 | End: 2023-07-28 | Stop reason: HOSPADM

## 2023-07-25 RX ORDER — ALBUTEROL SULFATE 2.5 MG/3ML
2.5 SOLUTION RESPIRATORY (INHALATION) EVERY 6 HOURS PRN
Status: DISCONTINUED | OUTPATIENT
Start: 2023-07-25 | End: 2023-07-28 | Stop reason: HOSPADM

## 2023-07-25 RX ORDER — ONDANSETRON 2 MG/ML
4 INJECTION INTRAMUSCULAR; INTRAVENOUS ONCE
Status: COMPLETED | OUTPATIENT
Start: 2023-07-25 | End: 2023-07-25

## 2023-07-25 RX ORDER — OXYCODONE HYDROCHLORIDE 10 MG/1
20 TABLET ORAL
Status: DISCONTINUED | OUTPATIENT
Start: 2023-07-25 | End: 2023-07-28 | Stop reason: HOSPADM

## 2023-07-25 RX ORDER — PREDNISONE 20 MG/1
10 TABLET ORAL DAILY
Status: DISCONTINUED | OUTPATIENT
Start: 2023-07-26 | End: 2023-07-28 | Stop reason: HOSPADM

## 2023-07-25 RX ORDER — VANCOMYCIN HYDROCHLORIDE 1 G/200ML
1000 INJECTION, SOLUTION INTRAVENOUS ONCE
Status: COMPLETED | OUTPATIENT
Start: 2023-07-25 | End: 2023-07-26

## 2023-07-25 RX ORDER — AMLODIPINE BESYLATE 5 MG/1
5 TABLET ORAL DAILY
Status: DISCONTINUED | OUTPATIENT
Start: 2023-07-26 | End: 2023-07-28 | Stop reason: HOSPADM

## 2023-07-25 RX ORDER — BISACODYL 5 MG/1
5 TABLET, DELAYED RELEASE ORAL DAILY PRN
Status: DISCONTINUED | OUTPATIENT
Start: 2023-07-25 | End: 2023-07-28 | Stop reason: HOSPADM

## 2023-07-25 RX ORDER — POLYETHYLENE GLYCOL 3350 17 G/17G
17 POWDER, FOR SOLUTION ORAL DAILY PRN
Status: DISCONTINUED | OUTPATIENT
Start: 2023-07-25 | End: 2023-07-28 | Stop reason: HOSPADM

## 2023-07-25 RX ORDER — BISACODYL 10 MG
10 SUPPOSITORY, RECTAL RECTAL DAILY PRN
Status: DISCONTINUED | OUTPATIENT
Start: 2023-07-25 | End: 2023-07-28 | Stop reason: HOSPADM

## 2023-07-25 RX ORDER — SODIUM CHLORIDE AND POTASSIUM CHLORIDE 150; 900 MG/100ML; MG/100ML
100 INJECTION, SOLUTION INTRAVENOUS CONTINUOUS
Status: DISCONTINUED | OUTPATIENT
Start: 2023-07-25 | End: 2023-07-28 | Stop reason: HOSPADM

## 2023-07-25 RX ORDER — SODIUM CHLORIDE 0.9 % (FLUSH) 0.9 %
10 SYRINGE (ML) INJECTION EVERY 12 HOURS SCHEDULED
Status: DISCONTINUED | OUTPATIENT
Start: 2023-07-25 | End: 2023-07-28 | Stop reason: HOSPADM

## 2023-07-25 RX ORDER — METOPROLOL SUCCINATE 25 MG/1
25 TABLET, EXTENDED RELEASE ORAL DAILY
Status: DISCONTINUED | OUTPATIENT
Start: 2023-07-26 | End: 2023-07-28 | Stop reason: HOSPADM

## 2023-07-25 RX ORDER — MORPHINE SULFATE 2 MG/ML
4 INJECTION, SOLUTION INTRAMUSCULAR; INTRAVENOUS ONCE
Status: COMPLETED | OUTPATIENT
Start: 2023-07-25 | End: 2023-07-25

## 2023-07-25 RX ORDER — CETIRIZINE HYDROCHLORIDE 10 MG/1
10 TABLET ORAL DAILY
COMMUNITY

## 2023-07-25 RX ORDER — MORPHINE SULFATE 15 MG/1
15 TABLET, FILM COATED, EXTENDED RELEASE ORAL 2 TIMES DAILY
Status: DISCONTINUED | OUTPATIENT
Start: 2023-07-25 | End: 2023-07-28 | Stop reason: HOSPADM

## 2023-07-25 RX ORDER — POTASSIUM CHLORIDE 750 MG/1
20 TABLET, FILM COATED, EXTENDED RELEASE ORAL DAILY
Status: DISCONTINUED | OUTPATIENT
Start: 2023-07-26 | End: 2023-07-28 | Stop reason: HOSPADM

## 2023-07-25 RX ORDER — CHLORHEXIDINE GLUCONATE 0.12 MG/ML
15 RINSE ORAL 2 TIMES DAILY
Status: DISCONTINUED | OUTPATIENT
Start: 2023-07-25 | End: 2023-07-28 | Stop reason: HOSPADM

## 2023-07-25 RX ORDER — ACETAMINOPHEN 325 MG/1
650 TABLET ORAL EVERY 4 HOURS PRN
Status: DISCONTINUED | OUTPATIENT
Start: 2023-07-25 | End: 2023-07-28 | Stop reason: HOSPADM

## 2023-07-25 RX ORDER — ONDANSETRON 2 MG/ML
4 INJECTION INTRAMUSCULAR; INTRAVENOUS EVERY 6 HOURS PRN
Status: DISCONTINUED | OUTPATIENT
Start: 2023-07-25 | End: 2023-07-28 | Stop reason: HOSPADM

## 2023-07-25 RX ORDER — IPRATROPIUM BROMIDE AND ALBUTEROL SULFATE 2.5; .5 MG/3ML; MG/3ML
3 SOLUTION RESPIRATORY (INHALATION)
Status: DISCONTINUED | OUTPATIENT
Start: 2023-07-25 | End: 2023-07-28 | Stop reason: HOSPADM

## 2023-07-25 RX ADMIN — CEFEPIME 2000 MG: 2 INJECTION, POWDER, FOR SOLUTION INTRAVENOUS at 23:59

## 2023-07-25 RX ADMIN — OXYCODONE HYDROCHLORIDE 20 MG: 10 TABLET ORAL at 19:58

## 2023-07-25 RX ADMIN — SODIUM CHLORIDE 1000 ML: 9 INJECTION, SOLUTION INTRAVENOUS at 17:18

## 2023-07-25 RX ADMIN — ONDANSETRON 4 MG: 2 INJECTION INTRAMUSCULAR; INTRAVENOUS at 17:01

## 2023-07-25 RX ADMIN — MORPHINE SULFATE 15 MG: 15 TABLET, EXTENDED RELEASE ORAL at 21:21

## 2023-07-25 RX ADMIN — Medication 10 ML: at 20:00

## 2023-07-25 RX ADMIN — MORPHINE SULFATE 4 MG: 2 INJECTION, SOLUTION INTRAMUSCULAR; INTRAVENOUS at 16:58

## 2023-07-25 RX ADMIN — ACETAMINOPHEN 650 MG: 325 TABLET, FILM COATED ORAL at 20:31

## 2023-07-25 RX ADMIN — SENNOSIDES AND DOCUSATE SODIUM 2 TABLET: 50; 8.6 TABLET ORAL at 20:00

## 2023-07-25 NOTE — ED NOTES
".Nursing report ED to floor  Darlene Pfeiffer  83 y.o.  female    HPI :   Chief Complaint   Patient presents with    Weakness - Generalized    Fall       Admitting doctor:   Lorna Escamilla MD    Admitting diagnosis:   The primary encounter diagnosis was Generalized weakness. Diagnoses of Malignant neoplasm of upper lobe of left lung and Failure to thrive in adult were also pertinent to this visit.    Code status:   Current Code Status       Date Active Code Status Order ID Comments User Context       Prior            Allergies:   Sulfa antibiotics    Isolation:   No active isolations    Intake and Output  No intake or output data in the 24 hours ending 07/25/23 1734    Weight:       07/25/23  1521   Weight: 42.4 kg (93 lb 7.6 oz)       Most recent vitals:   Vitals:    07/25/23 1504 07/25/23 1506 07/25/23 1521 07/25/23 1701   BP:   157/70 144/77   BP Location:       Patient Position:       Pulse: 84 78 73 78   Resp:       Temp:       TempSrc:       SpO2: 99% 100% 93% 94%   Weight:   42.4 kg (93 lb 7.6 oz)    Height:   160 cm (62.99\")        Active LDAs/IV Access:   Lines, Drains & Airways       Active LDAs       Name Placement date Placement time Site Days    Single Lumen Implantable Port 12/12/22 Right Chest 12/12/22  1108  Chest  225                    Labs (abnormal labs have a star):   Labs Reviewed   COMPREHENSIVE METABOLIC PANEL - Abnormal; Notable for the following components:       Result Value    Glucose 111 (*)     Chloride 95 (*)     CO2 31.9 (*)     Total Protein 5.1 (*)     Albumin 3.1 (*)     ALT (SGPT) 38 (*)     AST (SGOT) 39 (*)     BUN/Creatinine Ratio 26.0 (*)     All other components within normal limits    Narrative:     GFR Normal >60  Chronic Kidney Disease <60  Kidney Failure <15    The GFR formula is only valid for adults with stable renal function between ages 18 and 70.   SINGLE HSTROPONIN T - Abnormal; Notable for the following components:    HS Troponin T 89 (*)     All other " components within normal limits    Narrative:     High Sensitive Troponin T Reference Range:  <10.0 ng/L- Negative Female for AMI  <15.0 ng/L- Negative Male for AMI  >=10 - Abnormal Female indicating possible myocardial injury.  >=15 - Abnormal Male indicating possible myocardial injury.   Clinicians would have to utilize clinical acumen, EKG, Troponin, and serial changes to determine if it is an Acute Myocardial Infarction or myocardial injury due to an underlying chronic condition.        URINALYSIS W/ MICROSCOPIC IF INDICATED (NO CULTURE) - Abnormal; Notable for the following components:    Color, UA Dark Yellow (*)     Ketones, UA 40 mg/dL (2+) (*)     Blood, UA Trace (*)     Protein, UA 30 mg/dL (1+) (*)     Leuk Esterase, UA Trace (*)     All other components within normal limits   CBC WITH AUTO DIFFERENTIAL - Abnormal; Notable for the following components:    Eosinophil % 0.0 (*)     All other components within normal limits   URINALYSIS, MICROSCOPIC ONLY - Abnormal; Notable for the following components:    RBC, UA 6-12 (*)     WBC, UA 6-12 (*)     All other components within normal limits   MAGNESIUM - Normal   POCT GLUCOSE FINGERSTICK - Normal   RAINBOW DRAW    Narrative:     The following orders were created for panel order [facility]ater Draw.  Procedure                               Abnormality         Status                     ---------                               -----------         ------                     Green Top (Gel)[[id number]]                                  Final result               Lavender Top[[id number]]                                     Final result               Gold Top - SST[[id number]]                                   Final result               Light Blue Top[[id number]]                                   Final result                 Please view results for these tests on the individual orders.   HIGH SENSITIVITIY TROPONIN T 2HR   POCT GLUCOSE FINGERSTICK   CBC AND DIFFERENTIAL     Narrative:     The following orders were created for panel order CBC & Differential.  Procedure                               Abnormality         Status                     ---------                               -----------         ------                     CBC Auto Differential[[id number]]        Abnormal            Final result                 Please view results for these tests on the individual orders.   GREEN TOP   LAVENDER TOP   GOLD TOP - SST   LIGHT BLUE TOP       EKG:   ECG 12 Lead ED Triage Standing Order; Weak / Dizzy / AMS   Preliminary Result   HEART RATE= 76  bpm   RR Interval= 789  ms   IN Interval= 181  ms   P Horizontal Axis= -56  deg   P Front Axis= -18  deg   QRSD Interval= 100  ms   QT Interval= 380  ms   QRS Axis= 1  deg   T Wave Axis= 20  deg   - ABNORMAL ECG -   Sinus rhythm   Ventricular premature complex   Anteroseptal infarct, age indeterminate   Electronically Signed By:    Date and Time of Study: 2023-07-25 15:14:30          Meds given in ED:   Medications   sodium chloride 0.9 % flush 10 mL (has no administration in time range)   sodium chloride 0.9 % bolus 1,000 mL (1,000 mL Intravenous New Bag 7/25/23 1718)   sodium chloride 0.9 % flush 10 mL (has no administration in time range)   sodium chloride 0.9 % flush 10 mL (has no administration in time range)   sodium chloride 0.9 % flush 20 mL (has no administration in time range)   sodium chloride 0.9 % infusion 40 mL (has no administration in time range)   heparin injection 500 Units (has no administration in time range)   sodium chloride 0.9 % flush 10 mL (has no administration in time range)   morphine injection 4 mg (4 mg Intravenous Given 7/25/23 1658)   ondansetron (ZOFRAN) injection 4 mg (4 mg Intravenous Given 7/25/23 1701)       Imaging results:  CT Head Without Contrast    Result Date: 7/25/2023   No acute intracranial hemorrhage or hydrocephalus. If there is further clinical concern, MRI could be considered for further  evaluation.  This report was finalized on 2023 4:09 PM by Dr. Ebenezer Barton M.D.       Ambulatory status:   - Assist x1    Social issues:   Social History     Socioeconomic History    Marital status:    Tobacco Use    Smoking status: Former     Types: Cigarettes     Quit date: 2015     Years since quittin.4    Smokeless tobacco: Never    Tobacco comments:     less than a pack per day, no smoking since , Quit 2015   Vaping Use    Vaping Use: Never used   Substance and Sexual Activity    Alcohol use: Yes     Comment: Socially -A few times a month    Drug use: No    Sexual activity: Roger Heath RN  23 17:34 EDT

## 2023-07-25 NOTE — ED NOTES
Pt to ed from home via EMS.    Pt hx lung cancer. Pt has been getting chemo treatment. Pt c/o gen weakness. Pt had fall this AM. Pt denies injury from fall. Pt does not know if she hit her head. Pr denies LOC. Pt not on blood thinners.

## 2023-07-25 NOTE — ED PROVIDER NOTES
EMERGENCY DEPARTMENT ENCOUNTER    Room Number:  06/06  Date of encounter:  7/25/2023  PCP: Kehrer, Meredith Lea, MD  Historian: Patient, family  Chronic or social conditions impacting care (social determinants of health): Lives at home with , full code      I used full protective equipment while examining this patient.  This includes face mask, gloves and protective eyewear.  I washed my hands before entering the room and immediately upon leaving the room      HPI:  Chief Complaint: Weakness, fall  A complete HPI/ROS/PMH/PSH/SH/FH are unobtainable due to: Somnolence    Context: Darlene Pfeiffer is a 83 y.o. female who presents to the ED c/o progressive weakness over the past several weeks.  Patient has known lung cancer and follows with Dr. Escobar.  Patient was being treated with radiation therapy, as well as oral chemotherapy.  Over the past several weeks she has not been eating or drinking much.  Patient had to stop her oral chemotherapy secondary to nausea, vomiting.  She has had a hard time staying awake.  She complains of progressive weakness.  Patient denies any unilateral weakness.  She has normal speech at this time.  She is a difficult historian given her somnolence.  The family reports that she fell today with unknown injuries.    Review of prior external notes (non-ED):   I reviewed oncology office visit from 7/21/2023.  Patient being followed for left upper lobe malignant neoplasm, dehydration    Review of prior external test results outside of this encounter:  I reviewed CMP from 7/21/2023.  This showed a creatinine of 0.68, potassium 3.1    PAST MEDICAL HISTORY  Active Ambulatory Problems     Diagnosis Date Noted   • Hypertension 11/20/2019   • Hyperlipidemia 11/20/2019   • Esophageal reflux 11/20/2019   • Chronic obstructive pulmonary disease 03/11/2020   • Seasonal allergic rhinitis due to pollen 03/11/2020   • Abnormal glucose 05/21/2020   • Clinical diagnosis of severe acute respiratory  syndrome coronavirus 2 (SARS-CoV-2) disease 12/21/2020   • Lung cancer    • Cerebral aneurysm, nonruptured 06/23/2022   • Cerebral aneurysm without rupture 06/29/2022   • Shortness of breath 10/14/2022   • Hypokalemia 10/15/2022   • History of lung cancer    • Long-term use of high-risk medication 11/16/2022   • Intractable back pain 11/29/2022   • Hypokalemia 11/30/2022   • Fitting and adjustment of vascular catheter 12/13/2022     Resolved Ambulatory Problems     Diagnosis Date Noted   • No Resolved Ambulatory Problems     Past Medical History:   Diagnosis Date   • Allergic rhinitis    • Asthma    • Cancer of floor of mouth 2014   • Cerebral aneurysm    • COPD (chronic obstructive pulmonary disease)    • COVID 2020   • History of adenocarcinoma of lung    • History of anemia    • History of carcinoma in situ of skin    • History of oral hairy leukoplakia    • History of squamous cell carcinoma    • Low vitamin B12 level    • Thyromegaly    • UTI (urinary tract infection)    • Vitamin D deficiency          PAST SURGICAL HISTORY  Past Surgical History:   Procedure Laterality Date   • BRONCHOSCOPY Bilateral 10/17/2022    Procedure: BRONCHOSCOPY WITH FLUORO with biopsy and BAL;  Surgeon: India Flores MD;  Location: [facility] ENDOSCOPY;  Service: Pulmonary;  Laterality: Bilateral;  PRE/POST - lung mass   • CHOLECYSTECTOMY  1999   • COLONOSCOPY     • EMBOLIZATION CEREBRAL Left 6/29/2022    Procedure: 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;  Surgeon: Bradley Dowell MD;  Location: [facility] HYBRID OR 18/19;  Service: Interventional Radiology;  Laterality: Left;   • EYE SURGERY  11/24/2020    Took a muscle out of right eye   • GLOSSECTOMY PARTIAL      less than one half tongue   • LUNG SURGERY  2012   • ORAL LESION EXCISION/BIOPSY      June and August 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   • TUBAL ABDOMINAL LIGATION  1970   • VENOUS ACCESS DEVICE (PORT) INSERTION N/A 12/12/2022    Procedure: MEDIPORT  PLACEMENT;  Surgeon: Jason Villaseñor MD;  Location: [facility] OR;  Service: Vascular;  Laterality: N/A;         FAMILY HISTORY  Family History   Problem Relation Age of Onset   • Ovarian cancer Mother          at age 27   • No Known Problems Father    • Ovarian cancer Sister    • Colon cancer Brother    • No Known Problems Other    • Malig Hyperthermia Neg Hx          SOCIAL HISTORY  Social History     Socioeconomic History   • Marital status:    Tobacco Use   • Smoking status: Former     Types: Cigarettes     Quit date: 2015     Years since quittin.4   • Smokeless tobacco: Never   • Tobacco comments:     less than a pack per day, no smoking since , Quit 2015   Vaping Use   • Vaping Use: Never used   Substance and Sexual Activity   • Alcohol use: Yes     Comment: Socially -A few times a month   • Drug use: No   • Sexual activity: Defer         ALLERGIES  Sulfa antibiotics        REVIEW OF SYSTEMS  All systems reviewed and negative except for those discussed in HPI.       PHYSICAL EXAM    I have reviewed the triage vital signs and nursing notes.    ED Triage Vitals [23 1409]   Temp Heart Rate Resp BP SpO2   98.7 °F (37.1 °C) 79 16 143/59 99 %      Temp src Heart Rate Source Patient Position BP Location FiO2 (%)   Tympanic Monitor Lying Right arm --       Physical Exam  GENERAL: Somnolent but arousable to voice.  Oriented x4, elderly, chronically ill-appearing   HENT: head atraumatic, no nuchal rigidity  EYES: no scleral icterus, EOMI  CV: regular rhythm, regular rate, no murmur  RESPIRATORY: normal effort, CTA  ABDOMEN: soft, nontender  MUSCULOSKELETAL: no deformity, FROM, no calf swelling or tenderness  NEURO: alert, moves all extremities, follows commands  SKIN: warm, dry        LAB RESULTS  Recent Results (from the past 24 hour(s))   Comprehensive Metabolic Panel    Collection Time: 23  2:55 PM    Specimen: Blood   Result Value Ref Range    Glucose 111 (H) 65 - 99  mg/dL    BUN 19 8 - 23 mg/dL    Creatinine 0.73 0.57 - 1.00 mg/dL    Sodium 137 136 - 145 mmol/L    Potassium 3.5 3.5 - 5.2 mmol/L    Chloride 95 (L) 98 - 107 mmol/L    CO2 31.9 (H) 22.0 - 29.0 mmol/L    Calcium 8.6 8.6 - 10.5 mg/dL    Total Protein 5.1 (L) 6.0 - 8.5 g/dL    Albumin 3.1 (L) 3.5 - 5.2 g/dL    ALT (SGPT) 38 (H) 1 - 33 U/L    AST (SGOT) 39 (H) 1 - 32 U/L    Alkaline Phosphatase 68 39 - 117 U/L    Total Bilirubin 0.7 0.0 - 1.2 mg/dL    Globulin 2.0 gm/dL    A/G Ratio 1.6 g/dL    BUN/Creatinine Ratio 26.0 (H) 7.0 - 25.0    Anion Gap 10.1 5.0 - 15.0 mmol/L    eGFR 82.7 >60.0 mL/min/1.73   Single High Sensitivity Troponin T    Collection Time: 07/25/23  2:55 PM    Specimen: Blood   Result Value Ref Range    HS Troponin T 89 (C) <10 ng/L   Magnesium    Collection Time: 07/25/23  2:55 PM    Specimen: Blood   Result Value Ref Range    Magnesium 1.6 1.6 - 2.4 mg/dL   Green Top (Gel)    Collection Time: 07/25/23  2:55 PM   Result Value Ref Range    Extra Tube Hold for add-ons.    Lavender Top    Collection Time: 07/25/23  2:55 PM   Result Value Ref Range    Extra Tube hold for add-on    Gold Top - SST    Collection Time: 07/25/23  2:55 PM   Result Value Ref Range    Extra Tube Hold for add-ons.    Light Blue Top    Collection Time: 07/25/23  2:55 PM   Result Value Ref Range    Extra Tube Hold for add-ons.    CBC Auto Differential    Collection Time: 07/25/23  2:55 PM    Specimen: Blood   Result Value Ref Range    WBC 5.84 3.40 - 10.80 10*3/mm3    RBC 4.43 3.77 - 5.28 10*6/mm3    Hemoglobin 12.6 12.0 - 15.9 g/dL    Hematocrit 38.8 34.0 - 46.6 %    MCV 87.6 79.0 - 97.0 fL    MCH 28.4 26.6 - 33.0 pg    MCHC 32.5 31.5 - 35.7 g/dL    RDW 12.4 12.3 - 15.4 %    RDW-SD 39.6 37.0 - 54.0 fl    MPV 9.3 6.0 - 12.0 fL    Platelets 176 140 - 450 10*3/mm3    Neutrophil % 66.1 42.7 - 76.0 %    Lymphocyte % 25.3 19.6 - 45.3 %    Monocyte % 8.0 5.0 - 12.0 %    Eosinophil % 0.0 (L) 0.3 - 6.2 %    Basophil % 0.3 0.0 - 1.5 %     Immature Grans % 0.3 0.0 - 0.5 %    Neutrophils, Absolute 3.85 1.70 - 7.00 10*3/mm3    Lymphocytes, Absolute 1.48 0.70 - 3.10 10*3/mm3    Monocytes, Absolute 0.47 0.10 - 0.90 10*3/mm3    Eosinophils, Absolute 0.00 0.00 - 0.40 10*3/mm3    Basophils, Absolute 0.02 0.00 - 0.20 10*3/mm3    Immature Grans, Absolute 0.02 0.00 - 0.05 10*3/mm3    nRBC 0.0 0.0 - 0.2 /100 WBC   ECG 12 Lead ED Triage Standing Order; Weak / Dizzy / AMS    Collection Time: 07/25/23  3:14 PM   Result Value Ref Range    QT Interval 380 ms   POC Glucose Once    Collection Time: 07/25/23  4:19 PM    Specimen: Blood   Result Value Ref Range    Glucose 113 70 - 130 mg/dL   Urinalysis With Microscopic If Indicated (No Culture) - Urine, Clean Catch    Collection Time: 07/25/23  4:39 PM    Specimen: Urine, Clean Catch   Result Value Ref Range    Color, UA Dark Yellow (A) Yellow, Straw    Appearance, UA Clear Clear    pH, UA 6.5 5.0 - 8.0    Specific Gravity, UA 1.020 1.005 - 1.030    Glucose, UA Negative Negative    Ketones, UA 40 mg/dL (2+) (A) Negative    Bilirubin, UA Negative Negative    Blood, UA Trace (A) Negative    Protein, UA 30 mg/dL (1+) (A) Negative    Leuk Esterase, UA Trace (A) Negative    Nitrite, UA Negative Negative    Urobilinogen, UA 1.0 E.U./dL 0.2 - 1.0 E.U./dL   Urinalysis, Microscopic Only - Urine, Clean Catch    Collection Time: 07/25/23  4:39 PM    Specimen: Urine, Clean Catch   Result Value Ref Range    RBC, UA 6-12 (A) None Seen, 0-2 /HPF    WBC, UA 6-12 (A) None Seen, 0-2 /HPF    Bacteria, UA None Seen None Seen /HPF    Squamous Epithelial Cells, UA 0-2 None Seen, 0-2 /HPF    Hyaline Casts, UA 3-6 None Seen /LPF    Methodology Automated Microscopy        Ordered the above labs and independently reviewed the results.        RADIOLOGY  CT Head Without Contrast    Result Date: 7/25/2023  CT HEAD WO CONTRAST-  INDICATIONS: Head injury, lung cancer, cerebral aneurysm (left posterior communicating artery)  TECHNIQUE: Radiation dose  reduction techniques were utilized, including automated exposure control and exposure modulation based on body size. 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  No acute intracranial hemorrhage, midline shift or mass effect. No acute territorial infarct is identified.  Moderate periventricular hypodensities suggest chronic small vessel ischemic change in a patient this age.  Arterial calcifications are seen at the base of the brain.  Ventricles, cisterns, cerebral sulci are unremarkable for patient age.  The visualized paranasal sinuses, orbits, mastoid air cells are unremarkable.   Note: Possibility of metastatic disease in the brain cannot be excluded on the basis of an unenhanced exam. If there is clinical concern for metastatic disease, further evaluation with enhanced imaging would be recommended.        No acute intracranial hemorrhage or hydrocephalus. If there is further clinical concern, MRI could be considered for further evaluation.  This report was finalized on 7/25/2023 4:09 PM by Dr. Ebenezer Barton M.D.      XR Chest 1 View    Result Date: 7/25/2023  X-RAY CHEST ONE VIEW  HISTORY: 83-year-old female with weakness and dizziness. Recurrent lung cancer. Radiation to right lower lobe and left upper lobe, synchronous lung cancers.  FINDINGS: There is no significant change in the appearance of the chest since previous radiograph 12/12/2022. There is no airspace consolidation or effusion on the right. On the left, there is opacification of the left upper lobe and mediastinal shift to the left obscuring the left lower lobe. There is no CHF. Right MediPort catheter tip is within the SVC.       I ordered the above noted radiological studies. Reviewed by me and discussed with radiologist.  See dictation for official radiology interpretation.      MEDICATIONS GIVEN IN ER    Medications   sodium  chloride 0.9 % flush 10 mL (has no administration in time range)   sodium chloride 0.9 % bolus 1,000 mL (1,000 mL Intravenous New Bag 7/25/23 1718)   sodium chloride 0.9 % flush 10 mL (has no administration in time range)   sodium chloride 0.9 % flush 10 mL (has no administration in time range)   sodium chloride 0.9 % flush 20 mL (has no administration in time range)   sodium chloride 0.9 % infusion 40 mL (has no administration in time range)   heparin injection 500 Units (has no administration in time range)   sodium chloride 0.9 % flush 10 mL (has no administration in time range)   morphine injection 4 mg (4 mg Intravenous Given 7/25/23 1658)   ondansetron (ZOFRAN) injection 4 mg (4 mg Intravenous Given 7/25/23 1701)         ADDITIONAL ORDERS CONSIDERED BUT NOT ORDERED:  Nothing      PROGRESS, DATA ANALYSIS, CONSULTS, AND MEDICAL DECISION MAKING    All labs have been independently interpreted by myself.  All radiology studies have been independently interpreted by myself and discussed with radiologist dictating the report.   EKG's independently interpreted by myself.  Discussion below represents my analysis of pertinent findings related to patient's condition, differential diagnosis, treatment plan and final disposition.    I have discussed case with Dr. Davis, emergency room physician.  He has performed his own bedside examination and agrees with treatment plan.    ED Course as of 07/25/23 2312 Tue Jul 25, 2023   1505 Patient presents with progressive weakness, failure to thrive, fall today.  Patient is currently undergoing radiation and oral chemotherapy for known lung cancer.  Patient is somnolent however has stable vitals.  Differential diagnoses include but not limited to electrolyte abnormality, SUKH, UTI, pneumonia, metastatic brain disease. [EE]   1527 WBC: 5.84 [EE]   1527 Hemoglobin: 12.6 [EE]   1527 Chest x-ray independently interpreted myself shows left upper lobe mass.  No evidence of pneumothorax.  [EE]   1531 Creatinine: 0.73 [EE]   1535 EKG independently interpreted myself.  Time 1514.  Sinus rhythm, 76 bpm with a PVC.  Normal P/MEHNAZ.  QRS normal with normal axis.  Nonspecific T wave changes diffusely.  T wave changes are new versus prior EKG from 11/20/2022. [EE]   1551 Shared decision making discussion with family.  The patient's energy and health has been up and down over the past several weeks.  They state several days ago she was able to walk and go out to eat.  This setback appears fairly acute.  Plan to admit the patient if she cannot walk. [EE]   1615 EKG personally interpreted by me.  My personal interpretation is:          EKG time: 1514  Rhythm/Rate: Sinus rhythm with a PVC, rate 76  P waves and NH: Normal  QRS, axis: Normal axis, anterior Q waves  ST and T waves: Nonspecific ST/T wave changes in the lateral and inferior leads    Interpreted Contemporaneously by me, independently viewed   [WH]   1627 CT images of the head independently interpreted myself shows no evidence of intracranial abnormality. [EE]   1716 Bacteria, UA: None Seen [EE]   1728 Recheck of patient.  I explained that I am concerned of her overall weakness.  I will plan to admit her to the hospital for further care.  We will have hospice evaluate her as well.  Patient and family in agreement with treatment plan. [EE]   1729 I discussed the patient with CHELSI Veras.  She agrees to admit. [EE]      ED Course User Index  [EE] Jose Juares PA  [WH] Bernardo Davis MD       AS OF 17:39 EDT VITALS:    BP - 141/68  HR - 71  TEMP - 98.7 °F (37.1 °C) (Tympanic)  O2 SATS - 100%        DIAGNOSIS  Final diagnoses:   Generalized weakness   Malignant neoplasm of upper lobe of left lung   Failure to thrive in adult         DISPOSITION  Admitted      Dictated utilizing Dragon dictation     Jose Juares PA  07/25/23 5925

## 2023-07-26 PROBLEM — R50.9 FEVER: Status: ACTIVE | Noted: 2023-07-26

## 2023-07-26 PROBLEM — N30.90 CYSTITIS: Status: ACTIVE | Noted: 2023-07-26

## 2023-07-26 LAB
ANION GAP SERPL CALCULATED.3IONS-SCNC: 10.7 MMOL/L (ref 5–15)
BASOPHILS # BLD AUTO: 0.02 10*3/MM3 (ref 0–0.2)
BASOPHILS NFR BLD AUTO: 0.4 % (ref 0–1.5)
BUN SERPL-MCNC: 16 MG/DL (ref 8–23)
BUN/CREAT SERPL: 32.7 (ref 7–25)
CALCIUM SPEC-SCNC: 7.6 MG/DL (ref 8.6–10.5)
CHLORIDE SERPL-SCNC: 96 MMOL/L (ref 98–107)
CO2 SERPL-SCNC: 27.3 MMOL/L (ref 22–29)
CORTIS SERPL-MCNC: 20.2 MCG/DL
CREAT SERPL-MCNC: 0.49 MG/DL (ref 0.57–1)
D-LACTATE SERPL-SCNC: 0.8 MMOL/L (ref 0.5–2)
DEPRECATED RDW RBC AUTO: 41.1 FL (ref 37–54)
EGFRCR SERPLBLD CKD-EPI 2021: 94.8 ML/MIN/1.73
EOSINOPHIL # BLD AUTO: 0 10*3/MM3 (ref 0–0.4)
EOSINOPHIL NFR BLD AUTO: 0 % (ref 0.3–6.2)
ERYTHROCYTE [DISTWIDTH] IN BLOOD BY AUTOMATED COUNT: 12.8 % (ref 12.3–15.4)
GLUCOSE SERPL-MCNC: 87 MG/DL (ref 65–99)
HCT VFR BLD AUTO: 36.6 % (ref 34–46.6)
HGB BLD-MCNC: 12.1 G/DL (ref 12–15.9)
IMM GRANULOCYTES # BLD AUTO: 0.01 10*3/MM3 (ref 0–0.05)
IMM GRANULOCYTES NFR BLD AUTO: 0.2 % (ref 0–0.5)
LYMPHOCYTES # BLD AUTO: 1.51 10*3/MM3 (ref 0.7–3.1)
LYMPHOCYTES NFR BLD AUTO: 27.9 % (ref 19.6–45.3)
MCH RBC QN AUTO: 28.9 PG (ref 26.6–33)
MCHC RBC AUTO-ENTMCNC: 33.1 G/DL (ref 31.5–35.7)
MCV RBC AUTO: 87.6 FL (ref 79–97)
MONOCYTES # BLD AUTO: 0.39 10*3/MM3 (ref 0.1–0.9)
MONOCYTES NFR BLD AUTO: 7.2 % (ref 5–12)
NEUTROPHILS NFR BLD AUTO: 3.49 10*3/MM3 (ref 1.7–7)
NEUTROPHILS NFR BLD AUTO: 64.3 % (ref 42.7–76)
NRBC BLD AUTO-RTO: 0.2 /100 WBC (ref 0–0.2)
PLATELET # BLD AUTO: 150 10*3/MM3 (ref 140–450)
PMV BLD AUTO: 9.5 FL (ref 6–12)
POTASSIUM SERPL-SCNC: 3.1 MMOL/L (ref 3.5–5.2)
QT INTERVAL: 380 MS
RBC # BLD AUTO: 4.18 10*6/MM3 (ref 3.77–5.28)
SODIUM SERPL-SCNC: 134 MMOL/L (ref 136–145)
T4 FREE SERPL-MCNC: 1.22 NG/DL (ref 0.93–1.7)
TSH SERPL DL<=0.05 MIU/L-ACNC: 2.16 UIU/ML (ref 0.27–4.2)
WBC NRBC COR # BLD: 5.42 10*3/MM3 (ref 3.4–10.8)

## 2023-07-26 PROCEDURE — 0 CEFEPIME PER 500 MG: Performed by: INTERNAL MEDICINE

## 2023-07-26 PROCEDURE — 77014 CHG CT GUIDANCE RADIATION THERAPY FLDS PLACEMENT: CPT | Performed by: RADIOLOGY

## 2023-07-26 PROCEDURE — G0378 HOSPITAL OBSERVATION PER HR: HCPCS

## 2023-07-26 PROCEDURE — 97162 PT EVAL MOD COMPLEX 30 MIN: CPT

## 2023-07-26 PROCEDURE — 99205 OFFICE O/P NEW HI 60 MIN: CPT | Performed by: INTERNAL MEDICINE

## 2023-07-26 PROCEDURE — 97165 OT EVAL LOW COMPLEX 30 MIN: CPT

## 2023-07-26 PROCEDURE — 80048 BASIC METABOLIC PNL TOTAL CA: CPT | Performed by: INTERNAL MEDICINE

## 2023-07-26 PROCEDURE — 82533 TOTAL CORTISOL: CPT | Performed by: INTERNAL MEDICINE

## 2023-07-26 PROCEDURE — 63710000001 PREDNISONE PER 1 MG: Performed by: INTERNAL MEDICINE

## 2023-07-26 PROCEDURE — 97110 THERAPEUTIC EXERCISES: CPT

## 2023-07-26 PROCEDURE — 96361 HYDRATE IV INFUSION ADD-ON: CPT

## 2023-07-26 PROCEDURE — 77386: CPT | Performed by: RADIOLOGY

## 2023-07-26 PROCEDURE — 84439 ASSAY OF FREE THYROXINE: CPT | Performed by: INTERNAL MEDICINE

## 2023-07-26 PROCEDURE — 25010000002 VANCOMYCIN PER 500 MG: Performed by: INTERNAL MEDICINE

## 2023-07-26 PROCEDURE — 85025 COMPLETE CBC W/AUTO DIFF WBC: CPT | Performed by: INTERNAL MEDICINE

## 2023-07-26 PROCEDURE — 25010000002 VANCOMYCIN 750 MG RECONSTITUTED SOLUTION 1 EACH VIAL: Performed by: INTERNAL MEDICINE

## 2023-07-26 PROCEDURE — 25010000002 SODIUM CHLORIDE 0.9 % WITH KCL 20 MEQ 20-0.9 MEQ/L-% SOLUTION: Performed by: INTERNAL MEDICINE

## 2023-07-26 PROCEDURE — 99214 OFFICE O/P EST MOD 30 MIN: CPT | Performed by: INTERNAL MEDICINE

## 2023-07-26 PROCEDURE — 84443 ASSAY THYROID STIM HORMONE: CPT | Performed by: INTERNAL MEDICINE

## 2023-07-26 RX ORDER — PHENAZOPYRIDINE HYDROCHLORIDE 100 MG/1
100 TABLET, FILM COATED ORAL
Status: DISCONTINUED | OUTPATIENT
Start: 2023-07-26 | End: 2023-07-28 | Stop reason: HOSPADM

## 2023-07-26 RX ADMIN — POTASSIUM CHLORIDE AND SODIUM CHLORIDE 100 ML/HR: 900; 150 INJECTION, SOLUTION INTRAVENOUS at 18:14

## 2023-07-26 RX ADMIN — CEFEPIME 2000 MG: 2 INJECTION, POWDER, FOR SOLUTION INTRAVENOUS at 08:53

## 2023-07-26 RX ADMIN — MORPHINE SULFATE 15 MG: 15 TABLET, EXTENDED RELEASE ORAL at 08:52

## 2023-07-26 RX ADMIN — SENNOSIDES AND DOCUSATE SODIUM 2 TABLET: 50; 8.6 TABLET ORAL at 08:52

## 2023-07-26 RX ADMIN — POTASSIUM CHLORIDE AND SODIUM CHLORIDE 100 ML/HR: 900; 150 INJECTION, SOLUTION INTRAVENOUS at 00:00

## 2023-07-26 RX ADMIN — Medication 10 ML: at 20:45

## 2023-07-26 RX ADMIN — MORPHINE SULFATE 15 MG: 15 TABLET, EXTENDED RELEASE ORAL at 20:44

## 2023-07-26 RX ADMIN — AMLODIPINE BESYLATE 5 MG: 5 TABLET ORAL at 08:52

## 2023-07-26 RX ADMIN — PHENAZOPYRIDINE 100 MG: 100 TABLET ORAL at 08:52

## 2023-07-26 RX ADMIN — METOPROLOL SUCCINATE 25 MG: 25 TABLET, EXTENDED RELEASE ORAL at 08:52

## 2023-07-26 RX ADMIN — Medication 1000 MCG: at 08:52

## 2023-07-26 RX ADMIN — PHENAZOPYRIDINE 100 MG: 100 TABLET ORAL at 12:37

## 2023-07-26 RX ADMIN — CEFEPIME 2000 MG: 2 INJECTION, POWDER, FOR SOLUTION INTRAVENOUS at 18:06

## 2023-07-26 RX ADMIN — VANCOMYCIN HYDROCHLORIDE 1000 MG: 1 INJECTION, SOLUTION INTRAVENOUS at 01:25

## 2023-07-26 RX ADMIN — PHENAZOPYRIDINE 100 MG: 100 TABLET ORAL at 18:06

## 2023-07-26 RX ADMIN — CHLORHEXIDINE GLUCONATE 15 ML: 1.2 SOLUTION ORAL at 20:44

## 2023-07-26 RX ADMIN — SENNOSIDES AND DOCUSATE SODIUM 2 TABLET: 50; 8.6 TABLET ORAL at 20:44

## 2023-07-26 RX ADMIN — Medication 1000 UNITS: at 08:52

## 2023-07-26 RX ADMIN — PREDNISONE 10 MG: 20 TABLET ORAL at 08:52

## 2023-07-26 RX ADMIN — POTASSIUM CHLORIDE AND SODIUM CHLORIDE 100 ML/HR: 900; 150 INJECTION, SOLUTION INTRAVENOUS at 09:05

## 2023-07-26 RX ADMIN — Medication 10 ML: at 08:53

## 2023-07-26 RX ADMIN — CHLORHEXIDINE GLUCONATE 15 ML: 1.2 SOLUTION ORAL at 08:52

## 2023-07-26 RX ADMIN — VANCOMYCIN HYDROCHLORIDE 750 MG: 750 INJECTION, POWDER, LYOPHILIZED, FOR SOLUTION INTRAVENOUS at 12:36

## 2023-07-26 RX ADMIN — POTASSIUM CHLORIDE 20 MEQ: 750 TABLET, EXTENDED RELEASE ORAL at 08:52

## 2023-07-26 NOTE — PLAN OF CARE
Goal Outcome Evaluation:  Plan of Care Reviewed With: patient, daughter        Progress: no change  Outcome Evaluation: Pt is a 82 yo female admitted for increased weakness following fall at home, pt with hx lung cancer, current with chemo. Pt seen this date for OT MARISA scott&Edin, reports she lives at home with her spouse, no AD, (I) with ADLs and reports no recent falls besides one that occurred prior to admission. Pt presenting at her baseline this date with ADLs and func mob, no AD used and SBA. Pt plans home with spouse who is able to be home 24/7 if assist as needed. OT to sign off at this time.      Anticipated Discharge Disposition (OT): home

## 2023-07-26 NOTE — PLAN OF CARE
Problem: Adult Inpatient Plan of Care  Goal: Plan of Care Review  Outcome: Ongoing, Progressing  Flowsheets (Taken 7/26/2023 0704)  Progress: no change  Plan of Care Reviewed With: patient  Outcome Evaluation:   VSS   100.8 fever overnight, down w/tylenol   on RA   SR on monitor   PU to coccyx, abrasion on back   c/o pain all over and both legs - medicated PRN   ID and Urol consulted   will cont to monitor   Goal Outcome Evaluation:  Plan of Care Reviewed With: patient        Progress: no change  Outcome Evaluation: VSS; 100.8 fever overnight, down w/tylenol; on RA; SR on monitor; PU to coccyx, abrasion on back; c/o pain all over and both legs - medicated PRN; ID and Urol consulted; will cont to monitor

## 2023-07-26 NOTE — CASE MANAGEMENT/SOCIAL WORK
Discharge Planning Assessment  [facility]     Patient Name: Darlene Pfeiffer  MRN: [id number]  Today's Date: 7/26/2023    Admit Date: 7/25/2023    Plan: home with    Discharge Needs Assessment       Row Name 07/26/23 1549       Living Environment    People in Home spouse    Current Living Arrangements home    Potentially Unsafe Housing Conditions none    Family Caregiver if Needed spouse    Quality of Family Relationships involved;helpful    Able to Return to Prior Arrangements yes       Resource/Environmental Concerns    Resource/Environmental Concerns none       Transition Planning    Patient/Family Anticipates Transition to home with family    Patient/Family Anticipated Services at Transition none    Transportation Anticipated family or friend will provide       Discharge Needs Assessment    Readmission Within the Last 30 Days no previous admission in last 30 days    Equipment Currently Used at Home none    Concerns to be Addressed no discharge needs identified    Anticipated Changes Related to Illness none    Equipment Needed After Discharge none                   Discharge Plan       Row Name 07/26/23 1549       Plan    Plan home with     Plan Comments Spoke to patient at bedside, introduced self and explained CCP role, face sheet and pharmacy information verified. Pt lives with her  in 1 level home with 2 steps to enter, she uses no medical equipment, she is IADL's. No SNF or HH history, she plans home with  to transport, no anticipated needs CCP will follow - Pam LINDSEYAnthony                  Continued Care and Services - Admitted Since 7/25/2023    Coordination has not been started for this encounter.       Selected Continued Care - Episodes Includes continued care and service providers with selected services from the active episodes listed below      Oncology Episode start date: 6/13/2023   There are no active outsourced providers for this episode.                 Expected Discharge Date  and Time       Expected Discharge Date Expected Discharge Time    Jul 28, 2023            Demographic Summary       Row Name 07/26/23 1548       General Information    Admission Type observation                   Functional Status       Row Name 07/26/23 1548       Functional Status    Usual Activity Tolerance excellent    Current Activity Tolerance good       Assessment of Health Literacy    Health Literacy Excellent       Functional Status, IADL    Medications independent    Meal Preparation independent    Housekeeping assistive person    Laundry assistive person    Shopping assistive person       Mental Status    General Appearance WDL WDL       Mental Status Summary    Recent Changes in Mental Status/Cognitive Functioning no changes                   Psychosocial    No documentation.                  Abuse/Neglect    No documentation.                  Legal       Row Name 07/26/23 1548       Financial/Legal    Who Manages Finances if Patient Unable                    Substance Abuse    No documentation.                  Patient Forms    No documentation.                     Pam Pastrana RN

## 2023-07-26 NOTE — NURSING NOTE
Dr. Francis w/Urology called this morning. I told him about the patient's complaints of painful/burning urination and straining to void. He said that he had seen the results of the UA/clean catch. He stated they may be unable to see the patient today but to please order pyridium and bladder scan to see if emptying. I told him that Dr Escamilla wanted to order pyridium but d/t Cr level it was contraindicated. Creatinine at 0.73, Dr Francis said ok to override and order. Dr Francis also stated to please give them a call if anything else is needed.

## 2023-07-26 NOTE — CONSULTS
Subjective     REASON FOR CONSULTATION:  cancer   Provide an opinion on any further workup or treatment                             REQUESTING PHYSICIAN:  Herbert    RECORDS OBTAINED:  Records of the patients history including those obtained from the referring provider were reviewed and summarized in detail.    History of Present Illness   This is an 83-year-old lady followed by Dr. Escobar in our practice for non-small cell lung cancer.  She has adenocarcinoma moderately differentiated involving left upper lobe parenchyma and left lower lobe pleura.  She was treated with immunotherapy with Keytruda 12/14/2022 through 5/31/2023.  A PET scan 6/5/2023 showed progression of disease in the left upper lobe, new left supraclavicular lymphadenopathy, new metastases to the left posterior fourth rib.  She initiated dabrafenib and trametinib (BRAF mutation) which was complicated by nausea vomiting decreased appetite and elevated liver function test, fatigue and sleeping most of the day.  She was seen in the office 7/21/2023 at which time she was instructed to hold the BRAF inhibitor.  She has also been undergoing palliative radiation to the left chest wall and lung for pain control.    The patient was brought to the ER on 7/25/2023 with progressive weakness and a fall at home.  CT head showed no acute changes and chest x-ray showed prior lung cancer opacities no acute infiltrates.  Labs on admission normal white blood cell count 5.8, hemoglobin 12.6, platelets 176, glucose 111, BUN 19, creatinine 0.7, protein 5.1/albumin 3.1, ALT 38/AST 39, magnesium 1.6.  Lactic acid 0.8.    The patient had fever overnight and has been started on antibiotic therapy pending cultures etc. she is feeling better this morning after fluid and antibiotics.  She reports dysuria and urinary frequency and urgency prior to admission.  Her pain is presently controlled.  She reports resolved nausea/vomiting today.    Past Medical History:   Diagnosis Date     Allergic rhinitis     Asthma     Cancer of floor of mouth 2014    Cerebral aneurysm     COPD (chronic obstructive pulmonary disease)     COVID 2020    Esophageal reflux     History of adenocarcinoma of lung     History of anemia     History of carcinoma in situ of skin     History of lung cancer     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 from [facility]    History of squamous cell carcinoma     Tongue    Hyperlipidemia     Hypertension     since early 60s    Low vitamin B12 level     Lung cancer     Thyromegaly     UTI (urinary tract infection)     Vitamin D deficiency         Past Surgical History:   Procedure Laterality Date    BRONCHOSCOPY Bilateral 10/17/2022    Procedure: BRONCHOSCOPY WITH FLUORO with biopsy and BAL;  Surgeon: India Flores MD;  Location: [facility] ENDOSCOPY;  Service: Pulmonary;  Laterality: Bilateral;  PRE/POST - lung mass    CHOLECYSTECTOMY  1999    COLONOSCOPY      EMBOLIZATION CEREBRAL Left 6/29/2022    Procedure: 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;  Surgeon: Bradley Dowell MD;  Location: [facility] HYBRID OR 18/19;  Service: Interventional Radiology;  Laterality: Left;    EYE SURGERY  11/24/2020    Took a muscle out of right eye    GLOSSECTOMY PARTIAL      less than one half tongue    LUNG SURGERY  2012    ORAL LESION EXCISION/BIOPSY      June and August 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    TUBAL ABDOMINAL LIGATION  1970    VENOUS ACCESS DEVICE (PORT) INSERTION N/A 12/12/2022    Procedure: MEDIPORT PLACEMENT;  Surgeon: Jason Villaseñor MD;  Location: [facility] MAIN OR;  Service: Vascular;  Laterality: N/A;        No current facility-administered medications on file prior to encounter.     Current Outpatient Medications on File Prior to Encounter   Medication Sig Dispense Refill    albuterol sulfate  (90 Base) MCG/ACT inhaler Inhale 2 puffs Every 4 (Four) Hours As Needed for Wheezing. 18 g 2    amLODIPine (NORVASC) 5  MG tablet TAKE 1 TABLET BY MOUTH DAILY 90 tablet 0    cetirizine (zyrTEC) 10 MG tablet Take 1 tablet by mouth Daily.      cholecalciferol (VITAMIN D3) 1000 units tablet Take 1 tablet by mouth Daily. HOLDING FOR DOS      Cyanocobalamin (VITAMIN B12 PO) Take  by mouth.      dabrafenib (TAFINLAR) 75 MG chemo capsule Take 2 capsules by mouth 2 (Two) Times a Day. Take on an empty stomach, 1 hour before or 2 hours after a meal. 120 capsule 5    docusate sodium (COLACE) 50 MG capsule Take  by mouth 2 (Two) Times a Day.      hydroCHLOROthiazide (HYDRODIURIL) 25 MG tablet TAKE 1 TABLET EVERY DAY 90 tablet 1    metoprolol succinate XL (TOPROL-XL) 25 MG 24 hr tablet TAKE 1 TABLET EVERY DAY 90 tablet 1    Morphine (MS CONTIN) 15 MG 12 hr tablet Take 1 tablet by mouth 2 (Two) Times a Day. 60 tablet 0    olopatadine (PATANOL) 0.1 % ophthalmic solution 1 drop 2 (Two) Times a Day.      ondansetron (ZOFRAN) 8 MG tablet Take 1 tablet by mouth 3 (Three) Times a Day As Needed for Nausea or Vomiting. 30 tablet 5    oxyCODONE (ROXICODONE) 20 MG tablet Take 1 tablet by mouth Every 3 (Three) Hours As Needed for Moderate Pain. 120 tablet 0    potassium chloride (K-DUR,KLOR-CON) 10 MEQ CR tablet Take 2 tablets by mouth Daily. 60 tablet 1    predniSONE (DELTASONE) 10 MG tablet Take 1 tablet by mouth Daily. 30 tablet 2    chlorhexidine (PERIDEX) 0.12 % solution Apply 15 mL to the mouth or throat 2 (Two) Times a Day.      Diclofenac Sodium (VOLTAREN) 1 % gel gel Apply 4 g topically to the appropriate area as directed 4 (Four) Times a Day As Needed.      fluticasone (FLONASE) 50 MCG/ACT nasal spray 2 sprays into the nostril(s) as directed by provider As Needed.      Ibuprofen (ADVIL PO) Take 400 mg by mouth Daily As Needed. HOLD PER MD RESTREPO      lidocaine-prilocaine (EMLA) 2.5-2.5 % cream Apply nickel size amount to port site 30 min before appt time do not rub in cover with plastic wrap (Patient taking differently: 1 application  As Needed.  Apply nickel size amount to port site 30 min before appt time do not rub in cover with plastic wrap) 30 g 1    promethazine (PHENERGAN) 25 MG suppository Insert 1 suppository into the rectum Every 6 (Six) Hours As Needed for Nausea or Vomiting. 30 suppository 1    Umeclidinium-Vilanterol (Anoro Ellipta) 62.5-25 MCG/ACT aerosol powder  inhaler Inhale 1 puff Daily As Needed.          ALLERGIES:    Allergies   Allergen Reactions    Sulfa Antibiotics Rash        Social History     Socioeconomic History    Marital status:    Tobacco Use    Smoking status: Former     Types: Cigarettes     Quit date: 2015     Years since quittin.4    Smokeless tobacco: Never    Tobacco comments:     less than a pack per day, no smoking since , Quit 2015   Vaping Use    Vaping Use: Never used   Substance and Sexual Activity    Alcohol use: Not Currently     Comment: Socially -A few times a month    Drug use: No    Sexual activity: Defer        Family History   Problem Relation Age of Onset    Ovarian cancer Mother          at age 27    No Known Problems Father     Ovarian cancer Sister     Colon cancer Brother     No Known Problems Other     Malig Hyperthermia Neg Hx         Review of Systems   Constitutional:  Positive for activity change, appetite change, fatigue and unexpected weight change.   HENT: Negative.     Respiratory:  Positive for cough and shortness of breath.    Cardiovascular:  Negative for chest pain, palpitations and leg swelling.   Gastrointestinal:  Positive for nausea. Negative for abdominal pain.   Skin: Negative.    Allergic/Immunologic: Negative.    Neurological:  Positive for weakness. Negative for dizziness, light-headedness and headaches.   Hematological: Negative.    Psychiatric/Behavioral:  Positive for dysphoric mood. Negative for confusion.         Objective     Vitals:    23 2323 23 0335 23 0721   BP: 115/75 101/42 96/74 102/50   BP Location: Left arm  Left arm Left arm Right arm   Patient Position: Standing Lying Lying Lying   Pulse:  75 71 78   Resp:  16 16 15   Temp:  98.6 °F (37 °C) 97.3 °F (36.3 °C) 99 °F (37.2 °C)   TempSrc:  Oral Oral Oral   SpO2:  93% 97% 96%   Weight:       Height:             7/21/2023    10:45 AM   Current Status   ECOG score 3       Physical Exam    CONSTITUTIONAL: pleasant well-developed adult woman sitting up in bed eating some lunch  HEENT: no icterus, no thrush, moist membranes  LYMPH: no cervical or supraclavicular lad  CV: RRR, S1S2, no murmur  RESP: cta bilat, no wheezing, no rales  GI: soft, non-tender, no splenomegaly, +bs  MUSC: no edema   NEURO: alert and oriented x3, mild global weakness  PSYCH: normal mood and affect    RECENT LABS:  Hematology WBC   Date Value Ref Range Status   07/26/2023 5.42 3.40 - 10.80 10*3/mm3 Final     RBC   Date Value Ref Range Status   07/26/2023 4.18 3.77 - 5.28 10*6/mm3 Final     Hemoglobin   Date Value Ref Range Status   07/26/2023 12.1 12.0 - 15.9 g/dL Final     Hematocrit   Date Value Ref Range Status   07/26/2023 36.6 34.0 - 46.6 % Final     Platelets   Date Value Ref Range Status   07/26/2023 150 140 - 450 10*3/mm3 Final        Lab Results   Component Value Date    GLUCOSE 87 07/26/2023    BUN 16 07/26/2023    CREATININE 0.49 (L) 07/26/2023    EGFRRESULT 83.3 01/09/2023    EGFR 94.8 07/26/2023    BCR 32.7 (H) 07/26/2023    K 3.1 (L) 07/26/2023    CO2 27.3 07/26/2023    CALCIUM 7.6 (L) 07/26/2023    PROTENTOTREF 6.2 01/09/2023    ALBUMIN 3.1 (L) 07/25/2023    BILITOT 0.7 07/25/2023    AST 39 (H) 07/25/2023    ALT 38 (H) 07/25/2023     MRI brain 6/23/2023:    IMPRESSION:  Impression:  1.Flow void in the region of the supraclinoid portion left internal carotid artery that could reflect underlying aneurysm. A follow-up MRA or CTA could be obtained to reassess.  2.White matter changes involving both cerebral hemispheres which while nonspecific could reflect more chronic small vessel ischemic  change. There is some additional minimal signal in the right cerebellar hemisphere.  3.Exam limited for assessment of metastatic disease without contrast.    PET scan 6/5/2023:  IMPRESSION:  1. There has been significant progression of metastatic disease at the  left upper thorax and there is new metastatic left supraclavicular  lymphadenopathy and a new metastasis at the left posterior 4th rib.  There may also be new metastatic disease at the posterior medial aspect  of the left lower thorax along the pleura and extrapleural soft tissues.  2. There is no convincing evidence for metastatic disease at the abdomen  or pelvis.       Assessment & Plan     *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, stage IV  Previously treated with immunotherapy December 2022 through May 2023  PET scan 6/5/2023 showed progression of disease in the left lung, new metastatic left supraclavicular lymphadenopathy, left posterior fourth rib metastasis, new disease medial left lower lobe pleural/extrapleural  Undergoing palliative radiation to left chest wall  Initiated BRAF inhibitordabrafenib and trametinib 7/17/2023 subsequently discontinued 7/21/2023 secondary to poor appetite nausea vomiting fatigue elevated LFTs    *Admitted with progressive weakness/falls    *nausea/vomiting  Recent MRI showed no brain metastases  ?  Medication side effect from BRAF inhibitor but has been off since 7/21    *Fever-on antibiotics and work-up underway with cultures etc.    *Pain of malignancy  Home regimen is MS Contin 15 mg twice daily, oxycodone 20 mg every 3 hours as needed    Oncology plan/recommendations:  Continue to hold BRAF inhibitor  Check cortisol/ACTH rule out adrenal insufficiency as a cause of nausea vomiting etc. (patient previously on immunotherapy)  Check TSH free T4  Continue radiation therapy  Continue pain control/bowel regimen  Antibiotics per ID  Follow-up with Dr. Escobar after d/c

## 2023-07-26 NOTE — CONSULTS
Referring Provider: Lorna Escamilla MD  [address] [facility] [facility]  [address]  [place] [address]    Reason for Consultation: fever    History of present illness:  Darlene Pfeiffer is a 83 y.o. with COPD and left-sided lung adenocarcinoma s/p upper lobectomy who I am asked to evaluate and give opinion for fever. History is obtained from the patient, her daughter, her son, and review of the old medical records which I summarize/synthesize as follows: She presented to the ER  yesterday afternoon with weakness and a fall. The weakness has been progressive over the past few weeks. She's had a gradual decline in her health and performance status. She's had poor PO intake. She is currently undergoing immunotherapy treatment with oral dabrafenib and trametinib for lung cancer though it is currently on hold due to nausea and vomiting. She reports dysuria which is new. She denies any respiratory symptoms. No issues at her port site. No diarrhea. No skin lesions. No fever at home.     In the ER she was initially afebrile. Labs were notable for a normal WBC. UA with 6-12 WBCs. CXR showed chronic left-sided changes. She later had a fever to 100.8 F so ID was consulted along with urology and oncology. She was started on vancomycin and cefepime. She feels better this morning.     Past Medical History:   Diagnosis Date    Allergic rhinitis     Asthma     Cancer of floor of mouth 2014    Cerebral aneurysm     COPD (chronic obstructive pulmonary disease)     COVID 2020    Esophageal reflux     History of adenocarcinoma of lung     History of anemia     History of carcinoma in situ of skin     History of lung cancer     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 from [facility]    History of squamous cell carcinoma     Tongue    Hyperlipidemia     Hypertension     since early 60s    Low vitamin B12 level     Lung cancer     Thyromegaly     UTI (urinary tract infection)     Vitamin D deficiency        Past  Surgical History:   Procedure Laterality Date    BRONCHOSCOPY Bilateral 10/17/2022    Procedure: BRONCHOSCOPY WITH FLUORO with biopsy and BAL;  Surgeon: India Flores MD;  Location: [facility] ENDOSCOPY;  Service: Pulmonary;  Laterality: Bilateral;  PRE/POST - lung mass    CHOLECYSTECTOMY  1999    COLONOSCOPY      EMBOLIZATION CEREBRAL Left 6/29/2022    Procedure: 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;  Surgeon: Bradley Dowell MD;  Location: [facility] HYBRID OR 18/19;  Service: Interventional Radiology;  Laterality: Left;    EYE SURGERY  11/24/2020    Took a muscle out of right eye    GLOSSECTOMY PARTIAL      less than one half tongue    LUNG SURGERY  2012    ORAL LESION EXCISION/BIOPSY      June and August 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    TUBAL ABDOMINAL LIGATION  1970    VENOUS ACCESS DEVICE (PORT) INSERTION N/A 12/12/2022    Procedure: MEDIPORT PLACEMENT;  Surgeon: Jason Villaseñor MD;  Location: [facility] MAIN OR;  Service: Vascular;  Laterality: N/A;       Social History:  Quit smoking 2015  Lives in [place], [place] w/ her ; other family nearby    Antibiotic allergies and intolerances:  Sulfa    Medications:    Current Facility-Administered Medications:     acetaminophen (TYLENOL) tablet 650 mg, 650 mg, Oral, Q4H PRN, 650 mg at 07/25/23 2031 **OR** acetaminophen (TYLENOL) 160 MG/5ML solution 650 mg, 650 mg, Oral, Q4H PRN **OR** acetaminophen (TYLENOL) suppository 650 mg, 650 mg, Rectal, Q4H PRN, Lorna Escamilla MD    albuterol (PROVENTIL) nebulizer solution 0.083% 2.5 mg/3mL, 2.5 mg, Nebulization, Q6H PRN, Lorna Escamilla MD    amLODIPine (NORVASC) tablet 5 mg, 5 mg, Oral, Daily, StingLorna guerin MD    sennosides-docusate (PERICOLACE) 8.6-50 MG per tablet 2 tablet, 2 tablet, Oral, BID, 2 tablet at 07/25/23 2000 **AND** polyethylene glycol (MIRALAX) packet 17 g, 17 g, Oral, Daily PRN **AND** bisacodyl (DULCOLAX) EC tablet 5 mg, 5 mg, Oral, Daily PRN **AND**  bisacodyl (DULCOLAX) suppository 10 mg, 10 mg, Rectal, Daily PRN, Lorna Escamilla MD    cefepime 2 gm IVPB in 100 ml NS (VTB), 2,000 mg, Intravenous, Q12H, Lorna Escamilla MD    chlorhexidine (PERIDEX) 0.12 % solution 15 mL, 15 mL, Mouth/Throat, BID, Lorna Escamilla MD    cholecalciferol (VITAMIN D3) tablet 1,000 Units, 1,000 Units, Oral, Daily, Lorna Escamilla MD    heparin injection 500 Units, 5 mL, Intravenous, PRN, Bernardo Davis MD    ipratropium-albuterol (DUO-NEB) nebulizer solution 3 mL, 3 mL, Nebulization, Q8H - RT, Lorna Escamilla MD    metoprolol succinate XL (TOPROL-XL) 24 hr tablet 25 mg, 25 mg, Oral, Daily, Lorna Escamilla MD    Morphine (MS CONTIN) 12 hr tablet 15 mg, 15 mg, Oral, BID, Lorna Escamilla MD, 15 mg at 07/25/23 2121    ondansetron (ZOFRAN) injection 4 mg, 4 mg, Intravenous, Q6H PRN, Lorna Escamilla MD    oxyCODONE (ROXICODONE) immediate release tablet 20 mg, 20 mg, Oral, Q3H PRN, Lorna Escamilla MD, 20 mg at 07/25/23 1958    Pharmacy to dose vancomycin, , Does not apply, Continuous PRN, Lorna Escamilla MD    phenazopyridine (PYRIDIUM) tablet 100 mg, 100 mg, Oral, TID With Meals, Selvin Francis Jr., MD    potassium chloride (K-DUR,KLOR-CON) ER tablet 20 mEq, 20 mEq, Oral, Daily, Lorna Escamilla MD    predniSONE (DELTASONE) tablet 10 mg, 10 mg, Oral, Daily, Lorna Escamilla MD    sodium chloride 0.9 % flush 10 mL, 10 mL, Intravenous, PRN, Emergency, Triage Protocol, MD    sodium chloride 0.9 % flush 10 mL, 10 mL, Intravenous, Q12H, Bernardo Davis MD, 10 mL at 07/25/23 2000    sodium chloride 0.9 % flush 10 mL, 10 mL, Intravenous, PRRyan CR William D, MD    Access VAD, , , Once **AND** sodium chloride 0.9 % flush 10 mL, 10 mL, Intravenous, Ryan YEPEZ William D, MD    sodium chloride 0.9 % flush 20 mL, 20 mL, Intravenous, Ryan YEPEZ William D, MD    sodium chloride 0.9 % infusion 40 mL,  40 mL, Intravenous, PRN, Bernardo Davis MD    sodium chloride 0.9 % with KCl 20 mEq/L infusion, 100 mL/hr, Intravenous, Continuous, Lorna Escamilla MD, Last Rate: 100 mL/hr at 07/26/23 0000, 100 mL/hr at 07/26/23 0000    vancomycin (VANCOCIN) 500 mg in sodium chloride 0.9 % 100 mL IVPB-VTB, 500 mg, Intravenous, Q12H, Lorna Escamilla MD    vitamin B-12 (CYANOCOBALAMIN) tablet 1,000 mcg, 1,000 mcg, Oral, Daily, Lorna Escamilla MD      Objective   Vital Signs   Temp:  [97.3 °F (36.3 °C)-100.8 °F (38.2 °C)] 99 °F (37.2 °C)  Heart Rate:  [71-88] 78  Resp:  [15-16] 15  BP: ()/(42-77) 102/50    Physical Exam:   General: awake, alert, very nice, frail  Eyes: no scleral icterus  ENT: no thrush  Cardiovascular: NR  Respiratory: normal work of breathing on RA; no wheezing  GI: Abdomen is soft, not tender, + bowel sounds in all four quadrants  :  no Carcamo catheter  Skin: No rashes  Neurological: Alert and oriented x 3  Psychiatric: Normal mood and affect   Vasc: R chest port w/o erythema    Labs:     Lab Results   Component Value Date    WBC 5.42 07/26/2023    HGB 12.1 07/26/2023    HCT 36.6 07/26/2023    MCV 87.6 07/26/2023     07/26/2023       Lab Results   Component Value Date    GLUCOSE 87 07/26/2023    BUN 16 07/26/2023    CREATININE 0.49 (L) 07/26/2023    EGFRIFNONA 85 12/08/2021    EGFRIFAFRI 98 12/08/2021    BCR 32.7 (H) 07/26/2023    CO2 27.3 07/26/2023    CALCIUM 7.6 (L) 07/26/2023    PROTENTOTREF 6.2 01/09/2023    ALBUMIN 3.1 (L) 07/25/2023    LABIL2 2.0 01/09/2023    AST 39 (H) 07/25/2023    ALT 38 (H) 07/25/2023     LA 0.8  UA 6-12 WBCs    Microbiology:  7/25 BCx: pending    Radiology:  CT head no acute process    CXR personally reviewed and shows WARREN consolidation w/ shift similar to prior; port in place as well    ASSESSMENT/PLAN:  Fever in adult  Fall at home  Lung adenocarcinoma w/ history of lobectomy  On immunotherapy  Nausea and vomiting due to #4  Acute  cystitis    No fever at home but did have fever here. A fall due to generalized weakness and progressive physical decline associated with nausea, vomiting, and poor PO intake is really what brought her in. She does mention dysuria. She also has a port. No respiratory symptoms. Her CXR is abnormal due to prior lobectomy but unchanged from recent imaging.     Therefore I recommend we continue empiric vancomycin and cefepime while awaiting her culture results. I added on a urine culture to the sample that was collected yesterday.     While the patient is on vancomycin, vancomycin levels will be ordered and reviewed with dose adjustments made as needed. The patient will have a daily creatinine level checked while on vancomycin as part of monitoring this medication.     ID will follow.

## 2023-07-26 NOTE — PROGRESS NOTES
Name: Darlene Pfeiffer ADMIT: 2023   : 1940  PCP: Kehrer, Meredith Lea, MD    MRN: [id number] LOS: 0 days   AGE/SEX: 83 y.o. female  ROOM: [facility]     Subjective   Subjective   Patient is lying in the bed and does not complain of any nausea, vomiting abdominal pain, chest pain or shortness of breath.  And her pain is reasonably well controlled.       Objective   Objective   Vital Signs  Temp:  [97.3 °F (36.3 °C)-100.8 °F (38.2 °C)] 98.6 °F (37 °C)  Heart Rate:  [71-88] 76  Resp:  [15-16] 15  BP: ()/(42-77) 103/43  SpO2:  [92 %-100 %] 93 %  on   ;   Device (Oxygen Therapy): room air  Body mass index is 16.56 kg/m².  Physical Exam  HEENT: PERRLA, extraocular movements intact, Scleras no icterus  Neck: Supple, no JVD  Cardiovascular: Regular rate and rhythm with normal S1 and S2  Respiratory: Fairly clear to auscultation anteriorly with no wheezes  GI: Soft, nontender, bowel sounds are present  Extremities: No edema, palpable pedal pulses  Results Review     I reviewed the patient's new clinical results.  Results from last 7 days   Lab Units 23  0623  1455 23  0845   WBC 10*3/mm3 5.42 5.84 7.79   HEMOGLOBIN g/dL 12.1 12.6 13.6   PLATELETS 10*3/mm3 150 176 173     Results from last 7 days   Lab Units 23  0622 23  1455 23  0845   SODIUM mmol/L 134* 137 136   POTASSIUM mmol/L 3.1* 3.5 3.1*   CHLORIDE mmol/L 96* 95* 93*   CO2 mmol/L 27.3 31.9* 23.6   BUN mg/dL 16 19 21   CREATININE mg/dL 0.49* 0.73 0.68   GLUCOSE mg/dL 87 111* 73   EGFR mL/min/1.73 94.8 82.7 87.6     Results from last 7 days   Lab Units 23  1455 23  0845   ALBUMIN g/dL 3.1* 3.0*   BILIRUBIN mg/dL 0.7 0.5   ALK PHOS U/L 68 78   AST (SGOT) U/L 39* 100*   ALT (SGPT) U/L 38* 75*     Results from last 7 days   Lab Units 23  0622 23  1455 23  0845   CALCIUM mg/dL 7.6* 8.6 8.2*   ALBUMIN g/dL  --  3.1* 3.0*   MAGNESIUM mg/dL  --  1.6 1.7     Results from last 7 days   Lab Units  07/25/23  2331   LACTATE mmol/L 0.8     Glucose   Date/Time Value Ref Range Status   07/25/2023 1619 113 70 - 130 mg/dL Final     Comment:     Meter: [id number] : [id number] Tia Diehl       CT Head Without Contrast    Result Date: 7/25/2023   No acute intracranial hemorrhage or hydrocephalus. If there is further clinical concern, MRI could be considered for further evaluation.  This report was finalized on 7/25/2023 4:09 PM by Dr. Ebenezer Barton M.D.     I have personally reviewed all medications:  Scheduled Medications  amLODIPine, 5 mg, Oral, Daily  cefepime, 2,000 mg, Intravenous, Q12H  chlorhexidine, 15 mL, Mouth/Throat, BID  cholecalciferol, 1,000 Units, Oral, Daily  ipratropium-albuterol, 3 mL, Nebulization, Q8H - RT  metoprolol succinate XL, 25 mg, Oral, Daily  Morphine, 15 mg, Oral, BID  phenazopyridine, 100 mg, Oral, TID With Meals  potassium chloride, 20 mEq, Oral, Daily  predniSONE, 10 mg, Oral, Daily  senna-docusate sodium, 2 tablet, Oral, BID  sodium chloride, 10 mL, Intravenous, Q12H  vancomycin, 750 mg, Intravenous, Q12H  vitamin B-12, 1,000 mcg, Oral, Daily    Infusions  Pharmacy to dose vancomycin,   sodium chloride 0.9 % with KCl 20 mEq, 100 mL/hr, Last Rate: 100 mL/hr (07/26/23 0905)    Diet  Diet: Regular/House Diet; Texture: Regular Texture (IDDSI 7); Fluid Consistency: Thin (IDDSI 0)    I have personally reviewed:  [x]  Laboratory   [x]  Microbiology   [x]  Radiology   [x]  EKG/Telemetry  [x]  Cardiology/Vascular   []  Pathology    []  Records       Assessment/Plan     Active Hospital Problems    Diagnosis  POA    **Weakness [R53.1]  Yes      Resolved Hospital Problems   No resolved problems to display.       83 y.o. female admitted with Weakness.    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    2.  Fever, no clear source and currently empirically on cefepime and vancomycin which be  continued.  Appreciate infectious disease input.    3.  Generalized weakness with fall, PT/OT consult has been obtained.    4.  Acute cystitis, await for final cultures and sensitivities and is on a broad-spectrum antibiotics.    5.  Hypertension, continue with amlodipine and metoprolol and monitor blood pressure closely.    6.  Nausea/vomiting was felt to be secondary to immunotherapy which has been discontinued and now patient's p.o. intake is improving.    7.  On SCDs for DVT prophylaxis.    8.  CODE STATUS is full code.      Arcadio Godinez MD  [facility]  07/26/23  15:24 EDT

## 2023-07-26 NOTE — THERAPY EVALUATION
Patient Name: Darlene Pfeiffer  : 1940    MRN: [id number]                              Today's Date: 2023       Admit Date: 2023    Visit Dx:     ICD-10-CM ICD-9-CM   1. Generalized weakness  R53.1 780.79   2. Malignant neoplasm of upper lobe of left lung  C34.12 162.3   3. Failure to thrive in adult  R62.7 783.7     Patient Active Problem List   Diagnosis    Hypertension    Hyperlipidemia    Esophageal reflux    Chronic obstructive pulmonary disease    Seasonal allergic rhinitis due to pollen    Abnormal glucose    Clinical diagnosis of severe acute respiratory syndrome coronavirus 2 (SARS-CoV-2) disease    Lung cancer    Cerebral aneurysm, nonruptured    Cerebral aneurysm without rupture    Shortness of breath    Hypokalemia    History of lung cancer    Long-term use of high-risk medication    Intractable back pain    Hypokalemia    Fitting and adjustment of vascular catheter    Weakness     Past Medical History:   Diagnosis Date    Allergic rhinitis     Asthma     Cancer of floor of mouth     Cerebral aneurysm     COPD (chronic obstructive pulmonary disease)     COVID 2020    Esophageal reflux     History of adenocarcinoma of lung     History of anemia     History of carcinoma in situ of skin     History of lung cancer     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 from [facility]    History of squamous cell carcinoma     Tongue    Hyperlipidemia     Hypertension     since early 60s    Low vitamin B12 level     Lung cancer     Thyromegaly     UTI (urinary tract infection)     Vitamin D deficiency      Past Surgical History:   Procedure Laterality Date    BRONCHOSCOPY Bilateral 10/17/2022    Procedure: BRONCHOSCOPY WITH FLUORO with biopsy and BAL;  Surgeon: India Flores MD;  Location: [facility] ENDOSCOPY;  Service: Pulmonary;  Laterality: Bilateral;  PRE/POST - lung mass    CHOLECYSTECTOMY      COLONOSCOPY      EMBOLIZATION CEREBRAL Left 2022    Procedure:  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;  Surgeon: Bradley Dowell MD;  Location: [facility] HYBRID OR 18/19;  Service: Interventional Radiology;  Laterality: Left;    EYE SURGERY  11/24/2020    Took a muscle out of right eye    GLOSSECTOMY PARTIAL      less than one half tongue    LUNG SURGERY  2012    ORAL LESION EXCISION/BIOPSY      June and August 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    TUBAL ABDOMINAL LIGATION  1970    VENOUS ACCESS DEVICE (PORT) INSERTION N/A 12/12/2022    Procedure: MEDIPORT PLACEMENT;  Surgeon: Jason Villaseñor MD;  Location: [facility] MAIN OR;  Service: Vascular;  Laterality: N/A;      General Information       Row Name 07/26/23 0916          Physical Therapy Time and Intention    Document Type evaluation;discharge evaluation/summary  -EJ     Mode of Treatment physical therapy  -EJ       Row Name 07/26/23 0916          General Information    Patient Profile Reviewed yes  -EJ     Prior Level of Function independent:;all household mobility;community mobility;ADL's  -EJ     Existing Precautions/Restrictions no known precautions/restrictions  -EJ     Barriers to Rehab none identified  -EJ       Row Name 07/26/23 0916          Living Environment    People in Home spouse  -EJ       Row Name 07/26/23 0916          Home Main Entrance    Number of Stairs, Main Entrance none  -EJ       Row Name 07/26/23 0916          Stairs Within Home, Primary    Number of Stairs, Within Home, Primary none  -EJ       Row Name 07/26/23 0916          Cognition    Orientation Status (Cognition) oriented x 4  -EJ               User Key  (r) = Recorded By, (t) = Taken By, (c) = Cosigned By      Initials Name Provider Type    EJ Lara Caballero, PT Physical Therapist                   Mobility       Row Name 07/26/23 0916          Bed Mobility    Bed Mobility supine-sit;sit-supine  -EJ     Supine-Sit [place] (Bed Mobility) standby assist  -EJ     Sit-Supine [place] (Bed Mobility) standby  assist  -EJ     Assistive Device (Bed Mobility) head of bed elevated  -EJ       [facility] Name 07/26/23 0916          Sit-Stand Transfer    Sit-Stand [place] (Transfers) verbal cues;standby assist  -EJ       [facility] Name 07/26/23 0916          Gait/Stairs (Locomotion)    [place] Level (Gait) verbal cues;standby assist;contact guard  -EJ     Distance in Feet (Gait) 200  -EJ     Deviations/Abnormal Patterns (Gait) [place] decreased  -EJ     Comment, (Gait/Stairs) no unsteadiness noted  -EJ               User Key  (r) = Recorded By, (t) = Taken By, (c) = Cosigned By      Initials Name Provider Type    Lara Nuñez, PT Physical Therapist                   Obj/Interventions       [facility] Name 07/26/23 0918          Range of Motion Comprehensive    General Range of Motion no range of motion deficits identified  -EJ       Row Name 07/26/23 0918          Strength Comprehensive (MMT)    General Manual Muscle Testing (MMT) Assessment no strength deficits identified  -EJ       Row Name 07/26/23 0918          Balance    Balance Assessment sitting static balance;standing static balance;standing dynamic balance  -EJ     Static Sitting Balance independent  -EJ     Static Standing Balance standby assist  -EJ     Dynamic Standing Balance standby assist  -EJ               User Key  (r) = Recorded By, (t) = Taken By, (c) = Cosigned By      Initials Name Provider Type    Lara Nuñez, PT Physical Therapist                   Goals/Plan    No documentation.                  Clinical Impression       [facility] Name 07/26/23 0919          Pain    Pretreatment Pain Rating 0/10 - no pain  -EJ       Row Name 07/26/23 0919          Plan of Care Review    Plan of Care Reviewed With patient;daughter  -     Outcome Evaluation Pt seen for PT this am. She was admitted yesterday w increasing weakness after a fall at home yesterday morning. Pt w lung cancer and going through chemo. SHe has hx of COPD and HTN. At baseline, pt lives at home w  her  and reports independence w mobility and ADLs. She does not use AD. Today, pt doing well. No complaints at this time. Pt just states that she is cold. Pt currently moving well. SHe performed all mobility w SBA. She ambulated approx 200 ft w SBA. No unsteadiness or LOB noted. Pt back in bed at end of session. She plans home at [place]. No further acute PT needs at this time. Will sign off.  -       Row Name 07/26/23 0919          Therapy Assessment/Plan (PT)    Criteria for Skilled Interventions Met (PT) no problems identified which require skilled intervention  -EJ     Therapy Frequency (PT) evaluation only  -       Row Name 07/26/23 0919          Positioning and Restraints    Pre-Treatment Position in bed  -EJ     Post Treatment Position bed  -EJ     In Bed notified nsg;supine;call light within reach;encouraged to call for assist;with family/caregiver  -               User Key  (r) = Recorded By, (t) = Taken By, (c) = Cosigned By      Initials Name Provider Type    Lara Nuñez, PT Physical Therapist                   Outcome Measures       Row Name 07/26/23 0921          How much help from another person do you currently need...    Turning from your back to your side while in flat bed without using bedrails? 4  -EJ     Moving from lying on back to sitting on the side of a flat bed without bedrails? 4  -EJ     Moving to and from a bed to a chair (including a wheelchair)? 4  -EJ     Standing up from a chair using your arms (e.g., wheelchair, bedside chair)? 4  -EJ     Climbing 3-5 steps with a railing? 3  -EJ     To walk in hospital room? 3  -EJ     AM-PAC 6 Clicks Score (PT) 22  -EJ     Highest level of mobility 7 --> Walked 25 feet or more  -       Row Name 07/26/23 0921          Functional Assessment    Outcome Measure Options AM-PAC 6 Clicks Basic Mobility (PT)  -               User Key  (r) = Recorded By, (t) = Taken By, (c) = Cosigned By      Initials Name Provider Type    BENJA Caballero  Lara KAN, PT Physical Therapist                                   PT Recommendation and Plan     Plan of Care Reviewed With: patient, daughter  Outcome Evaluation: Pt seen for PT this am. She was admitted yesterday w increasing weakness after a fall at home yesterday morning. Pt w lung cancer and going through chemo. SHe has hx of COPD and HTN. At baseline, pt lives at home w her  and reports independence w mobility and ADLs. She does not use AD. Today, pt doing well. No complaints at this time. Pt just states that she is cold. Pt currently moving well. SHe performed all mobility w SBA. She ambulated approx 200 ft w SBA. No unsteadiness or LOB noted. Pt back in bed at end of session. She plans home at [place]. No further acute PT needs at this time. Will sign off.     Time Calculation:         PT Charges       Row Name 07/26/23 0921             Time Calculation    Start Time 0818  -EJ      Stop Time 0835  -EJ      Time Calculation (min) 17 min  -EJ      PT Received On 07/26/23  -EJ         Time Calculation- PT    Total Timed Code Minutes- PT 10 minute(s)  -EJ                User Key  (r) = Recorded By, (t) = Taken By, (c) = Cosigned By      Initials Name Provider Type    Lara Nuñez, PT Physical Therapist                  Therapy Charges for Today       Code Description Service Date Service Provider Modifiers Qty    78095501871  PT EVAL MOD COMPLEXITY 3 7/26/2023 Lara Caballero, PT GP 1    57062986297 HC PT THER PROC EA 15 MIN 7/26/2023 Lara Caballero, PT GP 1            PT G-Codes  Outcome Measure Options: AM-PAC 6 Clicks Basic Mobility (PT)  AM-PAC 6 Clicks Score (PT): 22  PT Discharge Summary  Anticipated Discharge Disposition (PT): home with assist    Lara Caballero PT  7/26/2023

## 2023-07-26 NOTE — PLAN OF CARE
Problem: Malnutrition  Goal: Improved Nutritional Intake  Outcome: Ongoing, Progressing   Goal Outcome Evaluation:   Severe malnutrition. Adding Boost Plus. Recommend considering addition of appetite stimulant if appropriate. RD following.

## 2023-07-26 NOTE — PLAN OF CARE
Goal Outcome Evaluation:  Plan of Care Reviewed With: patient, daughter           Outcome Evaluation: Pt seen for PT this am. She was admitted yesterday w increasing weakness after a fall at home yesterday morning. Pt w lung cancer and going through chemo. SHe has hx of COPD and HTN. At baseline, pt lives at home w her  and reports independence w mobility and ADLs. She does not use AD. Today, pt doing well. No complaints at this time. Pt just states that she is cold. Pt currently moving well. SHe performed all mobility w SBA. She ambulated approx 200 ft w SBA. No unsteadiness or LOB noted. Pt back in bed at end of session. She plans home at [place]. No further acute PT needs at this time. Will sign off.      Anticipated Discharge Disposition (PT): home with assist

## 2023-07-26 NOTE — PROGRESS NOTES
"[facility] Clinical Pharmacy Services: Vancomycin Pharmacokinetic Initial Consult Note    Darlene Pfeiffer is a 83 y.o. female who is on day 1 of pharmacy to dose vancomycin.    Indication:Sepsis  Consulting Provider: Dr. Escamilla  Planned Duration of Therapy: 5 days  Loading Dose Ordered or Given: 1000 mg on 7/26 at 0125  Culture/Source:   7/25 blood cultures in proces  Target: -600 mg/L.hr   Other Antimicrobials: Cefepime 2 g iv every 12 hours    Vitals/Labs  Ht: 160 cm (62.99\"); Wt: 42.4 kg (93 lb 7.6 oz)  Temp Readings from Last 1 Encounters:   07/25/23 98.6 °F (37 °C) (Oral)    Estimated Creatinine Clearance: 40.5 mL/min (by C-G formula based on SCr of 0.73 mg/dL).     Results from last 7 days   Lab Units 07/25/23  1455 07/21/23  0845   CREATININE mg/dL 0.73 0.68   WBC 10*3/mm3 5.84 7.79     Assessment/Plan:    Vancomycin Dose:   500 mg IV every  12  hours  Predictive AUC level for the dose ordered is 500 mg/L.hr, which is within the target of 400-600 mg/L.hr  Vanc Trough has been ordered for 7/27 at 1230     Pharmacy will follow patient's kidney function and will adjust doses and obtain levels as necessary. Thank you for involving pharmacy in this patient's care. Please contact pharmacy with any questions or concerns.                           Arun Blancas III, [facility]  Clinical Pharmacist3  .  "

## 2023-07-26 NOTE — THERAPY EVALUATION
Patient Name: Darlene Pfeiffer  : 1940    MRN: [id number]                              Today's Date: 2023       Admit Date: 2023    Visit Dx:     ICD-10-CM ICD-9-CM   1. Generalized weakness  R53.1 780.79   2. Malignant neoplasm of upper lobe of left lung  C34.12 162.3   3. Failure to thrive in adult  R62.7 783.7     Patient Active Problem List   Diagnosis    Hypertension    Hyperlipidemia    Esophageal reflux    Chronic obstructive pulmonary disease    Seasonal allergic rhinitis due to pollen    Abnormal glucose    Clinical diagnosis of severe acute respiratory syndrome coronavirus 2 (SARS-CoV-2) disease    Lung cancer    Cerebral aneurysm, nonruptured    Cerebral aneurysm without rupture    Shortness of breath    Hypokalemia    History of lung cancer    Long-term use of high-risk medication    Intractable back pain    Hypokalemia    Fitting and adjustment of vascular catheter    Weakness     Past Medical History:   Diagnosis Date    Allergic rhinitis     Asthma     Cancer of floor of mouth     Cerebral aneurysm     COPD (chronic obstructive pulmonary disease)     COVID 2020    Esophageal reflux     History of adenocarcinoma of lung     History of anemia     History of carcinoma in situ of skin     History of lung cancer     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 from [facility]    History of squamous cell carcinoma     Tongue    Hyperlipidemia     Hypertension     since early 60s    Low vitamin B12 level     Lung cancer     Thyromegaly     UTI (urinary tract infection)     Vitamin D deficiency      Past Surgical History:   Procedure Laterality Date    BRONCHOSCOPY Bilateral 10/17/2022    Procedure: BRONCHOSCOPY WITH FLUORO with biopsy and BAL;  Surgeon: India Flores MD;  Location: [facility] ENDOSCOPY;  Service: Pulmonary;  Laterality: Bilateral;  PRE/POST - lung mass    CHOLECYSTECTOMY      COLONOSCOPY      EMBOLIZATION CEREBRAL Left 2022    Procedure:  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;  Surgeon: Bradley Dowell MD;  Location: [facility] HYBRID OR 18/19;  Service: Interventional Radiology;  Laterality: Left;    EYE SURGERY  11/24/2020    Took a muscle out of right eye    GLOSSECTOMY PARTIAL      less than one half tongue    LUNG SURGERY  2012    ORAL LESION EXCISION/BIOPSY      June and August 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    TUBAL ABDOMINAL LIGATION  1970    VENOUS ACCESS DEVICE (PORT) INSERTION N/A 12/12/2022    Procedure: MEDIPORT PLACEMENT;  Surgeon: Jason Villaseñor MD;  Location: [facility] MAIN OR;  Service: Vascular;  Laterality: N/A;      General Information       Row Name 07/26/23 1209          OT Time and Intention    Document Type evaluation;discharge evaluation/summary  -     Mode of Treatment occupational therapy  -       Row Name 07/26/23 1209          General Information    Patient Profile Reviewed yes  -MW     Prior Level of Function independent:;ADL's;all household mobility;transfer;community mobility  -MW     Existing Precautions/Restrictions no known precautions/restrictions  -     Barriers to Rehab none identified  -       Row Name 07/26/23 1209          Living Environment    People in Home spouse  -       Row Name 07/26/23 1209          Cognition    Orientation Status (Cognition) oriented x 4  -MW               User Key  (r) = Recorded By, (t) = Taken By, (c) = Cosigned By      Initials Name Provider Type    MW Christi Denise OT Occupational Therapist                     Mobility/ADL's       Row Name 07/26/23 1213          Bed Mobility    Bed Mobility supine-sit;sit-supine  -MW     Supine-Sit [place] (Bed Mobility) standby assist  -MW     Sit-Supine [place] (Bed Mobility) standby assist  -     Assistive Device (Bed Mobility) head of bed elevated  -       Row Name 07/26/23 1213          Transfers    Transfers sit-stand transfer;toilet transfer  -       Row Name 07/26/23 1213           Sit-Stand Transfer    Sit-Stand [place] (Transfers) verbal cues;standby assist  -[facility] Name 07/26/23 1213          Toilet Transfer    Comment, (Toilet Transfer) reports no concern with transfer earlier, declined need this date  -MW       Row Name 07/26/23 1213          Functional Mobility    Functional Mobility- Ind. Level standby assist  -     Functional Mobility- Device --  no AD  -MW       Row Name 07/26/23 1213          Activities of Daily Living    BADL Assessment/Intervention upper body dressing;lower body dressing  -MW       Row Name 07/26/23 1213          Upper Body Dressing Assessment/Training    [place] Level (Upper Body Dressing) don;pajama/robe;set up  -     Position (Upper Body Dressing) edge of bed sitting  -MW       Row Name 07/26/23 1213          Lower Body Dressing Assessment/Training    Comment, (Lower Body Dressing) socks donned  -               User Key  (r) = Recorded By, (t) = Taken By, (c) = Cosigned By      Initials Name Provider Type     Christi Denise OT Occupational Therapist                   Obj/Interventions       Row Name 07/26/23 1215          Sensory Assessment (Somatosensory)    Sensory Assessment (Somatosensory) UE sensation intact  -MW       Row Name 07/26/23 1215          Vision Assessment/Intervention    Visual Impairment/Limitations WFL  -MW       Row Name 07/26/23 1215          Range of Motion Comprehensive    General Range of Motion no range of motion deficits identified  -MW       Row Name 07/26/23 1215          Strength Comprehensive (MMT)    General Manual Muscle Testing (MMT) Assessment no strength deficits identified  -MW       Row Name 07/26/23 1215          Motor Skills    Motor Skills functional endurance  -     Functional Endurance WFL  -MW       Row Name 07/26/23 1215          Balance    Balance Assessment sitting static balance;sitting dynamic balance;sit to stand dynamic balance;standing static balance;standing dynamic balance  -      Static Sitting Balance independent  -MW     Dynamic Sitting Balance modified independence  -MW     Position, Sitting Balance sitting edge of bed  -MW     Sit to Stand Dynamic Balance standby assist  -MW     Static Standing Balance standby assist  -MW     Dynamic Standing Balance standby assist  -MW     Position/Device Used, Standing Balance unsupported  -MW     Balance Interventions sitting;standing;sit to stand;supported;static;dynamic;minimal challenge  -MW               User Key  (r) = Recorded By, (t) = Taken By, (c) = Cosigned By      Initials Name Provider Type    Christi Sanches OT Occupational Therapist                   Goals/Plan       Row Name 07/26/23 1220          Transfer Goal 1 (OT)    Activity/Assistive Device (Transfer Goal 1, OT) sit-to-stand/stand-to-sit  -MW     [place] Level/Cues Needed (Transfer Goal 1, OT) standby assist  -MW     Time Frame (Transfer Goal 1, OT) short term goal (STG);1 day  -MW     Progress/Outcome (Transfer Goal 1, OT) goal met  -MW               User Key  (r) = Recorded By, (t) = Taken By, (c) = Cosigned By      Initials Name Provider Type    Christi Sanches OT Occupational Therapist                   Clinical Impression       Row Name 07/26/23 1215          Pain Assessment    Pretreatment Pain Rating 0/10 - no pain  -MW     Posttreatment Pain Rating 0/10 - no pain  -MW       Row Name 07/26/23 1215          Plan of Care Review    Plan of Care Reviewed With patient;daughter  -MW     Progress no change  -MW     Outcome Evaluation Pt is a 82 yo female admitted for increased weakness following fall at home, pt with hx lung cancer, current with chemo. Pt seen this date for MARISA Urban&Ox4, reports she lives at home with her spouse, no AD, (I) with ADLs and reports no recent falls besides one that occurred prior to admission. Pt presenting at her baseline this date with ADLs and func mob, no AD used and SBA. Pt plans home with spouse who is able to be home 24/7  if assist as needed. OT to sign off at this time.  -MW       Row Name 07/26/23 1215          Therapy Assessment/Plan (OT)    Criteria for Skilled Therapeutic Interventions Met (OT) no problems identified which require skilled intervention  -MW       Row Name 07/26/23 1215          Therapy Plan Review/Discharge Plan (OT)    Anticipated Discharge Disposition (OT) home  -MW       Row Name 07/26/23 1215          Vital Signs    O2 Delivery Pre Treatment room air  -MW     Pre Patient Position Supine  -MW     Intra Patient Position Standing  -MW     Post Patient Position Supine  -MW       Row Name 07/26/23 1215          Positioning and Restraints    Pre-Treatment Position in bed  -MW     Post Treatment Position bed  -MW     In Bed notified nsg;fowlers;call light within reach;encouraged to call for assist;with family/caregiver;side rails up x2  -MW               User Key  (r) = Recorded By, (t) = Taken By, (c) = Cosigned By      Initials Name Provider Type    Christi Sanches, OT Occupational Therapist                   Outcome Measures       Row Name 07/26/23 1220          How much help from another is currently needed...    Putting on and taking off regular lower body clothing? 4  -MW     Bathing (including washing, rinsing, and drying) 4  -MW     Toileting (which includes using toilet bed pan or urinal) 4  -MW     Putting on and taking off regular upper body clothing 4  -MW     Taking care of personal grooming (such as brushing teeth) 4  -MW     Eating meals 4  -MW     AM-PAC 6 Clicks Score (OT) 24  -MW       Row Name 07/26/23 0921          How much help from another person do you currently need...    Turning from your back to your side while in flat bed without using bedrails? 4  -EJ     Moving from lying on back to sitting on the side of a flat bed without bedrails? 4  -EJ     Moving to and from a bed to a chair (including a wheelchair)? 4  -EJ     Standing up from a chair using your arms (e.g., wheelchair, bedside  chair)? 4  -EJ     Climbing 3-5 steps with a railing? 3  -EJ     To walk in hospital room? 3  -EJ     AM-PAC 6 Clicks Score (PT) 22  -EJ     Highest level of mobility 7 --> Walked 25 feet or more  -       Row Name 07/26/23 1220 07/26/23 0921       Functional Assessment    Outcome Measure Options AM-PAC 6 Clicks Daily Activity (OT)  - AM-PAC 6 Clicks Basic Mobility (PT)  -              User Key  (r) = Recorded By, (t) = Taken By, (c) = Cosigned By      Initials Name Provider Type    Lara Nuñez, PT Physical Therapist    Christi Sanches OT Occupational Therapist                    Occupational Therapy Education       Title: PT OT SLP Therapies (Done)       Topic: Occupational Therapy (Done)       Point: ADL training (Done)       Description:   Instruct learner(s) on proper safety adaptation and remediation techniques during self care or transfers.   Instruct in proper use of assistive devices.                  Learning Progress Summary             Patient Acceptance, E, VU by  at 7/26/2023 1224    Comment: role of OT                         Point: Home exercise program (Done)       Description:   Instruct learner(s) on appropriate technique for monitoring, assisting and/or progressing therapeutic exercises/activities.                  Learning Progress Summary             Patient Acceptance, E, VU by  at 7/26/2023 1224    Comment: role of OT                         Point: Precautions (Done)       Description:   Instruct learner(s) on prescribed precautions during self-care and functional transfers.                  Learning Progress Summary             Patient Acceptance, E, VU by  at 7/26/2023 1224    Comment: role of OT                         Point: Body mechanics (Done)       Description:   Instruct learner(s) on proper positioning and spine alignment during self-care, functional mobility activities and/or exercises.                  Learning Progress Summary             Patient  Acceptance, E, VU by  at 7/26/2023 1224    Comment: role of OT                                         User Key       Initials Effective Dates Name Provider Type Discipline     08/20/21 -  Christi Denise OT Occupational Therapist OT                  OT Recommendation and Plan     Plan of Care Review  Plan of Care Reviewed With: patient, daughter  Progress: no change  Outcome Evaluation: Pt is a 84 yo female admitted for increased weakness following fall at home, pt with hx lung cancer, current with chemo. Pt seen this date for OT MARISA scott&Ox4, reports she lives at home with her spouse, no AD, (I) with ADLs and reports no recent falls besides one that occurred prior to admission. Pt presenting at her baseline this date with ADLs and func mob, no AD used and SBA. Pt plans home with spouse who is able to be home 24/7 if assist as needed. OT to sign off at this time.     Time Calculation:   Evaluation Complexity (OT)  Review Occupational Profile/Medical/Therapy History Complexity: brief/low complexity  Clinical Decision Making Complexity (OT): problem focused assessment/low complexity  Overall Complexity of Evaluation (OT): low complexity     Time Calculation- OT       Row Name 07/26/23 1225             Time Calculation- OT    OT Start Time 0819  -MW      OT Stop Time 0837  -MW      OT Time Calculation (min) 18 min  -MW      Total Timed Code Minutes- OT 18 minute(s)  -MW      OT Received On 07/26/23  -MW         Untimed Charges    OT Eval/Re-eval Minutes 18  -MW         Total Minutes    Untimed Charges Total Minutes 18  -MW       Total Minutes 18  -MW                User Key  (r) = Recorded By, (t) = Taken By, (c) = Cosigned By      Initials Name Provider Type     Christi Denise OT Occupational Therapist                  Therapy Charges for Today       Code Description Service Date Service Provider Modifiers Qty    [id number] HC OT EVAL LOW COMPLEXITY 3 7/26/2023 Christi Denise OT GO 1                  Christi Denise, OT  7/26/2023

## 2023-07-26 NOTE — CONSULTS
Nutrition Services    Patient Name:  Darlene Pfeiffer  YOB: 1940  MRN: [id number]  Admit Date:  7/25/2023    Assessment Date:  07/26/23    Comment: Nutrition consult per nurse admission screen, low BMI, MST score  84 y/o female with hx of NSCLC admitted d/t weakness. Pt recently treated with immunotherapy and currently undergoing palliative radiation of L chest wall. Pt reports she has little to no appetite. States she has recently been on prednisone which helps some, but mostly only eats 1 meal per day. Occasionally sips on Ensure throughout the day. Endorses nausea at times. Pt agreeable to RD sending Boost Plus TID with meals. Pt has lost 11# (10%) x 1 month. Encouraged her to ask staff for snacks available on floor as desired.   Pt meets ASPEN/AND criteria for nutrition dx of severe malnutrition based on insufficient energy intake, unintentional weight loss, and muscle/fat loss.     Recommendations:  Boost Plus TID with meals  Encourage intake of meals and ONS  Consider addition of appetite stimulant    RD will continue to follow clinical course, nutritional needs.     CLINICAL NUTRITION ASSESSMENT      Reason for Assessment BMI, MST score 2+, Nurse Admission Screen   Diagnosis/Problem Weakness, poor PO intake, malaise   Medical & Surgical Hx Past Medical History:   Diagnosis Date    Allergic rhinitis     Asthma     Cancer of floor of mouth 2014    Cerebral aneurysm     COPD (chronic obstructive pulmonary disease)     COVID 2020    Esophageal reflux     History of adenocarcinoma of lung     History of anemia     History of carcinoma in situ of skin     History of lung cancer     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 from [facility]    History of squamous cell carcinoma     Tongue    Hyperlipidemia     Hypertension     since early 60s    Low vitamin B12 level     Lung cancer     Thyromegaly     UTI (urinary tract infection)     Vitamin D deficiency        Past  "Surgical History:   Procedure Laterality Date    BRONCHOSCOPY Bilateral 10/17/2022    Procedure: BRONCHOSCOPY WITH FLUORO with biopsy and BAL;  Surgeon: India Flores MD;  Location: [facility] ENDOSCOPY;  Service: Pulmonary;  Laterality: Bilateral;  PRE/POST - lung mass    CHOLECYSTECTOMY  1999    COLONOSCOPY      EMBOLIZATION CEREBRAL Left 6/29/2022    Procedure: 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;  Surgeon: Bradley Dowell MD;  Location: [facility] HYBRID OR 18/19;  Service: Interventional Radiology;  Laterality: Left;    EYE SURGERY  11/24/2020    Took a muscle out of right eye    GLOSSECTOMY PARTIAL      less than one half tongue    LUNG SURGERY  2012    ORAL LESION EXCISION/BIOPSY      June and August 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    TUBAL ABDOMINAL LIGATION  1970    VENOUS ACCESS DEVICE (PORT) INSERTION N/A 12/12/2022    Procedure: MEDIPORT PLACEMENT;  Surgeon: Jason Villaseñor MD;  Location: [facility] MAIN OR;  Service: Vascular;  Laterality: N/A;        Current Problems      Encounter Information        Nutrition History Decreased PO intake, poor appetite, nausea   Food Preferences    Supplements    Factors Affecting Intake decreased appetite, nausea   Tests/Procedures CT scan, X-Ray     Anthropometrics        Current Height   Current Weight  BMI kg/m2 Height: 160 cm (62.99\")  Weight: 42.4 kg (93 lb 7.6 oz) (07/25/23 1521)  Body mass index is 16.56 kg/m².     Adj BMI (if applicable)    BMI Category Underweight (18.4 or below)       Admission Weight    Ideal Body Weight (IBW) 122#     Adj IBW (if applicable)    Usual Body Weight (UBW)    Weight Change/Trend Loss, Amount/Timeframe: 24# (20%) x 7 months, 11# (10%) x 1 month       Weight history: Wt Readings from Last 30 Encounters:   07/25/23 1521 42.4 kg (93 lb 7.6 oz)   07/24/23 1134 42.4 kg (93 lb 6.4 oz)   07/21/23 0856 43.5 kg (95 lb 12.8 oz)   07/17/23 1351 42.5 kg (93 lb 12.8 oz)   07/17/23 1421 43.2 kg (95 lb 3.2 oz) "   07/12/23 0847 44.5 kg (98 lb 3.2 oz)   07/05/23 1409 47.7 kg (105 lb 1.6 oz)   06/29/23 1058 45.9 kg (101 lb 3.2 oz)   06/27/23 1057 45.5 kg (100 lb 6.4 oz)   06/23/23 1136 45.4 kg (100 lb)   06/20/23 1356 46 kg (101 lb 6.4 oz)   06/12/23 1343 47.3 kg (104 lb 4.8 oz)   05/31/23 1131 48 kg (105 lb 14.4 oz)   05/10/23 1330 48.3 kg (106 lb 6.4 oz)   04/19/23 1037 48.2 kg (106 lb 4.8 oz)   03/29/23 1026 48.5 kg (106 lb 14.4 oz)   03/08/23 1042 48.4 kg (106 lb 9.6 oz)   02/15/23 1131 48 kg (105 lb 12.8 oz)   01/25/23 0843 47.8 kg (105 lb 6.4 oz)   01/09/23 0808 50.1 kg (110 lb 6.4 oz)   01/04/23 1026 49 kg (108 lb)   12/14/22 1019 52.3 kg (115 lb 3.2 oz)   12/12/22 0739 53.3 kg (117 lb 9.6 oz)   12/08/22 1017 53.3 kg (117 lb 9.6 oz)   12/02/22 1513 53.8 kg (118 lb 11.2 oz)   11/28/22 2225 55.3 kg (122 lb)   11/29/22 1334 55.3 kg (122 lb)   11/29/22 0702 55.3 kg (122 lb)   11/23/22 1053 55.5 kg (122 lb 6.4 oz)   11/15/22 1408 56.4 kg (124 lb 6.4 oz)        Estimated/Assessed Needs        Energy Requirements    Weight for Calculation 42.4kg   Method for Estimation  30-35 kcal/kg   EST Needs (kcal/day) 9295-8235       Protein Requirements    Weight for Calculation 42.4kg   EST Protein Needs (g/kg) 1.5 - 2.0 gm/kg   EST Daily Needs (g/day) 64-85       Fluid Requirements     Method for Estimation 1 mL/kcal    Estimated Needs (mL/day)        Fluid Deficit    Current Na Level (mEq/L)    Desired Na Level (mEq/L)    Estimated Fluid Deficit (L)       Labs        Pertinent Labs    Results from last 7 days   Lab Units 07/26/23  0622 07/25/23  1455 07/21/23  0845   SODIUM mmol/L 134* 137 136   POTASSIUM mmol/L 3.1* 3.5 3.1*   CHLORIDE mmol/L 96* 95* 93*   CO2 mmol/L 27.3 31.9* 23.6   BUN mg/dL 16 19 21   CREATININE mg/dL 0.49* 0.73 0.68   CALCIUM mg/dL 7.6* 8.6 8.2*   BILIRUBIN mg/dL  --  0.7 0.5   ALK PHOS U/L  --  68 78   ALT (SGPT) U/L  --  38* 75*   AST (SGOT) U/L  --  39* 100*   GLUCOSE mg/dL 87 111* 73     Results from last 7  days   Lab Units 07/26/23  0622 07/25/23  1455 07/21/23  0845   MAGNESIUM mg/dL  --  1.6 1.7   HEMOGLOBIN g/dL 12.1 12.6 13.6   HEMATOCRIT % 36.6 38.8 41.7   WBC 10*3/mm3 5.42 5.84 7.79   ALBUMIN g/dL  --  3.1* 3.0*     Results from last 7 days   Lab Units 07/26/23  0622 07/25/23  1455 07/21/23  0845   PLATELETS 10*3/mm3 150 176 173     COVID19   Date Value Ref Range Status   10/14/2022 Not Detected Not Detected - Ref. Range Final     Lab Results   Component Value Date    HGBA1C 5.80 (H) 01/09/2023          Medications            Scheduled Medications amLODIPine, 5 mg, Oral, Daily  cefepime, 2,000 mg, Intravenous, Q12H  chlorhexidine, 15 mL, Mouth/Throat, BID  cholecalciferol, 1,000 Units, Oral, Daily  ipratropium-albuterol, 3 mL, Nebulization, Q8H - RT  metoprolol succinate XL, 25 mg, Oral, Daily  Morphine, 15 mg, Oral, BID  phenazopyridine, 100 mg, Oral, TID With Meals  potassium chloride, 20 mEq, Oral, Daily  predniSONE, 10 mg, Oral, Daily  senna-docusate sodium, 2 tablet, Oral, BID  sodium chloride, 10 mL, Intravenous, Q12H  vancomycin, 750 mg, Intravenous, Q12H  vitamin B-12, 1,000 mcg, Oral, Daily        Infusions Pharmacy to dose vancomycin,   sodium chloride 0.9 % with KCl 20 mEq, 100 mL/hr, Last Rate: 100 mL/hr (07/26/23 0905)        PRN Medications   acetaminophen **OR** acetaminophen **OR** acetaminophen    albuterol    senna-docusate sodium **AND** polyethylene glycol **AND** bisacodyl **AND** bisacodyl    heparin    ondansetron    oxyCODONE    Pharmacy to dose vancomycin    sodium chloride    sodium chloride    Access VAD **AND** sodium chloride    sodium chloride    sodium chloride     Physical Findings          Physical Appearance alert, generalized wasting, oriented, room air, underweight   Oral/Mouth Cavity WNL   Edema  no edema   Gastrointestinal constipation   Skin  pressure injury: coccyx stg I   Tubes/Drains/Lines implantable port   NFPE See Malnutrition Severity Assessment     Malnutrition  Severity Assessment      Patient meets criteria for : Severe Malnutrition (Pt meets ASPEN/AND criteria for nutrition dx of severe malnutrition based on insufficient energy intake, unintentional weight loss, and muscle/fat loss.)  Malnutrition Type (last 8 hours)       Malnutrition Severity Assessment       Row Name 07/26/23 1312       Malnutrition Severity Assessment    Malnutrition Type Chronic Disease - Related Malnutrition      Row Name 07/26/23 1312       Insufficient Energy Intake     Insufficient Energy Intake Findings Severe    Insufficient Energy Intake  <75% of est. energy requirement for > or equal to 1 month      Row Name 07/26/23 1312       Unintentional Weight Loss     Unintentional Weight Loss Findings Severe    Unintentional Weight Loss  Weight loss greater than 5% in one month      Row Name 07/26/23 1312       Muscle Loss    Clavicle Bone Region Moderate - some protrusion in females, visible in males    Patellar Region Moderate - patella more prominent, less muscle definition around patella    Anterior Thigh Region Moderate - mild depression on inner thigh    Posterior Calf Region Moderate - some roundness, slight firmness      Row Name 07/26/23 1312       Fat Loss    Subcutaneous Fat Loss Findings Severe    Orbital Region  Severe - pronounced hollowness/depression, dark circles, loose saggy skin    Upper Arm Region Moderate - some fat tissue, not ample      Row Name 07/26/23 1312       Criteria Met (Must meet criteria for severity in at least 2 of these categories: M Wasting, Fat Loss, Fluid, Secondary Signs, Wt. Status, Intake)    Patient meets criteria for  Severe Malnutrition  Pt meets ASPEN/AND criteria for nutrition dx of severe malnutrition based on insufficient energy intake, unintentional weight loss, and muscle/fat loss.                       Current Nutrition Orders & Evaluation of Intake       Oral Nutrition     Food Allergies NKFA   Current PO Diet Diet: Regular/House Diet; Texture:  Regular Texture (IDDSI 7); Fluid Consistency: Thin (IDDSI 0)   Supplement    PO Evaluation     Trending % PO Intake Minimal today     Nutrition Diagnosis        Nutrition Dx Problem 1 Problem: Malnutrition  Etiology: Medical Diagnosis and Factors Affecting Nutrition  Signs/Symptoms: Report of Minimal PO Intake, NFPE Results, and Unintended Weight Change    Comment:      INTERVENTION / PLAN OF CARE  Intervention Goal        Intervention Goal(s) Disease management/therapy, Tolerate PO , Increase intake, No significant weight loss, and Appropriate weight gain     Nutrition Intervention        RD Action Supplement provided, Encourage intake, and Follow Tx Progress     Prescription         Diet     Supplement/Snack Boost Plus TID   EN/PN     Prescription Ordered Yes     Monitor/Evaluation        Monitor Per protocol   Discharge Plan Pending clinical course   Education Will instruct as appropriate     RD to follow per protocol.       Electronically signed by:  Tanya Palomares RD  07/26/23 13:00 EDT

## 2023-07-26 NOTE — PROGRESS NOTES
"[facility] Clinical Pharmacy Services: Vancomycin Monitoring Note    Darlene Pfeiffer is a 83 y.o. female who is on day 2 of pharmacy to dose vancomycin for Bacteremia.    Previous Vancomycin Dose:   500 mg IV every  12  hours - Predicted AUC 380mg/L*hr  Updated Cultures and Sensitivities: 7/25: Bcx pending 2/2      Vitals/Labs  Ht: 160 cm (62.99\"); Wt: 42.4 kg (93 lb 7.6 oz)   Temp Readings from Last 1 Encounters:   07/26/23 99 °F (37.2 °C) (Oral)     Estimated Creatinine Clearance: 60.3 mL/min (A) (by C-G formula based on SCr of 0.49 mg/dL (L)).        Results from last 7 days   Lab Units 07/26/23  0622 07/25/23  1455 07/21/23  0845   CREATININE mg/dL 0.49* 0.73 0.68   WBC 10*3/mm3 5.42 5.84 7.79     Assessment/Plan    Current Vancomycin Dose: Adjust to 750 mg IV every  12  hours; provides a predicted  mg/L.hr   Next Level Date and Time: Still plan for Vanc [facility]rough on 7/27 at 1230  We will continue to monitor patient changes and renal function     Thank you for involving pharmacy in this patient's care. Please contact pharmacy with any questions or concerns.       Balta Lainez, [facility]  Clinical Pharmacist          "

## 2023-07-26 NOTE — ED PROVIDER NOTES
MD ATTESTATION NOTE    The WALKER and I have discussed this patient's history, physical exam, and treatment plan.  I have reviewed the documentation and personally had a face to face interaction with the patient. I affirm the documentation and agree with the treatment and plan.  The attached note describes my personal findings.      I provided a substantive portion of the care of the patient.  I personally performed the physical exam in its entirety, and below are my findings.      Brief HPI: Patient presented to the ED complaining of increasing generalized weakness.  She has a history of lung cancer.  She reports decreased p.o. intake.  Denies chest pain, abdominal pain, or fever.    PHYSICAL EXAM  ED Triage Vitals [07/25/23 1409]   Temp Heart Rate Resp BP SpO2   98.7 °F (37.1 °C) 79 16 143/59 99 %      Temp src Heart Rate Source Patient Position BP Location FiO2 (%)   Tympanic Monitor Lying Right arm --         GENERAL: Awake and alert.  Chronically ill-appearing elderly female.  No acute distress  HENT: nares patent, mildly dry mucous membranes  EYES: no scleral icterus  CV: regular rhythm, normal rate  RESPIRATORY: normal effort, clear to auscultation bilaterally  ABDOMEN: soft, nontender  MUSCULOSKELETAL: no deformity  NEURO: alert, moves all extremities, follows commands  PSYCH:  calm, cooperative  SKIN: warm, dry    Vital signs and nursing notes reviewed.        Plan: Obtain labs, EKG, and imaging studies.      ED Course as of 07/25/23 2125 Tue Jul 25, 2023   1505 Patient presents with progressive weakness, failure to thrive, fall today.  Patient is currently undergoing radiation and oral chemotherapy for known lung cancer.  Patient is somnolent however has stable vitals.  Differential diagnoses include but not limited to electrolyte abnormality, SUKH, UTI, pneumonia, metastatic brain disease. [EE]   1527 WBC: 5.84 [EE]   1527 Hemoglobin: 12.6 [EE]   1527 Chest x-ray independently interpreted myself shows left  upper lobe mass.  No evidence of pneumothorax. [EE]   1531 Creatinine: 0.73 [EE]   1535 EKG independently interpreted myself.  Time 1514.  Sinus rhythm, 76 bpm with a PVC.  Normal P/MEHNAZ.  QRS normal with normal axis.  Nonspecific T wave changes diffusely.  T wave changes are new versus prior EKG from 11/20/2022. [EE]   1551 Shared decision making discussion with family.  The patient's energy and health has been up and down over the past several weeks.  They state several days ago she was able to walk and go out to eat.  This setback appears fairly acute.  Plan to admit the patient if she cannot walk. [EE]   1615 EKG personally interpreted by me.  My personal interpretation is:          EKG time: 1514  Rhythm/Rate: Sinus rhythm with a PVC, rate 76  P waves and RI: Normal  QRS, axis: Normal axis, anterior Q waves  ST and T waves: Nonspecific ST/T wave changes in the lateral and inferior leads    Interpreted Contemporaneously by me, independently viewed   [WH]   1627 CT images of the head independently interpreted myself shows no evidence of intracranial abnormality. [EE]   1716 Bacteria, UA: None Seen [EE]   1728 Recheck of patient.  I explained that I am concerned of her overall weakness.  I will plan to admit her to the hospital for further care.  We will have hospice evaluate her as well.  Patient and family in agreement with treatment plan. [EE]   1729 I discussed the patient with Dr. Escamilla MARISA.  She agrees to admit. [EE]      ED Course User Index  [EE] Jose Juares PA  [] Bernardo Davis MD Holland, William D, MD  07/25/23 2125

## 2023-07-26 NOTE — H&P
HISTORY AND PHYSICAL   [facility]        Date of Admission: 2023  Patient Identification:  Name: Darlene Pfeiffer  Age: 83 y.o.  Sex: female  :  1940  MRN: [id number]                     Primary Care Physician: Kehrer, Meredith Lea, MD    Chief Complaint:  83 year old female who presented to the emergency room with weakness, poor po intake and malaise; she has a history of lung cancer and is followed by dr langston; she stopped taking her chemotherapy due to nausea and vomiting; she has had little energy and has been sleeping a lot; no fever or chills    History of Present Illness:   As above    Past Medical History:  Past Medical History:   Diagnosis Date    Allergic rhinitis     Asthma     Cancer of floor of mouth     Cerebral aneurysm     COPD (chronic obstructive pulmonary disease)     COVID     Esophageal reflux     History of adenocarcinoma of lung     History of anemia     History of carcinoma in situ of skin     History of lung cancer     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 from [facility]    History of squamous cell carcinoma     Tongue    Hyperlipidemia     Hypertension     since early     Low vitamin B12 level     Lung cancer     Thyromegaly     UTI (urinary tract infection)     Vitamin D deficiency      Past Surgical History:  Past Surgical History:   Procedure Laterality Date    BRONCHOSCOPY Bilateral 10/17/2022    Procedure: BRONCHOSCOPY WITH FLUORO with biopsy and BAL;  Surgeon: India Flores MD;  Location: [facility] ENDOSCOPY;  Service: Pulmonary;  Laterality: Bilateral;  PRE/POST - lung mass    CHOLECYSTECTOMY      COLONOSCOPY      EMBOLIZATION CEREBRAL Left 2022    Procedure: 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;  Surgeon: Bradley Dowell MD;  Location: [facility] HYBRID OR ;  Service: Interventional Radiology;  Laterality: Left;    EYE SURGERY  2020    Took a muscle out of right  eye    GLOSSECTOMY PARTIAL      less than one half tongue    LUNG SURGERY  2012    ORAL LESION EXCISION/BIOPSY      June and August 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    TUBAL ABDOMINAL LIGATION  1970    VENOUS ACCESS DEVICE (PORT) INSERTION N/A 12/12/2022    Procedure: MEDIPORT PLACEMENT;  Surgeon: Jason Villaseñor MD;  Location: [facility] MAIN OR;  Service: Vascular;  Laterality: N/A;      Home Meds:  Medications Prior to Admission   Medication Sig Dispense Refill Last Dose    albuterol sulfate  (90 Base) MCG/ACT inhaler Inhale 2 puffs Every 4 (Four) Hours As Needed for Wheezing. 18 g 2     amLODIPine (NORVASC) 5 MG tablet TAKE 1 TABLET BY MOUTH DAILY 90 tablet 0 7/24/2023    cetirizine (zyrTEC) 10 MG tablet Take 1 tablet by mouth Daily.       cholecalciferol (VITAMIN D3) 1000 units tablet Take 1 tablet by mouth Daily. HOLDING FOR DOS       Cyanocobalamin (VITAMIN B12 PO) Take  by mouth.       dabrafenib (TAFINLAR) 75 MG chemo capsule Take 2 capsules by mouth 2 (Two) Times a Day. Take on an empty stomach, 1 hour before or 2 hours after a meal. 120 capsule 5     docusate sodium (COLACE) 50 MG capsule Take  by mouth 2 (Two) Times a Day.       hydroCHLOROthiazide (HYDRODIURIL) 25 MG tablet TAKE 1 TABLET EVERY DAY 90 tablet 1     metoprolol succinate XL (TOPROL-XL) 25 MG 24 hr tablet TAKE 1 TABLET EVERY DAY 90 tablet 1     Morphine (MS CONTIN) 15 MG 12 hr tablet Take 1 tablet by mouth 2 (Two) Times a Day. 60 tablet 0 Past Week    olopatadine (PATANOL) 0.1 % ophthalmic solution 1 drop 2 (Two) Times a Day.       ondansetron (ZOFRAN) 8 MG tablet Take 1 tablet by mouth 3 (Three) Times a Day As Needed for Nausea or Vomiting. 30 tablet 5     oxyCODONE (ROXICODONE) 20 MG tablet Take 1 tablet by mouth Every 3 (Three) Hours As Needed for Moderate Pain. 120 tablet 0 Past Week    potassium chloride (K-DUR,KLOR-CON) 10 MEQ CR tablet Take 2 tablets by mouth Daily. 60 tablet 1 7/24/2023    predniSONE (DELTASONE) 10 MG tablet  Take 1 tablet by mouth Daily. 30 tablet 2 2023    chlorhexidine (PERIDEX) 0.12 % solution Apply 15 mL to the mouth or throat 2 (Two) Times a Day.       Diclofenac Sodium (VOLTAREN) 1 % gel gel Apply 4 g topically to the appropriate area as directed 4 (Four) Times a Day As Needed.       fluticasone (FLONASE) 50 MCG/ACT nasal spray 2 sprays into the nostril(s) as directed by provider As Needed.       Ibuprofen (ADVIL PO) Take 400 mg by mouth Daily As Needed. HOLD PER MD RESTREPO       lidocaine-prilocaine (EMLA) 2.5-2.5 % cream Apply nickel size amount to port site 30 min before appt time do not rub in cover with plastic wrap (Patient taking differently: 1 application  As Needed. Apply nickel size amount to port site 30 min before appt time do not rub in cover with plastic wrap) 30 g 1     promethazine (PHENERGAN) 25 MG suppository Insert 1 suppository into the rectum Every 6 (Six) Hours As Needed for Nausea or Vomiting. 30 suppository 1     Umeclidinium-Vilanterol (Anoro Ellipta) 62.5-25 MCG/ACT aerosol powder  inhaler Inhale 1 puff Daily As Needed.          Allergies:  Allergies   Allergen Reactions    Sulfa Antibiotics Rash     Immunizations:  Immunization History   Administered Date(s) Administered    COVID-19 (PFIZER) Purple Cap Monovalent 2021, 2021, 2021    Covid-19 (Pfizer) Gray Cap Monovalent 2022    FLUAD TRI 65YR+ 2018    Fluzone High Dose =>65 Years ([employer] ONLY) 2018, 10/22/2019    Fluzone High-Dose 65+yrs 2020, 2021    Influenza, Unspecified 2020    Pneumococcal Conjugate 13-Valent (PCV13) 2015    Pneumococcal Polysaccharide (PPSV23) 2012    Tdap 11/10/2016     Social History:   Social History     Social History Narrative    Not on file     Social History     Socioeconomic History    Marital status:    Tobacco Use    Smoking status: Former     Types: Cigarettes     Quit date: 2015     Years since quittin.4    Smokeless  "tobacco: Never    Tobacco comments:     less than a pack per day, no smoking since , Quit 2015   Vaping Use    Vaping Use: Never used   Substance and Sexual Activity    Alcohol use: Not Currently     Comment: Socially -A few times a month    Drug use: No    Sexual activity: Defer       Family History:  Family History   Problem Relation Age of Onset    Ovarian cancer Mother          at age 27    No Known Problems Father     Ovarian cancer Sister     Colon cancer Brother     No Known Problems Other     Malig Hyperthermia Neg Hx         Review of Systems  See history of present illness and past medical history.  Patient denies headache, dizziness, syncope, falls, trauma, change in vision, change in hearing, change in taste, changes in weight, changes in appetite, focal weakness, numbness, or paresthesia.  Patient denies chest pain, palpitations, dyspnea, orthopnea, PND, cough, sinus pressure, rhinorrhea, epistaxis, hemoptysis, nausea, vomiting,hematemesis, diarrhea, constipation or hematochezia.  Denies cold or heat intolerance, polydipsia, polyuria, polyphagia. Denies hematuria, pyuria, dysuria, hesitancy, frequency or urgency. Denies consumption of raw and under cooked meats foods or change in water source.  Denies fever, chills, sweats, night sweats.  Denies missing any routine medications. Remainder of ROS is negative.    Objective:  T Max 24 hrs: Temp (24hrs), Av.8 °F (37.7 °C), Min:98.7 °F (37.1 °C), Max:100.8 °F (38.2 °C)    Vitals Ranges:   Temp:  [98.7 °F (37.1 °C)-100.8 °F (38.2 °C)] 100.8 °F (38.2 °C)  Heart Rate:  [71-88] 86  Resp:  [16] 16  BP: ()/(59-77) 115/75      Exam:  /75 (BP Location: Left arm, Patient Position: Standing)   Pulse 86   Temp (!) 100.8 °F (38.2 °C) (Oral)   Resp 16   Ht 160 cm (62.99\")   Wt 42.4 kg (93 lb 7.6 oz)   LMP  (LMP Unknown)   SpO2 93%   BMI 16.56 kg/m²     General Appearance:    Alert, cooperative, no distress, appears stated age; thin, " frail chronically ill appearing   Head:    Normocephalic, without obvious abnormality, atraumatic   Eyes:    PERRL, conjunctivae/corneas clear, EOM's intact, both eyes   Ears:    Normal external ear canals, both ears   Nose:   Nares normal, septum midline, mucosa normal, no drainage    or sinus tenderness   Throat:   Lips, mucosa, and tongue normal   Neck:   Supple, symmetrical, trachea midline, no adenopathy;     thyroid:  no enlargement/tenderness/nodules; no carotid    bruit or JVD   Back:     Symmetric, no curvature, ROM normal, no CVA tenderness   Lungs:     Decreased breath sounds bilaterally, respirations unlabored   Chest Wall:    No tenderness or deformity    Heart:    Regular rate and rhythm, S1 and S2 normal, no murmur, rub   or gallop   Abdomen:     Soft, nontender, bowel sounds active all four quadrants,     no masses, no hepatomegaly, no splenomegaly   Extremities:   Extremities normal, atraumatic, no cyanosis or edema   Pulses:   2+ and symmetric all extremities   Skin:   Skin color, texture, turgor normal, no rashes or lesions               .    Data Review:  Labs in chart were reviewed.  WBC   Date Value Ref Range Status   07/25/2023 5.84 3.40 - 10.80 10*3/mm3 Final     Hemoglobin   Date Value Ref Range Status   07/25/2023 12.6 12.0 - 15.9 g/dL Final     Hematocrit   Date Value Ref Range Status   07/25/2023 38.8 34.0 - 46.6 % Final     Platelets   Date Value Ref Range Status   07/25/2023 176 140 - 450 10*3/mm3 Final     Sodium   Date Value Ref Range Status   07/25/2023 137 136 - 145 mmol/L Final     Potassium   Date Value Ref Range Status   07/25/2023 3.5 3.5 - 5.2 mmol/L Final     Chloride   Date Value Ref Range Status   07/25/2023 95 (L) 98 - 107 mmol/L Final     CO2   Date Value Ref Range Status   07/25/2023 31.9 (H) 22.0 - 29.0 mmol/L Final     BUN   Date Value Ref Range Status   07/25/2023 19 8 - 23 mg/dL Final     Creatinine   Date Value Ref Range Status   07/25/2023 0.73 0.57 - 1.00 mg/dL  Final     Glucose   Date Value Ref Range Status   07/25/2023 111 (H) 65 - 99 mg/dL Final     Calcium   Date Value Ref Range Status   07/25/2023 8.6 8.6 - 10.5 mg/dL Final     Magnesium   Date Value Ref Range Status   07/25/2023 1.6 1.6 - 2.4 mg/dL Final                Imaging Results (All)       Procedure Component Value Units Date/Time    CT Head Without Contrast [[id number]] Collected: 07/25/23 1604     Updated: 07/25/23 1612    Narrative:      CT HEAD WO CONTRAST-     INDICATIONS: Head injury, lung cancer, cerebral aneurysm (left posterior  communicating artery)     TECHNIQUE: Radiation dose reduction techniques were utilized, including  automated exposure control and exposure modulation based on body size.  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     No acute intracranial hemorrhage, midline shift or mass effect. No acute  territorial infarct is identified.     Moderate periventricular hypodensities suggest chronic small vessel  ischemic change in a patient this age.     Arterial calcifications are seen at the base of the brain.     Ventricles, cisterns, cerebral sulci are unremarkable for patient age.     The visualized paranasal sinuses, orbits, mastoid air cells are  unremarkable.        Note: Possibility of metastatic disease in the brain cannot be excluded  on the basis of an unenhanced exam. If there is clinical concern for  metastatic disease, further evaluation with enhanced imaging would be  recommended.          Impression:         No acute intracranial hemorrhage or hydrocephalus. If there is further  clinical concern, MRI could be considered for further evaluation.     This report was finalized on 7/25/2023 4:09 PM by Dr. Ebenezer Barton M.D.       XR Chest 1 View [[id number]] Collected: 07/25/23 1444     Updated: 07/25/23 1444    Narrative:      X-RAY CHEST ONE VIEW      HISTORY: 83-year-old female with weakness and dizziness. Recurrent lung  cancer. Radiation to right lower lobe and left upper lobe, synchronous  lung cancers.     FINDINGS: There is no significant change in the appearance of the chest  since previous radiograph 12/12/2022. There is no airspace consolidation  or effusion on the right. On the left, there is opacification of the  left upper lobe and mediastinal shift to the left obscuring the left  lower lobe. There is no CHF. Right MediPort catheter tip is within the  SVC.                 Assessment:  Active Hospital Problems    Diagnosis  POA    **Weakness [R53.1]  Yes      Resolved Hospital Problems   No resolved problems to display.   Lung cancer  Malnutrition  Copd  hypertension    Plan:  Fluids, antibiotics  Ask oncology to see her  Ask id to see her  Monitor on telemetry  Dw patient and family as well as ed provider    Lorna Escamilla MD  7/25/2023  22:27 EDT

## 2023-07-27 ENCOUNTER — HOSPITAL ENCOUNTER (OUTPATIENT)
Dept: RADIATION ONCOLOGY | Facility: HOSPITAL | Age: 83
Discharge: HOME | End: 2023-07-27
Payer: MEDICARE

## 2023-07-27 LAB
ANION GAP SERPL CALCULATED.3IONS-SCNC: 5 MMOL/L (ref 5–15)
BACTERIA SPEC AEROBE CULT: NORMAL
BASOPHILS # BLD AUTO: 0.03 10*3/MM3 (ref 0–0.2)
BASOPHILS NFR BLD AUTO: 0.8 % (ref 0–1.5)
BUN SERPL-MCNC: 15 MG/DL (ref 8–23)
BUN/CREAT SERPL: 36.6 (ref 7–25)
CALCIUM SPEC-SCNC: 7.6 MG/DL (ref 8.6–10.5)
CHLORIDE SERPL-SCNC: 105 MMOL/L (ref 98–107)
CO2 SERPL-SCNC: 25 MMOL/L (ref 22–29)
CREAT SERPL-MCNC: 0.41 MG/DL (ref 0.57–1)
DEPRECATED RDW RBC AUTO: 40.2 FL (ref 37–54)
EGFRCR SERPLBLD CKD-EPI 2021: 99 ML/MIN/1.73
EOSINOPHIL # BLD AUTO: 0 10*3/MM3 (ref 0–0.4)
EOSINOPHIL NFR BLD AUTO: 0 % (ref 0.3–6.2)
ERYTHROCYTE [DISTWIDTH] IN BLOOD BY AUTOMATED COUNT: 12.7 % (ref 12.3–15.4)
GLUCOSE SERPL-MCNC: 103 MG/DL (ref 65–99)
HCT VFR BLD AUTO: 30 % (ref 34–46.6)
HGB BLD-MCNC: 10.1 G/DL (ref 12–15.9)
IMM GRANULOCYTES # BLD AUTO: 0.01 10*3/MM3 (ref 0–0.05)
IMM GRANULOCYTES NFR BLD AUTO: 0.3 % (ref 0–0.5)
LYMPHOCYTES # BLD AUTO: 1.3 10*3/MM3 (ref 0.7–3.1)
LYMPHOCYTES NFR BLD AUTO: 34.1 % (ref 19.6–45.3)
MCH RBC QN AUTO: 29.3 PG (ref 26.6–33)
MCHC RBC AUTO-ENTMCNC: 33.7 G/DL (ref 31.5–35.7)
MCV RBC AUTO: 87 FL (ref 79–97)
MONOCYTES # BLD AUTO: 0.5 10*3/MM3 (ref 0.1–0.9)
MONOCYTES NFR BLD AUTO: 13.1 % (ref 5–12)
NEUTROPHILS NFR BLD AUTO: 1.97 10*3/MM3 (ref 1.7–7)
NEUTROPHILS NFR BLD AUTO: 51.7 % (ref 42.7–76)
NRBC BLD AUTO-RTO: 0 /100 WBC (ref 0–0.2)
PLATELET # BLD AUTO: 140 10*3/MM3 (ref 140–450)
PMV BLD AUTO: 9.7 FL (ref 6–12)
POTASSIUM SERPL-SCNC: 3.8 MMOL/L (ref 3.5–5.2)
RAD ONC ARIA COURSE ID: NORMAL
RAD ONC ARIA COURSE ID: NORMAL
RAD ONC ARIA COURSE INTENT: NORMAL
RAD ONC ARIA COURSE INTENT: NORMAL
RAD ONC ARIA COURSE LAST TREATMENT DATE: NORMAL
RAD ONC ARIA COURSE LAST TREATMENT DATE: NORMAL
RAD ONC ARIA COURSE START DATE: NORMAL
RAD ONC ARIA COURSE START DATE: NORMAL
RAD ONC ARIA COURSE TREATMENT ELAPSED DAYS: 10
RAD ONC ARIA COURSE TREATMENT ELAPSED DAYS: 9
RAD ONC ARIA FIRST TREATMENT DATE: NORMAL
RAD ONC ARIA FIRST TREATMENT DATE: NORMAL
RAD ONC ARIA PLAN FRACTIONS TREATED TO DATE: 7
RAD ONC ARIA PLAN FRACTIONS TREATED TO DATE: 8
RAD ONC ARIA PLAN ID: NORMAL
RAD ONC ARIA PLAN ID: NORMAL
RAD ONC ARIA PLAN PRESCRIBED DOSE PER FRACTION: 2.5 GY
RAD ONC ARIA PLAN PRESCRIBED DOSE PER FRACTION: 2.5 GY
RAD ONC ARIA PLAN PRIMARY REFERENCE POINT: NORMAL
RAD ONC ARIA PLAN PRIMARY REFERENCE POINT: NORMAL
RAD ONC ARIA PLAN TOTAL FRACTIONS PRESCRIBED: 10
RAD ONC ARIA PLAN TOTAL FRACTIONS PRESCRIBED: 10
RAD ONC ARIA PLAN TOTAL PRESCRIBED DOSE: 2500 CGY
RAD ONC ARIA PLAN TOTAL PRESCRIBED DOSE: 2500 CGY
RAD ONC ARIA REFERENCE POINT DOSAGE GIVEN TO DATE: 17.5 GY
RAD ONC ARIA REFERENCE POINT DOSAGE GIVEN TO DATE: 20 GY
RAD ONC ARIA REFERENCE POINT ID: NORMAL
RAD ONC ARIA REFERENCE POINT ID: NORMAL
RAD ONC ARIA REFERENCE POINT SESSION DOSAGE GIVEN: 2.5 GY
RAD ONC ARIA REFERENCE POINT SESSION DOSAGE GIVEN: 2.5 GY
RBC # BLD AUTO: 3.45 10*6/MM3 (ref 3.77–5.28)
SODIUM SERPL-SCNC: 135 MMOL/L (ref 136–145)
VANCOMYCIN TROUGH SERPL-MCNC: 17.5 MCG/ML (ref 5–20)
WBC NRBC COR # BLD: 3.81 10*3/MM3 (ref 3.4–10.8)

## 2023-07-27 PROCEDURE — 25010000002 SODIUM CHLORIDE 0.9 % WITH KCL 20 MEQ 20-0.9 MEQ/L-% SOLUTION: Performed by: INTERNAL MEDICINE

## 2023-07-27 PROCEDURE — 77336 RADIATION PHYSICS CONSULT: CPT | Performed by: STUDENT IN AN ORGANIZED HEALTH CARE EDUCATION/TRAINING PROGRAM

## 2023-07-27 PROCEDURE — 99214 OFFICE O/P EST MOD 30 MIN: CPT | Performed by: INTERNAL MEDICINE

## 2023-07-27 PROCEDURE — 80048 BASIC METABOLIC PNL TOTAL CA: CPT | Performed by: INTERNAL MEDICINE

## 2023-07-27 PROCEDURE — 63710000001 PREDNISONE PER 1 MG: Performed by: INTERNAL MEDICINE

## 2023-07-27 PROCEDURE — 77386: CPT | Performed by: STUDENT IN AN ORGANIZED HEALTH CARE EDUCATION/TRAINING PROGRAM

## 2023-07-27 PROCEDURE — 96365 THER/PROPH/DIAG IV INF INIT: CPT

## 2023-07-27 PROCEDURE — 25010000002 VANCOMYCIN 750 MG RECONSTITUTED SOLUTION 1 EACH VIAL: Performed by: INTERNAL MEDICINE

## 2023-07-27 PROCEDURE — G0378 HOSPITAL OBSERVATION PER HR: HCPCS

## 2023-07-27 PROCEDURE — 77014 CHG CT GUIDANCE RADIATION THERAPY FLDS PLACEMENT: CPT | Performed by: STUDENT IN AN ORGANIZED HEALTH CARE EDUCATION/TRAINING PROGRAM

## 2023-07-27 PROCEDURE — 96366 THER/PROPH/DIAG IV INF ADDON: CPT

## 2023-07-27 PROCEDURE — 96361 HYDRATE IV INFUSION ADD-ON: CPT

## 2023-07-27 PROCEDURE — 0 CEFEPIME PER 500 MG: Performed by: INTERNAL MEDICINE

## 2023-07-27 PROCEDURE — 82024 ASSAY OF ACTH: CPT | Performed by: INTERNAL MEDICINE

## 2023-07-27 PROCEDURE — 80202 ASSAY OF VANCOMYCIN: CPT | Performed by: INTERNAL MEDICINE

## 2023-07-27 PROCEDURE — 85025 COMPLETE CBC W/AUTO DIFF WBC: CPT | Performed by: INTERNAL MEDICINE

## 2023-07-27 RX ORDER — MIRTAZAPINE 15 MG/1
15 TABLET, FILM COATED ORAL NIGHTLY
Status: DISCONTINUED | OUTPATIENT
Start: 2023-07-27 | End: 2023-07-28 | Stop reason: HOSPADM

## 2023-07-27 RX ADMIN — PHENAZOPYRIDINE 100 MG: 100 TABLET ORAL at 11:36

## 2023-07-27 RX ADMIN — VANCOMYCIN HYDROCHLORIDE 750 MG: 750 INJECTION, POWDER, LYOPHILIZED, FOR SOLUTION INTRAVENOUS at 01:59

## 2023-07-27 RX ADMIN — MORPHINE SULFATE 15 MG: 15 TABLET, EXTENDED RELEASE ORAL at 20:24

## 2023-07-27 RX ADMIN — MORPHINE SULFATE 15 MG: 15 TABLET, EXTENDED RELEASE ORAL at 09:11

## 2023-07-27 RX ADMIN — POTASSIUM CHLORIDE AND SODIUM CHLORIDE 100 ML/HR: 900; 150 INJECTION, SOLUTION INTRAVENOUS at 04:55

## 2023-07-27 RX ADMIN — METOPROLOL SUCCINATE 25 MG: 25 TABLET, EXTENDED RELEASE ORAL at 09:12

## 2023-07-27 RX ADMIN — PHENAZOPYRIDINE 100 MG: 100 TABLET ORAL at 09:12

## 2023-07-27 RX ADMIN — Medication 10 ML: at 09:12

## 2023-07-27 RX ADMIN — PREDNISONE 10 MG: 20 TABLET ORAL at 09:11

## 2023-07-27 RX ADMIN — Medication 1000 MCG: at 09:11

## 2023-07-27 RX ADMIN — CHLORHEXIDINE GLUCONATE 15 ML: 1.2 SOLUTION ORAL at 09:11

## 2023-07-27 RX ADMIN — CEFEPIME 2000 MG: 2 INJECTION, POWDER, FOR SOLUTION INTRAVENOUS at 07:04

## 2023-07-27 RX ADMIN — PHENAZOPYRIDINE 100 MG: 100 TABLET ORAL at 17:36

## 2023-07-27 RX ADMIN — CEFEPIME 2000 MG: 2 INJECTION, POWDER, FOR SOLUTION INTRAVENOUS at 20:24

## 2023-07-27 RX ADMIN — POTASSIUM CHLORIDE 20 MEQ: 750 TABLET, EXTENDED RELEASE ORAL at 09:11

## 2023-07-27 RX ADMIN — MIRTAZAPINE 15 MG: 15 TABLET, FILM COATED ORAL at 20:24

## 2023-07-27 RX ADMIN — Medication 10 ML: at 20:24

## 2023-07-27 RX ADMIN — Medication 1000 UNITS: at 09:12

## 2023-07-27 RX ADMIN — AMLODIPINE BESYLATE 5 MG: 5 TABLET ORAL at 09:12

## 2023-07-27 NOTE — PROGRESS NOTES
Name: Darlene Pfeiffer ADMIT: 2023   : 1940  PCP: Kehrer, Meredith Lea, MD    MRN: [id number] LOS: 0 days   AGE/SEX: 83 y.o. female  ROOM: [facility]     Subjective   Subjective   Patient is lying in the bed and does not complain of any nausea, vomiting abdominal pain, chest pain or shortness of breath.  Pain is reasonably well controlled and no more fever spikes.       Objective   Objective   Vital Signs  Temp:  [97.5 °F (36.4 °C)-98.8 °F (37.1 °C)] 98.6 °F (37 °C)  Heart Rate:  [69-76] 69  Resp:  [15-16] 16  BP: ()/(40-97) 149/69  SpO2:  [92 %-94 %] 94 %  on   ;   Device (Oxygen Therapy): room air  Body mass index is 16.56 kg/m².  Physical Exam  HEENT: PERRLA, extraocular movements intact, Scleras no icterus  Neck: Supple, no JVD  Cardiovascular: Regular rate and rhythm with normal S1 and S2  Respiratory: Fairly clear to auscultation anteriorly with no wheezes  GI: Soft, nontender, bowel sounds are present  Extremities: No edema, palpable pedal pulses  Results Review     I reviewed the patient's new clinical results.  Results from last 7 days   Lab Units 23  0705 23  0623  1455 23  0845   WBC 10*3/mm3 3.81 5.42 5.84 7.79   HEMOGLOBIN g/dL 10.1* 12.1 12.6 13.6   PLATELETS 10*3/mm3 140 150 176 173       Results from last 7 days   Lab Units 23  0705 23  0622 23  1455 23  0845   SODIUM mmol/L 135* 134* 137 136   POTASSIUM mmol/L 3.8 3.1* 3.5 3.1*   CHLORIDE mmol/L 105 96* 95* 93*   CO2 mmol/L 25.0 27.3 31.9* 23.6   BUN mg/dL 15 16 19 21   CREATININE mg/dL 0.41* 0.49* 0.73 0.68   GLUCOSE mg/dL 103* 87 111* 73   EGFR mL/min/1.73 99.0 94.8 82.7 87.6       Results from last 7 days   Lab Units 23  1455 23  0845   ALBUMIN g/dL 3.1* 3.0*   BILIRUBIN mg/dL 0.7 0.5   ALK PHOS U/L 68 78   AST (SGOT) U/L 39* 100*   ALT (SGPT) U/L 38* 75*       Results from last 7 days   Lab Units 23  0705 23  0622 23  1455 23  0845   CALCIUM  mg/dL 7.6* 7.6* 8.6 8.2*   ALBUMIN g/dL  --   --  3.1* 3.0*   MAGNESIUM mg/dL  --   --  1.6 1.7       Results from last 7 days   Lab Units 07/25/23  2331   LACTATE mmol/L 0.8       Glucose   Date/Time Value Ref Range Status   07/25/2023 1619 113 70 - 130 mg/dL Final     Comment:     Meter: [id number] : [id number] [facility]       CT Head Without Contrast    Result Date: 7/25/2023   No acute intracranial hemorrhage or hydrocephalus. If there is further clinical concern, MRI could be considered for further evaluation.  This report was finalized on 7/25/2023 4:09 PM by Dr. Ebenezer Barton M.D.     I have personally reviewed all medications:  Scheduled Medications  amLODIPine, 5 mg, Oral, Daily  cefepime, 2,000 mg, Intravenous, Q12H  chlorhexidine, 15 mL, Mouth/Throat, BID  cholecalciferol, 1,000 Units, Oral, Daily  ipratropium-albuterol, 3 mL, Nebulization, Q8H - RT  metoprolol succinate XL, 25 mg, Oral, Daily  mirtazapine, 15 mg, Oral, Nightly  Morphine, 15 mg, Oral, BID  phenazopyridine, 100 mg, Oral, TID With Meals  potassium chloride, 20 mEq, Oral, Daily  predniSONE, 10 mg, Oral, Daily  senna-docusate sodium, 2 tablet, Oral, BID  sodium chloride, 10 mL, Intravenous, Q12H  vitamin B-12, 1,000 mcg, Oral, Daily    Infusions  sodium chloride 0.9 % with KCl 20 mEq, 100 mL/hr, Last Rate: 100 mL/hr (07/27/23 6778)    Diet  Diet: Regular/House Diet; Texture: Regular Texture (IDDSI 7); Fluid Consistency: Thin (IDDSI 0)    I have personally reviewed:  [x]  Laboratory   [x]  Microbiology   [x]  Radiology   [x]  EKG/Telemetry  [x]  Cardiology/Vascular   []  Pathology    []  Records       Assessment/Plan     Active Hospital Problems    Diagnosis  POA    **Fever [R50.9]  Unknown    Cystitis [N30.90]  Unknown    Weakness [R53.1]  Yes    Hypokalemia [E87.6]  Yes    Lung cancer [C34.90]  Yes    Chronic obstructive pulmonary disease [J44.9]  Yes    Hypertension [I10]  Yes    Hyperlipidemia [E78.5]  Yes       Resolved Hospital Problems   No resolved problems to display.       83 y.o. female admitted with Fever.    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    2.  Fever, blood cultures did not reveal any growth and no clear source and urine cultures are still pending.  Vancomycin has been discontinued and continue with cefepime 2 g IV every 12 hours for now per ID recommendations.  Hopefully home tomorrow if continues to do well on oral antibiotics.    3.  Generalized weakness with fall, PT/OT consult has been obtained.    4.  Acute cystitis, await for final cultures and sensitivities and is on a broad-spectrum antibiotics.    5.  Hypertension, continue with amlodipine and metoprolol and monitor blood pressure closely.    6.  Nausea/vomiting was felt to be secondary to immunotherapy which has been discontinued and now patient's p.o. intake is improving.    7.  On SCDs for DVT prophylaxis.    8.  CODE STATUS is full code.    Copied text on this note has been reviewed by me on 7/27/2023    Arcadio Godinez MD  [facility]  07/27/23  13:19 EDT

## 2023-07-27 NOTE — PLAN OF CARE
Problem: Adult Inpatient Plan of Care  Goal: Plan of Care Review  Flowsheets (Taken 7/27/2023 0334)  Progress: no change  Plan of Care Reviewed With:   patient   daughter   family  Outcome Evaluation: No acute changes. VSS. Daughter @ bedside. AOx4. RA O2. Makes needs known. Bed locked and in lowest position. Side rails up x2. Call light within reach. Will continue to assess.  Goal: Absence of Hospital-Acquired Illness or Injury  Intervention: Identify and Manage Fall Risk  Flowsheets  Taken 7/27/2023 0321 by Eleanor Saab PCT  Safety Promotion/Fall Prevention:   safety round/check completed   room organization consistent   nonskid shoes/slippers when out of bed   activity supervised   assistive device/personal items within reach   clutter free environment maintained   fall prevention program maintained  Taken 7/27/2023 0232 by Thomas House, RN  Safety Promotion/Fall Prevention:   activity supervised   assistive device/personal items within reach   clutter free environment maintained   fall prevention program maintained   lighting adjusted   nonskid shoes/slippers when out of bed   room organization consistent   safety round/check completed  Taken 7/27/2023 0001 by Thomas House, RN  Safety Promotion/Fall Prevention:   activity supervised   assistive device/personal items within reach   clutter free environment maintained   fall prevention program maintained   lighting adjusted   nonskid shoes/slippers when out of bed   room organization consistent   safety round/check completed  Taken 7/26/2023 2252 by Thomas House, RN  Safety Promotion/Fall Prevention:   activity supervised   assistive device/personal items within reach   clutter free environment maintained   fall prevention program maintained   lighting adjusted   nonskid shoes/slippers when out of bed   room organization consistent   safety round/check completed  Taken 7/26/2023 2049 by Thomas House, RN  Safety Promotion/Fall Prevention:   activity supervised    assistive device/personal items within reach   clutter free environment maintained   fall prevention program maintained   lighting adjusted   nonskid shoes/slippers when out of bed   room organization consistent   safety round/check completed  Intervention: Prevent Skin Injury  Flowsheets  Taken 7/27/2023 0321 by Eleanor Saab PCT  Body Position: position changed independently  Taken 7/27/2023 0232 by Thomas House RN  Body Position: position changed independently  Taken 7/27/2023 0001 by Thomas House RN  Body Position: position changed independently  Taken 7/26/2023 2252 by Tohmas House RN  Body Position: position changed independently  Taken 7/26/2023 2049 by Thomas House RN  Body Position: position changed independently  Skin Protection:   adhesive use limited   incontinence pads utilized   transparent dressing maintained   tubing/devices free from skin contact  Intervention: Prevent and Manage VTE (Venous Thromboembolism) Risk  Flowsheets  Taken 7/27/2023 0321 by Eleanor Saab PCT  Activity Management: activity encouraged  Taken 7/27/2023 0232 by Thomas House RN  Activity Management: activity encouraged  Taken 7/27/2023 0001 by Thomas House RN  Activity Management: activity encouraged  Taken 7/26/2023 2252 by Thomas House RN  Activity Management: activity encouraged  Taken 7/26/2023 2049 by Thomas House RN  Activity Management: activity encouraged  VTE Prevention/Management:   bilateral   sequential compression devices on  Intervention: Prevent Infection  Flowsheets (Taken 7/27/2023 0321 by Eleanor Saab PCT)  Infection Prevention:   single patient room provided   rest/sleep promoted   personal protective equipment utilized   hand hygiene promoted   environmental surveillance performed  Goal: Optimal Comfort and Wellbeing  Intervention: Monitor Pain and Promote Comfort  Flowsheets (Taken 7/25/2023 2215 by Nena Nunez, RN)  Pain Management Interventions:   pillow support provided   position  adjusted  Intervention: Provide Person-Centered Care  Flowsheets (Taken 7/26/2023 2049)  Trust Relationship/Rapport:   care explained   choices provided   emotional support provided   empathic listening provided   questions answered   questions encouraged   reassurance provided   thoughts/feelings acknowledged  Goal: Readiness for Transition of Care  Intervention: Mutually Develop Transition Plan  Flowsheets (Taken 7/26/2023 1549 by Pam Pastrana, RN)  Equipment Needed After Discharge: none  Equipment Currently Used at Home: none  Anticipated Changes Related to Illness: none  Transportation Anticipated: family or friend will provide  Concerns to be Addressed: no discharge needs identified  Readmission Within the Last 30 Days: no previous admission in last 30 days  Patient/Family Anticipated Services at Transition: none  Patient/Family Anticipates Transition to: home with family     Problem: Fall Injury Risk  Goal: Absence of Fall and Fall-Related Injury  Intervention: Identify and Manage Contributors  Flowsheets (Taken 7/26/2023 1400 by Sanjuanita Ellsworth, RN)  Medication Review/Management: medications reviewed  Intervention: Promote Injury-Free Environment  Flowsheets  Taken 7/27/2023 0321 by Eleanor Saab PCT  Safety Promotion/Fall Prevention:   safety round/check completed   room organization consistent   nonskid shoes/slippers when out of bed   activity supervised   assistive device/personal items within reach   clutter free environment maintained   fall prevention program maintained  Taken 7/27/2023 0232 by Thomas House, RN  Safety Promotion/Fall Prevention:   activity supervised   assistive device/personal items within reach   clutter free environment maintained   fall prevention program maintained   lighting adjusted   nonskid shoes/slippers when out of bed   room organization consistent   safety round/check completed  Taken 7/27/2023 0001 by Thomas House, RN  Safety Promotion/Fall Prevention:    activity supervised   assistive device/personal items within reach   clutter free environment maintained   fall prevention program maintained   lighting adjusted   nonskid shoes/slippers when out of bed   room organization consistent   safety round/check completed  Taken 7/26/2023 2252 by Thomas House RN  Safety Promotion/Fall Prevention:   activity supervised   assistive device/personal items within reach   clutter free environment maintained   fall prevention program maintained   lighting adjusted   nonskid shoes/slippers when out of bed   room organization consistent   safety round/check completed  Taken 7/26/2023 2049 by Thomas House RN  Safety Promotion/Fall Prevention:   activity supervised   assistive device/personal items within reach   clutter free environment maintained   fall prevention program maintained   lighting adjusted   nonskid shoes/slippers when out of bed   room organization consistent   safety round/check completed     Problem: Skin Injury Risk Increased  Goal: Skin Health and Integrity  Intervention: Promote and Optimize Oral Intake  Flowsheets (Taken 7/27/2023 0334)  Oral Nutrition Promotion:   medicated   rest periods promoted  Intervention: Optimize Skin Protection  Flowsheets  Taken 7/27/2023 0321 by Eleanor Saab PCT  Head of Bed (HOB) Positioning: HOB elevated  Taken 7/27/2023 0232 by Thomas House RN  Head of Bed (HOB) Positioning: HOB elevated  Taken 7/27/2023 0001 by Thomas House RN  Head of Bed (HOB) Positioning: HOB elevated  Taken 7/26/2023 2252 by Thomas House RN  Head of Bed (HOB) Positioning: HOB elevated  Taken 7/26/2023 2049 by Thomas House RN  Pressure Reduction Techniques:   frequent weight shift encouraged   weight shift assistance provided  Head of Bed (HOB) Positioning: HOB elevated  Pressure Reduction Devices: positioning supports utilized  Skin Protection:   adhesive use limited   incontinence pads utilized   transparent dressing maintained   tubing/devices free from  skin contact     Problem: Malnutrition  Goal: Improved Nutritional Intake  Intervention: Promote and Optimize Oral Intake  Flowsheets (Taken 7/27/2023 3822)  Oral Nutrition Promotion:   medicated   rest periods promoted   Goal Outcome Evaluation:  Plan of Care Reviewed With: patient, daughter, family        Progress: no change  Outcome Evaluation: No acute changes. VSS. Daughter @ bedside. AOx4. RA O2. Makes needs known. Bed locked and in lowest position. Side rails up x2. Call light within reach. Will continue to assess.

## 2023-07-27 NOTE — PROGRESS NOTES
ID NOTE    CC: f/u one-time fever    Subj: No fever. Dysuria better. Still feels dizzy when standing. D/w her daughter Samra as well.     Medications:    Current Facility-Administered Medications:     acetaminophen (TYLENOL) tablet 650 mg, 650 mg, Oral, Q4H PRN, 650 mg at 07/25/23 2031 **OR** acetaminophen (TYLENOL) 160 MG/5ML solution 650 mg, 650 mg, Oral, Q4H PRN **OR** acetaminophen (TYLENOL) suppository 650 mg, 650 mg, Rectal, Q4H PRN, Lorna Escamilla MD    albuterol (PROVENTIL) nebulizer solution 0.083% 2.5 mg/3mL, 2.5 mg, Nebulization, Q6H PRN, Lorna Escamilla MD    amLODIPine (NORVASC) tablet 5 mg, 5 mg, Oral, Daily, Lorna Escamilla MD, 5 mg at 07/26/23 0852    sennosides-docusate (PERICOLACE) 8.6-50 MG per tablet 2 tablet, 2 tablet, Oral, BID, 2 tablet at 07/26/23 2044 **AND** polyethylene glycol (MIRALAX) packet 17 g, 17 g, Oral, Daily PRN **AND** bisacodyl (DULCOLAX) EC tablet 5 mg, 5 mg, Oral, Daily PRN **AND** bisacodyl (DULCOLAX) suppository 10 mg, 10 mg, Rectal, Daily PRN, Lorna Escamilla MD    cefepime 2 gm IVPB in 100 ml NS (VTB), 2,000 mg, Intravenous, Q12H, Lorna Escamilla MD, 2,000 mg at 07/27/23 0704    chlorhexidine (PERIDEX) 0.12 % solution 15 mL, 15 mL, Mouth/Throat, BID, Lorna Escamilla MD, 15 mL at 07/26/23 2044    cholecalciferol (VITAMIN D3) tablet 1,000 Units, 1,000 Units, Oral, Daily, Lorna Escamilla MD, 1,000 Units at 07/26/23 0852    heparin injection 500 Units, 5 mL, Intravenous, PRN, Bernardo Davis MD    ipratropium-albuterol (DUO-NEB) nebulizer solution 3 mL, 3 mL, Nebulization, Q8H - RT, Lorna Escamilla MD    metoprolol succinate XL (TOPROL-XL) 24 hr tablet 25 mg, 25 mg, Oral, Daily, Lorna Escamilla MD, 25 mg at 07/26/23 0852    Morphine (MS CONTIN) 12 hr tablet 15 mg, 15 mg, Oral, BID, Lorna Escamilla MD, 15 mg at 07/26/23 2044    ondansetron (ZOFRAN) injection 4 mg, 4 mg, Intravenous, Q6H PRN, Andi  Lorna Rosa MD    oxyCODONE (ROXICODONE) immediate release tablet 20 mg, 20 mg, Oral, Q3H PRN, StingLorna guerin MD, 20 mg at 07/25/23 1958    phenazopyridine (PYRIDIUM) tablet 100 mg, 100 mg, Oral, TID With Meals, Selvin Francis Jr., MD, 100 mg at 07/26/23 1806    potassium chloride (K-DUR,KLOR-CON) ER tablet 20 mEq, 20 mEq, Oral, Daily, Lorna Escamilla MD, 20 mEq at 07/26/23 0852    predniSONE (DELTASONE) tablet 10 mg, 10 mg, Oral, Daily, Lorna Escamilla MD, 10 mg at 07/26/23 0852    sodium chloride 0.9 % flush 10 mL, 10 mL, Intravenous, PRN, Emergency, Triage Protocol, MD    sodium chloride 0.9 % flush 10 mL, 10 mL, Intravenous, Q12H, Bernardo Davis MD, 10 mL at 07/26/23 2045    sodium chloride 0.9 % flush 10 mL, 10 mL, Intravenous, PRN, Bernarod Davis MD    Access VAD, , , Once **AND** sodium chloride 0.9 % flush 10 mL, 10 mL, Intravenous, PRN, Bernardo Davis MD    sodium chloride 0.9 % flush 20 mL, 20 mL, Intravenous, PRN, Bernardo Davis MD    sodium chloride 0.9 % infusion 40 mL, 40 mL, Intravenous, PRN, Bernardo Davis MD    sodium chloride 0.9 % with KCl 20 mEq/L infusion, 100 mL/hr, Intravenous, Continuous, Lorna Escamilla MD, Last Rate: 100 mL/hr at 07/27/23 0455, 100 mL/hr at 07/27/23 0455    vitamin B-12 (CYANOCOBALAMIN) tablet 1,000 mcg, 1,000 mcg, Oral, Daily, Lorna Escamilla MD, 1,000 mcg at 07/26/23 0852      Objective   Vital Signs   Temp:  [97.5 °F (36.4 °C)-98.8 °F (37.1 °C)] 98.6 °F (37 °C)  Heart Rate:  [69-76] 69  Resp:  [15-16] 16  BP: ()/(40-97) 149/69    Physical Exam:   General: awake, alert, very nice, frail  Eyes: no scleral icterus  ENT: no thrush  Cardiovascular: NR  Respiratory: normal work of breathing on RA; no wheezing  GI: Abdomen is soft  :  no Carcamo catheter  Skin: No rashes  Neurological: Alert and oriented x 3  Psychiatric: Normal mood and affect   Vasc: R chest port w/o erythema    Labs:   CBC, BMP, and  blood and urine cultures reviewed today  Lab Results   Component Value Date    WBC 3.81 07/27/2023    HGB 10.1 (L) 07/27/2023    HCT 30.0 (L) 07/27/2023    MCV 87.0 07/27/2023     07/27/2023       Lab Results   Component Value Date    GLUCOSE 103 (H) 07/27/2023    BUN 15 07/27/2023    CREATININE 0.41 (L) 07/27/2023    EGFRIFNONA 85 12/08/2021    EGFRIFAFRI 98 12/08/2021    BCR 36.6 (H) 07/27/2023    CO2 25.0 07/27/2023    CALCIUM 7.6 (L) 07/27/2023    PROTENTOTREF 6.2 01/09/2023    ALBUMIN 3.1 (L) 07/25/2023    LABIL2 2.0 01/09/2023    AST 39 (H) 07/25/2023    ALT 38 (H) 07/25/2023     LA 0.8  UA 6-12 WBCs    Microbiology:  7/25 BCx: NGTD  7/25 UCx: pending      ASSESSMENT/PLAN:  Fever in adult  Fall at home  Lung adenocarcinoma w/ history of lobectomy  On immunotherapy  Nausea and vomiting due to #4  Acute cystitis    No further fever other than the 1x 100.8 F on 7/25. Blood cultures are negative so will stop vancomycin. Urine culture is pending. I'll continue cefepime 2 g IV q12h for now but hopefully tomorrow can change to something oral to complete a 5-day course once the urine culture finalizes.     ID will follow.

## 2023-07-27 NOTE — PROGRESS NOTES
CHIEF COMPLAINT:  NSCLC    INTERVAL HISTORY:  Doing better no more fever.  Still little weak-wants PT. C/o poor appetite.  No n/v.    HISTORY OF PRESENT ILLNESS:   This is an 83-year-old lady followed by Dr. Escobar in our practice for non-small cell lung cancer.  She has adenocarcinoma moderately differentiated involving left upper lobe parenchyma and left lower lobe pleura.  She was treated with immunotherapy with Keytruda 12/14/2022 through 5/31/2023.  A PET scan 6/5/2023 showed progression of disease in the left upper lobe, new left supraclavicular lymphadenopathy, new metastases to the left posterior fourth rib.  She initiated dabrafenib and trametinib (BRAF mutation) which was complicated by nausea vomiting decreased appetite and elevated liver function test, fatigue and sleeping most of the day.  She was seen in the office 7/21/2023 at which time she was instructed to hold the BRAF inhibitor.  She has also been undergoing palliative radiation to the left chest wall and lung for pain control.     The patient was brought to the ER on 7/25/2023 with progressive weakness and a fall at home.  CT head showed no acute changes and chest x-ray showed prior lung cancer opacities no acute infiltrates.  Labs on admission normal white blood cell count 5.8, hemoglobin 12.6, platelets 176, glucose 111, BUN 19, creatinine 0.7, protein 5.1/albumin 3.1, ALT 38/AST 39, magnesium 1.6.  Lactic acid 0.8.     The patient had fever overnight and has been started on antibiotic therapy pending cultures etc. she is feeling better this morning after fluid and antibiotics.  She reports dysuria and urinary frequency and urgency prior to admission.  Her pain is presently controlled.  She reports resolved nausea/vomiting today.      Past Medical History, Past Surgical History, Social History, Family History have been reviewed and are without significant changes except as mentioned.    Review of Systems   A comprehensive 14 point review of  systems was performed and was negative except as mentioned.    Medications:  The current medication list was reviewed in the EMR    ALLERGIES:    Allergies   Allergen Reactions    Sulfa Antibiotics Rash       Objective      Vitals:    07/27/23 0735   BP: 149/69   Pulse: 69   Resp: 16   Temp: 98.6 °F (37 °C)   SpO2: 94%          Physical Exam    CONSTITUTIONAL: pleasant well-developed adult woman nad  HEENT: no icterus, no thrush, moist membranes  LYMPH: no cervical or supraclavicular lad  CV: RRR, S1S2, no murmur  RESP: cta bilat, no wheezing, no rales  GI: soft, non-tender, no splenomegaly, +bs  MUSC: no edema   NEURO: alert and oriented x3, mild global weakness  PSYCH: normal mood and affect    RECENT LABS:  Hematology Results from last 7 days   Lab Units 07/27/23  0705 07/26/23  0622 07/25/23  1455   WBC 10*3/mm3 3.81 5.42 5.84   HEMOGLOBIN g/dL 10.1* 12.1 12.6   HEMATOCRIT % 30.0* 36.6 38.8   PLATELETS 10*3/mm3 140 150 176     2  Lab Results   Component Value Date    GLUCOSE 103 (H) 07/27/2023    BUN 15 07/27/2023    CREATININE 0.41 (L) 07/27/2023    EGFRIFNONA 85 12/08/2021    EGFRIFAFRI 98 12/08/2021    BCR 36.6 (H) 07/27/2023    CO2 25.0 07/27/2023    CALCIUM 7.6 (L) 07/27/2023    PROTENTOTREF 6.2 01/09/2023    ALBUMIN 3.1 (L) 07/25/2023    LABIL2 2.0 01/09/2023    AST 39 (H) 07/25/2023    ALT 38 (H) 07/25/2023       No results found for: IRON, TIBC, FERRITIN    No results found for: [id number]    No results found for: FOLATE       Assessment & Plan   *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, stage IV  Previously treated with immunotherapy December 2022 through May 2023  PET scan 6/5/2023 showed progression of disease in the left lung, new metastatic left supraclavicular lymphadenopathy, left posterior fourth rib metastasis, new disease medial left lower lobe pleural/extrapleural  Undergoing palliative radiation to left chest wall  Initiated BRAF inhibitordabrafenib and trametinib  7/17/2023 subsequently discontinued 7/21/2023 secondary to poor appetite nausea vomiting fatigue elevated LFTs     *Admitted with progressive weakness/falls     *nausea/vomiting  Recent MRI showed no brain metastases  ?  Medication side effect from BRAF inhibitor but has been off since 7/21  TSH 2.16, cortisol 20     *Fever (one time)  Negative blood culture, nl lactic acid  Urine cx pending     *Pain of malignancy  Home regimen is MS Contin 15 mg twice daily, oxycodone 20 mg every 3 hours as needed     *poor appetite    Oncology plan/recommendations:  Continue to hold BRAF inhibitor  Continue radiation therapy  Continue pain control/bowel regimen  Antibiotics per ID  Try Remeron 15mg QHS for appetite  PT consult  Follow-up with Dr. Escobar after d/c (scheduled 8/4)                  7/27/2023      CC:

## 2023-07-28 ENCOUNTER — HOSPITAL ENCOUNTER (OUTPATIENT)
Dept: RADIATION ONCOLOGY | Facility: HOSPITAL | Age: 83
Discharge: HOME | End: 2023-07-28
Payer: MEDICARE

## 2023-07-28 ENCOUNTER — READMISSION MANAGEMENT (OUTPATIENT)
Dept: CALL CENTER | Facility: HOSPITAL | Age: 83
End: 2023-07-28
Payer: MEDICARE

## 2023-07-28 VITALS
HEART RATE: 72 BPM | RESPIRATION RATE: 16 BRPM | BODY MASS INDEX: 16.56 KG/M2 | WEIGHT: 93.47 LBS | HEIGHT: 63 IN | TEMPERATURE: 98.1 F | SYSTOLIC BLOOD PRESSURE: 144 MMHG | DIASTOLIC BLOOD PRESSURE: 64 MMHG | OXYGEN SATURATION: 95 %

## 2023-07-28 LAB
ACTH PLAS-MCNC: 15.9 PG/ML (ref 7.2–63.3)
ANION GAP SERPL CALCULATED.3IONS-SCNC: 6 MMOL/L (ref 5–15)
BUN SERPL-MCNC: 9 MG/DL (ref 8–23)
BUN/CREAT SERPL: 20.5 (ref 7–25)
CALCIUM SPEC-SCNC: 8.1 MG/DL (ref 8.6–10.5)
CHLORIDE SERPL-SCNC: 102 MMOL/L (ref 98–107)
CO2 SERPL-SCNC: 26 MMOL/L (ref 22–29)
CREAT SERPL-MCNC: 0.44 MG/DL (ref 0.57–1)
DEPRECATED RDW RBC AUTO: 40.2 FL (ref 37–54)
EGFRCR SERPLBLD CKD-EPI 2021: 97.3 ML/MIN/1.73
ERYTHROCYTE [DISTWIDTH] IN BLOOD BY AUTOMATED COUNT: 12.8 % (ref 12.3–15.4)
GLUCOSE SERPL-MCNC: 103 MG/DL (ref 65–99)
HCT VFR BLD AUTO: 33.2 % (ref 34–46.6)
HGB BLD-MCNC: 11 G/DL (ref 12–15.9)
MCH RBC QN AUTO: 28.9 PG (ref 26.6–33)
MCHC RBC AUTO-ENTMCNC: 33.1 G/DL (ref 31.5–35.7)
MCV RBC AUTO: 87.4 FL (ref 79–97)
PLATELET # BLD AUTO: 173 10*3/MM3 (ref 140–450)
PMV BLD AUTO: 9.7 FL (ref 6–12)
POTASSIUM SERPL-SCNC: 3.7 MMOL/L (ref 3.5–5.2)
RAD ONC ARIA COURSE ID: NORMAL
RAD ONC ARIA COURSE INTENT: NORMAL
RAD ONC ARIA COURSE LAST TREATMENT DATE: NORMAL
RAD ONC ARIA COURSE START DATE: NORMAL
RAD ONC ARIA COURSE TREATMENT ELAPSED DAYS: 11
RAD ONC ARIA FIRST TREATMENT DATE: NORMAL
RAD ONC ARIA PLAN FRACTIONS TREATED TO DATE: 9
RAD ONC ARIA PLAN ID: NORMAL
RAD ONC ARIA PLAN PRESCRIBED DOSE PER FRACTION: 2.5 GY
RAD ONC ARIA PLAN PRIMARY REFERENCE POINT: NORMAL
RAD ONC ARIA PLAN TOTAL FRACTIONS PRESCRIBED: 10
RAD ONC ARIA PLAN TOTAL PRESCRIBED DOSE: 2500 CGY
RAD ONC ARIA REFERENCE POINT DOSAGE GIVEN TO DATE: 22.5 GY
RAD ONC ARIA REFERENCE POINT ID: NORMAL
RAD ONC ARIA REFERENCE POINT SESSION DOSAGE GIVEN: 2.5 GY
RBC # BLD AUTO: 3.8 10*6/MM3 (ref 3.77–5.28)
SODIUM SERPL-SCNC: 134 MMOL/L (ref 136–145)
WBC NRBC COR # BLD: 5.51 10*3/MM3 (ref 3.4–10.8)

## 2023-07-28 PROCEDURE — 96376 TX/PRO/DX INJ SAME DRUG ADON: CPT

## 2023-07-28 PROCEDURE — 77386: CPT | Performed by: STUDENT IN AN ORGANIZED HEALTH CARE EDUCATION/TRAINING PROGRAM

## 2023-07-28 PROCEDURE — 25010000002 ONDANSETRON PER 1 MG: Performed by: INTERNAL MEDICINE

## 2023-07-28 PROCEDURE — 99214 OFFICE O/P EST MOD 30 MIN: CPT | Performed by: INTERNAL MEDICINE

## 2023-07-28 PROCEDURE — 99213 OFFICE O/P EST LOW 20 MIN: CPT | Performed by: INTERNAL MEDICINE

## 2023-07-28 PROCEDURE — 77014 CHG CT GUIDANCE RADIATION THERAPY FLDS PLACEMENT: CPT | Performed by: STUDENT IN AN ORGANIZED HEALTH CARE EDUCATION/TRAINING PROGRAM

## 2023-07-28 PROCEDURE — 80048 BASIC METABOLIC PNL TOTAL CA: CPT | Performed by: INTERNAL MEDICINE

## 2023-07-28 PROCEDURE — 0 CEFEPIME PER 500 MG: Performed by: INTERNAL MEDICINE

## 2023-07-28 PROCEDURE — 96366 THER/PROPH/DIAG IV INF ADDON: CPT

## 2023-07-28 PROCEDURE — 85027 COMPLETE CBC AUTOMATED: CPT | Performed by: INTERNAL MEDICINE

## 2023-07-28 PROCEDURE — 63710000001 PREDNISONE PER 1 MG: Performed by: INTERNAL MEDICINE

## 2023-07-28 PROCEDURE — G0378 HOSPITAL OBSERVATION PER HR: HCPCS

## 2023-07-28 PROCEDURE — 25010000002 HEPARIN LOCK FLUSH PER 10 UNITS: Performed by: EMERGENCY MEDICINE

## 2023-07-28 RX ORDER — CEFPODOXIME PROXETIL 200 MG/1
200 TABLET, FILM COATED ORAL EVERY 12 HOURS
Qty: 4 TABLET | Refills: 0 | Status: SHIPPED | OUTPATIENT
Start: 2023-07-28 | End: 2023-07-30

## 2023-07-28 RX ADMIN — SENNOSIDES AND DOCUSATE SODIUM 2 TABLET: 50; 8.6 TABLET ORAL at 08:44

## 2023-07-28 RX ADMIN — MORPHINE SULFATE 15 MG: 15 TABLET, EXTENDED RELEASE ORAL at 08:44

## 2023-07-28 RX ADMIN — CEFEPIME 2000 MG: 2 INJECTION, POWDER, FOR SOLUTION INTRAVENOUS at 07:01

## 2023-07-28 RX ADMIN — Medication 500 UNITS: at 11:52

## 2023-07-28 RX ADMIN — PHENAZOPYRIDINE 100 MG: 100 TABLET ORAL at 08:44

## 2023-07-28 RX ADMIN — AMLODIPINE BESYLATE 5 MG: 5 TABLET ORAL at 08:44

## 2023-07-28 RX ADMIN — Medication 1000 MCG: at 08:44

## 2023-07-28 RX ADMIN — Medication 10 ML: at 08:45

## 2023-07-28 RX ADMIN — POTASSIUM CHLORIDE 20 MEQ: 750 TABLET, EXTENDED RELEASE ORAL at 08:44

## 2023-07-28 RX ADMIN — PREDNISONE 10 MG: 20 TABLET ORAL at 08:44

## 2023-07-28 RX ADMIN — CHLORHEXIDINE GLUCONATE 15 ML: 1.2 SOLUTION ORAL at 08:44

## 2023-07-28 RX ADMIN — ONDANSETRON 4 MG: 2 INJECTION INTRAMUSCULAR; INTRAVENOUS at 08:45

## 2023-07-28 RX ADMIN — METOPROLOL SUCCINATE 25 MG: 25 TABLET, EXTENDED RELEASE ORAL at 08:44

## 2023-07-28 RX ADMIN — Medication 1000 UNITS: at 08:44

## 2023-07-28 NOTE — PROGRESS NOTES
ID NOTE    CC: f/u one-time fever and cystitis    Subj: No fever. Dysuria better. Hoping to go home today. D/w her son.     Medications:    Current Facility-Administered Medications:     acetaminophen (TYLENOL) tablet 650 mg, 650 mg, Oral, Q4H PRN, 650 mg at 07/25/23 2031 **OR** acetaminophen (TYLENOL) 160 MG/5ML solution 650 mg, 650 mg, Oral, Q4H PRN **OR** acetaminophen (TYLENOL) suppository 650 mg, 650 mg, Rectal, Q4H PRN, Lorna Escamilla MD    albuterol (PROVENTIL) nebulizer solution 0.083% 2.5 mg/3mL, 2.5 mg, Nebulization, Q6H PRN, Lorna Escamilla MD    amLODIPine (NORVASC) tablet 5 mg, 5 mg, Oral, Daily, Lorna Escamilla MD, 5 mg at 07/28/23 0844    sennosides-docusate (PERICOLACE) 8.6-50 MG per tablet 2 tablet, 2 tablet, Oral, BID, 2 tablet at 07/28/23 0844 **AND** polyethylene glycol (MIRALAX) packet 17 g, 17 g, Oral, Daily PRN **AND** bisacodyl (DULCOLAX) EC tablet 5 mg, 5 mg, Oral, Daily PRN **AND** bisacodyl (DULCOLAX) suppository 10 mg, 10 mg, Rectal, Daily PRN, Lorna Escamilla MD    cefepime 2 gm IVPB in 100 ml NS (VTB), 2,000 mg, Intravenous, Q12H, Lorna Escamilla MD, Last Rate: 0 mL/hr at 07/28/23 0030, 2,000 mg at 07/28/23 0701    chlorhexidine (PERIDEX) 0.12 % solution 15 mL, 15 mL, Mouth/Throat, BID, Lorna Escamilla MD, 15 mL at 07/28/23 0844    cholecalciferol (VITAMIN D3) tablet 1,000 Units, 1,000 Units, Oral, Daily, Lorna Escamilla MD, 1,000 Units at 07/28/23 0844    heparin injection 500 Units, 5 mL, Intravenous, PRN, Bernardo Davis MD    ipratropium-albuterol (DUO-NEB) nebulizer solution 3 mL, 3 mL, Nebulization, Q8H - RT, Lorna Escamilla MD    metoprolol succinate XL (TOPROL-XL) 24 hr tablet 25 mg, 25 mg, Oral, Daily, Lorna Escamilla MD, 25 mg at 07/28/23 0844    mirtazapine (REMERON) tablet 15 mg, 15 mg, Oral, Nightly, Moise Stacy MD, 15 mg at 07/27/23 2024    Morphine (MS CONTIN) 12 hr tablet 15 mg, 15 mg, Oral, BID,  Lorna Escamilla MD, 15 mg at 07/28/23 0844    ondansetron (ZOFRAN) injection 4 mg, 4 mg, Intravenous, Q6H PRN, Lorna Escamilla MD, 4 mg at 07/28/23 0845    oxyCODONE (ROXICODONE) immediate release tablet 20 mg, 20 mg, Oral, Q3H PRN, Lorna Escamilla MD, 20 mg at 07/25/23 1958    phenazopyridine (PYRIDIUM) tablet 100 mg, 100 mg, Oral, TID With Meals, Selvin Francis Jr., MD, 100 mg at 07/28/23 0844    potassium chloride (K-DUR,KLOR-CON) ER tablet 20 mEq, 20 mEq, Oral, Daily, Lorna Escamilla MD, 20 mEq at 07/28/23 0844    predniSONE (DELTASONE) tablet 10 mg, 10 mg, Oral, Daily, Lorna Escamilla MD, 10 mg at 07/28/23 0844    sodium chloride 0.9 % flush 10 mL, 10 mL, Intravenous, PRN, Emergency, [facility] Protocol, MD    sodium chloride 0.9 % flush 10 mL, 10 mL, Intravenous, Q12H, Bernardo Davis MD, 10 mL at 07/28/23 0845    sodium chloride 0.9 % flush 10 mL, 10 mL, Intravenous, PRN, Bernardo Davis MD    Access VAD, , , Once **AND** sodium chloride 0.9 % flush 10 mL, 10 mL, Intravenous, PRN, Bernardo Davis MD    sodium chloride 0.9 % flush 20 mL, 20 mL, Intravenous, PRN, Bernardo Davis MD    sodium chloride 0.9 % infusion 40 mL, 40 mL, Intravenous, PRN, Bernardo Davis MD    sodium chloride 0.9 % with KCl 20 mEq/L infusion, 100 mL/hr, Intravenous, Continuous, Lorna Escamilla MD, Last Rate: 100 mL/hr at 07/27/23 0455, 100 mL/hr at 07/27/23 0455    vitamin B-12 (CYANOCOBALAMIN) tablet 1,000 mcg, 1,000 mcg, Oral, Daily, Lorna Escamilla MD, 1,000 mcg at 07/28/23 0844      Objective   Vital Signs   Temp:  [98.1 °F (36.7 °C)-98.4 °F (36.9 °C)] 98.1 °F (36.7 °C)  Heart Rate:  [64-75] 72  Resp:  [16] 16  BP: (110-144)/(45-64) 144/64    Physical Exam:   General: awake, alert, very nice, frail  Eyes: no scleral icterus  Cardiovascular: NR  Respiratory: normal work of breathing on RA; no wheezing  GI: Abdomen is soft  :  no Carcamo catheter  Skin: No  rashes  Neurological: Alert and oriented x 3  Psychiatric: Normal mood and affect   Vasc: R chest port w/o erythema    Labs:   CBC, BMP, and blood and urine cultures reviewed today  Lab Results   Component Value Date    WBC 5.51 07/28/2023    HGB 11.0 (L) 07/28/2023    HCT 33.2 (L) 07/28/2023    MCV 87.4 07/28/2023     07/28/2023       Lab Results   Component Value Date    GLUCOSE 103 (H) 07/28/2023    BUN 9 07/28/2023    CREATININE 0.44 (L) 07/28/2023    EGFRIFNONA 85 12/08/2021    EGFRIFAFRI 98 12/08/2021    BCR 20.5 07/28/2023    CO2 26.0 07/28/2023    CALCIUM 8.1 (L) 07/28/2023    PROTENTOTREF 6.2 01/09/2023    ALBUMIN 3.1 (L) 07/25/2023    LABIL2 2.0 01/09/2023    AST 39 (H) 07/25/2023    ALT 38 (H) 07/25/2023     LA 0.8  UA 6-12 WBCs    Microbiology:  7/25 BCx: NGTD  7/25 UCx: 50k mixed hermelinda      ASSESSMENT/PLAN:  Fever in adult  Fall at home  Lung adenocarcinoma w/ history of lobectomy  On immunotherapy  Nausea and vomiting due to #4  Acute cystitis    No further fever other than the one-time fever of 100.8 F on 7/25. Blood cultures are negative. Urine culture with only 50k mixed hermelinda. I told her that I don't think it was a UTI that got her into the hospital. I think her presentation was due to poor intake and subsequent generalized weakness instead. She agreed.     She's getting her dose of cefepime this morning. Assuming she is discharged later today, please send her home on cefpodoxime 200 mg PO BID x 2 more days. Thank you.     Thank you for allowing me to be involved in the care of this patient. Infectious diseases will sign off at this time with antibiotics plan in place, but please call me at [phone] if any further ID questions or new ID concerns.

## 2023-07-28 NOTE — PLAN OF CARE
Problem: Adult Inpatient Plan of Care  Goal: Plan of Care Review  Flowsheets (Taken 7/28/2023 0318)  Progress: improving  Plan of Care Reviewed With: patient  Outcome Evaluation: No acute changes. VSS.  @ bedside during most of shift. AOx4. RA O2. Makes needs known. Bed locked and in lowest position. Side rails up x2. Call light within reach. Will continue to assess.  Goal: Absence of Hospital-Acquired Illness or Injury  Intervention: Identify and Manage Fall Risk  Flowsheets  Taken 7/28/2023 0216  Safety Promotion/Fall Prevention:   activity supervised   assistive device/personal items within reach   clutter free environment maintained   fall prevention program maintained   lighting adjusted   nonskid shoes/slippers when out of bed   room organization consistent   safety round/check completed  Taken 7/28/2023 0022  Safety Promotion/Fall Prevention:   activity supervised   assistive device/personal items within reach   clutter free environment maintained   fall prevention program maintained   lighting adjusted   nonskid shoes/slippers when out of bed   room organization consistent   safety round/check completed  Taken 7/27/2023 2234  Safety Promotion/Fall Prevention:   activity supervised   assistive device/personal items within reach   clutter free environment maintained   fall prevention program maintained   lighting adjusted   nonskid shoes/slippers when out of bed   safety round/check completed   room organization consistent  Taken 7/27/2023 2021  Safety Promotion/Fall Prevention:   activity supervised   assistive device/personal items within reach   clutter free environment maintained   fall prevention program maintained   lighting adjusted   nonskid shoes/slippers when out of bed   room organization consistent   safety round/check completed  Intervention: Prevent Skin Injury  Flowsheets  Taken 7/28/2023 0216  Body Position: position changed independently  Taken 7/28/2023 0022  Body Position: position  changed independently  Skin Protection:   adhesive use limited   incontinence pads utilized   transparent dressing maintained   tubing/devices free from skin contact  Taken 7/27/2023 2234  Body Position: position changed independently  Taken 7/27/2023 2021  Body Position: position changed independently  Intervention: Prevent and Manage VTE (Venous Thromboembolism) Risk  Flowsheets  Taken 7/28/2023 0216  Activity Management: activity minimized  Taken 7/28/2023 0022  Activity Management: activity minimized  Range of Motion:   active ROM (range of motion) encouraged   ROM (range of motion) performed  Taken 7/27/2023 2234  Activity Management: activity minimized  Taken 7/27/2023 2021  Activity Management: activity encouraged  VTE Prevention/Management:   bilateral   sequential compression devices on  Intervention: Prevent Infection  Flowsheets (Taken 7/27/2023 0911 by Torri Campbell, RN)  Infection Prevention:   environmental surveillance performed   hand hygiene promoted   rest/sleep promoted   single patient room provided  Goal: Optimal Comfort and Wellbeing  Intervention: Monitor Pain and Promote Comfort  Flowsheets (Taken 7/25/2023 2215 by Nena Nunez, RN)  Pain Management Interventions:   pillow support provided   position adjusted  Intervention: Provide Person-Centered Care  Flowsheets  Taken 7/28/2023 0022  Trust Relationship/Rapport:   care explained   choices provided   emotional support provided   empathic listening provided   questions answered   questions encouraged   reassurance provided   thoughts/feelings acknowledged  Taken 7/27/2023 2021  Trust Relationship/Rapport:   care explained   emotional support provided   choices provided   empathic listening provided   questions answered   questions encouraged   reassurance provided   thoughts/feelings acknowledged  Goal: Readiness for Transition of Care  Intervention: Mutually Develop Transition Plan  Flowsheets (Taken 7/26/2023 1549 by Pam Pastrana  RN)  Equipment Needed After Discharge: none  Equipment Currently Used at Home: none  Anticipated Changes Related to Illness: none  Transportation Anticipated: family or friend will provide  Concerns to be Addressed: no discharge needs identified  Readmission Within the Last 30 Days: no previous admission in last 30 days  Patient/Family Anticipated Services at Transition: none  Patient/Family Anticipates Transition to: home with family     Problem: Fall Injury Risk  Goal: Absence of Fall and Fall-Related Injury  Intervention: Identify and Manage Contributors  Flowsheets  Taken 7/28/2023 0022 by Thomas House RN  Medication Review/Management: medications reviewed  Taken 7/27/2023 0911 by Torri Campbell RN  Self-Care Promotion: independence encouraged  Intervention: Promote Injury-Free Environment  Flowsheets  Taken 7/28/2023 0216  Safety Promotion/Fall Prevention:   activity supervised   assistive device/personal items within reach   clutter free environment maintained   fall prevention program maintained   lighting adjusted   nonskid shoes/slippers when out of bed   room organization consistent   safety round/check completed  Taken 7/28/2023 0022  Safety Promotion/Fall Prevention:   activity supervised   assistive device/personal items within reach   clutter free environment maintained   fall prevention program maintained   lighting adjusted   nonskid shoes/slippers when out of bed   room organization consistent   safety round/check completed  Taken 7/27/2023 2234  Safety Promotion/Fall Prevention:   activity supervised   assistive device/personal items within reach   clutter free environment maintained   fall prevention program maintained   lighting adjusted   nonskid shoes/slippers when out of bed   safety round/check completed   room organization consistent  Taken 7/27/2023 2021  Safety Promotion/Fall Prevention:   activity supervised   assistive device/personal items within reach   clutter free environment  maintained   fall prevention program maintained   lighting adjusted   nonskid shoes/slippers when out of bed   room organization consistent   safety round/check completed     Problem: Skin Injury Risk Increased  Goal: Skin Health and Integrity  Intervention: Promote and Optimize Oral Intake  Flowsheets (Taken 7/27/2023 2021)  Oral Nutrition Promotion:   medicated   rest periods promoted  Intervention: Optimize Skin Protection  Flowsheets  Taken 7/28/2023 0216  Head of Bed (HOB) Positioning: HOB elevated  Taken 7/28/2023 0022  Pressure Reduction Techniques:   frequent weight shift encouraged   weight shift assistance provided  Head of Bed (HOB) Positioning: HOB elevated  Pressure Reduction Devices: positioning supports utilized  Skin Protection:   adhesive use limited   incontinence pads utilized   transparent dressing maintained   tubing/devices free from skin contact  Taken 7/27/2023 2234  Head of Bed (HOB) Positioning: HOB elevated  Taken 7/27/2023 2021  Head of Bed (HOB) Positioning: HOB elevated     Problem: Malnutrition  Goal: Improved Nutritional Intake  Intervention: Promote and Optimize Oral Intake  Flowsheets (Taken 7/27/2023 2021)  Oral Nutrition Promotion:   medicated   rest periods promoted   Goal Outcome Evaluation:  Plan of Care Reviewed With: patient        Progress: improving  Outcome Evaluation: No acute changes. VSS.  @ bedside during most of shift. AOx4. RA O2. Makes needs known. Bed locked and in lowest position. Side rails up x2. Call light within reach. Will continue to assess.

## 2023-07-28 NOTE — OUTREACH NOTE
Prep Survey      Flowsheet Row Responses   Alevism facility patient discharged from? [facility]   Is LACE score < 7 ? No   Eligibility [facility]   Date of Admission 07/25/23   Date of Discharge 07/28/23   Discharge Disposition Home or Self Care   Discharge diagnosis cystitis, generalized weakness, Lung CA with mets on radiation   Does the patient have one of the following disease processes/diagnoses(primary or secondary)? Other   Does the patient have Home health ordered? No   Is there a DME ordered? No   Prep survey completed? Yes            Nataliia LINDSEY - Registered Nurse

## 2023-07-28 NOTE — PROGRESS NOTES
CHIEF COMPLAINT:  NSCLC    INTERVAL HISTORY:  Feels pretty good this morning except a little bit of nausea but no vomiting after eating her breakfast.  Weakness has improved some with hydration.  No further fever.  Pain is controlled.    HISTORY OF PRESENT ILLNESS:   This is an 83-year-old lady followed by Dr. Escobar in our practice for non-small cell lung cancer.  She has adenocarcinoma moderately differentiated involving left upper lobe parenchyma and left lower lobe pleura.  She was treated with immunotherapy with Keytruda 12/14/2022 through 5/31/2023.  A PET scan 6/5/2023 showed progression of disease in the left upper lobe, new left supraclavicular lymphadenopathy, new metastases to the left posterior fourth rib.  She initiated dabrafenib and trametinib (BRAF mutation) which was complicated by nausea vomiting decreased appetite and elevated liver function test, fatigue and sleeping most of the day.  She was seen in the office 7/21/2023 at which time she was instructed to hold the BRAF inhibitor.  She has also been undergoing palliative radiation to the left chest wall and lung for pain control.     The patient was brought to the ER on 7/25/2023 with progressive weakness and a fall at home.  CT head showed no acute changes and chest x-ray showed prior lung cancer opacities no acute infiltrates.  Labs on admission normal white blood cell count 5.8, hemoglobin 12.6, platelets 176, glucose 111, BUN 19, creatinine 0.7, protein 5.1/albumin 3.1, ALT 38/AST 39, magnesium 1.6.  Lactic acid 0.8.     The patient had fever overnight and has been started on antibiotic therapy pending cultures etc. she is feeling better this morning after fluid and antibiotics.  She reports dysuria and urinary frequency and urgency prior to admission.  Her pain is presently controlled.  She reports resolved nausea/vomiting today.      Past Medical History, Past Surgical History, Social History, Family History have been reviewed and are  without significant changes except as mentioned.    Review of Systems   A comprehensive 14 point review of systems was performed and was negative except as mentioned.    Medications:  The current medication list was reviewed in the EMR    ALLERGIES:    Allergies   Allergen Reactions    Sulfa Antibiotics Rash       Objective      Vitals:    07/28/23 0755   BP: 144/64   Pulse: 72   Resp: 16   Temp: 98.1 °F (36.7 °C)   SpO2: 95%          Physical Exam    CONSTITUTIONAL: pleasant well-developed adult woman nad  HEENT: no icterus, no thrush, moist membranes  LYMPH: no cervical or supraclavicular lad  CV: RRR, S1S2, no murmur  RESP: cta bilat, no wheezing, no rales  GI: soft, non-tender, no splenomegaly, +bs  MUSC: no edema   NEURO: alert and oriented x3, mild global weakness  PSYCH: normal mood and affect  Exam is unchanged-7/28/2023  RECENT LABS:  Hematology Results from last 7 days   Lab Units 07/28/23  0701 07/27/23  0705 07/26/23  0622   WBC 10*3/mm3 5.51 3.81 5.42   HEMOGLOBIN g/dL 11.0* 10.1* 12.1   HEMATOCRIT % 33.2* 30.0* 36.6   PLATELETS 10*3/mm3 173 140 150       2  Lab Results   Component Value Date    GLUCOSE 103 (H) 07/28/2023    BUN 9 07/28/2023    CREATININE 0.44 (L) 07/28/2023    EGFRIFNONA 85 12/08/2021    EGFRIFAFRI 98 12/08/2021    BCR 20.5 07/28/2023    CO2 26.0 07/28/2023    CALCIUM 8.1 (L) 07/28/2023    PROTENTOTREF 6.2 01/09/2023    ALBUMIN 3.1 (L) 07/25/2023    LABIL2 2.0 01/09/2023    AST 39 (H) 07/25/2023    ALT 38 (H) 07/25/2023       No results found for: IRON, TIBC, FERRITIN    No results found for: [id number]    No results found for: FOLATE       Assessment & Plan   *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, stage IV  Previously treated with immunotherapy December 2022 through May 2023  PET scan 6/5/2023 showed progression of disease in the left lung, new metastatic left supraclavicular lymphadenopathy, left posterior fourth rib metastasis, new disease medial left  lower lobe pleural/extrapleural  Undergoing palliative radiation to left chest wall  Initiated BRAF inhibitordabrafenib and trametinib 7/17/2023 subsequently discontinued 7/21/2023 secondary to poor appetite nausea vomiting fatigue elevated LFTs     *Admitted with progressive weakness/falls  Some improvement with IV fluids/rehydration     *nausea/vomiting  Recent MRI showed no brain metastases  ?  Medication side effect from BRAF inhibitor but has been off since 7/21  TSH 2.16, cortisol 20     *Fever (one time)  Negative blood culture, nl lactic acid  Urine cx mixed hermelinda     *Pain of malignancy  Home regimen is MS Contin 15 mg twice daily, oxycodone 20 mg every 3 hours as needed     *poor appetite  Started trial of Remeron 15 mg nightly    Oncology plan/recommendations:  Continue to hold BRAF inhibitor at discharge  Continue radiation therapy  Continue pain control/bowel regimen  Antibiotics per ID  Try Remeron 15mg QHS for appetite  Follow-up with Dr. Escobar after d/c (scheduled 8/4); no opposition to discharge from an oncology standpoint                  7/28/2023      CC:

## 2023-07-30 LAB — BACTERIA SPEC AEROBE CULT: NORMAL

## 2023-07-30 NOTE — DISCHARGE SUMMARY
Patient Name: Darlene Pfeiffer  : 1940  MRN: [id number]    Date of Admission: 2023  Date of Discharge:  2023  Primary Care Physician: Kehrer, Meredith Lea, MD      Chief Complaint:   Weakness - Generalized and Fall      Discharge Diagnoses     Active Hospital Problems    Diagnosis  POA    **Fever [R50.9]  Unknown    Cystitis [N30.90]  Unknown    Weakness [R53.1]  Yes    Hypokalemia [E87.6]  Yes    Lung cancer [C34.90]  Yes    Chronic obstructive pulmonary disease [J44.9]  Yes    Hypertension [I10]  Yes    Hyperlipidemia [E78.5]  Yes      Resolved Hospital Problems   No resolved problems to display.        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     2.  Fever, blood cultures did not reveal any growth and no clear source and urine cultures  revealed mixed hermelinda. Vancomycin has been discontinued and   Patient was empirically treated with cefepime 2 g IV q.12 hours during this hospital stay and Infectious Disease cleared to be discharged home on cefpodoxime 200 mg p.o. b.I.d. for 2 more days.  Patient appears much better clinically and is afebrile and hemodynamically stable.     3.  Generalized weakness with fall,   PT/OT consult has been obtained during this hospital stay.     4.  Acute cystitis, As mentioned above.     5.  Hypertension, continue with amlodipine and metoprolol .  Advised patient to keep a log of blood pressure at home.     6.  Nausea/vomiting was felt to be secondary to immunotherapy which has been discontinued and now patient's p.o. intake is improving.  Oncology did  evaluate and will follow as an outpatient basis.      Copied text on this note has been reviewed by me on 07/28/2023    Day of Discharge          Body mass index is 16.56 kg/m².  Physical Exam  HEENT: PERRLA, extraocular movements intact, Scleras no icterus  Neck: Supple, no JVD  Cardiovascular: Regular rate and rhythm with normal S1 and S2  Respiratory: Fairly clear to auscultation anteriorly with no wheezes  GI: Soft, nontender, bowel sounds are present  Extremities: No edema, palpable pedal pulses      Consultants     Consult Orders (all) (From admission, onward)       Start     Ordered    07/25/23 2234  Inpatient Infectious Diseases Consult  Once        Specialty:  Infectious Diseases  Provider:  Noreen Miranda MD    07/25/23 2233 07/25/23 2023  Inpatient Case Management  Consult  Once        Provider:  (Not yet assigned)    07/25/23 2022 07/25/23 2023  Inpatient Nutrition Consult  Once        Provider:  (Not yet assigned)    07/25/23 2022 07/25/23 2001  Inpatient Urology Consult  Once,   Status:  Canceled        Specialty:  Urology  Provider:  Nicolas Gonzalez MD    07/25/23 2002 07/25/23 1743  Inpatient Hematology & Oncology Consult  Once        Specialty:  Hematology and Oncology  Provider:  Henry Escobar MD    07/25/23 1743                  Procedures     * Surgery not found *    Imaging Results (All)       Procedure Component Value Units Date/Time    XR Chest 1 View [[id number]] Collected: 07/25/23 1444     Updated: 07/26/23 2017    Narrative:      X-RAY CHEST ONE VIEW     HISTORY: 83-year-old female with weakness and dizziness. Recurrent lung  cancer. Radiation to right lower lobe and left upper lobe, synchronous  lung cancers.     FINDINGS: There is no significant change in the appearance of the chest  since previous radiograph 12/12/2022. There is no airspace consolidation  or effusion on the right. On the left, there is opacification of the  left upper lobe and  mediastinal shift to the left obscuring the left  lower lobe. There is no CHF. Right MediPort catheter tip is within the  SVC.     This report was finalized on 7/26/2023 8:14 PM by Dr. Noreen Addison M.D.       CT Head Without Contrast [[id number]] Collected: 07/25/23 1604     Updated: 07/25/23 1612    Narrative:      CT HEAD WO CONTRAST-     INDICATIONS: Head injury, lung cancer, cerebral aneurysm (left posterior  communicating artery)     TECHNIQUE: Radiation dose reduction techniques were utilized, including  automated exposure control and exposure modulation based on body size.  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     No acute intracranial hemorrhage, midline shift or mass effect. No acute  territorial infarct is identified.     Moderate periventricular hypodensities suggest chronic small vessel  ischemic change in a patient this age.     Arterial calcifications are seen at the base of the brain.     Ventricles, cisterns, cerebral sulci are unremarkable for patient age.     The visualized paranasal sinuses, orbits, mastoid air cells are  unremarkable.        Note: Possibility of metastatic disease in the brain cannot be excluded  on the basis of an unenhanced exam. If there is clinical concern for  metastatic disease, further evaluation with enhanced imaging would be  recommended.          Impression:         No acute intracranial hemorrhage or hydrocephalus. If there is further  clinical concern, MRI could be considered for further evaluation.     This report was finalized on 7/25/2023 4:09 PM by Dr. Ebenezer Barton M.D.                 Pertinent Labs     Results from last 7 days   Lab Units 07/28/23  0701 07/27/23  0705 07/26/23  0622 07/25/23  1455   WBC 10*3/mm3 5.51 3.81 5.42 5.84   HEMOGLOBIN g/dL 11.0* 10.1* 12.1 12.6   PLATELETS 10*3/mm3 173 140 150 176     Results from  last 7 days   Lab Units 07/28/23  0701 07/27/23  0705 07/26/23  0622 07/25/23  1455   SODIUM mmol/L 134* 135* 134* 137   POTASSIUM mmol/L 3.7 3.8 3.1* 3.5   CHLORIDE mmol/L 102 105 96* 95*   CO2 mmol/L 26.0 25.0 27.3 31.9*   BUN mg/dL 9 15 16 19   CREATININE mg/dL 0.44* 0.41* 0.49* 0.73   GLUCOSE mg/dL 103* 103* 87 111*   EGFR mL/min/1.73 97.3 99.0 94.8 82.7     Results from last 7 days   Lab Units 07/25/23  1455   ALBUMIN g/dL 3.1*   BILIRUBIN mg/dL 0.7   ALK PHOS U/L 68   AST (SGOT) U/L 39*   ALT (SGPT) U/L 38*     Results from last 7 days   Lab Units 07/28/23  0701 07/27/23  0705 07/26/23  0622 07/25/23  1455   CALCIUM mg/dL 8.1* 7.6* 7.6* 8.6   ALBUMIN g/dL  --   --   --  3.1*   MAGNESIUM mg/dL  --   --   --  1.6       Results from last 7 days   Lab Units 07/25/23  1646 07/25/23  1455   HSTROP T ng/L 89* 89*           Invalid input(s): LDLCALC  Results from last 7 days   Lab Units 07/25/23  2351 07/25/23  2325 07/25/23  1639   BLOODCX  No growth at 4 days No growth at 4 days  --    URINECX   --   --  50,000 CFU/mL Normal Urogenital Connie         Test Results Pending at Discharge     Pending Labs       Order Current Status    Blood Culture - Blood, Arm, Left Preliminary result    Blood Culture - Blood, Arm, Right Preliminary result            Discharge Details        Discharge Medications        New Medications        Instructions Start Date   cefpodoxime 200 MG tablet  Commonly known as: VANTIN   200 mg, Oral, Every 12 Hours             Changes to Medications        Instructions Start Date   lidocaine-prilocaine 2.5-2.5 % cream  Commonly known as: EMLA  What changed:   how much to take  when to take this  reasons to take this   Apply nickel size amount to port site 30 min before appt time do not rub in cover with plastic wrap             Continue These Medications        Instructions Start Date   ADVIL PO   400 mg, Oral, Daily PRN, HOLD PER MD RESTREPO      amLODIPine 5 MG tablet  Commonly known as: NORVASC   5 mg,  Oral, Daily      Anoro Ellipta 62.5-25 MCG/ACT aerosol powder  inhaler  Generic drug: Umeclidinium-Vilanterol   1 puff, Inhalation, Daily PRN      cetirizine 10 MG tablet  Commonly known as: zyrTEC   10 mg, Oral, Daily      chlorhexidine 0.12 % solution  Commonly known as: PERIDEX   15 mL, Mouth/Throat, 2 Times Daily      cholecalciferol 25 MCG (1000 UT) tablet  Commonly known as: VITAMIN D3   1,000 Units, Oral, Daily, HOLDING FOR DOS      dabrafenib 75 MG chemo capsule  Commonly known as: TAFINLAR   150 mg, Oral, 2 Times Daily, Take on an empty stomach, 1 hour before or 2 hours after a meal.      Diclofenac Sodium 1 % gel gel  Commonly known as: VOLTAREN   4 g, Topical, 4 Times Daily PRN      docusate sodium 50 MG capsule  Commonly known as: COLACE   Oral, 2 Times Daily      fluticasone 50 MCG/ACT nasal spray  Commonly known as: FLONASE   2 sprays, Nasal, As Needed      hydroCHLOROthiazide 25 MG tablet  Commonly known as: HYDRODIURIL   TAKE 1 TABLET EVERY DAY      metoprolol succinate XL 25 MG 24 hr tablet  Commonly known as: TOPROL-XL   TAKE 1 TABLET EVERY DAY      Morphine 15 MG 12 hr tablet  Commonly known as: MS CONTIN   15 mg, Oral, 2 Times Daily      olopatadine 0.1 % ophthalmic solution  Commonly known as: PATANOL   1 drop, 2 Times Daily      ondansetron 8 MG tablet  Commonly known as: ZOFRAN   8 mg, Oral, 3 Times Daily PRN      oxyCODONE 20 MG tablet  Commonly known as: ROXICODONE   20 mg, Oral, Every 3 Hours PRN      potassium chloride 10 MEQ CR tablet  Commonly known as: K-DUR,KLOR-CON   20 mEq, Oral, Daily      predniSONE 10 MG tablet  Commonly known as: DELTASONE   10 mg, Oral, Daily      promethazine 25 MG suppository  Commonly known as: PHENERGAN   25 mg, Rectal, Every 6 Hours PRN      VITAMIN B12 PO   Oral               Allergies   Allergen Reactions    Sulfa Antibiotics Rash       Discharge Disposition:  Home or Self Care      Discharge Diet:  No active diet order      Discharge Activity:   Activity  Instructions       Activity as Tolerated              CODE STATUS:    Code Status and Medical Interventions:   Ordered at: 07/25/23 1743     Code Status (Patient has no pulse and is not breathing):    CPR (Attempt to Resuscitate)     Medical Interventions (Patient has pulse or is breathing):    Full Support     Release to patient:    Routine Release       Future Appointments   Date Time Provider Department Center   7/31/2023 11:45 AM Nguyễn Bran MD [facility] None   8/4/2023  8:15 AM [facility] PORT CHAIR  [facility]   8/4/2023  8:40 AM Henry Escobar MD [facility]   8/4/2023  9:00 AM CHAIR 05 [facility] - LG  [facility]   8/8/2023  2:00 PM Kehrer, Meredith Lea, MD [facility]   1/15/2024  8:00 AM Kehrer, Meredith Lea, MD [facility]     Additional Instructions for the Follow-ups that You Need to Schedule       Discharge Follow-up with PCP   As directed       Currently Documented PCP:    Kehrer, Meredith Lea, MD    PCP Phone Number:    [phone]     Follow Up Details: 1 week               Follow-up Information       Kehrer, Meredith Lea, MD .    Specialty: Family Medicine  Why: 1 week  Contact information:  [address] [address]  [phone]                             Additional Instructions for the Follow-ups that You Need to Schedule       Discharge Follow-up with PCP   As directed       Currently Documented PCP:    Kehrer, Meredith Lea, MD    PCP Phone Number:    [phone]     Follow Up Details: 1 week            Time Spent on Discharge:  Greater than 30 minutes      Arcadio Godinez MD  [facility]  07/30/23  18:21 EDT

## 2023-07-31 ENCOUNTER — RADIATION ONCOLOGY WEEKLY ASSESSMENT (OUTPATIENT)
Dept: RADIATION ONCOLOGY | Facility: HOSPITAL | Age: 83
End: 2023-07-31
Payer: MEDICARE

## 2023-07-31 ENCOUNTER — TREATMENT (OUTPATIENT)
Dept: RADIATION ONCOLOGY | Facility: HOSPITAL | Age: 83
End: 2023-07-31

## 2023-07-31 ENCOUNTER — TRANSITIONAL CARE MANAGEMENT TELEPHONE ENCOUNTER (OUTPATIENT)
Dept: CALL CENTER | Facility: HOSPITAL | Age: 83
End: 2023-07-31
Payer: MEDICARE

## 2023-07-31 VITALS
HEART RATE: 78 BPM | DIASTOLIC BLOOD PRESSURE: 58 MMHG | OXYGEN SATURATION: 96 % | BODY MASS INDEX: 17.12 KG/M2 | WEIGHT: 96.6 LBS | SYSTOLIC BLOOD PRESSURE: 104 MMHG

## 2023-07-31 DIAGNOSIS — C34.12 MALIGNANT NEOPLASM OF UPPER LOBE OF LEFT LUNG: Primary | ICD-10-CM

## 2023-07-31 LAB — BACTERIA SPEC AEROBE CULT: NORMAL

## 2023-07-31 PROCEDURE — 77014 CHG CT GUIDANCE RADIATION THERAPY FLDS PLACEMENT: CPT | Performed by: STUDENT IN AN ORGANIZED HEALTH CARE EDUCATION/TRAINING PROGRAM

## 2023-07-31 PROCEDURE — 77386: CPT | Performed by: STUDENT IN AN ORGANIZED HEALTH CARE EDUCATION/TRAINING PROGRAM

## 2023-07-31 NOTE — CASE MANAGEMENT/SOCIAL WORK
Case Management Discharge Note      Final Note: home no needs         Selected Continued Care - Discharged on 7/28/2023 Admission date: 7/25/2023 - Discharge disposition: Home or Self Care      Destination    No services have been selected for the patient.                Durable Medical Equipment    No services have been selected for the patient.                Dialysis/Infusion    No services have been selected for the patient.                Home Medical Care    No services have been selected for the patient.                Therapy    No services have been selected for the patient.                Community Resources    No services have been selected for the patient.                Community & DME    No services have been selected for the patient.                    Selected Continued Care - Episodes Includes continued care and service providers with selected services from the active episodes listed below      Oncology Episode start date: 6/13/2023   There are no active outsourced providers for this episode.                 Transportation Services  Private: Car    Final Discharge Disposition Code: 01 - home or self-care

## 2023-08-01 LAB
RAD ONC ARIA COURSE ID: NORMAL
RAD ONC ARIA COURSE INTENT: NORMAL
RAD ONC ARIA COURSE LAST TREATMENT DATE: NORMAL
RAD ONC ARIA COURSE START DATE: NORMAL
RAD ONC ARIA COURSE TREATMENT ELAPSED DAYS: 14
RAD ONC ARIA FIRST TREATMENT DATE: NORMAL
RAD ONC ARIA PLAN FRACTIONS TREATED TO DATE: 10
RAD ONC ARIA PLAN ID: NORMAL
RAD ONC ARIA PLAN PRESCRIBED DOSE PER FRACTION: 2.5 GY
RAD ONC ARIA PLAN PRIMARY REFERENCE POINT: NORMAL
RAD ONC ARIA PLAN TOTAL FRACTIONS PRESCRIBED: 10
RAD ONC ARIA PLAN TOTAL PRESCRIBED DOSE: 2500 CGY
RAD ONC ARIA REFERENCE POINT DOSAGE GIVEN TO DATE: 25 GY
RAD ONC ARIA REFERENCE POINT ID: NORMAL
RAD ONC ARIA REFERENCE POINT SESSION DOSAGE GIVEN: 2.5 GY

## 2023-08-03 RX ORDER — MONTELUKAST SODIUM 10 MG/1
TABLET ORAL
Qty: 90 TABLET | Refills: 1 | Status: SHIPPED | OUTPATIENT
Start: 2023-08-03

## 2023-08-03 RX ORDER — ATORVASTATIN CALCIUM 20 MG/1
TABLET, FILM COATED ORAL
Qty: 90 TABLET | Refills: 1 | Status: SHIPPED | OUTPATIENT
Start: 2023-08-03

## 2023-08-04 ENCOUNTER — SPECIALTY PHARMACY (OUTPATIENT)
Dept: PHARMACY | Facility: HOSPITAL | Age: 83
End: 2023-08-04
Payer: MEDICARE

## 2023-08-04 ENCOUNTER — INFUSION (OUTPATIENT)
Dept: ONCOLOGY | Facility: HOSPITAL | Age: 83
End: 2023-08-04
Payer: MEDICARE

## 2023-08-04 ENCOUNTER — OFFICE VISIT (OUTPATIENT)
Dept: ONCOLOGY | Facility: CLINIC | Age: 83
End: 2023-08-04
Payer: MEDICARE

## 2023-08-04 VITALS
BODY MASS INDEX: 16.55 KG/M2 | RESPIRATION RATE: 16 BRPM | TEMPERATURE: 97.3 F | HEART RATE: 86 BPM | HEIGHT: 63 IN | SYSTOLIC BLOOD PRESSURE: 108 MMHG | OXYGEN SATURATION: 95 % | DIASTOLIC BLOOD PRESSURE: 68 MMHG | WEIGHT: 93.4 LBS

## 2023-08-04 DIAGNOSIS — Z85.118 HISTORY OF LUNG CANCER: Primary | ICD-10-CM

## 2023-08-04 DIAGNOSIS — C34.12 MALIGNANT NEOPLASM OF UPPER LOBE OF LEFT LUNG: ICD-10-CM

## 2023-08-04 DIAGNOSIS — Z85.118 HISTORY OF LUNG CANCER: ICD-10-CM

## 2023-08-04 DIAGNOSIS — C34.11 MALIGNANT NEOPLASM OF UPPER LOBE OF RIGHT LUNG: ICD-10-CM

## 2023-08-04 DIAGNOSIS — Z45.2 FITTING AND ADJUSTMENT OF VASCULAR CATHETER: Primary | ICD-10-CM

## 2023-08-04 LAB
ALBUMIN SERPL-MCNC: 3.3 G/DL (ref 3.5–5.2)
ALBUMIN/GLOB SERPL: 1.9 G/DL
ALP SERPL-CCNC: 49 U/L (ref 39–117)
ALT SERPL W P-5'-P-CCNC: 18 U/L (ref 1–33)
ANION GAP SERPL CALCULATED.3IONS-SCNC: 12.8 MMOL/L (ref 5–15)
AST SERPL-CCNC: 23 U/L (ref 1–32)
BASOPHILS # BLD AUTO: 0.01 10*3/MM3 (ref 0–0.2)
BASOPHILS NFR BLD AUTO: 0.2 % (ref 0–1.5)
BILIRUB SERPL-MCNC: 0.5 MG/DL (ref 0–1.2)
BUN SERPL-MCNC: 18 MG/DL (ref 8–23)
BUN/CREAT SERPL: 31.6 (ref 7–25)
CALCIUM SPEC-SCNC: 8.6 MG/DL (ref 8.6–10.5)
CHLORIDE SERPL-SCNC: 97 MMOL/L (ref 98–107)
CO2 SERPL-SCNC: 28.2 MMOL/L (ref 22–29)
CREAT SERPL-MCNC: 0.57 MG/DL (ref 0.6–1.1)
DEPRECATED RDW RBC AUTO: 45.2 FL (ref 37–54)
EGFRCR SERPLBLD CKD-EPI 2021: 91.4 ML/MIN/1.73
EOSINOPHIL # BLD AUTO: 0 10*3/MM3 (ref 0–0.4)
EOSINOPHIL NFR BLD AUTO: 0 % (ref 0.3–6.2)
ERYTHROCYTE [DISTWIDTH] IN BLOOD BY AUTOMATED COUNT: 13.9 % (ref 12.3–15.4)
GLOBULIN UR ELPH-MCNC: 1.7 GM/DL
GLUCOSE SERPL-MCNC: 118 MG/DL (ref 65–99)
HCT VFR BLD AUTO: 36.4 % (ref 34–46.6)
HGB BLD-MCNC: 11.7 G/DL (ref 12–15.9)
IMM GRANULOCYTES # BLD AUTO: 0.01 10*3/MM3 (ref 0–0.05)
IMM GRANULOCYTES NFR BLD AUTO: 0.2 % (ref 0–0.5)
LYMPHOCYTES # BLD AUTO: 0.85 10*3/MM3 (ref 0.7–3.1)
LYMPHOCYTES NFR BLD AUTO: 14.9 % (ref 19.6–45.3)
MCH RBC QN AUTO: 29.3 PG (ref 26.6–33)
MCHC RBC AUTO-ENTMCNC: 32.1 G/DL (ref 31.5–35.7)
MCV RBC AUTO: 91 FL (ref 79–97)
MONOCYTES # BLD AUTO: 0.5 10*3/MM3 (ref 0.1–0.9)
MONOCYTES NFR BLD AUTO: 8.8 % (ref 5–12)
NEUTROPHILS NFR BLD AUTO: 4.34 10*3/MM3 (ref 1.7–7)
NEUTROPHILS NFR BLD AUTO: 75.9 % (ref 42.7–76)
NRBC BLD AUTO-RTO: 0 /100 WBC (ref 0–0.2)
PLATELET # BLD AUTO: 177 10*3/MM3 (ref 140–450)
PMV BLD AUTO: 8.8 FL (ref 6–12)
POTASSIUM SERPL-SCNC: 3.4 MMOL/L (ref 3.5–5.2)
PROT SERPL-MCNC: 5 G/DL (ref 6–8.5)
RBC # BLD AUTO: 4 10*6/MM3 (ref 3.77–5.28)
SODIUM SERPL-SCNC: 138 MMOL/L (ref 136–145)
WBC NRBC COR # BLD: 5.71 10*3/MM3 (ref 3.4–10.8)

## 2023-08-04 PROCEDURE — 80053 COMPREHEN METABOLIC PANEL: CPT

## 2023-08-04 PROCEDURE — 85025 COMPLETE CBC W/AUTO DIFF WBC: CPT

## 2023-08-04 PROCEDURE — 36591 DRAW BLOOD OFF VENOUS DEVICE: CPT

## 2023-08-04 PROCEDURE — 25010000002 HEPARIN LOCK FLUSH PER 10 UNITS: Performed by: INTERNAL MEDICINE

## 2023-08-04 RX ORDER — LORAZEPAM 1 MG/1
1 TABLET ORAL EVERY 8 HOURS PRN
Qty: 10 TABLET | Refills: 0 | Status: SHIPPED | OUTPATIENT
Start: 2023-08-04 | End: 2023-08-04 | Stop reason: SDUPTHER

## 2023-08-04 RX ORDER — SODIUM CHLORIDE 0.9 % (FLUSH) 0.9 %
10 SYRINGE (ML) INJECTION AS NEEDED
OUTPATIENT
Start: 2023-08-04

## 2023-08-04 RX ORDER — MIRTAZAPINE 7.5 MG/1
7.5 TABLET, FILM COATED ORAL NIGHTLY
Qty: 30 TABLET | Refills: 1 | Status: SHIPPED | OUTPATIENT
Start: 2023-08-04

## 2023-08-04 RX ORDER — HEPARIN SODIUM (PORCINE) LOCK FLUSH IV SOLN 100 UNIT/ML 100 UNIT/ML
500 SOLUTION INTRAVENOUS AS NEEDED
OUTPATIENT
Start: 2023-08-04

## 2023-08-04 RX ORDER — LORAZEPAM 1 MG/1
1 TABLET ORAL EVERY 8 HOURS PRN
Qty: 10 TABLET | Refills: 0 | Status: SHIPPED | OUTPATIENT
Start: 2023-08-04

## 2023-08-04 RX ORDER — HEPARIN SODIUM (PORCINE) LOCK FLUSH IV SOLN 100 UNIT/ML 100 UNIT/ML
500 SOLUTION INTRAVENOUS AS NEEDED
Status: DISCONTINUED | OUTPATIENT
Start: 2023-08-04 | End: 2023-08-04 | Stop reason: HOSPADM

## 2023-08-04 RX ORDER — SODIUM CHLORIDE 0.9 % (FLUSH) 0.9 %
10 SYRINGE (ML) INJECTION AS NEEDED
Status: DISCONTINUED | OUTPATIENT
Start: 2023-08-04 | End: 2023-08-04 | Stop reason: HOSPADM

## 2023-08-04 RX ADMIN — Medication 10 ML: at 09:46

## 2023-08-04 RX ADMIN — Medication 500 UNITS: at 09:46

## 2023-08-08 ENCOUNTER — OFFICE VISIT (OUTPATIENT)
Dept: FAMILY MEDICINE CLINIC | Facility: CLINIC | Age: 83
End: 2023-08-08
Payer: MEDICARE

## 2023-08-08 VITALS
OXYGEN SATURATION: 98 % | DIASTOLIC BLOOD PRESSURE: 52 MMHG | TEMPERATURE: 97 F | WEIGHT: 89.2 LBS | SYSTOLIC BLOOD PRESSURE: 102 MMHG | HEIGHT: 63 IN | HEART RATE: 98 BPM | BODY MASS INDEX: 15.8 KG/M2

## 2023-08-08 DIAGNOSIS — R30.0 DYSURIA: ICD-10-CM

## 2023-08-08 DIAGNOSIS — C34.12 MALIGNANT NEOPLASM OF UPPER LOBE OF LEFT LUNG: ICD-10-CM

## 2023-08-08 DIAGNOSIS — I10 ESSENTIAL HYPERTENSION: ICD-10-CM

## 2023-08-08 DIAGNOSIS — Z09 HOSPITAL DISCHARGE FOLLOW-UP: Primary | ICD-10-CM

## 2023-08-08 DIAGNOSIS — E46 PROTEIN-CALORIE MALNUTRITION, UNSPECIFIED SEVERITY: ICD-10-CM

## 2023-08-08 LAB
BILIRUB BLD-MCNC: NEGATIVE MG/DL
CLARITY, POC: CLEAR
COLOR UR: YELLOW
EXPIRATION DATE: ABNORMAL
GLUCOSE UR STRIP-MCNC: NEGATIVE MG/DL
KETONES UR QL: ABNORMAL
LEUKOCYTE EST, POC: ABNORMAL
Lab: ABNORMAL
NITRITE UR-MCNC: NEGATIVE MG/ML
PH UR: 6 [PH] (ref 5–8)
PROT UR STRIP-MCNC: ABNORMAL MG/DL
RBC # UR STRIP: ABNORMAL /UL
SP GR UR: 1.01 (ref 1–1.03)
UROBILINOGEN UR QL: NORMAL

## 2023-08-08 NOTE — PROGRESS NOTES
Transitional Care Follow Up Visit  Subjective     Darlene Pfeiffer is a 83 y.o. female who presents for a transitional care management visit.    Within 48 business hours after discharge our office contacted her via telephone to coordinate her care and needs.      I reviewed and discussed the details of that call along with the discharge summary, hospital problems, inpatient lab results, inpatient diagnostic studies, and consultation reports with Darlene.     Current outpatient and discharge medications have been reconciled for the patient.  Reviewed by: Meredith Lea Kehrer, MD  Discharge Summary by Arcadio Godinez MD (07/28/2023 11:59)         7/28/2023     7:49 PM   Date of TCM Phone Call   [facility]   Date of Admission 7/25/2023   Date of Discharge 7/28/2023   Discharge Disposition Home or Self Care     Risk for Readmission (LACE) Score: 8 (7/28/2023  6:00 AM)      History of Present Illness   Course During Hospital Stay:  Fell at home on 7/25/2023.  Head fever and was resumed UTI with sepsis.  She was in the hospital for few days and was sent home on antibiotics.  She has since followed up with her oncologist and she is here to see me today.  She is compliant with her medications as listed and she is here with her son today.  She was started on mirtazapine to see if it would help her appetite but she continues to lose weight.  She has only been on a few days.  She seems very down at times because she is bored and wants to keep busy.  She denies any SI or HI.     The following portions of the patient's history were reviewed and updated as appropriate: allergies, current medications, past family history, past medical history, past social history, past surgical history, and problem list.    Review of Systems    Objective   Physical Exam  Constitutional:       General: She is not in acute distress.     Appearance: Normal appearance. She is well-developed.      Comments: Very thin   HENT:      Head:  Normocephalic and atraumatic.      Right Ear: Tympanic membrane, ear canal and external ear normal.      Left Ear: Tympanic membrane, ear canal and external ear normal.      Mouth/Throat:      Mouth: Mucous membranes are moist.      Pharynx: Oropharynx is clear.   Eyes:      Conjunctiva/sclera: Conjunctivae normal.      Pupils: Pupils are equal, round, and reactive to light.   Neck:      Thyroid: No thyromegaly.   Cardiovascular:      Rate and Rhythm: Normal rate and regular rhythm.      Heart sounds: No murmur heard.  Pulmonary:      Effort: Pulmonary effort is normal.      Breath sounds: Decreased breath sounds present. No wheezing.   Abdominal:      General: Bowel sounds are normal.      Palpations: Abdomen is soft.      Tenderness: There is no abdominal tenderness.   Musculoskeletal:         General: Normal range of motion.      Cervical back: Neck supple.   Lymphadenopathy:      Cervical: No cervical adenopathy.   Skin:     General: Skin is warm and dry.   Neurological:      Mental Status: She is alert and oriented to person, place, and time.   Psychiatric:         Mood and Affect: Mood normal.         Behavior: Behavior normal.       Assessment & Plan   Diagnoses and all orders for this visit:    1. Hospital discharge follow-up (Primary)    2. Malignant neoplasm of upper lobe of left lung    3. Dysuria  -     POC Urinalysis Dipstick, Automated  -     Urine Culture - Urine, Urine, Clean Catch; Future  -     Urine Culture - Urine, Urine, Clean Catch    4. Protein-calorie malnutrition, unspecified severity    5. Essential hypertension      Discussed with patient and her son at length that she needs to try to up her calories to help fight this cancer and slow its growth.  We will stop her amlodipine for now and the next thing we would stop as her HCT if her blood pressure stays low.

## 2023-08-10 ENCOUNTER — SPECIALTY PHARMACY (OUTPATIENT)
Dept: PHARMACY | Facility: HOSPITAL | Age: 83
End: 2023-08-10
Payer: MEDICARE

## 2023-08-10 LAB
BACTERIA UR CULT: NO GROWTH
BACTERIA UR CULT: NORMAL

## 2023-08-15 ENCOUNTER — TELEPHONE (OUTPATIENT)
Dept: ONCOLOGY | Facility: CLINIC | Age: 83
End: 2023-08-15

## 2023-08-15 ENCOUNTER — TELEPHONE (OUTPATIENT)
Dept: ONCOLOGY | Facility: CLINIC | Age: 83
End: 2023-08-15
Payer: MEDICARE

## 2023-08-15 ENCOUNTER — TELEPHONE (OUTPATIENT)
Dept: FAMILY MEDICINE CLINIC | Facility: CLINIC | Age: 83
End: 2023-08-15

## 2023-08-15 DIAGNOSIS — C34.11 MALIGNANT NEOPLASM OF UPPER LOBE OF RIGHT LUNG: Primary | ICD-10-CM

## 2023-08-15 RX ORDER — TEMAZEPAM 7.5 MG/1
7.5 CAPSULE ORAL NIGHTLY PRN
Qty: 30 CAPSULE | Refills: 0 | Status: SHIPPED | OUTPATIENT
Start: 2023-08-15 | End: 2023-08-15 | Stop reason: CLARIF

## 2023-08-15 RX ORDER — ZOLPIDEM TARTRATE 5 MG/1
5 TABLET ORAL NIGHTLY PRN
Qty: 30 TABLET | Refills: 0 | Status: SHIPPED | OUTPATIENT
Start: 2023-08-15 | End: 2023-08-18

## 2023-08-15 NOTE — TELEPHONE ENCOUNTER
Called the patient to let her know that Dr. Escobar wanted to send in restoril 7.5mg nightly. Patient v/u.

## 2023-08-15 NOTE — TELEPHONE ENCOUNTER
Provider: Luz   Caller: patient  Relationship to Patient: self  Call Back Phone Number:[phone]  Reason for Call: Pt said Temazapam was not approved but shows is Epic it is. Could nurse call [facility] about this?      Please Follow if Medication Concern   Name of Medication Temazapam:   Issue with Medication:[facility] needs a call about medication needing approval.

## 2023-08-15 NOTE — TELEPHONE ENCOUNTER
Provider: Luz  Caller: Patient  Relationship to Patient: Self  Call Back Phone Number: [phone]  Reason for Call: Pt having trouble sleeping. Would like a medication to help sleep better.

## 2023-08-15 NOTE — TELEPHONE ENCOUNTER
Call back to Ms. Kentrell to let her know the sleeping medication that was ordered is not in her formulary, therefore, Dr. Escobar is ordering Ambien 5 mg for her to take at night.  Patient verbalized understanding.

## 2023-08-15 NOTE — TELEPHONE ENCOUNTER
PATIENT STATES THAT SHE HASN'T SLEPT IN THE LAST 48 HOURS. SHE WANTS TO KNOW IF SHE CAN GET A MEDICATION TO HELP HER SLEEP. PLEASE ADVISE. SHE STATES SHE LEFT A MESSAGE ON DR. KEHRER'S CELL PHONE ALSO.     [id number] - [place], [place] - [address] - [phone]  - [phone]

## 2023-08-16 ENCOUNTER — TELEPHONE (OUTPATIENT)
Dept: ONCOLOGY | Facility: CLINIC | Age: 83
End: 2023-08-16
Payer: MEDICARE

## 2023-08-16 NOTE — TELEPHONE ENCOUNTER
Name of Medication: Unknown, pt stated she is having trouble getting her sleeping medication authorized through insurance. Pt stated she has been needing to take something to sleep for 3 days now. Please return call to [phone]

## 2023-08-18 ENCOUNTER — TELEPHONE (OUTPATIENT)
Dept: ONCOLOGY | Facility: CLINIC | Age: 83
End: 2023-08-18
Payer: MEDICARE

## 2023-08-18 DIAGNOSIS — G47.00 INSOMNIA, UNSPECIFIED TYPE: Primary | ICD-10-CM

## 2023-08-18 DIAGNOSIS — C34.11 MALIGNANT NEOPLASM OF UPPER LOBE OF RIGHT LUNG: ICD-10-CM

## 2023-08-18 RX ORDER — ZOLPIDEM TARTRATE 5 MG/1
5 TABLET ORAL NIGHTLY PRN
Qty: 30 TABLET | Refills: 0 | Status: SHIPPED | OUTPATIENT
Start: 2023-08-18

## 2023-08-18 NOTE — TELEPHONE ENCOUNTER
Provider: Dr Escobar  Caller: Darlene Pfeiffer   Relationship to Patient: Self   Call Back Phone Number: [phone]  Reason for Call: Pt has fatigue for the last week or so. Wants to know if she should come in to been seen

## 2023-08-18 NOTE — TELEPHONE ENCOUNTER
Pt called stating that she has been feeling very weak lately. She also complains about SOA on exertion. She currently takes Prednisone 10mg daily. D/W Dr. Escobar. Per Dr. Escobar, have pt increase her prednisone to 20mg daily. She should come in next week so we can recheck counts and see about tapering her steroids at that time. Informed pt of this and she v/u. She did mention that she did not receive her Ambien from the pharmacy earlier this week. This will be resent as it appears it did not go through. Message sent to scheduling.

## 2023-08-21 ENCOUNTER — SPECIALTY PHARMACY (OUTPATIENT)
Dept: PHARMACY | Facility: HOSPITAL | Age: 83
End: 2023-08-21
Payer: MEDICARE

## 2023-08-21 ENCOUNTER — DOCUMENTATION (OUTPATIENT)
Dept: PHARMACY | Facility: HOSPITAL | Age: 83
End: 2023-08-21
Payer: MEDICARE

## 2023-08-21 ENCOUNTER — OFFICE VISIT (OUTPATIENT)
Dept: ONCOLOGY | Facility: CLINIC | Age: 83
End: 2023-08-21
Payer: MEDICARE

## 2023-08-21 ENCOUNTER — INFUSION (OUTPATIENT)
Dept: ONCOLOGY | Facility: HOSPITAL | Age: 83
End: 2023-08-21
Payer: MEDICARE

## 2023-08-21 VITALS
WEIGHT: 89.9 LBS | SYSTOLIC BLOOD PRESSURE: 131 MMHG | HEART RATE: 94 BPM | RESPIRATION RATE: 18 BRPM | BODY MASS INDEX: 15.93 KG/M2 | OXYGEN SATURATION: 97 % | DIASTOLIC BLOOD PRESSURE: 69 MMHG | HEIGHT: 63 IN | TEMPERATURE: 97.8 F

## 2023-08-21 DIAGNOSIS — C34.11 MALIGNANT NEOPLASM OF UPPER LOBE OF RIGHT LUNG: Primary | ICD-10-CM

## 2023-08-21 DIAGNOSIS — R06.02 SHORTNESS OF BREATH: ICD-10-CM

## 2023-08-21 DIAGNOSIS — Z79.899 LONG-TERM USE OF HIGH-RISK MEDICATION: ICD-10-CM

## 2023-08-21 DIAGNOSIS — C34.11 MALIGNANT NEOPLASM OF UPPER LOBE OF RIGHT LUNG: ICD-10-CM

## 2023-08-21 DIAGNOSIS — Z45.2 FITTING AND ADJUSTMENT OF VASCULAR CATHETER: Primary | ICD-10-CM

## 2023-08-21 LAB
ANION GAP SERPL CALCULATED.3IONS-SCNC: 12.4 MMOL/L (ref 5–15)
BASOPHILS # BLD AUTO: 0.01 10*3/MM3 (ref 0–0.2)
BASOPHILS NFR BLD AUTO: 0.1 % (ref 0–1.5)
BUN SERPL-MCNC: 20 MG/DL (ref 8–23)
BUN/CREAT SERPL: 33.3 (ref 7–25)
CALCIUM SPEC-SCNC: 8.9 MG/DL (ref 8.6–10.5)
CHLORIDE SERPL-SCNC: 104 MMOL/L (ref 98–107)
CO2 SERPL-SCNC: 24.6 MMOL/L (ref 22–29)
CREAT SERPL-MCNC: 0.6 MG/DL (ref 0.6–1.1)
DEPRECATED RDW RBC AUTO: 55.9 FL (ref 37–54)
EGFRCR SERPLBLD CKD-EPI 2021: 90.3 ML/MIN/1.73
EOSINOPHIL # BLD AUTO: 0 10*3/MM3 (ref 0–0.4)
EOSINOPHIL NFR BLD AUTO: 0 % (ref 0.3–6.2)
ERYTHROCYTE [DISTWIDTH] IN BLOOD BY AUTOMATED COUNT: 16.1 % (ref 12.3–15.4)
GLUCOSE SERPL-MCNC: 185 MG/DL (ref 65–99)
HCT VFR BLD AUTO: 36.9 % (ref 34–46.6)
HGB BLD-MCNC: 11.7 G/DL (ref 12–15.9)
IMM GRANULOCYTES # BLD AUTO: 0.02 10*3/MM3 (ref 0–0.05)
IMM GRANULOCYTES NFR BLD AUTO: 0.3 % (ref 0–0.5)
LYMPHOCYTES # BLD AUTO: 0.54 10*3/MM3 (ref 0.7–3.1)
LYMPHOCYTES NFR BLD AUTO: 8 % (ref 19.6–45.3)
MCH RBC QN AUTO: 29.8 PG (ref 26.6–33)
MCHC RBC AUTO-ENTMCNC: 31.7 G/DL (ref 31.5–35.7)
MCV RBC AUTO: 93.9 FL (ref 79–97)
MONOCYTES # BLD AUTO: 0.13 10*3/MM3 (ref 0.1–0.9)
MONOCYTES NFR BLD AUTO: 1.9 % (ref 5–12)
NEUTROPHILS NFR BLD AUTO: 6.04 10*3/MM3 (ref 1.7–7)
NEUTROPHILS NFR BLD AUTO: 89.7 % (ref 42.7–76)
NRBC BLD AUTO-RTO: 0 /100 WBC (ref 0–0.2)
NT-PROBNP SERPL-MCNC: 2458 PG/ML (ref 0–1800)
PLATELET # BLD AUTO: 229 10*3/MM3 (ref 140–450)
PMV BLD AUTO: 9.7 FL (ref 6–12)
POTASSIUM SERPL-SCNC: 4.5 MMOL/L (ref 3.5–5.2)
RBC # BLD AUTO: 3.93 10*6/MM3 (ref 3.77–5.28)
SODIUM SERPL-SCNC: 141 MMOL/L (ref 136–145)
WBC NRBC COR # BLD: 6.74 10*3/MM3 (ref 3.4–10.8)

## 2023-08-21 PROCEDURE — 36415 COLL VENOUS BLD VENIPUNCTURE: CPT | Performed by: NURSE PRACTITIONER

## 2023-08-21 PROCEDURE — 99214 OFFICE O/P EST MOD 30 MIN: CPT | Performed by: NURSE PRACTITIONER

## 2023-08-21 PROCEDURE — 80048 BASIC METABOLIC PNL TOTAL CA: CPT | Performed by: NURSE PRACTITIONER

## 2023-08-21 PROCEDURE — 3078F DIAST BP <80 MM HG: CPT | Performed by: NURSE PRACTITIONER

## 2023-08-21 PROCEDURE — 1126F AMNT PAIN NOTED NONE PRSNT: CPT | Performed by: NURSE PRACTITIONER

## 2023-08-21 PROCEDURE — 36591 DRAW BLOOD OFF VENOUS DEVICE: CPT

## 2023-08-21 PROCEDURE — 3075F SYST BP GE 130 - 139MM HG: CPT | Performed by: NURSE PRACTITIONER

## 2023-08-21 PROCEDURE — 83880 ASSAY OF NATRIURETIC PEPTIDE: CPT | Performed by: NURSE PRACTITIONER

## 2023-08-21 PROCEDURE — 85025 COMPLETE CBC W/AUTO DIFF WBC: CPT

## 2023-08-21 PROCEDURE — 25010000002 HEPARIN LOCK FLUSH PER 10 UNITS: Performed by: INTERNAL MEDICINE

## 2023-08-21 RX ORDER — HEPARIN SODIUM (PORCINE) LOCK FLUSH IV SOLN 100 UNIT/ML 100 UNIT/ML
500 SOLUTION INTRAVENOUS AS NEEDED
Status: DISCONTINUED | OUTPATIENT
Start: 2023-08-21 | End: 2023-08-21 | Stop reason: HOSPADM

## 2023-08-21 RX ORDER — SODIUM CHLORIDE 0.9 % (FLUSH) 0.9 %
10 SYRINGE (ML) INJECTION AS NEEDED
Status: DISCONTINUED | OUTPATIENT
Start: 2023-08-21 | End: 2023-08-21 | Stop reason: HOSPADM

## 2023-08-21 RX ORDER — SODIUM CHLORIDE 0.9 % (FLUSH) 0.9 %
10 SYRINGE (ML) INJECTION AS NEEDED
OUTPATIENT
Start: 2023-08-21

## 2023-08-21 RX ORDER — HEPARIN SODIUM (PORCINE) LOCK FLUSH IV SOLN 100 UNIT/ML 100 UNIT/ML
500 SOLUTION INTRAVENOUS AS NEEDED
OUTPATIENT
Start: 2023-08-21

## 2023-08-21 RX ORDER — MIRTAZAPINE 15 MG/1
15 TABLET, FILM COATED ORAL NIGHTLY
COMMUNITY

## 2023-08-21 RX ADMIN — Medication 10 ML: at 14:31

## 2023-08-21 RX ADMIN — Medication 500 UNITS: at 14:31

## 2023-08-21 NOTE — PROGRESS NOTES
Drug: Mekinist (trametinib)  Strength: 0.5 mg  Directions: Take 1 tablet by mouth daily  Quantity: 30  Refills: 2  Pharmacy prescription sent to: [facility]  Completed independent double check on medication order/RX.   Ana Trejo RPh, BCOP

## 2023-08-21 NOTE — PROGRESS NOTES
Nena Avila contacted me to call the pharmacy to find out the status of the Tafinlar and Mekinist.  Pt's primary d/c'd the Mekinist and the Tafinlar is ready to schedule.  Mekinist will be re-sent in. I will follow this until pt schedule's delivery.  Pinky Walter  Specialty Pharmacy Technician

## 2023-08-21 NOTE — PROGRESS NOTES
REASON FOR FOLLOW-UP: Recurrent lung cancer.    History of Present Illness    The patient is a 83 y.o. female with the above-mentioned history, who returns the office today in anticipation of pembrolizumab which she continues to receive every 3 weeks.  She is due today for her ninth cycle.  Thankfully, she continues to have excellent tolerance to ongoing immunotherapy.  She denies any new or worsening shortness of breath.  She has no chest pain.  Her oxygen needs are unchanged.  She has no rash.  Her bowels are moving normally.  She is eating and drinking without difficulty.  She is overall feeling very well.  Her energy is adequate and she, again can accomplish activities that she desires.      She is, however,, beginning to note left shoulder and scapular pain.  This has been present over the last 2 to 3 weeks and CT of chest, abdomen pelvis performed 5/24/2023 were performed now revealing dense consolidation left apex along the left margin of the aortic arch increased to more consolidated measuring 4.9 x 5.0 cm previously 4.4 x 4.0 cm with increase sclerosis of the anterior left second rib.  Extension soft tissue density anterior to the left first and second ribs represents a change adversely with index area measuring 3.7 x 4.4 previously 1.8 x 2.9.  This may be post radiation this is concerning for progression of disease and would explain the patient's progressive symptoms.  We have discussed that she should undergo a PET/CT to help clarify these issues.  We will continue, at this point, her Keytruda as well as adjust her pain medications.    Follow-up PET/CT now available reveals progression of disease in left upper thorax, left supraclavicular adenopathy new metastasis left posterior fourth ribs, no evidence of metastatic disease in the abdomen or pelvis.    The patient is seen with her son and daughter 6/12/2023 and it is clear that she is not developing a Pancoast like syndrome with progression of her  malignancy in the left upper thorax and associated symptoms.  We have discussed discontinuance of her immunotherapy and moving to targeted therapy with dabrafenib and trametinib as we also request radiation therapy repeat consultation and try to manage her pain more effectively.    The patient was seen today,6/23/2023, for triage visit.  She is seen with her  back in the infusion area.  Reports vomiting twice daily for the last 3 days.  Has Zofran available at home which has been helpful with her nausea, however today she was unable to keep Zofran tablet down.  Reports decreased appetite and worsening nausea over the last week.  She recently discontinued prednisone and attempted THC for nausea and appetite, however experienced dizziness and worsening nausea with this.  She stopped taking THC, and feels her dizziness has improved some.  She continues on oxycodone every 3 hours for pain, however when she was experiencing worsening dizziness started taking half a tablet every 3 hours to try and help her dizziness.  She feels her pain is relatively well controlled.  She also complains of constipation.  Had a BM yesterday, passing gas just fine.  Has been utilizing Senokot as 1 tablet twice daily.  She also started drinking Ensure yesterday, and is trying to drink 3 ensures daily.  Her weight has declined to 100 lbs. he has not yet started her new oral chemo, Dabrafenib and Trametinib as she has not yet received the medication.  Denies fever or chills.  Denies new or worsening pain.    Patient seen again in follow-up 6/27/2023.  She was sent for an MRI of the brain with and without contrast last week due to complaints of nausea however the patient was unable to complete the MRI.  She reports that her nausea has actually completely resolved.  She has been eating and drinking better since her last visit.  She does still struggle with constipation and is taking Senokot-as 1 tablet twice a day.  She is asking if she  can take more.  We discussed that she can take 2 tablets twice daily if needed.  She denies issues with headaches, blurred vision, or double vision.  Patient reports that she is feeling better, and ready to start on her dabrafenib, and trametinib.      The patient is next seen 7/5/2023.  The patient had been seen by radiation therapy with plans to treat painful left chest wall lesion.  She is next seen 7/5/2023 fortunately with her pain modestly controlled with current medications and with plans to start radiation therapy for palliation x10 treatments.  Additionally she is expected to obtain dabrafenib and trametinib within the next several days and is willing to initiate that treatment.    7/17/2023 patient comes into the office today for a triage visit for IV fluids and reporting nausea and vomiting.  She started her dabrafenib and trametinib about 1 week ago.  Since that time she has struggled with some nausea that started this past weekend.  She initially was not needing nausea medication but thereafter started using ondansetron as a premedication just yesterday and today.  She reports she was vomiting shortly after taking her medications this morning.  After further discussion she was only waiting 15 minutes following the nausea medication to take her other meds.  We discussed today giving it closer to 30 minutes to 1 hour to be in full effect.  Patient did struggle drinking yesterday but had done well up to that.  She did start with radiation just today.    Patient returns on 7/21/2023 for evaluation and possible IV fluids as her  called in stating that she was sleeping the majority of the day and not eating.  Her nausea has been much improved with waiting 30 minutes prior to dabrafenib and trametinib.  She has however had a couple episodes where she vomits directly following taking one of the medications.  This was not related to nausea she reports.  She denies any other difficulty swallowing however  did eat a piece of chicken last night.  She states that all she ate yesterday.  She does report drinking ensures however her daughter states she is only taking sips of these.  Her daughter does accompany her in the office today.  They are quite concerned because the patient is falling asleep even when they are speaking with her.  Patient is awake during our meeting today though.  Reports she is taking her prednisone however is stopped taking her potassium pills because they are so large.    As result of toxicity the patient was taken off of dabrafenib and trametinib when seen 7/21/2023, given additional IV hydration and further supportive care.  Plans were made for follow-up on recovery to determine as to whether to redose the medications.    Unfortunately she required admission 7/25-30/2023 with failure to thrive.  Subsequent assessments including blood cultures were negative and patient was treated briefly with IV antibiotic therapy, started PT and OT, medications adjusted for hypotension and treated symptomatically for nausea and vomiting.  She was able to improve enough to be discharged home as she continued radiation therapy started 7/17/2023 and completed 7/31/2023 to the left chest wall.    She is next seen back 8/4/2023.  She has made significant improvement off the targeted therapy and completed radiation therapy.  Wonderfully this has included resolution of her pain or discomfort and her breathing is also improved.  We have had a lengthy conversation as to how to improve her performance status and attempt to use targeted therapy if possible to her benefit and tolerance.    Patient seen today 8/21/2023 with complaints of worsening fatigue and shortness of breath.  She states that she gets winded and tired with walking room to room in her house.  She has to stop and rest frequently.  She does note occasional cough which has not worsened.  She denies fevers.  No chest pain.  Last week she did call and was  instructed to increase her prednisone to help her appetite to 20 mg daily, which has helped.  She is also taking Remeron for sleep which does also seem to be helping.  She states that she is sleeping about 6 to 8 hours at night.  She denies any complaints of pain at all, and states that she is not taking anything for pain control.    Patient states that she has not heard anything about receiving lower dose dabrafenib or trametinib.  She is also very hesitant to restart these medications as she was so ill when she previously took them.  We discussed that it would be at a significantly lower dose, however again, she is hesitant.  Patient states that if she does restart them it would likely not be until she returns from her trip to [place].  She leaves on September 19 for this trip.    ONCOLOGY HISTORY:      The patient is an 83-year-old female with the below medical history including chronic obstructive pulmonary disease who was seen in the [facility] multidisciplinary thoracic oncology clinic July 1, 2021.  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      She had undergone a CT of the chest at [facility] 6/16/2021 showing postsurgical changes in the left chest from right upper lobectomy, 8 mm irregular nodule medial segment of the right lower lobe and diffuse changes of emphysema with fibrotic changes in the periphery, no suspicious hilar or mediastinal nodes, no pleural effusion.  New finding was suspicious for new malignancy with the patient felt to be a poor candidate for surgical resection.  A PET CT and PFTs were requested thereafter.  The PET/CT demonstrated ill-defined FDG avid soft tissue thickening left aspect mediastinum within the operative bed, 1 cm hypermetabolic pulmonary nodule right lower  lobe and asymmetric thickening patchy uptake involving the right base of tongue.  There is subtle uptake within the L1 vertebral body which is indeterminate and a sub-6 mm pulmonary nodule of right lung and subcentimeter hypodense hepatic lesion which was below PET resolution.       The patient's case was discussed in thoracic conference 7/22/2021 with consideration of the patient undergoing L1 vertebral body MRI, confirmatory biopsy left mediastinal and assessment by ENT (Dr. Caballero) who had worked with the patient previously.  She, incidentally, indicates that radiation therapy was given apparently intraoperatively at her recent surgery?  She still has issues with difficulty chewing and swallowing post procedures and was to be followed up with Dr. Caballero in the near future as planned.                                                            She was seen in the thoracic clinic on the same day with plans to proceed with MRI of the lumbar spine, biopsy left mediastinal nodular area of potential disease and follow-up of her ENT assessment as well as undergo a guardant 360 assessment in our office.  She underwent the biopsy 8/13/2021 found to be consistent with invasive moderately differentiated adenocarcinoma with PD-L1 TPS score 40%.  MRI of the lumbar spine revealed no evidence of metastatic disease though the patient did have multilevel degenerative disease throughout the lumbar spine.  She had an office follow-up with Dr. 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  She underwent laryngoscopy 8/18/2021 with right base of tongue without evidence of lesions or masses in the region.     She was seen by Dr. Hernandez 8/19/2021 and, her findings, had been presented in lung conference.  Her findings were consistent with 2 separate lesions including a nodule in the superior segment of the right lower lobe and  a mass in the upper portion of the left chest with the left chest lesion biopsy positive for adenocarcinoma.  The consensus was that these were to separate-synchronous primaries.  Stereotactic radiation each lesions were felt to be the appropriate way to proceed and the patient was referred to radiation therapy.     The patient is seen in office 8/23/2021 agreeable to the radiation therapy as well as additional assessments including Caris molecular assessment of her biopsy.  Again her guardant 360 is currently pending and we would follow her after she completes radiation therapy with subsequent scans.    She was able to proceed with SBRT to the right lung at primary #1 with 5 treatments at 1000 cGy per fraction in the left lung primary similarly at 1000 cGy per fraction x5.  Her treatment ranged between 8/31-9/13/2021.  She is now scheduled for follow-up 11/23/2021.    The patient subsequent genomic testing is discussed with her as she is is seen back 9/23/2021.  Her guardant 360 did not determine any new abnormalities but her Caris-MI profile-does reveal sensitivity to immunotherapy as well as a BRAF mutation.  This could be extremely important as we follow the patient from this point.  Fortunately she is feeling extremely well and quite pleased about her treatments.    Patient had subsequent PET/CT 11/23/2021 demonstrates decrease in size and avidity of the previously noted intensely FDG avid nodules left lobectomy operative site and right lower lobe.  Surrounding groundglass and pulmonary opacification is consistent with postradiation changes, a few new subcentimeter pulmonary nodules are present in the right upper lobe and indeterminant, stable subcentimeter hepatic lesion is below PET resolution no other findings of FDG-avid disease are seen in neck abdomen or pelvis.  The patient seen in office 12/1/2021 with a relatively good performance status stating she is not having any new symptoms and very pleased about  her results on her PET/CT.  She realizes we'll have to follow this further but subsequent scans in 6 months.  She is also hoping to see an oral surgeon that previously helped her-Dr. Wu.    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  There is evolution of presumed postradiation changes in the right midlung with soft tissue mass within the left lumpectomy operative bed minimally decreased in size.  There is a sub-6 mm nodule in the right upper lobe not definitively seen, indeterminate and an indeterminate left renal lesion at 1.5 cm unchanged from 7/13/2021.  The patient is currently scheduled to see Dr. Dowell on 6/23/2022.  She is feeling well without any additional symptoms.    The patient required admission 10/14 - 10/18/2022 presenting with shortness of breath and cough that was nonproductive.  She had been on oral antibiotics and did not improved and was admitted for therapy for apparent pneumonia.  This eventually led to bronchoscopy after initial CT revealed postsurgical changes, new masslike 6.7 cm area of consolidation anterior left lung raising concern for potential malignancy and a follow-up PET/CT planned.  Bronchoscopy and biopsy left lower lung failed to show evidence of malignancy.  The patient's PET/CT, however, demonstrated an irregular pleural-based soft tissue density thickening in the left upper lobe that was intensely FDG avid new from 6/8/2022 thought to be a malignant recurrence with spread into the left lung parenchyma.  There is intensely pleural-based avidity within the left lower lobe thought to be metastatic disease with postradiation of the left apex as well.  There is a small focus of uptake in the right hilum grossly unchanged in size and post radiation changes in the right lower lobe.  There is indeterminate  density left renal that was grossly unchanged.  The patient is seen back in office 11/15/2022 indicating that she still has chest discomfort that is slowly worsening and that she has a chronic paroxysmal cough but has not improved.  We discussed that she very likely has recurrent disease and plan to proceed with a guardant 360 examination initially as we determine whether we should try to proceed with therapy particularly immunotherapy versus targeted therapy recognizing the above findings.    Patient's guardant 360 returned as again significant for BRAF V600E and the potential use of BRAF inhibitor/MET inhibitor dabrafenib and trametinib.  Additional information [id number] and use of alpelisib.    Unfortunately she required admission 11/28-12/1 with worsening back pain.  Fortunately she did not demonstrate evidence of metastatic disease but did have moderate degenerative changes which could cause radiculopathy.  Medications were altered to include subsequently MSR 15 mg p.o. every 12 hours, Norco as needed.    The patient now presents back 12/14/2022 to initiate therapy- initially with immunotherapy considering Caris study with PD-L1 TPS of 70% on 22 C3 and 28-8 assays.    Patient seen with her  and we discussed pain medications and adjustments thereafter and that she stopped taking MSIR having only a short supply and having to use Norco more frequently.  She is willing to initiate Keytruda today we have discussed that considering her genomics treatment versus her BRAF mutation is also an option.    C1 Keytruda 12/14/2022    Patient is seen back 1/4/2023 in follow-up due for cycle 2 Keytruda.  Patient states that since receiving her first cycle she has had decreased appetite and decreased energy.  She has not been able to bring herself to eat much and she has notably lost 7 pounds.  She has also been struggling with constipation in relation to ongoing narcotics for pain control.  She has been taking senna S2  tabs twice a day.  We discussed adding daily MiraLAX.    Patient did just last week meet with dietitian, Zoila Clinton, to discuss ways to improve caloric intake.  She has not been able to implement any of these things yet though.    The patient is next seen 1/25/2023 with mild weight gain.  She is seen with family member both indicating that she has actually felt somewhat better in terms of performance status and general function.  We have discussed proceeding with a third cycle treatment and reevaluating by scan in 2 weeks.    The patient proceeded with treatment 1/25/2023 and underwent follow-up CT chest abdomen pelvis 2/8/2023 demonstrating small pericardial effusion that is decreased in size from 11/20/2022, ill-defined consolidation and pleural-based thickening in the left apex and upper lung has reduced slightly, additional index nodule soft tissue in the aspect the pericardium has not changed substantially, no evidence of metastatic disease in the abdomen pelvis.    The patient is seen with her daughter 2/15/2023 both indicating that she has definitely improved, stable weight, appetite and performance status.  She is also using her pain medication much less frequently and wonders whether she might begin to taper from her twice a day MS Contin.    Patient is seen back 3/8/2023 due for ongoing pembrolizumab.  She continues on low-dose prednisone 10 mg daily and has noted significant improvement in her energy and appetite with this.  She is reluctant to taper this any further we discussed that it is fine for her to stay on daily dosing.    She did try to taper her pain medication going down to just MS Contin 15 mg every 12 hours while holding her hydrocodone.  However over the last few days she has had some intermittent pain in the left upper back alleviated with hydrocodone 2-3 times a day.  She currently is denying any pain.  Monitor.    She is next evaluated 3/29/2023 for ongoing Keytruda.  Evidently her pain  medication was sent to the wrong pharmacy and will need to be represcribed today-long-acting MS Contin.  Otherwise she is doing reasonably well at present.    Patient seen 5/31/2023 with gradual development of left shoulder and left scapular pain.  Concurrent CT chest, abdomen and pelvis demonstrate interval increasing consolidation left upper lobe with extension anterior to the left upper ribs with sclerosis anterior left second rib.  This is suspicious for worsening malignancy.  Plans were made for subsequent PET/CT where the patient's pain medications were adjusted.PET/CT 6/5/2023 reveals progression of disease in left upper thorax, left supraclavicular adenopathy new metastasis left posterior fourth ribs, no evidence of metastatic disease in the abdomen or pelvis.    The patient is seen with her son and daughter 6/12/2023 and it is clear that she is not developing a Pancoast like syndrome with progression of her malignancy in the left upper thorax and associated symptoms.  We have discussed discontinuance of her immunotherapy and moving to targeted therapy with dabrafenib and trametinib as we also request radiation therapy repeat consultation and try to manage her pain more effectively.    Patient seen 7/5/2023 with plans to start palliative radiotherapy and to initiate concurrently dabrafenib and trametinib.    As a result of toxicity (nausea and vomiting) the patient was taken off of dabrafenib and trametinib when seen 7/21/2023, given additional IV hydration and further supportive care.  Plans were made for follow-up on recovery to determine as to whether to redose the medications.    Unfortunately she required admission 7/25-30/2023 with failure to thrive.  Subsequent assessments including blood cultures were negative and patient was treated briefly with IV antibiotic therapy, started PT and OT, medications adjusted for hypotension and treated symptomatically for nausea and vomiting.  She was able to improve  enough to be discharged home as she continued radiation therapy started 7/17/2023 and completed 7/31/2023 to the left chest wall.    She is next seen back 8/4/2023.  We have reviewed that she had been on dabrafenib and trametinib at 150 mg p.o. every 12 hours and 2 mg p.o. daily respectively and dosing could be changed considerably such as 50 mg twice daily and 0.5 mg daily.  Determination made to have her undergo repeat scans at 4 weeks and review her response to radiation therapy as we make application for lower dose targeted therapy, addition of Remeron p.o. nightly, tapering of her MS Contin to daily rather than twice daily, use of low-dose Ativan to undergo scans.    Past Medical History:   Diagnosis Date    Allergic rhinitis     Asthma     Cancer of floor of mouth 2014    Cerebral aneurysm     COPD (chronic obstructive pulmonary disease)     COVID 2020    Esophageal reflux     History of adenocarcinoma of lung     History of anemia     History of carcinoma in situ of skin     History of lung cancer     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 from [facility]    History of squamous cell carcinoma     Tongue    Hyperlipidemia     Hypertension     since early 60s    Low vitamin B12 level     Lung cancer     Thyromegaly     UTI (urinary tract infection)     Vitamin D deficiency         Past Surgical History:   Procedure Laterality Date    BRONCHOSCOPY Bilateral 10/17/2022    Procedure: BRONCHOSCOPY WITH FLUORO with biopsy and BAL;  Surgeon: India Folres MD;  Location: [facility] ENDOSCOPY;  Service: Pulmonary;  Laterality: Bilateral;  PRE/POST - lung mass    CHOLECYSTECTOMY  1999    COLONOSCOPY      EMBOLIZATION CEREBRAL Left 6/29/2022    Procedure: 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;  Surgeon: Bradley Dowell MD;  Location: [facility] HYBRID OR 18/19;  Service: Interventional Radiology;  Laterality: Left;    EYE SURGERY  11/24/2020    Took a muscle out  of right eye    GLOSSECTOMY PARTIAL      less than one half tongue    LUNG SURGERY      ORAL LESION EXCISION/BIOPSY       and 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    TUBAL ABDOMINAL LIGATION  1970    VENOUS ACCESS DEVICE (PORT) INSERTION N/A 2022    Procedure: MEDIPORT PLACEMENT;  Surgeon: Jason Villaseñor MD;  Location: [facility] MAIN OR;  Service: Vascular;  Laterality: N/A;        Current Outpatient Medications on File Prior to Visit   Medication Sig Dispense Refill    atorvastatin (LIPITOR) 20 MG tablet TAKE 1 TABLET EVERY NIGHT 90 tablet 1    cholecalciferol (VITAMIN D3) 1000 units tablet Take 1 tablet by mouth Daily. HOLDING FOR DOS      Cyanocobalamin (VITAMIN B12 PO) Take  by mouth Daily.      mirtazapine (REMERON) 7.5 MG half tablet Take 2 half tablet by mouth Every Night.      predniSONE (DELTASONE) 10 MG tablet Take 1 tablet by mouth Daily. 30 tablet 2    zolpidem (AMBIEN) 5 MG tablet Take 1 tablet by mouth At Night As Needed for Sleep. 30 tablet 0     Current Facility-Administered Medications on File Prior to Visit   Medication Dose Route Frequency Provider Last Rate Last Admin    [DISCONTINUED] heparin injection 500 Units  500 Units Intravenous PRN Henry Escobar MD   500 Units at 23 1431    [DISCONTINUED] sodium chloride 0.9 % flush 10 mL  10 mL Intravenous PRN Henry Escobar MD   10 mL at 23 1431        ALLERGIES:    Allergies   Allergen Reactions    Sulfa Antibiotics Rash        Social History     Socioeconomic History    Marital status:    Tobacco Use    Smoking status: Former     Types: Cigarettes     Quit date: 2015     Years since quittin.5     Passive exposure: Never    Smokeless tobacco: Never    Tobacco comments:     less than a pack per day, no smoking since , Quit 2015   Vaping Use    Vaping Use: Never used   Substance and Sexual Activity    Alcohol use: Not Currently     Comment: Socially -A few times a month    Drug use: No    Sexual  "activity: Defer     Family History   Problem Relation Age of Onset    Ovarian cancer Mother          at age 27    No Known Problems Father     Ovarian cancer Sister     Colon cancer Brother     No Known Problems Other     Malig Hyperthermia Neg Hx         Review of Systems  ROS as per HPI     Objective      Vitals:    23 1451   BP: 131/69   Pulse: 94   Resp: 18   Temp: 97.8 øF (36.6 øC)   TempSrc: Temporal   SpO2: 97%   Weight: 40.8 kg (89 lb 14.4 oz)   Height: 160 cm (62.99\")   PainSc: 0-No pain           2023     2:50 PM   Current Status   ECOG score 0      Physical Exam  Vitals reviewed.   Constitutional:       General: She is not in acute distress.     Appearance: Normal appearance. She is well-developed.   HENT:      Head: Normocephalic and atraumatic.   Eyes:      Pupils: Pupils are equal, round, and reactive to light.   Cardiovascular:      Rate and Rhythm: Normal rate and regular rhythm.      Heart sounds: Normal heart sounds. No murmur heard.  Pulmonary:      Effort: Pulmonary effort is normal. No respiratory distress.      Breath sounds: Normal breath sounds. No wheezing, rhonchi or rales.   Abdominal:      General: Bowel sounds are normal. There is no distension.      Palpations: Abdomen is soft.   Musculoskeletal:         General: No swelling. Normal range of motion.      Cervical back: Normal range of motion.   Skin:     General: Skin is warm and dry.      Findings: No rash.   Neurological:      Mental Status: She is alert and oriented to person, place, and time.       Physical exam preformed and reviewed. No changes noted from previous exam. Nena Avila, APRN ; 2023    RECENT LABS:  Results from last 7 days   Lab Units 23  1432   WBC 10*3/mm3 6.74   NEUTROS ABS 10*3/mm3 6.04   HEMOGLOBIN g/dL 11.7*   HEMATOCRIT % 36.9   PLATELETS 10*3/mm3 229     Results from last 7 days   Lab Units 23  1432   SODIUM mmol/L 141   POTASSIUM mmol/L 4.5   CHLORIDE mmol/L 104 "   CO2 mmol/L 24.6   BUN mg/dL 20   CREATININE mg/dL 0.60   CALCIUM mg/dL 8.9   GLUCOSE mg/dL 185*                     Assessment & Plan     1.  Carcinoma of the lung  May 2015, status post previous left upper lobectomy for carcinoma of the lung.    2.  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   She underwent laryngoscopy 8/18/2021 with right base of tongue without evidence of lesions or masses in the region.    3.  Moderately differentiated adenocarcinoma of the lung.  CT of the chest 6/16/2021 demonstrated postsurgical changes, 8 mm irregular nodule medial segment of right lower lobe and diffuse changes of emphysema.    She is seen in thoracic clinic with findings suspicious for new malignancy and concern of the patient being a poor operative candidate.    7/13/2021 PET CT demonstrated ill-defined avidity and soft tissue left aspect of the mediastinum, 1 cm hypermetabolic pulmonary nodule and asymmetric thickening involving the right base of tongue as well as subtle uptake in the L1 vertebral body.    This patient's case was discussed in thoracic clinic and plans were made to request an MRI of the lumbar spine, obtain a biopsy of the mediastinal involvement of the left had the patient reviewed by ENT.  Biopsy 8/13/2021 found to be consistent with invasive moderately differentiated adenocarcinoma with PD-L1 TPS score 40%.    7/24/2021 MRI of the lumbar spine revealed no evidence of metastatic disease though the patient did have multilevel degenerative disease throughout the lumbar spine.    Her findings were consistent with 2 separate lesions including a nodule in the superior segment of the right lower lobe and a mass in the upper portion of the left chest with the left chest lesion biopsy positive for adenocarcinoma.  The consensus was that these were to separate-synchronous  primaries.  Stereotactic radiation each lesions were felt to be the appropriate way to proceed and the patient was referred to radiation therapy.  The patient was referred for radiation therapy and she was able to proceed with SBRT to the right lung at primary #1 with 5 treatments at 1000 cGy per fraction in the left lung primary similarly at 1000 cGy per fraction x5.  Her treatment ranged between 8/31-9/13/2021.    Her guardant 360 did not determine any new abnormalities but her Caris-MI profile-does reveal sensitivity to immunotherapy as well as a BRAF mutation.  PET/CT 11/23/2021 demonstrates decrease in size and avidity of the previously noted intensely FDG avid nodules left lobectomy operative site and right lower lobe.  Surrounding groundglass and pulmonary opacification is consistent with postradiation changes, a few new subcentimeter pulmonary nodules are present in the right upper lobe and indeterminant, stable subcentimeter hepatic lesion is below PET resolution no other findings of FDG-avid disease are seen in neck abdomen or pelvis.  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  There is evolution of presumed postradiation changes in the right midlung with soft tissue mass within the left lumpectomy operative bed minimally decreased in size.  There is a sub-6 mm nodule in the right upper lobe not definitively seen, indeterminate and an indeterminate left renal lesion at 1.5 cm unchanged from 7/13/2021.  Admission 10/14 - 10/18/2022 presenting with shortness of breath and cough that was nonproductive.  She had been on oral antibiotics and did not improved and was admitted for therapy for apparent pneumonia.  This eventually led to bronchoscopy after initial CT revealed postsurgical changes, new masslike 6.7 cm area of consolidation anterior left lung raising concern for potential malignancy  and a follow-up PET/CT planned.   Bronchoscopy and biopsy left lower lung failed to show evidence of malignancy.    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  There is intensely pleural-based avidity within the left lower lobe thought to be metastatic disease with postradiation of the left apex as well.  There is a small focus of uptake in the right hilum grossly unchanged in size and post radiation changes in the right lower lobe.  There is indeterminate density left renal that was grossly unchanged.    Patient's guardant 360 returned as again significant for BRAF V600E and the potential use of BRAF inhibitor/MET inhibitor dabrafenib and trametinib.  Additional information [id number] and use of alpelisib.  The patient now presents back 12/14/2022 to initiate therapy- initially with immunotherapy considering Caris study with PD-L1 TPS of 70% on 22 C3 and 28-8 assays.  Single agent Keytruda initiated 12/14/2022 2/8/2023 CT of the chest, abdomen pelviswith consolidation and masslike pleural thickening in the left apex and upper lung index areas have decreased somewhat.  There is no evidence of metastatic disease otherwise and a few indeterminate left renal lesions are stable.  After lengthy discussion with the patient and her daughter as to the stability to mild improvement with immunotherapy it is agreed that we would continue treatment at this point.  Proceed today, 4/19/2023 with cycle 7 pembrolizumab which is continued every 3 weeks  The patient proceeded to 8 cycles of pembrolizumab and underwent follow-up chest CT chest, abdomen pelvis revealing evolution of dense consolidation left upper lobe and extension of soft tissue mass anterior to left upper ribs with increase sclerosis anterior left second rib.  This was  concerning for progression of disease versus radiation change and the patient was scheduled for subsequent PET/CT.  PET/CT 6/5/2023  reveals progression of disease in left upper thorax, left supraclavicular adenopathy new metastasis left posterior fourth ribs, no evidence of metastatic disease in the abdomen or pelvis.  Patient seen 6/12/2023 with plans to move her Norco to every 4 hours, discontinue Keytruda, make application for dabrafenib and trametinib at 150 mg p.o. every 12 hours and 2 mg p.o. daily respectively.  Patient referred for radiation therapy consultation concurrently.  Last dose of Keytruda 5/31/2023.  6/23/2023: Patient seen for triage visit.  She has not yet started dabrafenib or trametinib, she has not yet received the medications.  Unfortunately she has been experiencing uncontrolled nausea/vomiting as further detailed below.   Patient seen 6/27/2023 reporting that her nausea has resolved.  She was unable to complete the MRI of the brain however Dr. Escobar did review the images patient did have and there was no evidence of edema or metastatic disease.  Patient can start her new oral medication once she receives the medication.  Patient seen 7/5/2023 with plans to start palliative radiotherapy x10 fractions and initiate dabrafenib and trametinib as soon as medications received.  7/17/2023 patient initiated radiation with 10 fractions planned.  Patient has received dabrafenib and trametinib.  Brought in for triage visit due to nausea associated with dosing.  She is premedicating with ondansetron however only waiting 15 minutes.  We discussed today that she needs to wait at least 30 minutes to 1 hour prior to taking the dabrafenib and trametinib.  The patient will do so.  We discussed she could also take this again if she feels the need 8 hours later for nausea control.  If she is having breakthrough nausea then she should call our office.  I have encouraged her to utilize electrolyte drinks.  She  will get 1L normal liter normal saline in the office today.She was not nauseas while in the office so we did not give additional nausea medication.  We will have her return in 1 week repeat labs, review by nurse practitioner, and possible IV fluids.  I stressed the importance of calling sooner if she finds that the premedication is not controlling her nausea at which time we would have her come in for additional IV fluids and evaluation.  She is continuing daily radiation this week.  Case was discussed with Dr. Escobar today.  7/21/2023: Patient returns for short interval follow-up due to very poor appetite and sleeping the majority of the day.  Her nausea has been improved with premedicating 30 minutes prior to dabrafenib and trametinib.  She however has been sleeping the majority of the day and is not eating when she is awake.  She does report taking ensures but she is only sipping on these.  Have given her samples of some of the office today and encouraged her to drink when while she is here.  Her performance status continues to decline and her daughter states that she was fixing dinner nightly just 2 weeks ago.  With performance status of 3 today I discussed the case with Dr. Escobar and we will hold her dabrafenib and  TRAMETINIB.  Her liver enzymes are elevated today as well.  We will monitor this next week and have her evaluated by Dr. Escobar at which time we could consider restarting her dabrafenib and trametinib at reduced doses pending performance status and laboratory evaluation.  Unfortunately she required admission 7/25-30/2023 with failure to thrive.  Subsequent assessments including blood cultures were negative and patient was treated briefly with IV antibiotic therapy, started PT and OT, medications adjusted for hypotension and treated symptomatically for nausea and vomiting.  She was able to improve enough to be discharged home as she continued radiation therapy started 7/17/2023 and completed  7/31/2023 to the left chest wall.  She is next seen back 8/4/2023.  Lengthy discussion as to reevaluation   Plans made for CT chest, abdomen, pelvis in 4 weeks  Patient seen 8/21/2023 with complaints of worsening fatigue and shortness of breath.  Patient scheduled for follow up CT scans this Friday. Lungs clear on exam,  Sats 97%.  Hgb stable at 11.7.  Will check BNP today.  Discussed may be related to disease and will need to see what scan shows.     4.  Worsening back pain  Admission 11/28/2022 through 12/1/2022.  MRI of the cervical and thoracic spine fortunately failed to demonstrate metastatic disease.  Moderate degenerative changes noted which could cause radiculopathy.  Medication altered to include MS Contin 15 mg every 12 hours and Norco for breakthrough pain  She reports today her pain is adequately controlled  Patient with increasing pain 5/31/2023 with pain level of 6.  MS Contin was increased to 50 mg p.o. every 8 hours and Norco 10/325 #101 p.o. every 6 hours to move to every 4 hours as needed-E scribed to pharmacy  Patient seen 6/12/2023 with Norco moved to every 4 hours.  6/23/2023: Seen for triage visit.  Has been using oxycodone 20 mg every 3 hours as needed for pain.  Reports she has not been using the hydrocodone 10/325.  Was experiencing dizziness, so reduced her oxycodone use to half a tablet every 3 hours as needed.  Reports pain is uncontrolled during the night so she is having to wake at night time to take pain medicine.  They are questioning resuming long-acting pain medication, as she is waking throughout the night to take pain medicine.  She has already been prescribed MS Contin and has this available at home, did let her know it would be okay to resume this.  Assess 7/5/2023 with pain reduced to 2/10.  Plan to continue current therapy  Darlene Pfeiffer reports a pain score of 0.  Given her pain assessment as noted, treatment options were discussed and the following options were decided upon  as a follow-up plan to address the patient's pain: continuation of current treatment plan for pain. Currently not requiring pain medication.       5.  Poor appetite and malnutrition  Placed on prednisone 10 mg daily 1/4/2023 with significant improvement in her symptoms  Weight is stable today at just below 106 pounds  Patient assessed 6/12/23 with possible gummy therapy.  Stable performance status including weight and appetite 7/5/2023  Weight has declined as of 7/17/2023 due to nausea and vomiting that started this past weekend.  After further discussion the patient did stop her prednisone 10 mg while she was not feeling well.  We discussed today the importance of continuing this as it can help with her nausea and appetite and therefore she will restart tomorrow.  7/21/2023: Weight is stable today though albumin is declining at 3.  Patient is sleeping the majority of the day and not eating.  Yesterday she had a small piece of chicken and that is all.  She states she is drinking ensures however her daughter thinks that she is just sipping on these and not completing them.  Hopefully holding her dabrafenib and trametinib will be helpful with her being awake more and appetite.  I encouraged her to make sure she is taking her prednisone daily.  Trial of Remeron 7.5 mg p.o. nightly E scribed to pharmacy    6.  Uncontrolled nausea/vomiting  started after discontinuing prednisone and starting THC Gummies.  Started to experience dizziness with the THC Gummies.  Discontinue THC Gummies and dizziness improved, however she has remained nauseated now.  She has been utilizing Zofran with improvement, however today was unable to keep Zofran tablets down due to nausea/vomiting.  She will resume prednisone 10 mg daily.  She will receive 1 L IV normal saline in clinic today 10 mg IV Compazine.  We will also send prescription for Phenergan suppository, in case she is not able to keep Zofran down in the future.  Will also send for  stat MRI brain since patient is now experiencing uncontrolled nausea vomiting and has recently had progression of her cancer as seen on PET from 6/5/2023.  MRI brain was performed without contrast, even though contrast was ordered.  The patient also did not stay for entire exam to be completed.  Exam limited for assessment of metastatic disease.  Attempted to call patient to review results, however no answer, did leave detailed voicemail.  6/27/2023 patient reports that her nausea has resolved.  She did not complete the MRI however the images which were done showed no evidence of edema or metastatic disease.  Nausea reoccurred when seen on 7/17/2023 over this past weekend after initiation of dabrafenib and trametinib.  Patient was premedicating with ondansetron however only giving herself about 15 minutes and I encouraged her to give herself at least 30 minutes to 1 hour prior to taking the medications.  She will notify our office if this is not helpful.  7/21/2023: Premedication with ondansetron 30 minutes prior to dabrafenib and trametinib has been helpful.  Patient however has had some vomiting episodes directly after taking her medications where she has not had time to actually swallow them.  She denies difficulty swallowing however.  We will continue to monitor this.    7.  Hyperkalemia-  potassium 6/23/2023 3.1.  She has been having uncontrolled nausea, we will hold off on starting oral potassium replacement as it is unlikely she will tolerate that at this time.  Will recommend she increase oral potassium in her diet.  6/27/2023 potassium 3.0.  Nausea has resolved.  Patient will be given 40 M EQ p.o. potassium in the office and she will be started on 20 mEq daily of p.o. potassium.  Assessed 7/5/2023 with potassium 4.4  7/17/2023 potassium normal at 3.7  7/21/2023: Potassium 3.1, patient not taking potassium supplements at home because she does not like the big pill.  Changed to potassium chloride 10 mill  equivalents, 2 tablets every a.m. as these will be smaller.   8/21/2023 potassium 4.5.    PLAN:   Check BNP.   CT scans scheduled for Friday.  Continue prednisone 20 mg daily.   Continue Remeron 15 mg nightly.   Will check on lower dose dabrafenib and TRAMETINIB-apply for lower dosing 50 mg twice daily and 0.5 mg daily  Currently scheduled for follow up with Luz on 9/8  Call/ return sooner should the patient develop any new concerns or problems.      Patient is on a high risk medication requiring close monitoring for toxicity.      Nena Avila, APRN  08/21/2023

## 2023-08-21 NOTE — PROGRESS NOTES
Re: Refills of Oral Specialty Medication - Mekinist (trametinib)    Drug-Drug Interactions: The current medication list was reviewed and there are no relevant drug-drug interactions.  Medication Allergies: The patient has no relevant allergies as it relates to their oral specialty medication  Review of Labs/Dose Adjustments: NO DOSE CHANGE - I reviewed the most recent note and labs and the patient will continue without any dose changes.  I sent refills as described below.  Script accidentally discontinued by PCP.    Drug: Mekinist (trametinib)  Strength: 0.5 mg  Directions: Take 1 tablet by mouth daily  Quantity: 30  Refills: 2  Pharmacy prescription sent to: [facility]    Name/Credentials: Dion Kenyon, [facility], BETH    8/21/2023  15:54 EDT

## 2023-08-22 ENCOUNTER — TELEPHONE (OUTPATIENT)
Dept: ONCOLOGY | Facility: CLINIC | Age: 83
End: 2023-08-22
Payer: MEDICARE

## 2023-08-22 ENCOUNTER — DOCUMENTATION (OUTPATIENT)
Dept: PHARMACY | Facility: HOSPITAL | Age: 83
End: 2023-08-22
Payer: MEDICARE

## 2023-08-22 ENCOUNTER — SPECIALTY PHARMACY (OUTPATIENT)
Dept: PHARMACY | Facility: HOSPITAL | Age: 83
End: 2023-08-22
Payer: MEDICARE

## 2023-08-22 DIAGNOSIS — R06.02 SHORTNESS OF BREATH: Primary | ICD-10-CM

## 2023-08-22 NOTE — PROGRESS NOTES
[facility] still hasn't received the Mekinist 0.5 mg prescription that was sent in yesterday.  It takes 24-48 to receive in their system.  They do still have the Tafinlar 50 mg ready to ship out.  I will call tomorrow to verify receipt of prescription.  Then, I will call pt to ask her to schedule the delivery.     Pinky Walter  Specialty Pharmacy Technician

## 2023-08-22 NOTE — TELEPHONE ENCOUNTER
Reviewed BNP results with patient, need for echocardiogram.  Order placed, will get the patient scheduled.    DEJAN Lovell

## 2023-08-23 ENCOUNTER — SPECIALTY PHARMACY (OUTPATIENT)
Dept: PHARMACY | Facility: HOSPITAL | Age: 83
End: 2023-08-23
Payer: MEDICARE

## 2023-08-23 NOTE — PROGRESS NOTES
I called [facility] and [employer] to set up delivery for Mekinist 0.5 mg and Tafinlar 50 mg.  It was set up for Friday 8/25/23 via [facility] with signature required.  Pt was alerted via  about this.      Pinky Walter  Specialty Pharmacy Technician

## 2023-08-24 RX ORDER — POTASSIUM CHLORIDE 20 MEQ/1
20 TABLET, EXTENDED RELEASE ORAL DAILY
Qty: 30 TABLET | Refills: 1 | OUTPATIENT
Start: 2023-08-24

## 2023-08-25 ENCOUNTER — HOSPITAL ENCOUNTER (OUTPATIENT)
Dept: CT IMAGING | Facility: HOSPITAL | Age: 83
Discharge: HOME | End: 2023-08-25
Admitting: INTERNAL MEDICINE
Payer: MEDICARE

## 2023-08-25 DIAGNOSIS — C34.11 MALIGNANT NEOPLASM OF UPPER LOBE OF RIGHT LUNG: ICD-10-CM

## 2023-08-25 DIAGNOSIS — Z85.118 HISTORY OF LUNG CANCER: ICD-10-CM

## 2023-08-25 DIAGNOSIS — C34.12 MALIGNANT NEOPLASM OF UPPER LOBE OF LEFT LUNG: ICD-10-CM

## 2023-08-25 PROCEDURE — 0 DIATRIZOATE MEGLUMINE & SODIUM PER 1 ML: Performed by: INTERNAL MEDICINE

## 2023-08-25 PROCEDURE — 25510000001 IOPAMIDOL 61 % SOLUTION: Performed by: INTERNAL MEDICINE

## 2023-08-25 PROCEDURE — 71260 CT THORAX DX C+: CPT

## 2023-08-25 PROCEDURE — 74177 CT ABD & PELVIS W/CONTRAST: CPT

## 2023-08-25 RX ADMIN — DIATRIZOATE MEGLUMINE AND DIATRIZOATE SODIUM 30 ML: 660; 100 LIQUID ORAL; RECTAL at 09:00

## 2023-08-25 RX ADMIN — IOPAMIDOL 85 ML: 612 INJECTION, SOLUTION INTRAVENOUS at 10:24

## 2023-08-28 ENCOUNTER — HOSPITAL ENCOUNTER (OUTPATIENT)
Dept: CARDIOLOGY | Facility: HOSPITAL | Age: 83
Discharge: HOME | End: 2023-08-28
Admitting: INTERNAL MEDICINE
Payer: MEDICARE

## 2023-08-28 VITALS
HEIGHT: 63 IN | DIASTOLIC BLOOD PRESSURE: 76 MMHG | HEART RATE: 80 BPM | WEIGHT: 89 LBS | BODY MASS INDEX: 15.77 KG/M2 | SYSTOLIC BLOOD PRESSURE: 124 MMHG

## 2023-08-28 DIAGNOSIS — R06.02 SHORTNESS OF BREATH: ICD-10-CM

## 2023-08-28 LAB
AORTIC ARCH: 2.4 CM
ASCENDING AORTA: 3 CM
BH CV ECHO LEFT VENTRICLE GLOBAL LONGITUDINAL STRAIN: -20.8 %
BH CV ECHO MEAS - ACS: 1.93 CM
BH CV ECHO MEAS - AO MAX PG: 6.4 MMHG
BH CV ECHO MEAS - AO MEAN PG: 3.4 MMHG
BH CV ECHO MEAS - AO ROOT DIAM: 3.1 CM
BH CV ECHO MEAS - AO V2 MAX: 126.2 CM/SEC
BH CV ECHO MEAS - AO V2 VTI: 22.9 CM
BH CV ECHO MEAS - AVA(I,D): 2.11 CM2
BH CV ECHO MEAS - EDV(CUBED): 57.9 ML
BH CV ECHO MEAS - EDV(MOD-SP2): 86 ML
BH CV ECHO MEAS - EDV(MOD-SP4): 106 ML
BH CV ECHO MEAS - EF(MOD-BP): 55.9 %
BH CV ECHO MEAS - EF(MOD-SP2): 58.1 %
BH CV ECHO MEAS - EF(MOD-SP4): 52.8 %
BH CV ECHO MEAS - EF_3D-VOL: 51 %
BH CV ECHO MEAS - ESV(CUBED): 21.5 ML
BH CV ECHO MEAS - ESV(MOD-SP2): 36 ML
BH CV ECHO MEAS - ESV(MOD-SP4): 50 ML
BH CV ECHO MEAS - FS: 28.1 %
BH CV ECHO MEAS - IVS/LVPW: 1.04 CM
BH CV ECHO MEAS - IVSD: 0.98 CM
BH CV ECHO MEAS - LA 3D VOL INDEX: 41
BH CV ECHO MEAS - LAT PEAK E' VEL: 7.1 CM/SEC
BH CV ECHO MEAS - LV DIASTOLIC VOL/BSA (35-75): 77.3 CM2
BH CV ECHO MEAS - LV MASS(C)D: 113.8 GRAMS
BH CV ECHO MEAS - LV MAX PG: 2.5 MMHG
BH CV ECHO MEAS - LV MEAN PG: 1.3 MMHG
BH CV ECHO MEAS - LV SYSTOLIC VOL/BSA (12-30): 36.5 CM2
BH CV ECHO MEAS - LV V1 MAX: 79.3 CM/SEC
BH CV ECHO MEAS - LV V1 VTI: 15.3 CM
BH CV ECHO MEAS - LVIDD: 3.9 CM
BH CV ECHO MEAS - LVIDS: 2.8 CM
BH CV ECHO MEAS - LVOT AREA: 3.1 CM2
BH CV ECHO MEAS - LVOT DIAM: 2 CM
BH CV ECHO MEAS - LVPWD: 0.94 CM
BH CV ECHO MEAS - MED PEAK E' VEL: 5.4 CM/SEC
BH CV ECHO MEAS - MV A DUR: 0.13 SEC
BH CV ECHO MEAS - MV A MAX VEL: 117.8 CM/SEC
BH CV ECHO MEAS - MV DEC SLOPE: 306.6 CM/SEC2
BH CV ECHO MEAS - MV DEC TIME: 0.19 MSEC
BH CV ECHO MEAS - MV E MAX VEL: 69.8 CM/SEC
BH CV ECHO MEAS - MV E/A: 0.59
BH CV ECHO MEAS - MV MAX PG: 6.2 MMHG
BH CV ECHO MEAS - MV MEAN PG: 2.7 MMHG
BH CV ECHO MEAS - MV P1/2T: 76.5 MSEC
BH CV ECHO MEAS - MV V2 VTI: 22.3 CM
BH CV ECHO MEAS - MVA(P1/2T): 2.9 CM2
BH CV ECHO MEAS - MVA(VTI): 2.16 CM2
BH CV ECHO MEAS - PA ACC TIME: 0.1 SEC
BH CV ECHO MEAS - PA V2 MAX: 89.6 CM/SEC
BH CV ECHO MEAS - PULM A REVS DUR: 0.15 SEC
BH CV ECHO MEAS - PULM A REVS VEL: 28.3 CM/SEC
BH CV ECHO MEAS - PULM DIAS VEL: 26.1 CM/SEC
BH CV ECHO MEAS - PULM S/D: 1.98
BH CV ECHO MEAS - PULM SYS VEL: 51.8 CM/SEC
BH CV ECHO MEAS - QP/QS: 1
BH CV ECHO MEAS - RAP SYSTOLE: 3 MMHG
BH CV ECHO MEAS - RV MAX PG: 2.12 MMHG
BH CV ECHO MEAS - RV V1 MAX: 72.8 CM/SEC
BH CV ECHO MEAS - RV V1 VTI: 14.4 CM
BH CV ECHO MEAS - RVOT DIAM: 2.07 CM
BH CV ECHO MEAS - RVSP: 34.9 MMHG
BH CV ECHO MEAS - SI(MOD-SP2): 36.5 ML/M2
BH CV ECHO MEAS - SI(MOD-SP4): 40.9 ML/M2
BH CV ECHO MEAS - SV(LVOT): 48.3 ML
BH CV ECHO MEAS - SV(MOD-SP2): 50 ML
BH CV ECHO MEAS - SV(MOD-SP4): 56 ML
BH CV ECHO MEAS - SV(RVOT): 48.4 ML
BH CV ECHO MEAS - TAPSE (>1.6): 2.5 CM
BH CV ECHO MEAS - TR MAX PG: 31.9 MMHG
BH CV ECHO MEAS - TR MAX VEL: 282.3 CM/SEC
BH CV ECHO MEASUREMENTS AVERAGE E/E' RATIO: 11.17
BH CV XLRA - RV BASE: 3.4 CM
BH CV XLRA - RV LENGTH: 6.8 CM
BH CV XLRA - RV MID: 2.9 CM
BH CV XLRA - TDI S': 11.4 CM/SEC
LEFT ATRIUM VOLUME INDEX: 42.8 ML/M2
SINUS: 2.7 CM
STJ: 2.31 CM

## 2023-08-28 PROCEDURE — 93306 TTE W/DOPPLER COMPLETE: CPT

## 2023-08-28 PROCEDURE — 25510000001 PERFLUTREN (DEFINITY) 8.476 MG IN SODIUM CHLORIDE (PF) 0.9 % 10 ML INJECTION: Performed by: INTERNAL MEDICINE

## 2023-08-28 PROCEDURE — 93356 MYOCRD STRAIN IMG SPCKL TRCK: CPT

## 2023-08-28 RX ADMIN — PERFLUTREN 2 ML: 6.52 INJECTION, SUSPENSION INTRAVENOUS at 15:08

## 2023-08-29 ENCOUNTER — TELEPHONE (OUTPATIENT)
Dept: NEUROSURGERY | Facility: CLINIC | Age: 83
End: 2023-08-29
Payer: MEDICARE

## 2023-08-29 NOTE — TELEPHONE ENCOUNTER
"PATIENT CALLED IN AND WANTED TO SCHEDULE FOLLOW UP, HOWEVER PER LAST OVN:  \"Return in about 1 year (around 7/21/2023) for Recheck, Cerebral Angiogram (DSA).\"    PLEASE SUBMIT ORDER FOR ANGIOGRAM AND CALL PATIENT TO GET SCHEDULED FOLLOW UP.    THANK YOU!  "

## 2023-08-30 NOTE — H&P (VIEW-ONLY)
Subjective   Patient ID: Darlene Pfeiffer is a 83 y.o. female is here today for 1 year follow up/pre angio appointment for her cerebral aneurysm. Patient reports she is feeling good, no symptoms.     History of Present Illness  83-year-old female with incidental finding of a significant cerebral aneurysm while undergoing routine imaging for lung cancer follow-up. 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  No evidence to suggest any rupture or subarachnoid hemorrhage.  She underwent successful endovascular occlusion with a WEB device on 6/29/2022 with a good result. No complications were encountered. Patient has done well postoperatively with no complaints of any residual groin puncture site complications. Patient is currently on Lipitor. She is a former smoker, but quit in 2015. She does have underlying fibromuscular dysplasia which is the likely source of her aneurysm. A mirror image infundibulum is seen on the right. She presents today for preangio visit to assess aneurysm occlusion in the near future.    The following portions of the patient's history were reviewed and updated as appropriate: She  has a past medical history of Allergic rhinitis, Asthma, Cancer of floor of mouth (2014), Cerebral aneurysm, COPD (chronic obstructive pulmonary disease), COVID (2020), Esophageal reflux, History of adenocarcinoma of lung, History of anemia, History of carcinoma in situ of skin, History of lung cancer, History of oral hairy leukoplakia, History of squamous cell carcinoma, Hyperlipidemia, Hypertension, Low vitamin B12 level, Lung cancer, Thyromegaly, UTI (urinary tract infection), and Vitamin D deficiency.  She does not have any pertinent problems on file.  She  has a past surgical history that includes Lung surgery (2012); Cholecystectomy (1999); Oral Lesion Excision/Biopsy; Partial glossectomy; Tubal ligation (1970); Eye surgery (11/24/2020); Colonoscopy; embolization cerebral (Left,  6/29/2022); Bronchoscopy (Bilateral, 10/17/2022); and Venous Access Device (Port) (N/A, 12/12/2022).  Her family history includes Colon cancer in her brother; No Known Problems in her father and another family member; Ovarian cancer in her mother and sister.  She  reports that she quit smoking about 8 years ago. Her smoking use included cigarettes. She has never been exposed to tobacco smoke. She has never used smokeless tobacco. She reports that she does not currently use alcohol. She reports that she does not use drugs.  Current Outpatient Medications   Medication Sig Dispense Refill    atorvastatin (LIPITOR) 20 MG tablet TAKE 1 TABLET EVERY NIGHT 90 tablet 1    cholecalciferol (VITAMIN D3) 1000 units tablet Take 1 tablet by mouth Daily. HOLDING FOR DOS      Cyanocobalamin (VITAMIN B12 PO) Take  by mouth Daily.      mirtazapine (REMERON) 7.5 MG half tablet Take 2 half tablet by mouth Every Night.      predniSONE (DELTASONE) 10 MG tablet Take 1 tablet by mouth Daily. 30 tablet 2    trametinib dimethyl sulfoxide (MEKINIST) 0.5 MG chemo tablet Take 1 tablet by mouth Daily. 30 tablet 2    zolpidem (AMBIEN) 5 MG tablet Take 1 tablet by mouth At Night As Needed for Sleep. 30 tablet 0     No current facility-administered medications for this visit.     Current Outpatient Medications on File Prior to Visit   Medication Sig    atorvastatin (LIPITOR) 20 MG tablet TAKE 1 TABLET EVERY NIGHT    cholecalciferol (VITAMIN D3) 1000 units tablet Take 1 tablet by mouth Daily. HOLDING FOR DOS    Cyanocobalamin (VITAMIN B12 PO) Take  by mouth Daily.    mirtazapine (REMERON) 7.5 MG half tablet Take 2 half tablet by mouth Every Night.    predniSONE (DELTASONE) 10 MG tablet Take 1 tablet by mouth Daily.    trametinib dimethyl sulfoxide (MEKINIST) 0.5 MG chemo tablet Take 1 tablet by mouth Daily.    zolpidem (AMBIEN) 5 MG tablet Take 1 tablet by mouth At Night As Needed for Sleep.     No current facility-administered medications on file  "prior to visit.     She is allergic to sulfa antibiotics..    Review of Systems   Musculoskeletal:  Negative for gait problem.   Neurological:  Negative for dizziness, light-headedness and headaches.     Objective     Vitals:    09/05/23 0951   BP: 110/70   Pulse: 85   Resp: 16   Temp: 97.3 °F (36.3 °C)   SpO2: 97%   Weight: 44 kg (97 lb)   Height: 160 cm (62.99\")     Body mass index is 17.19 kg/m².    Tobacco Use: Medium Risk    Smoking Tobacco Use: Former    Smokeless Tobacco Use: Never    Passive Exposure: Never          Physical Exam  Vitals and nursing note reviewed.   Constitutional:       General: She is not in acute distress.     Appearance: Normal appearance.   Eyes:      Extraocular Movements: Extraocular movements intact.      Conjunctiva/sclera: Conjunctivae normal.   Cardiovascular:      Rate and Rhythm: Normal rate.   Pulmonary:      Effort: Pulmonary effort is normal.   Musculoskeletal:         General: Normal range of motion.      Cervical back: Normal range of motion.   Skin:     General: Skin is warm and dry.   Neurological:      General: No focal deficit present.      Mental Status: She is alert and oriented to person, place, and time.      Cranial Nerves: No cranial nerve deficit.      Sensory: No sensory deficit.      Motor: No weakness.      Coordination: Coordination normal.      Gait: Gait normal.   Psychiatric:         Mood and Affect: Mood normal.         Behavior: Behavior normal.         Thought Content: Thought content normal.         Judgment: Judgment normal.   Neurologic Exam     Mental Status   Oriented to person, place, and time.         Assessment & Plan   Independent Review of Radiographic Studies:      I personally reviewed the images from the following studies.    The cerebral embolization dated 6/29/2022 was reviewed with the patient and shows successful web embolization of the left posterior communicating artery aneurysm.  No additional aneurysms are appreciated.    Medical " Decision Makin-year-old female with history of an unruptured left posterior communicating artery aneurysm now status post web embolization on 2022.  Patient has done well postoperatively with no current neurologic complaints.  Patient's 1 year follow-up angiogram is now due.  She continues to medically treat her underlying hypertension and hyperlipidemia.  She is a former smoker and has fibromuscular dysplasia for her aneurysm recurrence risk factors.  She is on prednisone for her cancer and continues to be followed by oncology.    Diagnoses and all orders for this visit:    1. Cerebral aneurysm without rupture (Primary)  -     IR Arch & 3 Vessel Head; Future  -     Basic Metabolic Panel; Future  -     CBC & Differential; Future    2. Primary hypertension  -     IR Arch & 3 Vessel Head; Future  -     Basic Metabolic Panel; Future  -     CBC & Differential; Future    3. Hyperlipidemia, unspecified hyperlipidemia type  -     IR Arch & 3 Vessel Head; Future  -     Basic Metabolic Panel; Future  -     CBC & Differential; Future    4. History of lung cancer      Return in about 2 weeks (around 2023) for Recheck, Cerebral Angiogram (DSA).

## 2023-08-30 NOTE — TELEPHONE ENCOUNTER
Called pt and left message advising she does not need any imaging before her next appt. She just needs an appointment to come in so her and Dr. Soriano can discuss doing another cerebral angiogram to evaluate her aneurysm. Espial [employer] message also sent to patient.

## 2023-09-05 ENCOUNTER — OFFICE VISIT (OUTPATIENT)
Dept: NEUROSURGERY | Facility: CLINIC | Age: 83
End: 2023-09-05
Payer: MEDICARE

## 2023-09-05 ENCOUNTER — TELEPHONE (OUTPATIENT)
Dept: NEUROSURGERY | Facility: CLINIC | Age: 83
End: 2023-09-05

## 2023-09-05 VITALS
SYSTOLIC BLOOD PRESSURE: 110 MMHG | DIASTOLIC BLOOD PRESSURE: 70 MMHG | WEIGHT: 97 LBS | RESPIRATION RATE: 16 BRPM | OXYGEN SATURATION: 97 % | BODY MASS INDEX: 17.19 KG/M2 | HEIGHT: 63 IN | TEMPERATURE: 97.3 F | HEART RATE: 85 BPM

## 2023-09-05 DIAGNOSIS — E78.5 HYPERLIPIDEMIA, UNSPECIFIED HYPERLIPIDEMIA TYPE: ICD-10-CM

## 2023-09-05 DIAGNOSIS — Z85.118 HISTORY OF LUNG CANCER: ICD-10-CM

## 2023-09-05 DIAGNOSIS — I10 PRIMARY HYPERTENSION: ICD-10-CM

## 2023-09-05 DIAGNOSIS — I67.1 CEREBRAL ANEURYSM WITHOUT RUPTURE: Primary | ICD-10-CM

## 2023-09-05 PROCEDURE — 1160F RVW MEDS BY RX/DR IN RCRD: CPT | Performed by: RADIOLOGY

## 2023-09-05 PROCEDURE — 1159F MED LIST DOCD IN RCRD: CPT | Performed by: RADIOLOGY

## 2023-09-05 PROCEDURE — 3074F SYST BP LT 130 MM HG: CPT | Performed by: RADIOLOGY

## 2023-09-05 PROCEDURE — 99214 OFFICE O/P EST MOD 30 MIN: CPT | Performed by: RADIOLOGY

## 2023-09-05 PROCEDURE — 3078F DIAST BP <80 MM HG: CPT | Performed by: RADIOLOGY

## 2023-09-08 ENCOUNTER — OFFICE VISIT (OUTPATIENT)
Dept: ONCOLOGY | Facility: CLINIC | Age: 83
End: 2023-09-08
Payer: MEDICARE

## 2023-09-08 ENCOUNTER — INFUSION (OUTPATIENT)
Dept: ONCOLOGY | Facility: HOSPITAL | Age: 83
End: 2023-09-08
Payer: MEDICARE

## 2023-09-08 VITALS
OXYGEN SATURATION: 97 % | SYSTOLIC BLOOD PRESSURE: 123 MMHG | RESPIRATION RATE: 16 BRPM | HEIGHT: 63 IN | WEIGHT: 96.9 LBS | DIASTOLIC BLOOD PRESSURE: 71 MMHG | BODY MASS INDEX: 17.17 KG/M2 | TEMPERATURE: 97.7 F | HEART RATE: 103 BPM

## 2023-09-08 DIAGNOSIS — C34.12 MALIGNANT NEOPLASM OF UPPER LOBE OF LEFT LUNG: ICD-10-CM

## 2023-09-08 DIAGNOSIS — C34.11 MALIGNANT NEOPLASM OF UPPER LOBE OF RIGHT LUNG: ICD-10-CM

## 2023-09-08 DIAGNOSIS — Z45.2 FITTING AND ADJUSTMENT OF VASCULAR CATHETER: Primary | ICD-10-CM

## 2023-09-08 DIAGNOSIS — G47.00 INSOMNIA, UNSPECIFIED TYPE: ICD-10-CM

## 2023-09-08 DIAGNOSIS — Z85.118 HISTORY OF LUNG CANCER: ICD-10-CM

## 2023-09-08 LAB
ALBUMIN SERPL-MCNC: 3.6 G/DL (ref 3.5–5.2)
ALBUMIN/GLOB SERPL: 1.6 G/DL
ALP SERPL-CCNC: 70 U/L (ref 39–117)
ALT SERPL W P-5'-P-CCNC: 10 U/L (ref 1–33)
ANION GAP SERPL CALCULATED.3IONS-SCNC: 13 MMOL/L (ref 5–15)
AST SERPL-CCNC: 19 U/L (ref 1–32)
BASOPHILS # BLD AUTO: 0.02 10*3/MM3 (ref 0–0.2)
BASOPHILS NFR BLD AUTO: 0.2 % (ref 0–1.5)
BILIRUB SERPL-MCNC: 0.2 MG/DL (ref 0–1.2)
BUN SERPL-MCNC: 14 MG/DL (ref 8–23)
BUN/CREAT SERPL: 21.2 (ref 7–25)
CALCIUM SPEC-SCNC: 9.1 MG/DL (ref 8.6–10.5)
CHLORIDE SERPL-SCNC: 107 MMOL/L (ref 98–107)
CO2 SERPL-SCNC: 23 MMOL/L (ref 22–29)
CREAT SERPL-MCNC: 0.66 MG/DL (ref 0.6–1.1)
DEPRECATED RDW RBC AUTO: 59.4 FL (ref 37–54)
EGFRCR SERPLBLD CKD-EPI 2021: 88.3 ML/MIN/1.73
EOSINOPHIL # BLD AUTO: 0 10*3/MM3 (ref 0–0.4)
EOSINOPHIL NFR BLD AUTO: 0 % (ref 0.3–6.2)
ERYTHROCYTE [DISTWIDTH] IN BLOOD BY AUTOMATED COUNT: 16.9 % (ref 12.3–15.4)
GLOBULIN UR ELPH-MCNC: 2.3 GM/DL
GLUCOSE SERPL-MCNC: 84 MG/DL (ref 65–99)
HCT VFR BLD AUTO: 37.7 % (ref 34–46.6)
HGB BLD-MCNC: 12.1 G/DL (ref 12–15.9)
IMM GRANULOCYTES # BLD AUTO: 0.03 10*3/MM3 (ref 0–0.05)
IMM GRANULOCYTES NFR BLD AUTO: 0.3 % (ref 0–0.5)
LYMPHOCYTES # BLD AUTO: 1.34 10*3/MM3 (ref 0.7–3.1)
LYMPHOCYTES NFR BLD AUTO: 15 % (ref 19.6–45.3)
MCH RBC QN AUTO: 30.6 PG (ref 26.6–33)
MCHC RBC AUTO-ENTMCNC: 32.1 G/DL (ref 31.5–35.7)
MCV RBC AUTO: 95.2 FL (ref 79–97)
MONOCYTES # BLD AUTO: 0.36 10*3/MM3 (ref 0.1–0.9)
MONOCYTES NFR BLD AUTO: 4 % (ref 5–12)
NEUTROPHILS NFR BLD AUTO: 7.18 10*3/MM3 (ref 1.7–7)
NEUTROPHILS NFR BLD AUTO: 80.5 % (ref 42.7–76)
NRBC BLD AUTO-RTO: 0 /100 WBC (ref 0–0.2)
PLATELET # BLD AUTO: 242 10*3/MM3 (ref 140–450)
PMV BLD AUTO: 9.8 FL (ref 6–12)
POTASSIUM SERPL-SCNC: 4.6 MMOL/L (ref 3.5–5.2)
PROT SERPL-MCNC: 5.9 G/DL (ref 6–8.5)
RBC # BLD AUTO: 3.96 10*6/MM3 (ref 3.77–5.28)
SODIUM SERPL-SCNC: 143 MMOL/L (ref 136–145)
WBC NRBC COR # BLD: 8.93 10*3/MM3 (ref 3.4–10.8)

## 2023-09-08 PROCEDURE — 99214 OFFICE O/P EST MOD 30 MIN: CPT | Performed by: INTERNAL MEDICINE

## 2023-09-08 PROCEDURE — 1126F AMNT PAIN NOTED NONE PRSNT: CPT | Performed by: INTERNAL MEDICINE

## 2023-09-08 PROCEDURE — 25010000002 HEPARIN LOCK FLUSH PER 10 UNITS: Performed by: INTERNAL MEDICINE

## 2023-09-08 PROCEDURE — 3078F DIAST BP <80 MM HG: CPT | Performed by: INTERNAL MEDICINE

## 2023-09-08 PROCEDURE — 3074F SYST BP LT 130 MM HG: CPT | Performed by: INTERNAL MEDICINE

## 2023-09-08 PROCEDURE — 85025 COMPLETE CBC W/AUTO DIFF WBC: CPT

## 2023-09-08 PROCEDURE — 36591 DRAW BLOOD OFF VENOUS DEVICE: CPT

## 2023-09-08 PROCEDURE — 80053 COMPREHEN METABOLIC PANEL: CPT

## 2023-09-08 RX ORDER — SODIUM CHLORIDE 0.9 % (FLUSH) 0.9 %
10 SYRINGE (ML) INJECTION AS NEEDED
Status: DISCONTINUED | OUTPATIENT
Start: 2023-09-08 | End: 2023-09-08 | Stop reason: HOSPADM

## 2023-09-08 RX ORDER — HEPARIN SODIUM (PORCINE) LOCK FLUSH IV SOLN 100 UNIT/ML 100 UNIT/ML
500 SOLUTION INTRAVENOUS AS NEEDED
OUTPATIENT
Start: 2023-09-08

## 2023-09-08 RX ORDER — SODIUM CHLORIDE 0.9 % (FLUSH) 0.9 %
10 SYRINGE (ML) INJECTION AS NEEDED
OUTPATIENT
Start: 2023-09-08

## 2023-09-08 RX ORDER — HEPARIN SODIUM (PORCINE) LOCK FLUSH IV SOLN 100 UNIT/ML 100 UNIT/ML
500 SOLUTION INTRAVENOUS AS NEEDED
Status: DISCONTINUED | OUTPATIENT
Start: 2023-09-08 | End: 2023-09-08 | Stop reason: HOSPADM

## 2023-09-08 RX ORDER — ZOLPIDEM TARTRATE 5 MG/1
5 TABLET ORAL NIGHTLY PRN
Qty: 30 TABLET | Refills: 0 | Status: SHIPPED | OUTPATIENT
Start: 2023-09-08

## 2023-09-08 RX ADMIN — Medication 10 ML: at 14:59

## 2023-09-08 RX ADMIN — Medication 500 UNITS: at 14:59

## 2023-09-08 NOTE — PROGRESS NOTES
REASON FOR FOLLOW-UP: Recurrent lung cancer.    History of Present Illness    The patient is a 83 y.o. female with the above-mentioned history, who returns the office today in anticipation of pembrolizumab which she continues to receive every 3 weeks.  She is due today for her ninth cycle.  Thankfully, she continues to have excellent tolerance to ongoing immunotherapy.  She denies any new or worsening shortness of breath.  She has no chest pain.  Her oxygen needs are unchanged.  She has no rash.  Her bowels are moving normally.  She is eating and drinking without difficulty.  She is overall feeling very well.  Her energy is adequate and she, again can accomplish activities that she desires.      She is, however,, beginning to note left shoulder and scapular pain.  This has been present over the last 2 to 3 weeks and CT of chest, abdomen pelvis performed 5/24/2023 were performed now revealing dense consolidation left apex along the left margin of the aortic arch increased to more consolidated measuring 4.9 x 5.0 cm previously 4.4 x 4.0 cm with increase sclerosis of the anterior left second rib.  Extension soft tissue density anterior to the left first and second ribs represents a change adversely with index area measuring 3.7 x 4.4 previously 1.8 x 2.9.  This may be post radiation this is concerning for progression of disease and would explain the patient's progressive symptoms.  We have discussed that she should undergo a PET/CT to help clarify these issues.  We will continue, at this point, her Keytruda as well as adjust her pain medications.    Follow-up PET/CT now available reveals progression of disease in left upper thorax, left supraclavicular adenopathy new metastasis left posterior fourth ribs, no evidence of metastatic disease in the abdomen or pelvis.    The patient is seen with her son and daughter 6/12/2023 and it is clear that she is not developing a Pancoast like syndrome with progression of her  malignancy in the left upper thorax and associated symptoms.  We have discussed discontinuance of her immunotherapy and moving to targeted therapy with dabrafenib and trametinib as we also request radiation therapy repeat consultation and try to manage her pain more effectively.    The patient was seen today,6/23/2023, for triage visit.  She is seen with her  back in the infusion area.  Reports vomiting twice daily for the last 3 days.  Has Zofran available at home which has been helpful with her nausea, however today she was unable to keep Zofran tablet down.  Reports decreased appetite and worsening nausea over the last week.  She recently discontinued prednisone and attempted THC for nausea and appetite, however experienced dizziness and worsening nausea with this.  She stopped taking THC, and feels her dizziness has improved some.  She continues on oxycodone every 3 hours for pain, however when she was experiencing worsening dizziness started taking half a tablet every 3 hours to try and help her dizziness.  She feels her pain is relatively well controlled.  She also complains of constipation.  Had a BM yesterday, passing gas just fine.  Has been utilizing Senokot as 1 tablet twice daily.  She also started drinking Ensure yesterday, and is trying to drink 3 ensures daily.  Her weight has declined to 100 lbs. he has not yet started her new oral chemo, Dabrafenib and Trametinib as she has not yet received the medication.  Denies fever or chills.  Denies new or worsening pain.    Patient seen again in follow-up 6/27/2023.  She was sent for an MRI of the brain with and without contrast last week due to complaints of nausea however the patient was unable to complete the MRI.  She reports that her nausea has actually completely resolved.  She has been eating and drinking better since her last visit.  She does still struggle with constipation and is taking Senokot-as 1 tablet twice a day.  She is asking if she  can take more.  We discussed that she can take 2 tablets twice daily if needed.  She denies issues with headaches, blurred vision, or double vision.  Patient reports that she is feeling better, and ready to start on her dabrafenib, and trametinib.      The patient is next seen 7/5/2023.  The patient had been seen by radiation therapy with plans to treat painful left chest wall lesion.  She is next seen 7/5/2023 fortunately with her pain modestly controlled with current medications and with plans to start radiation therapy for palliation x10 treatments.  Additionally she is expected to obtain dabrafenib and trametinib within the next several days and is willing to initiate that treatment.    7/17/2023 patient comes into the office today for a triage visit for IV fluids and reporting nausea and vomiting.  She started her dabrafenib and trametinib about 1 week ago.  Since that time she has struggled with some nausea that started this past weekend.  She initially was not needing nausea medication but thereafter started using ondansetron as a premedication just yesterday and today.  She reports she was vomiting shortly after taking her medications this morning.  After further discussion she was only waiting 15 minutes following the nausea medication to take her other meds.  We discussed today giving it closer to 30 minutes to 1 hour to be in full effect.  Patient did struggle drinking yesterday but had done well up to that.  She did start with radiation just today.    Patient returns on 7/21/2023 for evaluation and possible IV fluids as her  called in stating that she was sleeping the majority of the day and not eating.  Her nausea has been much improved with waiting 30 minutes prior to dabrafenib and trametinib.  She has however had a couple episodes where she vomits directly following taking one of the medications.  This was not related to nausea she reports.  She denies any other difficulty swallowing however  did eat a piece of chicken last night.  She states that all she ate yesterday.  She does report drinking ensures however her daughter states she is only taking sips of these.  Her daughter does accompany her in the office today.  They are quite concerned because the patient is falling asleep even when they are speaking with her.  Patient is awake during our meeting today though.  Reports she is taking her prednisone however is stopped taking her potassium pills because they are so large.    As result of toxicity the patient was taken off of dabrafenib and trametinib when seen 7/21/2023, given additional IV hydration and further supportive care.  Plans were made for follow-up on recovery to determine as to whether to redose the medications.    Unfortunately she required admission 7/25-30/2023 with failure to thrive.  Subsequent assessments including blood cultures were negative and patient was treated briefly with IV antibiotic therapy, started PT and OT, medications adjusted for hypotension and treated symptomatically for nausea and vomiting.  She was able to improve enough to be discharged home as she continued radiation therapy started 7/17/2023 and completed 7/31/2023 to the left chest wall.    She is next seen back 8/4/2023.  She has made significant improvement off the targeted therapy and completed radiation therapy.  Wonderfully this has included resolution of her pain or discomfort and her breathing is also improved.  We have had a lengthy conversation as to how to improve her performance status and attempt to use targeted therapy if possible to her benefit and tolerance.    Patient seen today 8/21/2023 with complaints of worsening fatigue and shortness of breath.  She states that she gets winded and tired with walking room to room in her house.  She has to stop and rest frequently.  She does note occasional cough which has not worsened.  She denies fevers.  No chest pain.  Last week she did call and was  instructed to increase her prednisone to help her appetite to 20 mg daily, which has helped.  She is also taking Remeron for sleep which does also seem to be helping.  She states that she is sleeping about 6 to 8 hours at night.  She denies any complaints of pain at all, and states that she is not taking anything for pain control.    Patient states that she has not heard anything about receiving lower dose dabrafenib or trametinib.  She is also very hesitant to restart these medications as she was so ill when she previously took them.  We discussed that it would be at a significantly lower dose, however again, she is hesitant.  Patient states that if she does restart them it would likely not be until she returns from her trip to [place].  She leaves on September 19 for this trip.    Subsequent scans 8/25/2023, fortunately, with stable left apical mass with mediastinal chest wall involvement unchanged 1.4 cm supraclavicular lymph node.  No new findings of metastatic disease.    The patient is seen back 9/8/2023.  Fortunately she is improved dramatically off therapy and is gratified that his studies have not worsened in any substantial way.  We have discussed both her scan reports and echocardiogram that does not show significant reduction of her ejection fraction.  As result of her current stable status we have asked her to restart Mekinist at 0.5 mg daily and Tafinlar at 50 mg twice daily.    ONCOLOGY HISTORY:      The patient is an 83-year-old female with the below medical history including chronic obstructive pulmonary disease who was seen in the [facility] multidisciplinary thoracic oncology clinic July 1, 2021.  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      She had undergone a CT of the chest at [facility] 6/16/2021 showing postsurgical changes in the left chest from right upper lobectomy, 8 mm irregular nodule medial segment of the right lower lobe and diffuse changes of emphysema with fibrotic changes in the periphery, no suspicious hilar or mediastinal nodes, no pleural effusion.  New finding was suspicious for new malignancy with the patient felt to be a poor candidate for surgical resection.  A PET CT and PFTs were requested thereafter.  The PET/CT demonstrated ill-defined FDG avid soft tissue thickening left aspect mediastinum within the operative bed, 1 cm hypermetabolic pulmonary nodule right lower lobe and asymmetric thickening patchy uptake involving the right base of tongue.  There is subtle uptake within the L1 vertebral body which is indeterminate and a sub-6 mm pulmonary nodule of right lung and subcentimeter hypodense hepatic lesion which was below PET resolution.       The patient's case was discussed in thoracic conference 7/22/2021 with consideration of the patient undergoing L1 vertebral body MRI, confirmatory biopsy left mediastinal and assessment by ENT (Dr. Caballero) who had worked with the patient previously.  She, incidentally, indicates that radiation therapy was given apparently intraoperatively at her recent surgery?  She still has issues with difficulty chewing and swallowing post procedures and was to be followed up with Dr. Caballero in the near future as planned.                                                            She was seen in the thoracic clinic on the same day with plans to proceed with MRI of the lumbar spine, biopsy left mediastinal nodular area of potential disease and follow-up of her ENT assessment as well as undergo a guardant 360 assessment in our office.  She underwent the biopsy 8/13/2021 found to be consistent with invasive moderately differentiated adenocarcinoma with PD-L1 TPS score 40%.  MRI of the lumbar spine revealed no evidence of metastatic disease  though the patient did have multilevel degenerative disease throughout the lumbar spine.  She had an office follow-up with Dr. 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  She underwent laryngoscopy 8/18/2021 with right base of tongue without evidence of lesions or masses in the region.     She was seen by Dr. Hernandez 8/19/2021 and, her findings, had been presented in lung conference.  Her findings were consistent with 2 separate lesions including a nodule in the superior segment of the right lower lobe and a mass in the upper portion of the left chest with the left chest lesion biopsy positive for adenocarcinoma.  The consensus was that these were to separate-synchronous primaries.  Stereotactic radiation each lesions were felt to be the appropriate way to proceed and the patient was referred to radiation therapy.     The patient is seen in office 8/23/2021 agreeable to the radiation therapy as well as additional assessments including Caris molecular assessment of her biopsy.  Again her guardant 360 is currently pending and we would follow her after she completes radiation therapy with subsequent scans.    She was able to proceed with SBRT to the right lung at primary #1 with 5 treatments at 1000 cGy per fraction in the left lung primary similarly at 1000 cGy per fraction x5.  Her treatment ranged between 8/31-9/13/2021.  She is now scheduled for follow-up 11/23/2021.    The patient subsequent genomic testing is discussed with her as she is is seen back 9/23/2021.  Her guardant 360 did not determine any new abnormalities but her Caris-MI profile-does reveal sensitivity to immunotherapy as well as a BRAF mutation.  This could be extremely important as we follow the patient from this point.  Fortunately she is feeling extremely well and quite pleased about her treatments.    Patient had subsequent PET/CT  11/23/2021 demonstrates decrease in size and avidity of the previously noted intensely FDG avid nodules left lobectomy operative site and right lower lobe.  Surrounding groundglass and pulmonary opacification is consistent with postradiation changes, a few new subcentimeter pulmonary nodules are present in the right upper lobe and indeterminant, stable subcentimeter hepatic lesion is below PET resolution no other findings of FDG-avid disease are seen in neck abdomen or pelvis.  The patient seen in office 12/1/2021 with a relatively good performance status stating she is not having any new symptoms and very pleased about her results on her PET/CT.  She realizes we'll have to follow this further but subsequent scans in 6 months.  She is also hoping to see an oral surgeon that previously helped her-Dr. Wu.    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  There is evolution of presumed postradiation changes in the right midlung with soft tissue mass within the left lumpectomy operative bed minimally decreased in size.  There is a sub-6 mm nodule in the right upper lobe not definitively seen, indeterminate and an indeterminate left renal lesion at 1.5 cm unchanged from 7/13/2021.  The patient is currently scheduled to see Dr. Dowell on 6/23/2022.  She is feeling well without any additional symptoms.    The patient required admission 10/14 - 10/18/2022 presenting with shortness of breath and cough that was nonproductive.  She had been on oral antibiotics and did not improved and was admitted for therapy for apparent pneumonia.  This eventually led to bronchoscopy after initial CT revealed postsurgical changes, new masslike 6.7 cm area of consolidation anterior left lung raising concern for potential malignancy and a follow-up PET/CT planned.   Bronchoscopy and biopsy left lower lung failed to show evidence of malignancy.  The patient's PET/CT, however, demonstrated an irregular pleural-based soft tissue density thickening in the left upper lobe that was intensely FDG avid new from 6/8/2022 thought to be a malignant recurrence with spread into the left lung parenchyma.  There is intensely pleural-based avidity within the left lower lobe thought to be metastatic disease with postradiation of the left apex as well.  There is a small focus of uptake in the right hilum grossly unchanged in size and post radiation changes in the right lower lobe.  There is indeterminate density left renal that was grossly unchanged.  The patient is seen back in office 11/15/2022 indicating that she still has chest discomfort that is slowly worsening and that she has a chronic paroxysmal cough but has not improved.  We discussed that she very likely has recurrent disease and plan to proceed with a guardant 360 examination initially as we determine whether we should try to proceed with therapy particularly immunotherapy versus targeted therapy recognizing the above findings.    Patient's guardant 360 returned as again significant for BRAF V600E and the potential use of BRAF inhibitor/MET inhibitor dabrafenib and trametinib.  Additional information [id number] and use of alpelisib.    Unfortunately she required admission 11/28-12/1 with worsening back pain.  Fortunately she did not demonstrate evidence of metastatic disease but did have moderate degenerative changes which could cause radiculopathy.  Medications were altered to include subsequently MSR 15 mg p.o. every 12 hours, Norco as needed.    The patient now presents back 12/14/2022 to initiate therapy- initially with immunotherapy considering Caris study with PD-L1 TPS of 70% on 22 C3 and 28-8 assays.    Patient seen with her  and we discussed pain medications and adjustments thereafter and that she stopped taking MSIR  having only a short supply and having to use Norco more frequently.  She is willing to initiate Keytruda today we have discussed that considering her genomics treatment versus her BRAF mutation is also an option.    C1 Keytruda 12/14/2022    Patient is seen back 1/4/2023 in follow-up due for cycle 2 Keytruda.  Patient states that since receiving her first cycle she has had decreased appetite and decreased energy.  She has not been able to bring herself to eat much and she has notably lost 7 pounds.  She has also been struggling with constipation in relation to ongoing narcotics for pain control.  She has been taking senna S2 tabs twice a day.  We discussed adding daily MiraLAX.    Patient did just last week meet with dietitian, Zoila Clinton, to discuss ways to improve caloric intake.  She has not been able to implement any of these things yet though.    The patient is next seen 1/25/2023 with mild weight gain.  She is seen with family member both indicating that she has actually felt somewhat better in terms of performance status and general function.  We have discussed proceeding with a third cycle treatment and reevaluating by scan in 2 weeks.    The patient proceeded with treatment 1/25/2023 and underwent follow-up CT chest abdomen pelvis 2/8/2023 demonstrating small pericardial effusion that is decreased in size from 11/20/2022, ill-defined consolidation and pleural-based thickening in the left apex and upper lung has reduced slightly, additional index nodule soft tissue in the aspect the pericardium has not changed substantially, no evidence of metastatic disease in the abdomen pelvis.    The patient is seen with her daughter 2/15/2023 both indicating that she has definitely improved, stable weight, appetite and performance status.  She is also using her pain medication much less frequently and wonders whether she might begin to taper from her twice a day MS Contin.    Patient is seen back 3/8/2023 due for  ongoing pembrolizumab.  She continues on low-dose prednisone 10 mg daily and has noted significant improvement in her energy and appetite with this.  She is reluctant to taper this any further we discussed that it is fine for her to stay on daily dosing.    She did try to taper her pain medication going down to just MS Contin 15 mg every 12 hours while holding her hydrocodone.  However over the last few days she has had some intermittent pain in the left upper back alleviated with hydrocodone 2-3 times a day.  She currently is denying any pain.  Monitor.    She is next evaluated 3/29/2023 for ongoing Keytruda.  Evidently her pain medication was sent to the wrong pharmacy and will need to be represcribed today-long-acting MS Contin.  Otherwise she is doing reasonably well at present.    Patient seen 5/31/2023 with gradual development of left shoulder and left scapular pain.  Concurrent CT chest, abdomen and pelvis demonstrate interval increasing consolidation left upper lobe with extension anterior to the left upper ribs with sclerosis anterior left second rib.  This is suspicious for worsening malignancy.  Plans were made for subsequent PET/CT where the patient's pain medications were adjusted.PET/CT 6/5/2023 reveals progression of disease in left upper thorax, left supraclavicular adenopathy new metastasis left posterior fourth ribs, no evidence of metastatic disease in the abdomen or pelvis.    The patient is seen with her son and daughter 6/12/2023 and it is clear that she is not developing a Pancoast like syndrome with progression of her malignancy in the left upper thorax and associated symptoms.  We have discussed discontinuance of her immunotherapy and moving to targeted therapy with dabrafenib and trametinib as we also request radiation therapy repeat consultation and try to manage her pain more effectively.    Patient seen 7/5/2023 with plans to start palliative radiotherapy and to initiate concurrently  dabrafenib and trametinib.    As a result of toxicity (nausea and vomiting) the patient was taken off of dabrafenib and trametinib when seen 7/21/2023, given additional IV hydration and further supportive care.  Plans were made for follow-up on recovery to determine as to whether to redose the medications.    Unfortunately she required admission 7/25-30/2023 with failure to thrive.  Subsequent assessments including blood cultures were negative and patient was treated briefly with IV antibiotic therapy, started PT and OT, medications adjusted for hypotension and treated symptomatically for nausea and vomiting.  She was able to improve enough to be discharged home as she continued radiation therapy started 7/17/2023 and completed 7/31/2023 to the left chest wall.    She is next seen back 8/4/2023.  We have reviewed that she had been on dabrafenib and trametinib at 150 mg p.o. every 12 hours and 2 mg p.o. daily respectively and dosing could be changed considerably such as 50 mg twice daily and 0.5 mg daily.  Determination made to have her undergo repeat scans at 4 weeks and review her response to radiation therapy as we make application for lower dose targeted therapy, addition of Remeron p.o. nightly, tapering of her MS Contin to daily rather than twice daily, use of low-dose Ativan to undergo scans.    Subsequent scans 8/25/2023, fortunately, with stable left apical mass with mediastinal chest wall involvement unchanged 1.4 cm supraclavicular lymph node.  No new findings of metastatic disease.    The patient is seen back 9/8/2023.  Fortunately she is improved dramatically off therapy and is gratified that his studies have not worsened in any substantial way.  We have discussed both her scan reports and echocardiogram that does not show significant reduction of her ejection fraction.  As result of her current stable status we have asked her to restart Mekinist at 0.5 mg daily and Tafinlar at 50 mg twice daily to be  advanced as tolerated.    Past Medical History:   Diagnosis Date    Allergic rhinitis     Asthma     Cancer of floor of mouth 2014    Cerebral aneurysm     COPD (chronic obstructive pulmonary disease)     COVID 2020    Esophageal reflux     History of adenocarcinoma of lung     History of anemia     History of carcinoma in situ of skin     History of lung cancer     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 from [facility]    History of squamous cell carcinoma     Tongue    Hyperlipidemia     Hypertension     since early 60s    Low vitamin B12 level     Lung cancer     Thyromegaly     UTI (urinary tract infection)     Vitamin D deficiency         Past Surgical History:   Procedure Laterality Date    BRONCHOSCOPY Bilateral 10/17/2022    Procedure: BRONCHOSCOPY WITH FLUORO with biopsy and BAL;  Surgeon: India Flores MD;  Location: [facility] ENDOSCOPY;  Service: Pulmonary;  Laterality: Bilateral;  PRE/POST - lung mass    CHOLECYSTECTOMY  1999    COLONOSCOPY      EMBOLIZATION CEREBRAL Left 6/29/2022    Procedure: 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;  Surgeon: Bradley Dowell MD;  Location: [facility] HYBRID OR 18/19;  Service: Interventional Radiology;  Laterality: Left;    EYE SURGERY  11/24/2020    Took a muscle out of right eye    GLOSSECTOMY PARTIAL      less than one half tongue    LUNG SURGERY  2012    ORAL LESION EXCISION/BIOPSY      June and August 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    TUBAL ABDOMINAL LIGATION  1970    VENOUS ACCESS DEVICE (PORT) INSERTION N/A 12/12/2022    Procedure: MEDIPORT PLACEMENT;  Surgeon: Jason Villaseñor MD;  Location: [facility] MAIN OR;  Service: Vascular;  Laterality: N/A;        Current Outpatient Medications on File Prior to Visit   Medication Sig Dispense Refill    atorvastatin (LIPITOR) 20 MG tablet TAKE 1 TABLET EVERY NIGHT 90 tablet 1    cholecalciferol (VITAMIN D3) 1000 units tablet Take 1 tablet by mouth Daily. HOLDING  "FOR DOS      Cyanocobalamin (VITAMIN B12 PO) Take  by mouth Daily.      mirtazapine (REMERON) 7.5 MG half tablet Take 2 half tablet by mouth Every Night.      predniSONE (DELTASONE) 10 MG tablet Take 1 tablet by mouth Daily. 30 tablet 2    trametinib dimethyl sulfoxide (MEKINIST) 0.5 MG chemo tablet Take 1 tablet by mouth Daily. 30 tablet 2    [DISCONTINUED] zolpidem (AMBIEN) 5 MG tablet Take 1 tablet by mouth At Night As Needed for Sleep. 30 tablet 0     No current facility-administered medications on file prior to visit.        ALLERGIES:    Allergies   Allergen Reactions    Sulfa Antibiotics Rash        Social History     Socioeconomic History    Marital status:    Tobacco Use    Smoking status: Former     Types: Cigarettes     Quit date: 2015     Years since quittin.6     Passive exposure: Never    Smokeless tobacco: Never    Tobacco comments:     less than a pack per day, no smoking since , Quit 2015   Vaping Use    Vaping Use: Never used   Substance and Sexual Activity    Alcohol use: Not Currently     Comment: Socially -A few times a month    Drug use: No    Sexual activity: Defer     Family History   Problem Relation Age of Onset    Ovarian cancer Mother          at age 27    No Known Problems Father     Ovarian cancer Sister     Colon cancer Brother     No Known Problems Other     Malig Hyperthermia Neg Hx         Review of Systems  ROS as per HPI     Objective      Vitals:    23 1512   BP: 123/71   Pulse: 103   Resp: 16   Temp: 97.7 °F (36.5 °C)   TempSrc: Temporal   SpO2: 97%   Weight: 44 kg (96 lb 14.4 oz)   Height: 160 cm (62.99\")   PainSc: 0-No pain           2023     3:14 PM   Current Status   ECOG score 0      Physical Exam  Vitals reviewed.   Constitutional:       General: She is not in acute distress.     Appearance: Normal appearance. She is well-developed.   HENT:      Head: Normocephalic and atraumatic.   Eyes:      Pupils: Pupils are equal, round, and " reactive to light.   Cardiovascular:      Rate and Rhythm: Normal rate and regular rhythm.      Heart sounds: Normal heart sounds. No murmur heard.  Pulmonary:      Effort: Pulmonary effort is normal. No respiratory distress.      Breath sounds: Normal breath sounds. No wheezing, rhonchi or rales.   Abdominal:      General: Bowel sounds are normal. There is no distension.      Palpations: Abdomen is soft.   Musculoskeletal:         General: No swelling. Normal range of motion.      Cervical back: Normal range of motion.   Skin:     General: Skin is warm and dry.      Findings: No rash.   Neurological:      Mental Status: She is alert and oriented to person, place, and time.       Physical exam preformed and reviewed. No changes noted from previous exam. Henry Escobar MD ; 07/21/2023    RECENT LABS:  Results from last 7 days   Lab Units 09/08/23  1458   WBC 10*3/mm3 8.93   NEUTROS ABS 10*3/mm3 7.18*   HEMOGLOBIN g/dL 12.1   HEMATOCRIT % 37.7   PLATELETS 10*3/mm3 242     Results from last 7 days   Lab Units 09/08/23  1458   SODIUM mmol/L 143   POTASSIUM mmol/L 4.6   CHLORIDE mmol/L 107   CO2 mmol/L 23.0   BUN mg/dL 14   CREATININE mg/dL 0.66   CALCIUM mg/dL 9.1   ALBUMIN g/dL 3.6   BILIRUBIN mg/dL 0.2   ALK PHOS U/L 70   ALT (SGPT) U/L 10   AST (SGOT) U/L 19   GLUCOSE mg/dL 84                     Assessment & Plan     1.  Carcinoma of the lung  May 2015, status post previous left upper lobectomy for carcinoma of the lung.    2.  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   She underwent laryngoscopy 8/18/2021 with right base of tongue without evidence of lesions or masses in the region.    3.  Moderately differentiated adenocarcinoma of the lung.  CT of the chest 6/16/2021 demonstrated postsurgical changes, 8 mm irregular nodule medial segment of right lower lobe and diffuse  changes of emphysema.    She is seen in thoracic clinic with findings suspicious for new malignancy and concern of the patient being a poor operative candidate.    7/13/2021 PET CT demonstrated ill-defined avidity and soft tissue left aspect of the mediastinum, 1 cm hypermetabolic pulmonary nodule and asymmetric thickening involving the right base of tongue as well as subtle uptake in the L1 vertebral body.    This patient's case was discussed in thoracic clinic and plans were made to request an MRI of the lumbar spine, obtain a biopsy of the mediastinal involvement of the left had the patient reviewed by ENT.  Biopsy 8/13/2021 found to be consistent with invasive moderately differentiated adenocarcinoma with PD-L1 TPS score 40%.    7/24/2021 MRI of the lumbar spine revealed no evidence of metastatic disease though the patient did have multilevel degenerative disease throughout the lumbar spine.    Her findings were consistent with 2 separate lesions including a nodule in the superior segment of the right lower lobe and a mass in the upper portion of the left chest with the left chest lesion biopsy positive for adenocarcinoma.  The consensus was that these were to separate-synchronous primaries.  Stereotactic radiation each lesions were felt to be the appropriate way to proceed and the patient was referred to radiation therapy.  The patient was referred for radiation therapy and she was able to proceed with SBRT to the right lung at primary #1 with 5 treatments at 1000 cGy per fraction in the left lung primary similarly at 1000 cGy per fraction x5.  Her treatment ranged between 8/31-9/13/2021.    Her guardant 360 did not determine any new abnormalities but her Caris-MI profile-does reveal sensitivity to immunotherapy as well as a BRAF mutation.  PET/CT 11/23/2021 demonstrates decrease in size and avidity of the previously noted intensely FDG avid nodules left lobectomy operative site and right lower lobe.   Surrounding groundglass and pulmonary opacification is consistent with postradiation changes, a few new subcentimeter pulmonary nodules are present in the right upper lobe and indeterminant, stable subcentimeter hepatic lesion is below PET resolution no other findings of FDG-avid disease are seen in neck abdomen or pelvis.  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  There is evolution of presumed postradiation changes in the right midlung with soft tissue mass within the left lumpectomy operative bed minimally decreased in size.  There is a sub-6 mm nodule in the right upper lobe not definitively seen, indeterminate and an indeterminate left renal lesion at 1.5 cm unchanged from 7/13/2021.  Admission 10/14 - 10/18/2022 presenting with shortness of breath and cough that was nonproductive.  She had been on oral antibiotics and did not improved and was admitted for therapy for apparent pneumonia.  This eventually led to bronchoscopy after initial CT revealed postsurgical changes, new masslike 6.7 cm area of consolidation anterior left lung raising concern for potential malignancy and a follow-up PET/CT planned.   Bronchoscopy and biopsy left lower lung failed to show evidence of malignancy.    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  There is intensely pleural-based avidity within the left lower lobe thought to be metastatic disease with postradiation of the left apex as well.  There is a small focus of uptake in the right hilum grossly unchanged in size and post radiation changes in the right lower lobe.  There is indeterminate density left renal that was grossly unchanged.    Patient's guardant 360 returned  as again significant for BRAF V600E and the potential use of BRAF inhibitor/MET inhibitor dabrafenib and trametinib.  Additional information [id number] and use of alpelisib.  The patient now presents back 12/14/2022 to initiate therapy- initially with immunotherapy considering Caris study with PD-L1 TPS of 70% on 22 C3 and 28-8 assays.  Single agent Keytruda initiated 12/14/2022 2/8/2023 CT of the chest, abdomen pelviswith consolidation and masslike pleural thickening in the left apex and upper lung index areas have decreased somewhat.  There is no evidence of metastatic disease otherwise and a few indeterminate left renal lesions are stable.  After lengthy discussion with the patient and her daughter as to the stability to mild improvement with immunotherapy it is agreed that we would continue treatment at this point.  Proceed today, 4/19/2023 with cycle 7 pembrolizumab which is continued every 3 weeks  The patient proceeded to 8 cycles of pembrolizumab and underwent follow-up chest CT chest, abdomen pelvis revealing evolution of dense consolidation left upper lobe and extension of soft tissue mass anterior to left upper ribs with increase sclerosis anterior left second rib.  This was concerning for progression of disease versus radiation change and the patient was scheduled for subsequent PET/CT.  PET/CT 6/5/2023  reveals progression of disease in left upper thorax, left supraclavicular adenopathy new metastasis left posterior fourth ribs, no evidence of metastatic disease in the abdomen or pelvis.  Patient seen 6/12/2023 with plans to move her Norco to every 4 hours, discontinue Keytruda, make application for dabrafenib and trametinib at 150 mg p.o. every 12 hours and 2 mg p.o. daily respectively.  Patient referred for radiation therapy consultation concurrently.  Last dose of Keytruda 5/31/2023.  6/23/2023: Patient seen for triage visit.  She has not yet started dabrafenib or trametinib, she has not yet received  the medications.  Unfortunately she has been experiencing uncontrolled nausea/vomiting as further detailed below.   Patient seen 6/27/2023 reporting that her nausea has resolved.  She was unable to complete the MRI of the brain however Dr. Escobar did review the images patient did have and there was no evidence of edema or metastatic disease.  Patient can start her new oral medication once she receives the medication.  Patient seen 7/5/2023 with plans to start palliative radiotherapy x10 fractions and initiate dabrafenib and trametinib as soon as medications received.  7/17/2023 patient initiated radiation with 10 fractions planned.  Patient has received dabrafenib and trametinib.  Brought in for triage visit due to nausea associated with dosing.  She is premedicating with ondansetron however only waiting 15 minutes.  We discussed today that she needs to wait at least 30 minutes to 1 hour prior to taking the dabrafenib and trametinib.  The patient will do so.  We discussed she could also take this again if she feels the need 8 hours later for nausea control.  If she is having breakthrough nausea then she should call our office.  I have encouraged her to utilize electrolyte drinks.  She will get 1L normal liter normal saline in the office today.She was not nauseas while in the office so we did not give additional nausea medication.  We will have her return in 1 week repeat labs, review by nurse practitioner, and possible IV fluids.  I stressed the importance of calling sooner if she finds that the premedication is not controlling her nausea at which time we would have her come in for additional IV fluids and evaluation.  She is continuing daily radiation this week.  Case was discussed with Dr. Escobar today.  7/21/2023: Patient returns for short interval follow-up due to very poor appetite and sleeping the majority of the day.  Her nausea has been improved with premedicating 30 minutes prior to dabrafenib and  trametinib.  She however has been sleeping the majority of the day and is not eating when she is awake.  She does report taking ensures but she is only sipping on these.  Have given her samples of some of the office today and encouraged her to drink when while she is here.  Her performance status continues to decline and her daughter states that she was fixing dinner nightly just 2 weeks ago.  With performance status of 3 today I discussed the case with Dr. Escobar and we will hold her dabrafenib and  TRAMETINIB.  Her liver enzymes are elevated today as well.  We will monitor this next week and have her evaluated by Dr. Escobar at which time we could consider restarting her dabrafenib and trametinib at reduced doses pending performance status and laboratory evaluation.  Unfortunately she required admission 7/25-30/2023 with failure to thrive.  Subsequent assessments including blood cultures were negative and patient was treated briefly with IV antibiotic therapy, started PT and OT, medications adjusted for hypotension and treated symptomatically for nausea and vomiting.  She was able to improve enough to be discharged home as she continued radiation therapy started 7/17/2023 and completed 7/31/2023 to the left chest wall.  She is next seen back 8/4/2023.  Lengthy discussion as to reevaluation   Plans made for CT chest, abdomen, pelvis in 4 weeks  Patient seen 8/21/2023 with complaints of worsening fatigue and shortness of breath.  Patient scheduled for follow up CT scans this Friday. Lungs clear on exam,  Sats 97%.  Hgb stable at 11.7.  Will check BNP today.  Discussed may be related to disease and will need to see what scan shows.   Subsequent scans 8/25/2023, fortunately, with stable left apical mass with mediastinal chest wall involvement unchanged 1.4 cm supraclavicular lymph node.  No new findings of metastatic disease.  The patient is seen back 9/8/2023.  Fortunately she is improved dramatically off therapy and  is gratified that his studies have not worsened in any substantial way.  We have discussed both her scan reports and echocardiogram that does not show significant reduction of her ejection fraction.  As result of her current stable status we have asked her to restart Mekinist at 0.5 mg daily and Tafinlar at 50 mg twice daily    4.  Worsening back pain  Admission 11/28/2022 through 12/1/2022.  MRI of the cervical and thoracic spine fortunately failed to demonstrate metastatic disease.  Moderate degenerative changes noted which could cause radiculopathy.  Medication altered to include MS Contin 15 mg every 12 hours and Norco for breakthrough pain  She reports today her pain is adequately controlled  Patient with increasing pain 5/31/2023 with pain level of 6.  MS Contin was increased to 50 mg p.o. every 8 hours and Norco 10/325 #101 p.o. every 6 hours to move to every 4 hours as needed-E scribed to pharmacy  Patient seen 6/12/2023 with Norco moved to every 4 hours.  6/23/2023: Seen for triage visit.  Has been using oxycodone 20 mg every 3 hours as needed for pain.  Reports she has not been using the hydrocodone 10/325.  Was experiencing dizziness, so reduced her oxycodone use to half a tablet every 3 hours as needed.  Reports pain is uncontrolled during the night so she is having to wake at night time to take pain medicine.  They are questioning resuming long-acting pain medication, as she is waking throughout the night to take pain medicine.  She has already been prescribed MS Contin and has this available at home, did let her know it would be okay to resume this.  Assess 7/5/2023 with pain reduced to 2/10.  Plan to continue current therapy  Darlene Pfeiffer reports a pain score of 0.  Given her pain assessment as noted, treatment options were discussed and the following options were decided upon as a follow-up plan to address the patient's pain: continuation of current treatment plan for pain. Currently not requiring  pain medication.       5.  Poor appetite and malnutrition  Placed on prednisone 10 mg daily 1/4/2023 with significant improvement in her symptoms  Weight is stable today at just below 106 pounds  Patient assessed 6/12/23 with possible gummy therapy.  Stable performance status including weight and appetite 7/5/2023  Weight has declined as of 7/17/2023 due to nausea and vomiting that started this past weekend.  After further discussion the patient did stop her prednisone 10 mg while she was not feeling well.  We discussed today the importance of continuing this as it can help with her nausea and appetite and therefore she will restart tomorrow.  7/21/2023: Weight is stable today though albumin is declining at 3.  Patient is sleeping the majority of the day and not eating.  Yesterday she had a small piece of chicken and that is all.  She states she is drinking ensures however her daughter thinks that she is just sipping on these and not completing them.  Hopefully holding her dabrafenib and trametinib will be helpful with her being awake more and appetite.  I encouraged her to make sure she is taking her prednisone daily at 10 mg daily.  Remeron 7.5 mg p.o. nightly E scribed to pharmacy    6.  Uncontrolled nausea/vomiting  started after discontinuing prednisone and starting THC Gummies.  Started to experience dizziness with the THC Gummies.  Discontinue THC Gummies and dizziness improved, however she has remained nauseated now.  She has been utilizing Zofran with improvement, however today was unable to keep Zofran tablets down due to nausea/vomiting.  She will resume prednisone 10 mg daily.  She will receive 1 L IV normal saline in clinic today 10 mg IV Compazine.  We will also send prescription for Phenergan suppository, in case she is not able to keep Zofran down in the future.  Will also send for stat MRI brain since patient is now experiencing uncontrolled nausea vomiting and has recently had progression of her  cancer as seen on PET from 6/5/2023.  MRI brain was performed without contrast, even though contrast was ordered.  The patient also did not stay for entire exam to be completed.  Exam limited for assessment of metastatic disease.  Attempted to call patient to review results, however no answer, did leave detailed voicemail.  6/27/2023 patient reports that her nausea has resolved.  She did not complete the MRI however the images which were done showed no evidence of edema or metastatic disease.  Nausea reoccurred when seen on 7/17/2023 over this past weekend after initiation of dabrafenib and trametinib.  Patient was premedicating with ondansetron however only giving herself about 15 minutes and I encouraged her to give herself at least 30 minutes to 1 hour prior to taking the medications.  She will notify our office if this is not helpful.  7/21/2023: Premedication with ondansetron 30 minutes prior to dabrafenib and trametinib has been helpful.  Patient however has had some vomiting episodes directly after taking her medications where she has not had time to actually swallow them.  She denies difficulty swallowing however.  We will continue to monitor this.    7.  Hyperkalemia-  potassium 6/23/2023 3.1.  She has been having uncontrolled nausea, we will hold off on starting oral potassium replacement as it is unlikely she will tolerate that at this time.  Will recommend she increase oral potassium in her diet.  6/27/2023 potassium 3.0.  Nausea has resolved.  Patient will be given 40 M EQ p.o. potassium in the office and she will be started on 20 mEq daily of p.o. potassium.  Assessed 7/5/2023 with potassium 4.4  7/17/2023 potassium normal at 3.7  7/21/2023: Potassium 3.1, patient not taking potassium supplements at home because she does not like the big pill.  Changed to potassium chloride 10 mill equivalents, 2 tablets every a.m. as these will be smaller.   8/21/2023 potassium 4.6    Plan:  Continue prednisone  reduced to 10 mg daily.   Continue Remeron 7.5 mg nightly.   Patient asked to start lower dose dabrafenib and TRAMETINIB-apply for lower dosing 50 mg twice daily and 0.5 mg daily, slowly advance if tolerated well  2 weeks follow-up with NP, CBC, CMP  Call/ return sooner should the patient develop any new concerns or problems.      Patient is on a high risk medication requiring close monitoring for toxicity.      Henry Escobar MD  09/08/2023

## 2023-09-14 RX ORDER — POTASSIUM CHLORIDE 750 MG/1
20 TABLET, EXTENDED RELEASE ORAL DAILY
Qty: 60 TABLET | Refills: 1 | Status: SHIPPED | OUTPATIENT
Start: 2023-09-14

## 2023-09-15 ENCOUNTER — SPECIALTY PHARMACY (OUTPATIENT)
Dept: PHARMACY | Facility: HOSPITAL | Age: 83
End: 2023-09-15
Payer: MEDICARE

## 2023-09-15 RX ORDER — POTASSIUM CHLORIDE 750 MG/1
20 TABLET, EXTENDED RELEASE ORAL DAILY
Qty: 180 TABLET | OUTPATIENT
Start: 2023-09-15

## 2023-09-15 NOTE — PROGRESS NOTES
MTM telephone encounter re:adherence and side effects (Taflinar/Mekinist)     Darlene returned MTM call this afternoon and reports doing very well since restarting at lower doses. Thus far, patient denies side effects.  and reports gaining weight and sleeping better . Thus far, no missed doses since restarting. Advised pt to call office if any changes. Patient expressed understanding and had no additional questions at this time.       Patient also reports she has changed her pharmacy to [facility] in [place], [place].  Will update patient's chart to ensure all future scripts go there.     Dagmar Kenyon [facility]  9/15/2023  14:48 EDT             Called patient for toxicity check since restarting lower dose Taflinar/Mekinist, no answer. Left voicemail to return my call at direct line [phone].     Dion Kenyon, PharmD, BCOP

## 2023-09-22 ENCOUNTER — OFFICE VISIT (OUTPATIENT)
Dept: ONCOLOGY | Facility: CLINIC | Age: 83
End: 2023-09-22
Payer: MEDICARE

## 2023-09-22 ENCOUNTER — LAB (OUTPATIENT)
Dept: LAB | Facility: HOSPITAL | Age: 83
End: 2023-09-22
Payer: MEDICARE

## 2023-09-22 VITALS
RESPIRATION RATE: 16 BRPM | HEIGHT: 63 IN | OXYGEN SATURATION: 97 % | WEIGHT: 98.8 LBS | BODY MASS INDEX: 17.5 KG/M2 | TEMPERATURE: 97.7 F | SYSTOLIC BLOOD PRESSURE: 135 MMHG | HEART RATE: 84 BPM | DIASTOLIC BLOOD PRESSURE: 65 MMHG

## 2023-09-22 DIAGNOSIS — C34.11 MALIGNANT NEOPLASM OF UPPER LOBE OF RIGHT LUNG: Primary | ICD-10-CM

## 2023-09-22 DIAGNOSIS — G47.00 INSOMNIA, UNSPECIFIED TYPE: ICD-10-CM

## 2023-09-22 DIAGNOSIS — C34.11 MALIGNANT NEOPLASM OF UPPER LOBE OF RIGHT LUNG: ICD-10-CM

## 2023-09-22 DIAGNOSIS — Z79.899 LONG-TERM USE OF HIGH-RISK MEDICATION: ICD-10-CM

## 2023-09-22 LAB
ALBUMIN SERPL-MCNC: 3.7 G/DL (ref 3.5–5.2)
ALBUMIN/GLOB SERPL: 1.5 G/DL
ALP SERPL-CCNC: 88 U/L (ref 39–117)
ALT SERPL W P-5'-P-CCNC: 10 U/L (ref 1–33)
ANION GAP SERPL CALCULATED.3IONS-SCNC: 14 MMOL/L (ref 5–15)
AST SERPL-CCNC: 19 U/L (ref 1–32)
BASOPHILS # BLD AUTO: 0.03 10*3/MM3 (ref 0–0.2)
BASOPHILS NFR BLD AUTO: 0.4 % (ref 0–1.5)
BILIRUB SERPL-MCNC: 0.4 MG/DL (ref 0–1.2)
BUN SERPL-MCNC: 15 MG/DL (ref 8–23)
BUN/CREAT SERPL: 20.3 (ref 7–25)
CALCIUM SPEC-SCNC: 9.3 MG/DL (ref 8.6–10.5)
CHLORIDE SERPL-SCNC: 104 MMOL/L (ref 98–107)
CO2 SERPL-SCNC: 28 MMOL/L (ref 22–29)
CREAT SERPL-MCNC: 0.74 MG/DL (ref 0.6–1.1)
DEPRECATED RDW RBC AUTO: 59.1 FL (ref 37–54)
EGFRCR SERPLBLD CKD-EPI 2021: 81.4 ML/MIN/1.73
EOSINOPHIL # BLD AUTO: 0.21 10*3/MM3 (ref 0–0.4)
EOSINOPHIL NFR BLD AUTO: 2.7 % (ref 0.3–6.2)
ERYTHROCYTE [DISTWIDTH] IN BLOOD BY AUTOMATED COUNT: 16.4 % (ref 12.3–15.4)
GLOBULIN UR ELPH-MCNC: 2.5 GM/DL
GLUCOSE SERPL-MCNC: 99 MG/DL (ref 65–99)
HCT VFR BLD AUTO: 42.6 % (ref 34–46.6)
HGB BLD-MCNC: 12.9 G/DL (ref 12–15.9)
IMM GRANULOCYTES # BLD AUTO: 0.04 10*3/MM3 (ref 0–0.05)
IMM GRANULOCYTES NFR BLD AUTO: 0.5 % (ref 0–0.5)
LYMPHOCYTES # BLD AUTO: 2.04 10*3/MM3 (ref 0.7–3.1)
LYMPHOCYTES NFR BLD AUTO: 25.8 % (ref 19.6–45.3)
MCH RBC QN AUTO: 29.5 PG (ref 26.6–33)
MCHC RBC AUTO-ENTMCNC: 30.3 G/DL (ref 31.5–35.7)
MCV RBC AUTO: 97.5 FL (ref 79–97)
MONOCYTES # BLD AUTO: 0.45 10*3/MM3 (ref 0.1–0.9)
MONOCYTES NFR BLD AUTO: 5.7 % (ref 5–12)
NEUTROPHILS NFR BLD AUTO: 5.15 10*3/MM3 (ref 1.7–7)
NEUTROPHILS NFR BLD AUTO: 64.9 % (ref 42.7–76)
NRBC BLD AUTO-RTO: 0 /100 WBC (ref 0–0.2)
PLATELET # BLD AUTO: 268 10*3/MM3 (ref 140–450)
PMV BLD AUTO: 9.7 FL (ref 6–12)
POTASSIUM SERPL-SCNC: 3.7 MMOL/L (ref 3.5–5.2)
PROT SERPL-MCNC: 6.2 G/DL (ref 6–8.5)
RBC # BLD AUTO: 4.37 10*6/MM3 (ref 3.77–5.28)
SODIUM SERPL-SCNC: 146 MMOL/L (ref 136–145)
WBC NRBC COR # BLD: 7.92 10*3/MM3 (ref 3.4–10.8)

## 2023-09-22 PROCEDURE — 85025 COMPLETE CBC W/AUTO DIFF WBC: CPT

## 2023-09-22 PROCEDURE — 80053 COMPREHEN METABOLIC PANEL: CPT

## 2023-09-22 PROCEDURE — 36415 COLL VENOUS BLD VENIPUNCTURE: CPT

## 2023-09-22 NOTE — PROGRESS NOTES
REASON FOR FOLLOW-UP: Recurrent lung cancer.    History of Present Illness    The patient is a 83 y.o. female with the above-mentioned history who returns today 9/22/2023 continuing on Mekinist 0.5 mg daily and Tafinlar at 50 mg twice daily.  She also continues on prednisone only taking 10 mg daily.  She reports that she is feeling quite good.  She is tolerating things well.  She has a decent appetite denies issues with nausea, vomiting, diarrhea, or constipation.  She denies any issues with increased shortness of breath.    Patient did see ENT Dr. Topete, who has ordered an ultrasound of her neck, which will be done at [facility] on the 27th.  She is also scheduled for a cerebral angiogram by Dr. Calvin on 29 September.        ONCOLOGY HISTORY:      The patient is an 83-year-old female with the below medical history including chronic obstructive pulmonary disease who was seen in the [facility] multidisciplinary thoracic oncology clinic July 1, 2021.  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      She had undergone a CT of the chest at [facility] 6/16/2021 showing postsurgical changes in the left chest from right upper lobectomy, 8 mm irregular nodule medial segment of the right lower lobe and diffuse changes of emphysema with fibrotic changes in the periphery, no suspicious hilar or mediastinal nodes, no pleural effusion.  New finding was suspicious for new malignancy with the patient felt to be a poor candidate for surgical resection.  A PET CT and PFTs were requested thereafter.  The PET/CT demonstrated ill-defined FDG avid soft tissue thickening left aspect mediastinum within the operative bed, 1 cm hypermetabolic pulmonary nodule right lower lobe and asymmetric thickening patchy  uptake involving the right base of tongue.  There is subtle uptake within the L1 vertebral body which is indeterminate and a sub-6 mm pulmonary nodule of right lung and subcentimeter hypodense hepatic lesion which was below PET resolution.       The patient's case was discussed in thoracic conference 7/22/2021 with consideration of the patient undergoing L1 vertebral body MRI, confirmatory biopsy left mediastinal and assessment by ENT (Dr. Caballero) who had worked with the patient previously.  She, incidentally, indicates that radiation therapy was given apparently intraoperatively at her recent surgery?  She still has issues with difficulty chewing and swallowing post procedures and was to be followed up with Dr. Caballero in the near future as planned.                                                            She was seen in the thoracic clinic on the same day with plans to proceed with MRI of the lumbar spine, biopsy left mediastinal nodular area of potential disease and follow-up of her ENT assessment as well as undergo a guardant 360 assessment in our office.  She underwent the biopsy 8/13/2021 found to be consistent with invasive moderately differentiated adenocarcinoma with PD-L1 TPS score 40%.  MRI of the lumbar spine revealed no evidence of metastatic disease though the patient did have multilevel degenerative disease throughout the lumbar spine.  She had an office follow-up with Dr. 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  She underwent laryngoscopy 8/18/2021 with right base of tongue without evidence of lesions or masses in the region.     She was seen by Dr. Hernandez 8/19/2021 and, her findings, had been presented in lung conference.  Her findings were consistent with 2 separate lesions including a nodule in the superior segment of the right lower lobe and a mass in the upper portion of the  left chest with the left chest lesion biopsy positive for adenocarcinoma.  The consensus was that these were to separate-synchronous primaries.  Stereotactic radiation each lesions were felt to be the appropriate way to proceed and the patient was referred to radiation therapy.     The patient is seen in office 8/23/2021 agreeable to the radiation therapy as well as additional assessments including Caris molecular assessment of her biopsy.  Again her guardant 360 is currently pending and we would follow her after she completes radiation therapy with subsequent scans.    She was able to proceed with SBRT to the right lung at primary #1 with 5 treatments at 1000 cGy per fraction in the left lung primary similarly at 1000 cGy per fraction x5.  Her treatment ranged between 8/31-9/13/2021.  She is now scheduled for follow-up 11/23/2021.    The patient subsequent genomic testing is discussed with her as she is is seen back 9/23/2021.  Her guardant 360 did not determine any new abnormalities but her Caris-MI profile-does reveal sensitivity to immunotherapy as well as a BRAF mutation.  This could be extremely important as we follow the patient from this point.  Fortunately she is feeling extremely well and quite pleased about her treatments.    Patient had subsequent PET/CT 11/23/2021 demonstrates decrease in size and avidity of the previously noted intensely FDG avid nodules left lobectomy operative site and right lower lobe.  Surrounding groundglass and pulmonary opacification is consistent with postradiation changes, a few new subcentimeter pulmonary nodules are present in the right upper lobe and indeterminant, stable subcentimeter hepatic lesion is below PET resolution no other findings of FDG-avid disease are seen in neck abdomen or pelvis.  The patient seen in office 12/1/2021 with a relatively good performance status stating she is not having any new symptoms and very pleased about her results on her PET/CT.  She  realizes we'll have to follow this further but subsequent scans in 6 months.  She is also hoping to see an oral surgeon that previously helped her-Dr. Wu.    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  There is evolution of presumed postradiation changes in the right midlung with soft tissue mass within the left lumpectomy operative bed minimally decreased in size.  There is a sub-6 mm nodule in the right upper lobe not definitively seen, indeterminate and an indeterminate left renal lesion at 1.5 cm unchanged from 7/13/2021.  The patient is currently scheduled to see Dr. Dowell on 6/23/2022.  She is feeling well without any additional symptoms.    The patient required admission 10/14 - 10/18/2022 presenting with shortness of breath and cough that was nonproductive.  She had been on oral antibiotics and did not improved and was admitted for therapy for apparent pneumonia.  This eventually led to bronchoscopy after initial CT revealed postsurgical changes, new masslike 6.7 cm area of consolidation anterior left lung raising concern for potential malignancy and a follow-up PET/CT planned.  Bronchoscopy and biopsy left lower lung failed to show evidence of malignancy.  The patient's PET/CT, however, demonstrated an irregular pleural-based soft tissue density thickening in the left upper lobe that was intensely FDG avid new from 6/8/2022 thought to be a malignant recurrence with spread into the left lung parenchyma.  There is intensely pleural-based avidity within the left lower lobe thought to be metastatic disease with postradiation of the left apex as well.  There is a small focus of uptake in the right hilum grossly unchanged in size and post radiation changes in the right lower lobe.  There is indeterminate density left renal that was grossly  unchanged.  The patient is seen back in office 11/15/2022 indicating that she still has chest discomfort that is slowly worsening and that she has a chronic paroxysmal cough but has not improved.  We discussed that she very likely has recurrent disease and plan to proceed with a guardant 360 examination initially as we determine whether we should try to proceed with therapy particularly immunotherapy versus targeted therapy recognizing the above findings.    Patient's guardant 360 returned as again significant for BRAF V600E and the potential use of BRAF inhibitor/MET inhibitor dabrafenib and trametinib.  Additional information [id number] and use of alpelisib.    Unfortunately she required admission 11/28-12/1 with worsening back pain.  Fortunately she did not demonstrate evidence of metastatic disease but did have moderate degenerative changes which could cause radiculopathy.  Medications were altered to include subsequently MSR 15 mg p.o. every 12 hours, Norco as needed.    The patient now presents back 12/14/2022 to initiate therapy- initially with immunotherapy considering Caris study with PD-L1 TPS of 70% on 22 C3 and 28-8 assays.    Patient seen with her  and we discussed pain medications and adjustments thereafter and that she stopped taking MSIR having only a short supply and having to use Norco more frequently.  She is willing to initiate Keytruda today we have discussed that considering her genomics treatment versus her BRAF mutation is also an option.    C1 Keytruda 12/14/2022    Patient is seen back 1/4/2023 in follow-up due for cycle 2 Keytruda.  Patient states that since receiving her first cycle she has had decreased appetite and decreased energy.  She has not been able to bring herself to eat much and she has notably lost 7 pounds.  She has also been struggling with constipation in relation to ongoing narcotics for pain control.  She has been taking senna S2 tabs twice a day.  We discussed adding  daily MiraLAX.    Patient did just last week meet with dietitian, Zoila Clinton, to discuss ways to improve caloric intake.  She has not been able to implement any of these things yet though.    The patient is next seen 1/25/2023 with mild weight gain.  She is seen with family member both indicating that she has actually felt somewhat better in terms of performance status and general function.  We have discussed proceeding with a third cycle treatment and reevaluating by scan in 2 weeks.    The patient proceeded with treatment 1/25/2023 and underwent follow-up CT chest abdomen pelvis 2/8/2023 demonstrating small pericardial effusion that is decreased in size from 11/20/2022, ill-defined consolidation and pleural-based thickening in the left apex and upper lung has reduced slightly, additional index nodule soft tissue in the aspect the pericardium has not changed substantially, no evidence of metastatic disease in the abdomen pelvis.    The patient is seen with her daughter 2/15/2023 both indicating that she has definitely improved, stable weight, appetite and performance status.  She is also using her pain medication much less frequently and wonders whether she might begin to taper from her twice a day MS Contin.    Patient is seen back 3/8/2023 due for ongoing pembrolizumab.  She continues on low-dose prednisone 10 mg daily and has noted significant improvement in her energy and appetite with this.  She is reluctant to taper this any further we discussed that it is fine for her to stay on daily dosing.    She did try to taper her pain medication going down to just MS Contin 15 mg every 12 hours while holding her hydrocodone.  However over the last few days she has had some intermittent pain in the left upper back alleviated with hydrocodone 2-3 times a day.  She currently is denying any pain.  Monitor.    She is next evaluated 3/29/2023 for ongoing Keytruda.  Evidently her pain medication was sent to the wrong  pharmacy and will need to be represcribed today-long-acting MS Contin.  Otherwise she is doing reasonably well at present.    Patient seen 5/31/2023 with gradual development of left shoulder and left scapular pain.  Concurrent CT chest, abdomen and pelvis demonstrate interval increasing consolidation left upper lobe with extension anterior to the left upper ribs with sclerosis anterior left second rib.  This is suspicious for worsening malignancy.  Plans were made for subsequent PET/CT where the patient's pain medications were adjusted.PET/CT 6/5/2023 reveals progression of disease in left upper thorax, left supraclavicular adenopathy new metastasis left posterior fourth ribs, no evidence of metastatic disease in the abdomen or pelvis.    The patient is seen with her son and daughter 6/12/2023 and it is clear that she is not developing a Pancoast like syndrome with progression of her malignancy in the left upper thorax and associated symptoms.  We have discussed discontinuance of her immunotherapy and moving to targeted therapy with dabrafenib and trametinib as we also request radiation therapy repeat consultation and try to manage her pain more effectively.    Patient seen 7/5/2023 with plans to start palliative radiotherapy and to initiate concurrently dabrafenib and trametinib.    As a result of toxicity (nausea and vomiting) the patient was taken off of dabrafenib and trametinib when seen 7/21/2023, given additional IV hydration and further supportive care.  Plans were made for follow-up on recovery to determine as to whether to redose the medications.    Unfortunately she required admission 7/25-30/2023 with failure to thrive.  Subsequent assessments including blood cultures were negative and patient was treated briefly with IV antibiotic therapy, started PT and OT, medications adjusted for hypotension and treated symptomatically for nausea and vomiting.  She was able to improve enough to be discharged home as  she continued radiation therapy started 7/17/2023 and completed 7/31/2023 to the left chest wall.    She is next seen back 8/4/2023.  We have reviewed that she had been on dabrafenib and trametinib at 150 mg p.o. every 12 hours and 2 mg p.o. daily respectively and dosing could be changed considerably such as 50 mg twice daily and 0.5 mg daily.  Determination made to have her undergo repeat scans at 4 weeks and review her response to radiation therapy as we make application for lower dose targeted therapy, addition of Remeron p.o. nightly, tapering of her MS Contin to daily rather than twice daily, use of low-dose Ativan to undergo scans.    Subsequent scans 8/25/2023, fortunately, with stable left apical mass with mediastinal chest wall involvement unchanged 1.4 cm supraclavicular lymph node.  No new findings of metastatic disease.    The patient is seen back 9/8/2023.  Fortunately she is improved dramatically off therapy and is gratified that his studies have not worsened in any substantial way.  We have discussed both her scan reports and echocardiogram that does not show significant reduction of her ejection fraction.  As result of her current stable status we have asked her to restart Mekinist at 0.5 mg daily and Tafinlar at 50 mg twice daily to be advanced as tolerated.    Past Medical History:   Diagnosis Date    Allergic rhinitis     Asthma     Cancer of floor of mouth 2014    Cerebral aneurysm     COPD (chronic obstructive pulmonary disease)     COVID 2020    Esophageal reflux     History of adenocarcinoma of lung     History of anemia     History of carcinoma in situ of skin     History of lung cancer     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 from [facility]    History of squamous cell carcinoma     Tongue    Hyperlipidemia     Hypertension     since early 60s    Low vitamin B12 level     Lung cancer     Thyromegaly     UTI (urinary tract infection)     Vitamin D  deficiency         Past Surgical History:   Procedure Laterality Date    BRONCHOSCOPY Bilateral 10/17/2022    Procedure: BRONCHOSCOPY WITH FLUORO with biopsy and BAL;  Surgeon: India Flores MD;  Location: [facility] ENDOSCOPY;  Service: Pulmonary;  Laterality: Bilateral;  PRE/POST - lung mass    CHOLECYSTECTOMY  1999    COLONOSCOPY      EMBOLIZATION CEREBRAL Left 6/29/2022    Procedure: 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;  Surgeon: Bradley Dowell MD;  Location: [facility] HYBRID OR 18/19;  Service: Interventional Radiology;  Laterality: Left;    EYE SURGERY  11/24/2020    Took a muscle out of right eye    GLOSSECTOMY PARTIAL      less than one half tongue    LUNG SURGERY  2012    ORAL LESION EXCISION/BIOPSY      June and August 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    TUBAL ABDOMINAL LIGATION  1970    VENOUS ACCESS DEVICE (PORT) INSERTION N/A 12/12/2022    Procedure: MEDIPORT PLACEMENT;  Surgeon: Jason Villaseñor MD;  Location: [facility] MAIN OR;  Service: Vascular;  Laterality: N/A;        Current Outpatient Medications on File Prior to Visit   Medication Sig Dispense Refill    atorvastatin (LIPITOR) 20 MG tablet TAKE 1 TABLET EVERY NIGHT 90 tablet 1    cholecalciferol (VITAMIN D3) 1000 units tablet Take 1 tablet by mouth Daily. HOLDING FOR DOS      Cyanocobalamin (VITAMIN B12 PO) Take  by mouth Daily.      mirtazapine (REMERON) 7.5 MG half tablet Take 2 half tablet by mouth Every Night.      potassium chloride (K-DUR,KLOR-CON) 10 MEQ CR tablet TAKE 2 TABLETS BY MOUTH DAILY 60 tablet 1    predniSONE (DELTASONE) 10 MG tablet Take 1 tablet by mouth Daily. 30 tablet 2    trametinib dimethyl sulfoxide (MEKINIST) 0.5 MG chemo tablet Take 1 tablet by mouth Daily. 30 tablet 2    zolpidem (AMBIEN) 5 MG tablet Take 1 tablet by mouth At Night As Needed for Sleep. 30 tablet 0     No current facility-administered medications on file prior to visit.        ALLERGIES:    Allergies   Allergen Reactions     "Sulfa Antibiotics Rash        Social History     Socioeconomic History    Marital status:    Tobacco Use    Smoking status: Former     Types: Cigarettes     Quit date: 2015     Years since quittin.6     Passive exposure: Never    Smokeless tobacco: Never    Tobacco comments:     less than a pack per day, no smoking since , Quit 2015   Vaping Use    Vaping Use: Never used   Substance and Sexual Activity    Alcohol use: Not Currently     Comment: Socially -A few times a month    Drug use: No    Sexual activity: Defer     Family History   Problem Relation Age of Onset    Ovarian cancer Mother          at age 27    No Known Problems Father     Ovarian cancer Sister     Colon cancer Brother     No Known Problems Other     Malig Hyperthermia Neg Hx         Review of Systems  ROS as per HPI     Objective      Vitals:    23 0815   BP: 135/65   Pulse: 84   Resp: 16   Temp: 97.7 °F (36.5 °C)   TempSrc: Temporal   SpO2: 97%   Weight: 44.8 kg (98 lb 12.8 oz)   Height: 160 cm (62.99\")   PainSc: 0-No pain           2023     8:15 AM   Current Status   ECOG score 0      Physical Exam  Vitals reviewed.   Constitutional:       General: She is not in acute distress.     Appearance: Normal appearance. She is well-developed.   HENT:      Head: Normocephalic and atraumatic.   Eyes:      Pupils: Pupils are equal, round, and reactive to light.   Cardiovascular:      Rate and Rhythm: Normal rate and regular rhythm.      Heart sounds: Normal heart sounds. No murmur heard.  Pulmonary:      Effort: Pulmonary effort is normal. No respiratory distress.      Breath sounds: Normal breath sounds. No wheezing, rhonchi or rales.   Abdominal:      General: Bowel sounds are normal. There is no distension.      Palpations: Abdomen is soft.   Musculoskeletal:         General: No swelling. Normal range of motion.      Cervical back: Normal range of motion.   Skin:     General: Skin is warm and dry.      Findings: No " rash.   Neurological:      Mental Status: She is alert and oriented to person, place, and time.       Physical exam preformed and reviewed. No changes noted from previous exam. Nena ALEMANAnthony Avila, APRN ; 09/22/2023      RECENT LABS:  Results from last 7 days   Lab Units 09/22/23  0806   WBC 10*3/mm3 7.92   NEUTROS ABS 10*3/mm3 5.15   HEMOGLOBIN g/dL 12.9   HEMATOCRIT % 42.6   PLATELETS 10*3/mm3 268     Results from last 7 days   Lab Units 09/22/23  0806   SODIUM mmol/L 146*   POTASSIUM mmol/L 3.7   CHLORIDE mmol/L 104   CO2 mmol/L 28.0   BUN mg/dL 15   CREATININE mg/dL 0.74   CALCIUM mg/dL 9.3   ALBUMIN g/dL 3.7   BILIRUBIN mg/dL 0.4   ALK PHOS U/L 88   ALT (SGPT) U/L 10   AST (SGOT) U/L 19   GLUCOSE mg/dL 99                 Assessment & Plan     1.  Carcinoma of the lung  May 2015, status post previous left upper lobectomy for carcinoma of the lung.    2.  Carcinoma of the tongue  history of a [id number] SCCa of the rightt retromolar trigone   2016 s/p WLE, buccal fat pad flap and SLN biopsy that was negative, margins were negative.   She underwent laryngoscopy 8/18/2021 with right base of tongue without evidence of lesions or masses in the region.    3.  Moderately differentiated adenocarcinoma of the lung.  CT of the chest 6/16/2021 demonstrated postsurgical changes, 8 mm irregular nodule medial segment of right lower lobe and diffuse changes of emphysema.    She is seen in thoracic clinic with findings suspicious for new malignancy and concern of the patient being a poor operative candidate.    7/13/2021 PET CT demonstrated ill-defined avidity and soft tissue left aspect of the mediastinum, 1 cm hypermetabolic pulmonary nodule and asymmetric thickening involving the right base of tongue as well as subtle uptake in the L1 vertebral body.    This patient's case was discussed in thoracic clinic and plans were made to request an MRI of the lumbar spine, obtain a biopsy of the mediastinal involvement of the left had  the patient reviewed by ENT.  Biopsy 8/13/2021 found to be consistent with invasive moderately differentiated adenocarcinoma with PD-L1 TPS score 40%.    7/24/2021 MRI of the lumbar spine revealed no evidence of metastatic disease though the patient did have multilevel degenerative disease throughout the lumbar spine.    Her findings were consistent with 2 separate lesions including a nodule in the superior segment of the right lower lobe and a mass in the upper portion of the left chest with the left chest lesion biopsy positive for adenocarcinoma.  The consensus was that these were to separate-synchronous primaries.  Stereotactic radiation each lesions were felt to be the appropriate way to proceed and the patient was referred to radiation therapy.  The patient was referred for radiation therapy and she was able to proceed with SBRT to the right lung at primary #1 with 5 treatments at 1000 cGy per fraction in the left lung primary similarly at 1000 cGy per fraction x5.  Her treatment ranged between 8/31-9/13/2021.    Her guardant 360 did not determine any new abnormalities but her Caris-MI profile-does reveal sensitivity to immunotherapy as well as a BRAF mutation.  PET/CT 11/23/2021 demonstrates decrease in size and avidity of the previously noted intensely FDG avid nodules left lobectomy operative site and right lower lobe.  Surrounding groundglass and pulmonary opacification is consistent with postradiation changes, a few new subcentimeter pulmonary nodules are present in the right upper lobe and indeterminant, stable subcentimeter hepatic lesion is below PET resolution no other findings of FDG-avid disease are seen in neck abdomen or pelvis.  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  There is evolution of presumed postradiation changes in the right midlung with soft tissue mass within the left  lumpectomy operative bed minimally decreased in size.  There is a sub-6 mm nodule in the right upper lobe not definitively seen, indeterminate and an indeterminate left renal lesion at 1.5 cm unchanged from 7/13/2021.  Admission 10/14 - 10/18/2022 presenting with shortness of breath and cough that was nonproductive.  She had been on oral antibiotics and did not improved and was admitted for therapy for apparent pneumonia.  This eventually led to bronchoscopy after initial CT revealed postsurgical changes, new masslike 6.7 cm area of consolidation anterior left lung raising concern for potential malignancy and a follow-up PET/CT planned.   Bronchoscopy and biopsy left lower lung failed to show evidence of malignancy.    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  There is intensely pleural-based avidity within the left lower lobe thought to be metastatic disease with postradiation of the left apex as well.  There is a small focus of uptake in the right hilum grossly unchanged in size and post radiation changes in the right lower lobe.  There is indeterminate density left renal that was grossly unchanged.    Patient's guardant 360 returned as again significant for BRAF V600E and the potential use of BRAF inhibitor/MET inhibitor dabrafenib and trametinib.  Additional information [id number] and use of alpelisib.  The patient now presents back 12/14/2022 to initiate therapy- initially with immunotherapy considering Caris study with PD-L1 TPS of 70% on 22 C3 and 28-8 assays.  Single agent Keytruda initiated 12/14/2022 2/8/2023 CT of the chest, abdomen pelviswith consolidation and masslike pleural thickening in the left apex and upper lung index areas have decreased somewhat.  There is no evidence of metastatic disease otherwise and a  few indeterminate left renal lesions are stable.  After lengthy discussion with the patient and her daughter as to the stability to mild improvement with immunotherapy it is agreed that we would continue treatment at this point.  Proceed today, 4/19/2023 with cycle 7 pembrolizumab which is continued every 3 weeks  The patient proceeded to 8 cycles of pembrolizumab and underwent follow-up chest CT chest, abdomen pelvis revealing evolution of dense consolidation left upper lobe and extension of soft tissue mass anterior to left upper ribs with increase sclerosis anterior left second rib.  This was concerning for progression of disease versus radiation change and the patient was scheduled for subsequent PET/CT.  PET/CT 6/5/2023  reveals progression of disease in left upper thorax, left supraclavicular adenopathy new metastasis left posterior fourth ribs, no evidence of metastatic disease in the abdomen or pelvis.  Patient seen 6/12/2023 with plans to move her Norco to every 4 hours, discontinue Keytruda, make application for dabrafenib and trametinib at 150 mg p.o. every 12 hours and 2 mg p.o. daily respectively.  Patient referred for radiation therapy consultation concurrently.  Last dose of Keytruda 5/31/2023.  6/23/2023: Patient seen for triage visit.  She has not yet started dabrafenib or trametinib, she has not yet received the medications.  Unfortunately she has been experiencing uncontrolled nausea/vomiting as further detailed below.   Patient seen 6/27/2023 reporting that her nausea has resolved.  She was unable to complete the MRI of the brain however Dr. Escobar did review the images patient did have and there was no evidence of edema or metastatic disease.  Patient can start her new oral medication once she receives the medication.  Patient seen 7/5/2023 with plans to start palliative radiotherapy x10 fractions and initiate dabrafenib and trametinib as soon as medications received.  7/17/2023 patient  initiated radiation with 10 fractions planned.  Patient has received dabrafenib and trametinib.  Brought in for triage visit due to nausea associated with dosing.  She is premedicating with ondansetron however only waiting 15 minutes.  We discussed today that she needs to wait at least 30 minutes to 1 hour prior to taking the dabrafenib and trametinib.  The patient will do so.  We discussed she could also take this again if she feels the need 8 hours later for nausea control.  If she is having breakthrough nausea then she should call our office.  I have encouraged her to utilize electrolyte drinks.  She will get 1L normal liter normal saline in the office today.She was not nauseas while in the office so we did not give additional nausea medication.  We will have her return in 1 week repeat labs, review by nurse practitioner, and possible IV fluids.  I stressed the importance of calling sooner if she finds that the premedication is not controlling her nausea at which time we would have her come in for additional IV fluids and evaluation.  She is continuing daily radiation this week.  Case was discussed with Dr. Escobar today.  7/21/2023: Patient returns for short interval follow-up due to very poor appetite and sleeping the majority of the day.  Her nausea has been improved with premedicating 30 minutes prior to dabrafenib and trametinib.  She however has been sleeping the majority of the day and is not eating when she is awake.  She does report taking ensures but she is only sipping on these.  Have given her samples of some of the office today and encouraged her to drink when while she is here.  Her performance status continues to decline and her daughter states that she was fixing dinner nightly just 2 weeks ago.  With performance status of 3 today I discussed the case with Dr. Escobar and we will hold her dabrafenib and  TRAMETINIB.  Her liver enzymes are elevated today as well.  We will monitor this next week and  have her evaluated by Dr. Escobar at which time we could consider restarting her dabrafenib and trametinib at reduced doses pending performance status and laboratory evaluation.  Unfortunately she required admission 7/25-30/2023 with failure to thrive.  Subsequent assessments including blood cultures were negative and patient was treated briefly with IV antibiotic therapy, started PT and OT, medications adjusted for hypotension and treated symptomatically for nausea and vomiting.  She was able to improve enough to be discharged home as she continued radiation therapy started 7/17/2023 and completed 7/31/2023 to the left chest wall.  She is next seen back 8/4/2023.  Lengthy discussion as to reevaluation   Plans made for CT chest, abdomen, pelvis in 4 weeks  Patient seen 8/21/2023 with complaints of worsening fatigue and shortness of breath.  Patient scheduled for follow up CT scans this Friday. Lungs clear on exam,  Sats 97%.  Hgb stable at 11.7.  Will check BNP today.  Discussed may be related to disease and will need to see what scan shows.   Subsequent scans 8/25/2023, fortunately, with stable left apical mass with mediastinal chest wall involvement unchanged 1.4 cm supraclavicular lymph node.  No new findings of metastatic disease.  The patient is seen back 9/8/2023.  Fortunately she is improved dramatically off therapy and is gratified that his studies have not worsened in any substantial way.  We have discussed both her scan reports and echocardiogram that does not show significant reduction of her ejection fraction.  As result of her current stable status we have asked her to restart Mekinist at 0.5 mg daily and Tafinlar at 50 mg twice daily  9/22/2023 tolerating Mekinist 0.5 mg daily and tafinlar 50 mg twice daily quite well. Continues on prednisone 10 mg daily.     4.  Worsening back pain  Admission 11/28/2022 through 12/1/2022.  MRI of the cervical and thoracic spine fortunately failed to demonstrate  metastatic disease.  Moderate degenerative changes noted which could cause radiculopathy.  Medication altered to include MS Contin 15 mg every 12 hours and Norco for breakthrough pain  She reports today her pain is adequately controlled  Patient with increasing pain 5/31/2023 with pain level of 6.  MS Contin was increased to 50 mg p.o. every 8 hours and Norco 10/325 #101 p.o. every 6 hours to move to every 4 hours as needed-E scribed to pharmacy  Patient seen 6/12/2023 with Norco moved to every 4 hours.  6/23/2023: Seen for triage visit.  Has been using oxycodone 20 mg every 3 hours as needed for pain.  Reports she has not been using the hydrocodone 10/325.  Was experiencing dizziness, so reduced her oxycodone use to half a tablet every 3 hours as needed.  Reports pain is uncontrolled during the night so she is having to wake at night time to take pain medicine.  They are questioning resuming long-acting pain medication, as she is waking throughout the night to take pain medicine.  She has already been prescribed MS Contin and has this available at home, did let her know it would be okay to resume this.  Assess 7/5/2023 with pain reduced to 2/10.  Plan to continue current therapy  Darlene Pfeiffer reports a pain score of 0.  Given her pain assessment as noted, treatment options were discussed and the following options were decided upon as a follow-up plan to address the patient's pain: continuation of current treatment plan for pain. Currently not requiring pain medication.       5.  Poor appetite and malnutrition  Placed on prednisone 10 mg daily 1/4/2023 with significant improvement in her symptoms  Weight is stable today at just below 106 pounds  Patient assessed 6/12/23 with possible gummy therapy.  Stable performance status including weight and appetite 7/5/2023  Weight has declined as of 7/17/2023 due to nausea and vomiting that started this past weekend.  After further discussion the patient did stop her  prednisone 10 mg while she was not feeling well.  We discussed today the importance of continuing this as it can help with her nausea and appetite and therefore she will restart tomorrow.  7/21/2023: Weight is stable today though albumin is declining at 3.  Patient is sleeping the majority of the day and not eating.  Yesterday she had a small piece of chicken and that is all.  She states she is drinking ensures however her daughter thinks that she is just sipping on these and not completing them.  Hopefully holding her dabrafenib and trametinib will be helpful with her being awake more and appetite.  I encouraged her to make sure she is taking her prednisone daily at 10 mg daily.  Remeron 7.5 mg p.o. nightly E scribed to pharmacy  9/22/2023 weight is up to 98 pounds.    6.  Uncontrolled nausea/vomiting  started after discontinuing prednisone and starting THC Gummies.  Started to experience dizziness with the THC Gummies.  Discontinue THC Gummies and dizziness improved, however she has remained nauseated now.  She has been utilizing Zofran with improvement, however today was unable to keep Zofran tablets down due to nausea/vomiting.  She will resume prednisone 10 mg daily.  She will receive 1 L IV normal saline in clinic today 10 mg IV Compazine.  We will also send prescription for Phenergan suppository, in case she is not able to keep Zofran down in the future.  Will also send for stat MRI brain since patient is now experiencing uncontrolled nausea vomiting and has recently had progression of her cancer as seen on PET from 6/5/2023.  MRI brain was performed without contrast, even though contrast was ordered.  The patient also did not stay for entire exam to be completed.  Exam limited for assessment of metastatic disease.  Attempted to call patient to review results, however no answer, did leave detailed voicemail.  6/27/2023 patient reports that her nausea has resolved.  She did not complete the MRI however the  images which were done showed no evidence of edema or metastatic disease.  Nausea reoccurred when seen on 7/17/2023 over this past weekend after initiation of dabrafenib and trametinib.  Patient was premedicating with ondansetron however only giving herself about 15 minutes and I encouraged her to give herself at least 30 minutes to 1 hour prior to taking the medications.  She will notify our office if this is not helpful.  7/21/2023: Premedication with ondansetron 30 minutes prior to dabrafenib and trametinib has been helpful.  Patient however has had some vomiting episodes directly after taking her medications where she has not had time to actually swallow them.  She denies difficulty swallowing however.  We will continue to monitor this.  9/22/2023 not an issue today.    7.  Hyperkalemia-  potassium 6/23/2023 3.1.  She has been having uncontrolled nausea, we will hold off on starting oral potassium replacement as it is unlikely she will tolerate that at this time.  Will recommend she increase oral potassium in her diet.  6/27/2023 potassium 3.0.  Nausea has resolved.  Patient will be given 40 M EQ p.o. potassium in the office and she will be started on 20 mEq daily of p.o. potassium.  Assessed 7/5/2023 with potassium 4.4  7/17/2023 potassium normal at 3.7  7/21/2023: Potassium 3.1, patient not taking potassium supplements at home because she does not like the big pill.  Changed to potassium chloride 10 mill equivalents, 2 tablets every a.m. as these will be smaller.   9/22/2023 potassium is 3.7.    Plan:  Continued lower dose dabrafenib 0.5 mg daily and trametinib 50 mg twice daily.  Continue prednisone 10 mg daily.  Patient will follow-up in about 3 -4 weeks with Dr. Escobar with repeat CBC and CMP.  Call/ return sooner should the patient develop any new concerns or problems.    Patient is on a high risk medication requiring close monitoring for toxicity.      Nena Avila, APRN  09/22/2023

## 2023-09-25 ENCOUNTER — LAB (OUTPATIENT)
Dept: LAB | Facility: HOSPITAL | Age: 83
End: 2023-09-25
Payer: MEDICARE

## 2023-09-25 DIAGNOSIS — E78.5 HYPERLIPIDEMIA, UNSPECIFIED HYPERLIPIDEMIA TYPE: ICD-10-CM

## 2023-09-25 DIAGNOSIS — I10 PRIMARY HYPERTENSION: ICD-10-CM

## 2023-09-25 DIAGNOSIS — I67.1 CEREBRAL ANEURYSM WITHOUT RUPTURE: ICD-10-CM

## 2023-09-25 LAB
ANION GAP SERPL CALCULATED.3IONS-SCNC: 8.8 MMOL/L (ref 5–15)
BASOPHILS # BLD AUTO: 0.02 10*3/MM3 (ref 0–0.2)
BASOPHILS NFR BLD AUTO: 0.3 % (ref 0–1.5)
BUN SERPL-MCNC: 15 MG/DL (ref 8–23)
BUN/CREAT SERPL: 21.4 (ref 7–25)
CALCIUM SPEC-SCNC: 9.3 MG/DL (ref 8.6–10.5)
CHLORIDE SERPL-SCNC: 108 MMOL/L (ref 98–107)
CO2 SERPL-SCNC: 29.2 MMOL/L (ref 22–29)
CREAT SERPL-MCNC: 0.7 MG/DL (ref 0.57–1)
DEPRECATED RDW RBC AUTO: 50.3 FL (ref 37–54)
EGFRCR SERPLBLD CKD-EPI 2021: 87 ML/MIN/1.73
EOSINOPHIL # BLD AUTO: 0 10*3/MM3 (ref 0–0.4)
EOSINOPHIL NFR BLD AUTO: 0 % (ref 0.3–6.2)
ERYTHROCYTE [DISTWIDTH] IN BLOOD BY AUTOMATED COUNT: 14.6 % (ref 12.3–15.4)
GLUCOSE SERPL-MCNC: 169 MG/DL (ref 65–99)
HCT VFR BLD AUTO: 37.6 % (ref 34–46.6)
HGB BLD-MCNC: 12 G/DL (ref 12–15.9)
IMM GRANULOCYTES # BLD AUTO: 0.02 10*3/MM3 (ref 0–0.05)
IMM GRANULOCYTES NFR BLD AUTO: 0.3 % (ref 0–0.5)
LYMPHOCYTES # BLD AUTO: 1.18 10*3/MM3 (ref 0.7–3.1)
LYMPHOCYTES NFR BLD AUTO: 17 % (ref 19.6–45.3)
MCH RBC QN AUTO: 29.7 PG (ref 26.6–33)
MCHC RBC AUTO-ENTMCNC: 31.9 G/DL (ref 31.5–35.7)
MCV RBC AUTO: 93.1 FL (ref 79–97)
MONOCYTES # BLD AUTO: 0.22 10*3/MM3 (ref 0.1–0.9)
MONOCYTES NFR BLD AUTO: 3.2 % (ref 5–12)
NEUTROPHILS NFR BLD AUTO: 5.52 10*3/MM3 (ref 1.7–7)
NEUTROPHILS NFR BLD AUTO: 79.2 % (ref 42.7–76)
NRBC BLD AUTO-RTO: 0 /100 WBC (ref 0–0.2)
PLATELET # BLD AUTO: 245 10*3/MM3 (ref 140–450)
PMV BLD AUTO: 9.8 FL (ref 6–12)
POTASSIUM SERPL-SCNC: 4.5 MMOL/L (ref 3.5–5.2)
RBC # BLD AUTO: 4.04 10*6/MM3 (ref 3.77–5.28)
SODIUM SERPL-SCNC: 146 MMOL/L (ref 136–145)
WBC NRBC COR # BLD: 6.96 10*3/MM3 (ref 3.4–10.8)

## 2023-09-25 PROCEDURE — 80048 BASIC METABOLIC PNL TOTAL CA: CPT

## 2023-09-25 PROCEDURE — 85025 COMPLETE CBC W/AUTO DIFF WBC: CPT

## 2023-09-25 PROCEDURE — 36415 COLL VENOUS BLD VENIPUNCTURE: CPT

## 2023-09-28 ENCOUNTER — TELEPHONE (OUTPATIENT)
Dept: FAMILY MEDICINE CLINIC | Facility: CLINIC | Age: 83
End: 2023-09-28
Payer: MEDICARE

## 2023-09-28 RX ORDER — MIRTAZAPINE 7.5 MG/1
7.5 TABLET, FILM COATED ORAL NIGHTLY
Qty: 30 TABLET | Refills: 1 | Status: SHIPPED | OUTPATIENT
Start: 2023-09-28

## 2023-09-28 NOTE — PROGRESS NOTES
09/29/23 0001   Pre-Procedure Phone Call   Procedure Time Verified Yes   Arrival Time 0700   Procedure Location Verified Yes   Medical History Reviewed No  (left voicemail)   NPO Status Reinforced Yes   Ride and Caregiver Arranged Yes  (left voicemail, instructed to have a .)   Patient Knows to Bring Current Medications Yes   Bring Outside Films Requested No

## 2023-09-29 ENCOUNTER — ANESTHESIA EVENT (OUTPATIENT)
Dept: INTERVENTIONAL RADIOLOGY/VASCULAR | Facility: HOSPITAL | Age: 83
End: 2023-09-29
Payer: MEDICARE

## 2023-09-29 ENCOUNTER — HOSPITAL ENCOUNTER (OUTPATIENT)
Dept: INTERVENTIONAL RADIOLOGY/VASCULAR | Facility: HOSPITAL | Age: 83
Discharge: HOME | End: 2023-09-29
Payer: MEDICARE

## 2023-09-29 VITALS
HEART RATE: 79 BPM | DIASTOLIC BLOOD PRESSURE: 81 MMHG | TEMPERATURE: 98.6 F | OXYGEN SATURATION: 97 % | BODY MASS INDEX: 17.36 KG/M2 | HEIGHT: 63 IN | RESPIRATION RATE: 16 BRPM | SYSTOLIC BLOOD PRESSURE: 150 MMHG | WEIGHT: 98 LBS

## 2023-09-29 DIAGNOSIS — I67.1 CEREBRAL ANEURYSM WITHOUT RUPTURE: ICD-10-CM

## 2023-09-29 DIAGNOSIS — E78.5 HYPERLIPIDEMIA, UNSPECIFIED HYPERLIPIDEMIA TYPE: ICD-10-CM

## 2023-09-29 DIAGNOSIS — I10 PRIMARY HYPERTENSION: ICD-10-CM

## 2023-09-29 PROCEDURE — C1769 GUIDE WIRE: HCPCS

## 2023-09-29 PROCEDURE — C1894 INTRO/SHEATH, NON-LASER: HCPCS

## 2023-09-29 PROCEDURE — C1760 CLOSURE DEV, VASC: HCPCS

## 2023-09-29 PROCEDURE — 25010000002 PROPOFOL 10 MG/ML EMULSION: Performed by: ANESTHESIOLOGY

## 2023-09-29 PROCEDURE — 76377 3D RENDER W/INTRP POSTPROCES: CPT

## 2023-09-29 PROCEDURE — 0 IODIXANOL PER 1 ML: Performed by: RADIOLOGY

## 2023-09-29 PROCEDURE — 25010000002 HEPARIN (PORCINE) PER 1000 UNITS: Performed by: RADIOLOGY

## 2023-09-29 RX ORDER — SODIUM CHLORIDE 0.9 % (FLUSH) 0.9 %
1-10 SYRINGE (ML) INJECTION AS NEEDED
Status: DISCONTINUED | OUTPATIENT
Start: 2023-09-29 | End: 2023-09-30 | Stop reason: HOSPADM

## 2023-09-29 RX ORDER — LIDOCAINE HYDROCHLORIDE 10 MG/ML
0.5 INJECTION, SOLUTION INFILTRATION; PERINEURAL ONCE AS NEEDED
Status: DISCONTINUED | OUTPATIENT
Start: 2023-09-29 | End: 2023-09-30 | Stop reason: HOSPADM

## 2023-09-29 RX ORDER — MIDAZOLAM HYDROCHLORIDE 1 MG/ML
0.5 INJECTION INTRAMUSCULAR; INTRAVENOUS
Status: DISCONTINUED | OUTPATIENT
Start: 2023-09-29 | End: 2023-09-30 | Stop reason: HOSPADM

## 2023-09-29 RX ORDER — FAMOTIDINE 10 MG/ML
20 INJECTION, SOLUTION INTRAVENOUS ONCE
Status: COMPLETED | OUTPATIENT
Start: 2023-09-29 | End: 2023-09-29

## 2023-09-29 RX ORDER — SODIUM CHLORIDE 9 MG/ML
75 INJECTION, SOLUTION INTRAVENOUS CONTINUOUS
Status: ACTIVE | OUTPATIENT
Start: 2023-09-29 | End: 2023-09-29

## 2023-09-29 RX ORDER — SODIUM CHLORIDE, SODIUM LACTATE, POTASSIUM CHLORIDE, CALCIUM CHLORIDE 600; 310; 30; 20 MG/100ML; MG/100ML; MG/100ML; MG/100ML
9 INJECTION, SOLUTION INTRAVENOUS CONTINUOUS
Status: DISCONTINUED | OUTPATIENT
Start: 2023-09-29 | End: 2023-09-30 | Stop reason: HOSPADM

## 2023-09-29 RX ORDER — LIDOCAINE HYDROCHLORIDE 10 MG/ML
INJECTION, SOLUTION EPIDURAL; INFILTRATION; INTRACAUDAL; PERINEURAL AS NEEDED
Status: COMPLETED | OUTPATIENT
Start: 2023-09-29 | End: 2023-09-29

## 2023-09-29 RX ORDER — ACETAMINOPHEN 325 MG/1
650 TABLET ORAL EVERY 4 HOURS PRN
Status: DISCONTINUED | OUTPATIENT
Start: 2023-09-29 | End: 2023-09-30 | Stop reason: HOSPADM

## 2023-09-29 RX ORDER — SODIUM CHLORIDE 0.9 % (FLUSH) 0.9 %
3-10 SYRINGE (ML) INJECTION AS NEEDED
Status: DISCONTINUED | OUTPATIENT
Start: 2023-09-29 | End: 2023-09-30 | Stop reason: HOSPADM

## 2023-09-29 RX ORDER — SODIUM CHLORIDE 9 MG/ML
40 INJECTION, SOLUTION INTRAVENOUS AS NEEDED
Status: DISCONTINUED | OUTPATIENT
Start: 2023-09-29 | End: 2023-09-30 | Stop reason: HOSPADM

## 2023-09-29 RX ORDER — IODIXANOL 320 MG/ML
200 INJECTION, SOLUTION INTRAVASCULAR
Status: COMPLETED | OUTPATIENT
Start: 2023-09-29 | End: 2023-09-29

## 2023-09-29 RX ORDER — SODIUM CHLORIDE 9 MG/ML
100 INJECTION, SOLUTION INTRAVENOUS CONTINUOUS
Status: DISCONTINUED | OUTPATIENT
Start: 2023-09-29 | End: 2023-09-29

## 2023-09-29 RX ORDER — FENTANYL CITRATE 50 UG/ML
50 INJECTION, SOLUTION INTRAMUSCULAR; INTRAVENOUS ONCE AS NEEDED
Status: DISCONTINUED | OUTPATIENT
Start: 2023-09-29 | End: 2023-09-30 | Stop reason: HOSPADM

## 2023-09-29 RX ORDER — SODIUM CHLORIDE 0.9 % (FLUSH) 0.9 %
10 SYRINGE (ML) INJECTION EVERY 12 HOURS SCHEDULED
Status: DISCONTINUED | OUTPATIENT
Start: 2023-09-29 | End: 2023-09-30 | Stop reason: HOSPADM

## 2023-09-29 RX ORDER — SODIUM CHLORIDE 0.9 % (FLUSH) 0.9 %
3 SYRINGE (ML) INJECTION EVERY 12 HOURS SCHEDULED
Status: DISCONTINUED | OUTPATIENT
Start: 2023-09-29 | End: 2023-09-30 | Stop reason: HOSPADM

## 2023-09-29 RX ORDER — NITROGLYCERIN 0.4 MG/1
0.4 TABLET SUBLINGUAL
Status: DISCONTINUED | OUTPATIENT
Start: 2023-09-29 | End: 2023-09-30 | Stop reason: HOSPADM

## 2023-09-29 RX ORDER — ONDANSETRON 2 MG/ML
4 INJECTION INTRAMUSCULAR; INTRAVENOUS EVERY 6 HOURS PRN
Status: DISCONTINUED | OUTPATIENT
Start: 2023-09-29 | End: 2023-09-30 | Stop reason: HOSPADM

## 2023-09-29 RX ORDER — PROPOFOL 10 MG/ML
VIAL (ML) INTRAVENOUS CONTINUOUS PRN
Status: DISCONTINUED | OUTPATIENT
Start: 2023-09-29 | End: 2023-09-29 | Stop reason: SURG

## 2023-09-29 RX ORDER — ACETAMINOPHEN 160 MG/5ML
650 SOLUTION ORAL EVERY 4 HOURS PRN
Status: DISCONTINUED | OUTPATIENT
Start: 2023-09-29 | End: 2023-09-30 | Stop reason: HOSPADM

## 2023-09-29 RX ADMIN — HEPARIN SODIUM: 1000 INJECTION INTRAVENOUS; SUBCUTANEOUS at 08:55

## 2023-09-29 RX ADMIN — HEPARIN SODIUM: 1000 INJECTION INTRAVENOUS; SUBCUTANEOUS at 08:56

## 2023-09-29 RX ADMIN — SODIUM CHLORIDE, POTASSIUM CHLORIDE, SODIUM LACTATE AND CALCIUM CHLORIDE: 600; 310; 30; 20 INJECTION, SOLUTION INTRAVENOUS at 08:35

## 2023-09-29 RX ADMIN — LIDOCAINE HYDROCHLORIDE 5 ML: 10 INJECTION, SOLUTION EPIDURAL; INFILTRATION; INTRACAUDAL; PERINEURAL at 08:51

## 2023-09-29 RX ADMIN — FAMOTIDINE 20 MG: 10 INJECTION INTRAVENOUS at 08:26

## 2023-09-29 RX ADMIN — PROPOFOL 25 MCG/KG/MIN: 10 INJECTION, EMULSION INTRAVENOUS at 08:45

## 2023-09-29 RX ADMIN — HEPARIN SODIUM: 1000 INJECTION INTRAVENOUS; SUBCUTANEOUS at 08:54

## 2023-09-29 RX ADMIN — IODIXANOL 55 ML: 320 INJECTION, SOLUTION INTRAVASCULAR at 09:11

## 2023-09-29 NOTE — PROGRESS NOTES
Discharged home via private car with daughter.  Escorted to vehicle per this RN.  No acute distress noted and tolerating po well.  All belongings returned to patient prior to discharge and discharge instructions given verbally and in writing to patient and daughter.

## 2023-09-29 NOTE — POST-PROCEDURE NOTE
POST PROCEDURE NOTE    Procedure: Cerebral Angiogram    Pre-Procedure Diagnosis: Cerebral Aneurysm    Post-procedure Diagnosis: Same    Findings: Left Pcom aneurysm smaller in size, but not completely occluded. Fetal PCA remains patent. Official report to follow.    Complications: None encountered    Blood loss: 10 cc    Specimen Removed: None    Disposition:   Discharge home today.

## 2023-09-29 NOTE — ANESTHESIA PREPROCEDURE EVALUATION
Anesthesia Evaluation     Patient summary reviewed and Nursing notes reviewed   NPO Solid Status: > 8 hours  NPO Liquid Status: > 8 hours           Airway   Mallampati: II  Neck ROM: full  No difficulty expected  Dental - normal exam     Pulmonary     breath sounds clear to auscultation  (+) lung cancer, COPD, asthma,shortness of breath  Cardiovascular     Rhythm: regular    (+) hypertension, hyperlipidemia      Neuro/Psych  (+) weakness    ROS Comment: Cerebral aneurysm  GI/Hepatic/Renal/Endo    (+) GERD, thyroid problem     Musculoskeletal     Abdominal    Substance History      OB/GYN          Other      history of cancer active                  Anesthesia Plan    ASA 4     MAC     intravenous induction     Anesthetic plan, risks, benefits, and alternatives have been provided, discussed and informed consent has been obtained with: patient.    CODE STATUS:

## 2023-09-29 NOTE — ANESTHESIA POSTPROCEDURE EVALUATION
"Patient: Darlene Pfeiffer    Procedure Summary       Date: 09/29/23 Room / Location: [facility] INTERVENTIONAL    Anesthesia Start: 0835 Anesthesia Stop: 0936    Procedure: IR ARCH AND 3 VESSEL HEAD Diagnosis:       Cerebral aneurysm without rupture      Primary hypertension      Hyperlipidemia, unspecified hyperlipidemia type      (Cerebral Aneurysm)    Scheduled Providers:  Provider: Nicolas Casey MD    Anesthesia Type: general ASA Status: 4            Anesthesia Type: general    Vitals  Vitals Value Taken Time   /80 09/29/23 1015   Temp     Pulse 81 09/29/23 1026   Resp 16 09/29/23 1015   SpO2 96 % 09/29/23 1026   Vitals shown include unvalidated device data.        Post Anesthesia Care and Evaluation    Patient location during evaluation: bedside  Patient participation: complete - patient participated  Level of consciousness: sleepy but conscious  Pain score: 0  Pain management: adequate    Airway patency: patent  Anesthetic complications: No anesthetic complications    Cardiovascular status: acceptable  Respiratory status: acceptable  Hydration status: acceptable    Comments: /80   Pulse 82   Temp 37 °C (98.6 °F) (Temporal)   Resp 16   Ht 160 cm (63\")   Wt 44.5 kg (98 lb)   LMP  (LMP Unknown)   SpO2 97%   BMI 17.36 kg/m²       "

## 2023-10-09 ENCOUNTER — TELEPHONE (OUTPATIENT)
Dept: NEUROSURGERY | Facility: CLINIC | Age: 83
End: 2023-10-09
Payer: MEDICARE

## 2023-10-09 RX ORDER — PREDNISONE 10 MG/1
10 TABLET ORAL DAILY
Qty: 30 TABLET | Refills: 2 | Status: SHIPPED | OUTPATIENT
Start: 2023-10-09

## 2023-10-09 NOTE — PROGRESS NOTES
Subjective   Patient ID: Darlene Pfeiffer is a 83 y.o. female is here today for follow-up after cerebral angiogram done on 09/29/2023. Patient reports no symptoms.     History of Present Illness  83-year-old female with incidental finding of a significant cerebral aneurysm while undergoing routine imaging for lung cancer follow-up. Imaging revealed 11 mm left posterior communicating artery aneurysm.  No evidence to suggest any rupture or subarachnoid hemorrhage.  She underwent successful endovascular occlusion with a WEB device on 6/29/2022 with a good result. No complications were encountered. Patient has done well postoperatively with no complaints of any residual groin puncture site complications. Patient is currently on Lipitor. She is a former smoker, but quit in 2015. She does have underlying fibromuscular dysplasia which is the likely source of her aneurysm. A mirror image infundibulum is seen on the right. She presents today for to review her 1 year angiographic follow-up and to determine her next imaging evaluation.  She is not on any antiplatelet agents.  Her lung cancer treatment is going well and she still receives some chemotherapy.    The following portions of the patient's history were reviewed and updated as appropriate: She  has a past medical history of Allergic rhinitis, Aneurysm, Asthma, Cancer of floor of mouth (2014), Cerebral aneurysm, COPD (chronic obstructive pulmonary disease), COVID (2020), Esophageal reflux, History of adenocarcinoma of lung, History of anemia, History of carcinoma in situ of skin, History of lung cancer, History of oral hairy leukoplakia, History of squamous cell carcinoma, Hyperlipidemia, Hypertension, Low vitamin B12 level, Lung cancer, Thyromegaly, UTI (urinary tract infection), and Vitamin D deficiency.  She does not have any pertinent problems on file.  She  has a past surgical history that includes Lung surgery (2012); Cholecystectomy (1999); Oral Lesion  Excision/Biopsy; Partial glossectomy; Tubal ligation (1970); Eye surgery (11/24/2020); Colonoscopy; embolization cerebral (Left, 06/29/2022); Bronchoscopy (Bilateral, 10/17/2022); and Venous Access Device (Port) (N/A, 12/12/2022).  Her family history includes Colon cancer in her brother; No Known Problems in her father and another family member; Ovarian cancer in her mother and sister.  She  reports that she quit smoking about 8 years ago. Her smoking use included cigarettes. She has never been exposed to tobacco smoke. She has never used smokeless tobacco. She reports that she does not currently use alcohol. She reports that she does not use drugs.  Current Outpatient Medications   Medication Sig Dispense Refill    atorvastatin (LIPITOR) 20 MG tablet TAKE 1 TABLET EVERY NIGHT 90 tablet 1    cholecalciferol (VITAMIN D3) 1000 units tablet Take 1 tablet by mouth Daily. HOLDING FOR DOS      Cyanocobalamin (VITAMIN B12 PO) Take  by mouth Daily.      mirtazapine (REMERON) 7.5 MG tablet TAKE 1 TABLET BY MOUTH EVERY NIGHT 30 tablet 1    potassium chloride (K-DUR,KLOR-CON) 10 MEQ CR tablet TAKE 2 TABLETS BY MOUTH DAILY 60 tablet 1    predniSONE (DELTASONE) 10 MG tablet TAKE 1 TABLET BY MOUTH DAILY 30 tablet 2    trametinib dimethyl sulfoxide (MEKINIST) 0.5 MG chemo tablet Take 1 tablet by mouth Daily. 30 tablet 2    zolpidem (AMBIEN) 5 MG tablet Take 1 tablet by mouth At Night As Needed for Sleep. 30 tablet 0     No current facility-administered medications for this visit.     Current Outpatient Medications on File Prior to Visit   Medication Sig    atorvastatin (LIPITOR) 20 MG tablet TAKE 1 TABLET EVERY NIGHT    cholecalciferol (VITAMIN D3) 1000 units tablet Take 1 tablet by mouth Daily. HOLDING FOR DOS    Cyanocobalamin (VITAMIN B12 PO) Take  by mouth Daily.    mirtazapine (REMERON) 7.5 MG tablet TAKE 1 TABLET BY MOUTH EVERY NIGHT    potassium chloride (K-DUR,KLOR-CON) 10 MEQ CR tablet TAKE 2 TABLETS BY MOUTH  "DAILY    predniSONE (DELTASONE) 10 MG tablet TAKE 1 TABLET BY MOUTH DAILY    trametinib dimethyl sulfoxide (MEKINIST) 0.5 MG chemo tablet Take 1 tablet by mouth Daily.    zolpidem (AMBIEN) 5 MG tablet Take 1 tablet by mouth At Night As Needed for Sleep.     No current facility-administered medications on file prior to visit.     She is allergic to sulfa antibiotics..    Review of Systems   Constitutional: Negative.    HENT: Negative.     Musculoskeletal: Negative.    Neurological: Negative.          Objective     Vitals:    10/12/23 1101   BP: 110/70   Pulse: 86   Resp: 16   Temp: 97.9 øF (36.6 øC)   SpO2: 95%   Weight: 45.8 kg (101 lb)   Height: 160 cm (62.99\")     Body mass index is 17.9 kg/mý.    Tobacco Use: Medium Risk (10/12/2023)    Patient History     Smoking Tobacco Use: Former     Smokeless Tobacco Use: Never     Passive Exposure: Never          Physical Exam  Vitals and nursing note reviewed.   Constitutional:       General: She is not in acute distress.     Appearance: She is normal weight. She is not ill-appearing.   Eyes:      Extraocular Movements: Extraocular movements intact.      Conjunctiva/sclera: Conjunctivae normal.   Cardiovascular:      Rate and Rhythm: Normal rate.   Pulmonary:      Effort: Pulmonary effort is normal.   Musculoskeletal:         General: Normal range of motion.      Cervical back: Normal range of motion.   Skin:     General: Skin is warm and dry.   Neurological:      General: No focal deficit present.      Mental Status: She is alert and oriented to person, place, and time.      Cranial Nerves: No cranial nerve deficit.      Sensory: No sensory deficit.      Motor: No weakness.      Coordination: Coordination normal.   Neurologic Exam     Mental Status   Oriented to person, place, and time.           Assessment & Plan   Independent Review of Radiographic Studies:      I personally reviewed the images from the following studies.    The cerebral angiogram dated 9/29/2023 " was reviewed with the patient and shows the left posterior communicating artery aneurysm web embolization changes with significant size reduction in the aneurysm, but some neck residual does remain.  The fetal origin posterior cerebral artery remains patent with no compromise.    Medical Decision Makin-year-old female with an unruptured but significant left posterior communicating artery aneurysm now status post web occlusion on 2022.  The patient underwent a follow-up angiogram on 2023 which shows a significant reduction in the aneurysm size but not complete resolution.  This is in part due to the fetal origin posterior cerebral artery that arises at the aneurysm origin.  This remains widely patent.  A follow-up angiogram in 1 year is recommended to assess for further aneurysm occlusion and to exclude any aneurysm growth.  Patient currently has no neurologic symptoms and continues to undergo lung cancer treatment.  Her hypertension and hyperlipidemia are medically controlled.    Diagnoses and all orders for this visit:    1. Cerebral aneurysm, nonruptured (Primary)    2. Primary hypertension    3. Hyperlipidemia, unspecified hyperlipidemia type    4. Malignant neoplasm of upper lobe of right lung      Return in about 1 year (around 10/12/2024) for Recheck, Cerebral Angiogram (DSA).

## 2023-10-12 ENCOUNTER — OFFICE VISIT (OUTPATIENT)
Dept: NEUROSURGERY | Facility: CLINIC | Age: 83
End: 2023-10-12
Payer: MEDICARE

## 2023-10-12 VITALS
OXYGEN SATURATION: 95 % | RESPIRATION RATE: 16 BRPM | HEART RATE: 86 BPM | BODY MASS INDEX: 17.89 KG/M2 | HEIGHT: 63 IN | SYSTOLIC BLOOD PRESSURE: 110 MMHG | WEIGHT: 101 LBS | DIASTOLIC BLOOD PRESSURE: 70 MMHG | TEMPERATURE: 97.9 F

## 2023-10-12 DIAGNOSIS — I10 PRIMARY HYPERTENSION: ICD-10-CM

## 2023-10-12 DIAGNOSIS — I67.1 CEREBRAL ANEURYSM, NONRUPTURED: Primary | ICD-10-CM

## 2023-10-12 DIAGNOSIS — E78.5 HYPERLIPIDEMIA, UNSPECIFIED HYPERLIPIDEMIA TYPE: ICD-10-CM

## 2023-10-12 DIAGNOSIS — C34.11 MALIGNANT NEOPLASM OF UPPER LOBE OF RIGHT LUNG: ICD-10-CM

## 2023-10-12 PROCEDURE — 3074F SYST BP LT 130 MM HG: CPT | Performed by: RADIOLOGY

## 2023-10-12 PROCEDURE — 1159F MED LIST DOCD IN RCRD: CPT | Performed by: RADIOLOGY

## 2023-10-12 PROCEDURE — 1160F RVW MEDS BY RX/DR IN RCRD: CPT | Performed by: RADIOLOGY

## 2023-10-12 PROCEDURE — 99214 OFFICE O/P EST MOD 30 MIN: CPT | Performed by: RADIOLOGY

## 2023-10-12 PROCEDURE — 3078F DIAST BP <80 MM HG: CPT | Performed by: RADIOLOGY

## 2023-10-13 NOTE — PROGRESS NOTES
REASON FOR FOLLOW-UP: Recurrent lung cancer.    History of Present Illness    The patient is a 83 y.o. female with the above-mentioned history who returned 9/22/2023 continuing on Mekinist 0.5 mg daily and Tafinlar at 50 mg twice daily.  She also continues on prednisone only taking 10 mg daily.  She reports that she is feeling quite good.  She is tolerating things well.  She has a decent appetite denies issues with nausea, vomiting, diarrhea, or constipation.  She denies any issues with increased shortness of breath.    Patient did see ENT Dr. Topete, who has ordered an ultrasound of her neck, which will be done at [facility] on the 27th.  She is also scheduled for a cerebral angiogram by Dr. Calvin on 29 September.      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  Dr. Dowell of neurosurgery saw the patient 10/12/2023 and felt the patient was stable without neurologic symptoms, yearly follow-up planned.  The patient had started seeing a new ENT physician leading to the referral back to neurosurgery.  When seen 10/16/2023 she is doing very well on her current Mekinist and Tafinlar doses having improved her weight, appetite and general performance status.  We have discussed at least a chest x-ray today and repeat staging in the next 5 to 6 weeks as she continues her current dosing.        ONCOLOGY HISTORY:      The patient is an 83-year-old female with the below medical history including chronic obstructive pulmonary disease who was seen in the [facility] multidisciplinary thoracic oncology clinic July 1, 2021.  She had a long history of smoking having undergone, previously a left upper lobectomy for carcinoma lung in May 2012, 2015 diagnosed with carcinoma the tongue undergoing radiation therapy and surgical therapy and  eventual discontinue smoking in 2015.      She had undergone a CT of the chest at [facility] 6/16/2021 showing postsurgical changes in the left chest from right upper lobectomy, 8 mm irregular nodule medial segment of the right lower lobe and diffuse changes of emphysema with fibrotic changes in the periphery, no suspicious hilar or mediastinal nodes, no pleural effusion.  New finding was suspicious for new malignancy with the patient felt to be a poor candidate for surgical resection.  A PET CT and PFTs were requested thereafter.  The PET/CT demonstrated ill-defined FDG avid soft tissue thickening left aspect mediastinum within the operative bed, 1 cm hypermetabolic pulmonary nodule right lower lobe and asymmetric thickening patchy uptake involving the right base of tongue.  There is subtle uptake within the L1 vertebral body which is indeterminate and a sub-6 mm pulmonary nodule of right lung and subcentimeter hypodense hepatic lesion which was below PET resolution.       The patient's case was discussed in thoracic conference 7/22/2021 with consideration of the patient undergoing L1 vertebral body MRI, confirmatory biopsy left mediastinal and assessment by ENT (Dr. Caballero) who had worked with the patient previously.  She, incidentally, indicates that radiation therapy was given apparently intraoperatively at her recent surgery?  She still has issues with difficulty chewing and swallowing post procedures and was to be followed up with Dr. Caballero in the near future as planned.                                                            She was seen in the thoracic clinic on the same day with plans to proceed with MRI of the lumbar spine, biopsy left mediastinal nodular area of potential disease and follow-up of her ENT assessment as well as undergo a guardant 360 assessment in our office.  She underwent the biopsy 8/13/2021 found to be consistent with invasive moderately differentiated adenocarcinoma with PD-L1  TPS score 40%.  MRI of the lumbar spine revealed no evidence of metastatic disease though the patient did have multilevel degenerative disease throughout the lumbar spine.  She had an office follow-up with Dr. 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  She underwent laryngoscopy 8/18/2021 with right base of tongue without evidence of lesions or masses in the region.     She was seen by Dr. Hernandez 8/19/2021 and, her findings, had been presented in lung conference.  Her findings were consistent with 2 separate lesions including a nodule in the superior segment of the right lower lobe and a mass in the upper portion of the left chest with the left chest lesion biopsy positive for adenocarcinoma.  The consensus was that these were to separate-synchronous primaries.  Stereotactic radiation each lesions were felt to be the appropriate way to proceed and the patient was referred to radiation therapy.     The patient is seen in office 8/23/2021 agreeable to the radiation therapy as well as additional assessments including Caris molecular assessment of her biopsy.  Again her guardant 360 is currently pending and we would follow her after she completes radiation therapy with subsequent scans.    She was able to proceed with SBRT to the right lung at primary #1 with 5 treatments at 1000 cGy per fraction in the left lung primary similarly at 1000 cGy per fraction x5.  Her treatment ranged between 8/31-9/13/2021.  She is now scheduled for follow-up 11/23/2021.    The patient subsequent genomic testing is discussed with her as she is is seen back 9/23/2021.  Her guardant 360 did not determine any new abnormalities but her Caris-MI profile-does reveal sensitivity to immunotherapy as well as a BRAF mutation.  This could be extremely important as we follow the patient from this point.  Fortunately she is feeling  extremely well and quite pleased about her treatments.    Patient had subsequent PET/CT 11/23/2021 demonstrates decrease in size and avidity of the previously noted intensely FDG avid nodules left lobectomy operative site and right lower lobe.  Surrounding groundglass and pulmonary opacification is consistent with postradiation changes, a few new subcentimeter pulmonary nodules are present in the right upper lobe and indeterminant, stable subcentimeter hepatic lesion is below PET resolution no other findings of FDG-avid disease are seen in neck abdomen or pelvis.  The patient seen in office 12/1/2021 with a relatively good performance status stating she is not having any new symptoms and very pleased about her results on her PET/CT.  She realizes we'll have to follow this further but subsequent scans in 6 months.  She is also hoping to see an oral surgeon that previously helped her-Dr. Wu.    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  There is evolution of presumed postradiation changes in the right midlung with soft tissue mass within the left lumpectomy operative bed minimally decreased in size.  There is a sub-6 mm nodule in the right upper lobe not definitively seen, indeterminate and an indeterminate left renal lesion at 1.5 cm unchanged from 7/13/2021.  The patient is currently scheduled to see Dr. Dowell on 6/23/2022.  She is feeling well without any additional symptoms.    The patient required admission 10/14 - 10/18/2022 presenting with shortness of breath and cough that was nonproductive.  She had been on oral antibiotics and did not improved and was admitted for therapy for apparent pneumonia.  This eventually led to bronchoscopy after initial CT revealed postsurgical changes, new masslike 6.7 cm area of consolidation anterior left  lung raising concern for potential malignancy and a follow-up PET/CT planned.  Bronchoscopy and biopsy left lower lung failed to show evidence of malignancy.  The patient's PET/CT, however, demonstrated an irregular pleural-based soft tissue density thickening in the left upper lobe that was intensely FDG avid new from 6/8/2022 thought to be a malignant recurrence with spread into the left lung parenchyma.  There is intensely pleural-based avidity within the left lower lobe thought to be metastatic disease with postradiation of the left apex as well.  There is a small focus of uptake in the right hilum grossly unchanged in size and post radiation changes in the right lower lobe.  There is indeterminate density left renal that was grossly unchanged.  The patient is seen back in office 11/15/2022 indicating that she still has chest discomfort that is slowly worsening and that she has a chronic paroxysmal cough but has not improved.  We discussed that she very likely has recurrent disease and plan to proceed with a guardant 360 examination initially as we determine whether we should try to proceed with therapy particularly immunotherapy versus targeted therapy recognizing the above findings.    Patient's guardant 360 returned as again significant for BRAF V600E and the potential use of BRAF inhibitor/MET inhibitor dabrafenib and trametinib.  Additional information [id number] and use of alpelisib.    Unfortunately she required admission 11/28-12/1 with worsening back pain.  Fortunately she did not demonstrate evidence of metastatic disease but did have moderate degenerative changes which could cause radiculopathy.  Medications were altered to include subsequently MSR 15 mg p.o. every 12 hours, Norco as needed.    The patient now presents back 12/14/2022 to initiate therapy- initially with immunotherapy considering Caris study with PD-L1 TPS of 70% on 22 C3 and 28-8 assays.    Patient seen with her  and we discussed  pain medications and adjustments thereafter and that she stopped taking MSIR having only a short supply and having to use Norco more frequently.  She is willing to initiate Keytruda today we have discussed that considering her genomics treatment versus her BRAF mutation is also an option.    C1 Keytruda 12/14/2022    Patient is seen back 1/4/2023 in follow-up due for cycle 2 Keytruda.  Patient states that since receiving her first cycle she has had decreased appetite and decreased energy.  She has not been able to bring herself to eat much and she has notably lost 7 pounds.  She has also been struggling with constipation in relation to ongoing narcotics for pain control.  She has been taking senna S2 tabs twice a day.  We discussed adding daily MiraLAX.    Patient did just last week meet with dietitian, Zoila Clinton, to discuss ways to improve caloric intake.  She has not been able to implement any of these things yet though.    The patient is next seen 1/25/2023 with mild weight gain.  She is seen with family member both indicating that she has actually felt somewhat better in terms of performance status and general function.  We have discussed proceeding with a third cycle treatment and reevaluating by scan in 2 weeks.    The patient proceeded with treatment 1/25/2023 and underwent follow-up CT chest abdomen pelvis 2/8/2023 demonstrating small pericardial effusion that is decreased in size from 11/20/2022, ill-defined consolidation and pleural-based thickening in the left apex and upper lung has reduced slightly, additional index nodule soft tissue in the aspect the pericardium has not changed substantially, no evidence of metastatic disease in the abdomen pelvis.    The patient is seen with her daughter 2/15/2023 both indicating that she has definitely improved, stable weight, appetite and performance status.  She is also using her pain medication much less frequently and wonders whether she might begin to taper  from her twice a day MS Contin.    Patient is seen back 3/8/2023 due for ongoing pembrolizumab.  She continues on low-dose prednisone 10 mg daily and has noted significant improvement in her energy and appetite with this.  She is reluctant to taper this any further we discussed that it is fine for her to stay on daily dosing.    She did try to taper her pain medication going down to just MS Contin 15 mg every 12 hours while holding her hydrocodone.  However over the last few days she has had some intermittent pain in the left upper back alleviated with hydrocodone 2-3 times a day.  She currently is denying any pain.  Monitor.    She is next evaluated 3/29/2023 for ongoing Keytruda.  Evidently her pain medication was sent to the wrong pharmacy and will need to be represcribed today-long-acting MS Contin.  Otherwise she is doing reasonably well at present.    Patient seen 5/31/2023 with gradual development of left shoulder and left scapular pain.  Concurrent CT chest, abdomen and pelvis demonstrate interval increasing consolidation left upper lobe with extension anterior to the left upper ribs with sclerosis anterior left second rib.  This is suspicious for worsening malignancy.  Plans were made for subsequent PET/CT where the patient's pain medications were adjusted.PET/CT 6/5/2023 reveals progression of disease in left upper thorax, left supraclavicular adenopathy new metastasis left posterior fourth ribs, no evidence of metastatic disease in the abdomen or pelvis.    The patient is seen with her son and daughter 6/12/2023 and it is clear that she is not developing a Pancoast like syndrome with progression of her malignancy in the left upper thorax and associated symptoms.  We have discussed discontinuance of her immunotherapy and moving to targeted therapy with dabrafenib and trametinib as we also request radiation therapy repeat consultation and try to manage her pain more effectively.    Patient seen 7/5/2023  with plans to start palliative radiotherapy and to initiate concurrently dabrafenib and trametinib.    As a result of toxicity (nausea and vomiting) the patient was taken off of dabrafenib and trametinib when seen 7/21/2023, given additional IV hydration and further supportive care.  Plans were made for follow-up on recovery to determine as to whether to redose the medications.    Unfortunately she required admission 7/25-30/2023 with failure to thrive.  Subsequent assessments including blood cultures were negative and patient was treated briefly with IV antibiotic therapy, started PT and OT, medications adjusted for hypotension and treated symptomatically for nausea and vomiting.  She was able to improve enough to be discharged home as she continued radiation therapy started 7/17/2023 and completed 7/31/2023 to the left chest wall.    She is next seen back 8/4/2023.  We have reviewed that she had been on dabrafenib and trametinib at 150 mg p.o. every 12 hours and 2 mg p.o. daily respectively and dosing could be changed considerably such as 50 mg twice daily and 0.5 mg daily.  Determination made to have her undergo repeat scans at 4 weeks and review her response to radiation therapy as we make application for lower dose targeted therapy, addition of Remeron p.o. nightly, tapering of her MS Contin to daily rather than twice daily, use of low-dose Ativan to undergo scans.    Subsequent scans 8/25/2023, fortunately, with stable left apical mass with mediastinal chest wall involvement unchanged 1.4 cm supraclavicular lymph node.  No new findings of metastatic disease.    The patient is seen back 9/8/2023.  Fortunately she is improved dramatically off therapy and is gratified that his studies have not worsened in any substantial way.  We have discussed both her scan reports and echocardiogram that does not show significant reduction of her ejection fraction.  As result of her current stable status we have asked her to  restart Mekinist at 0.5 mg daily and Tafinlar at 50 mg twice daily to be advanced as tolerated.  Patient seen 10/16/2023 with follow-up chest x-ray planned and CT of chest abdomen pelvis at 5 weeks -approximately 3 months of therapy.    Past Medical History:   Diagnosis Date    Allergic rhinitis     Aneurysm     Asthma     Cancer of floor of mouth 2014    Cerebral aneurysm     COPD (chronic obstructive pulmonary disease)     COVID 2020    Esophageal reflux     History of adenocarcinoma of lung     History of anemia     History of carcinoma in situ of skin     History of lung cancer     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 from [facility]    History of squamous cell carcinoma     Tongue    Hyperlipidemia     Hypertension     since early 60s    Low vitamin B12 level     Lung cancer     Thyromegaly     UTI (urinary tract infection)     Vitamin D deficiency         Past Surgical History:   Procedure Laterality Date    BRONCHOSCOPY Bilateral 10/17/2022    Procedure: BRONCHOSCOPY WITH FLUORO with biopsy and BAL;  Surgeon: India Flores MD;  Location: [facility] ENDOSCOPY;  Service: Pulmonary;  Laterality: Bilateral;  PRE/POST - lung mass    CHOLECYSTECTOMY  1999    COLONOSCOPY      EMBOLIZATION CEREBRAL Left 06/29/2022    Procedure: 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;  Surgeon: Bradley Dowell MD;  Location: [facility] HYBRID OR 18/19;  Service: Interventional Radiology;  Laterality: Left;    EYE SURGERY  11/24/2020    Took a muscle out of right eye    GLOSSECTOMY PARTIAL      less than one half tongue    LUNG SURGERY  2012    ORAL LESION EXCISION/BIOPSY      June and August 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    TUBAL ABDOMINAL LIGATION  1970    VENOUS ACCESS DEVICE (PORT) INSERTION N/A 12/12/2022    Procedure: MEDIPORT PLACEMENT;  Surgeon: Jason Villaseñor MD;  Location: [facility] MAIN OR;  Service: Vascular;  Laterality: N/A;        Current Outpatient  Medications on File Prior to Visit   Medication Sig Dispense Refill    atorvastatin (LIPITOR) 20 MG tablet TAKE 1 TABLET EVERY NIGHT 90 tablet 1    cholecalciferol (VITAMIN D3) 1000 units tablet Take 1 tablet by mouth Daily. HOLDING FOR DOS      Cyanocobalamin (VITAMIN B12 PO) Take  by mouth Daily.      potassium chloride (K-DUR,KLOR-CON) 10 MEQ CR tablet TAKE 2 TABLETS BY MOUTH DAILY 60 tablet 1    predniSONE (DELTASONE) 10 MG tablet TAKE 1 TABLET BY MOUTH DAILY 30 tablet 2    trametinib dimethyl sulfoxide (MEKINIST) 0.5 MG chemo tablet Take 1 tablet by mouth Daily. 30 tablet 2    [DISCONTINUED] mirtazapine (REMERON) 7.5 MG tablet TAKE 1 TABLET BY MOUTH EVERY NIGHT 30 tablet 1    [DISCONTINUED] zolpidem (AMBIEN) 5 MG tablet Take 1 tablet by mouth At Night As Needed for Sleep. 30 tablet 0     No current facility-administered medications on file prior to visit.        ALLERGIES:    Allergies   Allergen Reactions    Sulfa Antibiotics Rash        Social History     Socioeconomic History    Marital status:    Tobacco Use    Smoking status: Former     Types: Cigarettes     Quit date: 2015     Years since quittin.7     Passive exposure: Never    Smokeless tobacco: Never    Tobacco comments:     less than a pack per day, no smoking since , Quit 2015   Vaping Use    Vaping Use: Never used   Substance and Sexual Activity    Alcohol use: Not Currently     Comment: Socially -A few times a month    Drug use: No    Sexual activity: Defer     Family History   Problem Relation Age of Onset    Ovarian cancer Mother          at age 27    No Known Problems Father     Ovarian cancer Sister     Colon cancer Brother     No Known Problems Other     Malig Hyperthermia Neg Hx         Review of Systems  ROS as per HPI     Objective      Vitals:    10/16/23 0806   BP: 146/73   Pulse: 80   Resp: 16   Temp: 98.4 °F (36.9 °C)   TempSrc: Temporal   SpO2: 97%   Weight: 47.6 kg (104 lb 14.4 oz)   Height: 160 cm  "(62.99\")   PainSc: 0-No pain             10/16/2023     8:07 AM   Current Status   ECOG score 0      Physical Exam  Vitals reviewed.   Constitutional:       General: She is not in acute distress.     Appearance: Normal appearance. She is well-developed.   HENT:      Head: Normocephalic and atraumatic.   Eyes:      Pupils: Pupils are equal, round, and reactive to light.   Cardiovascular:      Rate and Rhythm: Normal rate and regular rhythm.      Heart sounds: Normal heart sounds. No murmur heard.  Pulmonary:      Effort: Pulmonary effort is normal. No respiratory distress.      Breath sounds: Normal breath sounds. No wheezing, rhonchi or rales.   Abdominal:      General: Bowel sounds are normal. There is no distension.      Palpations: Abdomen is soft.   Musculoskeletal:         General: No swelling. Normal range of motion.      Cervical back: Normal range of motion.   Skin:     General: Skin is warm and dry.      Findings: No rash.   Neurological:      Mental Status: She is alert and oriented to person, place, and time.         Physical exam preformed and reviewed. No changes noted from previous exam. Henry Escobar MD ; 10/16/2023      RECENT LABS:  Results from last 7 days   Lab Units 10/16/23  0746   WBC 10*3/mm3 7.30   NEUTROS ABS 10*3/mm3 4.74   HEMOGLOBIN g/dL 12.7   HEMATOCRIT % 40.6   PLATELETS 10*3/mm3 224       Results from last 7 days   Lab Units 10/16/23  0746   SODIUM mmol/L 148*   POTASSIUM mmol/L 3.9   CHLORIDE mmol/L 107   CO2 mmol/L 32.3*   BUN mg/dL 17   CREATININE mg/dL 0.71   CALCIUM mg/dL 9.0   ALBUMIN g/dL 3.5   BILIRUBIN mg/dL 0.2   ALK PHOS U/L 75   ALT (SGPT) U/L 7   AST (SGOT) U/L 19   GLUCOSE mg/dL 111*                   Assessment & Plan     1.  Carcinoma of the lung  May 2015, status post previous left upper lobectomy for carcinoma of the lung.    2.  Carcinoma of the tongue  history of a [id number] SCCa of the rightt retromolar trigone   2016 s/p WLE, buccal fat pad flap and SLN biopsy " that was negative, margins were negative.   She underwent laryngoscopy 8/18/2021 with right base of tongue without evidence of lesions or masses in the region.    3.  Moderately differentiated adenocarcinoma of the lung.  CT of the chest 6/16/2021 demonstrated postsurgical changes, 8 mm irregular nodule medial segment of right lower lobe and diffuse changes of emphysema.    She is seen in thoracic clinic with findings suspicious for new malignancy and concern of the patient being a poor operative candidate.    7/13/2021 PET CT demonstrated ill-defined avidity and soft tissue left aspect of the mediastinum, 1 cm hypermetabolic pulmonary nodule and asymmetric thickening involving the right base of tongue as well as subtle uptake in the L1 vertebral body.    This patient's case was discussed in thoracic clinic and plans were made to request an MRI of the lumbar spine, obtain a biopsy of the mediastinal involvement of the left had the patient reviewed by ENT.  Biopsy 8/13/2021 found to be consistent with invasive moderately differentiated adenocarcinoma with PD-L1 TPS score 40%.    7/24/2021 MRI of the lumbar spine revealed no evidence of metastatic disease though the patient did have multilevel degenerative disease throughout the lumbar spine.    Her findings were consistent with 2 separate lesions including a nodule in the superior segment of the right lower lobe and a mass in the upper portion of the left chest with the left chest lesion biopsy positive for adenocarcinoma.  The consensus was that these were to separate-synchronous primaries.  Stereotactic radiation each lesions were felt to be the appropriate way to proceed and the patient was referred to radiation therapy.  The patient was referred for radiation therapy and she was able to proceed with SBRT to the right lung at primary #1 with 5 treatments at 1000 cGy per fraction in the left lung primary similarly at 1000 cGy per fraction x5.  Her treatment ranged  between 8/31-9/13/2021.    Her guardant 360 did not determine any new abnormalities but her Caris-MI profile-does reveal sensitivity to immunotherapy as well as a BRAF mutation.  PET/CT 11/23/2021 demonstrates decrease in size and avidity of the previously noted intensely FDG avid nodules left lobectomy operative site and right lower lobe.  Surrounding groundglass and pulmonary opacification is consistent with postradiation changes, a few new subcentimeter pulmonary nodules are present in the right upper lobe and indeterminant, stable subcentimeter hepatic lesion is below PET resolution no other findings of FDG-avid disease are seen in neck abdomen or pelvis.  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  There is evolution of presumed postradiation changes in the right midlung with soft tissue mass within the left lumpectomy operative bed minimally decreased in size.  There is a sub-6 mm nodule in the right upper lobe not definitively seen, indeterminate and an indeterminate left renal lesion at 1.5 cm unchanged from 7/13/2021.  Admission 10/14 - 10/18/2022 presenting with shortness of breath and cough that was nonproductive.  She had been on oral antibiotics and did not improved and was admitted for therapy for apparent pneumonia.  This eventually led to bronchoscopy after initial CT revealed postsurgical changes, new masslike 6.7 cm area of consolidation anterior left lung raising concern for potential malignancy and a follow-up PET/CT planned.   Bronchoscopy and biopsy left lower lung failed to show evidence of malignancy.    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  There is intensely pleural-based  avidity within the left lower lobe thought to be metastatic disease with postradiation of the left apex as well.  There is a small focus of uptake in the right hilum grossly unchanged in size and post radiation changes in the right lower lobe.  There is indeterminate density left renal that was grossly unchanged.    Patient's guardant 360 returned as again significant for BRAF V600E and the potential use of BRAF inhibitor/MET inhibitor dabrafenib and trametinib.  Additional information [id number] and use of alpelisib.  The patient now presents back 12/14/2022 to initiate therapy- initially with immunotherapy considering Caris study with PD-L1 TPS of 70% on 22 C3 and 28-8 assays.  Single agent Keytruda initiated 12/14/2022 2/8/2023 CT of the chest, abdomen pelviswith consolidation and masslike pleural thickening in the left apex and upper lung index areas have decreased somewhat.  There is no evidence of metastatic disease otherwise and a few indeterminate left renal lesions are stable.  After lengthy discussion with the patient and her daughter as to the stability to mild improvement with immunotherapy it is agreed that we would continue treatment at this point.  Proceed today, 4/19/2023 with cycle 7 pembrolizumab which is continued every 3 weeks  The patient proceeded to 8 cycles of pembrolizumab and underwent follow-up chest CT chest, abdomen pelvis revealing evolution of dense consolidation left upper lobe and extension of soft tissue mass anterior to left upper ribs with increase sclerosis anterior left second rib.  This was concerning for progression of disease versus radiation change and the patient was scheduled for subsequent PET/CT.  PET/CT 6/5/2023  reveals progression of disease in left upper thorax, left supraclavicular adenopathy new metastasis left posterior fourth ribs, no evidence of metastatic disease in the abdomen or pelvis.  Patient seen 6/12/2023 with plans to move her Norco to every 4 hours,  discontinue Keytruda, make application for dabrafenib and trametinib at 150 mg p.o. every 12 hours and 2 mg p.o. daily respectively.  Patient referred for radiation therapy consultation concurrently.  Last dose of Keytruda 5/31/2023.  6/23/2023: Patient seen for triage visit.  She has not yet started dabrafenib or trametinib, she has not yet received the medications.  Unfortunately she has been experiencing uncontrolled nausea/vomiting as further detailed below.   Patient seen 6/27/2023 reporting that her nausea has resolved.  She was unable to complete the MRI of the brain however Dr. Escobar did review the images patient did have and there was no evidence of edema or metastatic disease.  Patient can start her new oral medication once she receives the medication.  Patient seen 7/5/2023 with plans to start palliative radiotherapy x10 fractions and initiate dabrafenib and trametinib as soon as medications received.  7/17/2023 patient initiated radiation with 10 fractions planned.  Patient has received dabrafenib and trametinib.  Brought in for triage visit due to nausea associated with dosing.  She is premedicating with ondansetron however only waiting 15 minutes.  We discussed today that she needs to wait at least 30 minutes to 1 hour prior to taking the dabrafenib and trametinib.  The patient will do so.  We discussed she could also take this again if she feels the need 8 hours later for nausea control.  If she is having breakthrough nausea then she should call our office.  I have encouraged her to utilize electrolyte drinks.  She will get 1L normal liter normal saline in the office today.She was not nauseas while in the office so we did not give additional nausea medication.  We will have her return in 1 week repeat labs, review by nurse practitioner, and possible IV fluids.  I stressed the importance of calling sooner if she finds that the premedication is not controlling her nausea at which time we would have her  come in for additional IV fluids and evaluation.  She is continuing daily radiation this week.  Case was discussed with Dr. Escobar today.  7/21/2023: Patient returns for short interval follow-up due to very poor appetite and sleeping the majority of the day.  Her nausea has been improved with premedicating 30 minutes prior to dabrafenib and trametinib.  She however has been sleeping the majority of the day and is not eating when she is awake.  She does report taking ensures but she is only sipping on these.  Have given her samples of some of the office today and encouraged her to drink when while she is here.  Her performance status continues to decline and her daughter states that she was fixing dinner nightly just 2 weeks ago.  With performance status of 3 today I discussed the case with Dr. Escobar and we will hold her dabrafenib and  TRAMETINIB.  Her liver enzymes are elevated today as well.  We will monitor this next week and have her evaluated by Dr. Escobar at which time we could consider restarting her dabrafenib and trametinib at reduced doses pending performance status and laboratory evaluation.  Unfortunately she required admission 7/25-30/2023 with failure to thrive.  Subsequent assessments including blood cultures were negative and patient was treated briefly with IV antibiotic therapy, started PT and OT, medications adjusted for hypotension and treated symptomatically for nausea and vomiting.  She was able to improve enough to be discharged home as she continued radiation therapy started 7/17/2023 and completed 7/31/2023 to the left chest wall.  She is next seen back 8/4/2023.  Lengthy discussion as to reevaluation   Plans made for CT chest, abdomen, pelvis in 4 weeks  Patient seen 8/21/2023 with complaints of worsening fatigue and shortness of breath.  Patient scheduled for follow up CT scans this Friday. Lungs clear on exam,  Sats 97%.  Hgb stable at 11.7.  Will check BNP today.  Discussed may be  related to disease and will need to see what scan shows.   Subsequent scans 8/25/2023, fortunately, with stable left apical mass with mediastinal chest wall involvement unchanged 1.4 cm supraclavicular lymph node.  No new findings of metastatic disease.  The patient is seen back 9/8/2023.  Fortunately she is improved dramatically off therapy and is gratified that his studies have not worsened in any substantial way.  We have discussed both her scan reports and echocardiogram that does not show significant reduction of her ejection fraction.  As result of her current stable status we have asked her to restart Mekinist at 0.5 mg daily and Tafinlar at 50 mg twice daily  9/22/2023 tolerating Mekinist 0.5 mg daily and tafinlar 50 mg twice daily quite well. Continues on prednisone 10 mg daily which will now be reduced to 5 mg daily when seen 10/16/2023  Plans made to continue current dosing of Mekinist and Tafinlar and repeat evaluation with chest x-ray 10/16 and repeat CT chest abdomen pelvis in 5 weeks, MD in 6 weeks.    4.  Worsening back pain  Admission 11/28/2022 through 12/1/2022.  MRI of the cervical and thoracic spine fortunately failed to demonstrate metastatic disease.  Moderate degenerative changes noted which could cause radiculopathy.  Medication altered to include MS Contin 15 mg every 12 hours and Norco for breakthrough pain  She reports today her pain is adequately controlled  Patient with increasing pain 5/31/2023 with pain level of 6.  MS Contin was increased to 50 mg p.o. every 8 hours and Norco 10/325 #101 p.o. every 6 hours to move to every 4 hours as needed-E scribed to pharmacy  Patient seen 6/12/2023 with Norco moved to every 4 hours.  6/23/2023: Seen for triage visit.  Has been using oxycodone 20 mg every 3 hours as needed for pain.  Reports she has not been using the hydrocodone 10/325.  Was experiencing dizziness, so reduced her oxycodone use to half a tablet every 3 hours as needed.  Reports  pain is uncontrolled during the night so she is having to wake at night time to take pain medicine.  They are questioning resuming long-acting pain medication, as she is waking throughout the night to take pain medicine.  She has already been prescribed MS Contin and has this available at home, did let her know it would be okay to resume this.  Assess 7/5/2023 with pain reduced to 2/10.  Plan to continue current therapy  Darlene Pfeiffer reports a pain score of 0.  Given her pain assessment as noted, treatment options were discussed and the following options were decided upon as a follow-up plan to address the patient's pain: continuation of current treatment plan for pain. Currently not requiring pain medication.   Refilled both the patient's Remeron and Ambien 10/16/2023    5.  Poor appetite and malnutrition  Placed on prednisone 10 mg daily 1/4/2023 with significant improvement in her symptoms  Weight is stable today at just below 106 pounds  Patient assessed 6/12/23 with possible gummy therapy.  Stable performance status including weight and appetite 7/5/2023  Weight has declined as of 7/17/2023 due to nausea and vomiting that started this past weekend.  After further discussion the patient did stop her prednisone 10 mg while she was not feeling well.  We discussed today the importance of continuing this as it can help with her nausea and appetite and therefore she will restart tomorrow.  7/21/2023: Weight is stable today though albumin is declining at 3.  Patient is sleeping the majority of the day and not eating.  Yesterday she had a small piece of chicken and that is all.  She states she is drinking ensures however her daughter thinks that she is just sipping on these and not completing them.  Hopefully holding her dabrafenib and trametinib will be helpful with her being awake more and appetite.  I encouraged her to make sure she is taking her prednisone daily at 10 mg daily.  Remeron 7.5 mg p.o. nightly E  scribed to pharmacy  9/22/2023 weight is up to 98 pounds.  Further improvement in weight 10/16/2020 3 to 104 pounds, continue Remeron at current dose, prednisone reduced to 5 mg daily.    6.  Uncontrolled nausea/vomiting  started after discontinuing prednisone and starting THC Gummies.  Started to experience dizziness with the THC Gummies.  Discontinue THC Gummies and dizziness improved, however she has remained nauseated now.  She has been utilizing Zofran with improvement, however today was unable to keep Zofran tablets down due to nausea/vomiting.  She will resume prednisone 10 mg daily.  She will receive 1 L IV normal saline in clinic today 10 mg IV Compazine.  We will also send prescription for Phenergan suppository, in case she is not able to keep Zofran down in the future.  Will also send for stat MRI brain since patient is now experiencing uncontrolled nausea vomiting and has recently had progression of her cancer as seen on PET from 6/5/2023.  MRI brain was performed without contrast, even though contrast was ordered.  The patient also did not stay for entire exam to be completed.  Exam limited for assessment of metastatic disease.  Attempted to call patient to review results, however no answer, did leave detailed voicemail.  6/27/2023 patient reports that her nausea has resolved.  She did not complete the MRI however the images which were done showed no evidence of edema or metastatic disease.  Nausea reoccurred when seen on 7/17/2023 over this past weekend after initiation of dabrafenib and trametinib.  Patient was premedicating with ondansetron however only giving herself about 15 minutes and I encouraged her to give herself at least 30 minutes to 1 hour prior to taking the medications.  She will notify our office if this is not helpful.  7/21/2023: Premedication with ondansetron 30 minutes prior to dabrafenib and trametinib has been helpful.  Patient however has had some vomiting episodes directly after  taking her medications where she has not had time to actually swallow them.  She denies difficulty swallowing however.  We will continue to monitor this.  10/16/2023 not an issue today.    7.  Hyperkalemia-  potassium 6/23/2023 3.1.  She has been having uncontrolled nausea, we will hold off on starting oral potassium replacement as it is unlikely she will tolerate that at this time.  Will recommend she increase oral potassium in her diet.  6/27/2023 potassium 3.0.  Nausea has resolved.  Patient will be given 40 M EQ p.o. potassium in the office and she will be started on 20 mEq daily of p.o. potassium.  Assessed 7/5/2023 with potassium 4.4  7/17/2023 potassium normal at 3.7  7/21/2023: Potassium 3.1, patient not taking potassium supplements at home because she does not like the big pill.  Changed to potassium chloride 10 mill equivalents, 2 tablets every a.m. as these will be smaller.   9/22/2023 potassium is 3.7  Repeat 10/16/2023 potassium 3.9    Plan:  Continued lower dose dabrafenib 0.5 mg daily and trametinib 50 mg twice daily.  Continue prednisone reduced to 5 mg daily  Chest x-ray at [facility] today  5 weeks CT chest, abdomen, pelvis, CBC, CMP with MD follow-up 6 weeks  Patient is on a high risk medication requiring close monitoring for toxicity.      Henry Escobar MD  10/16/2023

## 2023-10-16 ENCOUNTER — LAB (OUTPATIENT)
Dept: LAB | Facility: HOSPITAL | Age: 83
End: 2023-10-16
Payer: MEDICARE

## 2023-10-16 ENCOUNTER — HOSPITAL ENCOUNTER (OUTPATIENT)
Dept: GENERAL RADIOLOGY | Facility: HOSPITAL | Age: 83
Discharge: HOME | End: 2023-10-16
Admitting: INTERNAL MEDICINE
Payer: MEDICARE

## 2023-10-16 ENCOUNTER — OFFICE VISIT (OUTPATIENT)
Dept: ONCOLOGY | Facility: CLINIC | Age: 83
End: 2023-10-16
Payer: MEDICARE

## 2023-10-16 VITALS
OXYGEN SATURATION: 97 % | BODY MASS INDEX: 18.59 KG/M2 | RESPIRATION RATE: 16 BRPM | HEART RATE: 80 BPM | DIASTOLIC BLOOD PRESSURE: 73 MMHG | TEMPERATURE: 98.4 F | WEIGHT: 104.9 LBS | HEIGHT: 63 IN | SYSTOLIC BLOOD PRESSURE: 146 MMHG

## 2023-10-16 DIAGNOSIS — C34.11 MALIGNANT NEOPLASM OF UPPER LOBE OF RIGHT LUNG: Primary | ICD-10-CM

## 2023-10-16 DIAGNOSIS — G47.00 INSOMNIA, UNSPECIFIED TYPE: ICD-10-CM

## 2023-10-16 DIAGNOSIS — C34.11 MALIGNANT NEOPLASM OF UPPER LOBE OF RIGHT LUNG: ICD-10-CM

## 2023-10-16 LAB
ALBUMIN SERPL-MCNC: 3.5 G/DL (ref 3.5–5.2)
ALBUMIN/GLOB SERPL: 1.8 G/DL
ALP SERPL-CCNC: 75 U/L (ref 39–117)
ALT SERPL W P-5'-P-CCNC: 7 U/L (ref 1–33)
ANION GAP SERPL CALCULATED.3IONS-SCNC: 8.7 MMOL/L (ref 5–15)
AST SERPL-CCNC: 19 U/L (ref 1–32)
BASOPHILS # BLD AUTO: 0.02 10*3/MM3 (ref 0–0.2)
BASOPHILS NFR BLD AUTO: 0.3 % (ref 0–1.5)
BILIRUB SERPL-MCNC: 0.2 MG/DL (ref 0–1.2)
BUN SERPL-MCNC: 17 MG/DL (ref 8–23)
BUN/CREAT SERPL: 23.9 (ref 7–25)
CALCIUM SPEC-SCNC: 9 MG/DL (ref 8.6–10.5)
CHLORIDE SERPL-SCNC: 107 MMOL/L (ref 98–107)
CO2 SERPL-SCNC: 32.3 MMOL/L (ref 22–29)
CREAT SERPL-MCNC: 0.71 MG/DL (ref 0.6–1.1)
DEPRECATED RDW RBC AUTO: 55.3 FL (ref 37–54)
EGFRCR SERPLBLD CKD-EPI 2021: 85.5 ML/MIN/1.73
EOSINOPHIL # BLD AUTO: 0 10*3/MM3 (ref 0–0.4)
EOSINOPHIL NFR BLD AUTO: 0 % (ref 0.3–6.2)
ERYTHROCYTE [DISTWIDTH] IN BLOOD BY AUTOMATED COUNT: 15.4 % (ref 12.3–15.4)
GLOBULIN UR ELPH-MCNC: 1.9 GM/DL
GLUCOSE SERPL-MCNC: 111 MG/DL (ref 65–99)
HCT VFR BLD AUTO: 40.6 % (ref 34–46.6)
HGB BLD-MCNC: 12.7 G/DL (ref 12–15.9)
IMM GRANULOCYTES # BLD AUTO: 0.02 10*3/MM3 (ref 0–0.05)
IMM GRANULOCYTES NFR BLD AUTO: 0.3 % (ref 0–0.5)
LYMPHOCYTES # BLD AUTO: 2.07 10*3/MM3 (ref 0.7–3.1)
LYMPHOCYTES NFR BLD AUTO: 28.4 % (ref 19.6–45.3)
MCH RBC QN AUTO: 30.2 PG (ref 26.6–33)
MCHC RBC AUTO-ENTMCNC: 31.3 G/DL (ref 31.5–35.7)
MCV RBC AUTO: 96.7 FL (ref 79–97)
MONOCYTES # BLD AUTO: 0.45 10*3/MM3 (ref 0.1–0.9)
MONOCYTES NFR BLD AUTO: 6.2 % (ref 5–12)
NEUTROPHILS NFR BLD AUTO: 4.74 10*3/MM3 (ref 1.7–7)
NEUTROPHILS NFR BLD AUTO: 64.8 % (ref 42.7–76)
NRBC BLD AUTO-RTO: 0 /100 WBC (ref 0–0.2)
PLATELET # BLD AUTO: 224 10*3/MM3 (ref 140–450)
PMV BLD AUTO: 9.5 FL (ref 6–12)
POTASSIUM SERPL-SCNC: 3.9 MMOL/L (ref 3.5–5.2)
PROT SERPL-MCNC: 5.4 G/DL (ref 6–8.5)
RBC # BLD AUTO: 4.2 10*6/MM3 (ref 3.77–5.28)
SODIUM SERPL-SCNC: 148 MMOL/L (ref 136–145)
WBC NRBC COR # BLD: 7.3 10*3/MM3 (ref 3.4–10.8)

## 2023-10-16 PROCEDURE — 99214 OFFICE O/P EST MOD 30 MIN: CPT | Performed by: INTERNAL MEDICINE

## 2023-10-16 PROCEDURE — 36415 COLL VENOUS BLD VENIPUNCTURE: CPT

## 2023-10-16 PROCEDURE — 71046 X-RAY EXAM CHEST 2 VIEWS: CPT

## 2023-10-16 PROCEDURE — 3078F DIAST BP <80 MM HG: CPT | Performed by: INTERNAL MEDICINE

## 2023-10-16 PROCEDURE — 85025 COMPLETE CBC W/AUTO DIFF WBC: CPT

## 2023-10-16 PROCEDURE — 80053 COMPREHEN METABOLIC PANEL: CPT

## 2023-10-16 PROCEDURE — 3077F SYST BP >= 140 MM HG: CPT | Performed by: INTERNAL MEDICINE

## 2023-10-16 PROCEDURE — 1126F AMNT PAIN NOTED NONE PRSNT: CPT | Performed by: INTERNAL MEDICINE

## 2023-10-16 RX ORDER — MIRTAZAPINE 7.5 MG/1
7.5 TABLET, FILM COATED ORAL NIGHTLY
Qty: 30 TABLET | Refills: 1 | Status: SHIPPED | OUTPATIENT
Start: 2023-10-16

## 2023-10-16 RX ORDER — ZOLPIDEM TARTRATE 5 MG/1
5 TABLET ORAL NIGHTLY PRN
Qty: 30 TABLET | Refills: 0 | Status: SHIPPED | OUTPATIENT
Start: 2023-10-16

## 2023-10-17 ENCOUNTER — SPECIALTY PHARMACY (OUTPATIENT)
Dept: PHARMACY | Facility: HOSPITAL | Age: 83
End: 2023-10-17
Payer: MEDICARE

## 2023-11-01 ENCOUNTER — SPECIALTY PHARMACY (OUTPATIENT)
Dept: PHARMACY | Facility: HOSPITAL | Age: 83
End: 2023-11-01
Payer: MEDICARE

## 2023-11-01 NOTE — PROGRESS NOTES
NPAF renewal application for Mekinist and Tafinlar is filled.  Waiting on the following information:  1.) household size  2.) first two pages of the 1040  3.) patient signature  4.) MD signature and verification of the prescriptions by [facility].   5.) PA approvals of both medications which  on 23- tasks set up to renew PAs.    Once all of this information is obtained, the application will be faxed into [facility].      Pinky Walter  Specialty Pharmacy Technician

## 2023-11-06 ENCOUNTER — DOCUMENTATION (OUTPATIENT)
Dept: PHARMACY | Facility: HOSPITAL | Age: 83
End: 2023-11-06
Payer: MEDICARE

## 2023-11-06 NOTE — PROGRESS NOTES
I called pt today about the renewal of the free drug applications of Mekinist and Tafinlar.  She will bring in the 1040 and sign the form on her next MD visit 11/27/23.     Pinky Walter  Specialty Pharmacy Technician

## 2023-11-14 ENCOUNTER — HOSPITAL ENCOUNTER (INPATIENT)
Facility: HOSPITAL | Age: 83
LOS: 2 days | Discharge: HOME | DRG: 291 | End: 2023-11-16
Attending: EMERGENCY MEDICINE | Admitting: HOSPITALIST
Payer: MEDICARE

## 2023-11-14 ENCOUNTER — APPOINTMENT (OUTPATIENT)
Dept: CT IMAGING | Facility: HOSPITAL | Age: 83
DRG: 291 | End: 2023-11-14
Payer: MEDICARE

## 2023-11-14 ENCOUNTER — APPOINTMENT (OUTPATIENT)
Dept: CARDIOLOGY | Facility: HOSPITAL | Age: 83
DRG: 291 | End: 2023-11-14
Payer: MEDICARE

## 2023-11-14 DIAGNOSIS — R79.89 ELEVATED TROPONIN: ICD-10-CM

## 2023-11-14 DIAGNOSIS — J81.0 ACUTE PULMONARY EDEMA: ICD-10-CM

## 2023-11-14 DIAGNOSIS — I50.9 ACUTE CONGESTIVE HEART FAILURE, UNSPECIFIED HEART FAILURE TYPE: ICD-10-CM

## 2023-11-14 DIAGNOSIS — J96.01 ACUTE RESPIRATORY FAILURE WITH HYPOXIA: Primary | ICD-10-CM

## 2023-11-14 PROBLEM — I50.21 SYSTOLIC CHF, ACUTE: Status: ACTIVE | Noted: 2023-11-14

## 2023-11-14 PROBLEM — J96.90 RESPIRATORY FAILURE: Status: ACTIVE | Noted: 2023-11-14

## 2023-11-14 LAB
ALBUMIN SERPL-MCNC: 3.7 G/DL (ref 3.5–5.2)
ALBUMIN/GLOB SERPL: 1.7 G/DL
ALP SERPL-CCNC: 97 U/L (ref 39–117)
ALT SERPL W P-5'-P-CCNC: 9 U/L (ref 1–33)
ANION GAP SERPL CALCULATED.3IONS-SCNC: 10.2 MMOL/L (ref 5–15)
ANION GAP SERPL CALCULATED.3IONS-SCNC: 11.2 MMOL/L (ref 5–15)
APTT PPP: 27.7 SECONDS (ref 22.7–35.4)
ASCENDING AORTA: 2.7 CM
AST SERPL-CCNC: 21 U/L (ref 1–32)
B PARAPERT DNA SPEC QL NAA+PROBE: NOT DETECTED
B PERT DNA SPEC QL NAA+PROBE: NOT DETECTED
BASOPHILS # BLD AUTO: 0.02 10*3/MM3 (ref 0–0.2)
BASOPHILS NFR BLD AUTO: 0.3 % (ref 0–1.5)
BH CV ECHO LEFT VENTRICLE GLOBAL LONGITUDINAL STRAIN: -11.1 %
BH CV ECHO MEAS - ACS: 1.75 CM
BH CV ECHO MEAS - AO MAX PG: 6.9 MMHG
BH CV ECHO MEAS - AO MEAN PG: 3.4 MMHG
BH CV ECHO MEAS - AO ROOT DIAM: 3 CM
BH CV ECHO MEAS - AO V2 MAX: 130.9 CM/SEC
BH CV ECHO MEAS - AO V2 VTI: 25.5 CM
BH CV ECHO MEAS - AVA(I,D): 2.01 CM2
BH CV ECHO MEAS - EDV(CUBED): 148.9 ML
BH CV ECHO MEAS - EDV(MOD-SP2): 103 ML
BH CV ECHO MEAS - EDV(MOD-SP4): 122 ML
BH CV ECHO MEAS - EF(MOD-BP): 39.2 %
BH CV ECHO MEAS - EF(MOD-SP2): 40.8 %
BH CV ECHO MEAS - EF(MOD-SP4): 36.9 %
BH CV ECHO MEAS - EF_3D-VOL: 39 %
BH CV ECHO MEAS - ESV(CUBED): 65.1 ML
BH CV ECHO MEAS - ESV(MOD-SP2): 61 ML
BH CV ECHO MEAS - ESV(MOD-SP4): 77 ML
BH CV ECHO MEAS - FS: 24.1 %
BH CV ECHO MEAS - IVS/LVPW: 1 CM
BH CV ECHO MEAS - IVSD: 0.9 CM
BH CV ECHO MEAS - LAT PEAK E' VEL: 4 CM/SEC
BH CV ECHO MEAS - LV MASS(C)D: 174.5 GRAMS
BH CV ECHO MEAS - LV MAX PG: 2.25 MMHG
BH CV ECHO MEAS - LV MEAN PG: 1.17 MMHG
BH CV ECHO MEAS - LV V1 MAX: 74.9 CM/SEC
BH CV ECHO MEAS - LV V1 VTI: 16.1 CM
BH CV ECHO MEAS - LVIDD: 5.3 CM
BH CV ECHO MEAS - LVIDS: 4 CM
BH CV ECHO MEAS - LVOT AREA: 3.2 CM2
BH CV ECHO MEAS - LVOT DIAM: 2.01 CM
BH CV ECHO MEAS - LVPWD: 0.9 CM
BH CV ECHO MEAS - MED PEAK E' VEL: 4.9 CM/SEC
BH CV ECHO MEAS - MV A DUR: 0.12 SEC
BH CV ECHO MEAS - MV A MAX VEL: 105 CM/SEC
BH CV ECHO MEAS - MV DEC SLOPE: 553.9 CM/SEC2
BH CV ECHO MEAS - MV DEC TIME: 0.18 SEC
BH CV ECHO MEAS - MV E MAX VEL: 81.5 CM/SEC
BH CV ECHO MEAS - MV E/A: 0.78
BH CV ECHO MEAS - MV MAX PG: 5.1 MMHG
BH CV ECHO MEAS - MV MEAN PG: 2.7 MMHG
BH CV ECHO MEAS - MV P1/2T: 46.1 MSEC
BH CV ECHO MEAS - MV V2 VTI: 22.7 CM
BH CV ECHO MEAS - MVA(P1/2T): 4.8 CM2
BH CV ECHO MEAS - MVA(VTI): 2.25 CM2
BH CV ECHO MEAS - PA ACC TIME: 0.13 SEC
BH CV ECHO MEAS - PA V2 MAX: 118.1 CM/SEC
BH CV ECHO MEAS - QP/QS: 1.01
BH CV ECHO MEAS - RAP SYSTOLE: 3 MMHG
BH CV ECHO MEAS - RV MAX PG: 2.9 MMHG
BH CV ECHO MEAS - RV V1 MAX: 84.8 CM/SEC
BH CV ECHO MEAS - RV V1 VTI: 15.1 CM
BH CV ECHO MEAS - RVOT DIAM: 2.08 CM
BH CV ECHO MEAS - RVSP: 29 MMHG
BH CV ECHO MEAS - SV(LVOT): 51.1 ML
BH CV ECHO MEAS - SV(MOD-SP2): 42 ML
BH CV ECHO MEAS - SV(MOD-SP4): 45 ML
BH CV ECHO MEAS - SV(RVOT): 51.6 ML
BH CV ECHO MEAS - TAPSE (>1.6): 1.77 CM
BH CV ECHO MEAS - TR MAX PG: 26 MMHG
BH CV ECHO MEAS - TR MAX VEL: 255.2 CM/SEC
BH CV ECHO MEASUREMENTS AVERAGE E/E' RATIO: 18.31
BH CV XLRA - RV BASE: 2.6 CM
BH CV XLRA - RV LENGTH: 5.1 CM
BH CV XLRA - RV MID: 2.6 CM
BH CV XLRA - TDI S': 9.7 CM/SEC
BILIRUB SERPL-MCNC: <0.2 MG/DL (ref 0–1.2)
BUN SERPL-MCNC: 16 MG/DL (ref 8–23)
BUN SERPL-MCNC: 18 MG/DL (ref 8–23)
BUN/CREAT SERPL: 25 (ref 7–25)
BUN/CREAT SERPL: 29.6 (ref 7–25)
C PNEUM DNA NPH QL NAA+NON-PROBE: NOT DETECTED
CALCIUM SPEC-SCNC: 8.3 MG/DL (ref 8.6–10.5)
CALCIUM SPEC-SCNC: 9.1 MG/DL (ref 8.6–10.5)
CHLORIDE SERPL-SCNC: 104 MMOL/L (ref 98–107)
CHLORIDE SERPL-SCNC: 107 MMOL/L (ref 98–107)
CO2 SERPL-SCNC: 22.8 MMOL/L (ref 22–29)
CO2 SERPL-SCNC: 26.8 MMOL/L (ref 22–29)
CREAT SERPL-MCNC: 0.54 MG/DL (ref 0.57–1)
CREAT SERPL-MCNC: 0.72 MG/DL (ref 0.57–1)
D-LACTATE SERPL-SCNC: 1.5 MMOL/L (ref 0.5–2)
DEPRECATED RDW RBC AUTO: 40.7 FL (ref 37–54)
DEPRECATED RDW RBC AUTO: 42.9 FL (ref 37–54)
EGFRCR SERPLBLD CKD-EPI 2021: 84.1 ML/MIN/1.73
EGFRCR SERPLBLD CKD-EPI 2021: 92.6 ML/MIN/1.73
EOSINOPHIL # BLD AUTO: 0.01 10*3/MM3 (ref 0–0.4)
EOSINOPHIL NFR BLD AUTO: 0.1 % (ref 0.3–6.2)
ERYTHROCYTE [DISTWIDTH] IN BLOOD BY AUTOMATED COUNT: 12.5 % (ref 12.3–15.4)
ERYTHROCYTE [DISTWIDTH] IN BLOOD BY AUTOMATED COUNT: 13 % (ref 12.3–15.4)
FLUAV SUBTYP SPEC NAA+PROBE: NOT DETECTED
FLUBV RNA ISLT QL NAA+PROBE: NOT DETECTED
GEN 5 2HR TROPONIN T REFLEX: 74 NG/L
GLOBULIN UR ELPH-MCNC: 2.2 GM/DL
GLUCOSE SERPL-MCNC: 125 MG/DL (ref 65–99)
GLUCOSE SERPL-MCNC: 155 MG/DL (ref 65–99)
HADV DNA SPEC NAA+PROBE: NOT DETECTED
HCOV 229E RNA SPEC QL NAA+PROBE: NOT DETECTED
HCOV HKU1 RNA SPEC QL NAA+PROBE: NOT DETECTED
HCOV NL63 RNA SPEC QL NAA+PROBE: NOT DETECTED
HCOV OC43 RNA SPEC QL NAA+PROBE: NOT DETECTED
HCT VFR BLD AUTO: 41.3 % (ref 34–46.6)
HCT VFR BLD AUTO: 41.8 % (ref 34–46.6)
HGB BLD-MCNC: 13.7 G/DL (ref 12–15.9)
HGB BLD-MCNC: 13.8 G/DL (ref 12–15.9)
HMPV RNA NPH QL NAA+NON-PROBE: NOT DETECTED
HPIV1 RNA ISLT QL NAA+PROBE: NOT DETECTED
HPIV2 RNA SPEC QL NAA+PROBE: NOT DETECTED
HPIV3 RNA NPH QL NAA+PROBE: NOT DETECTED
HPIV4 P GENE NPH QL NAA+PROBE: NOT DETECTED
IMM GRANULOCYTES # BLD AUTO: 0.02 10*3/MM3 (ref 0–0.05)
IMM GRANULOCYTES NFR BLD AUTO: 0.3 % (ref 0–0.5)
INR PPP: 1.04 (ref 0.9–1.1)
LEFT ATRIUM VOLUME INDEX: 24.7 ML/M2
LYMPHOCYTES # BLD AUTO: 2.01 10*3/MM3 (ref 0.7–3.1)
LYMPHOCYTES NFR BLD AUTO: 25.3 % (ref 19.6–45.3)
M PNEUMO IGG SER IA-ACNC: NOT DETECTED
MAGNESIUM SERPL-MCNC: 1.8 MG/DL (ref 1.6–2.4)
MCH RBC QN AUTO: 29.7 PG (ref 26.6–33)
MCH RBC QN AUTO: 30.5 PG (ref 26.6–33)
MCHC RBC AUTO-ENTMCNC: 33 G/DL (ref 31.5–35.7)
MCHC RBC AUTO-ENTMCNC: 33.2 G/DL (ref 31.5–35.7)
MCV RBC AUTO: 89.9 FL (ref 79–97)
MCV RBC AUTO: 92 FL (ref 79–97)
MONOCYTES # BLD AUTO: 0.5 10*3/MM3 (ref 0.1–0.9)
MONOCYTES NFR BLD AUTO: 6.3 % (ref 5–12)
NEUTROPHILS NFR BLD AUTO: 5.37 10*3/MM3 (ref 1.7–7)
NEUTROPHILS NFR BLD AUTO: 67.7 % (ref 42.7–76)
NRBC BLD AUTO-RTO: 0 /100 WBC (ref 0–0.2)
NT-PROBNP SERPL-MCNC: 9763 PG/ML (ref 0–1800)
PLATELET # BLD AUTO: 229 10*3/MM3 (ref 140–450)
PLATELET # BLD AUTO: 237 10*3/MM3 (ref 140–450)
PMV BLD AUTO: 10.4 FL (ref 6–12)
PMV BLD AUTO: 10.4 FL (ref 6–12)
POTASSIUM SERPL-SCNC: 3.5 MMOL/L (ref 3.5–5.2)
POTASSIUM SERPL-SCNC: 3.6 MMOL/L (ref 3.5–5.2)
POTASSIUM SERPL-SCNC: 3.6 MMOL/L (ref 3.5–5.2)
PROCALCITONIN SERPL-MCNC: 0.02 NG/ML (ref 0–0.25)
PROT SERPL-MCNC: 5.9 G/DL (ref 6–8.5)
PROTHROMBIN TIME: 13.7 SECONDS (ref 11.7–14.2)
QT INTERVAL: 338 MS
QTC INTERVAL: 428 MS
RBC # BLD AUTO: 4.49 10*6/MM3 (ref 3.77–5.28)
RBC # BLD AUTO: 4.65 10*6/MM3 (ref 3.77–5.28)
RHINOVIRUS RNA SPEC NAA+PROBE: NOT DETECTED
RSV RNA NPH QL NAA+NON-PROBE: NOT DETECTED
SARS-COV-2 RNA NPH QL NAA+NON-PROBE: NOT DETECTED
SINUS: 2.9 CM
SODIUM SERPL-SCNC: 137 MMOL/L (ref 136–145)
SODIUM SERPL-SCNC: 145 MMOL/L (ref 136–145)
STJ: 2.47 CM
TROPONIN T DELTA: 13 NG/L
TROPONIN T SERPL HS-MCNC: 61 NG/L
WBC NRBC COR # BLD: 7.93 10*3/MM3 (ref 3.4–10.8)
WBC NRBC COR # BLD: 8.46 10*3/MM3 (ref 3.4–10.8)

## 2023-11-14 PROCEDURE — 83605 ASSAY OF LACTIC ACID: CPT | Performed by: EMERGENCY MEDICINE

## 2023-11-14 PROCEDURE — 25010000002 FUROSEMIDE PER 20 MG: Performed by: NURSE PRACTITIONER

## 2023-11-14 PROCEDURE — 0202U NFCT DS 22 TRGT SARS-COV-2: CPT | Performed by: EMERGENCY MEDICINE

## 2023-11-14 PROCEDURE — 93306 TTE W/DOPPLER COMPLETE: CPT | Performed by: INTERNAL MEDICINE

## 2023-11-14 PROCEDURE — 94640 AIRWAY INHALATION TREATMENT: CPT

## 2023-11-14 PROCEDURE — 99222 1ST HOSP IP/OBS MODERATE 55: CPT | Performed by: INTERNAL MEDICINE

## 2023-11-14 PROCEDURE — 83735 ASSAY OF MAGNESIUM: CPT | Performed by: HOSPITALIST

## 2023-11-14 PROCEDURE — 85025 COMPLETE CBC W/AUTO DIFF WBC: CPT | Performed by: EMERGENCY MEDICINE

## 2023-11-14 PROCEDURE — 85610 PROTHROMBIN TIME: CPT | Performed by: EMERGENCY MEDICINE

## 2023-11-14 PROCEDURE — 80053 COMPREHEN METABOLIC PANEL: CPT | Performed by: EMERGENCY MEDICINE

## 2023-11-14 PROCEDURE — 94799 UNLISTED PULMONARY SVC/PX: CPT

## 2023-11-14 PROCEDURE — 63710000001 PREDNISONE PER 1 MG: Performed by: NURSE PRACTITIONER

## 2023-11-14 PROCEDURE — 93356 MYOCRD STRAIN IMG SPCKL TRCK: CPT | Performed by: INTERNAL MEDICINE

## 2023-11-14 PROCEDURE — 83880 ASSAY OF NATRIURETIC PEPTIDE: CPT | Performed by: EMERGENCY MEDICINE

## 2023-11-14 PROCEDURE — 25010000002 FUROSEMIDE PER 20 MG: Performed by: EMERGENCY MEDICINE

## 2023-11-14 PROCEDURE — 84132 ASSAY OF SERUM POTASSIUM: CPT | Performed by: HOSPITALIST

## 2023-11-14 PROCEDURE — 93010 ELECTROCARDIOGRAM REPORT: CPT | Performed by: INTERNAL MEDICINE

## 2023-11-14 PROCEDURE — 84145 PROCALCITONIN (PCT): CPT | Performed by: HOSPITALIST

## 2023-11-14 PROCEDURE — 25510000001 IOPAMIDOL PER 1 ML: Performed by: EMERGENCY MEDICINE

## 2023-11-14 PROCEDURE — 71275 CT ANGIOGRAPHY CHEST: CPT

## 2023-11-14 PROCEDURE — 87040 BLOOD CULTURE FOR BACTERIA: CPT | Performed by: HOSPITALIST

## 2023-11-14 PROCEDURE — 94664 DEMO&/EVAL PT USE INHALER: CPT

## 2023-11-14 PROCEDURE — 36415 COLL VENOUS BLD VENIPUNCTURE: CPT

## 2023-11-14 PROCEDURE — 99285 EMERGENCY DEPT VISIT HI MDM: CPT

## 2023-11-14 PROCEDURE — 85027 COMPLETE CBC AUTOMATED: CPT | Performed by: HOSPITALIST

## 2023-11-14 PROCEDURE — 93356 MYOCRD STRAIN IMG SPCKL TRCK: CPT

## 2023-11-14 PROCEDURE — 25010000002 METHYLPREDNISOLONE PER 125 MG: Performed by: EMERGENCY MEDICINE

## 2023-11-14 PROCEDURE — 93306 TTE W/DOPPLER COMPLETE: CPT

## 2023-11-14 PROCEDURE — 93005 ELECTROCARDIOGRAM TRACING: CPT | Performed by: EMERGENCY MEDICINE

## 2023-11-14 PROCEDURE — 94761 N-INVAS EAR/PLS OXIMETRY MLT: CPT

## 2023-11-14 PROCEDURE — 85730 THROMBOPLASTIN TIME PARTIAL: CPT | Performed by: EMERGENCY MEDICINE

## 2023-11-14 PROCEDURE — 99223 1ST HOSP IP/OBS HIGH 75: CPT | Performed by: INTERNAL MEDICINE

## 2023-11-14 PROCEDURE — 84484 ASSAY OF TROPONIN QUANT: CPT | Performed by: EMERGENCY MEDICINE

## 2023-11-14 PROCEDURE — 94760 N-INVAS EAR/PLS OXIMETRY 1: CPT

## 2023-11-14 PROCEDURE — 25510000001 PERFLUTREN (DEFINITY) 8.476 MG IN SODIUM CHLORIDE (PF) 0.9 % 10 ML INJECTION: Performed by: NURSE PRACTITIONER

## 2023-11-14 RX ORDER — IPRATROPIUM BROMIDE AND ALBUTEROL SULFATE 2.5; .5 MG/3ML; MG/3ML
3 SOLUTION RESPIRATORY (INHALATION)
Status: DISCONTINUED | OUTPATIENT
Start: 2023-11-14 | End: 2023-11-16 | Stop reason: HOSPADM

## 2023-11-14 RX ORDER — ACETAMINOPHEN 325 MG/1
650 TABLET ORAL EVERY 4 HOURS PRN
Status: DISCONTINUED | OUTPATIENT
Start: 2023-11-14 | End: 2023-11-16 | Stop reason: HOSPADM

## 2023-11-14 RX ORDER — FUROSEMIDE 10 MG/ML
40 INJECTION INTRAMUSCULAR; INTRAVENOUS EVERY 12 HOURS
Status: DISCONTINUED | OUTPATIENT
Start: 2023-11-14 | End: 2023-11-16 | Stop reason: HOSPADM

## 2023-11-14 RX ORDER — MIRTAZAPINE 15 MG/1
7.5 TABLET, FILM COATED ORAL NIGHTLY
Status: DISCONTINUED | OUTPATIENT
Start: 2023-11-14 | End: 2023-11-16 | Stop reason: HOSPADM

## 2023-11-14 RX ORDER — SODIUM CHLORIDE 0.9 % (FLUSH) 0.9 %
10 SYRINGE (ML) INJECTION AS NEEDED
Status: DISCONTINUED | OUTPATIENT
Start: 2023-11-14 | End: 2023-11-16 | Stop reason: HOSPADM

## 2023-11-14 RX ORDER — ONDANSETRON 4 MG/1
4 TABLET, FILM COATED ORAL EVERY 6 HOURS PRN
Status: DISCONTINUED | OUTPATIENT
Start: 2023-11-14 | End: 2023-11-16 | Stop reason: HOSPADM

## 2023-11-14 RX ORDER — METOPROLOL SUCCINATE 25 MG/1
25 TABLET, EXTENDED RELEASE ORAL DAILY
COMMUNITY
Start: 2023-11-06

## 2023-11-14 RX ORDER — METHYLPREDNISOLONE SODIUM SUCCINATE 125 MG/2ML
125 INJECTION, POWDER, LYOPHILIZED, FOR SOLUTION INTRAMUSCULAR; INTRAVENOUS ONCE
Status: COMPLETED | OUTPATIENT
Start: 2023-11-14 | End: 2023-11-14

## 2023-11-14 RX ORDER — METOPROLOL SUCCINATE 25 MG/1
25 TABLET, EXTENDED RELEASE ORAL DAILY
Status: DISCONTINUED | OUTPATIENT
Start: 2023-11-14 | End: 2023-11-16 | Stop reason: HOSPADM

## 2023-11-14 RX ORDER — ZOLPIDEM TARTRATE 5 MG/1
5 TABLET ORAL NIGHTLY PRN
Status: DISCONTINUED | OUTPATIENT
Start: 2023-11-14 | End: 2023-11-16 | Stop reason: HOSPADM

## 2023-11-14 RX ORDER — POTASSIUM CHLORIDE 750 MG/1
20 TABLET, FILM COATED, EXTENDED RELEASE ORAL DAILY
Status: DISCONTINUED | OUTPATIENT
Start: 2023-11-14 | End: 2023-11-16 | Stop reason: HOSPADM

## 2023-11-14 RX ORDER — CHOLECALCIFEROL (VITAMIN D3) 125 MCG
1000 CAPSULE ORAL DAILY
Status: DISCONTINUED | OUTPATIENT
Start: 2023-11-14 | End: 2023-11-16 | Stop reason: HOSPADM

## 2023-11-14 RX ORDER — SODIUM CHLORIDE 9 MG/ML
40 INJECTION, SOLUTION INTRAVENOUS AS NEEDED
Status: DISCONTINUED | OUTPATIENT
Start: 2023-11-14 | End: 2023-11-16 | Stop reason: HOSPADM

## 2023-11-14 RX ORDER — ATORVASTATIN CALCIUM 20 MG/1
20 TABLET, FILM COATED ORAL NIGHTLY
Status: DISCONTINUED | OUTPATIENT
Start: 2023-11-14 | End: 2023-11-16 | Stop reason: HOSPADM

## 2023-11-14 RX ORDER — FUROSEMIDE 10 MG/ML
80 INJECTION INTRAMUSCULAR; INTRAVENOUS ONCE
Status: COMPLETED | OUTPATIENT
Start: 2023-11-14 | End: 2023-11-14

## 2023-11-14 RX ORDER — IPRATROPIUM BROMIDE AND ALBUTEROL SULFATE 2.5; .5 MG/3ML; MG/3ML
3 SOLUTION RESPIRATORY (INHALATION) ONCE
Status: COMPLETED | OUTPATIENT
Start: 2023-11-14 | End: 2023-11-14

## 2023-11-14 RX ORDER — SODIUM CHLORIDE 0.9 % (FLUSH) 0.9 %
10 SYRINGE (ML) INJECTION EVERY 12 HOURS SCHEDULED
Status: DISCONTINUED | OUTPATIENT
Start: 2023-11-14 | End: 2023-11-16 | Stop reason: HOSPADM

## 2023-11-14 RX ORDER — MONTELUKAST SODIUM 10 MG/1
10 TABLET ORAL NIGHTLY
COMMUNITY

## 2023-11-14 RX ORDER — ACETAMINOPHEN 650 MG/1
650 SUPPOSITORY RECTAL EVERY 4 HOURS PRN
Status: DISCONTINUED | OUTPATIENT
Start: 2023-11-14 | End: 2023-11-16 | Stop reason: HOSPADM

## 2023-11-14 RX ORDER — ONDANSETRON 2 MG/ML
4 INJECTION INTRAMUSCULAR; INTRAVENOUS EVERY 6 HOURS PRN
Status: DISCONTINUED | OUTPATIENT
Start: 2023-11-14 | End: 2023-11-16 | Stop reason: HOSPADM

## 2023-11-14 RX ORDER — PREDNISONE 20 MG/1
40 TABLET ORAL
Status: DISCONTINUED | OUTPATIENT
Start: 2023-11-14 | End: 2023-11-14

## 2023-11-14 RX ORDER — PREDNISONE 20 MG/1
20 TABLET ORAL
Status: DISCONTINUED | OUTPATIENT
Start: 2023-11-15 | End: 2023-11-15

## 2023-11-14 RX ORDER — IPRATROPIUM BROMIDE AND ALBUTEROL SULFATE 2.5; .5 MG/3ML; MG/3ML
3 SOLUTION RESPIRATORY (INHALATION) EVERY 4 HOURS PRN
Status: DISCONTINUED | OUTPATIENT
Start: 2023-11-14 | End: 2023-11-16 | Stop reason: HOSPADM

## 2023-11-14 RX ORDER — TIZANIDINE 4 MG/1
4 TABLET ORAL EVERY 8 HOURS PRN
Status: DISCONTINUED | OUTPATIENT
Start: 2023-11-14 | End: 2023-11-16 | Stop reason: HOSPADM

## 2023-11-14 RX ORDER — CETIRIZINE HYDROCHLORIDE 10 MG/1
10 TABLET ORAL DAILY
COMMUNITY

## 2023-11-14 RX ORDER — NITROGLYCERIN 0.4 MG/1
0.4 TABLET SUBLINGUAL
Status: DISCONTINUED | OUTPATIENT
Start: 2023-11-14 | End: 2023-11-16 | Stop reason: HOSPADM

## 2023-11-14 RX ORDER — CALCIUM CARBONATE 500 MG/1
2 TABLET, CHEWABLE ORAL 2 TIMES DAILY PRN
Status: DISCONTINUED | OUTPATIENT
Start: 2023-11-14 | End: 2023-11-16 | Stop reason: HOSPADM

## 2023-11-14 RX ORDER — CETIRIZINE HYDROCHLORIDE 10 MG/1
10 TABLET ORAL DAILY
Status: DISCONTINUED | OUTPATIENT
Start: 2023-11-14 | End: 2023-11-16 | Stop reason: HOSPADM

## 2023-11-14 RX ORDER — ACETAMINOPHEN 160 MG/5ML
650 SOLUTION ORAL EVERY 4 HOURS PRN
Status: DISCONTINUED | OUTPATIENT
Start: 2023-11-14 | End: 2023-11-16 | Stop reason: HOSPADM

## 2023-11-14 RX ADMIN — MIRTAZAPINE 7.5 MG: 15 TABLET, FILM COATED ORAL at 22:46

## 2023-11-14 RX ADMIN — IPRATROPIUM BROMIDE AND ALBUTEROL SULFATE 3 ML: 2.5; .5 SOLUTION RESPIRATORY (INHALATION) at 04:28

## 2023-11-14 RX ADMIN — IPRATROPIUM BROMIDE AND ALBUTEROL SULFATE 3 ML: 2.5; .5 SOLUTION RESPIRATORY (INHALATION) at 19:30

## 2023-11-14 RX ADMIN — Medication 1000 MCG: at 10:28

## 2023-11-14 RX ADMIN — PREDNISONE 40 MG: 20 TABLET ORAL at 10:25

## 2023-11-14 RX ADMIN — POTASSIUM CHLORIDE 20 MEQ: 750 TABLET, EXTENDED RELEASE ORAL at 10:25

## 2023-11-14 RX ADMIN — METOPROLOL SUCCINATE 25 MG: 25 TABLET, EXTENDED RELEASE ORAL at 10:28

## 2023-11-14 RX ADMIN — IPRATROPIUM BROMIDE AND ALBUTEROL SULFATE 3 ML: 2.5; .5 SOLUTION RESPIRATORY (INHALATION) at 07:03

## 2023-11-14 RX ADMIN — ZOLPIDEM TARTRATE 5 MG: 5 TABLET ORAL at 20:52

## 2023-11-14 RX ADMIN — ATORVASTATIN CALCIUM 20 MG: 20 TABLET, FILM COATED ORAL at 20:52

## 2023-11-14 RX ADMIN — FUROSEMIDE 40 MG: 10 INJECTION, SOLUTION INTRAMUSCULAR; INTRAVENOUS at 20:52

## 2023-11-14 RX ADMIN — PERFLUTREN 2 ML: 6.52 INJECTION, SUSPENSION INTRAVENOUS at 11:17

## 2023-11-14 RX ADMIN — IPRATROPIUM BROMIDE AND ALBUTEROL SULFATE 3 ML: 2.5; .5 SOLUTION RESPIRATORY (INHALATION) at 13:49

## 2023-11-14 RX ADMIN — METHYLPREDNISOLONE SODIUM SUCCINATE 125 MG: 125 INJECTION, POWDER, FOR SOLUTION INTRAMUSCULAR; INTRAVENOUS at 02:59

## 2023-11-14 RX ADMIN — IOPAMIDOL 95 ML: 755 INJECTION, SOLUTION INTRAVENOUS at 03:35

## 2023-11-14 RX ADMIN — Medication 10 ML: at 20:52

## 2023-11-14 RX ADMIN — IPRATROPIUM BROMIDE AND ALBUTEROL SULFATE 3 ML: 2.5; .5 SOLUTION RESPIRATORY (INHALATION) at 02:43

## 2023-11-14 RX ADMIN — TIZANIDINE 4 MG: 4 TABLET ORAL at 22:46

## 2023-11-14 RX ADMIN — FUROSEMIDE 80 MG: 10 INJECTION, SOLUTION INTRAMUSCULAR; INTRAVENOUS at 05:21

## 2023-11-14 RX ADMIN — CETIRIZINE HYDROCHLORIDE 10 MG: 10 TABLET ORAL at 10:28

## 2023-11-14 RX ADMIN — FUROSEMIDE 40 MG: 10 INJECTION, SOLUTION INTRAMUSCULAR; INTRAVENOUS at 10:25

## 2023-11-14 RX ADMIN — Medication 10 ML: at 10:28

## 2023-11-14 NOTE — ED PROVIDER NOTES
EMERGENCY DEPARTMENT ENCOUNTER    Room Number:  N431/1  PCP: Kehrer, Meredith Lea, MD  Historian: Patient      HPI:  Chief Complaint: Shortness of breath  A complete HPI/ROS/PMH/PSH/SH/FH are unobtainable due to: None  Context: Darlene Pfeiffer is a 83 y.o. female who presents to the ED c/o shortness of breath that was gradual in onset but has been steadily worsening now over the past 2 to 3 days.  She does have a history of lung cancer that is currently being treated with oral chemotherapy as well as a previous diagnosis of COPD.  She is not oxygen dependent at home.  She does complain of a nonproductive cough associated with the symptoms.  She denies fever/chills, chest pain, back pain, extremity pain, nausea/vomiting, or abdominal pain.            PAST MEDICAL HISTORY  Active Ambulatory Problems     Diagnosis Date Noted    Hypertension 11/20/2019    Hyperlipidemia 11/20/2019    Esophageal reflux 11/20/2019    Chronic obstructive pulmonary disease 03/11/2020    Seasonal allergic rhinitis due to pollen 03/11/2020    Abnormal glucose 05/21/2020    Clinical diagnosis of severe acute respiratory syndrome coronavirus 2 (SARS-CoV-2) disease 12/21/2020    Lung cancer     Cerebral aneurysm, nonruptured 06/23/2022    Cerebral aneurysm without rupture 06/29/2022    Shortness of breath 10/14/2022    Hypokalemia 10/15/2022    History of lung cancer     Long-term use of high-risk medication 11/16/2022    Intractable back pain 11/29/2022    Hypokalemia 11/30/2022    Fitting and adjustment of vascular catheter 12/13/2022    Weakness 07/25/2023    Cystitis 07/26/2023    Fever 07/26/2023    Respiratory failure 11/14/2023     Resolved Ambulatory Problems     Diagnosis Date Noted    No Resolved Ambulatory Problems     Past Medical History:   Diagnosis Date    Allergic rhinitis     Aneurysm     Asthma     Cancer of floor of mouth 2014    Cerebral aneurysm     COPD (chronic obstructive pulmonary disease)     COVID 2020    History of  adenocarcinoma of lung     History of anemia     History of carcinoma in situ of skin     History of oral hairy leukoplakia     History of squamous cell carcinoma     Low vitamin B12 level     Thyromegaly     UTI (urinary tract infection)     Vitamin D deficiency          PAST SURGICAL HISTORY  Past Surgical History:   Procedure Laterality Date    BRONCHOSCOPY Bilateral 10/17/2022    Procedure: BRONCHOSCOPY WITH FLUORO with biopsy and BAL;  Surgeon: India Flores MD;  Location: [facility] ENDOSCOPY;  Service: Pulmonary;  Laterality: Bilateral;  PRE/POST - lung mass    CHOLECYSTECTOMY      COLONOSCOPY      EMBOLIZATION CEREBRAL Left 2022    Procedure: 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;  Surgeon: Bradley Dowell MD;  Location: [facility] HYBRID OR ;  Service: Interventional Radiology;  Laterality: Left;    EYE SURGERY  2020    Took a muscle out of right eye    GLOSSECTOMY PARTIAL      less than one half tongue    LUNG SURGERY      ORAL LESION EXCISION/BIOPSY       and 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    TUBAL ABDOMINAL LIGATION      VENOUS ACCESS DEVICE (PORT) INSERTION N/A 2022    Procedure: MEDIPORT PLACEMENT;  Surgeon: Jason Villaseñor MD;  Location: [facility] MAIN OR;  Service: Vascular;  Laterality: N/A;         FAMILY HISTORY  Family History   Problem Relation Age of Onset    Ovarian cancer Mother          at age 27    No Known Problems Father     Ovarian cancer Sister     Colon cancer Brother     No Known Problems Other     Malig Hyperthermia Neg Hx          SOCIAL HISTORY  Social History     Socioeconomic History    Marital status:    Tobacco Use    Smoking status: Former     Types: Cigarettes     Quit date: 2015     Years since quittin.7     Passive exposure: Never    Smokeless tobacco: Never    Tobacco comments:     less than a pack per day, no smoking since , Quit 2015   Vaping Use    Vaping Use: Never used    Substance and Sexual Activity    Alcohol use: Not Currently     Comment: Socially -A few times a month    Drug use: No    Sexual activity: Defer         ALLERGIES  Sulfa antibiotics        REVIEW OF SYSTEMS  Review of Systems   Constitutional:  Negative for fever.   HENT:  Negative for sore throat.    Eyes: Negative.    Respiratory:  Positive for cough, shortness of breath and wheezing.    Cardiovascular:  Negative for chest pain.   Gastrointestinal:  Negative for abdominal pain, diarrhea and vomiting.   Genitourinary:  Negative for dysuria.   Musculoskeletal:  Negative for neck pain.   Skin:  Negative for rash.   Allergic/Immunologic: Negative.    Neurological:  Negative for weakness, numbness and headaches.   Hematological: Negative.    Psychiatric/Behavioral: Negative.     All other systems reviewed and are negative.         PHYSICAL EXAM  ED Triage Vitals [11/14/23 0218]   Temp Heart Rate Resp BP SpO2   96.9 °F (36.1 °C) 114 18 -- (!) 85 %      Temp src Heart Rate Source Patient Position BP Location FiO2 (%)   Tympanic Monitor -- -- --       Physical Exam  Constitutional:       General: She is not in acute distress.     Appearance: Normal appearance. She is not ill-appearing or toxic-appearing.   HENT:      Head: Normocephalic and atraumatic.   Eyes:      Extraocular Movements: Extraocular movements intact.      Pupils: Pupils are equal, round, and reactive to light.   Cardiovascular:      Rate and Rhythm: Normal rate and regular rhythm.      Heart sounds: No murmur heard.     No friction rub. No gallop.   Pulmonary:      Effort: Pulmonary effort is normal.      Breath sounds: Decreased breath sounds present.   Abdominal:      General: Abdomen is flat. There is no distension.      Palpations: Abdomen is soft.      Tenderness: There is no abdominal tenderness.   Musculoskeletal:         General: No swelling or tenderness. Normal range of motion.      Cervical back: Normal range of motion and neck supple.    Skin:     General: Skin is warm and dry.   Neurological:      General: No focal deficit present.      Mental Status: She is alert and oriented to person, place, and time.      Sensory: No sensory deficit.      Motor: No weakness.   Psychiatric:         Mood and Affect: Mood normal.         Behavior: Behavior normal.           Vital signs and nursing notes reviewed.          LAB RESULTS  Recent Results (from the past 24 hour(s))   Comprehensive Metabolic Panel    Collection Time: 11/14/23  2:38 AM    Specimen: Blood   Result Value Ref Range    Glucose 125 (H) 65 - 99 mg/dL    BUN 18 8 - 23 mg/dL    Creatinine 0.72 0.57 - 1.00 mg/dL    Sodium 145 136 - 145 mmol/L    Potassium 3.6 3.5 - 5.2 mmol/L    Chloride 107 98 - 107 mmol/L    CO2 26.8 22.0 - 29.0 mmol/L    Calcium 9.1 8.6 - 10.5 mg/dL    Total Protein 5.9 (L) 6.0 - 8.5 g/dL    Albumin 3.7 3.5 - 5.2 g/dL    ALT (SGPT) 9 1 - 33 U/L    AST (SGOT) 21 1 - 32 U/L    Alkaline Phosphatase 97 39 - 117 U/L    Total Bilirubin <0.2 0.0 - 1.2 mg/dL    Globulin 2.2 gm/dL    A/G Ratio 1.7 g/dL    BUN/Creatinine Ratio 25.0 7.0 - 25.0    Anion Gap 11.2 5.0 - 15.0 mmol/L    eGFR 84.1 >60.0 mL/min/1.73   Protime-INR    Collection Time: 11/14/23  2:38 AM    Specimen: Blood   Result Value Ref Range    Protime 13.7 11.7 - 14.2 Seconds    INR 1.04 0.90 - 1.10   aPTT    Collection Time: 11/14/23  2:38 AM    Specimen: Blood   Result Value Ref Range    PTT 27.7 22.7 - 35.4 seconds   High Sensitivity Troponin T    Collection Time: 11/14/23  2:38 AM    Specimen: Blood   Result Value Ref Range    HS Troponin T 61 (C) <14 ng/L   BNP    Collection Time: 11/14/23  2:38 AM    Specimen: Blood   Result Value Ref Range    proBNP 9,763.0 (H) 0.0 - 1,800.0 pg/mL   Lactic Acid, Plasma    Collection Time: 11/14/23  2:38 AM    Specimen: Blood   Result Value Ref Range    Lactate 1.5 0.5 - 2.0 mmol/L   CBC Auto Differential    Collection Time: 11/14/23  2:38 AM    Specimen: Blood   Result Value Ref  Range    WBC 7.93 3.40 - 10.80 10*3/mm3    RBC 4.49 3.77 - 5.28 10*6/mm3    Hemoglobin 13.7 12.0 - 15.9 g/dL    Hematocrit 41.3 34.0 - 46.6 %    MCV 92.0 79.0 - 97.0 fL    MCH 30.5 26.6 - 33.0 pg    MCHC 33.2 31.5 - 35.7 g/dL    RDW 13.0 12.3 - 15.4 %    RDW-SD 42.9 37.0 - 54.0 fl    MPV 10.4 6.0 - 12.0 fL    Platelets 237 140 - 450 10*3/mm3    Neutrophil % 67.7 42.7 - 76.0 %    Lymphocyte % 25.3 19.6 - 45.3 %    Monocyte % 6.3 5.0 - 12.0 %    Eosinophil % 0.1 (L) 0.3 - 6.2 %    Basophil % 0.3 0.0 - 1.5 %    Immature Grans % 0.3 0.0 - 0.5 %    Neutrophils, Absolute 5.37 1.70 - 7.00 10*3/mm3    Lymphocytes, Absolute 2.01 0.70 - 3.10 10*3/mm3    Monocytes, Absolute 0.50 0.10 - 0.90 10*3/mm3    Eosinophils, Absolute 0.01 0.00 - 0.40 10*3/mm3    Basophils, Absolute 0.02 0.00 - 0.20 10*3/mm3    Immature Grans, Absolute 0.02 0.00 - 0.05 10*3/mm3    nRBC 0.0 0.0 - 0.2 /100 WBC   Respiratory Panel PCR w/COVID-19(SARS-CoV-2) JOSHUA/NUZHAT/JOHANA/PAD/COR/ROSEMARY In-House, NP Swab in UTM/VTM, 2 HR TAT - Swab, Nasopharynx    Collection Time: 11/14/23  2:42 AM    Specimen: Nasopharynx; Swab   Result Value Ref Range    ADENOVIRUS, PCR Not Detected Not Detected    Coronavirus 229E Not Detected Not Detected    Coronavirus HKU1 Not Detected Not Detected    Coronavirus NL63 Not Detected Not Detected    Coronavirus OC43 Not Detected Not Detected    COVID19 Not Detected Not Detected - Ref. Range    Human Metapneumovirus Not Detected Not Detected    Human Rhinovirus/Enterovirus Not Detected Not Detected    Influenza A PCR Not Detected Not Detected    Influenza B PCR Not Detected Not Detected    Parainfluenza Virus 1 Not Detected Not Detected    Parainfluenza Virus 2 Not Detected Not Detected    Parainfluenza Virus 3 Not Detected Not Detected    Parainfluenza Virus 4 Not Detected Not Detected    RSV, PCR Not Detected Not Detected    Bordetella pertussis pcr Not Detected Not Detected    Bordetella parapertussis PCR Not Detected Not Detected     Chlamydophila pneumoniae PCR Not Detected Not Detected    Mycoplasma pneumo by PCR Not Detected Not Detected   ECG 12 Lead Dyspnea    Collection Time: 11/14/23  3:05 AM   Result Value Ref Range    QT Interval 338 ms    QTC Interval 428 ms   High Sensitivity Troponin T 2Hr    Collection Time: 11/14/23  5:21 AM    Specimen: Blood   Result Value Ref Range    HS Troponin T 74 (C) <14 ng/L    Troponin T Delta 13 (C) >=-4 - <+4 ng/L   Basic Metabolic Panel    Collection Time: 11/14/23  5:21 AM    Specimen: Blood   Result Value Ref Range    Glucose 155 (H) 65 - 99 mg/dL    BUN 16 8 - 23 mg/dL    Creatinine 0.54 (L) 0.57 - 1.00 mg/dL    Sodium 137 136 - 145 mmol/L    Potassium 3.6 3.5 - 5.2 mmol/L    Chloride 104 98 - 107 mmol/L    CO2 22.8 22.0 - 29.0 mmol/L    Calcium 8.3 (L) 8.6 - 10.5 mg/dL    BUN/Creatinine Ratio 29.6 (H) 7.0 - 25.0    Anion Gap 10.2 5.0 - 15.0 mmol/L    eGFR 92.6 >60.0 mL/min/1.73       Ordered the above labs and reviewed the results.        RADIOLOGY  CT Angiogram Chest    Result Date: 11/14/2023  CT ANGIOGRAM OF THE CHEST  HISTORY: Shortness of breath  COMPARISON: August 25, 2023  TECHNIQUE: Axial CT imaging was obtained through the thorax. IV contrast was administered. Three-D reformatted images were obtained.  FINDINGS: No acute pulmonary thromboembolus is seen. Thoracic aorta is normal in caliber. No dissection is seen. There is calcification of the aorta. There are also coronary artery calcifications. The thyroid gland, trachea, and esophagus are unremarkable. Patient is status post left upper lobectomy. Confluent soft tissue mass is again noted within the left lung apex. I do think it is measuring larger on the current study, at 6.5 x 5.0 cm, previously 4.9 x 4.8 cm. Left supraclavicular lymph node has decreased in size, now measuring up to 1.1 cm, previously measuring 1.4 cm. There is sclerosis of the left first and second ribs anteriorly. This appears similar to the prior exam. There are  advanced background emphysematous changes. The patient has extensive interlobular and groundglass infiltrates throughout both lungs. Some of the appearance may be related to edema. There are also patchy areas of more focal airspace consolidation, and pneumonia is not excluded. There are small bilateral effusions. There is reflux of contrast material, which can be seen in the setting of right-sided heart failure. Images through the upper abdomen do not demonstrate any acute abnormalities. Right internal jugular vein Mediport appears to terminate within the superior vena cava.       1. No acute pulmonary thromboembolus. 2. The patient has extensive bilateral interstitial and groundglass infiltrates. Some of the appearance may be related to edema, but there are also areas of more focal airspace consolidation, and pneumonia is not excluded. 3. Possible increase in area of consolidation at the left lung apex. A left supraclavicular lymph node measures smaller on the current study.   Radiation dose reduction techniques were utilized, including automated exposure control and exposure modulation based on body size.   This report was finalized on 11/14/2023 4:15 AM by Dr. Ewelina Edwards M.D on Workstation: [employer]       Ordered the above noted radiological studies. Reviewed by me in PACS.            PROCEDURES  Critical Care    Performed by: Aram Rocha MD  Authorized by: Aram Rocha MD    Critical care provider statement:     Critical care time (minutes):  33    Critical care time was exclusive of:  Separately billable procedures and treating other patients    Critical care was necessary to treat or prevent imminent or life-threatening deterioration of the following conditions:  Respiratory failure    Critical care was time spent personally by me on the following activities:  Blood draw for specimens, development of treatment plan with patient or surrogate, discussions with consultants, evaluation  of patient's response to treatment, examination of patient, obtaining history from patient or surrogate, ordering and performing treatments and interventions, ordering and review of laboratory studies, ordering and review of radiographic studies, pulse oximetry, re-evaluation of patient's condition and review of old charts    Care discussed with: admitting provider        EKG independently interpreted by myself as follows:    EKG          EKG time: 0305  Rhythm/Rate: NSR, 96  P waves and NE: nml  QRS, axis: nml, nml   ST and T waves: Normal ST segments, nonspecific T wave flattening    Interpreted Contemporaneously by me, independently viewed  unchanged compared to prior 7/25/23            MEDICATIONS GIVEN IN ER  Medications   sodium chloride 0.9 % flush 10 mL (has no administration in time range)   sodium chloride 0.9 % flush 10 mL (has no administration in time range)   sodium chloride 0.9 % flush 10 mL (has no administration in time range)   sodium chloride 0.9 % infusion 40 mL (has no administration in time range)   nitroglycerin (NITROSTAT) SL tablet 0.4 mg (has no administration in time range)   acetaminophen (TYLENOL) tablet 650 mg (has no administration in time range)     Or   acetaminophen (TYLENOL) 160 MG/5ML oral solution 650 mg (has no administration in time range)     Or   acetaminophen (TYLENOL) suppository 650 mg (has no administration in time range)   ondansetron (ZOFRAN) tablet 4 mg (has no administration in time range)     Or   ondansetron (ZOFRAN) injection 4 mg (has no administration in time range)   calcium carbonate (TUMS) chewable tablet 500 mg (200 mg elemental) (has no administration in time range)   predniSONE (DELTASONE) tablet 40 mg (has no administration in time range)   ipratropium-albuterol (DUO-NEB) nebulizer solution 3 mL (has no administration in time range)   ipratropium-albuterol (DUO-NEB) nebulizer solution 3 mL (has no administration in time range)   furosemide (LASIX)  injection 40 mg (has no administration in time range)   methylPREDNISolone sodium succinate (SOLU-Medrol) injection 125 mg (125 mg Intravenous Given 11/14/23 0259)   ipratropium-albuterol (DUO-NEB) nebulizer solution 3 mL (3 mL Nebulization Given 11/14/23 0243)   iopamidol (ISOVUE-370) 76 % injection 100 mL (95 mL Intravenous Given 11/14/23 0335)   ipratropium-albuterol (DUO-NEB) nebulizer solution 3 mL (3 mL Nebulization Given 11/14/23 0428)   furosemide (LASIX) injection 80 mg (80 mg Intravenous Given 11/14/23 0521)                   MEDICAL DECISION MAKING, PROGRESS, and CONSULTS    All labs have been independently reviewed by me.  All radiology studies have been reviewed by me and I have also reviewed the radiology report.   EKG's independently viewed and interpreted by me.  Discussion below represents my analysis of pertinent findings related to patient's condition, differential diagnosis, treatment plan and final disposition.      Additional sources:  - Discussed/ obtained information from independent historians: History obtained from the patient herself at bedside.    - External (non-ED) record review: Upon medical records review, the patient was last seen and evaluated in the outpatient office of oncology on 10/16/2023 in follow-up for her known right upper lobe carcinoma.    - Chronic or social conditions impacting care: COPD, lung cancer    - Shared decision making: Admission decision based on shared conversations have between myself, the patient and family at bedside, as well as DEJAN Chicas for A.      Orders placed during this visit:  Orders Placed This Encounter   Procedures    Critical Care    Respiratory Panel PCR w/COVID-19(SARS-CoV-2) [facility]/NUZHAT/JOHANA/[facility]ARY In-House, NP Swab in UTM/VTM, 2 HR TAT - Swab, Nasopharynx    CT Angiogram Chest    Comprehensive Metabolic Panel    Protime-INR    aPTT    High Sensitivity Troponin T    BNP    Lactic Acid, Plasma    CBC Auto Differential    High  Sensitivity Troponin T 2Hr    Basic Metabolic Panel    CBC (No Diff)    Diet: Cardiac Diets; Healthy Heart (2-3 Na+); Texture: Regular Texture (IDDSI 7); Fluid Consistency: Thin (IDDSI 0)    Vital Signs    Intake & Output    Weigh Patient    Oral Care    Place Sequential Compression Device    Maintain Sequential Compression Device    Telemetry - Maintain IV Access    Telemetry - Place Orders & Notify Provider of Results When Patient Experiences Acute Chest Pain, Dysrhythmia or Respiratory Distress    May Be Off Telemetry for Tests    Daily Weights    Strict Intake & Output    Code Status and Medical Interventions:    [facility] (on-call MD unless specified) Details    Inpatient Cardiology Consult    Hematology & Oncology Inpatient Consult    ECG 12 Lead Dyspnea    Adult Transthoracic Echo Complete W/ Cont if Necessary Per Protocol    Insert Peripheral IV    Insert Peripheral IV    Inpatient Admission    CBC & Differential             Differential diagnosis includes but is not limited to:    Differential diagnosis includes but is not limited to COPD with acute exacerbation, pneumonia, pneumothorax, pulmonary embolism, acute coronary syndrome, sepsis, or CHF with pulmonary edema.      Independent interpretation of labs, radiology studies, and discussions with consultants:    CT scan of the patient's chest was independently interpreted by myself with my interpretation showing mild cardiomegaly with diffuse pulmonary edema.  No infiltrates or embolic disease seen.        ED Course as of 11/14/23 0637   Tue Nov 14, 2023   0345 The patient did get up to attempt ambulation to the restroom and became significantly short of breath.  Her oxygen saturations fell to 79%.  She was immediately placed back in bed and her oxygen elevated to 5 L by nasal cannula.  Thus far her cardiac enzymes and BNP are significantly elevated.  We will treat with another duo nebulizer treatment as we await her CT scan results. [BM]   1695 On reevaluation,  the patient is resting more comfortably while at rest with oxygen saturations of 91% on 5 L by nasal cannula.  I did inform her that it appears as though she has signs of pulmonary edema.  She denies a previous known history of congestive heart failure.  We will treat with IV Lasix and admit her to the hospital for further management and treatment.  The patient's in agreement with that plan and all questions have been answered. [BM]   0438 The patient's presentation, work-up, as well as diagnosis and treatment plan was discussed at length with DEJAN Chicas for A.  She agrees to admit the patient to the hospital today for Dr. Donohue. [BM]      ED Course User Index  [BM] Aram Rocha MD               DIAGNOSIS  Final diagnoses:   Acute respiratory failure with hypoxia   Acute congestive heart failure, unspecified heart failure type   Acute pulmonary edema   Elevated troponin         DISPOSITION  ADMISSION    Discussed treatment plan and reason for admission with pt/family and admitting physician.  Pt/family voiced understanding of the plan for admission for further testing/treatment as needed.               Latest Documented Vital Signs:  As of 06:37 EST  BP- 175/96 HR- 105 Temp- 96.9 °F (36.1 °C) (Tympanic) O2 sat- 90%              --    Please note that portions of this were completed with a voice recognition program.       Note Disclaimer: At [facility], we believe that sharing information builds trust and better relationships. You are receiving this note because you are receiving care at [facility] or recently visited. It is possible you will see health information before a provider has talked with you about it. This kind of information can be easy to misunderstand. To help you fully understand what it means for your health, we urge you to discuss this note with your provider.             Aram Rocha MD  11/14/23 0660

## 2023-11-14 NOTE — CONSULTS
Date of Hospital Visit: 2023  Encounter Provider: Selvin Chang MD  Place of Service: [facility] CARDIOLOGY  Patient Name: Darlene Pfeiffer  :1940  Referral Provider: Darius Donohue MD    Chief complaint shortness of breath     History of Present Illness Darlene Pfeiffer is a 83 year old pt with a history of HTN, HLD, COPD( not on home oxygen), lung cancer currently on oral chemotherapy with Tafinlar and Mekinist following with Dr. Kothari.      Pt presented to ER on 23 with complaints of worsening shortness of breath over the past 2-3 days. Pt also having nonproductive cough. She denied any fever, chills, chest pain, back pain,nausea/vomiting or abd pain.  She was noting some orthopnea and PND and had to sleep in a semirecumbent position.  In ER, sats of 85%, troponin T 61/73, proBNP 9763, Lactate 1.5, respiratory panel negative, CR 0.54, CTA of chest showed no acute pulmonary thromboembolus. 2. The patient has extensive bilateral interstitial and groundglass infiltrates. Some of the appearance may be related to edema, but there are also areas of more focal airspace consolidation, and pneumonia is not excluded. 3. Possible increase in area of consolidation at the left lung apex. A left supraclavicular lymph node measures smaller on the current study. EKG showed NSR 96,. Pt was placed on 5 liters NC and oxygen saturation 91%. Pt was treated with IV lasix with improvement in respiratory effort.  EKG shows no acute ischemic changes.  Echocardiogram today shows drop in EF to 40% with global hypokinesis and significant drop in longitudinal strain analysis compared to echo from 2023.  No cardiac complaints at time of interview.    HPI was reviewed, updated and amended when necessary.      ECHO 23    Left ventricular systolic function is normal. Calculated left ventricular EF = 55.9%    The following left ventricular wall segments are hypokinetic: basal inferior.     Left ventricular diastolic function is consistent with (grade I) impaired relaxation.    Left atrial volume is moderately increased.    Estimated right ventricular systolic pressure from tricuspid regurgitation is normal (<35 mmHg). Calculated right ventricular systolic pressure from tricuspid regurgitation is 35 mmHg.    Normal global longitudinal LV strain (GLS) = -20.8%.    Past Medical History:   Diagnosis Date    Allergic rhinitis     Aneurysm     Asthma     Cancer of floor of mouth 2014    Cerebral aneurysm     COPD (chronic obstructive pulmonary disease)     COVID 2020    Esophageal reflux     History of adenocarcinoma of lung     History of anemia     History of carcinoma in situ of skin     History of lung cancer     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 from [facility]    History of squamous cell carcinoma     Tongue    Hyperlipidemia     Hypertension     since early 60s    Intractable back pain 11/29/2022    Low vitamin B12 level     Lung cancer     Thyromegaly     UTI (urinary tract infection)     Vitamin D deficiency        Past Surgical History:   Procedure Laterality Date    BRONCHOSCOPY Bilateral 10/17/2022    Procedure: BRONCHOSCOPY WITH FLUORO with biopsy and BAL;  Surgeon: India Flores MD;  Location: [facility] ENDOSCOPY;  Service: Pulmonary;  Laterality: Bilateral;  PRE/POST - lung mass    CHOLECYSTECTOMY  1999    COLONOSCOPY      EMBOLIZATION CEREBRAL Left 06/29/2022    Procedure: 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;  Surgeon: Bradley Dowell MD;  Location: [facility] HYBRID OR 18/19;  Service: Interventional Radiology;  Laterality: Left;    EYE SURGERY  11/24/2020    Took a muscle out of right eye    GLOSSECTOMY PARTIAL      less than one half tongue    LUNG SURGERY  2012    ORAL LESION EXCISION/BIOPSY      June and August 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    TUBAL ABDOMINAL LIGATION  1970    VENOUS ACCESS DEVICE (PORT) INSERTION N/A  12/12/2022    Procedure: MEDIPORT PLACEMENT;  Surgeon: Jason Villaseñor MD;  Location: [facility] MAIN OR;  Service: Vascular;  Laterality: N/A;       Medications Prior to Admission   Medication Sig Dispense Refill Last Dose    atorvastatin (LIPITOR) 20 MG tablet TAKE 1 TABLET EVERY NIGHT (Patient taking differently: Take 1 tablet by mouth Every Night.) 90 tablet 1     cetirizine (zyrTEC) 10 MG tablet Take 1 tablet by mouth Daily.       Cholecalciferol (Vitamin D3) 50 MCG (2000 UT) tablet Take 1,000 Units by mouth Daily. HOLDING FOR DOS       Cyanocobalamin (VITAMIN B12 PO) Take 1,000 mcg by mouth Daily.       dabrafenib (Tafinlar) 50 MG chemo capsule Take 1 capsule by mouth 2 (Two) Times a Day.       metoprolol succinate XL (TOPROL-XL) 25 MG 24 hr tablet Take 1 tablet by mouth Daily.       mirtazapine (REMERON) 7.5 MG tablet Take 1 tablet by mouth Every Night. 30 tablet 1     potassium chloride (K-DUR,KLOR-CON) 10 MEQ CR tablet TAKE 2 TABLETS BY MOUTH DAILY 60 tablet 1     predniSONE (DELTASONE) 10 MG tablet TAKE 1 TABLET BY MOUTH DAILY 30 tablet 2     trametinib dimethyl sulfoxide (MEKINIST) 0.5 MG chemo tablet Take 1 tablet by mouth Daily. 30 tablet 2     zolpidem (AMBIEN) 5 MG tablet Take 1 tablet by mouth At Night As Needed for Sleep. 30 tablet 0        Current Meds  Scheduled Meds:furosemide, 40 mg, Intravenous, Q12H  ipratropium-albuterol, 3 mL, Nebulization, Q6H While Awake - RT  predniSONE, 40 mg, Oral, Daily With Breakfast  sodium chloride, 10 mL, Intravenous, Q12H      Continuous Infusions:   PRN Meds:.  acetaminophen **OR** acetaminophen **OR** acetaminophen    calcium carbonate    ipratropium-albuterol    nitroglycerin    ondansetron **OR** ondansetron    [COMPLETED] Insert Peripheral IV **AND** sodium chloride    sodium chloride    sodium chloride    Allergies as of 11/14/2023 - Reviewed 11/14/2023   Allergen Reaction Noted    Sulfa antibiotics Rash 10/13/2016       Social History     Socioeconomic  "History    Marital status:    Tobacco Use    Smoking status: Former     Types: Cigarettes     Quit date: 2015     Years since quittin.7     Passive exposure: Never    Smokeless tobacco: Never    Tobacco comments:     less than a pack per day, no smoking since , Quit 2015   Vaping Use    Vaping Use: Never used   Substance and Sexual Activity    Alcohol use: Not Currently     Comment: Socially -A few times a month    Drug use: No    Sexual activity: Defer       Family History   Problem Relation Age of Onset    Ovarian cancer Mother          at age 27    No Known Problems Father     Ovarian cancer Sister     Colon cancer Brother     No Known Problems Other     Malig Hyperthermia Neg Hx      Surgical, medical, social and family history was reviewed, updated and amended when necessary.    Review of Systems   Constitutional: Negative for chills and fever.   HENT:  Negative for hoarse voice and sore throat.    Eyes:  Negative for double vision and photophobia.   Cardiovascular:  Positive for dyspnea on exertion and orthopnea. Negative for chest pain, leg swelling, near-syncope, palpitations, paroxysmal nocturnal dyspnea and syncope.   Respiratory:  Negative for cough and wheezing.    Skin:  Negative for poor wound healing and rash.   Musculoskeletal:  Negative for arthritis and joint swelling.   Gastrointestinal:  Negative for bloating, abdominal pain, hematemesis and hematochezia.   Neurological:  Negative for dizziness and focal weakness.   Psychiatric/Behavioral:  Negative for depression and suicidal ideas.             Objective:   Temp:  [96.9 °F (36.1 °C)] 96.9 °F (36.1 °C)  Heart Rate:  [] 97  Resp:  [18-20] 18  BP: (160-175)/() 175/96  Body mass index is 19.06 kg/m².  Flowsheet Rows      Flowsheet Row First Filed Value   Admission Height 160 cm (63\") Documented at 2023   Admission Weight 47.2 kg (104 lb) Documented at 2023 021          Vitals:    23 " 0703   BP:    Pulse: 97   Resp: 18   Temp:    SpO2: 94%       Vitals reviewed.   Constitutional:       Appearance: Healthy appearance. Not in distress.   Neck:      Vascular: JVR present. JVD elevated.   Pulmonary:      Effort: Pulmonary effort is normal.      Breath sounds: Normal breath sounds. No wheezing. No rhonchi. No rales.   Chest:      Chest wall: Not tender to palpatation.   Cardiovascular:      PMI at left midclavicular line. Normal rate. Regular rhythm. Normal S1. Normal S2.       Murmurs: There is no murmur.      No gallop.  No click. No rub.   Pulses:     Intact distal pulses.   Edema:     Peripheral edema absent.   Abdominal:      General: Bowel sounds are normal.      Palpations: Abdomen is soft.      Tenderness: There is no abdominal tenderness.   Musculoskeletal: Normal range of motion.         General: No tenderness. Skin:     General: Skin is warm and dry.   Neurological:      General: No focal deficit present.      Mental Status: Alert and oriented to person, place and time.                 Lab Review:      Results from last 7 days   Lab Units 11/14/23 0521 11/14/23 0238   SODIUM mmol/L 137 145   POTASSIUM mmol/L 3.6 3.6   CHLORIDE mmol/L 104 107   CO2 mmol/L 22.8 26.8   BUN mg/dL 16 18   CREATININE mg/dL 0.54* 0.72   CALCIUM mg/dL 8.3* 9.1   BILIRUBIN mg/dL  --  <0.2   ALK PHOS U/L  --  97   ALT (SGPT) U/L  --  9   AST (SGOT) U/L  --  21   GLUCOSE mg/dL 155* 125*     Results from last 7 days   Lab Units 11/14/23 0521 11/14/23 0238   HSTROP T ng/L 74* 61*     @LABRCNTbnp@  Results from last 7 days   Lab Units 11/14/23 0238   WBC 10*3/mm3 7.93   HEMOGLOBIN g/dL 13.7   HEMATOCRIT % 41.3   PLATELETS 10*3/mm3 237     Results from last 7 days   Lab Units 11/14/23 0238   INR  1.04   APTT seconds 27.7         @[facility](chol,trig,hdl,ldl)                      I personally viewed and interpreted the patient's EKG/Telemetry data    Acute respiratory failure with hypoxia    Hypertension     Hyperlipidemia    Chronic obstructive pulmonary disease    Lung cancer    Assessment and Plan:    Acute systolic heart failure -cardiomyopathy listed in the adverse reaction profile for both chemotherapeutic agents especially when used in combination.  Case details discussed with Dr. Miranda.  She received therapy today but he anticipates making a change in regimen based on echo results today.  Continue Toprol.  Blood pressure is soft over the last 12 to 18 hours so we will not add ACE/ARB or MRA at this time.  Optimally I would like to initiate at least an ARB or Entresto prior to discharge.  She has no peripheral edema but some residual basilar crackles.  Continue IV diuretic for now with consideration for switching to oral dosing tomorrow.  Lung cancer -oncology following  COPD  Remote history of tobacco abuse  Hypertension  Mixed hyperlipidemia    Selvin Chang MD  11/14/23  07:35 EST.  Time spent in reviewing chart, discussion and examination:

## 2023-11-14 NOTE — CONSULTS
[facility] INITIAL INPATIENT CONSULTATION NOTE    REASON FOR CONSULTATION:    Metastatic adenocarcinoma of the lung  Currently on Tafinlar and Mekinist  Worsening shortness of breath    HISTORY OF PRESENT ILLNESS:  Darlene Pfeiffer is a 83 y.o. female who we are asked to see today in consultation for metastatic lung cancer, currently on Tafinlar and Mekinist    The patient has a past medical history of recurrent lung cancer with a BRAF mutation, currently on Tafinlar and Mekinist.  History of COPD, hypertension, hyperlipidemia, metastatic disease to bone, head and neck cancer.    The patient presented with worsening shortness of breath over the last 4 to 5 days with dyspnea on exertion, cough, wheezing.  She was hypoxic in the emergency department with oxygen saturation of 85% on room air.  Troponin and BNP were elevated.  CT angiogram of the chest showed no pulmonary embolism but did show extensive bilateral interstitial and groundglass infiltrates     She was given IV steroids.    Echo today with LVEF 39%, down from 55.9% on 8/28/23    Breathing better today after diuresis         Past Medical History:   Diagnosis Date    Allergic rhinitis     Aneurysm     Asthma     Cancer of floor of mouth 2014    Cerebral aneurysm     COPD (chronic obstructive pulmonary disease)     COVID 2020    Esophageal reflux     History of adenocarcinoma of lung     History of anemia     History of carcinoma in situ of skin     History of lung cancer     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 from [facility]    History of squamous cell carcinoma     Tongue    Hyperlipidemia     Hypertension     since early 60s    Intractable back pain 11/29/2022    Low vitamin B12 level     Lung cancer     Thyromegaly     UTI (urinary tract infection)     Vitamin D deficiency        Past Surgical History:   Procedure Laterality Date    BRONCHOSCOPY Bilateral 10/17/2022    Procedure: BRONCHOSCOPY WITH FLUORO  with biopsy and BAL;  Surgeon: India Flores MD;  Location: [facility] ENDOSCOPY;  Service: Pulmonary;  Laterality: Bilateral;  PRE/POST - lung mass    CHOLECYSTECTOMY  1999    COLONOSCOPY      EMBOLIZATION CEREBRAL Left 06/29/2022    Procedure: 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;  Surgeon: Bradley Dowell MD;  Location: [facility] HYBRID OR 18/19;  Service: Interventional Radiology;  Laterality: Left;    EYE SURGERY  11/24/2020    Took a muscle out of right eye    GLOSSECTOMY PARTIAL      less than one half tongue    LUNG SURGERY  2012    ORAL LESION EXCISION/BIOPSY      June and August 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    TUBAL ABDOMINAL LIGATION  1970    VENOUS ACCESS DEVICE (PORT) INSERTION N/A 12/12/2022    Procedure: MEDIPORT PLACEMENT;  Surgeon: Jason Villaseñor MD;  Location: [facility] MAIN OR;  Service: Vascular;  Laterality: N/A;       SOCIAL HISTORY:   reports that she quit smoking about 8 years ago. Her smoking use included cigarettes. She has never been exposed to tobacco smoke. She has never used smokeless tobacco. She reports that she does not currently use alcohol. She reports that she does not use drugs.    FAMILY HISTORY:  family history includes Colon cancer in her brother; No Known Problems in her father and another family member; Ovarian cancer in her mother and sister.    ALLERGIES:  Allergies   Allergen Reactions    Sulfa Antibiotics Rash       MEDICATIONS:  As listed in the electronic medical record.    Review of Systems   Constitutional:  Positive for fatigue.   Respiratory:  Positive for shortness of breath.    All other systems reviewed and are negative.      Vitals:    11/14/23 0739 11/14/23 1117 11/14/23 1342 11/14/23 1349   BP: 127/60 127/60 107/56    BP Location: Left arm  Left arm    Patient Position: Lying  Lying    Pulse: 94  88 84   Resp: 18  18 18   Temp: 97.2 °F (36.2 °C)  97.2 °F (36.2 °C)    TempSrc: Oral  Oral    SpO2: 99%  98% 97%   Weight:  48.5 kg  "(107 lb)     Height:  160 cm (63\")         Physical Exam  Vitals reviewed.   Constitutional:       Appearance: She is well-developed.   HENT:      Head: Normocephalic and atraumatic.      Nose: Nose normal.   Eyes:      Conjunctiva/sclera: Conjunctivae normal.      Pupils: Pupils are equal, round, and reactive to light.   Cardiovascular:      Rate and Rhythm: Normal rate and regular rhythm.      Heart sounds: Normal heart sounds, S1 normal and S2 normal. No murmur heard.     No friction rub. No gallop.   Pulmonary:      Effort: Pulmonary effort is normal. No respiratory distress.      Breath sounds: Normal breath sounds. No stridor. No wheezing, rhonchi or rales.   Chest:      Chest wall: No tenderness.   Abdominal:      General: Bowel sounds are normal. There is no distension.      Palpations: Abdomen is soft. There is no mass.      Tenderness: There is no abdominal tenderness. There is no guarding or rebound.   Musculoskeletal:         General: Normal range of motion.      Cervical back: Neck supple.   Lymphadenopathy:      Cervical: No cervical adenopathy.      Upper Body:      Right upper body: No supraclavicular adenopathy.      Left upper body: No supraclavicular adenopathy.   Skin:     General: Skin is warm and dry.      Findings: No erythema or rash.   Neurological:      Mental Status: She is alert and oriented to person, place, and time.      Cranial Nerves: No cranial nerve deficit.      Sensory: No sensory deficit.   Psychiatric:         Behavior: Behavior normal.         Thought Content: Thought content normal.         Judgment: Judgment normal.         DIAGNOSTIC DATA:  Results from last 7 days   Lab Units 11/14/23  0726   WBC 10*3/mm3 8.46   HEMOGLOBIN g/dL 13.8   HEMATOCRIT % 41.8   PLATELETS 10*3/mm3 229     Lab Results   Component Value Date    NEUTROABS 5.37 11/14/2023     Results from last 7 days   Lab Units 11/14/23  0521   SODIUM mmol/L 137   POTASSIUM mmol/L 3.6   CHLORIDE mmol/L 104   CO2 " mmol/L 22.8   BUN mg/dL 16   CREATININE mg/dL 0.54*   GLUCOSE mg/dL 155*   CALCIUM mg/dL 8.3*     Results from last 7 days   Lab Units 11/14/23  0238   INR  1.04   APTT seconds 27.7           IMAGING:      CT images personally reviewed.  No pulmonary embolism.  Extensive bilateral interstitial and groundglass infiltrates concerning for edema, consolidation or pneumonia.    ASSESSMENT:  This is a 83 y.o. female with:    *Worsening shortness of breath, new CHF  CT imaging 11/14/2023 no pulmonary embolism.  Extensive bilateral interstitial and groundglass infiltrates concerning for edema, consolidation or pneumonia.  Elevated BNP at 9763 and troponin at 61, 74  Respiratory viral panel pending  Prior echocardiogram 8/28/2023 with LVEF 55.9%, hypokinetic basal inferior segments, grade 1 impaired relaxation  Echocardiogram with LVEF 93%, moderate dilation of the LV, mild concentric LV hypertrophy, global hypokinesis    *Adenocarcinoma of the lung  6/16/2021: CT chest with an 8 mm irregular nodule in the medial segment of the left lower lobe  7/13/2021: PET/CT ill-defined avidity of soft tissue at the left mediastinum, 1 cm hypermetabolic pulmonary nodule right base of tongue, subtle uptake at L1 vertebral body  8/13/2021: Biopsy with invasive moderately differentiated adenocarcinoma, PD-L1 TPS 40%  Likely separate and synchronous primaries  SBRT to the right lung and left lung between 8/31 and 9/13/2021  Caris profile with a BRAF mutation, sensitivity to immunotherapy  Subsequent progression  11/9/2022: PET/CT with left upper lobe pleural-based soft tissue density intensely FDG avid with left lower lobe intensely avid pleural disease  [id number] with BRAF [id number] mutation.  Caris PD-L1 TPS 70%  6/5/2023 PET/CT with progression  Keytruda initiated 2/14/2022, final dose of Keytruda 5/31/2023 6/5/2023 PET scan with progression  Palliative radiation for pain  Tafinlar and Mekinist initiated  8/25/2023: Scans stable  Tafinlar  and Mekinist proceeded to dose reductions with Tafinlar 50 mg twice daily and Mekinist at 0.5 mg daily.  As of 9/22/2023 these were well-tolerated.  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 It appears she did receive these earlier this morning. Likely they will have to be permanently discontinued with acute cardiomyopathy.  Continue diuresis  Dr. Chang with cardiology following. Additional medications for CHF per Dr. Chang.  Currently on prednisone 40 mg daily in the hospital, 5 mg outpatient. Quick taper  Continue other supportive care    Will follow. Discussed with Dr. Escobar, Dr. Chang, Dr. Waterman, the patient.     Kodak Miranda MD

## 2023-11-14 NOTE — ED NOTES
.Nursing report ED to floor  Darlene Pfeiffer  83 y.o.  female    HPI :   Chief Complaint   Patient presents with    Shortness of Breath   Darlene Pfeiffer is a 83 y.o. female who presents to the ED c/o shortness of breath that was gradual in onset but has been steadily worsening now over the past 2 to 3 days.  She does have a history of lung cancer that is currently being treated with oral chemotherapy as well as a previous diagnosis of COPD.  She is not oxygen dependent at home.  She does complain of a nonproductive cough associated with the symptoms.  She denies fever/chills, chest pain, back pain, extremity pain, nausea/vomiting, or abdominal pain.     PAST MEDICAL HISTORY       Active Ambulatory Problems     Diagnosis Date Noted    Hypertension 11/20/2019    Hyperlipidemia 11/20/2019    Esophageal reflux 11/20/2019    Chronic obstructive pulmonary disease 03/11/2020    Seasonal allergic rhinitis due to pollen 03/11/2020    Abnormal glucose 05/21/2020    Clinical diagnosis of severe acute respiratory syndrome coronavirus 2 (SARS-CoV-2) disease 12/21/2020    Lung cancer      Cerebral aneurysm, nonruptured 06/23/2022    Cerebral aneurysm without rupture 06/29/2022    Shortness of breath 10/14/2022    Hypokalemia 10/15/2022    History of lung cancer      Long-term use of high-risk medication 11/16/2022    Intractable back pain 11/29/2022    Hypokalemia 11/30/2022    Fitting and adjustment of vascular catheter 12/13/2022    Weakness 07/25/2023    Cystitis 07/26/2023    Fever 07/26/2023   Admitting doctor:   Darius Donohue MD    Admitting diagnosis:   The primary encounter diagnosis was Acute respiratory failure with hypoxia. Diagnoses of Acute congestive heart failure, unspecified heart failure type, Acute pulmonary edema, and Elevated troponin were also pertinent to this visit.    Code status:   Current Code Status       Date Active Code Status Order ID Comments User Context       11/14/2023 0436 CPR (Attempt to  Resuscitate) [id number]  Nena Back APRN ED        Question Answer    Code Status (Patient has no pulse and is not breathing) CPR (Attempt to Resuscitate)    Medical Interventions (Patient has pulse or is breathing) Full Support                Allergies:   Sulfa antibiotics    Isolation:   No active isolations    Intake and Output  No intake or output data in the 24 hours ending 11/14/23 0505    Weight:       11/14/23  0218   Weight: 47.2 kg (104 lb)     Most recent vitals:   Vitals:    11/14/23 0342 11/14/23 0350 11/14/23 0353 11/14/23 0428   BP:       BP Location:       Patient Position:       Pulse: 105 100 97 103   Resp:  18  20   Temp:       TempSrc:       SpO2: (!) 79% 90% 90% 91%   Weight:       Height:         Active LDAs/IV Access:   Lines, Drains & Airways       Active LDAs       Name Placement date Placement time Site Days    Peripheral IV 11/14/23 0235 Posterior;Right Forearm 11/14/23 0235  Forearm  less than 1    Single Lumen Implantable Port Right Subclavian --  --  Subclavian  --                Labs (abnormal labs have a star):   Labs Reviewed   COMPREHENSIVE METABOLIC PANEL - Abnormal; Notable for the following components:       Result Value    Glucose 125 (*)     Total Protein 5.9 (*)     All other components within normal limits    Narrative:     GFR Normal >60  Chronic Kidney Disease <60  Kidney Failure <15    The GFR formula is only valid for adults with stable renal function between ages 18 and 70.   TROPONIN - Abnormal; Notable for the following components:    HS Troponin T 61 (*)     All other components within normal limits    Narrative:     High Sensitive Troponin T Reference Range:  <14.0 ng/L- Negative Female for AMI  <22.0 ng/L- Negative Male for AMI  >=14 - Abnormal Female indicating possible myocardial injury.  >=22 - Abnormal Male indicating possible myocardial injury.   Clinicians would have to utilize clinical acumen, EKG, Troponin, and serial changes to determine if it is  an Acute Myocardial Infarction or myocardial injury due to an underlying chronic condition.        BNP (IN-HOUSE) - Abnormal; Notable for the following components:    proBNP 9,763.0 (*)     All other components within normal limits    Narrative:     This assay is used as an aid in the diagnosis of individuals suspected of having heart failure. It can be used as an aid in the diagnosis of acute decompensated heart failure (ADHF) in patients presenting with signs and symptoms of ADHF to the emergency department (ED). In addition, NT-proBNP of <300 pg/mL indicates ADHF is not likely.    Age Range Result Interpretation  NT-proBNP Concentration (pg/mL:      <50             Positive            >450                   Gray                 300-450                    Negative             <300    50-75           Positive            >900                  Gray                300-900                  Negative            <300      >75             Positive            >1800                  Gray                300-1800                  Negative            <300   CBC WITH AUTO DIFFERENTIAL - Abnormal; Notable for the following components:    Eosinophil % 0.1 (*)     All other components within normal limits   RESPIRATORY PANEL PCR W/ COVID-19 (SARS-COV-2), NP SWAB IN UTM/VTP, 2 HR TAT - Normal    Narrative:     In the setting of a positive respiratory panel with a viral infection PLUS a negative procalcitonin without other underlying concern for bacterial infection, consider observing off antibiotics or discontinuation of antibiotics and continue supportive care. If the respiratory panel is positive for atypical bacterial infection (Bordetella pertussis, Chlamydophila pneumoniae, or Mycoplasma pneumoniae), consider antibiotic de-escalation to target atypical bacterial infection.   PROTIME-INR - Normal   APTT - Normal   LACTIC ACID, PLASMA - Normal   HIGH SENSITIVITIY TROPONIN T 2HR   BASIC METABOLIC PANEL   CBC (NO DIFF)   CBC AND  DIFFERENTIAL    Narrative:     The following orders were created for panel order CBC & Differential.  Procedure                               Abnormality         Status                     ---------                               -----------         ------                     CBC Auto Differential[[id number]]        Abnormal            Final result                 Please view results for these tests on the individual orders.     EKG:   ECG 12 Lead Dyspnea   Preliminary Result   HEART RATE= 96  bpm   RR Interval= 625  ms   MT Interval= 157  ms   P Horizontal Axis= -25  deg   P Front Axis= 51  deg   QRSD Interval= 89  ms   QT Interval= 338  ms   QTcB= 428  ms   QRS Axis= 9  deg   T Wave Axis= 146  deg   - ABNORMAL ECG -   Sinus rhythm   Ventricular premature complex   LVH with secondary repolarization abnormality   Electronically Signed By:    Date and Time of Study: 2023-11-14 03:05:22      Meds given in ED:   Medications   sodium chloride 0.9 % flush 10 mL (has no administration in time range)   furosemide (LASIX) injection 80 mg (has no administration in time range)   sodium chloride 0.9 % flush 10 mL (has no administration in time range)   sodium chloride 0.9 % flush 10 mL (has no administration in time range)   sodium chloride 0.9 % infusion 40 mL (has no administration in time range)   nitroglycerin (NITROSTAT) SL tablet 0.4 mg (has no administration in time range)   acetaminophen (TYLENOL) tablet 650 mg (has no administration in time range)     Or   acetaminophen (TYLENOL) 160 MG/5ML oral solution 650 mg (has no administration in time range)     Or   acetaminophen (TYLENOL) suppository 650 mg (has no administration in time range)   ondansetron (ZOFRAN) tablet 4 mg (has no administration in time range)     Or   ondansetron (ZOFRAN) injection 4 mg (has no administration in time range)   calcium carbonate (TUMS) chewable tablet 500 mg (200 mg elemental) (has no administration in time range)   methylPREDNISolone  sodium succinate (SOLU-Medrol) injection 125 mg (125 mg Intravenous Given 23 5469)   ipratropium-albuterol (DUO-NEB) nebulizer solution 3 mL (3 mL Nebulization Given 23 0243)   iopamidol (ISOVUE-370) 76 % injection 100 mL (95 mL Intravenous Given 23 0335)   ipratropium-albuterol (DUO-NEB) nebulizer solution 3 mL (3 mL Nebulization Given 23 7398)     Imaging results:  CT Angiogram Chest    Result Date: 2023   1. No acute pulmonary thromboembolus. 2. The patient has extensive bilateral interstitial and groundglass infiltrates. Some of the appearance may be related to edema, but there are also areas of more focal airspace consolidation, and pneumonia is not excluded. 3. Possible increase in area of consolidation at the left lung apex. A left supraclavicular lymph node measures smaller on the current study.   Radiation dose reduction techniques were utilized, including automated exposure control and exposure modulation based on body size.   This report was finalized on 2023 4:15 AM by Dr. Ewelina Edwards M.D on Workstation: [id number]       Ambulatory status:   - standby but bedrest due to O2 dropping significantly with exertion     Social issues:   Social History     Socioeconomic History    Marital status:    Tobacco Use    Smoking status: Former     Types: Cigarettes     Quit date: 2015     Years since quittin.7     Passive exposure: Never    Smokeless tobacco: Never    Tobacco comments:     less than a pack per day, no smoking since , Quit 2015   Vaping Use    Vaping Use: Never used   Substance and Sexual Activity    Alcohol use: Not Currently     Comment: Socially -A few times a month    Drug use: No    Sexual activity: Defer   NIH Stroke Scale:     Keli Hung RN  23 05:05 EST

## 2023-11-14 NOTE — CASE MANAGEMENT/SOCIAL WORK
Discharge Planning Assessment  [facility]     Patient Name: Darlene Pfeiffer  MRN: [id number]  Today's Date: 11/14/2023    Admit Date: 11/14/2023    Plan: Home, family to transport   Discharge Needs Assessment       Row Name 11/14/23 1605       Living Environment    People in Home spouse    Name(s) of People in Home Selvin Pfeiffer [phone]    Current Living Arrangements home    Potentially Unsafe Housing Conditions none    In the past 12 months has the electric, gas, oil, or water company threatened to shut off services in your home? No    Primary Care Provided by self    Provides Primary Care For no one    Family Caregiver if Needed spouse    Family Caregiver Names Selvin Pfeiffer    Quality of Family Relationships helpful;involved;supportive    Able to Return to Prior Arrangements yes       Resource/Environmental Concerns    Resource/Environmental Concerns none    Transportation Concerns none       Food Insecurity    Within the past 12 months, you worried that your food would run out before you got the money to buy more. Never true    Within the past 12 months, the food you bought just didn't last and you didn't have money to get more. Never true       Transition Planning    Patient/Family Anticipates Transition to home;home with family    Patient/Family Anticipated Services at Transition none    Transportation Anticipated car, drives self;family or friend will provide       Discharge Needs Assessment    Equipment Currently Used at Home none    Concerns to be Addressed no discharge needs identified;denies needs/concerns at this time    Anticipated Changes Related to Illness none    Equipment Needed After Discharge none    Provided Post Acute Provider List? N/A    Provided Post Acute Provider Quality & Resource List? N/A                   Discharge Plan       Row Name 11/14/23 1607       Plan    Plan Home, family to transport    Plan Comments Met with pt at bedside. Introduced self and explained role of .  Face sheet verified, PCP is Meredith Kehrer. Pt denies any difficulty paying for medications, and she obtains her medications from [facility]. Pt lives with her spouse, and stated that he can provide assistance if any care needs airse. Pt is independent in ADL's and does not use, nor require, any assistive devices. Pt has never had home health, nor been to rehab and does not anticipate require needing those services upon discharge. Explained that CCP would follow to assess for discharge needs.                  Continued Care and Services - Admitted Since 11/14/2023    Coordination has not been started for this encounter.       Selected Continued Care - Episodes Includes continued care and service providers with selected services from the active episodes listed below      Oncology- External Fill Episode start date: 6/13/2023   There are no active outsourced providers for this episode.                 Expected Discharge Date and Time       Expected Discharge Date Expected Discharge Time    Nov 17, 2023            Demographic Summary    No documentation.                  Functional Status    No documentation.                  Psychosocial    No documentation.                  Abuse/Neglect    No documentation.                  Legal    No documentation.                  Substance Abuse    No documentation.                  Patient Forms    No documentation.                     Tyalor Palacio RN

## 2023-11-14 NOTE — H&P
Patient Name:  Darlene Pfeiffer  YOB: 1940  MRN:  [id number]  Admit Date:  11/14/2023  Patient Care Team:  Kehrer, Meredith Lea, MD as PCP - General (Family Medicine)  Linker, Arias BROWN III, MD as Referring Physician (Thoracic Surgery)  Henry Escobar MD as Consulting Physician (Hematology and Oncology)  Zoila Clinton RD, SALONI as Dietitian (Nutrition)  Nguyễn Bran MD as Consulting Physician (Radiation Oncology)      Subjective   History Present Illness     Chief Complaint   Patient presents with    Shortness of Breath       Ms. Pfeiffer is a 83 y.o. former smoker with a history of lung cancer, COPD, hypertension, and hyperlipidemia that presents to [facility] complaining of shortness of breath.  She reports progressively worsening shortness of breath over the past 4 to 5 days.  She reports dyspnea on exertion, cough, and wheezing.  She denies chest pain, fever, chills, palpitations, and leg swelling.  On arrival to the ED, she had an oxygen saturation of 85% on room air.  Work up showed troponin 61 and BNP 9,763.  A CT angiogram of the chest was negative for an acute pulmonary thromboembolus but showed extensive bilateral interstitial and groundglass infiltrates.  Some of the appearance may be related to edema, but there are also areas of more focal airspace consolidation, and pneumonia is not excluded.  There is a possible increase in the area of consolidation at the left lung apex.  She received a dose of Lasix and is being admitted for further evaluation.        History of Present Illness  Review of Systems   Constitutional:  Negative for chills and fever.   HENT:  Negative for congestion and sore throat.    Eyes:  Negative for photophobia and visual disturbance.   Respiratory:  Positive for cough, shortness of breath and wheezing.    Cardiovascular:  Negative for chest pain, palpitations and leg swelling.   Gastrointestinal:  Negative for abdominal pain, nausea and vomiting.    Musculoskeletal:  Negative for arthralgias and myalgias.   Skin:  Negative for color change and pallor.   Neurological:  Negative for dizziness, speech difficulty, light-headedness, numbness and headaches.        Personal History     Past Medical History:   Diagnosis Date    Allergic rhinitis     Aneurysm     Asthma     Cancer of floor of mouth 2014    Cerebral aneurysm     COPD (chronic obstructive pulmonary disease)     COVID 2020    Esophageal reflux     History of adenocarcinoma of lung     History of anemia     History of carcinoma in situ of skin     History of lung cancer     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 from [facility]    History of squamous cell carcinoma     Tongue    Hyperlipidemia     Hypertension     since early 60s    Intractable back pain 11/29/2022    Low vitamin B12 level     Lung cancer     Thyromegaly     UTI (urinary tract infection)     Vitamin D deficiency      Past Surgical History:   Procedure Laterality Date    BRONCHOSCOPY Bilateral 10/17/2022    Procedure: BRONCHOSCOPY WITH FLUORO with biopsy and BAL;  Surgeon: India Flores MD;  Location: [facility] ENDOSCOPY;  Service: Pulmonary;  Laterality: Bilateral;  PRE/POST - lung mass    CHOLECYSTECTOMY  1999    COLONOSCOPY      EMBOLIZATION CEREBRAL Left 06/29/2022    Procedure: 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;  Surgeon: Bradley Dowell MD;  Location: [facility] HYBRID OR 18/19;  Service: Interventional Radiology;  Laterality: Left;    EYE SURGERY  11/24/2020    Took a muscle out of right eye    GLOSSECTOMY PARTIAL      less than one half tongue    LUNG SURGERY  2012    ORAL LESION EXCISION/BIOPSY      June and August 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    TUBAL ABDOMINAL LIGATION  1970    VENOUS ACCESS DEVICE (PORT) INSERTION N/A 12/12/2022    Procedure: MEDIPORT PLACEMENT;  Surgeon: Jason Villaseñor MD;  Location: [facility] MAIN OR;  Service: Vascular;  Laterality: N/A;      Family History   Problem Relation Age of Onset    Ovarian cancer Mother          at age 27    No Known Problems Father     Ovarian cancer Sister     Colon cancer Brother     No Known Problems Other     Malig Hyperthermia Neg Hx      Social History     Tobacco Use    Smoking status: Former     Types: Cigarettes     Quit date: 2015     Years since quittin.7     Passive exposure: Never    Smokeless tobacco: Never    Tobacco comments:     less than a pack per day, no smoking since , Quit 2015   Vaping Use    Vaping Use: Never used   Substance Use Topics    Alcohol use: Not Currently     Comment: Socially -A few times a month    Drug use: No     Medications Prior to Admission   Medication Sig Dispense Refill Last Dose    atorvastatin (LIPITOR) 20 MG tablet TAKE 1 TABLET EVERY NIGHT 90 tablet 1     cholecalciferol (VITAMIN D3) 1000 units tablet Take 1 tablet by mouth Daily. HOLDING FOR DOS       Cyanocobalamin (VITAMIN B12 PO) Take  by mouth Daily.       mirtazapine (REMERON) 7.5 MG tablet Take 1 tablet by mouth Every Night. 30 tablet 1     potassium chloride (K-DUR,KLOR-CON) 10 MEQ CR tablet TAKE 2 TABLETS BY MOUTH DAILY 60 tablet 1     predniSONE (DELTASONE) 10 MG tablet TAKE 1 TABLET BY MOUTH DAILY 30 tablet 2     trametinib dimethyl sulfoxide (MEKINIST) 0.5 MG chemo tablet Take 1 tablet by mouth Daily. 30 tablet 2     zolpidem (AMBIEN) 5 MG tablet Take 1 tablet by mouth At Night As Needed for Sleep. 30 tablet 0      Allergies:    Allergies   Allergen Reactions    Sulfa Antibiotics Rash       Objective    Objective     Vital Signs  Temp:  [96.9 °F (36.1 °C)] 96.9 °F (36.1 °C)  Heart Rate:  [] 105  Resp:  [18-20] 18  BP: (160-175)/() 175/96  SpO2:  [79 %-94 %] 90 %  on  Flow (L/min):  [3-5] 5;   Device (Oxygen Therapy): nasal cannula  Body mass index is 18.42 kg/m².    Physical Exam  Vitals and nursing note reviewed.   Constitutional:       Appearance: Normal appearance.   HENT:       Head: Normocephalic and atraumatic.      Nose: Nose normal.      Mouth/Throat:      Mouth: Mucous membranes are moist.      Pharynx: Oropharynx is clear.   Eyes:      Extraocular Movements: Extraocular movements intact.      Conjunctiva/sclera: Conjunctivae normal.   Cardiovascular:      Rate and Rhythm: Normal rate and regular rhythm.      Pulses: Normal pulses.      Heart sounds: Normal heart sounds.   Pulmonary:      Breath sounds: Examination of the right-lower field reveals decreased breath sounds. Examination of the left-lower field reveals decreased breath sounds.   Abdominal:      General: Bowel sounds are normal.      Palpations: Abdomen is soft.   Musculoskeletal:         General: No swelling. Normal range of motion.      Cervical back: Normal range of motion and neck supple.   Skin:     General: Skin is warm and dry.   Neurological:      Mental Status: She is alert and oriented to person, place, and time.   Psychiatric:         Mood and Affect: Mood normal.         Behavior: Behavior normal.         Results Review:  I reviewed the patient's new clinical results.  I reviewed the patient's new imaging results and agree with the interpretation.  I reviewed the patient's other test results and agree with the interpretation  I personally viewed and interpreted the patient's EKG/Telemetry data  Discussed with ED provider.    Lab Results (last 24 hours)       Procedure Component Value Units Date/Time    CBC & Differential [[id number]]  (Abnormal) Collected: 11/14/23 0238    Specimen: Blood Updated: 11/14/23 0251    Narrative:      The following orders were created for panel order CBC & Differential.  Procedure                               Abnormality         Status                     ---------                               -----------         ------                     CBC Auto Differential[[id number]]        Abnormal            Final result                 Please view results for these tests on the individual  orders.    Comprehensive Metabolic Panel [[id number]]  (Abnormal) Collected: 11/14/23 0238    Specimen: Blood Updated: 11/14/23 0311     Glucose 125 mg/dL      BUN 18 mg/dL      Creatinine 0.72 mg/dL      Sodium 145 mmol/L      Potassium 3.6 mmol/L      Chloride 107 mmol/L      CO2 26.8 mmol/L      Calcium 9.1 mg/dL      Total Protein 5.9 g/dL      Albumin 3.7 g/dL      ALT (SGPT) 9 U/L      AST (SGOT) 21 U/L      Alkaline Phosphatase 97 U/L      Total Bilirubin <0.2 mg/dL      Globulin 2.2 gm/dL      A/G Ratio 1.7 g/dL      BUN/Creatinine Ratio 25.0     Anion Gap 11.2 mmol/L      eGFR 84.1 mL/min/1.73     Narrative:      GFR Normal >60  Chronic Kidney Disease <60  Kidney Failure <15    The GFR formula is only valid for adults with stable renal function between ages 18 and 70.    Protime-INR [[id number]]  (Normal) Collected: 11/14/23 0238    Specimen: Blood Updated: 11/14/23 0318     Protime 13.7 Seconds      INR 1.04    aPTT [[id number]]  (Normal) Collected: 11/14/23 0238    Specimen: Blood Updated: 11/14/23 0318     PTT 27.7 seconds     High Sensitivity Troponin T [[id number]]  (Abnormal) Collected: 11/14/23 0238    Specimen: Blood Updated: 11/14/23 0314     HS Troponin T 61 ng/L     Narrative:      High Sensitive Troponin T Reference Range:  <14.0 ng/L- Negative Female for AMI  <22.0 ng/L- Negative Male for AMI  >=14 - Abnormal Female indicating possible myocardial injury.  >=22 - Abnormal Male indicating possible myocardial injury.   Clinicians would have to utilize clinical acumen, EKG, Troponin, and serial changes to determine if it is an Acute Myocardial Infarction or myocardial injury due to an underlying chronic condition.         BNP [[id number]]  (Abnormal) Collected: 11/14/23 0238    Specimen: Blood Updated: 11/14/23 0310     proBNP 9,763.0 pg/mL     Narrative:      This assay is used as an aid in the diagnosis of individuals suspected of having heart failure. It can be used as an aid in the diagnosis of  acute decompensated heart failure (ADHF) in patients presenting with signs and symptoms of ADHF to the emergency department (ED). In addition, NT-proBNP of <300 pg/mL indicates ADHF is not likely.    Age Range Result Interpretation  NT-proBNP Concentration (pg/mL:      <50             Positive            >450                   Gray                 300-450                    Negative             <300    50-75           Positive            >900                  Gray                300-900                  Negative            <300      >75             Positive            >1800                  Gray                300-1800                  Negative            <300    Lactic Acid, Plasma [[id number]]  (Normal) Collected: 11/14/23 0238    Specimen: Blood Updated: 11/14/23 0306     Lactate 1.5 mmol/L     CBC Auto Differential [[id number]]  (Abnormal) Collected: 11/14/23 0238    Specimen: Blood Updated: 11/14/23 0251     WBC 7.93 10*3/mm3      RBC 4.49 10*6/mm3      Hemoglobin 13.7 g/dL      Hematocrit 41.3 %      MCV 92.0 fL      MCH 30.5 pg      MCHC 33.2 g/dL      RDW 13.0 %      RDW-SD 42.9 fl      MPV 10.4 fL      Platelets 237 10*3/mm3      Neutrophil % 67.7 %      Lymphocyte % 25.3 %      Monocyte % 6.3 %      Eosinophil % 0.1 %      Basophil % 0.3 %      Immature Grans % 0.3 %      Neutrophils, Absolute 5.37 10*3/mm3      Lymphocytes, Absolute 2.01 10*3/mm3      Monocytes, Absolute 0.50 10*3/mm3      Eosinophils, Absolute 0.01 10*3/mm3      Basophils, Absolute 0.02 10*3/mm3      Immature Grans, Absolute 0.02 10*3/mm3      nRBC 0.0 /100 WBC     Respiratory Panel PCR w/COVID-19(SARS-CoV-2) JOSHUA/NUZHAT/JOHANA/PAD/COR/ROSEMARY In-House, NP Swab in UTM/VTM, 2 HR TAT - Swab, Nasopharynx [[id number]]  (Normal) Collected: 11/14/23 0242    Specimen: Swab from Nasopharynx Updated: 11/14/23 0339     ADENOVIRUS, PCR Not Detected     Coronavirus 229E Not Detected     Coronavirus HKU1 Not Detected     Coronavirus NL63 Not Detected      Coronavirus OC43 Not Detected     COVID19 Not Detected     Human Metapneumovirus Not Detected     Human Rhinovirus/Enterovirus Not Detected     Influenza A PCR Not Detected     Influenza B PCR Not Detected     Parainfluenza Virus 1 Not Detected     Parainfluenza Virus 2 Not Detected     Parainfluenza Virus 3 Not Detected     Parainfluenza Virus 4 Not Detected     RSV, PCR Not Detected     Bordetella pertussis pcr Not Detected     Bordetella parapertussis PCR Not Detected     Chlamydophila pneumoniae PCR Not Detected     Mycoplasma pneumo by PCR Not Detected    Narrative:      In the setting of a positive respiratory panel with a viral infection PLUS a negative procalcitonin without other underlying concern for bacterial infection, consider observing off antibiotics or discontinuation of antibiotics and continue supportive care. If the respiratory panel is positive for atypical bacterial infection (Bordetella pertussis, Chlamydophila pneumoniae, or Mycoplasma pneumoniae), consider antibiotic de-escalation to target atypical bacterial infection.    High Sensitivity Troponin T 2Hr [[id number]] Collected: 11/14/23 0521    Specimen: Blood Updated: 11/14/23 0545    Basic Metabolic Panel [[id number]] Collected: 11/14/23 0521    Specimen: Blood Updated: 11/14/23 0545            Imaging Results (Last 24 Hours)       Procedure Component Value Units Date/Time    CT Angiogram Chest [[id number]] Collected: 11/14/23 0401     Updated: 11/14/23 0418    Narrative:      CT ANGIOGRAM OF THE CHEST     HISTORY: Shortness of breath     COMPARISON: August 25, 2023     TECHNIQUE: Axial CT imaging was obtained through the thorax. IV contrast  was administered. Three-D reformatted images were obtained.     FINDINGS:  No acute pulmonary thromboembolus is seen. Thoracic aorta is normal in  caliber. No dissection is seen. There is calcification of the aorta.  There are also coronary artery calcifications. The thyroid gland,  trachea, and esophagus  are unremarkable. Patient is status post left  upper lobectomy. Confluent soft tissue mass is again noted within the  left lung apex. I do think it is measuring larger on the current study,  at 6.5 x 5.0 cm, previously 4.9 x 4.8 cm. Left supraclavicular lymph  node has decreased in size, now measuring up to 1.1 cm, previously  measuring 1.4 cm. There is sclerosis of the left first and second ribs  anteriorly. This appears similar to the prior exam. There are advanced  background emphysematous changes. The patient has extensive interlobular  and groundglass infiltrates throughout both lungs. Some of the  appearance may be related to edema. There are also patchy areas of more  focal airspace consolidation, and pneumonia is not excluded. There are  small bilateral effusions. There is reflux of contrast material, which  can be seen in the setting of right-sided heart failure. Images through  the upper abdomen do not demonstrate any acute abnormalities. Right  internal jugular vein Mediport appears to terminate within the superior  vena cava.       Impression:         1. No acute pulmonary thromboembolus.  2. The patient has extensive bilateral interstitial and groundglass  infiltrates. Some of the appearance may be related to edema, but there  are also areas of more focal airspace consolidation, and pneumonia is  not excluded.  3. Possible increase in area of consolidation at the left lung apex. A  left supraclavicular lymph node measures smaller on the current study.        Radiation dose reduction techniques were utilized, including automated  exposure control and exposure modulation based on body size.        This report was finalized on 11/14/2023 4:15 AM by Dr. Ewelina Edwards M.D on Workstation: [id number]               Results for orders placed during the hospital encounter of 08/28/23    Adult Transthoracic Echo Complete W/ Cont if Necessary Per Protocol    Interpretation Summary    Left ventricular  systolic function is normal. Calculated left ventricular EF = 55.9%    The following left ventricular wall segments are hypokinetic: basal inferior.    Left ventricular diastolic function is consistent with (grade I) impaired relaxation.    Left atrial volume is moderately increased.    Estimated right ventricular systolic pressure from tricuspid regurgitation is normal (<35 mmHg). Calculated right ventricular systolic pressure from tricuspid regurgitation is 35 mmHg.    Normal global longitudinal LV strain (GLS) = -20.8%.      ECG 12 Lead Dyspnea   Preliminary Result   HEART RATE= 96  bpm   RR Interval= 625  ms   AZ Interval= 157  ms   P Horizontal Axis= -25  deg   P Front Axis= 51  deg   QRSD Interval= 89  ms   QT Interval= 338  ms   QTcB= 428  ms   QRS Axis= 9  deg   T Wave Axis= 146  deg   - ABNORMAL ECG -   Sinus rhythm   Ventricular premature complex   LVH with secondary repolarization abnormality   Electronically Signed By:    Date and Time of Study: 2023-11-14 03:05:22           Assessment/Plan     Active Hospital Problems    Diagnosis  POA    **Acute respiratory failure with hypoxia [J96.01]  Unknown    Lung cancer [C34.90]  Yes    Chronic obstructive pulmonary disease [J44.9]  Yes    Hypertension [I10]  Yes     since early 60's      Hyperlipidemia [E78.5]  Yes       Acute Respiratory Failure with Hypoxia  -Appears to be secondary to pulmonary edema  -She has no history of CHF  -BNP 9,763  -Echo performed 8/28/23 showed calculated EF of 55.9% and left ventricular diastolic function consistent with grade I impaired relaxation, will repeat echo  -Cardiology consult  -Continue Lasix 40 mg IV q 12 H  -Daily weights  -Strict I&O  -Monitor renal function daily while diuresing  -She is not currently oxygen dependent at home. She is currently sating 90-91% on 5L NC. Continue supplemental oxygen to keep sats greater than or equal to 90%       COPD  -CTA chest showed findings suggestive of pneumonia. She has been  afebrile and WBC is ok. Monitor off antibiotics for now  -She received a dose of Solumedrol in the ED. Continue Prednisone 40 mg daily  -Scheduled and PRN Duonebs    Hypertension  -Blood pressures stable. Continue home regimen  -Monitor    Hyperlipidemia  -Continue statin    Lung Cancer  -She is on oral chemo, Mekinist, at home, and can take her home dose  -Consult oncology    -Will address and continue any further appropriate home medications once med rec is completed.    -I discussed the patients findings and my recommendations with patient and family.    VTE Prophylaxis - SCDs.  Code Status - Full code.       DEJAN Rodrigues  [facility]  11/14/23  05:52 EST

## 2023-11-15 LAB
ALBUMIN SERPL-MCNC: 3.1 G/DL (ref 3.5–5.2)
ALBUMIN/GLOB SERPL: 1.4 G/DL
ALP SERPL-CCNC: 77 U/L (ref 39–117)
ALT SERPL W P-5'-P-CCNC: 12 U/L (ref 1–33)
ANION GAP SERPL CALCULATED.3IONS-SCNC: 10.2 MMOL/L (ref 5–15)
AST SERPL-CCNC: 14 U/L (ref 1–32)
BILIRUB SERPL-MCNC: <0.2 MG/DL (ref 0–1.2)
BUN SERPL-MCNC: 22 MG/DL (ref 8–23)
BUN/CREAT SERPL: 30.1 (ref 7–25)
CALCIUM SPEC-SCNC: 8.9 MG/DL (ref 8.6–10.5)
CHLORIDE SERPL-SCNC: 104 MMOL/L (ref 98–107)
CO2 SERPL-SCNC: 27.8 MMOL/L (ref 22–29)
CREAT SERPL-MCNC: 0.73 MG/DL (ref 0.57–1)
DEPRECATED RDW RBC AUTO: 43 FL (ref 37–54)
EGFRCR SERPLBLD CKD-EPI 2021: 82.7 ML/MIN/1.73
ERYTHROCYTE [DISTWIDTH] IN BLOOD BY AUTOMATED COUNT: 12.9 % (ref 12.3–15.4)
GLOBULIN UR ELPH-MCNC: 2.2 GM/DL
GLUCOSE SERPL-MCNC: 105 MG/DL (ref 65–99)
HBA1C MFR BLD: 5.6 % (ref 4.8–5.6)
HCT VFR BLD AUTO: 35.1 % (ref 34–46.6)
HGB BLD-MCNC: 11.6 G/DL (ref 12–15.9)
MAGNESIUM SERPL-MCNC: 1.9 MG/DL (ref 1.6–2.4)
MAGNESIUM SERPL-MCNC: 1.9 MG/DL (ref 1.6–2.4)
MCH RBC QN AUTO: 30.4 PG (ref 26.6–33)
MCHC RBC AUTO-ENTMCNC: 33 G/DL (ref 31.5–35.7)
MCV RBC AUTO: 92.1 FL (ref 79–97)
PLATELET # BLD AUTO: 191 10*3/MM3 (ref 140–450)
PMV BLD AUTO: 10.3 FL (ref 6–12)
POTASSIUM SERPL-SCNC: 3 MMOL/L (ref 3.5–5.2)
PROT SERPL-MCNC: 5.3 G/DL (ref 6–8.5)
RBC # BLD AUTO: 3.81 10*6/MM3 (ref 3.77–5.28)
SODIUM SERPL-SCNC: 142 MMOL/L (ref 136–145)
TSH SERPL DL<=0.05 MIU/L-ACNC: 1 UIU/ML (ref 0.27–4.2)
WBC NRBC COR # BLD: 8.28 10*3/MM3 (ref 3.4–10.8)

## 2023-11-15 PROCEDURE — 84443 ASSAY THYROID STIM HORMONE: CPT | Performed by: HOSPITALIST

## 2023-11-15 PROCEDURE — 94760 N-INVAS EAR/PLS OXIMETRY 1: CPT

## 2023-11-15 PROCEDURE — 99232 SBSQ HOSP IP/OBS MODERATE 35: CPT | Performed by: INTERNAL MEDICINE

## 2023-11-15 PROCEDURE — 63710000001 PREDNISONE PER 1 MG: Performed by: HOSPITALIST

## 2023-11-15 PROCEDURE — 25010000002 FUROSEMIDE PER 20 MG: Performed by: NURSE PRACTITIONER

## 2023-11-15 PROCEDURE — 83735 ASSAY OF MAGNESIUM: CPT | Performed by: HOSPITALIST

## 2023-11-15 PROCEDURE — 80053 COMPREHEN METABOLIC PANEL: CPT | Performed by: HOSPITALIST

## 2023-11-15 PROCEDURE — 94799 UNLISTED PULMONARY SVC/PX: CPT

## 2023-11-15 PROCEDURE — 94664 DEMO&/EVAL PT USE INHALER: CPT

## 2023-11-15 PROCEDURE — 94761 N-INVAS EAR/PLS OXIMETRY MLT: CPT

## 2023-11-15 PROCEDURE — 85027 COMPLETE CBC AUTOMATED: CPT | Performed by: HOSPITALIST

## 2023-11-15 PROCEDURE — 83036 HEMOGLOBIN GLYCOSYLATED A1C: CPT | Performed by: HOSPITALIST

## 2023-11-15 RX ORDER — PREDNISONE 20 MG/1
20 TABLET ORAL
Status: DISCONTINUED | OUTPATIENT
Start: 2023-11-15 | End: 2023-11-15

## 2023-11-15 RX ORDER — PREDNISONE 10 MG/1
10 TABLET ORAL
Status: DISCONTINUED | OUTPATIENT
Start: 2023-11-16 | End: 2023-11-15

## 2023-11-15 RX ORDER — POTASSIUM CHLORIDE 750 MG/1
40 TABLET, FILM COATED, EXTENDED RELEASE ORAL EVERY 4 HOURS
Status: COMPLETED | OUTPATIENT
Start: 2023-11-15 | End: 2023-11-15

## 2023-11-15 RX ORDER — MONTELUKAST SODIUM 10 MG/1
10 TABLET ORAL NIGHTLY
Status: DISCONTINUED | OUTPATIENT
Start: 2023-11-15 | End: 2023-11-16 | Stop reason: HOSPADM

## 2023-11-15 RX ORDER — PREDNISONE 5 MG/1
5 TABLET ORAL
Status: DISCONTINUED | OUTPATIENT
Start: 2023-11-17 | End: 2023-11-16 | Stop reason: HOSPADM

## 2023-11-15 RX ORDER — PREDNISONE 5 MG/1
5 TABLET ORAL
Status: DISCONTINUED | OUTPATIENT
Start: 2023-11-17 | End: 2023-11-15

## 2023-11-15 RX ORDER — PREDNISONE 5 MG/1
5 TABLET ORAL
Status: DISCONTINUED | OUTPATIENT
Start: 2023-11-18 | End: 2023-11-16 | Stop reason: HOSPADM

## 2023-11-15 RX ORDER — PREDNISONE 20 MG/1
20 TABLET ORAL
Status: COMPLETED | OUTPATIENT
Start: 2023-11-16 | End: 2023-11-16

## 2023-11-15 RX ADMIN — POTASSIUM CHLORIDE 40 MEQ: 750 TABLET, EXTENDED RELEASE ORAL at 17:14

## 2023-11-15 RX ADMIN — MIRTAZAPINE 7.5 MG: 15 TABLET, FILM COATED ORAL at 21:47

## 2023-11-15 RX ADMIN — Medication 10 ML: at 08:45

## 2023-11-15 RX ADMIN — CETIRIZINE HYDROCHLORIDE 10 MG: 10 TABLET ORAL at 08:44

## 2023-11-15 RX ADMIN — FUROSEMIDE 40 MG: 10 INJECTION, SOLUTION INTRAMUSCULAR; INTRAVENOUS at 08:44

## 2023-11-15 RX ADMIN — IPRATROPIUM BROMIDE AND ALBUTEROL SULFATE 3 ML: 2.5; .5 SOLUTION RESPIRATORY (INHALATION) at 19:15

## 2023-11-15 RX ADMIN — POTASSIUM CHLORIDE 40 MEQ: 750 TABLET, EXTENDED RELEASE ORAL at 14:11

## 2023-11-15 RX ADMIN — POTASSIUM CHLORIDE 20 MEQ: 750 TABLET, EXTENDED RELEASE ORAL at 08:44

## 2023-11-15 RX ADMIN — FUROSEMIDE 40 MG: 10 INJECTION, SOLUTION INTRAMUSCULAR; INTRAVENOUS at 21:47

## 2023-11-15 RX ADMIN — ATORVASTATIN CALCIUM 20 MG: 20 TABLET, FILM COATED ORAL at 21:47

## 2023-11-15 RX ADMIN — IPRATROPIUM BROMIDE AND ALBUTEROL SULFATE 3 ML: 2.5; .5 SOLUTION RESPIRATORY (INHALATION) at 07:37

## 2023-11-15 RX ADMIN — PREDNISONE 20 MG: 20 TABLET ORAL at 08:44

## 2023-11-15 RX ADMIN — Medication 1000 MCG: at 08:44

## 2023-11-15 RX ADMIN — POTASSIUM CHLORIDE 40 MEQ: 750 TABLET, EXTENDED RELEASE ORAL at 21:47

## 2023-11-15 RX ADMIN — METOPROLOL SUCCINATE 25 MG: 25 TABLET, EXTENDED RELEASE ORAL at 08:44

## 2023-11-15 RX ADMIN — MONTELUKAST SODIUM 10 MG: 10 TABLET, FILM COATED ORAL at 21:47

## 2023-11-15 RX ADMIN — ZOLPIDEM TARTRATE 5 MG: 5 TABLET ORAL at 22:53

## 2023-11-15 NOTE — PROGRESS NOTES
Name: Darlene Pfeiffer ADMIT: 2023   : 1940  PCP: Kehrer, Meredith Lea, MD    MRN: [id number] LOS: 1 days   AGE/SEX: 83 y.o. female  ROOM: [facility]     Subjective   Subjective   She is feeling improved. Close to normal. She was on 1 L and I removed and sats 92%. Appetite is fine she believes it is the steroids.     Objective   Objective   Vital Signs  Temp:  [97.2 °F (36.2 °C)-98.1 °F (36.7 °C)] 97.7 °F (36.5 °C)  Heart Rate:  [84-98] 84  Resp:  [18-20] 18  BP: ()/(50-77) 115/77  SpO2:  [97 %-100 %] 99 %  on  Flow (L/min):  [1-4] 1;   Device (Oxygen Therapy): nasal cannula  Body mass index is 18.95 kg/m².    Physical Exam  Constitutional:       General: She is not in acute distress.     Appearance: Normal appearance. She is not toxic-appearing.   HENT:      Head: Normocephalic and atraumatic.   Eyes:      Extraocular Movements: Extraocular movements intact.   Cardiovascular:      Rate and Rhythm: Normal rate and regular rhythm.   Pulmonary:      Effort: Pulmonary effort is normal. No respiratory distress.      Breath sounds: Normal breath sounds. No wheezing.   Abdominal:      General: Bowel sounds are normal.      Palpations: Abdomen is soft.      Tenderness: There is no abdominal tenderness. There is no guarding or rebound.   Musculoskeletal:         General: No swelling.      Cervical back: Normal range of motion.      Right lower leg: No edema.      Left lower leg: No edema.   Skin:     General: Skin is warm and dry.   Neurological:      General: No focal deficit present.      Mental Status: She is alert and oriented to person, place, and time.   Psychiatric:         Mood and Affect: Mood normal.         Behavior: Behavior normal.         Thought Content: Thought content normal.       Results Review  I reviewed the patient's new clinical results.  Results from last 7 days   Lab Units 11/15/23  0412 23  0726 23  0238   WBC 10*3/mm3 8.28 8.46 7.93   HEMOGLOBIN g/dL 11.6* 13.8 13.7    PLATELETS 10*3/mm3 191 229 237     Results from last 7 days   Lab Units 11/15/23  0412 11/14/23  2122 11/14/23  0521 11/14/23  0238   SODIUM mmol/L 142  --  137 145   POTASSIUM mmol/L 3.0* 3.5 3.6 3.6   CHLORIDE mmol/L 104  --  104 107   CO2 mmol/L 27.8  --  22.8 26.8   BUN mg/dL 22  --  16 18   CREATININE mg/dL 0.73  --  0.54* 0.72   GLUCOSE mg/dL 105*  --  155* 125*     Lab Results   Component Value Date    ANIONGAP 10.2 11/15/2023     Estimated Creatinine Clearance: 46.3 mL/min (by C-G formula based on SCr of 0.73 mg/dL).   Lab Results   Component Value Date    EGFR 82.7 11/15/2023     Results from last 7 days   Lab Units 11/15/23  0412 11/14/23  0238   ALBUMIN g/dL 3.1* 3.7   BILIRUBIN mg/dL <0.2 <0.2   ALK PHOS U/L 77 97   AST (SGOT) U/L 14 21   ALT (SGPT) U/L 12 9     Results from last 7 days   Lab Units 11/15/23  0412 11/14/23  2122 11/14/23  0521 11/14/23  0238   CALCIUM mg/dL 8.9  --  8.3* 9.1   ALBUMIN g/dL 3.1*  --   --  3.7   MAGNESIUM mg/dL 1.9 1.8  --   --      Results from last 7 days   Lab Units 11/14/23 0521 11/14/23 0238   PROCALCITONIN ng/mL 0.02  --    LACTATE mmol/L  --  1.5     Hemoglobin A1C   Date/Time Value Ref Range Status   11/15/2023 0412 5.60 4.80 - 5.60 % Final       CT Angiogram Chest    Result Date: 11/14/2023   1. No acute pulmonary thromboembolus. 2. The patient has extensive bilateral interstitial and groundglass infiltrates. Some of the appearance may be related to edema, but there are also areas of more focal airspace consolidation, and pneumonia is not excluded. 3. Possible increase in area of consolidation at the left lung apex. A left supraclavicular lymph node measures smaller on the current study.   Radiation dose reduction techniques were utilized, including automated exposure control and exposure modulation based on body size.   This report was finalized on 11/14/2023 4:15 AM by Dr. Ewelina Edwards M.D on Workstation: [id number]       Scheduled Meds  atorvastatin,  20 mg, Oral, Nightly  cetirizine, 10 mg, Oral, Daily  furosemide, 40 mg, Intravenous, Q12H  ipratropium-albuterol, 3 mL, Nebulization, Q6H While Awake - RT  metoprolol succinate XL, 25 mg, Oral, Daily  mirtazapine, 7.5 mg, Oral, Nightly  montelukast, 10 mg, Oral, Nightly  potassium chloride, 20 mEq, Oral, Daily  [START ON 11/16/2023] predniSONE, 20 mg, Oral, Daily With Breakfast   Followed by  [START ON 11/17/2023] predniSONE, 5 mg, Oral, Daily With Breakfast   Followed by  [START ON 11/18/2023] predniSONE, 5 mg, Oral, Daily With Breakfast  sodium chloride, 10 mL, Intravenous, Q12H  vitamin B-12, 1,000 mcg, Oral, Daily    Continuous Infusions   PRN Meds    acetaminophen **OR** acetaminophen **OR** acetaminophen    calcium carbonate    Calcium Replacement - Follow Nurse / BPA Driven Protocol    ipratropium-albuterol    Magnesium Low Dose Replacement - Follow Nurse / BPA Driven Protocol    nitroglycerin    ondansetron **OR** ondansetron    Phosphorus Replacement - Follow Nurse / BPA Driven Protocol    Potassium Replacement - Follow Nurse / BPA Driven Protocol    [COMPLETED] Insert Peripheral IV **AND** sodium chloride    sodium chloride    sodium chloride    tiZANidine    zolpidem     Diet  Diet: Cardiac Diets; Healthy Heart (2-3 Na+); Texture: Regular Texture (IDDSI 7); Fluid Consistency: Thin (IDDSI 0)    I have personally reviewed:  [x]  Medications  [x]  Laboratory   [x]  Microbiology   [x]  Radiology   []  EKG/Telemetry   []  Cardiology/Vascular   []  Pathology   []  Records     Results for orders placed during the hospital encounter of 11/14/23    Adult Transthoracic Echo Complete W/ Cont if Necessary Per Protocol    Interpretation Summary    Left ventricular systolic function is moderately decreased. Calculated left ventricular EF = 39.2% Abnormal global longitudinal LV strain (GLS) = -11.1%. Left ventricle strain data was reviewed by the physician and found to be accurate. The left ventricular cavity is  moderately dilated. Left ventricular wall thickness is consistent with mild concentric hypertrophy. There is left ventricular global hypokinesis noted. Left ventricular diastolic function is consistent with (grade Ia w/high LAP) impaired relaxation.    The aortic valve is abnormal in structure. There is moderate calcification of the aortic valve mainly affecting the non-coronary cusp(s). The aortic valve appears trileaflet.    Trace mitral valve regurgitation is present.    Trace tricuspid valve regurgitation is present. Estimated right ventricular systolic pressure from tricuspid regurgitation is normal (<35 mmHg). Calculated right ventricular systolic pressure from tricuspid regurgitation is 29 mmHg.    Compared to prior study of 8/20/2023 LV systolic function, diastolic function and global longitudinal LV strain are all worse and abnormal.        Assessment/Plan     Active Hospital Problems    Diagnosis  POA    **Acute respiratory failure with hypoxia [J96.01]  Yes    Systolic CHF, acute [I50.21]  Yes    Lung cancer [C34.90]  Yes    Chronic obstructive pulmonary disease [J44.9]  Yes    Hypertension [I10]  Yes    Hyperlipidemia [E78.5]  Yes      Resolved Hospital Problems   No resolved problems to display.     83 y.o. female with a history of lung cancer on chemotherapy presented with pulmonary edema    Acute systolic heart failure  Tafinlar and Mekinist held due to cardiomyopathy  Echocardiogram above  Diuresis per cardiology possibly to PO today  Metoprolol. ACE/ARB on hold due to low normal BPs  Wean steroids to home dose Prednisone 5mg daily  WBC 8, lactate, and procalcitonin not elevated    Hypokalemia  Replace per protocol (low from diuresis)  Check magnesium     Lung cancer  Per oncology    A1c 5.60%, TSH normal    SCDs for DVT prophylaxis    Discussed with patient, nursing staff, and care team on multidisciplinary rounds    Expected Discharge Date: 11/16/2023; Expected Discharge Time:     David Delacruz  MD [facility]  11/15/23

## 2023-11-15 NOTE — PLAN OF CARE
Goal Outcome Evaluation:   Pt was c/o bad leg cramps last night. Had 4 beats of VT. Obtained K and Mg levels and paged [facility]. Orders for zanaflex. Down to 3L. urinated 300 this shift dark yellow after last nights dose of lasix.

## 2023-11-15 NOTE — PROGRESS NOTES
[facility] INPATIENT PROGRESS NOTE    Length of Stay:  1 days    CHIEF COMPLAINT/REASON FOR VISIT:  Metastatic adenocarcinoma of the lung  Currently on Tafinlar and Mekinist, held due to new heart failure  Worsening shortness of breath, CHF    SUBJECTIVE: Afebrile.  Mildly hypotensive.  Rapidly improving and currently on room air. Denies dyspnea.     ROS:  14 systems reviewed with pertinent positives and negatives in the HPI. Reviewed today.    OBJECTIVE:  Vitals:    11/14/23 2347 11/15/23 0100 11/15/23 0736 11/15/23 0737   BP: 94/51  115/77    BP Location: Right arm  Left arm    Patient Position: Lying  Lying    Pulse: 88  84    Resp: 20  18 18   Temp: 98.1 °F (36.7 °C)  97.7 °F (36.5 °C)    TempSrc: Oral  Oral    SpO2: 100% 100% 99%    Weight:       Height:             PHYSICAL EXAMINATION:   General: in bed, well appearing  Chest/Lungs: ctab  Heart: rrr  Abdomen/GI: soft, nt, nd, nabs  Extremities: wwp, no edema    DIAGNOSTIC DATA:  Results Review:     I reviewed the patient's new clinical results.    Results from last 7 days   Lab Units 11/15/23  0412   WBC 10*3/mm3 8.28   HEMOGLOBIN g/dL 11.6*   HEMATOCRIT % 35.1   PLATELETS 10*3/mm3 191     Lab Results   Component Value Date    NEUTROABS 5.37 11/14/2023     Results from last 7 days   Lab Units 11/15/23  0412   SODIUM mmol/L 142   POTASSIUM mmol/L 3.0*   CHLORIDE mmol/L 104   CO2 mmol/L 27.8   BUN mg/dL 22   CREATININE mg/dL 0.73   GLUCOSE mg/dL 105*   CALCIUM mg/dL 8.9     Results from last 7 days   Lab Units 11/14/23  0238   INR  1.04   APTT seconds 27.7     Results from last 7 days   Lab Units 11/15/23  0412   MAGNESIUM mg/dL 1.9         IMAGING:    None reviewed    ASSESSMENT:  This is a 83 y.o. female with:     *Worsening shortness of breath, new CHF  CT imaging 11/14/2023 no pulmonary embolism.  Extensive bilateral interstitial and groundglass infiltrates concerning for edema, consolidation or pneumonia.  Elevated BNP at 9763 and troponin at  61, 74  Respiratory viral panel pending  Prior echocardiogram 8/28/2023 with LVEF 55.9%, hypokinetic basal inferior segments, grade 1 impaired relaxation  Echocardiogram with LVEF 39%, moderate dilation of the LV, mild concentric LV hypertrophy, global hypokinesis     *Adenocarcinoma of the lung  6/16/2021: CT chest with an 8 mm irregular nodule in the medial segment of the left lower lobe  7/13/2021: PET/CT ill-defined avidity of soft tissue at the left mediastinum, 1 cm hypermetabolic pulmonary nodule right base of tongue, subtle uptake at L1 vertebral body  8/13/2021: Biopsy with invasive moderately differentiated adenocarcinoma, PD-L1 TPS 40%  Likely separate and synchronous primaries  SBRT to the right lung and left lung between 8/31 and 9/13/2021  Caris profile with a BRAF mutation, sensitivity to immunotherapy  Subsequent progression  11/9/2022: PET/CT with left upper lobe pleural-based soft tissue density intensely FDG avid with left lower lobe intensely avid pleural disease  [id number] with BRAF V600E mutation.  Caris PD-L1 TPS 70%  6/5/2023 PET/CT with progression  Keytruda initiated 2/14/2022, final dose of Keytruda 5/31/2023 6/5/2023 PET scan with progression  Palliative radiation for pain  Tafinlar and Mekinist initiated  8/25/2023: Scans stable  Tafinlar and Mekinist proceeded to dose reductions with Tafinlar 50 mg twice daily and Mekinist at 0.5 mg daily.  As of 9/22/2023 these were well-tolerated.  Chest x-ray on 10/16/2023 with stable chronic changes  She has also been continuing prednisone 5 mg daily as an outpatient  As of 11/14/2023, now holding Tafinlar and Mekinist due to new heart failure.     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  Last administered in the morning on 11/14/2023.  It is likely they will have to be permanently discontinued with the degree of acute cardiomyopathy.  Continue diuresis  Dr. Chang with cardiology following. Additional cardiac evaluation and medications for CHF per Dr. Chang.    Quick prednisone taper to 5 mg which she was taking outpatient  Continue other supportive care  Will sign off. Cancel the CT imaging scheduled for 11/24 and f/u with Dr. Escobar as scheduled on 11/27  We will see if we can dispose of her Tafinlar and Medinist    Kodak Miranda MD

## 2023-11-15 NOTE — PLAN OF CARE
Goal Outcome Evaluation:  Plan of Care Reviewed With: patient        Progress: no change  Outcome Evaluation: VSS, A/O x4 , pt remained on 4L, echo today EF 39.2% , pt's chemo drugs given this morning but dc'd by dr Miranda right after, still on IV lasix. safety maintained, will CTM. cl  .hf

## 2023-11-15 NOTE — PROGRESS NOTES
"[facility]    Patient Name: Darlene Pfeiffer  :1940  83 y.o.  LOS: 1  Encounter Provider: Selvin Chang Jr, MD      Patient Care Team:  Kehrer, Meredith Lea, MD as PCP - General (Family Medicine)  Linker, Arias BROWN III, MD as Referring Physician (Thoracic Surgery)  Henry Escobar MD as Consulting Physician (Hematology and Oncology)  Zoila Clinton RD, LD as Dietitian (Nutrition)  Nguyễn Bran MD as Consulting Physician (Radiation Oncology)    Chief Complaint: Follow-up acute systolic heart failure, metastatic lung cancer    Interval History: No acute issues overnight       Objective   Vital Signs  Temp:  [97.7 °F (36.5 °C)-98.1 °F (36.7 °C)] 97.7 °F (36.5 °C)  Heart Rate:  [84-98] 84  Resp:  [18-20] 18  BP: ()/(50-77) 115/77    Intake/Output Summary (Last 24 hours) at 11/15/2023 1414  Last data filed at 11/15/2023 1300  Gross per 24 hour   Intake 240 ml   Output 750 ml   Net -510 ml     Flowsheet Rows      Flowsheet Row First Filed Value   Admission Height 160 cm (63\") Documented at 2023   Admission Weight 47.2 kg (104 lb) Documented at 2023              Physical Exam  Vitals reviewed.   Constitutional:       Appearance: Healthy appearance. Not in distress.   Neck:      Vascular: JVR present. JVD elevated.   Pulmonary:      Effort: Pulmonary effort is normal.      Breath sounds: Normal breath sounds. No wheezing. No rhonchi. No rales.   Chest:      Chest wall: Not tender to palpatation.   Cardiovascular:      PMI at left midclavicular line. Normal rate. Regular rhythm. Normal S1. Normal S2.       Murmurs: There is no murmur.      No gallop.  No click. No rub.   Pulses:     Intact distal pulses.   Edema:     Peripheral edema absent.   Abdominal:      General: Bowel sounds are normal.      Palpations: Abdomen is soft.      Tenderness: There is no abdominal tenderness.   Musculoskeletal: Normal range of motion.         General: No tenderness. Skin:     " "General: Skin is warm and dry.   Neurological:      General: No focal deficit present.      Mental Status: Alert and oriented to person, place and time.     Physical exam was reviewed, updated and amended when necessary    Pertinent Test Results:  Results from last 7 days   Lab Units 11/15/23  0412 11/14/23  2122 11/14/23  0521 11/14/23  0238   SODIUM mmol/L 142  --  137 145   POTASSIUM mmol/L 3.0* 3.5 3.6 3.6   CHLORIDE mmol/L 104  --  104 107   CO2 mmol/L 27.8  --  22.8 26.8   BUN mg/dL 22  --  16 18   CREATININE mg/dL 0.73  --  0.54* 0.72   GLUCOSE mg/dL 105*  --  155* 125*   CALCIUM mg/dL 8.9  --  8.3* 9.1   AST (SGOT) U/L 14  --   --  21   ALT (SGPT) U/L 12  --   --  9     Results from last 7 days   Lab Units 11/14/23 0521 11/14/23 0238   HSTROP T ng/L 74* 61*     Results from last 7 days   Lab Units 11/15/23  0412 11/14/23  0726 11/14/23 0238   WBC 10*3/mm3 8.28 8.46 7.93   HEMOGLOBIN g/dL 11.6* 13.8 13.7   HEMATOCRIT % 35.1 41.8 41.3   PLATELETS 10*3/mm3 191 229 237     Results from last 7 days   Lab Units 11/14/23 0238   INR  1.04   APTT seconds 27.7     Results from last 7 days   Lab Units 11/15/23  1130 11/15/23  0412 11/14/23  2122   MAGNESIUM mg/dL 1.9 1.9 1.8           Invalid input(s): \"LDLCALC\"  Results from last 7 days   Lab Units 11/14/23 0238   PROBNP pg/mL 9,763.0*     Results from last 7 days   Lab Units 11/15/23  0412   TSH uIU/mL 1.000           Medication Review:   atorvastatin, 20 mg, Oral, Nightly  cetirizine, 10 mg, Oral, Daily  furosemide, 40 mg, Intravenous, Q12H  ipratropium-albuterol, 3 mL, Nebulization, Q6H While Awake - RT  metoprolol succinate XL, 25 mg, Oral, Daily  mirtazapine, 7.5 mg, Oral, Nightly  montelukast, 10 mg, Oral, Nightly  potassium chloride, 20 mEq, Oral, Daily  potassium chloride ER, 40 mEq, Oral, Q4H  [START ON 11/16/2023] predniSONE, 20 mg, Oral, Daily With Breakfast   Followed by  [START ON 11/17/2023] predniSONE, 5 mg, Oral, Daily With Breakfast   Followed " by  [START ON 11/18/2023] predniSONE, 5 mg, Oral, Daily With Breakfast  sodium chloride, 10 mL, Intravenous, Q12H  vitamin B-12, 1,000 mcg, Oral, Daily              Assessment & Plan     Active Hospital Problems    Diagnosis  POA    **Acute respiratory failure with hypoxia [J96.01]  Yes    Systolic CHF, acute [I50.21]  Yes    Lung cancer [C34.90]  Yes    Chronic obstructive pulmonary disease [J44.9]  Yes    Hypertension [I10]  Yes    Hyperlipidemia [E78.5]  Yes      Resolved Hospital Problems   No resolved problems to display.        Acute systolic heart failure -cardiomyopathy listed in the adverse reaction profile for both chemotherapeutic agents especially when used in combination.  Case details discussed with Dr. Miranda.  She received therapy yesterday but he anticipates making a change in regimen based on echo findings.  Continue Toprol.  Blood pressure is soft over the last 12 to 18 hours so we will not add ACE/ARB or MRA at this time.  Optimally I would like to initiate at least an ARB or Entresto prior to discharge.  She has no peripheral edema but some residual basilar crackles.  Continue IV diuretic for now with consideration for switching to oral dosing tomorrow.  Lung cancer -oncology following  COPD  Remote history of tobacco abuse  Hypertension  Mixed hyperlipidemia      Selvin Chang Jr, MD  [facility]  11/15/23  14:14 EST

## 2023-11-15 NOTE — PROGRESS NOTES
Nutrition Services    Patient Name:  Darlene Pfeiffer  YOB: 1940  MRN: [id number]  Admit Date:  11/14/2023    Assessment Date:  11/15/23    Summary:     Pt is a 83 y.o. female adm for acute respiratory failure with hypoxia. H/o COPD, lung cancer, CHF. Nutrition assessment completed. Visited pt at bedside. She endorses good appetite and believes its because of the steroids she's on. Eating 50% PO. Typically drinks Ensure drinks daily, drinking one brought from home at time of visit. Wt hx reviewed, wt gn noted. Pt endorses she used to be around 165 lbs and got down to 90 lbs in the past year but has slowly gained some of the wt back (104 lbs currently). NFPE completed, severe malnutrition. Plans for discharge tomorrow.   Labs: Glu 105, K 3.0  Meds: lasix, mekinist (oral chemo)    Pt meets ASPEN/AND criteria for nutrition dx of severe malnutrition of chronic disease related to wt loss, muscle wasting, and fat loss.    REC:  Will continue to encourage and monitor PO intake     RD to follow per protocol.    CLINICAL NUTRITION ASSESSMENT      Reason for Assessment Age, BMI     Diagnosis/Problem   Acute respiratory failure with hypoxia    Medical/Surgical History Past Medical History:   Diagnosis Date    Allergic rhinitis     Aneurysm     Asthma     Cancer of floor of mouth 2014    Cerebral aneurysm     COPD (chronic obstructive pulmonary disease)     COVID 2020    Esophageal reflux     History of adenocarcinoma of lung     History of anemia     History of carcinoma in situ of skin     History of lung cancer     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 from [facility]    History of squamous cell carcinoma     Tongue    Hyperlipidemia     Hypertension     since early 60s    Intractable back pain 11/29/2022    Low vitamin B12 level     Lung cancer     Systolic CHF, acute 11/14/2023    Thyromegaly     UTI (urinary tract infection)     Vitamin D deficiency        Past Surgical  "History:   Procedure Laterality Date    BRONCHOSCOPY Bilateral 10/17/2022    Procedure: BRONCHOSCOPY WITH FLUORO with biopsy and BAL;  Surgeon: India Flores MD;  Location: [facility] ENDOSCOPY;  Service: Pulmonary;  Laterality: Bilateral;  PRE/POST - lung mass    CHOLECYSTECTOMY  1999    COLONOSCOPY      EMBOLIZATION CEREBRAL Left 06/29/2022    Procedure: 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;  Surgeon: Bradley Dowell MD;  Location: [facility] HYBRID OR 18/19;  Service: Interventional Radiology;  Laterality: Left;    EYE SURGERY  11/24/2020    Took a muscle out of right eye    GLOSSECTOMY PARTIAL      less than one half tongue    LUNG SURGERY  2012    ORAL LESION EXCISION/BIOPSY      June and August 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    TUBAL ABDOMINAL LIGATION  1970    VENOUS ACCESS DEVICE (PORT) INSERTION N/A 12/12/2022    Procedure: MEDIPORT PLACEMENT;  Surgeon: Jason Villaseñor MD;  Location: [facility] MAIN OR;  Service: Vascular;  Laterality: N/A;        Anthropometrics        Current Height  Current Weight  BMI kg/m2 Height: 160 cm (63\")  Weight: 48.5 kg (107 lb) (11/14/23 1117)  Body mass index is 18.95 kg/m².   Adjusted BMI (if applicable)    BMI Category Normal/Healthy (18.4 - 24.9)   Ideal Body Weight (IBW) 52.4 kg    Usual Body Weight (UBW) 1005-105 lbs    Weight Trend Gain   Weight History Wt Readings from Last 30 Encounters:   11/14/23 1117 48.5 kg (107 lb)   11/14/23 0528 48.8 kg (107 lb 9.4 oz)   11/14/23 0218 47.2 kg (104 lb)   10/16/23 0806 47.6 kg (104 lb 14.4 oz)   10/12/23 1101 45.8 kg (101 lb)   09/29/23 0715 44.5 kg (98 lb)   09/22/23 0815 44.8 kg (98 lb 12.8 oz)   09/08/23 1512 44 kg (96 lb 14.4 oz)   09/05/23 0951 44 kg (97 lb)   08/28/23 1420 40.4 kg (89 lb)   08/21/23 1451 40.8 kg (89 lb 14.4 oz)   08/18/23 1044 40.4 kg (89 lb)   08/08/23 1354 40.5 kg (89 lb 3.2 oz)   08/04/23 0853 42.4 kg (93 lb 6.4 oz)   07/31/23 1127 43.8 kg (96 lb 9.6 oz)   07/29/23 0925 42.2 kg " (93 lb)   07/25/23 1521 42.4 kg (93 lb 7.6 oz)   07/24/23 1134 42.4 kg (93 lb 6.4 oz)   07/21/23 0856 43.5 kg (95 lb 12.8 oz)   07/17/23 1351 42.5 kg (93 lb 12.8 oz)   07/17/23 1421 43.2 kg (95 lb 3.2 oz)   07/12/23 0847 44.5 kg (98 lb 3.2 oz)   07/05/23 1409 47.7 kg (105 lb 1.6 oz)   06/29/23 1058 45.9 kg (101 lb 3.2 oz)   06/27/23 1057 45.5 kg (100 lb 6.4 oz)   06/23/23 1136 45.4 kg (100 lb)   06/20/23 1356 46 kg (101 lb 6.4 oz)   06/12/23 1343 47.3 kg (104 lb 4.8 oz)   05/31/23 1131 48 kg (105 lb 14.4 oz)   05/10/23 1330 48.3 kg (106 lb 6.4 oz)   04/19/23 1037 48.2 kg (106 lb 4.8 oz)   03/29/23 1026 48.5 kg (106 lb 14.4 oz)      --  Labs       Pertinent Labs    Results from last 7 days   Lab Units 11/15/23  0412 11/14/23  2122 11/14/23  0521 11/14/23  0238   SODIUM mmol/L 142  --  137 145   POTASSIUM mmol/L 3.0* 3.5 3.6 3.6   CHLORIDE mmol/L 104  --  104 107   CO2 mmol/L 27.8  --  22.8 26.8   BUN mg/dL 22  --  16 18   CREATININE mg/dL 0.73  --  0.54* 0.72   CALCIUM mg/dL 8.9  --  8.3* 9.1   BILIRUBIN mg/dL <0.2  --   --  <0.2   ALK PHOS U/L 77  --   --  97   ALT (SGPT) U/L 12  --   --  9   AST (SGOT) U/L 14  --   --  21   GLUCOSE mg/dL 105*  --  155* 125*     Results from last 7 days   Lab Units 11/15/23  1130 11/15/23  0412 11/14/23  2122   MAGNESIUM mg/dL 1.9 1.9 1.8   HEMOGLOBIN g/dL  --  11.6*  --    HEMATOCRIT %  --  35.1  --    WBC 10*3/mm3  --  8.28  --    ALBUMIN g/dL  --  3.1*  --      Results from last 7 days   Lab Units 11/15/23  0412 11/14/23  0726 11/14/23  0238   INR   --   --  1.04   APTT seconds  --   --  27.7   PLATELETS 10*3/mm3 191 229 237     COVID19   Date Value Ref Range Status   11/14/2023 Not Detected Not Detected - Ref. Range Final     Lab Results   Component Value Date    HGBA1C 5.60 11/15/2023          Medications           Scheduled Medications atorvastatin, 20 mg, Oral, Nightly  cetirizine, 10 mg, Oral, Daily  furosemide, 40 mg, Intravenous, Q12H  ipratropium-albuterol, 3 mL,  Nebulization, Q6H While Awake - RT  metoprolol succinate XL, 25 mg, Oral, Daily  mirtazapine, 7.5 mg, Oral, Nightly  montelukast, 10 mg, Oral, Nightly  potassium chloride, 20 mEq, Oral, Daily  potassium chloride ER, 40 mEq, Oral, Q4H  [START ON 11/16/2023] predniSONE, 20 mg, Oral, Daily With Breakfast   Followed by  [START ON 11/17/2023] predniSONE, 5 mg, Oral, Daily With Breakfast   Followed by  [START ON 11/18/2023] predniSONE, 5 mg, Oral, Daily With Breakfast  sodium chloride, 10 mL, Intravenous, Q12H  vitamin B-12, 1,000 mcg, Oral, Daily       Infusions     PRN Medications   acetaminophen **OR** acetaminophen **OR** acetaminophen    calcium carbonate    Calcium Replacement - Follow Nurse / BPA Driven Protocol    ipratropium-albuterol    Magnesium Low Dose Replacement - Follow Nurse / BPA Driven Protocol    nitroglycerin    ondansetron **OR** ondansetron    Phosphorus Replacement - Follow Nurse / BPA Driven Protocol    Potassium Replacement - Follow Nurse / BPA Driven Protocol    [COMPLETED] Insert Peripheral IV **AND** sodium chloride    sodium chloride    sodium chloride    tiZANidine    zolpidem     Physical Findings          General Findings alert, oriented, on oxygen therapy   Oral/Mouth Cavity WDL   Edema  no edema   Gastrointestinal last bowel movement: pending    Skin  skin intact   Tubes/Drains/Lines implantable port   NFPE See Malnutrition Severity Assessment     Malnutrition Severity Assessment      Patient meets criteria for : (P) Severe Malnutrition (Pt meets ASPEN/AND criteria for nutrition dx of severe malnutrition of chronic disease related to wt loss, muscle wasting, and fat loss.)  Malnutrition Type (last 8 hours)       Malnutrition Severity Assessment       Row Name 11/15/23 1429       Malnutrition Severity Assessment    Malnutrition Type Chronic Disease - Related Malnutrition (P)       Row Name 11/15/23 1429       Unintentional Weight Loss     Unintentional Weight Loss Findings Severe (P)   Pt  endorses 75 lb wt loss in past yr related to cancer      Row Name 11/15/23 1429       Muscle Loss    Loss of Muscle Mass Findings Severe (P)     Hindu Region Moderate - slight depression (P)     Dorsal Hand Region Severe - prominent depression (P)     Patellar Region Severe - prominent bone, square looking, very little muscle definition (P)     Anterior Thigh Region Severe - line/depression along thigh, obviously thin (P)     Posterior Calf Region Severe - thin with very little definition/firmness (P)       Row Name 11/15/23 1429       Fat Loss    Subcutaneous Fat Loss Findings Moderate (P)     Orbital Region  Moderate -  somewhat hollowness, slightly dark circles (P)     Upper Arm Region Severe - mostly skin, very little space between folds, fingers touch (P)       Row Name 11/15/23 1429       Criteria Met (Must meet criteria for severity in at least 2 of these categories: M Wasting, Fat Loss, Fluid, Secondary Signs, Wt. Status, Intake)    Patient meets criteria for  Severe Malnutrition (P)   Pt meets ASPEN/AND criteria for nutrition dx of severe malnutrition of chronic disease related to wt loss, muscle wasting, and fat loss.                     Current Nutrition Orders & Evaluation of Intake       Oral Nutrition     Food Allergies NKFA   Current PO Diet Diet: Cardiac Diets; Healthy Heart (2-3 Na+); Texture: Regular Texture (IDDSI 7); Fluid Consistency: Thin (IDDSI 0)   Supplement Boost Plus, TID   PO Evaluation     % PO Intake 50% of breakfast     Factors Affecting Intake: altered respiratory status   --  PES STATEMENT / NUTRITION DIAGNOSIS      Nutrition Dx Problem  Problem: Malnutrition (severe)  Etiology: Medical Diagnosis - lung cancer    Signs/Symptoms: NFPE Results and Unintended Weight Change     NUTRITION INTERVENTION / PLAN OF CARE      Intervention Goal(s) Maintain nutrition status, Establish goals of care, Meet estimated needs, Disease management/therapy, Increase intake, Appropriate weight gain, and  PO intake goal %: 75%         RD Intervention/Action Encourage intake, Continue to monitor, and Care plan reviewed   --      Prescription/Orders:       PO Diet       Supplements       Enteral Nutrition       Parenteral Nutrition    New Prescription Ordered? No changes at this time   --      Monitor/Evaluation Per protocol, I&O, PO intake, Supplement intake, Pertinent labs, Weight, Skin status, GI status, Symptoms, POC/GOC   Discharge Plan/Needs Pending clinical course   --    RD to follow per protocol.      Electronically signed by:  Tara Kc Dietitian Intern   11/15/23 13:03 EST

## 2023-11-16 ENCOUNTER — READMISSION MANAGEMENT (OUTPATIENT)
Dept: CALL CENTER | Facility: HOSPITAL | Age: 83
End: 2023-11-16
Payer: MEDICARE

## 2023-11-16 VITALS
HEART RATE: 86 BPM | DIASTOLIC BLOOD PRESSURE: 56 MMHG | TEMPERATURE: 98.6 F | WEIGHT: 107 LBS | OXYGEN SATURATION: 98 % | BODY MASS INDEX: 18.96 KG/M2 | SYSTOLIC BLOOD PRESSURE: 122 MMHG | HEIGHT: 63 IN | RESPIRATION RATE: 16 BRPM

## 2023-11-16 DIAGNOSIS — G47.00 INSOMNIA, UNSPECIFIED TYPE: ICD-10-CM

## 2023-11-16 DIAGNOSIS — C34.11 MALIGNANT NEOPLASM OF UPPER LOBE OF RIGHT LUNG: ICD-10-CM

## 2023-11-16 PROBLEM — I50.21 ACUTE HFREF (HEART FAILURE WITH REDUCED EJECTION FRACTION): Status: ACTIVE | Noted: 2023-11-16

## 2023-11-16 PROBLEM — E43 SEVERE MALNUTRITION: Status: ACTIVE | Noted: 2023-11-16

## 2023-11-16 LAB
ANION GAP SERPL CALCULATED.3IONS-SCNC: 8 MMOL/L (ref 5–15)
BUN SERPL-MCNC: 25 MG/DL (ref 8–23)
BUN/CREAT SERPL: 32.5 (ref 7–25)
CALCIUM SPEC-SCNC: 9.5 MG/DL (ref 8.6–10.5)
CHLORIDE SERPL-SCNC: 105 MMOL/L (ref 98–107)
CO2 SERPL-SCNC: 30 MMOL/L (ref 22–29)
CREAT SERPL-MCNC: 0.77 MG/DL (ref 0.57–1)
DEPRECATED RDW RBC AUTO: 44.8 FL (ref 37–54)
EGFRCR SERPLBLD CKD-EPI 2021: 77.6 ML/MIN/1.73
ERYTHROCYTE [DISTWIDTH] IN BLOOD BY AUTOMATED COUNT: 13 % (ref 12.3–15.4)
GLUCOSE SERPL-MCNC: 111 MG/DL (ref 65–99)
HCT VFR BLD AUTO: 38.8 % (ref 34–46.6)
HGB BLD-MCNC: 12.8 G/DL (ref 12–15.9)
MCH RBC QN AUTO: 30.7 PG (ref 26.6–33)
MCHC RBC AUTO-ENTMCNC: 33 G/DL (ref 31.5–35.7)
MCV RBC AUTO: 93 FL (ref 79–97)
PLATELET # BLD AUTO: 229 10*3/MM3 (ref 140–450)
PMV BLD AUTO: 10.4 FL (ref 6–12)
POTASSIUM SERPL-SCNC: 4.5 MMOL/L (ref 3.5–5.2)
RBC # BLD AUTO: 4.17 10*6/MM3 (ref 3.77–5.28)
SODIUM SERPL-SCNC: 143 MMOL/L (ref 136–145)
WBC NRBC COR # BLD: 8.28 10*3/MM3 (ref 3.4–10.8)

## 2023-11-16 PROCEDURE — 94799 UNLISTED PULMONARY SVC/PX: CPT

## 2023-11-16 PROCEDURE — 99232 SBSQ HOSP IP/OBS MODERATE 35: CPT | Performed by: INTERNAL MEDICINE

## 2023-11-16 PROCEDURE — 63710000001 PREDNISONE PER 1 MG: Performed by: HOSPITALIST

## 2023-11-16 PROCEDURE — 85027 COMPLETE CBC AUTOMATED: CPT | Performed by: HOSPITALIST

## 2023-11-16 PROCEDURE — 80048 BASIC METABOLIC PNL TOTAL CA: CPT | Performed by: HOSPITALIST

## 2023-11-16 PROCEDURE — 94664 DEMO&/EVAL PT USE INHALER: CPT

## 2023-11-16 PROCEDURE — 94760 N-INVAS EAR/PLS OXIMETRY 1: CPT

## 2023-11-16 RX ORDER — ZOLPIDEM TARTRATE 5 MG/1
5 TABLET ORAL NIGHTLY PRN
Qty: 30 TABLET | Refills: 0 | Status: SHIPPED | OUTPATIENT
Start: 2023-11-16

## 2023-11-16 RX ORDER — PREDNISONE 5 MG/1
TABLET ORAL
Qty: 34 TABLET | Refills: 0 | Status: SHIPPED | OUTPATIENT
Start: 2023-11-16 | End: 2023-11-16 | Stop reason: SDUPTHER

## 2023-11-16 RX ORDER — FUROSEMIDE 20 MG/1
20 TABLET ORAL DAILY
Qty: 30 TABLET | Refills: 0 | Status: SHIPPED | OUTPATIENT
Start: 2023-11-16

## 2023-11-16 RX ORDER — PREDNISONE 5 MG/1
TABLET ORAL
Qty: 6 TABLET | Refills: 0 | Status: SHIPPED | OUTPATIENT
Start: 2023-11-16 | End: 2023-11-18

## 2023-11-16 RX ADMIN — IPRATROPIUM BROMIDE AND ALBUTEROL SULFATE 3 ML: 2.5; .5 SOLUTION RESPIRATORY (INHALATION) at 07:23

## 2023-11-16 RX ADMIN — Medication 1000 MCG: at 10:22

## 2023-11-16 RX ADMIN — CETIRIZINE HYDROCHLORIDE 10 MG: 10 TABLET ORAL at 10:22

## 2023-11-16 RX ADMIN — METOPROLOL SUCCINATE 25 MG: 25 TABLET, EXTENDED RELEASE ORAL at 10:23

## 2023-11-16 RX ADMIN — PREDNISONE 20 MG: 20 TABLET ORAL at 10:22

## 2023-11-16 NOTE — CASE MANAGEMENT/SOCIAL WORK
Case Management Discharge Note      Final Note: Home, family to transport    Provided Post Acute Provider List?: N/A  Provided Post Acute Provider Quality & Resource List?: N/A    Selected Continued Care - Discharged on 11/16/2023 Admission date: 11/14/2023 - Discharge disposition: Home or Self Care      Destination    No services have been selected for the patient.                Durable Medical Equipment    No services have been selected for the patient.                Dialysis/Infusion    No services have been selected for the patient.                Home Medical Care    No services have been selected for the patient.                Therapy    No services have been selected for the patient.                Community Resources    No services have been selected for the patient.                Community & DME    No services have been selected for the patient.                    Selected Continued Care - Episodes Includes continued care and service providers with selected services from the active episodes listed below      Oncology- External Fill Episode start date: 6/13/2023   There are no active outsourced providers for this episode.                 Transportation Services  Private: Car    Final Discharge Disposition Code: 01 - home or self-care

## 2023-11-16 NOTE — PROGRESS NOTES
"[facility]    Patient Name: Darlene Pfeiffer  :1940  83 y.o.  LOS: 2  Encounter Provider: Selvin Chang Jr, MD      Patient Care Team:  Kehrer, Meredith Lea, MD as PCP - General (Family Medicine)  Linker, Arias BROWN III, MD as Referring Physician (Thoracic Surgery)  Henry Escobar MD as Consulting Physician (Hematology and Oncology)  Zoila Clinton RD, SALONI as Dietitian (Nutrition)  Nguyễn Bran MD as Consulting Physician (Radiation Oncology)    Chief Complaint: Follow-up acute systolic heart failure, metastatic lung cancer    Interval History: She is euvolemic today on exam.  We will send her home on low-dose furosemide with potassium replacement and reevaluate in the outpatient setting.       Objective   Vital Signs  Temp:  [98.2 °F (36.8 °C)-99.1 °F (37.3 °C)] 98.6 °F (37 °C)  Heart Rate:  [] 86  Resp:  [16-18] 16  BP: (122-139)/(56-74) 122/56    Intake/Output Summary (Last 24 hours) at 2023 1713  Last data filed at 2023 0130  Gross per 24 hour   Intake --   Output 300 ml   Net -300 ml     Flowsheet Rows      Flowsheet Row First Filed Value   Admission Height 160 cm (63\") Documented at 2023 0218   Admission Weight 47.2 kg (104 lb) Documented at 2023 0218              Vitals reviewed.   Constitutional:       Appearance: Frail. Chronically ill-appearing.   Neck:      Vascular: No JVR. JVD normal.   Pulmonary:      Breath sounds: No wheezing. Rhonchi present. No rales.   Chest:      Chest wall: Not tender to palpatation.   Cardiovascular:      PMI at left midclavicular line. Normal rate. Regular rhythm. Normal S1. Normal S2.       Murmurs: There is no murmur.      No gallop.  No click. No rub.   Pulses:     Intact distal pulses.   Edema:     Peripheral edema absent.   Abdominal:      General: Bowel sounds are normal.      Palpations: Abdomen is soft.      Tenderness: There is no abdominal tenderness.   Musculoskeletal: Normal range of motion.         " "General: No tenderness. Skin:     General: Skin is warm and dry.   Neurological:      General: No focal deficit present.      Mental Status: Alert and oriented to person, place and time.           Pertinent Test Results:  Results from last 7 days   Lab Units 11/16/23  0109 11/15/23  0412 11/14/23  2122 11/14/23  0521 11/14/23  0238   SODIUM mmol/L 143 142  --  137 145   POTASSIUM mmol/L 4.5 3.0* 3.5 3.6 3.6   CHLORIDE mmol/L 105 104  --  104 107   CO2 mmol/L 30.0* 27.8  --  22.8 26.8   BUN mg/dL 25* 22  --  16 18   CREATININE mg/dL 0.77 0.73  --  0.54* 0.72   GLUCOSE mg/dL 111* 105*  --  155* 125*   CALCIUM mg/dL 9.5 8.9  --  8.3* 9.1   AST (SGOT) U/L  --  14  --   --  21   ALT (SGPT) U/L  --  12  --   --  9     Results from last 7 days   Lab Units 11/14/23 0521 11/14/23 0238   HSTROP T ng/L 74* 61*     Results from last 7 days   Lab Units 11/16/23  0109 11/15/23  0412 11/14/23  0726 11/14/23  0238   WBC 10*3/mm3 8.28 8.28 8.46 7.93   HEMOGLOBIN g/dL 12.8 11.6* 13.8 13.7   HEMATOCRIT % 38.8 35.1 41.8 41.3   PLATELETS 10*3/mm3 229 191 229 237     Results from last 7 days   Lab Units 11/14/23 0238   INR  1.04   APTT seconds 27.7     Results from last 7 days   Lab Units 11/15/23  1130 11/15/23  0412 11/14/23  2122   MAGNESIUM mg/dL 1.9 1.9 1.8           Invalid input(s): \"LDLCALC\"  Results from last 7 days   Lab Units 11/14/23 0238   PROBNP pg/mL 9,763.0*     Results from last 7 days   Lab Units 11/15/23  0412   TSH uIU/mL 1.000           Medication Review:        No current facility-administered medications for this encounter.      Assessment & Plan     Active Hospital Problems    Diagnosis  POA    **Acute respiratory failure with hypoxia [J96.01]  Yes    Acute HFrEF (heart failure with reduced ejection fraction) [I50.21]  Yes    Severe malnutrition [E43]  Yes    Systolic CHF, acute [I50.21]  Yes    Lung cancer [C34.90]  Yes    Chronic obstructive pulmonary disease [J44.9]  Yes    Hypertension [I10]  Yes    " Hyperlipidemia [E78.5]  Yes      Resolved Hospital Problems   No resolved problems to display.        Acute systolic heart failure -cardiomyopathy listed in the adverse reaction profile for both chemotherapeutic agents especially when used in combination.  Case details discussed with Dr. Miranda.  She received therapy yesterday but he anticipates making a change in regimen based on echo findings.  Continue Toprol.  Blood pressure is soft over the last days.  I will reevaluate in office and hopefully start Entresto.  We will send her home on low-dose oral diuretic and potassium supplementation.  Lung cancer -oncology following  COPD  Remote history of tobacco abuse  Hypertension  Mixed hyperlipidemia    OK for discharge from cardiovascular standpoint.    Selvin Chang Jr, MD  [facility]  11/16/23  17:13 EST

## 2023-11-16 NOTE — PLAN OF CARE
Problem: Adult Inpatient Plan of Care  Goal: Plan of Care Review  Outcome: Ongoing, Progressing  Flowsheets (Taken 11/16/2023 0406)  Outcome Evaluation: VSS, A&Ox4, pt is independent, Pt is in bed at lowest position, brakes on, 2 side rails up, call light within reach.  Goal: Patient-Specific Goal (Individualized)  Outcome: Ongoing, Progressing  Goal: Absence of Hospital-Acquired Illness or Injury  Outcome: Ongoing, Progressing  Intervention: Identify and Manage Fall Risk  Recent Flowsheet Documentation  Taken 11/16/2023 0400 by Darwin Arroyo RN  Safety Promotion/Fall Prevention: safety round/check completed  Taken 11/16/2023 0200 by Darwin Arroyo RN  Safety Promotion/Fall Prevention: safety round/check completed  Taken 11/16/2023 0000 by Darwin Arroyo RN  Safety Promotion/Fall Prevention: safety round/check completed  Taken 11/15/2023 2200 by Darwin Arroyo RN  Safety Promotion/Fall Prevention:   safety round/check completed   room organization consistent   nonskid shoes/slippers when out of bed   lighting adjusted   fall prevention program maintained   clutter free environment maintained  Taken 11/15/2023 2000 by Darwin Arroyo RN  Safety Promotion/Fall Prevention: assistive device/personal items within reach  Intervention: Prevent Skin Injury  Recent Flowsheet Documentation  Taken 11/16/2023 0400 by Darwin Arroyo RN  Body Position: position changed independently  Taken 11/16/2023 0200 by Darwin Arroyo RN  Body Position: position changed independently  Taken 11/16/2023 0000 by Darwin Arroyo RN  Body Position: position changed independently  Taken 11/15/2023 2200 by Darwin Arroyo RN  Body Position: position changed independently  Taken 11/15/2023 2000 by Darwin Arroyo RN  Body Position: position changed independently  Intervention: Prevent and Manage VTE (Venous Thromboembolism) Risk  Recent Flowsheet Documentation  Taken 11/16/2023 0400 by Darwin Arroyo RN  Activity Management: up ad nani  Taken  11/16/2023 0200 by Darwin Arroyo RN  Activity Management: up ad nani  Taken 11/16/2023 0000 by Darwin Arroyo RN  Activity Management: up ad nani  Taken 11/15/2023 2200 by Darwin Arroyo RN  Activity Management: up ad nani  Taken 11/15/2023 2000 by Darwin Arroyo RN  Activity Management: up ad nani  Range of Motion: active ROM (range of motion) encouraged  Intervention: Prevent Infection  Recent Flowsheet Documentation  Taken 11/16/2023 0400 by Darwin Arroyo RN  Infection Prevention:   environmental surveillance performed   hand hygiene promoted  Taken 11/16/2023 0200 by Darwin Arroyo RN  Infection Prevention:   environmental surveillance performed   hand hygiene promoted  Taken 11/16/2023 0000 by Darwin Arroyo RN  Infection Prevention:   environmental surveillance performed   hand hygiene promoted  Taken 11/15/2023 2200 by Darwin Arroyo RN  Infection Prevention:   environmental surveillance performed   hand hygiene promoted  Taken 11/15/2023 2000 by Darwin Arroyo RN  Infection Prevention: hand hygiene promoted  Goal: Optimal Comfort and Wellbeing  Outcome: Ongoing, Progressing  Intervention: Provide Person-Centered Care  Recent Flowsheet Documentation  Taken 11/16/2023 0000 by Darwin Arroyo RN  Trust Relationship/Rapport:   care explained   choices provided  Taken 11/15/2023 2000 by Darwin Arroyo RN  Trust Relationship/Rapport:   care explained   choices provided  Goal: Readiness for Transition of Care  Outcome: Ongoing, Progressing   Goal Outcome Evaluation:              Outcome Evaluation: VSS, A&Ox4, pt is independent, Pt is in bed at lowest position, brakes on, 2 side rails up, call light within reach.

## 2023-11-16 NOTE — OUTREACH NOTE
Prep Survey      Flowsheet Row Responses   [facility] patient discharged from? [facility]   Is LACE score < 7 ? No   Eligibility [facility]   Date of Admission 11/14/23   Date of Discharge 11/16/23   Discharge diagnosis Systolic CHF, acute   Does the patient have one of the following disease processes/diagnoses(primary or secondary)? CHF   Does the patient have Home health ordered? No   Is there a DME ordered? No   Prep survey completed? Yes            Pinky EWING - Registered Nurse

## 2023-11-16 NOTE — PLAN OF CARE
Goal Outcome Evaluation:  Plan of Care Reviewed With: patient        Progress: no change  Outcome Evaluation: VSS, pt A/OX4, pt responding very well with IV diuretics, Room air, probable dc tomorrow, K replaced today, pt has been a paddy to take care of.  at bedside, safety maintained, will CTM cl

## 2023-11-16 NOTE — CASE MANAGEMENT/SOCIAL WORK
Continued Stay Note  [facility]     Patient Name: Darlene Pfeiffer  MRN: [id number]  Today's Date: 11/16/2023    Admit Date: 11/14/2023    Plan: DC home, family transport   Discharge Plan       Row Name 11/16/23 1011       Plan    Plan DC home, family transport    Plan Comments Pt dc home, confirmed that pt has no dc needs at this time                   Discharge Codes    No documentation.                 Expected Discharge Date and Time       Expected Discharge Date Expected Discharge Time    Nov 16, 2023               Taylor Palacio RN

## 2023-11-16 NOTE — DISCHARGE SUMMARY
[facility]    Date of Discharge:  11/16/2023    PCP: Kehrer, Meredith Lea, MD    Discharge Diagnosis:   Active Hospital Problems    Diagnosis  POA    **Acute respiratory failure with hypoxia [J96.01]  Yes    Acute HFrEF (heart failure with reduced ejection fraction) [I50.21]  Yes    Severe malnutrition [E43]  Yes    Systolic CHF, acute [I50.21]  Yes    Lung cancer [C34.90]  Yes    Chronic obstructive pulmonary disease [J44.9]  Yes    Hypertension [I10]  Yes    Hyperlipidemia [E78.5]  Yes      Resolved Hospital Problems   No resolved problems to display.          Consults       Date and Time Order Name Status Description    11/14/2023  5:52 AM Hematology & Oncology Inpatient Consult Completed     11/14/2023  4:36 AM Inpatient Cardiology Consult Completed     11/14/2023  4:26 AM [facility] (on-call MD unless specified) Details            Hospital Course  83 y.o. female with a history of lung cancer on chemotherapy presented with shortness of breath and found to have pulmonary edema. WBC, lactate, and procalcitonin were normal. Her two chemotherapeutic agents both listed cardiomyopathy as adverse reactions and those were stopped (last administered morning 11/14/2023). She was diuresed with resolution of her symptoms. Echocardiogram as below. Cardiology recommends furosemide 20 mg daily with potassium 20 mill equivalents daily at discharge. Oncology feels that likely the above two chemotherapeutic agents will have to be discontinued permanently. Recommend BMP in about a week with PCP follow-up electrolytes/renal function now on diuretics.    Results for orders placed during the hospital encounter of 11/14/23    Adult Transthoracic Echo Complete W/ Cont if Necessary Per Protocol    Interpretation Summary    Left ventricular systolic function is moderately decreased. Calculated left ventricular EF = 39.2% Abnormal global longitudinal LV strain (GLS) = -11.1%. Left  ventricle strain data was reviewed by the physician and found to be accurate. The left ventricular cavity is moderately dilated. Left ventricular wall thickness is consistent with mild concentric hypertrophy. There is left ventricular global hypokinesis noted. Left ventricular diastolic function is consistent with (grade Ia w/high LAP) impaired relaxation.    The aortic valve is abnormal in structure. There is moderate calcification of the aortic valve mainly affecting the non-coronary cusp(s). The aortic valve appears trileaflet.    Trace mitral valve regurgitation is present.    Trace tricuspid valve regurgitation is present. Estimated right ventricular systolic pressure from tricuspid regurgitation is normal (<35 mmHg). Calculated right ventricular systolic pressure from tricuspid regurgitation is 29 mmHg.    Compared to prior study of 8/20/2023 LV systolic function, diastolic function and global longitudinal LV strain are all worse and abnormal.       I discussed the patient's findings and my recommendations with patient and nursing staff and Dr. Chang.    Temp:  [98.1 °F (36.7 °C)-99.1 °F (37.3 °C)] 98.6 °F (37 °C)  Heart Rate:  [] 86  Resp:  [16-18] 16  BP: (122-139)/(56-74) 122/56  Body mass index is 18.95 kg/m².    Physical Exam  Constitutional:       General: She is not in acute distress.     Appearance: Normal appearance. She is not toxic-appearing.   HENT:      Head: Normocephalic and atraumatic.   Eyes:      Extraocular Movements: Extraocular movements intact.   Cardiovascular:      Rate and Rhythm: Normal rate and regular rhythm.   Pulmonary:      Effort: Pulmonary effort is normal. No respiratory distress.      Breath sounds: Normal breath sounds. No wheezing or rhonchi.   Abdominal:      General: Bowel sounds are normal.      Palpations: Abdomen is soft.      Tenderness: There is no abdominal tenderness. There is no guarding or rebound.   Musculoskeletal:         General: No swelling.       Cervical back: Normal range of motion.      Right lower leg: No edema.      Left lower leg: No edema.   Skin:     General: Skin is warm and dry.   Neurological:      General: No focal deficit present.      Mental Status: She is alert and oriented to person, place, and time.   Psychiatric:         Mood and Affect: Mood normal.         Behavior: Behavior normal.         Thought Content: Thought content normal.       Disposition: Home or Self Care       Discharge Medications        New Medications        Instructions Start Date   furosemide 20 MG tablet  Commonly known as: Lasix   20 mg, Oral, Daily             Changes to Medications        Instructions Start Date   predniSONE 5 MG tablet  Commonly known as: DELTASONE  What changed:   medication strength  See the new instructions.   Take 4 tablets by mouth Daily for 1 day, THEN 2 tablets Daily for 1 day. (resume previous prednisone 5 mg daily after these two doses)   Start Date: November 16, 2023            Continue These Medications        Instructions Start Date   atorvastatin 20 MG tablet  Commonly known as: LIPITOR   TAKE 1 TABLET EVERY NIGHT      cetirizine 10 MG tablet  Commonly known as: zyrTEC   10 mg, Oral, Daily      metoprolol succinate XL 25 MG 24 hr tablet  Commonly known as: TOPROL-XL   25 mg, Oral, Daily      mirtazapine 7.5 MG tablet  Commonly known as: REMERON   7.5 mg, Oral, Nightly      montelukast 10 MG tablet  Commonly known as: SINGULAIR   10 mg, Oral, Nightly      potassium chloride 10 MEQ CR tablet  Commonly known as: K-DUR,KLOR-CON   20 mEq, Oral, Daily      VITAMIN B12 PO   1,000 mcg, Oral, Daily      Vitamin D3 50 MCG (2000 UT) tablet   1,000 Units, Oral, Daily, HOLDING FOR DOS      zolpidem 5 MG tablet  Commonly known as: AMBIEN   5 mg, Oral, Nightly PRN             Stop These Medications      Tafinlar 50 MG chemo capsule  Generic drug: dabrafenib     trametinib dimethyl sulfoxide 0.5 MG chemo tablet  Commonly known as: MEKINIST              Diet Instructions       Diet: Regular/House Diet      Discharge Diet: Regular/House Diet    Texture: Regular Texture (IDDSI 7)    Fluid Consistency: Thin (IDDSI 0)           Activity Instructions       Activity as Tolerated             Additional Instructions for the Follow-ups that You Need to Schedule       Call MD for problems / concerns.   As directed             Follow-up Information       Kehrer, Meredith Lea, MD .    Specialty: Family Medicine  Contact information:  [address]  [address]  [facility] [address] [phone]               Selvin Chang Jr., MD Follow up.    Specialties: Cardiology, Interventional Cardiology  Contact information:  [address] [facility]  [address]  [place] [id number] [phone]                            Future Appointments   Date Time Provider Department Center   11/27/2023 11:20 AM VITALS ONLY [facility] [place]   11/27/2023 11:40 AM Henry Escobar MD [facility] [place]   1/15/2024  8:00 AM Kehrer, Meredith Lea, MD [facility]   10/8/2024 11:30 AM Bradley Dowell MD [facility]     Pending Labs       Order Current Status    Blood Culture - Blood, Arm, Left Preliminary result    Blood Culture - Blood, Arm, Right Preliminary result           David Delacruz MD  [facility]  11/16/23    Discharge time spent greater than 30 minutes.

## 2023-11-17 ENCOUNTER — TRANSITIONAL CARE MANAGEMENT TELEPHONE ENCOUNTER (OUTPATIENT)
Dept: CALL CENTER | Facility: HOSPITAL | Age: 83
End: 2023-11-17
Payer: MEDICARE

## 2023-11-17 NOTE — OUTREACH NOTE
Call Center TCM Note      Flowsheet Row Responses   [facility] patient discharged from? [place]   Does the patient have one of the following disease processes/diagnoses(primary or secondary)? CHF   TCM attempt successful? No   Unsuccessful attempts Attempt 2   Call Status Comments bh verbal outdated             Pinky Austin RN    11/17/2023, 10:29 EST

## 2023-11-17 NOTE — OUTREACH NOTE
Call Center TCM Note      Flowsheet Row Responses   [facility] patient discharged from? [place]   Does the patient have one of the following disease processes/diagnoses(primary or secondary)? CHF   TCM attempt successful? No   Unsuccessful attempts Attempt 1  [bh verbal outdated]             Pinky Austin RN    11/17/2023, 08:36 EST

## 2023-11-18 ENCOUNTER — TRANSITIONAL CARE MANAGEMENT TELEPHONE ENCOUNTER (OUTPATIENT)
Dept: CALL CENTER | Facility: HOSPITAL | Age: 83
End: 2023-11-18
Payer: MEDICARE

## 2023-11-18 NOTE — OUTREACH NOTE
Call Center TCM Note      Flowsheet Row Responses   [facility] patient discharged from? [place]   Does the patient have one of the following disease processes/diagnoses(primary or secondary)? CHF   TCM attempt successful? No   Unsuccessful attempts Attempt 3  [No updated verbal release on file from PCP group]             Korin Munoz RN    11/18/2023, 11:52 EST

## 2023-11-19 LAB
BACTERIA SPEC AEROBE CULT: NORMAL
BACTERIA SPEC AEROBE CULT: NORMAL

## 2023-11-21 ENCOUNTER — OFFICE VISIT (OUTPATIENT)
Dept: FAMILY MEDICINE CLINIC | Facility: CLINIC | Age: 83
End: 2023-11-21
Payer: MEDICARE

## 2023-11-21 ENCOUNTER — TELEPHONE (OUTPATIENT)
Dept: CARDIOLOGY | Facility: CLINIC | Age: 83
End: 2023-11-21
Payer: MEDICARE

## 2023-11-21 VITALS
TEMPERATURE: 97.2 F | HEART RATE: 68 BPM | OXYGEN SATURATION: 97 % | DIASTOLIC BLOOD PRESSURE: 60 MMHG | WEIGHT: 105.8 LBS | SYSTOLIC BLOOD PRESSURE: 114 MMHG | HEIGHT: 63 IN | BODY MASS INDEX: 18.75 KG/M2

## 2023-11-21 DIAGNOSIS — C34.12 MALIGNANT NEOPLASM OF UPPER LOBE OF LEFT LUNG: ICD-10-CM

## 2023-11-21 DIAGNOSIS — J96.01 ACUTE RESPIRATORY FAILURE WITH HYPOXIA: ICD-10-CM

## 2023-11-21 DIAGNOSIS — J06.9 ACUTE URI: ICD-10-CM

## 2023-11-21 DIAGNOSIS — E78.5 HYPERLIPIDEMIA, UNSPECIFIED HYPERLIPIDEMIA TYPE: ICD-10-CM

## 2023-11-21 DIAGNOSIS — I50.21 ACUTE HFREF (HEART FAILURE WITH REDUCED EJECTION FRACTION): ICD-10-CM

## 2023-11-21 DIAGNOSIS — Z51.81 ENCOUNTER FOR MEDICATION MONITORING: ICD-10-CM

## 2023-11-21 DIAGNOSIS — Z09 HOSPITAL DISCHARGE FOLLOW-UP: Primary | ICD-10-CM

## 2023-11-21 DIAGNOSIS — E43 SEVERE MALNUTRITION: ICD-10-CM

## 2023-11-21 DIAGNOSIS — J44.9 CHRONIC OBSTRUCTIVE PULMONARY DISEASE, UNSPECIFIED COPD TYPE: ICD-10-CM

## 2023-11-21 DIAGNOSIS — I10 ESSENTIAL HYPERTENSION: ICD-10-CM

## 2023-11-21 RX ORDER — IPRATROPIUM BROMIDE 42 UG/1
2 SPRAY, METERED NASAL 4 TIMES DAILY
Qty: 15 ML | Refills: 0 | Status: SHIPPED | OUTPATIENT
Start: 2023-11-21

## 2023-11-21 RX ORDER — DEXTROMETHORPHAN HYDROBROMIDE AND PROMETHAZINE HYDROCHLORIDE 15; 6.25 MG/5ML; MG/5ML
5 SYRUP ORAL 4 TIMES DAILY PRN
Qty: 180 ML | Refills: 0 | Status: SHIPPED | OUTPATIENT
Start: 2023-11-21

## 2023-11-21 NOTE — TELEPHONE ENCOUNTER
11/21/23  11:33 EST    Patient needs hospital follow-up with Zeynep Allison or myself.      DEJAN Mary  [facility]

## 2023-11-21 NOTE — PROGRESS NOTES
Transitional Care Follow Up Visit  Subjective     Darlene Pfeiffer is a 83 y.o. female who presents for a transitional care management visit.    Within 48 business hours after discharge our office contacted her via telephone to coordinate her care and needs.      I reviewed and discussed the details of that call along with the discharge summary, hospital problems, inpatient lab results, inpatient diagnostic studies, and consultation reports with Darlene.     Current outpatient and discharge medications have been reconciled for the patient.  Reviewed by: Meredith Lea Kehrer, MD          11/16/2023     3:44 PM   Date of TCM Phone Call   [facility]   Date of Admission 11/14/2023   Date of Discharge 11/16/2023     Risk for Readmission (LACE) Score: 12 (11/16/2023  6:00 AM)  ED to Hosp-Admission (Discharged) with David Delacruz MD (11/14/2023)   CT Angiogram Chest (11/14/2023 03:35)     History of Present Illness   Course During Hospital Stay:  Woke up suddenly 1 week ago and could not catch her breath. Went to the ER and was found to have fluid on her lungs. Her chemo was attributed to causing cardiomyopathy. Was discharged on furosemide and potassium. Was discharged a couple days later. Started getting an itchy ST the days she was discharged.  has a had a cold. Nose has been dripping constantly. Has not taste. Coughing. Fever to 102 a couple nights ago. Was achy and chilled then.  Is feeling better now.  Sees Dr. Hogan next week for follow up.      The following portions of the patient's history were reviewed and updated as appropriate: allergies, current medications, past family history, past medical history, past social history, past surgical history, and problem list.    Review of Systems    Objective   Physical Exam  Constitutional:       General: She is not in acute distress.     Appearance: Normal appearance. She is well-developed.   HENT:      Head: Normocephalic and atraumatic.       Right Ear: Tympanic membrane, ear canal and external ear normal.      Left Ear: Tympanic membrane, ear canal and external ear normal.      Mouth/Throat:      Mouth: Mucous membranes are moist.      Pharynx: Oropharynx is clear. Posterior oropharyngeal erythema present. No oropharyngeal exudate.   Eyes:      Conjunctiva/sclera: Conjunctivae normal.      Pupils: Pupils are equal, round, and reactive to light.   Neck:      Thyroid: No thyromegaly.   Cardiovascular:      Rate and Rhythm: Normal rate and regular rhythm.      Heart sounds: No murmur heard.  Pulmonary:      Effort: Pulmonary effort is normal.      Breath sounds: Decreased breath sounds present. No wheezing, rhonchi or rales.   Abdominal:      General: Bowel sounds are normal.      Palpations: Abdomen is soft.      Tenderness: There is no abdominal tenderness.   Musculoskeletal:         General: Normal range of motion.      Cervical back: Neck supple.   Lymphadenopathy:      Cervical: No cervical adenopathy.   Skin:     General: Skin is warm and dry.   Neurological:      Mental Status: She is alert and oriented to person, place, and time.   Psychiatric:         Mood and Affect: Mood normal.         Behavior: Behavior normal.         Assessment & Plan   Diagnoses and all orders for this visit:    1. Hospital discharge follow-up (Primary)    2. Malignant neoplasm of upper lobe of left lung    3. Acute respiratory failure with hypoxia    4. Acute HFrEF (heart failure with reduced ejection fraction)  -     Basic Metabolic Panel    5. Severe malnutrition    6. Chronic obstructive pulmonary disease, unspecified COPD type    7. Essential hypertension  -     Basic Metabolic Panel    8. Hyperlipidemia, unspecified hyperlipidemia type    9. Encounter for medication monitoring  -     Basic Metabolic Panel    10. Acute URI  -     ipratropium (ATROVENT) 0.06 % nasal spray; 2 sprays into the nostril(s) as directed by provider 4 (Four) Times a Day.  Dispense: 15 mL;  Refill: 0  -     promethazine-dextromethorphan (PROMETHAZINE-DM) 6.25-15 MG/5ML syrup; Take 5 mL by mouth 4 (Four) Times a Day As Needed for Cough.  Dispense: 180 mL; Refill: 0                 Will check basic metabolic panel due to diuretic use.  Keep follow-up with oncology.  For URI, symptomatic treatment.  Keep routine follow-up with me.

## 2023-11-21 NOTE — TELEPHONE ENCOUNTER
11.21.23    Called and left a voicemail for patient to return call to schedule this appointment.     Thanks   Jason

## 2023-11-22 LAB
BUN SERPL-MCNC: 26 MG/DL (ref 8–27)
BUN/CREAT SERPL: 32 (ref 12–28)
CALCIUM SERPL-MCNC: 8.9 MG/DL (ref 8.7–10.3)
CHLORIDE SERPL-SCNC: 103 MMOL/L (ref 96–106)
CO2 SERPL-SCNC: 26 MMOL/L (ref 20–29)
CREAT SERPL-MCNC: 0.81 MG/DL (ref 0.57–1)
EGFRCR SERPLBLD CKD-EPI 2021: 73 ML/MIN/1.73
GLUCOSE SERPL-MCNC: 145 MG/DL (ref 70–99)
POTASSIUM SERPL-SCNC: 4.9 MMOL/L (ref 3.5–5.2)
SODIUM SERPL-SCNC: 142 MMOL/L (ref 134–144)

## 2023-11-26 NOTE — PROGRESS NOTES
REASON FOR FOLLOW-UP: Recurrent lung cancer.    History of Present Illness    The patient is a 83 y.o. female with the above-mentioned history who returned 9/22/2023 continuing on Mekinist 0.5 mg daily and Tafinlar at 50 mg twice daily.  She also continues on prednisone only taking 10 mg daily.  She reports that she is feeling quite good.  She is tolerating things well.  She has a decent appetite denies issues with nausea, vomiting, diarrhea, or constipation.  She denies any issues with increased shortness of breath.    Patient did see ENT Dr. Topete, who has ordered an ultrasound of her neck, which will be done at [facility] on the 27th.  She is also scheduled for a cerebral angiogram by Dr. Calvin on 29 September.      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  Dr. Dowell of neurosurgery saw the patient 10/12/2023 and felt the patient was stable without neurologic symptoms, yearly follow-up planned.  The patient had started seeing a new ENT physician leading to the referral back to neurosurgery.  When seen 10/16/2023 she is doing very well on her current Mekinist and Tafinlar doses having improved her weight, appetite and general performance status.  We have discussed at least a chest x-ray today and repeat staging in the next 5 to 6 weeks as she continues her current dosing.    The patient required admission 11/14 - 11/16/2023 having presented with shortness of breath and found to be pulmonary edema with CBC, lactate and procalcitonin all normal.  There was a concern that her chemotherapy agents could have produced her cardiomyopathy and these were stopped 11/14/2023.  She was diuresed and echocardiogram demonstrated LV SF mildly decreased at 39.2% with GLS of -11.1%, left ventricular cavity mildly dilated, left  ventricular wall thickness consistent with mild concentric hypertrophy, left ventricular global hypokinesis, aortic valve abnormal with moderate calcification, valve was trileaflet, trace tricuspid valve regurgitation with right VSP at less than 35 mmHg.  The findings for LV systolic function, diastolic function and global longitudinal LV strain had all worsened.    CTA of chest 11/14/2023 demonstrating confluent soft tissue mass possibly measuring larger in current study at 6.5 x 5.0 previously 4.9 x 4.8, left supraclavicular node to decrease in size measuring 1.1 cm previously 1.4 cm, extensive bilateral interstitial and groundglass infiltrates.    The patient is seen 11/26/2023.  As per the discharge we are requested to have a BMP and CMP assessed.  She is seen with her son and daughter in a lengthy conversation held about her recent hospitalization with CHF-cardiomyopathy felt elicited, in part, from her targeted therapy with her Mekinist and Tafinlar discontinued altogether.    The patient is relatively stable currently having continued her diuretics and to be seen in cardiology in follow-up.  At the time of this dictation her CBC is found to be essentially normal with mild leukocytosis, CMP normal except for mildly elevated glucose at 125 and BNP reduced to 6126 from 9763.  She feels well with no additional shortness of breath chest pain lower extremity edema.  In reviewing the the possible treatment options she is next best treated with single agent chemotherapy moving to [facility].        ONCOLOGY HISTORY:      The patient is an 83-year-old female with the below medical history including chronic obstructive pulmonary disease who was seen in the [facility] multidisciplinary thoracic oncology clinic July 1, 2021.  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      She had undergone a CT of the chest at [facility] 6/16/2021 showing postsurgical changes in the left chest from right upper lobectomy, 8 mm irregular nodule medial segment of the right lower lobe and diffuse changes of emphysema with fibrotic changes in the periphery, no suspicious hilar or mediastinal nodes, no pleural effusion.  New finding was suspicious for new malignancy with the patient felt to be a poor candidate for surgical resection.  A PET CT and PFTs were requested thereafter.  The PET/CT demonstrated ill-defined FDG avid soft tissue thickening left aspect mediastinum within the operative bed, 1 cm hypermetabolic pulmonary nodule right lower lobe and asymmetric thickening patchy uptake involving the right base of tongue.  There is subtle uptake within the L1 vertebral body which is indeterminate and a sub-6 mm pulmonary nodule of right lung and subcentimeter hypodense hepatic lesion which was below PET resolution.       The patient's case was discussed in thoracic conference 7/22/2021 with consideration of the patient undergoing L1 vertebral body MRI, confirmatory biopsy left mediastinal and assessment by ENT (Dr. Caballero) who had worked with the patient previously.  She, incidentally, indicates that radiation therapy was given apparently intraoperatively at her recent surgery?  She still has issues with difficulty chewing and swallowing post procedures and was to be followed up with Dr. Caballero in the near future as planned.                                                            She was seen in the thoracic clinic on the same day with plans to proceed with MRI of the lumbar spine, biopsy left mediastinal nodular area of potential disease and follow-up of her ENT assessment as well as undergo a guardant 360 assessment in our office.  She underwent the biopsy 8/13/2021 found to be consistent with invasive moderately differentiated  adenocarcinoma with PD-L1 TPS score 40%.  MRI of the lumbar spine revealed no evidence of metastatic disease though the patient did have multilevel degenerative disease throughout the lumbar spine.  She had an office follow-up with Dr. 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  She underwent laryngoscopy 8/18/2021 with right base of tongue without evidence of lesions or masses in the region.     She was seen by Dr. Hernandez 8/19/2021 and, her findings, had been presented in lung conference.  Her findings were consistent with 2 separate lesions including a nodule in the superior segment of the right lower lobe and a mass in the upper portion of the left chest with the left chest lesion biopsy positive for adenocarcinoma.  The consensus was that these were to separate-synchronous primaries.  Stereotactic radiation each lesions were felt to be the appropriate way to proceed and the patient was referred to radiation therapy.     The patient is seen in office 8/23/2021 agreeable to the radiation therapy as well as additional assessments including Caris molecular assessment of her biopsy.  Again her guardant 360 is currently pending and we would follow her after she completes radiation therapy with subsequent scans.    She was able to proceed with SBRT to the right lung at primary #1 with 5 treatments at 1000 cGy per fraction in the left lung primary similarly at 1000 cGy per fraction x5.  Her treatment ranged between 8/31-9/13/2021.  She is now scheduled for follow-up 11/23/2021.    The patient subsequent genomic testing is discussed with her as she is is seen back 9/23/2021.  Her guardant 360 did not determine any new abnormalities but her Caris-MI profile-does reveal sensitivity to immunotherapy as well as a BRAF mutation.  This could be extremely important as we follow the patient from this point.   Fortunately she is feeling extremely well and quite pleased about her treatments.    Patient had subsequent PET/CT 11/23/2021 demonstrates decrease in size and avidity of the previously noted intensely FDG avid nodules left lobectomy operative site and right lower lobe.  Surrounding groundglass and pulmonary opacification is consistent with postradiation changes, a few new subcentimeter pulmonary nodules are present in the right upper lobe and indeterminant, stable subcentimeter hepatic lesion is below PET resolution no other findings of FDG-avid disease are seen in neck abdomen or pelvis.  The patient seen in office 12/1/2021 with a relatively good performance status stating she is not having any new symptoms and very pleased about her results on her PET/CT.  She realizes we'll have to follow this further but subsequent scans in 6 months.  She is also hoping to see an oral surgeon that previously helped her-Dr. Wu.    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  There is evolution of presumed postradiation changes in the right midlung with soft tissue mass within the left lumpectomy operative bed minimally decreased in size.  There is a sub-6 mm nodule in the right upper lobe not definitively seen, indeterminate and an indeterminate left renal lesion at 1.5 cm unchanged from 7/13/2021.  The patient is currently scheduled to see Dr. Dowell on 6/23/2022.  She is feeling well without any additional symptoms.    The patient required admission 10/14 - 10/18/2022 presenting with shortness of breath and cough that was nonproductive.  She had been on oral antibiotics and did not improved and was admitted for therapy for apparent pneumonia.  This eventually led to bronchoscopy after initial CT revealed postsurgical changes, new masslike 6.7 cm area of  consolidation anterior left lung raising concern for potential malignancy and a follow-up PET/CT planned.  Bronchoscopy and biopsy left lower lung failed to show evidence of malignancy.  The patient's PET/CT, however, demonstrated an irregular pleural-based soft tissue density thickening in the left upper lobe that was intensely FDG avid new from 6/8/2022 thought to be a malignant recurrence with spread into the left lung parenchyma.  There is intensely pleural-based avidity within the left lower lobe thought to be metastatic disease with postradiation of the left apex as well.  There is a small focus of uptake in the right hilum grossly unchanged in size and post radiation changes in the right lower lobe.  There is indeterminate density left renal that was grossly unchanged.  The patient is seen back in office 11/15/2022 indicating that she still has chest discomfort that is slowly worsening and that she has a chronic paroxysmal cough but has not improved.  We discussed that she very likely has recurrent disease and plan to proceed with a guardant 360 examination initially as we determine whether we should try to proceed with therapy particularly immunotherapy versus targeted therapy recognizing the above findings.    Patient's guardant 360 returned as again significant for BRAF V600E and the potential use of BRAF inhibitor/MET inhibitor dabrafenib and trametinib.  Additional information [id number] and use of alpelisib.    Unfortunately she required admission 11/28-12/1 with worsening back pain.  Fortunately she did not demonstrate evidence of metastatic disease but did have moderate degenerative changes which could cause radiculopathy.  Medications were altered to include subsequently MSR 15 mg p.o. every 12 hours, Norco as needed.    The patient now presents back 12/14/2022 to initiate therapy- initially with immunotherapy considering Caris study with PD-L1 TPS of 70% on 22 C3 and 28-8 assays.    Patient seen with  her  and we discussed pain medications and adjustments thereafter and that she stopped taking MSIR having only a short supply and having to use Norco more frequently.  She is willing to initiate Keytruda today we have discussed that considering her genomics treatment versus her BRAF mutation is also an option.    C1 Keytruda 12/14/2022    Patient is seen back 1/4/2023 in follow-up due for cycle 2 Keytruda.  Patient states that since receiving her first cycle she has had decreased appetite and decreased energy.  She has not been able to bring herself to eat much and she has notably lost 7 pounds.  She has also been struggling with constipation in relation to ongoing narcotics for pain control.  She has been taking senna S2 tabs twice a day.  We discussed adding daily MiraLAX.    Patient did just last week meet with dietitian, Zoila Clinton, to discuss ways to improve caloric intake.  She has not been able to implement any of these things yet though.    The patient is next seen 1/25/2023 with mild weight gain.  She is seen with family member both indicating that she has actually felt somewhat better in terms of performance status and general function.  We have discussed proceeding with a third cycle treatment and reevaluating by scan in 2 weeks.    The patient proceeded with treatment 1/25/2023 and underwent follow-up CT chest abdomen pelvis 2/8/2023 demonstrating small pericardial effusion that is decreased in size from 11/20/2022, ill-defined consolidation and pleural-based thickening in the left apex and upper lung has reduced slightly, additional index nodule soft tissue in the aspect the pericardium has not changed substantially, no evidence of metastatic disease in the abdomen pelvis.    The patient is seen with her daughter 2/15/2023 both indicating that she has definitely improved, stable weight, appetite and performance status.  She is also using her pain medication much less frequently and wonders  whether she might begin to taper from her twice a day MS Contin.    Patient is seen back 3/8/2023 due for ongoing pembrolizumab.  She continues on low-dose prednisone 10 mg daily and has noted significant improvement in her energy and appetite with this.  She is reluctant to taper this any further we discussed that it is fine for her to stay on daily dosing.    She did try to taper her pain medication going down to just MS Contin 15 mg every 12 hours while holding her hydrocodone.  However over the last few days she has had some intermittent pain in the left upper back alleviated with hydrocodone 2-3 times a day.  She currently is denying any pain.  Monitor.    She is next evaluated 3/29/2023 for ongoing Keytruda.  Evidently her pain medication was sent to the wrong pharmacy and will need to be represcribed today-long-acting MS Contin.  Otherwise she is doing reasonably well at present.    Patient seen 5/31/2023 with gradual development of left shoulder and left scapular pain.  Concurrent CT chest, abdomen and pelvis demonstrate interval increasing consolidation left upper lobe with extension anterior to the left upper ribs with sclerosis anterior left second rib.  This is suspicious for worsening malignancy.  Plans were made for subsequent PET/CT where the patient's pain medications were adjusted.PET/CT 6/5/2023 reveals progression of disease in left upper thorax, left supraclavicular adenopathy new metastasis left posterior fourth ribs, no evidence of metastatic disease in the abdomen or pelvis.    The patient is seen with her son and daughter 6/12/2023 and it is clear that she is not developing a Pancoast like syndrome with progression of her malignancy in the left upper thorax and associated symptoms.  We have discussed discontinuance of her immunotherapy and moving to targeted therapy with dabrafenib and trametinib as we also request radiation therapy repeat consultation and try to manage her pain more  effectively.    Patient seen 7/5/2023 with plans to start palliative radiotherapy and to initiate concurrently dabrafenib and trametinib.    As a result of toxicity (nausea and vomiting) the patient was taken off of dabrafenib and trametinib when seen 7/21/2023, given additional IV hydration and further supportive care.  Plans were made for follow-up on recovery to determine as to whether to redose the medications.    Unfortunately she required admission 7/25-30/2023 with failure to thrive.  Subsequent assessments including blood cultures were negative and patient was treated briefly with IV antibiotic therapy, started PT and OT, medications adjusted for hypotension and treated symptomatically for nausea and vomiting.  She was able to improve enough to be discharged home as she continued radiation therapy started 7/17/2023 and completed 7/31/2023 to the left chest wall.    She is next seen back 8/4/2023.  We have reviewed that she had been on dabrafenib and trametinib at 150 mg p.o. every 12 hours and 2 mg p.o. daily respectively and dosing could be changed considerably such as 50 mg twice daily and 0.5 mg daily.  Determination made to have her undergo repeat scans at 4 weeks and review her response to radiation therapy as we make application for lower dose targeted therapy, addition of Remeron p.o. nightly, tapering of her MS Contin to daily rather than twice daily, use of low-dose Ativan to undergo scans.    Subsequent scans 8/25/2023, fortunately, with stable left apical mass with mediastinal chest wall involvement unchanged 1.4 cm supraclavicular lymph node.  No new findings of metastatic disease.    The patient is seen back 9/8/2023.  Fortunately she is improved dramatically off therapy and is gratified that his studies have not worsened in any substantial way.  We have discussed both her scan reports and echocardiogram that does not show significant reduction of her ejection fraction.  As result of her current  stable status we have asked her to restart Mekinist at 0.5 mg daily and Tafinlar at 50 mg twice daily to be advanced as tolerated.  Patient seen 10/16/2023 with follow-up chest x-ray planned and CT of chest abdomen pelvis at 5 weeks -approximately 3 months of therapy.    The patient required admission 11/14 - 11/16/2023 having presented with shortness of breath and found to be pulmonary edema with CBC, lactate and procalcitonin all normal.  There was a concern that her chemotherapy agents could have produced her cardiomyopathy and these were stopped 11/14/2023.  She was diuresed and echocardiogram demonstrated LV SF mildly decreased at 39.2% with GLS of -11.1%, left ventricular cavity mildly dilated, left ventricular wall thickness consistent with mild concentric hypertrophy, left ventricular global hypokinesis, aortic valve abnormal with moderate calcification, valve was trileaflet, trace tricuspid valve regurgitation with right VSP at less than 35 mmHg.  The findings for LV systolic function, diastolic function and global longitudinal LV strain had all worsened.    CTA of chest 11/14/2023 demonstrating confluent soft tissue mass possibly measuring larger in current study at 6.5 x 5.0 previously 4.9 x 4.8, left supraclavicular node to decrease in size measuring 1.1 cm previously 1.4 cm, extensive bilateral interstitial and groundglass infiltrates.      The patient is seen 11/26/2023.  As per the discharge we are requested to have a BMP and CMP assessed.  She is seen with her son and daughter in a lengthy conversation held about her recent hospitalization with CHF-cardiomyopathy felt elicited, in part, from her targeted therapy with her Mekinist and Tafinlar discontinued altogether.    The patient is relatively stable currently having continued her diuretics and to be seen in cardiology in follow-up.  At the time of this dictation her CBC is found to be essentially normal with mild leukocytosis, CMP normal except  for mildly elevated glucose at 125 and BNP reduced to 6126 from 9763.  She feels well with no additional shortness of breath chest pain lower extremity edema.  In reviewing the the possible treatment options she is next best treated with single agent chemotherapy moving to [facility].        Past Medical History:   Diagnosis Date    Allergic rhinitis     Aneurysm     Asthma     Cancer of floor of mouth 2014    Cerebral aneurysm     COPD (chronic obstructive pulmonary disease)     COVID 2020    Esophageal reflux     History of adenocarcinoma of lung     History of anemia     History of carcinoma in situ of skin     History of lung cancer     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 from [facility]    History of squamous cell carcinoma     Tongue    Hyperlipidemia     Hypertension     since early 60s    Intractable back pain 11/29/2022    Low vitamin B12 level     Lung cancer     Systolic CHF, acute 11/14/2023    Thyromegaly     UTI (urinary tract infection)     Vitamin D deficiency         Past Surgical History:   Procedure Laterality Date    BRONCHOSCOPY Bilateral 10/17/2022    Procedure: BRONCHOSCOPY WITH FLUORO with biopsy and BAL;  Surgeon: India Flores MD;  Location: [facility] ENDOSCOPY;  Service: Pulmonary;  Laterality: Bilateral;  PRE/POST - lung mass    CHOLECYSTECTOMY  1999    COLONOSCOPY      EMBOLIZATION CEREBRAL Left 06/29/2022    Procedure: 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;  Surgeon: Bradley Dowell MD;  Location: [facility] HYBRID OR 18/19;  Service: Interventional Radiology;  Laterality: Left;    EYE SURGERY  11/24/2020    Took a muscle out of right eye    GLOSSECTOMY PARTIAL      less than one half tongue    LUNG SURGERY  2012    ORAL LESION EXCISION/BIOPSY      June and August 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    TUBAL ABDOMINAL LIGATION  1970    VENOUS ACCESS DEVICE (PORT) INSERTION N/A 12/12/2022    Procedure: MEDIPORT PLACEMENT;  Surgeon:  Jason Villaseñor MD;  Location: [facility] MAIN OR;  Service: Vascular;  Laterality: N/A;        Current Outpatient Medications on File Prior to Visit   Medication Sig Dispense Refill    atorvastatin (LIPITOR) 20 MG tablet TAKE 1 TABLET EVERY NIGHT (Patient taking differently: Take 1 tablet by mouth Every Night.) 90 tablet 1    cetirizine (zyrTEC) 10 MG tablet Take 1 tablet by mouth Daily.      Cholecalciferol (Vitamin D3) 50 MCG (2000 UT) tablet Take 1,000 Units by mouth Daily. HOLDING FOR DOS      Cyanocobalamin (VITAMIN B12 PO) Take 1,000 mcg by mouth Daily.      furosemide (Lasix) 20 MG tablet Take 1 tablet by mouth Daily. 30 tablet 0    ipratropium (ATROVENT) 0.06 % nasal spray 2 sprays into the nostril(s) as directed by provider 4 (Four) Times a Day. 15 mL 0    metoprolol succinate XL (TOPROL-XL) 25 MG 24 hr tablet Take 1 tablet by mouth Daily.      mirtazapine (REMERON) 7.5 MG tablet Take 1 tablet by mouth Every Night. 30 tablet 1    montelukast (SINGULAIR) 10 MG tablet Take 1 tablet by mouth Every Night.      potassium chloride (K-DUR,KLOR-CON) 10 MEQ CR tablet TAKE 2 TABLETS BY MOUTH DAILY 60 tablet 1    promethazine-dextromethorphan (PROMETHAZINE-DM) 6.25-15 MG/5ML syrup Take 5 mL by mouth 4 (Four) Times a Day As Needed for Cough. 180 mL 0    zolpidem (AMBIEN) 5 MG tablet TAKE 1 TABLET BY MOUTH EVERY NIGHT AT BEDTIME AS NEEDED FOR SLEEP 30 tablet 0     No current facility-administered medications on file prior to visit.        ALLERGIES:    Allergies   Allergen Reactions    Sulfa Antibiotics Rash        Social History     Socioeconomic History    Marital status:    Tobacco Use    Smoking status: Former     Types: Cigarettes     Quit date: 2015     Years since quittin.8     Passive exposure: Never    Smokeless tobacco: Never    Tobacco comments:     less than a pack per day, no smoking since , Quit 2015   Vaping Use    Vaping Use: Never used   Substance and Sexual Activity    Alcohol  "use: Not Currently     Comment: Socially -A few times a month    Drug use: No    Sexual activity: Defer     Family History   Problem Relation Age of Onset    Ovarian cancer Mother          at age 27    No Known Problems Father     Ovarian cancer Sister     Colon cancer Brother     No Known Problems Other     Malig Hyperthermia Neg Hx         Review of Systems  ROS as per HPI     Objective      Vitals:    23 1158   BP: 138/77   Pulse: 52   Resp: 16   Temp: 98.2 °F (36.8 °C)   TempSrc: Temporal   SpO2: 95%   Weight: 48.5 kg (106 lb 14.4 oz)   Height: 160 cm (62.99\")   PainSc: 0-No pain               2023    12:00 PM   Current Status   ECOG score 0      Physical Exam  Vitals reviewed.   Constitutional:       General: She is not in acute distress.     Appearance: Normal appearance. She is well-developed.   HENT:      Head: Normocephalic and atraumatic.   Eyes:      Pupils: Pupils are equal, round, and reactive to light.   Cardiovascular:      Rate and Rhythm: Normal rate and regular rhythm.      Heart sounds: Normal heart sounds. No murmur heard.  Pulmonary:      Effort: Pulmonary effort is normal. No respiratory distress.      Breath sounds: Normal breath sounds. No wheezing, rhonchi or rales.   Abdominal:      General: Bowel sounds are normal. There is no distension.      Palpations: Abdomen is soft.   Musculoskeletal:         General: No swelling. Normal range of motion.      Cervical back: Normal range of motion.   Skin:     General: Skin is warm and dry.      Findings: No rash.   Neurological:      Mental Status: She is alert and oriented to person, place, and time.         Physical exam preformed and reviewed. No changes noted from previous exam. Henry Escobar MD ; 2023      RECENT LABS:  Results from last 7 days   Lab Units 23  1255   WBC 10*3/mm3 11.94*   NEUTROS ABS 10*3/mm3 10.59*   HEMOGLOBIN g/dL 12.8   HEMATOCRIT % 39.9   PLATELETS 10*3/mm3 247         Results from last " 7 days   Lab Units 11/27/23  1255 11/21/23  1423   SODIUM mmol/L 144 142   POTASSIUM mmol/L 4.2 4.9   CHLORIDE mmol/L 105 103   CO2 mmol/L 28.5 26   BUN mg/dL 16 26   CREATININE mg/dL 0.73 0.81   CALCIUM mg/dL 9.1 8.9   ALBUMIN g/dL 3.6  --    BILIRUBIN mg/dL 0.3  --    ALK PHOS U/L 76  --    ALT (SGPT) U/L 14  --    AST (SGOT) U/L 20  --    GLUCOSE mg/dL 125* 145*                   Assessment & Plan     1.  Carcinoma of the lung  May 2015, status post previous left upper lobectomy for carcinoma of the lung.    2.  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   She underwent laryngoscopy 8/18/2021 with right base of tongue without evidence of lesions or masses in the region.    3.  Moderately differentiated adenocarcinoma of the lung.  CT of the chest 6/16/2021 demonstrated postsurgical changes, 8 mm irregular nodule medial segment of right lower lobe and diffuse changes of emphysema.    She is seen in thoracic clinic with findings suspicious for new malignancy and concern of the patient being a poor operative candidate.    7/13/2021 PET CT demonstrated ill-defined avidity and soft tissue left aspect of the mediastinum, 1 cm hypermetabolic pulmonary nodule and asymmetric thickening involving the right base of tongue as well as subtle uptake in the L1 vertebral body.    This patient's case was discussed in thoracic clinic and plans were made to request an MRI of the lumbar spine, obtain a biopsy of the mediastinal involvement of the left had the patient reviewed by ENT.  Biopsy 8/13/2021 found to be consistent with invasive moderately differentiated adenocarcinoma with PD-L1 TPS score 40%.    7/24/2021 MRI of the lumbar spine revealed no evidence of metastatic disease though the patient did have multilevel degenerative disease throughout the lumbar spine.    Her findings were consistent  with 2 separate lesions including a nodule in the superior segment of the right lower lobe and a mass in the upper portion of the left chest with the left chest lesion biopsy positive for adenocarcinoma.  The consensus was that these were to separate-synchronous primaries.  Stereotactic radiation each lesions were felt to be the appropriate way to proceed and the patient was referred to radiation therapy.  The patient was referred for radiation therapy and she was able to proceed with SBRT to the right lung at primary #1 with 5 treatments at 1000 cGy per fraction in the left lung primary similarly at 1000 cGy per fraction x5.  Her treatment ranged between 8/31-9/13/2021.    Her guardant 360 did not determine any new abnormalities but her Caris-MI profile-does reveal sensitivity to immunotherapy as well as a BRAF mutation.  PET/CT 11/23/2021 demonstrates decrease in size and avidity of the previously noted intensely FDG avid nodules left lobectomy operative site and right lower lobe.  Surrounding groundglass and pulmonary opacification is consistent with postradiation changes, a few new subcentimeter pulmonary nodules are present in the right upper lobe and indeterminant, stable subcentimeter hepatic lesion is below PET resolution no other findings of FDG-avid disease are seen in neck abdomen or pelvis.  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  There is evolution of presumed postradiation changes in the right midlung with soft tissue mass within the left lumpectomy operative bed minimally decreased in size.  There is a sub-6 mm nodule in the right upper lobe not definitively seen, indeterminate and an indeterminate left renal lesion at 1.5 cm unchanged from 7/13/2021.  Admission 10/14 - 10/18/2022 presenting with shortness of breath and cough that was nonproductive.  She had been on oral antibiotics  and did not improved and was admitted for therapy for apparent pneumonia.  This eventually led to bronchoscopy after initial CT revealed postsurgical changes, new masslike 6.7 cm area of consolidation anterior left lung raising concern for potential malignancy and a follow-up PET/CT planned.   Bronchoscopy and biopsy left lower lung failed to show evidence of malignancy.    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  There is intensely pleural-based avidity within the left lower lobe thought to be metastatic disease with postradiation of the left apex as well.  There is a small focus of uptake in the right hilum grossly unchanged in size and post radiation changes in the right lower lobe.  There is indeterminate density left renal that was grossly unchanged.    Patient's guardant 360 returned as again significant for BRAF V600E and the potential use of BRAF inhibitor/MET inhibitor dabrafenib and trametinib.  Additional information [id number] and use of alpelisib.  The patient now presents back 12/14/2022 to initiate therapy- initially with immunotherapy considering Caris study with PD-L1 TPS of 70% on 22 C3 and 28-8 assays.  Single agent Keytruda initiated 12/14/2022 2/8/2023 CT of the chest, abdomen pelviswith consolidation and masslike pleural thickening in the left apex and upper lung index areas have decreased somewhat.  There is no evidence of metastatic disease otherwise and a few indeterminate left renal lesions are stable.  After lengthy discussion with the patient and her daughter as to the stability to mild improvement with immunotherapy it is agreed that we would continue treatment at this point.  Proceed today, 4/19/2023 with cycle 7 pembrolizumab which is continued every 3 weeks  The patient proceeded to 8 cycles  of pembrolizumab and underwent follow-up chest CT chest, abdomen pelvis revealing evolution of dense consolidation left upper lobe and extension of soft tissue mass anterior to left upper ribs with increase sclerosis anterior left second rib.  This was concerning for progression of disease versus radiation change and the patient was scheduled for subsequent PET/CT.  PET/CT 6/5/2023  reveals progression of disease in left upper thorax, left supraclavicular adenopathy new metastasis left posterior fourth ribs, no evidence of metastatic disease in the abdomen or pelvis.  Patient seen 6/12/2023 with plans to move her Norco to every 4 hours, discontinue Keytruda, make application for dabrafenib and trametinib at 150 mg p.o. every 12 hours and 2 mg p.o. daily respectively.  Patient referred for radiation therapy consultation concurrently.  Last dose of Keytruda 5/31/2023.  6/23/2023: Patient seen for triage visit.  She has not yet started dabrafenib or trametinib, she has not yet received the medications.  Unfortunately she has been experiencing uncontrolled nausea/vomiting as further detailed below.   Patient seen 6/27/2023 reporting that her nausea has resolved.  She was unable to complete the MRI of the brain however Dr. Escobar did review the images patient did have and there was no evidence of edema or metastatic disease.  Patient can start her new oral medication once she receives the medication.  Patient seen 7/5/2023 with plans to start palliative radiotherapy x10 fractions and initiate dabrafenib and trametinib as soon as medications received.  7/17/2023 patient initiated radiation with 10 fractions planned.  Patient has received dabrafenib and trametinib.  Brought in for triage visit due to nausea associated with dosing.  She is premedicating with ondansetron however only waiting 15 minutes.  We discussed today that she needs to wait at least 30 minutes to 1 hour prior to taking the dabrafenib and trametinib.   The patient will do so.  We discussed she could also take this again if she feels the need 8 hours later for nausea control.  If she is having breakthrough nausea then she should call our office.  I have encouraged her to utilize electrolyte drinks.  She will get 1L normal liter normal saline in the office today.She was not nauseas while in the office so we did not give additional nausea medication.  We will have her return in 1 week repeat labs, review by nurse practitioner, and possible IV fluids.  I stressed the importance of calling sooner if she finds that the premedication is not controlling her nausea at which time we would have her come in for additional IV fluids and evaluation.  She is continuing daily radiation this week.  Case was discussed with Dr. Escobar today.  7/21/2023: Patient returns for short interval follow-up due to very poor appetite and sleeping the majority of the day.  Her nausea has been improved with premedicating 30 minutes prior to dabrafenib and trametinib.  She however has been sleeping the majority of the day and is not eating when she is awake.  She does report taking ensures but she is only sipping on these.  Have given her samples of some of the office today and encouraged her to drink when while she is here.  Her performance status continues to decline and her daughter states that she was fixing dinner nightly just 2 weeks ago.  With performance status of 3 today I discussed the case with Dr. Escobar and we will hold her dabrafenib and  TRAMETINIB.  Her liver enzymes are elevated today as well.  We will monitor this next week and have her evaluated by Dr. Escobar at which time we could consider restarting her dabrafenib and trametinib at reduced doses pending performance status and laboratory evaluation.  Unfortunately she required admission 7/25-30/2023 with failure to thrive.  Subsequent assessments including blood cultures were negative and patient was treated briefly with IV  antibiotic therapy, started PT and OT, medications adjusted for hypotension and treated symptomatically for nausea and vomiting.  She was able to improve enough to be discharged home as she continued radiation therapy started 7/17/2023 and completed 7/31/2023 to the left chest wall.  She is next seen back 8/4/2023.  Lengthy discussion as to reevaluation   Plans made for CT chest, abdomen, pelvis in 4 weeks  Patient seen 8/21/2023 with complaints of worsening fatigue and shortness of breath.  Patient scheduled for follow up CT scans this Friday. Lungs clear on exam,  Sats 97%.  Hgb stable at 11.7.  Will check BNP today.  Discussed may be related to disease and will need to see what scan shows.   Subsequent scans 8/25/2023, fortunately, with stable left apical mass with mediastinal chest wall involvement unchanged 1.4 cm supraclavicular lymph node.  No new findings of metastatic disease.  The patient is seen back 9/8/2023.  Fortunately she is improved dramatically off therapy and is gratified that his studies have not worsened in any substantial way.  We have discussed both her scan reports and echocardiogram that does not show significant reduction of her ejection fraction.  As result of her current stable status we have asked her to restart Mekinist at 0.5 mg daily and Tafinlar at 50 mg twice daily  9/22/2023 tolerating Mekinist 0.5 mg daily and tafinlar 50 mg twice daily quite well. Continues on prednisone 10 mg daily which will now be reduced to 5 mg daily when seen 10/16/2023  Plans made to continue current dosing of Mekinist and Tafinlar and repeat evaluation with chest x-ray 10/16 and repeat CT chest abdomen pelvis in 5 weeks, MD in 6 weeks.  The patient required admission 11/14 - 11/16/2023 having presented with shortness of breath and found to be pulmonary edema with CBC, lactate and procalcitonin all normal.  There was a concern that her chemotherapy agents could have produced her cardiomyopathy and these  were stopped 11/14/2023.  She was diuresed and echocardiogram demonstrated LV SF mildly decreased at 39.2% with GLS of -11.1%, left ventricular cavity mildly dilated, left ventricular wall thickness consistent with mild concentric hypertrophy, left ventricular global hypokinesis, aortic valve abnormal with moderate calcification, valve was trileaflet, trace tricuspid valve regurgitation with right VSP at less than 35 mmHg.  The findings for LV systolic function, diastolic function and global longitudinal LV strain had all worsened.  CTA of chest 11/14/2023 demonstrating confluent soft tissue mass possibly measuring larger in current study at 6.5 x 5.0 previously 4.9 x 4.8, left supraclavicular node to decrease in size measuring 1.1 cm previously 1.4 cm, extensive bilateral interstitial and groundglass infiltrates.  The patient is seen 11/26/2023.  As per the discharge we are requested to have a BMP and CMP assessed.  She is seen with her son and daughter in a lengthy conversation held about her recent hospitalization with CHF-cardiomyopathy felt elicited, in part, from her targeted therapy with her Mekinist and Tafinlar discontinued altogether.  The patient is relatively stable currently having continued her diuretics and to be seen in cardiology in follow-up.  At the time of this dictation her CBC is found to be essentially normal with mild leukocytosis, CMP normal except for mildly elevated glucose at 125 and BNP reduced to 6126 from 9763.  She feels well with no additional shortness of breath chest pain lower extremity edema.  In reviewing the the possible treatment options she is next best treated with single agent chemotherapy [facility].      4.  Worsening back pain  Admission 11/28/2022 through 12/1/2022.  MRI of the cervical and thoracic spine fortunately failed to demonstrate metastatic disease.  Moderate degenerative changes noted which could cause radiculopathy.  Medication altered to include MS  Contin 15 mg every 12 hours and Norco for breakthrough pain  She reports today her pain is adequately controlled  Patient with increasing pain 5/31/2023 with pain level of 6.  MS Contin was increased to 50 mg p.o. every 8 hours and Norco 10/325 #101 p.o. every 6 hours to move to every 4 hours as needed-E scribed to pharmacy  Patient seen 6/12/2023 with Norco moved to every 4 hours.  6/23/2023: Seen for triage visit.  Has been using oxycodone 20 mg every 3 hours as needed for pain.  Reports she has not been using the hydrocodone 10/325.  Was experiencing dizziness, so reduced her oxycodone use to half a tablet every 3 hours as needed.  Reports pain is uncontrolled during the night so she is having to wake at night time to take pain medicine.  They are questioning resuming long-acting pain medication, as she is waking throughout the night to take pain medicine.  She has already been prescribed MS Contin and has this available at home, did let her know it would be okay to resume this.  Assess 7/5/2023 with pain reduced to 2/10.  Plan to continue current therapy  Darlene Pfeiffer reports a pain score of 0.  Given her pain assessment as noted, treatment options were discussed and the following options were decided upon as a follow-up plan to address the patient's pain: continuation of current treatment plan for pain. Currently not requiring pain medication.   Refilled both the patient's Remeron and Ambien 10/16/2023    5.  Poor appetite and malnutrition  Placed on prednisone 10 mg daily 1/4/2023 with significant improvement in her symptoms  Weight is stable today at just below 106 pounds  Patient assessed 6/12/23 with possible gummy therapy.  Stable performance status including weight and appetite 7/5/2023  Weight has declined as of 7/17/2023 due to nausea and vomiting that started this past weekend.  After further discussion the patient did stop her prednisone 10 mg while she was not feeling well.  We discussed today the  importance of continuing this as it can help with her nausea and appetite and therefore she will restart tomorrow.  7/21/2023: Weight is stable today though albumin is declining at 3.  Patient is sleeping the majority of the day and not eating.  Yesterday she had a small piece of chicken and that is all.  She states she is drinking ensures however her daughter thinks that she is just sipping on these and not completing them.  Hopefully holding her dabrafenib and trametinib will be helpful with her being awake more and appetite.  I encouraged her to make sure she is taking her prednisone daily at 10 mg daily.  Remeron 7.5 mg p.o. nightly E scribed to pharmacy  9/22/2023 weight is up to 98 pounds.  Further improvement in weight 10/16/2020 3 to 104 pounds, continue Remeron at current dose, prednisone reduced to 5 mg daily.    6.  Uncontrolled nausea/vomiting  started after discontinuing prednisone and starting THC Gummies.  Started to experience dizziness with the THC Gummies.  Discontinue THC Gummies and dizziness improved, however she has remained nauseated now.  She has been utilizing Zofran with improvement, however today was unable to keep Zofran tablets down due to nausea/vomiting.  She will resume prednisone 10 mg daily.  She will receive 1 L IV normal saline in clinic today 10 mg IV Compazine.  We will also send prescription for Phenergan suppository, in case she is not able to keep Zofran down in the future.  Will also send for stat MRI brain since patient is now experiencing uncontrolled nausea vomiting and has recently had progression of her cancer as seen on PET from 6/5/2023.  MRI brain was performed without contrast, even though contrast was ordered.  The patient also did not stay for entire exam to be completed.  Exam limited for assessment of metastatic disease.  Attempted to call patient to review results, however no answer, did leave detailed voicemail.  6/27/2023 patient reports that her nausea has  resolved.  She did not complete the MRI however the images which were done showed no evidence of edema or metastatic disease.  Nausea reoccurred when seen on 7/17/2023 over this past weekend after initiation of dabrafenib and trametinib.  Patient was premedicating with ondansetron however only giving herself about 15 minutes and I encouraged her to give herself at least 30 minutes to 1 hour prior to taking the medications.  She will notify our office if this is not helpful.  7/21/2023: Premedication with ondansetron 30 minutes prior to dabrafenib and trametinib has been helpful.  Patient however has had some vomiting episodes directly after taking her medications where she has not had time to actually swallow them.  She denies difficulty swallowing however.  We will continue to monitor this.  10/16/2023 not an issue today.    7.  Hyperkalemia-  potassium 6/23/2023 3.1.  She has been having uncontrolled nausea, we will hold off on starting oral potassium replacement as it is unlikely she will tolerate that at this time.  Will recommend she increase oral potassium in her diet.  6/27/2023 potassium 3.0.  Nausea has resolved.  Patient will be given 40 M EQ p.o. potassium in the office and she will be started on 20 mEq daily of p.o. potassium.  Assessed 7/5/2023 with potassium 4.4  7/17/2023 potassium normal at 3.7  7/21/2023: Potassium 3.1, patient not taking potassium supplements at home because she does not like the big pill.  Changed to potassium chloride 10 mill equivalents, 2 tablets every a.m. as these will be smaller.   9/22/2023 potassium is 3.7  Repeat assessment 11/27/2023 with potassium of 4.2    8.Acute systolic heart failure -cardiomyopathy   Secondary to chemotherapy drugs during admission 11/14-16/2023 with Mekinist and Tafinlar discontinued  Diuretics continued postdischarge, cardiology follow-up planned 12/6/2023    Plan:   Discontinue lower dose dabrafenib 0.5 mg daily and trametinib 50 mg twice  daily.  Consider Alimta primary chemotherapy which is discussed with the patient and family members 11/27/2023  Return to laboratory today for the above laboratory exams  Teach this week-Alimta, provide B12 injection, folate 1 mg p.o. daily escribed to pharmacy  1 week MD, initiation of Alimta chemotherapy.2  I spent 65 minutes caring for Darlene on this date of service. This time includes time spent by me in the following activities: preparing for the visit, reviewing tests, obtaining and/or reviewing a separately obtained history, performing a medically appropriate examination and/or evaluation, counseling and educating the patient/family/caregiver, ordering medications, tests, or procedures, referring and communicating with other health care professionals, documenting information in the medical record, independently interpreting results and communicating that information with the patient/family/caregiver, and care coordination.     Henry Escobar MD  11/27/2023

## 2023-11-27 ENCOUNTER — OFFICE VISIT (OUTPATIENT)
Dept: ONCOLOGY | Facility: CLINIC | Age: 83
End: 2023-11-27
Payer: MEDICARE

## 2023-11-27 ENCOUNTER — INFUSION (OUTPATIENT)
Dept: ONCOLOGY | Facility: HOSPITAL | Age: 83
End: 2023-11-27
Payer: MEDICARE

## 2023-11-27 VITALS
WEIGHT: 106.9 LBS | HEIGHT: 63 IN | OXYGEN SATURATION: 95 % | RESPIRATION RATE: 16 BRPM | SYSTOLIC BLOOD PRESSURE: 138 MMHG | DIASTOLIC BLOOD PRESSURE: 77 MMHG | BODY MASS INDEX: 18.94 KG/M2 | TEMPERATURE: 98.2 F | HEART RATE: 52 BPM

## 2023-11-27 DIAGNOSIS — Z45.2 FITTING AND ADJUSTMENT OF VASCULAR CATHETER: Primary | ICD-10-CM

## 2023-11-27 DIAGNOSIS — I50.21 ACUTE HFREF (HEART FAILURE WITH REDUCED EJECTION FRACTION): Primary | ICD-10-CM

## 2023-11-27 DIAGNOSIS — C34.11 MALIGNANT NEOPLASM OF UPPER LOBE OF RIGHT LUNG: ICD-10-CM

## 2023-11-27 DIAGNOSIS — I50.21 ACUTE HFREF (HEART FAILURE WITH REDUCED EJECTION FRACTION): ICD-10-CM

## 2023-11-27 LAB
ALBUMIN SERPL-MCNC: 3.6 G/DL (ref 3.5–5.2)
ALBUMIN/GLOB SERPL: 1.4 G/DL
ALP SERPL-CCNC: 76 U/L (ref 39–117)
ALT SERPL W P-5'-P-CCNC: 14 U/L (ref 1–33)
ANION GAP SERPL CALCULATED.3IONS-SCNC: 10.5 MMOL/L (ref 5–15)
AST SERPL-CCNC: 20 U/L (ref 1–32)
BASOPHILS # BLD AUTO: 0.02 10*3/MM3 (ref 0–0.2)
BASOPHILS NFR BLD AUTO: 0.2 % (ref 0–1.5)
BILIRUB SERPL-MCNC: 0.3 MG/DL (ref 0–1.2)
BUN SERPL-MCNC: 16 MG/DL (ref 8–23)
BUN/CREAT SERPL: 21.9 (ref 7–25)
CALCIUM SPEC-SCNC: 9.1 MG/DL (ref 8.6–10.5)
CHLORIDE SERPL-SCNC: 105 MMOL/L (ref 98–107)
CO2 SERPL-SCNC: 28.5 MMOL/L (ref 22–29)
CREAT SERPL-MCNC: 0.73 MG/DL (ref 0.6–1.1)
DEPRECATED RDW RBC AUTO: 45.4 FL (ref 37–54)
EGFRCR SERPLBLD CKD-EPI 2021: 82.7 ML/MIN/1.73
EOSINOPHIL # BLD AUTO: 0 10*3/MM3 (ref 0–0.4)
EOSINOPHIL NFR BLD AUTO: 0 % (ref 0.3–6.2)
ERYTHROCYTE [DISTWIDTH] IN BLOOD BY AUTOMATED COUNT: 13.2 % (ref 12.3–15.4)
GLOBULIN UR ELPH-MCNC: 2.5 GM/DL
GLUCOSE SERPL-MCNC: 125 MG/DL (ref 65–99)
HCT VFR BLD AUTO: 39.9 % (ref 34–46.6)
HGB BLD-MCNC: 12.8 G/DL (ref 12–15.9)
IMM GRANULOCYTES # BLD AUTO: 0.07 10*3/MM3 (ref 0–0.05)
IMM GRANULOCYTES NFR BLD AUTO: 0.6 % (ref 0–0.5)
LYMPHOCYTES # BLD AUTO: 0.98 10*3/MM3 (ref 0.7–3.1)
LYMPHOCYTES NFR BLD AUTO: 8.2 % (ref 19.6–45.3)
MCH RBC QN AUTO: 30.1 PG (ref 26.6–33)
MCHC RBC AUTO-ENTMCNC: 32.1 G/DL (ref 31.5–35.7)
MCV RBC AUTO: 93.9 FL (ref 79–97)
MONOCYTES # BLD AUTO: 0.28 10*3/MM3 (ref 0.1–0.9)
MONOCYTES NFR BLD AUTO: 2.3 % (ref 5–12)
NEUTROPHILS NFR BLD AUTO: 10.59 10*3/MM3 (ref 1.7–7)
NEUTROPHILS NFR BLD AUTO: 88.7 % (ref 42.7–76)
NRBC BLD AUTO-RTO: 0 /100 WBC (ref 0–0.2)
NT-PROBNP SERPL-MCNC: 6126 PG/ML (ref 0–1800)
PLATELET # BLD AUTO: 247 10*3/MM3 (ref 140–450)
PMV BLD AUTO: 10 FL (ref 6–12)
POTASSIUM SERPL-SCNC: 4.2 MMOL/L (ref 3.5–5.2)
PROT SERPL-MCNC: 6.1 G/DL (ref 6–8.5)
RBC # BLD AUTO: 4.25 10*6/MM3 (ref 3.77–5.28)
SODIUM SERPL-SCNC: 144 MMOL/L (ref 136–145)
WBC NRBC COR # BLD AUTO: 11.94 10*3/MM3 (ref 3.4–10.8)

## 2023-11-27 PROCEDURE — 83880 ASSAY OF NATRIURETIC PEPTIDE: CPT | Performed by: INTERNAL MEDICINE

## 2023-11-27 PROCEDURE — 80053 COMPREHEN METABOLIC PANEL: CPT

## 2023-11-27 PROCEDURE — 25010000002 HEPARIN LOCK FLUSH PER 10 UNITS: Performed by: INTERNAL MEDICINE

## 2023-11-27 PROCEDURE — 36591 DRAW BLOOD OFF VENOUS DEVICE: CPT

## 2023-11-27 PROCEDURE — 85025 COMPLETE CBC W/AUTO DIFF WBC: CPT

## 2023-11-27 RX ORDER — SODIUM CHLORIDE 0.9 % (FLUSH) 0.9 %
10 SYRINGE (ML) INJECTION AS NEEDED
Status: DISCONTINUED | OUTPATIENT
Start: 2023-11-27 | End: 2023-11-27 | Stop reason: HOSPADM

## 2023-11-27 RX ORDER — SODIUM CHLORIDE 0.9 % (FLUSH) 0.9 %
10 SYRINGE (ML) INJECTION AS NEEDED
Status: CANCELLED | OUTPATIENT
Start: 2023-11-27

## 2023-11-27 RX ORDER — HEPARIN SODIUM (PORCINE) LOCK FLUSH IV SOLN 100 UNIT/ML 100 UNIT/ML
500 SOLUTION INTRAVENOUS AS NEEDED
Status: DISCONTINUED | OUTPATIENT
Start: 2023-11-27 | End: 2023-11-27 | Stop reason: HOSPADM

## 2023-11-27 RX ORDER — HEPARIN SODIUM (PORCINE) LOCK FLUSH IV SOLN 100 UNIT/ML 100 UNIT/ML
500 SOLUTION INTRAVENOUS AS NEEDED
Status: CANCELLED | OUTPATIENT
Start: 2023-11-27

## 2023-11-27 RX ORDER — FOLIC ACID 1 MG/1
1000 TABLET ORAL DAILY
Qty: 30 TABLET | Refills: 2 | Status: SHIPPED | OUTPATIENT
Start: 2023-11-27

## 2023-11-27 RX ADMIN — Medication 10 ML: at 12:56

## 2023-11-27 RX ADMIN — Medication 500 UNITS: at 12:56

## 2023-11-27 NOTE — LETTER
November 27, 2023     Meredith Lea Kehrer, MD  [address]  [address]  [facility] [address]    Patient: Darlene Pfeiffer   YOB: 1940   Date of Visit: 11/27/2023     Dear Meredith Lea Kehrer, MD:       Thank you for referring Darlene Pfeiffer to me for evaluation. Below are the relevant portions of my assessment and plan of care.    If you have questions, please do not hesitate to call me. I look forward to following Darlene along with you.         Sincerely,        Henry Escobar MD        CC: MD Selvin Brewster Jr., MD Lauren A Lincoln, APRN Kommor, Michael D., MD  11/27/23 2110  Sign when Signing Visit      REASON FOR FOLLOW-UP: Recurrent lung cancer.    History of Present Illness    The patient is a 83 y.o. female with the above-mentioned history who returned 9/22/2023 continuing on Mekinist 0.5 mg daily and Tafinlar at 50 mg twice daily.  She also continues on prednisone only taking 10 mg daily.  She reports that she is feeling quite good.  She is tolerating things well.  She has a decent appetite denies issues with nausea, vomiting, diarrhea, or constipation.  She denies any issues with increased shortness of breath.    Patient did see ENT Dr. Topete, who has ordered an ultrasound of her neck, which will be done at [facility] on the 27th.  She is also scheduled for a cerebral angiogram by Dr. Calvin on 29 September.      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  Dr. Dowell of neurosurgery saw the patient 10/12/2023 and felt the patient was stable without neurologic symptoms, yearly follow-up planned.  The patient had started seeing a new ENT physician leading to the referral back to neurosurgery.  When seen 10/16/2023 she is doing very well on her current Mekinist and Tafinlar doses having  improved her weight, appetite and general performance status.  We have discussed at least a chest x-ray today and repeat staging in the next 5 to 6 weeks as she continues her current dosing.    The patient required admission 11/14 - 11/16/2023 having presented with shortness of breath and found to be pulmonary edema with CBC, lactate and procalcitonin all normal.  There was a concern that her chemotherapy agents could have produced her cardiomyopathy and these were stopped 11/14/2023.  She was diuresed and echocardiogram demonstrated LV SF mildly decreased at 39.2% with GLS of -11.1%, left ventricular cavity mildly dilated, left ventricular wall thickness consistent with mild concentric hypertrophy, left ventricular global hypokinesis, aortic valve abnormal with moderate calcification, valve was trileaflet, trace tricuspid valve regurgitation with right VSP at less than 35 mmHg.  The findings for LV systolic function, diastolic function and global longitudinal LV strain had all worsened.    CTA of chest 11/14/2023 demonstrating confluent soft tissue mass possibly measuring larger in current study at 6.5 x 5.0 previously 4.9 x 4.8, left supraclavicular node to decrease in size measuring 1.1 cm previously 1.4 cm, extensive bilateral interstitial and groundglass infiltrates.    The patient is seen 11/26/2023.  As per the discharge we are requested to have a BMP and CMP assessed.  She is seen with her son and daughter in a lengthy conversation held about her recent hospitalization with CHF-cardiomyopathy felt elicited, in part, from her targeted therapy with her Mekinist and Tafinlar discontinued altogether.    The patient is relatively stable currently having continued her diuretics and to be seen in cardiology in follow-up.  At the time of this dictation her CBC is found to be essentially normal with mild leukocytosis, CMP normal except for mildly elevated glucose at 125 and BNP reduced to 6126 from 9763.  She feels  well with no additional shortness of breath chest pain lower extremity edema.  In reviewing the the possible treatment options she is next best treated with single agent chemotherapy moving to [facility].        ONCOLOGY HISTORY:      The patient is an 83-year-old female with the below medical history including chronic obstructive pulmonary disease who was seen in the [facility] multidisciplinary thoracic oncology clinic July 1, 2021.  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      She had undergone a CT of the chest at [facility] 6/16/2021 showing postsurgical changes in the left chest from right upper lobectomy, 8 mm irregular nodule medial segment of the right lower lobe and diffuse changes of emphysema with fibrotic changes in the periphery, no suspicious hilar or mediastinal nodes, no pleural effusion.  New finding was suspicious for new malignancy with the patient felt to be a poor candidate for surgical resection.  A PET CT and PFTs were requested thereafter.  The PET/CT demonstrated ill-defined FDG avid soft tissue thickening left aspect mediastinum within the operative bed, 1 cm hypermetabolic pulmonary nodule right lower lobe and asymmetric thickening patchy uptake involving the right base of tongue.  There is subtle uptake within the L1 vertebral body which is indeterminate and a sub-6 mm pulmonary nodule of right lung and subcentimeter hypodense hepatic lesion which was below PET resolution.       The patient's case was discussed in thoracic conference 7/22/2021 with consideration of the patient undergoing L1 vertebral body MRI, confirmatory biopsy left mediastinal and assessment by ENT (Dr. Caballero) who had worked with the patient previously.  She, incidentally, indicates that radiation therapy  was given apparently intraoperatively at her recent surgery?  She still has issues with difficulty chewing and swallowing post procedures and was to be followed up with Dr. Caballero in the near future as planned.                                                            She was seen in the thoracic clinic on the same day with plans to proceed with MRI of the lumbar spine, biopsy left mediastinal nodular area of potential disease and follow-up of her ENT assessment as well as undergo a guardant 360 assessment in our office.  She underwent the biopsy 8/13/2021 found to be consistent with invasive moderately differentiated adenocarcinoma with PD-L1 TPS score 40%.  MRI of the lumbar spine revealed no evidence of metastatic disease though the patient did have multilevel degenerative disease throughout the lumbar spine.  She had an office follow-up with Dr. 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  She underwent laryngoscopy 8/18/2021 with right base of tongue without evidence of lesions or masses in the region.     She was seen by Dr. Hernandez 8/19/2021 and, her findings, had been presented in lung conference.  Her findings were consistent with 2 separate lesions including a nodule in the superior segment of the right lower lobe and a mass in the upper portion of the left chest with the left chest lesion biopsy positive for adenocarcinoma.  The consensus was that these were to separate-synchronous primaries.  Stereotactic radiation each lesions were felt to be the appropriate way to proceed and the patient was referred to radiation therapy.     The patient is seen in office 8/23/2021 agreeable to the radiation therapy as well as additional assessments including Caris molecular assessment of her biopsy.  Again her guardant 360 is currently pending and we would follow her after she completes radiation therapy  with subsequent scans.    She was able to proceed with SBRT to the right lung at primary #1 with 5 treatments at 1000 cGy per fraction in the left lung primary similarly at 1000 cGy per fraction x5.  Her treatment ranged between 8/31-9/13/2021.  She is now scheduled for follow-up 11/23/2021.    The patient subsequent genomic testing is discussed with her as she is is seen back 9/23/2021.  Her guardant 360 did not determine any new abnormalities but her Caris-MI profile-does reveal sensitivity to immunotherapy as well as a BRAF mutation.  This could be extremely important as we follow the patient from this point.  Fortunately she is feeling extremely well and quite pleased about her treatments.    Patient had subsequent PET/CT 11/23/2021 demonstrates decrease in size and avidity of the previously noted intensely FDG avid nodules left lobectomy operative site and right lower lobe.  Surrounding groundglass and pulmonary opacification is consistent with postradiation changes, a few new subcentimeter pulmonary nodules are present in the right upper lobe and indeterminant, stable subcentimeter hepatic lesion is below PET resolution no other findings of FDG-avid disease are seen in neck abdomen or pelvis.  The patient seen in office 12/1/2021 with a relatively good performance status stating she is not having any new symptoms and very pleased about her results on her PET/CT.  She realizes we'll have to follow this further but subsequent scans in 6 months.  She is also hoping to see an oral surgeon that previously helped her-Dr. Wu.    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  There is evolution of presumed postradiation changes in the right midlung with soft tissue mass within the left lumpectomy operative bed minimally decreased in size.  There is a  sub-6 mm nodule in the right upper lobe not definitively seen, indeterminate and an indeterminate left renal lesion at 1.5 cm unchanged from 7/13/2021.  The patient is currently scheduled to see Dr. Dowell on 6/23/2022.  She is feeling well without any additional symptoms.    The patient required admission 10/14 - 10/18/2022 presenting with shortness of breath and cough that was nonproductive.  She had been on oral antibiotics and did not improved and was admitted for therapy for apparent pneumonia.  This eventually led to bronchoscopy after initial CT revealed postsurgical changes, new masslike 6.7 cm area of consolidation anterior left lung raising concern for potential malignancy and a follow-up PET/CT planned.  Bronchoscopy and biopsy left lower lung failed to show evidence of malignancy.  The patient's PET/CT, however, demonstrated an irregular pleural-based soft tissue density thickening in the left upper lobe that was intensely FDG avid new from 6/8/2022 thought to be a malignant recurrence with spread into the left lung parenchyma.  There is intensely pleural-based avidity within the left lower lobe thought to be metastatic disease with postradiation of the left apex as well.  There is a small focus of uptake in the right hilum grossly unchanged in size and post radiation changes in the right lower lobe.  There is indeterminate density left renal that was grossly unchanged.  The patient is seen back in office 11/15/2022 indicating that she still has chest discomfort that is slowly worsening and that she has a chronic paroxysmal cough but has not improved.  We discussed that she very likely has recurrent disease and plan to proceed with a guardant 360 examination initially as we determine whether we should try to proceed with therapy particularly immunotherapy versus targeted therapy recognizing the above findings.    Patient's guardant 360 returned as again significant for BRAF V600E and the potential use  of BRAF inhibitor/MET inhibitor dabrafenib and trametinib.  Additional information [id number] and use of alpelisib.    Unfortunately she required admission 11/28-12/1 with worsening back pain.  Fortunately she did not demonstrate evidence of metastatic disease but did have moderate degenerative changes which could cause radiculopathy.  Medications were altered to include subsequently MSR 15 mg p.o. every 12 hours, Norco as needed.    The patient now presents back 12/14/2022 to initiate therapy- initially with immunotherapy considering Caris study with PD-L1 TPS of 70% on 22 C3 and 28-8 assays.    Patient seen with her  and we discussed pain medications and adjustments thereafter and that she stopped taking MSIR having only a short supply and having to use Norco more frequently.  She is willing to initiate Keytruda today we have discussed that considering her genomics treatment versus her BRAF mutation is also an option.    C1 Keytruda 12/14/2022    Patient is seen back 1/4/2023 in follow-up due for cycle 2 Keytruda.  Patient states that since receiving her first cycle she has had decreased appetite and decreased energy.  She has not been able to bring herself to eat much and she has notably lost 7 pounds.  She has also been struggling with constipation in relation to ongoing narcotics for pain control.  She has been taking senna S2 tabs twice a day.  We discussed adding daily MiraLAX.    Patient did just last week meet with dietitian, Zoila Clinton, to discuss ways to improve caloric intake.  She has not been able to implement any of these things yet though.    The patient is next seen 1/25/2023 with mild weight gain.  She is seen with family member both indicating that she has actually felt somewhat better in terms of performance status and general function.  We have discussed proceeding with a third cycle treatment and reevaluating by scan in 2 weeks.    The patient proceeded with treatment 1/25/2023 and  underwent follow-up CT chest abdomen pelvis 2/8/2023 demonstrating small pericardial effusion that is decreased in size from 11/20/2022, ill-defined consolidation and pleural-based thickening in the left apex and upper lung has reduced slightly, additional index nodule soft tissue in the aspect the pericardium has not changed substantially, no evidence of metastatic disease in the abdomen pelvis.    The patient is seen with her daughter 2/15/2023 both indicating that she has definitely improved, stable weight, appetite and performance status.  She is also using her pain medication much less frequently and wonders whether she might begin to taper from her twice a day MS Contin.    Patient is seen back 3/8/2023 due for ongoing pembrolizumab.  She continues on low-dose prednisone 10 mg daily and has noted significant improvement in her energy and appetite with this.  She is reluctant to taper this any further we discussed that it is fine for her to stay on daily dosing.    She did try to taper her pain medication going down to just MS Contin 15 mg every 12 hours while holding her hydrocodone.  However over the last few days she has had some intermittent pain in the left upper back alleviated with hydrocodone 2-3 times a day.  She currently is denying any pain.  Monitor.    She is next evaluated 3/29/2023 for ongoing Keytruda.  Evidently her pain medication was sent to the wrong pharmacy and will need to be represcribed today-long-acting MS Contin.  Otherwise she is doing reasonably well at present.    Patient seen 5/31/2023 with gradual development of left shoulder and left scapular pain.  Concurrent CT chest, abdomen and pelvis demonstrate interval increasing consolidation left upper lobe with extension anterior to the left upper ribs with sclerosis anterior left second rib.  This is suspicious for worsening malignancy.  Plans were made for subsequent PET/CT where the patient's pain medications were adjusted.PET/CT  6/5/2023 reveals progression of disease in left upper thorax, left supraclavicular adenopathy new metastasis left posterior fourth ribs, no evidence of metastatic disease in the abdomen or pelvis.    The patient is seen with her son and daughter 6/12/2023 and it is clear that she is not developing a Pancoast like syndrome with progression of her malignancy in the left upper thorax and associated symptoms.  We have discussed discontinuance of her immunotherapy and moving to targeted therapy with dabrafenib and trametinib as we also request radiation therapy repeat consultation and try to manage her pain more effectively.    Patient seen 7/5/2023 with plans to start palliative radiotherapy and to initiate concurrently dabrafenib and trametinib.    As a result of toxicity (nausea and vomiting) the patient was taken off of dabrafenib and trametinib when seen 7/21/2023, given additional IV hydration and further supportive care.  Plans were made for follow-up on recovery to determine as to whether to redose the medications.    Unfortunately she required admission 7/25-30/2023 with failure to thrive.  Subsequent assessments including blood cultures were negative and patient was treated briefly with IV antibiotic therapy, started PT and OT, medications adjusted for hypotension and treated symptomatically for nausea and vomiting.  She was able to improve enough to be discharged home as she continued radiation therapy started 7/17/2023 and completed 7/31/2023 to the left chest wall.    She is next seen back 8/4/2023.  We have reviewed that she had been on dabrafenib and trametinib at 150 mg p.o. every 12 hours and 2 mg p.o. daily respectively and dosing could be changed considerably such as 50 mg twice daily and 0.5 mg daily.  Determination made to have her undergo repeat scans at 4 weeks and review her response to radiation therapy as we make application for lower dose targeted therapy, addition of Remeron p.o. nightly,  tapering of her MS Contin to daily rather than twice daily, use of low-dose Ativan to undergo scans.    Subsequent scans 8/25/2023, fortunately, with stable left apical mass with mediastinal chest wall involvement unchanged 1.4 cm supraclavicular lymph node.  No new findings of metastatic disease.    The patient is seen back 9/8/2023.  Fortunately she is improved dramatically off therapy and is gratified that his studies have not worsened in any substantial way.  We have discussed both her scan reports and echocardiogram that does not show significant reduction of her ejection fraction.  As result of her current stable status we have asked her to restart Mekinist at 0.5 mg daily and Tafinlar at 50 mg twice daily to be advanced as tolerated.  Patient seen 10/16/2023 with follow-up chest x-ray planned and CT of chest abdomen pelvis at 5 weeks -approximately 3 months of therapy.    The patient required admission 11/14 - 11/16/2023 having presented with shortness of breath and found to be pulmonary edema with CBC, lactate and procalcitonin all normal.  There was a concern that her chemotherapy agents could have produced her cardiomyopathy and these were stopped 11/14/2023.  She was diuresed and echocardiogram demonstrated LV SF mildly decreased at 39.2% with GLS of -11.1%, left ventricular cavity mildly dilated, left ventricular wall thickness consistent with mild concentric hypertrophy, left ventricular global hypokinesis, aortic valve abnormal with moderate calcification, valve was trileaflet, trace tricuspid valve regurgitation with right VSP at less than 35 mmHg.  The findings for LV systolic function, diastolic function and global longitudinal LV strain had all worsened.    CTA of chest 11/14/2023 demonstrating confluent soft tissue mass possibly measuring larger in current study at 6.5 x 5.0 previously 4.9 x 4.8, left supraclavicular node to decrease in size measuring 1.1 cm previously 1.4 cm, extensive bilateral  interstitial and groundglass infiltrates.      The patient is seen 11/26/2023.  As per the discharge we are requested to have a BMP and CMP assessed.  She is seen with her son and daughter in a lengthy conversation held about her recent hospitalization with CHF-cardiomyopathy felt elicited, in part, from her targeted therapy with her Mekinist and Tafinlar discontinued altogether.    The patient is relatively stable currently having continued her diuretics and to be seen in cardiology in follow-up.  At the time of this dictation her CBC is found to be essentially normal with mild leukocytosis, CMP normal except for mildly elevated glucose at 125 and BNP reduced to 6126 from 9763.  She feels well with no additional shortness of breath chest pain lower extremity edema.  In reviewing the the possible treatment options she is next best treated with single agent chemotherapy moving to [facility].        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 [facility]   • History of squamous cell carcinoma     Tongue   • Hyperlipidemia    • Hypertension     since early 60s   • Intractable back pain 11/29/2022   • Low vitamin B12 level    • Lung cancer    • Systolic CHF, acute 11/14/2023   • Thyromegaly    • UTI (urinary tract infection)    • Vitamin D deficiency         Past Surgical History:   Procedure Laterality Date   • BRONCHOSCOPY Bilateral 10/17/2022    Procedure: BRONCHOSCOPY WITH FLUORO with biopsy and BAL;  Surgeon: India Flores MD;  Location: [facility] ENDOSCOPY;  Service: Pulmonary;  Laterality: Bilateral;  PRE/POST - lung mass    • CHOLECYSTECTOMY  1999   • COLONOSCOPY     • EMBOLIZATION CEREBRAL Left 06/29/2022    Procedure: 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;  Surgeon: Bradley Dowell MD;  Location: [facility] HYBRID OR 18/19;  Service: Interventional Radiology;  Laterality: Left;   • EYE SURGERY  11/24/2020    Took a muscle out of right eye   • GLOSSECTOMY PARTIAL      less than one half tongue   • LUNG SURGERY  2012   • ORAL LESION EXCISION/BIOPSY      June and August 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   • TUBAL ABDOMINAL LIGATION  1970   • VENOUS ACCESS DEVICE (PORT) INSERTION N/A 12/12/2022    Procedure: MEDIPORT PLACEMENT;  Surgeon: Jason Villaseñor MD;  Location: [facility] MAIN OR;  Service: Vascular;  Laterality: N/A;        Current Outpatient Medications on File Prior to Visit   Medication Sig Dispense Refill   • atorvastatin (LIPITOR) 20 MG tablet TAKE 1 TABLET EVERY NIGHT (Patient taking differently: Take 1 tablet by mouth Every Night.) 90 tablet 1   • cetirizine (zyrTEC) 10 MG tablet Take 1 tablet by mouth Daily.     • Cholecalciferol (Vitamin D3) 50 MCG (2000 UT) tablet Take 1,000 Units by mouth Daily. HOLDING FOR DOS     • Cyanocobalamin (VITAMIN B12 PO) Take 1,000 mcg by mouth Daily.     • furosemide (Lasix) 20 MG tablet Take 1 tablet by mouth Daily. 30 tablet 0   • ipratropium (ATROVENT) 0.06 % nasal spray 2 sprays into the nostril(s) as directed by provider 4 (Four) Times a Day. 15 mL 0   • metoprolol succinate XL (TOPROL-XL) 25 MG 24 hr tablet Take 1 tablet by mouth Daily.     • mirtazapine (REMERON) 7.5 MG tablet Take 1 tablet by mouth Every Night. 30 tablet 1   • montelukast (SINGULAIR) 10 MG tablet Take 1 tablet by mouth Every Night.     • potassium chloride (K-DUR,KLOR-CON) 10 MEQ CR tablet TAKE 2 TABLETS BY MOUTH DAILY 60 tablet 1   • promethazine-dextromethorphan (PROMETHAZINE-DM) 6.25-15 MG/5ML syrup Take 5 mL by mouth 4 (Four) Times a Day As Needed for Cough. 180 mL 0   • zolpidem  "(AMBIEN) 5 MG tablet TAKE 1 TABLET BY MOUTH EVERY NIGHT AT BEDTIME AS NEEDED FOR SLEEP 30 tablet 0     No current facility-administered medications on file prior to visit.        ALLERGIES:    Allergies   Allergen Reactions   • Sulfa Antibiotics Rash        Social History     Socioeconomic History   • Marital status:    Tobacco Use   • Smoking status: Former     Types: Cigarettes     Quit date: 2015     Years since quittin.8     Passive exposure: Never   • Smokeless tobacco: Never   • Tobacco comments:     less than a pack per day, no smoking since , Quit 2015   Vaping Use   • Vaping Use: Never used   Substance and Sexual Activity   • Alcohol use: Not Currently     Comment: Socially -A few times a month   • Drug use: No   • Sexual activity: Defer     Family History   Problem Relation Age of Onset   • Ovarian cancer Mother          at age 27   • No Known Problems Father    • Ovarian cancer Sister    • Colon cancer Brother    • No Known Problems Other    • Malig Hyperthermia Neg Hx         Review of Systems  ROS as per HPI     Objective     Vitals:    23 1158   BP: 138/77   Pulse: 52   Resp: 16   Temp: 98.2 °F (36.8 °C)   TempSrc: Temporal   SpO2: 95%   Weight: 48.5 kg (106 lb 14.4 oz)   Height: 160 cm (62.99\")   PainSc: 0-No pain               2023    12:00 PM   Current Status   ECOG score 0      Physical Exam  Vitals reviewed.   Constitutional:       General: She is not in acute distress.     Appearance: Normal appearance. She is well-developed.   HENT:      Head: Normocephalic and atraumatic.   Eyes:      Pupils: Pupils are equal, round, and reactive to light.   Cardiovascular:      Rate and Rhythm: Normal rate and regular rhythm.      Heart sounds: Normal heart sounds. No murmur heard.  Pulmonary:      Effort: Pulmonary effort is normal. No respiratory distress.      Breath sounds: Normal breath sounds. No wheezing, rhonchi or rales.   Abdominal:      General: Bowel sounds " are normal. There is no distension.      Palpations: Abdomen is soft.   Musculoskeletal:         General: No swelling. Normal range of motion.      Cervical back: Normal range of motion.   Skin:     General: Skin is warm and dry.      Findings: No rash.   Neurological:      Mental Status: She is alert and oriented to person, place, and time.         Physical exam preformed and reviewed. No changes noted from previous exam. Henry Escobar MD ; 11/27/2023      RECENT LABS:  Results from last 7 days   Lab Units 11/27/23  1255   WBC 10*3/mm3 11.94*   NEUTROS ABS 10*3/mm3 10.59*   HEMOGLOBIN g/dL 12.8   HEMATOCRIT % 39.9   PLATELETS 10*3/mm3 247         Results from last 7 days   Lab Units 11/27/23  1255 11/21/23  1423   SODIUM mmol/L 144 142   POTASSIUM mmol/L 4.2 4.9   CHLORIDE mmol/L 105 103   CO2 mmol/L 28.5 26   BUN mg/dL 16 26   CREATININE mg/dL 0.73 0.81   CALCIUM mg/dL 9.1 8.9   ALBUMIN g/dL 3.6  --    BILIRUBIN mg/dL 0.3  --    ALK PHOS U/L 76  --    ALT (SGPT) U/L 14  --    AST (SGOT) U/L 20  --    GLUCOSE mg/dL 125* 145*                   Assessment & Plan    1.  Carcinoma of the lung  May 2015, status post previous left upper lobectomy for carcinoma of the lung.    2.  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   She underwent laryngoscopy 8/18/2021 with right base of tongue without evidence of lesions or masses in the region.    3.  Moderately differentiated adenocarcinoma of the lung.  CT of the chest 6/16/2021 demonstrated postsurgical changes, 8 mm irregular nodule medial segment of right lower lobe and diffuse changes of emphysema.    She is seen in thoracic clinic with findings suspicious for new malignancy and concern of the patient being a poor operative candidate.    7/13/2021 PET CT demonstrated ill-defined avidity and soft tissue left aspect of the mediastinum, 1  cm hypermetabolic pulmonary nodule and asymmetric thickening involving the right base of tongue as well as subtle uptake in the L1 vertebral body.    This patient's case was discussed in thoracic clinic and plans were made to request an MRI of the lumbar spine, obtain a biopsy of the mediastinal involvement of the left had the patient reviewed by ENT.  Biopsy 8/13/2021 found to be consistent with invasive moderately differentiated adenocarcinoma with PD-L1 TPS score 40%.    7/24/2021 MRI of the lumbar spine revealed no evidence of metastatic disease though the patient did have multilevel degenerative disease throughout the lumbar spine.    Her findings were consistent with 2 separate lesions including a nodule in the superior segment of the right lower lobe and a mass in the upper portion of the left chest with the left chest lesion biopsy positive for adenocarcinoma.  The consensus was that these were to separate-synchronous primaries.  Stereotactic radiation each lesions were felt to be the appropriate way to proceed and the patient was referred to radiation therapy.  The patient was referred for radiation therapy and she was able to proceed with SBRT to the right lung at primary #1 with 5 treatments at 1000 cGy per fraction in the left lung primary similarly at 1000 cGy per fraction x5.  Her treatment ranged between 8/31-9/13/2021.    Her guardant 360 did not determine any new abnormalities but her Caris-MI profile-does reveal sensitivity to immunotherapy as well as a BRAF mutation.  PET/CT 11/23/2021 demonstrates decrease in size and avidity of the previously noted intensely FDG avid nodules left lobectomy operative site and right lower lobe.  Surrounding groundglass and pulmonary opacification is consistent with postradiation changes, a few new subcentimeter pulmonary nodules are present in the right upper lobe and indeterminant, stable subcentimeter hepatic lesion is below PET resolution no other findings of  FDG-avid disease are seen in neck abdomen or pelvis.  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  There is evolution of presumed postradiation changes in the right midlung with soft tissue mass within the left lumpectomy operative bed minimally decreased in size.  There is a sub-6 mm nodule in the right upper lobe not definitively seen, indeterminate and an indeterminate left renal lesion at 1.5 cm unchanged from 7/13/2021.  Admission 10/14 - 10/18/2022 presenting with shortness of breath and cough that was nonproductive.  She had been on oral antibiotics and did not improved and was admitted for therapy for apparent pneumonia.  This eventually led to bronchoscopy after initial CT revealed postsurgical changes, new masslike 6.7 cm area of consolidation anterior left lung raising concern for potential malignancy and a follow-up PET/CT planned.   Bronchoscopy and biopsy left lower lung failed to show evidence of malignancy.    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  There is intensely pleural-based avidity within the left lower lobe thought to be metastatic disease with postradiation of the left apex as well.  There is a small focus of uptake in the right hilum grossly unchanged in size and post radiation changes in the right lower lobe.  There is indeterminate density left renal that was grossly unchanged.    Patient's guardant 360 returned as again significant for BRAF V600E and the potential use of BRAF inhibitor/MET inhibitor dabrafenib and trametinib.  Additional information [id number] and use of alpelisib.  The patient now presents back 12/14/2022 to initiate therapy- initially with immunotherapy  considering Caris study with PD-L1 TPS of 70% on 22 C3 and 28-8 assays.  Single agent Keytruda initiated 12/14/2022 2/8/2023 CT of the chest, abdomen pelviswith consolidation and masslike pleural thickening in the left apex and upper lung index areas have decreased somewhat.  There is no evidence of metastatic disease otherwise and a few indeterminate left renal lesions are stable.  After lengthy discussion with the patient and her daughter as to the stability to mild improvement with immunotherapy it is agreed that we would continue treatment at this point.  Proceed today, 4/19/2023 with cycle 7 pembrolizumab which is continued every 3 weeks  The patient proceeded to 8 cycles of pembrolizumab and underwent follow-up chest CT chest, abdomen pelvis revealing evolution of dense consolidation left upper lobe and extension of soft tissue mass anterior to left upper ribs with increase sclerosis anterior left second rib.  This was concerning for progression of disease versus radiation change and the patient was scheduled for subsequent PET/CT.  PET/CT 6/5/2023  reveals progression of disease in left upper thorax, left supraclavicular adenopathy new metastasis left posterior fourth ribs, no evidence of metastatic disease in the abdomen or pelvis.  Patient seen 6/12/2023 with plans to move her Norco to every 4 hours, discontinue Keytruda, make application for dabrafenib and trametinib at 150 mg p.o. every 12 hours and 2 mg p.o. daily respectively.  Patient referred for radiation therapy consultation concurrently.  Last dose of Keytruda 5/31/2023.  6/23/2023: Patient seen for triage visit.  She has not yet started dabrafenib or trametinib, she has not yet received the medications.  Unfortunately she has been experiencing uncontrolled nausea/vomiting as further detailed below.   Patient seen 6/27/2023 reporting that her nausea has resolved.  She was unable to complete the MRI of the brain however Dr. Escobar did review the  images patient did have and there was no evidence of edema or metastatic disease.  Patient can start her new oral medication once she receives the medication.  Patient seen 7/5/2023 with plans to start palliative radiotherapy x10 fractions and initiate dabrafenib and trametinib as soon as medications received.  7/17/2023 patient initiated radiation with 10 fractions planned.  Patient has received dabrafenib and trametinib.  Brought in for triage visit due to nausea associated with dosing.  She is premedicating with ondansetron however only waiting 15 minutes.  We discussed today that she needs to wait at least 30 minutes to 1 hour prior to taking the dabrafenib and trametinib.  The patient will do so.  We discussed she could also take this again if she feels the need 8 hours later for nausea control.  If she is having breakthrough nausea then she should call our office.  I have encouraged her to utilize electrolyte drinks.  She will get 1L normal liter normal saline in the office today.She was not nauseas while in the office so we did not give additional nausea medication.  We will have her return in 1 week repeat labs, review by nurse practitioner, and possible IV fluids.  I stressed the importance of calling sooner if she finds that the premedication is not controlling her nausea at which time we would have her come in for additional IV fluids and evaluation.  She is continuing daily radiation this week.  Case was discussed with Dr. Escobar today.  7/21/2023: Patient returns for short interval follow-up due to very poor appetite and sleeping the majority of the day.  Her nausea has been improved with premedicating 30 minutes prior to dabrafenib and trametinib.  She however has been sleeping the majority of the day and is not eating when she is awake.  She does report taking ensures but she is only sipping on these.  Have given her samples of some of the office today and encouraged her to drink when while she is  here.  Her performance status continues to decline and her daughter states that she was fixing dinner nightly just 2 weeks ago.  With performance status of 3 today I discussed the case with Dr. Escobar and we will hold her dabrafenib and  TRAMETINIB.  Her liver enzymes are elevated today as well.  We will monitor this next week and have her evaluated by Dr. Escobar at which time we could consider restarting her dabrafenib and trametinib at reduced doses pending performance status and laboratory evaluation.  Unfortunately she required admission 7/25-30/2023 with failure to thrive.  Subsequent assessments including blood cultures were negative and patient was treated briefly with IV antibiotic therapy, started PT and OT, medications adjusted for hypotension and treated symptomatically for nausea and vomiting.  She was able to improve enough to be discharged home as she continued radiation therapy started 7/17/2023 and completed 7/31/2023 to the left chest wall.  She is next seen back 8/4/2023.  Lengthy discussion as to reevaluation   Plans made for CT chest, abdomen, pelvis in 4 weeks  Patient seen 8/21/2023 with complaints of worsening fatigue and shortness of breath.  Patient scheduled for follow up CT scans this Friday. Lungs clear on exam,  Sats 97%.  Hgb stable at 11.7.  Will check BNP today.  Discussed may be related to disease and will need to see what scan shows.   Subsequent scans 8/25/2023, fortunately, with stable left apical mass with mediastinal chest wall involvement unchanged 1.4 cm supraclavicular lymph node.  No new findings of metastatic disease.  The patient is seen back 9/8/2023.  Fortunately she is improved dramatically off therapy and is gratified that his studies have not worsened in any substantial way.  We have discussed both her scan reports and echocardiogram that does not show significant reduction of her ejection fraction.  As result of her current stable status we have asked her to restart  Mekinist at 0.5 mg daily and Tafinlar at 50 mg twice daily  9/22/2023 tolerating Mekinist 0.5 mg daily and tafinlar 50 mg twice daily quite well. Continues on prednisone 10 mg daily which will now be reduced to 5 mg daily when seen 10/16/2023  Plans made to continue current dosing of Mekinist and Tafinlar and repeat evaluation with chest x-ray 10/16 and repeat CT chest abdomen pelvis in 5 weeks, MD in 6 weeks.  The patient required admission 11/14 - 11/16/2023 having presented with shortness of breath and found to be pulmonary edema with CBC, lactate and procalcitonin all normal.  There was a concern that her chemotherapy agents could have produced her cardiomyopathy and these were stopped 11/14/2023.  She was diuresed and echocardiogram demonstrated LV SF mildly decreased at 39.2% with GLS of -11.1%, left ventricular cavity mildly dilated, left ventricular wall thickness consistent with mild concentric hypertrophy, left ventricular global hypokinesis, aortic valve abnormal with moderate calcification, valve was trileaflet, trace tricuspid valve regurgitation with right VSP at less than 35 mmHg.  The findings for LV systolic function, diastolic function and global longitudinal LV strain had all worsened.  CTA of chest 11/14/2023 demonstrating confluent soft tissue mass possibly measuring larger in current study at 6.5 x 5.0 previously 4.9 x 4.8, left supraclavicular node to decrease in size measuring 1.1 cm previously 1.4 cm, extensive bilateral interstitial and groundglass infiltrates.  The patient is seen 11/26/2023.  As per the discharge we are requested to have a BMP and CMP assessed.  She is seen with her son and daughter in a lengthy conversation held about her recent hospitalization with CHF-cardiomyopathy felt elicited, in part, from her targeted therapy with her Mekinist and Tafinlar discontinued altogether.  The patient is relatively stable currently having continued her diuretics and to be seen in  cardiology in follow-up.  At the time of this dictation her CBC is found to be essentially normal with mild leukocytosis, CMP normal except for mildly elevated glucose at 125 and BNP reduced to 6126 from 9763.  She feels well with no additional shortness of breath chest pain lower extremity edema.  In reviewing the the possible treatment options she is next best treated with single agent chemotherapy moving to [facility].      4.  Worsening back pain  Admission 11/28/2022 through 12/1/2022.  MRI of the cervical and thoracic spine fortunately failed to demonstrate metastatic disease.  Moderate degenerative changes noted which could cause radiculopathy.  Medication altered to include MS Contin 15 mg every 12 hours and Norco for breakthrough pain  She reports today her pain is adequately controlled  Patient with increasing pain 5/31/2023 with pain level of 6.  MS Contin was increased to 50 mg p.o. every 8 hours and Norco 10/325 #101 p.o. every 6 hours to move to every 4 hours as needed-E scribed to pharmacy  Patient seen 6/12/2023 with Norco moved to every 4 hours.  6/23/2023: Seen for triage visit.  Has been using oxycodone 20 mg every 3 hours as needed for pain.  Reports she has not been using the hydrocodone 10/325.  Was experiencing dizziness, so reduced her oxycodone use to half a tablet every 3 hours as needed.  Reports pain is uncontrolled during the night so she is having to wake at night time to take pain medicine.  They are questioning resuming long-acting pain medication, as she is waking throughout the night to take pain medicine.  She has already been prescribed MS Contin and has this available at home, did let her know it would be okay to resume this.  Assess 7/5/2023 with pain reduced to 2/10.  Plan to continue current therapy  Darlene Pfeiffer reports a pain score of 0.  Given her pain assessment as noted, treatment options were discussed and the following options were decided upon as a follow-up plan to  address the patient's pain: continuation of current treatment plan for pain. Currently not requiring pain medication.   Refilled both the patient's Remeron and Ambien 10/16/2023    5.  Poor appetite and malnutrition  Placed on prednisone 10 mg daily 1/4/2023 with significant improvement in her symptoms  Weight is stable today at just below 106 pounds  Patient assessed 6/12/23 with possible gummy therapy.  Stable performance status including weight and appetite 7/5/2023  Weight has declined as of 7/17/2023 due to nausea and vomiting that started this past weekend.  After further discussion the patient did stop her prednisone 10 mg while she was not feeling well.  We discussed today the importance of continuing this as it can help with her nausea and appetite and therefore she will restart tomorrow.  7/21/2023: Weight is stable today though albumin is declining at 3.  Patient is sleeping the majority of the day and not eating.  Yesterday she had a small piece of chicken and that is all.  She states she is drinking ensures however her daughter thinks that she is just sipping on these and not completing them.  Hopefully holding her dabrafenib and trametinib will be helpful with her being awake more and appetite.  I encouraged her to make sure she is taking her prednisone daily at 10 mg daily.  Remeron 7.5 mg p.o. nightly E scribed to pharmacy  9/22/2023 weight is up to 98 pounds.  Further improvement in weight 10/16/2020 3 to 104 pounds, continue Remeron at current dose, prednisone reduced to 5 mg daily.    6.  Uncontrolled nausea/vomiting  started after discontinuing prednisone and starting THC Gummies.  Started to experience dizziness with the THC Gummies.  Discontinue THC Gummies and dizziness improved, however she has remained nauseated now.  She has been utilizing Zofran with improvement, however today was unable to keep Zofran tablets down due to nausea/vomiting.  She will resume prednisone 10 mg daily.  She will  receive 1 L IV normal saline in clinic today 10 mg IV Compazine.  We will also send prescription for Phenergan suppository, in case she is not able to keep Zofran down in the future.  Will also send for stat MRI brain since patient is now experiencing uncontrolled nausea vomiting and has recently had progression of her cancer as seen on PET from 6/5/2023.  MRI brain was performed without contrast, even though contrast was ordered.  The patient also did not stay for entire exam to be completed.  Exam limited for assessment of metastatic disease.  Attempted to call patient to review results, however no answer, did leave detailed voicemail.  6/27/2023 patient reports that her nausea has resolved.  She did not complete the MRI however the images which were done showed no evidence of edema or metastatic disease.  Nausea reoccurred when seen on 7/17/2023 over this past weekend after initiation of dabrafenib and trametinib.  Patient was premedicating with ondansetron however only giving herself about 15 minutes and I encouraged her to give herself at least 30 minutes to 1 hour prior to taking the medications.  She will notify our office if this is not helpful.  7/21/2023: Premedication with ondansetron 30 minutes prior to dabrafenib and trametinib has been helpful.  Patient however has had some vomiting episodes directly after taking her medications where she has not had time to actually swallow them.  She denies difficulty swallowing however.  We will continue to monitor this.  10/16/2023 not an issue today.    7.  Hyperkalemia-  potassium 6/23/2023 3.1.  She has been having uncontrolled nausea, we will hold off on starting oral potassium replacement as it is unlikely she will tolerate that at this time.  Will recommend she increase oral potassium in her diet.  6/27/2023 potassium 3.0.  Nausea has resolved.  Patient will be given 40 M EQ p.o. potassium in the office and she will be started on 20 mEq daily of p.o.  potassium.  Assessed 7/5/2023 with potassium 4.4  7/17/2023 potassium normal at 3.7  7/21/2023: Potassium 3.1, patient not taking potassium supplements at home because she does not like the big pill.  Changed to potassium chloride 10 mill equivalents, 2 tablets every a.m. as these will be smaller.   9/22/2023 potassium is 3.7  Repeat assessment 11/27/2023 with potassium of 4.2    8.Acute systolic heart failure -cardiomyopathy   Secondary to chemotherapy drugs during admission 11/14-16/2023 with Mekinist and Tafinlar discontinued  Diuretics continued postdischarge, cardiology follow-up planned 12/6/2023    Plan:   Discontinue lower dose dabrafenib 0.5 mg daily and trametinib 50 mg twice daily.  Consider Alimta primary chemotherapy which is discussed with the patient and family members 11/27/2023  Return to laboratory today for the above laboratory exams  Teach this week-Alimta, provide B12 injection, folate 1 mg p.o. daily escribed to pharmacy  1 week MD, initiation of Alimta chemotherapy.2  I spent 65 minutes caring for Darlene on this date of service. This time includes time spent by me in the following activities: preparing for the visit, reviewing tests, obtaining and/or reviewing a separately obtained history, performing a medically appropriate examination and/or evaluation, counseling and educating the patient/family/caregiver, ordering medications, tests, or procedures, referring and communicating with other health care professionals, documenting information in the medical record, independently interpreting results and communicating that information with the patient/family/caregiver, and care coordination.     Henry Escobar MD  11/27/2023

## 2023-11-28 ENCOUNTER — DOCUMENTATION (OUTPATIENT)
Dept: PHARMACY | Facility: HOSPITAL | Age: 83
End: 2023-11-28
Payer: MEDICARE

## 2023-11-28 ENCOUNTER — SPECIALTY PHARMACY (OUTPATIENT)
Dept: PHARMACY | Facility: HOSPITAL | Age: 83
End: 2023-11-28
Payer: MEDICARE

## 2023-11-28 ENCOUNTER — APPOINTMENT (OUTPATIENT)
Dept: LAB | Facility: HOSPITAL | Age: 83
End: 2023-11-28
Payer: MEDICARE

## 2023-11-28 ENCOUNTER — INFUSION (OUTPATIENT)
Dept: ONCOLOGY | Facility: HOSPITAL | Age: 83
End: 2023-11-28
Payer: MEDICARE

## 2023-11-28 ENCOUNTER — OFFICE VISIT (OUTPATIENT)
Dept: ONCOLOGY | Facility: CLINIC | Age: 83
End: 2023-11-28
Payer: MEDICARE

## 2023-11-28 VITALS
OXYGEN SATURATION: 96 % | BODY MASS INDEX: 18.89 KG/M2 | HEIGHT: 63 IN | TEMPERATURE: 97.8 F | RESPIRATION RATE: 16 BRPM | WEIGHT: 106.6 LBS | SYSTOLIC BLOOD PRESSURE: 134 MMHG | HEART RATE: 86 BPM | DIASTOLIC BLOOD PRESSURE: 73 MMHG

## 2023-11-28 DIAGNOSIS — C34.11 MALIGNANT NEOPLASM OF UPPER LOBE OF RIGHT LUNG: Primary | ICD-10-CM

## 2023-11-28 PROCEDURE — 96372 THER/PROPH/DIAG INJ SC/IM: CPT

## 2023-11-28 PROCEDURE — 25010000002 CYANOCOBALAMIN PER 1000 MCG: Performed by: INTERNAL MEDICINE

## 2023-11-28 RX ORDER — FOLIC ACID 1 MG/1
1 TABLET ORAL DAILY
Qty: 30 TABLET | Refills: 6 | Status: SHIPPED | OUTPATIENT
Start: 2023-11-28

## 2023-11-28 RX ORDER — DEXAMETHASONE 4 MG/1
TABLET ORAL
Qty: 6 TABLET | Refills: 5 | Status: SHIPPED | OUTPATIENT
Start: 2023-11-28

## 2023-11-28 RX ORDER — CYANOCOBALAMIN 1000 UG/ML
1000 INJECTION, SOLUTION INTRAMUSCULAR; SUBCUTANEOUS ONCE
Status: CANCELLED | OUTPATIENT
Start: 2023-11-28 | End: 2023-11-28

## 2023-11-28 RX ORDER — ONDANSETRON HYDROCHLORIDE 8 MG/1
8 TABLET, FILM COATED ORAL 3 TIMES DAILY PRN
Qty: 30 TABLET | Refills: 5 | Status: SHIPPED | OUTPATIENT
Start: 2023-11-28

## 2023-11-28 RX ORDER — CYANOCOBALAMIN 1000 UG/ML
1000 INJECTION, SOLUTION INTRAMUSCULAR; SUBCUTANEOUS ONCE
Status: COMPLETED | OUTPATIENT
Start: 2023-11-28 | End: 2023-11-28

## 2023-11-28 RX ADMIN — CYANOCOBALAMIN 1000 MCG: 1000 INJECTION, SOLUTION INTRAMUSCULAR at 14:47

## 2023-11-28 NOTE — PROGRESS NOTES
[facility] Chart Review    MD dictation noted:   Discontinue lower dose dabrafenib 0.5 mg daily and trametinib 50 mg twice daily.  Consider Alimta primary chemotherapy which is discussed with the patient and family members 11/27/2023      Prescriptions discontinued and patient disenrolled from [facility] oncology program.  Pharmacy will be notified, so patient is not contacted for refills.    Dagmar Kenyon RPH  11/28/2023  09:55 EST

## 2023-11-28 NOTE — PROGRESS NOTES
TREATMENT  PREPARATION    Darlene Pfeiffer  [id number]  1940    Chief Complaint: Treatment preparation and needs assessment    History of present illness:  Darlene Pfeiffer is a 83 y.o. year old female who is here today for treatment preparation and needs assessment.  The patient has been diagnosed with   Encounter Diagnosis   Name Primary?    Malignant neoplasm of upper lobe of right lung Yes    and is scheduled to begin treatment with:     Oncology History:    Oncology/Hematology History   Lung cancer   7/22/2021 Initial Diagnosis    Lung cancer (HCC)     12/14/2022 - 5/31/2023 Chemotherapy    OP LUNG Pembrolizumab 200 mg     6/19/2023 - 6/19/2023 Chemotherapy    OP LUNG Dabrafenib / Trametinib      7/21/2023 -  Chemotherapy    OP SUPPORTIVE HYDRATION + ANTIEMETICS     12/4/2023 -  Chemotherapy    OP LUNG PEMEtrexed         The current medication list and allergy list were reviewed and reconciled.     Past Medical History, Past Surgical History, Social History, Family History have been reviewed and are without significant changes except as mentioned.    Physical Exam:    Vitals:    11/28/23 1456   BP: 134/73   Pulse: 86   Resp: 16   Temp: 97.8 °F (36.6 °C)   SpO2: 96%     Vitals:    11/28/23 1456   PainSc: 0-No pain        ECOG score: 0             Physical Exam  HENT:      Head: Normocephalic and atraumatic.   Eyes:      Extraocular Movements: Extraocular movements intact.      Conjunctiva/sclera: Conjunctivae normal.   Pulmonary:      Effort: Pulmonary effort is normal. No respiratory distress.   Neurological:      General: No focal deficit present.      Mental Status: She is alert and oriented to person, place, and time.   Psychiatric:         Mood and Affect: Mood normal.         Behavior: Behavior normal.           NEEDS ASSESSMENTS    Genetics  The patient's new diagnosis and family history have been reviewed for genetic counseling needs. The patient will not be referred..     Psychosocial and Barriers to  care  The patient has completed a PHQ-9 Depression Screening and the Distress Thermometer (DT) today.  PHQ-9 results show PHQ-2 Total Score: 0 PHQ-9 Total Score: PHQ-9 Total Score: 0     The patient scored their distress today as   on a scale of 0-10 with 0 being no distress and 10 being extreme distress. Problems marked by the patient as being an issue for them within the last week include   .      Results were reviewed along with psychosocial resources offered by our cancer center.  Our Supportive Oncology team will be flagged for a score of 4 or above, and a same day call will be made for a score of 9 or 10.  A mental health referral is offered at that time. Patients who score less than 4 have been educated on our support services and can be referred to our  upon request.  The patient will not be referred to our .       Nutrition  The patient has completed the malnutrition screening today. They scored Malnutrition Screening Tool  Have you recently lost weight without trying?  If yes, how much weight have you lost?: 0--> No  Have you been eating poorly because of a decreased appetite?: 0--> No  MST score: 0   with a score of 0-1 meaning not at risk in a score of 2 or greater meaning at risk.  Patients with a score of 3 or higher will be referred to our oncology dietitian for support. Patients beginning at risk treatment regimens or who have dietary concerns will also be referred to our oncology dietitian. The patient will not be referred.    Functional Assessment  Persons who are age 70 or greater will be screened for qualification of a comprehensive geriatric assessment by our survivorship nurse practitioner.  Older adults with cancer face unique challenges. These may include an increased risk of drug reactions, financial burdens, and caregiver stress. The patient scored G8 Questionnaire  Has food intake declined over the past 3 months due to loss of appetite, digestive problems, chewing  or swallowing difficulties?: No decrease in food intake  Weight loss during the last 3 months: No weight loss  Mobility: Goes out  Neuropsychological Problems: No psychological problems  Body Mass Index (BMI (weight in kg) / (height in m2)): BMI 19 to BMI < 21  Takes more than 3 medications a day: Yes, takes more than 3 prescription drugs per day  In comparison with other people of the same age, how does the patient consider his/her health status?: As good  Age: 80 to 85  Total Score: 12 . Patients scoring 14 or lower will referred for an older adult functional assessment with the survivorship advanced practice registered nurse to ensure all needed support is provided as patients plan for their treatments. NOT APPLICABLE    Intravenous Access Assessment  The patient and I discussed planned intravenous chemo/biotherapy as well as other IV treatments that are often needed throughout the course of treatment. These may include, but are not limited to blood transfusions, antibiotics, and IV hydration. Discussed that depending on selected treatment and vein assessment, patient may require venous access device (VAD) which could include but not limited to a Mediport or PICC line. Risks and benefits of VADs reviewed. The patient will be treated via Port.    Reproductive/Sexual Activity   People should avoid becoming pregnant and should not get a partner pregnant while undergoing chemo/biotherapy.  People of childbearing age should use effective contraception during active therapy. The best recommendation for all people is to use a barrier method for a minimum of 1 week after the last infusion of chemo/biotherapy to prevent your partner being exposed to byproducts from treatment medications in bodily fluids. Effective contraception should be discussed with your oncology team to make sure it is safe to take based on your diagnosis. Possible options include oral contraceptives, barrier methods. Chemo/biotherapy can change your  "ability to reproduce children in the future.  There are options for fertility preservation. NOT APPLICABLE    Advanced Care Planning  Advance Care Planning   The patient and I discussed advanced care planning, \"Conversations that Matter\".   This service is offered for development of advance directives with a certified ACP facilitator.  The patient does not have an up-to-date advanced directive. This document is not on file with our office. The patient is not interested in an appointment with one of our facilitators to create or update their advanced directives.    Have you reviewed your Advance Directive and is it valid for this stay?: No  Patient Requests Assistance on Advance Directives: Patient Declined          Smoking cessation  Tobacco Use: Medium Risk (11/28/2023)    Patient History     Smoking Tobacco Use: Former     Smokeless Tobacco Use: Never     Passive Exposure: Never       Patient and I discussed their tobacco use history. Referral will not be made for smoking cessation.      Palliative Care  When appropriate, the patient and I discussed the availability palliative care services and when appropriate Hospice care. Palliative care is not the same as Hospice care which was explained to the patient.NOT APPLICABLE.    Survivorship   When appropriate, we discussed that we will refer the patient to survivorship clinic to discuss next steps following completion of planned treatment.  Reviewed this visit will include assessment of your physical, psychological, functional, and spiritual needs as a survivor and the need at attend this visit when scheduled.    TREATMENT EDUCATION    Today I met with the patient to discuss the chemo/biotherapy regimen recommended for treatment of Malignant neoplasm of upper lobe of right lung  .  The patient was given explanation of treatment premed side effects including office policy that prohibits patients to drive if sedating medications are administered, MD explanation given " regarding benefits, side effects, toxicities and goals of treatment.  The patient received a Chemotherapy/Biotherapy Plan Summary including diagnosis and explanation of specific treatment plan.    SIDE EFFECTS:  Common side effects were discussed with the patient and/or significant other.  Discussion included where applicable hair loss/discoloration, anemia/fatigue, infection/chills/fever, appetite, bleeding risk/precautions, constipation, diarrhea, mouth sores, taste alteration, loss of appetite, nausea/vomiting, peripheral neuropathy, skin/nail changes, rash, muscle aches/weakness, photosensitivity, weight gain/loss, hearing loss, dizziness, menopausal symptoms, menstrual irregularity, sterility, high blood pressure, heart damage, liver damage, lung damage, kidney damage, DVT/PE risk, fluid retention, pleural/pericardial effusion, somnolence, electrolyte/LFT imbalance, vein exercises and/or the possible need for vascular access/port placement.  The patient was advised that although uncommon, leakage of an infused medication from the vein or venous access device may lead to skin breakdown and/or other tissue damage.  The patient was advised that he/she may have pain, bleeding, and/or bruising from the insertion of a needle in their vein or venous access device (port).  The patient was further advised that, in spite of proper technique, infection with redness and irritation may rarely occur at the site where the needle was inserted.  The patient was advised that if complications occur, additional medical treatment is available.  Finally, where applicable we have reviewed rare but potential immune mediated side effects including shortness of breath, cough, chest pain (pneumonitis), abdominal pain, diarrhea (colitis), thyroiditis (hypothyroid or hyperthyroid), hepatitis and liver dysfunction, nephritis and renal dysfunction.    Discussion also included side effects specific to drugs in the treatment plan,  specifically:    Treatment Plans       Name Type Hold Status Plan Dates Plan Provider       Active    OP SUPPORTIVE HYDRATION + ANTIEMETICS ONCOLOGY SUPPORTIVE CARE 1 On Automatic Hold  7/20/2023 - Present Henry Escobar MD    OP LUNG PEMEtrexed ONCOLOGY TREATMENT Not on Hold  11/20/2023 - Present Henry Escobar MD                     Questions answered and additional information discussed on topics including:  Anemia, Thrombocytopenia, Neutropenia, Nutrition and appetite changes, Nausea & vomiting, Organ toxicities, and Home care       Assessment and Plan:    Diagnoses and all orders for this visit:    1. Malignant neoplasm of upper lobe of right lung (Primary)      No orders of the defined types were placed in this encounter.        The patient and I have reviewed their diagnosis and scheduled treatment plan. Needs assessment was completed where applicable including genetics, psychosocial needs, barriers to care, VAD evaluation, advanced care planning, survivorship, and palliative care services where indicated. Referrals have been ordered as appropriate based upon evaluation today and patient desires.   Chemo/biotherapy teaching was completed today and consent obtained. See separate documentation for further details.  Adequate time was given to answer questions.  Patient made aware of their care team members and contact information if they have questions or problems throughout the treatment course.  Discussion held and written information provided describing frequency of office visits and ongoing monitoring throughout the treatment plan.     Reviewed with patient any prescribed medication sent to pharmacy.  Education provided regarding proper storage, safe handling, and proper disposal of unused medication.  Proper handling of body fluids and waste discussed and written information provided.  If appropriate, patient had pretreatment labs drawn today.    Learning assessment completed at initial patient  encounter. See separate flowsheet. Chemo/biotherapy education comprehension assessed at today's visit.    I spent 40 minutes caring for Darlene on this date of service. This time includes time spent by me in the following activities: preparing for the visit, obtaining and/or reviewing a separately obtained history, performing a medically appropriate examination and/or evaluation, counseling and educating the patient/family/caregiver, ordering medications, tests, or procedures, referring and communicating with other health care professionals, documenting information in the medical record, and care coordination.     Jasbir Juárez, APRN   11/28/23    Confirmed with Dr. Escobar that patient is to continue daily prednisone for appetite in addition to scheduled dexamethasone with treatment. Patient called and updated.

## 2023-11-28 NOTE — PROGRESS NOTES
I was notified [facility] Pharmacist that pt will be discontinuing Tafinlar and Mekinist. Pt gets these medications free through [employer] Patient Assistance.     I have contacted [employer] [phone] and spoke with Tere. I have asked for pts enrollment to be discontinued. This has been done so pt will no longer get any shipments.       Kimberly Kat, Pharmacy Technician  Specialty Pharmacy Technician

## 2023-11-28 NOTE — PROGRESS NOTES
[facility] Hematology/Oncology Treatment Plan Summary    Name: Darlene Pfeifefr  [facility]# [id number]  MD: Dr. Escobar       Diagnosis:     ICD-10-CM ICD-9-CM   1. Malignant neoplasm of upper lobe of right lung  C34.11 162.3     Goal of treatment: disease control    Treatment Medication(s):   Pemetrexed (Alimta)     Frequency: treatment every 21 days    Number of cycles: 6 cycles    Starting on: 12/4/2023    Rx written for: [x] Nausea    [x] Pre-Treatment   Dexamethasone 4 mg by mouth twice daily on the day before, day of, and day after treatment  Folic acid 1 mg daily by mouth  Ondansetron 8 mg by mouth every 8 hours as needed for nausea    Notes: B12 injection today and every 9 weeks    Completing Provider: DEAJN Reynolds           Date/time: 11/28/2023      Please note: You will be seen by a provider frequently with your treatment plan. This plan may change depending on many factors, if so, this will be discussed with you by your physician.  Last update 03/2022.

## 2023-12-02 NOTE — PROGRESS NOTES
REASON FOR FOLLOW-UP: Recurrent lung cancer.    History of Present Illness    The patient is a 83 y.o. female with the above-mentioned history who returned 9/22/2023 continuing on Mekinist 0.5 mg daily and Tafinlar at 50 mg twice daily.  She also continues on prednisone only taking 10 mg daily.  She reports that she is feeling quite good.  She is tolerating things well.  She has a decent appetite denies issues with nausea, vomiting, diarrhea, or constipation.  She denies any issues with increased shortness of breath.    Patient did see ENT Dr. Topete, who has ordered an ultrasound of her neck, which will be done at [facility] on the 27th.  She is also scheduled for a cerebral angiogram by Dr. Calvin on 29 September.      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  Dr. Dowell of neurosurgery saw the patient 10/12/2023 and felt the patient was stable without neurologic symptoms, yearly follow-up planned.  The patient had started seeing a new ENT physician leading to the referral back to neurosurgery.  When seen 10/16/2023 she is doing very well on her current Mekinist and Tafinlar doses having improved her weight, appetite and general performance status.  We have discussed at least a chest x-ray today and repeat staging in the next 5 to 6 weeks as she continues her current dosing.    The patient required admission 11/14 - 11/16/2023 having presented with shortness of breath and found to be pulmonary edema with CBC, lactate and procalcitonin all normal.  There was a concern that her chemotherapy agents could have produced her cardiomyopathy and these were stopped 11/14/2023.  She was diuresed and echocardiogram demonstrated LV SF mildly decreased at 39.2% with GLS of -11.1%, left ventricular cavity mildly dilated, left  ventricular wall thickness consistent with mild concentric hypertrophy, left ventricular global hypokinesis, aortic valve abnormal with moderate calcification, valve was trileaflet, trace tricuspid valve regurgitation with right VSP at less than 35 mmHg.  The findings for LV systolic function, diastolic function and global longitudinal LV strain had all worsened.    CTA of chest 11/14/2023 demonstrating confluent soft tissue mass possibly measuring larger in current study at 6.5 x 5.0 previously 4.9 x 4.8, left supraclavicular node to decrease in size measuring 1.1 cm previously 1.4 cm, extensive bilateral interstitial and groundglass infiltrates.    The patient is seen 11/26/2023.  As per the discharge we are requested to have a BMP and CMP assessed.  She is seen with her son and daughter in a lengthy conversation held about her recent hospitalization with CHF-cardiomyopathy felt elicited, in part, from her targeted therapy with her Mekinist and Tafinlar discontinued altogether.    The patient is relatively stable currently having continued her diuretics and to be seen in cardiology in follow-up.  At the time of this dictation her CBC is found to be essentially normal with mild leukocytosis, CMP normal except for mildly elevated glucose at 125 and BNP reduced to 6126 from 9763.  She feels well with no additional shortness of breath chest pain lower extremity edema.  In reviewing the the possible treatment options she is next best treated with single agent chemotherapy moving to [facility].    The patient proceeded to treatment teaching and seen back 12/4/2023.  She is ready to proceed with chemotherapy with fortunately a recent good performance status still in place.        ONCOLOGY HISTORY:      The patient is an 83-year-old female with the below medical history including chronic obstructive pulmonary disease who was seen in the [facility] multidisciplinary thoracic oncology clinic July 1, 2021.  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      She had undergone a CT of the chest at [facility] 6/16/2021 showing postsurgical changes in the left chest from right upper lobectomy, 8 mm irregular nodule medial segment of the right lower lobe and diffuse changes of emphysema with fibrotic changes in the periphery, no suspicious hilar or mediastinal nodes, no pleural effusion.  New finding was suspicious for new malignancy with the patient felt to be a poor candidate for surgical resection.  A PET CT and PFTs were requested thereafter.  The PET/CT demonstrated ill-defined FDG avid soft tissue thickening left aspect mediastinum within the operative bed, 1 cm hypermetabolic pulmonary nodule right lower lobe and asymmetric thickening patchy uptake involving the right base of tongue.  There is subtle uptake within the L1 vertebral body which is indeterminate and a sub-6 mm pulmonary nodule of right lung and subcentimeter hypodense hepatic lesion which was below PET resolution.       The patient's case was discussed in thoracic conference 7/22/2021 with consideration of the patient undergoing L1 vertebral body MRI, confirmatory biopsy left mediastinal and assessment by ENT (Dr. Caballero) who had worked with the patient previously.  She, incidentally, indicates that radiation therapy was given apparently intraoperatively at her recent surgery?  She still has issues with difficulty chewing and swallowing post procedures and was to be followed up with Dr. Caballero in the near future as planned.                                                            She was seen in the thoracic clinic on the same day with plans to proceed with MRI of the lumbar spine, biopsy left mediastinal nodular area of potential disease and follow-up of her ENT  assessment as well as undergo a guardant 360 assessment in our office.  She underwent the biopsy 8/13/2021 found to be consistent with invasive moderately differentiated adenocarcinoma with PD-L1 TPS score 40%.  MRI of the lumbar spine revealed no evidence of metastatic disease though the patient did have multilevel degenerative disease throughout the lumbar spine.  She had an office follow-up with Dr. 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  She underwent laryngoscopy 8/18/2021 with right base of tongue without evidence of lesions or masses in the region.     She was seen by Dr. Hernandez 8/19/2021 and, her findings, had been presented in lung conference.  Her findings were consistent with 2 separate lesions including a nodule in the superior segment of the right lower lobe and a mass in the upper portion of the left chest with the left chest lesion biopsy positive for adenocarcinoma.  The consensus was that these were to separate-synchronous primaries.  Stereotactic radiation each lesions were felt to be the appropriate way to proceed and the patient was referred to radiation therapy.     The patient is seen in office 8/23/2021 agreeable to the radiation therapy as well as additional assessments including David molecular assessment of her biopsy.  Again her guardant 360 is currently pending and we would follow her after she completes radiation therapy with subsequent scans.    She was able to proceed with SBRT to the right lung at primary #1 with 5 treatments at 1000 cGy per fraction in the left lung primary similarly at 1000 cGy per fraction x5.  Her treatment ranged between 8/31-9/13/2021.  She is now scheduled for follow-up 11/23/2021.    The patient subsequent genomic testing is discussed with her as she is is seen back 9/23/2021.  Her guardant 360 did not determine any new abnormalities but her  Caris-MI profile-does reveal sensitivity to immunotherapy as well as a BRAF mutation.  This could be extremely important as we follow the patient from this point.  Fortunately she is feeling extremely well and quite pleased about her treatments.    Patient had subsequent PET/CT 11/23/2021 demonstrates decrease in size and avidity of the previously noted intensely FDG avid nodules left lobectomy operative site and right lower lobe.  Surrounding groundglass and pulmonary opacification is consistent with postradiation changes, a few new subcentimeter pulmonary nodules are present in the right upper lobe and indeterminant, stable subcentimeter hepatic lesion is below PET resolution no other findings of FDG-avid disease are seen in neck abdomen or pelvis.  The patient seen in office 12/1/2021 with a relatively good performance status stating she is not having any new symptoms and very pleased about her results on her PET/CT.  She realizes we'll have to follow this further but subsequent scans in 6 months.  She is also hoping to see an oral surgeon that previously helped her-Dr. Wu.    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  There is evolution of presumed postradiation changes in the right midlung with soft tissue mass within the left lumpectomy operative bed minimally decreased in size.  There is a sub-6 mm nodule in the right upper lobe not definitively seen, indeterminate and an indeterminate left renal lesion at 1.5 cm unchanged from 7/13/2021.  The patient is currently scheduled to see Dr. Dowell on 6/23/2022.  She is feeling well without any additional symptoms.    The patient required admission 10/14 - 10/18/2022 presenting with shortness of breath and cough that was nonproductive.  She had been on oral antibiotics and did not improved and  was admitted for therapy for apparent pneumonia.  This eventually led to bronchoscopy after initial CT revealed postsurgical changes, new masslike 6.7 cm area of consolidation anterior left lung raising concern for potential malignancy and a follow-up PET/CT planned.  Bronchoscopy and biopsy left lower lung failed to show evidence of malignancy.  The patient's PET/CT, however, demonstrated an irregular pleural-based soft tissue density thickening in the left upper lobe that was intensely FDG avid new from 6/8/2022 thought to be a malignant recurrence with spread into the left lung parenchyma.  There is intensely pleural-based avidity within the left lower lobe thought to be metastatic disease with postradiation of the left apex as well.  There is a small focus of uptake in the right hilum grossly unchanged in size and post radiation changes in the right lower lobe.  There is indeterminate density left renal that was grossly unchanged.  The patient is seen back in office 11/15/2022 indicating that she still has chest discomfort that is slowly worsening and that she has a chronic paroxysmal cough but has not improved.  We discussed that she very likely has recurrent disease and plan to proceed with a guardant 360 examination initially as we determine whether we should try to proceed with therapy particularly immunotherapy versus targeted therapy recognizing the above findings.    Patient's guardant 360 returned as again significant for BRAF V600E and the potential use of BRAF inhibitor/MET inhibitor dabrafenib and trametinib.  Additional information [id number] and use of alpelisib.    Unfortunately she required admission 11/28-12/1 with worsening back pain.  Fortunately she did not demonstrate evidence of metastatic disease but did have moderate degenerative changes which could cause radiculopathy.  Medications were altered to include subsequently MSR 15 mg p.o. every 12 hours, Norco as needed.    The patient now presents  back 12/14/2022 to initiate therapy- initially with immunotherapy considering Caris study with PD-L1 TPS of 70% on 22 C3 and 28-8 assays.    Patient seen with her  and we discussed pain medications and adjustments thereafter and that she stopped taking MSIR having only a short supply and having to use Norco more frequently.  She is willing to initiate Keytruda today we have discussed that considering her genomics treatment versus her BRAF mutation is also an option.    C1 Keytruda 12/14/2022    Patient is seen back 1/4/2023 in follow-up due for cycle 2 Keytruda.  Patient states that since receiving her first cycle she has had decreased appetite and decreased energy.  She has not been able to bring herself to eat much and she has notably lost 7 pounds.  She has also been struggling with constipation in relation to ongoing narcotics for pain control.  She has been taking senna S2 tabs twice a day.  We discussed adding daily MiraLAX.    Patient did just last week meet with dietitian, Zoila Clinton, to discuss ways to improve caloric intake.  She has not been able to implement any of these things yet though.    The patient is next seen 1/25/2023 with mild weight gain.  She is seen with family member both indicating that she has actually felt somewhat better in terms of performance status and general function.  We have discussed proceeding with a third cycle treatment and reevaluating by scan in 2 weeks.    The patient proceeded with treatment 1/25/2023 and underwent follow-up CT chest abdomen pelvis 2/8/2023 demonstrating small pericardial effusion that is decreased in size from 11/20/2022, ill-defined consolidation and pleural-based thickening in the left apex and upper lung has reduced slightly, additional index nodule soft tissue in the aspect the pericardium has not changed substantially, no evidence of metastatic disease in the abdomen pelvis.    The patient is seen with her daughter 2/15/2023 both indicating  that she has definitely improved, stable weight, appetite and performance status.  She is also using her pain medication much less frequently and wonders whether she might begin to taper from her twice a day MS Contin.    Patient is seen back 3/8/2023 due for ongoing pembrolizumab.  She continues on low-dose prednisone 10 mg daily and has noted significant improvement in her energy and appetite with this.  She is reluctant to taper this any further we discussed that it is fine for her to stay on daily dosing.    She did try to taper her pain medication going down to just MS Contin 15 mg every 12 hours while holding her hydrocodone.  However over the last few days she has had some intermittent pain in the left upper back alleviated with hydrocodone 2-3 times a day.  She currently is denying any pain.  Monitor.    She is next evaluated 3/29/2023 for ongoing Keytruda.  Evidently her pain medication was sent to the wrong pharmacy and will need to be represcribed today-long-acting MS Contin.  Otherwise she is doing reasonably well at present.    Patient seen 5/31/2023 with gradual development of left shoulder and left scapular pain.  Concurrent CT chest, abdomen and pelvis demonstrate interval increasing consolidation left upper lobe with extension anterior to the left upper ribs with sclerosis anterior left second rib.  This is suspicious for worsening malignancy.  Plans were made for subsequent PET/CT where the patient's pain medications were adjusted.PET/CT 6/5/2023 reveals progression of disease in left upper thorax, left supraclavicular adenopathy new metastasis left posterior fourth ribs, no evidence of metastatic disease in the abdomen or pelvis.    The patient is seen with her son and daughter 6/12/2023 and it is clear that she is not developing a Pancoast like syndrome with progression of her malignancy in the left upper thorax and associated symptoms.  We have discussed discontinuance of her immunotherapy and  moving to targeted therapy with dabrafenib and trametinib as we also request radiation therapy repeat consultation and try to manage her pain more effectively.    Patient seen 7/5/2023 with plans to start palliative radiotherapy and to initiate concurrently dabrafenib and trametinib.    As a result of toxicity (nausea and vomiting) the patient was taken off of dabrafenib and trametinib when seen 7/21/2023, given additional IV hydration and further supportive care.  Plans were made for follow-up on recovery to determine as to whether to redose the medications.    Unfortunately she required admission 7/25-30/2023 with failure to thrive.  Subsequent assessments including blood cultures were negative and patient was treated briefly with IV antibiotic therapy, started PT and OT, medications adjusted for hypotension and treated symptomatically for nausea and vomiting.  She was able to improve enough to be discharged home as she continued radiation therapy started 7/17/2023 and completed 7/31/2023 to the left chest wall.    She is next seen back 8/4/2023.  We have reviewed that she had been on dabrafenib and trametinib at 150 mg p.o. every 12 hours and 2 mg p.o. daily respectively and dosing could be changed considerably such as 50 mg twice daily and 0.5 mg daily.  Determination made to have her undergo repeat scans at 4 weeks and review her response to radiation therapy as we make application for lower dose targeted therapy, addition of Remeron p.o. nightly, tapering of her MS Contin to daily rather than twice daily, use of low-dose Ativan to undergo scans.    Subsequent scans 8/25/2023, fortunately, with stable left apical mass with mediastinal chest wall involvement unchanged 1.4 cm supraclavicular lymph node.  No new findings of metastatic disease.    The patient is seen back 9/8/2023.  Fortunately she is improved dramatically off therapy and is gratified that his studies have not worsened in any substantial way.  We  have discussed both her scan reports and echocardiogram that does not show significant reduction of her ejection fraction.  As result of her current stable status we have asked her to restart Mekinist at 0.5 mg daily and Tafinlar at 50 mg twice daily to be advanced as tolerated.  Patient seen 10/16/2023 with follow-up chest x-ray planned and CT of chest abdomen pelvis at 5 weeks -approximately 3 months of therapy.    The patient required admission 11/14 - 11/16/2023 having presented with shortness of breath and found to be pulmonary edema with CBC, lactate and procalcitonin all normal.  There was a concern that her chemotherapy agents could have produced her cardiomyopathy and these were stopped 11/14/2023.  She was diuresed and echocardiogram demonstrated LV SF mildly decreased at 39.2% with GLS of -11.1%, left ventricular cavity mildly dilated, left ventricular wall thickness consistent with mild concentric hypertrophy, left ventricular global hypokinesis, aortic valve abnormal with moderate calcification, valve was trileaflet, trace tricuspid valve regurgitation with right VSP at less than 35 mmHg.  The findings for LV systolic function, diastolic function and global longitudinal LV strain had all worsened.    CTA of chest 11/14/2023 demonstrating confluent soft tissue mass possibly measuring larger in current study at 6.5 x 5.0 previously 4.9 x 4.8, left supraclavicular node to decrease in size measuring 1.1 cm previously 1.4 cm, extensive bilateral interstitial and groundglass infiltrates.      The patient is seen 11/26/2023.  As per the discharge we are requested to have a BMP and CMP assessed.  She is seen with her son and daughter in a lengthy conversation held about her recent hospitalization with CHF-cardiomyopathy felt elicited, in part, from her targeted therapy with her Mekinist and Tafinlar discontinued altogether.    The patient is relatively stable currently having continued her diuretics and to be  seen in cardiology in follow-up.  At the time of this dictation her CBC is found to be essentially normal with mild leukocytosis, CMP normal except for mildly elevated glucose at 125 and BNP reduced to 6126 from 9763.  She feels well with no additional shortness of breath chest pain lower extremity edema.  In reviewing the the possible treatment options she is next best treated with single agent chemotherapy moving to [place].    Patient seen 12/4/2023 with plans to initiate [place] chemotherapy-cycle 1 day 1        Past Medical History:   Diagnosis Date    Allergic rhinitis     Aneurysm     Asthma     Cancer of floor of mouth 2014    Cerebral aneurysm     COPD (chronic obstructive pulmonary disease)     COVID 2020    Esophageal reflux     History of adenocarcinoma of lung     History of anemia     History of carcinoma in situ of skin     History of lung cancer     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 from [facility]    History of squamous cell carcinoma     Tongue    Hyperlipidemia     Hypertension     since early 60s    Intractable back pain 11/29/2022    Low vitamin B12 level     Lung cancer     Systolic CHF, acute 11/14/2023    Thyromegaly     UTI (urinary tract infection)     Vitamin D deficiency         Past Surgical History:   Procedure Laterality Date    BRONCHOSCOPY Bilateral 10/17/2022    Procedure: BRONCHOSCOPY WITH FLUORO with biopsy and BAL;  Surgeon: India Flores MD;  Location: [facility] ENDOSCOPY;  Service: Pulmonary;  Laterality: Bilateral;  PRE/POST - lung mass    CHOLECYSTECTOMY  1999    COLONOSCOPY      EMBOLIZATION CEREBRAL Left 06/29/2022    Procedure: 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;  Surgeon: Bradley Dowell MD;  Location: [facility] HYBRID OR 18/19;  Service: Interventional Radiology;  Laterality: Left;    EYE SURGERY  11/24/2020    Took a muscle out of right eye    GLOSSECTOMY PARTIAL      less than one half tongue    LUNG  SURGERY  2012    ORAL LESION EXCISION/BIOPSY      June and August 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    TUBAL ABDOMINAL LIGATION  1970    VENOUS ACCESS DEVICE (PORT) INSERTION N/A 12/12/2022    Procedure: MEDIPORT PLACEMENT;  Surgeon: Jason Villaseñor MD;  Location: [facility] MAIN OR;  Service: Vascular;  Laterality: N/A;        Current Outpatient Medications on File Prior to Visit   Medication Sig Dispense Refill    atorvastatin (LIPITOR) 20 MG tablet TAKE 1 TABLET EVERY NIGHT (Patient taking differently: Take 1 tablet by mouth Every Night.) 90 tablet 1    cetirizine (zyrTEC) 10 MG tablet Take 1 tablet by mouth Daily.      Cholecalciferol (Vitamin D3) 50 MCG (2000 UT) tablet Take 1,000 Units by mouth Daily. HOLDING FOR DOS      Cyanocobalamin (VITAMIN B12 PO) Take 1,000 mcg by mouth Daily.      dexAMETHasone (DECADRON) 4 MG tablet Take 1 tablet oral twice a day for 3 consecutive days beginning the day before chemotherapy and continue for 6 doses.Take with food. 6 tablet 5    folic acid (FOLVITE) 1 MG tablet Take 1 tablet by mouth Daily. 30 tablet 2    folic acid (FOLVITE) 1 MG tablet Take 1 tablet by mouth Daily. Start at least 7 days prior to chemotherapy until at least 3 weeks after all chemotherapy. 30 tablet 6    furosemide (Lasix) 20 MG tablet Take 1 tablet by mouth Daily. 30 tablet 0    ipratropium (ATROVENT) 0.06 % nasal spray 2 sprays into the nostril(s) as directed by provider 4 (Four) Times a Day. 15 mL 0    metoprolol succinate XL (TOPROL-XL) 25 MG 24 hr tablet Take 1 tablet by mouth Daily.      mirtazapine (REMERON) 7.5 MG tablet Take 1 tablet by mouth Every Night. 30 tablet 1    montelukast (SINGULAIR) 10 MG tablet Take 1 tablet by mouth Every Night.      ondansetron (ZOFRAN) 8 MG tablet Take 1 tablet by mouth 3 (Three) Times a Day As Needed for Nausea or Vomiting. 30 tablet 5    potassium chloride (K-DUR,KLOR-CON) 10 MEQ CR tablet TAKE 2 TABLETS BY MOUTH DAILY 60 tablet 1    predniSONE (DELTASONE) 10 MG  "tablet Take 1 tablet by mouth Daily.      promethazine-dextromethorphan (PROMETHAZINE-DM) 6.25-15 MG/5ML syrup Take 5 mL by mouth 4 (Four) Times a Day As Needed for Cough. 180 mL 0    zolpidem (AMBIEN) 5 MG tablet TAKE 1 TABLET BY MOUTH EVERY NIGHT AT BEDTIME AS NEEDED FOR SLEEP 30 tablet 0     No current facility-administered medications on file prior to visit.        ALLERGIES:    Allergies   Allergen Reactions    Sulfa Antibiotics Rash        Social History     Socioeconomic History    Marital status:    Tobacco Use    Smoking status: Former     Types: Cigarettes     Quit date: 2015     Years since quittin.8     Passive exposure: Never    Smokeless tobacco: Never    Tobacco comments:     less than a pack per day, no smoking since , Quit 2015   Vaping Use    Vaping Use: Never used   Substance and Sexual Activity    Alcohol use: Not Currently     Comment: Socially -A few times a month    Drug use: No    Sexual activity: Defer     Family History   Problem Relation Age of Onset    Ovarian cancer Mother          at age 27    No Known Problems Father     Ovarian cancer Sister     Colon cancer Brother     No Known Problems Other     Malig Hyperthermia Neg Hx         Review of Systems  ROS as per HPI     Objective      Vitals:    23 1134   BP: 131/71   Pulse: 87   Resp: 16   Temp: 97.7 °F (36.5 °C)   TempSrc: Temporal   SpO2: 94%   Weight: 49.2 kg (108 lb 8 oz)   Height: 160 cm (62.99\")   PainSc: 0-No pain                 2023    11:35 AM   Current Status   ECOG score 0      Physical Exam  Vitals reviewed.   Constitutional:       General: She is not in acute distress.     Appearance: Normal appearance. She is well-developed.   HENT:      Head: Normocephalic and atraumatic.   Eyes:      Pupils: Pupils are equal, round, and reactive to light.   Cardiovascular:      Rate and Rhythm: Normal rate and regular rhythm.      Heart sounds: Normal heart sounds. No murmur heard.  Pulmonary:    "   Effort: Pulmonary effort is normal. No respiratory distress.      Breath sounds: Normal breath sounds. No wheezing, rhonchi or rales.   Abdominal:      General: Bowel sounds are normal. There is no distension.      Palpations: Abdomen is soft.   Musculoskeletal:         General: No swelling. Normal range of motion.      Cervical back: Normal range of motion.   Skin:     General: Skin is warm and dry.      Findings: No rash.   Neurological:      Mental Status: She is alert and oriented to person, place, and time.         Physical exam preformed and reviewed. No changes noted from previous exam. Henry Escobar MD ; 12/04/2023      RECENT LABS:  Results from last 7 days   Lab Units 12/04/23  1123 11/27/23  1255   WBC 10*3/mm3 16.40* 11.94*   NEUTROS ABS 10*3/mm3 15.32* 10.59*   HEMOGLOBIN g/dL 12.0 12.8   HEMATOCRIT % 37.3 39.9   PLATELETS 10*3/mm3 258 247         Results from last 7 days   Lab Units 12/04/23  1123 11/27/23  1255   SODIUM mmol/L 148* 144   POTASSIUM mmol/L 4.0 4.2   CHLORIDE mmol/L 109* 105   CO2 mmol/L 27.2 28.5   BUN mg/dL 24* 16   CREATININE mg/dL 0.78 0.73   CALCIUM mg/dL 9.2 9.1   ALBUMIN g/dL 3.5 3.6   BILIRUBIN mg/dL 0.4 0.3   ALK PHOS U/L 71 76   ALT (SGPT) U/L 15 14   AST (SGOT) U/L 24 20   GLUCOSE mg/dL 133* 125*                   Assessment & Plan     1.  Carcinoma of the lung  May 2015, status post previous left upper lobectomy for carcinoma of the lung.    2.  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   She underwent laryngoscopy 8/18/2021 with right base of tongue without evidence of lesions or masses in the region.    3.  Moderately differentiated adenocarcinoma of the lung.  CT of the chest 6/16/2021 demonstrated postsurgical changes, 8 mm irregular nodule medial segment of right lower lobe and diffuse changes of emphysema.    She is seen in  thoracic clinic with findings suspicious for new malignancy and concern of the patient being a poor operative candidate.    7/13/2021 PET CT demonstrated ill-defined avidity and soft tissue left aspect of the mediastinum, 1 cm hypermetabolic pulmonary nodule and asymmetric thickening involving the right base of tongue as well as subtle uptake in the L1 vertebral body.    This patient's case was discussed in thoracic clinic and plans were made to request an MRI of the lumbar spine, obtain a biopsy of the mediastinal involvement of the left had the patient reviewed by ENT.  Biopsy 8/13/2021 found to be consistent with invasive moderately differentiated adenocarcinoma with PD-L1 TPS score 40%.    7/24/2021 MRI of the lumbar spine revealed no evidence of metastatic disease though the patient did have multilevel degenerative disease throughout the lumbar spine.    Her findings were consistent with 2 separate lesions including a nodule in the superior segment of the right lower lobe and a mass in the upper portion of the left chest with the left chest lesion biopsy positive for adenocarcinoma.  The consensus was that these were to separate-synchronous primaries.  Stereotactic radiation each lesions were felt to be the appropriate way to proceed and the patient was referred to radiation therapy.  The patient was referred for radiation therapy and she was able to proceed with SBRT to the right lung at primary #1 with 5 treatments at 1000 cGy per fraction in the left lung primary similarly at 1000 cGy per fraction x5.  Her treatment ranged between 8/31-9/13/2021.    Her guardant 360 did not determine any new abnormalities but her Caris-MI profile-does reveal sensitivity to immunotherapy as well as a BRAF mutation.  PET/CT 11/23/2021 demonstrates decrease in size and avidity of the previously noted intensely FDG avid nodules left lobectomy operative site and right lower lobe.  Surrounding groundglass and pulmonary  opacification is consistent with postradiation changes, a few new subcentimeter pulmonary nodules are present in the right upper lobe and indeterminant, stable subcentimeter hepatic lesion is below PET resolution no other findings of FDG-avid disease are seen in neck abdomen or pelvis.  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  There is evolution of presumed postradiation changes in the right midlung with soft tissue mass within the left lumpectomy operative bed minimally decreased in size.  There is a sub-6 mm nodule in the right upper lobe not definitively seen, indeterminate and an indeterminate left renal lesion at 1.5 cm unchanged from 7/13/2021.  Admission 10/14 - 10/18/2022 presenting with shortness of breath and cough that was nonproductive.  She had been on oral antibiotics and did not improved and was admitted for therapy for apparent pneumonia.  This eventually led to bronchoscopy after initial CT revealed postsurgical changes, new masslike 6.7 cm area of consolidation anterior left lung raising concern for potential malignancy and a follow-up PET/CT planned.   Bronchoscopy and biopsy left lower lung failed to show evidence of malignancy.    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  There is intensely pleural-based avidity within the left lower lobe thought to be metastatic disease with postradiation of the left apex as well.  There is a small focus of uptake in the right hilum grossly unchanged in size and post radiation changes in the right lower lobe.  There is indeterminate density left renal that was grossly unchanged.    Patient's guardant 360 returned as again significant for BRAF V600E  and the potential use of BRAF inhibitor/MET inhibitor dabrafenib and trametinib.  Additional information [id number] and use of alpelisib.  The patient now presents back 12/14/2022 to initiate therapy- initially with immunotherapy considering Caris study with PD-L1 TPS of 70% on 22 C3 and 28-8 assays.  Single agent Keytruda initiated 12/14/2022 2/8/2023 CT of the chest, abdomen pelviswith consolidation and masslike pleural thickening in the left apex and upper lung index areas have decreased somewhat.  There is no evidence of metastatic disease otherwise and a few indeterminate left renal lesions are stable.  After lengthy discussion with the patient and her daughter as to the stability to mild improvement with immunotherapy it is agreed that we would continue treatment at this point.  Proceed today, 4/19/2023 with cycle 7 pembrolizumab which is continued every 3 weeks  The patient proceeded to 8 cycles of pembrolizumab and underwent follow-up chest CT chest, abdomen pelvis revealing evolution of dense consolidation left upper lobe and extension of soft tissue mass anterior to left upper ribs with increase sclerosis anterior left second rib.  This was concerning for progression of disease versus radiation change and the patient was scheduled for subsequent PET/CT.  PET/CT 6/5/2023  reveals progression of disease in left upper thorax, left supraclavicular adenopathy new metastasis left posterior fourth ribs, no evidence of metastatic disease in the abdomen or pelvis.  Patient seen 6/12/2023 with plans to move her Norco to every 4 hours, discontinue Keytruda, make application for dabrafenib and trametinib at 150 mg p.o. every 12 hours and 2 mg p.o. daily respectively.  Patient referred for radiation therapy consultation concurrently.  Last dose of Keytruda 5/31/2023.  6/23/2023: Patient seen for triage visit.  She has not yet started dabrafenib or trametinib, she has not yet received the medications.  Unfortunately she  has been experiencing uncontrolled nausea/vomiting as further detailed below.   Patient seen 6/27/2023 reporting that her nausea has resolved.  She was unable to complete the MRI of the brain however Dr. Escobar did review the images patient did have and there was no evidence of edema or metastatic disease.  Patient can start her new oral medication once she receives the medication.  Patient seen 7/5/2023 with plans to start palliative radiotherapy x10 fractions and initiate dabrafenib and trametinib as soon as medications received.  7/17/2023 patient initiated radiation with 10 fractions planned.  Patient has received dabrafenib and trametinib.  Brought in for triage visit due to nausea associated with dosing.  She is premedicating with ondansetron however only waiting 15 minutes.  We discussed today that she needs to wait at least 30 minutes to 1 hour prior to taking the dabrafenib and trametinib.  The patient will do so.  We discussed she could also take this again if she feels the need 8 hours later for nausea control.  If she is having breakthrough nausea then she should call our office.  I have encouraged her to utilize electrolyte drinks.  She will get 1L normal liter normal saline in the office today.She was not nauseas while in the office so we did not give additional nausea medication.  We will have her return in 1 week repeat labs, review by nurse practitioner, and possible IV fluids.  I stressed the importance of calling sooner if she finds that the premedication is not controlling her nausea at which time we would have her come in for additional IV fluids and evaluation.  She is continuing daily radiation this week.  Case was discussed with Dr. Escobar today.  7/21/2023: Patient returns for short interval follow-up due to very poor appetite and sleeping the majority of the day.  Her nausea has been improved with premedicating 30 minutes prior to dabrafenib and trametinib.  She however has been sleeping  the majority of the day and is not eating when she is awake.  She does report taking ensures but she is only sipping on these.  Have given her samples of some of the office today and encouraged her to drink when while she is here.  Her performance status continues to decline and her daughter states that she was fixing dinner nightly just 2 weeks ago.  With performance status of 3 today I discussed the case with Dr. Escobar and we will hold her dabrafenib and  TRAMETINIB.  Her liver enzymes are elevated today as well.  We will monitor this next week and have her evaluated by Dr. Escobar at which time we could consider restarting her dabrafenib and trametinib at reduced doses pending performance status and laboratory evaluation.  Unfortunately she required admission 7/25-30/2023 with failure to thrive.  Subsequent assessments including blood cultures were negative and patient was treated briefly with IV antibiotic therapy, started PT and OT, medications adjusted for hypotension and treated symptomatically for nausea and vomiting.  She was able to improve enough to be discharged home as she continued radiation therapy started 7/17/2023 and completed 7/31/2023 to the left chest wall.  She is next seen back 8/4/2023.  Lengthy discussion as to reevaluation   Plans made for CT chest, abdomen, pelvis in 4 weeks  Patient seen 8/21/2023 with complaints of worsening fatigue and shortness of breath.  Patient scheduled for follow up CT scans this Friday. Lungs clear on exam,  Sats 97%.  Hgb stable at 11.7.  Will check BNP today.  Discussed may be related to disease and will need to see what scan shows.   Subsequent scans 8/25/2023, fortunately, with stable left apical mass with mediastinal chest wall involvement unchanged 1.4 cm supraclavicular lymph node.  No new findings of metastatic disease.  The patient is seen back 9/8/2023.  Fortunately she is improved dramatically off therapy and is gratified that his studies have not  worsened in any substantial way.  We have discussed both her scan reports and echocardiogram that does not show significant reduction of her ejection fraction.  As result of her current stable status we have asked her to restart Mekinist at 0.5 mg daily and Tafinlar at 50 mg twice daily  9/22/2023 tolerating Mekinist 0.5 mg daily and tafinlar 50 mg twice daily quite well. Continues on prednisone 10 mg daily which will now be reduced to 5 mg daily when seen 10/16/2023  Plans made to continue current dosing of Mekinist and Tafinlar and repeat evaluation with chest x-ray 10/16 and repeat CT chest abdomen pelvis in 5 weeks, MD in 6 weeks.  The patient required admission 11/14 - 11/16/2023 having presented with shortness of breath and found to be pulmonary edema with CBC, lactate and procalcitonin all normal.  There was a concern that her chemotherapy agents could have produced her cardiomyopathy and these were stopped 11/14/2023.  She was diuresed and echocardiogram demonstrated LV SF mildly decreased at 39.2% with GLS of -11.1%, left ventricular cavity mildly dilated, left ventricular wall thickness consistent with mild concentric hypertrophy, left ventricular global hypokinesis, aortic valve abnormal with moderate calcification, valve was trileaflet, trace tricuspid valve regurgitation with right VSP at less than 35 mmHg.  The findings for LV systolic function, diastolic function and global longitudinal LV strain had all worsened.  CTA of chest 11/14/2023 demonstrating confluent soft tissue mass possibly measuring larger in current study at 6.5 x 5.0 previously 4.9 x 4.8, left supraclavicular node to decrease in size measuring 1.1 cm previously 1.4 cm, extensive bilateral interstitial and groundglass infiltrates.  The patient is seen 11/26/2023.  As per the discharge we are requested to have a BMP and CMP assessed.  She is seen with her son and daughter in a lengthy conversation held about her recent hospitalization  with CHF-cardiomyopathy felt elicited, in part, from her targeted therapy with her Mekinist and Tafinlar discontinued altogether.  The patient is relatively stable currently having continued her diuretics and to be seen in cardiology in follow-up.  At the time of this dictation her CBC is found to be essentially normal with mild leukocytosis, CMP normal except for mildly elevated glucose at 125 and BNP reduced to 6126 from 9763.  She feels well with no additional shortness of breath chest pain lower extremity edema.  In reviewing the the possible treatment options she is next best treated with single agent chemotherapy moving to Alimta.  Patient proceeded through teaching and presents back 12/4/2023 to initiate chemotherapy with Alimta-cycle 1 day 1      4.  Worsening back pain  Admission 11/28/2022 through 12/1/2022.  MRI of the cervical and thoracic spine fortunately failed to demonstrate metastatic disease.  Moderate degenerative changes noted which could cause radiculopathy.  Medication altered to include MS Contin 15 mg every 12 hours and Norco for breakthrough pain  She reports today her pain is adequately controlled  Patient with increasing pain 5/31/2023 with pain level of 6.  MS Contin was increased to 50 mg p.o. every 8 hours and Norco 10/325 #101 p.o. every 6 hours to move to every 4 hours as needed-E scribed to pharmacy  Patient seen 6/12/2023 with Norco moved to every 4 hours.  6/23/2023: Seen for triage visit.  Has been using oxycodone 20 mg every 3 hours as needed for pain.  Reports she has not been using the hydrocodone 10/325.  Was experiencing dizziness, so reduced her oxycodone use to half a tablet every 3 hours as needed.  Reports pain is uncontrolled during the night so she is having to wake at night time to take pain medicine.  They are questioning resuming long-acting pain medication, as she is waking throughout the night to take pain medicine.  She has already been prescribed MS Contin and  has this available at home, did let her know it would be okay to resume this.  Assess 7/5/2023 with pain reduced to 2/10.  Plan to continue current therapy  Darlene Pfeiffer reports a pain score of 0.  Given her pain assessment as noted, treatment options were discussed and the following options were decided upon as a follow-up plan to address the patient's pain: continuation of current treatment plan for pain. Currently not requiring pain medication.   Refilled both the patient's Remeron and Ambien 10/16/2023    5.  Poor appetite and malnutrition  Placed on prednisone 10 mg daily 1/4/2023 with significant improvement in her symptoms  Weight is stable today at just below 106 pounds  Patient assessed 6/12/23 with possible gummy therapy.  Stable performance status including weight and appetite 7/5/2023  Weight has declined as of 7/17/2023 due to nausea and vomiting that started this past weekend.  After further discussion the patient did stop her prednisone 10 mg while she was not feeling well.  We discussed today the importance of continuing this as it can help with her nausea and appetite and therefore she will restart tomorrow.  7/21/2023: Weight is stable today though albumin is declining at 3.  Patient is sleeping the majority of the day and not eating.  Yesterday she had a small piece of chicken and that is all.  She states she is drinking ensures however her daughter thinks that she is just sipping on these and not completing them.  Hopefully holding her dabrafenib and trametinib will be helpful with her being awake more and appetite.  I encouraged her to make sure she is taking her prednisone daily at 10 mg daily.  Remeron 7.5 mg p.o. nightly E scribed to pharmacy  9/22/2023 weight is up to 98 pounds.  Further improvement in weight 10/16/2020 3 to 104 pounds, continue Remeron at current dose, prednisone reduced to 5 mg daily.    6.  Uncontrolled nausea/vomiting  started after discontinuing prednisone and starting  THC Gummies.  Started to experience dizziness with the THC Gummies.  Discontinue THC Gummies and dizziness improved, however she has remained nauseated now.  She has been utilizing Zofran with improvement, however today was unable to keep Zofran tablets down due to nausea/vomiting.  She will resume prednisone 10 mg daily.  She will receive 1 L IV normal saline in clinic today 10 mg IV Compazine.  We will also send prescription for Phenergan suppository, in case she is not able to keep Zofran down in the future.  Will also send for stat MRI brain since patient is now experiencing uncontrolled nausea vomiting and has recently had progression of her cancer as seen on PET from 6/5/2023.  MRI brain was performed without contrast, even though contrast was ordered.  The patient also did not stay for entire exam to be completed.  Exam limited for assessment of metastatic disease.  Attempted to call patient to review results, however no answer, did leave detailed voicemail.  6/27/2023 patient reports that her nausea has resolved.  She did not complete the MRI however the images which were done showed no evidence of edema or metastatic disease.  Nausea reoccurred when seen on 7/17/2023 over this past weekend after initiation of dabrafenib and trametinib.  Patient was premedicating with ondansetron however only giving herself about 15 minutes and I encouraged her to give herself at least 30 minutes to 1 hour prior to taking the medications.  She will notify our office if this is not helpful.  7/21/2023: Premedication with ondansetron 30 minutes prior to dabrafenib and trametinib has been helpful.  Patient however has had some vomiting episodes directly after taking her medications where she has not had time to actually swallow them.  She denies difficulty swallowing however.  We will continue to monitor this.  10/16/2023 not an issue today.    7.  Hyperkalemia-  potassium 6/23/2023 3.1.  She has been having uncontrolled  nausea, we will hold off on starting oral potassium replacement as it is unlikely she will tolerate that at this time.  Will recommend she increase oral potassium in her diet.  6/27/2023 potassium 3.0.  Nausea has resolved.  Patient will be given 40 M EQ p.o. potassium in the office and she will be started on 20 mEq daily of p.o. potassium.  Assessed 7/5/2023 with potassium 4.4  7/17/2023 potassium normal at 3.7  7/21/2023: Potassium 3.1, patient not taking potassium supplements at home because she does not like the big pill.  Changed to potassium chloride 10 mill equivalents, 2 tablets every a.m. as these will be smaller.   9/22/2023 potassium is 3.7  Repeat assessment 11/27/2023 with potassium of 4.2    8.Acute systolic heart failure -cardiomyopathy   Secondary to chemotherapy drugs during admission 11/14-16/2023 with Mekinist and Tafinlar discontinued  Diuretics continued postdischarge, cardiology follow-up planned 12/6/2023    Plan:  Alimta cycle 1 day 1 plan 12/4/2023   2 weeks NP, CBC, consider restart chest post holidays for second cycle?  Henry Escobar MD  12/04/2023

## 2023-12-04 ENCOUNTER — INFUSION (OUTPATIENT)
Dept: ONCOLOGY | Facility: HOSPITAL | Age: 83
End: 2023-12-04
Payer: MEDICARE

## 2023-12-04 ENCOUNTER — OFFICE VISIT (OUTPATIENT)
Dept: ONCOLOGY | Facility: CLINIC | Age: 83
End: 2023-12-04
Payer: MEDICARE

## 2023-12-04 VITALS
BODY MASS INDEX: 19.22 KG/M2 | WEIGHT: 108.5 LBS | SYSTOLIC BLOOD PRESSURE: 131 MMHG | HEART RATE: 87 BPM | OXYGEN SATURATION: 94 % | DIASTOLIC BLOOD PRESSURE: 71 MMHG | RESPIRATION RATE: 16 BRPM | HEIGHT: 63 IN | TEMPERATURE: 97.7 F

## 2023-12-04 DIAGNOSIS — C34.11 MALIGNANT NEOPLASM OF UPPER LOBE OF RIGHT LUNG: Primary | ICD-10-CM

## 2023-12-04 DIAGNOSIS — Z45.2 FITTING AND ADJUSTMENT OF VASCULAR CATHETER: ICD-10-CM

## 2023-12-04 DIAGNOSIS — C34.11 MALIGNANT NEOPLASM OF UPPER LOBE OF RIGHT LUNG: ICD-10-CM

## 2023-12-04 LAB
ALBUMIN SERPL-MCNC: 3.5 G/DL (ref 3.5–5.2)
ALBUMIN/GLOB SERPL: 1.3 G/DL
ALP SERPL-CCNC: 71 U/L (ref 39–117)
ALT SERPL W P-5'-P-CCNC: 15 U/L (ref 1–33)
ANION GAP SERPL CALCULATED.3IONS-SCNC: 11.8 MMOL/L (ref 5–15)
AST SERPL-CCNC: 24 U/L (ref 1–32)
BASOPHILS # BLD AUTO: 0.03 10*3/MM3 (ref 0–0.2)
BASOPHILS NFR BLD AUTO: 0.2 % (ref 0–1.5)
BILIRUB SERPL-MCNC: 0.4 MG/DL (ref 0–1.2)
BUN SERPL-MCNC: 24 MG/DL (ref 8–23)
BUN/CREAT SERPL: 30.8 (ref 7–25)
CALCIUM SPEC-SCNC: 9.2 MG/DL (ref 8.6–10.5)
CHLORIDE SERPL-SCNC: 109 MMOL/L (ref 98–107)
CO2 SERPL-SCNC: 27.2 MMOL/L (ref 22–29)
CREAT SERPL-MCNC: 0.78 MG/DL (ref 0.6–1.1)
DEPRECATED RDW RBC AUTO: 46 FL (ref 37–54)
EGFRCR SERPLBLD CKD-EPI 2021: 76.4 ML/MIN/1.73
EOSINOPHIL # BLD AUTO: 0 10*3/MM3 (ref 0–0.4)
EOSINOPHIL NFR BLD AUTO: 0 % (ref 0.3–6.2)
ERYTHROCYTE [DISTWIDTH] IN BLOOD BY AUTOMATED COUNT: 13.6 % (ref 12.3–15.4)
GLOBULIN UR ELPH-MCNC: 2.6 GM/DL
GLUCOSE SERPL-MCNC: 133 MG/DL (ref 65–99)
HCT VFR BLD AUTO: 37.3 % (ref 34–46.6)
HGB BLD-MCNC: 12 G/DL (ref 12–15.9)
IMM GRANULOCYTES # BLD AUTO: 0.08 10*3/MM3 (ref 0–0.05)
IMM GRANULOCYTES NFR BLD AUTO: 0.5 % (ref 0–0.5)
LYMPHOCYTES # BLD AUTO: 0.62 10*3/MM3 (ref 0.7–3.1)
LYMPHOCYTES NFR BLD AUTO: 3.8 % (ref 19.6–45.3)
MCH RBC QN AUTO: 29.9 PG (ref 26.6–33)
MCHC RBC AUTO-ENTMCNC: 32.2 G/DL (ref 31.5–35.7)
MCV RBC AUTO: 93 FL (ref 79–97)
MONOCYTES # BLD AUTO: 0.35 10*3/MM3 (ref 0.1–0.9)
MONOCYTES NFR BLD AUTO: 2.1 % (ref 5–12)
NEUTROPHILS NFR BLD AUTO: 15.32 10*3/MM3 (ref 1.7–7)
NEUTROPHILS NFR BLD AUTO: 93.4 % (ref 42.7–76)
NRBC BLD AUTO-RTO: 0 /100 WBC (ref 0–0.2)
PLATELET # BLD AUTO: 258 10*3/MM3 (ref 140–450)
PMV BLD AUTO: 9.9 FL (ref 6–12)
POTASSIUM SERPL-SCNC: 4 MMOL/L (ref 3.5–5.2)
PROT SERPL-MCNC: 6.1 G/DL (ref 6–8.5)
RBC # BLD AUTO: 4.01 10*6/MM3 (ref 3.77–5.28)
SODIUM SERPL-SCNC: 148 MMOL/L (ref 136–145)
WBC NRBC COR # BLD AUTO: 16.4 10*3/MM3 (ref 3.4–10.8)

## 2023-12-04 PROCEDURE — 25810000003 SODIUM CHLORIDE 0.9 % SOLUTION: Performed by: INTERNAL MEDICINE

## 2023-12-04 PROCEDURE — 3075F SYST BP GE 130 - 139MM HG: CPT | Performed by: INTERNAL MEDICINE

## 2023-12-04 PROCEDURE — 96409 CHEMO IV PUSH SNGL DRUG: CPT

## 2023-12-04 PROCEDURE — 25010000002 HEPARIN LOCK FLUSH PER 10 UNITS: Performed by: INTERNAL MEDICINE

## 2023-12-04 PROCEDURE — 99214 OFFICE O/P EST MOD 30 MIN: CPT | Performed by: INTERNAL MEDICINE

## 2023-12-04 PROCEDURE — 25010000002 PEMETREXED PER 10 MG: Performed by: INTERNAL MEDICINE

## 2023-12-04 PROCEDURE — 3078F DIAST BP <80 MM HG: CPT | Performed by: INTERNAL MEDICINE

## 2023-12-04 PROCEDURE — 25010000002 PEMETREXED 100 MG RECONSTITUTED SOLUTION 1 EACH VIAL: Performed by: INTERNAL MEDICINE

## 2023-12-04 PROCEDURE — 85025 COMPLETE CBC W/AUTO DIFF WBC: CPT

## 2023-12-04 PROCEDURE — 80053 COMPREHEN METABOLIC PANEL: CPT

## 2023-12-04 PROCEDURE — 1126F AMNT PAIN NOTED NONE PRSNT: CPT | Performed by: INTERNAL MEDICINE

## 2023-12-04 RX ORDER — SODIUM CHLORIDE 0.9 % (FLUSH) 0.9 %
10 SYRINGE (ML) INJECTION AS NEEDED
OUTPATIENT
Start: 2023-12-04

## 2023-12-04 RX ORDER — HEPARIN SODIUM (PORCINE) LOCK FLUSH IV SOLN 100 UNIT/ML 100 UNIT/ML
500 SOLUTION INTRAVENOUS AS NEEDED
Status: DISCONTINUED | OUTPATIENT
Start: 2023-12-04 | End: 2023-12-04 | Stop reason: HOSPADM

## 2023-12-04 RX ORDER — PREDNISONE 10 MG/1
1 TABLET ORAL DAILY
COMMUNITY
Start: 2023-11-29

## 2023-12-04 RX ORDER — SODIUM CHLORIDE 9 MG/ML
20 INJECTION, SOLUTION INTRAVENOUS ONCE
Status: CANCELLED | OUTPATIENT
Start: 2023-12-04

## 2023-12-04 RX ORDER — SODIUM CHLORIDE 0.9 % (FLUSH) 0.9 %
10 SYRINGE (ML) INJECTION AS NEEDED
Status: DISCONTINUED | OUTPATIENT
Start: 2023-12-04 | End: 2023-12-04 | Stop reason: HOSPADM

## 2023-12-04 RX ORDER — SODIUM CHLORIDE 9 MG/ML
20 INJECTION, SOLUTION INTRAVENOUS ONCE
Status: COMPLETED | OUTPATIENT
Start: 2023-12-04 | End: 2023-12-04

## 2023-12-04 RX ORDER — HEPARIN SODIUM (PORCINE) LOCK FLUSH IV SOLN 100 UNIT/ML 100 UNIT/ML
500 SOLUTION INTRAVENOUS AS NEEDED
OUTPATIENT
Start: 2023-12-04

## 2023-12-04 RX ADMIN — Medication 500 UNITS: at 12:50

## 2023-12-04 RX ADMIN — PEMETREXED DISODIUM 740 MG: 100 INJECTION, POWDER, LYOPHILIZED, FOR SOLUTION INTRAVENOUS at 12:37

## 2023-12-04 RX ADMIN — Medication 10 ML: at 12:50

## 2023-12-04 RX ADMIN — SODIUM CHLORIDE 20 ML/HR: 9 INJECTION, SOLUTION INTRAVENOUS at 12:37

## 2023-12-06 ENCOUNTER — TELEPHONE (OUTPATIENT)
Age: 83
End: 2023-12-06

## 2023-12-06 ENCOUNTER — HOSPITAL ENCOUNTER (OUTPATIENT)
Dept: CARDIOLOGY | Facility: HOSPITAL | Age: 83
Discharge: HOME | End: 2023-12-06
Admitting: INTERNAL MEDICINE
Payer: MEDICARE

## 2023-12-06 ENCOUNTER — OFFICE VISIT (OUTPATIENT)
Age: 83
End: 2023-12-06
Payer: MEDICARE

## 2023-12-06 VITALS
SYSTOLIC BLOOD PRESSURE: 110 MMHG | HEART RATE: 62 BPM | WEIGHT: 110 LBS | BODY MASS INDEX: 25.46 KG/M2 | HEIGHT: 55 IN | DIASTOLIC BLOOD PRESSURE: 75 MMHG | OXYGEN SATURATION: 98 %

## 2023-12-06 DIAGNOSIS — I42.8 NICM (NONISCHEMIC CARDIOMYOPATHY): ICD-10-CM

## 2023-12-06 DIAGNOSIS — I50.21 ACUTE HFREF (HEART FAILURE WITH REDUCED EJECTION FRACTION): Primary | ICD-10-CM

## 2023-12-06 DIAGNOSIS — C34.11 MALIGNANT NEOPLASM OF UPPER LOBE OF RIGHT LUNG: ICD-10-CM

## 2023-12-06 DIAGNOSIS — R94.30 ABNORMAL RESULT OF CARDIOVASCULAR FUNCTION STUDY, UNSPECIFIED: ICD-10-CM

## 2023-12-06 DIAGNOSIS — I10 ESSENTIAL HYPERTENSION: ICD-10-CM

## 2023-12-06 DIAGNOSIS — R06.02 SHORTNESS OF BREATH: ICD-10-CM

## 2023-12-06 DIAGNOSIS — Z79.899 LONG-TERM USE OF HIGH-RISK MEDICATION: ICD-10-CM

## 2023-12-06 LAB
ANION GAP SERPL CALCULATED.3IONS-SCNC: 13.5 MMOL/L (ref 5–15)
BASOPHILS # BLD AUTO: 0.02 10*3/MM3 (ref 0–0.2)
BASOPHILS NFR BLD AUTO: 0.2 % (ref 0–1.5)
BUN SERPL-MCNC: 29 MG/DL (ref 8–23)
BUN/CREAT SERPL: 40.8 (ref 7–25)
CALCIUM SPEC-SCNC: 9.5 MG/DL (ref 8.6–10.5)
CHLORIDE SERPL-SCNC: 107 MMOL/L (ref 98–107)
CO2 SERPL-SCNC: 25.5 MMOL/L (ref 22–29)
CREAT SERPL-MCNC: 0.71 MG/DL (ref 0.57–1)
DEPRECATED RDW RBC AUTO: 41.1 FL (ref 37–54)
EGFRCR SERPLBLD CKD-EPI 2021: 85.5 ML/MIN/1.73
EOSINOPHIL # BLD AUTO: 0 10*3/MM3 (ref 0–0.4)
EOSINOPHIL NFR BLD AUTO: 0 % (ref 0.3–6.2)
ERYTHROCYTE [DISTWIDTH] IN BLOOD BY AUTOMATED COUNT: 12.6 % (ref 12.3–15.4)
GLUCOSE SERPL-MCNC: 108 MG/DL (ref 65–99)
HCT VFR BLD AUTO: 35.1 % (ref 34–46.6)
HGB BLD-MCNC: 11.6 G/DL (ref 12–15.9)
IMM GRANULOCYTES # BLD AUTO: 0.05 10*3/MM3 (ref 0–0.05)
IMM GRANULOCYTES NFR BLD AUTO: 0.4 % (ref 0–0.5)
LYMPHOCYTES # BLD AUTO: 0.63 10*3/MM3 (ref 0.7–3.1)
LYMPHOCYTES NFR BLD AUTO: 5 % (ref 19.6–45.3)
MCH RBC QN AUTO: 30 PG (ref 26.6–33)
MCHC RBC AUTO-ENTMCNC: 33 G/DL (ref 31.5–35.7)
MCV RBC AUTO: 90.7 FL (ref 79–97)
MONOCYTES # BLD AUTO: 0.27 10*3/MM3 (ref 0.1–0.9)
MONOCYTES NFR BLD AUTO: 2.1 % (ref 5–12)
NEUTROPHILS NFR BLD AUTO: 11.6 10*3/MM3 (ref 1.7–7)
NEUTROPHILS NFR BLD AUTO: 92.3 % (ref 42.7–76)
NRBC BLD AUTO-RTO: 0 /100 WBC (ref 0–0.2)
NT-PROBNP SERPL-MCNC: ABNORMAL PG/ML (ref 0–1800)
PLATELET # BLD AUTO: 265 10*3/MM3 (ref 140–450)
PMV BLD AUTO: 10.6 FL (ref 6–12)
POTASSIUM SERPL-SCNC: 3.5 MMOL/L (ref 3.5–5.2)
RBC # BLD AUTO: 3.87 10*6/MM3 (ref 3.77–5.28)
SODIUM SERPL-SCNC: 146 MMOL/L (ref 136–145)
WBC NRBC COR # BLD AUTO: 12.57 10*3/MM3 (ref 3.4–10.8)

## 2023-12-06 PROCEDURE — 25010000002 FUROSEMIDE PER 20 MG: Performed by: NURSE PRACTITIONER

## 2023-12-06 PROCEDURE — 36415 COLL VENOUS BLD VENIPUNCTURE: CPT

## 2023-12-06 PROCEDURE — 96374 THER/PROPH/DIAG INJ IV PUSH: CPT

## 2023-12-06 PROCEDURE — 83880 ASSAY OF NATRIURETIC PEPTIDE: CPT | Performed by: NURSE PRACTITIONER

## 2023-12-06 PROCEDURE — 85025 COMPLETE CBC W/AUTO DIFF WBC: CPT | Performed by: INTERNAL MEDICINE

## 2023-12-06 PROCEDURE — 80048 BASIC METABOLIC PNL TOTAL CA: CPT | Performed by: NURSE PRACTITIONER

## 2023-12-06 RX ORDER — SACUBITRIL AND VALSARTAN 24; 26 MG/1; MG/1
0.5 TABLET, FILM COATED ORAL 2 TIMES DAILY
Qty: 30 TABLET | Refills: 3 | Status: SHIPPED | OUTPATIENT
Start: 2023-12-06

## 2023-12-06 RX ORDER — FUROSEMIDE 20 MG/1
20 TABLET ORAL DAILY
Qty: 90 TABLET | Refills: 3 | Status: SHIPPED | OUTPATIENT
Start: 2023-12-06 | End: 2023-12-08 | Stop reason: SDUPTHER

## 2023-12-06 RX ORDER — METOPROLOL SUCCINATE 25 MG/1
12.5 TABLET, EXTENDED RELEASE ORAL DAILY
Qty: 45 TABLET | Refills: 3 | Status: SHIPPED | OUTPATIENT
Start: 2023-12-06

## 2023-12-06 RX ORDER — FUROSEMIDE 10 MG/ML
20 INJECTION INTRAMUSCULAR; INTRAVENOUS ONCE
Status: COMPLETED | OUTPATIENT
Start: 2023-12-06 | End: 2023-12-06

## 2023-12-06 RX ORDER — POTASSIUM CHLORIDE 750 MG/1
20 TABLET, FILM COATED, EXTENDED RELEASE ORAL DAILY
Qty: 60 TABLET | Refills: 1 | Status: SHIPPED | OUTPATIENT
Start: 2023-12-06

## 2023-12-06 RX ADMIN — FUROSEMIDE 20 MG: 10 INJECTION, SOLUTION INTRAMUSCULAR; INTRAVENOUS at 11:22

## 2023-12-06 NOTE — ASSESSMENT & PLAN NOTE
Thought secondary to chemotoxic agents, they have since been discontinued.   In hospital, BP was soft and ACE/ARB/ARNI could not be initiated.    Recommend decreasing metoprolol succinate 12.5 mg per day  Add Entresto 24/26 mg 0.5 tabs twice daily  Monitor for low BP and lighheadedness  Continue furosemide 20 mg per day  Patient will go to the [facility] for BMP and BNP as well as a dose of IV diuretics.

## 2023-12-06 NOTE — PROGRESS NOTES
CARDIOLOGY        Patient Name: Darlene Pfeiffer  :1940  Age: 83 y.o.  Primary Cardiologist: Selvin Chang MD  Encounter Provider:  DEJAN Hines    Date of Service: 23      CHIEF COMPLAINT / REASON FOR OFFICE VISIT     Shortness of Breath      HISTORY OF PRESENT ILLNESS       HPI  Darlene Pfeiffer is a 83 y.o. female who presents today for hospital follow-up.     Pt has a  history significant for HTN, HLD, COPD, lung cancer currently on chemotherapy with Tafinlar and Mekinist following with Dr. Kothari.       Review of medical records reveals that patient was hospitalized 2023 with complaints of worsening dyspnea for 2 to 3 days.  Patient noted orthopnea and PND.  She was found to be hypoxic with oxygen sats of 85%.  Patient's proBNP was elevated at 6763.  Patient was found to be in acute systolic heart failure.  Cardiomyopathy was listed on the adverse effect profile for both chemotherapeutic agents that patient was prescribed, especially when used in combination.  Oncology recommended changing her regimen.  Patient's blood pressure was soft during hospitalization so they opted not to add ACE/ARB/MRA.  Patient's BP remains soft and it was hopeful that patient could be started on Entresto in the outpatient setting.  Patient had an echocardiogram in 2023 which revealed LVEF of 55% with GLS -20.8%.  Echocardiogram during hospitalization dated 2023 revealed LVEF 39.2% with global LV strain -11.1%.    Patient reports that she was doing well until the last week.  She notes that she has not weighed daily.  Recorded weights at MD appointments show that her weight is up 2-4 pounds.  She notes that she is having difficulty breathing again, similar to how she felt leading up to hospitalization.  She started a new chemotherapy agent, Alimta on Monday.  She notes that she has been experiencing orthopnea. COBB, dyspnea and increased fatigue.      The following portions of the patient's  "history were reviewed and updated as appropriate: allergies, current medications, past family history, past medical history, past social history, past surgical history and problem list.      VITAL SIGNS     Visit Vitals  /75 (BP Location: Left arm, Patient Position: Sitting)   Pulse 62   Ht 62.9 cm (24.76\")   Wt 49.9 kg (110 lb)   LMP  (LMP Unknown)   SpO2 98%   .11 kg/m²         Wt Readings from Last 3 Encounters:   12/06/23 49.9 kg (110 lb)   12/04/23 49.2 kg (108 lb 8 oz)   11/28/23 48.4 kg (106 lb 9.6 oz)     Body mass index is 126.11 kg/m².      REVIEW OF SYSTEMS   Review of Systems   Constitutional: Positive for malaise/fatigue and weight gain. Negative for chills, fever and weight loss.   Cardiovascular:  Positive for dyspnea on exertion. Negative for leg swelling.   Respiratory:  Positive for shortness of breath. Negative for cough, snoring and wheezing.    Hematologic/Lymphatic: Negative for bleeding problem. Does not bruise/bleed easily.   Skin:  Negative for color change.   Musculoskeletal:  Negative for falls, joint pain and myalgias.   Gastrointestinal:  Negative for melena.   Genitourinary:  Negative for hematuria.   Neurological:  Negative for excessive daytime sleepiness.   Psychiatric/Behavioral:  Negative for depression. The patient is not nervous/anxious.            PHYSICAL EXAMINATION     Vitals and nursing note reviewed.   Constitutional:       Appearance: Normal appearance. Well-developed.   Eyes:      Conjunctiva/sclera: Conjunctivae normal.   Neck:      Vascular: No carotid bruit.   Pulmonary:      Breath sounds: Normal breath sounds.   Cardiovascular:      Normal rate. Regular rhythm. Normal S1 with normal intensity. Normal S2 with normal intensity.       Murmurs: There is no murmur.      No gallop.  No click. No rub.   Edema:     Peripheral edema absent.   Musculoskeletal: Normal range of motion. Skin:     General: Skin is warm and dry.   Neurological:      Mental Status: " Alert and oriented to person, place, and time.      GCS: GCS eye subscore is 4. GCS verbal subscore is 5. GCS motor subscore is 6.   Psychiatric:         Speech: Speech normal.         Behavior: Behavior normal.         Thought Content: Thought content normal.         Judgment: Judgment normal.           REVIEWED DATA     Procedures    Cardiac Procedures:  Echocardiogram 8/28/2023.  LVEF 55.9%.  Grade 1 diastolic dysfunction.  Left atrial volume is moderately increased.  Strain -20.8%.  Echocardiogram 11/14/2023.  LVEF 39.2%.  Global strain -11.1%.  Moderate calcification of the aortic valve.  Trace tricuspid valve regurgitation.    Lipid Panel          1/9/2023    08:36 10/2/2023    08:08   Lipid Panel   Total Cholesterol 203  180    Triglycerides 135  97    HDL Cholesterol 46  73    VLDL Cholesterol 24  17    LDL Cholesterol  133  90        Lab Results   Component Value Date     (H) 12/04/2023     11/27/2023    K 4.0 12/04/2023    K 4.2 11/27/2023     (H) 12/04/2023     11/27/2023    CO2 27.2 12/04/2023    CO2 28.5 11/27/2023    BUN 24 (H) 12/04/2023    BUN 16 11/27/2023    CREATININE 0.78 12/04/2023    CREATININE 0.73 11/27/2023    EGFRIFNONA 85 12/08/2021    EGFRIFNONA 81 12/01/2021    EGFRIFAFRI 98 12/08/2021    EGFRIFAFRI 115 06/08/2021    GLUCOSE 133 (H) 12/04/2023    GLUCOSE 125 (H) 11/27/2023    CALCIUM 9.2 12/04/2023    CALCIUM 9.1 11/27/2023    PROTENTOTREF 6.2 01/09/2023    PROTENTOTREF 6.5 08/29/2022    ALBUMIN 3.5 12/04/2023    ALBUMIN 3.6 11/27/2023    BILITOT 0.4 12/04/2023    BILITOT 0.3 11/27/2023    AST 24 12/04/2023    AST 20 11/27/2023    ALT 15 12/04/2023    ALT 14 11/27/2023     Lab Results   Component Value Date    WBC 16.40 (H) 12/04/2023    WBC 11.94 (H) 11/27/2023    HGB 12.0 12/04/2023    HGB 12.8 11/27/2023    HCT 37.3 12/04/2023    HCT 39.9 11/27/2023    MCV 93.0 12/04/2023    MCV 93.9 11/27/2023     12/04/2023     11/27/2023     Lab Results    Component Value Date    PROBNP 6,126.0 (H) 11/27/2023    PROBNP 9,763.0 (H) 11/14/2023     Lab Results   Component Value Date    TROPONINT 74 (C) 11/14/2023     Lab Results   Component Value Date    TSH 1.000 11/15/2023    TSH 2.160 07/26/2023             ASSESSMENT & PLAN     Diagnoses and all orders for this visit:    1. Acute HFrEF (heart failure with reduced ejection fraction) (Primary)  Assessment & Plan:  Thought secondary to chemotoxic agents, they have since been discontinued.   In hospital, BP was soft and ACE/ARB/ARNI could not be initiated.    Recommend decreasing metoprolol succinate 12.5 mg per day  Add Entresto 24/26 mg 0.5 tabs twice daily  Monitor for low BP and lighheadedness  Continue furosemide 20 mg per day  Patient will go to the [facility] for BMP and BNP as well as a dose of IV diuretics.      2. Shortness of breath  -     Basic Metabolic Panel; Future  -     BNP; Future  -     furosemide (LASIX) injection 20 mg    3. NICM (nonischemic cardiomyopathy)  -     Ambulatory Referral to Cardiology CardioOncology  -     furosemide (LASIX) injection 20 mg    4. Malignant neoplasm of upper lobe of right lung  Assessment & Plan:  Recommend evaluation from cardio-oncologist    Orders:  -     Ambulatory Referral to Cardiology CardioOncology    5. Long-term use of high-risk medication  -     Ambulatory Referral to Cardiology CardioOncology    6. Essential hypertension  Assessment & Plan:  BP now soft  Plan as for CHF      Other orders  -     furosemide (Lasix) 20 MG tablet; Take 1 tablet by mouth Daily.  Dispense: 90 tablet; Refill: 3  -     metoprolol succinate XL (TOPROL-XL) 25 MG 24 hr tablet; Take 0.5 tablets by mouth Daily.  Dispense: 45 tablet; Refill: 3  -     sacubitril-valsartan (Entresto) 24-26 MG tablet; Take 0.5 tablets by mouth 2 (Two) Times a Day.  Dispense: 30 tablet; Refill: 3      1605: Reviewed laboratory studies.  proBNP markedly elevated at 10,000+.  Discussed case with Dr. Chang.  Plan  to increase furosemide to 40 mg/day.  Patient CTA of the chest was also reviewed where she does have known atherosclerotic disease of coronary arteries.  He recommends a stat coronary CTA to further assess for possible ischemic involvement in patient's new cardiomyopathy.  I have left a message for patient to return call to review these recommendations.      Future Appointments         Provider Department Center    12/12/2023 8:00 AM Claire Orr MD [facility] CARDIOLOGY [place]    12/18/2023 1:50 PM LAB CHAIR 1 [facility] ONCOLOGY CBC LAB [place]    12/18/2023 2:20 PM Nena Avila APRN [facility] HEMATOLOGY & ONCOLOGY [place]    1/15/2024 8:00 AM Kehrer, Meredith Lea, MD [facility] PRIMARY CARE [place]    10/8/2024 11:30 AM Bradley Dowell MD [facility] NEUROSURGERY [place]                    MEDICATIONS         Discharge Medications            Accurate as of December 6, 2023 12:38 PM. If you have any questions, ask your nurse or doctor.                New Medications        Instructions Start Date   Entresto 24-26 MG tablet  Generic drug: sacubitril-valsartan  Started by: DEJAN Hines   0.5 tablets, Oral, 2 Times Daily      potassium chloride 10 MEQ CR tablet  Started by: Henry Escobar MD   20 mEq, Oral, Daily             Changes to Medications        Instructions Start Date   metoprolol succinate XL 25 MG 24 hr tablet  Commonly known as: TOPROL-XL  What changed: how much to take  Changed by: DEJAN Hines   12.5 mg, Oral, Daily             Continue These Medications        Instructions Start Date   atorvastatin 20 MG tablet  Commonly known as: LIPITOR   TAKE 1 TABLET EVERY NIGHT      cetirizine 10 MG tablet  Commonly known as: zyrTEC   10 mg, Oral, Daily      dexAMETHasone 4 MG tablet  Commonly known as: DECADRON   Take 1 tablet oral twice a day for 3 consecutive days beginning the day before  chemotherapy and continue for 6 doses.Take with food.      folic acid 1 MG tablet  Commonly known as: FOLVITE   1,000 mcg, Oral, Daily      folic acid 1 MG tablet  Commonly known as: FOLVITE   1 mg, Oral, Daily, Start at least 7 days prior to chemotherapy until at least 3 weeks after all chemotherapy.      furosemide 20 MG tablet  Commonly known as: Lasix   20 mg, Oral, Daily      ipratropium 0.06 % nasal spray  Commonly known as: ATROVENT   2 sprays, Nasal, 4 Times Daily      mirtazapine 7.5 MG tablet  Commonly known as: REMERON   7.5 mg, Oral, Nightly      montelukast 10 MG tablet  Commonly known as: SINGULAIR   10 mg, Oral, Nightly      ondansetron 8 MG tablet  Commonly known as: ZOFRAN   8 mg, Oral, 3 Times Daily PRN      potassium chloride 10 MEQ CR tablet  Commonly known as: K-DUR,KLOR-CON   20 mEq, Oral, Daily      predniSONE 10 MG tablet  Commonly known as: DELTASONE   1 tablet, Oral, Daily      promethazine-dextromethorphan 6.25-15 MG/5ML syrup  Commonly known as: PROMETHAZINE-DM   5 mL, Oral, 4 Times Daily PRN      VITAMIN B12 PO   1,000 mcg, Oral, Daily      Vitamin D3 50 MCG (2000 UT) tablet   1,000 Units, Oral, Daily, HOLDING FOR DOS      zolpidem 5 MG tablet  Commonly known as: AMBIEN   5 mg, Oral, Nightly PRN, for sleep                   **Dragon Disclaimer:   Much of this encounter note is an electronic transcription/translation of spoken language to printed text. The electronic translation of spoken language may permit erroneous, or at times, nonsensical words or phrases to be inadvertently transcribed. Although I have reviewed the note for such errors, some may still exist.

## 2023-12-06 NOTE — TELEPHONE ENCOUNTER
I spoke with GEETA about plan of care.    I and let the patient know that her BNP is elevated and starting tomorrow (no need to increase today as patient received IV diuretic in [facility] today) she needs to double her lasix to 40 mg daily at least until she sees Dr. Orr on 12/12.  Her BP was on softer side so patient will need to watch for lightheadedness/ dizziness which could be signs of low BP.  If that happens she needs to stop the Entresto.  We are also recommending the patient have a coronary CTA to further evaluate for ischemia as potential cause of CM.  Patient is agreeable for coronary CTA.      Patient read back instructions and verbalizes understanding of all.    No questions at this time.  I advised her that is she does have any questions or concerns at any point, she can call the office and ask to speak with a triage nurses.      GEETA, I believe this covers everything.  If you want to place the order for STAT coronary CTA I can forward this to hosp scheduling.  Or if there is anything else I can do to help let me know.    Thanks,    Erinn Avalos RN  [facility] Triage  12/06/23 16:39 EST

## 2023-12-06 NOTE — TELEPHONE ENCOUNTER
12/06/23  16:06 EST    Left message for patient to return call to review medication recommendations and testing recommendations.      DEJAN Mary  [facility]

## 2023-12-07 NOTE — TELEPHONE ENCOUNTER
12/07/23  09:00 EST    Scheduling, per Dr. Chang patient needs a STAT CTA Coronary.  She will need to hold Entresto the morning of the test and please have her take a full tablet of metoprolol succinate to lower HR for the test.        DEJAN Mary  [facility]

## 2023-12-08 RX ORDER — FUROSEMIDE 40 MG/1
40 TABLET ORAL DAILY
Qty: 90 TABLET | Refills: 1 | Status: SHIPPED | OUTPATIENT
Start: 2023-12-08

## 2023-12-08 NOTE — TELEPHONE ENCOUNTER
Failed protocol check list.    Pt was seen on 12/6/23. This was the plan:    1605: Reviewed laboratory studies.  proBNP markedly elevated at 10,000+.  Discussed case with Dr. Chang.  Plan to increase furosemide to 40 mg/day.  Patient CTA of the chest was also reviewed where she does have known atherosclerotic disease of coronary arteries.  He recommends a stat coronary CTA to further assess for possible ischemic involvement in patient's new cardiomyopathy.  I have left a message for patient to return call to review these recommendations.     Did you still plan to increase her furosemide to 40 MG?

## 2023-12-11 RX ORDER — MIRTAZAPINE 7.5 MG/1
7.5 TABLET, FILM COATED ORAL
Qty: 30 TABLET | Refills: 1 | Status: SHIPPED | OUTPATIENT
Start: 2023-12-11

## 2023-12-12 ENCOUNTER — TELEPHONE (OUTPATIENT)
Dept: CARDIOLOGY | Facility: CLINIC | Age: 83
End: 2023-12-12

## 2023-12-12 ENCOUNTER — OFFICE VISIT (OUTPATIENT)
Dept: CARDIOLOGY | Facility: CLINIC | Age: 83
End: 2023-12-12
Payer: MEDICARE

## 2023-12-12 ENCOUNTER — DOCUMENTATION (OUTPATIENT)
Dept: ONCOLOGY | Facility: CLINIC | Age: 83
End: 2023-12-12
Payer: MEDICARE

## 2023-12-12 VITALS
HEIGHT: 62 IN | WEIGHT: 108 LBS | DIASTOLIC BLOOD PRESSURE: 70 MMHG | SYSTOLIC BLOOD PRESSURE: 112 MMHG | BODY MASS INDEX: 19.88 KG/M2 | HEART RATE: 122 BPM

## 2023-12-12 DIAGNOSIS — I50.21 ACUTE HFREF (HEART FAILURE WITH REDUCED EJECTION FRACTION): ICD-10-CM

## 2023-12-12 DIAGNOSIS — E43 SEVERE MALNUTRITION: ICD-10-CM

## 2023-12-12 DIAGNOSIS — C34.11 MALIGNANT NEOPLASM OF UPPER LOBE OF RIGHT LUNG: ICD-10-CM

## 2023-12-12 DIAGNOSIS — J44.9 CHRONIC OBSTRUCTIVE PULMONARY DISEASE, UNSPECIFIED COPD TYPE: ICD-10-CM

## 2023-12-12 DIAGNOSIS — I48.0 PAF (PAROXYSMAL ATRIAL FIBRILLATION): Primary | ICD-10-CM

## 2023-12-12 DIAGNOSIS — I50.21 SYSTOLIC CHF, ACUTE: ICD-10-CM

## 2023-12-12 DIAGNOSIS — I10 ESSENTIAL HYPERTENSION: ICD-10-CM

## 2023-12-12 DIAGNOSIS — E78.5 HYPERLIPIDEMIA, UNSPECIFIED HYPERLIPIDEMIA TYPE: ICD-10-CM

## 2023-12-12 NOTE — PROGRESS NOTES
Dr. Orr called concerning the patient's status-developing new onset atrial fibrillation with RVR.  Fortunately she is not in florid heart failure and plans are to try to increase her metoprolol, consider digoxin but at present not increase her diuretics.  She will also need to start anticoagulation and will begin low-dose Eliquis at 2.5 mg twice daily.  This is understood and agreed with.  GLENN

## 2023-12-12 NOTE — TELEPHONE ENCOUNTER
To do list:    Pt needs to remove nail polish and monitor O2.    Pt to call with BP/HR readings tomorrow.    CT to be moved up to early next week.    Followup needs to be with Dr Orr next week.      Call to screen pt for Entresto and Eliquis Pt Assistance.  (Pt was given 30 day free card for Eliquis)

## 2023-12-12 NOTE — PROGRESS NOTES
Date of Office Visit: 2023  Encounter Provider: Claire Orr MD  Place of Service: [facility] CARDIOLOGY  Patient Name: Darlene Pfeiffer  :1940    Chief complaint  Consult requested for cardio oncology care with cardiomyopathy, shortness of breath    History of Present Illness  Patient is a 83-year-old female with a history of COPD, hypertension, hyperlipidemia, intracranial aneurysm left PCA.  Lung cancer, adenocarcinoma of the tongue, asthma and coronary artery calcification.  2015 patient underwent left upper lobectomy.  By  she was diagnosed with tongue cancer underwent resection.  No evidence of recurrence.  However by 2021 found to have mediastinal changes as well as right lower lobe pulmonary nodule.  Eventually found to have adenocarcinoma in 2 different regions.  Patient received radiation therapy to the left lung that was completed on 2021.  By 10/2022 she was admitted with pneumonia and by 2022 had abnormal PET scan felt to be suggestive of metastatic postradiation changes left apex.  On 2022 she started Keytruda.  On 2023 however, there is evidence of progressive disease in the left upper thorax and ribs.  Therefore this was discontinued and she was to do with palliative radiation therapy 2023.  She then received   She developed significant nausea and failure to thrive.  On 2023 echo showed normal systolic function and she was changed to dabrafenib and trametinib.  By 2023 she developed cardiomyopathy with an ejection fraction 39.2% GLS -11.1% with global weightman 30 minutes hypokinesis with aortic valve calcification with trivial valve regurgitation normal right-sided pressures.  She recalls the morning of admission that she woke up with sudden shortness of breath.  She denies any chest pain.  She was diuresed and started on goal-directed therapy for heart failure though hypotension limited adequate therapy.  Following this both  chemotherapeutic agents were discontinued   and she started Alimta on 12/4/2023.     Baseline echo on 8/2023 with ejection fraction 56%, GLS -20.8%.  Inferobasal hypokinesis, grade 1 diastolic dysfunction, normal right-sided chambers with moderate left atrial enlargement and normal RV function.  Echo on 11/2023 with ejection fraction 39%, GLS -11.1% grade 1A diastolic dysfunction trivial valve regurgitation.  Coronary calcium has been noted on prior CT scans and coronary CTA has been ordered though not completed.    She continues to have significant dyspnea with household activities.  She says her O2 saturations yesterday was 89% though does have acrylic nails.  She denies any history of tachycardia noted at various blood pressure or pulse ox test at home.  However she is in atrial fibrillation with intermittent rapid rates during the visit.  She is completely unaware of this.  Despite presence of atrial fibrillation currently she has not had any palpitations now in the past few days.  She denies any palpitations at all.  She complains of shortness of breath    Past Medical History:   Diagnosis Date    Allergic rhinitis     Aneurysm     Asthma     Cancer of floor of mouth 2014    Cerebral aneurysm     COPD (chronic obstructive pulmonary disease)     COVID 2020    Esophageal reflux     History of adenocarcinoma of lung     History of anemia     History of carcinoma in situ of skin     History of lung cancer     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 from [facility]    History of squamous cell carcinoma     Tongue    Hyperlipidemia     Hypertension     since early 60s    Intractable back pain 11/29/2022    Low vitamin B12 level     Lung cancer     Systolic CHF, acute 11/14/2023    Thyromegaly     UTI (urinary tract infection)     Vitamin D deficiency      Past Surgical History:   Procedure Laterality Date    BRONCHOSCOPY Bilateral 10/17/2022    Procedure: BRONCHOSCOPY WITH FLUORO  with biopsy and BAL;  Surgeon: India Flores MD;  Location: [facility] ENDOSCOPY;  Service: Pulmonary;  Laterality: Bilateral;  PRE/POST - lung mass    CHOLECYSTECTOMY  1999    COLONOSCOPY      EMBOLIZATION CEREBRAL Left 06/29/2022    Procedure: 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;  Surgeon: Bradley Dowell MD;  Location: [facility] HYBRID OR 18/19;  Service: Interventional Radiology;  Laterality: Left;    EYE SURGERY  11/24/2020    Took a muscle out of right eye    GLOSSECTOMY PARTIAL      less than one half tongue    LUNG SURGERY  2012    ORAL LESION EXCISION/BIOPSY      June and August 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    TUBAL ABDOMINAL LIGATION  1970    VENOUS ACCESS DEVICE (PORT) INSERTION N/A 12/12/2022    Procedure: MEDIPORT PLACEMENT;  Surgeon: Jason Villaseñor MD;  Location: [facility] MAIN OR;  Service: Vascular;  Laterality: N/A;     Outpatient Medications Prior to Visit   Medication Sig Dispense Refill    atorvastatin (LIPITOR) 20 MG tablet TAKE 1 TABLET EVERY NIGHT (Patient taking differently: Take 1 tablet by mouth Every Night.) 90 tablet 1    cetirizine (zyrTEC) 10 MG tablet Take 1 tablet by mouth Daily.      Cholecalciferol (Vitamin D3) 50 MCG (2000 UT) tablet Take 1,000 Units by mouth Daily. HOLDING FOR DOS      Cyanocobalamin (VITAMIN B12 PO) Take 1,000 mcg by mouth Daily.      dexAMETHasone (DECADRON) 4 MG tablet Take 1 tablet oral twice a day for 3 consecutive days beginning the day before chemotherapy and continue for 6 doses.Take with food. 6 tablet 5    folic acid (FOLVITE) 1 MG tablet Take 1 tablet by mouth Daily. Start at least 7 days prior to chemotherapy until at least 3 weeks after all chemotherapy. 30 tablet 6    furosemide (LASIX) 40 MG tablet Take 1 tablet by mouth Daily. 90 tablet 1    ipratropium (ATROVENT) 0.06 % nasal spray 2 sprays into the nostril(s) as directed by provider 4 (Four) Times a Day. 15 mL 0    metoprolol succinate XL (TOPROL-XL) 25 MG 24 hr  tablet Take 0.5 tablets by mouth Daily. (Patient taking differently: Take 1 tablet by mouth Daily.) 45 tablet 3    mirtazapine (REMERON) 7.5 MG tablet TAKE 1 TABLET BY MOUTH EVERY NIGHT AT BEDTIME 30 tablet 1    montelukast (SINGULAIR) 10 MG tablet Take 1 tablet by mouth Every Night.      ondansetron (ZOFRAN) 8 MG tablet Take 1 tablet by mouth 3 (Three) Times a Day As Needed for Nausea or Vomiting. 30 tablet 5    potassium chloride (K-DUR,KLOR-CON) 10 MEQ CR tablet TAKE 2 TABLETS BY MOUTH DAILY 60 tablet 1    predniSONE (DELTASONE) 10 MG tablet Take 1 tablet by mouth Daily.      promethazine-dextromethorphan (PROMETHAZINE-DM) 6.25-15 MG/5ML syrup Take 5 mL by mouth 4 (Four) Times a Day As Needed for Cough. 180 mL 0    sacubitril-valsartan (Entresto) 24-26 MG tablet Take 0.5 tablets by mouth 2 (Two) Times a Day. 30 tablet 3    zolpidem (AMBIEN) 5 MG tablet TAKE 1 TABLET BY MOUTH EVERY NIGHT AT BEDTIME AS NEEDED FOR SLEEP 30 tablet 0    folic acid (FOLVITE) 1 MG tablet Take 1 tablet by mouth Daily. (Patient not taking: Reported on 2023) 30 tablet 2    potassium chloride 10 MEQ CR tablet TAKE 2 TABLETS BY MOUTH EVERY DAY (Patient not taking: Reported on 2023) 60 tablet 1     No facility-administered medications prior to visit.       Allergies as of 2023 - Reviewed 2023   Allergen Reaction Noted    Sulfa antibiotics Rash 10/13/2016     Social History     Socioeconomic History    Marital status:    Tobacco Use    Smoking status: Former     Types: Cigarettes     Quit date: 2015     Years since quittin.8     Passive exposure: Never    Smokeless tobacco: Never    Tobacco comments:     less than a pack per day, no smoking since , Quit 2015   Vaping Use    Vaping Use: Never used   Substance and Sexual Activity    Alcohol use: Not Currently     Comment: Socially -A few times a month    Drug use: No    Sexual activity: Defer     Family History   Problem Relation Age of Onset     "Ovarian cancer Mother          at age 27    No Known Problems Father     Ovarian cancer Sister     Colon cancer Brother     No Known Problems Other     Malig Hyperthermia Neg Hx      Review of Systems   Constitutional: Positive for malaise/fatigue and weight loss. Negative for chills, fever and weight gain.   Cardiovascular:  Positive for dyspnea on exertion and palpitations. Negative for leg swelling.   Respiratory:  Negative for cough, snoring and wheezing.    Hematologic/Lymphatic: Negative for bleeding problem. Does not bruise/bleed easily.   Skin:  Negative for color change.   Musculoskeletal:  Negative for falls, joint pain, joint swelling and myalgias.   Gastrointestinal:  Negative for melena.   Genitourinary:  Negative for hematuria.   Neurological:  Negative for excessive daytime sleepiness.   Psychiatric/Behavioral:  Negative for depression. The patient is not nervous/anxious.         Objective:     Vitals:    23 0800 23 0812   BP: 112/70 112/70   BP Location: Right arm Left arm   Pulse: (!) 122    Weight: 49 kg (108 lb)    Height: 157.5 cm (62\")      Body mass index is 19.75 kg/m².    Vitals reviewed.   Constitutional:       Appearance: Well-developed and underweight.   Eyes:      General: No scleral icterus.        Right eye: No discharge.      Conjunctiva/sclera: Conjunctivae normal.      Pupils: Pupils are equal, round, and reactive to light.   HENT:      Head: Normocephalic.      Nose: Nose normal.   Neck:      Thyroid: No thyromegaly.      Vascular: No JVD.   Pulmonary:      Effort: Pulmonary effort is normal. No respiratory distress.      Breath sounds: Normal breath sounds. No wheezing. No rales.   Cardiovascular:      Tachycardia present. Irregularly irregular rhythm. Normal S1. Normal S2.       Murmurs: There is a grade 2/6 high frequency blowing holosystolic murmur at the apex.  Heart sounds are distant      No gallop.    Pulses:     Intact distal pulses.      Carotid: 2+ " bilaterally.     Radial: 2+ bilaterally.     Femoral: 2+ bilaterally.     Popliteal: 2+ bilaterally.     Dorsalis pedis: 2+ bilaterally.     Posterior tibial: 2+ bilaterally.  Edema:     Peripheral edema absent.   Abdominal:      General: Bowel sounds are normal. There is no distension.      Palpations: Abdomen is soft.      Tenderness: There is no abdominal tenderness. There is no rebound.   Musculoskeletal: Normal range of motion.         General: No tenderness.      Cervical back: Normal range of motion and neck supple. Skin:     General: Skin is warm and dry.      Findings: No erythema or rash.   Neurological:      Mental Status: Alert and oriented to person, place, and time.   Psychiatric:         Behavior: Behavior normal.         Thought Content: Thought content normal.         Judgment: Judgment normal.       Lab Review:   Lab Results - Last 18 Months   Lab Units 12/18/23  1402 12/06/23  1103 01/25/23  0825 01/09/23  0836 10/14/22  1315 08/29/22  1548   WBC 10*3/mm3 8.10 12.57*   < > 7.44   < > 6.1   RBC 10*6/mm3 4.04 3.87   < > 4.64   < > 4.63   HEMOGLOBIN g/dL 12.0 11.6*   < > 13.3   < > 13.7   HEMATOCRIT % 37.2 35.1   < > 40.6   < > 41.5   MCV fL 92.1 90.7   < > 87.5   < > 90   MCH pg 29.7 30.0   < > 28.7   < > 29.6   MCHC g/dL 32.3 33.0   < > 32.8   < > 33.0   RDW % 14.7 12.6   < > 13.1   < > 12.6   PLATELETS 10*3/mm3 364 265   < > 287   < > 232   NEUTROPHIL % % 81.1* 92.3*   < > 54.4   < > 57   LYMPHOCYTE % % 9.3* 5.0*   < > 37.4   < > 30   MONOCYTES % % 6.4 2.1*   < > 7.4   < > 9   EOSINOPHIL % % 1.4 0.0*   < > 0.0*   < > 3   BASOPHIL % % 0.2 0.2   < > 0.7   < > 1   NEUTROS ABS 10*3/mm3 6.57 11.60*   < > 4.05   < > 3.5   LYMPHS ABS 10*3/mm3 0.75 0.63*   < > 2.78   < > 1.8   MONOS ABS 10*3/mm3 0.52 0.27   < > 0.55   < > 0.5   EOS ABS 10*3/mm3 0.11 0.00   < > 0.00   < > 0.2   BASOS ABS 10*3/mm3 0.02 0.02   < > 0.05   < > 0.0   IMM GRAN % %  --   --   --  0.1  --  0   IMMATURE GRANS (ABS) 10*3/mm3  --    --   --  0.01  --  0.0   RDW-SD fl 45.7 41.1   < >  --    < >  --    MPV fL 10.3 10.6   < >  --    < >  --     < > = values in this interval not displayed.       Lab Results - Last 18 Months   Lab Units 12/19/23  1228 12/06/23  1103 12/04/23  1123 11/27/23  1255   GLUCOSE mg/dL  --  108* 133* 125*   BUN mg/dL  --  29* 24* 16   CREATININE mg/dL 0.80 0.71 0.78 0.73   SODIUM mmol/L  --  146* 148* 144   POTASSIUM mmol/L  --  3.5 4.0 4.2   CHLORIDE mmol/L  --  107 109* 105   CO2 mmol/L  --  25.5 27.2 28.5   CALCIUM mg/dL  --  9.5 9.2 9.1   TOTAL PROTEIN g/dL  --   --  6.1 6.1   ALBUMIN g/dL  --   --  3.5 3.6   ALT (SGPT) U/L  --   --  15 14   AST (SGOT) U/L  --   --  24 20   ALK PHOS U/L  --   --  71 76   BILIRUBIN mg/dL  --   --  0.4 0.3   GLOBULIN gm/dL  --   --  2.6 2.5   A/G RATIO g/dL  --   --  1.3 1.4   BUN / CREAT RATIO   --  40.8* 30.8* 21.9   ANION GAP mmol/L  --  13.5 11.8 10.5   EGFR mL/min/1.73  --  85.5 76.4 82.7     Lab Results - Last 18 Months   Lab Units 10/02/23  0808 01/09/23  0836   TRIGLYCERIDES mg/dL 97 135   HDL CHOL mg/dL 73* 46   LDL CHOL mg/dL 90 133*   VLDL CHOLESTEROL ROBERT mg/dL 17 24     Lab Results - Last 18 Months   Lab Units 12/06/23  1103 11/27/23  1255   PROBNP pg/mL 10,452.0* 6,126.0*     Lab Results - Last 18 Months   Lab Units 11/14/23  0521 11/14/23  0238   HSTROP T ng/L 74* 61*     Lab Results - Last 18 Months   Lab Units 11/15/23  0412 07/26/23  0622   TSH uIU/mL 1.000 2.160     Lab Results - Last 18 Months   Lab Units 11/14/23  0238   PROTIME Seconds 13.7   APTT seconds 27.7       ECG 12 Lead    Date/Time: 12/12/2023 8:20 AM  Performed by: Claire Orr MD    Authorized by: Claire Orr MD  Comparison: compared with previous ECG   Comparison to previous ECG: Atrial fibrillation has replaced sinus rhythm.  Rhythm comments: Atrial fibrillation with rapid rates is present  Ectopy: unifocal PVCs  Other findings: non-specific ST-T wave changes    Clinical impression: abnormal EKG             Diagnosis Plan   1. PAF (paroxysmal atrial fibrillation)  ECG 12 Lead      2. Malignant neoplasm of upper lobe of right lung        3. Severe malnutrition        4. Hyperlipidemia, unspecified hyperlipidemia type        5. Chronic obstructive pulmonary disease, unspecified COPD type        6. Acute HFrEF (heart failure with reduced ejection fraction)        7. Essential hypertension        8. Systolic CHF, acute          Plan:       1.  Cardio oncology care.  Now on Alimta which should have relatively low cardiovascular adverse event risk.  2.  Cardiomyopathy with history of HFrEF.  Possibly due to cardiotoxicity from chemotherapy though also has coronary calcification and agree with coronary evaluation with coronary CTA.  Metoprolol and Entresto have been utilized.  However she still sounds wet.  And this may in part if not predominantly due to tachycardia that may have been intermittent.  Attempted to do a walking pulse oximeter however her gel nails are problematic.  She will take off 1 of these when she returns home and start checking blood pressure readings.  3.  Atrial fibrillation.  It is unknown duration and she is relatively asymptomatic.  Will increase Toprol to 25 mg a day and titrate up as blood pressure and heart rate allow.  Will also start her on Eliquis 2.5 mg twice daily.  Discussed with her risks and benefits of Eliquis therapy.  She understands and wishes to proceed.  She is aware she cannot take any nonsteroidals additional omega-3's, which would increase the bleeding risk.  3.  Lung cancer has had radiation and resection in the past now on Alimta started a few days ago.  4.  Coronary calcification.  No chest pain symptoms but with dyspnea.  And extent of calcification, will check coronary CTA  5.  Hypertension  6.  Hyperlipidemia  7.  COPD/asthma       Time Spent: I spent 60 minutes caring for Darlene on this date of service. This time includes time spent by me in the following activities:  preparing for the visit, reviewing tests, obtaining and/or reviewing a separately obtained history, performing a medically appropriate examination and/or evaluation, counseling and educating the patient/family/caregiver, ordering medications, tests, or procedures, documenting information in the medical record, and independently interpreting results and communicating that information with the patient/family/caregiver.   I spent 1 minutes on the separately reported service of ECG. This time is not included in the time used to support the E/M service also reported today.        Your medication list            Accurate as of December 12, 2023 11:59 PM. If you have any questions, ask your nurse or doctor.                CONTINUE taking these medications        Instructions Last Dose Given Next Dose Due   atorvastatin 20 MG tablet  Commonly known as: LIPITOR      TAKE 1 TABLET EVERY NIGHT       cetirizine 10 MG tablet  Commonly known as: zyrTEC      Take 1 tablet by mouth Daily.       dexAMETHasone 4 MG tablet  Commonly known as: DECADRON      Take 1 tablet oral twice a day for 3 consecutive days beginning the day before chemotherapy and continue for 6 doses.Take with food.       Entresto 24-26 MG tablet  Generic drug: sacubitril-valsartan      Take 0.5 tablets by mouth 2 (Two) Times a Day.       folic acid 1 MG tablet  Commonly known as: FOLVITE      Take 1 tablet by mouth Daily. Start at least 7 days prior to chemotherapy until at least 3 weeks after all chemotherapy.       furosemide 40 MG tablet  Commonly known as: LASIX      Take 1 tablet by mouth Daily.       ipratropium 0.06 % nasal spray  Commonly known as: ATROVENT      2 sprays into the nostril(s) as directed by provider 4 (Four) Times a Day.       metoprolol succinate XL 25 MG 24 hr tablet  Commonly known as: TOPROL-XL      Take 0.5 tablets by mouth Daily.       mirtazapine 7.5 MG tablet  Commonly known as: REMERON      TAKE 1 TABLET BY MOUTH EVERY NIGHT AT  BEDTIME       montelukast 10 MG tablet  Commonly known as: SINGULAIR      Take 1 tablet by mouth Every Night.       ondansetron 8 MG tablet  Commonly known as: ZOFRAN      Take 1 tablet by mouth 3 (Three) Times a Day As Needed for Nausea or Vomiting.       potassium chloride 10 MEQ CR tablet  Commonly known as: K-DUR,KLOR-CON      TAKE 2 TABLETS BY MOUTH DAILY       predniSONE 10 MG tablet  Commonly known as: DELTASONE      Take 1 tablet by mouth Daily.       promethazine-dextromethorphan 6.25-15 MG/5ML syrup  Commonly known as: PROMETHAZINE-DM      Take 5 mL by mouth 4 (Four) Times a Day As Needed for Cough.       VITAMIN B12 PO      Take 1,000 mcg by mouth Daily.       Vitamin D3 50 MCG (2000 UT) tablet      Take 1,000 Units by mouth Daily. HOLDING FOR DOS       zolpidem 5 MG tablet  Commonly known as: AMBIEN      TAKE 1 TABLET BY MOUTH EVERY NIGHT AT BEDTIME AS NEEDED FOR SLEEP                Patient is no longer taking -.  I corrected the med list to reflect this.  I did not stop these medications.      Dictated utilizing Dragon dictation

## 2023-12-13 ENCOUNTER — TELEPHONE (OUTPATIENT)
Dept: CARDIOLOGY | Facility: CLINIC | Age: 83
End: 2023-12-13

## 2023-12-13 NOTE — TELEPHONE ENCOUNTER
See new message.    Only task to complete is the Patient Assistance for her appointment on the 21st.  Paperwork has been obtained except MD signature and pt tax forms/signature.

## 2023-12-13 NOTE — TELEPHONE ENCOUNTER
Caller: Darlene Pfeiffer    Relationship: Self    Best call back number: [phone]    What is the best time to reach you: ANYTIME    Who are you requesting to speak with (clinical staff, provider,  specific staff member): GABRIELA    Do you know the name of the person who called: GABRIELA    What was the call regarding: PT IS CALLING BACK GABRIELA TO SCHEDULE CT SCAN FOR HER HEART. SHE SAID SHE WASN'T ABLE TO PICKUP THE BLOOD THINNER YESTERDAY BECAUSE THEY HAVENT RECEIVED THE PRESCRIPTION. SHE ALSO WANTED TO LET DR. EDWARDS KNOW ABOUT HER BP READINGS FOR TODAY WHICH WERE 132/56 /51. SHE SAID HER OXYGEN LEVEL WAS 95 TODAY.    Is it okay if the provider responds through MyChart: NO

## 2023-12-13 NOTE — TELEPHONE ENCOUNTER
CT was moved up to Tuesday 12/19.    Followup is scheduled for you on the 21st.    I sent the Eliquis in.    Pt got the BP/HR/O2 (listed below).    I am working on pt assistance for her Entresto and Eliquis.

## 2023-12-14 DIAGNOSIS — C34.11 MALIGNANT NEOPLASM OF UPPER LOBE OF RIGHT LUNG: Primary | ICD-10-CM

## 2023-12-18 ENCOUNTER — OFFICE VISIT (OUTPATIENT)
Dept: ONCOLOGY | Facility: CLINIC | Age: 83
End: 2023-12-18
Payer: MEDICARE

## 2023-12-18 ENCOUNTER — LAB (OUTPATIENT)
Dept: LAB | Facility: HOSPITAL | Age: 83
End: 2023-12-18
Payer: MEDICARE

## 2023-12-18 VITALS
OXYGEN SATURATION: 95 % | HEIGHT: 62 IN | HEART RATE: 79 BPM | SYSTOLIC BLOOD PRESSURE: 130 MMHG | RESPIRATION RATE: 18 BRPM | BODY MASS INDEX: 19.29 KG/M2 | TEMPERATURE: 97.8 F | WEIGHT: 104.8 LBS | DIASTOLIC BLOOD PRESSURE: 57 MMHG

## 2023-12-18 DIAGNOSIS — C34.11 MALIGNANT NEOPLASM OF UPPER LOBE OF RIGHT LUNG: ICD-10-CM

## 2023-12-18 DIAGNOSIS — G47.00 INSOMNIA, UNSPECIFIED TYPE: ICD-10-CM

## 2023-12-18 DIAGNOSIS — C34.11 MALIGNANT NEOPLASM OF UPPER LOBE OF RIGHT LUNG: Primary | ICD-10-CM

## 2023-12-18 LAB
BASOPHILS # BLD AUTO: 0.02 10*3/MM3 (ref 0–0.2)
BASOPHILS NFR BLD AUTO: 0.2 % (ref 0–1.5)
DEPRECATED RDW RBC AUTO: 45.7 FL (ref 37–54)
EOSINOPHIL # BLD AUTO: 0.11 10*3/MM3 (ref 0–0.4)
EOSINOPHIL NFR BLD AUTO: 1.4 % (ref 0.3–6.2)
ERYTHROCYTE [DISTWIDTH] IN BLOOD BY AUTOMATED COUNT: 14.7 % (ref 12.3–15.4)
HCT VFR BLD AUTO: 37.2 % (ref 34–46.6)
HGB BLD-MCNC: 12 G/DL (ref 12–15.9)
IMM GRANULOCYTES # BLD AUTO: 0.13 10*3/MM3 (ref 0–0.05)
IMM GRANULOCYTES NFR BLD AUTO: 1.6 % (ref 0–0.5)
LYMPHOCYTES # BLD AUTO: 0.75 10*3/MM3 (ref 0.7–3.1)
LYMPHOCYTES NFR BLD AUTO: 9.3 % (ref 19.6–45.3)
MCH RBC QN AUTO: 29.7 PG (ref 26.6–33)
MCHC RBC AUTO-ENTMCNC: 32.3 G/DL (ref 31.5–35.7)
MCV RBC AUTO: 92.1 FL (ref 79–97)
MONOCYTES # BLD AUTO: 0.52 10*3/MM3 (ref 0.1–0.9)
MONOCYTES NFR BLD AUTO: 6.4 % (ref 5–12)
NEUTROPHILS NFR BLD AUTO: 6.57 10*3/MM3 (ref 1.7–7)
NEUTROPHILS NFR BLD AUTO: 81.1 % (ref 42.7–76)
NRBC BLD AUTO-RTO: 0 /100 WBC (ref 0–0.2)
PLATELET # BLD AUTO: 364 10*3/MM3 (ref 140–450)
PMV BLD AUTO: 10.3 FL (ref 6–12)
RBC # BLD AUTO: 4.04 10*6/MM3 (ref 3.77–5.28)
WBC NRBC COR # BLD AUTO: 8.1 10*3/MM3 (ref 3.4–10.8)

## 2023-12-18 PROCEDURE — 85025 COMPLETE CBC W/AUTO DIFF WBC: CPT

## 2023-12-18 PROCEDURE — 36415 COLL VENOUS BLD VENIPUNCTURE: CPT

## 2023-12-18 RX ORDER — ZOLPIDEM TARTRATE 5 MG/1
5 TABLET ORAL NIGHTLY PRN
Qty: 30 TABLET | Refills: 0 | Status: SHIPPED | OUTPATIENT
Start: 2023-12-18

## 2023-12-18 NOTE — PROGRESS NOTES
REASON FOR FOLLOW-UP: Recurrent lung cancer.    History of Present Illness    The patient is a 83 y.o. female with the above-mentioned history who returned 9/22/2023 continuing on Mekinist 0.5 mg daily and Tafinlar at 50 mg twice daily.  She also continues on prednisone only taking 10 mg daily.  She reports that she is feeling quite good.  She is tolerating things well.  She has a decent appetite denies issues with nausea, vomiting, diarrhea, or constipation.  She denies any issues with increased shortness of breath.    Patient did see ENT Dr. Topete, who has ordered an ultrasound of her neck, which will be done at [facility] on the 27th.  She is also scheduled for a cerebral angiogram by Dr. Calvin on 29 September.      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  Dr. Dowell of neurosurgery saw the patient 10/12/2023 and felt the patient was stable without neurologic symptoms, yearly follow-up planned.  The patient had started seeing a new ENT physician leading to the referral back to neurosurgery.  When seen 10/16/2023 she is doing very well on her current Mekinist and Tafinlar doses having improved her weight, appetite and general performance status.  We have discussed at least a chest x-ray today and repeat staging in the next 5 to 6 weeks as she continues her current dosing.    The patient required admission 11/14 - 11/16/2023 having presented with shortness of breath and found to be pulmonary edema with CBC, lactate and procalcitonin all normal.  There was a concern that her chemotherapy agents could have produced her cardiomyopathy and these were stopped 11/14/2023.  She was diuresed and echocardiogram demonstrated LV SF mildly decreased at 39.2% with GLS of -11.1%, left ventricular cavity mildly dilated, left  ventricular wall thickness consistent with mild concentric hypertrophy, left ventricular global hypokinesis, aortic valve abnormal with moderate calcification, valve was trileaflet, trace tricuspid valve regurgitation with right VSP at less than 35 mmHg.  The findings for LV systolic function, diastolic function and global longitudinal LV strain had all worsened.    CTA of chest 11/14/2023 demonstrating confluent soft tissue mass possibly measuring larger in current study at 6.5 x 5.0 previously 4.9 x 4.8, left supraclavicular node to decrease in size measuring 1.1 cm previously 1.4 cm, extensive bilateral interstitial and groundglass infiltrates.    The patient is seen 11/26/2023.  As per the discharge we are requested to have a BMP and CMP assessed.  She is seen with her son and daughter in a lengthy conversation held about her recent hospitalization with CHF-cardiomyopathy felt elicited, in part, from her targeted therapy with her Mekinist and Tafinlar discontinued altogether.    The patient is relatively stable currently having continued her diuretics and to be seen in cardiology in follow-up.  At the time of this dictation her CBC is found to be essentially normal with mild leukocytosis, CMP normal except for mildly elevated glucose at 125 and BNP reduced to 6126 from 9763.  She feels well with no additional shortness of breath chest pain lower extremity edema.  In reviewing the the possible treatment options she is next best treated with single agent chemotherapy moving to Alimta.    The patient proceeded to treatment teaching and seen back 12/4/2023.  She is ready to proceed with chemotherapy with fortunately a recent good performance status still in place.    Patient returns 12/18/2023 for toxicity check.  She started treatment with Alimta on 12/4/2023.  Patient states that she did have an episode following treatment where she felt extremely itchy however she took Benadryl which helped, and her symptoms  resolved.  She has ongoing issues with fatigue.  She is being followed closely by cardio oncology.  She also reports being short of breath but denies chest pain, or swelling.  She does struggle with her appetite.  Otherwise she feels like she has tolerated the change in treatment well.      ONCOLOGY HISTORY:      The patient is an 83-year-old female with the below medical history including chronic obstructive pulmonary disease who was seen in the [facility] multidisciplinary thoracic oncology clinic July 1, 2021.  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      She had undergone a CT of the chest at [facility] 6/16/2021 showing postsurgical changes in the left chest from right upper lobectomy, 8 mm irregular nodule medial segment of the right lower lobe and diffuse changes of emphysema with fibrotic changes in the periphery, no suspicious hilar or mediastinal nodes, no pleural effusion.  New finding was suspicious for new malignancy with the patient felt to be a poor candidate for surgical resection.  A PET CT and PFTs were requested thereafter.  The PET/CT demonstrated ill-defined FDG avid soft tissue thickening left aspect mediastinum within the operative bed, 1 cm hypermetabolic pulmonary nodule right lower lobe and asymmetric thickening patchy uptake involving the right base of tongue.  There is subtle uptake within the L1 vertebral body which is indeterminate and a sub-6 mm pulmonary nodule of right lung and subcentimeter hypodense hepatic lesion which was below PET resolution.       The patient's case was discussed in thoracic conference 7/22/2021 with consideration of the patient undergoing L1 vertebral body MRI, confirmatory biopsy left mediastinal and assessment by ENT (Dr. Caballero) who had worked  with the patient previously.  She, incidentally, indicates that radiation therapy was given apparently intraoperatively at her recent surgery?  She still has issues with difficulty chewing and swallowing post procedures and was to be followed up with Dr. Caballero in the near future as planned.                                                            She was seen in the thoracic clinic on the same day with plans to proceed with MRI of the lumbar spine, biopsy left mediastinal nodular area of potential disease and follow-up of her ENT assessment as well as undergo a guardant 360 assessment in our office.  She underwent the biopsy 8/13/2021 found to be consistent with invasive moderately differentiated adenocarcinoma with PD-L1 TPS score 40%.  MRI of the lumbar spine revealed no evidence of metastatic disease though the patient did have multilevel degenerative disease throughout the lumbar spine.  She had an office follow-up with Dr. 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  She underwent laryngoscopy 8/18/2021 with right base of tongue without evidence of lesions or masses in the region.     She was seen by Dr. Hernandez 8/19/2021 and, her findings, had been presented in lung conference.  Her findings were consistent with 2 separate lesions including a nodule in the superior segment of the right lower lobe and a mass in the upper portion of the left chest with the left chest lesion biopsy positive for adenocarcinoma.  The consensus was that these were to separate-synchronous primaries.  Stereotactic radiation each lesions were felt to be the appropriate way to proceed and the patient was referred to radiation therapy.     The patient is seen in office 8/23/2021 agreeable to the radiation therapy as well as additional assessments including Caris molecular assessment of her biopsy.  Again her guardant 360  is currently pending and we would follow her after she completes radiation therapy with subsequent scans.    She was able to proceed with SBRT to the right lung at primary #1 with 5 treatments at 1000 cGy per fraction in the left lung primary similarly at 1000 cGy per fraction x5.  Her treatment ranged between 8/31-9/13/2021.  She is now scheduled for follow-up 11/23/2021.    The patient subsequent genomic testing is discussed with her as she is is seen back 9/23/2021.  Her guardant 360 did not determine any new abnormalities but her Caris-MI profile-does reveal sensitivity to immunotherapy as well as a BRAF mutation.  This could be extremely important as we follow the patient from this point.  Fortunately she is feeling extremely well and quite pleased about her treatments.    Patient had subsequent PET/CT 11/23/2021 demonstrates decrease in size and avidity of the previously noted intensely FDG avid nodules left lobectomy operative site and right lower lobe.  Surrounding groundglass and pulmonary opacification is consistent with postradiation changes, a few new subcentimeter pulmonary nodules are present in the right upper lobe and indeterminant, stable subcentimeter hepatic lesion is below PET resolution no other findings of FDG-avid disease are seen in neck abdomen or pelvis.  The patient seen in office 12/1/2021 with a relatively good performance status stating she is not having any new symptoms and very pleased about her results on her PET/CT.  She realizes we'll have to follow this further but subsequent scans in 6 months.  She is also hoping to see an oral surgeon that previously helped her-Dr. Wu.    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  There is evolution of presumed postradiation changes in the right midlung with soft tissue mass  within the left lumpectomy operative bed minimally decreased in size.  There is a sub-6 mm nodule in the right upper lobe not definitively seen, indeterminate and an indeterminate left renal lesion at 1.5 cm unchanged from 7/13/2021.  The patient is currently scheduled to see Dr. Dowell on 6/23/2022.  She is feeling well without any additional symptoms.    The patient required admission 10/14 - 10/18/2022 presenting with shortness of breath and cough that was nonproductive.  She had been on oral antibiotics and did not improved and was admitted for therapy for apparent pneumonia.  This eventually led to bronchoscopy after initial CT revealed postsurgical changes, new masslike 6.7 cm area of consolidation anterior left lung raising concern for potential malignancy and a follow-up PET/CT planned.  Bronchoscopy and biopsy left lower lung failed to show evidence of malignancy.  The patient's PET/CT, however, demonstrated an irregular pleural-based soft tissue density thickening in the left upper lobe that was intensely FDG avid new from 6/8/2022 thought to be a malignant recurrence with spread into the left lung parenchyma.  There is intensely pleural-based avidity within the left lower lobe thought to be metastatic disease with postradiation of the left apex as well.  There is a small focus of uptake in the right hilum grossly unchanged in size and post radiation changes in the right lower lobe.  There is indeterminate density left renal that was grossly unchanged.  The patient is seen back in office 11/15/2022 indicating that she still has chest discomfort that is slowly worsening and that she has a chronic paroxysmal cough but has not improved.  We discussed that she very likely has recurrent disease and plan to proceed with a guardant 360 examination initially as we determine whether we should try to proceed with therapy particularly immunotherapy versus targeted therapy recognizing the above findings.    Patient's  guardant 360 returned as again significant for BRAF V600E and the potential use of BRAF inhibitor/MET inhibitor dabrafenib and trametinib.  Additional information [id number] and use of alpelisib.    Unfortunately she required admission 11/28-12/1 with worsening back pain.  Fortunately she did not demonstrate evidence of metastatic disease but did have moderate degenerative changes which could cause radiculopathy.  Medications were altered to include subsequently MSR 15 mg p.o. every 12 hours, Norco as needed.    The patient now presents back 12/14/2022 to initiate therapy- initially with immunotherapy considering Caris study with PD-L1 TPS of 70% on 22 C3 and 28-8 assays.    Patient seen with her  and we discussed pain medications and adjustments thereafter and that she stopped taking MSIR having only a short supply and having to use Norco more frequently.  She is willing to initiate Keytruda today we have discussed that considering her genomics treatment versus her BRAF mutation is also an option.    C1 Keytruda 12/14/2022    Patient is seen back 1/4/2023 in follow-up due for cycle 2 Keytruda.  Patient states that since receiving her first cycle she has had decreased appetite and decreased energy.  She has not been able to bring herself to eat much and she has notably lost 7 pounds.  She has also been struggling with constipation in relation to ongoing narcotics for pain control.  She has been taking senna S2 tabs twice a day.  We discussed adding daily MiraLAX.    Patient did just last week meet with dietitian, Zoila Clinton, to discuss ways to improve caloric intake.  She has not been able to implement any of these things yet though.    The patient is next seen 1/25/2023 with mild weight gain.  She is seen with family member both indicating that she has actually felt somewhat better in terms of performance status and general function.  We have discussed proceeding with a third cycle treatment and reevaluating  by scan in 2 weeks.    The patient proceeded with treatment 1/25/2023 and underwent follow-up CT chest abdomen pelvis 2/8/2023 demonstrating small pericardial effusion that is decreased in size from 11/20/2022, ill-defined consolidation and pleural-based thickening in the left apex and upper lung has reduced slightly, additional index nodule soft tissue in the aspect the pericardium has not changed substantially, no evidence of metastatic disease in the abdomen pelvis.    The patient is seen with her daughter 2/15/2023 both indicating that she has definitely improved, stable weight, appetite and performance status.  She is also using her pain medication much less frequently and wonders whether she might begin to taper from her twice a day MS Contin.    Patient is seen back 3/8/2023 due for ongoing pembrolizumab.  She continues on low-dose prednisone 10 mg daily and has noted significant improvement in her energy and appetite with this.  She is reluctant to taper this any further we discussed that it is fine for her to stay on daily dosing.    She did try to taper her pain medication going down to just MS Contin 15 mg every 12 hours while holding her hydrocodone.  However over the last few days she has had some intermittent pain in the left upper back alleviated with hydrocodone 2-3 times a day.  She currently is denying any pain.  Monitor.    She is next evaluated 3/29/2023 for ongoing Keytruda.  Evidently her pain medication was sent to the wrong pharmacy and will need to be represcribed today-long-acting MS Contin.  Otherwise she is doing reasonably well at present.    Patient seen 5/31/2023 with gradual development of left shoulder and left scapular pain.  Concurrent CT chest, abdomen and pelvis demonstrate interval increasing consolidation left upper lobe with extension anterior to the left upper ribs with sclerosis anterior left second rib.  This is suspicious for worsening malignancy.  Plans were made for  subsequent PET/CT where the patient's pain medications were adjusted.PET/CT 6/5/2023 reveals progression of disease in left upper thorax, left supraclavicular adenopathy new metastasis left posterior fourth ribs, no evidence of metastatic disease in the abdomen or pelvis.    The patient is seen with her son and daughter 6/12/2023 and it is clear that she is not developing a Pancoast like syndrome with progression of her malignancy in the left upper thorax and associated symptoms.  We have discussed discontinuance of her immunotherapy and moving to targeted therapy with dabrafenib and trametinib as we also request radiation therapy repeat consultation and try to manage her pain more effectively.    Patient seen 7/5/2023 with plans to start palliative radiotherapy and to initiate concurrently dabrafenib and trametinib.    As a result of toxicity (nausea and vomiting) the patient was taken off of dabrafenib and trametinib when seen 7/21/2023, given additional IV hydration and further supportive care.  Plans were made for follow-up on recovery to determine as to whether to redose the medications.    Unfortunately she required admission 7/25-30/2023 with failure to thrive.  Subsequent assessments including blood cultures were negative and patient was treated briefly with IV antibiotic therapy, started PT and OT, medications adjusted for hypotension and treated symptomatically for nausea and vomiting.  She was able to improve enough to be discharged home as she continued radiation therapy started 7/17/2023 and completed 7/31/2023 to the left chest wall.    She is next seen back 8/4/2023.  We have reviewed that she had been on dabrafenib and trametinib at 150 mg p.o. every 12 hours and 2 mg p.o. daily respectively and dosing could be changed considerably such as 50 mg twice daily and 0.5 mg daily.  Determination made to have her undergo repeat scans at 4 weeks and review her response to radiation therapy as we make  application for lower dose targeted therapy, addition of Remeron p.o. nightly, tapering of her MS Contin to daily rather than twice daily, use of low-dose Ativan to undergo scans.    Subsequent scans 8/25/2023, fortunately, with stable left apical mass with mediastinal chest wall involvement unchanged 1.4 cm supraclavicular lymph node.  No new findings of metastatic disease.    The patient is seen back 9/8/2023.  Fortunately she is improved dramatically off therapy and is gratified that his studies have not worsened in any substantial way.  We have discussed both her scan reports and echocardiogram that does not show significant reduction of her ejection fraction.  As result of her current stable status we have asked her to restart Mekinist at 0.5 mg daily and Tafinlar at 50 mg twice daily to be advanced as tolerated.  Patient seen 10/16/2023 with follow-up chest x-ray planned and CT of chest abdomen pelvis at 5 weeks -approximately 3 months of therapy.    The patient required admission 11/14 - 11/16/2023 having presented with shortness of breath and found to be pulmonary edema with CBC, lactate and procalcitonin all normal.  There was a concern that her chemotherapy agents could have produced her cardiomyopathy and these were stopped 11/14/2023.  She was diuresed and echocardiogram demonstrated LV SF mildly decreased at 39.2% with GLS of -11.1%, left ventricular cavity mildly dilated, left ventricular wall thickness consistent with mild concentric hypertrophy, left ventricular global hypokinesis, aortic valve abnormal with moderate calcification, valve was trileaflet, trace tricuspid valve regurgitation with right VSP at less than 35 mmHg.  The findings for LV systolic function, diastolic function and global longitudinal LV strain had all worsened.    CTA of chest 11/14/2023 demonstrating confluent soft tissue mass possibly measuring larger in current study at 6.5 x 5.0 previously 4.9 x 4.8, left supraclavicular  node to decrease in size measuring 1.1 cm previously 1.4 cm, extensive bilateral interstitial and groundglass infiltrates.      The patient is seen 11/26/2023.  As per the discharge we are requested to have a BMP and CMP assessed.  She is seen with her son and daughter in a lengthy conversation held about her recent hospitalization with CHF-cardiomyopathy felt elicited, in part, from her targeted therapy with her Mekinist and Tafinlar discontinued altogether.    The patient is relatively stable currently having continued her diuretics and to be seen in cardiology in follow-up.  At the time of this dictation her CBC is found to be essentially normal with mild leukocytosis, CMP normal except for mildly elevated glucose at 125 and BNP reduced to 6126 from 9763.  She feels well with no additional shortness of breath chest pain lower extremity edema.  In reviewing the the possible treatment options she is next best treated with single agent chemotherapy moving to Alimta.    Patient seen 12/4/2023 with plans to initiate Alimta chemotherapy-cycle 1 day 1        Past Medical History:   Diagnosis Date    Allergic rhinitis     Aneurysm     Asthma     Cancer of floor of mouth 2014    Cerebral aneurysm     COPD (chronic obstructive pulmonary disease)     COVID 2020    Esophageal reflux     History of adenocarcinoma of lung     History of anemia     History of carcinoma in situ of skin     History of lung cancer     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 from [facility]    History of squamous cell carcinoma     Tongue    Hyperlipidemia     Hypertension     since early 60s    Intractable back pain 11/29/2022    Low vitamin B12 level     Lung cancer     Systolic CHF, acute 11/14/2023    Thyromegaly     UTI (urinary tract infection)     Vitamin D deficiency         Past Surgical History:   Procedure Laterality Date    BRONCHOSCOPY Bilateral 10/17/2022    Procedure: BRONCHOSCOPY WITH FLUORO with  biopsy and BAL;  Surgeon: India Flores MD;  Location: [facility] ENDOSCOPY;  Service: Pulmonary;  Laterality: Bilateral;  PRE/POST - lung mass    CHOLECYSTECTOMY  1999    COLONOSCOPY      EMBOLIZATION CEREBRAL Left 06/29/2022    Procedure: 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;  Surgeon: Bradley Dowell MD;  Location: [facility] HYBRID OR 18/19;  Service: Interventional Radiology;  Laterality: Left;    EYE SURGERY  11/24/2020    Took a muscle out of right eye    GLOSSECTOMY PARTIAL      less than one half tongue    LUNG SURGERY  2012    ORAL LESION EXCISION/BIOPSY      June and August 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    TUBAL ABDOMINAL LIGATION  1970    VENOUS ACCESS DEVICE (PORT) INSERTION N/A 12/12/2022    Procedure: MEDIPORT PLACEMENT;  Surgeon: Jason Villaseñor MD;  Location: [facility] MAIN OR;  Service: Vascular;  Laterality: N/A;        Current Outpatient Medications on File Prior to Visit   Medication Sig Dispense Refill    apixaban (ELIQUIS) 2.5 MG tablet tablet Take 1 tablet by mouth 2 (Two) Times a Day. 60 tablet 3    atorvastatin (LIPITOR) 20 MG tablet TAKE 1 TABLET EVERY NIGHT (Patient taking differently: Take 1 tablet by mouth Every Night.) 90 tablet 1    cetirizine (zyrTEC) 10 MG tablet Take 1 tablet by mouth Daily.      Cholecalciferol (Vitamin D3) 50 MCG (2000 UT) tablet Take 1,000 Units by mouth Daily. HOLDING FOR DOS      Cyanocobalamin (VITAMIN B12 PO) Take 1,000 mcg by mouth Daily.      dexAMETHasone (DECADRON) 4 MG tablet Take 1 tablet oral twice a day for 3 consecutive days beginning the day before chemotherapy and continue for 6 doses.Take with food. 6 tablet 5    folic acid (FOLVITE) 1 MG tablet Take 1 tablet by mouth Daily. Start at least 7 days prior to chemotherapy until at least 3 weeks after all chemotherapy. 30 tablet 6    furosemide (LASIX) 40 MG tablet Take 1 tablet by mouth Daily. 90 tablet 1    ipratropium (ATROVENT) 0.06 % nasal spray 2 sprays into the  nostril(s) as directed by provider 4 (Four) Times a Day. 15 mL 0    metoprolol succinate XL (TOPROL-XL) 25 MG 24 hr tablet Take 0.5 tablets by mouth Daily. 45 tablet 3    mirtazapine (REMERON) 7.5 MG tablet TAKE 1 TABLET BY MOUTH EVERY NIGHT AT BEDTIME 30 tablet 1    montelukast (SINGULAIR) 10 MG tablet Take 1 tablet by mouth Every Night.      ondansetron (ZOFRAN) 8 MG tablet Take 1 tablet by mouth 3 (Three) Times a Day As Needed for Nausea or Vomiting. 30 tablet 5    potassium chloride (K-DUR,KLOR-CON) 10 MEQ CR tablet TAKE 2 TABLETS BY MOUTH DAILY 60 tablet 1    predniSONE (DELTASONE) 10 MG tablet Take 1 tablet by mouth Daily.      promethazine-dextromethorphan (PROMETHAZINE-DM) 6.25-15 MG/5ML syrup Take 5 mL by mouth 4 (Four) Times a Day As Needed for Cough. 180 mL 0    sacubitril-valsartan (Entresto) 24-26 MG tablet Take 0.5 tablets by mouth 2 (Two) Times a Day. 30 tablet 3     No current facility-administered medications on file prior to visit.        ALLERGIES:    Allergies   Allergen Reactions    Sulfa Antibiotics Rash        Social History     Socioeconomic History    Marital status:    Tobacco Use    Smoking status: Former     Types: Cigarettes     Quit date: 2015     Years since quittin.8     Passive exposure: Never    Smokeless tobacco: Never    Tobacco comments:     less than a pack per day, no smoking since , Quit 2015   Vaping Use    Vaping Use: Never used   Substance and Sexual Activity    Alcohol use: Not Currently     Comment: Socially -A few times a month    Drug use: No    Sexual activity: Defer     Family History   Problem Relation Age of Onset    Ovarian cancer Mother          at age 27    No Known Problems Father     Ovarian cancer Sister     Colon cancer Brother     No Known Problems Other     Malig Hyperthermia Neg Hx         Review of Systems  ROS as per HPI     Objective      Vitals:    23 1503   BP: 130/57   Pulse: 79   Resp: 18   Temp: 97.8 °F (36.6 °C)  "  TempSrc: Temporal   SpO2: 95%   Weight: 47.5 kg (104 lb 12.8 oz)   Height: 157.5 cm (62.01\")   PainSc: 0-No pain                   12/18/2023     1:55 PM   Current Status   ECOG score 0      Physical Exam  Vitals reviewed.   Constitutional:       General: She is not in acute distress.     Appearance: Normal appearance. She is well-developed.   HENT:      Head: Normocephalic and atraumatic.      Mouth/Throat:      Pharynx: No oropharyngeal exudate.   Eyes:      Pupils: Pupils are equal, round, and reactive to light.   Cardiovascular:      Rate and Rhythm: Normal rate and regular rhythm.      Heart sounds: Normal heart sounds. No murmur heard.  Pulmonary:      Effort: Pulmonary effort is normal. No respiratory distress.      Breath sounds: Normal breath sounds. No wheezing, rhonchi or rales.   Abdominal:      General: Bowel sounds are normal. There is no distension.      Palpations: Abdomen is soft.   Musculoskeletal:         General: No swelling. Normal range of motion.      Cervical back: Normal range of motion.   Skin:     General: Skin is warm and dry.      Findings: No rash.   Neurological:      Mental Status: She is alert and oriented to person, place, and time.         Physical exam preformed and reviewed. No changes noted from previous exam. Nena Avila, APRN ; 12/18/2023      RECENT LABS:  Results from last 7 days   Lab Units 12/18/23  1402   WBC 10*3/mm3 8.10   NEUTROS ABS 10*3/mm3 6.57   HEMOGLOBIN g/dL 12.0   HEMATOCRIT % 37.2   PLATELETS 10*3/mm3 364                     Assessment & Plan     1.  Carcinoma of the lung  May 2015, status post previous left upper lobectomy for carcinoma of the lung.    2.  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   She underwent laryngoscopy 8/18/2021 with right base of tongue without evidence of lesions or masses in the " region.    3.  Moderately differentiated adenocarcinoma of the lung.  CT of the chest 6/16/2021 demonstrated postsurgical changes, 8 mm irregular nodule medial segment of right lower lobe and diffuse changes of emphysema.    She is seen in thoracic clinic with findings suspicious for new malignancy and concern of the patient being a poor operative candidate.    7/13/2021 PET CT demonstrated ill-defined avidity and soft tissue left aspect of the mediastinum, 1 cm hypermetabolic pulmonary nodule and asymmetric thickening involving the right base of tongue as well as subtle uptake in the L1 vertebral body.    This patient's case was discussed in thoracic clinic and plans were made to request an MRI of the lumbar spine, obtain a biopsy of the mediastinal involvement of the left had the patient reviewed by ENT.  Biopsy 8/13/2021 found to be consistent with invasive moderately differentiated adenocarcinoma with PD-L1 TPS score 40%.    7/24/2021 MRI of the lumbar spine revealed no evidence of metastatic disease though the patient did have multilevel degenerative disease throughout the lumbar spine.    Her findings were consistent with 2 separate lesions including a nodule in the superior segment of the right lower lobe and a mass in the upper portion of the left chest with the left chest lesion biopsy positive for adenocarcinoma.  The consensus was that these were to separate-synchronous primaries.  Stereotactic radiation each lesions were felt to be the appropriate way to proceed and the patient was referred to radiation therapy.  The patient was referred for radiation therapy and she was able to proceed with SBRT to the right lung at primary #1 with 5 treatments at 1000 cGy per fraction in the left lung primary similarly at 1000 cGy per fraction x5.  Her treatment ranged between 8/31-9/13/2021.    Her guardant 360 did not determine any new abnormalities but her Caris-MI profile-does reveal sensitivity to immunotherapy as  well as a BRAF mutation.  PET/CT 11/23/2021 demonstrates decrease in size and avidity of the previously noted intensely FDG avid nodules left lobectomy operative site and right lower lobe.  Surrounding groundglass and pulmonary opacification is consistent with postradiation changes, a few new subcentimeter pulmonary nodules are present in the right upper lobe and indeterminant, stable subcentimeter hepatic lesion is below PET resolution no other findings of FDG-avid disease are seen in neck abdomen or pelvis.  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  There is evolution of presumed postradiation changes in the right midlung with soft tissue mass within the left lumpectomy operative bed minimally decreased in size.  There is a sub-6 mm nodule in the right upper lobe not definitively seen, indeterminate and an indeterminate left renal lesion at 1.5 cm unchanged from 7/13/2021.  Admission 10/14 - 10/18/2022 presenting with shortness of breath and cough that was nonproductive.  She had been on oral antibiotics and did not improved and was admitted for therapy for apparent pneumonia.  This eventually led to bronchoscopy after initial CT revealed postsurgical changes, new masslike 6.7 cm area of consolidation anterior left lung raising concern for potential malignancy and a follow-up PET/CT planned.   Bronchoscopy and biopsy left lower lung failed to show evidence of malignancy.    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  There is intensely pleural-based avidity within the left lower lobe thought to be metastatic disease with postradiation of the left apex as well.  There is a small focus of uptake in the right  hilum grossly unchanged in size and post radiation changes in the right lower lobe.  There is indeterminate density left renal that was grossly unchanged.    Patient's guardant 360 returned as again significant for BRAF V600E and the potential use of BRAF inhibitor/MET inhibitor dabrafenib and trametinib.  Additional information [id number] and use of alpelisib.  The patient now presents back 12/14/2022 to initiate therapy- initially with immunotherapy considering Caris study with PD-L1 TPS of 70% on 22 C3 and 28-8 assays.  Single agent Keytruda initiated 12/14/2022 2/8/2023 CT of the chest, abdomen pelviswith consolidation and masslike pleural thickening in the left apex and upper lung index areas have decreased somewhat.  There is no evidence of metastatic disease otherwise and a few indeterminate left renal lesions are stable.  After lengthy discussion with the patient and her daughter as to the stability to mild improvement with immunotherapy it is agreed that we would continue treatment at this point.  Proceed today, 4/19/2023 with cycle 7 pembrolizumab which is continued every 3 weeks  The patient proceeded to 8 cycles of pembrolizumab and underwent follow-up chest CT chest, abdomen pelvis revealing evolution of dense consolidation left upper lobe and extension of soft tissue mass anterior to left upper ribs with increase sclerosis anterior left second rib.  This was concerning for progression of disease versus radiation change and the patient was scheduled for subsequent PET/CT.  PET/CT 6/5/2023  reveals progression of disease in left upper thorax, left supraclavicular adenopathy new metastasis left posterior fourth ribs, no evidence of metastatic disease in the abdomen or pelvis.  Patient seen 6/12/2023 with plans to move her Norco to every 4 hours, discontinue Keytruda, make application for dabrafenib and trametinib at 150 mg p.o. every 12 hours and 2 mg p.o. daily respectively.  Patient referred for radiation  therapy consultation concurrently.  Last dose of Keytruda 5/31/2023.  6/23/2023: Patient seen for triage visit.  She has not yet started dabrafenib or trametinib, she has not yet received the medications.  Unfortunately she has been experiencing uncontrolled nausea/vomiting as further detailed below.   Patient seen 6/27/2023 reporting that her nausea has resolved.  She was unable to complete the MRI of the brain however Dr. Escobar did review the images patient did have and there was no evidence of edema or metastatic disease.  Patient can start her new oral medication once she receives the medication.  Patient seen 7/5/2023 with plans to start palliative radiotherapy x10 fractions and initiate dabrafenib and trametinib as soon as medications received.  7/17/2023 patient initiated radiation with 10 fractions planned.  Patient has received dabrafenib and trametinib.  Brought in for triage visit due to nausea associated with dosing.  She is premedicating with ondansetron however only waiting 15 minutes.  We discussed today that she needs to wait at least 30 minutes to 1 hour prior to taking the dabrafenib and trametinib.  The patient will do so.  We discussed she could also take this again if she feels the need 8 hours later for nausea control.  If she is having breakthrough nausea then she should call our office.  I have encouraged her to utilize electrolyte drinks.  She will get 1L normal liter normal saline in the office today.She was not nauseas while in the office so we did not give additional nausea medication.  We will have her return in 1 week repeat labs, review by nurse practitioner, and possible IV fluids.  I stressed the importance of calling sooner if she finds that the premedication is not controlling her nausea at which time we would have her come in for additional IV fluids and evaluation.  She is continuing daily radiation this week.  Case was discussed with Dr. Escobar today.  7/21/2023: Patient  returns for short interval follow-up due to very poor appetite and sleeping the majority of the day.  Her nausea has been improved with premedicating 30 minutes prior to dabrafenib and trametinib.  She however has been sleeping the majority of the day and is not eating when she is awake.  She does report taking ensures but she is only sipping on these.  Have given her samples of some of the office today and encouraged her to drink when while she is here.  Her performance status continues to decline and her daughter states that she was fixing dinner nightly just 2 weeks ago.  With performance status of 3 today I discussed the case with Dr. Escobar and we will hold her dabrafenib and  TRAMETINIB.  Her liver enzymes are elevated today as well.  We will monitor this next week and have her evaluated by Dr. Escobar at which time we could consider restarting her dabrafenib and trametinib at reduced doses pending performance status and laboratory evaluation.  Unfortunately she required admission 7/25-30/2023 with failure to thrive.  Subsequent assessments including blood cultures were negative and patient was treated briefly with IV antibiotic therapy, started PT and OT, medications adjusted for hypotension and treated symptomatically for nausea and vomiting.  She was able to improve enough to be discharged home as she continued radiation therapy started 7/17/2023 and completed 7/31/2023 to the left chest wall.  She is next seen back 8/4/2023.  Lengthy discussion as to reevaluation   Plans made for CT chest, abdomen, pelvis in 4 weeks  Patient seen 8/21/2023 with complaints of worsening fatigue and shortness of breath.  Patient scheduled for follow up CT scans this Friday. Lungs clear on exam,  Sats 97%.  Hgb stable at 11.7.  Will check BNP today.  Discussed may be related to disease and will need to see what scan shows.   Subsequent scans 8/25/2023, fortunately, with stable left apical mass with mediastinal chest wall  involvement unchanged 1.4 cm supraclavicular lymph node.  No new findings of metastatic disease.  The patient is seen back 9/8/2023.  Fortunately she is improved dramatically off therapy and is gratified that his studies have not worsened in any substantial way.  We have discussed both her scan reports and echocardiogram that does not show significant reduction of her ejection fraction.  As result of her current stable status we have asked her to restart Mekinist at 0.5 mg daily and Tafinlar at 50 mg twice daily  9/22/2023 tolerating Mekinist 0.5 mg daily and tafinlar 50 mg twice daily quite well. Continues on prednisone 10 mg daily which will now be reduced to 5 mg daily when seen 10/16/2023  Plans made to continue current dosing of Mekinist and Tafinlar and repeat evaluation with chest x-ray 10/16 and repeat CT chest abdomen pelvis in 5 weeks, MD in 6 weeks.  The patient required admission 11/14 - 11/16/2023 having presented with shortness of breath and found to be pulmonary edema with CBC, lactate and procalcitonin all normal.  There was a concern that her chemotherapy agents could have produced her cardiomyopathy and these were stopped 11/14/2023.  She was diuresed and echocardiogram demonstrated LV SF mildly decreased at 39.2% with GLS of -11.1%, left ventricular cavity mildly dilated, left ventricular wall thickness consistent with mild concentric hypertrophy, left ventricular global hypokinesis, aortic valve abnormal with moderate calcification, valve was trileaflet, trace tricuspid valve regurgitation with right VSP at less than 35 mmHg.  The findings for LV systolic function, diastolic function and global longitudinal LV strain had all worsened.  CTA of chest 11/14/2023 demonstrating confluent soft tissue mass possibly measuring larger in current study at 6.5 x 5.0 previously 4.9 x 4.8, left supraclavicular node to decrease in size measuring 1.1 cm previously 1.4 cm, extensive bilateral interstitial and  groundglass infiltrates.  The patient is seen 11/26/2023.  As per the discharge we are requested to have a BMP and CMP assessed.  She is seen with her son and daughter in a lengthy conversation held about her recent hospitalization with CHF-cardiomyopathy felt elicited, in part, from her targeted therapy with her Mekinist and Tafinlar discontinued altogether.  The patient is relatively stable currently having continued her diuretics and to be seen in cardiology in follow-up.  At the time of this dictation her CBC is found to be essentially normal with mild leukocytosis, CMP normal except for mildly elevated glucose at 125 and BNP reduced to 6126 from 9763.  She feels well with no additional shortness of breath chest pain lower extremity edema.  In reviewing the the possible treatment options she is next best treated with single agent chemotherapy moving to Alimta.  Patient proceeded through teaching and presents back 12/4/2023 to initiate chemotherapy with Alimta-cycle 1 day 1  Seen 12/18/2023 for toxicity check, overall has tolerated well.  Continues to follow closely with cardiology.      4.  Worsening back pain  Admission 11/28/2022 through 12/1/2022.  MRI of the cervical and thoracic spine fortunately failed to demonstrate metastatic disease.  Moderate degenerative changes noted which could cause radiculopathy.  Medication altered to include MS Contin 15 mg every 12 hours and Norco for breakthrough pain  She reports today her pain is adequately controlled  Patient with increasing pain 5/31/2023 with pain level of 6.  MS Contin was increased to 50 mg p.o. every 8 hours and Norco 10/325 #101 p.o. every 6 hours to move to every 4 hours as needed-E scribed to pharmacy  Patient seen 6/12/2023 with Norco moved to every 4 hours.  6/23/2023: Seen for triage visit.  Has been using oxycodone 20 mg every 3 hours as needed for pain.  Reports she has not been using the hydrocodone 10/325.  Was experiencing dizziness, so  reduced her oxycodone use to half a tablet every 3 hours as needed.  Reports pain is uncontrolled during the night so she is having to wake at night time to take pain medicine.  They are questioning resuming long-acting pain medication, as she is waking throughout the night to take pain medicine.  She has already been prescribed MS Contin and has this available at home, did let her know it would be okay to resume this.  Assess 7/5/2023 with pain reduced to 2/10.  Plan to continue current therapy  Darlene Pfeiffer reports a pain score of 0.  Given her pain assessment as noted, treatment options were discussed and the following options were decided upon as a follow-up plan to address the patient's pain: continuation of current treatment plan for pain. Currently not requiring pain medication.   Refilled both the patient's Remeron and Ambien 10/16/2023  12/18/2023 requesting a refill of her Ambien.    5.  Poor appetite and malnutrition  Placed on prednisone 10 mg daily 1/4/2023 with significant improvement in her symptoms  Weight is stable today at just below 106 pounds  Patient assessed 6/12/23 with possible gummy therapy.  Stable performance status including weight and appetite 7/5/2023  Weight has declined as of 7/17/2023 due to nausea and vomiting that started this past weekend.  After further discussion the patient did stop her prednisone 10 mg while she was not feeling well.  We discussed today the importance of continuing this as it can help with her nausea and appetite and therefore she will restart tomorrow.  7/21/2023: Weight is stable today though albumin is declining at 3.  Patient is sleeping the majority of the day and not eating.  Yesterday she had a small piece of chicken and that is all.  She states she is drinking ensures however her daughter thinks that she is just sipping on these and not completing them.  Hopefully holding her dabrafenib and trametinib will be helpful with her being awake more and  appetite.  I encouraged her to make sure she is taking her prednisone daily at 10 mg daily.  Remeron 7.5 mg p.o. nightly E scribed to pharmacy  9/22/2023 weight is up to 98 pounds.  Further improvement in weight 10/16/2020 3 to 104 pounds, continue Remeron at current dose, prednisone reduced to 5 mg daily.    6.  Uncontrolled nausea/vomiting  started after discontinuing prednisone and starting THC Gummies.  Started to experience dizziness with the THC Gummies.  Discontinue THC Gummies and dizziness improved, however she has remained nauseated now.  She has been utilizing Zofran with improvement, however today was unable to keep Zofran tablets down due to nausea/vomiting.  She will resume prednisone 10 mg daily.  She will receive 1 L IV normal saline in clinic today 10 mg IV Compazine.  We will also send prescription for Phenergan suppository, in case she is not able to keep Zofran down in the future.  Will also send for stat MRI brain since patient is now experiencing uncontrolled nausea vomiting and has recently had progression of her cancer as seen on PET from 6/5/2023.  MRI brain was performed without contrast, even though contrast was ordered.  The patient also did not stay for entire exam to be completed.  Exam limited for assessment of metastatic disease.  Attempted to call patient to review results, however no answer, did leave detailed voicemail.  6/27/2023 patient reports that her nausea has resolved.  She did not complete the MRI however the images which were done showed no evidence of edema or metastatic disease.  Nausea reoccurred when seen on 7/17/2023 over this past weekend after initiation of dabrafenib and trametinib.  Patient was premedicating with ondansetron however only giving herself about 15 minutes and I encouraged her to give herself at least 30 minutes to 1 hour prior to taking the medications.  She will notify our office if this is not helpful.  7/21/2023: Premedication with ondansetron 30  minutes prior to dabrafenib and trametinib has been helpful.  Patient however has had some vomiting episodes directly after taking her medications where she has not had time to actually swallow them.  She denies difficulty swallowing however.  We will continue to monitor this.  10/16/2023 not an issue today.  1/18/2023 not an issue today.    7.  Hyperkalemia-  potassium 6/23/2023 3.1.  She has been having uncontrolled nausea, we will hold off on starting oral potassium replacement as it is unlikely she will tolerate that at this time.  Will recommend she increase oral potassium in her diet.  6/27/2023 potassium 3.0.  Nausea has resolved.  Patient will be given 40 M EQ p.o. potassium in the office and she will be started on 20 mEq daily of p.o. potassium.  Assessed 7/5/2023 with potassium 4.4  7/17/2023 potassium normal at 3.7  7/21/2023: Potassium 3.1, patient not taking potassium supplements at home because she does not like the big pill.  Changed to potassium chloride 10 mill equivalents, 2 tablets every a.m. as these will be smaller.   9/22/2023 potassium is 3.7  Repeat assessment 11/27/2023 with potassium of 4.2  12/4/2023 potassium was 4.0    8.Acute systolic heart failure -cardiomyopathy   Secondary to chemotherapy drugs during admission 11/14-16/2023 with Mekinist and Tafinlar discontinued  Diuretics continued postdischarge, cardiology follow-up planned 12/6/2023    Plan:  Return in 1 week with repeat labs due for cycle 2 Alimta.  Return in 4 weeks for follow-up with Dr. Escobar with repeat labs reassessment and cycle 3 Alimta.  Continue to follow closely with cardio oncology.  Ambien will be refilled today.  Call/ return sooner should the patient develop any new concerns or problems.    Patient is on a high risk medication requiring close monitoring for toxicity.

## 2023-12-19 ENCOUNTER — LAB (OUTPATIENT)
Dept: LAB | Facility: HOSPITAL | Age: 83
End: 2023-12-19
Payer: MEDICARE

## 2023-12-19 ENCOUNTER — TRANSCRIBE ORDERS (OUTPATIENT)
Dept: ADMINISTRATIVE | Facility: HOSPITAL | Age: 83
End: 2023-12-19
Payer: MEDICARE

## 2023-12-19 ENCOUNTER — HOSPITAL ENCOUNTER (OUTPATIENT)
Dept: CT IMAGING | Facility: HOSPITAL | Age: 83
Discharge: HOME | End: 2023-12-19
Payer: MEDICARE

## 2023-12-19 VITALS
HEART RATE: 79 BPM | SYSTOLIC BLOOD PRESSURE: 112 MMHG | DIASTOLIC BLOOD PRESSURE: 63 MMHG | TEMPERATURE: 97.8 F | OXYGEN SATURATION: 93 % | RESPIRATION RATE: 16 BRPM

## 2023-12-19 DIAGNOSIS — Z96.1 HISTORY OF INTRAOCULAR LENS IMPLANT: ICD-10-CM

## 2023-12-19 DIAGNOSIS — H53.2 DIPLOPIA: Primary | ICD-10-CM

## 2023-12-19 DIAGNOSIS — C34.11 MALIGNANT NEOPLASM OF UPPER LOBE OF RIGHT LUNG: ICD-10-CM

## 2023-12-19 DIAGNOSIS — H04.123 INSUFFICIENCY OF TEAR FILM OF BOTH EYES: ICD-10-CM

## 2023-12-19 DIAGNOSIS — H25.812 COMBINED FORMS OF AGE-RELATED CATARACT, LEFT EYE: ICD-10-CM

## 2023-12-19 DIAGNOSIS — H53.2 DIPLOPIA: ICD-10-CM

## 2023-12-19 DIAGNOSIS — I50.21 ACUTE HFREF (HEART FAILURE WITH REDUCED EJECTION FRACTION): ICD-10-CM

## 2023-12-19 DIAGNOSIS — H25.819 COMBINED FORM OF SENILE CATARACT, UNSPECIFIED LATERALITY: ICD-10-CM

## 2023-12-19 DIAGNOSIS — R94.30 ABNORMAL RESULT OF CARDIOVASCULAR FUNCTION STUDY, UNSPECIFIED: ICD-10-CM

## 2023-12-19 LAB
CREAT BLDA-MCNC: 0.8 MG/DL (ref 0.6–1.3)
CRP SERPL-MCNC: 2.51 MG/DL (ref 0–0.5)
ERYTHROCYTE [SEDIMENTATION RATE] IN BLOOD: 19 MM/HR (ref 0–30)
QT INTERVAL: 406 MS
QTC INTERVAL: 527 MS

## 2023-12-19 PROCEDURE — A9270 NON-COVERED ITEM OR SERVICE: HCPCS | Performed by: STUDENT IN AN ORGANIZED HEALTH CARE EDUCATION/TRAINING PROGRAM

## 2023-12-19 PROCEDURE — 25510000001 IOPAMIDOL PER 1 ML: Performed by: NURSE PRACTITIONER

## 2023-12-19 PROCEDURE — 36415 COLL VENOUS BLD VENIPUNCTURE: CPT

## 2023-12-19 PROCEDURE — 86140 C-REACTIVE PROTEIN: CPT

## 2023-12-19 PROCEDURE — 75574 CT ANGIO HRT W/3D IMAGE: CPT

## 2023-12-19 PROCEDURE — 63710000001 NITROGLYCERIN 0.4 MG SUBLINGUAL TABLET: Performed by: STUDENT IN AN ORGANIZED HEALTH CARE EDUCATION/TRAINING PROGRAM

## 2023-12-19 PROCEDURE — 82565 ASSAY OF CREATININE: CPT

## 2023-12-19 PROCEDURE — 85652 RBC SED RATE AUTOMATED: CPT

## 2023-12-19 PROCEDURE — 93005 ELECTROCARDIOGRAM TRACING: CPT | Performed by: STUDENT IN AN ORGANIZED HEALTH CARE EDUCATION/TRAINING PROGRAM

## 2023-12-19 RX ORDER — METOPROLOL TARTRATE 1 MG/ML
5 INJECTION, SOLUTION INTRAVENOUS
Status: DISCONTINUED | OUTPATIENT
Start: 2023-12-19 | End: 2023-12-20 | Stop reason: HOSPADM

## 2023-12-19 RX ORDER — SODIUM CHLORIDE 9 MG/ML
20 INJECTION, SOLUTION INTRAVENOUS ONCE
OUTPATIENT
Start: 2023-12-27

## 2023-12-19 RX ORDER — NITROGLYCERIN 0.4 MG/1
0.4 TABLET SUBLINGUAL ONCE
Status: COMPLETED | OUTPATIENT
Start: 2023-12-19 | End: 2023-12-19

## 2023-12-19 RX ADMIN — METOPROLOL TARTRATE 5 MG: 1 INJECTION, SOLUTION INTRAVENOUS at 13:10

## 2023-12-19 RX ADMIN — METOPROLOL TARTRATE 5 MG: 1 INJECTION, SOLUTION INTRAVENOUS at 13:43

## 2023-12-19 RX ADMIN — METOPROLOL TARTRATE 5 MG: 1 INJECTION, SOLUTION INTRAVENOUS at 13:16

## 2023-12-19 RX ADMIN — NITROGLYCERIN 0.4 MG: 0.4 TABLET SUBLINGUAL at 14:02

## 2023-12-19 RX ADMIN — IOPAMIDOL 85 ML: 755 INJECTION, SOLUTION INTRAVENOUS at 14:01

## 2023-12-19 RX ADMIN — METOPROLOL TARTRATE 5 MG: 1 INJECTION, SOLUTION INTRAVENOUS at 13:33

## 2023-12-19 RX ADMIN — METOPROLOL TARTRATE 5 MG: 1 INJECTION, SOLUTION INTRAVENOUS at 13:22

## 2023-12-19 NOTE — NURSING NOTE
Spoke to jonnie cardiology RN who advised this RN to give patient another dose of iv metoprolol and to call her back.

## 2023-12-20 ENCOUNTER — DOCUMENTATION (OUTPATIENT)
Dept: CARDIOLOGY | Facility: CLINIC | Age: 83
End: 2023-12-20
Payer: MEDICARE

## 2023-12-20 NOTE — PROGRESS NOTES
The patient can follow-up with Dr. Orr regarding these results.  Nothing urgent.  Nothing outside of guideline directed medical therapy for systolic heart failure for now.  Might be worthwhile to make sure that the patient's lipids are controlled and she is on the appropriate dose of statin, etc.

## 2023-12-20 NOTE — PROGRESS NOTES
Cardiac CTA with morphology  12/19/2023  Reason for the exam: Congestive heart failure, assess for ischemic cause    Calcium score is 642 Agatston units.  This correlates to the 83rd percentile and matched for age, gender, ethnicity.    Heart rate 27 bpm.  Left ventricular end-diastolic volume 148 mL.  Left ventricular end-systolic volume 109 mL.  Ejection fraction 27%.   Cardiac output 2730 mL/m    Study quality is poor.  There is significant intraluminal assessment limitations due to blooming artifact from densely calcified coronary plaques, as well as elevated heart rate at time of exam    The right atrium is normal in size.  The right ventricle is normal in size.  There is grossly normal right ventricular systolic function.  The pulmonary artery is normal in size.  There are 4 pulmonary veins which enter the left atrium in their expected location.  The left atrial appendage was visualized and is without thrombus.  The left atrium is normal in size.  The intra-atrial septum appeared to be intact, contrast bolus timing limits assessment of PFO. .  Left ventricular cavity is borderline dilated with reduced ejection fraction with global hypokinesis and EF of 27%.    There was no evidence of a left ventricular thrombus.  The intraventricular septum appeared to be intact.  The mitral valve appeared structurally normal.  The aortic valve was trileaflet and appears structurally and functionally normal.  The pulmonic valve appeared structurally normal.  The tricuspid valve appeared structurally normal.  There was no pericardial effusion.  The pericardium appeared normal.    The left main coronary artery originates from the left coronary cusp in its anticipated location.  It trifurcates into the LAD, small caliber ramus intermedius, and circumflex vessels.  The left coronary cusp is heavily calcified however there does not appear to be any luminal encroachment upon the ostial left main.  The remainder of the left main  appears free of atherosclerotic disease.  The LAD is a moderate caliber vessel that gives rise to a small caliber diagonal in its proximal segment, a moderate caliber branching diagonal in its mid segment, and then courses to the apex.  There is a calcified plaque in the proximal LAD prior to the first diagonal resulting in mild stenosis.  There is a heavily calcified plaque in the proximal mLAD after the first diagonal which limits intraluminal assessment and results in moderate stenosis.  Motion artifact degrades the remainder of the mid to distal LAD evaluation however the vessel appears widely patent.  There is a calcified plaque at the ostium of the diagonal branch resulting in mild stenosis, the remainder of the diagonal appears free of atherosclerotic disease.  The ramus intermedius is a small caliber vessel and the vessel evaluation is limited due to motion artifact.    The circumflex is a moderate caliber vessel that terminates as a small marginal branch.  There is motion or pressure limits assessment but appears widely patent.    The RCA is a large-caliber vessel that is dominant for posterior circulation.  Gives rise to PDA, and a branching PL system the ostium of the RCA is heavily calcified and there is a mixed plaque at the ostium resulting in moderate to severe stenosis however luminal assessment is heavily limited due to calcium blooming artifact.  There is a mixed plaque in the proximal RCA resulting in moderate stenosis there is a mixed plaque in the distal RCA at the level of the crux resulting in moderate to severe stenosis, remainder of the RCA remains heavily calcified, but widely patent.  There is a heavily calcified plaque at the ostium of the PL branch resulting in mild stenosis.    Conclusions:  1.  Coronary artery disease as identified above.  Most significant disease appears to be in the RCA whether may be a severely stenotic ostial lesion, as well as distal lesion.  There is a moderate  lesion in the mid LAD.  Severely elevated coronary calcifications per above.  2.  Trivial pericardial effusion  3.  Borderline dilated left ventricular cavity with reduced systolic function with a EF 27%  4.  Please see separately dictated radiology report for lung examination findings.  5.  CAD-RADS 4a.  Probable obstructive disease of the RCA, although there are significant limitations in today's study per above.  If there is a high suspicion of cardiac ischemia, consider further evaluation with functional assessment or invasive coronary angiography.  Consider symptom guided anti-ischemic and preventative pharmacotherapy, as well as risk factor modification per guideline directed care.  Other treatments, including options for revascularization, should be considered per guideline directed care.    Jose Moore MD  12/20/23

## 2023-12-21 ENCOUNTER — OFFICE VISIT (OUTPATIENT)
Dept: CARDIOLOGY | Facility: CLINIC | Age: 83
End: 2023-12-21
Payer: MEDICARE

## 2023-12-21 ENCOUNTER — HOSPITAL ENCOUNTER (OUTPATIENT)
Dept: CT IMAGING | Facility: HOSPITAL | Age: 83
Discharge: HOME | End: 2023-12-21
Payer: MEDICARE

## 2023-12-21 VITALS
SYSTOLIC BLOOD PRESSURE: 118 MMHG | DIASTOLIC BLOOD PRESSURE: 72 MMHG | HEIGHT: 62 IN | HEART RATE: 90 BPM | BODY MASS INDEX: 19.32 KG/M2 | WEIGHT: 105 LBS

## 2023-12-21 DIAGNOSIS — I10 ESSENTIAL HYPERTENSION: ICD-10-CM

## 2023-12-21 DIAGNOSIS — C34.11 MALIGNANT NEOPLASM OF UPPER LOBE OF RIGHT LUNG: ICD-10-CM

## 2023-12-21 DIAGNOSIS — E78.5 HYPERLIPIDEMIA, UNSPECIFIED HYPERLIPIDEMIA TYPE: Primary | ICD-10-CM

## 2023-12-21 DIAGNOSIS — Z45.2 FITTING AND ADJUSTMENT OF VASCULAR CATHETER: ICD-10-CM

## 2023-12-21 DIAGNOSIS — I50.21 ACUTE HFREF (HEART FAILURE WITH REDUCED EJECTION FRACTION): ICD-10-CM

## 2023-12-21 NOTE — PROGRESS NOTES
Date of Office Visit: 2023  Encounter Provider: Claire Orr MD  Place of Service: [facility] CARDIOLOGY  Patient Name: Darlene Pfeiffer  :1940    Chief complaint  Cardiomyopathy, paroxysmal atrial fibrillation, coronary artery disease, cardiac oncology care    History of Present Illness  Patient is a 83-year-old female with a history of COPD, hypertension, hyperlipidemia, intracranial aneurysm left PCA.  Lung cancer, adenocarcinoma of the tongue, asthma and coronary artery calcification.  2015 patient underwent left upper lobectomy.  By  she was diagnosed with tongue cancer underwent resection.  No evidence of recurrence.  However by 2021 found to have mediastinal changes as well as right lower lobe pulmonary nodule.  Eventually found to have adenocarcinoma in 2 different regions.  Patient received radiation therapy to the left lung that was completed on 2021.  By 10/2022 she was admitted with pneumonia and by 2022 had abnormal PET scan felt to be suggestive of metastatic postradiation changes left apex.  On 2022 she started Keytruda.  On 2023 however, there is evidence of progressive disease in the left upper thorax and ribs.  Therefore this was discontinued and she was to do with palliative radiation therapy 2023.  She then received   She developed significant nausea and failure to thrive.  On 2023 echo showed normal systolic function and she was changed to dabrafenib and trametinib.  By 2023 she developed cardiomyopathy with an ejection fraction 39.2% GLS -11.1% with global weightman 30 minutes hypokinesis with aortic valve calcification with trivial valve regurgitation normal right-sided pressures.  She recalls the morning of admission that she woke up with sudden shortness of breath.  She denies any chest pain.  She was diuresed and started on goal-directed therapy for heart failure though hypotension limited adequate therapy.  Following this both  chemotherapeutic agents were discontinued and she started Alimta on 12/4/2023.  When seen in the office on 12/12/2023 she developed atrial fibrillation with rapid rates.  Toprol was increased and she was started on low-dose Eliquis.  She subsequently converted to sinus rhythm between them and her current visit.  She had already been scheduled for coronary CT angiogram and already been discussed with patient at visit already been ordered prior to the visit.  So at the visit we had further discussion regarding evaluation for ischemic etiology of cardiomyopathy.  She was not a candidate for Lexiscan cardiac stress test in the setting of hypoxia and atrial fibrillation.  She was not keen on cardiac cath directly at that time.  With this in mind we proceeded with a coronary CT angiogram as already ordered.  This shows an ejection fraction of 27% with global hypokinesis with evidence of multivessel coronary artery disease most significant in the with probable significant ostial RCA stenosis.  There was moderate disease in the mid LAD.  Motion and pressure limited disease 3 circumflex vessel..  Left main was felt to be free of significant obstructive disease.  Mild proximal LAD stenosis was noted moderate mid LAD stenosis was present.    Baseline echo on 8/2023 with ejection fraction 56%, GLS -20.8%.  Inferobasal hypokinesis, grade 1 diastolic dysfunction, normal right-sided chambers with moderate left atrial enlargement and normal RV function.  Echo on 11/14/2023 with ejection fraction 39%, GLS -11.1% grade 1A diastolic dysfunction trivial valve regurgitation.  Coronary calcium has been noted on prior CT scans and coronary CTA has been ordered though not completed.    In the meanwhile patient has had no palpitations edema chest pain.  She has chronic dyspnea on exertion is not dizziness with standing.  She feels quite well on the metoprolol.  She is high surgery coming up with better embolism the next several weeks.       Past Medical History:   Diagnosis Date    Allergic rhinitis     Aneurysm     Asthma     Cancer of floor of mouth 2014    Cerebral aneurysm     COPD (chronic obstructive pulmonary disease)     COVID 2020    Esophageal reflux     History of adenocarcinoma of lung     History of anemia     History of carcinoma in situ of skin     History of lung cancer     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 from [facility]    History of squamous cell carcinoma     Tongue    Hyperlipidemia     Hypertension     since early 60s    Intractable back pain 11/29/2022    Low vitamin B12 level     Lung cancer     Systolic CHF, acute 11/14/2023    Thyromegaly     UTI (urinary tract infection)     Vitamin D deficiency      Past Surgical History:   Procedure Laterality Date    BRONCHOSCOPY Bilateral 10/17/2022    Procedure: BRONCHOSCOPY WITH FLUORO with biopsy and BAL;  Surgeon: India Flores MD;  Location: [facility] ENDOSCOPY;  Service: Pulmonary;  Laterality: Bilateral;  PRE/POST - lung mass    CHOLECYSTECTOMY  1999    COLONOSCOPY      EMBOLIZATION CEREBRAL Left 06/29/2022    Procedure: 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;  Surgeon: Bradley Dowell MD;  Location: [facility] HYBRID OR 18/19;  Service: Interventional Radiology;  Laterality: Left;    EYE SURGERY  11/24/2020    Took a muscle out of right eye    GLOSSECTOMY PARTIAL      less than one half tongue    LUNG SURGERY  2012    ORAL LESION EXCISION/BIOPSY      June and August 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    TUBAL ABDOMINAL LIGATION  1970    VENOUS ACCESS DEVICE (PORT) INSERTION N/A 12/12/2022    Procedure: MEDIPORT PLACEMENT;  Surgeon: Jason Villaseñor MD;  Location: [facility] MAIN OR;  Service: Vascular;  Laterality: N/A;     Outpatient Medications Prior to Visit   Medication Sig Dispense Refill    apixaban (ELIQUIS) 2.5 MG tablet tablet Take 1 tablet by mouth 2 (Two) Times a Day. 60 tablet 3    atorvastatin (LIPITOR)  20 MG tablet TAKE 1 TABLET EVERY NIGHT (Patient taking differently: Take 1 tablet by mouth Every Night.) 90 tablet 1    cetirizine (zyrTEC) 10 MG tablet Take 1 tablet by mouth Daily.      Cholecalciferol (Vitamin D3) 50 MCG (2000 UT) tablet Take 1,000 Units by mouth Daily. HOLDING FOR DOS      Cyanocobalamin (VITAMIN B12 PO) Take 1,000 mcg by mouth Daily.      dexAMETHasone (DECADRON) 4 MG tablet Take 1 tablet oral twice a day for 3 consecutive days beginning the day before chemotherapy and continue for 6 doses.Take with food. 6 tablet 5    folic acid (FOLVITE) 1 MG tablet Take 1 tablet by mouth Daily. Start at least 7 days prior to chemotherapy until at least 3 weeks after all chemotherapy. 30 tablet 6    furosemide (LASIX) 40 MG tablet Take 1 tablet by mouth Daily. 90 tablet 1    ipratropium (ATROVENT) 0.06 % nasal spray 2 sprays into the nostril(s) as directed by provider 4 (Four) Times a Day. 15 mL 0    metoprolol succinate XL (TOPROL-XL) 25 MG 24 hr tablet Take 0.5 tablets by mouth Daily. (Patient taking differently: Take 1 tablet by mouth Daily.) 45 tablet 3    mirtazapine (REMERON) 7.5 MG tablet TAKE 1 TABLET BY MOUTH EVERY NIGHT AT BEDTIME 30 tablet 1    montelukast (SINGULAIR) 10 MG tablet Take 1 tablet by mouth Every Night.      ondansetron (ZOFRAN) 8 MG tablet Take 1 tablet by mouth 3 (Three) Times a Day As Needed for Nausea or Vomiting. 30 tablet 5    potassium chloride (K-DUR,KLOR-CON) 10 MEQ CR tablet TAKE 2 TABLETS BY MOUTH DAILY 60 tablet 1    predniSONE (DELTASONE) 10 MG tablet Take 1 tablet by mouth Daily.      promethazine-dextromethorphan (PROMETHAZINE-DM) 6.25-15 MG/5ML syrup Take 5 mL by mouth 4 (Four) Times a Day As Needed for Cough. 180 mL 0    sacubitril-valsartan (Entresto) 24-26 MG tablet Take 0.5 tablets by mouth 2 (Two) Times a Day. 30 tablet 3    zolpidem (AMBIEN) 5 MG tablet TAKE 1 TABLET BY MOUTH EVERY NIGHT AT BEDTIME AS NEEDED FOR SLEEP 30 tablet 0     No facility-administered  "medications prior to visit.       Allergies as of 2023 - Reviewed 2023   Allergen Reaction Noted    Sulfa antibiotics Rash 10/13/2016     Social History     Socioeconomic History    Marital status:    Tobacco Use    Smoking status: Former     Types: Cigarettes     Quit date: 2015     Years since quittin.8     Passive exposure: Never    Smokeless tobacco: Never    Tobacco comments:     less than a pack per day, no smoking since , Quit 2015   Vaping Use    Vaping Use: Never used   Substance and Sexual Activity    Alcohol use: Not Currently     Comment: Socially -A few times a month    Drug use: No    Sexual activity: Defer     Family History   Problem Relation Age of Onset    Ovarian cancer Mother          at age 27    No Known Problems Father     Ovarian cancer Sister     Colon cancer Brother     No Known Problems Other     Malig Hyperthermia Neg Hx      Review of Systems   Constitutional: Negative for chills, fever, weight gain and weight loss.   Cardiovascular:  Negative for leg swelling.   Respiratory:  Negative for cough, snoring and wheezing.    Hematologic/Lymphatic: Negative for bleeding problem. Does not bruise/bleed easily.   Skin:  Negative for color change.   Musculoskeletal:  Negative for falls, joint pain and myalgias.   Gastrointestinal:  Negative for melena.   Genitourinary:  Negative for hematuria.   Neurological:  Negative for excessive daytime sleepiness.   Psychiatric/Behavioral:  Negative for depression. The patient is not nervous/anxious.         Objective:     Vitals:    23 1032   BP: 118/72   Pulse: 90   Weight: 47.6 kg (105 lb)   Height: 157.5 cm (62\")     Body mass index is 19.2 kg/m².    Vitals reviewed.   Constitutional:       Appearance: Well-developed.   Eyes:      General: No scleral icterus.        Right eye: No discharge.      Conjunctiva/sclera: Conjunctivae normal.      Pupils: Pupils are equal, round, and reactive to light.   HENT:      " Head: Normocephalic.      Nose: Nose normal.   Neck:      Thyroid: No thyromegaly.      Vascular: No JVD.   Pulmonary:      Effort: Pulmonary effort is normal. No respiratory distress.      Breath sounds: Normal breath sounds. No wheezing. No rales.   Cardiovascular:      Normal rate. Regular rhythm. Normal S1. Normal S2.       Murmurs: There is no murmur.      No gallop.    Pulses:     Intact distal pulses.      Carotid: 2+ bilaterally.     Radial: 2+ bilaterally.     Femoral: 2+ bilaterally.     Popliteal: 2+ bilaterally.     Dorsalis pedis: 2+ bilaterally.     Posterior tibial: 2+ bilaterally.  Edema:     Peripheral edema absent.   Abdominal:      General: Bowel sounds are normal. There is no distension.      Palpations: Abdomen is soft.      Tenderness: There is no abdominal tenderness. There is no rebound.   Musculoskeletal: Normal range of motion.         General: No tenderness.      Cervical back: Normal range of motion and neck supple. Skin:     General: Skin is warm and dry.      Findings: No erythema or rash.   Neurological:      Mental Status: Alert and oriented to person, place, and time.   Psychiatric:         Behavior: Behavior normal.         Thought Content: Thought content normal.         Judgment: Judgment normal.       Lab Review:   Lab Results - Last 18 Months   Lab Units 12/18/23  1402 12/06/23  1103 01/25/23  0825 01/09/23  0836 10/14/22  1315 08/29/22  1548   WBC 10*3/mm3 8.10 12.57*   < > 7.44   < > 6.1   RBC 10*6/mm3 4.04 3.87   < > 4.64   < > 4.63   HEMOGLOBIN g/dL 12.0 11.6*   < > 13.3   < > 13.7   HEMATOCRIT % 37.2 35.1   < > 40.6   < > 41.5   MCV fL 92.1 90.7   < > 87.5   < > 90   MCH pg 29.7 30.0   < > 28.7   < > 29.6   MCHC g/dL 32.3 33.0   < > 32.8   < > 33.0   RDW % 14.7 12.6   < > 13.1   < > 12.6   PLATELETS 10*3/mm3 364 265   < > 287   < > 232   NEUTROPHIL % % 81.1* 92.3*   < > 54.4   < > 57   LYMPHOCYTE % % 9.3* 5.0*   < > 37.4   < > 30   MONOCYTES % % 6.4 2.1*   < > 7.4   < > 9    EOSINOPHIL % % 1.4 0.0*   < > 0.0*   < > 3   BASOPHIL % % 0.2 0.2   < > 0.7   < > 1   NEUTROS ABS 10*3/mm3 6.57 11.60*   < > 4.05   < > 3.5   LYMPHS ABS 10*3/mm3 0.75 0.63*   < > 2.78   < > 1.8   MONOS ABS 10*3/mm3 0.52 0.27   < > 0.55   < > 0.5   EOS ABS 10*3/mm3 0.11 0.00   < > 0.00   < > 0.2   BASOS ABS 10*3/mm3 0.02 0.02   < > 0.05   < > 0.0   IMM GRAN % %  --   --   --  0.1  --  0   IMMATURE GRANS (ABS) 10*3/mm3  --   --   --  0.01  --  0.0   RDW-SD fl 45.7 41.1   < >  --    < >  --    MPV fL 10.3 10.6   < >  --    < >  --     < > = values in this interval not displayed.       Lab Results - Last 18 Months   Lab Units 12/19/23  1228 12/06/23  1103 12/04/23  1123 11/27/23  1255   GLUCOSE mg/dL  --  108* 133* 125*   BUN mg/dL  --  29* 24* 16   CREATININE mg/dL 0.80 0.71 0.78 0.73   SODIUM mmol/L  --  146* 148* 144   POTASSIUM mmol/L  --  3.5 4.0 4.2   CHLORIDE mmol/L  --  107 109* 105   CO2 mmol/L  --  25.5 27.2 28.5   CALCIUM mg/dL  --  9.5 9.2 9.1   TOTAL PROTEIN g/dL  --   --  6.1 6.1   ALBUMIN g/dL  --   --  3.5 3.6   ALT (SGPT) U/L  --   --  15 14   AST (SGOT) U/L  --   --  24 20   ALK PHOS U/L  --   --  71 76   BILIRUBIN mg/dL  --   --  0.4 0.3   GLOBULIN gm/dL  --   --  2.6 2.5   A/G RATIO g/dL  --   --  1.3 1.4   BUN / CREAT RATIO   --  40.8* 30.8* 21.9   ANION GAP mmol/L  --  13.5 11.8 10.5   EGFR mL/min/1.73  --  85.5 76.4 82.7     Lab Results - Last 18 Months   Lab Units 10/02/23  0808 01/09/23  0836   TRIGLYCERIDES mg/dL 97 135   HDL CHOL mg/dL 73* 46   LDL CHOL mg/dL 90 133*   VLDL CHOLESTEROL ROBERT mg/dL 17 24     Lab Results - Last 18 Months   Lab Units 12/06/23  1103 11/27/23  1255   PROBNP pg/mL 10,452.0* 6,126.0*     Lab Results - Last 18 Months   Lab Units 11/14/23  0521 11/14/23  0238   HSTROP T ng/L 74* 61*     Lab Results - Last 18 Months   Lab Units 11/15/23  0412 07/26/23  0622   TSH uIU/mL 1.000 2.160     Lab Results - Last 18 Months   Lab Units 11/14/23  0238   PROTIME Seconds 13.7   APTT  seconds 27.7       ECG 12 Lead    Date/Time: 12/21/2023 10:55 AM  Performed by: Claire Orr MD    Authorized by: Claire Orr MD  Comparison: compared with previous ECG   Comparison to previous ECG: Sinus rhythm has replaced atrial fibrillation.  Rhythm: sinus rhythm  Ectopy: atrial premature contractions  Other findings: non-specific ST-T wave changes    Clinical impression: abnormal EKG            Diagnosis Plan   1. Hyperlipidemia, unspecified hyperlipidemia type  ECG 12 Lead      2. Fitting and adjustment of vascular catheter  ECG 12 Lead      3. Malignant neoplasm of upper lobe of right lung        4. Essential hypertension        5. Acute HFrEF (heart failure with reduced ejection fraction)          Plan:       1.  Cardio oncology care.  Now on Alimta which should have relatively low cardiovascular adverse event risk.  2.  Cardiomyopathy with history of HFrEF.  Possibly due to cardiotoxicity from chemotherapy though may also be tachycardia related from atrial fibrillation or ischemic in origin as she has significant coronary artery disease noted on coronary CT angiogram.  I reviewed this with patient and her son in detail.  Treatment options are somewhat challenging and ongoing ocular abnormality requiring additional treatments.  We discussed options for ongoing medical therapy versus cardiac cath with intent for possible PCI though this would require dual antiplatelet therapy which may interfere additional ocular procedures and also may increase bleeding with known disease.  She is not a candidate for CABG. I told her I would discuss this further with Dr. Kothari and then Dr. Calvin.    3.  Paroxysmal atrial fibrillation.  Now in sinus rhythm on low-dose Toprol and tolerating Eliquis.  Avoiding all nonsteroidals.  3.  Lung cancer has had radiation and resection in the past now on Alimta started a few days ago.  4.  Coronary artery disease, as above  5.  Hypertension controlled  6.  Hyperlipidemia  7.   COPD/asthma    Addendum:: I talked to Dr. Kothari notes that patient has had disease progression on multiple medical therapy.  Her 1 year outlook is not optimal.  He overall favors medical therapy.    Time Spent: I spent 60 minutes caring for Darlene on this date of service. This time includes time spent by me in the following activities: preparing for the visit, reviewing tests, obtaining and/or reviewing a separately obtained history, performing a medically appropriate examination and/or evaluation, counseling and educating the patient/family/caregiver, ordering medications, tests, or procedures, documenting information in the medical record, and independently interpreting results and communicating that information with the patient/family/caregiver.     I spent 1 minutes on the separately reported service of ECG. This time is not included in the time used to support the E/M service also reported today.          Your medication list            Accurate as of December 21, 2023 11:59 PM. If you have any questions, ask your nurse or doctor.                CHANGE how you take these medications        Instructions Last Dose Given Next Dose Due   metoprolol succinate XL 25 MG 24 hr tablet  Commonly known as: TOPROL-XL  What changed: how much to take      Take 0.5 tablets by mouth Daily.              CONTINUE taking these medications        Instructions Last Dose Given Next Dose Due   apixaban 2.5 MG tablet tablet  Commonly known as: ELIQUIS      Take 1 tablet by mouth 2 (Two) Times a Day.       atorvastatin 20 MG tablet  Commonly known as: LIPITOR      TAKE 1 TABLET EVERY NIGHT       cetirizine 10 MG tablet  Commonly known as: zyrTEC      Take 1 tablet by mouth Daily.       dexAMETHasone 4 MG tablet  Commonly known as: DECADRON      Take 1 tablet oral twice a day for 3 consecutive days beginning the day before chemotherapy and continue for 6 doses.Take with food.       Entresto 24-26 MG tablet  Generic drug:  sacubitril-valsartan      Take 0.5 tablets by mouth 2 (Two) Times a Day.       folic acid 1 MG tablet  Commonly known as: FOLVITE      Take 1 tablet by mouth Daily. Start at least 7 days prior to chemotherapy until at least 3 weeks after all chemotherapy.       furosemide 40 MG tablet  Commonly known as: LASIX      Take 1 tablet by mouth Daily.       ipratropium 0.06 % nasal spray  Commonly known as: ATROVENT      2 sprays into the nostril(s) as directed by provider 4 (Four) Times a Day.       mirtazapine 7.5 MG tablet  Commonly known as: REMERON      TAKE 1 TABLET BY MOUTH EVERY NIGHT AT BEDTIME       montelukast 10 MG tablet  Commonly known as: SINGULAIR      Take 1 tablet by mouth Every Night.       ondansetron 8 MG tablet  Commonly known as: ZOFRAN      Take 1 tablet by mouth 3 (Three) Times a Day As Needed for Nausea or Vomiting.       potassium chloride 10 MEQ CR tablet  Commonly known as: K-DUR,KLOR-CON      TAKE 2 TABLETS BY MOUTH DAILY       predniSONE 10 MG tablet  Commonly known as: DELTASONE      Take 1 tablet by mouth Daily.       promethazine-dextromethorphan 6.25-15 MG/5ML syrup  Commonly known as: PROMETHAZINE-DM      Take 5 mL by mouth 4 (Four) Times a Day As Needed for Cough.       VITAMIN B12 PO      Take 1,000 mcg by mouth Daily.       Vitamin D3 50 MCG (2000 UT) tablet      Take 1,000 Units by mouth Daily. HOLDING FOR DOS       zolpidem 5 MG tablet  Commonly known as: AMBIEN      TAKE 1 TABLET BY MOUTH EVERY NIGHT AT BEDTIME AS NEEDED FOR SLEEP                Patient is no longer taking -.  I corrected the med list to reflect this.  I did not stop these medications.      Dictated utilizing Dragon dictation

## 2023-12-27 ENCOUNTER — TELEPHONE (OUTPATIENT)
Dept: ONCOLOGY | Facility: CLINIC | Age: 83
End: 2023-12-27
Payer: MEDICARE

## 2023-12-27 ENCOUNTER — INFUSION (OUTPATIENT)
Dept: ONCOLOGY | Facility: HOSPITAL | Age: 83
End: 2023-12-27
Payer: MEDICARE

## 2023-12-27 VITALS
HEART RATE: 106 BPM | DIASTOLIC BLOOD PRESSURE: 72 MMHG | BODY MASS INDEX: 19.39 KG/M2 | WEIGHT: 106 LBS | OXYGEN SATURATION: 86 % | TEMPERATURE: 98.4 F | RESPIRATION RATE: 16 BRPM | SYSTOLIC BLOOD PRESSURE: 139 MMHG

## 2023-12-27 DIAGNOSIS — C34.11 MALIGNANT NEOPLASM OF UPPER LOBE OF RIGHT LUNG: ICD-10-CM

## 2023-12-27 DIAGNOSIS — Z51.11 ENCOUNTER FOR ANTINEOPLASTIC CHEMOTHERAPY: ICD-10-CM

## 2023-12-27 DIAGNOSIS — J96.01 ACUTE RESPIRATORY FAILURE WITH HYPOXIA: ICD-10-CM

## 2023-12-27 DIAGNOSIS — R06.02 SHORTNESS OF BREATH: Primary | ICD-10-CM

## 2023-12-27 DIAGNOSIS — C34.11 MALIGNANT NEOPLASM OF UPPER LOBE OF RIGHT LUNG: Primary | ICD-10-CM

## 2023-12-27 LAB
ALBUMIN SERPL-MCNC: 3.4 G/DL (ref 3.5–5.2)
ALBUMIN/GLOB SERPL: 1.1 G/DL
ALP SERPL-CCNC: 73 U/L (ref 39–117)
ALT SERPL W P-5'-P-CCNC: 8 U/L (ref 1–33)
ANION GAP SERPL CALCULATED.3IONS-SCNC: 10.1 MMOL/L (ref 5–15)
AST SERPL-CCNC: 32 U/L (ref 1–32)
B PARAPERT DNA SPEC QL NAA+PROBE: NOT DETECTED
B PERT DNA SPEC QL NAA+PROBE: NOT DETECTED
BASOPHILS # BLD AUTO: 0.04 10*3/MM3 (ref 0–0.2)
BASOPHILS NFR BLD AUTO: 0.4 % (ref 0–1.5)
BILIRUB SERPL-MCNC: 0.5 MG/DL (ref 0–1.2)
BUN SERPL-MCNC: 15 MG/DL (ref 8–23)
BUN/CREAT SERPL: 21.7 (ref 7–25)
C PNEUM DNA NPH QL NAA+NON-PROBE: NOT DETECTED
CALCIUM SPEC-SCNC: 9 MG/DL (ref 8.6–10.5)
CHLORIDE SERPL-SCNC: 106 MMOL/L (ref 98–107)
CO2 SERPL-SCNC: 24.9 MMOL/L (ref 22–29)
CREAT SERPL-MCNC: 0.69 MG/DL (ref 0.57–1)
DEPRECATED RDW RBC AUTO: 52.4 FL (ref 37–54)
EGFRCR SERPLBLD CKD-EPI 2021: 87.3 ML/MIN/1.73
EOSINOPHIL # BLD AUTO: 0 10*3/MM3 (ref 0–0.4)
EOSINOPHIL NFR BLD AUTO: 0 % (ref 0.3–6.2)
ERYTHROCYTE [DISTWIDTH] IN BLOOD BY AUTOMATED COUNT: 15.7 % (ref 12.3–15.4)
FLUAV SUBTYP SPEC NAA+PROBE: NOT DETECTED
FLUBV RNA ISLT QL NAA+PROBE: NOT DETECTED
GLOBULIN UR ELPH-MCNC: 3.1 GM/DL
GLUCOSE SERPL-MCNC: 132 MG/DL (ref 65–99)
HADV DNA SPEC NAA+PROBE: NOT DETECTED
HCOV 229E RNA SPEC QL NAA+PROBE: NOT DETECTED
HCOV HKU1 RNA SPEC QL NAA+PROBE: NOT DETECTED
HCOV NL63 RNA SPEC QL NAA+PROBE: NOT DETECTED
HCOV OC43 RNA SPEC QL NAA+PROBE: NOT DETECTED
HCT VFR BLD AUTO: 37.5 % (ref 34–46.6)
HGB BLD-MCNC: 11.9 G/DL (ref 12–15.9)
HMPV RNA NPH QL NAA+NON-PROBE: NOT DETECTED
HPIV1 RNA ISLT QL NAA+PROBE: NOT DETECTED
HPIV2 RNA SPEC QL NAA+PROBE: NOT DETECTED
HPIV3 RNA NPH QL NAA+PROBE: NOT DETECTED
HPIV4 P GENE NPH QL NAA+PROBE: NOT DETECTED
IMM GRANULOCYTES # BLD AUTO: 0.03 10*3/MM3 (ref 0–0.05)
IMM GRANULOCYTES NFR BLD AUTO: 0.3 % (ref 0–0.5)
LYMPHOCYTES # BLD AUTO: 0.69 10*3/MM3 (ref 0.7–3.1)
LYMPHOCYTES NFR BLD AUTO: 6.5 % (ref 19.6–45.3)
M PNEUMO IGG SER IA-ACNC: NOT DETECTED
MCH RBC QN AUTO: 29.6 PG (ref 26.6–33)
MCHC RBC AUTO-ENTMCNC: 31.7 G/DL (ref 31.5–35.7)
MCV RBC AUTO: 93.3 FL (ref 79–97)
MONOCYTES # BLD AUTO: 0.6 10*3/MM3 (ref 0.1–0.9)
MONOCYTES NFR BLD AUTO: 5.6 % (ref 5–12)
NEUTROPHILS NFR BLD AUTO: 87.2 % (ref 42.7–76)
NEUTROPHILS NFR BLD AUTO: 9.27 10*3/MM3 (ref 1.7–7)
NRBC BLD AUTO-RTO: 0 /100 WBC (ref 0–0.2)
PLATELET # BLD AUTO: 353 10*3/MM3 (ref 140–450)
PMV BLD AUTO: 9.6 FL (ref 6–12)
POTASSIUM SERPL-SCNC: 4.3 MMOL/L (ref 3.5–5.2)
PROT SERPL-MCNC: 6.5 G/DL (ref 6–8.5)
RBC # BLD AUTO: 4.02 10*6/MM3 (ref 3.77–5.28)
RHINOVIRUS RNA SPEC NAA+PROBE: DETECTED
RSV RNA NPH QL NAA+NON-PROBE: NOT DETECTED
SARS-COV-2 RNA NPH QL NAA+NON-PROBE: DETECTED
SODIUM SERPL-SCNC: 141 MMOL/L (ref 136–145)
WBC NRBC COR # BLD AUTO: 10.63 10*3/MM3 (ref 3.4–10.8)

## 2023-12-27 PROCEDURE — 96409 CHEMO IV PUSH SNGL DRUG: CPT

## 2023-12-27 PROCEDURE — 25810000003 SODIUM CHLORIDE 0.9 % SOLUTION: Performed by: NURSE PRACTITIONER

## 2023-12-27 PROCEDURE — 0202U NFCT DS 22 TRGT SARS-COV-2: CPT | Performed by: INTERNAL MEDICINE

## 2023-12-27 PROCEDURE — 85025 COMPLETE CBC W/AUTO DIFF WBC: CPT

## 2023-12-27 PROCEDURE — 25010000002 PEMETREXED PER 10 MG: Performed by: NURSE PRACTITIONER

## 2023-12-27 PROCEDURE — 25010000002 PEMETREXED 100 MG RECONSTITUTED SOLUTION 1 EACH VIAL: Performed by: NURSE PRACTITIONER

## 2023-12-27 PROCEDURE — 80053 COMPREHEN METABOLIC PANEL: CPT

## 2023-12-27 RX ORDER — SODIUM CHLORIDE 9 MG/ML
20 INJECTION, SOLUTION INTRAVENOUS ONCE
Status: COMPLETED | OUTPATIENT
Start: 2023-12-27 | End: 2023-12-27

## 2023-12-27 RX ORDER — HYDROCODONE BITARTRATE AND HOMATROPINE METHYLBROMIDE ORAL SOLUTION 5; 1.5 MG/5ML; MG/5ML
5 LIQUID ORAL EVERY 8 HOURS PRN
Qty: 120 ML | Refills: 0 | Status: ON HOLD | OUTPATIENT
Start: 2023-12-27

## 2023-12-27 RX ADMIN — SODIUM CHLORIDE 20 ML/HR: 9 INJECTION, SOLUTION INTRAVENOUS at 11:49

## 2023-12-27 RX ADMIN — PEMETREXED DISODIUM 740 MG: 500 INJECTION, POWDER, LYOPHILIZED, FOR SOLUTION INTRAVENOUS at 11:49

## 2023-12-27 NOTE — TELEPHONE ENCOUNTER
Contacted Ms Pfeiffer by phone per Dr Escobar's request to advise her she is positive for covid and rhinovirus.  He recommends she proceed with a course of Paxlovid.  We reviewed her medication list in detail.  Advised her to hold atorvastatin during treatment with paxlovid.  Reviewed eliquis interaction with pharmacy team and Dr Escobar. He advises her to continue same dose.  She voiced understanding of all the above.

## 2023-12-27 NOTE — NURSING NOTE
Pt arrived sob and when doing vitals oxygen sat was at 74% on room air, after coaxing pt to take deep breaths O2 up to 86%.  Pt placed in mask.  Pt placed on 2L oxygen for comfort.  Consulted with Dr. Escobar, respiratory panel ordered and obtained.      Dr. Escobar also sent in Rx cough syrup for pt as well.  Pt asked about other medication sent for post nasal drip.  Pt directed to consult pharmacist when she picks up cough syrup.

## 2023-12-27 NOTE — TELEPHONE ENCOUNTER
Called the patient to explain the issue with [place] [facility] and she asked that I send the rx to [facility] in [place]. Rx has been sent and she v/u.

## 2023-12-27 NOTE — TELEPHONE ENCOUNTER
Caller: [facility] - [place], [place] - [address] - [phone]  - [phone]    Relationship: Pharmacy    Best call back number: [phone]     What is the best time to reach you: ASAP    Who are you requesting to speak with (clinical staff, provider,  specific staff member): CLINICAL        What was the call regarding: JASON CALLED FROM PHARMACY - THEY ARE  OUT OF PAXLOVID, THEY COULD HAVE FOR TOMORROW, OR THEY DO HAVE MOLNUPIRAVIR.  PLEASE CALL TO ADVISE.  HUB UNABLE TO WARM TRANSFER.

## 2023-12-27 NOTE — TELEPHONE ENCOUNTER
Dr. Lee (on call) came and spoke to me about the patient and that he received a call before 7am and he did not see it until he was on the way here. Patients  stated that she had not had a fever but that she had congestion and a cough and they wanted to see someone for those symptoms. I did not have an NP opening in the morning and had not been able to find Dr. Escobar. I asked about any known covid exposure and he denied this. He also stated he did not want to take her to the urgent care because of the medications she on through us. I let him know about the the NP openings in the afternoon and he stated he just wanted someone to see her and she would probably need something for her cough. I explained I would talk with the nursing staff in the back and also Dr. Escobar when I seen patel about this and he v/u. I spoke with Abeba Lundy RN to update her.

## 2023-12-28 ENCOUNTER — APPOINTMENT (OUTPATIENT)
Dept: GENERAL RADIOLOGY | Facility: HOSPITAL | Age: 83
DRG: 177 | End: 2023-12-28
Payer: MEDICARE

## 2023-12-28 ENCOUNTER — TELEPHONE (OUTPATIENT)
Dept: ONCOLOGY | Facility: CLINIC | Age: 83
End: 2023-12-28
Payer: MEDICARE

## 2023-12-28 ENCOUNTER — HOSPITAL ENCOUNTER (INPATIENT)
Facility: HOSPITAL | Age: 83
LOS: 5 days | Discharge: HOME | DRG: 177 | End: 2024-01-02
Attending: EMERGENCY MEDICINE | Admitting: STUDENT IN AN ORGANIZED HEALTH CARE EDUCATION/TRAINING PROGRAM
Payer: MEDICARE

## 2023-12-28 DIAGNOSIS — J96.01 ACUTE RESPIRATORY FAILURE WITH HYPOXIA: ICD-10-CM

## 2023-12-28 DIAGNOSIS — D84.821 IMMUNOSUPPRESSED DUE TO CHEMOTHERAPY: ICD-10-CM

## 2023-12-28 DIAGNOSIS — J96.01 ACUTE HYPOXEMIC RESPIRATORY FAILURE: Primary | ICD-10-CM

## 2023-12-28 DIAGNOSIS — T45.1X5A IMMUNOSUPPRESSED DUE TO CHEMOTHERAPY: ICD-10-CM

## 2023-12-28 DIAGNOSIS — I50.9 ACUTE ON CHRONIC CONGESTIVE HEART FAILURE, UNSPECIFIED HEART FAILURE TYPE: ICD-10-CM

## 2023-12-28 DIAGNOSIS — U07.1 COVID-19 VIRUS INFECTION: ICD-10-CM

## 2023-12-28 DIAGNOSIS — R91.8 PULMONARY INFILTRATE: ICD-10-CM

## 2023-12-28 DIAGNOSIS — Z79.899 IMMUNOSUPPRESSED DUE TO CHEMOTHERAPY: ICD-10-CM

## 2023-12-28 PROBLEM — I50.22 CHRONIC SYSTOLIC CHF (CONGESTIVE HEART FAILURE): Status: ACTIVE | Noted: 2023-12-28

## 2023-12-28 PROBLEM — I48.0 PAROXYSMAL ATRIAL FIBRILLATION: Status: ACTIVE | Noted: 2023-12-28

## 2023-12-28 LAB
ALBUMIN SERPL-MCNC: 3.7 G/DL (ref 3.5–5.2)
ALBUMIN/GLOB SERPL: 1.6 G/DL
ALP SERPL-CCNC: 75 U/L (ref 39–117)
ALT SERPL W P-5'-P-CCNC: 13 U/L (ref 1–33)
ANION GAP SERPL CALCULATED.3IONS-SCNC: 10 MMOL/L (ref 5–15)
AST SERPL-CCNC: 29 U/L (ref 1–32)
BASOPHILS # BLD AUTO: 0.02 10*3/MM3 (ref 0–0.2)
BASOPHILS NFR BLD AUTO: 0.2 % (ref 0–1.5)
BILIRUB SERPL-MCNC: 0.6 MG/DL (ref 0–1.2)
BUN SERPL-MCNC: 18 MG/DL (ref 8–23)
BUN/CREAT SERPL: 24.3 (ref 7–25)
CALCIUM SPEC-SCNC: 9 MG/DL (ref 8.6–10.5)
CHLORIDE SERPL-SCNC: 106 MMOL/L (ref 98–107)
CO2 SERPL-SCNC: 25 MMOL/L (ref 22–29)
CREAT SERPL-MCNC: 0.74 MG/DL (ref 0.57–1)
D-LACTATE SERPL-SCNC: 1.2 MMOL/L (ref 0.5–2)
DEPRECATED RDW RBC AUTO: 47.4 FL (ref 37–54)
EGFRCR SERPLBLD CKD-EPI 2021: 81.4 ML/MIN/1.73
EOSINOPHIL # BLD AUTO: 0 10*3/MM3 (ref 0–0.4)
EOSINOPHIL NFR BLD AUTO: 0 % (ref 0.3–6.2)
ERYTHROCYTE [DISTWIDTH] IN BLOOD BY AUTOMATED COUNT: 14.1 % (ref 12.3–15.4)
FLUAV RNA RESP QL NAA+PROBE: NOT DETECTED
FLUBV RNA RESP QL NAA+PROBE: NOT DETECTED
GEN 5 2HR TROPONIN T REFLEX: 53 NG/L
GLOBULIN UR ELPH-MCNC: 2.3 GM/DL
GLUCOSE SERPL-MCNC: 152 MG/DL (ref 65–99)
HCT VFR BLD AUTO: 34.3 % (ref 34–46.6)
HGB BLD-MCNC: 11.2 G/DL (ref 12–15.9)
IMM GRANULOCYTES # BLD AUTO: 0.02 10*3/MM3 (ref 0–0.05)
IMM GRANULOCYTES NFR BLD AUTO: 0.2 % (ref 0–0.5)
LYMPHOCYTES # BLD AUTO: 0.41 10*3/MM3 (ref 0.7–3.1)
LYMPHOCYTES NFR BLD AUTO: 4.2 % (ref 19.6–45.3)
MCH RBC QN AUTO: 30.2 PG (ref 26.6–33)
MCHC RBC AUTO-ENTMCNC: 32.7 G/DL (ref 31.5–35.7)
MCV RBC AUTO: 92.5 FL (ref 79–97)
MONOCYTES # BLD AUTO: 0.34 10*3/MM3 (ref 0.1–0.9)
MONOCYTES NFR BLD AUTO: 3.5 % (ref 5–12)
MRSA DNA SPEC QL NAA+PROBE: NORMAL
NEUTROPHILS NFR BLD AUTO: 8.86 10*3/MM3 (ref 1.7–7)
NEUTROPHILS NFR BLD AUTO: 91.9 % (ref 42.7–76)
NRBC BLD AUTO-RTO: 0 /100 WBC (ref 0–0.2)
NT-PROBNP SERPL-MCNC: ABNORMAL PG/ML (ref 0–1800)
PLATELET # BLD AUTO: 306 10*3/MM3 (ref 140–450)
PMV BLD AUTO: 9.7 FL (ref 6–12)
POTASSIUM SERPL-SCNC: 4.1 MMOL/L (ref 3.5–5.2)
PROCALCITONIN SERPL-MCNC: 0.33 NG/ML (ref 0–0.25)
PROT SERPL-MCNC: 6 G/DL (ref 6–8.5)
QT INTERVAL: 346 MS
QTC INTERVAL: 438 MS
RBC # BLD AUTO: 3.71 10*6/MM3 (ref 3.77–5.28)
RSV RNA NPH QL NAA+NON-PROBE: NOT DETECTED
SARS-COV-2 RNA RESP QL NAA+PROBE: DETECTED
SODIUM SERPL-SCNC: 141 MMOL/L (ref 136–145)
TROPONIN T DELTA: 3 NG/L
TROPONIN T SERPL HS-MCNC: 50 NG/L
WBC NRBC COR # BLD AUTO: 9.65 10*3/MM3 (ref 3.4–10.8)

## 2023-12-28 PROCEDURE — 25010000002 REMDESIVIR 100 MG/20ML SOLUTION 1 EACH VIAL: Performed by: STUDENT IN AN ORGANIZED HEALTH CARE EDUCATION/TRAINING PROGRAM

## 2023-12-28 PROCEDURE — 83605 ASSAY OF LACTIC ACID: CPT | Performed by: PHYSICIAN ASSISTANT

## 2023-12-28 PROCEDURE — 25010000002 FUROSEMIDE PER 20 MG: Performed by: EMERGENCY MEDICINE

## 2023-12-28 PROCEDURE — 87040 BLOOD CULTURE FOR BACTERIA: CPT | Performed by: PHYSICIAN ASSISTANT

## 2023-12-28 PROCEDURE — 25810000003 SODIUM CHLORIDE 0.9 % SOLUTION 250 ML FLEX CONT: Performed by: STUDENT IN AN ORGANIZED HEALTH CARE EDUCATION/TRAINING PROGRAM

## 2023-12-28 PROCEDURE — XW033E5 INTRODUCTION OF REMDESIVIR ANTI-INFECTIVE INTO PERIPHERAL VEIN, PERCUTANEOUS APPROACH, NEW TECHNOLOGY GROUP 5: ICD-10-PCS | Performed by: INTERNAL MEDICINE

## 2023-12-28 PROCEDURE — 83880 ASSAY OF NATRIURETIC PEPTIDE: CPT | Performed by: PHYSICIAN ASSISTANT

## 2023-12-28 PROCEDURE — 80053 COMPREHEN METABOLIC PANEL: CPT | Performed by: PHYSICIAN ASSISTANT

## 2023-12-28 PROCEDURE — 25010000002 CEFEPIME PER 500 MG: Performed by: EMERGENCY MEDICINE

## 2023-12-28 PROCEDURE — 93005 ELECTROCARDIOGRAM TRACING: CPT | Performed by: PHYSICIAN ASSISTANT

## 2023-12-28 PROCEDURE — 87637 SARSCOV2&INF A&B&RSV AMP PRB: CPT | Performed by: EMERGENCY MEDICINE

## 2023-12-28 PROCEDURE — 71045 X-RAY EXAM CHEST 1 VIEW: CPT

## 2023-12-28 PROCEDURE — 25010000002 CEFEPIME PER 500 MG: Performed by: STUDENT IN AN ORGANIZED HEALTH CARE EDUCATION/TRAINING PROGRAM

## 2023-12-28 PROCEDURE — 84484 ASSAY OF TROPONIN QUANT: CPT | Performed by: PHYSICIAN ASSISTANT

## 2023-12-28 PROCEDURE — 87641 MR-STAPH DNA AMP PROBE: CPT | Performed by: STUDENT IN AN ORGANIZED HEALTH CARE EDUCATION/TRAINING PROGRAM

## 2023-12-28 PROCEDURE — 84145 PROCALCITONIN (PCT): CPT | Performed by: PHYSICIAN ASSISTANT

## 2023-12-28 PROCEDURE — 93010 ELECTROCARDIOGRAM REPORT: CPT | Performed by: INTERNAL MEDICINE

## 2023-12-28 PROCEDURE — 85025 COMPLETE CBC W/AUTO DIFF WBC: CPT | Performed by: PHYSICIAN ASSISTANT

## 2023-12-28 PROCEDURE — 25010000002 DEXAMETHASONE PER 1 MG: Performed by: PHYSICIAN ASSISTANT

## 2023-12-28 PROCEDURE — 36415 COLL VENOUS BLD VENIPUNCTURE: CPT

## 2023-12-28 PROCEDURE — 99285 EMERGENCY DEPT VISIT HI MDM: CPT

## 2023-12-28 RX ORDER — METOPROLOL SUCCINATE 25 MG/1
25 TABLET, EXTENDED RELEASE ORAL DAILY
Status: DISCONTINUED | OUTPATIENT
Start: 2023-12-29 | End: 2023-12-28

## 2023-12-28 RX ORDER — SODIUM CHLORIDE 9 MG/ML
40 INJECTION, SOLUTION INTRAVENOUS AS NEEDED
Status: DISCONTINUED | OUTPATIENT
Start: 2023-12-28 | End: 2024-01-02 | Stop reason: HOSPADM

## 2023-12-28 RX ORDER — MIRTAZAPINE 15 MG/1
7.5 TABLET, FILM COATED ORAL NIGHTLY
Status: DISCONTINUED | OUTPATIENT
Start: 2023-12-28 | End: 2024-01-02 | Stop reason: HOSPADM

## 2023-12-28 RX ORDER — POLYETHYLENE GLYCOL 3350 17 G/17G
17 POWDER, FOR SOLUTION ORAL DAILY PRN
Status: DISCONTINUED | OUTPATIENT
Start: 2023-12-28 | End: 2024-01-02 | Stop reason: HOSPADM

## 2023-12-28 RX ORDER — PANTOPRAZOLE SODIUM 40 MG/1
40 TABLET, DELAYED RELEASE ORAL
Status: DISCONTINUED | OUTPATIENT
Start: 2023-12-28 | End: 2024-01-02 | Stop reason: HOSPADM

## 2023-12-28 RX ORDER — AMOXICILLIN 250 MG
2 CAPSULE ORAL 2 TIMES DAILY
Status: DISCONTINUED | OUTPATIENT
Start: 2023-12-28 | End: 2024-01-02 | Stop reason: HOSPADM

## 2023-12-28 RX ORDER — SODIUM CHLORIDE 0.9 % (FLUSH) 0.9 %
10 SYRINGE (ML) INJECTION AS NEEDED
Status: DISCONTINUED | OUTPATIENT
Start: 2023-12-28 | End: 2024-01-02 | Stop reason: HOSPADM

## 2023-12-28 RX ORDER — DEXAMETHASONE 4 MG/1
6 TABLET ORAL
Status: DISCONTINUED | OUTPATIENT
Start: 2023-12-29 | End: 2024-01-02 | Stop reason: HOSPADM

## 2023-12-28 RX ORDER — BISACODYL 5 MG/1
5 TABLET, DELAYED RELEASE ORAL DAILY PRN
Status: DISCONTINUED | OUTPATIENT
Start: 2023-12-28 | End: 2024-01-02 | Stop reason: HOSPADM

## 2023-12-28 RX ORDER — ATORVASTATIN CALCIUM 20 MG/1
20 TABLET, FILM COATED ORAL NIGHTLY
Status: DISCONTINUED | OUTPATIENT
Start: 2023-12-28 | End: 2024-01-02 | Stop reason: HOSPADM

## 2023-12-28 RX ORDER — ACETAMINOPHEN 500 MG
1000 TABLET ORAL ONCE
Status: DISCONTINUED | OUTPATIENT
Start: 2023-12-28 | End: 2023-12-30

## 2023-12-28 RX ORDER — ONDANSETRON 4 MG/1
8 TABLET, FILM COATED ORAL 3 TIMES DAILY PRN
Status: DISCONTINUED | OUTPATIENT
Start: 2023-12-28 | End: 2024-01-02 | Stop reason: HOSPADM

## 2023-12-28 RX ORDER — ZOLPIDEM TARTRATE 5 MG/1
5 TABLET ORAL NIGHTLY PRN
Status: DISCONTINUED | OUTPATIENT
Start: 2023-12-28 | End: 2024-01-02 | Stop reason: HOSPADM

## 2023-12-28 RX ORDER — FUROSEMIDE 10 MG/ML
80 INJECTION INTRAMUSCULAR; INTRAVENOUS ONCE
Status: COMPLETED | OUTPATIENT
Start: 2023-12-28 | End: 2023-12-28

## 2023-12-28 RX ORDER — METOPROLOL SUCCINATE 25 MG/1
25 TABLET, EXTENDED RELEASE ORAL DAILY
Status: DISCONTINUED | OUTPATIENT
Start: 2023-12-28 | End: 2024-01-02 | Stop reason: HOSPADM

## 2023-12-28 RX ORDER — NITROGLYCERIN 0.4 MG/1
0.4 TABLET SUBLINGUAL
Status: DISCONTINUED | OUTPATIENT
Start: 2023-12-28 | End: 2024-01-02 | Stop reason: HOSPADM

## 2023-12-28 RX ORDER — HYDROCODONE BITARTRATE AND HOMATROPINE METHYLBROMIDE ORAL SOLUTION 5; 1.5 MG/5ML; MG/5ML
5 LIQUID ORAL EVERY 8 HOURS PRN
Status: DISCONTINUED | OUTPATIENT
Start: 2023-12-28 | End: 2024-01-02 | Stop reason: HOSPADM

## 2023-12-28 RX ORDER — SODIUM CHLORIDE 0.9 % (FLUSH) 0.9 %
10 SYRINGE (ML) INJECTION EVERY 12 HOURS SCHEDULED
Status: DISCONTINUED | OUTPATIENT
Start: 2023-12-28 | End: 2024-01-02 | Stop reason: HOSPADM

## 2023-12-28 RX ORDER — FOLIC ACID 1 MG/1
1 TABLET ORAL DAILY
Status: DISCONTINUED | OUTPATIENT
Start: 2023-12-28 | End: 2024-01-02 | Stop reason: HOSPADM

## 2023-12-28 RX ORDER — MONTELUKAST SODIUM 10 MG/1
10 TABLET ORAL NIGHTLY
Status: DISCONTINUED | OUTPATIENT
Start: 2023-12-28 | End: 2024-01-02 | Stop reason: HOSPADM

## 2023-12-28 RX ORDER — FUROSEMIDE 20 MG/1
40 TABLET ORAL DAILY
Status: DISCONTINUED | OUTPATIENT
Start: 2023-12-29 | End: 2024-01-02 | Stop reason: HOSPADM

## 2023-12-28 RX ORDER — BISACODYL 10 MG
10 SUPPOSITORY, RECTAL RECTAL DAILY PRN
Status: DISCONTINUED | OUTPATIENT
Start: 2023-12-28 | End: 2024-01-02 | Stop reason: HOSPADM

## 2023-12-28 RX ORDER — DEXAMETHASONE SODIUM PHOSPHATE 10 MG/ML
6 INJECTION INTRAMUSCULAR; INTRAVENOUS ONCE
Status: COMPLETED | OUTPATIENT
Start: 2023-12-28 | End: 2023-12-28

## 2023-12-28 RX ADMIN — Medication 10 ML: at 20:11

## 2023-12-28 RX ADMIN — PANTOPRAZOLE SODIUM 40 MG: 40 TABLET, DELAYED RELEASE ORAL at 18:49

## 2023-12-28 RX ADMIN — HYDROCODONE BITARTRATE AND HOMATROPINE METHYLBROMIDE 5 ML: 5; 1.5 SOLUTION ORAL at 20:57

## 2023-12-28 RX ADMIN — MONTELUKAST SODIUM 10 MG: 10 TABLET, FILM COATED ORAL at 20:09

## 2023-12-28 RX ADMIN — SACUBITRIL AND VALSARTAN 0.5 TABLET: 24; 26 TABLET, FILM COATED ORAL at 20:09

## 2023-12-28 RX ADMIN — FUROSEMIDE 80 MG: 10 INJECTION, SOLUTION INTRAMUSCULAR; INTRAVENOUS at 13:59

## 2023-12-28 RX ADMIN — APIXABAN 2.5 MG: 2.5 TABLET, FILM COATED ORAL at 18:49

## 2023-12-28 RX ADMIN — MIRTAZAPINE 7.5 MG: 15 TABLET, FILM COATED ORAL at 20:09

## 2023-12-28 RX ADMIN — DEXAMETHASONE SODIUM PHOSPHATE 6 MG: 10 INJECTION INTRAMUSCULAR; INTRAVENOUS at 12:07

## 2023-12-28 RX ADMIN — ZOLPIDEM TARTRATE 5 MG: 5 TABLET ORAL at 21:01

## 2023-12-28 RX ADMIN — SENNOSIDES AND DOCUSATE SODIUM 2 TABLET: 50; 8.6 TABLET ORAL at 20:14

## 2023-12-28 RX ADMIN — CEFEPIME 2000 MG: 2 INJECTION, POWDER, FOR SOLUTION INTRAVENOUS at 14:08

## 2023-12-28 RX ADMIN — REMDESIVIR 200 MG: 100 INJECTION, POWDER, LYOPHILIZED, FOR SOLUTION INTRAVENOUS at 18:49

## 2023-12-28 RX ADMIN — METOPROLOL SUCCINATE 25 MG: 25 TABLET, EXTENDED RELEASE ORAL at 18:49

## 2023-12-28 RX ADMIN — CEFEPIME 2000 MG: 2 INJECTION, POWDER, FOR SOLUTION INTRAVENOUS at 21:01

## 2023-12-28 RX ADMIN — FOLIC ACID 1 MG: 1 TABLET ORAL at 18:49

## 2023-12-28 NOTE — TELEPHONE ENCOUNTER
Provider: Luz  Caller:patient  Relationship to Patient: self  Call Back Phone Number:[phone]   Reason for Call: Pt oxygen has decreased again.

## 2023-12-28 NOTE — ED PROVIDER NOTES
EMERGENCY DEPARTMENT ENCOUNTER    Room Number:  E447/1  Date of encounter:  12/28/2023  PCP: Kehrer, Meredith Lea, MD  Historian: Patient, family  Chronic or social conditions impacting care (social determinants of health): Full code from home    HPI:  Chief Complaint: Short of breath  A complete HPI/ROS/PMH/PSH/SH/FH are unobtainable due to: Nothing    Context: Darlene Pfeiffer is a 83 y.o. female who presents to the ED c/o cute on chronic shortness of breath.  She states her symptoms started yesterday.  She was getting chemotherapy when they found her to be hypoxic.  Sats were reportedly in the mid 80s.  She is not normally on oxygen.  She does complain of shortness of breath however denies any abdominal pain, fevers, chest pain.  She also has a history of cardiomyopathy, EF 39%    Review of prior external notes (non-ED):   I reviewed oncology office visit from 12/18/2023.  Patient being treated for adenocarcinoma of the lung.  Patient currently on Mekinist as well as Tafinlar    Review of prior external test results outside of this encounter:  Reviewed a respiratory panel from yesterday.  Patient tested positive for COVID-19, as well as rhinovirus.    PAST MEDICAL HISTORY  Active Ambulatory Problems     Diagnosis Date Noted    Essential hypertension 11/20/2019    Hyperlipidemia 11/20/2019    Esophageal reflux 11/20/2019    Chronic obstructive pulmonary disease 03/11/2020    Seasonal allergic rhinitis due to pollen 03/11/2020    Abnormal glucose 05/21/2020    Clinical diagnosis of severe acute respiratory syndrome coronavirus 2 (SARS-CoV-2) disease 12/21/2020    Lung cancer     Cerebral aneurysm, nonruptured 06/23/2022    Cerebral aneurysm without rupture 06/29/2022    Shortness of breath 10/14/2022    Hypokalemia 10/15/2022    History of lung cancer     Long-term use of high-risk medication 11/16/2022    Intractable back pain 11/29/2022    Hypokalemia 11/30/2022    Fitting and adjustment of vascular catheter  12/13/2022    Weakness 07/25/2023    Cystitis 07/26/2023    Fever 07/26/2023    Acute respiratory failure with hypoxia 11/14/2023    Respiratory failure 11/14/2023    Systolic CHF, acute 11/14/2023    Acute HFrEF (heart failure with reduced ejection fraction) 11/16/2023    Severe malnutrition 11/16/2023     Resolved Ambulatory Problems     Diagnosis Date Noted    No Resolved Ambulatory Problems     Past Medical History:   Diagnosis Date    Allergic rhinitis     Aneurysm     Asthma     Cancer of floor of mouth 2014    Cerebral aneurysm     COPD (chronic obstructive pulmonary disease)     COVID 2020    History of adenocarcinoma of lung     History of anemia     History of carcinoma in situ of skin     History of oral hairy leukoplakia     History of squamous cell carcinoma     Hypertension     Low vitamin B12 level     Thyromegaly     UTI (urinary tract infection)     Vitamin D deficiency          PAST SURGICAL HISTORY  Past Surgical History:   Procedure Laterality Date    BRONCHOSCOPY Bilateral 10/17/2022    Procedure: BRONCHOSCOPY WITH FLUORO with biopsy and BAL;  Surgeon: India Flores MD;  Location: [facility] ENDOSCOPY;  Service: Pulmonary;  Laterality: Bilateral;  PRE/POST - lung mass    CHOLECYSTECTOMY  1999    COLONOSCOPY      EMBOLIZATION CEREBRAL Left 06/29/2022    Procedure: 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;  Surgeon: Bradley Dowell MD;  Location: [facility] HYBRID OR 18/19;  Service: Interventional Radiology;  Laterality: Left;    EYE SURGERY  11/24/2020    Took a muscle out of right eye    GLOSSECTOMY PARTIAL      less than one half tongue    LUNG SURGERY  2012    ORAL LESION EXCISION/BIOPSY      June and August 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    TUBAL ABDOMINAL LIGATION  1970    VENOUS ACCESS DEVICE (PORT) INSERTION N/A 12/12/2022    Procedure: MEDIPORT PLACEMENT;  Surgeon: Jason Villaseñor MD;  Location: [facility] MAIN OR;  Service: Vascular;  Laterality: N/A;         FAMILY  HISTORY  Family History   Problem Relation Age of Onset    Ovarian cancer Mother          at age 27    No Known Problems Father     Ovarian cancer Sister     Colon cancer Brother     No Known Problems Other     Malig Hyperthermia Neg Hx          SOCIAL HISTORY  Social History     Socioeconomic History    Marital status:    Tobacco Use    Smoking status: Former     Types: Cigarettes     Quit date: 2015     Years since quittin.9     Passive exposure: Never    Smokeless tobacco: Never    Tobacco comments:     less than a pack per day, no smoking since , Quit 2015   Vaping Use    Vaping Use: Never used   Substance and Sexual Activity    Alcohol use: Not Currently     Comment: Socially -A few times a month    Drug use: No    Sexual activity: Defer         ALLERGIES  Sulfa antibiotics        REVIEW OF SYSTEMS  All systems reviewed and negative except for those discussed in HPI.       PHYSICAL EXAM    I have reviewed the triage vital signs and nursing notes.    ED Triage Vitals   Temp Heart Rate Resp BP SpO2   23 1056 23 1046 23 1046 23 1056 23 1046   100.5 °F (38.1 °C) (!) 134 24 147/78 (!) 72 %      Temp src Heart Rate Source Patient Position BP Location FiO2 (%)   23 1056 23 1046 -- -- --   Tympanic Monitor          Physical Exam  GENERAL: Alert, oriented, mild distress  HENT: head atraumatic, no nuchal rigidity  EYES: no scleral icterus, EOMI  CV: regular rhythm, regular rate, no murmur  RESPIRATORY: Mild respiratory distress, tachypneic, CTA  ABDOMEN: soft, nontender  MUSCULOSKELETAL: no deformity, FROM, no calf swelling or tenderness  NEURO: alert, moves all extremities, follows commands  SKIN: warm, dry        LAB RESULTS  Recent Results (from the past 24 hour(s))   Comprehensive Metabolic Panel    Collection Time: 23 11:31 AM    Specimen: Blood   Result Value Ref Range    Glucose 152 (H) 65 - 99 mg/dL    BUN 18 8 - 23 mg/dL    Creatinine  0.74 0.57 - 1.00 mg/dL    Sodium 141 136 - 145 mmol/L    Potassium 4.1 3.5 - 5.2 mmol/L    Chloride 106 98 - 107 mmol/L    CO2 25.0 22.0 - 29.0 mmol/L    Calcium 9.0 8.6 - 10.5 mg/dL    Total Protein 6.0 6.0 - 8.5 g/dL    Albumin 3.7 3.5 - 5.2 g/dL    ALT (SGPT) 13 1 - 33 U/L    AST (SGOT) 29 1 - 32 U/L    Alkaline Phosphatase 75 39 - 117 U/L    Total Bilirubin 0.6 0.0 - 1.2 mg/dL    Globulin 2.3 gm/dL    A/G Ratio 1.6 g/dL    BUN/Creatinine Ratio 24.3 7.0 - 25.0    Anion Gap 10.0 5.0 - 15.0 mmol/L    eGFR 81.4 >60.0 mL/min/1.73   BNP    Collection Time: 12/28/23 11:31 AM    Specimen: Blood   Result Value Ref Range    proBNP 13,478.0 (H) 0.0 - 1,800.0 pg/mL   High Sensitivity Troponin T    Collection Time: 12/28/23 11:31 AM    Specimen: Blood   Result Value Ref Range    HS Troponin T 50 (H) <14 ng/L   Lactic Acid, Plasma    Collection Time: 12/28/23 11:31 AM    Specimen: Blood   Result Value Ref Range    Lactate 1.2 0.5 - 2.0 mmol/L   Procalcitonin    Collection Time: 12/28/23 11:31 AM    Specimen: Blood   Result Value Ref Range    Procalcitonin 0.33 (H) 0.00 - 0.25 ng/mL   CBC Auto Differential    Collection Time: 12/28/23 11:31 AM    Specimen: Blood   Result Value Ref Range    WBC 9.65 3.40 - 10.80 10*3/mm3    RBC 3.71 (L) 3.77 - 5.28 10*6/mm3    Hemoglobin 11.2 (L) 12.0 - 15.9 g/dL    Hematocrit 34.3 34.0 - 46.6 %    MCV 92.5 79.0 - 97.0 fL    MCH 30.2 26.6 - 33.0 pg    MCHC 32.7 31.5 - 35.7 g/dL    RDW 14.1 12.3 - 15.4 %    RDW-SD 47.4 37.0 - 54.0 fl    MPV 9.7 6.0 - 12.0 fL    Platelets 306 140 - 450 10*3/mm3    Neutrophil % 91.9 (H) 42.7 - 76.0 %    Lymphocyte % 4.2 (L) 19.6 - 45.3 %    Monocyte % 3.5 (L) 5.0 - 12.0 %    Eosinophil % 0.0 (L) 0.3 - 6.2 %    Basophil % 0.2 0.0 - 1.5 %    Immature Grans % 0.2 0.0 - 0.5 %    Neutrophils, Absolute 8.86 (H) 1.70 - 7.00 10*3/mm3    Lymphocytes, Absolute 0.41 (L) 0.70 - 3.10 10*3/mm3    Monocytes, Absolute 0.34 0.10 - 0.90 10*3/mm3    Eosinophils, Absolute 0.00 0.00 -  0.40 10*3/mm3    Basophils, Absolute 0.02 0.00 - 0.20 10*3/mm3    Immature Grans, Absolute 0.02 0.00 - 0.05 10*3/mm3    nRBC 0.0 0.0 - 0.2 /100 WBC   ECG 12 Lead Dyspnea    Collection Time: 12/28/23 11:49 AM   Result Value Ref Range    QT Interval 346 ms    QTC Interval 438 ms   COVID-19, FLU A/B, RSV PCR 1 HR TAT - Swab, Nasopharynx    Collection Time: 12/28/23 12:13 PM    Specimen: Nasopharynx; Swab   Result Value Ref Range    COVID19 Detected (C) Not Detected - Ref. Range    Influenza A PCR Not Detected Not Detected    Influenza B PCR Not Detected Not Detected    RSV, PCR Not Detected Not Detected   High Sensitivity Troponin T 2Hr    Collection Time: 12/28/23  1:49 PM    Specimen: Blood   Result Value Ref Range    HS Troponin T 53 (C) <14 ng/L    Troponin T Delta 3 >=-4 - <+4 ng/L       Ordered the above labs and independently reviewed the results.        RADIOLOGY  XR Chest 1 View    Result Date: 12/28/2023  Portable chest x-ray  HISTORY: Shortness of breath, COPD and lung cancer.  TECHNIQUE: Portable chest x-ray correlated with chest x-ray October 16, 2023.  FINDINGS: Right Mediport catheter as before. Elevated left hemidiaphragm with abnormal opacity in the left suprahilar lung. Chronic pleural thickening at the left apex.  Abnormal alveolar and interstitial opacity in the right upper lobe and in the right lung base was not present previously and is suspicious for pneumonia. No appreciable change in the appearance of the left lung.      Chronic changes in the left hemithorax as described. New abnormal alveolar and interstitial opacities in the right lung which are suspected to represent pneumonia.  This report was finalized on 12/28/2023 11:14 AM by Dr. Selvin Srinivasan M.D on Workstation: [id number]       I ordered the above noted radiological studies. Reviewed by me and discussed with radiologist.  See dictation for official radiology interpretation.      MEDICATIONS GIVEN IN ER    Medications   sodium  chloride 0.9 % flush 10 mL (has no administration in time range)   acetaminophen (TYLENOL) tablet 1,000 mg (1,000 mg Oral Not Given 12/28/23 1207)   sodium chloride 0.9 % flush 10 mL (has no administration in time range)   sodium chloride 0.9 % flush 10 mL (10 mL Intravenous Given 12/28/23 2011)   sodium chloride 0.9 % flush 10 mL (has no administration in time range)   sodium chloride 0.9 % infusion 40 mL (has no administration in time range)   sennosides-docusate (PERICOLACE) 8.6-50 MG per tablet 2 tablet (2 tablets Oral Given 12/28/23 2014)     And   polyethylene glycol (MIRALAX) packet 17 g (has no administration in time range)     And   bisacodyl (DULCOLAX) EC tablet 5 mg (has no administration in time range)     And   bisacodyl (DULCOLAX) suppository 10 mg (has no administration in time range)   nitroglycerin (NITROSTAT) SL tablet 0.4 mg (has no administration in time range)   apixaban (ELIQUIS) tablet 2.5 mg (2.5 mg Oral Given 12/28/23 1849)   atorvastatin (LIPITOR) tablet 20 mg (20 mg Oral Not Given 12/28/23 2011)   folic acid (FOLVITE) tablet 1 mg (1 mg Oral Given 12/28/23 1849)   furosemide (LASIX) tablet 40 mg (has no administration in time range)   HYDROcodone Bit-Homatrop MBr (HYCODAN) 5-1.5 MG/5ML solution 5 mL (has no administration in time range)   mirtazapine (REMERON) tablet 7.5 mg (7.5 mg Oral Given 12/28/23 2009)   montelukast (SINGULAIR) tablet 10 mg (10 mg Oral Given 12/28/23 2009)   ondansetron (ZOFRAN) tablet 8 mg (has no administration in time range)   sacubitril-valsartan (ENTRESTO) 24-26 MG tablet 0.5 tablet (0.5 tablets Oral Given 12/28/23 2009)   zolpidem (AMBIEN) tablet 5 mg (has no administration in time range)   Pharmacy Consult - Remdesivir for Mild to Moderate COVID-19 (Within 5 days of symptom onset)- only if contraindicated to Paxlovid (has no administration in time range)   remdesivir 200 mg in sodium chloride 0.9 % 290 mL IVPB (powder vial) (200 mg Intravenous New Bag 12/28/23  1849)     Followed by   remdesivir 100 mg in sodium chloride 0.9 % 250 mL IVPB (powder vial) (has no administration in time range)   dexAMETHasone (DECADRON) tablet 6 mg (has no administration in time range)   metoprolol succinate XL (TOPROL-XL) 24 hr tablet 25 mg (25 mg Oral Given 12/28/23 1849)   pantoprazole (PROTONIX) EC tablet 40 mg (40 mg Oral Given 12/28/23 1849)   cefepime 2000 mg IVPB in 100 ml NS (VTB) (has no administration in time range)   dexAMETHasone (DECADRON) injection 6 mg (6 mg Intravenous Given 12/28/23 1207)   furosemide (LASIX) injection 80 mg (80 mg Intravenous Given 12/28/23 1359)   cefepime 2000 mg IVPB in 100 ml NS (VTB) (0 mg Intravenous Stopped 12/28/23 1542)         ADDITIONAL ORDERS CONSIDERED BUT NOT ORDERED:  Nothing      PROGRESS, DATA ANALYSIS, CONSULTS, AND MEDICAL DECISION MAKING    All labs have been independently interpreted by myself.  All radiology studies have been independently interpreted by myself and discussed with radiologist dictating the report.   EKG's independently interpreted by myself.  Discussion below represents my analysis of pertinent findings related to patient's condition, differential diagnosis, treatment plan and final disposition.    I have discussed case with Dr. Gonzalez, emergency room physician.  He has performed his own bedside examination and agrees with treatment plan.    ED Course as of 12/28/23 2022   Thu Dec 28, 2023   1058 Patient presents with 2-day history of acute on chronic shortness of breath.  Patient has history of lung cancer and is currently undergoing chemo.  She reportedly tested positive for COVID yesterday.  Sats were 72% at triage. [EE]   1112 Patient is on Eliquis for history of A-fib.  Have low suspicion of PE. [EE]   1124 Chest x-ray independently interpreted myself shows a stable left lung with new opacities in the right lung concerning for pneumonia. [EE]   1213 proBNP(!): 13,478.0 [EE]   1346 HS Troponin T(!): 50  Decreased from  prior [RS]   1347 proBNP(!): 13,478.0  Increase from prior [RS]   1347 Lactate: 1.2 [RS]   1347 Hemoglobin(!): 11.2  Minimally decreased from prior [RS]   1347 Immature Grans, Absolute: 0.02 [RS]   1347 BUN: 18 [RS]   1347 Creatinine: 0.74 [RS]   1347 Sodium: 141 [RS]   1347 Potassium: 4.1 [RS]   1354 Reviewed all findings with patient and son.  Complex patient with numerous medical problems.  Appears to be a bit volume overloaded so we will order some furosemide today.  Also given the chest x-ray and immunocompromise state, pneumonia certainly possibility.  Blood cultures have been sent.  We will cover her with IV antibiotics as well.  Patient agreeable with plan for admission. [RS]   1357 Complicated case discussed with clinical pharmacistUrbano.  Recommends coverage with cefepime and MRSA nasal swab. [RS]   1413 CONSULT        Provider: Dr. Escobar - Onc    Discussion: Reviewed patient history, ED presentation and evaluation.  He is agreeable to be involved in patient's care as a consult at    Agreeable c treatment and planned disposition.         [RS]   1414 CONSULT        Provider: Dr. Orr - Cardiology    Discussion: Reviewed patient history, ED presentation and evaluation.  She is agreeable to be involved in the patient's care as a consultant    Agreeable c treatment and planned disposition.         [RS]   1425 CONSULT        Provider: Dr. Wright - [facility]    Discussion: Reviewed patient history, ED presentation and evaluation as well as consultations with cardiology and hematology/oncology.  Agreeable to accept patient for admission.    Agreeable c treatment and planned disposition.         [RS]      ED Course User Index  [EE] Jose Juares PA  [RS] Jakob Gonzalez MD       AS OF 20:22 EST VITALS:    BP - 112/72  HR - 95  TEMP - 97.7 °F (36.5 °C) (Oral)  O2 SATS - 92%        DIAGNOSIS  Final diagnoses:   Acute hypoxemic respiratory failure   Acute on chronic congestive heart failure, unspecified heart failure  type   Pulmonary infiltrate   Immunosuppressed due to chemotherapy         DISPOSITION  Admitted      Dictated utilizing Dragon dictation     Jose Juares PA  12/28/23 2022

## 2023-12-28 NOTE — ED NOTES
Nursing report ED to floor  Darlene Pfeiffer  83 y.o.  female    HPI :   Chief Complaint   Patient presents with    Shortness of Breath     Had chemo yesterday for lung cancer sats 72% at triage       Admitting doctor:   Perla Wright MD    Admitting diagnosis:   The primary encounter diagnosis was Acute hypoxemic respiratory failure. Diagnoses of Acute on chronic congestive heart failure, unspecified heart failure type, Pulmonary infiltrate, and Immunosuppressed due to chemotherapy were also pertinent to this visit.    Code status:   Current Code Status       Date Active Code Status Order ID Comments User Context       Prior            Allergies:   Sulfa antibiotics    Isolation:   Enhanced Droplet/Contact , Droplet    Intake and Output  No intake or output data in the 24 hours ending 12/28/23 1512    Weight:   There were no vitals filed for this visit.    Most recent vitals:   Vitals:    12/28/23 1046 12/28/23 1056   BP:  147/78   Pulse: (!) 134    Resp: 24    Temp:  100.5 °F (38.1 °C)   TempSrc:  Tympanic   SpO2: (!) 72% 93%       Active LDAs/IV Access:   Lines, Drains & Airways       Active LDAs       Name Placement date Placement time Site Days    Peripheral IV 12/28/23 1406 Anterior;Right Forearm 12/28/23  1406  Forearm  less than 1    Single Lumen Implantable Port Right Subclavian --  --  Subclavian  --                    Labs (abnormal labs have a star):   Labs Reviewed   COVID-19/FLUA&B/RSV, NP SWAB IN TRANSPORT MEDIA 1 HR TAT - Abnormal; Notable for the following components:       Result Value    COVID19 Detected (*)     All other components within normal limits    Narrative:     Fact sheet for providers: https://www.fda.gov/media/124579/download    Fact sheet for patients: https://www.fda.gov/media/715087/download    Test performed by PCR.   COMPREHENSIVE METABOLIC PANEL - Abnormal; Notable for the following components:    Glucose 152 (*)     All other components within normal limits    Narrative:      GFR Normal >60  Chronic Kidney Disease <60  Kidney Failure <15    The GFR formula is only valid for adults with stable renal function between ages 18 and 70.   BNP (IN-HOUSE) - Abnormal; Notable for the following components:    proBNP 13,478.0 (*)     All other components within normal limits    Narrative:     This assay is used as an aid in the diagnosis of individuals suspected of having heart failure. It can be used as an aid in the diagnosis of acute decompensated heart failure (ADHF) in patients presenting with signs and symptoms of ADHF to the emergency department (ED). In addition, NT-proBNP of <300 pg/mL indicates ADHF is not likely.    Age Range Result Interpretation  NT-proBNP Concentration (pg/mL:      <50             Positive            >450                   Gray                 300-450                    Negative             <300    50-75           Positive            >900                  Gray                300-900                  Negative            <300      >75             Positive            >1800                  Gray                300-1800                  Negative            <300   TROPONIN - Abnormal; Notable for the following components:    HS Troponin T 50 (*)     All other components within normal limits    Narrative:     High Sensitive Troponin T Reference Range:  <14.0 ng/L- Negative Female for AMI  <22.0 ng/L- Negative Male for AMI  >=14 - Abnormal Female indicating possible myocardial injury.  >=22 - Abnormal Male indicating possible myocardial injury.   Clinicians would have to utilize clinical acumen, EKG, Troponin, and serial changes to determine if it is an Acute Myocardial Infarction or myocardial injury due to an underlying chronic condition.        PROCALCITONIN - Abnormal; Notable for the following components:    Procalcitonin 0.33 (*)     All other components within normal limits    Narrative:     As a Marker for Sepsis (Non-Neonates):    1. <0.5 ng/mL represents a low risk  "of severe sepsis and/or septic shock.  2. >2 ng/mL represents a high risk of severe sepsis and/or septic shock.    As a Marker for Lower Respiratory Tract Infections that require antibiotic therapy:    PCT on Admission    Antibiotic Therapy       6-12 Hrs later    >0.5                Strongly Recommended  >0.25 - <0.5        Recommended   0.1 - 0.25          Discouraged              Remeasure/reassess PCT  <0.1                Strongly Discouraged     Remeasure/reassess PCT    As 28 day mortality risk marker: \"Change in Procalcitonin Result\" (>80% or <=80%) if Day 0 (or Day 1) and Day 4 values are available. Refer to [email or web address]://[email or web address] [email or web address]-[email or web address]    Change in PCT <=80%  A decrease of PCT levels below or equal to 80% defines a positive change in PCT test result representing a higher risk for 28-day all-cause mortality of patients diagnosed with severe sepsis for septic shock.    Change in PCT >80%  A decrease of PCT levels of more than 80% defines a negative change in PCT result representing a lower risk for 28-day all-cause mortality of patients diagnosed with severe sepsis or septic shock.      CBC WITH AUTO DIFFERENTIAL - Abnormal; Notable for the following components:    RBC 3.71 (*)     Hemoglobin 11.2 (*)     Neutrophil % 91.9 (*)     Lymphocyte % 4.2 (*)     Monocyte % 3.5 (*)     Eosinophil % 0.0 (*)     Neutrophils, Absolute 8.86 (*)     Lymphocytes, Absolute 0.41 (*)     All other components within normal limits   HIGH SENSITIVITIY TROPONIN T 2HR - Abnormal; Notable for the following components:    HS Troponin T 53 (*)     All other components within normal limits    Narrative:     High Sensitive Troponin T Reference Range:  <14.0 ng/L- Negative Female for AMI  <22.0 ng/L- Negative Male for AMI  >=14 - Abnormal Female indicating possible myocardial injury.  >=22 - Abnormal Male indicating possible myocardial injury.   Clinicians would have to utilize clinical acumen, EKG, Troponin, and serial changes " to determine if it is an Acute Myocardial Infarction or myocardial injury due to an underlying chronic condition.        LACTIC ACID, PLASMA - Normal   BLOOD CULTURE   BLOOD CULTURE   MRSA SCREEN, PCR   CBC AND DIFFERENTIAL    Narrative:     The following orders were created for panel order CBC & Differential.  Procedure                               Abnormality         Status                     ---------                               -----------         ------                     CBC Auto Differential[[id number]]        Abnormal            Final result                 Please view results for these tests on the individual orders.       EKG:   ECG 12 Lead Dyspnea   Final Result   HEART RATE= 96  bpm   RR Interval= 625  ms   SC Interval= 141  ms   P Horizontal Axis= -35  deg   P Front Axis= 36  deg   QRSD Interval= 91  ms   QT Interval= 346  ms   QTcB= 438  ms   QRS Axis= 26  deg   T Wave Axis= 112  deg   - ABNORMAL ECG -   Sinus rhythm   Atrial premature complex   Probable LVH with secondary repol abnrm   PVCs have resolved   Electronically Signed By: Wendie Lugo ([facility]) 28-Dec-2023 14:58:24   Date and Time of Study: 2023-12-28 11:49:47          Meds given in ED:   Medications   sodium chloride 0.9 % flush 10 mL (has no administration in time range)   acetaminophen (TYLENOL) tablet 1,000 mg (1,000 mg Oral Not Given 12/28/23 1207)   sodium chloride 0.9 % flush 10 mL (has no administration in time range)   dexAMETHasone (DECADRON) injection 6 mg (6 mg Intravenous Given 12/28/23 1207)   furosemide (LASIX) injection 80 mg (80 mg Intravenous Given 12/28/23 1359)   cefepime 2000 mg IVPB in 100 ml NS (VTB) (2,000 mg Intravenous New Bag 12/28/23 1408)       Imaging results:  XR Chest 1 View    Result Date: 12/28/2023  Chronic changes in the left hemithorax as described. New abnormal alveolar and interstitial opacities in the right lung which are suspected to represent pneumonia.  This report was finalized on 12/28/2023  11:14 AM by Dr. Selvin Srinivasan M.D on Workstation: [id number]       Ambulatory status:   - up at [facility]     Social issues:   Social History     Socioeconomic History    Marital status:    Tobacco Use    Smoking status: Former     Types: Cigarettes     Quit date: 2015     Years since quittin.9     Passive exposure: Never    Smokeless tobacco: Never    Tobacco comments:     less than a pack per day, no smoking since , Quit 2015   Vaping Use    Vaping Use: Never used   Substance and Sexual Activity    Alcohol use: Not Currently     Comment: Socially -A few times a month    Drug use: No    Sexual activity: Defer       NIH Stroke Scale:       Jacqueline Bella RN  23 15:12 EST

## 2023-12-28 NOTE — TELEPHONE ENCOUNTER
Called the patient back and her  answered. I asked about her o2 sat and he stated it dropped into the 60s but it was 75 now. I let him know she needed to be taken to the ER right away for them to get her conncected to oxygen. He v/u.

## 2023-12-28 NOTE — ED PROVIDER NOTES
Brief history of present illness: 83-year-old female with history of lung cancer for which she is receiving chemotherapy presents for evaluation of acute shortness of breath that began yesterday.  She was noted to be hypoxemic.  Patient reports feeling much better since arriving in the ED and having oxygen applied.  She has been having some intermittent hypoxemia over the last 3 weeks but just feels like she is terribly short of breath now.  Her cough is actually unchanged from baseline.  She was unaware of any fever.      Physical Exam   Constitutional: No distress.  Nontoxic but chronically ill-appearing  HENT:  Head: Normocephalic and atraumatic.   Oropharynx: Mucous membranes are moist.   Eyes: . No scleral icterus. No conjunctival pallor.  Neck: Normal range of motion. Neck supple.   Cardiovascular: Pink warm and well perfused throughout.    Pulmonary/Chest: Mild tachypnea with no retractions.  Speaks in full sentences.  Fine rhonchi throughout bilateral lungs  Abdominal: Soft. There is no tenderness. There is no rebound and no guarding.   Musculoskeletal: Moves all extremities equally.  No calf tenderness or swelling.  Neurological: Alert and oriented.  No acute focal deficit appreciated.  Skin: Skin is pink, warm, and dry.   Psychiatric: Mood and affect normal.   Nursing note and vitals reviewed.      MDM:   Results for orders placed or performed during the hospital encounter of 12/28/23   Comprehensive Metabolic Panel    Specimen: Blood   Result Value Ref Range    Glucose 152 (H) 65 - 99 mg/dL    BUN 18 8 - 23 mg/dL    Creatinine 0.74 0.57 - 1.00 mg/dL    Sodium 141 136 - 145 mmol/L    Potassium 4.1 3.5 - 5.2 mmol/L    Chloride 106 98 - 107 mmol/L    CO2 25.0 22.0 - 29.0 mmol/L    Calcium 9.0 8.6 - 10.5 mg/dL    Total Protein 6.0 6.0 - 8.5 g/dL    Albumin 3.7 3.5 - 5.2 g/dL    ALT (SGPT) 13 1 - 33 U/L    AST (SGOT) 29 1 - 32 U/L    Alkaline Phosphatase 75 39 - 117 U/L    Total Bilirubin 0.6 0.0 - 1.2 mg/dL     Globulin 2.3 gm/dL    A/G Ratio 1.6 g/dL    BUN/Creatinine Ratio 24.3 7.0 - 25.0    Anion Gap 10.0 5.0 - 15.0 mmol/L    eGFR 81.4 >60.0 mL/min/1.73   BNP    Specimen: Blood   Result Value Ref Range    proBNP 13,478.0 (H) 0.0 - 1,800.0 pg/mL   High Sensitivity Troponin T    Specimen: Blood   Result Value Ref Range    HS Troponin T 50 (H) <14 ng/L   Lactic Acid, Plasma    Specimen: Blood   Result Value Ref Range    Lactate 1.2 0.5 - 2.0 mmol/L   Procalcitonin    Specimen: Blood   Result Value Ref Range    Procalcitonin 0.33 (H) 0.00 - 0.25 ng/mL   CBC Auto Differential    Specimen: Blood   Result Value Ref Range    WBC 9.65 3.40 - 10.80 10*3/mm3    RBC 3.71 (L) 3.77 - 5.28 10*6/mm3    Hemoglobin 11.2 (L) 12.0 - 15.9 g/dL    Hematocrit 34.3 34.0 - 46.6 %    MCV 92.5 79.0 - 97.0 fL    MCH 30.2 26.6 - 33.0 pg    MCHC 32.7 31.5 - 35.7 g/dL    RDW 14.1 12.3 - 15.4 %    RDW-SD 47.4 37.0 - 54.0 fl    MPV 9.7 6.0 - 12.0 fL    Platelets 306 140 - 450 10*3/mm3    Neutrophil % 91.9 (H) 42.7 - 76.0 %    Lymphocyte % 4.2 (L) 19.6 - 45.3 %    Monocyte % 3.5 (L) 5.0 - 12.0 %    Eosinophil % 0.0 (L) 0.3 - 6.2 %    Basophil % 0.2 0.0 - 1.5 %    Immature Grans % 0.2 0.0 - 0.5 %    Neutrophils, Absolute 8.86 (H) 1.70 - 7.00 10*3/mm3    Lymphocytes, Absolute 0.41 (L) 0.70 - 3.10 10*3/mm3    Monocytes, Absolute 0.34 0.10 - 0.90 10*3/mm3    Eosinophils, Absolute 0.00 0.00 - 0.40 10*3/mm3    Basophils, Absolute 0.02 0.00 - 0.20 10*3/mm3    Immature Grans, Absolute 0.02 0.00 - 0.05 10*3/mm3    nRBC 0.0 0.0 - 0.2 /100 WBC   High Sensitivity Troponin T 2Hr    Specimen: Blood   Result Value Ref Range    HS Troponin T 53 (C) <14 ng/L    Troponin T Delta 3 >=-4 - <+4 ng/L   ECG 12 Lead Dyspnea   Result Value Ref Range    QT Interval 346 ms    QTC Interval 438 ms       XR Chest 1 View    Result Date: 12/28/2023  Narrative: Portable chest x-ray  HISTORY: Shortness of breath, COPD and lung cancer.  TECHNIQUE: Portable chest x-ray correlated with  chest x-ray October 16, 2023.  FINDINGS: Right Mediport catheter as before. Elevated left hemidiaphragm with abnormal opacity in the left suprahilar lung. Chronic pleural thickening at the left apex.  Abnormal alveolar and interstitial opacity in the right upper lobe and in the right lung base was not present previously and is suspicious for pneumonia. No appreciable change in the appearance of the left lung.      Impression: Chronic changes in the left hemithorax as described. New abnormal alveolar and interstitial opacities in the right lung which are suspected to represent pneumonia.  This report was finalized on 12/28/2023 11:14 AM by Dr. Selvin Srinivasan M.D on Workstation: [id number]      CT Angiogram Coronary    Result Date: 12/21/2023  Narrative: RADIOLOGY INTERPRETATION OF EXTRACARDIAC STRUCTURES WITHIN THE CHEST  HISTORY: 83-year-old female with CHF history.   FINDINGS: Compared with previous chest CTA 11/14/2023. The visualized lung fields are better aerated than on the previous examination with resolved pulmonary and interstitial edema. There are significant interstitial fibrotic changes.  The pulmonary artery is enlarged measuring 3 cm in diameter. There is no aneurysmal dilatation of the visualized thoracic aorta. Aortic root measures 3 cm, ascending thoracic aorta 2.8 cm, and descending thoracic aorta 2.5 cm.  Please see separate cardiology report of the cardiac findings.   Radiation dose reduction techniques were utilized, including automated exposure control and exposure modulation based on body size.   This report was finalized on 12/21/2023 2:54 PM by Dr. Noreen Addison M.D on Workstation: [id number]          Results for orders placed or performed during the hospital encounter of 12/28/23   Comprehensive Metabolic Panel    Specimen: Blood   Result Value Ref Range    Glucose 152 (H) 65 - 99 mg/dL    BUN 18 8 - 23 mg/dL    Creatinine 0.74 0.57 - 1.00 mg/dL    Sodium 141 136 - 145 mmol/L    Potassium 4.1  3.5 - 5.2 mmol/L    Chloride 106 98 - 107 mmol/L    CO2 25.0 22.0 - 29.0 mmol/L    Calcium 9.0 8.6 - 10.5 mg/dL    Total Protein 6.0 6.0 - 8.5 g/dL    Albumin 3.7 3.5 - 5.2 g/dL    ALT (SGPT) 13 1 - 33 U/L    AST (SGOT) 29 1 - 32 U/L    Alkaline Phosphatase 75 39 - 117 U/L    Total Bilirubin 0.6 0.0 - 1.2 mg/dL    Globulin 2.3 gm/dL    A/G Ratio 1.6 g/dL    BUN/Creatinine Ratio 24.3 7.0 - 25.0    Anion Gap 10.0 5.0 - 15.0 mmol/L    eGFR 81.4 >60.0 mL/min/1.73   BNP    Specimen: Blood   Result Value Ref Range    proBNP 13,478.0 (H) 0.0 - 1,800.0 pg/mL   High Sensitivity Troponin T    Specimen: Blood   Result Value Ref Range    HS Troponin T 50 (H) <14 ng/L   Lactic Acid, Plasma    Specimen: Blood   Result Value Ref Range    Lactate 1.2 0.5 - 2.0 mmol/L   Procalcitonin    Specimen: Blood   Result Value Ref Range    Procalcitonin 0.33 (H) 0.00 - 0.25 ng/mL   CBC Auto Differential    Specimen: Blood   Result Value Ref Range    WBC 9.65 3.40 - 10.80 10*3/mm3    RBC 3.71 (L) 3.77 - 5.28 10*6/mm3    Hemoglobin 11.2 (L) 12.0 - 15.9 g/dL    Hematocrit 34.3 34.0 - 46.6 %    MCV 92.5 79.0 - 97.0 fL    MCH 30.2 26.6 - 33.0 pg    MCHC 32.7 31.5 - 35.7 g/dL    RDW 14.1 12.3 - 15.4 %    RDW-SD 47.4 37.0 - 54.0 fl    MPV 9.7 6.0 - 12.0 fL    Platelets 306 140 - 450 10*3/mm3    Neutrophil % 91.9 (H) 42.7 - 76.0 %    Lymphocyte % 4.2 (L) 19.6 - 45.3 %    Monocyte % 3.5 (L) 5.0 - 12.0 %    Eosinophil % 0.0 (L) 0.3 - 6.2 %    Basophil % 0.2 0.0 - 1.5 %    Immature Grans % 0.2 0.0 - 0.5 %    Neutrophils, Absolute 8.86 (H) 1.70 - 7.00 10*3/mm3    Lymphocytes, Absolute 0.41 (L) 0.70 - 3.10 10*3/mm3    Monocytes, Absolute 0.34 0.10 - 0.90 10*3/mm3    Eosinophils, Absolute 0.00 0.00 - 0.40 10*3/mm3    Basophils, Absolute 0.02 0.00 - 0.20 10*3/mm3    Immature Grans, Absolute 0.02 0.00 - 0.05 10*3/mm3    nRBC 0.0 0.0 - 0.2 /100 WBC   High Sensitivity Troponin T 2Hr    Specimen: Blood   Result Value Ref Range    HS Troponin T 53 (C) <14 ng/L     Troponin T Delta 3 >=-4 - <+4 ng/L   ECG 12 Lead Dyspnea   Result Value Ref Range    QT Interval 346 ms    QTC Interval 438 ms     RADIOLOGY      Study: Single view chest  Findings:  Diffuse interstitial pattern with significantly diminished lung fields on the left  I independently viewed and interpreted these images contemporaneously with treatment.     EKG         EKG time: 1149  Rhythm/Rate: Sinus tachycardia, 95  P waves and OR: MEHNAZ within normal limits  QRS, axis: Narrow complex, LVH  ST and T waves: ST/T wave reposition abnormality without clear evidence of STEMI  Interpreted Contemporaneously by me, independently viewed  Comparison: 12/21/2023      I have seen and personally evaluated this patient, discussed the case with the treating advanced practice provider, and reviewed their note. I was involved in the medical decision making during the evaluation, testing and disposition planning for this patient.    Progress:  1142: Patient had RVP yesterday it was negative.  Chest x-ray concerning for new opacities though history not really suggestive of pneumonia but she is certainly immunocompromise.  Blood cultures and septic workup initiated.    ED Course as of 12/28/23 1426   Thu Dec 28, 2023   1058 Patient presents with 2-day history of acute on chronic shortness of breath.  Patient has history of lung cancer and is currently undergoing chemo.  She reportedly tested positive for COVID yesterday.  Sats were 72% at triage. [EE]   1112 Patient is on Eliquis for history of A-fib.  Have low suspicion of PE. [EE]   1124 Chest x-ray independently interpreted myself shows a stable left lung with new opacities in the right lung concerning for pneumonia. [EE]   1213 proBNP(!): 13,478.0 [EE]   1346 HS Troponin T(!): 50  Decreased from prior [RS]   1347 proBNP(!): 13,478.0  Increase from prior [RS]   1347 Lactate: 1.2 [RS]   1347 Hemoglobin(!): 11.2  Minimally decreased from prior [RS]   1347 Immature Grans, Absolute:  0.02 [RS]   1347 BUN: 18 [RS]   1347 Creatinine: 0.74 [RS]   1347 Sodium: 141 [RS]   1347 Potassium: 4.1 [RS]   1354 Reviewed all findings with patient and son.  Complex patient with numerous medical problems.  Appears to be a bit volume overloaded so we will order some furosemide today.  Also given the chest x-ray and immunocompromise state, pneumonia certainly possibility.  Blood cultures have been sent.  We will cover her with IV antibiotics as well.  Patient agreeable with plan for admission. [RS]   1357 Complicated case discussed with clinical pharmacistUrbano.  Recommends coverage with cefepime and MRSA nasal swab. [RS]   1413 CONSULT        Provider: Dr. Escobar - Onc    Discussion: Reviewed patient history, ED presentation and evaluation.  He is agreeable to be involved in patient's care as a consult at    Agreeable c treatment and planned disposition.         [RS]   1414 CONSULT        Provider: Dr. Orr - Cardiology    Discussion: Reviewed patient history, ED presentation and evaluation.  She is agreeable to be involved in the patient's care as a consultant    Agreeable c treatment and planned disposition.         [RS]   1425 CONSULT        Provider: Dr. Wright - [facility]    Discussion: Reviewed patient history, ED presentation and evaluation as well as consultations with cardiology and hematology/oncology.  Agreeable to accept patient for admission.    Agreeable c treatment and planned disposition.         [RS]      ED Course User Index  [EE] Jose Juares PA  [RS] Jakob Gonzalez MD     Total critical care time: Approximately 35 minutes    Due to a high probability of clinically significant, life threatening deterioration, the patient required my highest level of preparedness to intervene emergently and I personally spent this critical care time directly and personally managing the patient. This critical care time included obtaining a history; examining the patient; vital sign monitoring; ordering and review  of studies; arranging urgent treatment with development of a management plan; evaluation of patient's response to treatment; frequent reassessment; and, discussions with other providers.    This critical care time was performed to assess and manage the high probability of imminent, life-threatening deterioration that could result in multi-organ failure. It was exclusive of separately billable procedures and treating other patients and teaching time.    Please see MDM section and the rest of the note for further information on patient assessment and treatment.        Final diagnoses:   Acute hypoxemic respiratory failure   Acute on chronic congestive heart failure, unspecified heart failure type   Pulmonary infiltrate   Immunosuppressed due to chemotherapy       Disposition:  ADMISSION    Discussed treatment plan and reason for admission with pt/family and admitting physician.  Pt/family voiced understanding of the plan for admission for further testing/treatment as needed.        Jakob Gonzalez MD  12/28/23 4721

## 2023-12-28 NOTE — PROGRESS NOTES
[facility]  Clinical Pharmacy Department     Remdesivir Review Note  Does patient qualify for nirmatrelvir/ritonavir (Paxlovid)?:    If no, please check one of the following rationale(s):   [] Patient is outside 5 days from symptom onset window   [] Patient has a drug-drug interaction that cannot be managed while admitted (I.e. can't dose adjust or hold during Paxlovid therapy)  [] Patient's eGFR precludes them from using Paxlovid (eGFR<30mL/min)  [x] Patient qualifies for a 5-day treatment course of Remdesivir (requires supplemental oxygen or SpO2 ? 94% on room air)      Darlene Pfeiffer is a 83 y.o. female with confirmed COVID-19 infection on day 1 of hospitalization.      Consulting Provider:  Dr. Wright  Date of Confirmed SARS-CoV-2: 12/27  Date of Symptom Onset: 12/27  Other Antimicrobials: N/A        Allergies  Allergies as of 12/28/2023 - Reviewed 12/28/2023   Allergen Reaction Noted    Sulfa antibiotics Rash 10/13/2016       Microbiology:  Microbiology Results (last 10 days)       Procedure Component Value - Date/Time    COVID-19, FLU A/B, RSV PCR 1 HR TAT - Swab, Nasopharynx [[id number]]  (Abnormal) Collected: 12/28/23 1213    Lab Status: Final result Specimen: Swab from Nasopharynx Updated: 12/28/23 1501     COVID19 Detected     Influenza A PCR Not Detected     Influenza B PCR Not Detected     RSV, PCR Not Detected    Narrative:      Fact sheet for providers: https://www.fda.gov/media/721261/download    Fact sheet for patients: https://www.fda.gov/media/490046/download    Test performed by PCR.    Respiratory Panel PCR w/COVID-19(SARS-CoV-2) JOSHUA/NUZHAT/JOHANA/PAD/COR/ROSEMARY In-House, NP Swab in UTM/VTM, 2 HR TAT - Swab, Nasopharynx [[id number]]  (Abnormal) Collected: 12/27/23 1032    Lab Status: Final result Specimen: Swab from Nasopharynx Updated: 12/27/23 1237     ADENOVIRUS, PCR Not Detected     Coronavirus 229E Not Detected     Coronavirus HKU1 Not Detected     Coronavirus NL63 Not Detected     Coronavirus OC43  Not Detected     COVID19 Detected     Human Metapneumovirus Not Detected     Human Rhinovirus/Enterovirus Detected     Influenza A PCR Not Detected     Influenza B PCR Not Detected     Parainfluenza Virus 1 Not Detected     Parainfluenza Virus 2 Not Detected     Parainfluenza Virus 3 Not Detected     Parainfluenza Virus 4 Not Detected     RSV, PCR Not Detected     Bordetella pertussis pcr Not Detected     Bordetella parapertussis PCR Not Detected     Chlamydophila pneumoniae PCR Not Detected     Mycoplasma pneumo by PCR Not Detected    Narrative:      In the setting of a positive respiratory panel with a viral infection PLUS a negative procalcitonin without other underlying concern for bacterial infection, consider observing off antibiotics or discontinuation of antibiotics and continue supportive care. If the respiratory panel is positive for atypical bacterial infection (Bordetella pertussis, Chlamydophila pneumoniae, or Mycoplasma pneumoniae), consider antibiotic de-escalation to target atypical bacterial infection.            Vitals/Labs/I&O  Visit Vitals  /78   Pulse (!) 134   Temp 100.5 °F (38.1 °C) (Tympanic)   Resp 24   LMP  (LMP Unknown)   SpO2 93%       Results from last 7 days   Lab Units 12/28/23  1131 12/27/23  0947   WBC 10*3/mm3 9.65 10.63     Results from last 7 days   Lab Units 12/28/23  1131   PROCALCITONIN ng/mL 0.33*     Results from last 7 days   Lab Units 12/28/23  1131 12/27/23  0947   AST (SGOT) U/L 29 32      Results from last 7 days   Lab Units 12/28/23  1131 12/27/23  0947   ALT (SGPT) U/L 13 8       Estimated Creatinine Clearance: 45.3 mL/min (by C-G formula based on SCr of 0.74 mg/dL).  Results from last 7 days   Lab Units 12/28/23  1131 12/27/23  0947   BUN mg/dL 18 15   CREATININE mg/dL 0.74 0.69     Intake & Output (last 3 days)         12/25 0701 12/26 0700 12/26 0701 12/27 0700 12/27 0701 12/28 0700 12/28 0701 12/29 0700    IV Piggyback    100    Total Intake    100    Net     +100                    Assessment/Plan:    Patient meets the following inclusion/exclusion criteria:  Patient is hospitalized with confirmed COVID-19 infection (and accompanying symptoms)  Patient meets one of the two below criteria:  Patient is requiring an increase in baseline supplemental oxygen requirements OR SpO2 </= 94% on room air secondary to COVID-19 infection OR  Patient is symptomatic but not requiring supplemental oxygen or an increase in baseline oxygen AND has at least one of the below high risk criteria for progression to severe illness. High risk criteria:   Age >/= 65  Cancer  Cardiovascular diseases (HF, CAD, or cardiomyopathies)  CKD  Chronic lung disease (COPD, CF, interstitial lung disease, PE, pulmonary hypertension, bronchopulmonary dysplasia, bronchiectasis)  Diabetes mellitis (insulin dependent or poorly controlled)  Immunocompromising conditions or receipt of immunosuppressive medications  Obesity (BMI > 35 kg/m2)  Pregnancy and recent pregnancy (within 7 days of delivery)  Sickle cell disease  Baseline and daily LFTs and Scr have been ordered prior to remdesivir initiation  ALT is not ? 10 times the upper limit of normal  Patient is not on concomitant hydroxychloroquine or chloroquine  Patient does not require invasive mechanical ventilation (IMV) or ECMO  Patient is within 10 days from symptom onset (for criteria 2a) or within 7 days of symptom onset (for criteria 2b)    Remdesivir 200 mg IV x 1 dose, followed by 100 mg IV q24h for 4 days or until hospital discharge (whichever comes first) has been ordered.    Thank you for involving pharmacy in this patient's care. Please contact pharmacy with any questions or concerns.                           Arun Blancas III, [facility]  Clinical Pharmacist

## 2023-12-28 NOTE — H&P
Patient Name:  Darlene Pfeiffer  YOB: 1940  MRN:  [id number]  Admit Date:  12/28/2023  Patient Care Team:  Kehrer, Meredith Lea, MD as PCP - General (Family Medicine)  Linker, Arias BROWN III, MD as Referring Physician (Thoracic Surgery)  Henry Escobar MD as Consulting Physician (Hematology and Oncology)  Zoila Clinton RD, SALONI as Dietitian (Nutrition)  Nguyễn Bran MD as Consulting Physician (Radiation Oncology)      Subjective   History Present Illness     Chief Complaint   Patient presents with    Shortness of Breath     Had chemo yesterday for lung cancer sats 72% at triage           History of Present Illness  Patient is a 83-year-old female with a history of COPD, hypertension, hyperlipidemia, intracranial aneurysm left PCA.  Lung cancer on chemotherapy, coronary artery calcification, paroxysmal atrial fibrillation, presented to the ED complaining of acute on chronic shortness of breath that started yesterday.  Patient reports history of chronic shortness of breath, in the setting of lung cancer, heart failure, however acutely worsened for the last 2 days.  Per son at bedside her oxygen was as low as 67% on room air .Patient has history of lung cancer, and on chemotherapy outpatient, and last chemotherapy was yesterday.  Patient reported she was having upper respiratory for several days, was coughing up yellow phlegm and had yellow drainage from nose.  Symptoms started on Yoni Shonna.  And she was tested for COVID yesterday at oncology clinic which came back positive for COVID-19, and she was started on Paxlovid yesterday, took 1 dose yesterday evening and 1 dose this morning.  However symptoms continue to worsen and decided to come to the ED.  O2 saturation was on room air in ED.  Not on oxygen at home baseline.  Patient denies chest pain or palpitations.        Review of Systems   .GENERAL: No fevers, chills, sweats.    RESPIRATORY: +cough, +shortness of breath, hemoptysis or wheezing.    CVS: No chest pain, palpitations, orthopnea, dyspnea on exertion   GI: No melena or hematochezia. No abdominal pain. No nausea, vomiting, constipation, diarrhea    Personal History     Past Medical History:   Diagnosis Date    Allergic rhinitis     Aneurysm     Asthma     Cancer of floor of mouth 2014    Cerebral aneurysm     COPD (chronic obstructive pulmonary disease)     COVID 2020    Esophageal reflux     History of adenocarcinoma of lung     History of anemia     History of carcinoma in situ of skin     History of lung cancer     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 from [facility]    History of squamous cell carcinoma     Tongue    Hyperlipidemia     Hypertension     since early 60s    Intractable back pain 11/29/2022    Low vitamin B12 level     Lung cancer     Systolic CHF, acute 11/14/2023    Thyromegaly     UTI (urinary tract infection)     Vitamin D deficiency      Past Surgical History:   Procedure Laterality Date    BRONCHOSCOPY Bilateral 10/17/2022    Procedure: BRONCHOSCOPY WITH FLUORO with biopsy and BAL;  Surgeon: India Flores MD;  Location: [facility] ENDOSCOPY;  Service: Pulmonary;  Laterality: Bilateral;  PRE/POST - lung mass    CHOLECYSTECTOMY  1999    COLONOSCOPY      EMBOLIZATION CEREBRAL Left 06/29/2022    Procedure: 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;  Surgeon: Bradley Dowlel MD;  Location: [facility] HYBRID OR 18/19;  Service: Interventional Radiology;  Laterality: Left;    EYE SURGERY  11/24/2020    Took a muscle out of right eye    GLOSSECTOMY PARTIAL      less than one half tongue    LUNG SURGERY  2012    ORAL LESION EXCISION/BIOPSY      June and August 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    TUBAL ABDOMINAL LIGATION  1970    VENOUS ACCESS DEVICE (PORT) INSERTION N/A 12/12/2022    Procedure: MEDIPORT PLACEMENT;  Surgeon: Jason Villaseñor MD;  Location: [facility] MAIN OR;  Service: Vascular;  Laterality: N/A;     Family  History   Problem Relation Age of Onset    Ovarian cancer Mother          at age 27    No Known Problems Father     Ovarian cancer Sister     Colon cancer Brother     No Known Problems Other     Malig Hyperthermia Neg Hx      Social History     Tobacco Use    Smoking status: Former     Types: Cigarettes     Quit date: 2015     Years since quittin.9     Passive exposure: Never    Smokeless tobacco: Never    Tobacco comments:     less than a pack per day, no smoking since , Quit 2015   Vaping Use    Vaping Use: Never used   Substance Use Topics    Alcohol use: Not Currently     Comment: Socially -A few times a month    Drug use: No     No current facility-administered medications on file prior to encounter.     Current Outpatient Medications on File Prior to Encounter   Medication Sig Dispense Refill    apixaban (ELIQUIS) 2.5 MG tablet tablet Take 1 tablet by mouth 2 (Two) Times a Day. 60 tablet 3    atorvastatin (LIPITOR) 20 MG tablet TAKE 1 TABLET EVERY NIGHT (Patient taking differently: Take 1 tablet by mouth Every Night.) 90 tablet 1    cetirizine (zyrTEC) 10 MG tablet Take 1 tablet by mouth Daily.      Cholecalciferol (Vitamin D3) 50 MCG (2000 UT) tablet Take 1,000 Units by mouth Daily. HOLDING FOR DOS      Cyanocobalamin (VITAMIN B12 PO) Take 1,000 mcg by mouth Daily.      dexAMETHasone (DECADRON) 4 MG tablet Take 1 tablet oral twice a day for 3 consecutive days beginning the day before chemotherapy and continue for 6 doses.Take with food. 6 tablet 5    folic acid (FOLVITE) 1 MG tablet Take 1 tablet by mouth Daily. Start at least 7 days prior to chemotherapy until at least 3 weeks after all chemotherapy. 30 tablet 6    furosemide (LASIX) 40 MG tablet Take 1 tablet by mouth Daily. 90 tablet 1    HYDROcodone Bit-Homatrop MBr (HYCODAN) 5-1.5 MG/5ML solution Take 5 mL by mouth Every 8 (Eight) Hours As Needed for Cough. 120 mL 0    ipratropium (ATROVENT) 0.06 % nasal spray 2 sprays into the  nostril(s) as directed by provider 4 (Four) Times a Day. 15 mL 0    metoprolol succinate XL (TOPROL-XL) 25 MG 24 hr tablet Take 0.5 tablets by mouth Daily. (Patient taking differently: Take 1 tablet by mouth Daily.) 45 tablet 3    mirtazapine (REMERON) 7.5 MG tablet TAKE 1 TABLET BY MOUTH EVERY NIGHT AT BEDTIME 30 tablet 1    montelukast (SINGULAIR) 10 MG tablet Take 1 tablet by mouth Every Night.      Nirmatrelvir & Ritonavir, 300mg/100mg, (PAXLOVID) 20 x 150 MG & 10 x 100MG tablet therapy pack tablet Take 3 tablets by mouth 2 (Two) Times a Day. 30 each 0    ondansetron (ZOFRAN) 8 MG tablet Take 1 tablet by mouth 3 (Three) Times a Day As Needed for Nausea or Vomiting. 30 tablet 5    potassium chloride (K-DUR,KLOR-CON) 10 MEQ CR tablet TAKE 2 TABLETS BY MOUTH DAILY 60 tablet 1    predniSONE (DELTASONE) 10 MG tablet Take 1 tablet by mouth Daily.      promethazine-dextromethorphan (PROMETHAZINE-DM) 6.25-15 MG/5ML syrup Take 5 mL by mouth 4 (Four) Times a Day As Needed for Cough. 180 mL 0    sacubitril-valsartan (Entresto) 24-26 MG tablet Take 0.5 tablets by mouth 2 (Two) Times a Day. 30 tablet 3    zolpidem (AMBIEN) 5 MG tablet TAKE 1 TABLET BY MOUTH EVERY NIGHT AT BEDTIME AS NEEDED FOR SLEEP 30 tablet 0     Allergies   Allergen Reactions    Sulfa Antibiotics Rash       Objective    Objective     Vital Signs  Temp:  [100.5 °F (38.1 °C)] 100.5 °F (38.1 °C)  Heart Rate:  [134] 134  Resp:  [24] 24  BP: (147)/(78) 147/78  SpO2:  [72 %-93 %] 93 %  on  Flow (L/min):  [4.5] 4.5;   Device (Oxygen Therapy): nasal cannula  There is no height or weight on file to calculate BMI.    Physical Exam  General: Alert and oriented x3, no acute distress, elderly ,appearing  HEENT: Normocephalic, atraumatic  Eyes: PERRL, EOMI, anicteric sclerae  Lungs: Coarse bilateral breath sounds, no wheezing, on 4 L nasal cannula, nonlabored breathing  CV: Regular rhythm, tachycardic  Abdomen: Soft, nontender, nondistended.  Normoactive bowel  sounds  Extremities: No significant peripheral edema  Skin: Clean/dry/intact, no rashes  Neuro: Cranial nerves II through XII intact, no gross focal neurological deficits appreciated  Psych: Appropriate mood and affect    Results Review:  I reviewed the patient's new clinical results.  I reviewed the patient's new imaging results and agree with the interpretation.  I reviewed the patient's other test results and agree with the interpretation  I personally viewed and interpreted the patient's EKG/Telemetry data  Discussed with ED provider.    Lab Results (last 24 hours)       Procedure Component Value Units Date/Time    CBC & Differential [[id number]]  (Abnormal) Collected: 12/28/23 1131    Specimen: Blood Updated: 12/28/23 1142    Narrative:      The following orders were created for panel order CBC & Differential.  Procedure                               Abnormality         Status                     ---------                               -----------         ------                     CBC Auto Differential[[id number]]        Abnormal            Final result                 Please view results for these tests on the individual orders.    Comprehensive Metabolic Panel [[id number]]  (Abnormal) Collected: 12/28/23 1131    Specimen: Blood Updated: 12/28/23 1205     Glucose 152 mg/dL      BUN 18 mg/dL      Creatinine 0.74 mg/dL      Sodium 141 mmol/L      Potassium 4.1 mmol/L      Chloride 106 mmol/L      CO2 25.0 mmol/L      Calcium 9.0 mg/dL      Total Protein 6.0 g/dL      Albumin 3.7 g/dL      ALT (SGPT) 13 U/L      AST (SGOT) 29 U/L      Alkaline Phosphatase 75 U/L      Total Bilirubin 0.6 mg/dL      Globulin 2.3 gm/dL      A/G Ratio 1.6 g/dL      BUN/Creatinine Ratio 24.3     Anion Gap 10.0 mmol/L      eGFR 81.4 mL/min/1.73     Narrative:      GFR Normal >60  Chronic Kidney Disease <60  Kidney Failure <15    The GFR formula is only valid for adults with stable renal function between ages 18 and 70.    BNP [[id number]]   (Abnormal) Collected: 12/28/23 1131    Specimen: Blood Updated: 12/28/23 1207     proBNP 13,478.0 pg/mL     Narrative:      This assay is used as an aid in the diagnosis of individuals suspected of having heart failure. It can be used as an aid in the diagnosis of acute decompensated heart failure (ADHF) in patients presenting with signs and symptoms of ADHF to the emergency department (ED). In addition, NT-proBNP of <300 pg/mL indicates ADHF is not likely.    Age Range Result Interpretation  NT-proBNP Concentration (pg/mL:      <50             Positive            >450                   Gray                 300-450                    Negative             <300    50-75           Positive            >900                  Gray                300-900                  Negative            <300      >75             Positive            >1800                  Gray                300-1800                  Negative            <300    High Sensitivity Troponin T [[id number]]  (Abnormal) Collected: 12/28/23 1131    Specimen: Blood Updated: 12/28/23 1207     HS Troponin T 50 ng/L     Narrative:      High Sensitive Troponin T Reference Range:  <14.0 ng/L- Negative Female for AMI  <22.0 ng/L- Negative Male for AMI  >=14 - Abnormal Female indicating possible myocardial injury.  >=22 - Abnormal Male indicating possible myocardial injury.   Clinicians would have to utilize clinical acumen, EKG, Troponin, and serial changes to determine if it is an Acute Myocardial Infarction or myocardial injury due to an underlying chronic condition.         Blood Culture - Blood, Arm, Left [[id number]] Collected: 12/28/23 1131    Specimen: Blood from Arm, Left Updated: 12/28/23 1136    Lactic Acid, Plasma [[id number]]  (Normal) Collected: 12/28/23 1131    Specimen: Blood Updated: 12/28/23 1202     Lactate 1.2 mmol/L     Procalcitonin [[id number]]  (Abnormal) Collected: 12/28/23 1131    Specimen: Blood Updated: 12/28/23 1207     Procalcitonin 0.33 ng/mL  "    Narrative:      As a Marker for Sepsis (Non-Neonates):    1. <0.5 ng/mL represents a low risk of severe sepsis and/or septic shock.  2. >2 ng/mL represents a high risk of severe sepsis and/or septic shock.    As a Marker for Lower Respiratory Tract Infections that require antibiotic therapy:    PCT on Admission    Antibiotic Therapy       6-12 Hrs later    >0.5                Strongly Recommended  >0.25 - <0.5        Recommended   0.1 - 0.25          Discouraged              Remeasure/reassess PCT  <0.1                Strongly Discouraged     Remeasure/reassess PCT    As 28 day mortality risk marker: \"Change in Procalcitonin Result\" (>80% or <=80%) if Day 0 (or Day 1) and Day 4 values are available. Refer to http://[email or web address]-pct-calculator.com    Change in PCT <=80%  A decrease of PCT levels below or equal to 80% defines a positive change in PCT test result representing a higher risk for 28-day all-cause mortality of patients diagnosed with severe sepsis for septic shock.    Change in PCT >80%  A decrease of PCT levels of more than 80% defines a negative change in PCT result representing a lower risk for 28-day all-cause mortality of patients diagnosed with severe sepsis or septic shock.       CBC Auto Differential [[id number]]  (Abnormal) Collected: 12/28/23 1131    Specimen: Blood Updated: 12/28/23 1142     WBC 9.65 10*3/mm3      RBC 3.71 10*6/mm3      Hemoglobin 11.2 g/dL      Hematocrit 34.3 %      MCV 92.5 fL      MCH 30.2 pg      MCHC 32.7 g/dL      RDW 14.1 %      RDW-SD 47.4 fl      MPV 9.7 fL      Platelets 306 10*3/mm3      Neutrophil % 91.9 %      Lymphocyte % 4.2 %      Monocyte % 3.5 %      Eosinophil % 0.0 %      Basophil % 0.2 %      Immature Grans % 0.2 %      Neutrophils, Absolute 8.86 10*3/mm3      Lymphocytes, Absolute 0.41 10*3/mm3      Monocytes, Absolute 0.34 10*3/mm3      Eosinophils, Absolute 0.00 10*3/mm3      Basophils, Absolute 0.02 10*3/mm3      Immature Grans, Absolute 0.02 10*3/mm3  "     nRBC 0.0 /100 WBC     COVID-19, FLU A/B, RSV PCR 1 HR TAT - Swab, Nasopharynx [[id number]]  (Abnormal) Collected: 12/28/23 1213    Specimen: Swab from Nasopharynx Updated: 12/28/23 1501     COVID19 Detected     Influenza A PCR Not Detected     Influenza B PCR Not Detected     RSV, PCR Not Detected    Narrative:      Fact sheet for providers: https://www.fda.gov/media/957340/download    Fact sheet for patients: https://www.fda.gov/media/182436/download    Test performed by PCR.    Blood Culture - Blood, Arm, Left [[id number]] Collected: 12/28/23 1318    Specimen: Blood from Arm, Left Updated: 12/28/23 1325    High Sensitivity Troponin T 2Hr [[id number]]  (Abnormal) Collected: 12/28/23 1349    Specimen: Blood Updated: 12/28/23 1419     HS Troponin T 53 ng/L      Troponin T Delta 3 ng/L     Narrative:      High Sensitive Troponin T Reference Range:  <14.0 ng/L- Negative Female for AMI  <22.0 ng/L- Negative Male for AMI  >=14 - Abnormal Female indicating possible myocardial injury.  >=22 - Abnormal Male indicating possible myocardial injury.   Clinicians would have to utilize clinical acumen, EKG, Troponin, and serial changes to determine if it is an Acute Myocardial Infarction or myocardial injury due to an underlying chronic condition.                 Imaging Results (Last 24 Hours)       Procedure Component Value Units Date/Time    XR Chest 1 View [[id number]] Collected: 12/28/23 1112     Updated: 12/28/23 1117    Narrative:      Portable chest x-ray     HISTORY: Shortness of breath, COPD and lung cancer.     TECHNIQUE: Portable chest x-ray correlated with chest x-ray October 16, 2023.     FINDINGS: Right Mediport catheter as before. Elevated left hemidiaphragm  with abnormal opacity in the left suprahilar lung. Chronic pleural  thickening at the left apex.     Abnormal alveolar and interstitial opacity in the right upper lobe and  in the right lung base was not present previously and is suspicious for  pneumonia.  No appreciable change in the appearance of the left lung.       Impression:      Chronic changes in the left hemithorax as described. New  abnormal alveolar and interstitial opacities in the right lung which are  suspected to represent pneumonia.     This report was finalized on 12/28/2023 11:14 AM by Dr. Selvin Srinivasan M.D on Workstation: [id number]               Results for orders placed during the hospital encounter of 11/14/23    Adult Transthoracic Echo Complete W/ Cont if Necessary Per Protocol    Interpretation Summary    Left ventricular systolic function is moderately decreased. Calculated left ventricular EF = 39.2% Abnormal global longitudinal LV strain (GLS) = -11.1%. Left ventricle strain data was reviewed by the physician and found to be accurate. The left ventricular cavity is moderately dilated. Left ventricular wall thickness is consistent with mild concentric hypertrophy. There is left ventricular global hypokinesis noted. Left ventricular diastolic function is consistent with (grade Ia w/high LAP) impaired relaxation.    The aortic valve is abnormal in structure. There is moderate calcification of the aortic valve mainly affecting the non-coronary cusp(s). The aortic valve appears trileaflet.    Trace mitral valve regurgitation is present.    Trace tricuspid valve regurgitation is present. Estimated right ventricular systolic pressure from tricuspid regurgitation is normal (<35 mmHg). Calculated right ventricular systolic pressure from tricuspid regurgitation is 29 mmHg.    Compared to prior study of 8/20/2023 LV systolic function, diastolic function and global longitudinal LV strain are all worse and abnormal.      ECG 12 Lead Dyspnea   Final Result   HEART RATE= 96  bpm   RR Interval= 625  ms   MS Interval= 141  ms   P Horizontal Axis= -35  deg   P Front Axis= 36  deg   QRSD Interval= 91  ms   QT Interval= 346  ms   QTcB= 438  ms   QRS Axis= 26  deg   T Wave Axis= 112  deg   - ABNORMAL ECG -    Sinus rhythm   Atrial premature complex   Probable LVH with secondary repol abnrm   PVCs have resolved   Electronically Signed By: Wendie Lugo ([facility]) 28-Dec-2023 14:58:24   Date and Time of Study: 2023-12-28 11:49:47           Assessment/Plan     Active Hospital Problems    Diagnosis  POA    **Acute hypoxemic respiratory failure [J96.01]  Yes    Chronic systolic CHF (congestive heart failure) [I50.22]  Unknown    Paroxysmal atrial fibrillation [I48.0]  Unknown    COVID-19 virus infection [U07.1]  Unknown    Lung cancer [C34.90]  Yes    Essential hypertension [I10]  Yes    Hyperlipidemia [E78.5]  Yes      Resolved Hospital Problems   No resolved problems to display.     Patient is a 83-year-old female with a history of COPD, hypertension, hyperlipidemia, intracranial aneurysm left PCA.  Lung cancer on chemotherapy, coronary artery calcification, paroxysmal atrial fibrillation, presented to the ED complaining of acute on chronic shortness of breath that started yesterday.       Acute hypoxic and respiratory failure secondary to COVID-19 and possible superimposed bacterial pneumonia  -Oxygen saturation as low as 73% on room air on arrival.  -X-ray showed new abnormal alveolar and interstitial opacities in the right lung which are suspected to represent pneumonia.  -WBC was normal.  Procalcitonin mildly elevated 0.33.  Tmax was 100.5 on admission.  -Patient was initiated on Paxlovid outpatient.  Will hold Paxlovid in the setting of apixaban due to increased risk of bleeding.  Initiated on IV remdesivir for 5 days.  -Dexamethasone 6 mg daily for 10 days  -status post IV cefepime in the ED, will continue IV cefepime in the setting of possible pneumonia especially considering immunosuppression, last dose of chemotherapy was yesterday.  Ordered MRSA nares, if positive will initiate on vancomycin  -Sputum culture, strep and Legionella antigen  -On Eliquis for anticoagulation  -Monitor inflammatory markers  -Wean off O2  as able      Paroxysmal atrial fibrillation: Heart rate tachycardic however in sinus rhythm.  EKG reviewed.  Resume home metoprolol XL 25 mg mg daily , apixaban 5 mg twice daily.    Chronic systolic heart failure: Last echocardiogram 11/14/2023 showed EF 39.2%, grade 1 diastolic dysfunction.  Resume home metoprolol, Entresto.  BP is currently stable with systolic in 140s, monitor for hypotension and hold as indicated.    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 Escobar.  on chemotherapy, last chemotherapy 12/27        I discussed the patient's findings and my recommendations with patient, family, and ED provider.    VTE Prophylaxis - Eliquis (home med).  Code Status - Full code.       Perla Wright MD  [facility]  12/28/23  16:23 EST

## 2023-12-29 LAB
ALBUMIN SERPL-MCNC: 3.2 G/DL (ref 3.5–5.2)
ALBUMIN/GLOB SERPL: 1.2 G/DL
ALP SERPL-CCNC: 64 U/L (ref 39–117)
ALT SERPL W P-5'-P-CCNC: 11 U/L (ref 1–33)
ANION GAP SERPL CALCULATED.3IONS-SCNC: 13 MMOL/L (ref 5–15)
AST SERPL-CCNC: 26 U/L (ref 1–32)
BILIRUB SERPL-MCNC: 0.3 MG/DL (ref 0–1.2)
BUN SERPL-MCNC: 30 MG/DL (ref 8–23)
BUN/CREAT SERPL: 30.9 (ref 7–25)
CALCIUM SPEC-SCNC: 8.7 MG/DL (ref 8.6–10.5)
CHLORIDE SERPL-SCNC: 106 MMOL/L (ref 98–107)
CO2 SERPL-SCNC: 20 MMOL/L (ref 22–29)
CREAT SERPL-MCNC: 0.97 MG/DL (ref 0.57–1)
CRP SERPL-MCNC: 8.47 MG/DL (ref 0–0.5)
DEPRECATED RDW RBC AUTO: 43 FL (ref 37–54)
EGFRCR SERPLBLD CKD-EPI 2021: 58.8 ML/MIN/1.73
ERYTHROCYTE [DISTWIDTH] IN BLOOD BY AUTOMATED COUNT: 13.8 % (ref 12.3–15.4)
FERRITIN SERPL-MCNC: 318 NG/ML (ref 13–150)
GLOBULIN UR ELPH-MCNC: 2.7 GM/DL
GLUCOSE SERPL-MCNC: 142 MG/DL (ref 65–99)
HCT VFR BLD AUTO: 37.5 % (ref 34–46.6)
HGB BLD-MCNC: 12 G/DL (ref 12–15.9)
L PNEUMO1 AG UR QL IA: NEGATIVE
MAGNESIUM SERPL-MCNC: 1.8 MG/DL (ref 1.6–2.4)
MCH RBC QN AUTO: 28.3 PG (ref 26.6–33)
MCHC RBC AUTO-ENTMCNC: 32 G/DL (ref 31.5–35.7)
MCV RBC AUTO: 88.4 FL (ref 79–97)
PHOSPHATE SERPL-MCNC: 5.7 MG/DL (ref 2.5–4.5)
PLATELET # BLD AUTO: 320 10*3/MM3 (ref 140–450)
PMV BLD AUTO: 9.5 FL (ref 6–12)
POTASSIUM SERPL-SCNC: 4 MMOL/L (ref 3.5–5.2)
PROT SERPL-MCNC: 5.9 G/DL (ref 6–8.5)
RBC # BLD AUTO: 4.24 10*6/MM3 (ref 3.77–5.28)
S PNEUM AG SPEC QL LA: NEGATIVE
SODIUM SERPL-SCNC: 139 MMOL/L (ref 136–145)
WBC NRBC COR # BLD AUTO: 5.88 10*3/MM3 (ref 3.4–10.8)

## 2023-12-29 PROCEDURE — 25010000002 HEPARIN LOCK FLUSH PER 10 UNITS: Performed by: STUDENT IN AN ORGANIZED HEALTH CARE EDUCATION/TRAINING PROGRAM

## 2023-12-29 PROCEDURE — 85027 COMPLETE CBC AUTOMATED: CPT | Performed by: STUDENT IN AN ORGANIZED HEALTH CARE EDUCATION/TRAINING PROGRAM

## 2023-12-29 PROCEDURE — 36415 COLL VENOUS BLD VENIPUNCTURE: CPT | Performed by: STUDENT IN AN ORGANIZED HEALTH CARE EDUCATION/TRAINING PROGRAM

## 2023-12-29 PROCEDURE — 83735 ASSAY OF MAGNESIUM: CPT | Performed by: STUDENT IN AN ORGANIZED HEALTH CARE EDUCATION/TRAINING PROGRAM

## 2023-12-29 PROCEDURE — 82728 ASSAY OF FERRITIN: CPT | Performed by: STUDENT IN AN ORGANIZED HEALTH CARE EDUCATION/TRAINING PROGRAM

## 2023-12-29 PROCEDURE — 86140 C-REACTIVE PROTEIN: CPT | Performed by: STUDENT IN AN ORGANIZED HEALTH CARE EDUCATION/TRAINING PROGRAM

## 2023-12-29 PROCEDURE — 99222 1ST HOSP IP/OBS MODERATE 55: CPT | Performed by: INTERNAL MEDICINE

## 2023-12-29 PROCEDURE — 84100 ASSAY OF PHOSPHORUS: CPT | Performed by: STUDENT IN AN ORGANIZED HEALTH CARE EDUCATION/TRAINING PROGRAM

## 2023-12-29 PROCEDURE — 25010000002 CEFEPIME PER 500 MG: Performed by: STUDENT IN AN ORGANIZED HEALTH CARE EDUCATION/TRAINING PROGRAM

## 2023-12-29 PROCEDURE — 80053 COMPREHEN METABOLIC PANEL: CPT | Performed by: STUDENT IN AN ORGANIZED HEALTH CARE EDUCATION/TRAINING PROGRAM

## 2023-12-29 PROCEDURE — 87899 AGENT NOS ASSAY W/OPTIC: CPT | Performed by: STUDENT IN AN ORGANIZED HEALTH CARE EDUCATION/TRAINING PROGRAM

## 2023-12-29 PROCEDURE — 25810000003 SODIUM CHLORIDE 0.9 % SOLUTION 250 ML FLEX CONT: Performed by: STUDENT IN AN ORGANIZED HEALTH CARE EDUCATION/TRAINING PROGRAM

## 2023-12-29 PROCEDURE — 25010000002 REMDESIVIR 100 MG/20ML SOLUTION 1 EACH VIAL: Performed by: STUDENT IN AN ORGANIZED HEALTH CARE EDUCATION/TRAINING PROGRAM

## 2023-12-29 PROCEDURE — 63710000001 DEXAMETHASONE PER 0.25 MG: Performed by: STUDENT IN AN ORGANIZED HEALTH CARE EDUCATION/TRAINING PROGRAM

## 2023-12-29 PROCEDURE — 87449 NOS EACH ORGANISM AG IA: CPT | Performed by: STUDENT IN AN ORGANIZED HEALTH CARE EDUCATION/TRAINING PROGRAM

## 2023-12-29 PROCEDURE — 99231 SBSQ HOSP IP/OBS SF/LOW 25: CPT | Performed by: INTERNAL MEDICINE

## 2023-12-29 RX ORDER — HEPARIN SODIUM (PORCINE) LOCK FLUSH IV SOLN 100 UNIT/ML 100 UNIT/ML
500 SOLUTION INTRAVENOUS ONCE
Status: COMPLETED | OUTPATIENT
Start: 2023-12-29 | End: 2023-12-29

## 2023-12-29 RX ADMIN — METOPROLOL SUCCINATE 25 MG: 25 TABLET, EXTENDED RELEASE ORAL at 10:27

## 2023-12-29 RX ADMIN — Medication 10 ML: at 20:59

## 2023-12-29 RX ADMIN — FUROSEMIDE 40 MG: 20 TABLET ORAL at 10:23

## 2023-12-29 RX ADMIN — SACUBITRIL AND VALSARTAN 0.5 TABLET: 24; 26 TABLET, FILM COATED ORAL at 18:37

## 2023-12-29 RX ADMIN — REMDESIVIR 100 MG: 100 INJECTION, POWDER, LYOPHILIZED, FOR SOLUTION INTRAVENOUS at 17:03

## 2023-12-29 RX ADMIN — CEFEPIME 2000 MG: 2 INJECTION, POWDER, FOR SOLUTION INTRAVENOUS at 10:23

## 2023-12-29 RX ADMIN — APIXABAN 2.5 MG: 2.5 TABLET, FILM COATED ORAL at 18:37

## 2023-12-29 RX ADMIN — CEFEPIME 2000 MG: 2 INJECTION, POWDER, FOR SOLUTION INTRAVENOUS at 20:58

## 2023-12-29 RX ADMIN — APIXABAN 2.5 MG: 2.5 TABLET, FILM COATED ORAL at 06:29

## 2023-12-29 RX ADMIN — MIRTAZAPINE 7.5 MG: 15 TABLET, FILM COATED ORAL at 20:56

## 2023-12-29 RX ADMIN — SENNOSIDES AND DOCUSATE SODIUM 2 TABLET: 50; 8.6 TABLET ORAL at 20:56

## 2023-12-29 RX ADMIN — DEXAMETHASONE 6 MG: 4 TABLET ORAL at 10:23

## 2023-12-29 RX ADMIN — PANTOPRAZOLE SODIUM 40 MG: 40 TABLET, DELAYED RELEASE ORAL at 06:29

## 2023-12-29 RX ADMIN — SENNOSIDES AND DOCUSATE SODIUM 2 TABLET: 50; 8.6 TABLET ORAL at 10:23

## 2023-12-29 RX ADMIN — MONTELUKAST SODIUM 10 MG: 10 TABLET, FILM COATED ORAL at 20:56

## 2023-12-29 RX ADMIN — FOLIC ACID 1 MG: 1 TABLET ORAL at 10:23

## 2023-12-29 RX ADMIN — HEPARIN 500 UNITS: 100 SYRINGE at 12:53

## 2023-12-29 RX ADMIN — Medication 10 ML: at 10:25

## 2023-12-29 RX ADMIN — ZOLPIDEM TARTRATE 5 MG: 5 TABLET ORAL at 21:05

## 2023-12-29 RX ADMIN — HYDROCODONE BITARTRATE AND HOMATROPINE METHYLBROMIDE 5 ML: 5; 1.5 SOLUTION ORAL at 23:08

## 2023-12-29 NOTE — CASE MANAGEMENT/SOCIAL WORK
Continued Stay Note  [facility]     Patient Name: Darlene Pfeiffer  MRN: [id number]  Today's Date: 12/29/2023    Admit Date: 12/28/2023    Plan: Home with spouse. Denies any needs. Spouse will transport. Need to follow for possible home O2 at D/C.   Discharge Plan       Row Name 12/29/23 1644       Plan    Plan Home with spouse. Denies any needs. Spouse will transport. Need to follow for possible home O2 at D/C.    Patient/Family in Agreement with Plan yes    Plan Comments Pt in Covid isolation. Called into pt's room and spoke with pt. Explained roll of . Face sheet and pharmacy verified. Pt lives with spouse in a single-story home with no steps.  There are no steps to enter home. Denies the use of any home DME.  Pt is independent with ADLs. Pt has never been to Rehab or used HH. Pt's PCP is Meredith Lea Kehrer MD. Uses [facility] on [facility] in [facility] Pt drives herself to appointments. At discharge, spouse will transport. Currently requiring O2. Need to follow for possible home O2 at D/C. Explained that CCP would follow to assess for discharge needs.  Aaron Payne RN-BC                   Discharge Codes    No documentation.                 Expected Discharge Date and Time       Expected Discharge Date Expected Discharge Time    Jan 1, 2024               Aaron Payne RN

## 2023-12-29 NOTE — PROGRESS NOTES
REASON FOR FOLLOW-UP: Recurrent lung cancer.    History of Present Illness    The patient is a 83 y.o. female with the above-mentioned history who returned 9/22/2023 continuing on Mekinist 0.5 mg daily and Tafinlar at 50 mg twice daily.  She also continues on prednisone only taking 10 mg daily.  She reports that she is feeling quite good.  She is tolerating things well.  She has a decent appetite denies issues with nausea, vomiting, diarrhea, or constipation.  She denies any issues with increased shortness of breath.    Patient did see ENT Dr. Topete, who has ordered an ultrasound of her neck, which will be done at [facility] on the 27th.  She is also scheduled for a cerebral angiogram by Dr. Calvin on 29 September.      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  Dr. Dowell of neurosurgery saw the patient 10/12/2023 and felt the patient was stable without neurologic symptoms, yearly follow-up planned.  The patient had started seeing a new ENT physician leading to the referral back to neurosurgery.  When seen 10/16/2023 she is doing very well on her current Mekinist and Tafinlar doses having improved her weight, appetite and general performance status.  We have discussed at least a chest x-ray today and repeat staging in the next 5 to 6 weeks as she continues her current dosing.    The patient required admission 11/14 - 11/16/2023 having presented with shortness of breath and found to be pulmonary edema with CBC, lactate and procalcitonin all normal.  There was a concern that her chemotherapy agents could have produced her cardiomyopathy and these were stopped 11/14/2023.  She was diuresed and echocardiogram demonstrated LV SF mildly decreased at 39.2% with GLS of -11.1%, left ventricular cavity mildly dilated, left  ventricular wall thickness consistent with mild concentric hypertrophy, left ventricular global hypokinesis, aortic valve abnormal with moderate calcification, valve was trileaflet, trace tricuspid valve regurgitation with right VSP at less than 35 mmHg.  The findings for LV systolic function, diastolic function and global longitudinal LV strain had all worsened.    CTA of chest 11/14/2023 demonstrating confluent soft tissue mass possibly measuring larger in current study at 6.5 x 5.0 previously 4.9 x 4.8, left supraclavicular node to decrease in size measuring 1.1 cm previously 1.4 cm, extensive bilateral interstitial and groundglass infiltrates.    The patient is seen 11/26/2023.  As per the discharge we are requested to have a BMP and CMP assessed.  She is seen with her son and daughter in a lengthy conversation held about her recent hospitalization with CHF-cardiomyopathy felt elicited, in part, from her targeted therapy with her Mekinist and Tafinlar discontinued altogether.    The patient is relatively stable currently having continued her diuretics and to be seen in cardiology in follow-up.  At the time of this dictation her CBC is found to be essentially normal with mild leukocytosis, CMP normal except for mildly elevated glucose at 125 and BNP reduced to 6126 from 9763.  She feels well with no additional shortness of breath chest pain lower extremity edema.  In reviewing the the possible treatment options she is next best treated with single agent chemotherapy moving to Alimta.    The patient proceeded to treatment teaching and seen back 12/4/2023.  She is ready to proceed with chemotherapy with fortunately a recent good performance status still in place.    Patient returns 12/18/2023 for toxicity check.  She started treatment with Alimta on 12/4/2023.  Patient states that she did have an episode following treatment where she felt extremely itchy however she took Benadryl which helped, and her symptoms  resolved.  She has ongoing issues with fatigue.  She is being followed closely by cardio oncology.  She also reports being short of breath but denies chest pain, or swelling.  She does struggle with her appetite.  Otherwise she feels like she has tolerated the change in treatment well.        Patient treated with Alimta 12/27/2023, subsequent finding of worsening shortness of breath, hypoxemia and presentation testing positive for COVID-19 as well as rhinovirus.  Patient require admission treated with remdesivir, dexamethasone and cefepime and when seen 12/29/23 days, fortunately, gradually improving.      Interval history:  12/29/2023  T98.8, pulse 94, respirations 20, BP 97/61  Patient fortunately feeling somewhat improved per shortness of breath, continue productive cough  H&H 12.0 and 37.5, white count of 5880, platelet count of 3 20,000, albumin 3.2, normal LFTs          ONCOLOGY HISTORY:      The patient is an 83-year-old female with the below medical history including chronic obstructive pulmonary disease who was seen in the [facility] multidisciplinary thoracic oncology clinic July 1, 2021.  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      She had undergone a CT of the chest at [facility] 6/16/2021 showing postsurgical changes in the left chest from right upper lobectomy, 8 mm irregular nodule medial segment of the right lower lobe and diffuse changes of emphysema with fibrotic changes in the periphery, no suspicious hilar or mediastinal nodes, no pleural effusion.  New finding was suspicious for new malignancy with the patient felt to be a poor candidate for surgical resection.  A PET CT and PFTs were requested thereafter.  The PET/CT demonstrated ill-defined FDG avid soft tissue thickening left  aspect mediastinum within the operative bed, 1 cm hypermetabolic pulmonary nodule right lower lobe and asymmetric thickening patchy uptake involving the right base of tongue.  There is subtle uptake within the L1 vertebral body which is indeterminate and a sub-6 mm pulmonary nodule of right lung and subcentimeter hypodense hepatic lesion which was below PET resolution.       The patient's case was discussed in thoracic conference 7/22/2021 with consideration of the patient undergoing L1 vertebral body MRI, confirmatory biopsy left mediastinal and assessment by ENT (Dr. Caballero) who had worked with the patient previously.  She, incidentally, indicates that radiation therapy was given apparently intraoperatively at her recent surgery?  She still has issues with difficulty chewing and swallowing post procedures and was to be followed up with Dr. Caballero in the near future as planned.                                                            She was seen in the thoracic clinic on the same day with plans to proceed with MRI of the lumbar spine, biopsy left mediastinal nodular area of potential disease and follow-up of her ENT assessment as well as undergo a guardant 360 assessment in our office.  She underwent the biopsy 8/13/2021 found to be consistent with invasive moderately differentiated adenocarcinoma with PD-L1 TPS score 40%.  MRI of the lumbar spine revealed no evidence of metastatic disease though the patient did have multilevel degenerative disease throughout the lumbar spine.  She had an office follow-up with Dr. 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  She underwent laryngoscopy 8/18/2021 with right base of tongue without evidence of lesions or masses in the region.     She was seen by Dr. Hernandez 8/19/2021 and, her findings, had been presented in lung conference.  Her findings were consistent  with 2 separate lesions including a nodule in the superior segment of the right lower lobe and a mass in the upper portion of the left chest with the left chest lesion biopsy positive for adenocarcinoma.  The consensus was that these were to separate-synchronous primaries.  Stereotactic radiation each lesions were felt to be the appropriate way to proceed and the patient was referred to radiation therapy.     The patient is seen in office 8/23/2021 agreeable to the radiation therapy as well as additional assessments including Caris molecular assessment of her biopsy.  Again her guardant 360 is currently pending and we would follow her after she completes radiation therapy with subsequent scans.    She was able to proceed with SBRT to the right lung at primary #1 with 5 treatments at 1000 cGy per fraction in the left lung primary similarly at 1000 cGy per fraction x5.  Her treatment ranged between 8/31-9/13/2021.  She is now scheduled for follow-up 11/23/2021.    The patient subsequent genomic testing is discussed with her as she is is seen back 9/23/2021.  Her guardant 360 did not determine any new abnormalities but her Caris-MI profile-does reveal sensitivity to immunotherapy as well as a BRAF mutation.  This could be extremely important as we follow the patient from this point.  Fortunately she is feeling extremely well and quite pleased about her treatments.    Patient had subsequent PET/CT 11/23/2021 demonstrates decrease in size and avidity of the previously noted intensely FDG avid nodules left lobectomy operative site and right lower lobe.  Surrounding groundglass and pulmonary opacification is consistent with postradiation changes, a few new subcentimeter pulmonary nodules are present in the right upper lobe and indeterminant, stable subcentimeter hepatic lesion is below PET resolution no other findings of FDG-avid disease are seen in neck abdomen or pelvis.  The patient seen in office 12/1/2021 with a  relatively good performance status stating she is not having any new symptoms and very pleased about her results on her PET/CT.  She realizes we'll have to follow this further but subsequent scans in 6 months.  She is also hoping to see an oral surgeon that previously helped her-Dr. Wu.    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  There is evolution of presumed postradiation changes in the right midlung with soft tissue mass within the left lumpectomy operative bed minimally decreased in size.  There is a sub-6 mm nodule in the right upper lobe not definitively seen, indeterminate and an indeterminate left renal lesion at 1.5 cm unchanged from 7/13/2021.  The patient is currently scheduled to see Dr. Dowell on 6/23/2022.  She is feeling well without any additional symptoms.    The patient required admission 10/14 - 10/18/2022 presenting with shortness of breath and cough that was nonproductive.  She had been on oral antibiotics and did not improved and was admitted for therapy for apparent pneumonia.  This eventually led to bronchoscopy after initial CT revealed postsurgical changes, new masslike 6.7 cm area of consolidation anterior left lung raising concern for potential malignancy and a follow-up PET/CT planned.  Bronchoscopy and biopsy left lower lung failed to show evidence of malignancy.  The patient's PET/CT, however, demonstrated an irregular pleural-based soft tissue density thickening in the left upper lobe that was intensely FDG avid new from 6/8/2022 thought to be a malignant recurrence with spread into the left lung parenchyma.  There is intensely pleural-based avidity within the left lower lobe thought to be metastatic disease with postradiation of the left apex as well.  There is a small focus of uptake in the right hilum  grossly unchanged in size and post radiation changes in the right lower lobe.  There is indeterminate density left renal that was grossly unchanged.  The patient is seen back in office 11/15/2022 indicating that she still has chest discomfort that is slowly worsening and that she has a chronic paroxysmal cough but has not improved.  We discussed that she very likely has recurrent disease and plan to proceed with a guardant 360 examination initially as we determine whether we should try to proceed with therapy particularly immunotherapy versus targeted therapy recognizing the above findings.    Patient's guardant 360 returned as again significant for BRAF V600E and the potential use of BRAF inhibitor/MET inhibitor dabrafenib and trametinib.  Additional information [id number] and use of alpelisib.    Unfortunately she required admission 11/28-12/1 with worsening back pain.  Fortunately she did not demonstrate evidence of metastatic disease but did have moderate degenerative changes which could cause radiculopathy.  Medications were altered to include subsequently MSR 15 mg p.o. every 12 hours, Norco as needed.    The patient now presents back 12/14/2022 to initiate therapy- initially with immunotherapy considering Caris study with PD-L1 TPS of 70% on 22 C3 and 28-8 assays.    Patient seen with her  and we discussed pain medications and adjustments thereafter and that she stopped taking MSIR having only a short supply and having to use Norco more frequently.  She is willing to initiate Keytruda today we have discussed that considering her genomics treatment versus her BRAF mutation is also an option.    C1 Keytruda 12/14/2022    Patient is seen back 1/4/2023 in follow-up due for cycle 2 Keytruda.  Patient states that since receiving her first cycle she has had decreased appetite and decreased energy.  She has not been able to bring herself to eat much and she has notably lost 7 pounds.  She has also been struggling  with constipation in relation to ongoing narcotics for pain control.  She has been taking senna S2 tabs twice a day.  We discussed adding daily MiraLAX.    Patient did just last week meet with dietitian, Zoila Clinton, to discuss ways to improve caloric intake.  She has not been able to implement any of these things yet though.    The patient is next seen 1/25/2023 with mild weight gain.  She is seen with family member both indicating that she has actually felt somewhat better in terms of performance status and general function.  We have discussed proceeding with a third cycle treatment and reevaluating by scan in 2 weeks.    The patient proceeded with treatment 1/25/2023 and underwent follow-up CT chest abdomen pelvis 2/8/2023 demonstrating small pericardial effusion that is decreased in size from 11/20/2022, ill-defined consolidation and pleural-based thickening in the left apex and upper lung has reduced slightly, additional index nodule soft tissue in the aspect the pericardium has not changed substantially, no evidence of metastatic disease in the abdomen pelvis.    The patient is seen with her daughter 2/15/2023 both indicating that she has definitely improved, stable weight, appetite and performance status.  She is also using her pain medication much less frequently and wonders whether she might begin to taper from her twice a day MS Contin.    Patient is seen back 3/8/2023 due for ongoing pembrolizumab.  She continues on low-dose prednisone 10 mg daily and has noted significant improvement in her energy and appetite with this.  She is reluctant to taper this any further we discussed that it is fine for her to stay on daily dosing.    She did try to taper her pain medication going down to just MS Contin 15 mg every 12 hours while holding her hydrocodone.  However over the last few days she has had some intermittent pain in the left upper back alleviated with hydrocodone 2-3 times a day.  She currently is  denying any pain.  Monitor.    She is next evaluated 3/29/2023 for ongoing Keytruda.  Evidently her pain medication was sent to the wrong pharmacy and will need to be represcribed today-long-acting MS Contin.  Otherwise she is doing reasonably well at present.    Patient seen 5/31/2023 with gradual development of left shoulder and left scapular pain.  Concurrent CT chest, abdomen and pelvis demonstrate interval increasing consolidation left upper lobe with extension anterior to the left upper ribs with sclerosis anterior left second rib.  This is suspicious for worsening malignancy.  Plans were made for subsequent PET/CT where the patient's pain medications were adjusted.PET/CT 6/5/2023 reveals progression of disease in left upper thorax, left supraclavicular adenopathy new metastasis left posterior fourth ribs, no evidence of metastatic disease in the abdomen or pelvis.    The patient is seen with her son and daughter 6/12/2023 and it is clear that she is not developing a Pancoast like syndrome with progression of her malignancy in the left upper thorax and associated symptoms.  We have discussed discontinuance of her immunotherapy and moving to targeted therapy with dabrafenib and trametinib as we also request radiation therapy repeat consultation and try to manage her pain more effectively.    Patient seen 7/5/2023 with plans to start palliative radiotherapy and to initiate concurrently dabrafenib and trametinib.    As a result of toxicity (nausea and vomiting) the patient was taken off of dabrafenib and trametinib when seen 7/21/2023, given additional IV hydration and further supportive care.  Plans were made for follow-up on recovery to determine as to whether to redose the medications.    Unfortunately she required admission 7/25-30/2023 with failure to thrive.  Subsequent assessments including blood cultures were negative and patient was treated briefly with IV antibiotic therapy, started PT and OT,  medications adjusted for hypotension and treated symptomatically for nausea and vomiting.  She was able to improve enough to be discharged home as she continued radiation therapy started 7/17/2023 and completed 7/31/2023 to the left chest wall.    She is next seen back 8/4/2023.  We have reviewed that she had been on dabrafenib and trametinib at 150 mg p.o. every 12 hours and 2 mg p.o. daily respectively and dosing could be changed considerably such as 50 mg twice daily and 0.5 mg daily.  Determination made to have her undergo repeat scans at 4 weeks and review her response to radiation therapy as we make application for lower dose targeted therapy, addition of Remeron p.o. nightly, tapering of her MS Contin to daily rather than twice daily, use of low-dose Ativan to undergo scans.    Subsequent scans 8/25/2023, fortunately, with stable left apical mass with mediastinal chest wall involvement unchanged 1.4 cm supraclavicular lymph node.  No new findings of metastatic disease.    The patient is seen back 9/8/2023.  Fortunately she is improved dramatically off therapy and is gratified that his studies have not worsened in any substantial way.  We have discussed both her scan reports and echocardiogram that does not show significant reduction of her ejection fraction.  As result of her current stable status we have asked her to restart Mekinist at 0.5 mg daily and Tafinlar at 50 mg twice daily to be advanced as tolerated.  Patient seen 10/16/2023 with follow-up chest x-ray planned and CT of chest abdomen pelvis at 5 weeks -approximately 3 months of therapy.    The patient required admission 11/14 - 11/16/2023 having presented with shortness of breath and found to be pulmonary edema with CBC, lactate and procalcitonin all normal.  There was a concern that her chemotherapy agents could have produced her cardiomyopathy and these were stopped 11/14/2023.  She was diuresed and echocardiogram demonstrated LV SF mildly  decreased at 39.2% with GLS of -11.1%, left ventricular cavity mildly dilated, left ventricular wall thickness consistent with mild concentric hypertrophy, left ventricular global hypokinesis, aortic valve abnormal with moderate calcification, valve was trileaflet, trace tricuspid valve regurgitation with right VSP at less than 35 mmHg.  The findings for LV systolic function, diastolic function and global longitudinal LV strain had all worsened.    CTA of chest 11/14/2023 demonstrating confluent soft tissue mass possibly measuring larger in current study at 6.5 x 5.0 previously 4.9 x 4.8, left supraclavicular node to decrease in size measuring 1.1 cm previously 1.4 cm, extensive bilateral interstitial and groundglass infiltrates.      The patient is seen 11/26/2023.  As per the discharge we are requested to have a BMP and CMP assessed.  She is seen with her son and daughter in a lengthy conversation held about her recent hospitalization with CHF-cardiomyopathy felt elicited, in part, from her targeted therapy with her Mekinist and Tafinlar discontinued altogether.    The patient is relatively stable currently having continued her diuretics and to be seen in cardiology in follow-up.  At the time of this dictation her CBC is found to be essentially normal with mild leukocytosis, CMP normal except for mildly elevated glucose at 125 and BNP reduced to 6126 from 9763.  She feels well with no additional shortness of breath chest pain lower extremity edema.  In reviewing the the possible treatment options she is next best treated with single agent chemotherapy moving to Alimta.    Patient seen 12/4/2023 with plans to initiate Alimta chemotherapy-cycle 1 day 1    Dr. Orr called concerning the patient's status-developing new onset atrial fibrillation with RVR.  Fortunately she is not in florid heart failure and plans are to try to increase her metoprolol, consider digoxin but at present not increase her diuretics.  She will  also need to start anticoagulation and will begin low-dose Eliquis at 2.5 mg twice daily.     Patient returns 12/18/2023 for toxicity check.  She started treatment with Alimta on 12/4/2023.  Patient states that she did have an episode following treatment where she felt extremely itchy however she took Benadryl which helped, and her symptoms resolved.  She has ongoing issues with fatigue.  She is being followed closely by cardio oncology.  She also reports being short of breath but denies chest pain, or swelling.  She does struggle with her appetite.  Otherwise she feels like she has tolerated the change in treatment well.     Patient treated with Alimta 12/27/2023, subsequent finding of worsening shortness of breath, hypoxemia and presentation testing positive for COVID-19 as well as rhinovirus.  Patient require admission treated with remdesivir, dexamethasone and cefepime and when seen 12/29/23 days, fortunately, gradually improving.    Patient admitted 12/28/2023 with increasing shortness of breath, findings of elevated proBNP to 13,004 and 78, at bedtime troponin of 50, hemoglobin 11.2.  Additional studies consistent with volume overload, consideration of pneumonia needs of COVID-19 pneumonia.  Patient placed on IV remdesivir for 5 days, dexamethasone for 10 days, IV cefepime for 5 days.      Past Medical History:   Diagnosis Date    Allergic rhinitis     Aneurysm     Asthma     Cancer of floor of mouth 2014    Cerebral aneurysm     COPD (chronic obstructive pulmonary disease)     COVID 2020    Esophageal reflux     History of adenocarcinoma of lung     History of anemia     History of carcinoma in situ of skin     History of lung cancer     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 from [facility]    History of squamous cell carcinoma     Tongue    Hyperlipidemia     Hypertension     since early 60s    Intractable back pain 11/29/2022    Low vitamin B12 level     Lung cancer      Systolic CHF, acute 11/14/2023    Thyromegaly     UTI (urinary tract infection)     Vitamin D deficiency         Past Surgical History:   Procedure Laterality Date    BRONCHOSCOPY Bilateral 10/17/2022    Procedure: BRONCHOSCOPY WITH FLUORO with biopsy and BAL;  Surgeon: India Flores MD;  Location: [facility] ENDOSCOPY;  Service: Pulmonary;  Laterality: Bilateral;  PRE/POST - lung mass    CHOLECYSTECTOMY  1999    COLONOSCOPY      EMBOLIZATION CEREBRAL Left 06/29/2022    Procedure: 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;  Surgeon: Bradley Dowell MD;  Location: [facility] HYBRID OR 18/19;  Service: Interventional Radiology;  Laterality: Left;    EYE SURGERY  11/24/2020    Took a muscle out of right eye    GLOSSECTOMY PARTIAL      less than one half tongue    LUNG SURGERY  2012    ORAL LESION EXCISION/BIOPSY      June and August 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    TUBAL ABDOMINAL LIGATION  1970    VENOUS ACCESS DEVICE (PORT) INSERTION N/A 12/12/2022    Procedure: MEDIPORT PLACEMENT;  Surgeon: Jason Villaseñor MD;  Location: [facility] MAIN OR;  Service: Vascular;  Laterality: N/A;        No current facility-administered medications on file prior to encounter.     Current Outpatient Medications on File Prior to Encounter   Medication Sig Dispense Refill    apixaban (ELIQUIS) 2.5 MG tablet tablet Take 1 tablet by mouth 2 (Two) Times a Day. 60 tablet 3    atorvastatin (LIPITOR) 20 MG tablet TAKE 1 TABLET EVERY NIGHT (Patient taking differently: Take 1 tablet by mouth Every Night.) 90 tablet 1    cetirizine (zyrTEC) 10 MG tablet Take 1 tablet by mouth Daily.      Cholecalciferol (Vitamin D3) 50 MCG (2000 UT) tablet Take 1,000 Units by mouth Daily. HOLDING FOR DOS      Cyanocobalamin (VITAMIN B12 PO) Take 1,000 mcg by mouth Daily.      dexAMETHasone (DECADRON) 4 MG tablet Take 1 tablet oral twice a day for 3 consecutive days beginning the day before chemotherapy and continue for 6 doses.Take with food.  6 tablet 5    folic acid (FOLVITE) 1 MG tablet Take 1 tablet by mouth Daily. Start at least 7 days prior to chemotherapy until at least 3 weeks after all chemotherapy. 30 tablet 6    furosemide (LASIX) 40 MG tablet Take 1 tablet by mouth Daily. 90 tablet 1    HYDROcodone Bit-Homatrop MBr (HYCODAN) 5-1.5 MG/5ML solution Take 5 mL by mouth Every 8 (Eight) Hours As Needed for Cough. 120 mL 0    ipratropium (ATROVENT) 0.06 % nasal spray 2 sprays into the nostril(s) as directed by provider 4 (Four) Times a Day. 15 mL 0    metoprolol succinate XL (TOPROL-XL) 25 MG 24 hr tablet Take 0.5 tablets by mouth Daily. (Patient taking differently: Take 1 tablet by mouth Daily.) 45 tablet 3    mirtazapine (REMERON) 7.5 MG tablet TAKE 1 TABLET BY MOUTH EVERY NIGHT AT BEDTIME 30 tablet 1    montelukast (SINGULAIR) 10 MG tablet Take 1 tablet by mouth Every Night.      Nirmatrelvir & Ritonavir, 300mg/100mg, (PAXLOVID) 20 x 150 MG & 10 x 100MG tablet therapy pack tablet Take 3 tablets by mouth 2 (Two) Times a Day. 30 each 0    ondansetron (ZOFRAN) 8 MG tablet Take 1 tablet by mouth 3 (Three) Times a Day As Needed for Nausea or Vomiting. 30 tablet 5    potassium chloride (K-DUR,KLOR-CON) 10 MEQ CR tablet TAKE 2 TABLETS BY MOUTH DAILY 60 tablet 1    predniSONE (DELTASONE) 10 MG tablet Take 1 tablet by mouth Daily.      promethazine-dextromethorphan (PROMETHAZINE-DM) 6.25-15 MG/5ML syrup Take 5 mL by mouth 4 (Four) Times a Day As Needed for Cough. 180 mL 0    sacubitril-valsartan (Entresto) 24-26 MG tablet Take 0.5 tablets by mouth 2 (Two) Times a Day. 30 tablet 3    zolpidem (AMBIEN) 5 MG tablet TAKE 1 TABLET BY MOUTH EVERY NIGHT AT BEDTIME AS NEEDED FOR SLEEP 30 tablet 0        ALLERGIES:    Allergies   Allergen Reactions    Sulfa Antibiotics Rash        Social History     Socioeconomic History    Marital status:    Tobacco Use    Smoking status: Former     Types: Cigarettes     Quit date: 2015     Years since quittin.9      Passive exposure: Never    Smokeless tobacco: Never    Tobacco comments:     less than a pack per day, no smoking since , Quit 2015   Vaping Use    Vaping Use: Never used   Substance and Sexual Activity    Alcohol use: Not Currently     Comment: Socially -A few times a month    Drug use: No    Sexual activity: Defer     Family History   Problem Relation Age of Onset    Ovarian cancer Mother          at age 27    No Known Problems Father     Ovarian cancer Sister     Colon cancer Brother     No Known Problems Other     Malig Hyperthermia Neg Hx         Review of Systems  ROS as per HPI     Objective      Vitals:    23 0045 23 0459 23 0839 23 1347   BP: 141/78  105/74 97/61   BP Location: Right arm  Right arm Right arm   Patient Position: Lying  Lying Lying   Pulse: 94      Resp: 20  20 20   Temp: 98.2 °F (36.8 °C)  98.3 °F (36.8 °C) 98.8 °F (37.1 °C)   TempSrc: Oral  Oral Oral   SpO2: 90%      Weight:  47.2 kg (104 lb)     Height:                       2023     9:57 AM   Current Status   ECOG score 0      Physical Exam  Vitals reviewed.   Constitutional:       General: She is not in acute distress.     Appearance: Normal appearance. She is well-developed.   HENT:      Head: Normocephalic and atraumatic.      Mouth/Throat:      Pharynx: No oropharyngeal exudate.   Eyes:      Pupils: Pupils are equal, round, and reactive to light.   Cardiovascular:      Rate and Rhythm: Normal rate and regular rhythm.      Heart sounds: Normal heart sounds. No murmur heard.  Pulmonary:      Effort: Pulmonary effort is normal. No respiratory distress.      Breath sounds: Normal breath sounds. No wheezing, rhonchi or rales.      Comments: Decreased breath sounds bilateral bases  Abdominal:      General: Bowel sounds are normal. There is no distension.      Palpations: Abdomen is soft.   Musculoskeletal:         General: No swelling. Normal range of motion.      Cervical back: Normal range of  motion.   Skin:     General: Skin is warm and dry.      Findings: No rash.   Neurological:      Mental Status: She is alert and oriented to person, place, and time.         Physical exam preformed and reviewed. No changes noted from previous exam. Henry Escobar MD ; 12/28/2023      RECENT LABS:  Results from last 7 days   Lab Units 12/29/23  0623 12/28/23  1131 12/27/23  0947   WBC 10*3/mm3 5.88 9.65 10.63   NEUTROS ABS 10*3/mm3  --  8.86* 9.27*   HEMOGLOBIN g/dL 12.0 11.2* 11.9*   HEMATOCRIT % 37.5 34.3 37.5   PLATELETS 10*3/mm3 320 306 353                     Assessment & Plan     1.  Carcinoma of the lung  May 2015, status post previous left upper lobectomy for carcinoma of the lung.    2.  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   She underwent laryngoscopy 8/18/2021 with right base of tongue without evidence of lesions or masses in the region.    3.  Moderately differentiated adenocarcinoma of the lung.  CT of the chest 6/16/2021 demonstrated postsurgical changes, 8 mm irregular nodule medial segment of right lower lobe and diffuse changes of emphysema.    She is seen in thoracic clinic with findings suspicious for new malignancy and concern of the patient being a poor operative candidate.    7/13/2021 PET CT demonstrated ill-defined avidity and soft tissue left aspect of the mediastinum, 1 cm hypermetabolic pulmonary nodule and asymmetric thickening involving the right base of tongue as well as subtle uptake in the L1 vertebral body.    This patient's case was discussed in thoracic clinic and plans were made to request an MRI of the lumbar spine, obtain a biopsy of the mediastinal involvement of the left had the patient reviewed by ENT.  Biopsy 8/13/2021 found to be consistent with invasive moderately differentiated adenocarcinoma with PD-L1 TPS score 40%.    7/24/2021  MRI of the lumbar spine revealed no evidence of metastatic disease though the patient did have multilevel degenerative disease throughout the lumbar spine.    Her findings were consistent with 2 separate lesions including a nodule in the superior segment of the right lower lobe and a mass in the upper portion of the left chest with the left chest lesion biopsy positive for adenocarcinoma.  The consensus was that these were to separate-synchronous primaries.  Stereotactic radiation each lesions were felt to be the appropriate way to proceed and the patient was referred to radiation therapy.  The patient was referred for radiation therapy and she was able to proceed with SBRT to the right lung at primary #1 with 5 treatments at 1000 cGy per fraction in the left lung primary similarly at 1000 cGy per fraction x5.  Her treatment ranged between 8/31-9/13/2021.    Her guardant 360 did not determine any new abnormalities but her Caris-MI profile-does reveal sensitivity to immunotherapy as well as a BRAF mutation.  PET/CT 11/23/2021 demonstrates decrease in size and avidity of the previously noted intensely FDG avid nodules left lobectomy operative site and right lower lobe.  Surrounding groundglass and pulmonary opacification is consistent with postradiation changes, a few new subcentimeter pulmonary nodules are present in the right upper lobe and indeterminant, stable subcentimeter hepatic lesion is below PET resolution no other findings of FDG-avid disease are seen in neck abdomen or pelvis.  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  There is evolution of presumed postradiation changes in the right midlung with soft tissue mass within the left lumpectomy operative bed minimally decreased in size.  There is a sub-6 mm nodule in the right upper lobe not definitively seen, indeterminate and an indeterminate  left renal lesion at 1.5 cm unchanged from 7/13/2021.  Admission 10/14 - 10/18/2022 presenting with shortness of breath and cough that was nonproductive.  She had been on oral antibiotics and did not improved and was admitted for therapy for apparent pneumonia.  This eventually led to bronchoscopy after initial CT revealed postsurgical changes, new masslike 6.7 cm area of consolidation anterior left lung raising concern for potential malignancy and a follow-up PET/CT planned.   Bronchoscopy and biopsy left lower lung failed to show evidence of malignancy.    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  There is intensely pleural-based avidity within the left lower lobe thought to be metastatic disease with postradiation of the left apex as well.  There is a small focus of uptake in the right hilum grossly unchanged in size and post radiation changes in the right lower lobe.  There is indeterminate density left renal that was grossly unchanged.    Patient's guardant 360 returned as again significant for BRAF V600E and the potential use of BRAF inhibitor/MET inhibitor dabrafenib and trametinib.  Additional information [id number] and use of alpelisib.  The patient now presents back 12/14/2022 to initiate therapy- initially with immunotherapy considering Caris study with PD-L1 TPS of 70% on 22 C3 and 28-8 assays.  Single agent Keytruda initiated 12/14/2022 2/8/2023 CT of the chest, abdomen pelviswith consolidation and masslike pleural thickening in the left apex and upper lung index areas have decreased somewhat.  There is no evidence of metastatic disease otherwise and a few indeterminate left renal lesions are stable.  After lengthy discussion with the patient and her daughter as to the stability to mild improvement with  immunotherapy it is agreed that we would continue treatment at this point.  Proceed today, 4/19/2023 with cycle 7 pembrolizumab which is continued every 3 weeks  The patient proceeded to 8 cycles of pembrolizumab and underwent follow-up chest CT chest, abdomen pelvis revealing evolution of dense consolidation left upper lobe and extension of soft tissue mass anterior to left upper ribs with increase sclerosis anterior left second rib.  This was concerning for progression of disease versus radiation change and the patient was scheduled for subsequent PET/CT.  PET/CT 6/5/2023  reveals progression of disease in left upper thorax, left supraclavicular adenopathy new metastasis left posterior fourth ribs, no evidence of metastatic disease in the abdomen or pelvis.  Patient seen 6/12/2023 with plans to move her Norco to every 4 hours, discontinue Keytruda, make application for dabrafenib and trametinib at 150 mg p.o. every 12 hours and 2 mg p.o. daily respectively.  Patient referred for radiation therapy consultation concurrently.  Last dose of Keytruda 5/31/2023.  6/23/2023: Patient seen for triage visit.  She has not yet started dabrafenib or trametinib, she has not yet received the medications.  Unfortunately she has been experiencing uncontrolled nausea/vomiting as further detailed below.   Patient seen 6/27/2023 reporting that her nausea has resolved.  She was unable to complete the MRI of the brain however Dr. Escobar did review the images patient did have and there was no evidence of edema or metastatic disease.  Patient can start her new oral medication once she receives the medication.  Patient seen 7/5/2023 with plans to start palliative radiotherapy x10 fractions and initiate dabrafenib and trametinib as soon as medications received.  7/17/2023 patient initiated radiation with 10 fractions planned.  Patient has received dabrafenib and trametinib.  Brought in for triage visit due to nausea associated with dosing.   She is premedicating with ondansetron however only waiting 15 minutes.  We discussed today that she needs to wait at least 30 minutes to 1 hour prior to taking the dabrafenib and trametinib.  The patient will do so.  We discussed she could also take this again if she feels the need 8 hours later for nausea control.  If she is having breakthrough nausea then she should call our office.  I have encouraged her to utilize electrolyte drinks.  She will get 1L normal liter normal saline in the office today.She was not nauseas while in the office so we did not give additional nausea medication.  We will have her return in 1 week repeat labs, review by nurse practitioner, and possible IV fluids.  I stressed the importance of calling sooner if she finds that the premedication is not controlling her nausea at which time we would have her come in for additional IV fluids and evaluation.  She is continuing daily radiation this week.  Case was discussed with Dr. Escobar today.  7/21/2023: Patient returns for short interval follow-up due to very poor appetite and sleeping the majority of the day.  Her nausea has been improved with premedicating 30 minutes prior to dabrafenib and trametinib.  She however has been sleeping the majority of the day and is not eating when she is awake.  She does report taking ensures but she is only sipping on these.  Have given her samples of some of the office today and encouraged her to drink when while she is here.  Her performance status continues to decline and her daughter states that she was fixing dinner nightly just 2 weeks ago.  With performance status of 3 today I discussed the case with Dr. Escobar and we will hold her dabrafenib and  TRAMETINIB.  Her liver enzymes are elevated today as well.  We will monitor this next week and have her evaluated by Dr. sEcobar at which time we could consider restarting her dabrafenib and trametinib at reduced doses pending performance status and laboratory  evaluation.  Unfortunately she required admission 7/25-30/2023 with failure to thrive.  Subsequent assessments including blood cultures were negative and patient was treated briefly with IV antibiotic therapy, started PT and OT, medications adjusted for hypotension and treated symptomatically for nausea and vomiting.  She was able to improve enough to be discharged home as she continued radiation therapy started 7/17/2023 and completed 7/31/2023 to the left chest wall.  She is next seen back 8/4/2023.  Lengthy discussion as to reevaluation   Plans made for CT chest, abdomen, pelvis in 4 weeks  Patient seen 8/21/2023 with complaints of worsening fatigue and shortness of breath.  Patient scheduled for follow up CT scans this Friday. Lungs clear on exam,  Sats 97%.  Hgb stable at 11.7.  Will check BNP today.  Discussed may be related to disease and will need to see what scan shows.   Subsequent scans 8/25/2023, fortunately, with stable left apical mass with mediastinal chest wall involvement unchanged 1.4 cm supraclavicular lymph node.  No new findings of metastatic disease.  The patient is seen back 9/8/2023.  Fortunately she is improved dramatically off therapy and is gratified that his studies have not worsened in any substantial way.  We have discussed both her scan reports and echocardiogram that does not show significant reduction of her ejection fraction.  As result of her current stable status we have asked her to restart Mekinist at 0.5 mg daily and Tafinlar at 50 mg twice daily  9/22/2023 tolerating Mekinist 0.5 mg daily and tafinlar 50 mg twice daily quite well. Continues on prednisone 10 mg daily which will now be reduced to 5 mg daily when seen 10/16/2023  Plans made to continue current dosing of Mekinist and Tafinlar and repeat evaluation with chest x-ray 10/16 and repeat CT chest abdomen pelvis in 5 weeks, MD in 6 weeks.  The patient required admission 11/14 - 11/16/2023 having presented with shortness  of breath and found to be pulmonary edema with CBC, lactate and procalcitonin all normal.  There was a concern that her chemotherapy agents could have produced her cardiomyopathy and these were stopped 11/14/2023.  She was diuresed and echocardiogram demonstrated LV SF mildly decreased at 39.2% with GLS of -11.1%, left ventricular cavity mildly dilated, left ventricular wall thickness consistent with mild concentric hypertrophy, left ventricular global hypokinesis, aortic valve abnormal with moderate calcification, valve was trileaflet, trace tricuspid valve regurgitation with right VSP at less than 35 mmHg.  The findings for LV systolic function, diastolic function and global longitudinal LV strain had all worsened.  CTA of chest 11/14/2023 demonstrating confluent soft tissue mass possibly measuring larger in current study at 6.5 x 5.0 previously 4.9 x 4.8, left supraclavicular node to decrease in size measuring 1.1 cm previously 1.4 cm, extensive bilateral interstitial and groundglass infiltrates.  The patient is seen 11/26/2023.  As per the discharge we are requested to have a BMP and CMP assessed.  She is seen with her son and daughter in a lengthy conversation held about her recent hospitalization with CHF-cardiomyopathy felt elicited, in part, from her targeted therapy with her Mekinist and Tafinlar discontinued altogether.  The patient is relatively stable currently having continued her diuretics and to be seen in cardiology in follow-up.  At the time of this dictation her CBC is found to be essentially normal with mild leukocytosis, CMP normal except for mildly elevated glucose at 125 and BNP reduced to 6126 from 9763.  She feels well with no additional shortness of breath chest pain lower extremity edema.  In reviewing the the possible treatment options she is next best treated with single agent chemotherapy moving to Alimta.  Patient proceeded through teaching and presents back 12/4/2023 to initiate  chemotherapy with Alimta-cycle 1 day 1  Seen 12/18/2023 for toxicity check, overall has tolerated well.  Continues to follow closely with cardiology.  Patient treated with Alimta 12/27/2023, subsequent finding of worsening shortness of breath, hypoxemia and presentation testing positive for COVID-19 as well as rhinovirus.  Patient require admission treated with remdesivir, dexamethasone and cefepime and when seen 12/29/23 days, fortunately, gradually improving.  Patient admitted 12/28/2023 with increasing shortness of breath, findings of elevated proBNP to 13,004 and 78, at bedtime troponin of 50, hemoglobin 11.2.  Additional studies consistent with volume overload, consideration of pneumonia needs of COVID-19 pneumonia.  Patient placed on IV remdesivir for 5 days, dexamethasone for 10 days, IV cefepime for 5 days.        4.  Worsening back pain  Admission 11/28/2022 through 12/1/2022.  MRI of the cervical and thoracic spine fortunately failed to demonstrate metastatic disease.  Moderate degenerative changes noted which could cause radiculopathy.  Medication altered to include MS Contin 15 mg every 12 hours and Norco for breakthrough pain  She reports today her pain is adequately controlled  Patient with increasing pain 5/31/2023 with pain level of 6.  MS Contin was increased to 50 mg p.o. every 8 hours and Norco 10/325 #101 p.o. every 6 hours to move to every 4 hours as needed-E scribed to pharmacy  Patient seen 6/12/2023 with Norco moved to every 4 hours.  6/23/2023: Seen for triage visit.  Has been using oxycodone 20 mg every 3 hours as needed for pain.  Reports she has not been using the hydrocodone 10/325.  Was experiencing dizziness, so reduced her oxycodone use to half a tablet every 3 hours as needed.  Reports pain is uncontrolled during the night so she is having to wake at night time to take pain medicine.  They are questioning resuming long-acting pain medication, as she is waking throughout the night to  take pain medicine.  She has already been prescribed MS Contin and has this available at home, did let her know it would be okay to resume this.  Assess 7/5/2023 with pain reduced to 2/10.  Plan to continue current therapy  Darlene Pfeiffer reports a pain score of .  Given her pain assessment as noted, treatment options were discussed and the following options were decided upon as a follow-up plan to address the patient's pain: continuation of current treatment plan for pain. Currently not requiring pain medication.   Refilled both the patient's Remeron and Ambien 10/16/2023  12/18/2023 requesting a refill of her Ambien.    5.  Poor appetite and malnutrition  Placed on prednisone 10 mg daily 1/4/2023 with significant improvement in her symptoms  Weight is stable today at just below 106 pounds  Patient assessed 6/12/23 with possible gummy therapy.  Stable performance status including weight and appetite 7/5/2023  Weight has declined as of 7/17/2023 due to nausea and vomiting that started this past weekend.  After further discussion the patient did stop her prednisone 10 mg while she was not feeling well.  We discussed today the importance of continuing this as it can help with her nausea and appetite and therefore she will restart tomorrow.  7/21/2023: Weight is stable today though albumin is declining at 3.  Patient is sleeping the majority of the day and not eating.  Yesterday she had a small piece of chicken and that is all.  She states she is drinking ensures however her daughter thinks that she is just sipping on these and not completing them.  Hopefully holding her dabrafenib and trametinib will be helpful with her being awake more and appetite.  I encouraged her to make sure she is taking her prednisone daily at 10 mg daily.  Remeron 7.5 mg p.o. nightly E scribed to pharmacy  9/22/2023 weight is up to 98 pounds.  Further improvement in weight 10/16/2020 3 to 104 pounds, continue Remeron at current dose,  prednisone reduced to 5 mg daily.    6.  Uncontrolled nausea/vomiting  started after discontinuing prednisone and starting THC Gummies.  Started to experience dizziness with the THC Gummies.  Discontinue THC Gummies and dizziness improved, however she has remained nauseated now.  She has been utilizing Zofran with improvement, however today was unable to keep Zofran tablets down due to nausea/vomiting.  She will resume prednisone 10 mg daily.  She will receive 1 L IV normal saline in clinic today 10 mg IV Compazine.  We will also send prescription for Phenergan suppository, in case she is not able to keep Zofran down in the future.  Will also send for stat MRI brain since patient is now experiencing uncontrolled nausea vomiting and has recently had progression of her cancer as seen on PET from 6/5/2023.  MRI brain was performed without contrast, even though contrast was ordered.  The patient also did not stay for entire exam to be completed.  Exam limited for assessment of metastatic disease.  Attempted to call patient to review results, however no answer, did leave detailed voicemail.  6/27/2023 patient reports that her nausea has resolved.  She did not complete the MRI however the images which were done showed no evidence of edema or metastatic disease.  Nausea reoccurred when seen on 7/17/2023 over this past weekend after initiation of dabrafenib and trametinib.  Patient was premedicating with ondansetron however only giving herself about 15 minutes and I encouraged her to give herself at least 30 minutes to 1 hour prior to taking the medications.  She will notify our office if this is not helpful.  7/21/2023: Premedication with ondansetron 30 minutes prior to dabrafenib and trametinib has been helpful.  Patient however has had some vomiting episodes directly after taking her medications where she has not had time to actually swallow them.  She denies difficulty swallowing however.  We will continue to monitor  this.  10/16/2023 not an issue today.  1/18/2023 not an issue today.    7.  Hyperkalemia-  potassium 6/23/2023 3.1.  She has been having uncontrolled nausea, we will hold off on starting oral potassium replacement as it is unlikely she will tolerate that at this time.  Will recommend she increase oral potassium in her diet.  6/27/2023 potassium 3.0.  Nausea has resolved.  Patient will be given 40 M EQ p.o. potassium in the office and she will be started on 20 mEq daily of p.o. potassium.  Assessed 7/5/2023 with potassium 4.4  7/17/2023 potassium normal at 3.7  7/21/2023: Potassium 3.1, patient not taking potassium supplements at home because she does not like the big pill.  Changed to potassium chloride 10 mill equivalents, 2 tablets every a.m. as these will be smaller.   9/22/2023 potassium is 3.7  Repeat assessment 11/27/2023 with potassium of 4.2  12/4/2023 potassium was 4.0    8.Acute systolic heart failure -cardiomyopathy   Secondary to chemotherapy drugs during admission 11/14-16/2023 with Mekinist and Tafinlar discontinued  Diuretics continued postdischarge, cardiology follow-up planned 12/6/2023      9.  Admission 12/20/2023 with acute hypoxic respiratory failure secondary COVID-19 and presumed superimposed bacterial pneumonia  Patient currently undergoing therapy with remdesivir, dexamethasone, IV cefepime  Patient improving, schedule office appointment 1/24/2024.  She is advised to keep that scheduled appointments.  Please notify if we can be of further assistance.

## 2023-12-29 NOTE — PLAN OF CARE
Goal Outcome Evaluation:  Plan of Care Reviewed With: patient        Progress: improving  Outcome Evaluation: VSS. Redemsevir given for covid. Pt on 2L of O2. Q2 turn encouraged. MRSA, Resp culture, and urine sample needed.

## 2023-12-29 NOTE — SIGNIFICANT NOTE
12/29/23 7914   OTHER   Discipline physical therapist   Rehab Time/Intention   Session Not Performed other (see comments)  (PT spoke with pt - reports she is at [facility] mobility, up with nsg SBA and denies any acute PT needs. Will sign-off, RN aware.)   Therapy Assessment/Plan (PT)   Criteria for Skilled Interventions Met (PT) no;no problems identified which require skilled intervention

## 2023-12-29 NOTE — PROGRESS NOTES
Name: Darlene Pfeiffer ADMIT: 2023   : 1940  PCP: Kehrer, Meredith Lea, MD    MRN: [id number] LOS: 1 days   AGE/SEX: 83 y.o. female  ROOM: [facility]     Subjective   Subjective   Patient seen this morning.  No acute events overnight.  Reports she is feeling better, shortness of breath improved.  Denies curren shortness of breath.  Continues to have productive cough.  No fevers, chills, nausea or vomiting.  No chest pain or palpitations.    Review of Systems   As above  Objective   Objective   Vital Signs  Temp:  [97.7 °F (36.5 °C)-98.3 °F (36.8 °C)] 98.3 °F (36.8 °C)  Heart Rate:  [94-98] 94  Resp:  [20-22] 20  BP: (105-141)/(72-98) 105/74  SpO2:  [90 %-96 %] 90 %  on  Flow (L/min):  [2] 2;   Device (Oxygen Therapy): nasal cannula  Body mass index is 18.42 kg/m².  Physical Exam    General: Alert and oriented x3, no acute distress  HEENT: Normocephalic, atraumatic  CV: Regular rate and rhythm, no murmurs rubs or gallops  Lungs: Decreased at bases, otherwise clear to auscultation, no wheezing, nonlabored breathing,  Abdomen: Soft, nontender, nondistended  Extremities: No significant peripheral edema , no cyanosis     Results Review     I reviewed the patient's new clinical results.  Results from last 7 days   Lab Units 23  0623  1131 23  0947   WBC 10*3/mm3 5.88 9.65 10.63   HEMOGLOBIN g/dL 12.0 11.2* 11.9*   PLATELETS 10*3/mm3 320 306 353     Results from last 7 days   Lab Units 23  0623  1131 23  0947   SODIUM mmol/L 139 141 141   POTASSIUM mmol/L 4.0 4.1 4.3   CHLORIDE mmol/L 106 106 106   CO2 mmol/L 20.0* 25.0 24.9   BUN mg/dL 30* 18 15   CREATININE mg/dL 0.97 0.74 0.69   GLUCOSE mg/dL 142* 152* 132*   Estimated Creatinine Clearance: 33.9 mL/min (by C-G formula based on SCr of 0.97 mg/dL).  Results from last 7 days   Lab Units 23  0623 23  1131 23  0947   ALBUMIN g/dL 3.2* 3.7 3.4*   BILIRUBIN mg/dL 0.3 0.6 0.5   ALK PHOS U/L 64 75 73   AST  "(SGOT) U/L 26 29 32   ALT (SGPT) U/L 11 13 8     Results from last 7 days   Lab Units 12/29/23  0623 12/28/23  1131 12/27/23  0947   CALCIUM mg/dL 8.7 9.0 9.0   ALBUMIN g/dL 3.2* 3.7 3.4*   MAGNESIUM mg/dL 1.8  --   --    PHOSPHORUS mg/dL 5.7*  --   --      Results from last 7 days   Lab Units 12/28/23  1131   PROCALCITONIN ng/mL 0.33*   LACTATE mmol/L 1.2     COVID19   Date Value Ref Range Status   12/28/2023 Detected (C) Not Detected - Ref. Range Final   12/27/2023 Detected (C) Not Detected - Ref. Range Final     No results found for: \"HGBA1C\", \"POCGLU\"        XR Chest 1 View  Narrative: Portable chest x-ray     HISTORY: Shortness of breath, COPD and lung cancer.     TECHNIQUE: Portable chest x-ray correlated with chest x-ray October 16, 2023.     FINDINGS: Right Mediport catheter as before. Elevated left hemidiaphragm  with abnormal opacity in the left suprahilar lung. Chronic pleural  thickening at the left apex.     Abnormal alveolar and interstitial opacity in the right upper lobe and  in the right lung base was not present previously and is suspicious for  pneumonia. No appreciable change in the appearance of the left lung.     Impression: Chronic changes in the left hemithorax as described. New  abnormal alveolar and interstitial opacities in the right lung which are  suspected to represent pneumonia.     This report was finalized on 12/28/2023 11:14 AM by Dr. Selvin Srinivasan M.D on Workstation: [id number]       Scheduled Medications  acetaminophen, 1,000 mg, Oral, Once  apixaban, 2.5 mg, Oral, BID  atorvastatin, 20 mg, Oral, Nightly  cefepime, 2,000 mg, Intravenous, Q12H  dexAMETHasone, 6 mg, Oral, Daily With Breakfast  folic acid, 1 mg, Oral, Daily  furosemide, 40 mg, Oral, Daily  heparin, 500 Units, Intracatheter, Once  metoprolol succinate XL, 25 mg, Oral, Daily  mirtazapine, 7.5 mg, Oral, Nightly  montelukast, 10 mg, Oral, Nightly  pantoprazole, 40 mg, Oral, Q AM  remdesivir, 100 mg, Intravenous, " Q24H  sacubitril-valsartan, 0.5 tablet, Oral, BID  senna-docusate sodium, 2 tablet, Oral, BID  sodium chloride, 10 mL, Intravenous, Q12H    Infusions  Pharmacy Consult - Remdesivir,     Diet  Diet: Regular/House Diet; Texture: Regular Texture (IDDSI 7); Fluid Consistency: Thin (IDDSI 0)    I have personally reviewed     [x]  Laboratory   [x]  Microbiology   []  Radiology   [x]  EKG/Telemetry  []  Cardiology/Vascular   []  Pathology    []  Records       Assessment/Plan     Active Hospital Problems    Diagnosis  POA    **Acute hypoxemic respiratory failure [J96.01]  Yes    Chronic systolic CHF (congestive heart failure) [I50.22]  Unknown    Paroxysmal atrial fibrillation [I48.0]  Unknown    COVID-19 virus infection [U07.1]  Unknown    Lung cancer [C34.90]  Yes    Essential hypertension [I10]  Yes    Hyperlipidemia [E78.5]  Yes      Resolved Hospital Problems   No resolved problems to display.       Patient is a 83-year-old female with a history of COPD, hypertension, hyperlipidemia, intracranial aneurysm left PCA.  Lung cancer on chemotherapy, coronary artery calcification, paroxysmal atrial fibrillation, presented to the ED complaining of acute on chronic shortness of breath that started yesterday.         Acute hypoxic and respiratory failure secondary to COVID-19 and possible superimposed bacterial pneumonia  -Oxygen saturation as low as 73% on room air on arrival.  -X-ray showed new abnormal alveolar and interstitial opacities in the right lung which are suspected to represent pneumonia.  -WBC was normal.  Procalcitonin mildly elevated 0.33.  Tmax was 100.5 on admission.  -Continue IV remdesivir for 5 days, dexamethasone for 10 days  -Continue IV cefepime for 5 days  -Stable Legionella negative, MRSA nares negative.  Sputum culture negative.  -On Eliquis for anticoagulation  -Monitor inflammatory markers ferritin elevated at 318, CRP 8.47.  -Wean off O2 as able, on 2 L nasal cannula        Paroxysmal atrial  fibrillation: Continue metoprolol XL 25 mg daily, Eliquis.      Chronic systolic heart failure: Last echocardiogram 11/14/2023 showed EF 39.2%, grade 1 diastolic dysfunction.  Continue home metoprolol, Entresto.  BP on the softer side.  Morning Entresto dose held.  Monitor and adjust medication as needed.     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 Escobar.  on chemotherapy, last chemotherapy 12/27    Insomnia: Continue nightly Ambien as needed for sleep.      CODE STATUS: Full code  DVT prophylax: Eliquis  Disposition: Likely home, in 2-3 days if continues to improve, weaned down to room air.      Copied text in this note has been reviewed and is accurate as of 12/29/23.         Dictated utilizing Dragon dictation        Perla Wright MD  [facility]  12/29/23  12:03 EST

## 2023-12-29 NOTE — CASE MANAGEMENT/SOCIAL WORK
Discharge Planning Assessment  [facility]     Patient Name: Darlene Pfeiffer  MRN: [id number]  Today's Date: 12/29/2023    Admit Date: 12/28/2023    Plan: Home with spouse. Denies any needs. Spouse will transport. Need to follow for possible home O2 at D/C.   Discharge Needs Assessment       Row Name 12/29/23 1637       Living Environment    People in Home spouse    Current Living Arrangements home    Potentially Unsafe Housing Conditions none    Primary Care Provided by self    Provides Primary Care For no one    Family Caregiver if Needed spouse    Quality of Family Relationships involved;helpful;supportive    Able to Return to Prior Arrangements yes       Resource/Environmental Concerns    Resource/Environmental Concerns none    Transportation Concerns none       Transition Planning    Patient/Family Anticipates Transition to home with family    Patient/Family Anticipated Services at Transition none    Transportation Anticipated family or friend will provide       Discharge Needs Assessment    Readmission Within the Last 30 Days no previous admission in last 30 days    Equipment Currently Used at Home none    Concerns to be Addressed denies needs/concerns at this time;care coordination/care conferences;discharge planning    Anticipated Changes Related to Illness none    Equipment Needed After Discharge oxygen  May need O2 at D/C    Provided Post Acute Provider List? Refused    Refused Provider List Comment Denies any needs at D/C    Provided Post Acute Provider Quality & Resource List? Refused                   Discharge Plan       Row Name 12/29/23 6363       Plan    Plan Home with spouse. Denies any needs. Spouse will transport. Need to follow for possible home O2 at D/C.    Patient/Family in Agreement with Plan yes    Plan Comments Pt in Covid isolation. Called into pt's room and spoke with pt. Explained roll of . Face sheet and pharmacy verified. Pt lives with spouse in a single-story home with no  steps.  There are no steps to enter home. Denies the use of any home DME.  Pt is independent with ADLs. Pt has never been to Rehab or used HH. Pt's PCP is Meredith Lea Kehrer MD. Uses [facility] on [facility] in [place] [place]. Pt drives herself to appointments. At discharge, spouse will transport. Currently requiring O2. Need to follow for possible home O2 at D/C. Explained that CCP would follow to assess for discharge needs.  Aaron Payne RN-BC                  Continued Care and Services - Admitted Since 12/28/2023    Coordination has not been started for this encounter.       Expected Discharge Date and Time       Expected Discharge Date Expected Discharge Time    Jan 1, 2024            Demographic Summary       Row Name 12/29/23 1636       General Information    Admission Type inpatient    Arrived From emergency department    Required Notices Provided Important Message from Medicare    Reason for Consult care coordination/care conference;discharge planning                   Functional Status       Row Name 12/29/23 1637       Functional Status    Usual Activity Tolerance good    Current Activity Tolerance good       Functional Status, IADL    Medications independent    Meal Preparation independent    Housekeeping independent    Laundry independent    Shopping independent       Mental Status    General Appearance WDL WDL       Mental Status Summary    Recent Changes in Mental Status/Cognitive Functioning no changes       Employment/    Employment Status retired                   Psychosocial    No documentation.                  Abuse/Neglect    No documentation.                  Legal    No documentation.                  Substance Abuse    No documentation.                  Patient Forms    No documentation.                     Aaron Payne RN

## 2023-12-29 NOTE — PROGRESS NOTES
Nutrition Services    Patient Name:  Darlene Pfeiffer  YOB: 1940  MRN: [id number]  Admit Date:  12/28/2023    Assessment Date:  12/29/23    Summary:     Pt is a 83 y.o. female adm for acute hypoxemic respiratory failure. H/o lung cancer on chemo, CHF, a-fib, HTN, HLD, COPD, severe malnutrition. Nutrition assessment completed. Visited pt at bedside. Has a good appetite and eating well, 100% PO per EMR. She endorses 5 lb wt gn since last admission. NFPE completed, meets criteria for severe malnutrition. Pt asked for Ensure, will honor.   Labs: Glu 142, BUN 30, Phos 5.7, CRP 8.47  Meds: lasix, PPI, pericolace     Pt meets ASPEN/AND criteria for nutrition dx of severe malnutrition of chronic disease related to muscle wasting and fat loss.    REC:  Ensure Enlive chocolate BID B/D for additional calories/protein and to support appropriate wt gn  Will CTM PO/ONS intake     RD to follow per protocol.    CLINICAL NUTRITION ASSESSMENT      Reason for Assessment Age, BMI     Diagnosis/Problem   Acute hypoxemic respiratory failure    Medical/Surgical History Past Medical History:   Diagnosis Date    Allergic rhinitis     Aneurysm     Asthma     Cancer of floor of mouth 2014    Cerebral aneurysm     COPD (chronic obstructive pulmonary disease)     COVID 2020    Esophageal reflux     History of adenocarcinoma of lung     History of anemia     History of carcinoma in situ of skin     History of lung cancer     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 from [facility]    History of squamous cell carcinoma     Tongue    Hyperlipidemia     Hypertension     since early 60s    Intractable back pain 11/29/2022    Low vitamin B12 level     Lung cancer     Systolic CHF, acute 11/14/2023    Thyromegaly     UTI (urinary tract infection)     Vitamin D deficiency        Past Surgical History:   Procedure Laterality Date    BRONCHOSCOPY Bilateral 10/17/2022    Procedure: BRONCHOSCOPY WITH FLUORO  "with biopsy and BAL;  Surgeon: India Flores MD;  Location: [facility] ENDOSCOPY;  Service: Pulmonary;  Laterality: Bilateral;  PRE/POST - lung mass    CHOLECYSTECTOMY  1999    COLONOSCOPY      EMBOLIZATION CEREBRAL Left 06/29/2022    Procedure: 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;  Surgeon: Bradley Dowell MD;  Location: [facility] HYBRID OR 18/19;  Service: Interventional Radiology;  Laterality: Left;    EYE SURGERY  11/24/2020    Took a muscle out of right eye    GLOSSECTOMY PARTIAL      less than one half tongue    LUNG SURGERY  2012    ORAL LESION EXCISION/BIOPSY      June and August 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    TUBAL ABDOMINAL LIGATION  1970    VENOUS ACCESS DEVICE (PORT) INSERTION N/A 12/12/2022    Procedure: MEDIPORT PLACEMENT;  Surgeon: Jason Villaseñor MD;  Location: [facility] MAIN OR;  Service: Vascular;  Laterality: N/A;        Anthropometrics        Current Height  Current Weight  BMI kg/m2 Height: 160 cm (63\")  Weight: 47.2 kg (104 lb) (12/29/23 0459)  Body mass index is 18.42 kg/m².   Adjusted BMI (if applicable)    BMI Category Normal/Healthy (18.4 - 24.9)   Ideal Body Weight (IBW) 52.4 kg    Usual Body Weight (UBW) 122 lbs about 1 yr ago    Weight Trend Pt endorses wt gn since last adm ~ 5 lbs    Weight History Wt Readings from Last 30 Encounters:   12/29/23 0459 47.2 kg (104 lb)   12/28/23 1659 47.4 kg (104 lb 8 oz)   12/27/23 0957 48.1 kg (106 lb)   12/21/23 1032 47.6 kg (105 lb)   12/18/23 1503 47.5 kg (104 lb 12.8 oz)   12/12/23 0800 49 kg (108 lb)   12/06/23 1025 49.9 kg (110 lb)   12/04/23 1134 49.2 kg (108 lb 8 oz)   11/28/23 1456 48.4 kg (106 lb 9.6 oz)   11/27/23 1158 48.5 kg (106 lb 14.4 oz)   11/21/23 1356 48 kg (105 lb 12.8 oz)   11/14/23 1117 48.5 kg (107 lb)   11/14/23 0528 48.8 kg (107 lb 9.4 oz)   11/14/23 0218 47.2 kg (104 lb)   10/16/23 0806 47.6 kg (104 lb 14.4 oz)   10/12/23 1101 45.8 kg (101 lb)   09/29/23 0715 44.5 kg (98 lb)   09/22/23 0815 44.8 " kg (98 lb 12.8 oz)   09/08/23 1512 44 kg (96 lb 14.4 oz)   09/05/23 0951 44 kg (97 lb)   08/28/23 1420 40.4 kg (89 lb)   08/21/23 1451 40.8 kg (89 lb 14.4 oz)   08/18/23 1044 40.4 kg (89 lb)   08/08/23 1354 40.5 kg (89 lb 3.2 oz)   08/04/23 0853 42.4 kg (93 lb 6.4 oz)   07/31/23 1127 43.8 kg (96 lb 9.6 oz)   07/29/23 0925 42.2 kg (93 lb)   07/25/23 1521 42.4 kg (93 lb 7.6 oz)   07/24/23 1134 42.4 kg (93 lb 6.4 oz)   07/21/23 0856 43.5 kg (95 lb 12.8 oz)   07/17/23 1351 42.5 kg (93 lb 12.8 oz)   07/17/23 1421 43.2 kg (95 lb 3.2 oz)   07/12/23 0847 44.5 kg (98 lb 3.2 oz)        Estimated Requirements         Weight used  47.2 kg     Calories  5671-6778 (30 kcal/kg, 35 kcal/kg)    Protein  57-71 (1.2 - 1.5 gm/kg)    Fluid  6619-8492 (1 mL/kcal)     Labs       Pertinent Labs    Results from last 7 days   Lab Units 12/29/23  0623 12/28/23  1131 12/27/23  0947   SODIUM mmol/L 139 141 141   POTASSIUM mmol/L 4.0 4.1 4.3   CHLORIDE mmol/L 106 106 106   CO2 mmol/L 20.0* 25.0 24.9   BUN mg/dL 30* 18 15   CREATININE mg/dL 0.97 0.74 0.69   CALCIUM mg/dL 8.7 9.0 9.0   BILIRUBIN mg/dL 0.3 0.6 0.5   ALK PHOS U/L 64 75 73   ALT (SGPT) U/L 11 13 8   AST (SGOT) U/L 26 29 32   GLUCOSE mg/dL 142* 152* 132*     Results from last 7 days   Lab Units 12/29/23 0623   MAGNESIUM mg/dL 1.8   PHOSPHORUS mg/dL 5.7*   HEMOGLOBIN g/dL 12.0   HEMATOCRIT % 37.5   WBC 10*3/mm3 5.88   ALBUMIN g/dL 3.2*     Results from last 7 days   Lab Units 12/29/23  0623 12/28/23  1131 12/27/23  0947   PLATELETS 10*3/mm3 320 306 353     COVID19   Date Value Ref Range Status   12/28/2023 Detected (C) Not Detected - Ref. Range Final     Lab Results   Component Value Date    HGBA1C 5.60 11/15/2023          Medications           Scheduled Medications acetaminophen, 1,000 mg, Oral, Once  apixaban, 2.5 mg, Oral, BID  atorvastatin, 20 mg, Oral, Nightly  cefepime, 2,000 mg, Intravenous, Q12H  dexAMETHasone, 6 mg, Oral, Daily With Breakfast  folic acid, 1 mg, Oral,  Daily  furosemide, 40 mg, Oral, Daily  metoprolol succinate XL, 25 mg, Oral, Daily  mirtazapine, 7.5 mg, Oral, Nightly  montelukast, 10 mg, Oral, Nightly  pantoprazole, 40 mg, Oral, Q AM  remdesivir, 100 mg, Intravenous, Q24H  sacubitril-valsartan, 0.5 tablet, Oral, BID  senna-docusate sodium, 2 tablet, Oral, BID  sodium chloride, 10 mL, Intravenous, Q12H       Infusions Pharmacy Consult - Remdesivir,        PRN Medications   senna-docusate sodium **AND** polyethylene glycol **AND** bisacodyl **AND** bisacodyl    HYDROcodone Bit-Homatrop MBr    nitroglycerin    ondansetron    Pharmacy Consult - Remdesivir    Insert Peripheral IV **AND** sodium chloride    Access VAD **AND** sodium chloride    sodium chloride    sodium chloride    zolpidem     Physical Findings          General Findings frail, generalized wasting, loss of muscle mass, loss of subcutaneous fat, on oxygen therapy   Oral/Mouth Cavity Not assessed   Edema  no edema   Gastrointestinal last bowel movement: 12/28   Skin  pale   Tubes/Drains/Lines implantable port   NFPE See Malnutrition Severity Assessment, Date Completed: 12/29     Malnutrition Severity Assessment      Patient meets criteria for : (P) Severe Malnutrition (Pt meets ASPEN/AND criteria for nutrition dx of severe malnutrition of chronic disease related to muscle wasting and fat loss.)  Malnutrition Type (last 8 hours)       Malnutrition Severity Assessment       Row Name 12/29/23 1232       Malnutrition Severity Assessment    Malnutrition Type Chronic Disease - Related Malnutrition (P)       Row Name 12/29/23 1232       Muscle Loss    Loss of Muscle Mass Findings Severe (P)     [place] Region Severe - deep hollowing/scooping, lack of muscle to touch, facial bones well defined (P)     Clavicle Bone Region Severe - protruding prominent bone (P)     Anterior Thigh Region Severe - line/depression along thigh, obviously thin (P)     Posterior Calf Region Severe - thin with very little  definition/firmness (P)       Row Name 12/29/23 1232       Fat Loss    Subcutaneous Fat Loss Findings Severe (P)     Orbital Region  Severe - pronounced hollowness/depression, dark circles, loose saggy skin (P)     Upper Arm Region Severe - mostly skin, very little space between folds, fingers touch (P)       Row Name 12/29/23 1232       Criteria Met (Must meet criteria for severity in at least 2 of these categories: M Wasting, Fat Loss, Fluid, Secondary Signs, Wt. Status, Intake)    Patient meets criteria for  Severe Malnutrition (P)   Pt meets ASPEN/AND criteria for nutrition dx of severe malnutrition of chronic disease related to muscle wasting and fat loss.                     Current Nutrition Orders & Evaluation of Intake       Oral Nutrition     Food Allergies NKFA   Current PO Diet Diet: Regular/House Diet; Texture: Regular Texture (IDDSI 7); Fluid Consistency: Thin (IDDSI 0)   Supplement n/a   PO Evaluation     % PO Intake 100%    Factors Affecting Intake: No factors at this time   --  PES STATEMENT / NUTRITION DIAGNOSIS      Nutrition Dx Problem  Problem: Malnutrition (severe)  Etiology: Medical Diagnosis - lung cancer, respiratory failure, heart failure    Signs/Symptoms: NFPE Results     NUTRITION INTERVENTION / PLAN OF CARE      Intervention Goal(s) Maintain nutrition status, Improved nutrition related labs, Establish goals of care, Reduce/improve symptoms, Meet estimated needs, Disease management/therapy, Maintain intake, Accepts oral nutrition supplement, and Appropriate weight gain         RD Intervention/Action Interview for preferences, Encourage intake, Continue to monitor, Care plan reviewed, and Recommend/order: ONS   --      Prescription/Orders:       PO Diet       Supplements Ensure Enlive chocolate BID B/D      Enteral Nutrition       Parenteral Nutrition    New Prescription Ordered? Yes   --      Monitor/Evaluation Per protocol, PO intake, Supplement intake, Pertinent labs, Weight, Symptoms    Discharge Plan/Needs Pending clinical course   --    RD to follow per protocol.      Electronically signed by:  Tara Kc RD  12/29/23 10:57 EST

## 2023-12-29 NOTE — PLAN OF CARE
Goal Outcome Evaluation:  Plan of Care Reviewed With: patient        Progress: improving  Outcome Evaluation: VSS. IV redemsevir given. 2L of O2  conituines. On cefepime. Port deaccessed. Will continue to monitor.

## 2023-12-29 NOTE — PLAN OF CARE
Problem: Malnutrition  Goal: Improved Nutritional Intake  Outcome: Ongoing, Progressing   Goal Outcome Evaluation:     Meets criteria for severe malnutrition. Adding Ensure Enlive chocolate BID B/D. Will CTM PO/ONS intake and wt.

## 2023-12-29 NOTE — CONSULTS
Date of Consultation: 23    Referral Provider: Dr. Wright.     Reason for Consultation: Elevated troponin    Encounter Provider: Reymundo Edwards MD    Group of Service: [facility]     Patient Name: Darlene Pfeiffer    :1940    Chief complaint: Shortness of breath    History of Present Illness:      Darlene Pfeiffer is a 83 year-old patient who follows with Dr. Orr. She has a past medical history significant for hypertension, hyperlipidemia, COPD (not on home oxygen), and lung cancer (follows with Dr. Kothari).     In 2023, she presented to the hospital with sudden shortness of breath,  An echocardiogram showed that she had developed cardiomyopathy with an ejection fraction of 39.2%, left ventricular global hypokinesis, aortic valve calcification with trivial valve regurgitation, and normal right-sided pressures. She was diuresed and started on goal-directed therapy for her heart failure though hypotension limited adequate therapy. Both of her oral chemotherapy medications, Tafinlar and Mekinist, had been discontinued and she was started on Alimta on 2023.     She was seen In the office on 2023 by Dr. Orr. At that time, she was found to be in atrial fibrillation with RVR. Her Toprol was increased and she was started on low dose Eliquis. At this point, she had already been scheduled for a Coronary CT angiogram. She was unable to have a Lexiscan Cardiac stress test due to hypoxia and atrial fibrillation. There was also discussion of a heart catheterization, which she  was also not keen on at that time.     On 2023, she had the Coronary CT angiogram done. It revealed a calcium score of 642 Agatston units and an ejection fraction of 27% with global hypokinesis. There was also evidence of multivessel coronary artery disease, most significant in the RCA. There was moderate disease in the mid LAD. Left main was felt to be free of significant obstructive disease.     She  had converted back to sinus rhythm by the time she was seen in the office on 12/21/2023. It is unclear if her cardiomyopathy is due to cardiotoxicity from chemotherapy, tachycardia from the atrial fibrillation, or ischemic based on the CT results. Ongoing medical therapy versus cardiac catheterization with intent for possible PCI were both discussed with her and her son. She was told that she is not a candidate for CABG. Dr. Orr talked to Dr. Kothari, who noted that patient has had disease progression on multiple medications. Her 1 year outlook is not optimal and Dr. Kothari overall favors medical therapy.     She presented to the emergency department on 12/28/2023 with complaints of acute on chronic shortness of breath that started the day prior to arrival. She has chronic shortness of breath in the setting of lung cancer and heart failure. Her son reported that she had oxygen saturations as low as 67% on room air.  She tested positive for COVID and was started on Paxlovid on 12/27/2023. Upon arrival to the emergency department, her oxygen saturations were 72% on room air.  HS troponins were 50 with a reflex of 53, significantly elevated BNP at 13,478, procalcitonin 0.33, and a hemoglobin of 11.2. Chest x-ray revealed new abnormal alveolar and interstitial opacities in the right lung that are suspicious for pneumonia. She received 80 mg IV Lasix in the emergency department.  She does not appear to be volume overloaded.      Coronary CTA 12/20/2023  Conclusions:  1.  Coronary artery disease as identified above.  Most significant disease appears to be in the RCA whether may be a severely stenotic ostial lesion, as well as distal lesion.  There is a moderate lesion in the mid LAD.  Severely elevated coronary calcifications per above.  2.  Trivial pericardial effusion  3.  Borderline dilated left ventricular cavity with reduced systolic function with a EF 27%  4.  Please see separately dictated radiology report for lung  examination findings.  5.  CAD-RADS 4a.  Probable obstructive disease of the RCA, although there are significant limitations in today's study per above.  If there is a high suspicion of cardiac ischemia, consider further evaluation with functional assessment or invasive coronary angiography.  Consider symptom guided anti-ischemic and preventative pharmacotherapy, as well as risk factor modification per guideline directed care.  Other treatments, including options for revascularization, should be considered per guideline directed care.     ECHO 11/14/23    Left ventricular systolic function is moderately decreased. Calculated left ventricular EF = 39.2% Abnormal global longitudinal LV strain (GLS) = -11.1%. Left ventricle strain data was reviewed by the physician and found to be accurate. The left ventricular cavity is moderately dilated. Left ventricular wall thickness is consistent with mild concentric hypertrophy. There is left ventricular global hypokinesis noted. Left ventricular diastolic function is consistent with (grade Ia w/high LAP) impaired relaxation.    The aortic valve is abnormal in structure. There is moderate calcification of the aortic valve mainly affecting the non-coronary cusp(s). The aortic valve appears trileaflet.    Trace mitral valve regurgitation is present.    Trace tricuspid valve regurgitation is present. Estimated right ventricular systolic pressure from tricuspid regurgitation is normal (<35 mmHg). Calculated right ventricular systolic pressure from tricuspid regurgitation is 29 mmHg.    Compared to prior study of 8/20/2023 LV systolic function, diastolic function and global longitudinal LV strain are all worse and abnormal.         Past Medical History:   Diagnosis Date    Allergic rhinitis     Aneurysm     Asthma     Cancer of floor of mouth 2014    Cerebral aneurysm     COPD (chronic obstructive pulmonary disease)     COVID 2020    Esophageal reflux     History of adenocarcinoma of  lung     History of anemia     History of carcinoma in situ of skin     History of lung cancer     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 from [facility]    History of squamous cell carcinoma     Tongue    Hyperlipidemia     Hypertension     since early 60s    Intractable back pain 11/29/2022    Low vitamin B12 level     Lung cancer     Systolic CHF, acute 11/14/2023    Thyromegaly     UTI (urinary tract infection)     Vitamin D deficiency          Past Surgical History:   Procedure Laterality Date    BRONCHOSCOPY Bilateral 10/17/2022    Procedure: BRONCHOSCOPY WITH FLUORO with biopsy and BAL;  Surgeon: India Flores MD;  Location: [facility] ENDOSCOPY;  Service: Pulmonary;  Laterality: Bilateral;  PRE/POST - lung mass    CHOLECYSTECTOMY  1999    COLONOSCOPY      EMBOLIZATION CEREBRAL Left 06/29/2022    Procedure: 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;  Surgeon: Bradley Dowell MD;  Location: [facility] HYBRID OR 18/19;  Service: Interventional Radiology;  Laterality: Left;    EYE SURGERY  11/24/2020    Took a muscle out of right eye    GLOSSECTOMY PARTIAL      less than one half tongue    LUNG SURGERY  2012    ORAL LESION EXCISION/BIOPSY      June and August 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    TUBAL ABDOMINAL LIGATION  1970    VENOUS ACCESS DEVICE (PORT) INSERTION N/A 12/12/2022    Procedure: MEDIPORT PLACEMENT;  Surgeon: Jason Villaseñor MD;  Location: [facility] MAIN OR;  Service: Vascular;  Laterality: N/A;         Allergies   Allergen Reactions    Sulfa Antibiotics Rash         No current facility-administered medications on file prior to encounter.     Current Outpatient Medications on File Prior to Encounter   Medication Sig Dispense Refill    apixaban (ELIQUIS) 2.5 MG tablet tablet Take 1 tablet by mouth 2 (Two) Times a Day. 60 tablet 3    atorvastatin (LIPITOR) 20 MG tablet TAKE 1 TABLET EVERY NIGHT (Patient taking differently: Take 1 tablet by mouth  Every Night.) 90 tablet 1    cetirizine (zyrTEC) 10 MG tablet Take 1 tablet by mouth Daily.      Cholecalciferol (Vitamin D3) 50 MCG (2000 UT) tablet Take 1,000 Units by mouth Daily. HOLDING FOR DOS      Cyanocobalamin (VITAMIN B12 PO) Take 1,000 mcg by mouth Daily.      dexAMETHasone (DECADRON) 4 MG tablet Take 1 tablet oral twice a day for 3 consecutive days beginning the day before chemotherapy and continue for 6 doses.Take with food. 6 tablet 5    folic acid (FOLVITE) 1 MG tablet Take 1 tablet by mouth Daily. Start at least 7 days prior to chemotherapy until at least 3 weeks after all chemotherapy. 30 tablet 6    furosemide (LASIX) 40 MG tablet Take 1 tablet by mouth Daily. 90 tablet 1    HYDROcodone Bit-Homatrop MBr (HYCODAN) 5-1.5 MG/5ML solution Take 5 mL by mouth Every 8 (Eight) Hours As Needed for Cough. 120 mL 0    ipratropium (ATROVENT) 0.06 % nasal spray 2 sprays into the nostril(s) as directed by provider 4 (Four) Times a Day. 15 mL 0    metoprolol succinate XL (TOPROL-XL) 25 MG 24 hr tablet Take 0.5 tablets by mouth Daily. (Patient taking differently: Take 1 tablet by mouth Daily.) 45 tablet 3    mirtazapine (REMERON) 7.5 MG tablet TAKE 1 TABLET BY MOUTH EVERY NIGHT AT BEDTIME 30 tablet 1    montelukast (SINGULAIR) 10 MG tablet Take 1 tablet by mouth Every Night.      Nirmatrelvir & Ritonavir, 300mg/100mg, (PAXLOVID) 20 x 150 MG & 10 x 100MG tablet therapy pack tablet Take 3 tablets by mouth 2 (Two) Times a Day. 30 each 0    ondansetron (ZOFRAN) 8 MG tablet Take 1 tablet by mouth 3 (Three) Times a Day As Needed for Nausea or Vomiting. 30 tablet 5    potassium chloride (K-DUR,KLOR-CON) 10 MEQ CR tablet TAKE 2 TABLETS BY MOUTH DAILY 60 tablet 1    predniSONE (DELTASONE) 10 MG tablet Take 1 tablet by mouth Daily.      promethazine-dextromethorphan (PROMETHAZINE-DM) 6.25-15 MG/5ML syrup Take 5 mL by mouth 4 (Four) Times a Day As Needed for Cough. 180 mL 0    sacubitril-valsartan (Entresto) 24-26 MG tablet  "Take 0.5 tablets by mouth 2 (Two) Times a Day. 30 tablet 3    zolpidem (AMBIEN) 5 MG tablet TAKE 1 TABLET BY MOUTH EVERY NIGHT AT BEDTIME AS NEEDED FOR SLEEP 30 tablet 0         Social History     Socioeconomic History    Marital status:    Tobacco Use    Smoking status: Former     Types: Cigarettes     Quit date: 2015     Years since quittin.9     Passive exposure: Never    Smokeless tobacco: Never    Tobacco comments:     less than a pack per day, no smoking since , Quit 2015   Vaping Use    Vaping Use: Never used   Substance and Sexual Activity    Alcohol use: Not Currently     Comment: Socially -A few times a month    Drug use: No    Sexual activity: Defer         Family History   Problem Relation Age of Onset    Ovarian cancer Mother          at age 27    No Known Problems Father     Ovarian cancer Sister     Colon cancer Brother     No Known Problems Other     Malig Hyperthermia Neg Hx        REVIEW OF SYSTEMS:   Pertinent positives are noted in the HPI above.  Otherwise, all other systems were reviewed, and are negative.     Objective:     Vitals:    23 0839 23 1347 23 1836 23 1921   BP: 105/74 97/61 120/64 109/62   BP Location: Right arm Right arm  Right arm   Patient Position: Lying Lying  Lying   Pulse:   87 87   Resp: 20 20  18   Temp: 98.3 °F (36.8 °C) 98.8 °F (37.1 °C)  98.2 °F (36.8 °C)   TempSrc: Oral Oral  Oral   SpO2:    91%   Weight:       Height:         Body mass index is 18.42 kg/m².  Flowsheet Rows      Flowsheet Row First Filed Value   Admission Height 160 cm (63\") Documented at 2023 1659   Admission Weight 47.4 kg (104 lb 8 oz) Documented at 2023 1659             General:    No acute distress, alert and oriented x4, pleasant                   Head:    Normocephalic, atraumatic.   Eyes:          Conjunctivae and sclerae normal, no icterus.   Throat:   No oral lesions, no thrush, oral mucosa moist.    Neck:   Supple, trachea " midline.   Lungs:     Mildly decreased breath sounds at the bases.    Heart:    Regular rhythm and normal rate.  II/VI SM RUSB.   Abdomen:     Soft, non-tender, non-distended, positive bowel sounds.    Extremities:   No clubbing, cyanosis, or edema.     Pulses:   Pulses palpable and equal bilaterally.    Skin:   No bleeding or rash.   Neuro:   Non-focal.  Moves all extremities well.    Psychiatric:   Normal mood and affect.             Lab Review:                Results from last 7 days   Lab Units 12/29/23  0623   SODIUM mmol/L 139   POTASSIUM mmol/L 4.0   CHLORIDE mmol/L 106   CO2 mmol/L 20.0*   BUN mg/dL 30*   CREATININE mg/dL 0.97   GLUCOSE mg/dL 142*   CALCIUM mg/dL 8.7     Results from last 7 days   Lab Units 12/28/23  1349 12/28/23  1131   HSTROP T ng/L 53* 50*     Results from last 7 days   Lab Units 12/29/23  0623   WBC 10*3/mm3 5.88   HEMOGLOBIN g/dL 12.0   HEMATOCRIT % 37.5   PLATELETS 10*3/mm3 320             Results from last 7 days   Lab Units 12/29/23  0623   MAGNESIUM mg/dL 1.8           EKG (reviewed by me personally):    12/28/23 12/21/23          Assessment:   1.  Acute hypoxic respiratory failure  2.  COVID-19 infection  3.  Possible secondary bacterial pneumonia  4.  Chronic systolic CHF, last EF 39% on 11/14/2023  5.  Coronary artery disease by coronary CT angiogram on 12/20/2023 (probable obstructive disease of the RCA, moderate disease of the LAD)  6.  Nonspecific high-sensitivity troponin elevation  7.  Paroxysmal atrial fibrillation  8.  Adenocarcinoma of the lung, status post left upper lobectomy    Plan:       The patient has not had any anginal symptoms.  I strongly suspect that the troponin elevation is nonspecific and not secondary to a type I or type II NSTEMI.  She has coronary artery disease on the CT angiogram of the chest recently, and there was discussion of proceeding with a heart catheterization because of her reduced ejection fraction.  However, I told her that this is not  the time to pursue a heart catheterization while she is hospitalized with COVID and pneumonia in the absence of other symptoms.  Also, there has been discussion about her long-term prognosis with the cancer and whether medical therapy would be better.  I will ultimately defer this to Dr. Orr and Dr. Escobar as an outpatient.  She is on Lipitor.    Do not see evidence of congestive heart failure.  She got a dose of IV Lasix, although I would keep her on her regular dose of 40 mg orally.  She is in sinus rhythm, and will continue on Eliquis at half dose while on Paxlovid.  She will also continue on the Entresto and Toprol-XL for her cardiomyopathy and history of congestive heart failure.    Cardiology will sign off for now.  Please call back if needed.    Thank you very much for this consult.    Angus Edwards MD

## 2023-12-29 NOTE — PLAN OF CARE
Alert, vss. 2L NC. Covid isolation. Port in place, accessed. Pt does not want it used for medication or labs. Assist x 1 to bathroom. Urine and MRSA swan sent. Cefepime given IV. No complaints of pain. Meds whole with ensure.       Problem: Adult Inpatient Plan of Care  Goal: Absence of Hospital-Acquired Illness or Injury  Intervention: Prevent Skin Injury  Recent Flowsheet Documentation  Taken 12/29/2023 0148 by Jennifer Lockett RN  Body Position:   left   side-lying  Taken 12/29/2023 0010 by Jennifer Lockett RN  Body Position:   left   side-lying  Skin Protection: adhesive use limited  Taken 12/28/2023 2157 by Jennifer Lockett RN  Body Position: sitting up in bed  Taken 12/28/2023 2015 by Jennifer Lockett RN  Body Position: position changed independently  Skin Protection: adhesive use limited   Goal Outcome Evaluation:

## 2023-12-30 LAB
ALBUMIN SERPL-MCNC: 3.1 G/DL (ref 3.5–5.2)
ALBUMIN/GLOB SERPL: 1.2 G/DL
ALP SERPL-CCNC: 52 U/L (ref 39–117)
ALT SERPL W P-5'-P-CCNC: 12 U/L (ref 1–33)
ANION GAP SERPL CALCULATED.3IONS-SCNC: 12 MMOL/L (ref 5–15)
AST SERPL-CCNC: 28 U/L (ref 1–32)
BILIRUB SERPL-MCNC: 0.2 MG/DL (ref 0–1.2)
BUN SERPL-MCNC: 40 MG/DL (ref 8–23)
BUN/CREAT SERPL: 52.6 (ref 7–25)
CALCIUM SPEC-SCNC: 8.5 MG/DL (ref 8.6–10.5)
CHLORIDE SERPL-SCNC: 107 MMOL/L (ref 98–107)
CO2 SERPL-SCNC: 22 MMOL/L (ref 22–29)
CREAT SERPL-MCNC: 0.76 MG/DL (ref 0.57–1)
CRP SERPL-MCNC: 5.2 MG/DL (ref 0–0.5)
EGFRCR SERPLBLD CKD-EPI 2021: 78.8 ML/MIN/1.73
GLOBULIN UR ELPH-MCNC: 2.6 GM/DL
GLUCOSE SERPL-MCNC: 142 MG/DL (ref 65–99)
POTASSIUM SERPL-SCNC: 4.1 MMOL/L (ref 3.5–5.2)
PROT SERPL-MCNC: 5.7 G/DL (ref 6–8.5)
SODIUM SERPL-SCNC: 141 MMOL/L (ref 136–145)

## 2023-12-30 PROCEDURE — 25010000002 CEFEPIME PER 500 MG: Performed by: STUDENT IN AN ORGANIZED HEALTH CARE EDUCATION/TRAINING PROGRAM

## 2023-12-30 PROCEDURE — 63710000001 DEXAMETHASONE PER 0.25 MG: Performed by: STUDENT IN AN ORGANIZED HEALTH CARE EDUCATION/TRAINING PROGRAM

## 2023-12-30 PROCEDURE — 80053 COMPREHEN METABOLIC PANEL: CPT | Performed by: STUDENT IN AN ORGANIZED HEALTH CARE EDUCATION/TRAINING PROGRAM

## 2023-12-30 PROCEDURE — 86140 C-REACTIVE PROTEIN: CPT | Performed by: STUDENT IN AN ORGANIZED HEALTH CARE EDUCATION/TRAINING PROGRAM

## 2023-12-30 PROCEDURE — 25810000003 SODIUM CHLORIDE 0.9 % SOLUTION 250 ML FLEX CONT: Performed by: STUDENT IN AN ORGANIZED HEALTH CARE EDUCATION/TRAINING PROGRAM

## 2023-12-30 PROCEDURE — 25010000002 REMDESIVIR 100 MG/20ML SOLUTION 1 EACH VIAL: Performed by: STUDENT IN AN ORGANIZED HEALTH CARE EDUCATION/TRAINING PROGRAM

## 2023-12-30 PROCEDURE — 36415 COLL VENOUS BLD VENIPUNCTURE: CPT | Performed by: STUDENT IN AN ORGANIZED HEALTH CARE EDUCATION/TRAINING PROGRAM

## 2023-12-30 RX ADMIN — SACUBITRIL AND VALSARTAN 0.5 TABLET: 24; 26 TABLET, FILM COATED ORAL at 20:44

## 2023-12-30 RX ADMIN — HYDROCODONE BITARTRATE AND HOMATROPINE METHYLBROMIDE 5 ML: 5; 1.5 SOLUTION ORAL at 12:02

## 2023-12-30 RX ADMIN — Medication 10 ML: at 20:47

## 2023-12-30 RX ADMIN — MONTELUKAST SODIUM 10 MG: 10 TABLET, FILM COATED ORAL at 20:47

## 2023-12-30 RX ADMIN — APIXABAN 2.5 MG: 2.5 TABLET, FILM COATED ORAL at 06:55

## 2023-12-30 RX ADMIN — MIRTAZAPINE 7.5 MG: 15 TABLET, FILM COATED ORAL at 20:46

## 2023-12-30 RX ADMIN — PANTOPRAZOLE SODIUM 40 MG: 40 TABLET, DELAYED RELEASE ORAL at 05:26

## 2023-12-30 RX ADMIN — FOLIC ACID 1 MG: 1 TABLET ORAL at 08:49

## 2023-12-30 RX ADMIN — ZOLPIDEM TARTRATE 5 MG: 5 TABLET ORAL at 22:18

## 2023-12-30 RX ADMIN — CEFEPIME 2000 MG: 2 INJECTION, POWDER, FOR SOLUTION INTRAVENOUS at 09:53

## 2023-12-30 RX ADMIN — APIXABAN 2.5 MG: 2.5 TABLET, FILM COATED ORAL at 18:01

## 2023-12-30 RX ADMIN — REMDESIVIR 100 MG: 100 INJECTION, POWDER, LYOPHILIZED, FOR SOLUTION INTRAVENOUS at 15:43

## 2023-12-30 RX ADMIN — SENNOSIDES AND DOCUSATE SODIUM 2 TABLET: 50; 8.6 TABLET ORAL at 22:18

## 2023-12-30 RX ADMIN — DEXAMETHASONE 6 MG: 4 TABLET ORAL at 08:49

## 2023-12-30 RX ADMIN — SACUBITRIL AND VALSARTAN 0.5 TABLET: 24; 26 TABLET, FILM COATED ORAL at 08:49

## 2023-12-30 RX ADMIN — SENNOSIDES AND DOCUSATE SODIUM 2 TABLET: 50; 8.6 TABLET ORAL at 09:53

## 2023-12-30 RX ADMIN — CEFEPIME 2000 MG: 2 INJECTION, POWDER, FOR SOLUTION INTRAVENOUS at 22:18

## 2023-12-30 RX ADMIN — FUROSEMIDE 40 MG: 20 TABLET ORAL at 08:49

## 2023-12-30 RX ADMIN — METOPROLOL SUCCINATE 25 MG: 25 TABLET, EXTENDED RELEASE ORAL at 08:49

## 2023-12-30 RX ADMIN — HYDROCODONE BITARTRATE AND HOMATROPINE METHYLBROMIDE 5 ML: 5; 1.5 SOLUTION ORAL at 22:18

## 2023-12-30 RX ADMIN — Medication 10 ML: at 08:51

## 2023-12-30 NOTE — PLAN OF CARE
Goal Outcome Evaluation:   Still with O2 / NC at 2 lpm tolerated. VS stable  a dose of cough medicine given per request. No other complaints made. Will continue to monitor.

## 2023-12-30 NOTE — PROGRESS NOTES
Name: Darlene Pfeiffer ADMIT: 2023   : 1940  PCP: Kehrer, Meredith Lea, MD    MRN: [id number] LOS: 2 days   AGE/SEX: 83 y.o. female  ROOM: [facility]     Subjective   Subjective   Laying in bed.  Nectars overnight.  Remains on 2 L nasal cannula, shortness of breath continues to improve, still coughing with less blood production now.  Denies fevers, chills.  Denies chest pain palpitation.      Review of Systems   As above  Objective   Objective   Vital Signs  Temp:  [97.3 °F (36.3 °C)-98.8 °F (37.1 °C)] 97.9 °F (36.6 °C)  Heart Rate:  [85-90] 90  Resp:  [18-20] 18  BP: ()/(52-64) 118/63  SpO2:  [90 %-95 %] 92 %  on  Flow (L/min):  [2] 2;   Device (Oxygen Therapy): room air  Body mass index is 18.72 kg/m².  Physical Exam    General: Alert and oriented x3, no acute distress  HEENT: Normocephalic, atraumatic  CV: Regular rate and rhythm, no murmurs rubs or gallops  Lungs: Diminished at bases, bilateral rhonchi, no wheezing, on 2 L nasal cannula  Abdomen: Soft, nontender, nondistended  Extremities: No significant peripheral edema , no cyanosis     Results Review     I reviewed the patient's new clinical results.  Results from last 7 days   Lab Units 23  0623 23  1131 23  0947   WBC 10*3/mm3 5.88 9.65 10.63   HEMOGLOBIN g/dL 12.0 11.2* 11.9*   PLATELETS 10*3/mm3 320 306 353     Results from last 7 days   Lab Units 23  0510 23  0623 23  1131 23  0947   SODIUM mmol/L 141 139 141 141   POTASSIUM mmol/L 4.1 4.0 4.1 4.3   CHLORIDE mmol/L 107 106 106 106   CO2 mmol/L 22.0 20.0* 25.0 24.9   BUN mg/dL 40* 30* 18 15   CREATININE mg/dL 0.76 0.97 0.74 0.69   GLUCOSE mg/dL 142* 142* 152* 132*   Estimated Creatinine Clearance: 43.9 mL/min (by C-G formula based on SCr of 0.76 mg/dL).  Results from last 7 days   Lab Units 23  0510 23  0623 23  1131 23  0947   ALBUMIN g/dL 3.1* 3.2* 3.7 3.4*   BILIRUBIN mg/dL 0.2 0.3 0.6 0.5   ALK PHOS U/L 52 64 75 73   AST  "(SGOT) U/L 28 26 29 32   ALT (SGPT) U/L 12 11 13 8     Results from last 7 days   Lab Units 12/30/23  0510 12/29/23  0623 12/28/23  1131 12/27/23  0947   CALCIUM mg/dL 8.5* 8.7 9.0 9.0   ALBUMIN g/dL 3.1* 3.2* 3.7 3.4*   MAGNESIUM mg/dL  --  1.8  --   --    PHOSPHORUS mg/dL  --  5.7*  --   --      Results from last 7 days   Lab Units 12/28/23  1131   PROCALCITONIN ng/mL 0.33*   LACTATE mmol/L 1.2     COVID19   Date Value Ref Range Status   12/28/2023 Detected (C) Not Detected - Ref. Range Final   12/27/2023 Detected (C) Not Detected - Ref. Range Final     No results found for: \"HGBA1C\", \"POCGLU\"        XR Chest 1 View  Narrative: Portable chest x-ray     HISTORY: Shortness of breath, COPD and lung cancer.     TECHNIQUE: Portable chest x-ray correlated with chest x-ray October 16, 2023.     FINDINGS: Right Mediport catheter as before. Elevated left hemidiaphragm  with abnormal opacity in the left suprahilar lung. Chronic pleural  thickening at the left apex.     Abnormal alveolar and interstitial opacity in the right upper lobe and  in the right lung base was not present previously and is suspicious for  pneumonia. No appreciable change in the appearance of the left lung.     Impression: Chronic changes in the left hemithorax as described. New  abnormal alveolar and interstitial opacities in the right lung which are  suspected to represent pneumonia.     This report was finalized on 12/28/2023 11:14 AM by Dr. Selvin Srinivasan M.D on Workstation: [id number]       Scheduled Medications  acetaminophen, 1,000 mg, Oral, Once  apixaban, 2.5 mg, Oral, BID  atorvastatin, 20 mg, Oral, Nightly  cefepime, 2,000 mg, Intravenous, Q12H  dexAMETHasone, 6 mg, Oral, Daily With Breakfast  folic acid, 1 mg, Oral, Daily  furosemide, 40 mg, Oral, Daily  metoprolol succinate XL, 25 mg, Oral, Daily  mirtazapine, 7.5 mg, Oral, Nightly  montelukast, 10 mg, Oral, Nightly  pantoprazole, 40 mg, Oral, Q AM  remdesivir, 100 mg, Intravenous, " Q24H  sacubitril-valsartan, 0.5 tablet, Oral, BID  senna-docusate sodium, 2 tablet, Oral, BID  sodium chloride, 10 mL, Intravenous, Q12H    Infusions     Diet  Diet: Regular/House Diet; Texture: Regular Texture (IDDSI 7); Fluid Consistency: Thin (IDDSI 0)    I have personally reviewed     [x]  Laboratory   []  Microbiology   []  Radiology   [x]  EKG/Telemetry  []  Cardiology/Vascular   []  Pathology    []  Records       Assessment/Plan     Active Hospital Problems    Diagnosis  POA    **Acute hypoxemic respiratory failure [J96.01]  Yes    Chronic systolic CHF (congestive heart failure) [I50.22]  Unknown    Paroxysmal atrial fibrillation [I48.0]  Unknown    COVID-19 virus infection [U07.1]  Unknown    Lung cancer [C34.90]  Yes    Essential hypertension [I10]  Yes    Hyperlipidemia [E78.5]  Yes      Resolved Hospital Problems   No resolved problems to display.       Patient is a 83-year-old female with a history of COPD, hypertension, hyperlipidemia, intracranial aneurysm left PCA.  Lung cancer on chemotherapy, coronary artery calcification, paroxysmal atrial fibrillation, presented to the ED complaining of acute on chronic shortness of breath .         Acute hypoxic and respiratory failure secondary to COVID-19 and possible superimposed bacterial pneumonia  -Oxygen saturation as low as 73% on room air on arrival.  -X-ray showed new abnormal alveolar and interstitial opacities in the right lung which are suspected to represent pneumonia.  -WBC was normal.  Procalcitonin mildly elevated 0.33.  Tmax was 100.5 on admission.  -Continue IV remdesivir for 5 days, dexamethasone for 10 days  -Continue IV cefepime for 5 days  Strep and Legionella negative, MRSA nares negative.  Sputum culture negative.  -On Eliquis for anticoagulation  -Monitor inflammatory markers CRP improving  -Wean off O2 as able, on 2 L nasal cannula        Paroxysmal atrial fibrillation: Continue metoprolol XL 25 mg daily, Eliquis.      Chronic systolic  heart failure: Last echocardiogram 11/14/2023 showed EF 39.2%, grade 1 diastolic dysfunction.  Continue home metoprolol, Entresto.       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 Escobar.  on chemotherapy, last chemotherapy 12/27.  Hematology oncology evaluated, appreciate recs.  Will need follow-up outpatient.    Insomnia: Continue nightly Ambien as needed for sleep.    Hyperglycemia: Secondary to steroids.  Last hemoglobin A1c 5.6 on 11/15/2023.  Blood glucose stable on BMP.      CODE STATUS: Full code  DVT prophylax: Eliquis  Disposition: Likely home, in 1-2  days if continues to improve, weaned down to room air.      Copied text in this note has been reviewed and is accurate as of 12/30/23.         Dictated utilizing Dragon dictation        Perla Wright MD  [facility]  12/30/23  12:59 EST

## 2023-12-30 NOTE — PLAN OF CARE
Goal Outcome Evaluation:  Plan of Care Reviewed With: patient        Progress: no change  Outcome Evaluation: Vitals stable. Pt maintaining O2 sats on room air while at rest. Pt requiring 2L NC with activity. IV abx cont'd. Pt needs met and questions answered. Will continue to monitor.

## 2023-12-31 LAB
ALBUMIN SERPL-MCNC: 3.3 G/DL (ref 3.5–5.2)
ALBUMIN/GLOB SERPL: 1.6 G/DL
ALP SERPL-CCNC: 50 U/L (ref 39–117)
ALT SERPL W P-5'-P-CCNC: 11 U/L (ref 1–33)
ANION GAP SERPL CALCULATED.3IONS-SCNC: 13 MMOL/L (ref 5–15)
AST SERPL-CCNC: 25 U/L (ref 1–32)
BILIRUB SERPL-MCNC: 0.2 MG/DL (ref 0–1.2)
BUN SERPL-MCNC: 33 MG/DL (ref 8–23)
BUN/CREAT SERPL: 47.8 (ref 7–25)
BURR CELLS BLD QL SMEAR: ABNORMAL
CALCIUM SPEC-SCNC: 8.5 MG/DL (ref 8.6–10.5)
CHLORIDE SERPL-SCNC: 110 MMOL/L (ref 98–107)
CO2 SERPL-SCNC: 24 MMOL/L (ref 22–29)
CREAT SERPL-MCNC: 0.69 MG/DL (ref 0.57–1)
CRP SERPL-MCNC: 3.36 MG/DL (ref 0–0.5)
DEPRECATED RDW RBC AUTO: 47.1 FL (ref 37–54)
EGFRCR SERPLBLD CKD-EPI 2021: 87.3 ML/MIN/1.73
ERYTHROCYTE [DISTWIDTH] IN BLOOD BY AUTOMATED COUNT: 14.2 % (ref 12.3–15.4)
FERRITIN SERPL-MCNC: 371 NG/ML (ref 13–150)
GLOBULIN UR ELPH-MCNC: 2.1 GM/DL
GLUCOSE SERPL-MCNC: 95 MG/DL (ref 65–99)
HCT VFR BLD AUTO: 33.4 % (ref 34–46.6)
HGB BLD-MCNC: 10.7 G/DL (ref 12–15.9)
LYMPHOCYTES # BLD MANUAL: 0.56 10*3/MM3 (ref 0.7–3.1)
MAGNESIUM SERPL-MCNC: 2.1 MG/DL (ref 1.6–2.4)
MCH RBC QN AUTO: 29.6 PG (ref 26.6–33)
MCHC RBC AUTO-ENTMCNC: 32 G/DL (ref 31.5–35.7)
MCV RBC AUTO: 92.3 FL (ref 79–97)
NEUTROPHILS # BLD AUTO: 8.7 10*3/MM3 (ref 1.7–7)
NEUTROPHILS NFR BLD MANUAL: 94 % (ref 42.7–76)
NRBC BLD AUTO-RTO: 0 /100 WBC (ref 0–0.2)
OVALOCYTES BLD QL SMEAR: ABNORMAL
PLAT MORPH BLD: NORMAL
PLATELET # BLD AUTO: 245 10*3/MM3 (ref 140–450)
PMV BLD AUTO: 10.2 FL (ref 6–12)
POIKILOCYTOSIS BLD QL SMEAR: ABNORMAL
POTASSIUM SERPL-SCNC: 3.2 MMOL/L (ref 3.5–5.2)
PROT SERPL-MCNC: 5.4 G/DL (ref 6–8.5)
RBC # BLD AUTO: 3.62 10*6/MM3 (ref 3.77–5.28)
SODIUM SERPL-SCNC: 147 MMOL/L (ref 136–145)
VARIANT LYMPHS NFR BLD MANUAL: 6 % (ref 19.6–45.3)
WBC MORPH BLD: NORMAL
WBC NRBC COR # BLD AUTO: 9.26 10*3/MM3 (ref 3.4–10.8)

## 2023-12-31 PROCEDURE — 25010000002 REMDESIVIR 100 MG/20ML SOLUTION 1 EACH VIAL: Performed by: STUDENT IN AN ORGANIZED HEALTH CARE EDUCATION/TRAINING PROGRAM

## 2023-12-31 PROCEDURE — 63710000001 DEXAMETHASONE PER 0.25 MG: Performed by: STUDENT IN AN ORGANIZED HEALTH CARE EDUCATION/TRAINING PROGRAM

## 2023-12-31 PROCEDURE — 80053 COMPREHEN METABOLIC PANEL: CPT | Performed by: STUDENT IN AN ORGANIZED HEALTH CARE EDUCATION/TRAINING PROGRAM

## 2023-12-31 PROCEDURE — 85007 BL SMEAR W/DIFF WBC COUNT: CPT | Performed by: STUDENT IN AN ORGANIZED HEALTH CARE EDUCATION/TRAINING PROGRAM

## 2023-12-31 PROCEDURE — 83735 ASSAY OF MAGNESIUM: CPT | Performed by: STUDENT IN AN ORGANIZED HEALTH CARE EDUCATION/TRAINING PROGRAM

## 2023-12-31 PROCEDURE — 85025 COMPLETE CBC W/AUTO DIFF WBC: CPT | Performed by: STUDENT IN AN ORGANIZED HEALTH CARE EDUCATION/TRAINING PROGRAM

## 2023-12-31 PROCEDURE — 25010000002 CEFEPIME PER 500 MG: Performed by: STUDENT IN AN ORGANIZED HEALTH CARE EDUCATION/TRAINING PROGRAM

## 2023-12-31 PROCEDURE — 86140 C-REACTIVE PROTEIN: CPT | Performed by: STUDENT IN AN ORGANIZED HEALTH CARE EDUCATION/TRAINING PROGRAM

## 2023-12-31 PROCEDURE — 25810000003 SODIUM CHLORIDE 0.9 % SOLUTION 250 ML FLEX CONT: Performed by: STUDENT IN AN ORGANIZED HEALTH CARE EDUCATION/TRAINING PROGRAM

## 2023-12-31 PROCEDURE — 82728 ASSAY OF FERRITIN: CPT | Performed by: STUDENT IN AN ORGANIZED HEALTH CARE EDUCATION/TRAINING PROGRAM

## 2023-12-31 RX ORDER — BENZONATATE 100 MG/1
100 CAPSULE ORAL 3 TIMES DAILY PRN
Status: DISCONTINUED | OUTPATIENT
Start: 2023-12-31 | End: 2024-01-02 | Stop reason: HOSPADM

## 2023-12-31 RX ORDER — POTASSIUM CHLORIDE 750 MG/1
40 TABLET, FILM COATED, EXTENDED RELEASE ORAL EVERY 4 HOURS
Status: COMPLETED | OUTPATIENT
Start: 2023-12-31 | End: 2023-12-31

## 2023-12-31 RX ORDER — CALCIUM CARBONATE 500 MG/1
2 TABLET, CHEWABLE ORAL 3 TIMES DAILY PRN
Status: DISCONTINUED | OUTPATIENT
Start: 2023-12-31 | End: 2024-01-02 | Stop reason: HOSPADM

## 2023-12-31 RX ORDER — GUAIFENESIN 600 MG/1
600 TABLET, EXTENDED RELEASE ORAL EVERY 12 HOURS SCHEDULED
Status: DISCONTINUED | OUTPATIENT
Start: 2023-12-31 | End: 2024-01-02 | Stop reason: HOSPADM

## 2023-12-31 RX ADMIN — SACUBITRIL AND VALSARTAN 0.5 TABLET: 24; 26 TABLET, FILM COATED ORAL at 08:21

## 2023-12-31 RX ADMIN — POTASSIUM CHLORIDE 40 MEQ: 750 TABLET, EXTENDED RELEASE ORAL at 08:21

## 2023-12-31 RX ADMIN — SACUBITRIL AND VALSARTAN 0.5 TABLET: 24; 26 TABLET, FILM COATED ORAL at 21:20

## 2023-12-31 RX ADMIN — MIRTAZAPINE 7.5 MG: 15 TABLET, FILM COATED ORAL at 21:18

## 2023-12-31 RX ADMIN — Medication 10 ML: at 21:34

## 2023-12-31 RX ADMIN — CEFEPIME 2000 MG: 2 INJECTION, POWDER, FOR SOLUTION INTRAVENOUS at 10:04

## 2023-12-31 RX ADMIN — ANTACID TABLETS 2 TABLET: 500 TABLET, CHEWABLE ORAL at 12:25

## 2023-12-31 RX ADMIN — ZOLPIDEM TARTRATE 5 MG: 5 TABLET ORAL at 21:19

## 2023-12-31 RX ADMIN — BENZONATATE 100 MG: 100 CAPSULE ORAL at 22:41

## 2023-12-31 RX ADMIN — METOPROLOL SUCCINATE 25 MG: 25 TABLET, EXTENDED RELEASE ORAL at 08:21

## 2023-12-31 RX ADMIN — PANTOPRAZOLE SODIUM 40 MG: 40 TABLET, DELAYED RELEASE ORAL at 06:35

## 2023-12-31 RX ADMIN — REMDESIVIR 100 MG: 100 INJECTION, POWDER, LYOPHILIZED, FOR SOLUTION INTRAVENOUS at 18:01

## 2023-12-31 RX ADMIN — FUROSEMIDE 40 MG: 20 TABLET ORAL at 08:21

## 2023-12-31 RX ADMIN — FOLIC ACID 1 MG: 1 TABLET ORAL at 08:21

## 2023-12-31 RX ADMIN — APIXABAN 2.5 MG: 2.5 TABLET, FILM COATED ORAL at 18:01

## 2023-12-31 RX ADMIN — POTASSIUM CHLORIDE 40 MEQ: 750 TABLET, EXTENDED RELEASE ORAL at 12:25

## 2023-12-31 RX ADMIN — SENNOSIDES AND DOCUSATE SODIUM 2 TABLET: 50; 8.6 TABLET ORAL at 08:21

## 2023-12-31 RX ADMIN — DEXAMETHASONE 6 MG: 4 TABLET ORAL at 08:21

## 2023-12-31 RX ADMIN — Medication 10 ML: at 08:22

## 2023-12-31 RX ADMIN — CEFEPIME 2000 MG: 2 INJECTION, POWDER, FOR SOLUTION INTRAVENOUS at 21:33

## 2023-12-31 RX ADMIN — GUAIFENESIN 600 MG: 600 TABLET, EXTENDED RELEASE ORAL at 08:21

## 2023-12-31 RX ADMIN — APIXABAN 2.5 MG: 2.5 TABLET, FILM COATED ORAL at 06:35

## 2023-12-31 RX ADMIN — GUAIFENESIN 600 MG: 600 TABLET, EXTENDED RELEASE ORAL at 21:18

## 2023-12-31 RX ADMIN — MONTELUKAST SODIUM 10 MG: 10 TABLET, FILM COATED ORAL at 21:18

## 2023-12-31 NOTE — PLAN OF CARE
Goal Outcome Evaluation:  Plan of Care Reviewed With: patient        Progress: no change  Outcome Evaluation: Vitals stable. Pt maintaining O2 sat on 2L NC. Pt upadlib. IV abx and remdesivir cont'd. prn tums given. Pt needs met and questions answered. Will continue to monitor.

## 2023-12-31 NOTE — PROGRESS NOTES
Name: Darlene Pfeiffer ADMIT: 2023   : 1940  PCP: Kehrer, Meredith Lea, MD    MRN: [id number] LOS: 3 days   AGE/SEX: 83 y.o. female  ROOM: [facility]     Subjective   Subjective   No new events overnight.  Laying in bed, reports she is feeling 100% better compared to admission, However continues to have frequent cough, minimal sputum production.  Shortness of breath with exertion, but not at rest, overall improving.  No chest pain or palpitations.  No fevers no chills.    Review of Systems   As above  Objective   Objective   Vital Signs  Temp:  [97.9 °F (36.6 °C)-98.8 °F (37.1 °C)] 98.6 °F (37 °C)  Heart Rate:  [79-90] 79  Resp:  [18-20] 20  BP: (103-132)/(49-69) 132/69  SpO2:  [90 %-95 %] 94 %  on  Flow (L/min):  [2] 2;   Device (Oxygen Therapy): nasal cannula  Body mass index is 18.6 kg/m².  Physical Exam    General: Alert and in bed, not in distress,  HEENT: Normocephalic, atraumatic  CV: Regular rate and rhythm, no murmurs rubs or gallops  Lungs: Bilateral rhonchi, diminished but overall air movement improved compared to prior.  On 2 L nasal cannula.  Abdomen: Soft, nontender, nondistended  Extremities: No significant peripheral edema , no cyanosis     Results Review     I reviewed the patient's new clinical results.  Results from last 7 days   Lab Units 23  0409 23  0623 23  1131 23  0947   WBC 10*3/mm3 9.26 5.88 9.65 10.63   HEMOGLOBIN g/dL 10.7* 12.0 11.2* 11.9*   PLATELETS 10*3/mm3 245 320 306 353     Results from last 7 days   Lab Units 23  0409 23  0510 23  0623 23  1131   SODIUM mmol/L 147* 141 139 141   POTASSIUM mmol/L 3.2* 4.1 4.0 4.1   CHLORIDE mmol/L 110* 107 106 106   CO2 mmol/L 24.0 22.0 20.0* 25.0   BUN mg/dL 33* 40* 30* 18   CREATININE mg/dL 0.69 0.76 0.97 0.74   GLUCOSE mg/dL 95 142* 142* 152*   Estimated Creatinine Clearance: 48.1 mL/min (by C-G formula based on SCr of 0.69 mg/dL).  Results from last 7 days   Lab Units 23  9643  "12/30/23  0510 12/29/23  0623 12/28/23  1131   ALBUMIN g/dL 3.3* 3.1* 3.2* 3.7   BILIRUBIN mg/dL 0.2 0.2 0.3 0.6   ALK PHOS U/L 50 52 64 75   AST (SGOT) U/L 25 28 26 29   ALT (SGPT) U/L 11 12 11 13     Results from last 7 days   Lab Units 12/31/23  0409 12/30/23  0510 12/29/23  0623 12/28/23  1131   CALCIUM mg/dL 8.5* 8.5* 8.7 9.0   ALBUMIN g/dL 3.3* 3.1* 3.2* 3.7   MAGNESIUM mg/dL 2.1  --  1.8  --    PHOSPHORUS mg/dL  --   --  5.7*  --      Results from last 7 days   Lab Units 12/28/23  1131   PROCALCITONIN ng/mL 0.33*   LACTATE mmol/L 1.2     COVID19   Date Value Ref Range Status   12/28/2023 Detected (C) Not Detected - Ref. Range Final   12/27/2023 Detected (C) Not Detected - Ref. Range Final     No results found for: \"HGBA1C\", \"POCGLU\"        XR Chest 1 View  Narrative: Portable chest x-ray     HISTORY: Shortness of breath, COPD and lung cancer.     TECHNIQUE: Portable chest x-ray correlated with chest x-ray October 16, 2023.     FINDINGS: Right Mediport catheter as before. Elevated left hemidiaphragm  with abnormal opacity in the left suprahilar lung. Chronic pleural  thickening at the left apex.     Abnormal alveolar and interstitial opacity in the right upper lobe and  in the right lung base was not present previously and is suspicious for  pneumonia. No appreciable change in the appearance of the left lung.     Impression: Chronic changes in the left hemithorax as described. New  abnormal alveolar and interstitial opacities in the right lung which are  suspected to represent pneumonia.     This report was finalized on 12/28/2023 11:14 AM by Dr. Selvin Srinivasan M.D on Workstation: [id number]       Scheduled Medications  apixaban, 2.5 mg, Oral, BID  atorvastatin, 20 mg, Oral, Nightly  cefepime, 2,000 mg, Intravenous, Q12H  dexAMETHasone, 6 mg, Oral, Daily With Breakfast  folic acid, 1 mg, Oral, Daily  furosemide, 40 mg, Oral, Daily  guaiFENesin, 600 mg, Oral, Q12H  metoprolol succinate XL, 25 mg, Oral, " Daily  mirtazapine, 7.5 mg, Oral, Nightly  montelukast, 10 mg, Oral, Nightly  pantoprazole, 40 mg, Oral, Q AM  potassium chloride, 40 mEq, Oral, Q4H  remdesivir, 100 mg, Intravenous, Q24H  sacubitril-valsartan, 0.5 tablet, Oral, BID  senna-docusate sodium, 2 tablet, Oral, BID  sodium chloride, 10 mL, Intravenous, Q12H    Infusions     Diet  Diet: Regular/House Diet; Texture: Regular Texture (IDDSI 7); Fluid Consistency: Thin (IDDSI 0)    I have personally reviewed     [x]  Laboratory   []  Microbiology   []  Radiology   []  EKG/Telemetry  []  Cardiology/Vascular   []  Pathology    []  Records       Assessment/Plan     Active Hospital Problems    Diagnosis  POA    **Acute hypoxemic respiratory failure [J96.01]  Yes    Chronic systolic CHF (congestive heart failure) [I50.22]  Unknown    Paroxysmal atrial fibrillation [I48.0]  Unknown    COVID-19 virus infection [U07.1]  Unknown    Lung cancer [C34.90]  Yes    Essential hypertension [I10]  Yes    Hyperlipidemia [E78.5]  Yes      Resolved Hospital Problems   No resolved problems to display.       Patient is a 83-year-old female with a history of COPD, hypertension, hyperlipidemia, intracranial aneurysm left PCA.  Lung cancer on chemotherapy, coronary artery calcification, paroxysmal atrial fibrillation, presented to the ED complaining of acute on chronic shortness of breath .         Acute hypoxic and respiratory failure secondary to COVID-19 and possible superimposed bacterial pneumonia  -Oxygen saturation as low as 73% on room air on arrival.  -X-ray showed new abnormal alveolar and interstitial opacities in the right lung which are suspected to represent pneumonia.  -WBC was normal.  Procalcitonin mildly elevated 0.33.  Tmax was 100.5 on admission.  -Continue IV remdesivir for 5 days, dexamethasone for 10 days  -Continue IV cefepime for 5 days  Strep and Legionella negative, MRSA nares negative.  Sputum culture negative.  -On Eliquis for anticoagulation  -Monitor  inflammatory markers .CRP improving, ferritin slightly up today.  -Scheduled Mucinex, as needed Tessalon Perles,  -Incentive spirometer, flutter valve  --Patient maintaining O2 sats on room air, however requires oxygen with exertion.  - Will need walk oximetry prior to discharge     Paroxysmal atrial fibrillation: Continue metoprolol XL 25 mg daily, Eliquis.      Chronic systolic heart failure: Last echocardiogram 11/14/2023 showed EF 39.2%, grade 1 diastolic dysfunction.  Continue home metoprolol, Entresto.       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 Escobar.  on chemotherapy, last chemotherapy 12/27.  Hematology oncology evaluated, appreciate recs.  Will need follow-up outpatient.    Insomnia: Continue nightly Ambien as needed for sleep.    Hyperglycemia: Secondary to steroids.  Last hemoglobin A1c 5.6 on 11/15/2023.  Blood glucose stable on BMP.      Hypokalemia: Potassium 3.2.  Potassium replacement ordered.  Magnesium normal.  Repeat potassium in the morning.    Hypernatremia: Sodium 147, patient encouraged to increase p.o. fluid intake.  Repeat sodium in the morning.      CODE STATUS: Full code  DVT prophylax: Eliquis  Disposition: Likely home, likely tomorrow, may need home oxygen at discharge.      Copied text in this note has been reviewed and is accurate as of 12/31/23.         Dictated utilizing Dragon dictation        Perla Wright MD  [facility]  12/31/23  08:14 EST

## 2024-01-01 ENCOUNTER — TELEPHONE (OUTPATIENT)
Dept: FAMILY MEDICINE CLINIC | Facility: CLINIC | Age: 84
End: 2024-01-01

## 2024-01-01 LAB
ALBUMIN SERPL-MCNC: 3 G/DL (ref 3.5–5.2)
ALBUMIN/GLOB SERPL: 1.1 G/DL
ALP SERPL-CCNC: 57 U/L (ref 39–117)
ALT SERPL W P-5'-P-CCNC: 14 U/L (ref 1–33)
ANION GAP SERPL CALCULATED.3IONS-SCNC: 11.4 MMOL/L (ref 5–15)
AST SERPL-CCNC: 26 U/L (ref 1–32)
BASOPHILS # BLD AUTO: 0 10*3/MM3 (ref 0–0.2)
BASOPHILS NFR BLD AUTO: 0 % (ref 0–1.5)
BILIRUB SERPL-MCNC: 0.6 MG/DL (ref 0–1.2)
BUN SERPL-MCNC: 24 MG/DL (ref 8–23)
BUN/CREAT SERPL: 43.6 (ref 7–25)
CALCIUM SPEC-SCNC: 8.7 MG/DL (ref 8.6–10.5)
CHLORIDE SERPL-SCNC: 109 MMOL/L (ref 98–107)
CO2 SERPL-SCNC: 22.6 MMOL/L (ref 22–29)
CREAT SERPL-MCNC: 0.55 MG/DL (ref 0.57–1)
CRP SERPL-MCNC: 8.79 MG/DL (ref 0–0.5)
DEPRECATED RDW RBC AUTO: 44 FL (ref 37–54)
EGFRCR SERPLBLD CKD-EPI 2021: 92.2 ML/MIN/1.73
EOSINOPHIL # BLD AUTO: 0 10*3/MM3 (ref 0–0.4)
EOSINOPHIL NFR BLD AUTO: 0 % (ref 0.3–6.2)
ERYTHROCYTE [DISTWIDTH] IN BLOOD BY AUTOMATED COUNT: 13.8 % (ref 12.3–15.4)
GLOBULIN UR ELPH-MCNC: 2.7 GM/DL
GLUCOSE SERPL-MCNC: 102 MG/DL (ref 65–99)
HCT VFR BLD AUTO: 34.7 % (ref 34–46.6)
HGB BLD-MCNC: 10.9 G/DL (ref 12–15.9)
IMM GRANULOCYTES # BLD AUTO: 0.03 10*3/MM3 (ref 0–0.05)
IMM GRANULOCYTES NFR BLD AUTO: 0.4 % (ref 0–0.5)
LYMPHOCYTES # BLD AUTO: 1.1 10*3/MM3 (ref 0.7–3.1)
LYMPHOCYTES NFR BLD AUTO: 14.7 % (ref 19.6–45.3)
MCH RBC QN AUTO: 28.2 PG (ref 26.6–33)
MCHC RBC AUTO-ENTMCNC: 31.4 G/DL (ref 31.5–35.7)
MCV RBC AUTO: 89.9 FL (ref 79–97)
MONOCYTES # BLD AUTO: 0.03 10*3/MM3 (ref 0.1–0.9)
MONOCYTES NFR BLD AUTO: 0.4 % (ref 5–12)
NEUTROPHILS NFR BLD AUTO: 6.32 10*3/MM3 (ref 1.7–7)
NEUTROPHILS NFR BLD AUTO: 84.5 % (ref 42.7–76)
NRBC BLD AUTO-RTO: 0 /100 WBC (ref 0–0.2)
PLATELET # BLD AUTO: 228 10*3/MM3 (ref 140–450)
PMV BLD AUTO: 10.3 FL (ref 6–12)
POTASSIUM SERPL-SCNC: 3.5 MMOL/L (ref 3.5–5.2)
PROT SERPL-MCNC: 5.7 G/DL (ref 6–8.5)
RBC # BLD AUTO: 3.86 10*6/MM3 (ref 3.77–5.28)
SODIUM SERPL-SCNC: 143 MMOL/L (ref 136–145)
WBC NRBC COR # BLD AUTO: 7.48 10*3/MM3 (ref 3.4–10.8)

## 2024-01-01 PROCEDURE — 25010000002 REMDESIVIR 100 MG/20ML SOLUTION 1 EACH VIAL: Performed by: STUDENT IN AN ORGANIZED HEALTH CARE EDUCATION/TRAINING PROGRAM

## 2024-01-01 PROCEDURE — 86140 C-REACTIVE PROTEIN: CPT | Performed by: STUDENT IN AN ORGANIZED HEALTH CARE EDUCATION/TRAINING PROGRAM

## 2024-01-01 PROCEDURE — 36415 COLL VENOUS BLD VENIPUNCTURE: CPT | Performed by: STUDENT IN AN ORGANIZED HEALTH CARE EDUCATION/TRAINING PROGRAM

## 2024-01-01 PROCEDURE — 85025 COMPLETE CBC W/AUTO DIFF WBC: CPT | Performed by: STUDENT IN AN ORGANIZED HEALTH CARE EDUCATION/TRAINING PROGRAM

## 2024-01-01 PROCEDURE — 80053 COMPREHEN METABOLIC PANEL: CPT | Performed by: STUDENT IN AN ORGANIZED HEALTH CARE EDUCATION/TRAINING PROGRAM

## 2024-01-01 PROCEDURE — 63710000001 DEXAMETHASONE PER 0.25 MG: Performed by: STUDENT IN AN ORGANIZED HEALTH CARE EDUCATION/TRAINING PROGRAM

## 2024-01-01 PROCEDURE — 25010000002 CEFEPIME PER 500 MG: Performed by: STUDENT IN AN ORGANIZED HEALTH CARE EDUCATION/TRAINING PROGRAM

## 2024-01-01 PROCEDURE — 25810000003 SODIUM CHLORIDE 0.9 % SOLUTION 250 ML FLEX CONT: Performed by: STUDENT IN AN ORGANIZED HEALTH CARE EDUCATION/TRAINING PROGRAM

## 2024-01-01 RX ADMIN — ZOLPIDEM TARTRATE 5 MG: 5 TABLET ORAL at 21:21

## 2024-01-01 RX ADMIN — HYDROCODONE BITARTRATE AND HOMATROPINE METHYLBROMIDE 5 ML: 5; 1.5 SOLUTION ORAL at 21:21

## 2024-01-01 RX ADMIN — DEXAMETHASONE 6 MG: 4 TABLET ORAL at 10:06

## 2024-01-01 RX ADMIN — SACUBITRIL AND VALSARTAN 0.5 TABLET: 24; 26 TABLET, FILM COATED ORAL at 10:07

## 2024-01-01 RX ADMIN — Medication 10 ML: at 21:35

## 2024-01-01 RX ADMIN — REMDESIVIR 100 MG: 100 INJECTION, POWDER, LYOPHILIZED, FOR SOLUTION INTRAVENOUS at 16:55

## 2024-01-01 RX ADMIN — GUAIFENESIN 600 MG: 600 TABLET, EXTENDED RELEASE ORAL at 10:06

## 2024-01-01 RX ADMIN — MONTELUKAST SODIUM 10 MG: 10 TABLET, FILM COATED ORAL at 21:20

## 2024-01-01 RX ADMIN — FOLIC ACID 1 MG: 1 TABLET ORAL at 10:07

## 2024-01-01 RX ADMIN — CEFEPIME 2000 MG: 2 INJECTION, POWDER, FOR SOLUTION INTRAVENOUS at 21:21

## 2024-01-01 RX ADMIN — Medication 10 ML: at 10:08

## 2024-01-01 RX ADMIN — MIRTAZAPINE 7.5 MG: 15 TABLET, FILM COATED ORAL at 21:22

## 2024-01-01 RX ADMIN — CEFEPIME 2000 MG: 2 INJECTION, POWDER, FOR SOLUTION INTRAVENOUS at 10:08

## 2024-01-01 RX ADMIN — APIXABAN 2.5 MG: 2.5 TABLET, FILM COATED ORAL at 06:27

## 2024-01-01 RX ADMIN — GUAIFENESIN 600 MG: 600 TABLET, EXTENDED RELEASE ORAL at 21:20

## 2024-01-01 RX ADMIN — APIXABAN 2.5 MG: 2.5 TABLET, FILM COATED ORAL at 18:18

## 2024-01-01 RX ADMIN — HYDROCODONE BITARTRATE AND HOMATROPINE METHYLBROMIDE 5 ML: 5; 1.5 SOLUTION ORAL at 10:17

## 2024-01-01 RX ADMIN — PANTOPRAZOLE SODIUM 40 MG: 40 TABLET, DELAYED RELEASE ORAL at 06:27

## 2024-01-01 RX ADMIN — BENZONATATE 100 MG: 100 CAPSULE ORAL at 14:44

## 2024-01-01 RX ADMIN — SACUBITRIL AND VALSARTAN 0.5 TABLET: 24; 26 TABLET, FILM COATED ORAL at 21:20

## 2024-01-01 NOTE — PLAN OF CARE
Goal Outcome Evaluation:  Plan of Care Reviewed With: patient           Outcome Evaluation: Confused at times. Pulled lines multiple times including IV  line. Frustrated and irritable with care of plan.Back on 2L NC.Keeps on taking NC off her nose.

## 2024-01-01 NOTE — PLAN OF CARE
Problem: Adult Inpatient Plan of Care  Goal: Plan of Care Review  Outcome: Ongoing, Progressing   Goal Outcome Evaluation:   Vss.Prn hycodan and tessalon perle given as ordered for cough.Remains on 02 @ 2l n/c.No c/o pain or n/v.Remains in enhanced contact/droplet precautions.

## 2024-01-01 NOTE — PROGRESS NOTES
" LOS: 4 days     Name: Darlene Pfeiffer  Age: 83 y.o.  Sex: female  :  1940  MRN: [id number]         Primary Care Physician: Kehrer, Meredith Lea, MD    Subjective   Subjective  Upset that she is not going home today.  Presently on 2 L.  Denies shortness of breath.  Nursing staff reported that she became confused and was pulling at lines last night however the patient states this happened because she had a nightmare.  She does not appear confused today.    Objective   Vital Signs  Temp:  [97.9 °F (36.6 °C)-99 °F (37.2 °C)] 99 °F (37.2 °C)  Heart Rate:  [81-98] 98  Resp:  [20] 20  BP: (116-128)/(49-70) 116/49  Body mass index is 18.55 kg/m².    Objective:  General Appearance:  Comfortable and in no acute distress.    Vital signs: (most recent): Blood pressure 116/49, pulse 98, temperature 99 °F (37.2 °C), temperature source Oral, resp. rate 20, height 160 cm (63\"), weight 47.5 kg (104 lb 11.2 oz), SpO2 98%, not currently breastfeeding.    Lungs:  Normal effort and normal respiratory rate.  She is not in respiratory distress.  There are decreased breath sounds.    Heart: Normal rate.  Regular rhythm.    Abdomen: Abdomen is soft.  Bowel sounds are normal.   There is no abdominal tenderness.     Extremities: There is no dependent edema or local swelling.    Neurological: Patient is alert and oriented to person, place and time.    Skin:  Warm and dry.                Results Review:       I reviewed the patient's new clinical results.    Results from last 7 days   Lab Units 24  0409 23  0623 23  11323  0947   WBC 10*3/mm3 7.48 9.26 5.88 9.65 10.63   HEMOGLOBIN g/dL 10.9* 10.7* 12.0 11.2* 11.9*   PLATELETS 10*3/mm3 228 245 320 306 353     Results from last 7 days   Lab Units 24  0523  0409 23  0510 23  0623 23  1131 23  0947   SODIUM mmol/L 143 147* 141 139 141 141   POTASSIUM mmol/L 3.5 3.2* 4.1 4.0 4.1 4.3   CHLORIDE mmol/L 109* 110* 107 " 106 106 106   CO2 mmol/L 22.6 24.0 22.0 20.0* 25.0 24.9   BUN mg/dL 24* 33* 40* 30* 18 15   CREATININE mg/dL 0.55* 0.69 0.76 0.97 0.74 0.69   CALCIUM mg/dL 8.7 8.5* 8.5* 8.7 9.0 9.0   GLUCOSE mg/dL 102* 95 142* 142* 152* 132*                 Scheduled Meds:   apixaban, 2.5 mg, Oral, BID  atorvastatin, 20 mg, Oral, Nightly  cefepime, 2,000 mg, Intravenous, Q12H  dexAMETHasone, 6 mg, Oral, Daily With Breakfast  folic acid, 1 mg, Oral, Daily  furosemide, 40 mg, Oral, Daily  guaiFENesin, 600 mg, Oral, Q12H  metoprolol succinate XL, 25 mg, Oral, Daily  mirtazapine, 7.5 mg, Oral, Nightly  montelukast, 10 mg, Oral, Nightly  pantoprazole, 40 mg, Oral, Q AM  remdesivir, 100 mg, Intravenous, Q24H  sacubitril-valsartan, 0.5 tablet, Oral, BID  senna-docusate sodium, 2 tablet, Oral, BID  sodium chloride, 10 mL, Intravenous, Q12H      PRN Meds:     benzonatate    senna-docusate sodium **AND** polyethylene glycol **AND** bisacodyl **AND** bisacodyl    calcium carbonate    HYDROcodone Bit-Homatrop MBr    nitroglycerin    ondansetron    Insert Peripheral IV **AND** sodium chloride    Access VAD **AND** sodium chloride    sodium chloride    sodium chloride    zolpidem  Continuous Infusions:       Assessment & Plan   Active Hospital Problems    Diagnosis  POA    **COVID-19 virus infection [U07.1]  Unknown    Acute hypoxemic respiratory failure [J96.01]  Yes    Chronic systolic CHF (congestive heart failure) [I50.22]  Unknown    Paroxysmal atrial fibrillation [I48.0]  Unknown    Lung cancer [C34.90]  Yes    Essential hypertension [I10]  Yes    Hyperlipidemia [E78.5]  Yes      Resolved Hospital Problems   No resolved problems to display.       Assessment & Plan    Patient is a 83-year-old female with a history of COPD, hypertension, hyperlipidemia, intracranial aneurysm left PCA.  Lung cancer on chemotherapy, coronary artery calcification, paroxysmal atrial fibrillation, presented to the ED complaining of acute on chronic shortness of  breath .         Acute hypoxic and respiratory failure secondary to COVID-19 and possible superimposed bacterial pneumonia  -Patient is currently requiring 2 L  -Today is day #5 remdesivir and she remains on Decadron and cefepime as well  -I advised the patient that it is in her best interest to stay here and complete the course of remdesivir at least before returning home.  She reluctantly agrees to this.  -Continue pulmonary hygiene measures and wean oxygen as tolerated  -Plan will be for her to complete a 7 total day course of antibiotics     Paroxysmal atrial fibrillation: Continue metoprolol XL 25 mg daily, Eliquis.        Chronic systolic heart failure: Last echocardiogram 11/14/2023 showed EF 39.2%, grade 1 diastolic dysfunction.  Continue home metoprolol, Entresto.       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 Escobar.  On chemotherapy, last chemotherapy 12/27.  Hematology oncology evaluated, appreciate recs.  Will need follow-up outpatient.     Insomnia: Continue nightly Ambien as needed for sleep.     Hyperglycemia: Resolved     Hypokalemia: Replace potassium per protocol     Hypernatremia: Resolved        CODE STATUS: Full code  DVT prophylax: Eliquis  Disposition: Home tomorrow.  Will check walking oximetry to determine if she is going to need home oxygen.      Expected Discharge Date: 1/1/2024; Expected Discharge Time:      Arias Wright MD  [facility]  01/01/24  11:21 EST

## 2024-01-02 ENCOUNTER — HOME HEALTH ADMISSION (OUTPATIENT)
Dept: HOME HEALTH SERVICES | Facility: HOME HEALTHCARE | Age: 84
End: 2024-01-02
Payer: MEDICARE

## 2024-01-02 ENCOUNTER — DOCUMENTATION (OUTPATIENT)
Dept: HOME HEALTH SERVICES | Facility: HOME HEALTHCARE | Age: 84
End: 2024-01-02
Payer: MEDICARE

## 2024-01-02 ENCOUNTER — READMISSION MANAGEMENT (OUTPATIENT)
Dept: CALL CENTER | Facility: HOSPITAL | Age: 84
End: 2024-01-02
Payer: MEDICARE

## 2024-01-02 VITALS
BODY MASS INDEX: 18.34 KG/M2 | HEART RATE: 103 BPM | TEMPERATURE: 98.8 F | OXYGEN SATURATION: 92 % | DIASTOLIC BLOOD PRESSURE: 60 MMHG | SYSTOLIC BLOOD PRESSURE: 131 MMHG | HEIGHT: 63 IN | WEIGHT: 103.5 LBS | RESPIRATION RATE: 18 BRPM

## 2024-01-02 PROBLEM — I50.23 ACUTE ON CHRONIC HFREF (HEART FAILURE WITH REDUCED EJECTION FRACTION): Status: ACTIVE | Noted: 2024-01-02

## 2024-01-02 PROBLEM — D89.832 CYTOKINE RELEASE SYNDROME, GRADE 2: Status: ACTIVE | Noted: 2024-01-02

## 2024-01-02 LAB
ALBUMIN SERPL-MCNC: 3.1 G/DL (ref 3.5–5.2)
ALBUMIN/GLOB SERPL: 1.5 G/DL
ALP SERPL-CCNC: 58 U/L (ref 39–117)
ALT SERPL W P-5'-P-CCNC: 12 U/L (ref 1–33)
ANION GAP SERPL CALCULATED.3IONS-SCNC: 8 MMOL/L (ref 5–15)
AST SERPL-CCNC: 22 U/L (ref 1–32)
BACTERIA SPEC AEROBE CULT: NORMAL
BACTERIA SPEC AEROBE CULT: NORMAL
BASOPHILS # BLD AUTO: 0 10*3/MM3 (ref 0–0.2)
BASOPHILS NFR BLD AUTO: 0 % (ref 0–1.5)
BILIRUB SERPL-MCNC: 0.4 MG/DL (ref 0–1.2)
BUN SERPL-MCNC: 24 MG/DL (ref 8–23)
BUN/CREAT SERPL: 46.2 (ref 7–25)
CALCIUM SPEC-SCNC: 8.7 MG/DL (ref 8.6–10.5)
CHLORIDE SERPL-SCNC: 112 MMOL/L (ref 98–107)
CO2 SERPL-SCNC: 24 MMOL/L (ref 22–29)
CREAT SERPL-MCNC: 0.52 MG/DL (ref 0.57–1)
CRP SERPL-MCNC: 11.16 MG/DL (ref 0–0.5)
DEPRECATED RDW RBC AUTO: 45 FL (ref 37–54)
EGFRCR SERPLBLD CKD-EPI 2021: 93.5 ML/MIN/1.73
EOSINOPHIL # BLD AUTO: 0 10*3/MM3 (ref 0–0.4)
EOSINOPHIL NFR BLD AUTO: 0 % (ref 0.3–6.2)
ERYTHROCYTE [DISTWIDTH] IN BLOOD BY AUTOMATED COUNT: 13.9 % (ref 12.3–15.4)
FERRITIN SERPL-MCNC: 537 NG/ML (ref 13–150)
GLOBULIN UR ELPH-MCNC: 2.1 GM/DL
GLUCOSE SERPL-MCNC: 111 MG/DL (ref 65–99)
HCT VFR BLD AUTO: 32.8 % (ref 34–46.6)
HGB BLD-MCNC: 10.7 G/DL (ref 12–15.9)
IMM GRANULOCYTES # BLD AUTO: 0.03 10*3/MM3 (ref 0–0.05)
IMM GRANULOCYTES NFR BLD AUTO: 0.5 % (ref 0–0.5)
LYMPHOCYTES # BLD AUTO: 1.24 10*3/MM3 (ref 0.7–3.1)
LYMPHOCYTES NFR BLD AUTO: 19.4 % (ref 19.6–45.3)
MCH RBC QN AUTO: 29.3 PG (ref 26.6–33)
MCHC RBC AUTO-ENTMCNC: 32.6 G/DL (ref 31.5–35.7)
MCV RBC AUTO: 89.9 FL (ref 79–97)
MONOCYTES # BLD AUTO: 0.11 10*3/MM3 (ref 0.1–0.9)
MONOCYTES NFR BLD AUTO: 1.7 % (ref 5–12)
NEUTROPHILS NFR BLD AUTO: 5.01 10*3/MM3 (ref 1.7–7)
NEUTROPHILS NFR BLD AUTO: 78.4 % (ref 42.7–76)
NRBC BLD AUTO-RTO: 0.2 /100 WBC (ref 0–0.2)
PLATELET # BLD AUTO: 172 10*3/MM3 (ref 140–450)
PMV BLD AUTO: 10.2 FL (ref 6–12)
POTASSIUM SERPL-SCNC: 4.1 MMOL/L (ref 3.5–5.2)
PROT SERPL-MCNC: 5.2 G/DL (ref 6–8.5)
RBC # BLD AUTO: 3.65 10*6/MM3 (ref 3.77–5.28)
SODIUM SERPL-SCNC: 144 MMOL/L (ref 136–145)
WBC NRBC COR # BLD AUTO: 6.39 10*3/MM3 (ref 3.4–10.8)

## 2024-01-02 PROCEDURE — 85025 COMPLETE CBC W/AUTO DIFF WBC: CPT | Performed by: STUDENT IN AN ORGANIZED HEALTH CARE EDUCATION/TRAINING PROGRAM

## 2024-01-02 PROCEDURE — 63710000001 DEXAMETHASONE PER 0.25 MG: Performed by: STUDENT IN AN ORGANIZED HEALTH CARE EDUCATION/TRAINING PROGRAM

## 2024-01-02 PROCEDURE — 94760 N-INVAS EAR/PLS OXIMETRY 1: CPT

## 2024-01-02 PROCEDURE — 25010000002 CEFEPIME PER 500 MG: Performed by: STUDENT IN AN ORGANIZED HEALTH CARE EDUCATION/TRAINING PROGRAM

## 2024-01-02 PROCEDURE — 80053 COMPREHEN METABOLIC PANEL: CPT | Performed by: STUDENT IN AN ORGANIZED HEALTH CARE EDUCATION/TRAINING PROGRAM

## 2024-01-02 PROCEDURE — 36415 COLL VENOUS BLD VENIPUNCTURE: CPT | Performed by: STUDENT IN AN ORGANIZED HEALTH CARE EDUCATION/TRAINING PROGRAM

## 2024-01-02 PROCEDURE — 94799 UNLISTED PULMONARY SVC/PX: CPT

## 2024-01-02 PROCEDURE — 94618 PULMONARY STRESS TESTING: CPT

## 2024-01-02 PROCEDURE — 94761 N-INVAS EAR/PLS OXIMETRY MLT: CPT

## 2024-01-02 PROCEDURE — 86140 C-REACTIVE PROTEIN: CPT | Performed by: STUDENT IN AN ORGANIZED HEALTH CARE EDUCATION/TRAINING PROGRAM

## 2024-01-02 PROCEDURE — 82728 ASSAY OF FERRITIN: CPT | Performed by: STUDENT IN AN ORGANIZED HEALTH CARE EDUCATION/TRAINING PROGRAM

## 2024-01-02 RX ORDER — PANTOPRAZOLE SODIUM 40 MG/1
40 TABLET, DELAYED RELEASE ORAL
Qty: 30 TABLET | Refills: 0 | Status: SHIPPED | OUTPATIENT
Start: 2024-01-03 | End: 2024-02-02

## 2024-01-02 RX ORDER — DEXAMETHASONE 2 MG/1
TABLET ORAL
Qty: 45 TABLET | Refills: 0 | Status: SHIPPED | OUTPATIENT
Start: 2024-01-03 | End: 2024-01-16

## 2024-01-02 RX ORDER — CEFDINIR 300 MG/1
300 CAPSULE ORAL 2 TIMES DAILY
Qty: 4 CAPSULE | Refills: 0 | Status: SHIPPED | OUTPATIENT
Start: 2024-01-02 | End: 2024-01-04

## 2024-01-02 RX ORDER — METOPROLOL SUCCINATE 25 MG/1
25 TABLET, EXTENDED RELEASE ORAL DAILY
Start: 2024-01-02

## 2024-01-02 RX ADMIN — SACUBITRIL AND VALSARTAN 0.5 TABLET: 24; 26 TABLET, FILM COATED ORAL at 09:05

## 2024-01-02 RX ADMIN — Medication 10 ML: at 09:07

## 2024-01-02 RX ADMIN — METOPROLOL SUCCINATE 25 MG: 25 TABLET, EXTENDED RELEASE ORAL at 09:05

## 2024-01-02 RX ADMIN — GUAIFENESIN 600 MG: 600 TABLET, EXTENDED RELEASE ORAL at 09:05

## 2024-01-02 RX ADMIN — FUROSEMIDE 40 MG: 20 TABLET ORAL at 09:05

## 2024-01-02 RX ADMIN — DEXAMETHASONE 6 MG: 4 TABLET ORAL at 09:05

## 2024-01-02 RX ADMIN — PANTOPRAZOLE SODIUM 40 MG: 40 TABLET, DELAYED RELEASE ORAL at 06:53

## 2024-01-02 RX ADMIN — APIXABAN 2.5 MG: 2.5 TABLET, FILM COATED ORAL at 06:53

## 2024-01-02 RX ADMIN — FOLIC ACID 1 MG: 1 TABLET ORAL at 09:05

## 2024-01-02 RX ADMIN — CEFEPIME 2000 MG: 2 INJECTION, POWDER, FOR SOLUTION INTRAVENOUS at 09:05

## 2024-01-02 RX ADMIN — SENNOSIDES AND DOCUSATE SODIUM 2 TABLET: 50; 8.6 TABLET ORAL at 09:05

## 2024-01-02 NOTE — PROGRESS NOTES
Enter Query Response Below      Query Response: Chronic illness related severe protein calorie malnutrition.              If applicable, please update the problem list.       Patient: Darlene Pfeiffer        : 1940  Account: [id number]           Admit Date:         How to Respond to this query:       a. Click New Note     b. Answer query within the yellow box.                c. Update the Problem List, if applicable.      If you have any questions about this query contact me at: [email or web address]     Dr. Wright:      Nutrition note states patient meets criteria for severe malnutrition of chronic disease with severe muscle mass loss and severe fat loss.  Started on Ensure enlive bid.     Please clarify diagnosis treated/monitored:    Chronic illness related severe protein calorie malnutrition.  Malnutrition  Other- specify __________  Unable to determine.     By submitting this query, we are merely seeking further clarification of documentation to accurately reflect all conditions that you are monitoring, evaluating, treating or that extend the hospitalization or utilize additional resources of care. Please utilize your independent clinical judgment when addressing the question(s) above.     This query and your response, once completed, will be entered into the legal medical record.    Sincerely,  Hever LINDSEY RN BSN   Clinical Documentation Integrity Program   [email or web address]

## 2024-01-02 NOTE — PLAN OF CARE
Goal Outcome Evaluation:   Received PT still on 2.5 lpm of O2 tolerated. PRN meds for cough and sleepin pills given per her request. For possible DC after walking oxymetry  to determine if she O2 at home.No other complaints made .

## 2024-01-02 NOTE — PROGRESS NOTES
Exercise Oximetry    Patient Name:Darlene Pfeiffer   MRN: [id number]   Date: 01/02/24             ROOM AIR BASELINE   SpO2% 86   Heart Rate 78   Blood Pressure      EXERCISE ON ROOM AIR SpO2% EXERCISE ON O2 @ 2 LPM SpO2%   1 MINUTE  1 MINUTE 88   2 MINUTES  2 MINUTES @4lpm 89   3 MINUTES  3 MINUTES 91   4 MINUTES  4 MINUTES 92   5 MINUTES  5 MINUTES 90   6 MINUTES  6 MINUTES 91              Distance Walked   Distance Walked   Dyspnea (Aishwarya Scale)   Dyspnea (Aishwarya Scale)   Fatigue (Aishwarya Scale)   Fatigue (Aishwarya Scale)   SpO2% Post Exercise  92 SpO2% Post Exercise   HR Post Exercise  102 HR Post Exercise   Time to Recovery   Time to Recovery     Comments: PT walked in room due to Covid isolation. PT walked without assistance and no c/o pain or SOB. PT is requiring 4lpm nc.

## 2024-01-02 NOTE — DISCHARGE PLACEMENT REQUEST
"Bakari Stahl (83 y.o. Female)       Date of Birth   1940    Social Security Number       Address   [address] [place] [address]    Home Phone   [phone]    MRN   [id number]       Episcopal   Synagogue    Marital Status                               Admission Date   12/28/23    Admission Type   Emergency    Admitting Provider   Perla Wright MD    Attending Provider   Arias Wright MD    Department, Room/Bed   [address], [address]/1       Discharge Date       Discharge Disposition   Home or Self Care    Discharge Destination                                 Attending Provider: Arias Wright MD    Allergies: Sulfa Antibiotics    Isolation: Enh Drop/Con, Droplet   Infection: Rhinovirus  (12/27/23), COVID (confirmed) (12/27/23)   Code Status: CPR    Ht: 160 cm (63\")   Wt: 46.9 kg (103 lb 8 oz)    Admission Cmt: None   Principal Problem: COVID-19 virus infection [U07.1]                   Active Insurance as of 12/28/2023       Primary Coverage       Payor Plan Insurance Group Employer/Plan Group    MEDICARE MEDICARE A & B        Payor Plan Address Payor Plan Phone Number Payor Plan Fax Number Effective Dates    [address] [phone]  7/1/2007 - None Entered    [facility] [id number]         Subscriber Name Subscriber Birth Date Member ID       BAKARI STAHL 1940 [id number]               Secondary Coverage       Payor Plan Insurance Group Employer/Plan Group    [facility] SUPP [id number]       Payor Plan Address Payor Plan Phone Number Payor Plan Fax Number Effective Dates    [address]   12/1/2016 - None Entered    [facility] [id number]         Subscriber Name Subscriber Birth Date Member ID       BAKARI STAHL 1940 [id number]                     Emergency Contacts        (Rel.) Home Phone Work Phone Mobile Phone    MAYELIN STAHL (Spouse) [phone] -- [phone]    Santhosh Stahl (Son) [phone] " -- --

## 2024-01-02 NOTE — CASE MANAGEMENT/SOCIAL WORK
Case Management Discharge Note      Final Note: Home with spouse and BHH. O2 provided by [employer] and portable tank delivered to room. Family transport.    Provided Post Acute Provider List?: Refused  Refused Provider List Comment: Denies any needs at D/C  Provided Post Acute Provider Quality & Resource List?: Refused    Selected Continued Care - Discharged on 1/2/2024 Admission date: 12/28/2023 - Discharge disposition: Home or Self Care      Destination    No services have been selected for the patient.                Durable Medical Equipment Coordination complete.      Service Provider Selected Services Address Phone Fax Patient Preferred    [facility] [address] [facility] [address] [phone] --              Dialysis/Infusion    No services have been selected for the patient.                Home Medical Care Coordination complete.      Service Provider Selected Services Address Phone Fax Patient Preferred     [facility] [address] [address] [phone] --              Therapy    No services have been selected for the patient.                Community Resources    No services have been selected for the patient.                Community & DME    No services have been selected for the patient.                    Transportation Services  Private: Car    Final Discharge Disposition Code: 06 - home with home health care

## 2024-01-02 NOTE — DISCHARGE PLACEMENT REQUEST
"Bakari Stahl (83 y.o. Female)       Date of Birth   1940    Social Security Number       Address   [address] [place] [address]    Home Phone   [phone]    MRN   [id number]       Advent   Protestant    Marital Status                               Admission Date   12/28/23    Admission Type   Emergency    Admitting Provider   Perla Wright MD    Attending Provider   Arias Wright MD    Department, Room/Bed   [address], [address]/1       Discharge Date       Discharge Disposition   Home or Self Care    Discharge Destination                                 Attending Provider: Arias Wright MD    Allergies: Sulfa Antibiotics    Isolation: Enh Drop/Con, Droplet   Infection: Rhinovirus  (12/27/23), COVID (confirmed) (12/27/23)   Code Status: CPR    Ht: 160 cm (63\")   Wt: 46.9 kg (103 lb 8 oz)    Admission Cmt: None   Principal Problem: COVID-19 virus infection [U07.1]                   Active Insurance as of 12/28/2023       Primary Coverage       Payor Plan Insurance Group Employer/Plan Group    MEDICARE MEDICARE A & B        Payor Plan Address Payor Plan Phone Number Payor Plan Fax Number Effective Dates    [address] [phone]  7/1/2007 - None Entered    [facility] [id number]         Subscriber Name Subscriber Birth Date Member ID       BAKARI STAHL 1940 [id number]               Secondary Coverage       Payor Plan Insurance Group Employer/Plan Group    [facility] SUPP [id number]       Payor Plan Address Payor Plan Phone Number Payor Plan Fax Number Effective Dates    [address]   12/1/2016 - None Entered    [facility] [id number]         Subscriber Name Subscriber Birth Date Member ID       BAKARI STAHL 1940 [id number]                     Emergency Contacts        (Rel.) Home Phone Work Phone Mobile Phone    MAYELIN STAHL (Spouse) [phone] -- [phone]    Santhosh Stahl (Son) [phone] " -- --

## 2024-01-02 NOTE — PROGRESS NOTES
[facility] to provide Home Care services. Patient agreeable and denies any previous HH. PCP and contact information confirmed. Patient also reminded to call Home Oxygen provider to confirm delivery upon arrival home.

## 2024-01-02 NOTE — DISCHARGE SUMMARY
Date of Admission: 12/28/2023  Date of Discharge:  1/2/2024  Primary Care Physician: Kehrer, Meredith Lea, MD     Discharge Diagnosis:  Active Hospital Problems    Diagnosis  POA    **COVID-19 virus infection [U07.1]  Unknown    Cytokine release syndrome, grade 2 [D89.832]  No    Acute on chronic HFrEF (heart failure with reduced ejection fraction) [I50.23]  Yes    Acute hypoxemic respiratory failure [J96.01]  Yes    Chronic systolic CHF (congestive heart failure) [I50.22]  Unknown    Paroxysmal atrial fibrillation [I48.0]  Unknown    Lung cancer [C34.90]  Yes    Essential hypertension [I10]  Yes    Hyperlipidemia [E78.5]  Yes      Resolved Hospital Problems   No resolved problems to display.       DETAILS OF HOSPITAL STAY     Pertinent Test Results and Procedures Performed    CXR:  Chronic changes in the left hemithorax as described. New   abnormal alveolar and interstitial opacities in the right lung which are   suspected to represent pneumonia.     Hospital Course    This is an 83-year-old female with history of lung cancer, on chemotherapy, who presented to the hospital with shortness of breath day occurring 1 day after chemotherapy infusion.  Additionally, she has a history of chronic systolic heart failure and paroxysmal atrial fibrillation for which she is on anticoagulation long-term.  Please see H&P for full details.  She was diagnosed with COVID-19 pneumonia, acute hypoxia respiratory failure and possible superimposed bacterial pneumonia.  She was placed upon remdesivir, cefepime, and Decadron.  Symptomatically she has improved and is no longer complaining of dyspnea.  She still is requiring 2 L of nasal cannula oxygen.  At this point she has completed a 5-day course of remdesivir.  She is medically stable and request discharge home.  I think this is reasonable given her very stable oxygen levels and improvement of symptoms.  I will have her complete a Decadron taper and then she will transition back to  her long-term prednisone thereafter.  I will also have her complete 2 additional days of oral antibiotics upon return home.  I have ordered for home oxygen and will also arrange for home health to keep an eye on this.  I instructed her to follow-up with her primary care physician in 1 week and she should continue to follow-up with her oncologist as well.      Physical Exam at Discharge:  General: No acute distress, AAOx3  HEENT: EOMI, PERRL  Cardiovascular: +s1 and s2, RRR  Lungs: No rhonchi or wheezing  Abdomen: soft, nontender    Consults:   Consults       Date and Time Order Name Status Description    12/28/2023  1:54 PM Hematology and Oncology (on-call MD unless specified)      12/28/2023  1:54 PM LCG (on-call MD unless specified) Completed     12/28/2023  1:54 PM LHA (on-call MD unless specified) Details                Condition on Discharge: Stable, improved    Discharge Disposition  Home or Self Care    Discharge Medications     Discharge Medications        New Medications        Instructions Start Date   cefdinir 300 MG capsule  Commonly known as: OMNICEF   300 mg, Oral, 2 Times Daily      pantoprazole 40 MG EC tablet  Commonly known as: PROTONIX   40 mg, Oral, Every Early Morning   Start Date: January 3, 2024            Changes to Medications        Instructions Start Date   dexAMETHasone 2 MG tablet  Commonly known as: DECADRON  What changed:   medication strength  See the new instructions.   Take 3 tablets by mouth 2 (Two) Times a Day With Meals for 4 days, THEN 2 tablets 2 (Two) Times a Day With Meals for 3 days, THEN 1 tablet 2 (Two) Times a Day With Meals for 3 days, THEN 0.5 tablets 2 (Two) Times a Day With Meals for 3 days.   Start Date: January 3, 2024            Continue These Medications        Instructions Start Date   apixaban 2.5 MG tablet tablet  Commonly known as: ELIQUIS   2.5 mg, Oral, 2 Times Daily      atorvastatin 20 MG tablet  Commonly known as: LIPITOR   TAKE 1 TABLET EVERY NIGHT       cetirizine 10 MG tablet  Commonly known as: zyrTEC   10 mg, Oral, Daily      Entresto 24-26 MG tablet  Generic drug: sacubitril-valsartan   0.5 tablets, Oral, 2 Times Daily      folic acid 1 MG tablet  Commonly known as: FOLVITE   1 mg, Oral, Daily, Start at least 7 days prior to chemotherapy until at least 3 weeks after all chemotherapy.      furosemide 40 MG tablet  Commonly known as: LASIX   40 mg, Oral, Daily      HYDROcodone Bit-Homatrop MBr 5-1.5 MG/5ML solution  Commonly known as: HYCODAN   5 mL, Oral, Every 8 Hours PRN      ipratropium 0.06 % nasal spray  Commonly known as: ATROVENT   2 sprays, Nasal, 4 Times Daily      metoprolol succinate XL 25 MG 24 hr tablet  Commonly known as: TOPROL-XL   25 mg, Oral, Daily      mirtazapine 7.5 MG tablet  Commonly known as: REMERON   7.5 mg, Oral, Every Night at Bedtime      montelukast 10 MG tablet  Commonly known as: SINGULAIR   10 mg, Oral, Nightly      ondansetron 8 MG tablet  Commonly known as: ZOFRAN   8 mg, Oral, 3 Times Daily PRN      potassium chloride 10 MEQ CR tablet  Commonly known as: K-DUR,KLOR-CON   20 mEq, Oral, Daily      promethazine-dextromethorphan 6.25-15 MG/5ML syrup  Commonly known as: PROMETHAZINE-DM   5 mL, Oral, 4 Times Daily PRN      VITAMIN B12 PO   1,000 mcg, Oral, Daily      Vitamin D3 50 MCG (2000 UT) tablet   1,000 Units, Oral, Daily, HOLDING FOR DOS      zolpidem 5 MG tablet  Commonly known as: AMBIEN   5 mg, Oral, Nightly PRN, for sleep             Stop These Medications      Nirmatrelvir & Ritonavir (300mg/100mg) 20 x 150 MG & 10 x 100MG tablet therapy pack tablet  Commonly known as: PAXLOVID     predniSONE 10 MG tablet  Commonly known as: DELTASONE              Discharge Diet:   Diet Instructions       Diet: Regular/House Diet; Regular Texture (IDDSI 7); Thin (IDDSI 0)      Discharge Diet: Regular/House Diet    Texture: Regular Texture (IDDSI 7)    Fluid Consistency: Thin (IDDSI 0)            Activity at Discharge:   Activity  Instructions       Activity as Tolerated              Follow-up Appointments  Future Appointments   Date Time Provider Department Center   1/4/2024  2:00 PM Kehrer, Meredith Lea, MD [facility]   1/15/2024  8:00 AM Kehrer, Meredith Lea, MD [facility]   1/23/2024 11:00 AM Claire Orr MD [facility]   1/24/2024  9:00 AM INFU [facility] PORT CHAIR BH INFUS [facility]   1/24/2024  9:40 AM Henry Escobar MD [facility]   1/24/2024 10:00 AM CHAIR 01 CBC K[facility] - LG BH INFUS KRE LAG   3/26/2024  9:20 AM Claire Orr MD [facility]   10/8/2024 11:30 AM Bradley Dowell MD [facility]     Additional Instructions for the Follow-ups that You Need to Schedule       Ambulatory Referral to Home Health (Hospital)   As directed      Face to Face Visit Date: 1/2/2024   Follow-up provider for Plan of Care?: I treated the patient in an acute care facility and will not continue treatment after discharge.   Follow-up provider: KEHRER, MEREDITH LEA [[id number]]   Reason/Clinical Findings: COVID, hypoxia   Describe mobility limitations that make leaving home difficult: COVID   Nursing/Therapeutic Services Requested: Skilled Nursing   Skilled nursing orders: O2 instruction Cardiopulmonary assessments   Frequency: 1 Week 1        Discharge Follow-up with PCP   As directed       Currently Documented PCP:    Kehrer, Meredith Lea, MD    PCP Phone Number:    [phone]     Follow Up Details: 1 week                Test Results Pending at Discharge  Pending Labs       Order Current Status    Blood Culture - Blood, Arm, Left Preliminary result    Blood Culture - Blood, Arm, Left Preliminary result            I have examined and discussed discharge planning with the patient today.    I wore full protective equipment throughout the patient encounter including eye protection and facemask.  Hand hygiene was performed before donning protective equipment and after removal when leaving the room.     Arias Wright,  MD  01/02/24  09:41 EST    Time: Discharge greater than 30 min

## 2024-01-03 ENCOUNTER — TRANSITIONAL CARE MANAGEMENT TELEPHONE ENCOUNTER (OUTPATIENT)
Dept: CALL CENTER | Facility: HOSPITAL | Age: 84
End: 2024-01-03
Payer: MEDICARE

## 2024-01-03 ENCOUNTER — HOME CARE VISIT (OUTPATIENT)
Dept: HOME HEALTH SERVICES | Facility: HOME HEALTHCARE | Age: 84
End: 2024-01-03
Payer: MEDICARE

## 2024-01-03 PROCEDURE — G0299 HHS/HOSPICE OF RN EA 15 MIN: HCPCS

## 2024-01-03 NOTE — Clinical Note
"SOC NOTE    1/3/24.  spouse present for SOC     Home Health ordered for:      Reason for Hosp/Primary Dx:  COVID 19     Co-morbidities:  CHF,     Focus of Care:  SN assessment, and education of resp failure (new home o2 @ 2l), COVID 19,  CHF    Patient's goal(s):  \" I don't want to end up back in the hospital\"     Current Functional status/mobility/DME:  Ambulatory without DME, requires supervision.  Gait limited to household distances.    HB status/Living Arrangements:  Homebound due to fatigue, SOA with exertion    Skin Integrity/wound status:  intact    Code Status:  FULL/CPR    Fall Risk/Safety concerns:  RISK.  O2 tubing trip hazard    Medication issues/Concerns:  Went through med list and meds with patient and spouse to ensure all meds obtained and in home.  Patient also instructed to take list and meds to first PCP appt as well.     Additional Problems/Concerns:  N/A    SDOH Barriers (i.e. caregiver concerns, social isolation, transportation, food insecurity, environment, income etc.)/Need for MSW:  No    Plan for next visit:  SN assess/instruct. teaching on disease mgmt and meds   -----------------------------------------------------  Pleasant 83yo female admitted to [facility] for COVID 19 infection.  Patient feeling less SOA than previously, but still sent home on 02 due to low sats.  Went through o2 teaching with patient and spouse.  Also taught on changing to o2 for leaving home as well.     AAOx4, sitting in recliner upon arrival with O2 via nasal cannula at 2L supplied by [employer]. Patient is SOA as expected.  Has some coughing, but patient reports that is improving slowly.  Patient lives in home with spouse who did not test positive for COVID 19, but may have had it without any s/s.  Patient reports fatigue but improving as well.     "

## 2024-01-03 NOTE — Clinical Note
Dear Dr Kehrer RE Peggy Wills   44    The above named patient was admitted into home health services as of 1/3/24.  We will be doing nursing visits for teaching on medications, disease management, oxygen safety, infection precautions,  and assessment of all systems.  If you have any questions or needs on this patient, please feel free to contact our office at [phone].     Thank you,     Sharda SUBRAMANIANN RN   [facility]

## 2024-01-03 NOTE — OUTREACH NOTE
Call Center TCM Note      Flowsheet Row Responses   [facility] patient discharged from? [place]   Does the patient have one of the following disease processes/diagnoses(primary or secondary)? CHF   TCM attempt successful? No  [No current VR from PCP]   Unsuccessful attempts Attempt 2             Rosanne King RN    1/3/2024, 13:51 EST

## 2024-01-03 NOTE — OUTREACH NOTE
Prep Survey      Flowsheet Row Responses   [facility] patient discharged from? [place]   Is LACE score < 7 ? No   Eligibility [facility]   Date of Admission 12/28/23   Date of Discharge 01/02/24   Discharge Disposition Home-Health Care Svc   Discharge diagnosis COVID-19 virus infection, A/C CHF   Does the patient have one of the following disease processes/diagnoses(primary or secondary)? CHF   Does the patient have Home health ordered? Yes   What is the Home health agency?  [facility]   Is there a DME ordered? Yes   What DME was ordered? O2 from [employer]   Prep survey completed? Yes            Nataliia LINDSEY - Registered Nurse

## 2024-01-03 NOTE — OUTREACH NOTE
Call Center TCM Note      Flowsheet Row Responses   [facility] patient discharged from? [place]   Does the patient have one of the following disease processes/diagnoses(primary or secondary)? CHF   TCM attempt successful? No   Unsuccessful attempts Attempt 1             Rosanne King RN    1/3/2024, 12:03 EST

## 2024-01-04 ENCOUNTER — TRANSITIONAL CARE MANAGEMENT TELEPHONE ENCOUNTER (OUTPATIENT)
Dept: CALL CENTER | Facility: HOSPITAL | Age: 84
End: 2024-01-04
Payer: MEDICARE

## 2024-01-04 ENCOUNTER — OFFICE VISIT (OUTPATIENT)
Dept: FAMILY MEDICINE CLINIC | Facility: CLINIC | Age: 84
End: 2024-01-04
Payer: MEDICARE

## 2024-01-04 VITALS
SYSTOLIC BLOOD PRESSURE: 124 MMHG | DIASTOLIC BLOOD PRESSURE: 68 MMHG | BODY MASS INDEX: 18.11 KG/M2 | HEART RATE: 95 BPM | OXYGEN SATURATION: 95 % | WEIGHT: 102.2 LBS | HEIGHT: 63 IN

## 2024-01-04 DIAGNOSIS — I10 ESSENTIAL HYPERTENSION: ICD-10-CM

## 2024-01-04 DIAGNOSIS — E78.5 HYPERLIPIDEMIA, UNSPECIFIED HYPERLIPIDEMIA TYPE: ICD-10-CM

## 2024-01-04 DIAGNOSIS — U07.1 COVID-19 VIRUS INFECTION: Primary | ICD-10-CM

## 2024-01-04 DIAGNOSIS — J44.9 CHRONIC OBSTRUCTIVE PULMONARY DISEASE, UNSPECIFIED COPD TYPE: ICD-10-CM

## 2024-01-04 DIAGNOSIS — J96.01 ACUTE RESPIRATORY FAILURE WITH HYPOXIA: ICD-10-CM

## 2024-01-04 DIAGNOSIS — C34.12 MALIGNANT NEOPLASM OF UPPER LOBE OF LEFT LUNG: ICD-10-CM

## 2024-01-04 NOTE — OUTREACH NOTE
Call Center TCM Note      Flowsheet Row Responses   [facility] patient discharged from? [place]   Does the patient have one of the following disease processes/diagnoses(primary or secondary)? CHF   TCM attempt successful? No   Unsuccessful attempts Attempt 3             Ximena Valencia RN    1/4/2024, 11:37 EST

## 2024-01-04 NOTE — PROGRESS NOTES
Transitional Care Follow Up Visit  Subjective     Darlene Pfeiffer is a 83 y.o. female who presents for a transitional care management visit.    Within 48 business hours after discharge our office contacted her via telephone to coordinate her care and needs.      I reviewed and discussed the details of that call along with the discharge summary, hospital problems, inpatient lab results, inpatient diagnostic studies, and consultation reports with Darlene.     Current outpatient and discharge medications have been reconciled for the patient.  Reviewed by: Meredith Lea Kehrer, MD          1/2/2024     7:26 PM   Date of TCM Phone Call   [facility]   Date of Admission 12/28/2023   Date of Discharge 1/2/2024   Discharge Disposition [facility] [place]     Risk for Readmission (LACE) Score: 15 (1/2/2024  6:00 AM)  H&P by Perla Wright MD (12/28/2023 16:00)  ED Provider Notes by Jakob Gonzalez MD (12/28/2023 11:32)  Discharge Summary by Arias Wright MD (01/02/2024 09:41)      History of Present Illness   Course During Hospital Stay: Patient presents for hospital follow-up after being admitted and acute hypoxic respiratory failure from COVID pneumonia and possible bacterial pneumonia.  She was treated with remdesivir cefepime and Decadron and sent home on a Decadron taper.  At time of discharge she was on her home oxygen of 2 L nasal cannula.  She had 2 more days of antibiotics as well.  She is doing better.  She is eating.  She is here today with a family member.  He has follow-up with cardiology and oncology already scheduled.  She was post to have an appointment with me in a couple weeks but we can postpone that.  She does have home health coming.     The following portions of the patient's history were reviewed and updated as appropriate: allergies, current medications, past family history, past medical history, past social history, past surgical history, and problem list.    Review of  Systems    Objective   Physical Exam  Constitutional:       General: She is not in acute distress.     Appearance: Normal appearance. She is well-developed.      Comments: O2 per nasal cannula in place   HENT:      Head: Normocephalic and atraumatic.      Right Ear: Tympanic membrane, ear canal and external ear normal.      Left Ear: Tympanic membrane, ear canal and external ear normal.      Mouth/Throat:      Mouth: Mucous membranes are moist.      Pharynx: Oropharynx is clear.   Eyes:      Conjunctiva/sclera: Conjunctivae normal.      Pupils: Pupils are equal, round, and reactive to light.   Neck:      Thyroid: No thyromegaly.   Cardiovascular:      Rate and Rhythm: Normal rate and regular rhythm.      Heart sounds: No murmur heard.  Pulmonary:      Effort: Pulmonary effort is normal.      Breath sounds: Decreased breath sounds and rhonchi present. No wheezing or rales.      Comments: Few scattered rhonchi that cleared after cough  Abdominal:      General: Bowel sounds are normal.      Palpations: Abdomen is soft.      Tenderness: There is no abdominal tenderness.   Musculoskeletal:         General: Normal range of motion.      Cervical back: Neck supple.   Lymphadenopathy:      Cervical: No cervical adenopathy.   Skin:     General: Skin is warm and dry.   Neurological:      Mental Status: She is alert and oriented to person, place, and time.   Psychiatric:         Mood and Affect: Mood normal.         Behavior: Behavior normal.     CBC & Differential (01/02/2024 05:41)  Ferritin (01/02/2024 05:41)  C-reactive Protein (01/02/2024 05:41)  Comprehensive Metabolic Panel (01/02/2024 05:41)  CBC & Differential (01/01/2024 05:25)    Assessment & Plan   Diagnoses and all orders for this visit:    1. COVID-19 virus infection (Primary)    2. Acute respiratory failure with hypoxia    3. Essential hypertension    4. Hyperlipidemia, unspecified hyperlipidemia type    5. Chronic obstructive pulmonary disease, unspecified COPD  type    6. Malignant neoplasm of upper lobe of left lung        COVID-19 with acute hypoxic respiratory failure and probable pneumonia-patient is finished her antibiotics and is doing well, she is still on her dexamethasone taper and will go back to her 20 mg of prednisone daily when she is done with that, keep follow-up with oncology for her lung cancer.  Follow-up with me this summer for her Medicare wellness.  Follow-up with me sooner if needed but I will monitor peripherally.

## 2024-01-05 ENCOUNTER — HOME CARE VISIT (OUTPATIENT)
Dept: HOME HEALTH SERVICES | Facility: HOME HEALTHCARE | Age: 84
End: 2024-01-05
Payer: MEDICARE

## 2024-01-05 VITALS
HEART RATE: 100 BPM | TEMPERATURE: 97.1 F | RESPIRATION RATE: 18 BRPM | DIASTOLIC BLOOD PRESSURE: 72 MMHG | SYSTOLIC BLOOD PRESSURE: 120 MMHG | OXYGEN SATURATION: 98 %

## 2024-01-05 PROCEDURE — G0299 HHS/HOSPICE OF RN EA 15 MIN: HCPCS

## 2024-01-08 ENCOUNTER — HOME CARE VISIT (OUTPATIENT)
Dept: HOME HEALTH SERVICES | Facility: HOME HEALTHCARE | Age: 84
End: 2024-01-08
Payer: MEDICARE

## 2024-01-08 VITALS
SYSTOLIC BLOOD PRESSURE: 126 MMHG | OXYGEN SATURATION: 95 % | DIASTOLIC BLOOD PRESSURE: 74 MMHG | TEMPERATURE: 98.3 F | RESPIRATION RATE: 20 BRPM | HEART RATE: 88 BPM

## 2024-01-08 VITALS
OXYGEN SATURATION: 97 % | HEART RATE: 99 BPM | RESPIRATION RATE: 18 BRPM | DIASTOLIC BLOOD PRESSURE: 68 MMHG | SYSTOLIC BLOOD PRESSURE: 116 MMHG | TEMPERATURE: 97.7 F

## 2024-01-08 PROCEDURE — G0299 HHS/HOSPICE OF RN EA 15 MIN: HCPCS

## 2024-01-08 NOTE — HOME HEALTH
"SOC NOTE    1/3/24.  spouse present for SOC     Home Health ordered for:      Reason for Hosp/Primary Dx:  COVID 19     Co-morbidities:  CHF,     Focus of Care:  SN assessment, and education of resp failure (new home o2 @ 2l), COVID 19,  CHF    Patient's goal(s):  \" I don't want to end up back in the hospital\"     Current Functional status/mobility/DME:  Ambulatory without DME, requires supervision.  Gait limited to household distances.    HB status/Living Arrangements:  Homebound due to fatigue, SOA with exertion    Skin Integrity/wound status:  intact    Code Status:  FULL/CPR    Fall Risk/Safety concerns:  RISK.  O2 tubing trip hazard    Medication issues/Concerns:  Went through med list and meds with patient and spouse to ensure all meds obtained and in home.  Patient also instructed to take list and meds to first PCP appt as well.     Additional Problems/Concerns:  N/A    SDOH Barriers (i.e. caregiver concerns, social isolation, transportation, food insecurity, environment, income etc.)/Need for MSW:  No    Plan for next visit:  SN assess/instruct. teaching on disease mgmt and meds   -----------------------------------------------------  Pleasant 85yo female admitted to [facility] for COVID 19 infection.  Patient feeling less SOA than previously, but still sent home on 02 due to low sats.  Went through o2 teaching with patient and spouse.  Also taught on changing to o2 for leaving home as well.     AAOx4, sitting in recliner upon arrival with O2 via nasal cannula at 2L supplied by [employer]. Patient is SOA as expected.  Has some coughing, but patient reports that is improving slowly.  Patient lives in home with spouse who did not test positive for COVID 19, but may have had it without any s/s.  Patient reports fatigue but improving as well.     Denies therapy need.  Plan for SN 2w4"

## 2024-01-09 ENCOUNTER — TELEPHONE (OUTPATIENT)
Dept: CARDIOLOGY | Facility: CLINIC | Age: 84
End: 2024-01-09
Payer: MEDICARE

## 2024-01-09 NOTE — TELEPHONE ENCOUNTER
----- Message from Sharda Whitaker RN sent at 1/9/2024  3:37 PM EST -----  Dr Orr,     Any chance you have any samples of Entresto that Mrs Pfeiffer can have.  It is so expensive and she isn't sure she is going to be able to afford it if she has to pay for it herself.      Thanks so much,    Sharda Whitaker BSN RN

## 2024-01-10 NOTE — TELEPHONE ENCOUNTER
Sharda: I do have samples for Eliquis and Entresto for the pt to have someone to pickup.  Pt was given pt assistance for both to bring to our office to help but we are still waiting on this.

## 2024-01-10 NOTE — TELEPHONE ENCOUNTER
Caller: Darlene Pfeiffer    Relationship: Self    Best call back number: [phone]     What is the best time to reach you: ANYTIME    Who are you requesting to speak with (clinical staff, provider,  specific staff member): GABRIELA    Do you know the name of the person who called: GABRIELA    What was the call regarding: PT WANTING TO LET GABRIELA KNOW THAT SHE WILL BRING PAPERWORK TO APPT TOMORROW AND LEAVE IT WITH HER NAME ON IT SO IT CAN BE GIVEN TO HER     Is it okay if the provider responds through MyChart: CALL BACK PLEASE

## 2024-01-11 ENCOUNTER — HOME CARE VISIT (OUTPATIENT)
Dept: HOME HEALTH SERVICES | Facility: HOME HEALTHCARE | Age: 84
End: 2024-01-11
Payer: MEDICARE

## 2024-01-11 PROCEDURE — G0495 RN CARE TRAIN/EDU IN HH: HCPCS

## 2024-01-11 NOTE — TELEPHONE ENCOUNTER
Patient has been called, [facility] advising message was received and we will await paper work.

## 2024-01-15 ENCOUNTER — TELEPHONE (OUTPATIENT)
Dept: ONCOLOGY | Facility: CLINIC | Age: 84
End: 2024-01-15
Payer: MEDICARE

## 2024-01-15 VITALS
SYSTOLIC BLOOD PRESSURE: 118 MMHG | DIASTOLIC BLOOD PRESSURE: 68 MMHG | HEART RATE: 71 BPM | OXYGEN SATURATION: 95 % | TEMPERATURE: 98.2 F | RESPIRATION RATE: 20 BRPM

## 2024-01-15 NOTE — TELEPHONE ENCOUNTER
Provider: Luz  Caller: patient  Relationship to Patient: self  Call Back Phone Number: [phone]  Reason for Call: patient went to [facility] in [place] and was told she has pneumonia and wanted to admit her but she would not go.  Wants to know if Dr. Escobar thinks she should go to the hospital

## 2024-01-15 NOTE — TELEPHONE ENCOUNTER
Called the patient to let her know that per Dr. Escobar he agreed she did not need to be evaluated by the ER as she has no current symptoms other than a cough. Her oxygen saturation has improved on 3-3.5L continuously and I let her know that if her breathing got worse we would then ask that she be evaluated by the ER. Patient v/u.

## 2024-01-16 ENCOUNTER — HOME CARE VISIT (OUTPATIENT)
Dept: HOME HEALTH SERVICES | Facility: HOME HEALTHCARE | Age: 84
End: 2024-01-16
Payer: MEDICARE

## 2024-01-16 PROCEDURE — G0495 RN CARE TRAIN/EDU IN HH: HCPCS

## 2024-01-18 ENCOUNTER — APPOINTMENT (OUTPATIENT)
Dept: GENERAL RADIOLOGY | Facility: HOSPITAL | Age: 84
DRG: 177 | End: 2024-01-18
Payer: MEDICARE

## 2024-01-18 ENCOUNTER — TELEPHONE (OUTPATIENT)
Dept: ONCOLOGY | Facility: CLINIC | Age: 84
End: 2024-01-18
Payer: MEDICARE

## 2024-01-18 ENCOUNTER — HOSPITAL ENCOUNTER (INPATIENT)
Facility: HOSPITAL | Age: 84
LOS: 3 days | Discharge: HOME HEALTH | DRG: 177 | End: 2024-01-21
Attending: EMERGENCY MEDICINE | Admitting: INTERNAL MEDICINE
Payer: MEDICARE

## 2024-01-18 ENCOUNTER — APPOINTMENT (OUTPATIENT)
Dept: CT IMAGING | Facility: HOSPITAL | Age: 84
DRG: 177 | End: 2024-01-18
Payer: MEDICARE

## 2024-01-18 DIAGNOSIS — J44.9 CHRONIC OBSTRUCTIVE PULMONARY DISEASE, UNSPECIFIED COPD TYPE: ICD-10-CM

## 2024-01-18 DIAGNOSIS — Z85.118 HISTORY OF LUNG CANCER: ICD-10-CM

## 2024-01-18 DIAGNOSIS — J96.21 ACUTE ON CHRONIC HYPOXIC RESPIRATORY FAILURE: Primary | ICD-10-CM

## 2024-01-18 DIAGNOSIS — J18.9 PNEUMONIA OF BOTH LUNGS DUE TO INFECTIOUS ORGANISM, UNSPECIFIED PART OF LUNG: ICD-10-CM

## 2024-01-18 PROBLEM — J96.01 ACUTE HYPOXIC RESPIRATORY FAILURE: Status: ACTIVE | Noted: 2024-01-18

## 2024-01-18 LAB
ALBUMIN SERPL-MCNC: 3.7 G/DL (ref 3.5–5.2)
ALBUMIN/GLOB SERPL: 1.2 G/DL
ALP SERPL-CCNC: 81 U/L (ref 39–117)
ALT SERPL W P-5'-P-CCNC: 15 U/L (ref 1–33)
ANION GAP SERPL CALCULATED.3IONS-SCNC: 13 MMOL/L (ref 5–15)
AST SERPL-CCNC: 30 U/L (ref 1–32)
B PARAPERT DNA SPEC QL NAA+PROBE: NOT DETECTED
B PERT DNA SPEC QL NAA+PROBE: NOT DETECTED
BASOPHILS # BLD AUTO: 0.02 10*3/MM3 (ref 0–0.2)
BASOPHILS NFR BLD AUTO: 0.3 % (ref 0–1.5)
BILIRUB SERPL-MCNC: 0.4 MG/DL (ref 0–1.2)
BUN SERPL-MCNC: 22 MG/DL (ref 8–23)
BUN/CREAT SERPL: 31 (ref 7–25)
C PNEUM DNA NPH QL NAA+NON-PROBE: NOT DETECTED
CALCIUM SPEC-SCNC: 9.5 MG/DL (ref 8.6–10.5)
CHLORIDE SERPL-SCNC: 99 MMOL/L (ref 98–107)
CO2 SERPL-SCNC: 30 MMOL/L (ref 22–29)
CREAT SERPL-MCNC: 0.71 MG/DL (ref 0.57–1)
D-LACTATE SERPL-SCNC: 1 MMOL/L (ref 0.5–2)
DEPRECATED RDW RBC AUTO: 48.5 FL (ref 37–54)
EGFRCR SERPLBLD CKD-EPI 2021: 85.5 ML/MIN/1.73
EOSINOPHIL # BLD AUTO: 0 10*3/MM3 (ref 0–0.4)
EOSINOPHIL NFR BLD AUTO: 0 % (ref 0.3–6.2)
ERYTHROCYTE [DISTWIDTH] IN BLOOD BY AUTOMATED COUNT: 15.2 % (ref 12.3–15.4)
FLUAV SUBTYP SPEC NAA+PROBE: NOT DETECTED
FLUBV RNA ISLT QL NAA+PROBE: NOT DETECTED
GLOBULIN UR ELPH-MCNC: 3 GM/DL
GLUCOSE SERPL-MCNC: 152 MG/DL (ref 65–99)
HADV DNA SPEC NAA+PROBE: NOT DETECTED
HCOV 229E RNA SPEC QL NAA+PROBE: NOT DETECTED
HCOV HKU1 RNA SPEC QL NAA+PROBE: NOT DETECTED
HCOV NL63 RNA SPEC QL NAA+PROBE: NOT DETECTED
HCOV OC43 RNA SPEC QL NAA+PROBE: NOT DETECTED
HCT VFR BLD AUTO: 35.5 % (ref 34–46.6)
HGB BLD-MCNC: 11.1 G/DL (ref 12–15.9)
HMPV RNA NPH QL NAA+NON-PROBE: NOT DETECTED
HOLD SPECIMEN: NORMAL
HOLD SPECIMEN: NORMAL
HPIV1 RNA ISLT QL NAA+PROBE: NOT DETECTED
HPIV2 RNA SPEC QL NAA+PROBE: NOT DETECTED
HPIV3 RNA NPH QL NAA+PROBE: NOT DETECTED
HPIV4 P GENE NPH QL NAA+PROBE: NOT DETECTED
IMM GRANULOCYTES # BLD AUTO: 0.03 10*3/MM3 (ref 0–0.05)
IMM GRANULOCYTES NFR BLD AUTO: 0.4 % (ref 0–0.5)
LYMPHOCYTES # BLD AUTO: 0.73 10*3/MM3 (ref 0.7–3.1)
LYMPHOCYTES NFR BLD AUTO: 9.7 % (ref 19.6–45.3)
M PNEUMO IGG SER IA-ACNC: NOT DETECTED
MCH RBC QN AUTO: 28.5 PG (ref 26.6–33)
MCHC RBC AUTO-ENTMCNC: 31.3 G/DL (ref 31.5–35.7)
MCV RBC AUTO: 91 FL (ref 79–97)
MONOCYTES # BLD AUTO: 0.72 10*3/MM3 (ref 0.1–0.9)
MONOCYTES NFR BLD AUTO: 9.5 % (ref 5–12)
NEUTROPHILS NFR BLD AUTO: 6.04 10*3/MM3 (ref 1.7–7)
NEUTROPHILS NFR BLD AUTO: 80.1 % (ref 42.7–76)
NRBC BLD AUTO-RTO: 0 /100 WBC (ref 0–0.2)
NT-PROBNP SERPL-MCNC: 1450 PG/ML (ref 0–1800)
PLATELET # BLD AUTO: 311 10*3/MM3 (ref 140–450)
PMV BLD AUTO: 9.8 FL (ref 6–12)
POTASSIUM SERPL-SCNC: 3.6 MMOL/L (ref 3.5–5.2)
PROCALCITONIN SERPL-MCNC: 0.14 NG/ML (ref 0–0.25)
PROT SERPL-MCNC: 6.7 G/DL (ref 6–8.5)
RBC # BLD AUTO: 3.9 10*6/MM3 (ref 3.77–5.28)
RHINOVIRUS RNA SPEC NAA+PROBE: DETECTED
RSV RNA NPH QL NAA+NON-PROBE: NOT DETECTED
SARS-COV-2 RNA NPH QL NAA+NON-PROBE: NOT DETECTED
SODIUM SERPL-SCNC: 142 MMOL/L (ref 136–145)
TROPONIN T SERPL HS-MCNC: 48 NG/L
TROPONIN T SERPL HS-MCNC: 50 NG/L
WBC NRBC COR # BLD AUTO: 7.54 10*3/MM3 (ref 3.4–10.8)
WHOLE BLOOD HOLD COAG: NORMAL
WHOLE BLOOD HOLD SPECIMEN: NORMAL

## 2024-01-18 PROCEDURE — 94664 DEMO&/EVAL PT USE INHALER: CPT

## 2024-01-18 PROCEDURE — 93005 ELECTROCARDIOGRAM TRACING: CPT | Performed by: EMERGENCY MEDICINE

## 2024-01-18 PROCEDURE — 84145 PROCALCITONIN (PCT): CPT | Performed by: EMERGENCY MEDICINE

## 2024-01-18 PROCEDURE — 94761 N-INVAS EAR/PLS OXIMETRY MLT: CPT

## 2024-01-18 PROCEDURE — 80053 COMPREHEN METABOLIC PANEL: CPT | Performed by: EMERGENCY MEDICINE

## 2024-01-18 PROCEDURE — 93005 ELECTROCARDIOGRAM TRACING: CPT

## 2024-01-18 PROCEDURE — 36415 COLL VENOUS BLD VENIPUNCTURE: CPT | Performed by: EMERGENCY MEDICINE

## 2024-01-18 PROCEDURE — 0202U NFCT DS 22 TRGT SARS-COV-2: CPT | Performed by: EMERGENCY MEDICINE

## 2024-01-18 PROCEDURE — 71045 X-RAY EXAM CHEST 1 VIEW: CPT

## 2024-01-18 PROCEDURE — 94799 UNLISTED PULMONARY SVC/PX: CPT

## 2024-01-18 PROCEDURE — 87040 BLOOD CULTURE FOR BACTERIA: CPT | Performed by: EMERGENCY MEDICINE

## 2024-01-18 PROCEDURE — 93010 ELECTROCARDIOGRAM REPORT: CPT | Performed by: INTERNAL MEDICINE

## 2024-01-18 PROCEDURE — 83605 ASSAY OF LACTIC ACID: CPT | Performed by: EMERGENCY MEDICINE

## 2024-01-18 PROCEDURE — 25010000002 METHYLPREDNISOLONE PER 40 MG: Performed by: INTERNAL MEDICINE

## 2024-01-18 PROCEDURE — 25510000001 IOPAMIDOL PER 1 ML: Performed by: EMERGENCY MEDICINE

## 2024-01-18 PROCEDURE — 84484 ASSAY OF TROPONIN QUANT: CPT | Performed by: EMERGENCY MEDICINE

## 2024-01-18 PROCEDURE — 83880 ASSAY OF NATRIURETIC PEPTIDE: CPT | Performed by: EMERGENCY MEDICINE

## 2024-01-18 PROCEDURE — 71275 CT ANGIOGRAPHY CHEST: CPT

## 2024-01-18 PROCEDURE — 85025 COMPLETE CBC W/AUTO DIFF WBC: CPT

## 2024-01-18 PROCEDURE — 99285 EMERGENCY DEPT VISIT HI MDM: CPT

## 2024-01-18 PROCEDURE — 94640 AIRWAY INHALATION TREATMENT: CPT

## 2024-01-18 RX ORDER — IPRATROPIUM BROMIDE AND ALBUTEROL SULFATE 2.5; .5 MG/3ML; MG/3ML
3 SOLUTION RESPIRATORY (INHALATION)
Status: DISCONTINUED | OUTPATIENT
Start: 2024-01-18 | End: 2024-01-21 | Stop reason: HOSPADM

## 2024-01-18 RX ORDER — SODIUM CHLORIDE 0.9 % (FLUSH) 0.9 %
10 SYRINGE (ML) INJECTION AS NEEDED
Status: DISCONTINUED | OUTPATIENT
Start: 2024-01-18 | End: 2024-01-21 | Stop reason: HOSPADM

## 2024-01-18 RX ORDER — ALBUTEROL SULFATE 2.5 MG/3ML
2.5 SOLUTION RESPIRATORY (INHALATION) EVERY 6 HOURS PRN
Status: DISCONTINUED | OUTPATIENT
Start: 2024-01-18 | End: 2024-01-21 | Stop reason: HOSPADM

## 2024-01-18 RX ORDER — METHYLPREDNISOLONE SODIUM SUCCINATE 40 MG/ML
40 INJECTION, POWDER, LYOPHILIZED, FOR SOLUTION INTRAMUSCULAR; INTRAVENOUS EVERY 8 HOURS
Status: DISCONTINUED | OUTPATIENT
Start: 2024-01-18 | End: 2024-01-20

## 2024-01-18 RX ORDER — BISACODYL 5 MG/1
5 TABLET, DELAYED RELEASE ORAL DAILY PRN
Status: DISCONTINUED | OUTPATIENT
Start: 2024-01-18 | End: 2024-01-21 | Stop reason: HOSPADM

## 2024-01-18 RX ORDER — UREA 10 %
3 LOTION (ML) TOPICAL NIGHTLY PRN
Status: DISCONTINUED | OUTPATIENT
Start: 2024-01-18 | End: 2024-01-21 | Stop reason: HOSPADM

## 2024-01-18 RX ORDER — ACETAMINOPHEN 325 MG/1
650 TABLET ORAL EVERY 4 HOURS PRN
Status: DISCONTINUED | OUTPATIENT
Start: 2024-01-18 | End: 2024-01-21 | Stop reason: HOSPADM

## 2024-01-18 RX ORDER — BISACODYL 10 MG
10 SUPPOSITORY, RECTAL RECTAL DAILY PRN
Status: DISCONTINUED | OUTPATIENT
Start: 2024-01-18 | End: 2024-01-21 | Stop reason: HOSPADM

## 2024-01-18 RX ORDER — POLYETHYLENE GLYCOL 3350 17 G/17G
17 POWDER, FOR SOLUTION ORAL DAILY PRN
Status: DISCONTINUED | OUTPATIENT
Start: 2024-01-18 | End: 2024-01-21 | Stop reason: HOSPADM

## 2024-01-18 RX ORDER — AMOXICILLIN 250 MG
2 CAPSULE ORAL 2 TIMES DAILY
Status: DISCONTINUED | OUTPATIENT
Start: 2024-01-18 | End: 2024-01-21 | Stop reason: HOSPADM

## 2024-01-18 RX ORDER — ONDANSETRON 2 MG/ML
4 INJECTION INTRAMUSCULAR; INTRAVENOUS EVERY 6 HOURS PRN
Status: DISCONTINUED | OUTPATIENT
Start: 2024-01-18 | End: 2024-01-21 | Stop reason: HOSPADM

## 2024-01-18 RX ORDER — ONDANSETRON 4 MG/1
4 TABLET, ORALLY DISINTEGRATING ORAL EVERY 6 HOURS PRN
Status: DISCONTINUED | OUTPATIENT
Start: 2024-01-18 | End: 2024-01-21 | Stop reason: HOSPADM

## 2024-01-18 RX ORDER — IPRATROPIUM BROMIDE AND ALBUTEROL SULFATE 2.5; .5 MG/3ML; MG/3ML
3 SOLUTION RESPIRATORY (INHALATION) ONCE
Status: COMPLETED | OUTPATIENT
Start: 2024-01-18 | End: 2024-01-18

## 2024-01-18 RX ADMIN — METHYLPREDNISOLONE SODIUM SUCCINATE 40 MG: 40 INJECTION, POWDER, FOR SOLUTION INTRAMUSCULAR; INTRAVENOUS at 19:02

## 2024-01-18 RX ADMIN — IOPAMIDOL 95 ML: 755 INJECTION, SOLUTION INTRAVENOUS at 16:46

## 2024-01-18 RX ADMIN — IPRATROPIUM BROMIDE AND ALBUTEROL SULFATE 3 ML: 2.5; .5 SOLUTION RESPIRATORY (INHALATION) at 19:10

## 2024-01-18 RX ADMIN — IPRATROPIUM BROMIDE AND ALBUTEROL SULFATE 3 ML: 2.5; .5 SOLUTION RESPIRATORY (INHALATION) at 15:01

## 2024-01-18 RX ADMIN — APIXABAN 2.5 MG: 2.5 TABLET, FILM COATED ORAL at 19:01

## 2024-01-18 NOTE — ED PROVIDER NOTES
EMERGENCY DEPARTMENT ENCOUNTER  Room Number:  10/10  PCP: Kehrer, Meredith Lea, MD  Independent Historians: Patient and Family      HPI:  Chief Complaint: had concerns including Shortness of Breath.     A complete HPI/ROS/PMH/PSH/SH/FH are unobtainable due to: None    Chronic or social conditions impacting patient care (Social Determinants of Health): None      Context: Darlene Pfeiffer is a 83 y.o. female with a medical history of lung cancer, COPD, systolic CHF, anemia who presents to the ED c/o acute shortness of breath.  The patient reports over the last few days she has had increased shortness of breath.  She is on baseline home oxygen at 3 to 4 L/min however has required increased dosing.  Today the patient was at rest and was short of breath and  states that he had to increase the oxygen to 6 L/min in order to help her however her oxygen saturation still did not improve.  She denies any chest pain.  She denies any swelling of her legs.  She states she has had previous similar presentations.  She states she was in the ER last week and was given pills that have not helped.      Review of prior external notes (non-ED) -and- Review of prior external test results outside of this encounter:  No prior records are available from her visit last week.  Discharge summary dated 1/2/2024 where she had heart failure with COVID and hypoxia.    Prescription drug monitoring program review:         PAST MEDICAL HISTORY  Active Ambulatory Problems     Diagnosis Date Noted    Essential hypertension 11/20/2019    Hyperlipidemia 11/20/2019    Esophageal reflux 11/20/2019    Chronic obstructive pulmonary disease 03/11/2020    Seasonal allergic rhinitis due to pollen 03/11/2020    Abnormal glucose 05/21/2020    Clinical diagnosis of severe acute respiratory syndrome coronavirus 2 (SARS-CoV-2) disease 12/21/2020    Lung cancer     Cerebral aneurysm, nonruptured 06/23/2022    Cerebral aneurysm without rupture 06/29/2022     Shortness of breath 10/14/2022    Hypokalemia 10/15/2022    History of lung cancer     Long-term use of high-risk medication 11/16/2022    Intractable back pain 11/29/2022    Hypokalemia 11/30/2022    Fitting and adjustment of vascular catheter 12/13/2022    Weakness 07/25/2023    Cystitis 07/26/2023    Fever 07/26/2023    Acute respiratory failure with hypoxia 11/14/2023    Respiratory failure 11/14/2023    Systolic CHF, acute 11/14/2023    Acute HFrEF (heart failure with reduced ejection fraction) 11/16/2023    Severe malnutrition 11/16/2023    Acute hypoxemic respiratory failure 12/28/2023    Chronic systolic CHF (congestive heart failure) 12/28/2023    Paroxysmal atrial fibrillation 12/28/2023    COVID-19 virus infection 12/28/2023    Cytokine release syndrome, grade 2 01/02/2024    Acute on chronic HFrEF (heart failure with reduced ejection fraction) 01/02/2024     Resolved Ambulatory Problems     Diagnosis Date Noted    No Resolved Ambulatory Problems     Past Medical History:   Diagnosis Date    Allergic rhinitis     Aneurysm     Asthma     Cancer of floor of mouth 2014    Cerebral aneurysm     COPD (chronic obstructive pulmonary disease)     COVID 2020    History of adenocarcinoma of lung     History of anemia     History of carcinoma in situ of skin     History of oral hairy leukoplakia     History of squamous cell carcinoma     Hypertension     Low vitamin B12 level     Thyromegaly     UTI (urinary tract infection)     Vitamin D deficiency          PAST SURGICAL HISTORY  Past Surgical History:   Procedure Laterality Date    BRONCHOSCOPY Bilateral 10/17/2022    Procedure: BRONCHOSCOPY WITH FLUORO with biopsy and BAL;  Surgeon: India Flores MD;  Location: [facility] ENDOSCOPY;  Service: Pulmonary;  Laterality: Bilateral;  PRE/POST - lung mass    CHOLECYSTECTOMY  1999    COLONOSCOPY      EMBOLIZATION CEREBRAL Left 06/29/2022    Procedure: 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;   Surgeon: Bradley Dowell MD;  Location: [facility] HYBRID OR ;  Service: Interventional Radiology;  Laterality: Left;    EYE SURGERY  2020    Took a muscle out of right eye    GLOSSECTOMY PARTIAL      less than one half tongue    LUNG SURGERY      ORAL LESION EXCISION/BIOPSY       and 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    TUBAL ABDOMINAL LIGATION  1970    VENOUS ACCESS DEVICE (PORT) INSERTION N/A 2022    Procedure: MEDIPORT PLACEMENT;  Surgeon: Jason Villaseñor MD;  Location: [facility] MAIN OR;  Service: Vascular;  Laterality: N/A;         FAMILY HISTORY  Family History   Problem Relation Age of Onset    Ovarian cancer Mother          at age 27    No Known Problems Father     Ovarian cancer Sister     Colon cancer Brother     No Known Problems Other     Malig Hyperthermia Neg Hx          SOCIAL HISTORY  Social History     Socioeconomic History    Marital status:    Tobacco Use    Smoking status: Former     Types: Cigarettes     Quit date: 2015     Years since quittin.9     Passive exposure: Never    Smokeless tobacco: Never    Tobacco comments:     less than a pack per day, no smoking since , Quit 2015   Vaping Use    Vaping Use: Never used   Substance and Sexual Activity    Alcohol use: Not Currently     Comment: Socially -A few times a month    Drug use: No    Sexual activity: Defer         ALLERGIES  Sulfa antibiotics      REVIEW OF SYSTEMS  Review of Systems  Included in HPI  All systems reviewed and negative except for those discussed in HPI.      PHYSICAL EXAM    I have reviewed the triage vital signs and nursing notes.    ED Triage Vitals   Temp Heart Rate Resp BP SpO2   24 1403 24 1403 24 1403 24 1406 24 1403   97.7 °F (36.5 °C) 117 28 174/77 (!) 84 %      Temp src Heart Rate Source Patient Position BP Location FiO2 (%)   -- 24 1446 24 1446 24 1446 --    Monitor Lying Right arm        Physical  Exam  GENERAL: Awake, chronically ill-appearing, alert, no acute distress  SKIN: Warm, dry  HENT: Normocephalic, atraumatic  EYES: no scleral icterus  CV: regular rhythm, regular rate  RESPIRATORY: Increased effort, lungs clear  ABDOMEN: soft, nontender, nondistended  MUSCULOSKELETAL: no deformity, no calf tenderness or swelling  NEURO: alert, moves all extremities, follows commands      NIH:                                                             LAB RESULTS  Recent Results (from the past 24 hour(s))   ECG 12 Lead ED Triage Standing Order; SOA    Collection Time: 01/18/24  2:10 PM   Result Value Ref Range    QT Interval 335 ms    QTC Interval 464 ms   Comprehensive Metabolic Panel    Collection Time: 01/18/24  2:31 PM    Specimen: Blood   Result Value Ref Range    Glucose 152 (H) 65 - 99 mg/dL    BUN 22 8 - 23 mg/dL    Creatinine 0.71 0.57 - 1.00 mg/dL    Sodium 142 136 - 145 mmol/L    Potassium 3.6 3.5 - 5.2 mmol/L    Chloride 99 98 - 107 mmol/L    CO2 30.0 (H) 22.0 - 29.0 mmol/L    Calcium 9.5 8.6 - 10.5 mg/dL    Total Protein 6.7 6.0 - 8.5 g/dL    Albumin 3.7 3.5 - 5.2 g/dL    ALT (SGPT) 15 1 - 33 U/L    AST (SGOT) 30 1 - 32 U/L    Alkaline Phosphatase 81 39 - 117 U/L    Total Bilirubin 0.4 0.0 - 1.2 mg/dL    Globulin 3.0 gm/dL    A/G Ratio 1.2 g/dL    BUN/Creatinine Ratio 31.0 (H) 7.0 - 25.0    Anion Gap 13.0 5.0 - 15.0 mmol/L    eGFR 85.5 >60.0 mL/min/1.73   BNP    Collection Time: 01/18/24  2:31 PM    Specimen: Blood   Result Value Ref Range    proBNP 1,450.0 0.0 - 1,800.0 pg/mL   Single High Sensitivity Troponin T    Collection Time: 01/18/24  2:31 PM    Specimen: Blood   Result Value Ref Range    HS Troponin T 48 (H) <14 ng/L   Green Top (Gel)    Collection Time: 01/18/24  2:31 PM   Result Value Ref Range    Extra Tube Hold for add-ons.    Lavender Top    Collection Time: 01/18/24  2:31 PM   Result Value Ref Range    Extra Tube hold for add-on    Gold Top - SST    Collection Time: 01/18/24  2:31 PM    Result Value Ref Range    Extra Tube Hold for add-ons.    Light Blue Top    Collection Time: 01/18/24  2:31 PM   Result Value Ref Range    Extra Tube Hold for add-ons.    CBC Auto Differential    Collection Time: 01/18/24  2:31 PM    Specimen: Blood   Result Value Ref Range    WBC 7.54 3.40 - 10.80 10*3/mm3    RBC 3.90 3.77 - 5.28 10*6/mm3    Hemoglobin 11.1 (L) 12.0 - 15.9 g/dL    Hematocrit 35.5 34.0 - 46.6 %    MCV 91.0 79.0 - 97.0 fL    MCH 28.5 26.6 - 33.0 pg    MCHC 31.3 (L) 31.5 - 35.7 g/dL    RDW 15.2 12.3 - 15.4 %    RDW-SD 48.5 37.0 - 54.0 fl    MPV 9.8 6.0 - 12.0 fL    Platelets 311 140 - 450 10*3/mm3    Neutrophil % 80.1 (H) 42.7 - 76.0 %    Lymphocyte % 9.7 (L) 19.6 - 45.3 %    Monocyte % 9.5 5.0 - 12.0 %    Eosinophil % 0.0 (L) 0.3 - 6.2 %    Basophil % 0.3 0.0 - 1.5 %    Immature Grans % 0.4 0.0 - 0.5 %    Neutrophils, Absolute 6.04 1.70 - 7.00 10*3/mm3    Lymphocytes, Absolute 0.73 0.70 - 3.10 10*3/mm3    Monocytes, Absolute 0.72 0.10 - 0.90 10*3/mm3    Eosinophils, Absolute 0.00 0.00 - 0.40 10*3/mm3    Basophils, Absolute 0.02 0.00 - 0.20 10*3/mm3    Immature Grans, Absolute 0.03 0.00 - 0.05 10*3/mm3    nRBC 0.0 0.0 - 0.2 /100 WBC   Single High Sensitivity Troponin T    Collection Time: 01/18/24  5:07 PM    Specimen: Blood   Result Value Ref Range    HS Troponin T 50 (H) <14 ng/L         RADIOLOGY  CT Angiogram Chest    Result Date: 1/18/2024  CT ANGIOGRAM OF THE CHEST WITH CONTRAST INCLUDING RECONSTRUCTION IMAGES 01/18/2024  HISTORY: Shortness of breath. Possible pulmonary embolus.  TECHNIQUE: Following the intravenous contrast injection, CT angiography was performed through the chest. Sagittal, coronal and 3D reconstruction images were reviewed.  FINDINGS: The pulmonary arterial system is well opacified with no evidence of pulmonary embolus.  There is volume loss of the left hemithorax. There are moderately extensive areas of infiltrate in the bilateral lungs which appear largely interstitial in  nature and involve essentially all of the lung lobes. This finding is more severe on the right than on the left. Bilateral pulmonary infiltrates were seen on the previous study of 11/14/2023 and some of these areas have cleared. Also some areas such as in the right lower lobe appears slightly more severe. No significant pleural effusion is seen. No pathologically enlarged lymph nodes are seen. There is masslike increase soft tissue in the anterior medial aspect of the left upper lung which appears slightly smaller than on the previous study such as on image 51.  A left renal cyst is seen.  Gallbladder has been removed.      1. No evidence of pulmonary embolus. 2. Moderately extensive bilateral pulmonary infiltrates. Some of the areas of infiltrate have cleared since the 11/14/2023 study while there are some additional ill-defined areas of increased density which are new such as in the right lower lobe which may represent some acute pneumonia. 3. Short-term follow-up CT of the chest in approximately 3 months suggested.  Radiation dose reduction techniques were utilized, including automated exposure control and exposure modulation based on body size.       XR Chest 1 View    Result Date: 1/18/2024  XR CHEST 1 VW-  HISTORY: Female who is 83 years-old, short of breath  TECHNIQUE: Frontal view of the chest  COMPARISON: 12/28/2023  FINDINGS: Right chest port appears stable. The heart size is borderline. Aorta is calcified. Pulmonary vasculature is congested. Bilateral pulmonary opacities persist and appear mildly increased at the right midlung, that could be evidence of pneumonia, continued follow-up advised. No large pleural effusion or pneumothorax. Otherwise stable.       As described.    This report was finalized on 1/18/2024 3:34 PM by Dr. Ebenezer Barton M.D on Workstation: [id number]         MEDICATIONS GIVEN IN ER  Medications   sodium chloride 0.9 % flush 10 mL (has no administration in time range)    piperacillin-tazobactam (ZOSYN) 3.375 g in iso-osmotic dextrose 50 ml (premix) (has no administration in time range)   apixaban (ELIQUIS) tablet 2.5 mg (has no administration in time range)   ipratropium-albuterol (DUO-NEB) nebulizer solution 3 mL (3 mL Nebulization Given 1/18/24 1501)   iopamidol (ISOVUE-370) 76 % injection 100 mL (95 mL Intravenous Given by Other 1/18/24 1646)         ORDERS PLACED DURING THIS VISIT:  Orders Placed This Encounter   Procedures    Blood Culture - Blood,    Blood Culture - Blood,    Respiratory Panel PCR w/COVID-19(SARS-CoV-2) JOSHUA/NUZHAT/JOHANA/PAD/COR/ROSEMARY In-House, NP Swab in UTM/VTM, 2 HR TAT - Swab, Nasopharynx    XR Chest 1 View    CT Angiogram Chest    [place] Draw    Comprehensive Metabolic Panel    BNP    Single High Sensitivity Troponin T    CBC Auto Differential    Single High Sensitivity Troponin T    Lactic Acid, Plasma    Procalcitonin    NPO Diet NPO Type: Strict NPO    Undress & Gown    Continuous Pulse Oximetry    Vital Signs    LHA (on-call MD unless specified) Details    Inpatient Palliative Care Team Consult    Oxygen Therapy- Nasal Cannula; Titrate 1-6 LPM Per SpO2; 90 - 95%    ECG 12 Lead ED Triage Standing Order; SOA    Insert Peripheral IV    Inpatient Admission    CBC & Differential    Green Top (Gel)    Lavender Top    Gold Top - SST    Light Blue Top         OUTPATIENT MEDICATION MANAGEMENT:  Current Facility-Administered Medications Ordered in Epic   Medication Dose Route Frequency Provider Last Rate Last Admin    apixaban (ELIQUIS) tablet 2.5 mg  2.5 mg Oral Once Anthony Hope MD        piperacillin-tazobactam (ZOSYN) 3.375 g in iso-osmotic dextrose 50 ml (premix)  3.375 g Intravenous Once Anthony Hope MD        sodium chloride 0.9 % flush 10 mL  10 mL Intravenous PRN Anthony Hope MD         Current Outpatient Medications Ordered in Epic   Medication Sig Dispense Refill    amoxicillin-clavulanate (AUGMENTIN) 875-125 MG per tablet Take 1 tablet by  mouth 2 (Two) Times a Day. Indications: Upper Respiratory Tract Infection      apixaban (ELIQUIS) 2.5 MG tablet tablet Take 1 tablet by mouth 2 (Two) Times a Day. 60 tablet 3    atorvastatin (LIPITOR) 20 MG tablet TAKE 1 TABLET EVERY NIGHT (Patient taking differently: Take 1 tablet by mouth Every Night.) 90 tablet 1    cetirizine (zyrTEC) 10 MG tablet Take 1 tablet by mouth Daily. Indications: Perennial Allergic Rhinitis      Cholecalciferol (Vitamin D3) 50 MCG (2000 UT) tablet Take 1,000 Units by mouth Daily. HOLDING FOR DOS  Indications: Vitamin D Deficiency      Cyanocobalamin (VITAMIN B12 PO) Take 1,000 mcg by mouth Daily. Indications: vitamin b12 deficiency       doxycycline (VIBRAMYICN) 100 MG tablet Take 100 mg by mouth 2 (Two) Times a Day. Indications: Upper Respiratory Tract Infection      folic acid (FOLVITE) 1 MG tablet Take 1 tablet by mouth Daily. Start at least 7 days prior to chemotherapy until at least 3 weeks after all chemotherapy. 30 tablet 6    furosemide (LASIX) 40 MG tablet Take 1 tablet by mouth Daily. 90 tablet 1    HYDROcodone Bit-Homatrop MBr (HYCODAN) 5-1.5 MG/5ML solution Take 5 mL by mouth Every 8 (Eight) Hours As Needed for Cough. 120 mL 0    ipratropium (ATROVENT) 0.06 % nasal spray 2 sprays into the nostril(s) as directed by provider 4 (Four) Times a Day. 15 mL 0    metoprolol succinate XL (TOPROL-XL) 25 MG 24 hr tablet Take 1 tablet by mouth Daily.      mirtazapine (REMERON) 7.5 MG tablet TAKE 1 TABLET BY MOUTH EVERY NIGHT AT BEDTIME 30 tablet 1    montelukast (SINGULAIR) 10 MG tablet Take 1 tablet by mouth Every Night. Indications: Hayfever, Perennial Allergic Rhinitis      ondansetron (ZOFRAN) 8 MG tablet Take 1 tablet by mouth 3 (Three) Times a Day As Needed for Nausea or Vomiting. 30 tablet 5    pantoprazole (PROTONIX) 40 MG EC tablet Take 1 tablet by mouth Every Morning for 30 days. 30 tablet 0    potassium chloride (K-DUR,KLOR-CON) 10 MEQ CR tablet TAKE 2 TABLETS BY MOUTH DAILY  60 tablet 1    promethazine-dextromethorphan (PROMETHAZINE-DM) 6.25-15 MG/5ML syrup Take 5 mL by mouth 4 (Four) Times a Day As Needed for Cough. 180 mL 0    sacubitril-valsartan (Entresto) 24-26 MG tablet Take 0.5 tablets by mouth 2 (Two) Times a Day. 30 tablet 3    zolpidem (AMBIEN) 5 MG tablet TAKE 1 TABLET BY MOUTH EVERY NIGHT AT BEDTIME AS NEEDED FOR SLEEP 30 tablet 0         PROCEDURES  Procedures            PROGRESS, DATA ANALYSIS, CONSULTS, AND MEDICAL DECISION MAKING  All labs have been independently interpreted by me.  All radiology studies have been reviewed by me. All EKG's have been independently viewed and interpreted by me.  Discussion below represents my analysis of pertinent findings related to patient's condition, differential diagnosis, treatment plan and final disposition.    Differential diagnosis includes but is not limited to COPD exacerbation, CHF exacerbation, acute coronary syndrome, acute aortic syndrome, PE, pneumothorax, pneumonia.    Clinical Scores:                  ED Course as of 01/18/24 1750   Thu Jan 18, 2024   1508 EKG          EKG time: 1410  Rhythm/Rate: Sinus tachycardia, rate 115  P waves and MN: LV, normal MN  QRS, axis: Narrow QRS, normal axis  ST and T waves: No acute    Independently Interpreted by me  New tachycardia changed compared to prior 12/28/2023   [TR]   1536 XR Chest 1 View  My independent interpretation of the chest x-ray is left sided opacity [TR]   1744 Reviewed the workup and findings with the patient and family at the bedside.  Answered all questions.  She has bilateral pneumonia with acute on chronic hypoxia.  She had no significant movement with breathing treatments.  Plan to initiate antibiotics and admit her to the hospital.  She is agreeable.  The patient does request her 7 PM dose of Eliquis.  I have ordered this. [TR]   6170 Discussing with Dr. Escamilla with A.  She agrees to admit. [TR]      ED Course User Index  [TR] Anthony Hope MD              AS OF 17:50 EST VITALS:    BP - 154/74  HR - 110  TEMP - 97.7 °F (36.5 °C)  O2 SATS - 95%    COMPLEXITY OF CARE  The patient requires admission.      DIAGNOSIS  Final diagnoses:   Acute on chronic hypoxic respiratory failure   Pneumonia of both lungs due to infectious organism, unspecified part of lung         DISPOSITION  ED Disposition       ED Disposition   Decision to Admit    Condition   --    Comment   Level of Care: Telemetry [5]   Diagnosis: Bilateral pneumonia [891987]   Admitting Physician: ISMAEL BALDWIN [[id number]]   Attending Physician: ISMAEL BALDWIN [[id number]]   Certification: I Certify That Inpatient Hospital Services Are Medically Necessary For Greater Than 2 Midnights                  Please note that portions of this document were completed with a voice recognition program.    Note Disclaimer: At [facility], we believe that sharing information builds trust and better relationships. You are receiving this note because you recently visited [facility]. It is possible you will see health information before a provider has talked with you about it. This kind of information can be easy to misunderstand. To help you fully understand what it means for your health, we urge you to discuss this note with your provider.         Anthony Hope MD  01/18/24 6844

## 2024-01-18 NOTE — TELEPHONE ENCOUNTER
Pt called to state that she is going to the ER as her O2 sats are in the 60's on O2. She was leaving as she was speaking to me. She will go to [facility] for evaluation. Informed her that is what we would recommend. She v/u.

## 2024-01-19 ENCOUNTER — HOME CARE VISIT (OUTPATIENT)
Dept: HOME HEALTH SERVICES | Facility: HOME HEALTHCARE | Age: 84
End: 2024-01-19
Payer: MEDICARE

## 2024-01-19 ENCOUNTER — DOCUMENTATION (OUTPATIENT)
Dept: HOME HEALTH SERVICES | Facility: HOME HEALTHCARE | Age: 84
End: 2024-01-19
Payer: MEDICARE

## 2024-01-19 LAB
ANION GAP SERPL CALCULATED.3IONS-SCNC: 14 MMOL/L (ref 5–15)
BUN SERPL-MCNC: 22 MG/DL (ref 8–23)
BUN/CREAT SERPL: 27.2 (ref 7–25)
CALCIUM SPEC-SCNC: 9.1 MG/DL (ref 8.6–10.5)
CHLORIDE SERPL-SCNC: 100 MMOL/L (ref 98–107)
CO2 SERPL-SCNC: 28 MMOL/L (ref 22–29)
CREAT SERPL-MCNC: 0.81 MG/DL (ref 0.57–1)
DEPRECATED RDW RBC AUTO: 47.8 FL (ref 37–54)
EGFRCR SERPLBLD CKD-EPI 2021: 73 ML/MIN/1.73
ERYTHROCYTE [DISTWIDTH] IN BLOOD BY AUTOMATED COUNT: 15.2 % (ref 12.3–15.4)
GLUCOSE SERPL-MCNC: 120 MG/DL (ref 65–99)
HCT VFR BLD AUTO: 32.3 % (ref 34–46.6)
HGB BLD-MCNC: 10.7 G/DL (ref 12–15.9)
MCH RBC QN AUTO: 30 PG (ref 26.6–33)
MCHC RBC AUTO-ENTMCNC: 33.1 G/DL (ref 31.5–35.7)
MCV RBC AUTO: 90.5 FL (ref 79–97)
PLATELET # BLD AUTO: 274 10*3/MM3 (ref 140–450)
PMV BLD AUTO: 10.4 FL (ref 6–12)
POTASSIUM SERPL-SCNC: 3.7 MMOL/L (ref 3.5–5.2)
RBC # BLD AUTO: 3.57 10*6/MM3 (ref 3.77–5.28)
SODIUM SERPL-SCNC: 142 MMOL/L (ref 136–145)
WBC NRBC COR # BLD AUTO: 5.38 10*3/MM3 (ref 3.4–10.8)

## 2024-01-19 PROCEDURE — 94760 N-INVAS EAR/PLS OXIMETRY 1: CPT

## 2024-01-19 PROCEDURE — 80048 BASIC METABOLIC PNL TOTAL CA: CPT | Performed by: INTERNAL MEDICINE

## 2024-01-19 PROCEDURE — 25010000002 PIPERACILLIN SOD-TAZOBACTAM PER 1 G: Performed by: INTERNAL MEDICINE

## 2024-01-19 PROCEDURE — 94799 UNLISTED PULMONARY SVC/PX: CPT

## 2024-01-19 PROCEDURE — 94761 N-INVAS EAR/PLS OXIMETRY MLT: CPT

## 2024-01-19 PROCEDURE — 94664 DEMO&/EVAL PT USE INHALER: CPT

## 2024-01-19 PROCEDURE — 25010000002 METHYLPREDNISOLONE PER 40 MG: Performed by: INTERNAL MEDICINE

## 2024-01-19 PROCEDURE — 85027 COMPLETE CBC AUTOMATED: CPT | Performed by: INTERNAL MEDICINE

## 2024-01-19 RX ORDER — FOLIC ACID 1 MG/1
1 TABLET ORAL DAILY
Status: DISCONTINUED | OUTPATIENT
Start: 2024-01-19 | End: 2024-01-21 | Stop reason: HOSPADM

## 2024-01-19 RX ORDER — MELATONIN
1000 DAILY
Status: DISCONTINUED | OUTPATIENT
Start: 2024-01-19 | End: 2024-01-21 | Stop reason: HOSPADM

## 2024-01-19 RX ORDER — MIRTAZAPINE 15 MG/1
7.5 TABLET, FILM COATED ORAL NIGHTLY
Status: DISCONTINUED | OUTPATIENT
Start: 2024-01-19 | End: 2024-01-21 | Stop reason: HOSPADM

## 2024-01-19 RX ORDER — POTASSIUM CHLORIDE 750 MG/1
20 TABLET, FILM COATED, EXTENDED RELEASE ORAL DAILY
Status: DISCONTINUED | OUTPATIENT
Start: 2024-01-19 | End: 2024-01-21 | Stop reason: HOSPADM

## 2024-01-19 RX ORDER — ZOLPIDEM TARTRATE 5 MG/1
5 TABLET ORAL NIGHTLY PRN
Status: DISCONTINUED | OUTPATIENT
Start: 2024-01-19 | End: 2024-01-21 | Stop reason: HOSPADM

## 2024-01-19 RX ORDER — CHOLECALCIFEROL (VITAMIN D3) 125 MCG
1000 CAPSULE ORAL DAILY
Status: DISCONTINUED | OUTPATIENT
Start: 2024-01-19 | End: 2024-01-21 | Stop reason: HOSPADM

## 2024-01-19 RX ORDER — METOPROLOL SUCCINATE 25 MG/1
25 TABLET, EXTENDED RELEASE ORAL DAILY
Status: DISCONTINUED | OUTPATIENT
Start: 2024-01-19 | End: 2024-01-21 | Stop reason: HOSPADM

## 2024-01-19 RX ORDER — ATORVASTATIN CALCIUM 20 MG/1
20 TABLET, FILM COATED ORAL NIGHTLY
Status: DISCONTINUED | OUTPATIENT
Start: 2024-01-19 | End: 2024-01-21 | Stop reason: HOSPADM

## 2024-01-19 RX ORDER — MONTELUKAST SODIUM 10 MG/1
10 TABLET ORAL NIGHTLY
Status: DISCONTINUED | OUTPATIENT
Start: 2024-01-19 | End: 2024-01-21 | Stop reason: HOSPADM

## 2024-01-19 RX ORDER — FUROSEMIDE 40 MG/1
40 TABLET ORAL DAILY
Status: DISCONTINUED | OUTPATIENT
Start: 2024-01-19 | End: 2024-01-21 | Stop reason: HOSPADM

## 2024-01-19 RX ORDER — PANTOPRAZOLE SODIUM 40 MG/1
40 TABLET, DELAYED RELEASE ORAL
Status: DISCONTINUED | OUTPATIENT
Start: 2024-01-19 | End: 2024-01-21 | Stop reason: HOSPADM

## 2024-01-19 RX ADMIN — FUROSEMIDE 40 MG: 40 TABLET ORAL at 09:46

## 2024-01-19 RX ADMIN — ZOLPIDEM TARTRATE 5 MG: 5 TABLET ORAL at 22:05

## 2024-01-19 RX ADMIN — IPRATROPIUM BROMIDE AND ALBUTEROL SULFATE 3 ML: 2.5; .5 SOLUTION RESPIRATORY (INHALATION) at 14:20

## 2024-01-19 RX ADMIN — ATORVASTATIN CALCIUM 20 MG: 20 TABLET, FILM COATED ORAL at 21:14

## 2024-01-19 RX ADMIN — DOCUSATE SODIUM 50MG AND SENNOSIDES 8.6MG 2 TABLET: 8.6; 5 TABLET, FILM COATED ORAL at 09:47

## 2024-01-19 RX ADMIN — PIPERACILLIN SODIUM AND TAZOBACTAM SODIUM 3.38 G: 3; .375 INJECTION, SOLUTION INTRAVENOUS at 09:47

## 2024-01-19 RX ADMIN — MIRTAZAPINE 7.5 MG: 15 TABLET, FILM COATED ORAL at 21:14

## 2024-01-19 RX ADMIN — FOLIC ACID 1 MG: 1 TABLET ORAL at 09:46

## 2024-01-19 RX ADMIN — DOCUSATE SODIUM 50MG AND SENNOSIDES 8.6MG 2 TABLET: 8.6; 5 TABLET, FILM COATED ORAL at 21:14

## 2024-01-19 RX ADMIN — PIPERACILLIN SODIUM AND TAZOBACTAM SODIUM 3.38 G: 3; .375 INJECTION, SOLUTION INTRAVENOUS at 18:42

## 2024-01-19 RX ADMIN — SACUBITRIL AND VALSARTAN 0.5 TABLET: 24; 26 TABLET, FILM COATED ORAL at 09:46

## 2024-01-19 RX ADMIN — MONTELUKAST SODIUM 10 MG: 10 TABLET, FILM COATED ORAL at 21:14

## 2024-01-19 RX ADMIN — Medication 1000 UNITS: at 09:46

## 2024-01-19 RX ADMIN — POTASSIUM CHLORIDE 20 MEQ: 750 TABLET, EXTENDED RELEASE ORAL at 09:46

## 2024-01-19 RX ADMIN — APIXABAN 2.5 MG: 2.5 TABLET, FILM COATED ORAL at 09:47

## 2024-01-19 RX ADMIN — IPRATROPIUM BROMIDE AND ALBUTEROL SULFATE 3 ML: 2.5; .5 SOLUTION RESPIRATORY (INHALATION) at 07:37

## 2024-01-19 RX ADMIN — METHYLPREDNISOLONE SODIUM SUCCINATE 40 MG: 40 INJECTION, POWDER, FOR SOLUTION INTRAMUSCULAR; INTRAVENOUS at 09:46

## 2024-01-19 RX ADMIN — METHYLPREDNISOLONE SODIUM SUCCINATE 40 MG: 40 INJECTION, POWDER, FOR SOLUTION INTRAMUSCULAR; INTRAVENOUS at 01:07

## 2024-01-19 RX ADMIN — Medication 1000 MCG: at 09:47

## 2024-01-19 RX ADMIN — PIPERACILLIN SODIUM AND TAZOBACTAM SODIUM 3.38 G: 3; .375 INJECTION, SOLUTION INTRAVENOUS at 01:03

## 2024-01-19 RX ADMIN — IPRATROPIUM BROMIDE AND ALBUTEROL SULFATE 3 ML: 2.5; .5 SOLUTION RESPIRATORY (INHALATION) at 19:47

## 2024-01-19 RX ADMIN — METOPROLOL SUCCINATE 25 MG: 25 TABLET, EXTENDED RELEASE ORAL at 09:46

## 2024-01-19 RX ADMIN — APIXABAN 2.5 MG: 2.5 TABLET, FILM COATED ORAL at 21:14

## 2024-01-19 RX ADMIN — METHYLPREDNISOLONE SODIUM SUCCINATE 40 MG: 40 INJECTION, POWDER, FOR SOLUTION INTRAMUSCULAR; INTRAVENOUS at 18:42

## 2024-01-19 RX ADMIN — PANTOPRAZOLE SODIUM 40 MG: 40 TABLET, DELAYED RELEASE ORAL at 09:47

## 2024-01-19 NOTE — H&P
HISTORY AND PHYSICAL   [facility]        Date of Admission: 2024  Patient Identification:  Name: Darlene Pfeiffer  Age: 83 y.o.  Sex: female  :  1940  MRN: [id number]                     Primary Care Physician: Kehrer, Meredith Lea, MD    Chief Complaint:  83 year old female who presented to the emergency room with shortness of breath which started yesterday; she has a history of copd and is on home oxygen; she has had to turn up her oxygen at home; no sick contacts;     History of Present Illness:   As above    Past Medical History:  Past Medical History:   Diagnosis Date    Allergic rhinitis     Aneurysm     Asthma     Cancer of floor of mouth     Cerebral aneurysm     COPD (chronic obstructive pulmonary disease)     COVID     Esophageal reflux     History of adenocarcinoma of lung     History of anemia     History of carcinoma in situ of skin     History of lung cancer     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 from [facility]    History of squamous cell carcinoma     Tongue    Hyperlipidemia     Hypertension     since early 60s    Intractable back pain 2022    Low vitamin B12 level     Lung cancer     Systolic CHF, acute 2023    Thyromegaly     UTI (urinary tract infection)     Vitamin D deficiency      Past Surgical History:  Past Surgical History:   Procedure Laterality Date    BRONCHOSCOPY Bilateral 10/17/2022    Procedure: BRONCHOSCOPY WITH FLUORO with biopsy and BAL;  Surgeon: India Flores MD;  Location: [facility] ENDOSCOPY;  Service: Pulmonary;  Laterality: Bilateral;  PRE/POST - lung mass    CHOLECYSTECTOMY      COLONOSCOPY      EMBOLIZATION CEREBRAL Left 2022    Procedure: 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;  Surgeon: Bradley Dowell MD;  Location: [facility] HYBRID OR ;  Service: Interventional Radiology;  Laterality: Left;    EYE SURGERY  2020    Took a muscle out of right  eye    GLOSSECTOMY PARTIAL      less than one half tongue    LUNG SURGERY      ORAL LESION EXCISION/BIOPSY       and 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    TUBAL ABDOMINAL LIGATION  1970    VENOUS ACCESS DEVICE (PORT) INSERTION N/A 2022    Procedure: MEDIPORT PLACEMENT;  Surgeon: Jason Villaseñor MD;  Location: [facility] MAIN OR;  Service: Vascular;  Laterality: N/A;      Home Meds:  (Not in a hospital admission)      Allergies:  Allergies   Allergen Reactions    Sulfa Antibiotics Rash     Immunizations:  Immunization History   Administered Date(s) Administered    ABRYSVO (RSV, 60+ or pregnant women 32-36 wks) 2023    COVID-19 (PFIZER) Purple Cap Monovalent 2021, 2021, 2021    Covid-19 (Pfizer) Gray Cap Monovalent 2022    FLUAD TRI 65YR+ 2018    Fluad Quad 65+ 2023    Fluzone High Dose =>65 Years ([employer] ONLY) 2018, 10/22/2019    Fluzone High-Dose 65+yrs 2020, 2021    Influenza, Unspecified 2020    Pneumococcal Conjugate 13-Valent (PCV13) 2015    Pneumococcal Polysaccharide (PPSV23) 2012    Tdap 11/10/2016     Social History:   Social History     Social History Narrative    Not on file     Social History     Socioeconomic History    Marital status:    Tobacco Use    Smoking status: Former     Types: Cigarettes     Quit date: 2015     Years since quittin.9     Passive exposure: Never    Smokeless tobacco: Never    Tobacco comments:     less than a pack per day, no smoking since , Quit 2015   Vaping Use    Vaping Use: Never used   Substance and Sexual Activity    Alcohol use: Not Currently     Comment: Socially -A few times a month    Drug use: No    Sexual activity: Defer       Family History:  Family History   Problem Relation Age of Onset    Ovarian cancer Mother          at age 27    No Known Problems Father     Ovarian cancer Sister     Colon cancer Brother     No Known Problems Other      "Malig Hyperthermia Neg Hx         Review of Systems  See history of present illness and past medical history.  Patient denies headache, dizziness, syncope, falls, trauma, change in vision, change in hearing, change in taste, changes in weight, changes in appetite, focal weakness, numbness, or paresthesia.  Patient denies chest pain,   sinus pressure, rhinorrhea, epistaxis, hemoptysis, nausea, vomiting,hematemesis, diarrhea, constipation or hematochezia.  Denies cold or heat intolerance, polydipsia, polyuria, polyphagia. Denies hematuria, pyuria, dysuria, hesitancy, frequency or urgency. Denies consumption of raw and under cooked meats foods or change in water source.  Denies fever, chills, sweats, night sweats.  Denies missing any routine medications. Remainder of ROS is negative.    Objective:  T Max 24 hrs: Temp (24hrs), Av.7 °F (36.5 °C), Min:97.7 °F (36.5 °C), Max:97.7 °F (36.5 °C)    Vitals Ranges:   Temp:  [97.7 °F (36.5 °C)] 97.7 °F (36.5 °C)  Heart Rate:  [108-120] 110  Resp:  [24-28] 24  BP: (115-174)/(70-94) 115/70      Exam:  /70   Pulse 110   Temp 97.7 °F (36.5 °C)   Resp 24   Ht 160 cm (63\")   Wt 46.3 kg (102 lb)   LMP  (LMP Unknown)   SpO2 95%   BMI 18.07 kg/m²     General Appearance:    Alert, cooperative, no distress, appears stated age   Head:    Normocephalic, without obvious abnormality, atraumatic   Eyes:    PERRL, conjunctivae/corneas clear, EOM's intact, both eyes   Ears:    Normal external ear canals, both ears   Nose:   Nares normal, septum midline, mucosa normal, no drainage    or sinus tenderness   Throat:   Lips, mucosa, and tongue normal   Neck:   Supple, symmetrical, trachea midline, no adenopathy;     thyroid:  no enlargement/tenderness/nodules; no carotid    bruit or JVD   Back:     Symmetric, no curvature, ROM normal, no CVA tenderness   Lungs:     Decreased breath sounds bilaterally, respirations unlabored   Chest Wall:    No tenderness or deformity    Heart:    " Regular rate and rhythm, S1 and S2 normal, no murmur, rub   or gallop   Abdomen:     Soft, nontender, bowel sounds active all four quadrants,     no masses, no hepatomegaly, no splenomegaly   Extremities:   Extremities normal, atraumatic, no cyanosis or edema        Data Review:  Labs in chart were reviewed.  WBC   Date Value Ref Range Status   01/18/2024 7.54 3.40 - 10.80 10*3/mm3 Final     Hemoglobin   Date Value Ref Range Status   01/18/2024 11.1 (L) 12.0 - 15.9 g/dL Final     Hematocrit   Date Value Ref Range Status   01/18/2024 35.5 34.0 - 46.6 % Final     Platelets   Date Value Ref Range Status   01/18/2024 311 140 - 450 10*3/mm3 Final     Sodium   Date Value Ref Range Status   01/18/2024 142 136 - 145 mmol/L Final     Potassium   Date Value Ref Range Status   01/18/2024 3.6 3.5 - 5.2 mmol/L Final     Chloride   Date Value Ref Range Status   01/18/2024 99 98 - 107 mmol/L Final     CO2   Date Value Ref Range Status   01/18/2024 30.0 (H) 22.0 - 29.0 mmol/L Final     BUN   Date Value Ref Range Status   01/18/2024 22 8 - 23 mg/dL Final     Creatinine   Date Value Ref Range Status   01/18/2024 0.71 0.57 - 1.00 mg/dL Final     Glucose   Date Value Ref Range Status   01/18/2024 152 (H) 65 - 99 mg/dL Final     Calcium   Date Value Ref Range Status   01/18/2024 9.5 8.6 - 10.5 mg/dL Final                Imaging Results (All)       Procedure Component Value Units Date/Time    CT Angiogram Chest [[id number]] Collected: 01/18/24 1737     Updated: 01/18/24 1737    Narrative:      CT ANGIOGRAM OF THE CHEST WITH CONTRAST INCLUDING RECONSTRUCTION IMAGES  01/18/2024     HISTORY: Shortness of breath. Possible pulmonary embolus.     TECHNIQUE: Following the intravenous contrast injection, CT angiography  was performed through the chest. Sagittal, coronal and 3D reconstruction  images were reviewed.     FINDINGS: The pulmonary arterial system is well opacified with no  evidence of pulmonary embolus.     There is volume loss of the  left hemithorax. There are moderately  extensive areas of infiltrate in the bilateral lungs which appear  largely interstitial in nature and involve essentially all of the lung  lobes. This finding is more severe on the right than on the left.  Bilateral pulmonary infiltrates were seen on the previous study of  11/14/2023 and some of these areas have cleared. Also some areas such as  in the right lower lobe appears slightly more severe. No significant  pleural effusion is seen. No pathologically enlarged lymph nodes are  seen. There is masslike increase soft tissue in the anterior medial  aspect of the left upper lung which appears slightly smaller than on the  previous study such as on image 51.     A left renal cyst is seen.     Gallbladder has been removed.       Impression:      1. No evidence of pulmonary embolus.  2. Moderately extensive bilateral pulmonary infiltrates. Some of the  areas of infiltrate have cleared since the 11/14/2023 study while there  are some additional ill-defined areas of increased density which are new  such as in the right lower lobe which may represent some acute  pneumonia.  3. Short-term follow-up CT of the chest in approximately 3 months  suggested.     Radiation dose reduction techniques were utilized, including automated  exposure control and exposure modulation based on body size.          XR Chest 1 View [[id number]] Collected: 01/18/24 1532     Updated: 01/18/24 1537    Narrative:      XR CHEST 1 VW-     HISTORY: Female who is 83 years-old, short of breath     TECHNIQUE: Frontal view of the chest     COMPARISON: 12/28/2023     FINDINGS: Right chest port appears stable. The heart size is borderline.  Aorta is calcified. Pulmonary vasculature is congested. Bilateral  pulmonary opacities persist and appear mildly increased at the right  midlung, that could be evidence of pneumonia, continued follow-up  advised. No large pleural effusion or pneumothorax. Otherwise stable.           Impression:      As described.           This report was finalized on 1/18/2024 3:34 PM by Dr. Ebenezer Barton M.D on Workstation: [id number]                 Assessment:  Active Hospital Problems    Diagnosis  POA    **Bilateral pneumonia [J18.9]  Yes    Acute hypoxic respiratory failure [J96.01]  Yes      Resolved Hospital Problems   No resolved problems to display.   Copd  Hypertension  Hyperglycemia  Underweight  Anemia  Chronic systolic chf  rhinovirus      Plan:  Will continue antibiotics  Steroids, mininebs  Monitor on telemetry  Wean oxygen as tolerated  Dw patient and ed provider as well as family     Lorna Escamilla MD  1/18/2024  23:23 EST

## 2024-01-19 NOTE — ED NOTES
Nursing report ED to floor  Darlene Pfeiffer  83 y.o.  female    HPI :   Chief Complaint   Patient presents with    Shortness of Breath     Darlene Pfeiffer is a 83 y.o. female with a medical history of lung cancer, COPD, systolic CHF, anemia who presents to the ED c/o acute shortness of breath.  The patient reports over the last few days she has had increased shortness of breath.  She is on baseline home oxygen at 3 to 4 L/min however has required increased dosing.  Today the patient was at rest and was short of breath and  states that he had to increase the oxygen to 6 L/min in order to help her however her oxygen saturation still did not improve.  She denies any chest pain.  She denies any swelling of her legs.  She states she has had previous similar presentations.  She states she was in the ER last week and was given pills that have not helped.     Admitting doctor:   Lorna Escamilla MD    Admitting diagnosis:   The primary encounter diagnosis was Acute on chronic hypoxic respiratory failure. A diagnosis of Pneumonia of both lungs due to infectious organism, unspecified part of lung was also pertinent to this visit.    Code status:   Current Code Status       Date Active Code Status Order ID Comments User Context       1/18/2024 1751 CPR (Attempt to Resuscitate) [id number]  Lorna Escamilla MD ED        Question Answer    Code Status (Patient has no pulse and is not breathing) CPR (Attempt to Resuscitate)    Medical Interventions (Patient has pulse or is breathing) Full                    Allergies:   Sulfa antibiotics    Isolation:   Droplet    Intake and Output    Intake/Output Summary (Last 24 hours) at 1/18/2024 2223  Last data filed at 1/18/2024 1940  Gross per 24 hour   Intake 50 ml   Output --   Net 50 ml       Weight:       01/18/24  1403   Weight: 46.3 kg (102 lb)       Most recent vitals:   Vitals:    01/18/24 1546 01/18/24 1910 01/18/24 1915 01/18/24 2049   BP: 154/74   115/70   BP  Location:       Patient Position:       Pulse: 110      Resp:       Temp:       SpO2: 95% 95% 95%    Weight:       Height:           Active LDAs/IV Access:   Lines, Drains & Airways       Active LDAs       Name Placement date Placement time Site Days    Peripheral IV 01/18/24 1431 Anterior;Left Forearm 01/18/24  1431  Forearm  less than 1    Single Lumen Implantable Port Right Subclavian --  --  Subclavian  --                    Labs (abnormal labs have a star):   Labs Reviewed   RESPIRATORY PANEL PCR W/ COVID-19 (SARS-COV-2), NP SWAB IN UTM/VTP, 2 HR TAT - Abnormal; Notable for the following components:       Result Value    Human Rhinovirus/Enterovirus Detected (*)     All other components within normal limits    Narrative:     In the setting of a positive respiratory panel with a viral infection PLUS a negative procalcitonin without other underlying concern for bacterial infection, consider observing off antibiotics or discontinuation of antibiotics and continue supportive care. If the respiratory panel is positive for atypical bacterial infection (Bordetella pertussis, Chlamydophila pneumoniae, or Mycoplasma pneumoniae), consider antibiotic de-escalation to target atypical bacterial infection.   COMPREHENSIVE METABOLIC PANEL - Abnormal; Notable for the following components:    Glucose 152 (*)     CO2 30.0 (*)     BUN/Creatinine Ratio 31.0 (*)     All other components within normal limits    Narrative:     GFR Normal >60  Chronic Kidney Disease <60  Kidney Failure <15    The GFR formula is only valid for adults with stable renal function between ages 18 and 70.   SINGLE HSTROPONIN T - Abnormal; Notable for the following components:    HS Troponin T 48 (*)     All other components within normal limits    Narrative:     High Sensitive Troponin T Reference Range:  <14.0 ng/L- Negative Female for AMI  <22.0 ng/L- Negative Male for AMI  >=14 - Abnormal Female indicating possible myocardial injury.  >=22 - Abnormal  Male indicating possible myocardial injury.   Clinicians would have to utilize clinical acumen, EKG, Troponin, and serial changes to determine if it is an Acute Myocardial Infarction or myocardial injury due to an underlying chronic condition.        CBC WITH AUTO DIFFERENTIAL - Abnormal; Notable for the following components:    Hemoglobin 11.1 (*)     MCHC 31.3 (*)     Neutrophil % 80.1 (*)     Lymphocyte % 9.7 (*)     Eosinophil % 0.0 (*)     All other components within normal limits   SINGLE HSTROPONIN T - Abnormal; Notable for the following components:    HS Troponin T 50 (*)     All other components within normal limits    Narrative:     High Sensitive Troponin T Reference Range:  <14.0 ng/L- Negative Female for AMI  <22.0 ng/L- Negative Male for AMI  >=14 - Abnormal Female indicating possible myocardial injury.  >=22 - Abnormal Male indicating possible myocardial injury.   Clinicians would have to utilize clinical acumen, EKG, Troponin, and serial changes to determine if it is an Acute Myocardial Infarction or myocardial injury due to an underlying chronic condition.        BNP (IN-HOUSE) - Normal    Narrative:     This assay is used as an aid in the diagnosis of individuals suspected of having heart failure. It can be used as an aid in the diagnosis of acute decompensated heart failure (ADHF) in patients presenting with signs and symptoms of ADHF to the emergency department (ED). In addition, NT-proBNP of <300 pg/mL indicates ADHF is not likely.    Age Range Result Interpretation  NT-proBNP Concentration (pg/mL:      <50             Positive            >450                   Gray                 300-450                    Negative             <300    50-75           Positive            >900                  Gray                300-900                  Negative            <300      >75             Positive            >1800                  Gray                300-1800                  Negative            <300  "  LACTIC ACID, PLASMA - Normal   PROCALCITONIN - Normal    Narrative:     As a Marker for Sepsis (Non-Neonates):    1. <0.5 ng/mL represents a low risk of severe sepsis and/or septic shock.  2. >2 ng/mL represents a high risk of severe sepsis and/or septic shock.    As a Marker for Lower Respiratory Tract Infections that require antibiotic therapy:    PCT on Admission    Antibiotic Therapy       6-12 Hrs later    >0.5                Strongly Recommended  >0.25 - <0.5        Recommended   0.1 - 0.25          Discouraged              Remeasure/reassess PCT  <0.1                Strongly Discouraged     Remeasure/reassess PCT    As 28 day mortality risk marker: \"Change in Procalcitonin Result\" (>80% or <=80%) if Day 0 (or Day 1) and Day 4 values are available. Refer to [email or web address]    Change in PCT <=80%  A decrease of PCT levels below or equal to 80% defines a positive change in PCT test result representing a higher risk for 28-day all-cause mortality of patients diagnosed with severe sepsis for septic shock.    Change in PCT >80%  A decrease of PCT levels of more than 80% defines a negative change in PCT result representing a lower risk for 28-day all-cause mortality of patients diagnosed with severe sepsis or septic shock.      BLOOD CULTURE   BLOOD CULTURE   RAINBOW DRAW    Narrative:     The following orders were created for panel order [place] Draw.  Procedure                               Abnormality         Status                     ---------                               -----------         ------                     Green Top (Gel)[[id number]]                                  Final result               Lavender Top[[id number]]                                     Final result               Gold Top - SST[[id number]]                                   Final result               Light Blue Top[[id number]]                                   Final result                 Please view results for " these tests on the individual orders.   BASIC METABOLIC PANEL   CBC (NO DIFF)   CBC AND DIFFERENTIAL    Narrative:     The following orders were created for panel order CBC & Differential.  Procedure                               Abnormality         Status                     ---------                               -----------         ------                     CBC Auto Differential[[id number]]        Abnormal            Final result                 Please view results for these tests on the individual orders.   GREEN TOP   LAVENDER TOP   GOLD TOP - SST   LIGHT BLUE TOP       EKG:   ECG 12 Lead ED Triage Standing Order; SOA   Preliminary Result   HEART RATE= 115  bpm   RR Interval= 522  ms   FL Interval= 140  ms   P Horizontal Axis= 48  deg   P Front Axis= 38  deg   QRSD Interval= 86  ms   QT Interval= 335  ms   QTcB= 464  ms   QRS Axis= 41  deg   T Wave Axis= 87  deg   - BORDERLINE ECG -   Sinus tachycardia   Atrial premature complex   Abnormal R-wave progression, early transition   Borderline repolarization abnormality   Electronically Signed By:    Date and Time of Study: 2024-01-18 14:10:41          Meds given in ED:   Medications   sodium chloride 0.9 % flush 10 mL (has no administration in time range)   piperacillin-tazobactam (ZOSYN) 3.375 g in iso-osmotic dextrose 50 ml (premix) (0 g Intravenous Stopped 1/18/24 1940)   acetaminophen (TYLENOL) tablet 650 mg (has no administration in time range)   sennosides-docusate (PERICOLACE) 8.6-50 MG per tablet 2 tablet (0 tablets Oral Hold 1/18/24 2122)     And   polyethylene glycol (MIRALAX) packet 17 g (has no administration in time range)     And   bisacodyl (DULCOLAX) EC tablet 5 mg (has no administration in time range)     And   bisacodyl (DULCOLAX) suppository 10 mg (has no administration in time range)   ondansetron ODT (ZOFRAN-ODT) disintegrating tablet 4 mg (has no administration in time range)     Or   ondansetron (ZOFRAN) injection 4 mg (has no administration  in time range)   melatonin tablet 3 mg (has no administration in time range)   methylPREDNISolone sodium succinate (SOLU-Medrol) injection 40 mg (40 mg Intravenous Given 24 190)   ipratropium-albuterol (DUO-NEB) nebulizer solution 3 mL (3 mL Nebulization Given 24 1910)   albuterol (PROVENTIL) nebulizer solution 0.083% 2.5 mg/3mL (has no administration in time range)   ipratropium-albuterol (DUO-NEB) nebulizer solution 3 mL (3 mL Nebulization Given 24 1501)   iopamidol (ISOVUE-370) 76 % injection 100 mL (95 mL Intravenous Given by Other 24 1646)   apixaban (ELIQUIS) tablet 2.5 mg (2.5 mg Oral Given 24 190)       Imaging results:  CT Angiogram Chest    Result Date: 2024  1. No evidence of pulmonary embolus. 2. Moderately extensive bilateral pulmonary infiltrates. Some of the areas of infiltrate have cleared since the 2023 study while there are some additional ill-defined areas of increased density which are new such as in the right lower lobe which may represent some acute pneumonia. 3. Short-term follow-up CT of the chest in approximately 3 months suggested.  Radiation dose reduction techniques were utilized, including automated exposure control and exposure modulation based on body size.       XR Chest 1 View    Result Date: 2024  As described.    This report was finalized on 2024 3:34 PM by Dr. Ebenezer Barton M.D on Workstation: [id number]       Ambulatory status:   - Standby    Social issues:   Social History     Socioeconomic History    Marital status:    Tobacco Use    Smoking status: Former     Types: Cigarettes     Quit date: 2015     Years since quittin.9     Passive exposure: Never    Smokeless tobacco: Never    Tobacco comments:     less than a pack per day, no smoking since , Quit 2015   Vaping Use    Vaping Use: Never used   Substance and Sexual Activity    Alcohol use: Not Currently     Comment: Socially -A few times a month    Drug  use: No    Sexual activity: Defer       NIH Stroke Scale:       Lucy Woodard RN  01/18/24 22:23 EST     Call Lucy [phone] with any questions

## 2024-01-19 NOTE — SIGNIFICANT NOTE
01/19/24 0917   OTHER   Discipline physical therapist   Therapy Assessment/Plan (PT)   Criteria for Skilled Interventions Met (PT) no problems identified which require skilled intervention  (Per nsg notes, pt up adlib/indep with ampac of 24.  Based on this, no indication for acute PT needs.  Continue to encourage/facilitate indep activity.)

## 2024-01-19 NOTE — PLAN OF CARE
Problem: Adult Inpatient Plan of Care  Goal: Plan of Care Review  Outcome: Ongoing, Progressing  Flowsheets (Taken 1/19/2024 1817)  Progress: improving  Plan of Care Reviewed With: patient  Outcome Evaluation: Pt reports improvement in SOA. O2 at 5L NC. Hypotensive this afternoon, asymptomatic - hold entresto on 1/20 per MD. Cont IV abx.  Goal: Patient-Specific Goal (Individualized)  Outcome: Ongoing, Progressing  Goal: Absence of Hospital-Acquired Illness or Injury  Outcome: Ongoing, Progressing  Intervention: Identify and Manage Fall Risk  Recent Flowsheet Documentation  Taken 1/19/2024 1810 by Nena Hamm RN  Safety Promotion/Fall Prevention:   fall prevention program maintained   safety round/check completed  Taken 1/19/2024 1600 by Nena Hamm RN  Safety Promotion/Fall Prevention:   fall prevention program maintained   safety round/check completed  Taken 1/19/2024 1415 by Nena Hamm RN  Safety Promotion/Fall Prevention:   fall prevention program maintained   safety round/check completed  Taken 1/19/2024 1200 by Nena Hamm RN  Safety Promotion/Fall Prevention:   fall prevention program maintained   safety round/check completed  Taken 1/19/2024 1035 by Nena Hamm RN  Safety Promotion/Fall Prevention:   fall prevention program maintained   safety round/check completed  Taken 1/19/2024 0940 by Nena Hamm RN  Safety Promotion/Fall Prevention:   fall prevention program maintained   safety round/check completed  Taken 1/19/2024 0800 by Nena Hamm RN  Safety Promotion/Fall Prevention:   fall prevention program maintained   safety round/check completed  Goal: Optimal Comfort and Wellbeing  Outcome: Ongoing, Progressing  Goal: Readiness for Transition of Care  Outcome: Ongoing, Progressing     Problem: COPD (Chronic Obstructive Pulmonary Disease) Comorbidity  Goal: Maintenance of COPD Symptom Control  Outcome: Ongoing, Progressing     Problem: Fluid Imbalance  (Pneumonia)  Goal: Fluid Balance  Outcome: Ongoing, Progressing     Problem: Infection (Pneumonia)  Goal: Resolution of Infection Signs and Symptoms  Outcome: Ongoing, Progressing     Problem: Respiratory Compromise (Pneumonia)  Goal: Effective Oxygenation and Ventilation  Outcome: Ongoing, Progressing   Goal Outcome Evaluation:  Plan of Care Reviewed With: patient        Progress: improving  Outcome Evaluation: Pt reports improvement in SOA. O2 at 5L NC. Hypotensive this afternoon, asymptomatic - hold entresto on 1/20 per MD. Cont IV abx.

## 2024-01-19 NOTE — PROGRESS NOTES
Patient is Current with [facility]. We will follow for discharge plans and HH needs.

## 2024-01-19 NOTE — PROGRESS NOTES
Name: Darlene Pfeiffer ADMIT: 2024   : 1940  PCP: Kehrer, Meredith Lea, MD    MRN: [id number] LOS: 1 days   AGE/SEX: 83 y.o. female  ROOM: [facility]     Subjective   Subjective   Feeling a little bit better, her breathing is a bit improved.  Mild nonproductive cough.    Objective   Objective   Vital Signs  Temp:  [97.7 °F (36.5 °C)-98 °F (36.7 °C)] 98 °F (36.7 °C)  Heart Rate:  [] 89  Resp:  [18-28] 18  BP: ()/(45-94) 115/79  SpO2:  [84 %-99 %] 97 %  on  Flow (L/min):  [6-11] 6;   Device (Oxygen Therapy): nasal cannula  Body mass index is 18.71 kg/m².  Physical Exam  Constitutional:       Appearance: Normal appearance.   Cardiovascular:      Rate and Rhythm: Normal rate.      Pulses: Normal pulses.   Pulmonary:      Effort: Pulmonary effort is normal. No respiratory distress.      Breath sounds: Normal breath sounds. No stridor.   Musculoskeletal:      Right lower leg: No edema.      Left lower leg: No edema.   Skin:     General: Skin is warm.   Neurological:      General: No focal deficit present.      Mental Status: She is alert and oriented to person, place, and time.         Results Review     I reviewed the patient's new clinical results.  Results from last 7 days   Lab Units 24  0635 24  1431   WBC 10*3/mm3 5.38 7.54   HEMOGLOBIN g/dL 10.7* 11.1*   PLATELETS 10*3/mm3 274 311     Results from last 7 days   Lab Units 24  0635 24  1431   SODIUM mmol/L 142 142   POTASSIUM mmol/L 3.7 3.6   CHLORIDE mmol/L 100 99   CO2 mmol/L 28.0 30.0*   BUN mg/dL 22 22   CREATININE mg/dL 0.81 0.71   GLUCOSE mg/dL 120* 152*   EGFR mL/min/1.73 73.0 85.5     Results from last 7 days   Lab Units 24  1431   ALBUMIN g/dL 3.7   BILIRUBIN mg/dL 0.4   ALK PHOS U/L 81   AST (SGOT) U/L 30   ALT (SGPT) U/L 15     Results from last 7 days   Lab Units 24  0635 24  1431   CALCIUM mg/dL 9.1 9.5   ALBUMIN g/dL  --  3.7     Results from last 7 days   Lab Units 24  0342  "01/18/24  1707   PROCALCITONIN ng/mL  --  0.14   LACTATE mmol/L 1.0  --      No results found for: \"HGBA1C\", \"POCGLU\"    CT Angiogram Chest    Result Date: 1/18/2024  1. No evidence of pulmonary embolus. 2. Moderately extensive bilateral pulmonary infiltrates. Some of the areas of infiltrate have cleared since the 11/14/2023 study while there are some additional ill-defined areas of increased density which are new such as in the right lower lobe which may represent some acute pneumonia. 3. Short-term follow-up CT of the chest in approximately 3 months suggested.  Radiation dose reduction techniques were utilized, including automated exposure control and exposure modulation based on body size.       XR Chest 1 View    Result Date: 1/18/2024  As described.    This report was finalized on 1/18/2024 3:34 PM by Dr. Ebenezer Barton M.D on Workstation: [id number]     Scheduled Medications  apixaban, 2.5 mg, Oral, BID  atorvastatin, 20 mg, Oral, Nightly  cholecalciferol, 1,000 Units, Oral, Daily  folic acid, 1 mg, Oral, Daily  furosemide, 40 mg, Oral, Daily  ipratropium-albuterol, 3 mL, Nebulization, Q6H While Awake - RT  methylPREDNISolone sodium succinate, 40 mg, Intravenous, Q8H  metoprolol succinate XL, 25 mg, Oral, Daily  mirtazapine, 7.5 mg, Oral, Nightly  montelukast, 10 mg, Oral, Nightly  pantoprazole, 40 mg, Oral, Q AM  piperacillin-tazobactam, 3.375 g, Intravenous, Once  piperacillin-tazobactam, 3.375 g, Intravenous, Q8H  potassium chloride, 20 mEq, Oral, Daily  sacubitril-valsartan, 0.5 tablet, Oral, BID  senna-docusate sodium, 2 tablet, Oral, BID  vitamin B-12, 1,000 mcg, Oral, Daily    Infusions   Diet  Diet: Cardiac Diets; Healthy Heart (2-3 Na+); Texture: Regular Texture (IDDSI 7); Fluid Consistency: Thin (IDDSI 0)       Assessment/Plan     Active Hospital Problems    Diagnosis  POA    **Bilateral pneumonia [J18.9]  Yes    Acute hypoxic respiratory failure [J96.01]  Yes    Lung cancer [C34.90]  Yes    Chronic " obstructive pulmonary disease [J44.9]  Yes    Essential hypertension [I10]  Yes    Hyperlipidemia [E78.5]  Yes      Resolved Hospital Problems   No resolved problems to display.       83 y.o. female admitted with Bilateral pneumonia.      01/19/24  Continue antibiotics, follow-up cultures.  As needed breathing treatments.    Rhinovirus  B/l PNA  Acute on chronic hypoxic respiratory failure (2L O2)  COPD  -requiring up to 6L NC  -procal, WBC WNL  -CTA (1/18/2024): No PE; moderately extensive bilateral infiltrates; repeat CT in 3 months suggested to ensure resolution (around 4/19/2024)  -Viral illness lung can certainly be the cause of her symptoms, however given bilateral infiltrates and immunosuppressed status will complete a course of antibiotics for possible concurrent bacterial pneumonia  -Zosyn    Adenocarcinoma of lung  -follows with Dr Escobar  -Currently receiving CTX and scheduled for tx on 1/24; likely will need to be delayed- will message Dr Escobar    Elevated troponin  Trop 48 > 50  -EKG w/out acute ischemia  -demand ischemia related to hypoxia; no further ischemic eval at this time    HFrEF  -Continue Entresto, metoprolol, Lasix with hold parameters  -has f/u with Dr Orr 1/23    PAF  -RC: Metoprolol  -AC: Apixaban    Flow (L/min):  [6-11] 6    DVT prophylaxis: Eliquis (home med)  Discussed with patient, family, and Dr Escobar .  Anticipated discharge home, 2-3 days            Jakob Mendiola MD  [facility]  01/19/24  11:47 EST

## 2024-01-19 NOTE — PLAN OF CARE
Goal Outcome Evaluation:     Pt from er. O2 at 6L NC. No c/o voiced. VSS. New orders noted. No s/s of distress.

## 2024-01-20 PROCEDURE — 94760 N-INVAS EAR/PLS OXIMETRY 1: CPT

## 2024-01-20 PROCEDURE — 94664 DEMO&/EVAL PT USE INHALER: CPT

## 2024-01-20 PROCEDURE — 25010000002 PIPERACILLIN SOD-TAZOBACTAM PER 1 G: Performed by: INTERNAL MEDICINE

## 2024-01-20 PROCEDURE — 94799 UNLISTED PULMONARY SVC/PX: CPT

## 2024-01-20 PROCEDURE — 25010000002 METHYLPREDNISOLONE PER 40 MG: Performed by: INTERNAL MEDICINE

## 2024-01-20 PROCEDURE — 94761 N-INVAS EAR/PLS OXIMETRY MLT: CPT

## 2024-01-20 RX ORDER — HYDROCODONE BITARTRATE AND HOMATROPINE METHYLBROMIDE ORAL SOLUTION 5; 1.5 MG/5ML; MG/5ML
5 LIQUID ORAL ONCE
Status: COMPLETED | OUTPATIENT
Start: 2024-01-20 | End: 2024-01-20

## 2024-01-20 RX ORDER — PREDNISONE 20 MG/1
40 TABLET ORAL
Status: DISCONTINUED | OUTPATIENT
Start: 2024-01-21 | End: 2024-01-21 | Stop reason: HOSPADM

## 2024-01-20 RX ADMIN — POTASSIUM CHLORIDE 20 MEQ: 750 TABLET, EXTENDED RELEASE ORAL at 09:15

## 2024-01-20 RX ADMIN — METOPROLOL SUCCINATE 25 MG: 25 TABLET, EXTENDED RELEASE ORAL at 11:19

## 2024-01-20 RX ADMIN — PANTOPRAZOLE SODIUM 40 MG: 40 TABLET, DELAYED RELEASE ORAL at 04:41

## 2024-01-20 RX ADMIN — PIPERACILLIN SODIUM AND TAZOBACTAM SODIUM 3.38 G: 3; .375 INJECTION, SOLUTION INTRAVENOUS at 18:21

## 2024-01-20 RX ADMIN — FOLIC ACID 1 MG: 1 TABLET ORAL at 09:15

## 2024-01-20 RX ADMIN — PIPERACILLIN SODIUM AND TAZOBACTAM SODIUM 3.38 G: 3; .375 INJECTION, SOLUTION INTRAVENOUS at 09:15

## 2024-01-20 RX ADMIN — MIRTAZAPINE 7.5 MG: 15 TABLET, FILM COATED ORAL at 20:27

## 2024-01-20 RX ADMIN — HYDROCODONE BITARTRATE AND HOMATROPINE METHYLBROMIDE 5 ML: 5; 1.5 SOLUTION ORAL at 22:29

## 2024-01-20 RX ADMIN — PIPERACILLIN SODIUM AND TAZOBACTAM SODIUM 3.38 G: 3; .375 INJECTION, SOLUTION INTRAVENOUS at 02:31

## 2024-01-20 RX ADMIN — APIXABAN 2.5 MG: 2.5 TABLET, FILM COATED ORAL at 20:31

## 2024-01-20 RX ADMIN — APIXABAN 2.5 MG: 2.5 TABLET, FILM COATED ORAL at 09:15

## 2024-01-20 RX ADMIN — IPRATROPIUM BROMIDE AND ALBUTEROL SULFATE 3 ML: 2.5; .5 SOLUTION RESPIRATORY (INHALATION) at 07:34

## 2024-01-20 RX ADMIN — FUROSEMIDE 40 MG: 40 TABLET ORAL at 09:15

## 2024-01-20 RX ADMIN — IPRATROPIUM BROMIDE AND ALBUTEROL SULFATE 3 ML: 2.5; .5 SOLUTION RESPIRATORY (INHALATION) at 13:43

## 2024-01-20 RX ADMIN — METHYLPREDNISOLONE SODIUM SUCCINATE 40 MG: 40 INJECTION, POWDER, FOR SOLUTION INTRAMUSCULAR; INTRAVENOUS at 02:31

## 2024-01-20 RX ADMIN — MONTELUKAST SODIUM 10 MG: 10 TABLET, FILM COATED ORAL at 20:27

## 2024-01-20 RX ADMIN — Medication 1000 UNITS: at 09:15

## 2024-01-20 RX ADMIN — ATORVASTATIN CALCIUM 20 MG: 20 TABLET, FILM COATED ORAL at 20:27

## 2024-01-20 RX ADMIN — ZOLPIDEM TARTRATE 5 MG: 5 TABLET ORAL at 22:29

## 2024-01-20 RX ADMIN — Medication 1000 MCG: at 09:15

## 2024-01-20 RX ADMIN — IPRATROPIUM BROMIDE AND ALBUTEROL SULFATE 3 ML: 2.5; .5 SOLUTION RESPIRATORY (INHALATION) at 19:11

## 2024-01-20 NOTE — PROGRESS NOTES
Name: Darlene Pfeiffer ADMIT: 2024   : 1940  PCP: Kehrer, Meredith Lea, MD    MRN: [id number] LOS: 2 days   AGE/SEX: 83 y.o. female  ROOM: [facility]     Subjective   Subjective   She feels little bit better today.  Still feels dyspneic with exertion compared to her baseline.    Objective   Objective   Vital Signs  Temp:  [97.5 °F (36.4 °C)-98.2 °F (36.8 °C)] 97.5 °F (36.4 °C)  Heart Rate:  [83-92] 90  Resp:  [16-20] 16  BP: ()/(48-63) 110/52  SpO2:  [91 %-99 %] 93 %  on  Flow (L/min):  [4-5] 4;   Device (Oxygen Therapy): nasal cannula  Body mass index is 18.63 kg/m².  Physical Exam  Constitutional:       Appearance: Normal appearance.   Cardiovascular:      Rate and Rhythm: Normal rate.      Pulses: Normal pulses.   Pulmonary:      Effort: Pulmonary effort is normal. No respiratory distress.      Breath sounds: Normal breath sounds. No stridor.   Musculoskeletal:      Right lower leg: No edema.      Left lower leg: No edema.   Skin:     General: Skin is warm.   Neurological:      General: No focal deficit present.      Mental Status: She is alert and oriented to person, place, and time.         Results Review     I reviewed the patient's new clinical results.  Results from last 7 days   Lab Units 24  0635 24  1431   WBC 10*3/mm3 5.38 7.54   HEMOGLOBIN g/dL 10.7* 11.1*   PLATELETS 10*3/mm3 274 311     Results from last 7 days   Lab Units 24  0635 24  1431   SODIUM mmol/L 142 142   POTASSIUM mmol/L 3.7 3.6   CHLORIDE mmol/L 100 99   CO2 mmol/L 28.0 30.0*   BUN mg/dL 22 22   CREATININE mg/dL 0.81 0.71   GLUCOSE mg/dL 120* 152*   EGFR mL/min/1.73 73.0 85.5     Results from last 7 days   Lab Units 24  1431   ALBUMIN g/dL 3.7   BILIRUBIN mg/dL 0.4   ALK PHOS U/L 81   AST (SGOT) U/L 30   ALT (SGPT) U/L 15     Results from last 7 days   Lab Units 24  0635 24  1431   CALCIUM mg/dL 9.1 9.5   ALBUMIN g/dL  --  3.7     Results from last 7 days   Lab Units 24  2974  "01/18/24  1707   PROCALCITONIN ng/mL  --  0.14   LACTATE mmol/L 1.0  --      No results found for: \"HGBA1C\", \"POCGLU\"    CT Angiogram Chest    Result Date: 1/19/2024  1. No evidence of pulmonary embolus. 2. Moderately extensive bilateral pulmonary infiltrates. Some of the areas of infiltrate have cleared since the 11/14/2023 study while there are some additional ill-defined areas of increased density which are new such as in the right lower lobe which may represent some acute pneumonia. 3. Short-term follow-up CT of the chest in approximately 3 months suggested.  Radiation dose reduction techniques were utilized, including automated exposure control and exposure modulation based on body size.   This report was finalized on 1/19/2024 2:26 PM by Dr. Chele De Leon M.D on Workstation: [id number]      XR Chest 1 View    Result Date: 1/18/2024  As described.    This report was finalized on 1/18/2024 3:34 PM by Dr. Ebenezer Barton M.D on Workstation: [id number]     Scheduled Medications  apixaban, 2.5 mg, Oral, BID  atorvastatin, 20 mg, Oral, Nightly  cholecalciferol, 1,000 Units, Oral, Daily  folic acid, 1 mg, Oral, Daily  furosemide, 40 mg, Oral, Daily  ipratropium-albuterol, 3 mL, Nebulization, Q6H While Awake - RT  metoprolol succinate XL, 25 mg, Oral, Daily  mirtazapine, 7.5 mg, Oral, Nightly  montelukast, 10 mg, Oral, Nightly  pantoprazole, 40 mg, Oral, Q AM  piperacillin-tazobactam, 3.375 g, Intravenous, Once  piperacillin-tazobactam, 3.375 g, Intravenous, Q8H  potassium chloride, 20 mEq, Oral, Daily  [START ON 1/21/2024] predniSONE, 40 mg, Oral, Daily With Breakfast  [Held by provider] sacubitril-valsartan, 0.5 tablet, Oral, BID  senna-docusate sodium, 2 tablet, Oral, BID  vitamin B-12, 1,000 mcg, Oral, Daily    Infusions   Diet  Diet: Cardiac Diets; Healthy Heart (2-3 Na+); Texture: Regular Texture (IDDSI 7); Fluid Consistency: Thin (IDDSI 0)       Assessment/Plan     Active Hospital Problems    Diagnosis  POA "    **Bilateral pneumonia [J18.9]  Yes    Acute hypoxic respiratory failure [J96.01]  Yes    Lung cancer [C34.90]  Yes    Chronic obstructive pulmonary disease [J44.9]  Yes    Essential hypertension [I10]  Yes    Hyperlipidemia [E78.5]  Yes      Resolved Hospital Problems   No resolved problems to display.       83 y.o. female admitted with Bilateral pneumonia.      01/20/24  Continue scheduled breathing treatments.     Rhinovirus  B/l PNA  Acute on chronic hypoxic respiratory failure (2L O2)  COPD  -requiring up to 6L NC  -procal, WBC WNL  -CTA (1/18/2024): No PE; moderately extensive bilateral infiltrates; repeat CT in 3 months suggested to ensure resolution (around 4/19/2024)  -Viral illness lung can certainly be the cause of her symptoms, however given bilateral infiltrates and immunosuppressed status will complete a course of antibiotics for possible concurrent bacterial pneumonia  -Zosyn    Adenocarcinoma of lung  -follows with Dr Escobar  -Currently receiving CTX and scheduled for tx on 1/24; d/w Dr Escobar- pt to reschedule appointment     Elevated troponin  Trop 48 > 50  -EKG w/out acute ischemia  -demand ischemia related to hypoxia; no further ischemic eval at this time    HFrEF  -Continue Entresto, metoprolol, Lasix with hold parameters  -has f/u with Dr Orr 1/23    PAF  -RC: Metoprolol  -AC: Apixaban    Flow (L/min):  [4-5] 4    DVT prophylaxis: Eliquis (home med)  Discussed with patient.  Anticipated discharge home, 1-2 days            Jakob Mendiola MD  [facility]  01/20/24  12:39 EST

## 2024-01-20 NOTE — PLAN OF CARE
Goal Outcome Evaluation:   Patient alert follows commands iv antibx given. Up with minimal assistance. No c/o pain or nausea. No acute distress noted will continue to monitor

## 2024-01-21 ENCOUNTER — DOCUMENTATION (OUTPATIENT)
Dept: HOME HEALTH SERVICES | Facility: HOME HEALTHCARE | Age: 84
End: 2024-01-21
Payer: MEDICARE

## 2024-01-21 ENCOUNTER — TRANSCRIBE ORDERS (OUTPATIENT)
Dept: HOME HEALTH SERVICES | Facility: HOME HEALTHCARE | Age: 84
End: 2024-01-21
Payer: MEDICARE

## 2024-01-21 ENCOUNTER — READMISSION MANAGEMENT (OUTPATIENT)
Dept: CALL CENTER | Facility: HOSPITAL | Age: 84
End: 2024-01-21
Payer: MEDICARE

## 2024-01-21 ENCOUNTER — TELEPHONE (OUTPATIENT)
Dept: CARDIOLOGY | Facility: CLINIC | Age: 84
End: 2024-01-21
Payer: MEDICARE

## 2024-01-21 VITALS
HEART RATE: 80 BPM | RESPIRATION RATE: 16 BRPM | DIASTOLIC BLOOD PRESSURE: 58 MMHG | WEIGHT: 108.03 LBS | OXYGEN SATURATION: 94 % | HEIGHT: 63 IN | TEMPERATURE: 98.2 F | BODY MASS INDEX: 19.14 KG/M2 | SYSTOLIC BLOOD PRESSURE: 114 MMHG

## 2024-01-21 DIAGNOSIS — J18.9 PNEUMONIA OF BOTH LUNGS DUE TO INFECTIOUS ORGANISM, UNSPECIFIED PART OF LUNG: Primary | ICD-10-CM

## 2024-01-21 PROCEDURE — 94799 UNLISTED PULMONARY SVC/PX: CPT

## 2024-01-21 PROCEDURE — 94760 N-INVAS EAR/PLS OXIMETRY 1: CPT

## 2024-01-21 PROCEDURE — 94664 DEMO&/EVAL PT USE INHALER: CPT

## 2024-01-21 PROCEDURE — 63710000001 PREDNISONE PER 1 MG: Performed by: STUDENT IN AN ORGANIZED HEALTH CARE EDUCATION/TRAINING PROGRAM

## 2024-01-21 PROCEDURE — 25010000002 PIPERACILLIN SOD-TAZOBACTAM PER 1 G: Performed by: INTERNAL MEDICINE

## 2024-01-21 PROCEDURE — 94761 N-INVAS EAR/PLS OXIMETRY MLT: CPT

## 2024-01-21 RX ORDER — PREDNISONE 20 MG/1
TABLET ORAL
Qty: 9 TABLET | Refills: 0 | Status: SHIPPED | OUTPATIENT
Start: 2024-01-22 | End: 2024-01-21 | Stop reason: SDUPTHER

## 2024-01-21 RX ORDER — AMOXICILLIN AND CLAVULANATE POTASSIUM 875; 125 MG/1; MG/1
1 TABLET, FILM COATED ORAL 2 TIMES DAILY
Qty: 6 TABLET | Refills: 0 | Status: SHIPPED | OUTPATIENT
Start: 2024-01-22 | End: 2024-01-21 | Stop reason: SDUPTHER

## 2024-01-21 RX ORDER — AMOXICILLIN AND CLAVULANATE POTASSIUM 875; 125 MG/1; MG/1
1 TABLET, FILM COATED ORAL 2 TIMES DAILY
Qty: 6 TABLET | Refills: 0 | Status: SHIPPED | OUTPATIENT
Start: 2024-01-22 | End: 2024-01-25

## 2024-01-21 RX ORDER — ALBUTEROL SULFATE 2.5 MG/3ML
2.5 SOLUTION RESPIRATORY (INHALATION) EVERY 4 HOURS PRN
Qty: 90 ML | Refills: 0 | Status: SHIPPED | OUTPATIENT
Start: 2024-01-21

## 2024-01-21 RX ORDER — IPRATROPIUM BROMIDE AND ALBUTEROL SULFATE 2.5; .5 MG/3ML; MG/3ML
3 SOLUTION RESPIRATORY (INHALATION) 2 TIMES DAILY PRN
Qty: 180 ML | Refills: 0 | Status: SHIPPED | OUTPATIENT
Start: 2024-01-21

## 2024-01-21 RX ORDER — IPRATROPIUM BROMIDE AND ALBUTEROL SULFATE 2.5; .5 MG/3ML; MG/3ML
3 SOLUTION RESPIRATORY (INHALATION) 2 TIMES DAILY PRN
Qty: 180 ML | Refills: 0 | Status: SHIPPED | OUTPATIENT
Start: 2024-01-21 | End: 2024-01-21 | Stop reason: SDUPTHER

## 2024-01-21 RX ORDER — PREDNISONE 20 MG/1
TABLET ORAL
Qty: 9 TABLET | Refills: 0 | Status: SHIPPED | OUTPATIENT
Start: 2024-01-22 | End: 2024-01-31

## 2024-01-21 RX ORDER — ALBUTEROL SULFATE 2.5 MG/3ML
2.5 SOLUTION RESPIRATORY (INHALATION) EVERY 4 HOURS PRN
Qty: 90 ML | Refills: 0 | Status: SHIPPED | OUTPATIENT
Start: 2024-01-21 | End: 2024-01-21 | Stop reason: SDUPTHER

## 2024-01-21 RX ADMIN — IPRATROPIUM BROMIDE AND ALBUTEROL SULFATE 3 ML: 2.5; .5 SOLUTION RESPIRATORY (INHALATION) at 07:34

## 2024-01-21 RX ADMIN — METOPROLOL SUCCINATE 25 MG: 25 TABLET, EXTENDED RELEASE ORAL at 08:19

## 2024-01-21 RX ADMIN — Medication 1000 MCG: at 08:19

## 2024-01-21 RX ADMIN — PANTOPRAZOLE SODIUM 40 MG: 40 TABLET, DELAYED RELEASE ORAL at 08:36

## 2024-01-21 RX ADMIN — FUROSEMIDE 40 MG: 40 TABLET ORAL at 08:19

## 2024-01-21 RX ADMIN — FOLIC ACID 1 MG: 1 TABLET ORAL at 08:19

## 2024-01-21 RX ADMIN — Medication 1000 UNITS: at 08:19

## 2024-01-21 RX ADMIN — APIXABAN 2.5 MG: 2.5 TABLET, FILM COATED ORAL at 08:20

## 2024-01-21 RX ADMIN — POTASSIUM CHLORIDE 20 MEQ: 750 TABLET, EXTENDED RELEASE ORAL at 08:19

## 2024-01-21 RX ADMIN — PIPERACILLIN SODIUM AND TAZOBACTAM SODIUM 3.38 G: 3; .375 INJECTION, SOLUTION INTRAVENOUS at 02:35

## 2024-01-21 RX ADMIN — PIPERACILLIN SODIUM AND TAZOBACTAM SODIUM 3.38 G: 3; .375 INJECTION, SOLUTION INTRAVENOUS at 08:20

## 2024-01-21 RX ADMIN — PREDNISONE 40 MG: 20 TABLET ORAL at 08:19

## 2024-01-21 NOTE — OUTREACH NOTE
Prep Survey      Flowsheet Row Responses   [facility] patient discharged from? [place]   Is LACE score < 7 ? No   Eligibility [facility]   Date of Admission 01/18/24   Date of Discharge 01/21/24   Discharge Disposition Home-Health Care Sv   Discharge diagnosis Bilateral pneumonia, Acute hypoxic respiratory failure   Does the patient have one of the following disease processes/diagnoses(primary or secondary)? Pneumonia   Does the patient have Home health ordered? Yes   What is the Home health agency?  [facility]   Is there a DME ordered? Yes   What DME was ordered? nebulizer from [facility]   Prep survey completed? Yes            Nataliia LINDSEY - Registered Nurse

## 2024-01-21 NOTE — SIGNIFICANT NOTE
01/21/24 0800   OTHER   Discipline occupational therapist   Rehab Time/Intention   Session Not Performed other (see comments)  (per chart, pt is up ad nani, no acute OT needs indicated, will sign off.)   Therapy Assessment/Plan (PT)   Criteria for Skilled Interventions Met (PT) no problems identified which require skilled intervention

## 2024-01-21 NOTE — CASE MANAGEMENT/SOCIAL WORK
Case Management Discharge Note      Final Note: home with continued [facility] nebulizer         Selected Continued Care - Discharged on 1/21/2024 Admission date: 1/18/2024 - Discharge disposition: Home or Self Care      Destination    No services have been selected for the patient.                Durable Medical Equipment Coordination complete.      Service Provider Selected Services Address Phone Fax Patient Preferred    [facility] [address], [place] [address] [phone] --              Dialysis/Infusion    No services have been selected for the patient.                Home Medical Care Coordination complete.      Service Provider Selected Services Address Phone Fax Patient Preferred    [facility] [address] [address] [phone] --              Therapy    No services have been selected for the patient.                Community Resources    No services have been selected for the patient.                Community & DME    No services have been selected for the patient.                    Selected Continued Care - Prior Encounters Includes continued care and service providers with selected services from prior encounters from 10/20/2023 to 1/21/2024      Discharged on 1/2/2024 Admission date: 12/28/2023 - Discharge disposition: Home-Health Care Svc      Durable Medical Equipment       Service Provider Selected Services Address Phone Fax Patient Preferred    [facility] [address] [facility] [id number] [phone] --              Home Medical Care       Service Provider Selected Services Address Phone Fax Patient Preferred    [facility] [address] [address] [phone] --                          Transportation Services  Private:  Car    Final Discharge Disposition Code: 06 - home with home health care

## 2024-01-21 NOTE — CASE MANAGEMENT/SOCIAL WORK
Continued Stay Note  [facility]     Patient Name: Darlene Pfeiffer  MRN: [id number]  Today's Date: 1/21/2024    Admit Date: 1/18/2024    Plan: home, nebulizer/[facility]   Discharge Plan       Row Name 01/21/24 0956       Plan    Plan home, nebulizer/[facility]    Patient/Family in Agreement with Plan --    Plan Comments Inbound call received from RN notifying CCP that pt is being discharged home and needs a home nebulizer. Nebulizer delivered to unit for pt from weekend CCP stock. Pt current with [facility] and they will resume services at [place].                   Discharge Codes    No documentation.                 Expected Discharge Date and Time       Expected Discharge Date Expected Discharge Time    Jan 21, 2024               Moe Bull RN

## 2024-01-21 NOTE — DISCHARGE PLACEMENT REQUEST
"Bakari Stahl (83 y.o. Female)       Date of Birth   1940    Social Security Number       Address   [address] [place] [address]    Home Phone       MRN   [id number]       Mandaen   Islam    Marital Status                               Admission Date   1/18/24    Admission Type   Emergency    Admitting Provider   Lorna Escamilla MD    Attending Provider   Jakob Mendiola MD    Department, Room/Bed   [address], [address]64/1       Discharge Date       Discharge Disposition   Home or Self Care    Discharge Destination                                 Attending Provider: Jakob Mendiola MD    Allergies: Sulfa Antibiotics    Isolation: Droplet   Infection: Rhinovirus  (12/27/23), COVID (History) (01/19/24)   Code Status: CPR    Ht: 160 cm (63\")   Wt: 49 kg (108 lb 0.4 oz)    Admission Cmt: None   Principal Problem: Bilateral pneumonia [J18.9]                   Active Insurance as of 1/18/2024       Primary Coverage       Payor Plan Insurance Group Employer/Plan Group    MEDICARE MEDICARE A & B        Payor Plan Address Payor Plan Phone Number Payor Plan Fax Number Effective Dates    [address] [phone]  7/1/2007 - None Entered    [facility] [id number]         Subscriber Name Subscriber Birth Date Member ID       BAKARI STAHL 1940 [id number]               Secondary Coverage       Payor Plan Insurance Group Employer/Plan Group    [facility] SUPP [id number]       Payor Plan Address Payor Plan Phone Number Payor Plan Fax Number Effective Dates    [address]   12/1/2016 - None Entered    [facility] [id number]         Subscriber Name Subscriber Birth Date Member ID       BAKARI STAHL 1940 [id number]                     Emergency Contacts        (Rel.) Home Phone Work Phone Mobile Phone    MAYELIN STAHL (Spouse) [phone] -- [phone]    [employer] (Son) [phone] -- --                "

## 2024-01-21 NOTE — DISCHARGE SUMMARY
Patient Name: Darlene Pfeiffer  : 1940  MRN: [id number]    Date of Admission: 2024  Date of Discharge:  2024  Primary Care Physician: Kehrer, Meredith Lea, MD      Chief Complaint:   Shortness of Breath      Discharge Diagnoses     Active Hospital Problems    Diagnosis  POA    **Bilateral pneumonia [J18.9]  Yes    Acute hypoxic respiratory failure [J96.01]  Yes    Lung cancer [C34.90]  Yes    Chronic obstructive pulmonary disease [J44.9]  Yes    Essential hypertension [I10]  Yes    Hyperlipidemia [E78.5]  Yes      Resolved Hospital Problems    Diagnosis Date Resolved POA    Pneumonia, unspecified organism [J18.9] 2024 Yes        Admitting HPI     83 year old female who presented to the emergency room with shortness of breath which started yesterday; she has a history of copd and is on home oxygen; she has had to turn up her oxygen at home; no sick contacts;      Hospital Course     Pt admitted for acute on chronic respiratory failure, positive for rhinovirus on admission.  She was treated with supportive care as well as course of antibiotics given her immunosuppressed status, as well as recent COVID infection and increased oxygen requirements concerning for possible concurrent bacterial pneumonia.  She was able to be weaned to her home oxygen dose prior to discharge.  Will plan to complete total 5 days with Augmentin.  In addition she has been given nebulizer and as needed breathing treatments.  Symptomatically improved and is stable for discharge home.  She has follow-up with Dr. Orr this week, and will need to reschedule her oncology appointment for chemotherapy which she will plan on doing tomorrow.    Discharge Plan     Rhinovirus  B/l PNA  Acute on chronic hypoxic respiratory failure (2L O2)  COPD  -required up to 6L NC  -procal, WBC WNL  -CTA (2024): No PE; moderately extensive bilateral infiltrates; repeat CT in 3 months suggested to ensure resolution (around 2024)  -Viral  illness lung can certainly be the cause of her symptoms, however given bilateral infiltrates and immunosuppressed status will complete a course of antibiotics for concurrent bacterial pneumonia    Adenocarcinoma of lung  -follows with Dr Escobar  -Currently receiving CTX and scheduled for tx on 1/24; d/w Dr Escobar- pt to reschedule appointment      Elevated troponin  Trop 48 > 50  -EKG w/out acute ischemia  -demand ischemia related to hypoxia; no further ischemic eval at this time     HFrEF  -Continue Entresto, metoprolol, Lasix  -has f/u with Dr Orr 1/23     PAF  -RC: Metoprolol  -AC: Apixaban    Day of Discharge     Physical Exam:  Temp:  [97.2 °F (36.2 °C)-98.2 °F (36.8 °C)] 98.2 °F (36.8 °C)  Heart Rate:  [80-98] 80  Resp:  [16-20] 16  BP: (110-125)/(52-67) 114/58  Body mass index is 19.14 kg/m².  Physical Exam  Constitutional:       Appearance: Normal appearance; thin  Cardiovascular:      Rate and Rhythm: Normal rate.      Pulses: Normal pulses.   Pulmonary:      Effort: Pulmonary effort is normal. No respiratory distress on NC     Breath sounds: Normal breath sounds. No stridor.   Musculoskeletal:      Right lower leg: No edema.      Left lower leg: No edema.   Skin:     General: Skin is warm.   Neurological:      General: No focal deficit present.      Mental Status: She is alert and oriented to person, place, and time.     Consultants     Consult Orders (all) (From admission, onward)       Start     Ordered    01/18/24 1742  [facility] (on-call MD unless specified) Details  Once        Specialty:  Hospitalist  Provider:  Lorna Escamilla MD    01/18/24 1741    01/18/24 1742  Inpatient Palliative Care Team Consult  Once        Provider:  (Not yet assigned)    01/18/24 1741                  Procedures     * Surgery not found *      Imaging Results (All)       Procedure Component Value Units Date/Time    CT Angiogram Chest [[id number]] Collected: 01/18/24 1737     Updated: 01/19/24 1429    Narrative:      CT  ANGIOGRAM OF THE CHEST WITH CONTRAST INCLUDING RECONSTRUCTION IMAGES  01/18/2024     HISTORY: Shortness of breath. Possible pulmonary embolus.     TECHNIQUE: Following the intravenous contrast injection, CT angiography  was performed through the chest. Sagittal, coronal and 3D reconstruction  images were reviewed.     FINDINGS: The pulmonary arterial system is well opacified with no  evidence of pulmonary embolus.     There is volume loss of the left hemithorax. There are moderately  extensive areas of infiltrate in the bilateral lungs which appear  largely interstitial in nature and involve essentially all of the lung  lobes. This finding is more severe on the right than on the left.  Bilateral pulmonary infiltrates were seen on the previous study of  11/14/2023 and some of these areas have cleared. Also some areas such as  in the right lower lobe appears slightly more severe. No significant  pleural effusion is seen. No pathologically enlarged lymph nodes are  seen. There is masslike increase soft tissue in the anterior medial  aspect of the left upper lung which appears slightly smaller than on the  previous study such as on image 51.     A left renal cyst is seen.     Gallbladder has been removed.       Impression:      1. No evidence of pulmonary embolus.  2. Moderately extensive bilateral pulmonary infiltrates. Some of the  areas of infiltrate have cleared since the 11/14/2023 study while there  are some additional ill-defined areas of increased density which are new  such as in the right lower lobe which may represent some acute  pneumonia.  3. Short-term follow-up CT of the chest in approximately 3 months  suggested.     Radiation dose reduction techniques were utilized, including automated  exposure control and exposure modulation based on body size.        This report was finalized on 1/19/2024 2:26 PM by Dr. Chele De Leon M.D on Workstation: [id number]       XR Chest 1 View [[id number]] Collected:  01/18/24 1532     Updated: 01/18/24 1537    Narrative:      XR CHEST 1 VW-     HISTORY: Female who is 83 years-old, short of breath     TECHNIQUE: Frontal view of the chest     COMPARISON: 12/28/2023     FINDINGS: Right chest port appears stable. The heart size is borderline.  Aorta is calcified. Pulmonary vasculature is congested. Bilateral  pulmonary opacities persist and appear mildly increased at the right  midlung, that could be evidence of pneumonia, continued follow-up  advised. No large pleural effusion or pneumothorax. Otherwise stable.          Impression:      As described.           This report was finalized on 1/18/2024 3:34 PM by Dr. Ebenezer Barton M.D on Workstation: [id number]               Results for orders placed during the hospital encounter of 11/14/23    Adult Transthoracic Echo Complete W/ Cont if Necessary Per Protocol    Interpretation Summary    Left ventricular systolic function is moderately decreased. Calculated left ventricular EF = 39.2% Abnormal global longitudinal LV strain (GLS) = -11.1%. Left ventricle strain data was reviewed by the physician and found to be accurate. The left ventricular cavity is moderately dilated. Left ventricular wall thickness is consistent with mild concentric hypertrophy. There is left ventricular global hypokinesis noted. Left ventricular diastolic function is consistent with (grade Ia w/high LAP) impaired relaxation.    The aortic valve is abnormal in structure. There is moderate calcification of the aortic valve mainly affecting the non-coronary cusp(s). The aortic valve appears trileaflet.    Trace mitral valve regurgitation is present.    Trace tricuspid valve regurgitation is present. Estimated right ventricular systolic pressure from tricuspid regurgitation is normal (<35 mmHg). Calculated right ventricular systolic pressure from tricuspid regurgitation is 29 mmHg.    Compared to prior study of 8/20/2023 LV systolic function, diastolic function  "and global longitudinal LV strain are all worse and abnormal.    Pertinent Labs     Results from last 7 days   Lab Units 01/19/24  0635 01/18/24  1431   WBC 10*3/mm3 5.38 7.54   HEMOGLOBIN g/dL 10.7* 11.1*   PLATELETS 10*3/mm3 274 311     Results from last 7 days   Lab Units 01/19/24  0635 01/18/24  1431   SODIUM mmol/L 142 142   POTASSIUM mmol/L 3.7 3.6   CHLORIDE mmol/L 100 99   CO2 mmol/L 28.0 30.0*   BUN mg/dL 22 22   CREATININE mg/dL 0.81 0.71   GLUCOSE mg/dL 120* 152*   EGFR mL/min/1.73 73.0 85.5     Results from last 7 days   Lab Units 01/18/24  1431   ALBUMIN g/dL 3.7   BILIRUBIN mg/dL 0.4   ALK PHOS U/L 81   AST (SGOT) U/L 30   ALT (SGPT) U/L 15     Results from last 7 days   Lab Units 01/19/24  0635 01/18/24  1431   CALCIUM mg/dL 9.1 9.5   ALBUMIN g/dL  --  3.7       Results from last 7 days   Lab Units 01/18/24  1707 01/18/24  1431   HSTROP T ng/L 50* 48*   PROBNP pg/mL  --  1,450.0           Invalid input(s): \"LDLCALC\"  Results from last 7 days   Lab Units 01/18/24  1854 01/18/24  1820   BLOODCX  No growth at 2 days No growth at 2 days     Results from last 7 days   Lab Units 01/18/24  1906   COVID19  Not Detected       Test Results Pending at Discharge     Pending Labs       Order Current Status    Blood Culture - Blood, Arm, Left Preliminary result    Blood Culture - Blood, Arm, Right Preliminary result            Discharge Details        Discharge Medications        New Medications        Instructions Start Date   albuterol (2.5 MG/3ML) 0.083% nebulizer solution  Commonly known as: PROVENTIL   2.5 mg, Nebulization, Every 4 Hours PRN      ipratropium-albuterol 0.5-2.5 mg/3 ml nebulizer  Commonly known as: DUO-NEB   3 mL, Nebulization, 2 Times Daily PRN      predniSONE 20 MG tablet  Commonly known as: DELTASONE   Take 2 tablets by mouth Daily With Breakfast for 2 days, THEN 1 tablet Daily With Breakfast for 3 days, THEN 0.5 tablets Daily With Breakfast for 4 days.   Start Date: January 22, 2024        "     Continue These Medications        Instructions Start Date   amoxicillin-clavulanate 875-125 MG per tablet  Commonly known as: AUGMENTIN   1 tablet, Oral, 2 Times Daily   Start Date: January 22, 2024     apixaban 2.5 MG tablet tablet  Commonly known as: ELIQUIS   2.5 mg, Oral, 2 Times Daily      atorvastatin 20 MG tablet  Commonly known as: LIPITOR   TAKE 1 TABLET EVERY NIGHT      cetirizine 10 MG tablet  Commonly known as: zyrTEC   1 tablet, Oral, Daily      Entresto 24-26 MG tablet  Generic drug: sacubitril-valsartan   0.5 tablets, Oral, 2 Times Daily      folic acid 1 MG tablet  Commonly known as: FOLVITE   1 mg, Oral, Daily, Start at least 7 days prior to chemotherapy until at least 3 weeks after all chemotherapy.      furosemide 40 MG tablet  Commonly known as: LASIX   40 mg, Oral, Daily      ipratropium 0.06 % nasal spray  Commonly known as: ATROVENT   2 sprays, Nasal, 4 Times Daily      metoprolol succinate XL 25 MG 24 hr tablet  Commonly known as: TOPROL-XL   25 mg, Oral, Daily      mirtazapine 7.5 MG tablet  Commonly known as: REMERON   7.5 mg, Oral, Every Night at Bedtime      montelukast 10 MG tablet  Commonly known as: SINGULAIR   1 tablet, Oral, Nightly      pantoprazole 40 MG EC tablet  Commonly known as: PROTONIX   40 mg, Oral, Every Early Morning      potassium chloride 10 MEQ CR tablet  Commonly known as: K-DUR,KLOR-CON   20 mEq, Oral, Daily      VITAMIN B12 PO   1,000 mcg, Oral, Daily      Vitamin D3 50 MCG (2000 UT) tablet   1,000 Units, Oral, Daily, HOLDING FOR DOS      zolpidem 5 MG tablet  Commonly known as: AMBIEN   5 mg, Oral, Nightly PRN, for sleep             Stop These Medications      doxycycline 100 MG tablet  Commonly known as: VIBRAMYICN     HYDROcodone Bit-Homatrop MBr 5-1.5 MG/5ML solution  Commonly known as: HYCODAN     ondansetron 8 MG tablet  Commonly known as: ZOFRAN     promethazine-dextromethorphan 6.25-15 MG/5ML syrup  Commonly known as: PROMETHAZINE-DM               Allergies   Allergen Reactions    Sulfa Antibiotics Rash       Discharge Disposition:  Home or Self Care      Discharge Diet:  Diet Order   Procedures    Diet: Cardiac Diets; Healthy Heart (2-3 Na+); Texture: Regular Texture (IDDSI 7); Fluid Consistency: Thin (IDDSI 0)       Discharge Activity:   Activity Instructions       Activity as Tolerated              CODE STATUS:    Code Status and Medical Interventions:   Ordered at: 01/18/24 8691     Code Status (Patient has no pulse and is not breathing):    CPR (Attempt to Resuscitate)     Medical Interventions (Patient has pulse or is breathing):    Full       Future Appointments   Date Time Provider Department Center   1/23/2024 11:00 AM Claire Orr MD [facility] CD [facility]   1/24/2024  9:00 AM IN[facility] PORT CHAIR  INFUS [facility]   1/24/2024  9:40 AM Henry Escobar MD [facility]   1/24/2024 10:00 AM [address] K[facility] - LG  [facility]US [facility]   3/26/2024  9:20 AM Claire Orr MD MGANKIT CD LCGKR [facility]   7/16/2024  8:30 AM Kehrer, Meredith Lea, MD [facility] PC SHBYV [facility]   10/8/2024 11:30 AM Bradley Dowell MD [facility] NS LOU41 [facility]      Follow-up Information       Kehrer, Meredith Lea, MD .    Specialty: Family Medicine  Contact information:  [address] [facility] [address]  [facility] [address] [phone]               Claire Orr MD Follow up on 1/23/2024.    Specialty: Cardiology  Contact information:  [address]  [facility] [id number] [phone]                             Time Spent on Discharge:  Greater than 30 minutes spent on discharge management including final examination, discussion of hospital stay and patient education, preparation of records, medication reconciliation, follow up planning      Jakob Mendiola MD  [facility]  01/21/24  08:45 EST

## 2024-01-21 NOTE — TELEPHONE ENCOUNTER
Looks like patient just left the hospital on Sunday with respiratory failure and milligrams.  And has an appointment with me on Monday.  This seems a bit early.  Please find out how she is doing if she wants to schedule her appointment bladder a week from now.

## 2024-01-21 NOTE — PROGRESS NOTES
Date of Office Visit: 2024  Encounter Provider: Claire Orr MD  Place of Service: [facility] CARDIOLOGY  Patient Name: Darlene Pfeiffer  :1940    Chief complaint  Paroxysmal atrial fibrillation, coronary artery disease    History of Present Illness  Patient is a 83-year-old female with a history of COPD, hypertension, hyperlipidemia, intracranial aneurysm left PCA.  Lung cancer, adenocarcinoma of the tongue, asthma and coronary artery calcification.  2015 patient underwent left upper lobectomy.  By  she was diagnosed with tongue cancer underwent resection.  No evidence of recurrence.  However by 2021 found to have mediastinal changes as well as right lower lobe pulmonary nodule.  Eventually found to have adenocarcinoma in 2 different regions.  Patient received radiation therapy to the left lung that was completed on 2021.  By 10/2022 she was admitted with pneumonia and by 2022 had abnormal PET scan felt to be suggestive of metastatic postradiation changes left apex.  On 2022 she started Keytruda.  On 2023 however, there is evidence of progressive disease in the left upper thorax and ribs.  Therefore this was discontinued and she was to do with palliative radiation therapy 2023.  She then received   She developed significant nausea and failure to thrive.  On 2023 echo showed normal systolic function and she was changed to dabrafenib and trametinib.  By 2023 she developed cardiomyopathy with an ejection fraction 39.2% GLS -11.1% with global hypokinesis with aortic valve calcification with trivial valve regurgitation normal right-sided pressures.  She recalls the morning of admission that she woke up with sudden shortness of breath without chest pain.  She was started on goal-directed medical therapy for heart failure which was limited by hypotension.  Following this both chemotherapeutic agents were discontinued and she started Alimta on 2023.  When  seen in the office on 12/12/2023 she developed atrial fibrillation with rapid rates.  Toprol was increased and she was started on low-dose Eliquis.  She subsequently converted to sinus rhythm between them and her current visit.  She had a preordered coronary CT angiogram which showed an ejection fraction of 27% with global hypokinesis with evidence of multivessel coronary artery disease most significant in the with probable significant ostial RCA stenosis.  There was moderate disease in the mid LAD.  Motion and pressure limited disease 3 circumflex vessel..  Left main was felt to be free of significant obstructive disease.  Mild proximal LAD stenosis was noted moderate mid LAD stenosis was present.  After further discussion with Dr. Kothari as her 1 year prognosis was suboptimal medical therapy was pursued.  Fortunately clinically she had improved.  However on 12/28/2023 she was admitted with hypoxia and COVID-pneumonia.  Possibly with superimposed bacterial pneumonia.  She was treated with remdesivir cefepime and Decadron with slow improvement.  However she was admitted on 1/18/2022 and discharged on 1/21/2024 with acute respiratory failure and rhinovirus infection.      She remains on continuous O2 by nasal cannula.  She is up to 4 to 5 L.  She has had troubles with mild epistaxis but a does not have a humidifier.  She denies any chest pain potation's syncope near syncope.  On 12/28/2023 proBNP had elevated significantly at 13,178.  By 1/18/2024 with more recent admission proBNP was 1450.  Troponin has been constantly elevated since July 2023 in the 89 range and actually improved to 53 by December 28    Baseline echo on 8/2023 with ejection fraction  56%, GLS -20.8%.  Inferobasal hypokinesis, grade 1 diastolic dysfunction, normal right-sided chambers with moderate left atrial enlargement and normal RV function.  Echo on 11/14/2023 with ejection fraction 39%, GLS -11.1% grade 1A diastolic dysfunction trivial valve  regurgitation.        Past Medical History:   Diagnosis Date    Allergic rhinitis     Aneurysm     Asthma     Cancer of floor of mouth 2014    Cerebral aneurysm     COPD (chronic obstructive pulmonary disease)     COVID 2020    Esophageal reflux     History of adenocarcinoma of lung     History of anemia     History of carcinoma in situ of skin     History of lung cancer     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 from [facility]    History of squamous cell carcinoma     Tongue    Hyperlipidemia     Hypertension     since early 60s    Intractable back pain 11/29/2022    Low vitamin B12 level     Lung cancer     Systolic CHF, acute 11/14/2023    Thyromegaly     UTI (urinary tract infection)     Vitamin D deficiency      Past Surgical History:   Procedure Laterality Date    BRONCHOSCOPY Bilateral 10/17/2022    Procedure: BRONCHOSCOPY WITH FLUORO with biopsy and BAL;  Surgeon: India Flores MD;  Location: [facility] ENDOSCOPY;  Service: Pulmonary;  Laterality: Bilateral;  PRE/POST - lung mass    CHOLECYSTECTOMY  1999    COLONOSCOPY      EMBOLIZATION CEREBRAL Left 06/29/2022    Procedure: 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;  Surgeon: Bradley Dowell MD;  Location: [facility] HYBRID OR 18/19;  Service: Interventional Radiology;  Laterality: Left;    EYE SURGERY  11/24/2020    Took a muscle out of right eye    GLOSSECTOMY PARTIAL      less than one half tongue    LUNG SURGERY  2012    ORAL LESION EXCISION/BIOPSY      June and August 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    TUBAL ABDOMINAL LIGATION  1970    VENOUS ACCESS DEVICE (PORT) INSERTION N/A 12/12/2022    Procedure: MEDIPORT PLACEMENT;  Surgeon: Jason Villaseñor MD;  Location: [facility] MAIN OR;  Service: Vascular;  Laterality: N/A;     Outpatient Medications Prior to Visit   Medication Sig Dispense Refill    albuterol (PROVENTIL) (2.5 MG/3ML) 0.083% nebulizer solution Inhale the contents of 1 vial by  nebulization Every 4 (Four) Hours As Needed for Wheezing. 90 mL 0    amoxicillin-clavulanate (AUGMENTIN) 875-125 MG per tablet Take 1 tablet by mouth 2 (Two) Times a Day for 3 days. Indications: Upper Respiratory Tract Infection 6 tablet 0    apixaban (ELIQUIS) 2.5 MG tablet tablet Take 1 tablet by mouth 2 (Two) Times a Day. 60 tablet 3    atorvastatin (LIPITOR) 20 MG tablet TAKE 1 TABLET EVERY NIGHT 90 tablet 1    cetirizine (zyrTEC) 10 MG tablet Take 1 tablet by mouth Daily. Indications: Perennial Allergic Rhinitis      Cholecalciferol (Vitamin D3) 50 MCG (2000 UT) tablet Take 1,000 Units by mouth Daily. Indications: Vitamin D Deficiency      Cyanocobalamin (VITAMIN B12 PO) Take 1,000 mcg by mouth Daily. Indications: vitamin b12 deficiency       folic acid (FOLVITE) 1 MG tablet Take 1 tablet by mouth Daily. Start at least 7 days prior to chemotherapy until at least 3 weeks after all chemotherapy. 30 tablet 6    ipratropium (ATROVENT) 0.06 % nasal spray 2 sprays into the nostril(s) as directed by provider 4 (Four) Times a Day. 15 mL 0    ipratropium-albuterol (DUO-NEB) 0.5-2.5 mg/3 ml nebulizer Inhale the contents of 1 vial by nebulization 2 (Two) Times a Day As Needed for Wheezing. 180 mL 0    mirtazapine (REMERON) 7.5 MG tablet TAKE 1 TABLET BY MOUTH EVERY NIGHT AT BEDTIME 30 tablet 1    predniSONE (DELTASONE) 20 MG tablet Take 2 tablets by mouth Daily With Breakfast for 2 days, THEN 1 tablet Daily With Breakfast for 3 days, THEN 0.5 tablets Daily With Breakfast for 4 days. 9 tablet 0    sacubitril-valsartan (Entresto) 24-26 MG tablet Take 0.5 tablets by mouth 2 (Two) Times a Day. 30 tablet 3    furosemide (LASIX) 40 MG tablet Take 1 tablet by mouth Daily. 90 tablet 1    metoprolol succinate XL (TOPROL-XL) 25 MG 24 hr tablet Take 1 tablet by mouth Daily.      montelukast (SINGULAIR) 10 MG tablet Take 1 tablet by mouth Every Night. Indications: Hayfever, Perennial Allergic Rhinitis      pantoprazole (PROTONIX) 40  MG EC tablet Take 1 tablet by mouth Every Morning for 30 days. 30 tablet 0    potassium chloride (K-DUR,KLOR-CON) 10 MEQ CR tablet TAKE 2 TABLETS BY MOUTH DAILY 60 tablet 1    zolpidem (AMBIEN) 5 MG tablet TAKE 1 TABLET BY MOUTH EVERY NIGHT AT BEDTIME AS NEEDED FOR SLEEP 30 tablet 0     No facility-administered medications prior to visit.       Allergies as of 2024 - Reviewed 2024   Allergen Reaction Noted    Sulfa antibiotics Rash 10/13/2016     Social History     Socioeconomic History    Marital status:    Tobacco Use    Smoking status: Former     Types: Cigarettes     Quit date: 2015     Years since quittin.0     Passive exposure: Never    Smokeless tobacco: Never    Tobacco comments:     less than a pack per day, no smoking since , Quit 2015   Vaping Use    Vaping Use: Never used   Substance and Sexual Activity    Alcohol use: Not Currently     Comment: Socially -A few times a month    Drug use: No    Sexual activity: Defer     Family History   Problem Relation Age of Onset    Ovarian cancer Mother          at age 27    No Known Problems Father     Ovarian cancer Sister     Colon cancer Brother     No Known Problems Other     Malig Hyperthermia Neg Hx      Review of Systems   Constitutional: Negative for chills, fever, weight gain and weight loss.   Cardiovascular:  Negative for leg swelling.   Respiratory:  Negative for cough, snoring and wheezing.    Hematologic/Lymphatic: Negative for bleeding problem. Does not bruise/bleed easily.   Skin:  Negative for color change.   Musculoskeletal:  Negative for falls, joint pain and myalgias.   Gastrointestinal:  Negative for melena.   Genitourinary:  Negative for hematuria.   Neurological:  Negative for excessive daytime sleepiness.   Psychiatric/Behavioral:  Negative for depression. The patient is not nervous/anxious.         Objective:     Vitals:    24 1128   BP: 144/60   Pulse: 104   Weight: 45.8 kg (101 lb)   Height:  "160 cm (63\")     Body mass index is 17.89 kg/m².    Vitals reviewed.   Constitutional:       Appearance: Underweight. Frail.   Eyes:      General: No scleral icterus.        Right eye: No discharge.      Conjunctiva/sclera: Conjunctivae normal.      Pupils: Pupils are equal, round, and reactive to light.   HENT:      Head: Normocephalic.      Nose: Nose normal.   Neck:      Thyroid: No thyromegaly.      Vascular: No JVD.   Pulmonary:      Effort: Pulmonary effort is normal. No respiratory distress.      Breath sounds: Normal breath sounds. No wheezing. No rales.   Cardiovascular:      Tachycardia present. Regular rhythm. Normal S1. Normal S2.       Murmurs: There is no murmur.      No gallop.    Pulses:     Intact distal pulses.      Carotid: 2+ bilaterally.     Radial: 2+ bilaterally.     Femoral: 2+ bilaterally.     Popliteal: 2+ bilaterally.     Dorsalis pedis: 2+ bilaterally.     Posterior tibial: 2+ bilaterally.  Edema:     Peripheral edema absent.   Abdominal:      General: Bowel sounds are normal. There is no distension.      Palpations: Abdomen is soft.      Tenderness: There is no abdominal tenderness. There is no rebound.   Musculoskeletal: Normal range of motion.         General: No tenderness.      Cervical back: Normal range of motion and neck supple. Skin:     General: Skin is warm and dry.      Findings: No erythema or rash.   Neurological:      Mental Status: Alert and oriented to person, place, and time.   Psychiatric:         Behavior: Behavior normal.         Thought Content: Thought content normal.         Judgment: Judgment normal.       Lab Review:   Lab Results - Last 18 Months   Lab Units 01/29/24  0835 01/19/24  0635 01/18/24  1431 01/25/23  0825 01/09/23  0836 10/14/22  1315 08/29/22  1548   WBC 10*3/mm3 11.59* 5.38 7.54   < > 7.44   < > 6.1   RBC 10*6/mm3 3.97 3.57* 3.90   < > 4.64   < > 4.63   HEMOGLOBIN g/dL 12.1 10.7* 11.1*   < > 13.3   < > 13.7   HEMATOCRIT % 39.2 32.3* 35.5   < > 40.6 "   < > 41.5   MCV fL 98.7* 90.5 91.0   < > 87.5   < > 90   MCH pg 30.5 30.0 28.5   < > 28.7   < > 29.6   MCHC g/dL 30.9* 33.1 31.3*   < > 32.8   < > 33.0   RDW % 17.2* 15.2 15.2   < > 13.1   < > 12.6   PLATELETS 10*3/mm3 267 274 311   < > 287   < > 232   NEUTROPHIL % % 74.1  --  80.1*   < > 54.4   < > 57   LYMPHOCYTE % % 18.2*  --  9.7*   < > 37.4   < > 30   MONOCYTES % % 6.6  --  9.5   < > 7.4   < > 9   EOSINOPHIL % % 0.1*  --  0.0*   < > 0.0*   < > 3   BASOPHIL % % 0.4  --  0.3   < > 0.7   < > 1   NEUTROS ABS 10*3/mm3 8.59*  --  6.04   < > 4.05   < > 3.5   LYMPHS ABS 10*3/mm3 2.11  --  0.73   < > 2.78   < > 1.8   MONOS ABS 10*3/mm3 0.76  --  0.72   < > 0.55   < > 0.5   EOS ABS 10*3/mm3 0.01  --  0.00   < > 0.00   < > 0.2   BASOS ABS 10*3/mm3 0.05  --  0.02   < > 0.05   < > 0.0   IMM GRAN % %  --   --   --   --  0.1  --  0   IMMATURE GRANS (ABS) 10*3/mm3  --   --   --   --  0.01  --  0.0   RDW-SD fl 61.1* 47.8 48.5   < >  --    < >  --    MPV fL 10.1 10.4 9.8   < >  --    < >  --     < > = values in this interval not displayed.       Lab Results - Last 18 Months   Lab Units 01/29/24  0835 01/19/24  0635 01/18/24  1431   GLUCOSE mg/dL 119* 120* 152*   BUN mg/dL 22 22 22   CREATININE mg/dL 0.65 0.81 0.71   SODIUM mmol/L 142 142 142   POTASSIUM mmol/L 4.2 3.7 3.6   CHLORIDE mmol/L 100 100 99   CO2 mmol/L 33.9* 28.0 30.0*   CALCIUM mg/dL 9.9 9.1 9.5   TOTAL PROTEIN g/dL 6.5  --  6.7   ALBUMIN g/dL 3.6  --  3.7   ALT (SGPT) U/L 11  --  15   AST (SGOT) U/L 20  --  30   ALK PHOS U/L 71  --  81   BILIRUBIN mg/dL 0.4  --  0.4   GLOBULIN gm/dL 2.9  --  3.0   A/G RATIO g/dL 1.2  --  1.2   BUN / CREAT RATIO  33.8* 27.2* 31.0*   ANION GAP mmol/L 8.1 14.0 13.0   EGFR mL/min/1.73 88.6 73.0 85.5     Lab Results - Last 18 Months   Lab Units 10/02/23  0808 01/09/23  0836   TRIGLYCERIDES mg/dL 97 135   HDL CHOL mg/dL 73* 46   LDL CHOL mg/dL 90 133*   VLDL CHOLESTEROL ROBERT mg/dL 17 24     Lab Results - Last 18 Months   Lab Units  01/23/24  1248 01/18/24  1431   PROBNP pg/mL 4,137.0* 1,450.0     Lab Results - Last 18 Months   Lab Units 01/18/24  1707 01/18/24  1431   HSTROP T ng/L 50* 48*     Lab Results - Last 18 Months   Lab Units 11/15/23  0412 07/26/23  0622   TSH uIU/mL 1.000 2.160     Lab Results - Last 18 Months   Lab Units 11/14/23  0238   PROTIME Seconds 13.7   APTT seconds 27.7           ECG 12 Lead    Date/Time: 1/23/2024 11:38 AM  Performed by: Claire Orr MD    Authorized by: Claire Orr MD  Comparison: compared with previous ECG   Similar to previous ECG  Rhythm: sinus tachycardia  Ectopy: unifocal PVCs  Other findings: non-specific ST-T wave changes    Clinical impression: abnormal EKG            Diagnosis Plan   1. Acute HFrEF (heart failure with reduced ejection fraction)  ECG 12 Lead    proBNP      2. PAF (paroxysmal atrial fibrillation)  ECG 12 Lead    proBNP        Plan:       1.  Recent recurrent respiratory compromise with COVID and pneumonia and infection 12/28/2023 and more recently with rhinovirus and bacterial pneumonia 1/18/2024.  Still with dyspnea and hypoxia.  Anticipate this will take several weeks to improve.  In the meanwhile we will increase treatment for cardiomyopathy to avoid aggravation of diastolic heart failure.  She does not appear clinically wet today.  2.  Diastolic heart failure.  Will increase Toprol-XL to 25 mg twice daily.  Will check a proBNP.  If this is again increased we will titrate Lasix to slightly higher dose.  3.  Paroxysmal atrial fibrillation.  As above.  For now we will continue with Eliquis but family will add a humidifier.  If epistaxis continues or worsens we will have to sacrifice Eliquis.  4.  Multivessel coronary artery disease.  Troponin recently elevated but flat.  She is not an ideal candidate for bypass.  Angioplasty/percutaneous intervention could be considered though it would be a high risk procedure with proximal disease.  She will also need to be able to tolerate  antiplatelet as well as anticoagulant therapy.  With current epistaxis we will aim for medical therapy and increase beta-blockers as above.  5.  Cardiomyopathy.  Likely multifactorial.  Remains on beta-blocker therapy as above as well as also on diuretics.  Recommendations as above.  6.  Lung cancer.  Patient to see Dr. Kothari in the near future for additional treatment options.  7.  History of hypertension.  Blood pressure controlled  8.  History of cerebral aneurysm.  Followed by Dr. Calvin  9.  Dyslipidemia  10.  COPD/asthma      Time Spent: I spent 1 minutes caring for Darlene on this date of service. This time includes time spent by me in the following activities: preparing for the visit.   I spent 35 minutes on the separately reported service of ECG. This time is not included in the time used to support the E/M service also reported today.   Ongoing care of this patient will be with cafrdiology        Your medication list            Accurate as of January 23, 2024 11:59 PM. If you have any questions, ask your nurse or doctor.                CHANGE how you take these medications        Instructions Last Dose Given Next Dose Due   atorvastatin 20 MG tablet  Commonly known as: LIPITOR  What changed: how to take this      TAKE 1 TABLET EVERY NIGHT       furosemide 40 MG tablet  Commonly known as: LASIX  What changed: how much to take  Changed by: Claire Orr MD      Take 1.5 tablets by mouth Daily. Indications: Cardiac Failure, High Blood Pressure Disorder       metoprolol succinate XL 25 MG 24 hr tablet  Commonly known as: TOPROL-XL  What changed: when to take this  Changed by: Claire Orr MD      Take 1 tablet by mouth 2 (Two) Times a Day. Indications: High Blood Pressure Disorder       potassium chloride 10 MEQ CR tablet  Commonly known as: K-DUR,KLOR-CON,KLOR-CON M10  What changed: additional instructions  Changed by: Claire Orr MD      Take 2 tablets by mouth Daily. 1 tablet in the PM  Indications: Low  Amount of Potassium in the Blood              CONTINUE taking these medications        Instructions Last Dose Given Next Dose Due   albuterol (2.5 MG/3ML) 0.083% nebulizer solution  Commonly known as: PROVENTIL      Inhale the contents of 1 vial by nebulization Every 4 (Four) Hours As Needed for Wheezing.       amoxicillin-clavulanate 875-125 MG per tablet  Commonly known as: AUGMENTIN      Take 1 tablet by mouth 2 (Two) Times a Day for 3 days. Indications: Upper Respiratory Tract Infection       apixaban 2.5 MG tablet tablet  Commonly known as: ELIQUIS      Take 1 tablet by mouth 2 (Two) Times a Day.       cetirizine 10 MG tablet  Commonly known as: zyrTEC      Take 1 tablet by mouth Daily. Indications: Perennial Allergic Rhinitis       Entresto 24-26 MG tablet  Generic drug: sacubitril-valsartan      Take 0.5 tablets by mouth 2 (Two) Times a Day.       folic acid 1 MG tablet  Commonly known as: FOLVITE      Take 1 tablet by mouth Daily. Start at least 7 days prior to chemotherapy until at least 3 weeks after all chemotherapy.       ipratropium 0.06 % nasal spray  Commonly known as: ATROVENT      2 sprays into the nostril(s) as directed by provider 4 (Four) Times a Day.       ipratropium-albuterol 0.5-2.5 mg/3 ml nebulizer  Commonly known as: DUO-NEB      Inhale the contents of 1 vial by nebulization 2 (Two) Times a Day As Needed for Wheezing.       mirtazapine 7.5 MG tablet  Commonly known as: REMERON      TAKE 1 TABLET BY MOUTH EVERY NIGHT AT BEDTIME       montelukast 10 MG tablet  Commonly known as: SINGULAIR      Take 1 tablet by mouth Every Night. Indications: Hayfever, Perennial Allergic Rhinitis       pantoprazole 40 MG EC tablet  Commonly known as: PROTONIX      Take 1 tablet by mouth Every Morning for 30 days.       predniSONE 20 MG tablet  Commonly known as: DELTASONE  Start taking on: January 22, 2024      Take 2 tablets by mouth Daily With Breakfast for 2 days, THEN 1 tablet Daily With Breakfast for 3  days, THEN 0.5 tablets Daily With Breakfast for 4 days.       VITAMIN B12 PO      Take 1,000 mcg by mouth Daily. Indications: vitamin b12 deficiency       Vitamin D3 50 MCG (2000 UT) tablet      Take 1,000 Units by mouth Daily. Indications: Vitamin D Deficiency       zolpidem 5 MG tablet  Commonly known as: AMBIEN      TAKE 1 TABLET BY MOUTH EVERY NIGHT AT BEDTIME AS NEEDED FOR SLEEP                 Where to Get Your Medications        These medications were sent to [facility] - [place], [place] - [address] - [phone]  - [phone]   [address] [address]      Phone: [phone]   furosemide 40 MG tablet  metoprolol succinate XL 25 MG 24 hr tablet  potassium chloride 10 MEQ CR tablet         Patient is no longer taking -.  I corrected the med list to reflect this.  I did not stop these medications.      Dictated utilizing Dragon dictation

## 2024-01-21 NOTE — PLAN OF CARE
Goal Outcome Evaluation:   Patient alert follows commands, prn cough med x1 dose given. Prn sleep aid given. No c/o pain or nausea noted. Up ad nani with minimal assistance. No acute distress noted will continue to monitor

## 2024-01-22 ENCOUNTER — TELEPHONE (OUTPATIENT)
Dept: FAMILY MEDICINE CLINIC | Facility: CLINIC | Age: 84
End: 2024-01-22

## 2024-01-22 ENCOUNTER — TRANSITIONAL CARE MANAGEMENT TELEPHONE ENCOUNTER (OUTPATIENT)
Dept: CALL CENTER | Facility: HOSPITAL | Age: 84
End: 2024-01-22
Payer: MEDICARE

## 2024-01-22 ENCOUNTER — TELEPHONE (OUTPATIENT)
Dept: ONCOLOGY | Facility: CLINIC | Age: 84
End: 2024-01-22
Payer: MEDICARE

## 2024-01-22 VITALS
DIASTOLIC BLOOD PRESSURE: 72 MMHG | OXYGEN SATURATION: 94 % | HEART RATE: 102 BPM | TEMPERATURE: 97.2 F | SYSTOLIC BLOOD PRESSURE: 124 MMHG | RESPIRATION RATE: 20 BRPM

## 2024-01-22 LAB
QT INTERVAL: 335 MS
QTC INTERVAL: 464 MS

## 2024-01-22 NOTE — TELEPHONE ENCOUNTER
Caller: RASHEL TREVINO     Relationship: DAUGHTER    Best call back number: [phone]     What is your medical concern?     PATIENT IS ON OXYGEN 24/7.  THE CONCERN IS PATIENT KEEPS PASSING BLOOD THROUGH HER NOSE AND SHE FEELS STOPPED UP ALL THE TIME.  THEY WOULD LIKE TO KNOW WHAT DO YOU SUGGEST THAT THEY USE TO KEEP HER NOSE MOIST AND NOT DRIED OUT.    BOOGIE WAS SUGGESTED FOR HER TO USE BUT SHE DOES NOT LIKE IT AND WILL NOT USE IT.      PLEASE CALL AND ADVISE.

## 2024-01-22 NOTE — TELEPHONE ENCOUNTER
Spoke to patient. She really wants to keep her appointment tomorrow because she has been anxiously waiting to see you. I told her that was fine and we would see her tomorrow.     Armida Pink RN  Triage [facility]

## 2024-01-22 NOTE — TELEPHONE ENCOUNTER
I tried to call Darlene LINDSEY Pfeiffer but there was no answer.  Left a voicemail asking patient to call back.  Will continue to try to reach pt.    HUB- if pt calls back, please transfer through to triage.    Thank you,    Linda HART RN  Triage [facility]  01/22/24 08:33 EST

## 2024-01-22 NOTE — TELEPHONE ENCOUNTER
FYI - PATIENT DECLINED AN APPT THIS WEEK - PATIENT SCHEDULED FOR HER HOSPITAL FOLLOW UP ON 1/29/24

## 2024-01-22 NOTE — TELEPHONE ENCOUNTER
Provider: Luz  Caller: patient  Relationship to Patient: self  Call Back Phone Number: [phone]  Reason for Call: patient wants to move her appointment out a week

## 2024-01-22 NOTE — TELEPHONE ENCOUNTER
"Called the patient to let her know we could move her out but it would most likely be with an NP and she stated \"fine\" and the call abruptly ended. Message was sent to scheduling to have them work on this.   "

## 2024-01-22 NOTE — OUTREACH NOTE
Call Center TCM Note      Flowsheet Row Responses   [facility] patient discharged from? [place]   Does the patient have one of the following disease processes/diagnoses(primary or secondary)? Pneumonia   TCM attempt successful? Yes   Call start time 0844   Call end time 0849   Discharge diagnosis Bilateral pneumonia, Acute hypoxic respiratory failure   Person spoke with today (if not patient) and relationship spouse   Meds reviewed with patient/caregiver? Yes   Is the patient having any side effects they believe may be caused by any medication additions or changes? No   Does the patient have all medications ordered at discharge? No   What is keeping the patient from filling the prescriptions? --  [will p/u RX's today]   Is the patient taking all medications as directed (includes completed medication regime)? Yes   Does the patient have an appointment with their PCP within 7-14 days of discharge? No   Nursing Interventions Patient declined scheduling/rescheduling appointment at this time  [spouse reports pt will call PCP today]   What is the Home health agency?  [facility]   Has home health visited the patient within 72 hours of discharge? Call prior to 72 hours   Has all DME been delivered? Yes   Pulse Ox monitoring Intermittent   Pulse Ox device source Patient   Psychosocial issues? No   What is the patient's perception of their health status since discharge? Same   Nursing Interventions Nurse provided patient education   TCM call completed? Yes   Call end time 0849   Would this patient benefit from a Referral to Amb Social Work? No   Is the patient interested in additional calls from an ambulatory ? No            Harriet Jenkins RN    1/22/2024, 08:50 EST

## 2024-01-23 ENCOUNTER — TELEPHONE (OUTPATIENT)
Dept: CARDIOLOGY | Facility: CLINIC | Age: 84
End: 2024-01-23

## 2024-01-23 ENCOUNTER — HOSPITAL ENCOUNTER (OUTPATIENT)
Dept: CARDIOLOGY | Facility: HOSPITAL | Age: 84
Discharge: HOME | End: 2024-01-23
Admitting: INTERNAL MEDICINE
Payer: MEDICARE

## 2024-01-23 ENCOUNTER — OFFICE VISIT (OUTPATIENT)
Dept: CARDIOLOGY | Facility: CLINIC | Age: 84
End: 2024-01-23
Payer: MEDICARE

## 2024-01-23 VITALS
WEIGHT: 101 LBS | DIASTOLIC BLOOD PRESSURE: 60 MMHG | HEART RATE: 104 BPM | SYSTOLIC BLOOD PRESSURE: 144 MMHG | BODY MASS INDEX: 17.89 KG/M2 | HEIGHT: 63 IN

## 2024-01-23 DIAGNOSIS — I48.0 PAF (PAROXYSMAL ATRIAL FIBRILLATION): ICD-10-CM

## 2024-01-23 DIAGNOSIS — I50.21 ACUTE HFREF (HEART FAILURE WITH REDUCED EJECTION FRACTION): Primary | ICD-10-CM

## 2024-01-23 DIAGNOSIS — I50.21 ACUTE HFREF (HEART FAILURE WITH REDUCED EJECTION FRACTION): ICD-10-CM

## 2024-01-23 LAB
BACTERIA SPEC AEROBE CULT: NORMAL
BACTERIA SPEC AEROBE CULT: NORMAL
NT-PROBNP SERPL-MCNC: 4137 PG/ML (ref 0–1800)

## 2024-01-23 PROCEDURE — 83880 ASSAY OF NATRIURETIC PEPTIDE: CPT | Performed by: INTERNAL MEDICINE

## 2024-01-23 PROCEDURE — 36415 COLL VENOUS BLD VENIPUNCTURE: CPT

## 2024-01-23 NOTE — TELEPHONE ENCOUNTER
Yes, please increase metoprolol to 25 mg twice daily.  Also let her son know that the test for heart failure was again slightly elevated though not as high as when she was admitted.  Increase Lasix to 60 mg a day p.o. and potassium chloride to 20meq in the a.m. and 10 mg in p.m.  Have her come back to see us in 2 weeks instead of 3.  Thank you

## 2024-01-23 NOTE — TELEPHONE ENCOUNTER
Pt's son (Santhosh) at [phone] call re: Verify meds.    Current meds are correct and up to date.      Labs are still pending.    Let me know if the pt needs to increase the metoprolol to 25mg BID.

## 2024-01-24 ENCOUNTER — TELEPHONE (OUTPATIENT)
Dept: CARDIOLOGY | Facility: CLINIC | Age: 84
End: 2024-01-24

## 2024-01-24 ENCOUNTER — HOME CARE VISIT (OUTPATIENT)
Dept: HOME HEALTH SERVICES | Facility: HOME HEALTHCARE | Age: 84
End: 2024-01-24
Payer: MEDICARE

## 2024-01-24 VITALS
DIASTOLIC BLOOD PRESSURE: 66 MMHG | OXYGEN SATURATION: 98 % | HEART RATE: 99 BPM | SYSTOLIC BLOOD PRESSURE: 112 MMHG | RESPIRATION RATE: 20 BRPM

## 2024-01-24 DIAGNOSIS — C34.11 MALIGNANT NEOPLASM OF UPPER LOBE OF RIGHT LUNG: ICD-10-CM

## 2024-01-24 DIAGNOSIS — G47.00 INSOMNIA, UNSPECIFIED TYPE: ICD-10-CM

## 2024-01-24 PROCEDURE — G0299 HHS/HOSPICE OF RN EA 15 MIN: HCPCS

## 2024-01-24 RX ORDER — METOPROLOL SUCCINATE 25 MG/1
25 TABLET, EXTENDED RELEASE ORAL 2 TIMES DAILY
Qty: 60 TABLET | Refills: 2 | Status: SHIPPED | OUTPATIENT
Start: 2024-01-24

## 2024-01-24 RX ORDER — FUROSEMIDE 40 MG/1
60 TABLET ORAL DAILY
Qty: 45 TABLET | Refills: 2 | Status: SHIPPED | OUTPATIENT
Start: 2024-01-24

## 2024-01-24 RX ORDER — POTASSIUM CHLORIDE 750 MG/1
20 TABLET, EXTENDED RELEASE ORAL DAILY
Qty: 90 TABLET | Refills: 2 | Status: SHIPPED | OUTPATIENT
Start: 2024-01-24

## 2024-01-24 RX ORDER — ZOLPIDEM TARTRATE 5 MG/1
5 TABLET ORAL NIGHTLY PRN
Qty: 30 TABLET | Refills: 0 | Status: SHIPPED | OUTPATIENT
Start: 2024-01-24

## 2024-01-24 NOTE — TELEPHONE ENCOUNTER
Called [employer] about pt assistance for Entresto.  This is still pending.  Check back in 1 week.    Called [employer] about pt assistance for Eliquis.  A new form was created and pt will need to re-sign and have updated form faxed.      I will have pt re-sign at next visit.  Reminder set.

## 2024-01-24 NOTE — Clinical Note
Dear Dr Kehrer RE Peggy Wills   44    The above named patient was admitted back into home health services as of 24 after recent hospital stay.  We will be doing nursing visits for teaching on medications, disease management, and assessment of all systems.  If you have any questions or needs on this patient, please feel free to contact our office at [phone].     Thank you,     Sharda SUBRAMANIANN RN   [facility]

## 2024-01-24 NOTE — TELEPHONE ENCOUNTER
Spoke with son.  No other questions.  He was driving so I will sent this message via [employer] along with rx's to the pharmacy.     [As Noted in HPI] : as noted in HPI [Negative] : Heme/Lymph

## 2024-01-24 NOTE — HOME HEALTH
"VICTORINO NOTE     Patient referred back to  services following most recent hosp for pneumonia.  Patient sent home on O2, and it has been increased at this time.  She was also instructed to get a humidifier, which she has set up in home as well as a nebulizer.  She has used a neb before so she was able to start her treatments already.  She states that it is helping her to feel like she is breathing better.  Not contagious from covid at this time.       Home health ordered for:    SN    Reason for Hosp/Primary Dx:    Pneumonia, hypoxia    Co-morbidities:    Recent covid 19 infection, lung cancer, COPD     Focus of Care:    SN assessment, and education of resp distress (new home o2 @ 2l), COPD, lung cancer, pneumonia     Patient's goal(s):    \"Stay out of hospital\"    Current Functional status/mobility/DME:    Ambulatory without DME, requires supervision. Gait limited to household distances.    HB status/Living Arrangements:    Homebound due to fatigue, SOA with exertion    Skin Integrity/wound status:    intact    Code Status:    FULL/CPR    Fall Risk/Safety concerns:    RISK. O2 tubing trip hazard    Medication issues/Concerns:    No problems/concerns at this time     Additional Problems/Concerns:    No problems at this time     SDOH Barriers (i.e. caregiver concerns, social isolation, transportation, food insecurity, environment, income etc.)/Need for MSW:    No    Plan for next visit:    SN assess/instruct    -----------------------------------------------------"

## 2024-01-26 ENCOUNTER — TELEPHONE (OUTPATIENT)
Dept: ONCOLOGY | Facility: CLINIC | Age: 84
End: 2024-01-26
Payer: MEDICARE

## 2024-01-26 DIAGNOSIS — C34.11 MALIGNANT NEOPLASM OF UPPER LOBE OF RIGHT LUNG: Primary | ICD-10-CM

## 2024-01-26 NOTE — TELEPHONE ENCOUNTER
Pt called wanting to let Dr. Escobar know that she saw Dr. Orr this week and she recommended no treatment while she is recovering from her pneumonia. Pt has cancelled her f/u apts here but wants to see what Dr. Escobar thinks about getting treatment. D/W Dr. Escobar and he would like to see her next week with possible treatment. Informed pt of this and she v/u. Message sent to scheduling.

## 2024-01-26 NOTE — PROGRESS NOTES
"Enter Query Response Below      Query Response: Gram negative pneumonia (excluding Haemophilus influenzae)             If applicable, please update the problem list.     Patient: Darlene Pfeiffer        : 1940  Account: [id number]           Admit Date: 2024        How to Respond to this query:       a. Click New Note     b. Answer query within the yellow box.                c. Update the Problem List, if applicable.      If you have any questions about this query contact me at: [email or web address]     Dr. Mendiola    83-year-old female with history of COPD, lung cancer, HFrEF, immunosuppressed admitted 24 with bilateral pneumonia, per the discharge summary. Treatment: IV Zosyn (-), \"plan to complete total 5 days with Augmentin. \" per the discharge summary.    Please clarify the type of pneumonia the patient was treated/monitored for:  Gram negative pneumonia (excluding Haemophilus influenzae)  Bacterial pneumonia unspecified  Other- specify______  Unable to determine    By submitting this query, we are merely seeking further clarification of documentation to accurately reflect all conditions that you are monitoring, evaluating, treating or that extend the hospitalization or utilize additional resources of care. Please utilize your independent clinical judgment when addressing the question(s) above.     This query and your response, once completed, will be entered into the legal medical record.    Sincerely,  Shelly PITTMAN, RN, CCDS  Clinical Documentation Integrity Program     "

## 2024-01-27 ENCOUNTER — HOME CARE VISIT (OUTPATIENT)
Dept: HOME HEALTH SERVICES | Facility: HOME HEALTHCARE | Age: 84
End: 2024-01-27
Payer: MEDICARE

## 2024-01-27 PROCEDURE — G0495 RN CARE TRAIN/EDU IN HH: HCPCS

## 2024-01-27 NOTE — PROGRESS NOTES
REASON FOR FOLLOW-UP: Recurrent lung cancer.    History of Present Illness    The patient is a 83 y.o. female with the above-mentioned history who returned 9/22/2023 continuing on Mekinist 0.5 mg daily and Tafinlar at 50 mg twice daily.  She also continues on prednisone only taking 10 mg daily.  She reports that she is feeling quite good.  She is tolerating things well.  She has a decent appetite denies issues with nausea, vomiting, diarrhea, or constipation.  She denies any issues with increased shortness of breath.    Patient did see ENT Dr. Topete, who has ordered an ultrasound of her neck, which will be done at [facility] on the 27th.  She is also scheduled for a cerebral angiogram by Dr. Calvin on 29 September.      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  Dr. Dowell of neurosurgery saw the patient 10/12/2023 and felt the patient was stable without neurologic symptoms, yearly follow-up planned.  The patient had started seeing a new ENT physician leading to the referral back to neurosurgery.  When seen 10/16/2023 she is doing very well on her current Mekinist and Tafinlar doses having improved her weight, appetite and general performance status.  We have discussed at least a chest x-ray today and repeat staging in the next 5 to 6 weeks as she continues her current dosing.    The patient required admission 11/14 - 11/16/2023 having presented with shortness of breath and found to be pulmonary edema with CBC, lactate and procalcitonin all normal.  There was a concern that her chemotherapy agents could have produced her cardiomyopathy and these were stopped 11/14/2023.  She was diuresed and echocardiogram demonstrated LV SF mildly decreased at 39.2% with GLS of -11.1%, left ventricular cavity mildly dilated, left  ventricular wall thickness consistent with mild concentric hypertrophy, left ventricular global hypokinesis, aortic valve abnormal with moderate calcification, valve was trileaflet, trace tricuspid valve regurgitation with right VSP at less than 35 mmHg.  The findings for LV systolic function, diastolic function and global longitudinal LV strain had all worsened.    CTA of chest 11/14/2023 demonstrating confluent soft tissue mass possibly measuring larger in current study at 6.5 x 5.0 previously 4.9 x 4.8, left supraclavicular node to decrease in size measuring 1.1 cm previously 1.4 cm, extensive bilateral interstitial and groundglass infiltrates.    The patient is seen 11/26/2023.  As per the discharge we are requested to have a BMP and CMP assessed.  She is seen with her son and daughter in a lengthy conversation held about her recent hospitalization with CHF-cardiomyopathy felt elicited, in part, from her targeted therapy with her Mekinist and Tafinlar discontinued altogether.    The patient is relatively stable currently having continued her diuretics and to be seen in cardiology in follow-up.  At the time of this dictation her CBC is found to be essentially normal with mild leukocytosis, CMP normal except for mildly elevated glucose at 125 and BNP reduced to 6126 from 9763.  She feels well with no additional shortness of breath chest pain lower extremity edema.  In reviewing the the possible treatment options she is next best treated with single agent chemotherapy moving to Alimta.    The patient proceeded to treatment teaching and seen back 12/4/2023.  She is ready to proceed with chemotherapy with fortunately a recent good performance status still in place.    Patient returns 12/18/2023 for toxicity check.  She started treatment with Alimta on 12/4/2023.  Patient states that she did have an episode following treatment where she felt extremely itchy however she took Benadryl which helped, and her symptoms  resolved.  She has ongoing issues with fatigue.  She is being followed closely by cardio oncology.  She also reports being short of breath but denies chest pain, or swelling.  She does struggle with her appetite.  Otherwise she feels like she has tolerated the change in treatment well.      The patient required hospitalization 1/18-21/24 presenting with increasing shortness of breath, findings of chronic respiratory failure, positive for rhinovirus on admission.  She is treated with supportive treatments including for previous COVID infection as well as antibiotic therapies.  She completed 5 days of Augmentin, continue nebulizer and breathing treatments.  Upon discharge she rescheduled appointments though was seen by cardiology 1/23/2024 not felt to be in heart failure, treated supportively for epistaxis.    There was concern as to whether she should continue treatment and she has seen back in office 1/29/2024 to discuss potential options.  She is seen with her son and daughter both indicating that she drops her O2 saturation quite quickly and is very inactive as result of difficulty with rapidly becoming shortness of breath and weak when she tries to move.  This is led to a lengthy conversation about the extent of her disease including both her cardiovascular status and her respiratory status.  She is worsening quickly and is not, currently, a candidate for chemotherapy.    The patient is doing poorly enough that we have had a cristy conversation today about end-of-life concerns.  She is not interested in hospice at this point but hopes to improve further.        ONCOLOGY HISTORY:      The patient is an 83-year-old female with the below medical history including chronic obstructive pulmonary disease who was seen in the [facility] multidisciplinary thoracic oncology clinic July 1, 2021.  She had a long history of smoking having undergone, previously a left upper lobectomy for carcinoma lung in May 2012, 2015  diagnosed with carcinoma the tongue undergoing radiation therapy and surgical therapy and eventual discontinue smoking in 2015.      She had undergone a CT of the chest at [facility] 6/16/2021 showing postsurgical changes in the left chest from right upper lobectomy, 8 mm irregular nodule medial segment of the right lower lobe and diffuse changes of emphysema with fibrotic changes in the periphery, no suspicious hilar or mediastinal nodes, no pleural effusion.  New finding was suspicious for new malignancy with the patient felt to be a poor candidate for surgical resection.  A PET CT and PFTs were requested thereafter.  The PET/CT demonstrated ill-defined FDG avid soft tissue thickening left aspect mediastinum within the operative bed, 1 cm hypermetabolic pulmonary nodule right lower lobe and asymmetric thickening patchy uptake involving the right base of tongue.  There is subtle uptake within the L1 vertebral body which is indeterminate and a sub-6 mm pulmonary nodule of right lung and subcentimeter hypodense hepatic lesion which was below PET resolution.       The patient's case was discussed in thoracic conference 7/22/2021 with consideration of the patient undergoing L1 vertebral body MRI, confirmatory biopsy left mediastinal and assessment by ENT (Dr. aCballero) who had worked with the patient previously.  She, incidentally, indicates that radiation therapy was given apparently intraoperatively at her recent surgery?  She still has issues with difficulty chewing and swallowing post procedures and was to be followed up with Dr. Caballero in the near future as planned.                                                            She was seen in the thoracic clinic on the same day with plans to proceed with MRI of the lumbar spine, biopsy left mediastinal nodular area of potential disease and follow-up of her ENT assessment as well as undergo a guardant 360 assessment in our office.  She underwent the biopsy 8/13/2021  found to be consistent with invasive moderately differentiated adenocarcinoma with PD-L1 TPS score 40%.  MRI of the lumbar spine revealed no evidence of metastatic disease though the patient did have multilevel degenerative disease throughout the lumbar spine.  She had an office follow-up with Dr. 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  She underwent laryngoscopy 8/18/2021 with right base of tongue without evidence of lesions or masses in the region.     She was seen by Dr. Hernandez 8/19/2021 and, her findings, had been presented in lung conference.  Her findings were consistent with 2 separate lesions including a nodule in the superior segment of the right lower lobe and a mass in the upper portion of the left chest with the left chest lesion biopsy positive for adenocarcinoma.  The consensus was that these were to separate-synchronous primaries.  Stereotactic radiation each lesions were felt to be the appropriate way to proceed and the patient was referred to radiation therapy.     The patient is seen in office 8/23/2021 agreeable to the radiation therapy as well as additional assessments including Caris molecular assessment of her biopsy.  Again her guardant 360 is currently pending and we would follow her after she completes radiation therapy with subsequent scans.    She was able to proceed with SBRT to the right lung at primary #1 with 5 treatments at 1000 cGy per fraction in the left lung primary similarly at 1000 cGy per fraction x5.  Her treatment ranged between 8/31-9/13/2021.  She is now scheduled for follow-up 11/23/2021.    The patient subsequent genomic testing is discussed with her as she is is seen back 9/23/2021.  Her guardant 360 did not determine any new abnormalities but her Caris-MI profile-does reveal sensitivity to immunotherapy as well as a BRAF mutation.  This could be  extremely important as we follow the patient from this point.  Fortunately she is feeling extremely well and quite pleased about her treatments.    Patient had subsequent PET/CT 11/23/2021 demonstrates decrease in size and avidity of the previously noted intensely FDG avid nodules left lobectomy operative site and right lower lobe.  Surrounding groundglass and pulmonary opacification is consistent with postradiation changes, a few new subcentimeter pulmonary nodules are present in the right upper lobe and indeterminant, stable subcentimeter hepatic lesion is below PET resolution no other findings of FDG-avid disease are seen in neck abdomen or pelvis.  The patient seen in office 12/1/2021 with a relatively good performance status stating she is not having any new symptoms and very pleased about her results on her PET/CT.  She realizes we'll have to follow this further but subsequent scans in 6 months.  She is also hoping to see an oral surgeon that previously helped her-Dr. Wu.    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  There is evolution of presumed postradiation changes in the right midlung with soft tissue mass within the left lumpectomy operative bed minimally decreased in size.  There is a sub-6 mm nodule in the right upper lobe not definitively seen, indeterminate and an indeterminate left renal lesion at 1.5 cm unchanged from 7/13/2021.  The patient is currently scheduled to see Dr. Dowell on 6/23/2022.  She is feeling well without any additional symptoms.    The patient required admission 10/14 - 10/18/2022 presenting with shortness of breath and cough that was nonproductive.  She had been on oral antibiotics and did not improved and was admitted for therapy for apparent pneumonia.  This eventually led to bronchoscopy after initial  CT revealed postsurgical changes, new masslike 6.7 cm area of consolidation anterior left lung raising concern for potential malignancy and a follow-up PET/CT planned.  Bronchoscopy and biopsy left lower lung failed to show evidence of malignancy.  The patient's PET/CT, however, demonstrated an irregular pleural-based soft tissue density thickening in the left upper lobe that was intensely FDG avid new from 6/8/2022 thought to be a malignant recurrence with spread into the left lung parenchyma.  There is intensely pleural-based avidity within the left lower lobe thought to be metastatic disease with postradiation of the left apex as well.  There is a small focus of uptake in the right hilum grossly unchanged in size and post radiation changes in the right lower lobe.  There is indeterminate density left renal that was grossly unchanged.  The patient is seen back in office 11/15/2022 indicating that she still has chest discomfort that is slowly worsening and that she has a chronic paroxysmal cough but has not improved.  We discussed that she very likely has recurrent disease and plan to proceed with a guardant 360 examination initially as we determine whether we should try to proceed with therapy particularly immunotherapy versus targeted therapy recognizing the above findings.    Patient's guardant 360 returned as again significant for BRAF V600E and the potential use of BRAF inhibitor/MET inhibitor dabrafenib and trametinib.  Additional information [id number] and use of alpelisib.    Unfortunately she required admission 11/28-12/1 with worsening back pain.  Fortunately she did not demonstrate evidence of metastatic disease but did have moderate degenerative changes which could cause radiculopathy.  Medications were altered to include subsequently MSR 15 mg p.o. every 12 hours, Norco as needed.    The patient now presents back 12/14/2022 to initiate therapy- initially with immunotherapy considering Caris study with  PD-L1 TPS of 70% on 22 C3 and 28-8 assays.    Patient seen with her  and we discussed pain medications and adjustments thereafter and that she stopped taking MSIR having only a short supply and having to use Norco more frequently.  She is willing to initiate Keytruda today we have discussed that considering her genomics treatment versus her BRAF mutation is also an option.    C1 Keytruda 12/14/2022    Patient is seen back 1/4/2023 in follow-up due for cycle 2 Keytruda.  Patient states that since receiving her first cycle she has had decreased appetite and decreased energy.  She has not been able to bring herself to eat much and she has notably lost 7 pounds.  She has also been struggling with constipation in relation to ongoing narcotics for pain control.  She has been taking senna S2 tabs twice a day.  We discussed adding daily MiraLAX.    Patient did just last week meet with dietitian, Zoila Clinton, to discuss ways to improve caloric intake.  She has not been able to implement any of these things yet though.    The patient is next seen 1/25/2023 with mild weight gain.  She is seen with family member both indicating that she has actually felt somewhat better in terms of performance status and general function.  We have discussed proceeding with a third cycle treatment and reevaluating by scan in 2 weeks.    The patient proceeded with treatment 1/25/2023 and underwent follow-up CT chest abdomen pelvis 2/8/2023 demonstrating small pericardial effusion that is decreased in size from 11/20/2022, ill-defined consolidation and pleural-based thickening in the left apex and upper lung has reduced slightly, additional index nodule soft tissue in the aspect the pericardium has not changed substantially, no evidence of metastatic disease in the abdomen pelvis.    The patient is seen with her daughter 2/15/2023 both indicating that she has definitely improved, stable weight, appetite and performance status.  She is also  using her pain medication much less frequently and wonders whether she might begin to taper from her twice a day MS Contin.    Patient is seen back 3/8/2023 due for ongoing pembrolizumab.  She continues on low-dose prednisone 10 mg daily and has noted significant improvement in her energy and appetite with this.  She is reluctant to taper this any further we discussed that it is fine for her to stay on daily dosing.    She did try to taper her pain medication going down to just MS Contin 15 mg every 12 hours while holding her hydrocodone.  However over the last few days she has had some intermittent pain in the left upper back alleviated with hydrocodone 2-3 times a day.  She currently is denying any pain.  Monitor.    She is next evaluated 3/29/2023 for ongoing Keytruda.  Evidently her pain medication was sent to the wrong pharmacy and will need to be represcribed today-long-acting MS Contin.  Otherwise she is doing reasonably well at present.    Patient seen 5/31/2023 with gradual development of left shoulder and left scapular pain.  Concurrent CT chest, abdomen and pelvis demonstrate interval increasing consolidation left upper lobe with extension anterior to the left upper ribs with sclerosis anterior left second rib.  This is suspicious for worsening malignancy.  Plans were made for subsequent PET/CT where the patient's pain medications were adjusted.PET/CT 6/5/2023 reveals progression of disease in left upper thorax, left supraclavicular adenopathy new metastasis left posterior fourth ribs, no evidence of metastatic disease in the abdomen or pelvis.    The patient is seen with her son and daughter 6/12/2023 and it is clear that she is not developing a Pancoast like syndrome with progression of her malignancy in the left upper thorax and associated symptoms.  We have discussed discontinuance of her immunotherapy and moving to targeted therapy with dabrafenib and trametinib as we also request radiation therapy  repeat consultation and try to manage her pain more effectively.    Patient seen 7/5/2023 with plans to start palliative radiotherapy and to initiate concurrently dabrafenib and trametinib.    As a result of toxicity (nausea and vomiting) the patient was taken off of dabrafenib and trametinib when seen 7/21/2023, given additional IV hydration and further supportive care.  Plans were made for follow-up on recovery to determine as to whether to redose the medications.    Unfortunately she required admission 7/25-30/2023 with failure to thrive.  Subsequent assessments including blood cultures were negative and patient was treated briefly with IV antibiotic therapy, started PT and OT, medications adjusted for hypotension and treated symptomatically for nausea and vomiting.  She was able to improve enough to be discharged home as she continued radiation therapy started 7/17/2023 and completed 7/31/2023 to the left chest wall.    She is next seen back 8/4/2023.  We have reviewed that she had been on dabrafenib and trametinib at 150 mg p.o. every 12 hours and 2 mg p.o. daily respectively and dosing could be changed considerably such as 50 mg twice daily and 0.5 mg daily.  Determination made to have her undergo repeat scans at 4 weeks and review her response to radiation therapy as we make application for lower dose targeted therapy, addition of Remeron p.o. nightly, tapering of her MS Contin to daily rather than twice daily, use of low-dose Ativan to undergo scans.    Subsequent scans 8/25/2023, fortunately, with stable left apical mass with mediastinal chest wall involvement unchanged 1.4 cm supraclavicular lymph node.  No new findings of metastatic disease.    The patient is seen back 9/8/2023.  Fortunately she is improved dramatically off therapy and is gratified that his studies have not worsened in any substantial way.  We have discussed both her scan reports and echocardiogram that does not show significant reduction  of her ejection fraction.  As result of her current stable status we have asked her to restart Mekinist at 0.5 mg daily and Tafinlar at 50 mg twice daily to be advanced as tolerated.  Patient seen 10/16/2023 with follow-up chest x-ray planned and CT of chest abdomen pelvis at 5 weeks -approximately 3 months of therapy.    The patient required admission 11/14 - 11/16/2023 having presented with shortness of breath and found to be pulmonary edema with CBC, lactate and procalcitonin all normal.  There was a concern that her chemotherapy agents could have produced her cardiomyopathy and these were stopped 11/14/2023.  She was diuresed and echocardiogram demonstrated LV SF mildly decreased at 39.2% with GLS of -11.1%, left ventricular cavity mildly dilated, left ventricular wall thickness consistent with mild concentric hypertrophy, left ventricular global hypokinesis, aortic valve abnormal with moderate calcification, valve was trileaflet, trace tricuspid valve regurgitation with right VSP at less than 35 mmHg.  The findings for LV systolic function, diastolic function and global longitudinal LV strain had all worsened.    CTA of chest 11/14/2023 demonstrating confluent soft tissue mass possibly measuring larger in current study at 6.5 x 5.0 previously 4.9 x 4.8, left supraclavicular node to decrease in size measuring 1.1 cm previously 1.4 cm, extensive bilateral interstitial and groundglass infiltrates.      The patient is seen 11/26/2023.  As per the discharge we are requested to have a BMP and CMP assessed.  She is seen with her son and daughter in a lengthy conversation held about her recent hospitalization with CHF-cardiomyopathy felt elicited, in part, from her targeted therapy with her Mekinist and Tafinlar discontinued altogether.    The patient is relatively stable currently having continued her diuretics and to be seen in cardiology in follow-up.  At the time of this dictation her CBC is found to be essentially  normal with mild leukocytosis, CMP normal except for mildly elevated glucose at 125 and BNP reduced to 6126 from 9763.  She feels well with no additional shortness of breath chest pain lower extremity edema.  In reviewing the the possible treatment options she is next best treated with single agent chemotherapy moving to Alimta.    Patient seen 12/4/2023 with plans to initiate Alimta chemotherapy-cycle 1 day 1    The patient required hospitalization 1/18-21/24 presenting with increasing shortness of breath, findings of chronic respiratory failure, positive for rhinovirus on admission.  She is treated with supportive treatments including for previous COVID infection as well as antibiotic therapies.  She completed 5 days of Augmentin, continue nebulizer and breathing treatments.  Upon discharge she rescheduled appointments though was seen by cardiology 1/23/2024 not felt to be in heart failure, treated supportively for epistaxis.    There was concern as to whether she should continue treatment and she has seen back in office 1/29/2024 to discuss potential options.  She is seen with her son and daughter both indicating that she drops her O2 saturation quite quickly and is very inactive as result of difficulty with rapidly becoming shortness of breath and weak when she tries to move.  This is led to a lengthy conversation about the extent of her disease including both her cardiovascular status and her respiratory status.  She is worsening quickly and is not, currently, a candidate for chemotherapy.    The patient is doing poorly enough that we have had a cristy conversation today about end-of-life concerns.  She is not interested in hospice at this point but hopes to improve further.    Past Medical History:   Diagnosis Date    Allergic rhinitis     Aneurysm     Asthma     Cancer of floor of mouth 2014    Cerebral aneurysm     COPD (chronic obstructive pulmonary disease)     COVID 2020    Esophageal reflux     History of  adenocarcinoma of lung     History of anemia     History of carcinoma in situ of skin     History of lung cancer     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 from [facility]    History of squamous cell carcinoma     Tongue    Hyperlipidemia     Hypertension     since early 60s    Intractable back pain 11/29/2022    Low vitamin B12 level     Lung cancer     Systolic CHF, acute 11/14/2023    Thyromegaly     UTI (urinary tract infection)     Vitamin D deficiency         Past Surgical History:   Procedure Laterality Date    BRONCHOSCOPY Bilateral 10/17/2022    Procedure: BRONCHOSCOPY WITH FLUORO with biopsy and BAL;  Surgeon: India Flores MD;  Location: [facility] ENDOSCOPY;  Service: Pulmonary;  Laterality: Bilateral;  PRE/POST - lung mass    CHOLECYSTECTOMY  1999    COLONOSCOPY      EMBOLIZATION CEREBRAL Left 06/29/2022    Procedure: 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;  Surgeon: Bradley Dowell MD;  Location: [facility] HYBRID OR 18/19;  Service: Interventional Radiology;  Laterality: Left;    EYE SURGERY  11/24/2020    Took a muscle out of right eye    GLOSSECTOMY PARTIAL      less than one half tongue    LUNG SURGERY  2012    ORAL LESION EXCISION/BIOPSY      June and August 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    TUBAL ABDOMINAL LIGATION  1970    VENOUS ACCESS DEVICE (PORT) INSERTION N/A 12/12/2022    Procedure: MEDIPORT PLACEMENT;  Surgeon: Jason Villaseñor MD;  Location: [facility] MAIN OR;  Service: Vascular;  Laterality: N/A;        Current Outpatient Medications on File Prior to Visit   Medication Sig Dispense Refill    albuterol (PROVENTIL) (2.5 MG/3ML) 0.083% nebulizer solution Inhale the contents of 1 vial by nebulization Every 4 (Four) Hours As Needed for Wheezing. 90 mL 0    apixaban (ELIQUIS) 2.5 MG tablet tablet Take 1 tablet by mouth 2 (Two) Times a Day. 60 tablet 3    atorvastatin (LIPITOR) 20 MG tablet TAKE 1 TABLET EVERY NIGHT (Patient taking  differently: Take 1 tablet by mouth Every Night.) 90 tablet 1    cetirizine (zyrTEC) 10 MG tablet Take 1 tablet by mouth Daily. Indications: Perennial Allergic Rhinitis      Cholecalciferol (Vitamin D3) 50 MCG (2000 UT) tablet Take 1,000 Units by mouth Daily. HOLDING FOR DOS  Indications: Vitamin D Deficiency      Cyanocobalamin (VITAMIN B12 PO) Take 1,000 mcg by mouth Daily. Indications: vitamin b12 deficiency       folic acid (FOLVITE) 1 MG tablet Take 1 tablet by mouth Daily. Start at least 7 days prior to chemotherapy until at least 3 weeks after all chemotherapy. 30 tablet 6    furosemide (LASIX) 40 MG tablet Take 1.5 tablets by mouth Daily. Indications: Cardiac Failure, High Blood Pressure Disorder 45 tablet 2    ipratropium (ATROVENT) 0.06 % nasal spray 2 sprays into the nostril(s) as directed by provider 4 (Four) Times a Day. 15 mL 0    ipratropium-albuterol (DUO-NEB) 0.5-2.5 mg/3 ml nebulizer Inhale the contents of 1 vial by nebulization 2 (Two) Times a Day As Needed for Wheezing. 180 mL 0    metoprolol succinate XL (TOPROL-XL) 25 MG 24 hr tablet Take 1 tablet by mouth 2 (Two) Times a Day. Indications: High Blood Pressure Disorder 60 tablet 2    mirtazapine (REMERON) 7.5 MG tablet TAKE 1 TABLET BY MOUTH EVERY NIGHT AT BEDTIME 30 tablet 1    montelukast (SINGULAIR) 10 MG tablet Take 1 tablet by mouth Every Night. Indications: Hayfever, Perennial Allergic Rhinitis      pantoprazole (PROTONIX) 40 MG EC tablet Take 1 tablet by mouth Every Morning for 30 days. 30 tablet 0    potassium chloride (K-DUR,KLOR-CON) 10 MEQ CR tablet Take 2 tablets by mouth Daily. 1 tablet in the PM  Indications: Low Amount of Potassium in the Blood 90 tablet 2    predniSONE (DELTASONE) 20 MG tablet Take 2 tablets by mouth Daily With Breakfast for 2 days, THEN 1 tablet Daily With Breakfast for 3 days, THEN 0.5 tablets Daily With Breakfast for 4 days. 9 tablet 0    sacubitril-valsartan (Entresto) 24-26 MG tablet Take 0.5 tablets by  "mouth 2 (Two) Times a Day. 30 tablet 3    zolpidem (AMBIEN) 5 MG tablet TAKE 1 TABLET BY MOUTH EVERY NIGHT AT BEDTIME AS NEEDED FOR SLEEP 30 tablet 0     No current facility-administered medications on file prior to visit.        ALLERGIES:    Allergies   Allergen Reactions    Sulfa Antibiotics Rash        Social History     Socioeconomic History    Marital status:    Tobacco Use    Smoking status: Former     Types: Cigarettes     Quit date: 2015     Years since quittin.9     Passive exposure: Never    Smokeless tobacco: Never    Tobacco comments:     less than a pack per day, no smoking since , Quit 2015   Vaping Use    Vaping Use: Never used   Substance and Sexual Activity    Alcohol use: Not Currently     Comment: Socially -A few times a month    Drug use: No    Sexual activity: Defer     Family History   Problem Relation Age of Onset    Ovarian cancer Mother          at age 27    No Known Problems Father     Ovarian cancer Sister     Colon cancer Brother     No Known Problems Other     Malig Hyperthermia Neg Hx         Review of Systems  ROS as per HPI     Objective      Vitals:    24 0846   BP: 138/59   Pulse: 91   Resp: 16   Temp: 96.3 °F (35.7 °C)   TempSrc: Temporal   SpO2: (!) 88%  Comment: pt on 4 to 5 liter O2   Weight: 45.3 kg (99 lb 12.8 oz)   Height: 160 cm (62.99\")   PainSc: 0-No pain                 2024     8:46 AM   Current Status   ECOG score 2      Physical Exam  Vitals reviewed.   Constitutional:       General: She is not in acute distress.     Appearance: Normal appearance. She is well-developed.   HENT:      Head: Normocephalic and atraumatic.      Mouth/Throat:      Pharynx: No oropharyngeal exudate.   Eyes:      Pupils: Pupils are equal, round, and reactive to light.   Cardiovascular:      Rate and Rhythm: Normal rate and regular rhythm.      Heart sounds: Normal heart sounds. No murmur heard.  Pulmonary:      Effort: Pulmonary effort is normal. No " respiratory distress.      Breath sounds: Normal breath sounds. No wheezing, rhonchi or rales.   Abdominal:      General: Bowel sounds are normal. There is no distension.      Palpations: Abdomen is soft.   Musculoskeletal:         General: No swelling. Normal range of motion.      Cervical back: Normal range of motion.   Skin:     General: Skin is warm and dry.      Findings: No rash.   Neurological:      Mental Status: She is alert and oriented to person, place, and time.         Physical exam preformed and reviewed. No changes noted from previous exam. Henry Escobar MD ; 01/29/2024      RECENT LABS:  Results from last 7 days   Lab Units 01/29/24  0835   WBC 10*3/mm3 11.59*   NEUTROS ABS 10*3/mm3 8.59*   HEMOGLOBIN g/dL 12.1   HEMATOCRIT % 39.2   PLATELETS 10*3/mm3 267                       Assessment & Plan     1.  Carcinoma of the lung  May 2015, status post previous left upper lobectomy for carcinoma of the lung.    2.  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   She underwent laryngoscopy 8/18/2021 with right base of tongue without evidence of lesions or masses in the region.    3.  Moderately differentiated adenocarcinoma of the lung.  CT of the chest 6/16/2021 demonstrated postsurgical changes, 8 mm irregular nodule medial segment of right lower lobe and diffuse changes of emphysema.    She is seen in thoracic clinic with findings suspicious for new malignancy and concern of the patient being a poor operative candidate.    7/13/2021 PET CT demonstrated ill-defined avidity and soft tissue left aspect of the mediastinum, 1 cm hypermetabolic pulmonary nodule and asymmetric thickening involving the right base of tongue as well as subtle uptake in the L1 vertebral body.    This patient's case was discussed in thoracic clinic and plans were made to request an MRI of the lumbar  spine, obtain a biopsy of the mediastinal involvement of the left had the patient reviewed by ENT.  Biopsy 8/13/2021 found to be consistent with invasive moderately differentiated adenocarcinoma with PD-L1 TPS score 40%.    7/24/2021 MRI of the lumbar spine revealed no evidence of metastatic disease though the patient did have multilevel degenerative disease throughout the lumbar spine.    Her findings were consistent with 2 separate lesions including a nodule in the superior segment of the right lower lobe and a mass in the upper portion of the left chest with the left chest lesion biopsy positive for adenocarcinoma.  The consensus was that these were to separate-synchronous primaries.  Stereotactic radiation each lesions were felt to be the appropriate way to proceed and the patient was referred to radiation therapy.  The patient was referred for radiation therapy and she was able to proceed with SBRT to the right lung at primary #1 with 5 treatments at 1000 cGy per fraction in the left lung primary similarly at 1000 cGy per fraction x5.  Her treatment ranged between 8/31-9/13/2021.    Her guardant 360 did not determine any new abnormalities but her Caris-MI profile-does reveal sensitivity to immunotherapy as well as a BRAF mutation.  PET/CT 11/23/2021 demonstrates decrease in size and avidity of the previously noted intensely FDG avid nodules left lobectomy operative site and right lower lobe.  Surrounding groundglass and pulmonary opacification is consistent with postradiation changes, a few new subcentimeter pulmonary nodules are present in the right upper lobe and indeterminant, stable subcentimeter hepatic lesion is below PET resolution no other findings of FDG-avid disease are seen in neck abdomen or pelvis.  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  There is evolution of presumed  postradiation changes in the right midlung with soft tissue mass within the left lumpectomy operative bed minimally decreased in size.  There is a sub-6 mm nodule in the right upper lobe not definitively seen, indeterminate and an indeterminate left renal lesion at 1.5 cm unchanged from 7/13/2021.  Admission 10/14 - 10/18/2022 presenting with shortness of breath and cough that was nonproductive.  She had been on oral antibiotics and did not improved and was admitted for therapy for apparent pneumonia.  This eventually led to bronchoscopy after initial CT revealed postsurgical changes, new masslike 6.7 cm area of consolidation anterior left lung raising concern for potential malignancy and a follow-up PET/CT planned.   Bronchoscopy and biopsy left lower lung failed to show evidence of malignancy.    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  There is intensely pleural-based avidity within the left lower lobe thought to be metastatic disease with postradiation of the left apex as well.  There is a small focus of uptake in the right hilum grossly unchanged in size and post radiation changes in the right lower lobe.  There is indeterminate density left renal that was grossly unchanged.    Patient's guardant 360 returned as again significant for BRAF V600E and the potential use of BRAF inhibitor/MET inhibitor dabrafenib and trametinib.  Additional information [id number] and use of alpelisib.  The patient now presents back 12/14/2022 to initiate therapy- initially with immunotherapy considering Caris study with PD-L1 TPS of 70% on 22 C3 and 28-8 assays.  Single agent Keytruda initiated 12/14/2022 2/8/2023 CT of the chest, abdomen pelviswith consolidation and masslike pleural thickening in the left apex and upper lung index areas  have decreased somewhat.  There is no evidence of metastatic disease otherwise and a few indeterminate left renal lesions are stable.  After lengthy discussion with the patient and her daughter as to the stability to mild improvement with immunotherapy it is agreed that we would continue treatment at this point.  Proceed today, 4/19/2023 with cycle 7 pembrolizumab which is continued every 3 weeks  The patient proceeded to 8 cycles of pembrolizumab and underwent follow-up chest CT chest, abdomen pelvis revealing evolution of dense consolidation left upper lobe and extension of soft tissue mass anterior to left upper ribs with increase sclerosis anterior left second rib.  This was concerning for progression of disease versus radiation change and the patient was scheduled for subsequent PET/CT.  PET/CT 6/5/2023  reveals progression of disease in left upper thorax, left supraclavicular adenopathy new metastasis left posterior fourth ribs, no evidence of metastatic disease in the abdomen or pelvis.  Patient seen 6/12/2023 with plans to move her Norco to every 4 hours, discontinue Keytruda, make application for dabrafenib and trametinib at 150 mg p.o. every 12 hours and 2 mg p.o. daily respectively.  Patient referred for radiation therapy consultation concurrently.  Last dose of Keytruda 5/31/2023.  6/23/2023: Patient seen for triage visit.  She has not yet started dabrafenib or trametinib, she has not yet received the medications.  Unfortunately she has been experiencing uncontrolled nausea/vomiting as further detailed below.   Patient seen 6/27/2023 reporting that her nausea has resolved.  She was unable to complete the MRI of the brain however Dr. Escobar did review the images patient did have and there was no evidence of edema or metastatic disease.  Patient can start her new oral medication once she receives the medication.  Patient seen 7/5/2023 with plans to start palliative radiotherapy x10 fractions and initiate  dabrafenib and trametinib as soon as medications received.  7/17/2023 patient initiated radiation with 10 fractions planned.  Patient has received dabrafenib and trametinib.  Brought in for triage visit due to nausea associated with dosing.  She is premedicating with ondansetron however only waiting 15 minutes.  We discussed today that she needs to wait at least 30 minutes to 1 hour prior to taking the dabrafenib and trametinib.  The patient will do so.  We discussed she could also take this again if she feels the need 8 hours later for nausea control.  If she is having breakthrough nausea then she should call our office.  I have encouraged her to utilize electrolyte drinks.  She will get 1L normal liter normal saline in the office today.She was not nauseas while in the office so we did not give additional nausea medication.  We will have her return in 1 week repeat labs, review by nurse practitioner, and possible IV fluids.  I stressed the importance of calling sooner if she finds that the premedication is not controlling her nausea at which time we would have her come in for additional IV fluids and evaluation.  She is continuing daily radiation this week.  Case was discussed with Dr. Escobar today.  7/21/2023: Patient returns for short interval follow-up due to very poor appetite and sleeping the majority of the day.  Her nausea has been improved with premedicating 30 minutes prior to dabrafenib and trametinib.  She however has been sleeping the majority of the day and is not eating when she is awake.  She does report taking ensures but she is only sipping on these.  Have given her samples of some of the office today and encouraged her to drink when while she is here.  Her performance status continues to decline and her daughter states that she was fixing dinner nightly just 2 weeks ago.  With performance status of 3 today I discussed the case with Dr. Escobar and we will hold her dabrafenib and  TRAMETINIB.  Her  liver enzymes are elevated today as well.  We will monitor this next week and have her evaluated by Dr. Escobar at which time we could consider restarting her dabrafenib and trametinib at reduced doses pending performance status and laboratory evaluation.  Unfortunately she required admission 7/25-30/2023 with failure to thrive.  Subsequent assessments including blood cultures were negative and patient was treated briefly with IV antibiotic therapy, started PT and OT, medications adjusted for hypotension and treated symptomatically for nausea and vomiting.  She was able to improve enough to be discharged home as she continued radiation therapy started 7/17/2023 and completed 7/31/2023 to the left chest wall.  She is next seen back 8/4/2023.  Lengthy discussion as to reevaluation   Plans made for CT chest, abdomen, pelvis in 4 weeks  Patient seen 8/21/2023 with complaints of worsening fatigue and shortness of breath.  Patient scheduled for follow up CT scans this Friday. Lungs clear on exam,  Sats 97%.  Hgb stable at 11.7.  Will check BNP today.  Discussed may be related to disease and will need to see what scan shows.   Subsequent scans 8/25/2023, fortunately, with stable left apical mass with mediastinal chest wall involvement unchanged 1.4 cm supraclavicular lymph node.  No new findings of metastatic disease.  The patient is seen back 9/8/2023.  Fortunately she is improved dramatically off therapy and is gratified that his studies have not worsened in any substantial way.  We have discussed both her scan reports and echocardiogram that does not show significant reduction of her ejection fraction.  As result of her current stable status we have asked her to restart Mekinist at 0.5 mg daily and Tafinlar at 50 mg twice daily  9/22/2023 tolerating Mekinist 0.5 mg daily and tafinlar 50 mg twice daily quite well. Continues on prednisone 10 mg daily which will now be reduced to 5 mg daily when seen 10/16/2023  Plans made  to continue current dosing of Mekinist and Tafinlar and repeat evaluation with chest x-ray 10/16 and repeat CT chest abdomen pelvis in 5 weeks, MD in 6 weeks.  The patient required admission 11/14 - 11/16/2023 having presented with shortness of breath and found to be pulmonary edema with CBC, lactate and procalcitonin all normal.  There was a concern that her chemotherapy agents could have produced her cardiomyopathy and these were stopped 11/14/2023.  She was diuresed and echocardiogram demonstrated LV SF mildly decreased at 39.2% with GLS of -11.1%, left ventricular cavity mildly dilated, left ventricular wall thickness consistent with mild concentric hypertrophy, left ventricular global hypokinesis, aortic valve abnormal with moderate calcification, valve was trileaflet, trace tricuspid valve regurgitation with right VSP at less than 35 mmHg.  The findings for LV systolic function, diastolic function and global longitudinal LV strain had all worsened.  CTA of chest 11/14/2023 demonstrating confluent soft tissue mass possibly measuring larger in current study at 6.5 x 5.0 previously 4.9 x 4.8, left supraclavicular node to decrease in size measuring 1.1 cm previously 1.4 cm, extensive bilateral interstitial and groundglass infiltrates.  The patient is seen 11/26/2023.  As per the discharge we are requested to have a BMP and CMP assessed.  She is seen with her son and daughter in a lengthy conversation held about her recent hospitalization with CHF-cardiomyopathy felt elicited, in part, from her targeted therapy with her Mekinist and Tafinlar discontinued altogether.  The patient is relatively stable currently having continued her diuretics and to be seen in cardiology in follow-up.  At the time of this dictation her CBC is found to be essentially normal with mild leukocytosis, CMP normal except for mildly elevated glucose at 125 and BNP reduced to 6126 from 9763.  She feels well with no additional shortness of  breath chest pain lower extremity edema.  In reviewing the the possible treatment options she is next best treated with single agent chemotherapy moving to Alimta.  Patient proceeded through teaching and presents back 12/4/2023 to initiate chemotherapy with Alimta-cycle 1 day 1  Seen 12/18/2023 for toxicity check, overall has tolerated well.  Continues to follow closely with cardiology.  The patient required hospitalization 1/18-21/24 presenting with increasing shortness of breath, findings of chronic respiratory failure, positive for rhinovirus on admission.  She is treated with supportive treatments including for previous COVID infection as well as antibiotic therapies.  She completed 5 days of Augmentin, continue nebulizer and breathing treatments.  Upon discharge she rescheduled appointments though was seen by cardiology 1/23/2024 not felt to be in heart failure, treated supportively for epistaxis.  There was concern as to whether she should continue treatment and she has seen back in office 1/29/2024 to discuss potential options.  She is seen with her son and daughter both indicating that she drops her O2 saturation quite quickly and is very inactive as result of difficulty with rapidly becoming shortness of breath and weak when she tries to move.  This is led to a lengthy conversation about the extent of her disease including both her cardiovascular status and her respiratory status.  She is worsening quickly and is not, currently, a candidate for chemotherapy.  The patient is doing poorly enough that we have had a cristy conversation today-1/29/2024 about end-of-life concerns.  She is not interested in hospice at this point but hopes to improve further.  At this point holding chemotherapy.      4.  Worsening back pain  Admission 11/28/2022 through 12/1/2022.  MRI of the cervical and thoracic spine fortunately failed to demonstrate metastatic disease.  Moderate degenerative changes noted which could cause  radiculopathy.  Medication altered to include MS Contin 15 mg every 12 hours and Norco for breakthrough pain  She reports today her pain is adequately controlled  Patient with increasing pain 5/31/2023 with pain level of 6.  MS Contin was increased to 50 mg p.o. every 8 hours and Norco 10/325 #101 p.o. every 6 hours to move to every 4 hours as needed-E scribed to pharmacy  Patient seen 6/12/2023 with Norco moved to every 4 hours.  6/23/2023: Seen for triage visit.  Has been using oxycodone 20 mg every 3 hours as needed for pain.  Reports she has not been using the hydrocodone 10/325.  Was experiencing dizziness, so reduced her oxycodone use to half a tablet every 3 hours as needed.  Reports pain is uncontrolled during the night so she is having to wake at night time to take pain medicine.  They are questioning resuming long-acting pain medication, as she is waking throughout the night to take pain medicine.  She has already been prescribed MS Contin and has this available at home, did let her know it would be okay to resume this.  Assess 7/5/2023 with pain reduced to 2/10.  Plan to continue current therapy  Darlene Pfeiffer reports a pain score of 0.  Given her pain assessment as noted, treatment options were discussed and the following options were decided upon as a follow-up plan to address the patient's pain: continuation of current treatment plan for pain. Currently not requiring pain medication.   Refilled both the patient's Remeron and Ambien 10/16/2023  12/18/2023 requesting a refill of her Ambien.    5.  Poor appetite and malnutrition  Placed on prednisone 10 mg daily 1/4/2023 with significant improvement in her symptoms  Weight is stable today at just below 106 pounds  Patient assessed 6/12/23 with possible gummy therapy.  Stable performance status including weight and appetite 7/5/2023  Weight has declined as of 7/17/2023 due to nausea and vomiting that started this past weekend.  After further discussion the  patient did stop her prednisone 10 mg while she was not feeling well.  We discussed today the importance of continuing this as it can help with her nausea and appetite and therefore she will restart tomorrow.  7/21/2023: Weight is stable today though albumin is declining at 3.  Patient is sleeping the majority of the day and not eating.  Yesterday she had a small piece of chicken and that is all.  She states she is drinking ensures however her daughter thinks that she is just sipping on these and not completing them.  Hopefully holding her dabrafenib and trametinib will be helpful with her being awake more and appetite.  I encouraged her to make sure she is taking her prednisone daily at 10 mg daily.  Remeron 7.5 mg p.o. nightly E scribed to pharmacy  9/22/2023 weight is up to 98 pounds.  Further improvement in weight 10/16/2020 3 to 104 pounds, continue Remeron at current dose, prednisone reduced to 5 mg daily.    6.  Uncontrolled nausea/vomiting  started after discontinuing prednisone and starting THC Gummies.  Started to experience dizziness with the THC Gummies.  Discontinue THC Gummies and dizziness improved, however she has remained nauseated now.  She has been utilizing Zofran with improvement, however today was unable to keep Zofran tablets down due to nausea/vomiting.  She will resume prednisone 10 mg daily.  She will receive 1 L IV normal saline in clinic today 10 mg IV Compazine.  We will also send prescription for Phenergan suppository, in case she is not able to keep Zofran down in the future.  Will also send for stat MRI brain since patient is now experiencing uncontrolled nausea vomiting and has recently had progression of her cancer as seen on PET from 6/5/2023.  MRI brain was performed without contrast, even though contrast was ordered.  The patient also did not stay for entire exam to be completed.  Exam limited for assessment of metastatic disease.  Attempted to call patient to review results,  however no answer, did leave detailed voicemail.  6/27/2023 patient reports that her nausea has resolved.  She did not complete the MRI however the images which were done showed no evidence of edema or metastatic disease.  Nausea reoccurred when seen on 7/17/2023 over this past weekend after initiation of dabrafenib and trametinib.  Patient was premedicating with ondansetron however only giving herself about 15 minutes and I encouraged her to give herself at least 30 minutes to 1 hour prior to taking the medications.  She will notify our office if this is not helpful.  7/21/2023: Premedication with ondansetron 30 minutes prior to dabrafenib and trametinib has been helpful.  Patient however has had some vomiting episodes directly after taking her medications where she has not had time to actually swallow them.  She denies difficulty swallowing however.  We will continue to monitor this.  10/16/2023 not an issue today.  1/18/2023, 1/29/2024 not an issue today.    7.  Hyperkalemia-  potassium 6/23/2023 3.1.  She has been having uncontrolled nausea, we will hold off on starting oral potassium replacement as it is unlikely she will tolerate that at this time.  Will recommend she increase oral potassium in her diet.  6/27/2023 potassium 3.0.  Nausea has resolved.  Patient will be given 40 M EQ p.o. potassium in the office and she will be started on 20 mEq daily of p.o. potassium.  Assessed 7/5/2023 with potassium 4.4  7/17/2023 potassium normal at 3.7  7/21/2023: Potassium 3.1, patient not taking potassium supplements at home because she does not like the big pill.  Changed to potassium chloride 10 mill equivalents, 2 tablets every a.m. as these will be smaller.   9/22/2023 potassium is 3.7  Repeat assessment 11/27/2023 with potassium of 4.2  12/4/2023 potassium was 4.0.  1/29/2024-potassium 4.2    8.Acute systolic heart failure -cardiomyopathy   Secondary to chemotherapy drugs during admission 11/14-16/2023 with Mekinist  and Tafinlar discontinued  Diuretics continued postdischarge, cardiology follow-up planned 12/6/2023    Plan:  Referral to pulmonary medicine-follow-up  CT chest 4 weeks  MD 5 weeks, possible into.2  I spent 65 minutes caring for Darlene on this date of service. This time includes time spent by me in the following activities: preparing for the visit, reviewing tests, obtaining and/or reviewing a separately obtained history, performing a medically appropriate examination and/or evaluation, counseling and educating the patient/family/caregiver, ordering medications, tests, or procedures, referring and communicating with other health care professionals, documenting information in the medical record, independently interpreting results and communicating that information with the patient/family/caregiver, and care coordination.

## 2024-01-28 VITALS
SYSTOLIC BLOOD PRESSURE: 118 MMHG | DIASTOLIC BLOOD PRESSURE: 70 MMHG | HEART RATE: 95 BPM | TEMPERATURE: 97.6 F | RESPIRATION RATE: 20 BRPM | OXYGEN SATURATION: 95 %

## 2024-01-29 ENCOUNTER — INFUSION (OUTPATIENT)
Dept: ONCOLOGY | Facility: HOSPITAL | Age: 84
End: 2024-01-29
Payer: MEDICARE

## 2024-01-29 ENCOUNTER — OFFICE VISIT (OUTPATIENT)
Dept: ONCOLOGY | Facility: CLINIC | Age: 84
End: 2024-01-29
Payer: MEDICARE

## 2024-01-29 ENCOUNTER — HOME CARE VISIT (OUTPATIENT)
Dept: HOME HEALTH SERVICES | Facility: HOME HEALTHCARE | Age: 84
End: 2024-01-29
Payer: MEDICARE

## 2024-01-29 VITALS
DIASTOLIC BLOOD PRESSURE: 59 MMHG | SYSTOLIC BLOOD PRESSURE: 138 MMHG | HEIGHT: 63 IN | RESPIRATION RATE: 16 BRPM | TEMPERATURE: 96.3 F | WEIGHT: 99.8 LBS | HEART RATE: 91 BPM | OXYGEN SATURATION: 88 % | BODY MASS INDEX: 17.68 KG/M2

## 2024-01-29 DIAGNOSIS — Z85.118 HISTORY OF LUNG CANCER: Primary | ICD-10-CM

## 2024-01-29 DIAGNOSIS — C34.11 MALIGNANT NEOPLASM OF UPPER LOBE OF RIGHT LUNG: ICD-10-CM

## 2024-01-29 DIAGNOSIS — R06.02 SHORTNESS OF BREATH: ICD-10-CM

## 2024-01-29 DIAGNOSIS — Z45.2 FITTING AND ADJUSTMENT OF VASCULAR CATHETER: Primary | ICD-10-CM

## 2024-01-29 LAB
ALBUMIN SERPL-MCNC: 3.6 G/DL (ref 3.5–5.2)
ALBUMIN/GLOB SERPL: 1.2 G/DL
ALP SERPL-CCNC: 71 U/L (ref 39–117)
ALT SERPL W P-5'-P-CCNC: 11 U/L (ref 1–33)
ANION GAP SERPL CALCULATED.3IONS-SCNC: 8.1 MMOL/L (ref 5–15)
AST SERPL-CCNC: 20 U/L (ref 1–32)
BASOPHILS # BLD AUTO: 0.05 10*3/MM3 (ref 0–0.2)
BASOPHILS NFR BLD AUTO: 0.4 % (ref 0–1.5)
BILIRUB SERPL-MCNC: 0.4 MG/DL (ref 0–1.2)
BUN SERPL-MCNC: 22 MG/DL (ref 8–23)
BUN/CREAT SERPL: 33.8 (ref 7–25)
CALCIUM SPEC-SCNC: 9.9 MG/DL (ref 8.6–10.5)
CHLORIDE SERPL-SCNC: 100 MMOL/L (ref 98–107)
CO2 SERPL-SCNC: 33.9 MMOL/L (ref 22–29)
CREAT SERPL-MCNC: 0.65 MG/DL (ref 0.57–1)
DEPRECATED RDW RBC AUTO: 61.1 FL (ref 37–54)
EGFRCR SERPLBLD CKD-EPI 2021: 88.6 ML/MIN/1.73
EOSINOPHIL # BLD AUTO: 0.01 10*3/MM3 (ref 0–0.4)
EOSINOPHIL NFR BLD AUTO: 0.1 % (ref 0.3–6.2)
ERYTHROCYTE [DISTWIDTH] IN BLOOD BY AUTOMATED COUNT: 17.2 % (ref 12.3–15.4)
GLOBULIN UR ELPH-MCNC: 2.9 GM/DL
GLUCOSE SERPL-MCNC: 119 MG/DL (ref 65–99)
HCT VFR BLD AUTO: 39.2 % (ref 34–46.6)
HGB BLD-MCNC: 12.1 G/DL (ref 12–15.9)
IMM GRANULOCYTES # BLD AUTO: 0.07 10*3/MM3 (ref 0–0.05)
IMM GRANULOCYTES NFR BLD AUTO: 0.6 % (ref 0–0.5)
LYMPHOCYTES # BLD AUTO: 2.11 10*3/MM3 (ref 0.7–3.1)
LYMPHOCYTES NFR BLD AUTO: 18.2 % (ref 19.6–45.3)
MCH RBC QN AUTO: 30.5 PG (ref 26.6–33)
MCHC RBC AUTO-ENTMCNC: 30.9 G/DL (ref 31.5–35.7)
MCV RBC AUTO: 98.7 FL (ref 79–97)
MONOCYTES # BLD AUTO: 0.76 10*3/MM3 (ref 0.1–0.9)
MONOCYTES NFR BLD AUTO: 6.6 % (ref 5–12)
NEUTROPHILS NFR BLD AUTO: 74.1 % (ref 42.7–76)
NEUTROPHILS NFR BLD AUTO: 8.59 10*3/MM3 (ref 1.7–7)
NRBC BLD AUTO-RTO: 0 /100 WBC (ref 0–0.2)
PLATELET # BLD AUTO: 267 10*3/MM3 (ref 140–450)
PMV BLD AUTO: 10.1 FL (ref 6–12)
POTASSIUM SERPL-SCNC: 4.2 MMOL/L (ref 3.5–5.2)
PROT SERPL-MCNC: 6.5 G/DL (ref 6–8.5)
RBC # BLD AUTO: 3.97 10*6/MM3 (ref 3.77–5.28)
SODIUM SERPL-SCNC: 142 MMOL/L (ref 136–145)
WBC NRBC COR # BLD AUTO: 11.59 10*3/MM3 (ref 3.4–10.8)

## 2024-01-29 PROCEDURE — 99215 OFFICE O/P EST HI 40 MIN: CPT | Performed by: INTERNAL MEDICINE

## 2024-01-29 PROCEDURE — 3078F DIAST BP <80 MM HG: CPT | Performed by: INTERNAL MEDICINE

## 2024-01-29 PROCEDURE — 80053 COMPREHEN METABOLIC PANEL: CPT

## 2024-01-29 PROCEDURE — 1126F AMNT PAIN NOTED NONE PRSNT: CPT | Performed by: INTERNAL MEDICINE

## 2024-01-29 PROCEDURE — 36591 DRAW BLOOD OFF VENOUS DEVICE: CPT

## 2024-01-29 PROCEDURE — 85025 COMPLETE CBC W/AUTO DIFF WBC: CPT

## 2024-01-29 PROCEDURE — 3075F SYST BP GE 130 - 139MM HG: CPT | Performed by: INTERNAL MEDICINE

## 2024-01-29 PROCEDURE — 25010000002 HEPARIN LOCK FLUSH PER 10 UNITS: Performed by: INTERNAL MEDICINE

## 2024-01-29 RX ORDER — SODIUM CHLORIDE 0.9 % (FLUSH) 0.9 %
10 SYRINGE (ML) INJECTION AS NEEDED
Status: DISCONTINUED | OUTPATIENT
Start: 2024-01-29 | End: 2024-01-29 | Stop reason: HOSPADM

## 2024-01-29 RX ORDER — SODIUM CHLORIDE 0.9 % (FLUSH) 0.9 %
10 SYRINGE (ML) INJECTION AS NEEDED
OUTPATIENT
Start: 2024-01-29

## 2024-01-29 RX ORDER — HEPARIN SODIUM (PORCINE) LOCK FLUSH IV SOLN 100 UNIT/ML 100 UNIT/ML
500 SOLUTION INTRAVENOUS AS NEEDED
OUTPATIENT
Start: 2024-01-29

## 2024-01-29 RX ORDER — HEPARIN SODIUM (PORCINE) LOCK FLUSH IV SOLN 100 UNIT/ML 100 UNIT/ML
500 SOLUTION INTRAVENOUS AS NEEDED
Status: DISCONTINUED | OUTPATIENT
Start: 2024-01-29 | End: 2024-01-29 | Stop reason: HOSPADM

## 2024-01-29 RX ORDER — POTASSIUM CHLORIDE 750 MG/1
20 TABLET, FILM COATED, EXTENDED RELEASE ORAL DAILY
Qty: 60 TABLET | Refills: 1 | OUTPATIENT
Start: 2024-01-29

## 2024-01-29 RX ADMIN — Medication 500 UNITS: at 09:30

## 2024-01-29 RX ADMIN — Medication 10 ML: at 09:30

## 2024-01-29 NOTE — TELEPHONE ENCOUNTER
LMM re: BMS needs form update.  She can stop by to sign or I will keep until 2/8/2024 appointment.

## 2024-01-31 ENCOUNTER — READMISSION MANAGEMENT (OUTPATIENT)
Dept: CALL CENTER | Facility: HOSPITAL | Age: 84
End: 2024-01-31
Payer: MEDICARE

## 2024-01-31 ENCOUNTER — HOME CARE VISIT (OUTPATIENT)
Dept: HOME HEALTH SERVICES | Facility: HOME HEALTHCARE | Age: 84
End: 2024-01-31
Payer: MEDICARE

## 2024-01-31 VITALS
RESPIRATION RATE: 20 BRPM | DIASTOLIC BLOOD PRESSURE: 76 MMHG | SYSTOLIC BLOOD PRESSURE: 122 MMHG | OXYGEN SATURATION: 100 % | HEART RATE: 75 BPM | TEMPERATURE: 98.1 F

## 2024-01-31 PROCEDURE — G0495 RN CARE TRAIN/EDU IN HH: HCPCS

## 2024-01-31 NOTE — OUTREACH NOTE
COPD/PN Week 2 Survey      Flowsheet Row Responses   [facility] patient discharged from? [facility]   Does the patient have one of the following disease processes/diagnoses(primary or secondary)? Pneumonia   Week 2 attempt successful? Yes   Call start time 1329   Call end time 1331   Discharge diagnosis Bilateral pneumonia, Acute hypoxic respiratory failure   Person spoke with today (if not patient) and relationship spouse   What is the Home health agency?  [facility]   Has home health visited the patient within 72 hours of discharge? Yes   Pulse Ox monitoring Intermittent   Pulse Ox device source Patient   O2 Sat: education provided Sat levels, Monitoring frequency, When to seek care   Psychosocial issues? No   Did the patient receive a copy of their discharge instructions? Yes   Nursing interventions Reviewed instructions with patient   What is the patient's perception of their health status since discharge? Same   Is the patient/caregiver able to teach back the hierarchy of who to call/visit for symptoms/problems? PCP, Specialist, Home health nurse, Urgent Care, ED, 911 Yes   Patient reports what zone on this call? Yellow Zone   Yellow Zone I have less energy for my daily activities, More breathless than usual   Yellow interventions Call provider immediately if symptoms don't improve: they may indicate that an adjustment in medication or oxygen therapy is needed   Is the patient/caregiver able to teach back signs and symptoms of worsening condition: Fever/chills, Shortness of breath, Chest pain   Is the patient/caregiver able to teach back importance of completing antibiotic course of treatment? Yes   Week 2 call completed? Yes   Is the patient interested in additional calls from an ambulatory ? No   Would this patient benefit from a Referral to [facility] Social Work? No   Wrap up additional comments Spouse reports patient is not doing better. Still having SOB with exertion. 02 sats 98%. Patient on 4LNC. HH  is still coming however patient doesnt feel like HH is doing much for patient. Did advise patient to call pcp office to schedule a fu appt for this week. spouse is aware when to take patient to the ER.   Call end time 2089            Pinky MCCLENDON - Registered Nurse

## 2024-02-01 RX ORDER — PANTOPRAZOLE SODIUM 40 MG/1
40 TABLET, DELAYED RELEASE ORAL EVERY MORNING
Qty: 30 TABLET | Refills: 0 | Status: SHIPPED | OUTPATIENT
Start: 2024-02-01

## 2024-02-06 ENCOUNTER — OFFICE VISIT (OUTPATIENT)
Dept: FAMILY MEDICINE CLINIC | Facility: CLINIC | Age: 84
End: 2024-02-06
Payer: MEDICARE

## 2024-02-06 VITALS
DIASTOLIC BLOOD PRESSURE: 64 MMHG | TEMPERATURE: 97.7 F | SYSTOLIC BLOOD PRESSURE: 114 MMHG | WEIGHT: 99.2 LBS | HEART RATE: 82 BPM | HEIGHT: 63 IN | BODY MASS INDEX: 17.58 KG/M2 | OXYGEN SATURATION: 89 %

## 2024-02-06 DIAGNOSIS — J96.01 ACUTE RESPIRATORY FAILURE WITH HYPOXIA: ICD-10-CM

## 2024-02-06 DIAGNOSIS — C34.12 MALIGNANT NEOPLASM OF UPPER LOBE OF LEFT LUNG: ICD-10-CM

## 2024-02-06 DIAGNOSIS — I50.21 ACUTE HFREF (HEART FAILURE WITH REDUCED EJECTION FRACTION): ICD-10-CM

## 2024-02-06 DIAGNOSIS — B34.8 RHINOVIRUS INFECTION: ICD-10-CM

## 2024-02-06 DIAGNOSIS — Z09 HOSPITAL DISCHARGE FOLLOW-UP: Primary | ICD-10-CM

## 2024-02-06 DIAGNOSIS — J44.9 CHRONIC OBSTRUCTIVE PULMONARY DISEASE, UNSPECIFIED COPD TYPE: ICD-10-CM

## 2024-02-06 DIAGNOSIS — I10 ESSENTIAL HYPERTENSION: ICD-10-CM

## 2024-02-06 NOTE — PROGRESS NOTES
"Chief Complaint  Hospital Follow Up Visit ([facility] pt admitted  ) and Shortness of Breath    Subjective        Darlene Pfeiffer presents to [facility]  History of Present Illness  Patient presents for hospital follow-up.  She was discharged on 21 January.  She was admitted again for acute respiratory failure from pneumonia.  She was discharged on oxygen.  She is done with antibiotics.  She continues with persistent infiltrates on her chest imaging.  Her mass looked improved however.  She saw oncology last week.  She is very weak and tired.  She initially was only on 4 L of oxygen when she got here and her sats were in the low 80s.  They came up to 89% when I had her go back up to her 5 L.  She used her wheelchair to come in for office visit but refused me sending an order for a portable wheelchair.  Sees pulmonary this week.   Nose keeps running.       Objective   Vital Signs:  /64 (BP Location: Left arm, Patient Position: Sitting)   Pulse 82   Temp 97.7 °F (36.5 °C) (Temporal)   Ht 160 cm (62.99\")   Wt 45 kg (99 lb 3.2 oz)   SpO2 (!) 89%   PF (!) 5 L/min   BMI 17.58 kg/m²   Estimated body mass index is 17.58 kg/m² as calculated from the following:    Height as of this encounter: 160 cm (62.99\").    Weight as of this encounter: 45 kg (99 lb 3.2 oz).     Telephone Encounter by Louisa Clark MA (02/01/2024 15:42)  Discharge Summary by Jakob Mendiola MD (01/21/2024 08:45)  ED Provider Notes by Anthony Hope MD (01/18/2024 15:13)  H&P by Lorna Escamilla MD (01/18/2024 23:23)  Outreach Note by Harriet Jenkins RN (01/22/2024 08:32)        Physical Exam  Constitutional:       General: She is not in acute distress.     Appearance: Normal appearance. She is well-developed. She is ill-appearing.      Comments: Thin and chronically ill-appearing with O2 in place per nasal cannula   HENT:      Head: Normocephalic and atraumatic.      Right Ear: External ear " normal.      Left Ear: External ear normal.      Mouth/Throat:      Mouth: Mucous membranes are moist.      Pharynx: Oropharynx is clear.   Eyes:      Conjunctiva/sclera: Conjunctivae normal.      Pupils: Pupils are equal, round, and reactive to light.   Neck:      Thyroid: No thyromegaly.   Cardiovascular:      Rate and Rhythm: Normal rate and regular rhythm.      Heart sounds: No murmur heard.  Pulmonary:      Effort: Pulmonary effort is normal.      Breath sounds: Decreased breath sounds and rhonchi present. No wheezing.      Comments: Few scattered rhonchi  Abdominal:      General: Bowel sounds are normal.      Palpations: Abdomen is soft.      Tenderness: There is no abdominal tenderness.   Musculoskeletal:         General: Normal range of motion.      Cervical back: Neck supple.   Lymphadenopathy:      Cervical: No cervical adenopathy.   Skin:     General: Skin is warm and dry.   Neurological:      Mental Status: She is alert and oriented to person, place, and time.   Psychiatric:         Mood and Affect: Mood normal.         Behavior: Behavior normal.        Result Review :                     Assessment and Plan     Diagnoses and all orders for this visit:    1. Hospital discharge follow-up (Primary)    2. Acute respiratory failure with hypoxia    3. Chronic obstructive pulmonary disease, unspecified COPD type    4. Rhinovirus infection    5. Acute HFrEF (heart failure with reduced ejection fraction)    6. Essential hypertension    7. Malignant neoplasm of upper lobe of left lung    Acute on chronic respiratory failure from COPD and persistent infiltrates and lung cancer-make sure to follow-up with pulmonary and cardiology, discussed with patient about her long-term plans, told her that she should not be contagious anymore from her rhinovirus infection for her young great-grandchild         Follow Up     No follow-ups on file.  Patient was given instructions and counseling regarding her condition or for  health maintenance advice. Please see specific information pulled into the AVS if appropriate.

## 2024-02-07 ENCOUNTER — HOME CARE VISIT (OUTPATIENT)
Dept: HOME HEALTH SERVICES | Facility: HOME HEALTHCARE | Age: 84
End: 2024-02-07
Payer: MEDICARE

## 2024-02-07 VITALS
SYSTOLIC BLOOD PRESSURE: 124 MMHG | OXYGEN SATURATION: 93 % | HEART RATE: 75 BPM | TEMPERATURE: 97.3 F | RESPIRATION RATE: 20 BRPM | DIASTOLIC BLOOD PRESSURE: 72 MMHG

## 2024-02-07 PROCEDURE — G0495 RN CARE TRAIN/EDU IN HH: HCPCS

## 2024-02-07 RX ORDER — MONTELUKAST SODIUM 10 MG/1
10 TABLET ORAL NIGHTLY
Qty: 90 TABLET | Refills: 3 | Status: SHIPPED | OUTPATIENT
Start: 2024-02-07

## 2024-02-08 ENCOUNTER — TELEPHONE (OUTPATIENT)
Dept: CARDIOLOGY | Facility: CLINIC | Age: 84
End: 2024-02-08

## 2024-02-08 ENCOUNTER — OFFICE VISIT (OUTPATIENT)
Dept: CARDIOLOGY | Facility: CLINIC | Age: 84
End: 2024-02-08
Payer: MEDICARE

## 2024-02-08 VITALS
HEART RATE: 95 BPM | SYSTOLIC BLOOD PRESSURE: 118 MMHG | DIASTOLIC BLOOD PRESSURE: 62 MMHG | WEIGHT: 100 LBS | BODY MASS INDEX: 18.4 KG/M2 | HEIGHT: 62 IN

## 2024-02-08 DIAGNOSIS — E78.2 MIXED HYPERLIPIDEMIA: ICD-10-CM

## 2024-02-08 DIAGNOSIS — C34.11 MALIGNANT NEOPLASM OF UPPER LOBE OF RIGHT LUNG: ICD-10-CM

## 2024-02-08 DIAGNOSIS — I42.8 NICM (NONISCHEMIC CARDIOMYOPATHY): Primary | ICD-10-CM

## 2024-02-08 DIAGNOSIS — I50.21 ACUTE HFREF (HEART FAILURE WITH REDUCED EJECTION FRACTION): Primary | ICD-10-CM

## 2024-02-08 DIAGNOSIS — I10 ESSENTIAL HYPERTENSION: ICD-10-CM

## 2024-02-08 DIAGNOSIS — I48.0 PAROXYSMAL ATRIAL FIBRILLATION: ICD-10-CM

## 2024-02-08 DIAGNOSIS — I50.23 ACUTE ON CHRONIC HFREF (HEART FAILURE WITH REDUCED EJECTION FRACTION): ICD-10-CM

## 2024-02-08 NOTE — TELEPHONE ENCOUNTER
Patient is starting Jardiance tomorrow.  She needs a BMP and a proBNP in 1 week.  Please follow-up on this.  [facility]

## 2024-02-08 NOTE — PROGRESS NOTES
Date of Office Visit: 2024  Encounter Provider: Claire Orr MD  Place of Service: [facility] CARDIOLOGY  Patient Name: Darlene Pfeiffer  :1940    Chief complaint  Coronary artery disease, cardiomyopathy, congestive heart failure, paroxysmal atrial fibrillation    History of Present Illness  Patient is a 83-year-old female with a history of COPD, hypertension, hyperlipidemia, intracranial aneurysm left PCA.  Lung cancer, adenocarcinoma of the tongue, asthma and coronary artery calcification.  2015 patient underwent left upper lobectomy.  By  she was diagnosed with tongue cancer underwent resection.  No evidence of recurrence.  However by 2021 she was found to have adenocarcinoma in 2 different regions.  She completed radiation to the left chest on 2021.  By 10/2022 she was admitted with pneumonia and by 2022 had abnormal PET scan felt to be suggestive of metastatic changes left apex.  On 2022 she started Keytruda.  On 2023 however, there is evidence of progressive disease in the left upper thorax and ribs.  Therefore this was discontinued and she was to do with palliative radiation therapy 2023.  She then developed significant nausea and failure to thrive.  On 2023 echo showed normal systolic function and she was changed to dabrafenib and trametinib.  By 2023 she was admitted with heart failure and found to have developed cardiomyopathy with an ejection fraction 39.2% GLS -11.1% with global hypokinesis with aortic valve calcification with trivial valve regurgitation normal right-sided pressures.  She recalls the morning of admission that she woke up with sudden shortness of breath without chest pain.  She was started on goal-directed medical therapy for heart failure which was limited by hypotension.  Following this both chemotherapeutic agents were discontinued and she started Alimta on 2023.  When seen in the office on 2023 she developed  atrial fibrillation with rapid rates.  Toprol was increased and she was started on low-dose Eliquis.  She subsequently converted to sinus rhythm between them and her current visit.  She had a preordered coronary CT angiogram which showed an ejection fraction of 27% with global hypokinesis with evidence of multivessel coronary artery disease most significant in the with probable significant ostial RCA stenosis.  There was moderate disease in the mid LAD.  Motion and pressure limited disease 3 circumflex vessel..  Left main was felt to be free of significant obstructive disease.  Mild proximal LAD stenosis was noted moderate mid LAD stenosis was present.  After further discussion with Dr. Kothari as her 1 year prognosis was suboptimal medical therapy was pursued.  Fortunately clinically she had improved.  However on 12/28/2023 she was admitted with hypoxia and COVID-pneumonia.  Possibly with superimposed bacterial pneumonia.  She was treated with remdesivir cefepime and Decadron with slow improvement.  However she was admitted on 1/18/2024 and discharged on 1/21/2024 with acute respiratory failure and rhinovirus infection with exacerbation of heart failure treated medically.  She was dismissed home on O2 at 4 L  She remains on continuous O2 by nasal cannula.  She is up to 4 to 5 L.  She has had troubles with mild epistaxis but a does not have a humidifier.    Baseline echo on 8/2023 with ejection fraction  56%, GLS -20.8%.  Inferobasal hypokinesis, grade 1 diastolic dysfunction, normal right-sided chambers with moderate left atrial enlargement and normal RV function.  Echo on 11/14/2023 with ejection fraction 39%, GLS -11.1% grade 1A diastolic dysfunction trivial valve regurgitation.      Since last visit her dyspnea on exertion is unchanged.  She saw Dr. Carpio yesterday and said O2 saturations were 83% and O2 was increased to 5 L.  She is to see Dr. Flores later today.  She has had no palpitations chest pain  syncope.  She has had little light dizziness on standing on occasion and at systolic pressures have occasionally been in the 90s.    Past Medical History:   Diagnosis Date    Allergic rhinitis     Aneurysm     Asthma     Cancer of floor of mouth 2014    Cerebral aneurysm     COPD (chronic obstructive pulmonary disease)     COVID 2020    Esophageal reflux     History of adenocarcinoma of lung     History of anemia     History of carcinoma in situ of skin     History of lung cancer     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 from [facility]    History of squamous cell carcinoma     Tongue    Hyperlipidemia     Hypertension     since early 60s    Intractable back pain 11/29/2022    Low vitamin B12 level     Lung cancer     Systolic CHF, acute 11/14/2023    Thyromegaly     UTI (urinary tract infection)     Vitamin D deficiency      Past Surgical History:   Procedure Laterality Date    BRONCHOSCOPY Bilateral 10/17/2022    Procedure: BRONCHOSCOPY WITH FLUORO with biopsy and BAL;  Surgeon: India Flores MD;  Location: [facility] ENDOSCOPY;  Service: Pulmonary;  Laterality: Bilateral;  PRE/POST - lung mass    CHOLECYSTECTOMY  1999    COLONOSCOPY      EMBOLIZATION CEREBRAL Left 06/29/2022    Procedure: 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;  Surgeon: Bradley Dowell MD;  Location: [facility] HYBRID OR 18/19;  Service: Interventional Radiology;  Laterality: Left;    EYE SURGERY  11/24/2020    Took a muscle out of right eye    GLOSSECTOMY PARTIAL      less than one half tongue    LUNG SURGERY  2012    ORAL LESION EXCISION/BIOPSY      June and August 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    TUBAL ABDOMINAL LIGATION  1970    VENOUS ACCESS DEVICE (PORT) INSERTION N/A 12/12/2022    Procedure: MEDIPORT PLACEMENT;  Surgeon: Jason Villaseñor MD;  Location: [facility] MAIN OR;  Service: Vascular;  Laterality: N/A;     Outpatient Medications Prior to Visit   Medication Sig Dispense  Refill    albuterol (PROVENTIL) (2.5 MG/3ML) 0.083% nebulizer solution Inhale the contents of 1 vial by nebulization Every 4 (Four) Hours As Needed for Wheezing. 90 mL 0    apixaban (ELIQUIS) 2.5 MG tablet tablet Take 1 tablet by mouth 2 (Two) Times a Day. 60 tablet 3    atorvastatin (LIPITOR) 20 MG tablet TAKE 1 TABLET EVERY NIGHT 90 tablet 1    cetirizine (zyrTEC) 10 MG tablet Take 1 tablet by mouth Daily. Indications: Perennial Allergic Rhinitis      Cholecalciferol (Vitamin D3) 50 MCG (2000 UT) tablet Take 1,000 Units by mouth Daily. Indications: Vitamin D Deficiency      Cyanocobalamin (VITAMIN B12 PO) Take 1,000 mcg by mouth Daily. Indications: vitamin b12 deficiency       folic acid (FOLVITE) 1 MG tablet Take 1 tablet by mouth Daily. Start at least 7 days prior to chemotherapy until at least 3 weeks after all chemotherapy. 30 tablet 6    furosemide (LASIX) 40 MG tablet Take 1.5 tablets by mouth Daily. Indications: Cardiac Failure, High Blood Pressure Disorder 45 tablet 2    ipratropium (ATROVENT) 0.06 % nasal spray 2 sprays into the nostril(s) as directed by provider 4 (Four) Times a Day. 15 mL 0    ipratropium-albuterol (DUO-NEB) 0.5-2.5 mg/3 ml nebulizer Inhale the contents of 1 vial by nebulization 2 (Two) Times a Day As Needed for Wheezing. 180 mL 0    metoprolol succinate XL (TOPROL-XL) 25 MG 24 hr tablet Take 1 tablet by mouth 2 (Two) Times a Day. Indications: High Blood Pressure Disorder 60 tablet 2    mirtazapine (REMERON) 7.5 MG tablet TAKE 1 TABLET BY MOUTH EVERY NIGHT AT BEDTIME 30 tablet 1    montelukast (SINGULAIR) 10 MG tablet Take 1 tablet by mouth Every Night. Indications: Hayfever, Perennial Allergic Rhinitis 90 tablet 3    O2 (OXYGEN) Inhale 2 L/min Continuous.      pantoprazole (PROTONIX) 40 MG EC tablet TAKE 1 TABLET BY MOUTH EVERY MORNING 30 tablet 0    potassium chloride (K-DUR,KLOR-CON) 10 MEQ CR tablet Take 2 tablets by mouth Daily. 1 tablet in the PM  Indications: Low Amount of  Potassium in the Blood 90 tablet 2    sacubitril-valsartan (Entresto) 24-26 MG tablet Take 0.5 tablets by mouth 2 (Two) Times a Day. 30 tablet 3    zolpidem (AMBIEN) 5 MG tablet TAKE 1 TABLET BY MOUTH EVERY NIGHT AT BEDTIME AS NEEDED FOR SLEEP 30 tablet 0     No facility-administered medications prior to visit.       Allergies as of 2024 - Reviewed 2024   Allergen Reaction Noted    Sulfa antibiotics Rash 10/13/2016     Social History     Socioeconomic History    Marital status:    Tobacco Use    Smoking status: Former     Types: Cigarettes     Quit date: 2015     Years since quittin.0     Passive exposure: Never    Smokeless tobacco: Never    Tobacco comments:     less than a pack per day, no smoking since , Quit 2015   Vaping Use    Vaping Use: Never used   Substance and Sexual Activity    Alcohol use: Not Currently     Comment: Socially -A few times a month    Drug use: No    Sexual activity: Defer     Family History   Problem Relation Age of Onset    Ovarian cancer Mother          at age 27    No Known Problems Father     Ovarian cancer Sister     Colon cancer Brother     No Known Problems Other     Malig Hyperthermia Neg Hx      Review of Systems   Constitutional: Negative for chills, fever, weight gain and weight loss.   Cardiovascular:  Positive for dyspnea on exertion. Negative for leg swelling.   Respiratory:  Negative for cough, snoring and wheezing.    Hematologic/Lymphatic: Negative for bleeding problem. Does not bruise/bleed easily.   Skin:  Negative for color change.   Musculoskeletal:  Negative for falls, joint pain and myalgias.   Gastrointestinal:  Negative for melena.   Genitourinary:  Negative for hematuria.   Neurological:  Positive for dizziness. Negative for excessive daytime sleepiness.   Psychiatric/Behavioral:  Negative for depression. The patient is not nervous/anxious.         Objective:     Vitals:    24 0754   BP: 118/62   Pulse: 95   Weight:  "45.4 kg (100 lb)   Height: 157.5 cm (62\")     Body mass index is 18.29 kg/m².    Vitals reviewed.   Constitutional:       Appearance: Underweight. Cachectic and frail.   Eyes:      General: No scleral icterus.        Right eye: No discharge.      Conjunctiva/sclera: Conjunctivae normal.      Pupils: Pupils are equal, round, and reactive to light.   HENT:      Head: Normocephalic.      Nose: Nose normal.   Neck:      Thyroid: No thyromegaly.      Vascular: No JVD.   Pulmonary:      Effort: Pulmonary effort is normal. No respiratory distress.      Breath sounds: Normal breath sounds. No wheezing. No rales.   Cardiovascular:      Normal rate. Regular rhythm. Normal S1. Normal S2.       Murmurs: There is a grade 2/6 systolic murmur at the LLSB.      No gallop.    Pulses:     Intact distal pulses.      Carotid: 2+ bilaterally.     Radial: 2+ bilaterally.     Femoral: 2+ bilaterally.     Popliteal: 2+ bilaterally.     Dorsalis pedis: 2+ bilaterally.     Posterior tibial: 2+ bilaterally.  Edema:     Peripheral edema absent.   Abdominal:      General: Bowel sounds are normal. There is no distension.      Palpations: Abdomen is soft.      Tenderness: There is no abdominal tenderness. There is no rebound.   Musculoskeletal: Normal range of motion.         General: No tenderness.      Cervical back: Normal range of motion and neck supple. Skin:     General: Skin is warm and dry.      Findings: No erythema or rash.   Neurological:      Mental Status: Alert and oriented to person, place, and time.   Psychiatric:         Behavior: Behavior normal.         Thought Content: Thought content normal.         Judgment: Judgment normal.       Lab Review:   Lab Results - Last 18 Months   Lab Units 01/29/24  0835 01/19/24  0635 01/18/24  1431 01/25/23  0825 01/09/23  0836 10/14/22  1315 08/29/22  1548   WBC 10*3/mm3 11.59* 5.38 7.54   < > 7.44   < > 6.1   RBC 10*6/mm3 3.97 3.57* 3.90   < > 4.64   < > 4.63   HEMOGLOBIN g/dL 12.1 10.7* " 11.1*   < > 13.3   < > 13.7   HEMATOCRIT % 39.2 32.3* 35.5   < > 40.6   < > 41.5   MCV fL 98.7* 90.5 91.0   < > 87.5   < > 90   MCH pg 30.5 30.0 28.5   < > 28.7   < > 29.6   MCHC g/dL 30.9* 33.1 31.3*   < > 32.8   < > 33.0   RDW % 17.2* 15.2 15.2   < > 13.1   < > 12.6   PLATELETS 10*3/mm3 267 274 311   < > 287   < > 232   NEUTROPHIL % % 74.1  --  80.1*   < > 54.4   < > 57   LYMPHOCYTE % % 18.2*  --  9.7*   < > 37.4   < > 30   MONOCYTES % % 6.6  --  9.5   < > 7.4   < > 9   EOSINOPHIL % % 0.1*  --  0.0*   < > 0.0*   < > 3   BASOPHIL % % 0.4  --  0.3   < > 0.7   < > 1   NEUTROS ABS 10*3/mm3 8.59*  --  6.04   < > 4.05   < > 3.5   LYMPHS ABS 10*3/mm3 2.11  --  0.73   < > 2.78   < > 1.8   MONOS ABS 10*3/mm3 0.76  --  0.72   < > 0.55   < > 0.5   EOS ABS 10*3/mm3 0.01  --  0.00   < > 0.00   < > 0.2   BASOS ABS 10*3/mm3 0.05  --  0.02   < > 0.05   < > 0.0   IMM GRAN % %  --   --   --   --  0.1  --  0   IMMATURE GRANS (ABS) 10*3/mm3  --   --   --   --  0.01  --  0.0   RDW-SD fl 61.1* 47.8 48.5   < >  --    < >  --    MPV fL 10.1 10.4 9.8   < >  --    < >  --     < > = values in this interval not displayed.       Lab Results - Last 18 Months   Lab Units 01/29/24  0835 01/19/24  0635 01/18/24  1431   GLUCOSE mg/dL 119* 120* 152*   BUN mg/dL 22 22 22   CREATININE mg/dL 0.65 0.81 0.71   SODIUM mmol/L 142 142 142   POTASSIUM mmol/L 4.2 3.7 3.6   CHLORIDE mmol/L 100 100 99   CO2 mmol/L 33.9* 28.0 30.0*   CALCIUM mg/dL 9.9 9.1 9.5   TOTAL PROTEIN g/dL 6.5  --  6.7   ALBUMIN g/dL 3.6  --  3.7   ALT (SGPT) U/L 11  --  15   AST (SGOT) U/L 20  --  30   ALK PHOS U/L 71  --  81   BILIRUBIN mg/dL 0.4  --  0.4   GLOBULIN gm/dL 2.9  --  3.0   A/G RATIO g/dL 1.2  --  1.2   BUN / CREAT RATIO  33.8* 27.2* 31.0*   ANION GAP mmol/L 8.1 14.0 13.0   EGFR mL/min/1.73 88.6 73.0 85.5     Lab Results - Last 18 Months   Lab Units 10/02/23  0808 01/09/23  0836   TRIGLYCERIDES mg/dL 97 135   HDL CHOL mg/dL 73* 46   LDL CHOL mg/dL 90 133*   VLDL  CHOLESTEROL ROBERT mg/dL 17 24     Lab Results - Last 18 Months   Lab Units 01/23/24  1248 01/18/24  1431   PROBNP pg/mL 4,137.0* 1,450.0     Lab Results - Last 18 Months   Lab Units 01/18/24  1707 01/18/24  1431   HSTROP T ng/L 50* 48*     Lab Results - Last 18 Months   Lab Units 11/15/23  0412 07/26/23  0622   TSH uIU/mL 1.000 2.160     Lab Results - Last 18 Months   Lab Units 11/14/23  0238   PROTIME Seconds 13.7   APTT seconds 27.7           ECG 12 Lead    Date/Time: 2/8/2024 8:05 AM  Performed by: Claire Orr MD    Authorized by: Claire Orr MD  Comparison: compared with previous ECG   Similar to previous ECG  Rhythm: sinus rhythm  Other findings: non-specific ST-T wave changes    Clinical impression: abnormal EKG            Diagnosis Plan   1. NICM (nonischemic cardiomyopathy)  ECG 12 Lead    Adult Transthoracic Echo Limited W/ Cont if Necessary Per Protocol      2. Mixed hyperlipidemia        3. Essential hypertension        4. Acute on chronic HFrEF (heart failure with reduced ejection fraction)        5. Malignant neoplasm of upper lobe of right lung        6. Paroxysmal atrial fibrillation          Plan:       1.  Recent recurrent respiratory compromise with COVID and pneumonia and infection 12/28/2023 and more recently with rhinovirus and bacterial pneumonia 1/18/2024.  Still with dyspnea and hypoxia and remains on 5 L O2.  I suspect this is multifactorial with components of heart failure.  She is to be seen by the pulmonary service later today.  In the meanwhile we will address heart failure as below  2.  HFrEF.  Ejection fraction 39% in 11/2023 in the setting of atrial fibrillation.  I suspect its better though she still appears wet.  Will add Jardiance 10 mg a day (I did speak with Dr Kehrer about this) will check a BMP and a proBNP in 1 week which they prefer to do with Dr. Carpio and they will call to arrange.  Recheck echo possibly in 4 weeks at follow-up  3.  Paroxysmal atrial fibrillation.  As  above.  Maintaining sinus rhythm on metoprolol and tolerating Eliquis.  4.  Multivessel coronary artery disease.  Troponin recently elevated but flat.  She is not an ideal candidate for bypass.  Angioplasty/percutaneous intervention could be considered though it would be a high risk procedure with proximal disease.  Patient also developed epistaxis.  Given frailty and poor 1 year outlook, decision made to treat medically which she seems to be doing well with.  Will address heart failure as above.  5.  Lung cancer.  Patient to see Dr. Kothari felt she is too high risk at this point for additional intervention.  Follow-up CT scans are pending in late February  7.  History of hypertension.  Blood pressure at times low.  8.  History of cerebral aneurysm.  Followed by Dr. Calvin  9.  Dyslipidemia  10.  COPD/asthma      Time Spent: I spent 35 minutes caring for Darlene on this date of service. This time includes time spent by me in the following activities: preparing for the visit, reviewing tests, obtaining and/or reviewing a separately obtained history, performing a medically appropriate examination and/or evaluation, counseling and educating the patient/family/caregiver, ordering medications, tests, or procedures, documenting information in the medical record, and independently interpreting results and communicating that information with the patient/family/caregiver.   I spent 1 minutes on the separately reported service of ECG. This time is not included in the time used to support the E/M service also reported today.   Ongoing care of this cardio-oncology patient will be with cardiology        Your medication list            Accurate as of February 8, 2024 11:59 PM. If you have any questions, ask your nurse or doctor.                START taking these medications        Instructions Last Dose Given Next Dose Due   empagliflozin 10 MG tablet tablet  Commonly known as: Jardiance  Started by: Claire Orr MD      Take 1  tablet by mouth Daily.              CONTINUE taking these medications        Instructions Last Dose Given Next Dose Due   albuterol (2.5 MG/3ML) 0.083% nebulizer solution  Commonly known as: PROVENTIL      Inhale the contents of 1 vial by nebulization Every 4 (Four) Hours As Needed for Wheezing.       apixaban 2.5 MG tablet tablet  Commonly known as: ELIQUIS      Take 1 tablet by mouth 2 (Two) Times a Day.       atorvastatin 20 MG tablet  Commonly known as: LIPITOR      TAKE 1 TABLET EVERY NIGHT       cetirizine 10 MG tablet  Commonly known as: zyrTEC      Take 1 tablet by mouth Daily. Indications: Perennial Allergic Rhinitis       Entresto 24-26 MG tablet  Generic drug: sacubitril-valsartan      Take 0.5 tablets by mouth 2 (Two) Times a Day.       folic acid 1 MG tablet  Commonly known as: FOLVITE      Take 1 tablet by mouth Daily. Start at least 7 days prior to chemotherapy until at least 3 weeks after all chemotherapy.       furosemide 40 MG tablet  Commonly known as: LASIX      Take 1.5 tablets by mouth Daily. Indications: Cardiac Failure, High Blood Pressure Disorder       ipratropium 0.06 % nasal spray  Commonly known as: ATROVENT      2 sprays into the nostril(s) as directed by provider 4 (Four) Times a Day.       ipratropium-albuterol 0.5-2.5 mg/3 ml nebulizer  Commonly known as: DUO-NEB      Inhale the contents of 1 vial by nebulization 2 (Two) Times a Day As Needed for Wheezing.       metoprolol succinate XL 25 MG 24 hr tablet  Commonly known as: TOPROL-XL      Take 1 tablet by mouth 2 (Two) Times a Day. Indications: High Blood Pressure Disorder       mirtazapine 7.5 MG tablet  Commonly known as: REMERON      TAKE 1 TABLET BY MOUTH EVERY NIGHT AT BEDTIME       montelukast 10 MG tablet  Commonly known as: SINGULAIR      Take 1 tablet by mouth Every Night. Indications: Hayfever, Perennial Allergic Rhinitis       O2  Commonly known as: OXYGEN      Inhale 2 L/min Continuous.       pantoprazole 40 MG EC  tablet  Commonly known as: PROTONIX      TAKE 1 TABLET BY MOUTH EVERY MORNING       potassium chloride 10 MEQ CR tablet  Commonly known as: K-DUR,KLOR-CON,KLOR-CON M10      Take 2 tablets by mouth Daily. 1 tablet in the PM  Indications: Low Amount of Potassium in the Blood       VITAMIN B12 PO      Take 1,000 mcg by mouth Daily. Indications: vitamin b12 deficiency       Vitamin D3 50 MCG (2000 UT) tablet      Take 1,000 Units by mouth Daily. Indications: Vitamin D Deficiency       zolpidem 5 MG tablet  Commonly known as: AMBIEN      TAKE 1 TABLET BY MOUTH EVERY NIGHT AT BEDTIME AS NEEDED FOR SLEEP                 Where to Get Your Medications        These medications were sent to [facility] - [place], [place] - [address] [place] - [phone]  - [phone] [address] [address]      Phone: [phone]   empagliflozin 10 MG tablet tablet         Patient is no longer taking -.  I corrected the med list to reflect this.  I did not stop these medications.      Dictated utilizing Dragon dictation

## 2024-02-09 ENCOUNTER — READMISSION MANAGEMENT (OUTPATIENT)
Dept: CALL CENTER | Facility: HOSPITAL | Age: 84
End: 2024-02-09
Payer: MEDICARE

## 2024-02-09 ENCOUNTER — HOME CARE VISIT (OUTPATIENT)
Dept: HOME HEALTH SERVICES | Facility: HOME HEALTHCARE | Age: 84
End: 2024-02-09
Payer: MEDICARE

## 2024-02-09 NOTE — OUTREACH NOTE
COPD/PN Week 3 Survey      Flowsheet Row Responses   [facility] facility patient discharged from? [facility]   Does the patient have one of the following disease processes/diagnoses(primary or secondary)? Pneumonia   Week 3 attempt successful? No   Unsuccessful attempts Attempt 1            Felix ROBERTSON - Registered Nurse

## 2024-02-12 ENCOUNTER — HOME CARE VISIT (OUTPATIENT)
Dept: HOME HEALTH SERVICES | Facility: HOME HEALTHCARE | Age: 84
End: 2024-02-12
Payer: MEDICARE

## 2024-02-12 DIAGNOSIS — I50.21 ACUTE HFREF (HEART FAILURE WITH REDUCED EJECTION FRACTION): Primary | ICD-10-CM

## 2024-02-12 RX ORDER — IPRATROPIUM BROMIDE AND ALBUTEROL SULFATE 2.5; .5 MG/3ML; MG/3ML
3 SOLUTION RESPIRATORY (INHALATION) 2 TIMES DAILY PRN
Qty: 180 ML | Refills: 0 | Status: SHIPPED | OUTPATIENT
Start: 2024-02-12

## 2024-02-13 ENCOUNTER — TELEPHONE (OUTPATIENT)
Dept: FAMILY MEDICINE CLINIC | Facility: CLINIC | Age: 84
End: 2024-02-13
Payer: MEDICARE

## 2024-02-14 ENCOUNTER — READMISSION MANAGEMENT (OUTPATIENT)
Dept: CALL CENTER | Facility: HOSPITAL | Age: 84
End: 2024-02-14
Payer: MEDICARE

## 2024-02-14 NOTE — TELEPHONE ENCOUNTER
Spoke with [employer] and they need spouse's social security benefit.      Spoke with [employer] and they said she was over the income limit and did not quality.    I will call insurance to see if we can do a tier exception to get a lower cost to the pt.

## 2024-02-15 ENCOUNTER — HOME CARE VISIT (OUTPATIENT)
Dept: HOME HEALTH SERVICES | Facility: HOME HEALTHCARE | Age: 84
End: 2024-02-15
Payer: MEDICARE

## 2024-02-16 ENCOUNTER — TELEPHONE (OUTPATIENT)
Dept: CARDIOLOGY | Facility: CLINIC | Age: 84
End: 2024-02-16
Payer: MEDICARE

## 2024-02-19 ENCOUNTER — HOME CARE VISIT (OUTPATIENT)
Dept: HOME HEALTH SERVICES | Facility: HOME HEALTHCARE | Age: 84
End: 2024-02-19
Payer: MEDICARE

## 2024-02-19 PROCEDURE — G0299 HHS/HOSPICE OF RN EA 15 MIN: HCPCS

## 2024-02-20 VITALS
HEART RATE: 100 BPM | SYSTOLIC BLOOD PRESSURE: 110 MMHG | OXYGEN SATURATION: 96 % | TEMPERATURE: 97.8 F | DIASTOLIC BLOOD PRESSURE: 58 MMHG | RESPIRATION RATE: 20 BRPM

## 2024-02-20 NOTE — HOME HEALTH
SN to patient's home for routine visit.  Teaching provided as per careplan.  Teaching reinforcement provided on breathinbg treatments, importance of taking albuaterol first in sequence and rationale; importance of deep breathing exercises every 3-4 hours.  Pt reports that her appetite has been good lately as she's on prednisone.  No swelling noted.  I did hear some light crackles in her LLL all other lung fields were clear.  Pt reports that she saw pulmonology and they are going to start weaning her from 5lpm soon.     Plan:  teaching and monitoring r/t pneumonia, medications management

## 2024-02-21 DIAGNOSIS — G47.00 INSOMNIA, UNSPECIFIED TYPE: ICD-10-CM

## 2024-02-21 DIAGNOSIS — C34.11 MALIGNANT NEOPLASM OF UPPER LOBE OF RIGHT LUNG: ICD-10-CM

## 2024-02-21 RX ORDER — ZOLPIDEM TARTRATE 5 MG/1
5 TABLET ORAL NIGHTLY PRN
Qty: 30 TABLET | Refills: 0 | Status: SHIPPED | OUTPATIENT
Start: 2024-02-21

## 2024-02-21 NOTE — TELEPHONE ENCOUNTER
LMM with pt that [employer] said she did not qualify.    Called [facility] re: Tier Exception for Entresto.  This will be in review for 1-3 days.    Eliquis cost is $47.  Entresto cost is $94.    I will continue to watch.

## 2024-02-22 ENCOUNTER — HOME CARE VISIT (OUTPATIENT)
Dept: HOME HEALTH SERVICES | Facility: HOME HEALTHCARE | Age: 84
End: 2024-02-22
Payer: MEDICARE

## 2024-02-22 VITALS
HEART RATE: 100 BPM | RESPIRATION RATE: 20 BRPM | OXYGEN SATURATION: 91 % | SYSTOLIC BLOOD PRESSURE: 118 MMHG | TEMPERATURE: 97.9 F | DIASTOLIC BLOOD PRESSURE: 60 MMHG

## 2024-02-22 PROCEDURE — G0495 RN CARE TRAIN/EDU IN HH: HCPCS

## 2024-02-22 NOTE — Clinical Note
Patient requested to be discharged from home health during visit on 2/23. States that she does not believe that this is necessary an longer and she sees her MD at least once a week.

## 2024-02-23 NOTE — HOME HEALTH
Upon start of visit patient responds to nurse that she wants to know how much long home health has to go on for. Went through the plan for the visits. Patient requested discharge from home health stating that she believes that this is no longer necessary.

## 2024-02-26 ENCOUNTER — HOSPITAL ENCOUNTER (OUTPATIENT)
Dept: CT IMAGING | Facility: HOSPITAL | Age: 84
Discharge: HOME | End: 2024-02-26
Admitting: INTERNAL MEDICINE
Payer: MEDICARE

## 2024-02-26 DIAGNOSIS — R06.02 SHORTNESS OF BREATH: ICD-10-CM

## 2024-02-26 DIAGNOSIS — C34.11 MALIGNANT NEOPLASM OF UPPER LOBE OF RIGHT LUNG: ICD-10-CM

## 2024-02-26 DIAGNOSIS — Z85.118 HISTORY OF LUNG CANCER: ICD-10-CM

## 2024-02-26 PROCEDURE — 71260 CT THORAX DX C+: CPT

## 2024-02-26 PROCEDURE — 25510000001 IOPAMIDOL 61 % SOLUTION: Performed by: INTERNAL MEDICINE

## 2024-02-26 RX ADMIN — IOPAMIDOL 75 ML: 612 INJECTION, SOLUTION INTRAVENOUS at 11:30

## 2024-02-29 RX ORDER — PANTOPRAZOLE SODIUM 40 MG/1
40 TABLET, DELAYED RELEASE ORAL EVERY MORNING
Qty: 30 TABLET | Refills: 0 | Status: SHIPPED | OUTPATIENT
Start: 2024-02-29

## 2024-02-29 NOTE — PROGRESS NOTES
REASON FOR FOLLOW-UP: Recurrent lung cancer.    History of Present Illness    The patient is a 83 y.o. female with the above-mentioned history who returned 9/22/2023 continuing on Mekinist 0.5 mg daily and Tafinlar at 50 mg twice daily.  She also continues on prednisone only taking 10 mg daily.  She reports that she is feeling quite good.  She is tolerating things well.  She has a decent appetite denies issues with nausea, vomiting, diarrhea, or constipation.  She denies any issues with increased shortness of breath.    Patient did see ENT Dr. Topete, who has ordered an ultrasound of her neck, which will be done at [facility] on the 27th.  She is also scheduled for a cerebral angiogram by Dr. Calvin on 29 September.      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  Dr. Dowell of neurosurgery saw the patient 10/12/2023 and felt the patient was stable without neurologic symptoms, yearly follow-up planned.  The patient had started seeing a new ENT physician leading to the referral back to neurosurgery.  When seen 10/16/2023 she is doing very well on her current Mekinist and Tafinlar doses having improved her weight, appetite and general performance status.  We have discussed at least a chest x-ray today and repeat staging in the next 5 to 6 weeks as she continues her current dosing.    The patient required admission 11/14 - 11/16/2023 having presented with shortness of breath and found to be pulmonary edema with CBC, lactate and procalcitonin all normal.  There was a concern that her chemotherapy agents could have produced her cardiomyopathy and these were stopped 11/14/2023.  She was diuresed and echocardiogram demonstrated LV SF mildly decreased at 39.2% with GLS of -11.1%, left ventricular cavity mildly dilated, left  ventricular wall thickness consistent with mild concentric hypertrophy, left ventricular global hypokinesis, aortic valve abnormal with moderate calcification, valve was trileaflet, trace tricuspid valve regurgitation with right VSP at less than 35 mmHg.  The findings for LV systolic function, diastolic function and global longitudinal LV strain had all worsened.    CTA of chest 11/14/2023 demonstrating confluent soft tissue mass possibly measuring larger in current study at 6.5 x 5.0 previously 4.9 x 4.8, left supraclavicular node to decrease in size measuring 1.1 cm previously 1.4 cm, extensive bilateral interstitial and groundglass infiltrates.    The patient is seen 11/26/2023.  As per the discharge we are requested to have a BMP and CMP assessed.  She is seen with her son and daughter in a lengthy conversation held about her recent hospitalization with CHF-cardiomyopathy felt elicited, in part, from her targeted therapy with her Mekinist and Tafinlar discontinued altogether.    The patient is relatively stable currently having continued her diuretics and to be seen in cardiology in follow-up.  At the time of this dictation her CBC is found to be essentially normal with mild leukocytosis, CMP normal except for mildly elevated glucose at 125 and BNP reduced to 6126 from 9763.  She feels well with no additional shortness of breath chest pain lower extremity edema.  In reviewing the the possible treatment options she is next best treated with single agent chemotherapy moving to Alimta.    The patient proceeded to treatment teaching and seen back 12/4/2023.  She is ready to proceed with chemotherapy with fortunately a recent good performance status still in place.    Patient returns 12/18/2023 for toxicity check.  She started treatment with Alimta on 12/4/2023.  Patient states that she did have an episode following treatment where she felt extremely itchy however she took Benadryl which helped, and her symptoms  resolved.  She has ongoing issues with fatigue.  She is being followed closely by cardio oncology.  She also reports being short of breath but denies chest pain, or swelling.  She does struggle with her appetite.  Otherwise she feels like she has tolerated the change in treatment well.      The patient required hospitalization 1/18-21/24 presenting with increasing shortness of breath, findings of chronic respiratory failure, positive for rhinovirus on admission.  She is treated with supportive treatments including for previous COVID infection as well as antibiotic therapies.  She completed 5 days of Augmentin, continue nebulizer and breathing treatments.  Upon discharge she rescheduled appointments though was seen by cardiology 1/23/2024 not felt to be in heart failure, treated supportively for epistaxis.    There was concern as to whether she should continue treatment and she has seen back in office 1/29/2024 to discuss potential options.  She is seen with her son and daughter both indicating that she drops her O2 saturation quite quickly and is very inactive as result of difficulty with rapidly becoming shortness of breath and weak when she tries to move.  This is led to a lengthy conversation about the extent of her disease including both her cardiovascular status and her respiratory status.  She is worsening quickly and is not, currently, a candidate for chemotherapy.    The patient is doing poorly enough that we have had a cristy conversation today about end-of-life concerns.  She is not interested in hospice at this point but hopes to improve further.                ONCOLOGY HISTORY:      The patient is an 83-year-old female with the below medical history including chronic obstructive pulmonary disease who was seen in the [facility] multidisciplinary thoracic oncology clinic July 1, 2021.  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      She had undergone a CT of the chest at [facility] 6/16/2021 showing postsurgical changes in the left chest from right upper lobectomy, 8 mm irregular nodule medial segment of the right lower lobe and diffuse changes of emphysema with fibrotic changes in the periphery, no suspicious hilar or mediastinal nodes, no pleural effusion.  New finding was suspicious for new malignancy with the patient felt to be a poor candidate for surgical resection.  A PET CT and PFTs were requested thereafter.  The PET/CT demonstrated ill-defined FDG avid soft tissue thickening left aspect mediastinum within the operative bed, 1 cm hypermetabolic pulmonary nodule right lower lobe and asymmetric thickening patchy uptake involving the right base of tongue.  There is subtle uptake within the L1 vertebral body which is indeterminate and a sub-6 mm pulmonary nodule of right lung and subcentimeter hypodense hepatic lesion which was below PET resolution.       The patient's case was discussed in thoracic conference 7/22/2021 with consideration of the patient undergoing L1 vertebral body MRI, confirmatory biopsy left mediastinal and assessment by ENT (Dr. Caballero) who had worked with the patient previously.  She, incidentally, indicates that radiation therapy was given apparently intraoperatively at her recent surgery?  She still has issues with difficulty chewing and swallowing post procedures and was to be followed up with Dr. Caballero in the near future as planned.                                                            She was seen in the thoracic clinic on the same day with plans to proceed with MRI of the lumbar spine, biopsy left mediastinal nodular area of potential disease and follow-up of her ENT assessment as well as undergo a guardant 360 assessment in our office.  She underwent the biopsy  8/13/2021 found to be consistent with invasive moderately differentiated adenocarcinoma with PD-L1 TPS score 40%.  MRI of the lumbar spine revealed no evidence of metastatic disease though the patient did have multilevel degenerative disease throughout the lumbar spine.  She had an office follow-up with Dr. 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  She underwent laryngoscopy 8/18/2021 with right base of tongue without evidence of lesions or masses in the region.     She was seen by Dr. Hernandez 8/19/2021 and, her findings, had been presented in lung conference.  Her findings were consistent with 2 separate lesions including a nodule in the superior segment of the right lower lobe and a mass in the upper portion of the left chest with the left chest lesion biopsy positive for adenocarcinoma.  The consensus was that these were to separate-synchronous primaries.  Stereotactic radiation each lesions were felt to be the appropriate way to proceed and the patient was referred to radiation therapy.     The patient is seen in office 8/23/2021 agreeable to the radiation therapy as well as additional assessments including Caris molecular assessment of her biopsy.  Again her guardant 360 is currently pending and we would follow her after she completes radiation therapy with subsequent scans.    She was able to proceed with SBRT to the right lung at primary #1 with 5 treatments at 1000 cGy per fraction in the left lung primary similarly at 1000 cGy per fraction x5.  Her treatment ranged between 8/31-9/13/2021.  She is now scheduled for follow-up 11/23/2021.    The patient subsequent genomic testing is discussed with her as she is is seen back 9/23/2021.  Her guardant 360 did not determine any new abnormalities but her Caris-MI profile-does reveal sensitivity to immunotherapy as well as a BRAF mutation.  This could  be extremely important as we follow the patient from this point.  Fortunately she is feeling extremely well and quite pleased about her treatments.    Patient had subsequent PET/CT 11/23/2021 demonstrates decrease in size and avidity of the previously noted intensely FDG avid nodules left lobectomy operative site and right lower lobe.  Surrounding groundglass and pulmonary opacification is consistent with postradiation changes, a few new subcentimeter pulmonary nodules are present in the right upper lobe and indeterminant, stable subcentimeter hepatic lesion is below PET resolution no other findings of FDG-avid disease are seen in neck abdomen or pelvis.  The patient seen in office 12/1/2021 with a relatively good performance status stating she is not having any new symptoms and very pleased about her results on her PET/CT.  She realizes we'll have to follow this further but subsequent scans in 6 months.  She is also hoping to see an oral surgeon that previously helped her-Dr. Wu.    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  There is evolution of presumed postradiation changes in the right midlung with soft tissue mass within the left lumpectomy operative bed minimally decreased in size.  There is a sub-6 mm nodule in the right upper lobe not definitively seen, indeterminate and an indeterminate left renal lesion at 1.5 cm unchanged from 7/13/2021.  The patient is currently scheduled to see Dr. Dowell on 6/23/2022.  She is feeling well without any additional symptoms.    The patient required admission 10/14 - 10/18/2022 presenting with shortness of breath and cough that was nonproductive.  She had been on oral antibiotics and did not improved and was admitted for therapy for apparent pneumonia.  This eventually led to bronchoscopy after  initial CT revealed postsurgical changes, new masslike 6.7 cm area of consolidation anterior left lung raising concern for potential malignancy and a follow-up PET/CT planned.  Bronchoscopy and biopsy left lower lung failed to show evidence of malignancy.  The patient's PET/CT, however, demonstrated an irregular pleural-based soft tissue density thickening in the left upper lobe that was intensely FDG avid new from 6/8/2022 thought to be a malignant recurrence with spread into the left lung parenchyma.  There is intensely pleural-based avidity within the left lower lobe thought to be metastatic disease with postradiation of the left apex as well.  There is a small focus of uptake in the right hilum grossly unchanged in size and post radiation changes in the right lower lobe.  There is indeterminate density left renal that was grossly unchanged.  The patient is seen back in office 11/15/2022 indicating that she still has chest discomfort that is slowly worsening and that she has a chronic paroxysmal cough but has not improved.  We discussed that she very likely has recurrent disease and plan to proceed with a guardant 360 examination initially as we determine whether we should try to proceed with therapy particularly immunotherapy versus targeted therapy recognizing the above findings.    Patient's guardant 360 returned as again significant for BRAF V600E and the potential use of BRAF inhibitor/MET inhibitor dabrafenib and trametinib.  Additional information [id number] and use of alpelisib.    Unfortunately she required admission 11/28-12/1 with worsening back pain.  Fortunately she did not demonstrate evidence of metastatic disease but did have moderate degenerative changes which could cause radiculopathy.  Medications were altered to include subsequently MSR 15 mg p.o. every 12 hours, Norco as needed.    The patient now presents back 12/14/2022 to initiate therapy- initially with immunotherapy considering Caris study  with PD-L1 TPS of 70% on 22 C3 and 28-8 assays.    Patient seen with her  and we discussed pain medications and adjustments thereafter and that she stopped taking MSIR having only a short supply and having to use Norco more frequently.  She is willing to initiate Keytruda today we have discussed that considering her genomics treatment versus her BRAF mutation is also an option.    C1 Keytruda 12/14/2022    Patient is seen back 1/4/2023 in follow-up due for cycle 2 Keytruda.  Patient states that since receiving her first cycle she has had decreased appetite and decreased energy.  She has not been able to bring herself to eat much and she has notably lost 7 pounds.  She has also been struggling with constipation in relation to ongoing narcotics for pain control.  She has been taking senna S2 tabs twice a day.  We discussed adding daily MiraLAX.    Patient did just last week meet with dietitian, Zoila Clinton, to discuss ways to improve caloric intake.  She has not been able to implement any of these things yet though.    The patient is next seen 1/25/2023 with mild weight gain.  She is seen with family member both indicating that she has actually felt somewhat better in terms of performance status and general function.  We have discussed proceeding with a third cycle treatment and reevaluating by scan in 2 weeks.    The patient proceeded with treatment 1/25/2023 and underwent follow-up CT chest abdomen pelvis 2/8/2023 demonstrating small pericardial effusion that is decreased in size from 11/20/2022, ill-defined consolidation and pleural-based thickening in the left apex and upper lung has reduced slightly, additional index nodule soft tissue in the aspect the pericardium has not changed substantially, no evidence of metastatic disease in the abdomen pelvis.    The patient is seen with her daughter 2/15/2023 both indicating that she has definitely improved, stable weight, appetite and performance status.  She is  also using her pain medication much less frequently and wonders whether she might begin to taper from her twice a day MS Contin.    Patient is seen back 3/8/2023 due for ongoing pembrolizumab.  She continues on low-dose prednisone 10 mg daily and has noted significant improvement in her energy and appetite with this.  She is reluctant to taper this any further we discussed that it is fine for her to stay on daily dosing.    She did try to taper her pain medication going down to just MS Contin 15 mg every 12 hours while holding her hydrocodone.  However over the last few days she has had some intermittent pain in the left upper back alleviated with hydrocodone 2-3 times a day.  She currently is denying any pain.  Monitor.    She is next evaluated 3/29/2023 for ongoing Keytruda.  Evidently her pain medication was sent to the wrong pharmacy and will need to be represcribed today-long-acting MS Contin.  Otherwise she is doing reasonably well at present.    Patient seen 5/31/2023 with gradual development of left shoulder and left scapular pain.  Concurrent CT chest, abdomen and pelvis demonstrate interval increasing consolidation left upper lobe with extension anterior to the left upper ribs with sclerosis anterior left second rib.  This is suspicious for worsening malignancy.  Plans were made for subsequent PET/CT where the patient's pain medications were adjusted.PET/CT 6/5/2023 reveals progression of disease in left upper thorax, left supraclavicular adenopathy new metastasis left posterior fourth ribs, no evidence of metastatic disease in the abdomen or pelvis.    The patient is seen with her son and daughter 6/12/2023 and it is clear that she is not developing a Pancoast like syndrome with progression of her malignancy in the left upper thorax and associated symptoms.  We have discussed discontinuance of her immunotherapy and moving to targeted therapy with dabrafenib and trametinib as we also request radiation  therapy repeat consultation and try to manage her pain more effectively.    Patient seen 7/5/2023 with plans to start palliative radiotherapy and to initiate concurrently dabrafenib and trametinib.    As a result of toxicity (nausea and vomiting) the patient was taken off of dabrafenib and trametinib when seen 7/21/2023, given additional IV hydration and further supportive care.  Plans were made for follow-up on recovery to determine as to whether to redose the medications.    Unfortunately she required admission 7/25-30/2023 with failure to thrive.  Subsequent assessments including blood cultures were negative and patient was treated briefly with IV antibiotic therapy, started PT and OT, medications adjusted for hypotension and treated symptomatically for nausea and vomiting.  She was able to improve enough to be discharged home as she continued radiation therapy started 7/17/2023 and completed 7/31/2023 to the left chest wall.    She is next seen back 8/4/2023.  We have reviewed that she had been on dabrafenib and trametinib at 150 mg p.o. every 12 hours and 2 mg p.o. daily respectively and dosing could be changed considerably such as 50 mg twice daily and 0.5 mg daily.  Determination made to have her undergo repeat scans at 4 weeks and review her response to radiation therapy as we make application for lower dose targeted therapy, addition of Remeron p.o. nightly, tapering of her MS Contin to daily rather than twice daily, use of low-dose Ativan to undergo scans.    Subsequent scans 8/25/2023, fortunately, with stable left apical mass with mediastinal chest wall involvement unchanged 1.4 cm supraclavicular lymph node.  No new findings of metastatic disease.    The patient is seen back 9/8/2023.  Fortunately she is improved dramatically off therapy and is gratified that his studies have not worsened in any substantial way.  We have discussed both her scan reports and echocardiogram that does not show significant  reduction of her ejection fraction.  As result of her current stable status we have asked her to restart Mekinist at 0.5 mg daily and Tafinlar at 50 mg twice daily to be advanced as tolerated.  Patient seen 10/16/2023 with follow-up chest x-ray planned and CT of chest abdomen pelvis at 5 weeks -approximately 3 months of therapy.    The patient required admission 11/14 - 11/16/2023 having presented with shortness of breath and found to be pulmonary edema with CBC, lactate and procalcitonin all normal.  There was a concern that her chemotherapy agents could have produced her cardiomyopathy and these were stopped 11/14/2023.  She was diuresed and echocardiogram demonstrated LV SF mildly decreased at 39.2% with GLS of -11.1%, left ventricular cavity mildly dilated, left ventricular wall thickness consistent with mild concentric hypertrophy, left ventricular global hypokinesis, aortic valve abnormal with moderate calcification, valve was trileaflet, trace tricuspid valve regurgitation with right VSP at less than 35 mmHg.  The findings for LV systolic function, diastolic function and global longitudinal LV strain had all worsened.    CTA of chest 11/14/2023 demonstrating confluent soft tissue mass possibly measuring larger in current study at 6.5 x 5.0 previously 4.9 x 4.8, left supraclavicular node to decrease in size measuring 1.1 cm previously 1.4 cm, extensive bilateral interstitial and groundglass infiltrates.      The patient is seen 11/26/2023.  As per the discharge we are requested to have a BMP and CMP assessed.  She is seen with her son and daughter in a lengthy conversation held about her recent hospitalization with CHF-cardiomyopathy felt elicited, in part, from her targeted therapy with her Mekinist and Tafinlar discontinued altogether.    The patient is relatively stable currently having continued her diuretics and to be seen in cardiology in follow-up.  At the time of this dictation her CBC is found to be  essentially normal with mild leukocytosis, CMP normal except for mildly elevated glucose at 125 and BNP reduced to 6126 from 9763.  She feels well with no additional shortness of breath chest pain lower extremity edema.  In reviewing the the possible treatment options she is next best treated with single agent chemotherapy moving to Alimta.    Patient seen 12/4/2023 with plans to initiate Alimta chemotherapy-cycle 1 day 1    The patient required hospitalization 1/18-21/24 presenting with increasing shortness of breath, findings of chronic respiratory failure, positive for rhinovirus on admission.  She is treated with supportive treatments including for previous COVID infection as well as antibiotic therapies.  She completed 5 days of Augmentin, continue nebulizer and breathing treatments.  Upon discharge she rescheduled appointments though was seen by cardiology 1/23/2024 not felt to be in heart failure, treated supportively for epistaxis.    There was concern as to whether she should continue treatment and she has seen back in office 1/29/2024 to discuss potential options.  She is seen with her son and daughter both indicating that she drops her O2 saturation quite quickly and is very inactive as result of difficulty with rapidly becoming shortness of breath and weak when she tries to move.  This is led to a lengthy conversation about the extent of her disease including both her cardiovascular status and her respiratory status.  She is worsening quickly and is not, currently, a candidate for chemotherapy.    The patient is doing poorly enough that we have had a cristy conversation today about end-of-life concerns.  She is not interested in hospice at this point but hopes to improve further.  The patient had follow-up with pulmonary medicine.  She was seen 2/9/2024 with groundglass infiltrates since November 2023 suggestive of pneumonitis and he had restarted prednisone at 40 mg daily.    A repeat CT of chest  2/26/2024 revealed decreased consolidation in superior segment right lower lobe, stable consolidation in the left upper lobe and apex, stable coarsened interstitial lung opacities elsewhere, stable soft tissue mass along the left upper mediastinum, small pericardial effusion, no pleural effusion, no clear abnormalities in the liver, stable left renal cyst and no acute bony abnormality.    The patient is seen with her son and both indicate that her general performance status has improved.  They are concerned about her response to chemotherapy previously and, though we have scheduled her for chemotherapy, are not certain they wish to proceed.  Now with her improved scan we could follow her for period of time and then try to reevaluate offering chemotherapy potentially with Alimta.        Past Medical History:   Diagnosis Date    Allergic rhinitis     Aneurysm     Asthma     Cancer of floor of mouth 2014    Cerebral aneurysm     COPD (chronic obstructive pulmonary disease)     COVID 2020    Esophageal reflux     History of adenocarcinoma of lung     History of anemia     History of carcinoma in situ of skin     History of lung cancer     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 from [facility]    History of squamous cell carcinoma     Tongue    Hyperlipidemia     Hypertension     since early 60s    Intractable back pain 11/29/2022    Low vitamin B12 level     Lung cancer     Systolic CHF, acute 11/14/2023    Thyromegaly     UTI (urinary tract infection)     Vitamin D deficiency         Past Surgical History:   Procedure Laterality Date    BRONCHOSCOPY Bilateral 10/17/2022    Procedure: BRONCHOSCOPY WITH FLUORO with biopsy and BAL;  Surgeon: India Flores MD;  Location: [facility] ENDOSCOPY;  Service: Pulmonary;  Laterality: Bilateral;  PRE/POST - lung mass    CHOLECYSTECTOMY  1999    COLONOSCOPY      EMBOLIZATION CEREBRAL Left 06/29/2022    Procedure: EMBOLIZATION CEREBRAL left posterior  communicating artery aneurysm;  Surgeon: Bradley Dowell MD;  Location: [facility] HYBRID OR 18/19;  Service: Interventional Radiology;  Laterality: Left;    EYE SURGERY  11/24/2020    Took a muscle out of right eye    GLOSSECTOMY PARTIAL      less than one half tongue    LUNG SURGERY  2012    ORAL LESION EXCISION/BIOPSY      June and August 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    TUBAL ABDOMINAL LIGATION  1970    VENOUS ACCESS DEVICE (PORT) INSERTION N/A 12/12/2022    Procedure: MEDIPORT PLACEMENT;  Surgeon: Jason Villaseñor MD;  Location: [facility] MAIN OR;  Service: Vascular;  Laterality: N/A;        Current Outpatient Medications on File Prior to Visit   Medication Sig Dispense Refill    albuterol (PROVENTIL) (2.5 MG/3ML) 0.083% nebulizer solution Inhale the contents of 1 vial by nebulization Every 4 (Four) Hours As Needed for Wheezing. 90 mL 0    apixaban (ELIQUIS) 2.5 MG tablet tablet Take 1 tablet by mouth 2 (Two) Times a Day. 60 tablet 3    arformoterol (BROVANA) 15 MCG/2ML nebulizer solution USE 2 ML VIA NEBULIZER TWICE DAILY      atorvastatin (LIPITOR) 20 MG tablet TAKE 1 TABLET EVERY NIGHT 90 tablet 1    budesonide (PULMICORT) 0.5 MG/2ML nebulizer solution INHALE 2 MLS VIA NEBULIZER EVERY 12 HOURS      budesonide 0.5 MG/2ML suspension 0.5 mg, arformoterol 15 MCG/2ML nebulizer solution 15 mcg Inhale 1 nebule 2 (Two) Times a Day.      cetirizine (zyrTEC) 10 MG tablet Take 1 tablet by mouth Daily. Indications: Perennial Allergic Rhinitis      Cholecalciferol (Vitamin D3) 50 MCG (2000 UT) tablet Take 1,000 Units by mouth Daily. Indications: Vitamin D Deficiency      Cyanocobalamin (VITAMIN B12 PO) Take 1,000 mcg by mouth Daily. Indications: vitamin b12 deficiency       empagliflozin (Jardiance) 10 MG tablet tablet Take 1 tablet by mouth Daily. 30 tablet 5    folic acid (FOLVITE) 1 MG tablet Take 1 tablet by mouth Daily. Start at least 7 days prior to chemotherapy until at least 3 weeks after all chemotherapy. 30  tablet 6    furosemide (LASIX) 40 MG tablet Take 1.5 tablets by mouth Daily. Indications: Cardiac Failure, High Blood Pressure Disorder 45 tablet 2    ipratropium (ATROVENT) 0.06 % nasal spray 2 sprays into the nostril(s) as directed by provider 4 (Four) Times a Day. 15 mL 0    ipratropium-albuterol (DUO-NEB) 0.5-2.5 mg/3 ml nebulizer Inhale the contents of 1 vial by nebulization 2 (Two) Times a Day As Needed for Wheezing. 180 mL 0    metoprolol succinate XL (TOPROL-XL) 25 MG 24 hr tablet Take 1 tablet by mouth 2 (Two) Times a Day. Indications: High Blood Pressure Disorder 60 tablet 2    mirtazapine (REMERON) 7.5 MG tablet TAKE 1 TABLET BY MOUTH EVERY NIGHT AT BEDTIME 30 tablet 1    montelukast (SINGULAIR) 10 MG tablet Take 1 tablet by mouth Every Night. Indications: Hayfever, Perennial Allergic Rhinitis 90 tablet 3    O2 (OXYGEN) Inhale 2 L/min Continuous.      pantoprazole (PROTONIX) 40 MG EC tablet TAKE 1 TABLET BY MOUTH EVERY MORNING 30 tablet 0    potassium chloride (K-DUR,KLOR-CON) 10 MEQ CR tablet Take 2 tablets by mouth Daily. 1 tablet in the PM  Indications: Low Amount of Potassium in the Blood 90 tablet 2    predniSONE (DELTASONE) 20 MG tablet TAKE 2 TABLETS BY MOUTH DAILY. DO NOT. STOP ABRUPTLY      sacubitril-valsartan (Entresto) 24-26 MG tablet Take 0.5 tablets by mouth 2 (Two) Times a Day. 30 tablet 3    zolpidem (AMBIEN) 5 MG tablet TAKE 1 TABLET BY MOUTH EVERY NIGHT AT BEDTIME AS NEEDED FOR SLEEP 30 tablet 0     No current facility-administered medications on file prior to visit.        ALLERGIES:    Allergies   Allergen Reactions    Sulfa Antibiotics Rash        Social History     Socioeconomic History    Marital status:    Tobacco Use    Smoking status: Former     Types: Cigarettes     Quit date: 2015     Years since quittin.0     Passive exposure: Never    Smokeless tobacco: Never    Tobacco comments:     less than a pack per day, no smoking since , Quit 2015   Vaping Use     "Vaping Use: Never used   Substance and Sexual Activity    Alcohol use: Not Currently     Comment: Socially -A few times a month    Drug use: No    Sexual activity: Defer     Family History   Problem Relation Age of Onset    Ovarian cancer Mother          at age 27    No Known Problems Father     Ovarian cancer Sister     Colon cancer Brother     No Known Problems Other     Malig Hyperthermia Neg Hx         Review of Systems  ROS as per HPI     Objective      Vitals:    24 0931   BP: 126/57   Pulse: 111   Resp: 16   Temp: 96.1 °F (35.6 °C)   TempSrc: Temporal   SpO2: 92%  Comment: pt on 4 liters O2   Weight: 45.4 kg (100 lb)   Height: 157.5 cm (62.01\")   PainSc: 0-No pain                   3/1/2024     9:32 AM   Current Status   ECOG score 0      Physical Exam  Vitals reviewed.   Constitutional:       General: She is not in acute distress.     Appearance: Normal appearance. She is well-developed.   HENT:      Head: Normocephalic and atraumatic.      Mouth/Throat:      Pharynx: No oropharyngeal exudate.   Eyes:      Pupils: Pupils are equal, round, and reactive to light.   Cardiovascular:      Rate and Rhythm: Normal rate and regular rhythm.      Heart sounds: Normal heart sounds. No murmur heard.  Pulmonary:      Effort: Pulmonary effort is normal. No respiratory distress.      Breath sounds: Normal breath sounds. No wheezing, rhonchi or rales.   Abdominal:      General: Bowel sounds are normal. There is no distension.      Palpations: Abdomen is soft.   Musculoskeletal:         General: No swelling. Normal range of motion.      Cervical back: Normal range of motion.   Skin:     General: Skin is warm and dry.      Findings: No rash.   Neurological:      Mental Status: She is alert and oriented to person, place, and time.         Physical exam preformed and reviewed. No changes noted from previous exam. Henry Escobar MD ; 2024      RECENT LABS:  Results from last 7 days   Lab Units " 03/01/24  0911   WBC 10*3/mm3 13.62*   NEUTROS ABS 10*3/mm3 12.39*   HEMOGLOBIN g/dL 12.9   HEMATOCRIT % 41.3   PLATELETS 10*3/mm3 193                         Assessment & Plan     1.  Carcinoma of the lung  May 2015, status post previous left upper lobectomy for carcinoma of the lung.    2.  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   She underwent laryngoscopy 8/18/2021 with right base of tongue without evidence of lesions or masses in the region.    3.  Moderately differentiated adenocarcinoma of the lung.  CT of the chest 6/16/2021 demonstrated postsurgical changes, 8 mm irregular nodule medial segment of right lower lobe and diffuse changes of emphysema.    She is seen in thoracic clinic with findings suspicious for new malignancy and concern of the patient being a poor operative candidate.    7/13/2021 PET CT demonstrated ill-defined avidity and soft tissue left aspect of the mediastinum, 1 cm hypermetabolic pulmonary nodule and asymmetric thickening involving the right base of tongue as well as subtle uptake in the L1 vertebral body.    This patient's case was discussed in thoracic clinic and plans were made to request an MRI of the lumbar spine, obtain a biopsy of the mediastinal involvement of the left had the patient reviewed by ENT.  Biopsy 8/13/2021 found to be consistent with invasive moderately differentiated adenocarcinoma with PD-L1 TPS score 40%.    7/24/2021 MRI of the lumbar spine revealed no evidence of metastatic disease though the patient did have multilevel degenerative disease throughout the lumbar spine.    Her findings were consistent with 2 separate lesions including a nodule in the superior segment of the right lower lobe and a mass in the upper portion of the left chest with the left chest lesion biopsy positive for adenocarcinoma.  The consensus was that  these were to separate-synchronous primaries.  Stereotactic radiation each lesions were felt to be the appropriate way to proceed and the patient was referred to radiation therapy.  The patient was referred for radiation therapy and she was able to proceed with SBRT to the right lung at primary #1 with 5 treatments at 1000 cGy per fraction in the left lung primary similarly at 1000 cGy per fraction x5.  Her treatment ranged between 8/31-9/13/2021.    Her guardant 360 did not determine any new abnormalities but her Caris-MI profile-does reveal sensitivity to immunotherapy as well as a BRAF mutation.  PET/CT 11/23/2021 demonstrates decrease in size and avidity of the previously noted intensely FDG avid nodules left lobectomy operative site and right lower lobe.  Surrounding groundglass and pulmonary opacification is consistent with postradiation changes, a few new subcentimeter pulmonary nodules are present in the right upper lobe and indeterminant, stable subcentimeter hepatic lesion is below PET resolution no other findings of FDG-avid disease are seen in neck abdomen or pelvis.  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  There is evolution of presumed postradiation changes in the right midlung with soft tissue mass within the left lumpectomy operative bed minimally decreased in size.  There is a sub-6 mm nodule in the right upper lobe not definitively seen, indeterminate and an indeterminate left renal lesion at 1.5 cm unchanged from 7/13/2021.  Admission 10/14 - 10/18/2022 presenting with shortness of breath and cough that was nonproductive.  She had been on oral antibiotics and did not improved and was admitted for therapy for apparent pneumonia.  This eventually led to bronchoscopy after initial CT revealed postsurgical changes, new masslike 6.7 cm area of consolidation anterior left lung raising  concern for potential malignancy and a follow-up PET/CT planned.   Bronchoscopy and biopsy left lower lung failed to show evidence of malignancy.    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  There is intensely pleural-based avidity within the left lower lobe thought to be metastatic disease with postradiation of the left apex as well.  There is a small focus of uptake in the right hilum grossly unchanged in size and post radiation changes in the right lower lobe.  There is indeterminate density left renal that was grossly unchanged.    Patient's guardant 360 returned as again significant for BRAF V600E and the potential use of BRAF inhibitor/MET inhibitor dabrafenib and trametinib.  Additional information [id number] and use of alpelisib.  The patient now presents back 12/14/2022 to initiate therapy- initially with immunotherapy considering Caris study with PD-L1 TPS of 70% on 22 C3 and 28-8 assays.  Single agent Keytruda initiated 12/14/2022 2/8/2023 CT of the chest, abdomen pelviswith consolidation and masslike pleural thickening in the left apex and upper lung index areas have decreased somewhat.  There is no evidence of metastatic disease otherwise and a few indeterminate left renal lesions are stable.  After lengthy discussion with the patient and her daughter as to the stability to mild improvement with immunotherapy it is agreed that we would continue treatment at this point.  Proceed today, 4/19/2023 with cycle 7 pembrolizumab which is continued every 3 weeks  The patient proceeded to 8 cycles of pembrolizumab and underwent follow-up chest CT chest, abdomen pelvis revealing evolution of dense consolidation left upper lobe and extension of soft tissue mass anterior to left upper ribs with increase sclerosis anterior  left second rib.  This was concerning for progression of disease versus radiation change and the patient was scheduled for subsequent PET/CT.  PET/CT 6/5/2023  reveals progression of disease in left upper thorax, left supraclavicular adenopathy new metastasis left posterior fourth ribs, no evidence of metastatic disease in the abdomen or pelvis.  Patient seen 6/12/2023 with plans to move her Norco to every 4 hours, discontinue Keytruda, make application for dabrafenib and trametinib at 150 mg p.o. every 12 hours and 2 mg p.o. daily respectively.  Patient referred for radiation therapy consultation concurrently.  Last dose of Keytruda 5/31/2023.  6/23/2023: Patient seen for triage visit.  She has not yet started dabrafenib or trametinib, she has not yet received the medications.  Unfortunately she has been experiencing uncontrolled nausea/vomiting as further detailed below.   Patient seen 6/27/2023 reporting that her nausea has resolved.  She was unable to complete the MRI of the brain however Dr. Escobar did review the images patient did have and there was no evidence of edema or metastatic disease.  Patient can start her new oral medication once she receives the medication.  Patient seen 7/5/2023 with plans to start palliative radiotherapy x10 fractions and initiate dabrafenib and trametinib as soon as medications received.  7/17/2023 patient initiated radiation with 10 fractions planned.  Patient has received dabrafenib and trametinib.  Brought in for triage visit due to nausea associated with dosing.  She is premedicating with ondansetron however only waiting 15 minutes.  We discussed today that she needs to wait at least 30 minutes to 1 hour prior to taking the dabrafenib and trametinib.  The patient will do so.  We discussed she could also take this again if she feels the need 8 hours later for nausea control.  If she is having breakthrough nausea then she should call our office.  I have encouraged her to  utilize electrolyte drinks.  She will get 1L normal liter normal saline in the office today.She was not nauseas while in the office so we did not give additional nausea medication.  We will have her return in 1 week repeat labs, review by nurse practitioner, and possible IV fluids.  I stressed the importance of calling sooner if she finds that the premedication is not controlling her nausea at which time we would have her come in for additional IV fluids and evaluation.  She is continuing daily radiation this week.  Case was discussed with Dr. Escobar today.  7/21/2023: Patient returns for short interval follow-up due to very poor appetite and sleeping the majority of the day.  Her nausea has been improved with premedicating 30 minutes prior to dabrafenib and trametinib.  She however has been sleeping the majority of the day and is not eating when she is awake.  She does report taking ensures but she is only sipping on these.  Have given her samples of some of the office today and encouraged her to drink when while she is here.  Her performance status continues to decline and her daughter states that she was fixing dinner nightly just 2 weeks ago.  With performance status of 3 today I discussed the case with Dr. Escobar and we will hold her dabrafenib and  TRAMETINIB.  Her liver enzymes are elevated today as well.  We will monitor this next week and have her evaluated by Dr. Escobar at which time we could consider restarting her dabrafenib and trametinib at reduced doses pending performance status and laboratory evaluation.  Unfortunately she required admission 7/25-30/2023 with failure to thrive.  Subsequent assessments including blood cultures were negative and patient was treated briefly with IV antibiotic therapy, started PT and OT, medications adjusted for hypotension and treated symptomatically for nausea and vomiting.  She was able to improve enough to be discharged home as she continued radiation therapy  started 7/17/2023 and completed 7/31/2023 to the left chest wall.  She is next seen back 8/4/2023.  Lengthy discussion as to reevaluation   Plans made for CT chest, abdomen, pelvis in 4 weeks  Patient seen 8/21/2023 with complaints of worsening fatigue and shortness of breath.  Patient scheduled for follow up CT scans this Friday. Lungs clear on exam,  Sats 97%.  Hgb stable at 11.7.  Will check BNP today.  Discussed may be related to disease and will need to see what scan shows.   Subsequent scans 8/25/2023, fortunately, with stable left apical mass with mediastinal chest wall involvement unchanged 1.4 cm supraclavicular lymph node.  No new findings of metastatic disease.  The patient is seen back 9/8/2023.  Fortunately she is improved dramatically off therapy and is gratified that his studies have not worsened in any substantial way.  We have discussed both her scan reports and echocardiogram that does not show significant reduction of her ejection fraction.  As result of her current stable status we have asked her to restart Mekinist at 0.5 mg daily and Tafinlar at 50 mg twice daily  9/22/2023 tolerating Mekinist 0.5 mg daily and tafinlar 50 mg twice daily quite well. Continues on prednisone 10 mg daily which will now be reduced to 5 mg daily when seen 10/16/2023  Plans made to continue current dosing of Mekinist and Tafinlar and repeat evaluation with chest x-ray 10/16 and repeat CT chest abdomen pelvis in 5 weeks, MD in 6 weeks.  The patient required admission 11/14 - 11/16/2023 having presented with shortness of breath and found to be pulmonary edema with CBC, lactate and procalcitonin all normal.  There was a concern that her chemotherapy agents could have produced her cardiomyopathy and these were stopped 11/14/2023.  She was diuresed and echocardiogram demonstrated LV SF mildly decreased at 39.2% with GLS of -11.1%, left ventricular cavity mildly dilated, left ventricular wall thickness consistent with mild  concentric hypertrophy, left ventricular global hypokinesis, aortic valve abnormal with moderate calcification, valve was trileaflet, trace tricuspid valve regurgitation with right VSP at less than 35 mmHg.  The findings for LV systolic function, diastolic function and global longitudinal LV strain had all worsened.  CTA of chest 11/14/2023 demonstrating confluent soft tissue mass possibly measuring larger in current study at 6.5 x 5.0 previously 4.9 x 4.8, left supraclavicular node to decrease in size measuring 1.1 cm previously 1.4 cm, extensive bilateral interstitial and groundglass infiltrates.  The patient is seen 11/26/2023.  As per the discharge we are requested to have a BMP and CMP assessed.  She is seen with her son and daughter in a lengthy conversation held about her recent hospitalization with CHF-cardiomyopathy felt elicited, in part, from her targeted therapy with her Mekinist and Tafinlar discontinued altogether.  The patient is relatively stable currently having continued her diuretics and to be seen in cardiology in follow-up.  At the time of this dictation her CBC is found to be essentially normal with mild leukocytosis, CMP normal except for mildly elevated glucose at 125 and BNP reduced to 6126 from 9763.  She feels well with no additional shortness of breath chest pain lower extremity edema.  In reviewing the the possible treatment options she is next best treated with single agent chemotherapy moving to Alimta.  Patient proceeded through teaching and presents back 12/4/2023 to initiate chemotherapy with Alimta-cycle 1 day 1  Seen 12/18/2023 for toxicity check, overall has tolerated well.  Continues to follow closely with cardiology.  The patient required hospitalization 1/18-21/24 presenting with increasing shortness of breath, findings of chronic respiratory failure, positive for rhinovirus on admission.  She is treated with supportive treatments including for previous COVID infection as well  as antibiotic therapies.  She completed 5 days of Augmentin, continue nebulizer and breathing treatments.  Upon discharge she rescheduled appointments though was seen by cardiology 1/23/2024 not felt to be in heart failure, treated supportively for epistaxis.  There was concern as to whether she should continue treatment and she has seen back in office 1/29/2024 to discuss potential options.  She is seen with her son and daughter both indicating that she drops her O2 saturation quite quickly and is very inactive as result of difficulty with rapidly becoming shortness of breath and weak when she tries to move.  This is led to a lengthy conversation about the extent of her disease including both her cardiovascular status and her respiratory status.  She is worsening quickly and is not, currently, a candidate for chemotherapy.  The patient is doing poorly enough that we have had a cristy conversation today-1/29/2024 about end-of-life concerns.  She is not interested in hospice at this point but hopes to improve further.  At this point holding chemotherapy.  She was seen 2/9/2024 with groundglass infiltrates since November 2023 suggestive of pneumonitis and he had restarted prednisone at 40 mg daily.  A repeat CT of chest 2/26/2024 revealed decreased consolidation in superior segment right lower lobe, stable consolidation in the left upper lobe and apex, stable coarsened interstitial lung opacities elsewhere, stable soft tissue mass along the left upper mediastinum, small pericardial effusion, no pleural effusion, no clear abnormalities in the liver, stable left renal cyst and no acute bony abnormality  The patient is seen with her son and both indicate that her general performance status has improved.  They are concerned about her response to chemotherapy previously and, though we have scheduled her for chemotherapy, are not certain they wish to proceed.  Now with her improved scan we could follow her for period of time  and then try to reevaluate offering chemotherapy potentially with Alimta.      4.  Worsening back pain  Admission 11/28/2022 through 12/1/2022.  MRI of the cervical and thoracic spine fortunately failed to demonstrate metastatic disease.  Moderate degenerative changes noted which could cause radiculopathy.  Medication altered to include MS Contin 15 mg every 12 hours and Norco for breakthrough pain  She reports today her pain is adequately controlled  Patient with increasing pain 5/31/2023 with pain level of 6.  MS Contin was increased to 50 mg p.o. every 8 hours and Norco 10/325 #101 p.o. every 6 hours to move to every 4 hours as needed-E scribed to pharmacy  Patient seen 6/12/2023 with Norco moved to every 4 hours.  6/23/2023: Seen for triage visit.  Has been using oxycodone 20 mg every 3 hours as needed for pain.  Reports she has not been using the hydrocodone 10/325.  Was experiencing dizziness, so reduced her oxycodone use to half a tablet every 3 hours as needed.  Reports pain is uncontrolled during the night so she is having to wake at night time to take pain medicine.  They are questioning resuming long-acting pain medication, as she is waking throughout the night to take pain medicine.  She has already been prescribed MS Contin and has this available at home, did let her know it would be okay to resume this.  Assess 7/5/2023 with pain reduced to 2/10.  Plan to continue current therapy  Darlene Pfeiffer reports a pain score of 0.  Given her pain assessment as noted, treatment options were discussed and the following options were decided upon as a follow-up plan to address the patient's pain: continuation of current treatment plan for pain. Currently not requiring pain medication.   Refilled both the patient's Remeron and Ambien 10/16/2023  12/18/2023 requesting a refill of her Ambien.    5.  Poor appetite and malnutrition  Placed on prednisone 10 mg daily 1/4/2023 with significant improvement in her  symptoms  Weight is stable today at just below 106 pounds  Patient assessed 6/12/23 with possible gummy therapy.  Stable performance status including weight and appetite 7/5/2023  Weight has declined as of 7/17/2023 due to nausea and vomiting that started this past weekend.  After further discussion the patient did stop her prednisone 10 mg while she was not feeling well.  We discussed today the importance of continuing this as it can help with her nausea and appetite and therefore she will restart tomorrow.  7/21/2023: Weight is stable today though albumin is declining at 3.  Patient is sleeping the majority of the day and not eating.  Yesterday she had a small piece of chicken and that is all.  She states she is drinking ensures however her daughter thinks that she is just sipping on these and not completing them.  Hopefully holding her dabrafenib and trametinib will be helpful with her being awake more and appetite.  I encouraged her to make sure she is taking her prednisone daily at 10 mg daily.  Remeron 7.5 mg p.o. nightly E scribed to pharmacy  9/22/2023 weight is up to 98 pounds.  Further improvement in weight 10/16/2020 3 to 104 pounds, continue Remeron at current dose, prednisone reduced to 5 mg daily.  Patient placed on prednisone from pulmonary medicine for pneumonitis, seen 3/1/2420 mg daily, requesting continuance of this over the next month and then drop to 10 mg over 2-week, then 2 5 mg over 2 weeks at which time she will be seen back after repeat scans.    6.  Uncontrolled nausea/vomiting  started after discontinuing prednisone and starting THC Gummies.  Started to experience dizziness with the THC Gummies.  Discontinue THC Gummies and dizziness improved, however she has remained nauseated now.  She has been utilizing Zofran with improvement, however today was unable to keep Zofran tablets down due to nausea/vomiting.  She will resume prednisone 10 mg daily.  She will receive 1 L IV normal saline in  clinic today 10 mg IV Compazine.  We will also send prescription for Phenergan suppository, in case she is not able to keep Zofran down in the future.  Will also send for stat MRI brain since patient is now experiencing uncontrolled nausea vomiting and has recently had progression of her cancer as seen on PET from 6/5/2023.  MRI brain was performed without contrast, even though contrast was ordered.  The patient also did not stay for entire exam to be completed.  Exam limited for assessment of metastatic disease.  Attempted to call patient to review results, however no answer, did leave detailed voicemail.  6/27/2023 patient reports that her nausea has resolved.  She did not complete the MRI however the images which were done showed no evidence of edema or metastatic disease.  Nausea reoccurred when seen on 7/17/2023 over this past weekend after initiation of dabrafenib and trametinib.  Patient was premedicating with ondansetron however only giving herself about 15 minutes and I encouraged her to give herself at least 30 minutes to 1 hour prior to taking the medications.  She will notify our office if this is not helpful.  7/21/2023: Premedication with ondansetron 30 minutes prior to dabrafenib and trametinib has been helpful.  Patient however has had some vomiting episodes directly after taking her medications where she has not had time to actually swallow them.  She denies difficulty swallowing however.  We will continue to monitor this.  10/16/2023 not an issue today.  1/18/2023, 1/29/2024, 3/1/2024 not an issue today.    7.  Hyperkalemia-  potassium 6/23/2023 3.1.  She has been having uncontrolled nausea, we will hold off on starting oral potassium replacement as it is unlikely she will tolerate that at this time.  Will recommend she increase oral potassium in her diet.  6/27/2023 potassium 3.0.  Nausea has resolved.  Patient will be given 40 M EQ p.o. potassium in the office and she will be started on 20 mEq  daily of p.o. potassium.  Assessed 7/5/2023 with potassium 4.4  7/17/2023 potassium normal at 3.7  7/21/2023: Potassium 3.1, patient not taking potassium supplements at home because she does not like the big pill.  Changed to potassium chloride 10 mill equivalents, 2 tablets every a.m. as these will be smaller.   9/22/2023 potassium is 3.7  Repeat assessment 11/27/2023 with potassium of 4.2  12/4/2023 potassium was 4.0.  1/29/2024-potassium 4.2, reassessment 3/1/2024 potassium 4.1    8.Acute systolic heart failure -cardiomyopathy   Secondary to chemotherapy drugs during admission 11/14-16/2023 with Mekinist and Tafinlar discontinued  Diuretics continued postdischarge, cardiology follow-up planned       Plan:  *9 weeks CT chest, abdomen, pelvis    *10 weeks MD follow-up, possible into    *Prednisone taper as described above    I spent 60 minutes caring for Darlene on this date of service. This time includes time spent by me in the following activities: preparing for the visit, reviewing tests, obtaining and/or reviewing a separately obtained history, performing a medically appropriate examination and/or evaluation, counseling and educating the patient/family/caregiver, ordering medications, tests, or procedures, referring and communicating with other health care professionals, documenting information in the medical record, independently interpreting results and communicating that information with the patient/family/caregiver, and care coordination.

## 2024-03-01 ENCOUNTER — INFUSION (OUTPATIENT)
Dept: ONCOLOGY | Facility: HOSPITAL | Age: 84
End: 2024-03-01
Payer: MEDICARE

## 2024-03-01 ENCOUNTER — OFFICE VISIT (OUTPATIENT)
Dept: ONCOLOGY | Facility: CLINIC | Age: 84
End: 2024-03-01
Payer: MEDICARE

## 2024-03-01 VITALS
HEART RATE: 111 BPM | BODY MASS INDEX: 18.4 KG/M2 | HEIGHT: 62 IN | SYSTOLIC BLOOD PRESSURE: 126 MMHG | RESPIRATION RATE: 16 BRPM | TEMPERATURE: 96.1 F | OXYGEN SATURATION: 92 % | WEIGHT: 100 LBS | DIASTOLIC BLOOD PRESSURE: 57 MMHG

## 2024-03-01 DIAGNOSIS — C34.11 MALIGNANT NEOPLASM OF UPPER LOBE OF RIGHT LUNG: ICD-10-CM

## 2024-03-01 DIAGNOSIS — C34.11 MALIGNANT NEOPLASM OF UPPER LOBE OF RIGHT LUNG: Primary | ICD-10-CM

## 2024-03-01 DIAGNOSIS — Z45.2 FITTING AND ADJUSTMENT OF VASCULAR CATHETER: Primary | ICD-10-CM

## 2024-03-01 LAB
ALBUMIN SERPL-MCNC: 3.7 G/DL (ref 3.5–5.2)
ALBUMIN/GLOB SERPL: 1.5 G/DL
ALP SERPL-CCNC: 62 U/L (ref 39–117)
ALT SERPL W P-5'-P-CCNC: 6 U/L (ref 1–33)
ANION GAP SERPL CALCULATED.3IONS-SCNC: 13.5 MMOL/L (ref 5–15)
AST SERPL-CCNC: 17 U/L (ref 1–32)
BASOPHILS # BLD AUTO: 0.01 10*3/MM3 (ref 0–0.2)
BASOPHILS NFR BLD AUTO: 0.1 % (ref 0–1.5)
BILIRUB SERPL-MCNC: 0.4 MG/DL (ref 0–1.2)
BUN SERPL-MCNC: 24 MG/DL (ref 8–23)
BUN/CREAT SERPL: 33.8 (ref 7–25)
CALCIUM SPEC-SCNC: 9.7 MG/DL (ref 8.6–10.5)
CHLORIDE SERPL-SCNC: 100 MMOL/L (ref 98–107)
CO2 SERPL-SCNC: 30.5 MMOL/L (ref 22–29)
CREAT SERPL-MCNC: 0.71 MG/DL (ref 0.57–1)
DEPRECATED RDW RBC AUTO: 65.7 FL (ref 37–54)
EGFRCR SERPLBLD CKD-EPI 2021: 85.5 ML/MIN/1.73
EOSINOPHIL # BLD AUTO: 0 10*3/MM3 (ref 0–0.4)
EOSINOPHIL NFR BLD AUTO: 0 % (ref 0.3–6.2)
ERYTHROCYTE [DISTWIDTH] IN BLOOD BY AUTOMATED COUNT: 17.6 % (ref 12.3–15.4)
GLOBULIN UR ELPH-MCNC: 2.4 GM/DL
GLUCOSE SERPL-MCNC: 264 MG/DL (ref 65–99)
HCT VFR BLD AUTO: 41.3 % (ref 34–46.6)
HGB BLD-MCNC: 12.9 G/DL (ref 12–15.9)
IMM GRANULOCYTES # BLD AUTO: 0.06 10*3/MM3 (ref 0–0.05)
IMM GRANULOCYTES NFR BLD AUTO: 0.4 % (ref 0–0.5)
LYMPHOCYTES # BLD AUTO: 0.74 10*3/MM3 (ref 0.7–3.1)
LYMPHOCYTES NFR BLD AUTO: 5.4 % (ref 19.6–45.3)
MCH RBC QN AUTO: 31.2 PG (ref 26.6–33)
MCHC RBC AUTO-ENTMCNC: 31.2 G/DL (ref 31.5–35.7)
MCV RBC AUTO: 100 FL (ref 79–97)
MONOCYTES # BLD AUTO: 0.42 10*3/MM3 (ref 0.1–0.9)
MONOCYTES NFR BLD AUTO: 3.1 % (ref 5–12)
NEUTROPHILS NFR BLD AUTO: 12.39 10*3/MM3 (ref 1.7–7)
NEUTROPHILS NFR BLD AUTO: 91 % (ref 42.7–76)
NRBC BLD AUTO-RTO: 0 /100 WBC (ref 0–0.2)
PLATELET # BLD AUTO: 193 10*3/MM3 (ref 140–450)
PMV BLD AUTO: 9.8 FL (ref 6–12)
POTASSIUM SERPL-SCNC: 4.1 MMOL/L (ref 3.5–5.2)
PROT SERPL-MCNC: 6.1 G/DL (ref 6–8.5)
RBC # BLD AUTO: 4.13 10*6/MM3 (ref 3.77–5.28)
SODIUM SERPL-SCNC: 144 MMOL/L (ref 136–145)
WBC NRBC COR # BLD AUTO: 13.62 10*3/MM3 (ref 3.4–10.8)

## 2024-03-01 PROCEDURE — 80053 COMPREHEN METABOLIC PANEL: CPT

## 2024-03-01 PROCEDURE — 36591 DRAW BLOOD OFF VENOUS DEVICE: CPT

## 2024-03-01 PROCEDURE — 25010000002 HEPARIN LOCK FLUSH PER 10 UNITS: Performed by: INTERNAL MEDICINE

## 2024-03-01 PROCEDURE — 85025 COMPLETE CBC W/AUTO DIFF WBC: CPT

## 2024-03-01 RX ORDER — BUDESONIDE 0.5 MG/2ML
INHALANT ORAL
COMMUNITY
Start: 2024-02-09

## 2024-03-01 RX ORDER — SODIUM CHLORIDE 0.9 % (FLUSH) 0.9 %
10 SYRINGE (ML) INJECTION AS NEEDED
Status: DISCONTINUED | OUTPATIENT
Start: 2024-03-01 | End: 2024-03-01 | Stop reason: HOSPADM

## 2024-03-01 RX ORDER — HEPARIN SODIUM (PORCINE) LOCK FLUSH IV SOLN 100 UNIT/ML 100 UNIT/ML
500 SOLUTION INTRAVENOUS AS NEEDED
Status: DISCONTINUED | OUTPATIENT
Start: 2024-03-01 | End: 2024-03-01 | Stop reason: HOSPADM

## 2024-03-01 RX ORDER — SODIUM CHLORIDE 0.9 % (FLUSH) 0.9 %
10 SYRINGE (ML) INJECTION AS NEEDED
OUTPATIENT
Start: 2024-03-01

## 2024-03-01 RX ORDER — HEPARIN SODIUM (PORCINE) LOCK FLUSH IV SOLN 100 UNIT/ML 100 UNIT/ML
500 SOLUTION INTRAVENOUS AS NEEDED
OUTPATIENT
Start: 2024-03-01

## 2024-03-01 RX ORDER — PREDNISONE 20 MG/1
TABLET ORAL
COMMUNITY
Start: 2024-02-09

## 2024-03-01 RX ORDER — ARFORMOTEROL TARTRATE 15 UG/2ML
SOLUTION RESPIRATORY (INHALATION)
COMMUNITY
Start: 2024-02-09

## 2024-03-01 RX ADMIN — Medication 500 UNITS: at 09:58

## 2024-03-01 RX ADMIN — Medication 10 ML: at 09:58

## 2024-03-01 NOTE — LETTER
March 1, 2024     Meredith Lea Kehrer, MD  [address]  [address]  [facility] [address]    Patient: Darlene Pfeiffer   YOB: 1940   Date of Visit: 3/1/2024     Dear Meredith Lea Kehrer, MD:       Thank you for referring Darlene Pfeiffer to me for evaluation. Below are the relevant portions of my assessment and plan of care.    If you have questions, please do not hesitate to call me. I look forward to following Darlene along with you.         Sincerely,        Henry Escobar MD        CC: MD Luz Chance Michael D., MD  03/01/24 1126  Sign when Signing Visit      REASON FOR FOLLOW-UP: Recurrent lung cancer.    History of Present Illness    The patient is a 83 y.o. female with the above-mentioned history who returned 9/22/2023 continuing on Mekinist 0.5 mg daily and Tafinlar at 50 mg twice daily.  She also continues on prednisone only taking 10 mg daily.  She reports that she is feeling quite good.  She is tolerating things well.  She has a decent appetite denies issues with nausea, vomiting, diarrhea, or constipation.  She denies any issues with increased shortness of breath.    Patient did see ENT Dr. Topete, who has ordered an ultrasound of her neck, which will be done at [facility] on the 27th.  She is also scheduled for a cerebral angiogram by Dr. Calvin on 29 September.      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  Dr. Dowell of neurosurgery saw the patient 10/12/2023 and felt the patient was stable without neurologic symptoms, yearly follow-up planned.  The patient had started seeing a new ENT physician leading to the referral back to neurosurgery.  When seen 10/16/2023 she is doing very well on her current Mekinist and Tafinlar doses having improved her weight, appetite and general performance  status.  We have discussed at least a chest x-ray today and repeat staging in the next 5 to 6 weeks as she continues her current dosing.    The patient required admission 11/14 - 11/16/2023 having presented with shortness of breath and found to be pulmonary edema with CBC, lactate and procalcitonin all normal.  There was a concern that her chemotherapy agents could have produced her cardiomyopathy and these were stopped 11/14/2023.  She was diuresed and echocardiogram demonstrated LV SF mildly decreased at 39.2% with GLS of -11.1%, left ventricular cavity mildly dilated, left ventricular wall thickness consistent with mild concentric hypertrophy, left ventricular global hypokinesis, aortic valve abnormal with moderate calcification, valve was trileaflet, trace tricuspid valve regurgitation with right VSP at less than 35 mmHg.  The findings for LV systolic function, diastolic function and global longitudinal LV strain had all worsened.    CTA of chest 11/14/2023 demonstrating confluent soft tissue mass possibly measuring larger in current study at 6.5 x 5.0 previously 4.9 x 4.8, left supraclavicular node to decrease in size measuring 1.1 cm previously 1.4 cm, extensive bilateral interstitial and groundglass infiltrates.    The patient is seen 11/26/2023.  As per the discharge we are requested to have a BMP and CMP assessed.  She is seen with her son and daughter in a lengthy conversation held about her recent hospitalization with CHF-cardiomyopathy felt elicited, in part, from her targeted therapy with her Mekinist and Tafinlar discontinued altogether.    The patient is relatively stable currently having continued her diuretics and to be seen in cardiology in follow-up.  At the time of this dictation her CBC is found to be essentially normal with mild leukocytosis, CMP normal except for mildly elevated glucose at 125 and BNP reduced to 6126 from 9763.  She feels well with no additional shortness of breath chest pain  lower extremity edema.  In reviewing the the possible treatment options she is next best treated with single agent chemotherapy moving to Alimta.    The patient proceeded to treatment teaching and seen back 12/4/2023.  She is ready to proceed with chemotherapy with fortunately a recent good performance status still in place.    Patient returns 12/18/2023 for toxicity check.  She started treatment with Alimta on 12/4/2023.  Patient states that she did have an episode following treatment where she felt extremely itchy however she took Benadryl which helped, and her symptoms resolved.  She has ongoing issues with fatigue.  She is being followed closely by cardio oncology.  She also reports being short of breath but denies chest pain, or swelling.  She does struggle with her appetite.  Otherwise she feels like she has tolerated the change in treatment well.      The patient required hospitalization 1/18-21/24 presenting with increasing shortness of breath, findings of chronic respiratory failure, positive for rhinovirus on admission.  She is treated with supportive treatments including for previous COVID infection as well as antibiotic therapies.  She completed 5 days of Augmentin, continue nebulizer and breathing treatments.  Upon discharge she rescheduled appointments though was seen by cardiology 1/23/2024 not felt to be in heart failure, treated supportively for epistaxis.    There was concern as to whether she should continue treatment and she has seen back in office 1/29/2024 to discuss potential options.  She is seen with her son and daughter both indicating that she drops her O2 saturation quite quickly and is very inactive as result of difficulty with rapidly becoming shortness of breath and weak when she tries to move.  This is led to a lengthy conversation about the extent of her disease including both her cardiovascular status and her respiratory status.  She is worsening quickly and is not, currently, a  candidate for chemotherapy.    The patient is doing poorly enough that we have had a cristy conversation today about end-of-life concerns.  She is not interested in hospice at this point but hopes to improve further.                ONCOLOGY HISTORY:      The patient is an 83-year-old female with the below medical history including chronic obstructive pulmonary disease who was seen in the [facility] multidisciplinary thoracic oncology clinic July 1, 2021.  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      She had undergone a CT of the chest at [facility] 6/16/2021 showing postsurgical changes in the left chest from right upper lobectomy, 8 mm irregular nodule medial segment of the right lower lobe and diffuse changes of emphysema with fibrotic changes in the periphery, no suspicious hilar or mediastinal nodes, no pleural effusion.  New finding was suspicious for new malignancy with the patient felt to be a poor candidate for surgical resection.  A PET CT and PFTs were requested thereafter.  The PET/CT demonstrated ill-defined FDG avid soft tissue thickening left aspect mediastinum within the operative bed, 1 cm hypermetabolic pulmonary nodule right lower lobe and asymmetric thickening patchy uptake involving the right base of tongue.  There is subtle uptake within the L1 vertebral body which is indeterminate and a sub-6 mm pulmonary nodule of right lung and subcentimeter hypodense hepatic lesion which was below PET resolution.       The patient's case was discussed in thoracic conference 7/22/2021 with consideration of the patient undergoing L1 vertebral body MRI, confirmatory biopsy left mediastinal and assessment by ENT (Dr. Caballero) who had worked with the patient previously.  She, incidentally, indicates  that radiation therapy was given apparently intraoperatively at her recent surgery?  She still has issues with difficulty chewing and swallowing post procedures and was to be followed up with Dr. Caballero in the near future as planned.                                                            She was seen in the thoracic clinic on the same day with plans to proceed with MRI of the lumbar spine, biopsy left mediastinal nodular area of potential disease and follow-up of her ENT assessment as well as undergo a guardant 360 assessment in our office.  She underwent the biopsy 8/13/2021 found to be consistent with invasive moderately differentiated adenocarcinoma with PD-L1 TPS score 40%.  MRI of the lumbar spine revealed no evidence of metastatic disease though the patient did have multilevel degenerative disease throughout the lumbar spine.  She had an office follow-up with Dr. 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  She underwent laryngoscopy 8/18/2021 with right base of tongue without evidence of lesions or masses in the region.     She was seen by Dr. Hernandez 8/19/2021 and, her findings, had been presented in lung conference.  Her findings were consistent with 2 separate lesions including a nodule in the superior segment of the right lower lobe and a mass in the upper portion of the left chest with the left chest lesion biopsy positive for adenocarcinoma.  The consensus was that these were to separate-synchronous primaries.  Stereotactic radiation each lesions were felt to be the appropriate way to proceed and the patient was referred to radiation therapy.     The patient is seen in office 8/23/2021 agreeable to the radiation therapy as well as additional assessments including David molecular assessment of her biopsy.  Again her guardant 360 is currently pending and we would follow her after she  completes radiation therapy with subsequent scans.    She was able to proceed with SBRT to the right lung at primary #1 with 5 treatments at 1000 cGy per fraction in the left lung primary similarly at 1000 cGy per fraction x5.  Her treatment ranged between 8/31-9/13/2021.  She is now scheduled for follow-up 11/23/2021.    The patient subsequent genomic testing is discussed with her as she is is seen back 9/23/2021.  Her guardant 360 did not determine any new abnormalities but her Caris-MI profile-does reveal sensitivity to immunotherapy as well as a BRAF mutation.  This could be extremely important as we follow the patient from this point.  Fortunately she is feeling extremely well and quite pleased about her treatments.    Patient had subsequent PET/CT 11/23/2021 demonstrates decrease in size and avidity of the previously noted intensely FDG avid nodules left lobectomy operative site and right lower lobe.  Surrounding groundglass and pulmonary opacification is consistent with postradiation changes, a few new subcentimeter pulmonary nodules are present in the right upper lobe and indeterminant, stable subcentimeter hepatic lesion is below PET resolution no other findings of FDG-avid disease are seen in neck abdomen or pelvis.  The patient seen in office 12/1/2021 with a relatively good performance status stating she is not having any new symptoms and very pleased about her results on her PET/CT.  She realizes we'll have to follow this further but subsequent scans in 6 months.  She is also hoping to see an oral surgeon that previously helped her-Dr. Wu.    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  There is evolution of presumed postradiation changes in the right midlung with soft tissue mass within the left lumpectomy operative bed minimally  decreased in size.  There is a sub-6 mm nodule in the right upper lobe not definitively seen, indeterminate and an indeterminate left renal lesion at 1.5 cm unchanged from 7/13/2021.  The patient is currently scheduled to see Dr. Dowell on 6/23/2022.  She is feeling well without any additional symptoms.    The patient required admission 10/14 - 10/18/2022 presenting with shortness of breath and cough that was nonproductive.  She had been on oral antibiotics and did not improved and was admitted for therapy for apparent pneumonia.  This eventually led to bronchoscopy after initial CT revealed postsurgical changes, new masslike 6.7 cm area of consolidation anterior left lung raising concern for potential malignancy and a follow-up PET/CT planned.  Bronchoscopy and biopsy left lower lung failed to show evidence of malignancy.  The patient's PET/CT, however, demonstrated an irregular pleural-based soft tissue density thickening in the left upper lobe that was intensely FDG avid new from 6/8/2022 thought to be a malignant recurrence with spread into the left lung parenchyma.  There is intensely pleural-based avidity within the left lower lobe thought to be metastatic disease with postradiation of the left apex as well.  There is a small focus of uptake in the right hilum grossly unchanged in size and post radiation changes in the right lower lobe.  There is indeterminate density left renal that was grossly unchanged.  The patient is seen back in office 11/15/2022 indicating that she still has chest discomfort that is slowly worsening and that she has a chronic paroxysmal cough but has not improved.  We discussed that she very likely has recurrent disease and plan to proceed with a guardant 360 examination initially as we determine whether we should try to proceed with therapy particularly immunotherapy versus targeted therapy recognizing the above findings.    Patient's guardant 360 returned as again significant for  BRAF V600E and the potential use of BRAF inhibitor/MET inhibitor dabrafenib and trametinib.  Additional information [id number] and use of alpelisib.    Unfortunately she required admission 11/28-12/1 with worsening back pain.  Fortunately she did not demonstrate evidence of metastatic disease but did have moderate degenerative changes which could cause radiculopathy.  Medications were altered to include subsequently MSR 15 mg p.o. every 12 hours, Norco as needed.    The patient now presents back 12/14/2022 to initiate therapy- initially with immunotherapy considering Caris study with PD-L1 TPS of 70% on 22 C3 and 28-8 assays.    Patient seen with her  and we discussed pain medications and adjustments thereafter and that she stopped taking MSIR having only a short supply and having to use Norco more frequently.  She is willing to initiate Keytruda today we have discussed that considering her genomics treatment versus her BRAF mutation is also an option.    C1 Keytruda 12/14/2022    Patient is seen back 1/4/2023 in follow-up due for cycle 2 Keytruda.  Patient states that since receiving her first cycle she has had decreased appetite and decreased energy.  She has not been able to bring herself to eat much and she has notably lost 7 pounds.  She has also been struggling with constipation in relation to ongoing narcotics for pain control.  She has been taking senna S2 tabs twice a day.  We discussed adding daily MiraLAX.    Patient did just last week meet with dietitian, Zoila Clinton, to discuss ways to improve caloric intake.  She has not been able to implement any of these things yet though.    The patient is next seen 1/25/2023 with mild weight gain.  She is seen with family member both indicating that she has actually felt somewhat better in terms of performance status and general function.  We have discussed proceeding with a third cycle treatment and reevaluating by scan in 2 weeks.    The patient proceeded  with treatment 1/25/2023 and underwent follow-up CT chest abdomen pelvis 2/8/2023 demonstrating small pericardial effusion that is decreased in size from 11/20/2022, ill-defined consolidation and pleural-based thickening in the left apex and upper lung has reduced slightly, additional index nodule soft tissue in the aspect the pericardium has not changed substantially, no evidence of metastatic disease in the abdomen pelvis.    The patient is seen with her daughter 2/15/2023 both indicating that she has definitely improved, stable weight, appetite and performance status.  She is also using her pain medication much less frequently and wonders whether she might begin to taper from her twice a day MS Contin.    Patient is seen back 3/8/2023 due for ongoing pembrolizumab.  She continues on low-dose prednisone 10 mg daily and has noted significant improvement in her energy and appetite with this.  She is reluctant to taper this any further we discussed that it is fine for her to stay on daily dosing.    She did try to taper her pain medication going down to just MS Contin 15 mg every 12 hours while holding her hydrocodone.  However over the last few days she has had some intermittent pain in the left upper back alleviated with hydrocodone 2-3 times a day.  She currently is denying any pain.  Monitor.    She is next evaluated 3/29/2023 for ongoing Keytruda.  Evidently her pain medication was sent to the wrong pharmacy and will need to be represcribed today-long-acting MS Contin.  Otherwise she is doing reasonably well at present.    Patient seen 5/31/2023 with gradual development of left shoulder and left scapular pain.  Concurrent CT chest, abdomen and pelvis demonstrate interval increasing consolidation left upper lobe with extension anterior to the left upper ribs with sclerosis anterior left second rib.  This is suspicious for worsening malignancy.  Plans were made for subsequent PET/CT where the patient's pain  medications were adjusted.PET/CT 6/5/2023 reveals progression of disease in left upper thorax, left supraclavicular adenopathy new metastasis left posterior fourth ribs, no evidence of metastatic disease in the abdomen or pelvis.    The patient is seen with her son and daughter 6/12/2023 and it is clear that she is not developing a Pancoast like syndrome with progression of her malignancy in the left upper thorax and associated symptoms.  We have discussed discontinuance of her immunotherapy and moving to targeted therapy with dabrafenib and trametinib as we also request radiation therapy repeat consultation and try to manage her pain more effectively.    Patient seen 7/5/2023 with plans to start palliative radiotherapy and to initiate concurrently dabrafenib and trametinib.    As a result of toxicity (nausea and vomiting) the patient was taken off of dabrafenib and trametinib when seen 7/21/2023, given additional IV hydration and further supportive care.  Plans were made for follow-up on recovery to determine as to whether to redose the medications.    Unfortunately she required admission 7/25-30/2023 with failure to thrive.  Subsequent assessments including blood cultures were negative and patient was treated briefly with IV antibiotic therapy, started PT and OT, medications adjusted for hypotension and treated symptomatically for nausea and vomiting.  She was able to improve enough to be discharged home as she continued radiation therapy started 7/17/2023 and completed 7/31/2023 to the left chest wall.    She is next seen back 8/4/2023.  We have reviewed that she had been on dabrafenib and trametinib at 150 mg p.o. every 12 hours and 2 mg p.o. daily respectively and dosing could be changed considerably such as 50 mg twice daily and 0.5 mg daily.  Determination made to have her undergo repeat scans at 4 weeks and review her response to radiation therapy as we make application for lower dose targeted therapy,  addition of Remeron p.o. nightly, tapering of her MS Contin to daily rather than twice daily, use of low-dose Ativan to undergo scans.    Subsequent scans 8/25/2023, fortunately, with stable left apical mass with mediastinal chest wall involvement unchanged 1.4 cm supraclavicular lymph node.  No new findings of metastatic disease.    The patient is seen back 9/8/2023.  Fortunately she is improved dramatically off therapy and is gratified that his studies have not worsened in any substantial way.  We have discussed both her scan reports and echocardiogram that does not show significant reduction of her ejection fraction.  As result of her current stable status we have asked her to restart Mekinist at 0.5 mg daily and Tafinlar at 50 mg twice daily to be advanced as tolerated.  Patient seen 10/16/2023 with follow-up chest x-ray planned and CT of chest abdomen pelvis at 5 weeks -approximately 3 months of therapy.    The patient required admission 11/14 - 11/16/2023 having presented with shortness of breath and found to be pulmonary edema with CBC, lactate and procalcitonin all normal.  There was a concern that her chemotherapy agents could have produced her cardiomyopathy and these were stopped 11/14/2023.  She was diuresed and echocardiogram demonstrated LV SF mildly decreased at 39.2% with GLS of -11.1%, left ventricular cavity mildly dilated, left ventricular wall thickness consistent with mild concentric hypertrophy, left ventricular global hypokinesis, aortic valve abnormal with moderate calcification, valve was trileaflet, trace tricuspid valve regurgitation with right VSP at less than 35 mmHg.  The findings for LV systolic function, diastolic function and global longitudinal LV strain had all worsened.    CTA of chest 11/14/2023 demonstrating confluent soft tissue mass possibly measuring larger in current study at 6.5 x 5.0 previously 4.9 x 4.8, left supraclavicular node to decrease in size measuring 1.1 cm  previously 1.4 cm, extensive bilateral interstitial and groundglass infiltrates.      The patient is seen 11/26/2023.  As per the discharge we are requested to have a BMP and CMP assessed.  She is seen with her son and daughter in a lengthy conversation held about her recent hospitalization with CHF-cardiomyopathy felt elicited, in part, from her targeted therapy with her Mekinist and Tafinlar discontinued altogether.    The patient is relatively stable currently having continued her diuretics and to be seen in cardiology in follow-up.  At the time of this dictation her CBC is found to be essentially normal with mild leukocytosis, CMP normal except for mildly elevated glucose at 125 and BNP reduced to 6126 from 9763.  She feels well with no additional shortness of breath chest pain lower extremity edema.  In reviewing the the possible treatment options she is next best treated with single agent chemotherapy moving to Alimta.    Patient seen 12/4/2023 with plans to initiate Alimta chemotherapy-cycle 1 day 1    The patient required hospitalization 1/18-21/24 presenting with increasing shortness of breath, findings of chronic respiratory failure, positive for rhinovirus on admission.  She is treated with supportive treatments including for previous COVID infection as well as antibiotic therapies.  She completed 5 days of Augmentin, continue nebulizer and breathing treatments.  Upon discharge she rescheduled appointments though was seen by cardiology 1/23/2024 not felt to be in heart failure, treated supportively for epistaxis.    There was concern as to whether she should continue treatment and she has seen back in office 1/29/2024 to discuss potential options.  She is seen with her son and daughter both indicating that she drops her O2 saturation quite quickly and is very inactive as result of difficulty with rapidly becoming shortness of breath and weak when she tries to move.  This is led to a lengthy conversation  about the extent of her disease including both her cardiovascular status and her respiratory status.  She is worsening quickly and is not, currently, a candidate for chemotherapy.    The patient is doing poorly enough that we have had a cristy conversation today about end-of-life concerns.  She is not interested in hospice at this point but hopes to improve further.  The patient had follow-up with pulmonary medicine.  She was seen 2/9/2024 with groundglass infiltrates since November 2023 suggestive of pneumonitis and he had restarted prednisone at 40 mg daily.    A repeat CT of chest 2/26/2024 revealed decreased consolidation in superior segment right lower lobe, stable consolidation in the left upper lobe and apex, stable coarsened interstitial lung opacities elsewhere, stable soft tissue mass along the left upper mediastinum, small pericardial effusion, no pleural effusion, no clear abnormalities in the liver, stable left renal cyst and no acute bony abnormality.    The patient is seen with her son and both indicate that her general performance status has improved.  They are concerned about her response to chemotherapy previously and, though we have scheduled her for chemotherapy, are not certain they wish to proceed.  Now with her improved scan we could follow her for period of time and then try to reevaluate offering chemotherapy potentially with Alimta.        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 [facility]   • History of squamous cell carcinoma     Tongue   • Hyperlipidemia    • Hypertension     since early  60s   • Intractable back pain 11/29/2022   • Low vitamin B12 level    • Lung cancer    • Systolic CHF, acute 11/14/2023   • Thyromegaly    • UTI (urinary tract infection)    • Vitamin D deficiency         Past Surgical History:   Procedure Laterality Date   • BRONCHOSCOPY Bilateral 10/17/2022    Procedure: BRONCHOSCOPY WITH FLUORO with biopsy and BAL;  Surgeon: India Flores MD;  Location: [facility] ENDOSCOPY;  Service: Pulmonary;  Laterality: Bilateral;  PRE/POST - lung mass   • CHOLECYSTECTOMY  1999   • COLONOSCOPY     • EMBOLIZATION CEREBRAL Left 06/29/2022    Procedure: 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;  Surgeon: Bradley Dowell MD;  Location: [facility] HYBRID OR 18/19;  Service: Interventional Radiology;  Laterality: Left;   • EYE SURGERY  11/24/2020    Took a muscle out of right eye   • GLOSSECTOMY PARTIAL      less than one half tongue   • LUNG SURGERY  2012   • ORAL LESION EXCISION/BIOPSY      June and August 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   • TUBAL ABDOMINAL LIGATION  1970   • VENOUS ACCESS DEVICE (PORT) INSERTION N/A 12/12/2022    Procedure: MEDIPORT PLACEMENT;  Surgeon: Jason Villaseñor MD;  Location: [facility] MAIN OR;  Service: Vascular;  Laterality: N/A;        Current Outpatient Medications on File Prior to Visit   Medication Sig Dispense Refill   • albuterol (PROVENTIL) (2.5 MG/3ML) 0.083% nebulizer solution Inhale the contents of 1 vial by nebulization Every 4 (Four) Hours As Needed for Wheezing. 90 mL 0   • apixaban (ELIQUIS) 2.5 MG tablet tablet Take 1 tablet by mouth 2 (Two) Times a Day. 60 tablet 3   • arformoterol (BROVANA) 15 MCG/2ML nebulizer solution USE 2 ML VIA NEBULIZER TWICE DAILY     • atorvastatin (LIPITOR) 20 MG tablet TAKE 1 TABLET EVERY NIGHT 90 tablet 1   • budesonide (PULMICORT) 0.5 MG/2ML nebulizer solution INHALE 2 MLS VIA NEBULIZER EVERY 12 HOURS     • budesonide 0.5 MG/2ML suspension 0.5 mg, arformoterol 15 MCG/2ML nebulizer solution 15 mcg  Inhale 1 nebule 2 (Two) Times a Day.     • cetirizine (zyrTEC) 10 MG tablet Take 1 tablet by mouth Daily. Indications: Perennial Allergic Rhinitis     • Cholecalciferol (Vitamin D3) 50 MCG (2000 UT) tablet Take 1,000 Units by mouth Daily. Indications: Vitamin D Deficiency     • Cyanocobalamin (VITAMIN B12 PO) Take 1,000 mcg by mouth Daily. Indications: vitamin b12 deficiency      • empagliflozin (Jardiance) 10 MG tablet tablet Take 1 tablet by mouth Daily. 30 tablet 5   • folic acid (FOLVITE) 1 MG tablet Take 1 tablet by mouth Daily. Start at least 7 days prior to chemotherapy until at least 3 weeks after all chemotherapy. 30 tablet 6   • furosemide (LASIX) 40 MG tablet Take 1.5 tablets by mouth Daily. Indications: Cardiac Failure, High Blood Pressure Disorder 45 tablet 2   • ipratropium (ATROVENT) 0.06 % nasal spray 2 sprays into the nostril(s) as directed by provider 4 (Four) Times a Day. 15 mL 0   • ipratropium-albuterol (DUO-NEB) 0.5-2.5 mg/3 ml nebulizer Inhale the contents of 1 vial by nebulization 2 (Two) Times a Day As Needed for Wheezing. 180 mL 0   • metoprolol succinate XL (TOPROL-XL) 25 MG 24 hr tablet Take 1 tablet by mouth 2 (Two) Times a Day. Indications: High Blood Pressure Disorder 60 tablet 2   • mirtazapine (REMERON) 7.5 MG tablet TAKE 1 TABLET BY MOUTH EVERY NIGHT AT BEDTIME 30 tablet 1   • montelukast (SINGULAIR) 10 MG tablet Take 1 tablet by mouth Every Night. Indications: Hayfever, Perennial Allergic Rhinitis 90 tablet 3   • O2 (OXYGEN) Inhale 2 L/min Continuous.     • pantoprazole (PROTONIX) 40 MG EC tablet TAKE 1 TABLET BY MOUTH EVERY MORNING 30 tablet 0   • potassium chloride (K-DUR,KLOR-CON) 10 MEQ CR tablet Take 2 tablets by mouth Daily. 1 tablet in the PM  Indications: Low Amount of Potassium in the Blood 90 tablet 2   • predniSONE (DELTASONE) 20 MG tablet TAKE 2 TABLETS BY MOUTH DAILY. DO NOT. STOP ABRUPTLY     • sacubitril-valsartan (Entresto) 24-26 MG tablet Take 0.5 tablets by mouth  "2 (Two) Times a Day. 30 tablet 3   • zolpidem (AMBIEN) 5 MG tablet TAKE 1 TABLET BY MOUTH EVERY NIGHT AT BEDTIME AS NEEDED FOR SLEEP 30 tablet 0     No current facility-administered medications on file prior to visit.        ALLERGIES:    Allergies   Allergen Reactions   • Sulfa Antibiotics Rash        Social History     Socioeconomic History   • Marital status:    Tobacco Use   • Smoking status: Former     Types: Cigarettes     Quit date: 2015     Years since quittin.0     Passive exposure: Never   • Smokeless tobacco: Never   • Tobacco comments:     less than a pack per day, no smoking since , Quit 2015   Vaping Use   • Vaping Use: Never used   Substance and Sexual Activity   • Alcohol use: Not Currently     Comment: Socially -A few times a month   • Drug use: No   • Sexual activity: Defer     Family History   Problem Relation Age of Onset   • Ovarian cancer Mother          at age 27   • No Known Problems Father    • Ovarian cancer Sister    • Colon cancer Brother    • No Known Problems Other    • Malig Hyperthermia Neg Hx         Review of Systems  ROS as per HPI     Objective     Vitals:    24 0931   BP: 126/57   Pulse: 111   Resp: 16   Temp: 96.1 °F (35.6 °C)   TempSrc: Temporal   SpO2: 92%  Comment: pt on 4 liters O2   Weight: 45.4 kg (100 lb)   Height: 157.5 cm (62.01\")   PainSc: 0-No pain                   3/1/2024     9:32 AM   Current Status   ECOG score 0      Physical Exam  Vitals reviewed.   Constitutional:       General: She is not in acute distress.     Appearance: Normal appearance. She is well-developed.   HENT:      Head: Normocephalic and atraumatic.      Mouth/Throat:      Pharynx: No oropharyngeal exudate.   Eyes:      Pupils: Pupils are equal, round, and reactive to light.   Cardiovascular:      Rate and Rhythm: Normal rate and regular rhythm.      Heart sounds: Normal heart sounds. No murmur heard.  Pulmonary:      Effort: Pulmonary effort is normal. No " respiratory distress.      Breath sounds: Normal breath sounds. No wheezing, rhonchi or rales.   Abdominal:      General: Bowel sounds are normal. There is no distension.      Palpations: Abdomen is soft.   Musculoskeletal:         General: No swelling. Normal range of motion.      Cervical back: Normal range of motion.   Skin:     General: Skin is warm and dry.      Findings: No rash.   Neurological:      Mental Status: She is alert and oriented to person, place, and time.         Physical exam preformed and reviewed. No changes noted from previous exam. Henry Escobar MD ; 03/01/2024      RECENT LABS:  Results from last 7 days   Lab Units 03/01/24  0911   WBC 10*3/mm3 13.62*   NEUTROS ABS 10*3/mm3 12.39*   HEMOGLOBIN g/dL 12.9   HEMATOCRIT % 41.3   PLATELETS 10*3/mm3 193                         Assessment & Plan    1.  Carcinoma of the lung  May 2015, status post previous left upper lobectomy for carcinoma of the lung.    2.  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   She underwent laryngoscopy 8/18/2021 with right base of tongue without evidence of lesions or masses in the region.    3.  Moderately differentiated adenocarcinoma of the lung.  CT of the chest 6/16/2021 demonstrated postsurgical changes, 8 mm irregular nodule medial segment of right lower lobe and diffuse changes of emphysema.    She is seen in thoracic clinic with findings suspicious for new malignancy and concern of the patient being a poor operative candidate.    7/13/2021 PET CT demonstrated ill-defined avidity and soft tissue left aspect of the mediastinum, 1 cm hypermetabolic pulmonary nodule and asymmetric thickening involving the right base of tongue as well as subtle uptake in the L1 vertebral body.    This patient's case was discussed in thoracic clinic and plans were made to request an MRI of the  lumbar spine, obtain a biopsy of the mediastinal involvement of the left had the patient reviewed by ENT.  Biopsy 8/13/2021 found to be consistent with invasive moderately differentiated adenocarcinoma with PD-L1 TPS score 40%.    7/24/2021 MRI of the lumbar spine revealed no evidence of metastatic disease though the patient did have multilevel degenerative disease throughout the lumbar spine.    Her findings were consistent with 2 separate lesions including a nodule in the superior segment of the right lower lobe and a mass in the upper portion of the left chest with the left chest lesion biopsy positive for adenocarcinoma.  The consensus was that these were to separate-synchronous primaries.  Stereotactic radiation each lesions were felt to be the appropriate way to proceed and the patient was referred to radiation therapy.  The patient was referred for radiation therapy and she was able to proceed with SBRT to the right lung at primary #1 with 5 treatments at 1000 cGy per fraction in the left lung primary similarly at 1000 cGy per fraction x5.  Her treatment ranged between 8/31-9/13/2021.    Her guardant 360 did not determine any new abnormalities but her Caris-MI profile-does reveal sensitivity to immunotherapy as well as a BRAF mutation.  PET/CT 11/23/2021 demonstrates decrease in size and avidity of the previously noted intensely FDG avid nodules left lobectomy operative site and right lower lobe.  Surrounding groundglass and pulmonary opacification is consistent with postradiation changes, a few new subcentimeter pulmonary nodules are present in the right upper lobe and indeterminant, stable subcentimeter hepatic lesion is below PET resolution no other findings of FDG-avid disease are seen in neck abdomen or pelvis.  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  There is evolution of presumed  postradiation changes in the right midlung with soft tissue mass within the left lumpectomy operative bed minimally decreased in size.  There is a sub-6 mm nodule in the right upper lobe not definitively seen, indeterminate and an indeterminate left renal lesion at 1.5 cm unchanged from 7/13/2021.  Admission 10/14 - 10/18/2022 presenting with shortness of breath and cough that was nonproductive.  She had been on oral antibiotics and did not improved and was admitted for therapy for apparent pneumonia.  This eventually led to bronchoscopy after initial CT revealed postsurgical changes, new masslike 6.7 cm area of consolidation anterior left lung raising concern for potential malignancy and a follow-up PET/CT planned.   Bronchoscopy and biopsy left lower lung failed to show evidence of malignancy.    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  There is intensely pleural-based avidity within the left lower lobe thought to be metastatic disease with postradiation of the left apex as well.  There is a small focus of uptake in the right hilum grossly unchanged in size and post radiation changes in the right lower lobe.  There is indeterminate density left renal that was grossly unchanged.    Patient's guardant 360 returned as again significant for BRAF V600E and the potential use of BRAF inhibitor/MET inhibitor dabrafenib and trametinib.  Additional information [id number] and use of alpelisib.  The patient now presents back 12/14/2022 to initiate therapy- initially with immunotherapy considering Caris study with PD-L1 TPS of 70% on 22 C3 and 28-8 assays.  Single agent Keytruda initiated 12/14/2022 2/8/2023 CT of the chest, abdomen pelviswith consolidation and masslike pleural thickening in the left apex and upper lung index areas  have decreased somewhat.  There is no evidence of metastatic disease otherwise and a few indeterminate left renal lesions are stable.  After lengthy discussion with the patient and her daughter as to the stability to mild improvement with immunotherapy it is agreed that we would continue treatment at this point.  Proceed today, 4/19/2023 with cycle 7 pembrolizumab which is continued every 3 weeks  The patient proceeded to 8 cycles of pembrolizumab and underwent follow-up chest CT chest, abdomen pelvis revealing evolution of dense consolidation left upper lobe and extension of soft tissue mass anterior to left upper ribs with increase sclerosis anterior left second rib.  This was concerning for progression of disease versus radiation change and the patient was scheduled for subsequent PET/CT.  PET/CT 6/5/2023  reveals progression of disease in left upper thorax, left supraclavicular adenopathy new metastasis left posterior fourth ribs, no evidence of metastatic disease in the abdomen or pelvis.  Patient seen 6/12/2023 with plans to move her Norco to every 4 hours, discontinue Keytruda, make application for dabrafenib and trametinib at 150 mg p.o. every 12 hours and 2 mg p.o. daily respectively.  Patient referred for radiation therapy consultation concurrently.  Last dose of Keytruda 5/31/2023.  6/23/2023: Patient seen for triage visit.  She has not yet started dabrafenib or trametinib, she has not yet received the medications.  Unfortunately she has been experiencing uncontrolled nausea/vomiting as further detailed below.   Patient seen 6/27/2023 reporting that her nausea has resolved.  She was unable to complete the MRI of the brain however Dr. Escobar did review the images patient did have and there was no evidence of edema or metastatic disease.  Patient can start her new oral medication once she receives the medication.  Patient seen 7/5/2023 with plans to start palliative radiotherapy x10 fractions and initiate  dabrafenib and trametinib as soon as medications received.  7/17/2023 patient initiated radiation with 10 fractions planned.  Patient has received dabrafenib and trametinib.  Brought in for triage visit due to nausea associated with dosing.  She is premedicating with ondansetron however only waiting 15 minutes.  We discussed today that she needs to wait at least 30 minutes to 1 hour prior to taking the dabrafenib and trametinib.  The patient will do so.  We discussed she could also take this again if she feels the need 8 hours later for nausea control.  If she is having breakthrough nausea then she should call our office.  I have encouraged her to utilize electrolyte drinks.  She will get 1L normal liter normal saline in the office today.She was not nauseas while in the office so we did not give additional nausea medication.  We will have her return in 1 week repeat labs, review by nurse practitioner, and possible IV fluids.  I stressed the importance of calling sooner if she finds that the premedication is not controlling her nausea at which time we would have her come in for additional IV fluids and evaluation.  She is continuing daily radiation this week.  Case was discussed with Dr. Escobar today.  7/21/2023: Patient returns for short interval follow-up due to very poor appetite and sleeping the majority of the day.  Her nausea has been improved with premedicating 30 minutes prior to dabrafenib and trametinib.  She however has been sleeping the majority of the day and is not eating when she is awake.  She does report taking ensures but she is only sipping on these.  Have given her samples of some of the office today and encouraged her to drink when while she is here.  Her performance status continues to decline and her daughter states that she was fixing dinner nightly just 2 weeks ago.  With performance status of 3 today I discussed the case with Dr. Escobar and we will hold her dabrafenib and  TRAMETINIB.  Her  liver enzymes are elevated today as well.  We will monitor this next week and have her evaluated by Dr. Escobar at which time we could consider restarting her dabrafenib and trametinib at reduced doses pending performance status and laboratory evaluation.  Unfortunately she required admission 7/25-30/2023 with failure to thrive.  Subsequent assessments including blood cultures were negative and patient was treated briefly with IV antibiotic therapy, started PT and OT, medications adjusted for hypotension and treated symptomatically for nausea and vomiting.  She was able to improve enough to be discharged home as she continued radiation therapy started 7/17/2023 and completed 7/31/2023 to the left chest wall.  She is next seen back 8/4/2023.  Lengthy discussion as to reevaluation   Plans made for CT chest, abdomen, pelvis in 4 weeks  Patient seen 8/21/2023 with complaints of worsening fatigue and shortness of breath.  Patient scheduled for follow up CT scans this Friday. Lungs clear on exam,  Sats 97%.  Hgb stable at 11.7.  Will check BNP today.  Discussed may be related to disease and will need to see what scan shows.   Subsequent scans 8/25/2023, fortunately, with stable left apical mass with mediastinal chest wall involvement unchanged 1.4 cm supraclavicular lymph node.  No new findings of metastatic disease.  The patient is seen back 9/8/2023.  Fortunately she is improved dramatically off therapy and is gratified that his studies have not worsened in any substantial way.  We have discussed both her scan reports and echocardiogram that does not show significant reduction of her ejection fraction.  As result of her current stable status we have asked her to restart Mekinist at 0.5 mg daily and Tafinlar at 50 mg twice daily  9/22/2023 tolerating Mekinist 0.5 mg daily and tafinlar 50 mg twice daily quite well. Continues on prednisone 10 mg daily which will now be reduced to 5 mg daily when seen 10/16/2023  Plans made  to continue current dosing of Mekinist and Tafinlar and repeat evaluation with chest x-ray 10/16 and repeat CT chest abdomen pelvis in 5 weeks, MD in 6 weeks.  The patient required admission 11/14 - 11/16/2023 having presented with shortness of breath and found to be pulmonary edema with CBC, lactate and procalcitonin all normal.  There was a concern that her chemotherapy agents could have produced her cardiomyopathy and these were stopped 11/14/2023.  She was diuresed and echocardiogram demonstrated LV SF mildly decreased at 39.2% with GLS of -11.1%, left ventricular cavity mildly dilated, left ventricular wall thickness consistent with mild concentric hypertrophy, left ventricular global hypokinesis, aortic valve abnormal with moderate calcification, valve was trileaflet, trace tricuspid valve regurgitation with right VSP at less than 35 mmHg.  The findings for LV systolic function, diastolic function and global longitudinal LV strain had all worsened.  CTA of chest 11/14/2023 demonstrating confluent soft tissue mass possibly measuring larger in current study at 6.5 x 5.0 previously 4.9 x 4.8, left supraclavicular node to decrease in size measuring 1.1 cm previously 1.4 cm, extensive bilateral interstitial and groundglass infiltrates.  The patient is seen 11/26/2023.  As per the discharge we are requested to have a BMP and CMP assessed.  She is seen with her son and daughter in a lengthy conversation held about her recent hospitalization with CHF-cardiomyopathy felt elicited, in part, from her targeted therapy with her Mekinist and Tafinlar discontinued altogether.  The patient is relatively stable currently having continued her diuretics and to be seen in cardiology in follow-up.  At the time of this dictation her CBC is found to be essentially normal with mild leukocytosis, CMP normal except for mildly elevated glucose at 125 and BNP reduced to 6126 from 9763.  She feels well with no additional shortness of  breath chest pain lower extremity edema.  In reviewing the the possible treatment options she is next best treated with single agent chemotherapy moving to Alimta.  Patient proceeded through teaching and presents back 12/4/2023 to initiate chemotherapy with Alimta-cycle 1 day 1  Seen 12/18/2023 for toxicity check, overall has tolerated well.  Continues to follow closely with cardiology.  The patient required hospitalization 1/18-21/24 presenting with increasing shortness of breath, findings of chronic respiratory failure, positive for rhinovirus on admission.  She is treated with supportive treatments including for previous COVID infection as well as antibiotic therapies.  She completed 5 days of Augmentin, continue nebulizer and breathing treatments.  Upon discharge she rescheduled appointments though was seen by cardiology 1/23/2024 not felt to be in heart failure, treated supportively for epistaxis.  There was concern as to whether she should continue treatment and she has seen back in office 1/29/2024 to discuss potential options.  She is seen with her son and daughter both indicating that she drops her O2 saturation quite quickly and is very inactive as result of difficulty with rapidly becoming shortness of breath and weak when she tries to move.  This is led to a lengthy conversation about the extent of her disease including both her cardiovascular status and her respiratory status.  She is worsening quickly and is not, currently, a candidate for chemotherapy.  The patient is doing poorly enough that we have had a cristy conversation today-1/29/2024 about end-of-life concerns.  She is not interested in hospice at this point but hopes to improve further.  At this point holding chemotherapy.  She was seen 2/9/2024 with groundglass infiltrates since November 2023 suggestive of pneumonitis and he had restarted prednisone at 40 mg daily.  A repeat CT of chest 2/26/2024 revealed decreased consolidation in superior  segment right lower lobe, stable consolidation in the left upper lobe and apex, stable coarsened interstitial lung opacities elsewhere, stable soft tissue mass along the left upper mediastinum, small pericardial effusion, no pleural effusion, no clear abnormalities in the liver, stable left renal cyst and no acute bony abnormality  The patient is seen with her son and both indicate that her general performance status has improved.  They are concerned about her response to chemotherapy previously and, though we have scheduled her for chemotherapy, are not certain they wish to proceed.  Now with her improved scan we could follow her for period of time and then try to reevaluate offering chemotherapy potentially with Alimta.      4.  Worsening back pain  Admission 11/28/2022 through 12/1/2022.  MRI of the cervical and thoracic spine fortunately failed to demonstrate metastatic disease.  Moderate degenerative changes noted which could cause radiculopathy.  Medication altered to include MS Contin 15 mg every 12 hours and Norco for breakthrough pain  She reports today her pain is adequately controlled  Patient with increasing pain 5/31/2023 with pain level of 6.  MS Contin was increased to 50 mg p.o. every 8 hours and Norco 10/325 #101 p.o. every 6 hours to move to every 4 hours as needed-E scribed to pharmacy  Patient seen 6/12/2023 with Norco moved to every 4 hours.  6/23/2023: Seen for triage visit.  Has been using oxycodone 20 mg every 3 hours as needed for pain.  Reports she has not been using the hydrocodone 10/325.  Was experiencing dizziness, so reduced her oxycodone use to half a tablet every 3 hours as needed.  Reports pain is uncontrolled during the night so she is having to wake at night time to take pain medicine.  They are questioning resuming long-acting pain medication, as she is waking throughout the night to take pain medicine.  She has already been prescribed MS Contin and has this available at home,  did let her know it would be okay to resume this.  Assess 7/5/2023 with pain reduced to 2/10.  Plan to continue current therapy  Darlene Pfeiffer reports a pain score of 0.  Given her pain assessment as noted, treatment options were discussed and the following options were decided upon as a follow-up plan to address the patient's pain: continuation of current treatment plan for pain. Currently not requiring pain medication.   Refilled both the patient's Remeron and Ambien 10/16/2023  12/18/2023 requesting a refill of her Ambien.    5.  Poor appetite and malnutrition  Placed on prednisone 10 mg daily 1/4/2023 with significant improvement in her symptoms  Weight is stable today at just below 106 pounds  Patient assessed 6/12/23 with possible gummy therapy.  Stable performance status including weight and appetite 7/5/2023  Weight has declined as of 7/17/2023 due to nausea and vomiting that started this past weekend.  After further discussion the patient did stop her prednisone 10 mg while she was not feeling well.  We discussed today the importance of continuing this as it can help with her nausea and appetite and therefore she will restart tomorrow.  7/21/2023: Weight is stable today though albumin is declining at 3.  Patient is sleeping the majority of the day and not eating.  Yesterday she had a small piece of chicken and that is all.  She states she is drinking ensures however her daughter thinks that she is just sipping on these and not completing them.  Hopefully holding her dabrafenib and trametinib will be helpful with her being awake more and appetite.  I encouraged her to make sure she is taking her prednisone daily at 10 mg daily.  Remeron 7.5 mg p.o. nightly E scribed to pharmacy  9/22/2023 weight is up to 98 pounds.  Further improvement in weight 10/16/2020 3 to 104 pounds, continue Remeron at current dose, prednisone reduced to 5 mg daily.  Patient placed on prednisone from pulmonary medicine for  pneumonitis, seen 3/1/2420 mg daily, requesting continuance of this over the next month and then drop to 10 mg over 2-week, then 2 5 mg over 2 weeks at which time she will be seen back after repeat scans.    6.  Uncontrolled nausea/vomiting  started after discontinuing prednisone and starting THC Gummies.  Started to experience dizziness with the THC Gummies.  Discontinue THC Gummies and dizziness improved, however she has remained nauseated now.  She has been utilizing Zofran with improvement, however today was unable to keep Zofran tablets down due to nausea/vomiting.  She will resume prednisone 10 mg daily.  She will receive 1 L IV normal saline in clinic today 10 mg IV Compazine.  We will also send prescription for Phenergan suppository, in case she is not able to keep Zofran down in the future.  Will also send for stat MRI brain since patient is now experiencing uncontrolled nausea vomiting and has recently had progression of her cancer as seen on PET from 6/5/2023.  MRI brain was performed without contrast, even though contrast was ordered.  The patient also did not stay for entire exam to be completed.  Exam limited for assessment of metastatic disease.  Attempted to call patient to review results, however no answer, did leave detailed voicemail.  6/27/2023 patient reports that her nausea has resolved.  She did not complete the MRI however the images which were done showed no evidence of edema or metastatic disease.  Nausea reoccurred when seen on 7/17/2023 over this past weekend after initiation of dabrafenib and trametinib.  Patient was premedicating with ondansetron however only giving herself about 15 minutes and I encouraged her to give herself at least 30 minutes to 1 hour prior to taking the medications.  She will notify our office if this is not helpful.  7/21/2023: Premedication with ondansetron 30 minutes prior to dabrafenib and trametinib has been helpful.  Patient however has had some vomiting  episodes directly after taking her medications where she has not had time to actually swallow them.  She denies difficulty swallowing however.  We will continue to monitor this.  10/16/2023 not an issue today.  1/18/2023, 1/29/2024, 3/1/2024 not an issue today.    7.  Hyperkalemia-  potassium 6/23/2023 3.1.  She has been having uncontrolled nausea, we will hold off on starting oral potassium replacement as it is unlikely she will tolerate that at this time.  Will recommend she increase oral potassium in her diet.  6/27/2023 potassium 3.0.  Nausea has resolved.  Patient will be given 40 M EQ p.o. potassium in the office and she will be started on 20 mEq daily of p.o. potassium.  Assessed 7/5/2023 with potassium 4.4  7/17/2023 potassium normal at 3.7  7/21/2023: Potassium 3.1, patient not taking potassium supplements at home because she does not like the big pill.  Changed to potassium chloride 10 mill equivalents, 2 tablets every a.m. as these will be smaller.   9/22/2023 potassium is 3.7  Repeat assessment 11/27/2023 with potassium of 4.2  12/4/2023 potassium was 4.0.  1/29/2024-potassium 4.2, reassessment 3/1/2024 potassium 4.1    8.Acute systolic heart failure -cardiomyopathy   Secondary to chemotherapy drugs during admission 11/14-16/2023 with Mekinist and Tafinlar discontinued  Diuretics continued postdischarge, cardiology follow-up planned       Plan:  *9 weeks CT chest, abdomen, pelvis    *10 weeks MD follow-up, possible into    *Prednisone taper as described above    I spent 60 minutes caring for Darlene on this date of service. This time includes time spent by me in the following activities: preparing for the visit, reviewing tests, obtaining and/or reviewing a separately obtained history, performing a medically appropriate examination and/or evaluation, counseling and educating the patient/family/caregiver, ordering medications, tests, or procedures, referring and communicating with other health care  professionals, documenting information in the medical record, independently interpreting results and communicating that information with the patient/family/caregiver, and care coordination.

## 2024-03-05 RX ORDER — ATORVASTATIN CALCIUM 20 MG/1
20 TABLET, FILM COATED ORAL NIGHTLY
Qty: 90 TABLET | Refills: 1 | Status: SHIPPED | OUTPATIENT
Start: 2024-03-05

## 2024-03-05 NOTE — TELEPHONE ENCOUNTER
Caller: [place], [place] - [address] - [phone] [facility] [phone] FX    Relationship: Pharmacy    Best call back number: [phone]    Requested Prescriptions:   Requested Prescriptions     Pending Prescriptions Disp Refills    atorvastatin (LIPITOR) 20 MG tablet 90 tablet 1     Si tablet Every Night. Indications: High Amount of Fats in the Blood        Pharmacy where request should be sent: [place], [place] - [address] - [phone] [facility] [phone] FX     Last office visit with prescribing clinician: 2024   Last telemedicine visit with prescribing clinician: Visit date not found   Next office visit with prescribing clinician: 2024     Additional details provided by patient: PHARMACY STATED PATIENT IS NEW TO THEM AND THEY DID NOT HAVE THE PRESCRIPTION    Does the patient have less than a 3 day supply:  [] Yes  [] No    Would you like a call back once the refill request has been completed: [] Yes [x] No    If the office needs to give you a call back, can they leave a voicemail: [] Yes [x] No    Charlene Evans Rep   24 12:09 EST

## 2024-03-07 ENCOUNTER — TELEPHONE (OUTPATIENT)
Dept: CARDIOLOGY | Facility: CLINIC | Age: 84
End: 2024-03-07

## 2024-03-07 ENCOUNTER — OFFICE VISIT (OUTPATIENT)
Dept: CARDIOLOGY | Facility: CLINIC | Age: 84
End: 2024-03-07
Payer: MEDICARE

## 2024-03-07 VITALS
DIASTOLIC BLOOD PRESSURE: 68 MMHG | BODY MASS INDEX: 18.4 KG/M2 | WEIGHT: 100 LBS | SYSTOLIC BLOOD PRESSURE: 118 MMHG | HEART RATE: 97 BPM | HEIGHT: 62 IN

## 2024-03-07 DIAGNOSIS — C34.11 MALIGNANT NEOPLASM OF UPPER LOBE OF RIGHT LUNG: ICD-10-CM

## 2024-03-07 DIAGNOSIS — I50.21 SYSTOLIC CHF, ACUTE: ICD-10-CM

## 2024-03-07 DIAGNOSIS — I48.0 PAF (PAROXYSMAL ATRIAL FIBRILLATION): ICD-10-CM

## 2024-03-07 DIAGNOSIS — I48.0 PAROXYSMAL ATRIAL FIBRILLATION: ICD-10-CM

## 2024-03-07 DIAGNOSIS — E78.2 MIXED HYPERLIPIDEMIA: ICD-10-CM

## 2024-03-07 DIAGNOSIS — I50.21 ACUTE HFREF (HEART FAILURE WITH REDUCED EJECTION FRACTION): ICD-10-CM

## 2024-03-07 DIAGNOSIS — I42.8 NICM (NONISCHEMIC CARDIOMYOPATHY): Primary | ICD-10-CM

## 2024-03-07 PROCEDURE — 93000 ELECTROCARDIOGRAM COMPLETE: CPT | Performed by: INTERNAL MEDICINE

## 2024-03-07 PROCEDURE — 3074F SYST BP LT 130 MM HG: CPT | Performed by: INTERNAL MEDICINE

## 2024-03-07 PROCEDURE — 1159F MED LIST DOCD IN RCRD: CPT | Performed by: INTERNAL MEDICINE

## 2024-03-07 PROCEDURE — 1160F RVW MEDS BY RX/DR IN RCRD: CPT | Performed by: INTERNAL MEDICINE

## 2024-03-07 PROCEDURE — 99214 OFFICE O/P EST MOD 30 MIN: CPT | Performed by: INTERNAL MEDICINE

## 2024-03-07 PROCEDURE — 3078F DIAST BP <80 MM HG: CPT | Performed by: INTERNAL MEDICINE

## 2024-03-07 RX ORDER — SACUBITRIL AND VALSARTAN 24; 26 MG/1; MG/1
0.5 TABLET, FILM COATED ORAL 2 TIMES DAILY
Qty: 28 TABLET | Refills: 0 | COMMUNITY
Start: 2024-03-07

## 2024-03-07 NOTE — PROGRESS NOTES
Date of Office Visit: 2024  Encounter Provider: Claire Orr MD  Place of Service: [facility] CARDIOLOGY  Patient Name: Darlene Pfeiffer  :1940    Chief complaint  Cardio oncology care, hypoxia, coronary artery disease, paroxysmal atrial fibrillation, cardiomyopathy    History of Present Illness  Patient is a 83-year-old female with a history of COPD, hypertension, hyperlipidemia, intracranial aneurysm left PCA.  Lung cancer, adenocarcinoma of the tongue, asthma and coronary artery calcification.  2015 patient underwent left upper lobectomy.  By  she was diagnosed with tongue cancer underwent resection.  No evidence of recurrence.  However by 2021 she was found to have adenocarcinoma in 2 different regions.  She completed radiation to the left chest on 2021.  By 10/2022 she was admitted with pneumonia and by 2022 had abnormal PET scan felt to be suggestive of metastatic changes left apex.  On 2022 she started Keytruda.  On 2023 however, there is evidence of progressive disease in the left upper thorax and ribs.  Therefore this was discontinued and she was to do with palliative radiation therapy 2023.  She then developed significant nausea and failure to thrive.  On 2023 echo showed normal systolic function and she was changed to dabrafenib and trametinib.  By 2023 she was admitted with heart failure and found to have developed cardiomyopathy with an ejection fraction 39.2% GLS -11.1% with global hypokinesis with aortic valve calcification with trivial valve regurgitation normal right-sided pressures.  She recalls the morning of admission that she woke up with sudden shortness of breath without chest pain.  She was started on goal-directed medical therapy for heart failure which was limited by hypotension.  Following this both chemotherapeutic agents were discontinued and she started Alimta on 2023.  When seen in the office on 2023 she  developed atrial fibrillation with rapid rates.  Toprol was increased and she was started on low-dose Eliquis.  She subsequently converted to sinus rhythm between them and her current visit.  She had a preordered coronary CT angiogram which showed an ejection fraction of 27% with global hypokinesis with evidence of multivessel coronary artery disease most significant in the with probable significant ostial RCA stenosis.  There was moderate disease in the mid LAD.  Motion and pressure limited disease 3 circumflex vessel..  Left main was felt to be free of significant obstructive disease.  Mild proximal LAD stenosis was noted moderate mid LAD stenosis was present.  After further discussion with Dr. Kothari as her 1 year prognosis was suboptimal medical therapy was pursued.  Fortunately clinically she had improved.  However on 12/28/2023 she was admitted with hypoxia and COVID-pneumonia.  Possibly with superimposed bacterial pneumonia.  She was treated with remdesivir cefepime and Decadron with slow improvement.  However she was admitted on 1/18/2024 and discharged on 1/21/2024 with acute respiratory failure and rhinovirus infection with exacerbation of heart failure treated medically.  She was dismissed home on O2 at 4 L    Since last visit, patient denies any chest pain palpitations syncope near syncope.  Dyspnea has improved changes in her breathing regimen by Dr. Flores over the past 4 weeks.  She is on several new inhalers.  She believes her dyspnea is slightly improved and she is down to 4 L of oxygen.  She is ambulating around her home.  Blood pressure at home has been as it is today.  She is to have a follow-up CT scan in the next several weeks and additional recommendations regarding oncology therapy depending on results of CT    Baseline echo on 8/2023 with ejection fraction  56%, GLS -20.8%.  Inferobasal hypokinesis, grade 1 diastolic dysfunction, normal right-sided chambers with moderate left atrial  enlargement and normal RV function.  Echo on 11/14/2023 with ejection fraction 39%, GLS -11.1% grade 1A diastolic dysfunction trivial valve regurgitation.        Past Medical History:   Diagnosis Date    Allergic rhinitis     Aneurysm     Asthma     Cancer of floor of mouth 2014    Cerebral aneurysm     COPD (chronic obstructive pulmonary disease)     COVID 2020    Esophageal reflux     History of adenocarcinoma of lung     History of anemia     History of carcinoma in situ of skin     History of lung cancer     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 from [facility]    History of squamous cell carcinoma     Tongue    Hyperlipidemia     Hypertension     since early 60s    Intractable back pain 11/29/2022    Low vitamin B12 level     Lung cancer     Systolic CHF, acute 11/14/2023    Thyromegaly     UTI (urinary tract infection)     Vitamin D deficiency      Past Surgical History:   Procedure Laterality Date    BRONCHOSCOPY Bilateral 10/17/2022    Procedure: BRONCHOSCOPY WITH FLUORO with biopsy and BAL;  Surgeon: India Flores MD;  Location: [facility] ENDOSCOPY;  Service: Pulmonary;  Laterality: Bilateral;  PRE/POST - lung mass    CHOLECYSTECTOMY  1999    COLONOSCOPY      EMBOLIZATION CEREBRAL Left 06/29/2022    Procedure: 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;  Surgeon: Bradley Dowell MD;  Location: [facility] HYBRID OR 18/19;  Service: Interventional Radiology;  Laterality: Left;    EYE SURGERY  11/24/2020    Took a muscle out of right eye    GLOSSECTOMY PARTIAL      less than one half tongue    LUNG SURGERY  2012    ORAL LESION EXCISION/BIOPSY      June and August 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    TUBAL ABDOMINAL LIGATION  1970    VENOUS ACCESS DEVICE (PORT) INSERTION N/A 12/12/2022    Procedure: MEDIPORT PLACEMENT;  Surgeon: Jason Villaseñor MD;  Location: [facility] MAIN OR;  Service: Vascular;  Laterality: N/A;     Outpatient Medications Prior to Visit    Medication Sig Dispense Refill    albuterol (PROVENTIL) (2.5 MG/3ML) 0.083% nebulizer solution Inhale the contents of 1 vial by nebulization Every 4 (Four) Hours As Needed for Wheezing. 90 mL 0    apixaban (ELIQUIS) 2.5 MG tablet tablet Take 1 tablet by mouth 2 (Two) Times a Day. 60 tablet 3    arformoterol (BROVANA) 15 MCG/2ML nebulizer solution USE 2 ML VIA NEBULIZER TWICE DAILY      atorvastatin (LIPITOR) 20 MG tablet Take 1 tablet by mouth Every Night. Indications: High Amount of Fats in the Blood 90 tablet 1    budesonide (PULMICORT) 0.5 MG/2ML nebulizer solution INHALE 2 MLS VIA NEBULIZER EVERY 12 HOURS      budesonide 0.5 MG/2ML suspension 0.5 mg, arformoterol 15 MCG/2ML nebulizer solution 15 mcg Inhale 1 nebule 2 (Two) Times a Day.      cetirizine (zyrTEC) 10 MG tablet Take 1 tablet by mouth Daily. Indications: Perennial Allergic Rhinitis      Cholecalciferol (Vitamin D3) 50 MCG (2000 UT) tablet Take 1,000 Units by mouth Daily. Indications: Vitamin D Deficiency      Cyanocobalamin (VITAMIN B12 PO) Take 1,000 mcg by mouth Daily. Indications: vitamin b12 deficiency       empagliflozin (Jardiance) 10 MG tablet tablet Take 1 tablet by mouth Daily. 30 tablet 5    folic acid (FOLVITE) 1 MG tablet Take 1 tablet by mouth Daily. Start at least 7 days prior to chemotherapy until at least 3 weeks after all chemotherapy. 30 tablet 6    furosemide (LASIX) 40 MG tablet Take 1.5 tablets by mouth Daily. Indications: Cardiac Failure, High Blood Pressure Disorder 45 tablet 2    ipratropium (ATROVENT) 0.06 % nasal spray 2 sprays into the nostril(s) as directed by provider 4 (Four) Times a Day. 15 mL 0    ipratropium-albuterol (DUO-NEB) 0.5-2.5 mg/3 ml nebulizer Inhale the contents of 1 vial by nebulization 2 (Two) Times a Day As Needed for Wheezing. 180 mL 0    metoprolol succinate XL (TOPROL-XL) 25 MG 24 hr tablet Take 1 tablet by mouth 2 (Two) Times a Day. Indications: High Blood Pressure Disorder 60 tablet 2     mirtazapine (REMERON) 7.5 MG tablet TAKE 1 TABLET BY MOUTH EVERY NIGHT AT BEDTIME 30 tablet 1    montelukast (SINGULAIR) 10 MG tablet Take 1 tablet by mouth Every Night. Indications: Hayfever, Perennial Allergic Rhinitis 90 tablet 3    O2 (OXYGEN) Inhale 2 L/min Continuous.      pantoprazole (PROTONIX) 40 MG EC tablet TAKE 1 TABLET BY MOUTH EVERY MORNING 30 tablet 0    potassium chloride (K-DUR,KLOR-CON) 10 MEQ CR tablet Take 2 tablets by mouth Daily. 1 tablet in the PM  Indications: Low Amount of Potassium in the Blood 90 tablet 2    predniSONE (DELTASONE) 20 MG tablet TAKE 2 TABLETS BY MOUTH DAILY. DO NOT. STOP ABRUPTLY      zolpidem (AMBIEN) 5 MG tablet TAKE 1 TABLET BY MOUTH EVERY NIGHT AT BEDTIME AS NEEDED FOR SLEEP 30 tablet 0    sacubitril-valsartan (Entresto) 24-26 MG tablet Take 0.5 tablets by mouth 2 (Two) Times a Day. 30 tablet 3     No facility-administered medications prior to visit.       Allergies as of 2024 - Reviewed 2024   Allergen Reaction Noted    Sulfa antibiotics Rash 10/13/2016     Social History     Socioeconomic History    Marital status:    Tobacco Use    Smoking status: Former     Current packs/day: 0.00     Types: Cigarettes     Quit date: 2015     Years since quittin.1     Passive exposure: Never    Smokeless tobacco: Never    Tobacco comments:     less than a pack per day, no smoking since , Quit 2015   Vaping Use    Vaping status: Never Used   Substance and Sexual Activity    Alcohol use: Not Currently     Comment: Socially -A few times a month    Drug use: No    Sexual activity: Defer     Family History   Problem Relation Age of Onset    Ovarian cancer Mother          at age 27    No Known Problems Father     Ovarian cancer Sister     Colon cancer Brother     No Known Problems Other     Malig Hyperthermia Neg Hx      Review of Systems   Constitutional: Negative for chills, fever, weight gain and weight loss.   Cardiovascular:  Negative for leg  "swelling.   Respiratory:  Negative for cough, snoring and wheezing.    Hematologic/Lymphatic: Negative for bleeding problem. Does not bruise/bleed easily.   Skin:  Negative for color change.   Musculoskeletal:  Negative for falls, joint pain and myalgias.   Gastrointestinal:  Negative for melena.   Genitourinary:  Negative for hematuria.   Neurological:  Negative for excessive daytime sleepiness.   Psychiatric/Behavioral:  Negative for depression. The patient is not nervous/anxious.         Objective:     Vitals:    03/07/24 0822   BP: 118/68   Pulse: 97   Weight: 45.4 kg (100 lb)   Height: 157.5 cm (62\")     Body mass index is 18.29 kg/m².    Vitals reviewed.   Constitutional:       Appearance: Well-developed. Frail. Chronically ill-appearing.   Eyes:      General: No scleral icterus.        Right eye: No discharge.      Conjunctiva/sclera: Conjunctivae normal.      Pupils: Pupils are equal, round, and reactive to light.   HENT:      Head: Normocephalic.      Nose: Nose normal.   Neck:      Thyroid: No thyromegaly.      Vascular: No JVD.   Pulmonary:      Effort: Pulmonary effort is normal. No respiratory distress.      Breath sounds: Normal breath sounds. Examination of the left-lower field reveals rales. No wheezing. No rales.   Cardiovascular:      Normal rate. Regular rhythm. Normal S1. Normal S2.       Murmurs: There is a grade 2/6 systolic murmur.      No gallop.    Pulses:     Intact distal pulses.      Carotid: 2+ bilaterally.     Radial: 2+ bilaterally.     Femoral: 2+ bilaterally.     Popliteal: 2+ bilaterally.     Dorsalis pedis: 2+ bilaterally.     Posterior tibial: 2+ bilaterally.  Edema:     Peripheral edema absent.   Abdominal:      General: Bowel sounds are normal. There is no distension.      Palpations: Abdomen is soft.      Tenderness: There is no abdominal tenderness. There is no rebound.   Musculoskeletal: Normal range of motion.         General: No tenderness.      Cervical back: Normal range " of motion and neck supple. Skin:     General: Skin is warm and dry.      Findings: No erythema or rash.   Neurological:      Mental Status: Alert and oriented to person, place, and time.   Psychiatric:         Behavior: Behavior normal.         Thought Content: Thought content normal.         Judgment: Judgment normal.       Lab Review:   Lab Results - Last 18 Months   Lab Units 03/01/24  0911 01/29/24  0835 01/25/23  0825 01/09/23  0836   WBC 10*3/mm3 13.62* 11.59*   < > 7.44   RBC 10*6/mm3 4.13 3.97   < > 4.64   HEMOGLOBIN g/dL 12.9 12.1   < > 13.3   HEMATOCRIT % 41.3 39.2   < > 40.6   MCV fL 100.0* 98.7*   < > 87.5   MCH pg 31.2 30.5   < > 28.7   MCHC g/dL 31.2* 30.9*   < > 32.8   RDW % 17.6* 17.2*   < > 13.1   PLATELETS 10*3/mm3 193 267   < > 287   NEUTROPHIL % % 91.0* 74.1   < > 54.4   LYMPHOCYTE % % 5.4* 18.2*   < > 37.4   MONOCYTES % % 3.1* 6.6   < > 7.4   EOSINOPHIL % % 0.0* 0.1*   < > 0.0*   BASOPHIL % % 0.1 0.4   < > 0.7   NEUTROS ABS 10*3/mm3 12.39* 8.59*   < > 4.05   LYMPHS ABS 10*3/mm3 0.74 2.11   < > 2.78   MONOS ABS 10*3/mm3 0.42 0.76   < > 0.55   EOS ABS 10*3/mm3 0.00 0.01   < > 0.00   BASOS ABS 10*3/mm3 0.01 0.05   < > 0.05   IMM GRAN % %  --   --   --  0.1   IMMATURE GRANS (ABS) 10*3/mm3  --   --   --  0.01   RDW-SD fl 65.7* 61.1*   < >  --    MPV fL 9.8 10.1   < >  --     < > = values in this interval not displayed.     Lab Results - Last 18 Months   Lab Units 03/01/24  0911 02/15/24  1406 01/29/24  0835   GLUCOSE mg/dL 264* 215* 119*   BUN mg/dL 24* 20 22   CREATININE mg/dL 0.71 0.81 0.65   SODIUM mmol/L 144 144 142   POTASSIUM mmol/L 4.1 4.5 4.2   CHLORIDE mmol/L 100 99 100   CO2 mmol/L 30.5* 28 33.9*   CALCIUM mg/dL 9.7 9.6 9.9   TOTAL PROTEIN g/dL 6.1  --  6.5   ALBUMIN g/dL 3.7  --  3.6   ALT (SGPT) U/L 6  --  11   AST (SGOT) U/L 17  --  20   ALK PHOS U/L 62  --  71   BILIRUBIN mg/dL 0.4  --  0.4   GLOBULIN gm/dL 2.4  --  2.9   A/G RATIO g/dL 1.5  --  1.2   BUN / CREAT RATIO  33.8* 25 33.8*    ANION GAP mmol/L 13.5  --  8.1   EGFR mL/min/1.73 85.5  --  88.6     Lab Results - Last 18 Months   Lab Units 10/02/23  0808 01/09/23  0836   TRIGLYCERIDES mg/dL 97 135   HDL CHOL mg/dL 73* 46   LDL CHOL mg/dL 90 133*   VLDL CHOLESTEROL ROBERT mg/dL 17 24     Lab Results - Last 18 Months   Lab Units 02/15/24  1406 01/23/24  1248   PROBNP pg/mL 1,724* 4,137.0*     Lab Results - Last 18 Months   Lab Units 01/18/24  1707 01/18/24  1431   HSTROP T ng/L 50* 48*     Lab Results - Last 18 Months   Lab Units 11/15/23  0412 07/26/23  0622   TSH uIU/mL 1.000 2.160     Lab Results - Last 18 Months   Lab Units 11/14/23  0238   PROTIME Seconds 13.7   APTT seconds 27.7         ECG 12 Lead    Date/Time: 3/7/2024 8:43 AM  Performed by: Claire Orr MD    Authorized by: Claire Orr MD  Comparison: compared with previous ECG   Similar to previous ECG  Rhythm: sinus rhythm  Other findings: non-specific ST-T wave changes    Clinical impression: abnormal EKG            Diagnosis Plan   1. NICM (nonischemic cardiomyopathy)  ECG 12 Lead    proBNP    proBNP      2. Acute HFrEF (heart failure with reduced ejection fraction)  ECG 12 Lead      3. PAF (paroxysmal atrial fibrillation)  ECG 12 Lead      4. Systolic CHF, acute        5. Paroxysmal atrial fibrillation        6. Mixed hyperlipidemia        7. Malignant neoplasm of upper lobe of right lung          Plan:       1.  Recent recurrent respiratory compromise with COVID and pneumonia and infection 12/28/2023  with rhinovirus and bacterial pneumonia 1/18/2024.  Dyspnea and O2 requirements have improved and she is regaining some strength fortunately.  She looks quite good today.  Continue with the current regimen follow-up in 6 weeks and repeat echo at that time.  2.  HFrEF.  Ejection fraction 39% in 11/2023 in the setting of atrial fibrillation.  As above.  She is now maintaining sinus rhythm with no evidence of heart failure with very minimal rales left base.  3.  Paroxysmal atrial  fibrillation.  As above.  Maintaining sinus rhythm on metoprolol and tolerating Eliquis.  4.  Multivessel coronary artery disease.  Troponin was elevated in the setting of heart failure and infection.  However she was felt to not be a surgical candidate and angioplasty/percutaneous intervention was felt to be very high risk.  Therefore medical therapy was pursued with plans for further intervention if this fails.  I  5.  Lung cancer.  Patient to see Dr. Kothari felt she is too high risk at this point for additional intervention.  Follow-up CT scans are pending in late February.  Clinically improved  7.  History of hypertension.  Has been better with no episodes of hypotension.  8.  History of cerebral aneurysm.  Followed by Dr. Calvin  9.  Dyslipidemia  10.  COPD/asthma      Time Spent: I spent 35 minutes caring for Darlene on this date of service. This time includes time spent by me in the following activities: preparing for the visit, reviewing tests, obtaining and/or reviewing a separately obtained history, performing a medically appropriate examination and/or evaluation, counseling and educating the patient/family/caregiver, ordering medications, tests, or procedures, documenting information in the medical record, and independently interpreting results and communicating that information with the patient/family/caregiver.   I spent 1 minutes on the separately reported service of ECG. This time is not included in the time used to support the E/M service also reported today.        Your medication list            Accurate as of March 7, 2024 11:59 PM. If you have any questions, ask your nurse or doctor.                CONTINUE taking these medications        Instructions Last Dose Given Next Dose Due   albuterol (2.5 MG/3ML) 0.083% nebulizer solution  Commonly known as: PROVENTIL      Inhale the contents of 1 vial by nebulization Every 4 (Four) Hours As Needed for Wheezing.       apixaban 2.5 MG tablet  tablet  Commonly known as: ELIQUIS      Take 1 tablet by mouth 2 (Two) Times a Day.       arformoterol 15 MCG/2ML nebulizer solution  Commonly known as: BROVANA      USE 2 ML VIA NEBULIZER TWICE DAILY       atorvastatin 20 MG tablet  Commonly known as: LIPITOR      Take 1 tablet by mouth Every Night. Indications: High Amount of Fats in the Blood       budesonide 0.5 MG/2ML nebulizer solution  Commonly known as: PULMICORT      INHALE 2 MLS VIA NEBULIZER EVERY 12 HOURS       budesonide 0.5 MG/2ML suspension 0.5 mg, arformoterol 15 MCG/2ML nebulizer solution 15 mcg      Inhale 1 nebule 2 (Two) Times a Day.       cetirizine 10 MG tablet  Commonly known as: zyrTEC      Take 1 tablet by mouth Daily. Indications: Perennial Allergic Rhinitis       empagliflozin 10 MG tablet tablet  Commonly known as: Jardiance      Take 1 tablet by mouth Daily.       Entresto 24-26 MG tablet  Generic drug: sacubitril-valsartan      Take 0.5 tablets by mouth 2 (Two) Times a Day. Indications: Cardiac Failure       folic acid 1 MG tablet  Commonly known as: FOLVITE      Take 1 tablet by mouth Daily. Start at least 7 days prior to chemotherapy until at least 3 weeks after all chemotherapy.       furosemide 40 MG tablet  Commonly known as: LASIX      Take 1.5 tablets by mouth Daily. Indications: Cardiac Failure, High Blood Pressure Disorder       ipratropium 0.06 % nasal spray  Commonly known as: ATROVENT      2 sprays into the nostril(s) as directed by provider 4 (Four) Times a Day.       ipratropium-albuterol 0.5-2.5 mg/3 ml nebulizer  Commonly known as: DUO-NEB      Inhale the contents of 1 vial by nebulization 2 (Two) Times a Day As Needed for Wheezing.       metoprolol succinate XL 25 MG 24 hr tablet  Commonly known as: TOPROL-XL      Take 1 tablet by mouth 2 (Two) Times a Day. Indications: High Blood Pressure Disorder       mirtazapine 7.5 MG tablet  Commonly known as: REMERON      TAKE 1 TABLET BY MOUTH EVERY NIGHT AT BEDTIME        montelukast 10 MG tablet  Commonly known as: SINGULAIR      Take 1 tablet by mouth Every Night. Indications: Hayfever, Perennial Allergic Rhinitis       O2  Commonly known as: OXYGEN      Inhale 2 L/min Continuous.       pantoprazole 40 MG EC tablet  Commonly known as: PROTONIX      TAKE 1 TABLET BY MOUTH EVERY MORNING       potassium chloride 10 MEQ CR tablet  Commonly known as: KLOR-CON M10      Take 2 tablets by mouth Daily. 1 tablet in the PM  Indications: Low Amount of Potassium in the Blood       predniSONE 20 MG tablet  Commonly known as: DELTASONE      TAKE 2 TABLETS BY MOUTH DAILY. DO NOT. STOP ABRUPTLY       VITAMIN B12 PO      Take 1,000 mcg by mouth Daily. Indications: vitamin b12 deficiency       Vitamin D3 50 MCG (2000 UT) tablet  Generic drug: Cholecalciferol      Take 1,000 Units by mouth Daily. Indications: Vitamin D Deficiency       zolpidem 5 MG tablet  Commonly known as: AMBIEN      TAKE 1 TABLET BY MOUTH EVERY NIGHT AT BEDTIME AS NEEDED FOR SLEEP                 Where to Get Your Medications        Information about where to get these medications is not yet available    Ask your nurse or doctor about these medications  Entresto 24-26 MG tablet         Patient is no longer taking -.  I corrected the med list to reflect this.  I did not stop these medications.      Dictated utilizing Dragon dictation

## 2024-03-07 NOTE — TELEPHONE ENCOUNTER
Please arrange for proBNP to be done with next blood draw with Dr. Kothari 4/26/2024.  Also please follow-up on proBNP and adding onto labs that were done 6 weeks ago.

## 2024-03-11 ENCOUNTER — TELEPHONE (OUTPATIENT)
Dept: CARDIOLOGY | Facility: CLINIC | Age: 84
End: 2024-03-11
Payer: MEDICARE

## 2024-03-11 NOTE — TELEPHONE ENCOUNTER
Pt called and will be having a tooth extraction of 2 teeth.  She wanting to know if this is ok.    Called pt and LMM re: Do they want her to hold Eliquis?  Call back to let us know.

## 2024-03-12 ENCOUNTER — TELEPHONE (OUTPATIENT)
Dept: CARDIOLOGY | Facility: CLINIC | Age: 84
End: 2024-03-12
Payer: MEDICARE

## 2024-03-14 ENCOUNTER — HOSPITAL ENCOUNTER (OUTPATIENT)
Dept: CARDIOLOGY | Facility: HOSPITAL | Age: 84
Discharge: HOME | End: 2024-03-14
Admitting: INTERNAL MEDICINE
Payer: MEDICARE

## 2024-03-14 VITALS
BODY MASS INDEX: 18.4 KG/M2 | HEIGHT: 62 IN | HEART RATE: 110 BPM | WEIGHT: 100 LBS | SYSTOLIC BLOOD PRESSURE: 118 MMHG | DIASTOLIC BLOOD PRESSURE: 82 MMHG

## 2024-03-14 DIAGNOSIS — I42.8 NICM (NONISCHEMIC CARDIOMYOPATHY): ICD-10-CM

## 2024-03-14 LAB
BH CV ECHO LEFT VENTRICLE GLOBAL LONGITUDINAL STRAIN: -16.8 %
BH CV ECHO MEAS - AI P1/2T: 422.3 MSEC
BH CV ECHO MEAS - EDV(CUBED): 72.5 ML
BH CV ECHO MEAS - EDV(MOD-SP2): 96 ML
BH CV ECHO MEAS - EDV(MOD-SP4): 145 ML
BH CV ECHO MEAS - EF(MOD-BP): 48 %
BH CV ECHO MEAS - EF(MOD-SP2): 49 %
BH CV ECHO MEAS - EF(MOD-SP4): 51 %
BH CV ECHO MEAS - EF_3D-VOL: 41 %
BH CV ECHO MEAS - ESV(CUBED): 36.2 ML
BH CV ECHO MEAS - ESV(MOD-SP2): 49 ML
BH CV ECHO MEAS - ESV(MOD-SP4): 71 ML
BH CV ECHO MEAS - FS: 20.7 %
BH CV ECHO MEAS - IVS/LVPW: 0.96 CM
BH CV ECHO MEAS - IVSD: 0.86 CM
BH CV ECHO MEAS - LA 3D VOL INDEX: 23
BH CV ECHO MEAS - LAT PEAK E' VEL: 8.9 CM/SEC
BH CV ECHO MEAS - LV DIASTOLIC VOL/BSA (35-75): 101.8 CM2
BH CV ECHO MEAS - LV MASS(C)D: 114.5 GRAMS
BH CV ECHO MEAS - LV SYSTOLIC VOL/BSA (12-30): 49.9 CM2
BH CV ECHO MEAS - LVIDD: 4.2 CM
BH CV ECHO MEAS - LVIDS: 3.3 CM
BH CV ECHO MEAS - LVPWD: 0.9 CM
BH CV ECHO MEAS - MED PEAK E' VEL: 8.6 CM/SEC
BH CV ECHO MEAS - MV A MAX VEL: 106.7 CM/SEC
BH CV ECHO MEAS - MV DEC SLOPE: 682.9 CM/SEC2
BH CV ECHO MEAS - MV DEC TIME: 0.12 SEC
BH CV ECHO MEAS - MV E MAX VEL: 70.7 CM/SEC
BH CV ECHO MEAS - MV E/A: 0.66
BH CV ECHO MEAS - MV MAX PG: 6.2 MMHG
BH CV ECHO MEAS - MV MEAN PG: 3.2 MMHG
BH CV ECHO MEAS - MV P1/2T: 53.6 MSEC
BH CV ECHO MEAS - MV V2 VTI: 11.2 CM
BH CV ECHO MEAS - MVA(P1/2T): 4.1 CM2
BH CV ECHO MEAS - PULM A REVS DUR: 0.11 SEC
BH CV ECHO MEAS - PULM A REVS VEL: 30.8 CM/SEC
BH CV ECHO MEAS - PULM DIAS VEL: 23.1 CM/SEC
BH CV ECHO MEAS - PULM S/D: 2.43
BH CV ECHO MEAS - PULM SYS VEL: 56.1 CM/SEC
BH CV ECHO MEAS - RAP SYSTOLE: 3 MMHG
BH CV ECHO MEAS - RVSP: 33.1 MMHG
BH CV ECHO MEAS - SI(MOD-SP2): 33 ML/M2
BH CV ECHO MEAS - SI(MOD-SP4): 52 ML/M2
BH CV ECHO MEAS - SV(MOD-SP2): 47 ML
BH CV ECHO MEAS - SV(MOD-SP4): 74 ML
BH CV ECHO MEAS - TR MAX PG: 30.1 MMHG
BH CV ECHO MEAS - TR MAX VEL: 274.2 CM/SEC
BH CV ECHO MEASUREMENTS AVERAGE E/E' RATIO: 8.08
LEFT ATRIUM VOLUME INDEX: 27.3 ML/M2

## 2024-03-14 PROCEDURE — 25510000001 PERFLUTREN (DEFINITY) 8.476 MG IN SODIUM CHLORIDE (PF) 0.9 % 10 ML INJECTION: Performed by: INTERNAL MEDICINE

## 2024-03-14 PROCEDURE — 93356 MYOCRD STRAIN IMG SPCKL TRCK: CPT

## 2024-03-14 PROCEDURE — 93325 DOPPLER ECHO COLOR FLOW MAPG: CPT

## 2024-03-14 PROCEDURE — 93308 TTE F-UP OR LMTD: CPT

## 2024-03-14 PROCEDURE — 93321 DOPPLER ECHO F-UP/LMTD STD: CPT

## 2024-03-14 RX ADMIN — PERFLUTREN 2 ML: 6.52 INJECTION, SUSPENSION INTRAVENOUS at 09:32

## 2024-03-15 NOTE — TELEPHONE ENCOUNTER
Assumed care of patient at 0645. Bedside report received. Assessment complete.  AA&Ox4. Denies CP/SOB.  Cardiac Monitor Room Verbalized Visualization of Heart Rhythm   Reporting 0/10 pain. Declined intervention at this time.  Educated patient regarding pharmacologic and non pharmacologic modalities for pain management.  Skin per flowsheets  Tolerating GI Soft, Low Fat diet. Denies N/V.  + void. Last BM 11/21  Pt ambulates SBA w Single Point Cane.  All needs met at this time. Call light within reach. Pt calls appropriately. Bed low and locked, non skid socks in place. Hourly rounding in place.     NATY re: call to discuss.

## 2024-03-15 NOTE — TELEPHONE ENCOUNTER
Pt called re: She has an appointment with Dr Dixon on 3/20/2023.  She will have more information once she goes to that appointment.  She will call back.

## 2024-03-19 ENCOUNTER — TELEPHONE (OUTPATIENT)
Dept: CARDIOLOGY | Facility: CLINIC | Age: 84
End: 2024-03-19
Payer: MEDICARE

## 2024-03-19 NOTE — TELEPHONE ENCOUNTER
Called and left VM, will continue to try to reach pt.    HUB- please put patient straight through to triage    Charmaine Winters, RN  Triage RN  03/19/24 13:53 EDT

## 2024-03-19 NOTE — TELEPHONE ENCOUNTER
I spoke with Darlene LINDSEY Pfeiffer and updated pt on results/recommendations from provider.  They verbalized understanding and have no further questions at this time.    Thank you,    Linda HART, RN  Triage [facility]  03/19/24 14:48 EDT

## 2024-03-19 NOTE — TELEPHONE ENCOUNTER
Please let her know that echo is improved since November but not quite back to baseline (done in August 2023) would continue current medications and keep currently scheduled follow-up appointment with Dr. Orr on 4/11/2024.

## 2024-03-20 NOTE — TELEPHONE ENCOUNTER
Pt called re: Dental visit.  She saw Dr Dixon today.  He did not say if she needed to hold Eliquis.    I called and LMM re: Does she need to hold Eliquis?

## 2024-03-21 ENCOUNTER — TELEPHONE (OUTPATIENT)
Dept: CARDIOLOGY | Facility: CLINIC | Age: 84
End: 2024-03-21
Payer: MEDICARE

## 2024-03-21 NOTE — TELEPHONE ENCOUNTER
Reached out to patient AUBRIE EUBANKS advising she was approved for [employer] Assistance program. She will return call to confirm, message was received.

## 2024-03-21 NOTE — TELEPHONE ENCOUNTER
Johanna with Dr Dixon's office called back and pt can stay on the AC.  Is she cleared to proceed?  Any other recommendation?

## 2024-03-22 DIAGNOSIS — G47.00 INSOMNIA, UNSPECIFIED TYPE: ICD-10-CM

## 2024-03-22 DIAGNOSIS — C34.11 MALIGNANT NEOPLASM OF UPPER LOBE OF RIGHT LUNG: ICD-10-CM

## 2024-03-22 RX ORDER — ZOLPIDEM TARTRATE 5 MG/1
5 TABLET ORAL NIGHTLY PRN
Qty: 30 TABLET | Refills: 0 | Status: SHIPPED | OUTPATIENT
Start: 2024-03-22

## 2024-04-11 ENCOUNTER — HOSPITAL ENCOUNTER (OUTPATIENT)
Dept: CARDIOLOGY | Facility: HOSPITAL | Age: 84
Discharge: HOME | End: 2024-04-11
Payer: MEDICARE

## 2024-04-11 ENCOUNTER — TELEPHONE (OUTPATIENT)
Dept: CARDIOLOGY | Facility: CLINIC | Age: 84
End: 2024-04-11
Payer: MEDICARE

## 2024-04-11 ENCOUNTER — OFFICE VISIT (OUTPATIENT)
Dept: CARDIOLOGY | Facility: CLINIC | Age: 84
End: 2024-04-11
Payer: MEDICARE

## 2024-04-11 VITALS
HEIGHT: 62 IN | DIASTOLIC BLOOD PRESSURE: 72 MMHG | SYSTOLIC BLOOD PRESSURE: 118 MMHG | OXYGEN SATURATION: 82 % | BODY MASS INDEX: 18.58 KG/M2 | WEIGHT: 101 LBS | HEART RATE: 89 BPM

## 2024-04-11 DIAGNOSIS — I48.0 PAROXYSMAL ATRIAL FIBRILLATION: ICD-10-CM

## 2024-04-11 DIAGNOSIS — I42.8 NICM (NONISCHEMIC CARDIOMYOPATHY): Primary | ICD-10-CM

## 2024-04-11 DIAGNOSIS — I10 ESSENTIAL HYPERTENSION: ICD-10-CM

## 2024-04-11 DIAGNOSIS — I50.21 ACUTE HFREF (HEART FAILURE WITH REDUCED EJECTION FRACTION): ICD-10-CM

## 2024-04-11 DIAGNOSIS — C34.11 MALIGNANT NEOPLASM OF UPPER LOBE OF RIGHT LUNG: ICD-10-CM

## 2024-04-11 DIAGNOSIS — I42.8 NICM (NONISCHEMIC CARDIOMYOPATHY): ICD-10-CM

## 2024-04-11 LAB
ANION GAP SERPL CALCULATED.3IONS-SCNC: 11 MMOL/L (ref 5–15)
BUN SERPL-MCNC: 29 MG/DL (ref 8–23)
BUN/CREAT SERPL: 35.8 (ref 7–25)
CALCIUM SPEC-SCNC: 9.3 MG/DL (ref 8.6–10.5)
CHLORIDE SERPL-SCNC: 100 MMOL/L (ref 98–107)
CO2 SERPL-SCNC: 34 MMOL/L (ref 22–29)
CREAT SERPL-MCNC: 0.81 MG/DL (ref 0.57–1)
EGFRCR SERPLBLD CKD-EPI 2021: 73 ML/MIN/1.73
GLUCOSE SERPL-MCNC: 139 MG/DL (ref 65–99)
NT-PROBNP SERPL-MCNC: 1034 PG/ML (ref 0–1800)
POTASSIUM SERPL-SCNC: 4.1 MMOL/L (ref 3.5–5.2)
SODIUM SERPL-SCNC: 145 MMOL/L (ref 136–145)

## 2024-04-11 PROCEDURE — 36415 COLL VENOUS BLD VENIPUNCTURE: CPT

## 2024-04-11 PROCEDURE — 83880 ASSAY OF NATRIURETIC PEPTIDE: CPT | Performed by: INTERNAL MEDICINE

## 2024-04-11 PROCEDURE — 80048 BASIC METABOLIC PNL TOTAL CA: CPT | Performed by: INTERNAL MEDICINE

## 2024-04-11 RX ORDER — METOPROLOL SUCCINATE 25 MG/1
TABLET, EXTENDED RELEASE ORAL
Qty: 45 TABLET | Refills: 3 | Status: SHIPPED | OUTPATIENT
Start: 2024-04-11

## 2024-04-11 NOTE — TELEPHONE ENCOUNTER
Please let her know that kidney function and electrolytes are stable.  proBNP (test for heart failure) is improved.  Continue current medications and keep currently scheduled May follow-up with Dr. Orr.

## 2024-04-11 NOTE — PROGRESS NOTES
Date of Office Visit: 2024  Encounter Provider: Claire Orr MD  Place of Service: [facility] CARDIOLOGY  Patient Name: Darlene Pfeiffer  :1940    Chief complaint  Cardio oncology care, hypoxia, coronary artery disease, paroxysmal atrial fibrillation, cardiomyopathy    History of Present Illness  Patient is a 83-year-old female with a history of COPD, hypertension, hyperlipidemia, intracranial aneurysm left PCA.  Lung cancer, adenocarcinoma of the tongue, asthma and coronary artery calcification.  2015 patient underwent left upper lobectomy.  By  she was diagnosed with tongue cancer underwent resection.  No evidence of recurrence.  However by 2021 she was found to have adenocarcinoma in 2 different regions.  She completed radiation to the left chest on 2021.  By 10/2022 she was admitted with pneumonia and by 2022 had abnormal PET scan felt to be suggestive of metastatic changes left apex.  On 2022 she started Keytruda.  On 2023 however, there is evidence of progressive disease in the left upper thorax and ribs.  Therefore this was discontinued and she was to do with palliative radiation therapy 2023.  She then developed significant nausea and failure to thrive.  On 2023 echo showed normal systolic function and she was changed to dabrafenib and trametinib.  By 2023 she was admitted with heart failure and found to have developed cardiomyopathy with an ejection fraction 39.2% GLS -11.1% with global hypokinesis with aortic valve calcification with trivial valve regurgitation normal right-sided pressures.  She recalls the morning of admission that she woke up with sudden shortness of breath without chest pain.  She was started on goal-directed medical therapy for heart failure which was limited by hypotension.  Following this both chemotherapeutic agents were discontinued and she started Alimta on 2023.  When seen in the office on 2023 she  developed atrial fibrillation with rapid rates.  Toprol was increased and she was started on low-dose Eliquis.  She subsequently converted to sinus rhythm between them and her current visit.  She had a preordered coronary CT angiogram which showed an ejection fraction of 27% with global hypokinesis with evidence of multivessel coronary artery disease most significant in the with probable significant ostial RCA stenosis.  There was moderate disease in the mid LAD.  Motion and pressure limited disease 3 circumflex vessel..  Left main was felt to be free of significant obstructive disease.  Mild proximal LAD stenosis was noted moderate mid LAD stenosis was present.  After further discussion with Dr. Kothari as her 1 year prognosis was suboptimal medical therapy was pursued.  Fortunately clinically she had improved.  However on 12/28/2023 she was admitted with hypoxia and COVID-pneumonia.  Possibly with superimposed bacterial pneumonia.  She was treated with remdesivir cefepime and Decadron with slow improvement.  However she was admitted on 1/18/2024 and discharged on 1/21/2024 with acute respiratory failure and rhinovirus infection with exacerbation of heart failure treated medically.  She was dismissed home on O2 at 4 L.  She continued medical therapy by 2/2024 ejection fraction was 45 to 50%, GLS also improved to -16.8% with indeterminate diastolic function RVSP was 33 mmHg.      Since last visit she has had no palpitations.  Dyspnea on exertion is about the same she uses 3 L all the time and 4 L at night.  She states her blood pressure is typically in the 120s over 80s.  She had 2 episodes of chest discomfort 2 weeks ago that occurred when she was sitting in a chair was not exertional.  She is also contemplating a dental extraction under local anesthesia.  They are unclear as to exact the dose of metoprolol she is taking    Past Medical History:   Diagnosis Date    Allergic rhinitis     Aneurysm     Asthma      Cancer of floor of mouth 2014    Cerebral aneurysm     COPD (chronic obstructive pulmonary disease)     COVID 2020    Esophageal reflux     History of adenocarcinoma of lung     History of anemia     History of carcinoma in situ of skin     History of lung cancer     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 from [facility]    History of squamous cell carcinoma     Tongue    Hyperlipidemia     Hypertension     since early 60s    Intractable back pain 11/29/2022    Low vitamin B12 level     Lung cancer     Systolic CHF, acute 11/14/2023    Thyromegaly     UTI (urinary tract infection)     Vitamin D deficiency      Past Surgical History:   Procedure Laterality Date    BRONCHOSCOPY Bilateral 10/17/2022    Procedure: BRONCHOSCOPY WITH FLUORO with biopsy and BAL;  Surgeon: India Flores MD;  Location: [facility] ENDOSCOPY;  Service: Pulmonary;  Laterality: Bilateral;  PRE/POST - lung mass    CHOLECYSTECTOMY  1999    COLONOSCOPY      EMBOLIZATION CEREBRAL Left 06/29/2022    Procedure: 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;  Surgeon: Bradley Dowell MD;  Location: [facility] HYBRID OR 18/19;  Service: Interventional Radiology;  Laterality: Left;    EYE SURGERY  11/24/2020    Took a muscle out of right eye    GLOSSECTOMY PARTIAL      less than one half tongue    LUNG SURGERY  2012    ORAL LESION EXCISION/BIOPSY      June and August 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    TUBAL ABDOMINAL LIGATION  1970    VENOUS ACCESS DEVICE (PORT) INSERTION N/A 12/12/2022    Procedure: MEDIPORT PLACEMENT;  Surgeon: Jason Villaseñor MD;  Location: [facility] MAIN OR;  Service: Vascular;  Laterality: N/A;     Outpatient Medications Prior to Visit   Medication Sig Dispense Refill    albuterol (PROVENTIL) (2.5 MG/3ML) 0.083% nebulizer solution Inhale the contents of 1 vial by nebulization Every 4 (Four) Hours As Needed for Wheezing. 90 mL 0    apixaban (ELIQUIS) 2.5 MG tablet tablet Take 1  tablet by mouth 2 (Two) Times a Day. 60 tablet 3    arformoterol (BROVANA) 15 MCG/2ML nebulizer solution USE 2 ML VIA NEBULIZER TWICE DAILY      atorvastatin (LIPITOR) 20 MG tablet Take 1 tablet by mouth Every Night. Indications: High Amount of Fats in the Blood 90 tablet 1    budesonide (PULMICORT) 0.5 MG/2ML nebulizer solution INHALE 2 MLS VIA NEBULIZER EVERY 12 HOURS      budesonide 0.5 MG/2ML suspension 0.5 mg, arformoterol 15 MCG/2ML nebulizer solution 15 mcg Inhale 1 nebule 2 (Two) Times a Day.      cetirizine (zyrTEC) 10 MG tablet Take 1 tablet by mouth Daily. Indications: Perennial Allergic Rhinitis      Cholecalciferol (Vitamin D3) 50 MCG (2000 UT) tablet Take 1,000 Units by mouth Daily. Indications: Vitamin D Deficiency      Cyanocobalamin (VITAMIN B12 PO) Take 1,000 mcg by mouth Daily. Indications: vitamin b12 deficiency       empagliflozin (Jardiance) 10 MG tablet tablet Take 1 tablet by mouth Daily. 30 tablet 5    folic acid (FOLVITE) 1 MG tablet Take 1 tablet by mouth Daily. Start at least 7 days prior to chemotherapy until at least 3 weeks after all chemotherapy. 30 tablet 6    furosemide (LASIX) 40 MG tablet Take 1.5 tablets by mouth Daily. Indications: Cardiac Failure, High Blood Pressure Disorder 45 tablet 2    ipratropium (ATROVENT) 0.06 % nasal spray 2 sprays into the nostril(s) as directed by provider 4 (Four) Times a Day. 15 mL 0    ipratropium-albuterol (DUO-NEB) 0.5-2.5 mg/3 ml nebulizer Inhale the contents of 1 vial by nebulization 2 (Two) Times a Day As Needed for Wheezing. 180 mL 0    montelukast (SINGULAIR) 10 MG tablet Take 1 tablet by mouth Every Night. Indications: Hayfever, Perennial Allergic Rhinitis 90 tablet 3    O2 (OXYGEN) Inhale 2 L/min Continuous.      potassium chloride (K-DUR,KLOR-CON) 10 MEQ CR tablet Take 2 tablets by mouth Daily. 1 tablet in the PM  Indications: Low Amount of Potassium in the Blood 90 tablet 2    predniSONE (DELTASONE) 20 MG tablet TAKE 2 TABLETS BY MOUTH  DAILY. DO NOT. STOP ABRUPTLY      zolpidem (AMBIEN) 5 MG tablet TAKE 1 TABLET BY MOUTH EVERY NIGHT AT BEDTIME AS NEEDED FOR SLEEP 30 tablet 0    metoprolol succinate XL (TOPROL-XL) 25 MG 24 hr tablet Take 1 tablet by mouth 2 (Two) Times a Day. Indications: High Blood Pressure Disorder 60 tablet 2    mirtazapine (REMERON) 7.5 MG tablet TAKE 1 TABLET BY MOUTH EVERY NIGHT AT BEDTIME 30 tablet 1    pantoprazole (PROTONIX) 40 MG EC tablet TAKE 1 TABLET BY MOUTH EVERY MORNING 30 tablet 0    sacubitril-valsartan (Entresto) 24-26 MG tablet Take 0.5 tablets by mouth 2 (Two) Times a Day. Indications: Cardiac Failure 28 tablet 0     No facility-administered medications prior to visit.       Allergies as of 2024 - Reviewed 2024   Allergen Reaction Noted    Sulfa antibiotics Rash 10/13/2016     Social History     Socioeconomic History    Marital status:    Tobacco Use    Smoking status: Former     Current packs/day: 0.00     Types: Cigarettes     Quit date: 2015     Years since quittin.2     Passive exposure: Never    Smokeless tobacco: Never    Tobacco comments:     less than a pack per day, no smoking since , Quit 2015   Vaping Use    Vaping status: Never Used   Substance and Sexual Activity    Alcohol use: Not Currently     Comment: Socially -A few times a month    Drug use: No    Sexual activity: Defer     Family History   Problem Relation Age of Onset    Ovarian cancer Mother          at age 27    No Known Problems Father     Ovarian cancer Sister     Colon cancer Brother     No Known Problems Other     Malig Hyperthermia Neg Hx      Review of Systems   Constitutional: Positive for malaise/fatigue. Negative for chills, fever, weight gain and weight loss.   Cardiovascular:  Positive for chest pain, dyspnea on exertion and palpitations. Negative for leg swelling.   Respiratory:  Negative for cough, snoring and wheezing.    Hematologic/Lymphatic: Negative for bleeding problem. Does not  "bruise/bleed easily.   Skin:  Negative for color change.   Musculoskeletal:  Negative for falls, joint pain and myalgias.   Gastrointestinal:  Negative for melena.   Genitourinary:  Negative for hematuria.   Neurological:  Negative for excessive daytime sleepiness.   Psychiatric/Behavioral:  Negative for depression. The patient is not nervous/anxious.         Objective:     Vitals:    04/11/24 1003   BP: 118/72   Pulse: 89   SpO2: (!) 82%   Weight: 45.8 kg (101 lb)   Height: 157.5 cm (62\")     Body mass index is 18.47 kg/m².    Vitals reviewed.   Constitutional:       Appearance: Underweight.   Eyes:      General: No scleral icterus.        Right eye: No discharge.      Conjunctiva/sclera: Conjunctivae normal.      Pupils: Pupils are equal, round, and reactive to light.   HENT:      Head: Normocephalic.      Nose: Nose normal.   Neck:      Thyroid: No thyromegaly.      Vascular: No JVD.   Pulmonary:      Effort: Pulmonary effort is normal. No respiratory distress.      Breath sounds: Normal breath sounds. No wheezing. No rales.   Cardiovascular:      Normal rate. Regular rhythm. Normal S1. Normal S2.       Murmurs: There is no murmur.      No gallop.    Pulses:     Intact distal pulses.      Carotid: 2+ bilaterally.     Radial: 2+ bilaterally.     Femoral: 2+ bilaterally.     Popliteal: 2+ bilaterally.     Dorsalis pedis: 2+ bilaterally.     Posterior tibial: 2+ bilaterally.  Edema:     Peripheral edema absent.   Abdominal:      General: Bowel sounds are normal. There is no distension.      Palpations: Abdomen is soft.      Tenderness: There is no abdominal tenderness. There is no rebound.   Musculoskeletal: Normal range of motion.         General: No tenderness.      Cervical back: Normal range of motion and neck supple. Skin:     General: Skin is warm and dry.      Findings: No erythema or rash.   Neurological:      Mental Status: Alert and oriented to person, place, and time.   Psychiatric:         Behavior: " Behavior normal.         Thought Content: Thought content normal.         Judgment: Judgment normal.       Lab Review:   Lab Results - Last 18 Months   Lab Units 03/01/24  0911 01/29/24  0835 01/25/23  0825 01/09/23  0836   WBC 10*3/mm3 13.62* 11.59*   < > 7.44   RBC 10*6/mm3 4.13 3.97   < > 4.64   HEMOGLOBIN g/dL 12.9 12.1   < > 13.3   HEMATOCRIT % 41.3 39.2   < > 40.6   MCV fL 100.0* 98.7*   < > 87.5   MCH pg 31.2 30.5   < > 28.7   MCHC g/dL 31.2* 30.9*   < > 32.8   RDW % 17.6* 17.2*   < > 13.1   PLATELETS 10*3/mm3 193 267   < > 287   NEUTROPHIL % % 91.0* 74.1   < > 54.4   LYMPHOCYTE % % 5.4* 18.2*   < > 37.4   MONOCYTES % % 3.1* 6.6   < > 7.4   EOSINOPHIL % % 0.0* 0.1*   < > 0.0*   BASOPHIL % % 0.1 0.4   < > 0.7   NEUTROS ABS 10*3/mm3 12.39* 8.59*   < > 4.05   LYMPHS ABS 10*3/mm3 0.74 2.11   < > 2.78   MONOS ABS 10*3/mm3 0.42 0.76   < > 0.55   EOS ABS 10*3/mm3 0.00 0.01   < > 0.00   BASOS ABS 10*3/mm3 0.01 0.05   < > 0.05   IMM GRAN % %  --   --   --  0.1   IMMATURE GRANS (ABS) 10*3/mm3  --   --   --  0.01   RDW-SD fl 65.7* 61.1*   < >  --    MPV fL 9.8 10.1   < >  --     < > = values in this interval not displayed.       Lab Results - Last 18 Months   Lab Units 04/11/24  1059 03/01/24  0911 02/15/24  1406 01/29/24  0835   GLUCOSE mg/dL 139* 264*   < > 119*   BUN mg/dL 29* 24*   < > 22   CREATININE mg/dL 0.81 0.71   < > 0.65   SODIUM mmol/L 145 144   < > 142   POTASSIUM mmol/L 4.1 4.1   < > 4.2   CHLORIDE mmol/L 100 100   < > 100   CO2 mmol/L 34.0* 30.5*   < > 33.9*   CALCIUM mg/dL 9.3 9.7   < > 9.9   TOTAL PROTEIN g/dL  --  6.1  --  6.5   ALBUMIN g/dL  --  3.7  --  3.6   ALT (SGPT) U/L  --  6  --  11   AST (SGOT) U/L  --  17  --  20   ALK PHOS U/L  --  62  --  71   BILIRUBIN mg/dL  --  0.4  --  0.4   GLOBULIN gm/dL  --  2.4  --  2.9   A/G RATIO g/dL  --  1.5  --  1.2   BUN / CREAT RATIO  35.8* 33.8*   < > 33.8*   ANION GAP mmol/L 11.0 13.5  --  8.1   EGFR mL/min/1.73 73.0 85.5  --  88.6    < > = values in this  interval not displayed.     Lab Results - Last 18 Months   Lab Units 10/02/23  0808 01/09/23  0836   TRIGLYCERIDES mg/dL 97 135   HDL CHOL mg/dL 73* 46   LDL CHOL mg/dL 90 133*   VLDL CHOLESTEROL ROBERT mg/dL 17 24     Lab Results - Last 18 Months   Lab Units 04/11/24  1059 02/15/24  1406   PROBNP pg/mL 1,034.0 1,724*     Lab Results - Last 18 Months   Lab Units 01/18/24  1707 01/18/24  1431   HSTROP T ng/L 50* 48*     Lab Results - Last 18 Months   Lab Units 11/15/23  0412 07/26/23  0622   TSH uIU/mL 1.000 2.160     Lab Results - Last 18 Months   Lab Units 11/14/23  0238   PROTIME Seconds 13.7   APTT seconds 27.7           ECG 12 Lead    Date/Time: 4/12/2024 12:05 AM  Performed by: Claire Orr MD    Authorized by: Claire Orr MD  Comparison: compared with previous ECG   Similar to previous ECG  Rhythm: sinus rhythm  Other findings: non-specific ST-T wave changes    Clinical impression: abnormal EKG        Assessment:       Diagnosis Plan   1. NICM (nonischemic cardiomyopathy)  Basic Metabolic Panel    proBNP    ECG 12 Lead      2. Paroxysmal atrial fibrillation        3. Essential hypertension        4. Acute HFrEF (heart failure with reduced ejection fraction)        5. Malignant neoplasm of upper lobe of right lung          Plan:         1.  Recurrent respiratory compromise with COVID and pneumonia and infection 12/28/2023  with rhinovirus and bacterial pneumonia 1/18/2024.  Care complicated by superimposed cardiomyopathy.  All had been improving by last visit.  2.  HFrEF.  Ejection fraction 39% in 11/2023 in the setting of atrial fibrillation.  Now improved in 3/2024 to 45 to 50% with improvement and global longitudinal LV strain.  3.  Paroxysmal atrial fibrillation.  As above.  Maintaining sinus rhythm on metoprolol and tolerating Eliquis.  There is concern that she is having orthostasis and dizziness associated with hypotension.  Will clarify the home dose of metoprolol we will consider decreasing this to 12.5  in a.m. and 25 mg in p.m. if she is taking 25 twice daily.  They will count to confirm.  4.  Multivessel coronary artery disease.  Troponin was elevated in the setting of heart failure and infection.  However she was felt to not be a surgical candidate and angioplasty/percutaneous intervention was felt to be very high risk.  Therefore medical therapy was pursued with plans for further intervention if this fails.  I  5.  Lung cancer.  Patient to see Dr. Kothari  in the next several weeks  7.  History of hypertension.  Has been better with no episodes of hypotension.  8.  History of cerebral aneurysm.  Followed by Dr. Calvin  9.  Dyslipidemia  10.  COPD/asthma    Time Spent: I spent 35 minutes caring for Darlene on this date of service. This time includes time spent by me in the following activities: preparing for the visit, reviewing tests, obtaining and/or reviewing a separately obtained history, performing a medically appropriate examination and/or evaluation, counseling and educating the patient/family/caregiver, ordering medications, tests, or procedures, documenting information in the medical record, and independently interpreting results and communicating that information with the patient/family/caregiver.   I spent 1 minutes on the separately reported service of ECG. This time is not included in the time used to support the E/M service also reported today.        Your medication list            Accurate as of April 11, 2024 11:59 PM. If you have any questions, ask your nurse or doctor.                CHANGE how you take these medications        Instructions Last Dose Given Next Dose Due   metoprolol succinate XL 25 MG 24 hr tablet  Commonly known as: TOPROL-XL  What changed:   how much to take  how to take this  when to take this  additional instructions  Changed by: Claire Orr MD      Take 1 whole tablet in a.m. and half tablet in p.m.  Indications: Cardiac Failure              CONTINUE taking these  medications        Instructions Last Dose Given Next Dose Due   albuterol (2.5 MG/3ML) 0.083% nebulizer solution  Commonly known as: PROVENTIL      Inhale the contents of 1 vial by nebulization Every 4 (Four) Hours As Needed for Wheezing.       apixaban 2.5 MG tablet tablet  Commonly known as: ELIQUIS      Take 1 tablet by mouth 2 (Two) Times a Day.       arformoterol 15 MCG/2ML nebulizer solution  Commonly known as: BROVANA      USE 2 ML VIA NEBULIZER TWICE DAILY       atorvastatin 20 MG tablet  Commonly known as: LIPITOR      Take 1 tablet by mouth Every Night. Indications: High Amount of Fats in the Blood       budesonide 0.5 MG/2ML nebulizer solution  Commonly known as: PULMICORT      INHALE 2 MLS VIA NEBULIZER EVERY 12 HOURS       budesonide 0.5 MG/2ML suspension 0.5 mg, arformoterol 15 MCG/2ML nebulizer solution 15 mcg      Inhale 1 nebule 2 (Two) Times a Day.       cetirizine 10 MG tablet  Commonly known as: zyrTEC      Take 1 tablet by mouth Daily. Indications: Perennial Allergic Rhinitis       empagliflozin 10 MG tablet tablet  Commonly known as: Jardiance      Take 1 tablet by mouth Daily.       Entresto 24-26 MG tablet  Generic drug: sacubitril-valsartan      Take 0.5 tablets by mouth 2 (Two) Times a Day. Indications: Cardiac Failure       folic acid 1 MG tablet  Commonly known as: FOLVITE      Take 1 tablet by mouth Daily. Start at least 7 days prior to chemotherapy until at least 3 weeks after all chemotherapy.       furosemide 40 MG tablet  Commonly known as: LASIX      Take 1.5 tablets by mouth Daily. Indications: Cardiac Failure, High Blood Pressure Disorder       ipratropium 0.06 % nasal spray  Commonly known as: ATROVENT      2 sprays into the nostril(s) as directed by provider 4 (Four) Times a Day.       ipratropium-albuterol 0.5-2.5 mg/3 ml nebulizer  Commonly known as: DUO-NEB      Inhale the contents of 1 vial by nebulization 2 (Two) Times a Day As Needed for Wheezing.       mirtazapine 7.5 MG  tablet  Commonly known as: REMERON      TAKE 1 TABLET BY MOUTH EVERY NIGHT AT BEDTIME       montelukast 10 MG tablet  Commonly known as: SINGULAIR      Take 1 tablet by mouth Every Night. Indications: Hayfever, Perennial Allergic Rhinitis       O2  Commonly known as: OXYGEN      Inhale 2 L/min Continuous.       pantoprazole 40 MG EC tablet  Commonly known as: PROTONIX      TAKE 1 TABLET BY MOUTH EVERY MORNING       potassium chloride 10 MEQ CR tablet  Commonly known as: KLOR-CON M10      Take 2 tablets by mouth Daily. 1 tablet in the PM  Indications: Low Amount of Potassium in the Blood       predniSONE 20 MG tablet  Commonly known as: DELTASONE      TAKE 2 TABLETS BY MOUTH DAILY. DO NOT. STOP ABRUPTLY       VITAMIN B12 PO      Take 1,000 mcg by mouth Daily. Indications: vitamin b12 deficiency       Vitamin D3 50 MCG (2000 UT) tablet  Generic drug: Cholecalciferol      Take 1,000 Units by mouth Daily. Indications: Vitamin D Deficiency       zolpidem 5 MG tablet  Commonly known as: AMBIEN      TAKE 1 TABLET BY MOUTH EVERY NIGHT AT BEDTIME AS NEEDED FOR SLEEP                 Where to Get Your Medications        These medications were sent to [facility] - [place], [place] - [address] - [phone]  - [phone]   [address] [address]      Phone: [phone]   metoprolol succinate XL 25 MG 24 hr tablet         Patient is no longer taking -.  I corrected the med list to reflect this.  I did not stop these medications.      Dictated utilizing Dragon dictation

## 2024-04-11 NOTE — TELEPHONE ENCOUNTER
Called and left VM, will continue to try to reach pt.    HUB- please put patient straight through to triage    Charmaine Winters, RN  Triage RN  04/11/24 13:01 EDT

## 2024-04-11 NOTE — TELEPHONE ENCOUNTER
I spoke with Darlene LINDSEY Pfeiffer and updated pt on results/recommendations from provider.  They verbalized understanding and have no further questions at this time.    Thank you,    Linda HART, RN  Triage [facility]  04/11/24 13:17 EDT

## 2024-04-12 ENCOUNTER — TELEPHONE (OUTPATIENT)
Dept: CARDIOLOGY | Facility: CLINIC | Age: 84
End: 2024-04-12
Payer: MEDICARE

## 2024-04-15 RX ORDER — MIRTAZAPINE 7.5 MG/1
7.5 TABLET, FILM COATED ORAL
Qty: 30 TABLET | Refills: 1 | Status: SHIPPED | OUTPATIENT
Start: 2024-04-15

## 2024-04-17 RX ORDER — PANTOPRAZOLE SODIUM 40 MG/1
40 TABLET, DELAYED RELEASE ORAL EVERY MORNING
Qty: 30 TABLET | Refills: 2 | Status: SHIPPED | OUTPATIENT
Start: 2024-04-17

## 2024-04-17 RX ORDER — PREDNISONE 10 MG/1
10 TABLET ORAL DAILY
Qty: 30 TABLET | Refills: 1 | Status: SHIPPED | OUTPATIENT
Start: 2024-04-17

## 2024-04-19 ENCOUNTER — HOSPITAL ENCOUNTER (OUTPATIENT)
Dept: CT IMAGING | Facility: HOSPITAL | Age: 84
Discharge: HOME | End: 2024-04-19
Payer: MEDICARE

## 2024-04-19 DIAGNOSIS — C34.11 MALIGNANT NEOPLASM OF UPPER LOBE OF RIGHT LUNG: ICD-10-CM

## 2024-04-19 PROCEDURE — 0 DIATRIZOATE MEGLUMINE & SODIUM PER 1 ML: Performed by: INTERNAL MEDICINE

## 2024-04-19 PROCEDURE — 25510000001 IOPAMIDOL 61 % SOLUTION: Performed by: INTERNAL MEDICINE

## 2024-04-19 PROCEDURE — 74177 CT ABD & PELVIS W/CONTRAST: CPT

## 2024-04-19 PROCEDURE — 71260 CT THORAX DX C+: CPT

## 2024-04-19 RX ADMIN — DIATRIZOATE MEGLUMINE AND DIATRIZOATE SODIUM 30 ML: 660; 100 LIQUID ORAL; RECTAL at 09:45

## 2024-04-19 RX ADMIN — IOPAMIDOL 85 ML: 612 INJECTION, SOLUTION INTRAVENOUS at 10:40

## 2024-04-22 DIAGNOSIS — G47.00 INSOMNIA, UNSPECIFIED TYPE: ICD-10-CM

## 2024-04-22 DIAGNOSIS — C34.11 MALIGNANT NEOPLASM OF UPPER LOBE OF RIGHT LUNG: ICD-10-CM

## 2024-04-22 RX ORDER — ZOLPIDEM TARTRATE 5 MG/1
5 TABLET ORAL NIGHTLY PRN
Qty: 30 TABLET | Refills: 0 | Status: SHIPPED | OUTPATIENT
Start: 2024-04-22

## 2024-04-25 ENCOUNTER — TELEPHONE (OUTPATIENT)
Dept: ONCOLOGY | Facility: CLINIC | Age: 84
End: 2024-04-25
Payer: MEDICARE

## 2024-04-25 NOTE — TELEPHONE ENCOUNTER
Caller: TAE    Relationship: DR SARITA EDWARDS OFFICE         What was the call regarding:     WANTED TO HAVE LABS DRAWN FOR DR EDWARDS AT PATIENTS APPT 04/26    PRO BNP DATE 03/07/24 DATE OF ORDER

## 2024-04-25 NOTE — PROGRESS NOTES
REASON FOR FOLLOW-UP: Recurrent lung cancer.    History of Present Illness    The patient is a 83 y.o. female with the above-mentioned history who returned 9/22/2023 continuing on Mekinist 0.5 mg daily and Tafinlar at 50 mg twice daily.  She also continues on prednisone only taking 10 mg daily.  She reports that she is feeling quite good.  She is tolerating things well.  She has a decent appetite denies issues with nausea, vomiting, diarrhea, or constipation.  She denies any issues with increased shortness of breath.    Patient did see ENT Dr. Topete, who has ordered an ultrasound of her neck, which will be done at [facility] on the 27th.  She is also scheduled for a cerebral angiogram by Dr. Calvin on 29 September.      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  Dr. Dowell of neurosurgery saw the patient 10/12/2023 and felt the patient was stable without neurologic symptoms, yearly follow-up planned.  The patient had started seeing a new ENT physician leading to the referral back to neurosurgery.  When seen 10/16/2023 she is doing very well on her current Mekinist and Tafinlar doses having improved her weight, appetite and general performance status.  We have discussed at least a chest x-ray today and repeat staging in the next 5 to 6 weeks as she continues her current dosing.    The patient required admission 11/14 - 11/16/2023 having presented with shortness of breath and found to be pulmonary edema with CBC, lactate and procalcitonin all normal.  There was a concern that her chemotherapy agents could have produced her cardiomyopathy and these were stopped 11/14/2023.  She was diuresed and echocardiogram demonstrated LV SF mildly decreased at 39.2% with GLS of -11.1%, left ventricular cavity mildly dilated, left  Do labs Monday and prior to next office visit in 3 months.  Continue Uloric 40 mg a day  Call for uric acid results on Wednesday 11/17/2021.  Try to shift to nonweightbearing exercises such as swimming or walking in a pool or using an exercise bicycle.     ventricular wall thickness consistent with mild concentric hypertrophy, left ventricular global hypokinesis, aortic valve abnormal with moderate calcification, valve was trileaflet, trace tricuspid valve regurgitation with right VSP at less than 35 mmHg.  The findings for LV systolic function, diastolic function and global longitudinal LV strain had all worsened.    CTA of chest 11/14/2023 demonstrating confluent soft tissue mass possibly measuring larger in current study at 6.5 x 5.0 previously 4.9 x 4.8, left supraclavicular node to decrease in size measuring 1.1 cm previously 1.4 cm, extensive bilateral interstitial and groundglass infiltrates.    The patient is seen 11/26/2023.  As per the discharge we are requested to have a BMP and CMP assessed.  She is seen with her son and daughter in a lengthy conversation held about her recent hospitalization with CHF-cardiomyopathy felt elicited, in part, from her targeted therapy with her Mekinist and Tafinlar discontinued altogether.    The patient is relatively stable currently having continued her diuretics and to be seen in cardiology in follow-up.  At the time of this dictation her CBC is found to be essentially normal with mild leukocytosis, CMP normal except for mildly elevated glucose at 125 and BNP reduced to 6126 from 9763.  She feels well with no additional shortness of breath chest pain lower extremity edema.  In reviewing the the possible treatment options she is next best treated with single agent chemotherapy moving to Alimta.    The patient proceeded to treatment teaching and seen back 12/4/2023.  She is ready to proceed with chemotherapy with fortunately a recent good performance status still in place.    Patient returns 12/18/2023 for toxicity check.  She started treatment with Alimta on 12/4/2023.  Patient states that she did have an episode following treatment where she felt extremely itchy however she took Benadryl which helped, and her symptoms  resolved.  She has ongoing issues with fatigue.  She is being followed closely by cardio oncology.  She also reports being short of breath but denies chest pain, or swelling.  She does struggle with her appetite.  Otherwise she feels like she has tolerated the change in treatment well.      The patient required hospitalization 1/18-21/24 presenting with increasing shortness of breath, findings of chronic respiratory failure, positive for rhinovirus on admission.  She is treated with supportive treatments including for previous COVID infection as well as antibiotic therapies.  She completed 5 days of Augmentin, continue nebulizer and breathing treatments.  Upon discharge she rescheduled appointments though was seen by cardiology 1/23/2024 not felt to be in heart failure, treated supportively for epistaxis.    There was concern as to whether she should continue treatment and she has seen back in office 1/29/2024 to discuss potential options.  She is seen with her son and daughter both indicating that she drops her O2 saturation quite quickly and is very inactive as result of difficulty with rapidly becoming shortness of breath and weak when she tries to move.  This is led to a lengthy conversation about the extent of her disease including both her cardiovascular status and her respiratory status.  She is worsening quickly and is not, currently, a candidate for chemotherapy.    The patient is doing poorly enough that we have had a cristy conversation today about end-of-life concerns.  She is not interested in hospice at this point but hopes to improve further.    The patient is next evaluated 4/26/2024 with recent scans demonstrating progression of disease with enlarged mediastinal hilar lymphadenopathy, creased consolidation apex left upper lobe, new subcentimeter low-density lesion right lobe of the liver, right adrenal nodule, pleural thickening left apex, decreased patchy consolidation apical segment of right lower  lobe.6/2024.  Recent CT chest, abdomen, pelvis demonstrates progression of disease.  She is seen with her sister-in-law both indicating that her oxygen requirements are increasing and her appetite reducing.  We have again discussed restarting chemotherapy but also concerned about her performance status.  She has recently started using THC Gummies and is encouraged to continue to do so to improve her performance status, appetite and weight before we try to reinstitute chemotherapy treatment with Alimta.        ONCOLOGY HISTORY:      The patient is an 83-year-old female with the below medical history including chronic obstructive pulmonary disease who was seen in the [facility] multidisciplinary thoracic oncology clinic July 1, 2021.  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      She had undergone a CT of the chest at [facility] 6/16/2021 showing postsurgical changes in the left chest from right upper lobectomy, 8 mm irregular nodule medial segment of the right lower lobe and diffuse changes of emphysema with fibrotic changes in the periphery, no suspicious hilar or mediastinal nodes, no pleural effusion.  New finding was suspicious for new malignancy with the patient felt to be a poor candidate for surgical resection.  A PET CT and PFTs were requested thereafter.  The PET/CT demonstrated ill-defined FDG avid soft tissue thickening left aspect mediastinum within the operative bed, 1 cm hypermetabolic pulmonary nodule right lower lobe and asymmetric thickening patchy uptake involving the right base of tongue.  There is subtle uptake within the L1 vertebral body which is indeterminate and a sub-6 mm pulmonary nodule of right lung and subcentimeter hypodense hepatic lesion which was below PET resolution.        The patient's case was discussed in thoracic conference 7/22/2021 with consideration of the patient undergoing L1 vertebral body MRI, confirmatory biopsy left mediastinal and assessment by ENT (Dr. Caballero) who had worked with the patient previously.  She, incidentally, indicates that radiation therapy was given apparently intraoperatively at her recent surgery?  She still has issues with difficulty chewing and swallowing post procedures and was to be followed up with Dr. Caballero in the near future as planned.                                                            She was seen in the thoracic clinic on the same day with plans to proceed with MRI of the lumbar spine, biopsy left mediastinal nodular area of potential disease and follow-up of her ENT assessment as well as undergo a guardant 360 assessment in our office.  She underwent the biopsy 8/13/2021 found to be consistent with invasive moderately differentiated adenocarcinoma with PD-L1 TPS score 40%.  MRI of the lumbar spine revealed no evidence of metastatic disease though the patient did have multilevel degenerative disease throughout the lumbar spine.  She had an office follow-up with Dr. 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  She underwent laryngoscopy 8/18/2021 with right base of tongue without evidence of lesions or masses in the region.     She was seen by Dr. Hernandez 8/19/2021 and, her findings, had been presented in lung conference.  Her findings were consistent with 2 separate lesions including a nodule in the superior segment of the right lower lobe and a mass in the upper portion of the left chest with the left chest lesion biopsy positive for adenocarcinoma.  The consensus was that these were to separate-synchronous primaries.  Stereotactic radiation each lesions were felt to be the appropriate way to proceed and the patient  was referred to radiation therapy.     The patient is seen in office 8/23/2021 agreeable to the radiation therapy as well as additional assessments including Caris molecular assessment of her biopsy.  Again her guardant 360 is currently pending and we would follow her after she completes radiation therapy with subsequent scans.    She was able to proceed with SBRT to the right lung at primary #1 with 5 treatments at 1000 cGy per fraction in the left lung primary similarly at 1000 cGy per fraction x5.  Her treatment ranged between 8/31-9/13/2021.  She is now scheduled for follow-up 11/23/2021.    The patient subsequent genomic testing is discussed with her as she is is seen back 9/23/2021.  Her guardant 360 did not determine any new abnormalities but her Caris-MI profile-does reveal sensitivity to immunotherapy as well as a BRAF mutation.  This could be extremely important as we follow the patient from this point.  Fortunately she is feeling extremely well and quite pleased about her treatments.    Patient had subsequent PET/CT 11/23/2021 demonstrates decrease in size and avidity of the previously noted intensely FDG avid nodules left lobectomy operative site and right lower lobe.  Surrounding groundglass and pulmonary opacification is consistent with postradiation changes, a few new subcentimeter pulmonary nodules are present in the right upper lobe and indeterminant, stable subcentimeter hepatic lesion is below PET resolution no other findings of FDG-avid disease are seen in neck abdomen or pelvis.  The patient seen in office 12/1/2021 with a relatively good performance status stating she is not having any new symptoms and very pleased about her results on her PET/CT.  She realizes we'll have to follow this further but subsequent scans in 6 months.  She is also hoping to see an oral surgeon that previously helped her-Dr. Wu.    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  There is evolution of presumed postradiation changes in the right midlung with soft tissue mass within the left lumpectomy operative bed minimally decreased in size.  There is a sub-6 mm nodule in the right upper lobe not definitively seen, indeterminate and an indeterminate left renal lesion at 1.5 cm unchanged from 7/13/2021.  The patient is currently scheduled to see Dr. Dowell on 6/23/2022.  She is feeling well without any additional symptoms.    The patient required admission 10/14 - 10/18/2022 presenting with shortness of breath and cough that was nonproductive.  She had been on oral antibiotics and did not improved and was admitted for therapy for apparent pneumonia.  This eventually led to bronchoscopy after initial CT revealed postsurgical changes, new masslike 6.7 cm area of consolidation anterior left lung raising concern for potential malignancy and a follow-up PET/CT planned.  Bronchoscopy and biopsy left lower lung failed to show evidence of malignancy.  The patient's PET/CT, however, demonstrated an irregular pleural-based soft tissue density thickening in the left upper lobe that was intensely FDG avid new from 6/8/2022 thought to be a malignant recurrence with spread into the left lung parenchyma.  There is intensely pleural-based avidity within the left lower lobe thought to be metastatic disease with postradiation of the left apex as well.  There is a small focus of uptake in the right hilum grossly unchanged in size and post radiation changes in the right lower lobe.  There is indeterminate density left renal that was grossly unchanged.  The patient is seen back in office 11/15/2022 indicating that she still has chest discomfort that is slowly worsening and that she has a chronic paroxysmal cough but has not improved.  We discussed that she very likely has  recurrent disease and plan to proceed with a guardant 360 examination initially as we determine whether we should try to proceed with therapy particularly immunotherapy versus targeted therapy recognizing the above findings.    Patient's guardant 360 returned as again significant for BRAF V600E and the potential use of BRAF inhibitor/MET inhibitor dabrafenib and trametinib.  Additional information [id number] and use of alpelisib.    Unfortunately she required admission 11/28-12/1 with worsening back pain.  Fortunately she did not demonstrate evidence of metastatic disease but did have moderate degenerative changes which could cause radiculopathy.  Medications were altered to include subsequently MSR 15 mg p.o. every 12 hours, Norco as needed.    The patient now presents back 12/14/2022 to initiate therapy- initially with immunotherapy considering Caris study with PD-L1 TPS of 70% on 22 C3 and 28-8 assays.    Patient seen with her  and we discussed pain medications and adjustments thereafter and that she stopped taking MSIR having only a short supply and having to use Norco more frequently.  She is willing to initiate Keytruda today we have discussed that considering her genomics treatment versus her BRAF mutation is also an option.    C1 Keytruda 12/14/2022    Patient is seen back 1/4/2023 in follow-up due for cycle 2 Keytruda.  Patient states that since receiving her first cycle she has had decreased appetite and decreased energy.  She has not been able to bring herself to eat much and she has notably lost 7 pounds.  She has also been struggling with constipation in relation to ongoing narcotics for pain control.  She has been taking senna S2 tabs twice a day.  We discussed adding daily MiraLAX.    Patient did just last week meet with dietitian, Zoila Clinton, to discuss ways to improve caloric intake.  She has not been able to implement any of these things yet though.    The patient is next seen 1/25/2023 with  mild weight gain.  She is seen with family member both indicating that she has actually felt somewhat better in terms of performance status and general function.  We have discussed proceeding with a third cycle treatment and reevaluating by scan in 2 weeks.    The patient proceeded with treatment 1/25/2023 and underwent follow-up CT chest abdomen pelvis 2/8/2023 demonstrating small pericardial effusion that is decreased in size from 11/20/2022, ill-defined consolidation and pleural-based thickening in the left apex and upper lung has reduced slightly, additional index nodule soft tissue in the aspect the pericardium has not changed substantially, no evidence of metastatic disease in the abdomen pelvis.    The patient is seen with her daughter 2/15/2023 both indicating that she has definitely improved, stable weight, appetite and performance status.  She is also using her pain medication much less frequently and wonders whether she might begin to taper from her twice a day MS Contin.    Patient is seen back 3/8/2023 due for ongoing pembrolizumab.  She continues on low-dose prednisone 10 mg daily and has noted significant improvement in her energy and appetite with this.  She is reluctant to taper this any further we discussed that it is fine for her to stay on daily dosing.    She did try to taper her pain medication going down to just MS Contin 15 mg every 12 hours while holding her hydrocodone.  However over the last few days she has had some intermittent pain in the left upper back alleviated with hydrocodone 2-3 times a day.  She currently is denying any pain.  Monitor.    She is next evaluated 3/29/2023 for ongoing Keytruda.  Evidently her pain medication was sent to the wrong pharmacy and will need to be represcribed today-long-acting MS Contin.  Otherwise she is doing reasonably well at present.    Patient seen 5/31/2023 with gradual development of left shoulder and left scapular pain.  Concurrent CT chest,  abdomen and pelvis demonstrate interval increasing consolidation left upper lobe with extension anterior to the left upper ribs with sclerosis anterior left second rib.  This is suspicious for worsening malignancy.  Plans were made for subsequent PET/CT where the patient's pain medications were adjusted.PET/CT 6/5/2023 reveals progression of disease in left upper thorax, left supraclavicular adenopathy new metastasis left posterior fourth ribs, no evidence of metastatic disease in the abdomen or pelvis.    The patient is seen with her son and daughter 6/12/2023 and it is clear that she is not developing a Pancoast like syndrome with progression of her malignancy in the left upper thorax and associated symptoms.  We have discussed discontinuance of her immunotherapy and moving to targeted therapy with dabrafenib and trametinib as we also request radiation therapy repeat consultation and try to manage her pain more effectively.    Patient seen 7/5/2023 with plans to start palliative radiotherapy and to initiate concurrently dabrafenib and trametinib.    As a result of toxicity (nausea and vomiting) the patient was taken off of dabrafenib and trametinib when seen 7/21/2023, given additional IV hydration and further supportive care.  Plans were made for follow-up on recovery to determine as to whether to redose the medications.    Unfortunately she required admission 7/25-30/2023 with failure to thrive.  Subsequent assessments including blood cultures were negative and patient was treated briefly with IV antibiotic therapy, started PT and OT, medications adjusted for hypotension and treated symptomatically for nausea and vomiting.  She was able to improve enough to be discharged home as she continued radiation therapy started 7/17/2023 and completed 7/31/2023 to the left chest wall.    She is next seen back 8/4/2023.  We have reviewed that she had been on dabrafenib and trametinib at 150 mg p.o. every 12 hours and 2 mg  p.o. daily respectively and dosing could be changed considerably such as 50 mg twice daily and 0.5 mg daily.  Determination made to have her undergo repeat scans at 4 weeks and review her response to radiation therapy as we make application for lower dose targeted therapy, addition of Remeron p.o. nightly, tapering of her MS Contin to daily rather than twice daily, use of low-dose Ativan to undergo scans.    Subsequent scans 8/25/2023, fortunately, with stable left apical mass with mediastinal chest wall involvement unchanged 1.4 cm supraclavicular lymph node.  No new findings of metastatic disease.    The patient is seen back 9/8/2023.  Fortunately she is improved dramatically off therapy and is gratified that his studies have not worsened in any substantial way.  We have discussed both her scan reports and echocardiogram that does not show significant reduction of her ejection fraction.  As result of her current stable status we have asked her to restart Mekinist at 0.5 mg daily and Tafinlar at 50 mg twice daily to be advanced as tolerated.  Patient seen 10/16/2023 with follow-up chest x-ray planned and CT of chest abdomen pelvis at 5 weeks -approximately 3 months of therapy.    The patient required admission 11/14 - 11/16/2023 having presented with shortness of breath and found to be pulmonary edema with CBC, lactate and procalcitonin all normal.  There was a concern that her chemotherapy agents could have produced her cardiomyopathy and these were stopped 11/14/2023.  She was diuresed and echocardiogram demonstrated LV SF mildly decreased at 39.2% with GLS of -11.1%, left ventricular cavity mildly dilated, left ventricular wall thickness consistent with mild concentric hypertrophy, left ventricular global hypokinesis, aortic valve abnormal with moderate calcification, valve was trileaflet, trace tricuspid valve regurgitation with right VSP at less than 35 mmHg.  The findings for LV systolic function, diastolic  function and global longitudinal LV strain had all worsened.    CTA of chest 11/14/2023 demonstrating confluent soft tissue mass possibly measuring larger in current study at 6.5 x 5.0 previously 4.9 x 4.8, left supraclavicular node to decrease in size measuring 1.1 cm previously 1.4 cm, extensive bilateral interstitial and groundglass infiltrates.      The patient is seen 11/26/2023.  As per the discharge we are requested to have a BMP and CMP assessed.  She is seen with her son and daughter in a lengthy conversation held about her recent hospitalization with CHF-cardiomyopathy felt elicited, in part, from her targeted therapy with her Mekinist and Tafinlar discontinued altogether.    The patient is relatively stable currently having continued her diuretics and to be seen in cardiology in follow-up.  At the time of this dictation her CBC is found to be essentially normal with mild leukocytosis, CMP normal except for mildly elevated glucose at 125 and BNP reduced to 6126 from 9763.  She feels well with no additional shortness of breath chest pain lower extremity edema.  In reviewing the the possible treatment options she is next best treated with single agent chemotherapy moving to Alimta.    Patient seen 12/4/2023 with plans to initiate Alimta chemotherapy-cycle 1 day 1    The patient required hospitalization 1/18-21/24 presenting with increasing shortness of breath, findings of chronic respiratory failure, positive for rhinovirus on admission.  She is treated with supportive treatments including for previous COVID infection as well as antibiotic therapies.  She completed 5 days of Augmentin, continue nebulizer and breathing treatments.  Upon discharge she rescheduled appointments though was seen by cardiology 1/23/2024 not felt to be in heart failure, treated supportively for epistaxis.    There was concern as to whether she should continue treatment and she has seen back in office 1/29/2024 to discuss potential  options.  She is seen with her son and daughter both indicating that she drops her O2 saturation quite quickly and is very inactive as result of difficulty with rapidly becoming shortness of breath and weak when she tries to move.  This is led to a lengthy conversation about the extent of her disease including both her cardiovascular status and her respiratory status.  She is worsening quickly and is not, currently, a candidate for chemotherapy.    The patient is doing poorly enough that we have had a cristy conversation today about end-of-life concerns.  She is not interested in hospice at this point but hopes to improve further.  The patient had follow-up with pulmonary medicine.  She was seen 2/9/2024 with groundglass infiltrates since November 2023 suggestive of pneumonitis and he had restarted prednisone at 40 mg daily.    A repeat CT of chest 2/26/2024 revealed decreased consolidation in superior segment right lower lobe, stable consolidation in the left upper lobe and apex, stable coarsened interstitial lung opacities elsewhere, stable soft tissue mass along the left upper mediastinum, small pericardial effusion, no pleural effusion, no clear abnormalities in the liver, stable left renal cyst and no acute bony abnormality.    The patient is seen with her son and both indicate that her general performance status has improved.  They are concerned about her response to chemotherapy previously and, though we have scheduled her for chemotherapy, are not certain they wish to proceed.  Now with her improved scan we could follow her for period of time and then try to reevaluate offering chemotherapy potentially with Alimta.    A follow-up CT scan series 4/19/2024 shows overall progression with enlarged mediastinal hilar lymphadenopathy, creased consolidation apex left upper lobe, new subcentimeter low-density lesion right lobe of the liver, right adrenal nodule, pleural thickening left apex, decreased patchy  consolidation apical segment of right lower lobe.    She is seen back formally 4/26/2024.  The progression of her disease was discussed and it was determined that she would use THC Gummies or Marinol to try to improve her appetite before we restart Alimta chemotherapy.  She will be seen back in 3 weeks to possibly start that treatment.        Past Medical History:   Diagnosis Date    Allergic rhinitis     Aneurysm     Asthma     Cancer of floor of mouth 2014    Cerebral aneurysm     COPD (chronic obstructive pulmonary disease)     COVID 2020    Esophageal reflux     History of adenocarcinoma of lung     History of anemia     History of carcinoma in situ of skin     History of lung cancer     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 from [facility]    History of squamous cell carcinoma     Tongue    Hyperlipidemia     Hypertension     since early 60s    Intractable back pain 11/29/2022    Low vitamin B12 level     Lung cancer     Systolic CHF, acute 11/14/2023    Thyromegaly     UTI (urinary tract infection)     Vitamin D deficiency         Past Surgical History:   Procedure Laterality Date    BRONCHOSCOPY Bilateral 10/17/2022    Procedure: BRONCHOSCOPY WITH FLUORO with biopsy and BAL;  Surgeon: India Flores MD;  Location: [facility] ENDOSCOPY;  Service: Pulmonary;  Laterality: Bilateral;  PRE/POST - lung mass    CHOLECYSTECTOMY  1999    COLONOSCOPY      EMBOLIZATION CEREBRAL Left 06/29/2022    Procedure: 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;  Surgeon: Bradley Dowell MD;  Location: [facility] HYBRID OR 18/19;  Service: Interventional Radiology;  Laterality: Left;    EYE SURGERY  11/24/2020    Took a muscle out of right eye    GLOSSECTOMY PARTIAL      less than one half tongue    LUNG SURGERY  2012    ORAL LESION EXCISION/BIOPSY      June and August 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    TUBAL ABDOMINAL LIGATION  1970    VENOUS ACCESS DEVICE (PORT) INSERTION N/A  12/12/2022    Procedure: MEDIPORT PLACEMENT;  Surgeon: Jason Villaseñor MD;  Location: [facility] OR;  Service: Vascular;  Laterality: N/A;        Current Outpatient Medications on File Prior to Visit   Medication Sig Dispense Refill    albuterol (PROVENTIL) (2.5 MG/3ML) 0.083% nebulizer solution Inhale the contents of 1 vial by nebulization Every 4 (Four) Hours As Needed for Wheezing. 90 mL 0    apixaban (ELIQUIS) 2.5 MG tablet tablet Take 1 tablet by mouth 2 (Two) Times a Day. 60 tablet 3    arformoterol (BROVANA) 15 MCG/2ML nebulizer solution USE 2 ML VIA NEBULIZER TWICE DAILY      atorvastatin (LIPITOR) 20 MG tablet Take 1 tablet by mouth Every Night. Indications: High Amount of Fats in the Blood 90 tablet 1    budesonide (PULMICORT) 0.5 MG/2ML nebulizer solution INHALE 2 MLS VIA NEBULIZER EVERY 12 HOURS      budesonide 0.5 MG/2ML suspension 0.5 mg, arformoterol 15 MCG/2ML nebulizer solution 15 mcg Inhale 1 nebule 2 (Two) Times a Day.      cetirizine (zyrTEC) 10 MG tablet Take 1 tablet by mouth Daily. Indications: Perennial Allergic Rhinitis      Cholecalciferol (Vitamin D3) 50 MCG (2000 UT) tablet Take 1,000 Units by mouth Daily. Indications: Vitamin D Deficiency      Cyanocobalamin (VITAMIN B12 PO) Take 1,000 mcg by mouth Daily. Indications: vitamin b12 deficiency       empagliflozin (Jardiance) 10 MG tablet tablet Take 1 tablet by mouth Daily. 30 tablet 5    folic acid (FOLVITE) 1 MG tablet Take 1 tablet by mouth Daily. Start at least 7 days prior to chemotherapy until at least 3 weeks after all chemotherapy. 30 tablet 6    furosemide (LASIX) 40 MG tablet Take 1.5 tablets by mouth Daily. Indications: Cardiac Failure, High Blood Pressure Disorder 45 tablet 2    ipratropium (ATROVENT) 0.06 % nasal spray 2 sprays into the nostril(s) as directed by provider 4 (Four) Times a Day. 15 mL 0    ipratropium-albuterol (DUO-NEB) 0.5-2.5 mg/3 ml nebulizer Inhale the contents of 1 vial by nebulization 2 (Two) Times  a Day As Needed for Wheezing. 180 mL 0    metoprolol succinate XL (TOPROL-XL) 25 MG 24 hr tablet Take 1 whole tablet in a.m. and half tablet in p.m.  Indications: Cardiac Failure 45 tablet 3    mirtazapine (REMERON) 7.5 MG tablet TAKE 1 TABLET BY MOUTH EVERY NIGHT AT BEDTIME 30 tablet 1    montelukast (SINGULAIR) 10 MG tablet Take 1 tablet by mouth Every Night. Indications: Hayfever, Perennial Allergic Rhinitis 90 tablet 3    O2 (OXYGEN) Inhale 2 L/min Continuous.      pantoprazole (PROTONIX) 40 MG EC tablet TAKE 1 TABLET BY MOUTH EVERY MORNING 30 tablet 2    potassium chloride (K-DUR,KLOR-CON) 10 MEQ CR tablet Take 2 tablets by mouth Daily. 1 tablet in the PM  Indications: Low Amount of Potassium in the Blood 90 tablet 2    predniSONE (DELTASONE) 10 MG tablet TAKE 1 TABLET BY MOUTH EVERY DAY 30 tablet 1    predniSONE (DELTASONE) 20 MG tablet TAKE 2 TABLETS BY MOUTH DAILY. DO NOT. STOP ABRUPTLY      sacubitril-valsartan (Entresto) 24-26 MG tablet Take 0.5 tablets by mouth 2 (Two) Times a Day. Indications: Cardiac Failure 28 tablet 0    zolpidem (AMBIEN) 5 MG tablet TAKE 1 TABLET BY MOUTH EVERY NIGHT AT BEDTIME AS NEEDED FOR SLEEP 30 tablet 0     No current facility-administered medications on file prior to visit.        ALLERGIES:    Allergies   Allergen Reactions    Sulfa Antibiotics Rash        Social History     Socioeconomic History    Marital status:    Tobacco Use    Smoking status: Former     Current packs/day: 0.00     Types: Cigarettes     Quit date: 2015     Years since quittin.2     Passive exposure: Never    Smokeless tobacco: Never    Tobacco comments:     less than a pack per day, no smoking since , Quit 2015   Vaping Use    Vaping status: Never Used   Substance and Sexual Activity    Alcohol use: Not Currently     Comment: Socially -A few times a month    Drug use: No    Sexual activity: Defer     Family History   Problem Relation Age of Onset    Ovarian cancer Mother          " at age 27    No Known Problems Father     Ovarian cancer Sister     Colon cancer Brother     No Known Problems Other     Malig Hyperthermia Neg Hx         Review of Systems  ROS as per HPI     Objective      Vitals:    24 1144   BP: 134/76   Pulse: 84   Resp: 16   Temp: 97.8 °F (36.6 °C)   TempSrc: Temporal   SpO2: 94%  Comment: on 4 liters of oxygen   Weight: 43.5 kg (95 lb 14.4 oz)   Height: 157 cm (61.81\")   PainSc: 0-No pain                     2024    11:45 AM   Current Status   ECOG score 0      Physical Exam  Vitals reviewed.   Constitutional:       General: She is not in acute distress.     Appearance: Normal appearance. She is well-developed.   HENT:      Head: Normocephalic and atraumatic.      Mouth/Throat:      Pharynx: No oropharyngeal exudate.   Eyes:      Pupils: Pupils are equal, round, and reactive to light.   Cardiovascular:      Rate and Rhythm: Normal rate and regular rhythm.      Heart sounds: Normal heart sounds. No murmur heard.  Pulmonary:      Effort: Pulmonary effort is normal. No respiratory distress.      Breath sounds: No wheezing, rhonchi or rales.      Comments: Decreased breath sounds throughout, occasional rales  Abdominal:      General: Bowel sounds are normal. There is no distension.      Palpations: Abdomen is soft.   Musculoskeletal:         General: No swelling. Normal range of motion.      Cervical back: Normal range of motion.   Skin:     General: Skin is warm and dry.      Findings: No rash.   Neurological:      Mental Status: She is alert and oriented to person, place, and time.         Physical exam preformed and reviewed. No changes noted from previous exam. Henry Escobar MD ; 2024      RECENT LABS:  Results from last 7 days   Lab Units 24  1136   WBC 10*3/mm3 12.57*   NEUTROS ABS 10*3/mm3 11.70*   HEMOGLOBIN g/dL 13.0   HEMATOCRIT % 41.6   PLATELETS 10*3/mm3 217                           Assessment & Plan     1.  Carcinoma of the " lung  May 2015, status post previous left upper lobectomy for carcinoma of the lung.    2.  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   She underwent laryngoscopy 8/18/2021 with right base of tongue without evidence of lesions or masses in the region.    3.  Moderately differentiated adenocarcinoma of the lung.  CT of the chest 6/16/2021 demonstrated postsurgical changes, 8 mm irregular nodule medial segment of right lower lobe and diffuse changes of emphysema.    She is seen in thoracic clinic with findings suspicious for new malignancy and concern of the patient being a poor operative candidate.    7/13/2021 PET CT demonstrated ill-defined avidity and soft tissue left aspect of the mediastinum, 1 cm hypermetabolic pulmonary nodule and asymmetric thickening involving the right base of tongue as well as subtle uptake in the L1 vertebral body.    This patient's case was discussed in thoracic clinic and plans were made to request an MRI of the lumbar spine, obtain a biopsy of the mediastinal involvement of the left had the patient reviewed by ENT.  Biopsy 8/13/2021 found to be consistent with invasive moderately differentiated adenocarcinoma with PD-L1 TPS score 40%.    7/24/2021 MRI of the lumbar spine revealed no evidence of metastatic disease though the patient did have multilevel degenerative disease throughout the lumbar spine.    Her findings were consistent with 2 separate lesions including a nodule in the superior segment of the right lower lobe and a mass in the upper portion of the left chest with the left chest lesion biopsy positive for adenocarcinoma.  The consensus was that these were to separate-synchronous primaries.  Stereotactic radiation each lesions were felt to be the appropriate way to proceed and the patient was referred to radiation therapy.  The patient was  referred for radiation therapy and she was able to proceed with SBRT to the right lung at primary #1 with 5 treatments at 1000 cGy per fraction in the left lung primary similarly at 1000 cGy per fraction x5.  Her treatment ranged between 8/31-9/13/2021.    Her guardant 360 did not determine any new abnormalities but her Caris-MI profile-does reveal sensitivity to immunotherapy as well as a BRAF mutation.  PET/CT 11/23/2021 demonstrates decrease in size and avidity of the previously noted intensely FDG avid nodules left lobectomy operative site and right lower lobe.  Surrounding groundglass and pulmonary opacification is consistent with postradiation changes, a few new subcentimeter pulmonary nodules are present in the right upper lobe and indeterminant, stable subcentimeter hepatic lesion is below PET resolution no other findings of FDG-avid disease are seen in neck abdomen or pelvis.  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  There is evolution of presumed postradiation changes in the right midlung with soft tissue mass within the left lumpectomy operative bed minimally decreased in size.  There is a sub-6 mm nodule in the right upper lobe not definitively seen, indeterminate and an indeterminate left renal lesion at 1.5 cm unchanged from 7/13/2021.  Admission 10/14 - 10/18/2022 presenting with shortness of breath and cough that was nonproductive.  She had been on oral antibiotics and did not improved and was admitted for therapy for apparent pneumonia.  This eventually led to bronchoscopy after initial CT revealed postsurgical changes, new masslike 6.7 cm area of consolidation anterior left lung raising concern for potential malignancy and a follow-up PET/CT planned.   Bronchoscopy and biopsy left lower lung failed to show evidence of malignancy.    11/9/2022 PET/CT, demonstrated an irregular  pleural-based soft tissue density thickening in the left upper lobe that was intensely FDG avid new from 6/8/2022 thought to be a malignant recurrence with spread into the left lung parenchyma.  There is intensely pleural-based avidity within the left lower lobe thought to be metastatic disease with postradiation of the left apex as well.  There is a small focus of uptake in the right hilum grossly unchanged in size and post radiation changes in the right lower lobe.  There is indeterminate density left renal that was grossly unchanged.    Patient's guardant 360 returned as again significant for BRAF V600E and the potential use of BRAF inhibitor/MET inhibitor dabrafenib and trametinib.  Additional information [id number] and use of alpelisib.  The patient now presents back 12/14/2022 to initiate therapy- initially with immunotherapy considering Caris study with PD-L1 TPS of 70% on 22 C3 and 28-8 assays.  Single agent Keytruda initiated 12/14/2022 2/8/2023 CT of the chest, abdomen pelviswith consolidation and masslike pleural thickening in the left apex and upper lung index areas have decreased somewhat.  There is no evidence of metastatic disease otherwise and a few indeterminate left renal lesions are stable.  After lengthy discussion with the patient and her daughter as to the stability to mild improvement with immunotherapy it is agreed that we would continue treatment at this point.  Proceed today, 4/19/2023 with cycle 7 pembrolizumab which is continued every 3 weeks  The patient proceeded to 8 cycles of pembrolizumab and underwent follow-up chest CT chest, abdomen pelvis revealing evolution of dense consolidation left upper lobe and extension of soft tissue mass anterior to left upper ribs with increase sclerosis anterior left second rib.  This was concerning for progression of disease versus radiation change and the patient was scheduled for subsequent PET/CT.  PET/CT 6/5/2023  reveals progression of disease in  left upper thorax, left supraclavicular adenopathy new metastasis left posterior fourth ribs, no evidence of metastatic disease in the abdomen or pelvis.  Patient seen 6/12/2023 with plans to move her Norco to every 4 hours, discontinue Keytruda, make application for dabrafenib and trametinib at 150 mg p.o. every 12 hours and 2 mg p.o. daily respectively.  Patient referred for radiation therapy consultation concurrently.  Last dose of Keytruda 5/31/2023.  6/23/2023: Patient seen for triage visit.  She has not yet started dabrafenib or trametinib, she has not yet received the medications.  Unfortunately she has been experiencing uncontrolled nausea/vomiting as further detailed below.   Patient seen 6/27/2023 reporting that her nausea has resolved.  She was unable to complete the MRI of the brain however Dr. Escobar did review the images patient did have and there was no evidence of edema or metastatic disease.  Patient can start her new oral medication once she receives the medication.  Patient seen 7/5/2023 with plans to start palliative radiotherapy x10 fractions and initiate dabrafenib and trametinib as soon as medications received.  7/17/2023 patient initiated radiation with 10 fractions planned.  Patient has received dabrafenib and trametinib.  Brought in for triage visit due to nausea associated with dosing.  She is premedicating with ondansetron however only waiting 15 minutes.  We discussed today that she needs to wait at least 30 minutes to 1 hour prior to taking the dabrafenib and trametinib.  The patient will do so.  We discussed she could also take this again if she feels the need 8 hours later for nausea control.  If she is having breakthrough nausea then she should call our office.  I have encouraged her to utilize electrolyte drinks.  She will get 1L normal liter normal saline in the office today.She was not nauseas while in the office so we did not give additional nausea medication.  We will have her  return in 1 week repeat labs, review by nurse practitioner, and possible IV fluids.  I stressed the importance of calling sooner if she finds that the premedication is not controlling her nausea at which time we would have her come in for additional IV fluids and evaluation.  She is continuing daily radiation this week.  Case was discussed with Dr. Escobar today.  7/21/2023: Patient returns for short interval follow-up due to very poor appetite and sleeping the majority of the day.  Her nausea has been improved with premedicating 30 minutes prior to dabrafenib and trametinib.  She however has been sleeping the majority of the day and is not eating when she is awake.  She does report taking ensures but she is only sipping on these.  Have given her samples of some of the office today and encouraged her to drink when while she is here.  Her performance status continues to decline and her daughter states that she was fixing dinner nightly just 2 weeks ago.  With performance status of 3 today I discussed the case with Dr. Escobar and we will hold her dabrafenib and  TRAMETINIB.  Her liver enzymes are elevated today as well.  We will monitor this next week and have her evaluated by Dr. Escobar at which time we could consider restarting her dabrafenib and trametinib at reduced doses pending performance status and laboratory evaluation.  Unfortunately she required admission 7/25-30/2023 with failure to thrive.  Subsequent assessments including blood cultures were negative and patient was treated briefly with IV antibiotic therapy, started PT and OT, medications adjusted for hypotension and treated symptomatically for nausea and vomiting.  She was able to improve enough to be discharged home as she continued radiation therapy started 7/17/2023 and completed 7/31/2023 to the left chest wall.  She is next seen back 8/4/2023.  Lengthy discussion as to reevaluation   Plans made for CT chest, abdomen, pelvis in 4 weeks  Patient seen  8/21/2023 with complaints of worsening fatigue and shortness of breath.  Patient scheduled for follow up CT scans this Friday. Lungs clear on exam,  Sats 97%.  Hgb stable at 11.7.  Will check BNP today.  Discussed may be related to disease and will need to see what scan shows.   Subsequent scans 8/25/2023, fortunately, with stable left apical mass with mediastinal chest wall involvement unchanged 1.4 cm supraclavicular lymph node.  No new findings of metastatic disease.  The patient is seen back 9/8/2023.  Fortunately she is improved dramatically off therapy and is gratified that his studies have not worsened in any substantial way.  We have discussed both her scan reports and echocardiogram that does not show significant reduction of her ejection fraction.  As result of her current stable status we have asked her to restart Mekinist at 0.5 mg daily and Tafinlar at 50 mg twice daily  9/22/2023 tolerating Mekinist 0.5 mg daily and tafinlar 50 mg twice daily quite well. Continues on prednisone 10 mg daily which will now be reduced to 5 mg daily when seen 10/16/2023  Plans made to continue current dosing of Mekinist and Tafinlar and repeat evaluation with chest x-ray 10/16 and repeat CT chest abdomen pelvis in 5 weeks, MD in 6 weeks.  The patient required admission 11/14 - 11/16/2023 having presented with shortness of breath and found to be pulmonary edema with CBC, lactate and procalcitonin all normal.  There was a concern that her chemotherapy agents could have produced her cardiomyopathy and these were stopped 11/14/2023.  She was diuresed and echocardiogram demonstrated LV SF mildly decreased at 39.2% with GLS of -11.1%, left ventricular cavity mildly dilated, left ventricular wall thickness consistent with mild concentric hypertrophy, left ventricular global hypokinesis, aortic valve abnormal with moderate calcification, valve was trileaflet, trace tricuspid valve regurgitation with right VSP at less than 35 mmHg.   The findings for LV systolic function, diastolic function and global longitudinal LV strain had all worsened.  CTA of chest 11/14/2023 demonstrating confluent soft tissue mass possibly measuring larger in current study at 6.5 x 5.0 previously 4.9 x 4.8, left supraclavicular node to decrease in size measuring 1.1 cm previously 1.4 cm, extensive bilateral interstitial and groundglass infiltrates.  The patient is seen 11/26/2023.  As per the discharge we are requested to have a BMP and CMP assessed.  She is seen with her son and daughter in a lengthy conversation held about her recent hospitalization with CHF-cardiomyopathy felt elicited, in part, from her targeted therapy with her Mekinist and Tafinlar discontinued altogether.  The patient is relatively stable currently having continued her diuretics and to be seen in cardiology in follow-up.  At the time of this dictation her CBC is found to be essentially normal with mild leukocytosis, CMP normal except for mildly elevated glucose at 125 and BNP reduced to 6126 from 9763.  She feels well with no additional shortness of breath chest pain lower extremity edema.  In reviewing the the possible treatment options she is next best treated with single agent chemotherapy moving to Alimta.  Patient proceeded through teaching and presents back 12/4/2023 to initiate chemotherapy with Alimta-cycle 1 day 1  Seen 12/18/2023 for toxicity check, overall has tolerated well.  Continues to follow closely with cardiology.  The patient required hospitalization 1/18-21/24 presenting with increasing shortness of breath, findings of chronic respiratory failure, positive for rhinovirus on admission.  She is treated with supportive treatments including for previous COVID infection as well as antibiotic therapies.  She completed 5 days of Augmentin, continue nebulizer and breathing treatments.  Upon discharge she rescheduled appointments though was seen by cardiology 1/23/2024 not felt to be  in heart failure, treated supportively for epistaxis.  There was concern as to whether she should continue treatment and she has seen back in office 1/29/2024 to discuss potential options.  She is seen with her son and daughter both indicating that she drops her O2 saturation quite quickly and is very inactive as result of difficulty with rapidly becoming shortness of breath and weak when she tries to move.  This is led to a lengthy conversation about the extent of her disease including both her cardiovascular status and her respiratory status.  She is worsening quickly and is not, currently, a candidate for chemotherapy.  The patient is doing poorly enough that we have had a cristy conversation today-1/29/2024 about end-of-life concerns.  She is not interested in hospice at this point but hopes to improve further.  At this point holding chemotherapy.  She was seen 2/9/2024 with groundglass infiltrates since November 2023 suggestive of pneumonitis and he had restarted prednisone at 40 mg daily.  A repeat CT of chest 2/26/2024 revealed decreased consolidation in superior segment right lower lobe, stable consolidation in the left upper lobe and apex, stable coarsened interstitial lung opacities elsewhere, stable soft tissue mass along the left upper mediastinum, small pericardial effusion, no pleural effusion, no clear abnormalities in the liver, stable left renal cyst and no acute bony abnormality  The patient is seen with her son and both indicate that her general performance status has improved.  They are concerned about her response to chemotherapy previously and, though we have scheduled her for chemotherapy, are not certain they wish to proceed.  Now with her improved scan we could follow her for period of time and then try to reevaluate offering chemotherapy potentially with Alimta.  Patient seen next 4/26/2024 with recent imaging demonstrating progression of disease.  Patient remains a candidate, potentially,  for Kita though it is hoped that her performance status to improve before that started.  THC Gummies were initiated versus Marinol.      4.  Worsening back pain  Admission 11/28/2022 through 12/1/2022.  MRI of the cervical and thoracic spine fortunately failed to demonstrate metastatic disease.  Moderate degenerative changes noted which could cause radiculopathy.  Medication altered to include MS Contin 15 mg every 12 hours and Norco for breakthrough pain  She reports today her pain is adequately controlled  Patient with increasing pain 5/31/2023 with pain level of 6.  MS Contin was increased to 50 mg p.o. every 8 hours and Norco 10/325 #101 p.o. every 6 hours to move to every 4 hours as needed-E scribed to pharmacy  Patient seen 6/12/2023 with Norco moved to every 4 hours.  6/23/2023: Seen for triage visit.  Has been using oxycodone 20 mg every 3 hours as needed for pain.  Reports she has not been using the hydrocodone 10/325.  Was experiencing dizziness, so reduced her oxycodone use to half a tablet every 3 hours as needed.  Reports pain is uncontrolled during the night so she is having to wake at night time to take pain medicine.  They are questioning resuming long-acting pain medication, as she is waking throughout the night to take pain medicine.  She has already been prescribed MS Contin and has this available at home, did let her know it would be okay to resume this.  Assess 7/5/2023 with pain reduced to 2/10.  Plan to continue current therapy  Darlene Pfeiffer reports a pain score of 0.  Given her pain assessment as noted, treatment options were discussed and the following options were decided upon as a follow-up plan to address the patient's pain: continuation of current treatment plan for pain. Currently not requiring pain medication.   Refilled both the patient's Remeron and Ambien 10/16/2023  12/18/2023 requesting a refill of her Ambien.    5.  Poor appetite and malnutrition  Placed on prednisone 10 mg  daily 1/4/2023 with significant improvement in her symptoms  Weight is stable today at just below 106 pounds  Patient assessed 6/12/23 with possible gummy therapy.  Stable performance status including weight and appetite 7/5/2023  Weight has declined as of 7/17/2023 due to nausea and vomiting that started this past weekend.  After further discussion the patient did stop her prednisone 10 mg while she was not feeling well.  We discussed today the importance of continuing this as it can help with her nausea and appetite and therefore she will restart tomorrow.  7/21/2023: Weight is stable today though albumin is declining at 3.  Patient is sleeping the majority of the day and not eating.  Yesterday she had a small piece of chicken and that is all.  She states she is drinking ensures however her daughter thinks that she is just sipping on these and not completing them.  Hopefully holding her dabrafenib and trametinib will be helpful with her being awake more and appetite.  I encouraged her to make sure she is taking her prednisone daily at 10 mg daily.  Remeron 7.5 mg p.o. nightly E scribed to pharmacy  9/22/2023 weight is up to 98 pounds.  Further improvement in weight 10/16/2020 3 to 104 pounds, continue Remeron at current dose, prednisone reduced to 5 mg daily.  Patient placed on prednisone from pulmonary medicine for pneumonitis, seen 3/1/2420 mg daily, requesting continuance of this over the next month and then drop to 10 mg over 2-week, then 2 5 mg over 2 weeks at which time she will be seen back after repeat scans.  Patient seen 4/26/2024, THC Gummies versus Marinol initiated.    6.  Uncontrolled nausea/vomiting  started after discontinuing prednisone and starting THC Gummies.  Started to experience dizziness with the THC Gummies.  Discontinue THC Gummies and dizziness improved, however she has remained nauseated now.  She has been utilizing Zofran with improvement, however today was unable to keep Zofran  tablets down due to nausea/vomiting.  She will resume prednisone 10 mg daily.  She will receive 1 L IV normal saline in clinic today 10 mg IV Compazine.  We will also send prescription for Phenergan suppository, in case she is not able to keep Zofran down in the future.  Will also send for stat MRI brain since patient is now experiencing uncontrolled nausea vomiting and has recently had progression of her cancer as seen on PET from 6/5/2023.  MRI brain was performed without contrast, even though contrast was ordered.  The patient also did not stay for entire exam to be completed.  Exam limited for assessment of metastatic disease.  Attempted to call patient to review results, however no answer, did leave detailed voicemail.  6/27/2023 patient reports that her nausea has resolved.  She did not complete the MRI however the images which were done showed no evidence of edema or metastatic disease.  Nausea reoccurred when seen on 7/17/2023 over this past weekend after initiation of dabrafenib and trametinib.  Patient was premedicating with ondansetron however only giving herself about 15 minutes and I encouraged her to give herself at least 30 minutes to 1 hour prior to taking the medications.  She will notify our office if this is not helpful.  7/21/2023: Premedication with ondansetron 30 minutes prior to dabrafenib and trametinib has been helpful.  Patient however has had some vomiting episodes directly after taking her medications where she has not had time to actually swallow them.  She denies difficulty swallowing however.  We will continue to monitor this.  10/16/2023 not an issue today.  1/18/2023, 1/29/2024, 3/1/2024, 4/26/2024 not an issue today.    7.  Hyperkalemia-  potassium 6/23/2023 3.1.  She has been having uncontrolled nausea, we will hold off on starting oral potassium replacement as it is unlikely she will tolerate that at this time.  Will recommend she increase oral potassium in her diet.  6/27/2023  potassium 3.0.  Nausea has resolved.  Patient will be given 40 M EQ p.o. potassium in the office and she will be started on 20 mEq daily of p.o. potassium.  Assessed 7/5/2023 with potassium 4.4  7/17/2023 potassium normal at 3.7  7/21/2023: Potassium 3.1, patient not taking potassium supplements at home because she does not like the big pill.  Changed to potassium chloride 10 mill equivalents, 2 tablets every a.m. as these will be smaller.   9/22/2023 potassium is 3.7  Repeat assessment 11/27/2023 with potassium of 4.2  12/4/2023 potassium was 4.0.  1/29/2024-potassium 4.2, reassessment 3/1/2024 potassium 4.1    8.Acute systolic heart failure -cardiomyopathy   Secondary to chemotherapy drugs during admission 11/14-16/2023 with Mekinist and Tafinlar discontinued  Diuretics continued postdischarge, cardiology follow-up planned       Plan:  *The patient is decided proceed with THC Gummies and will contact our office about her current dosing.    *3-week follow-up NP, possible Alimta  .

## 2024-04-25 NOTE — TELEPHONE ENCOUNTER
Called and LVM with [facility] stating that we can draw the ProBNP for Dr. Orr tomorrow. Note was put on scheduling line and order in computer.

## 2024-04-26 ENCOUNTER — INFUSION (OUTPATIENT)
Dept: ONCOLOGY | Facility: HOSPITAL | Age: 84
End: 2024-04-26
Payer: MEDICARE

## 2024-04-26 ENCOUNTER — OFFICE VISIT (OUTPATIENT)
Dept: ONCOLOGY | Facility: CLINIC | Age: 84
End: 2024-04-26
Payer: MEDICARE

## 2024-04-26 VITALS
OXYGEN SATURATION: 94 % | SYSTOLIC BLOOD PRESSURE: 134 MMHG | BODY MASS INDEX: 17.65 KG/M2 | DIASTOLIC BLOOD PRESSURE: 76 MMHG | WEIGHT: 95.9 LBS | HEART RATE: 84 BPM | RESPIRATION RATE: 16 BRPM | HEIGHT: 62 IN | TEMPERATURE: 97.8 F

## 2024-04-26 DIAGNOSIS — Z45.2 FITTING AND ADJUSTMENT OF VASCULAR CATHETER: Primary | ICD-10-CM

## 2024-04-26 DIAGNOSIS — C34.11 MALIGNANT NEOPLASM OF UPPER LOBE OF RIGHT LUNG: ICD-10-CM

## 2024-04-26 DIAGNOSIS — C34.11 MALIGNANT NEOPLASM OF UPPER LOBE OF RIGHT LUNG: Primary | ICD-10-CM

## 2024-04-26 LAB
ALBUMIN SERPL-MCNC: 3.3 G/DL (ref 3.5–5.2)
ALBUMIN/GLOB SERPL: 1.3 G/DL
ALP SERPL-CCNC: 64 U/L (ref 39–117)
ALT SERPL W P-5'-P-CCNC: 8 U/L (ref 1–33)
ANION GAP SERPL CALCULATED.3IONS-SCNC: 11.5 MMOL/L (ref 5–15)
AST SERPL-CCNC: 19 U/L (ref 1–32)
BASOPHILS # BLD AUTO: 0.02 10*3/MM3 (ref 0–0.2)
BASOPHILS NFR BLD AUTO: 0.2 % (ref 0–1.5)
BILIRUB SERPL-MCNC: 0.4 MG/DL (ref 0–1.2)
BUN SERPL-MCNC: 29 MG/DL (ref 8–23)
BUN/CREAT SERPL: 46.8 (ref 7–25)
CALCIUM SPEC-SCNC: 9.2 MG/DL (ref 8.6–10.5)
CHLORIDE SERPL-SCNC: 99 MMOL/L (ref 98–107)
CO2 SERPL-SCNC: 34.5 MMOL/L (ref 22–29)
CREAT SERPL-MCNC: 0.62 MG/DL (ref 0.57–1)
DEPRECATED RDW RBC AUTO: 58 FL (ref 37–54)
EGFRCR SERPLBLD CKD-EPI 2021: 89.6 ML/MIN/1.73
EOSINOPHIL # BLD AUTO: 0 10*3/MM3 (ref 0–0.4)
EOSINOPHIL NFR BLD AUTO: 0 % (ref 0.3–6.2)
ERYTHROCYTE [DISTWIDTH] IN BLOOD BY AUTOMATED COUNT: 15.5 % (ref 12.3–15.4)
GLOBULIN UR ELPH-MCNC: 2.5 GM/DL
GLUCOSE SERPL-MCNC: 171 MG/DL (ref 65–99)
HCT VFR BLD AUTO: 41.6 % (ref 34–46.6)
HGB BLD-MCNC: 13 G/DL (ref 12–15.9)
IMM GRANULOCYTES # BLD AUTO: 0.06 10*3/MM3 (ref 0–0.05)
IMM GRANULOCYTES NFR BLD AUTO: 0.5 % (ref 0–0.5)
LYMPHOCYTES # BLD AUTO: 0.57 10*3/MM3 (ref 0.7–3.1)
LYMPHOCYTES NFR BLD AUTO: 4.5 % (ref 19.6–45.3)
MCH RBC QN AUTO: 31.4 PG (ref 26.6–33)
MCHC RBC AUTO-ENTMCNC: 31.3 G/DL (ref 31.5–35.7)
MCV RBC AUTO: 100.5 FL (ref 79–97)
MONOCYTES # BLD AUTO: 0.22 10*3/MM3 (ref 0.1–0.9)
MONOCYTES NFR BLD AUTO: 1.8 % (ref 5–12)
NEUTROPHILS NFR BLD AUTO: 11.7 10*3/MM3 (ref 1.7–7)
NEUTROPHILS NFR BLD AUTO: 93 % (ref 42.7–76)
NRBC BLD AUTO-RTO: 0 /100 WBC (ref 0–0.2)
NT-PROBNP SERPL-MCNC: 1375 PG/ML (ref 0–1800)
PLATELET # BLD AUTO: 217 10*3/MM3 (ref 140–450)
PMV BLD AUTO: 9.4 FL (ref 6–12)
POTASSIUM SERPL-SCNC: 4.4 MMOL/L (ref 3.5–5.2)
PROT SERPL-MCNC: 5.8 G/DL (ref 6–8.5)
RBC # BLD AUTO: 4.14 10*6/MM3 (ref 3.77–5.28)
SODIUM SERPL-SCNC: 145 MMOL/L (ref 136–145)
WBC NRBC COR # BLD AUTO: 12.57 10*3/MM3 (ref 3.4–10.8)

## 2024-04-26 PROCEDURE — 80053 COMPREHEN METABOLIC PANEL: CPT

## 2024-04-26 PROCEDURE — 85025 COMPLETE CBC W/AUTO DIFF WBC: CPT

## 2024-04-26 PROCEDURE — 3078F DIAST BP <80 MM HG: CPT | Performed by: INTERNAL MEDICINE

## 2024-04-26 PROCEDURE — 1126F AMNT PAIN NOTED NONE PRSNT: CPT | Performed by: INTERNAL MEDICINE

## 2024-04-26 PROCEDURE — 3075F SYST BP GE 130 - 139MM HG: CPT | Performed by: INTERNAL MEDICINE

## 2024-04-26 PROCEDURE — 25010000002 HEPARIN LOCK FLUSH PER 10 UNITS: Performed by: INTERNAL MEDICINE

## 2024-04-26 PROCEDURE — 83880 ASSAY OF NATRIURETIC PEPTIDE: CPT | Performed by: INTERNAL MEDICINE

## 2024-04-26 PROCEDURE — 99214 OFFICE O/P EST MOD 30 MIN: CPT | Performed by: INTERNAL MEDICINE

## 2024-04-26 PROCEDURE — 36591 DRAW BLOOD OFF VENOUS DEVICE: CPT

## 2024-04-26 RX ORDER — SODIUM CHLORIDE 0.9 % (FLUSH) 0.9 %
10 SYRINGE (ML) INJECTION AS NEEDED
Status: DISCONTINUED | OUTPATIENT
Start: 2024-04-26 | End: 2024-04-26 | Stop reason: HOSPADM

## 2024-04-26 RX ORDER — SODIUM CHLORIDE 0.9 % (FLUSH) 0.9 %
10 SYRINGE (ML) INJECTION AS NEEDED
OUTPATIENT
Start: 2024-04-26

## 2024-04-26 RX ORDER — HEPARIN SODIUM (PORCINE) LOCK FLUSH IV SOLN 100 UNIT/ML 100 UNIT/ML
500 SOLUTION INTRAVENOUS AS NEEDED
Status: DISCONTINUED | OUTPATIENT
Start: 2024-04-26 | End: 2024-04-26 | Stop reason: HOSPADM

## 2024-04-26 RX ORDER — HEPARIN SODIUM (PORCINE) LOCK FLUSH IV SOLN 100 UNIT/ML 100 UNIT/ML
500 SOLUTION INTRAVENOUS AS NEEDED
OUTPATIENT
Start: 2024-04-26

## 2024-04-26 RX ADMIN — Medication 10 ML: at 12:13

## 2024-04-26 RX ADMIN — Medication 500 UNITS: at 12:13

## 2024-04-26 NOTE — LETTER
April 26, 2024     Meredith Lea Kehrer, MD  [address]  [address]  [facility] [address]    Patient: Darlene Pfeiffer   YOB: 1940   Date of Visit: 4/26/2024     Dear Meredith Lea Kehrer, MD:       Thank you for referring Darlene Pfeiffer to me for evaluation. Below are the relevant portions of my assessment and plan of care.    If you have questions, please do not hesitate to call me. I look forward to following Darlene along with you.         Sincerely,        Henry Escobar MD        CC: No Recipients    Henry Escobar MD  04/26/24 1214  Sign when Signing Visit      REASON FOR FOLLOW-UP: Recurrent lung cancer.    History of Present Illness    The patient is a 83 y.o. female with the above-mentioned history who returned 9/22/2023 continuing on Mekinist 0.5 mg daily and Tafinlar at 50 mg twice daily.  She also continues on prednisone only taking 10 mg daily.  She reports that she is feeling quite good.  She is tolerating things well.  She has a decent appetite denies issues with nausea, vomiting, diarrhea, or constipation.  She denies any issues with increased shortness of breath.    Patient did see ENT Dr. Topete, who has ordered an ultrasound of her neck, which will be done at [facility] on the 27th.  She is also scheduled for a cerebral angiogram by Dr. Calvin on 29 September.      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  Dr. Dowell of neurosurgery saw the patient 10/12/2023 and felt the patient was stable without neurologic symptoms, yearly follow-up planned.  The patient had started seeing a new ENT physician leading to the referral back to neurosurgery.  When seen 10/16/2023 she is doing very well on her current Mekinist and Tafinlar doses having improved her weight, appetite and general performance  status.  We have discussed at least a chest x-ray today and repeat staging in the next 5 to 6 weeks as she continues her current dosing.    The patient required admission 11/14 - 11/16/2023 having presented with shortness of breath and found to be pulmonary edema with CBC, lactate and procalcitonin all normal.  There was a concern that her chemotherapy agents could have produced her cardiomyopathy and these were stopped 11/14/2023.  She was diuresed and echocardiogram demonstrated LV SF mildly decreased at 39.2% with GLS of -11.1%, left ventricular cavity mildly dilated, left ventricular wall thickness consistent with mild concentric hypertrophy, left ventricular global hypokinesis, aortic valve abnormal with moderate calcification, valve was trileaflet, trace tricuspid valve regurgitation with right VSP at less than 35 mmHg.  The findings for LV systolic function, diastolic function and global longitudinal LV strain had all worsened.    CTA of chest 11/14/2023 demonstrating confluent soft tissue mass possibly measuring larger in current study at 6.5 x 5.0 previously 4.9 x 4.8, left supraclavicular node to decrease in size measuring 1.1 cm previously 1.4 cm, extensive bilateral interstitial and groundglass infiltrates.    The patient is seen 11/26/2023.  As per the discharge we are requested to have a BMP and CMP assessed.  She is seen with her son and daughter in a lengthy conversation held about her recent hospitalization with CHF-cardiomyopathy felt elicited, in part, from her targeted therapy with her Mekinist and Tafinlar discontinued altogether.    The patient is relatively stable currently having continued her diuretics and to be seen in cardiology in follow-up.  At the time of this dictation her CBC is found to be essentially normal with mild leukocytosis, CMP normal except for mildly elevated glucose at 125 and BNP reduced to 6126 from 9763.  She feels well with no additional shortness of breath chest pain  lower extremity edema.  In reviewing the the possible treatment options she is next best treated with single agent chemotherapy moving to Alimta.    The patient proceeded to treatment teaching and seen back 12/4/2023.  She is ready to proceed with chemotherapy with fortunately a recent good performance status still in place.    Patient returns 12/18/2023 for toxicity check.  She started treatment with Alimta on 12/4/2023.  Patient states that she did have an episode following treatment where she felt extremely itchy however she took Benadryl which helped, and her symptoms resolved.  She has ongoing issues with fatigue.  She is being followed closely by cardio oncology.  She also reports being short of breath but denies chest pain, or swelling.  She does struggle with her appetite.  Otherwise she feels like she has tolerated the change in treatment well.      The patient required hospitalization 1/18-21/24 presenting with increasing shortness of breath, findings of chronic respiratory failure, positive for rhinovirus on admission.  She is treated with supportive treatments including for previous COVID infection as well as antibiotic therapies.  She completed 5 days of Augmentin, continue nebulizer and breathing treatments.  Upon discharge she rescheduled appointments though was seen by cardiology 1/23/2024 not felt to be in heart failure, treated supportively for epistaxis.    There was concern as to whether she should continue treatment and she has seen back in office 1/29/2024 to discuss potential options.  She is seen with her son and daughter both indicating that she drops her O2 saturation quite quickly and is very inactive as result of difficulty with rapidly becoming shortness of breath and weak when she tries to move.  This is led to a lengthy conversation about the extent of her disease including both her cardiovascular status and her respiratory status.  She is worsening quickly and is not, currently, a  candidate for chemotherapy.    The patient is doing poorly enough that we have had a cristy conversation today about end-of-life concerns.  She is not interested in hospice at this point but hopes to improve further.    The patient is next evaluated 4/26/2024 with recent scans demonstrating progression of disease with enlarged mediastinal hilar lymphadenopathy, creased consolidation apex left upper lobe, new subcentimeter low-density lesion right lobe of the liver, right adrenal nodule, pleural thickening left apex, decreased patchy consolidation apical segment of right lower lobe.6/2024.  Recent CT chest, abdomen, pelvis demonstrates progression of disease.  She is seen with her sister-in-law both indicating that her oxygen requirements are increasing and her appetite reducing.  We have again discussed restarting chemotherapy but also concerned about her performance status.  She has recently started using THC Gummies and is encouraged to continue to do so to improve her performance status, appetite and weight before we try to reinstitute chemotherapy treatment with Alimta.        ONCOLOGY HISTORY:      The patient is an 83-year-old female with the below medical history including chronic obstructive pulmonary disease who was seen in the [facility] multidisciplinary thoracic oncology clinic July 1, 2021.  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      She had undergone a CT of the chest at [facility] 6/16/2021 showing postsurgical changes in the left chest from right upper lobectomy, 8 mm irregular nodule medial segment of the right lower lobe and diffuse changes of emphysema with fibrotic changes in the periphery, no suspicious hilar or mediastinal nodes, no pleural effusion.  New finding was suspicious  for new malignancy with the patient felt to be a poor candidate for surgical resection.  A PET CT and PFTs were requested thereafter.  The PET/CT demonstrated ill-defined FDG avid soft tissue thickening left aspect mediastinum within the operative bed, 1 cm hypermetabolic pulmonary nodule right lower lobe and asymmetric thickening patchy uptake involving the right base of tongue.  There is subtle uptake within the L1 vertebral body which is indeterminate and a sub-6 mm pulmonary nodule of right lung and subcentimeter hypodense hepatic lesion which was below PET resolution.       The patient's case was discussed in thoracic conference 7/22/2021 with consideration of the patient undergoing L1 vertebral body MRI, confirmatory biopsy left mediastinal and assessment by ENT (Dr. Caballero) who had worked with the patient previously.  She, incidentally, indicates that radiation therapy was given apparently intraoperatively at her recent surgery?  She still has issues with difficulty chewing and swallowing post procedures and was to be followed up with Dr. Caballero in the near future as planned.                                                            She was seen in the thoracic clinic on the same day with plans to proceed with MRI of the lumbar spine, biopsy left mediastinal nodular area of potential disease and follow-up of her ENT assessment as well as undergo a guardant 360 assessment in our office.  She underwent the biopsy 8/13/2021 found to be consistent with invasive moderately differentiated adenocarcinoma with PD-L1 TPS score 40%.  MRI of the lumbar spine revealed no evidence of metastatic disease though the patient did have multilevel degenerative disease throughout the lumbar spine.  She had an office follow-up with Dr. 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  She underwent laryngoscopy 8/18/2021  with right base of tongue without evidence of lesions or masses in the region.     She was seen by Dr. Hernandez 8/19/2021 and, her findings, had been presented in lung conference.  Her findings were consistent with 2 separate lesions including a nodule in the superior segment of the right lower lobe and a mass in the upper portion of the left chest with the left chest lesion biopsy positive for adenocarcinoma.  The consensus was that these were to separate-synchronous primaries.  Stereotactic radiation each lesions were felt to be the appropriate way to proceed and the patient was referred to radiation therapy.     The patient is seen in office 8/23/2021 agreeable to the radiation therapy as well as additional assessments including Caris molecular assessment of her biopsy.  Again her guardant 360 is currently pending and we would follow her after she completes radiation therapy with subsequent scans.    She was able to proceed with SBRT to the right lung at primary #1 with 5 treatments at 1000 cGy per fraction in the left lung primary similarly at 1000 cGy per fraction x5.  Her treatment ranged between 8/31-9/13/2021.  She is now scheduled for follow-up 11/23/2021.    The patient subsequent genomic testing is discussed with her as she is is seen back 9/23/2021.  Her guardant 360 did not determine any new abnormalities but her Caris-MI profile-does reveal sensitivity to immunotherapy as well as a BRAF mutation.  This could be extremely important as we follow the patient from this point.  Fortunately she is feeling extremely well and quite pleased about her treatments.    Patient had subsequent PET/CT 11/23/2021 demonstrates decrease in size and avidity of the previously noted intensely FDG avid nodules left lobectomy operative site and right lower lobe.  Surrounding groundglass and pulmonary opacification is consistent with postradiation changes, a few new subcentimeter pulmonary nodules are present in the right upper  lobe and indeterminant, stable subcentimeter hepatic lesion is below PET resolution no other findings of FDG-avid disease are seen in neck abdomen or pelvis.  The patient seen in office 12/1/2021 with a relatively good performance status stating she is not having any new symptoms and very pleased about her results on her PET/CT.  She realizes we'll have to follow this further but subsequent scans in 6 months.  She is also hoping to see an oral surgeon that previously helped her-Dr. Wu.    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  There is evolution of presumed postradiation changes in the right midlung with soft tissue mass within the left lumpectomy operative bed minimally decreased in size.  There is a sub-6 mm nodule in the right upper lobe not definitively seen, indeterminate and an indeterminate left renal lesion at 1.5 cm unchanged from 7/13/2021.  The patient is currently scheduled to see Dr. Dowell on 6/23/2022.  She is feeling well without any additional symptoms.    The patient required admission 10/14 - 10/18/2022 presenting with shortness of breath and cough that was nonproductive.  She had been on oral antibiotics and did not improved and was admitted for therapy for apparent pneumonia.  This eventually led to bronchoscopy after initial CT revealed postsurgical changes, new masslike 6.7 cm area of consolidation anterior left lung raising concern for potential malignancy and a follow-up PET/CT planned.  Bronchoscopy and biopsy left lower lung failed to show evidence of malignancy.  The patient's PET/CT, however, demonstrated an irregular pleural-based soft tissue density thickening in the left upper lobe that was intensely FDG avid new from 6/8/2022 thought to be a malignant recurrence with spread into the left lung parenchyma.   There is intensely pleural-based avidity within the left lower lobe thought to be metastatic disease with postradiation of the left apex as well.  There is a small focus of uptake in the right hilum grossly unchanged in size and post radiation changes in the right lower lobe.  There is indeterminate density left renal that was grossly unchanged.  The patient is seen back in office 11/15/2022 indicating that she still has chest discomfort that is slowly worsening and that she has a chronic paroxysmal cough but has not improved.  We discussed that she very likely has recurrent disease and plan to proceed with a guardant 360 examination initially as we determine whether we should try to proceed with therapy particularly immunotherapy versus targeted therapy recognizing the above findings.    Patient's guardant 360 returned as again significant for BRAF V600E and the potential use of BRAF inhibitor/MET inhibitor dabrafenib and trametinib.  Additional information [id number] and use of alpelisib.    Unfortunately she required admission 11/28-12/1 with worsening back pain.  Fortunately she did not demonstrate evidence of metastatic disease but did have moderate degenerative changes which could cause radiculopathy.  Medications were altered to include subsequently MSR 15 mg p.o. every 12 hours, Norco as needed.    The patient now presents back 12/14/2022 to initiate therapy- initially with immunotherapy considering Caris study with PD-L1 TPS of 70% on 22 C3 and 28-8 assays.    Patient seen with her  and we discussed pain medications and adjustments thereafter and that she stopped taking MSIR having only a short supply and having to use Norco more frequently.  She is willing to initiate Keytruda today we have discussed that considering her genomics treatment versus her BRAF mutation is also an option.    C1 Keytruda 12/14/2022    Patient is seen back 1/4/2023 in follow-up due for cycle 2 Keytruda.  Patient states that  since receiving her first cycle she has had decreased appetite and decreased energy.  She has not been able to bring herself to eat much and she has notably lost 7 pounds.  She has also been struggling with constipation in relation to ongoing narcotics for pain control.  She has been taking senna S2 tabs twice a day.  We discussed adding daily MiraLAX.    Patient did just last week meet with dietitian, Zoila Clinton, to discuss ways to improve caloric intake.  She has not been able to implement any of these things yet though.    The patient is next seen 1/25/2023 with mild weight gain.  She is seen with family member both indicating that she has actually felt somewhat better in terms of performance status and general function.  We have discussed proceeding with a third cycle treatment and reevaluating by scan in 2 weeks.    The patient proceeded with treatment 1/25/2023 and underwent follow-up CT chest abdomen pelvis 2/8/2023 demonstrating small pericardial effusion that is decreased in size from 11/20/2022, ill-defined consolidation and pleural-based thickening in the left apex and upper lung has reduced slightly, additional index nodule soft tissue in the aspect the pericardium has not changed substantially, no evidence of metastatic disease in the abdomen pelvis.    The patient is seen with her daughter 2/15/2023 both indicating that she has definitely improved, stable weight, appetite and performance status.  She is also using her pain medication much less frequently and wonders whether she might begin to taper from her twice a day MS Contin.    Patient is seen back 3/8/2023 due for ongoing pembrolizumab.  She continues on low-dose prednisone 10 mg daily and has noted significant improvement in her energy and appetite with this.  She is reluctant to taper this any further we discussed that it is fine for her to stay on daily dosing.    She did try to taper her pain medication going down to just MS Contin 15 mg  every 12 hours while holding her hydrocodone.  However over the last few days she has had some intermittent pain in the left upper back alleviated with hydrocodone 2-3 times a day.  She currently is denying any pain.  Monitor.    She is next evaluated 3/29/2023 for ongoing Keytruda.  Evidently her pain medication was sent to the wrong pharmacy and will need to be represcribed today-long-acting MS Contin.  Otherwise she is doing reasonably well at present.    Patient seen 5/31/2023 with gradual development of left shoulder and left scapular pain.  Concurrent CT chest, abdomen and pelvis demonstrate interval increasing consolidation left upper lobe with extension anterior to the left upper ribs with sclerosis anterior left second rib.  This is suspicious for worsening malignancy.  Plans were made for subsequent PET/CT where the patient's pain medications were adjusted.PET/CT 6/5/2023 reveals progression of disease in left upper thorax, left supraclavicular adenopathy new metastasis left posterior fourth ribs, no evidence of metastatic disease in the abdomen or pelvis.    The patient is seen with her son and daughter 6/12/2023 and it is clear that she is not developing a Pancoast like syndrome with progression of her malignancy in the left upper thorax and associated symptoms.  We have discussed discontinuance of her immunotherapy and moving to targeted therapy with dabrafenib and trametinib as we also request radiation therapy repeat consultation and try to manage her pain more effectively.    Patient seen 7/5/2023 with plans to start palliative radiotherapy and to initiate concurrently dabrafenib and trametinib.    As a result of toxicity (nausea and vomiting) the patient was taken off of dabrafenib and trametinib when seen 7/21/2023, given additional IV hydration and further supportive care.  Plans were made for follow-up on recovery to determine as to whether to redose the medications.    Unfortunately she required  admission 7/25-30/2023 with failure to thrive.  Subsequent assessments including blood cultures were negative and patient was treated briefly with IV antibiotic therapy, started PT and OT, medications adjusted for hypotension and treated symptomatically for nausea and vomiting.  She was able to improve enough to be discharged home as she continued radiation therapy started 7/17/2023 and completed 7/31/2023 to the left chest wall.    She is next seen back 8/4/2023.  We have reviewed that she had been on dabrafenib and trametinib at 150 mg p.o. every 12 hours and 2 mg p.o. daily respectively and dosing could be changed considerably such as 50 mg twice daily and 0.5 mg daily.  Determination made to have her undergo repeat scans at 4 weeks and review her response to radiation therapy as we make application for lower dose targeted therapy, addition of Remeron p.o. nightly, tapering of her MS Contin to daily rather than twice daily, use of low-dose Ativan to undergo scans.    Subsequent scans 8/25/2023, fortunately, with stable left apical mass with mediastinal chest wall involvement unchanged 1.4 cm supraclavicular lymph node.  No new findings of metastatic disease.    The patient is seen back 9/8/2023.  Fortunately she is improved dramatically off therapy and is gratified that his studies have not worsened in any substantial way.  We have discussed both her scan reports and echocardiogram that does not show significant reduction of her ejection fraction.  As result of her current stable status we have asked her to restart Mekinist at 0.5 mg daily and Tafinlar at 50 mg twice daily to be advanced as tolerated.  Patient seen 10/16/2023 with follow-up chest x-ray planned and CT of chest abdomen pelvis at 5 weeks -approximately 3 months of therapy.    The patient required admission 11/14 - 11/16/2023 having presented with shortness of breath and found to be pulmonary edema with CBC, lactate and procalcitonin all normal.   There was a concern that her chemotherapy agents could have produced her cardiomyopathy and these were stopped 11/14/2023.  She was diuresed and echocardiogram demonstrated LV SF mildly decreased at 39.2% with GLS of -11.1%, left ventricular cavity mildly dilated, left ventricular wall thickness consistent with mild concentric hypertrophy, left ventricular global hypokinesis, aortic valve abnormal with moderate calcification, valve was trileaflet, trace tricuspid valve regurgitation with right VSP at less than 35 mmHg.  The findings for LV systolic function, diastolic function and global longitudinal LV strain had all worsened.    CTA of chest 11/14/2023 demonstrating confluent soft tissue mass possibly measuring larger in current study at 6.5 x 5.0 previously 4.9 x 4.8, left supraclavicular node to decrease in size measuring 1.1 cm previously 1.4 cm, extensive bilateral interstitial and groundglass infiltrates.      The patient is seen 11/26/2023.  As per the discharge we are requested to have a BMP and CMP assessed.  She is seen with her son and daughter in a lengthy conversation held about her recent hospitalization with CHF-cardiomyopathy felt elicited, in part, from her targeted therapy with her Mekinist and Tafinlar discontinued altogether.    The patient is relatively stable currently having continued her diuretics and to be seen in cardiology in follow-up.  At the time of this dictation her CBC is found to be essentially normal with mild leukocytosis, CMP normal except for mildly elevated glucose at 125 and BNP reduced to 6126 from 9763.  She feels well with no additional shortness of breath chest pain lower extremity edema.  In reviewing the the possible treatment options she is next best treated with single agent chemotherapy moving to Alimta.    Patient seen 12/4/2023 with plans to initiate Alimta chemotherapy-cycle 1 day 1    The patient required hospitalization 1/18-21/24 presenting with increasing  shortness of breath, findings of chronic respiratory failure, positive for rhinovirus on admission.  She is treated with supportive treatments including for previous COVID infection as well as antibiotic therapies.  She completed 5 days of Augmentin, continue nebulizer and breathing treatments.  Upon discharge she rescheduled appointments though was seen by cardiology 1/23/2024 not felt to be in heart failure, treated supportively for epistaxis.    There was concern as to whether she should continue treatment and she has seen back in office 1/29/2024 to discuss potential options.  She is seen with her son and daughter both indicating that she drops her O2 saturation quite quickly and is very inactive as result of difficulty with rapidly becoming shortness of breath and weak when she tries to move.  This is led to a lengthy conversation about the extent of her disease including both her cardiovascular status and her respiratory status.  She is worsening quickly and is not, currently, a candidate for chemotherapy.    The patient is doing poorly enough that we have had a cristy conversation today about end-of-life concerns.  She is not interested in hospice at this point but hopes to improve further.  The patient had follow-up with pulmonary medicine.  She was seen 2/9/2024 with groundglass infiltrates since November 2023 suggestive of pneumonitis and he had restarted prednisone at 40 mg daily.    A repeat CT of chest 2/26/2024 revealed decreased consolidation in superior segment right lower lobe, stable consolidation in the left upper lobe and apex, stable coarsened interstitial lung opacities elsewhere, stable soft tissue mass along the left upper mediastinum, small pericardial effusion, no pleural effusion, no clear abnormalities in the liver, stable left renal cyst and no acute bony abnormality.    The patient is seen with her son and both indicate that her general performance status has improved.  They are  concerned about her response to chemotherapy previously and, though we have scheduled her for chemotherapy, are not certain they wish to proceed.  Now with her improved scan we could follow her for period of time and then try to reevaluate offering chemotherapy potentially with Alimta.    A follow-up CT scan series 4/19/2024 shows overall progression with enlarged mediastinal hilar lymphadenopathy, creased consolidation apex left upper lobe, new subcentimeter low-density lesion right lobe of the liver, right adrenal nodule, pleural thickening left apex, decreased patchy consolidation apical segment of right lower lobe.    She is seen back formally 4/26/2024.  The progression of her disease was discussed and it was determined that she would use THC Gummies or Marinol to try to improve her appetite before we restart Alimta chemotherapy.  She will be seen back in 3 weeks to possibly start that treatment.        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 [facility]   • History of squamous cell carcinoma     Tongue   • Hyperlipidemia    • Hypertension     since early 60s   • Intractable back pain 11/29/2022   • Low vitamin B12 level    • Lung cancer    • Systolic CHF, acute 11/14/2023   • Thyromegaly    • UTI (urinary tract infection)    • Vitamin D deficiency         Past Surgical History:   Procedure Laterality Date   • BRONCHOSCOPY Bilateral 10/17/2022    Procedure: BRONCHOSCOPY WITH FLUORO with biopsy and BAL;  Surgeon: India Flores MD;  Location: [facility] ENDOSCOPY;  Service: Pulmonary;  Laterality: Bilateral;   PRE/POST - lung mass   • CHOLECYSTECTOMY  1999   • COLONOSCOPY     • EMBOLIZATION CEREBRAL Left 06/29/2022    Procedure: 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;  Surgeon: Bradley Dowell MD;  Location: [facility] to [facility] OR 18/19;  Service: Interventional Radiology;  Laterality: Left;   • EYE SURGERY  11/24/2020    Took a muscle out of right eye   • GLOSSECTOMY PARTIAL      less than one half tongue   • LUNG SURGERY  2012   • ORAL LESION EXCISION/BIOPSY      June and August 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   • TUBAL ABDOMINAL LIGATION  1970   • VENOUS ACCESS DEVICE (PORT) INSERTION N/A 12/12/2022    Procedure: MEDIPORT PLACEMENT;  Surgeon: Jason Villaseñor MD;  Location: [facility] OR;  Service: Vascular;  Laterality: N/A;        Current Outpatient Medications on File Prior to Visit   Medication Sig Dispense Refill   • albuterol (PROVENTIL) (2.5 MG/3ML) 0.083% nebulizer solution Inhale the contents of 1 vial by nebulization Every 4 (Four) Hours As Needed for Wheezing. 90 mL 0   • apixaban (ELIQUIS) 2.5 MG tablet tablet Take 1 tablet by mouth 2 (Two) Times a Day. 60 tablet 3   • arformoterol (BROVANA) 15 MCG/2ML nebulizer solution USE 2 ML VIA NEBULIZER TWICE DAILY     • atorvastatin (LIPITOR) 20 MG tablet Take 1 tablet by mouth Every Night. Indications: High Amount of Fats in the Blood 90 tablet 1   • budesonide (PULMICORT) 0.5 MG/2ML nebulizer solution INHALE 2 MLS VIA NEBULIZER EVERY 12 HOURS     • budesonide 0.5 MG/2ML suspension 0.5 mg, arformoterol 15 MCG/2ML nebulizer solution 15 mcg Inhale 1 nebule 2 (Two) Times a Day.     • cetirizine (zyrTEC) 10 MG tablet Take 1 tablet by mouth Daily. Indications: Perennial Allergic Rhinitis     • Cholecalciferol (Vitamin D3) 50 MCG (2000 UT) tablet Take 1,000 Units by mouth Daily. Indications: Vitamin D Deficiency     • Cyanocobalamin (VITAMIN B12 PO) Take 1,000 mcg by mouth Daily. Indications: vitamin b12 deficiency      • empagliflozin  (Jardiance) 10 MG tablet tablet Take 1 tablet by mouth Daily. 30 tablet 5   • folic acid (FOLVITE) 1 MG tablet Take 1 tablet by mouth Daily. Start at least 7 days prior to chemotherapy until at least 3 weeks after all chemotherapy. 30 tablet 6   • furosemide (LASIX) 40 MG tablet Take 1.5 tablets by mouth Daily. Indications: Cardiac Failure, High Blood Pressure Disorder 45 tablet 2   • ipratropium (ATROVENT) 0.06 % nasal spray 2 sprays into the nostril(s) as directed by provider 4 (Four) Times a Day. 15 mL 0   • ipratropium-albuterol (DUO-NEB) 0.5-2.5 mg/3 ml nebulizer Inhale the contents of 1 vial by nebulization 2 (Two) Times a Day As Needed for Wheezing. 180 mL 0   • metoprolol succinate XL (TOPROL-XL) 25 MG 24 hr tablet Take 1 whole tablet in a.m. and half tablet in p.m.  Indications: Cardiac Failure 45 tablet 3   • mirtazapine (REMERON) 7.5 MG tablet TAKE 1 TABLET BY MOUTH EVERY NIGHT AT BEDTIME 30 tablet 1   • montelukast (SINGULAIR) 10 MG tablet Take 1 tablet by mouth Every Night. Indications: Hayfever, Perennial Allergic Rhinitis 90 tablet 3   • O2 (OXYGEN) Inhale 2 L/min Continuous.     • pantoprazole (PROTONIX) 40 MG EC tablet TAKE 1 TABLET BY MOUTH EVERY MORNING 30 tablet 2   • potassium chloride (K-DUR,KLOR-CON) 10 MEQ CR tablet Take 2 tablets by mouth Daily. 1 tablet in the PM  Indications: Low Amount of Potassium in the Blood 90 tablet 2   • predniSONE (DELTASONE) 10 MG tablet TAKE 1 TABLET BY MOUTH EVERY DAY 30 tablet 1   • predniSONE (DELTASONE) 20 MG tablet TAKE 2 TABLETS BY MOUTH DAILY. DO NOT. STOP ABRUPTLY     • sacubitril-valsartan (Entresto) 24-26 MG tablet Take 0.5 tablets by mouth 2 (Two) Times a Day. Indications: Cardiac Failure 28 tablet 0   • zolpidem (AMBIEN) 5 MG tablet TAKE 1 TABLET BY MOUTH EVERY NIGHT AT BEDTIME AS NEEDED FOR SLEEP 30 tablet 0     No current facility-administered medications on file prior to visit.        ALLERGIES:    Allergies   Allergen Reactions   • Sulfa  "Antibiotics Rash        Social History     Socioeconomic History   • Marital status:    Tobacco Use   • Smoking status: Former     Current packs/day: 0.00     Types: Cigarettes     Quit date: 2015     Years since quittin.2     Passive exposure: Never   • Smokeless tobacco: Never   • Tobacco comments:     less than a pack per day, no smoking since , Quit 2015   Vaping Use   • Vaping status: Never Used   Substance and Sexual Activity   • Alcohol use: Not Currently     Comment: Socially -A few times a month   • Drug use: No   • Sexual activity: Defer     Family History   Problem Relation Age of Onset   • Ovarian cancer Mother          at age 27   • No Known Problems Father    • Ovarian cancer Sister    • Colon cancer Brother    • No Known Problems Other    • Malig Hyperthermia Neg Hx         Review of Systems  ROS as per HPI     Objective     Vitals:    24 1144   BP: 134/76   Pulse: 84   Resp: 16   Temp: 97.8 °F (36.6 °C)   TempSrc: Temporal   SpO2: 94%  Comment: on 4 liters of oxygen   Weight: 43.5 kg (95 lb 14.4 oz)   Height: 157 cm (61.81\")   PainSc: 0-No pain                     2024    11:45 AM   Current Status   ECOG score 0      Physical Exam  Vitals reviewed.   Constitutional:       General: She is not in acute distress.     Appearance: Normal appearance. She is well-developed.   HENT:      Head: Normocephalic and atraumatic.      Mouth/Throat:      Pharynx: No oropharyngeal exudate.   Eyes:      Pupils: Pupils are equal, round, and reactive to light.   Cardiovascular:      Rate and Rhythm: Normal rate and regular rhythm.      Heart sounds: Normal heart sounds. No murmur heard.  Pulmonary:      Effort: Pulmonary effort is normal. No respiratory distress.      Breath sounds: No wheezing, rhonchi or rales.      Comments: Decreased breath sounds throughout, occasional rales  Abdominal:      General: Bowel sounds are normal. There is no distension.      Palpations: Abdomen is " soft.   Musculoskeletal:         General: No swelling. Normal range of motion.      Cervical back: Normal range of motion.   Skin:     General: Skin is warm and dry.      Findings: No rash.   Neurological:      Mental Status: She is alert and oriented to person, place, and time.         Physical exam preformed and reviewed. No changes noted from previous exam. Henry Escobar MD ; 04/26/2024      RECENT LABS:  Results from last 7 days   Lab Units 04/26/24  1136   WBC 10*3/mm3 12.57*   NEUTROS ABS 10*3/mm3 11.70*   HEMOGLOBIN g/dL 13.0   HEMATOCRIT % 41.6   PLATELETS 10*3/mm3 217                           Assessment & Plan    1.  Carcinoma of the lung  May 2015, status post previous left upper lobectomy for carcinoma of the lung.    2.  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   She underwent laryngoscopy 8/18/2021 with right base of tongue without evidence of lesions or masses in the region.    3.  Moderately differentiated adenocarcinoma of the lung.  CT of the chest 6/16/2021 demonstrated postsurgical changes, 8 mm irregular nodule medial segment of right lower lobe and diffuse changes of emphysema.    She is seen in thoracic clinic with findings suspicious for new malignancy and concern of the patient being a poor operative candidate.    7/13/2021 PET CT demonstrated ill-defined avidity and soft tissue left aspect of the mediastinum, 1 cm hypermetabolic pulmonary nodule and asymmetric thickening involving the right base of tongue as well as subtle uptake in the L1 vertebral body.    This patient's case was discussed in thoracic clinic and plans were made to request an MRI of the lumbar spine, obtain a biopsy of the mediastinal involvement of the left had the patient reviewed by ENT.  Biopsy 8/13/2021 found to be consistent with invasive moderately differentiated adenocarcinoma  with PD-L1 TPS score 40%.    7/24/2021 MRI of the lumbar spine revealed no evidence of metastatic disease though the patient did have multilevel degenerative disease throughout the lumbar spine.    Her findings were consistent with 2 separate lesions including a nodule in the superior segment of the right lower lobe and a mass in the upper portion of the left chest with the left chest lesion biopsy positive for adenocarcinoma.  The consensus was that these were to separate-synchronous primaries.  Stereotactic radiation each lesions were felt to be the appropriate way to proceed and the patient was referred to radiation therapy.  The patient was referred for radiation therapy and she was able to proceed with SBRT to the right lung at primary #1 with 5 treatments at 1000 cGy per fraction in the left lung primary similarly at 1000 cGy per fraction x5.  Her treatment ranged between 8/31-9/13/2021.    Her guardant 360 did not determine any new abnormalities but her Caris-MI profile-does reveal sensitivity to immunotherapy as well as a BRAF mutation.  PET/CT 11/23/2021 demonstrates decrease in size and avidity of the previously noted intensely FDG avid nodules left lobectomy operative site and right lower lobe.  Surrounding groundglass and pulmonary opacification is consistent with postradiation changes, a few new subcentimeter pulmonary nodules are present in the right upper lobe and indeterminant, stable subcentimeter hepatic lesion is below PET resolution no other findings of FDG-avid disease are seen in neck abdomen or pelvis.  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  There is evolution of presumed postradiation changes in the right midlung with soft tissue mass within the left lumpectomy operative bed minimally decreased in size.  There is a sub-6 mm nodule in the right upper lobe not definitively  seen, indeterminate and an indeterminate left renal lesion at 1.5 cm unchanged from 7/13/2021.  Admission 10/14 - 10/18/2022 presenting with shortness of breath and cough that was nonproductive.  She had been on oral antibiotics and did not improved and was admitted for therapy for apparent pneumonia.  This eventually led to bronchoscopy after initial CT revealed postsurgical changes, new masslike 6.7 cm area of consolidation anterior left lung raising concern for potential malignancy and a follow-up PET/CT planned.   Bronchoscopy and biopsy left lower lung failed to show evidence of malignancy.    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  There is intensely pleural-based avidity within the left lower lobe thought to be metastatic disease with postradiation of the left apex as well.  There is a small focus of uptake in the right hilum grossly unchanged in size and post radiation changes in the right lower lobe.  There is indeterminate density left renal that was grossly unchanged.    Patient's guardant 360 returned as again significant for BRAF V600E and the potential use of BRAF inhibitor/MET inhibitor dabrafenib and trametinib.  Additional information [id number] and use of alpelisib.  The patient now presents back 12/14/2022 to initiate therapy- initially with immunotherapy considering Caris study with PD-L1 TPS of 70% on 22 C3 and 28-8 assays.  Single agent Keytruda initiated 12/14/2022 2/8/2023 CT of the chest, abdomen pelviswith consolidation and masslike pleural thickening in the left apex and upper lung index areas have decreased somewhat.  There is no evidence of metastatic disease otherwise and a few indeterminate left renal lesions are stable.  After lengthy discussion with the patient and her daughter as to the  stability to mild improvement with immunotherapy it is agreed that we would continue treatment at this point.  Proceed today, 4/19/2023 with cycle 7 pembrolizumab which is continued every 3 weeks  The patient proceeded to 8 cycles of pembrolizumab and underwent follow-up chest CT chest, abdomen pelvis revealing evolution of dense consolidation left upper lobe and extension of soft tissue mass anterior to left upper ribs with increase sclerosis anterior left second rib.  This was concerning for progression of disease versus radiation change and the patient was scheduled for subsequent PET/CT.  PET/CT 6/5/2023  reveals progression of disease in left upper thorax, left supraclavicular adenopathy new metastasis left posterior fourth ribs, no evidence of metastatic disease in the abdomen or pelvis.  Patient seen 6/12/2023 with plans to move her Norco to every 4 hours, discontinue Keytruda, make application for dabrafenib and trametinib at 150 mg p.o. every 12 hours and 2 mg p.o. daily respectively.  Patient referred for radiation therapy consultation concurrently.  Last dose of Keytruda 5/31/2023.  6/23/2023: Patient seen for triage visit.  She has not yet started dabrafenib or trametinib, she has not yet received the medications.  Unfortunately she has been experiencing uncontrolled nausea/vomiting as further detailed below.   Patient seen 6/27/2023 reporting that her nausea has resolved.  She was unable to complete the MRI of the brain however Dr. Escobar did review the images patient did have and there was no evidence of edema or metastatic disease.  Patient can start her new oral medication once she receives the medication.  Patient seen 7/5/2023 with plans to start palliative radiotherapy x10 fractions and initiate dabrafenib and trametinib as soon as medications received.  7/17/2023 patient initiated radiation with 10 fractions planned.  Patient has received dabrafenib and trametinib.  Brought in for triage visit  due to nausea associated with dosing.  She is premedicating with ondansetron however only waiting 15 minutes.  We discussed today that she needs to wait at least 30 minutes to 1 hour prior to taking the dabrafenib and trametinib.  The patient will do so.  We discussed she could also take this again if she feels the need 8 hours later for nausea control.  If she is having breakthrough nausea then she should call our office.  I have encouraged her to utilize electrolyte drinks.  She will get 1L normal liter normal saline in the office today.She was not nauseas while in the office so we did not give additional nausea medication.  We will have her return in 1 week repeat labs, review by nurse practitioner, and possible IV fluids.  I stressed the importance of calling sooner if she finds that the premedication is not controlling her nausea at which time we would have her come in for additional IV fluids and evaluation.  She is continuing daily radiation this week.  Case was discussed with Dr. Escobar today.  7/21/2023: Patient returns for short interval follow-up due to very poor appetite and sleeping the majority of the day.  Her nausea has been improved with premedicating 30 minutes prior to dabrafenib and trametinib.  She however has been sleeping the majority of the day and is not eating when she is awake.  She does report taking ensures but she is only sipping on these.  Have given her samples of some of the office today and encouraged her to drink when while she is here.  Her performance status continues to decline and her daughter states that she was fixing dinner nightly just 2 weeks ago.  With performance status of 3 today I discussed the case with Dr. Escobar and we will hold her dabrafenib and  TRAMETINIB.  Her liver enzymes are elevated today as well.  We will monitor this next week and have her evaluated by Dr. Escobar at which time we could consider restarting her dabrafenib and trametinib at reduced doses  pending performance status and laboratory evaluation.  Unfortunately she required admission 7/25-30/2023 with failure to thrive.  Subsequent assessments including blood cultures were negative and patient was treated briefly with IV antibiotic therapy, started PT and OT, medications adjusted for hypotension and treated symptomatically for nausea and vomiting.  She was able to improve enough to be discharged home as she continued radiation therapy started 7/17/2023 and completed 7/31/2023 to the left chest wall.  She is next seen back 8/4/2023.  Lengthy discussion as to reevaluation   Plans made for CT chest, abdomen, pelvis in 4 weeks  Patient seen 8/21/2023 with complaints of worsening fatigue and shortness of breath.  Patient scheduled for follow up CT scans this Friday. Lungs clear on exam,  Sats 97%.  Hgb stable at 11.7.  Will check BNP today.  Discussed may be related to disease and will need to see what scan shows.   Subsequent scans 8/25/2023, fortunately, with stable left apical mass with mediastinal chest wall involvement unchanged 1.4 cm supraclavicular lymph node.  No new findings of metastatic disease.  The patient is seen back 9/8/2023.  Fortunately she is improved dramatically off therapy and is gratified that his studies have not worsened in any substantial way.  We have discussed both her scan reports and echocardiogram that does not show significant reduction of her ejection fraction.  As result of her current stable status we have asked her to restart Mekinist at 0.5 mg daily and Tafinlar at 50 mg twice daily  9/22/2023 tolerating Mekinist 0.5 mg daily and tafinlar 50 mg twice daily quite well. Continues on prednisone 10 mg daily which will now be reduced to 5 mg daily when seen 10/16/2023  Plans made to continue current dosing of Mekinist and Tafinlar and repeat evaluation with chest x-ray 10/16 and repeat CT chest abdomen pelvis in 5 weeks, MD in 6 weeks.  The patient required admission 11/14 -  11/16/2023 having presented with shortness of breath and found to be pulmonary edema with CBC, lactate and procalcitonin all normal.  There was a concern that her chemotherapy agents could have produced her cardiomyopathy and these were stopped 11/14/2023.  She was diuresed and echocardiogram demonstrated LV SF mildly decreased at 39.2% with GLS of -11.1%, left ventricular cavity mildly dilated, left ventricular wall thickness consistent with mild concentric hypertrophy, left ventricular global hypokinesis, aortic valve abnormal with moderate calcification, valve was trileaflet, trace tricuspid valve regurgitation with right VSP at less than 35 mmHg.  The findings for LV systolic function, diastolic function and global longitudinal LV strain had all worsened.  CTA of chest 11/14/2023 demonstrating confluent soft tissue mass possibly measuring larger in current study at 6.5 x 5.0 previously 4.9 x 4.8, left supraclavicular node to decrease in size measuring 1.1 cm previously 1.4 cm, extensive bilateral interstitial and groundglass infiltrates.  The patient is seen 11/26/2023.  As per the discharge we are requested to have a BMP and CMP assessed.  She is seen with her son and daughter in a lengthy conversation held about her recent hospitalization with CHF-cardiomyopathy felt elicited, in part, from her targeted therapy with her Mekinist and Tafinlar discontinued altogether.  The patient is relatively stable currently having continued her diuretics and to be seen in cardiology in follow-up.  At the time of this dictation her CBC is found to be essentially normal with mild leukocytosis, CMP normal except for mildly elevated glucose at 125 and BNP reduced to 6126 from 9763.  She feels well with no additional shortness of breath chest pain lower extremity edema.  In reviewing the the possible treatment options she is next best treated with single agent chemotherapy moving to [facility].  Patient proceeded through teaching and  presents back 12/4/2023 to initiate chemotherapy with Alimta-cycle 1 day 1  Seen 12/18/2023 for toxicity check, overall has tolerated well.  Continues to follow closely with cardiology.  The patient required hospitalization 1/18-21/24 presenting with increasing shortness of breath, findings of chronic respiratory failure, positive for rhinovirus on admission.  She is treated with supportive treatments including for previous COVID infection as well as antibiotic therapies.  She completed 5 days of Augmentin, continue nebulizer and breathing treatments.  Upon discharge she rescheduled appointments though was seen by cardiology 1/23/2024 not felt to be in heart failure, treated supportively for epistaxis.  There was concern as to whether she should continue treatment and she has seen back in office 1/29/2024 to discuss potential options.  She is seen with her son and daughter both indicating that she drops her O2 saturation quite quickly and is very inactive as result of difficulty with rapidly becoming shortness of breath and weak when she tries to move.  This is led to a lengthy conversation about the extent of her disease including both her cardiovascular status and her respiratory status.  She is worsening quickly and is not, currently, a candidate for chemotherapy.  The patient is doing poorly enough that we have had a cristy conversation today-1/29/2024 about end-of-life concerns.  She is not interested in hospice at this point but hopes to improve further.  At this point holding chemotherapy.  She was seen 2/9/2024 with groundglass infiltrates since November 2023 suggestive of pneumonitis and he had restarted prednisone at 40 mg daily.  A repeat CT of chest 2/26/2024 revealed decreased consolidation in superior segment right lower lobe, stable consolidation in the left upper lobe and apex, stable coarsened interstitial lung opacities elsewhere, stable soft tissue mass along the left upper mediastinum, small  pericardial effusion, no pleural effusion, no clear abnormalities in the liver, stable left renal cyst and no acute bony abnormality  The patient is seen with her son and both indicate that her general performance status has improved.  They are concerned about her response to chemotherapy previously and, though we have scheduled her for chemotherapy, are not certain they wish to proceed.  Now with her improved scan we could follow her for period of time and then try to reevaluate offering chemotherapy potentially with Alimta.  Patient seen next 4/26/2024 with recent imaging demonstrating progression of disease.  Patient remains a candidate, potentially, for Alimta though it is hoped that her performance status to improve before that started.  THC Gummies were initiated versus Marinol.      4.  Worsening back pain  Admission 11/28/2022 through 12/1/2022.  MRI of the cervical and thoracic spine fortunately failed to demonstrate metastatic disease.  Moderate degenerative changes noted which could cause radiculopathy.  Medication altered to include MS Contin 15 mg every 12 hours and Norco for breakthrough pain  She reports today her pain is adequately controlled  Patient with increasing pain 5/31/2023 with pain level of 6.  MS Contin was increased to 50 mg p.o. every 8 hours and Norco 10/325 #101 p.o. every 6 hours to move to every 4 hours as needed-E scribed to pharmacy  Patient seen 6/12/2023 with Norco moved to every 4 hours.  6/23/2023: Seen for triage visit.  Has been using oxycodone 20 mg every 3 hours as needed for pain.  Reports she has not been using the hydrocodone 10/325.  Was experiencing dizziness, so reduced her oxycodone use to half a tablet every 3 hours as needed.  Reports pain is uncontrolled during the night so she is having to wake at night time to take pain medicine.  They are questioning resuming long-acting pain medication, as she is waking throughout the night to take pain medicine.  She has  already been prescribed MS Contin and has this available at home, did let her know it would be okay to resume this.  Assess 7/5/2023 with pain reduced to 2/10.  Plan to continue current therapy  Darlene Pfeiffer reports a pain score of 0.  Given her pain assessment as noted, treatment options were discussed and the following options were decided upon as a follow-up plan to address the patient's pain: continuation of current treatment plan for pain. Currently not requiring pain medication.   Refilled both the patient's Remeron and Ambien 10/16/2023  12/18/2023 requesting a refill of her Ambien.    5.  Poor appetite and malnutrition  Placed on prednisone 10 mg daily 1/4/2023 with significant improvement in her symptoms  Weight is stable today at just below 106 pounds  Patient assessed 6/12/23 with possible gummy therapy.  Stable performance status including weight and appetite 7/5/2023  Weight has declined as of 7/17/2023 due to nausea and vomiting that started this past weekend.  After further discussion the patient did stop her prednisone 10 mg while she was not feeling well.  We discussed today the importance of continuing this as it can help with her nausea and appetite and therefore she will restart tomorrow.  7/21/2023: Weight is stable today though albumin is declining at 3.  Patient is sleeping the majority of the day and not eating.  Yesterday she had a small piece of chicken and that is all.  She states she is drinking ensures however her daughter thinks that she is just sipping on these and not completing them.  Hopefully holding her dabrafenib and trametinib will be helpful with her being awake more and appetite.  I encouraged her to make sure she is taking her prednisone daily at 10 mg daily.  Remeron 7.5 mg p.o. nightly E scribed to pharmacy  9/22/2023 weight is up to 98 pounds.  Further improvement in weight 10/16/2020 3 to 104 pounds, continue Remeron at current dose, prednisone reduced to 5 mg  daily.  Patient placed on prednisone from pulmonary medicine for pneumonitis, seen 3/1/2420 mg daily, requesting continuance of this over the next month and then drop to 10 mg over 2-week, then 2 5 mg over 2 weeks at which time she will be seen back after repeat scans.  Patient seen 4/26/2024, THC Gummies versus Marinol initiated.    6.  Uncontrolled nausea/vomiting  started after discontinuing prednisone and starting THC Gummies.  Started to experience dizziness with the THC Gummies.  Discontinue THC Gummies and dizziness improved, however she has remained nauseated now.  She has been utilizing Zofran with improvement, however today was unable to keep Zofran tablets down due to nausea/vomiting.  She will resume prednisone 10 mg daily.  She will receive 1 L IV normal saline in clinic today 10 mg IV Compazine.  We will also send prescription for Phenergan suppository, in case she is not able to keep Zofran down in the future.  Will also send for stat MRI brain since patient is now experiencing uncontrolled nausea vomiting and has recently had progression of her cancer as seen on PET from 6/5/2023.  MRI brain was performed without contrast, even though contrast was ordered.  The patient also did not stay for entire exam to be completed.  Exam limited for assessment of metastatic disease.  Attempted to call patient to review results, however no answer, did leave detailed voicemail.  6/27/2023 patient reports that her nausea has resolved.  She did not complete the MRI however the images which were done showed no evidence of edema or metastatic disease.  Nausea reoccurred when seen on 7/17/2023 over this past weekend after initiation of dabrafenib and trametinib.  Patient was premedicating with ondansetron however only giving herself about 15 minutes and I encouraged her to give herself at least 30 minutes to 1 hour prior to taking the medications.  She will notify our office if this is not helpful.  7/21/2023:  Premedication with ondansetron 30 minutes prior to dabrafenib and trametinib has been helpful.  Patient however has had some vomiting episodes directly after taking her medications where she has not had time to actually swallow them.  She denies difficulty swallowing however.  We will continue to monitor this.  10/16/2023 not an issue today.  1/18/2023, 1/29/2024, 3/1/2024, 4/26/2024 not an issue today.    7.  Hyperkalemia-  potassium 6/23/2023 3.1.  She has been having uncontrolled nausea, we will hold off on starting oral potassium replacement as it is unlikely she will tolerate that at this time.  Will recommend she increase oral potassium in her diet.  6/27/2023 potassium 3.0.  Nausea has resolved.  Patient will be given 40 M EQ p.o. potassium in the office and she will be started on 20 mEq daily of p.o. potassium.  Assessed 7/5/2023 with potassium 4.4  7/17/2023 potassium normal at 3.7  7/21/2023: Potassium 3.1, patient not taking potassium supplements at home because she does not like the big pill.  Changed to potassium chloride 10 mill equivalents, 2 tablets every a.m. as these will be smaller.   9/22/2023 potassium is 3.7  Repeat assessment 11/27/2023 with potassium of 4.2  12/4/2023 potassium was 4.0.  1/29/2024-potassium 4.2, reassessment 3/1/2024 potassium 4.1    8.Acute systolic heart failure -cardiomyopathy   Secondary to chemotherapy drugs during admission 11/14-16/2023 with Mekinist and Tafinlar discontinued  Diuretics continued postdischarge, cardiology follow-up planned       Plan:  *The patient is decided proceed with THC Gummies and will contact our office about her current dosing.    *3-week follow-up NP, possible Huy  .

## 2024-05-08 ENCOUNTER — TELEPHONE (OUTPATIENT)
Dept: OTHER | Facility: HOSPITAL | Age: 84
End: 2024-05-08
Payer: MEDICARE

## 2024-05-08 DIAGNOSIS — C34.11 MALIGNANT NEOPLASM OF UPPER LOBE OF RIGHT LUNG: ICD-10-CM

## 2024-05-08 DIAGNOSIS — E43 SEVERE MALNUTRITION: Primary | ICD-10-CM

## 2024-05-08 RX ORDER — DRONABINOL 2.5 MG/1
2.5 CAPSULE ORAL
Qty: 60 CAPSULE | Refills: 0 | Status: SHIPPED | OUTPATIENT
Start: 2024-05-08

## 2024-05-09 ENCOUNTER — TELEPHONE (OUTPATIENT)
Dept: ONCOLOGY | Facility: CLINIC | Age: 84
End: 2024-05-09
Payer: MEDICARE

## 2024-05-10 NOTE — Clinical Note
Level of Care: Telemetry [5]   Diagnosis: Bilateral pneumonia [[id number]]   Admitting Physician: ISMAEL BALDWIN [[id number]]   Attending Physician: ISMAEL BALDWIN [[id number]]   Certification: I Certify That Inpatient Hospital Services Are Medically Necessary For Greater Than 2 Midnights   Unanticipated physiological fall

## 2024-05-16 ENCOUNTER — TELEPHONE (OUTPATIENT)
Dept: CARDIOLOGY | Facility: CLINIC | Age: 84
End: 2024-05-16

## 2024-05-16 ENCOUNTER — OFFICE VISIT (OUTPATIENT)
Dept: CARDIOLOGY | Facility: CLINIC | Age: 84
End: 2024-05-16
Payer: MEDICARE

## 2024-05-16 ENCOUNTER — HOSPITAL ENCOUNTER (INPATIENT)
Facility: HOSPITAL | Age: 84
LOS: 3 days | Discharge: HOSPICE | DRG: 291 | End: 2024-05-20
Attending: EMERGENCY MEDICINE
Payer: MEDICARE

## 2024-05-16 ENCOUNTER — APPOINTMENT (OUTPATIENT)
Dept: CT IMAGING | Facility: HOSPITAL | Age: 84
DRG: 291 | End: 2024-05-16
Payer: MEDICARE

## 2024-05-16 ENCOUNTER — HOSPITAL ENCOUNTER (OUTPATIENT)
Dept: CARDIOLOGY | Facility: HOSPITAL | Age: 84
Discharge: HOME | End: 2024-05-16
Admitting: INTERNAL MEDICINE
Payer: MEDICARE

## 2024-05-16 VITALS
DIASTOLIC BLOOD PRESSURE: 74 MMHG | SYSTOLIC BLOOD PRESSURE: 122 MMHG | BODY MASS INDEX: 18.12 KG/M2 | HEIGHT: 61 IN | WEIGHT: 96 LBS | HEART RATE: 96 BPM

## 2024-05-16 DIAGNOSIS — I50.22 CHRONIC SYSTOLIC CHF (CONGESTIVE HEART FAILURE): ICD-10-CM

## 2024-05-16 DIAGNOSIS — I50.21 ACUTE HFREF (HEART FAILURE WITH REDUCED EJECTION FRACTION): ICD-10-CM

## 2024-05-16 DIAGNOSIS — R06.00 DYSPNEA, UNSPECIFIED TYPE: ICD-10-CM

## 2024-05-16 DIAGNOSIS — R06.09 DOE (DYSPNEA ON EXERTION): ICD-10-CM

## 2024-05-16 DIAGNOSIS — C34.90 MALIGNANT NEOPLASM OF LUNG, UNSPECIFIED LATERALITY, UNSPECIFIED PART OF LUNG: ICD-10-CM

## 2024-05-16 DIAGNOSIS — I48.0 PAROXYSMAL ATRIAL FIBRILLATION: ICD-10-CM

## 2024-05-16 DIAGNOSIS — R07.9 CHEST PAIN, UNSPECIFIED TYPE: Primary | ICD-10-CM

## 2024-05-16 DIAGNOSIS — R11.2 NAUSEA AND VOMITING, UNSPECIFIED VOMITING TYPE: ICD-10-CM

## 2024-05-16 DIAGNOSIS — I31.39 PERICARDIAL EFFUSION: ICD-10-CM

## 2024-05-16 DIAGNOSIS — C34.11 MALIGNANT NEOPLASM OF UPPER LOBE OF RIGHT LUNG: ICD-10-CM

## 2024-05-16 DIAGNOSIS — R11.2 NAUSEA AND VOMITING, UNSPECIFIED VOMITING TYPE: Primary | ICD-10-CM

## 2024-05-16 LAB
ALBUMIN SERPL-MCNC: 3.2 G/DL (ref 3.5–5.2)
ALBUMIN/GLOB SERPL: 1.3 G/DL
ALP SERPL-CCNC: 105 U/L (ref 39–117)
ALT SERPL W P-5'-P-CCNC: 15 U/L (ref 1–33)
ANION GAP SERPL CALCULATED.3IONS-SCNC: 7.8 MMOL/L (ref 5–15)
AST SERPL-CCNC: 16 U/L (ref 1–32)
BASOPHILS # BLD AUTO: 0.01 10*3/MM3 (ref 0–0.2)
BASOPHILS NFR BLD AUTO: 0.1 % (ref 0–1.5)
BILIRUB SERPL-MCNC: 0.4 MG/DL (ref 0–1.2)
BUN SERPL-MCNC: 40 MG/DL (ref 8–23)
BUN/CREAT SERPL: 51.3 (ref 7–25)
CALCIUM SPEC-SCNC: 9.3 MG/DL (ref 8.6–10.5)
CHLORIDE SERPL-SCNC: 93 MMOL/L (ref 98–107)
CO2 SERPL-SCNC: 35.2 MMOL/L (ref 22–29)
CREAT SERPL-MCNC: 0.78 MG/DL (ref 0.57–1)
DEPRECATED RDW RBC AUTO: 47.7 FL (ref 37–54)
EGFRCR SERPLBLD CKD-EPI 2021: 76.4 ML/MIN/1.73
EOSINOPHIL # BLD AUTO: 0 10*3/MM3 (ref 0–0.4)
EOSINOPHIL NFR BLD AUTO: 0 % (ref 0.3–6.2)
ERYTHROCYTE [DISTWIDTH] IN BLOOD BY AUTOMATED COUNT: 13.8 % (ref 12.3–15.4)
GEN 5 2HR TROPONIN T REFLEX: 106 NG/L
GLOBULIN UR ELPH-MCNC: 2.4 GM/DL
GLUCOSE SERPL-MCNC: 216 MG/DL (ref 65–99)
HCT VFR BLD AUTO: 38.7 % (ref 34–46.6)
HGB BLD-MCNC: 12.7 G/DL (ref 12–15.9)
IMM GRANULOCYTES # BLD AUTO: 0.1 10*3/MM3 (ref 0–0.05)
IMM GRANULOCYTES NFR BLD AUTO: 0.7 % (ref 0–0.5)
LYMPHOCYTES # BLD AUTO: 0.44 10*3/MM3 (ref 0.7–3.1)
LYMPHOCYTES NFR BLD AUTO: 3 % (ref 19.6–45.3)
MCH RBC QN AUTO: 31.1 PG (ref 26.6–33)
MCHC RBC AUTO-ENTMCNC: 32.8 G/DL (ref 31.5–35.7)
MCV RBC AUTO: 94.9 FL (ref 79–97)
MONOCYTES # BLD AUTO: 0.5 10*3/MM3 (ref 0.1–0.9)
MONOCYTES NFR BLD AUTO: 3.5 % (ref 5–12)
NEUTROPHILS NFR BLD AUTO: 13.44 10*3/MM3 (ref 1.7–7)
NEUTROPHILS NFR BLD AUTO: 92.7 % (ref 42.7–76)
NRBC BLD AUTO-RTO: 0 /100 WBC (ref 0–0.2)
NT-PROBNP SERPL-MCNC: 3983 PG/ML (ref 0–1800)
PLATELET # BLD AUTO: 267 10*3/MM3 (ref 140–450)
PMV BLD AUTO: 10.2 FL (ref 6–12)
POTASSIUM SERPL-SCNC: 4.5 MMOL/L (ref 3.5–5.2)
PROT SERPL-MCNC: 5.6 G/DL (ref 6–8.5)
QT INTERVAL: 327 MS
QTC INTERVAL: 396 MS
RBC # BLD AUTO: 4.08 10*6/MM3 (ref 3.77–5.28)
SODIUM SERPL-SCNC: 136 MMOL/L (ref 136–145)
TROPONIN T DELTA: 8 NG/L
TROPONIN T SERPL HS-MCNC: 98 NG/L
WBC NRBC COR # BLD AUTO: 14.49 10*3/MM3 (ref 3.4–10.8)

## 2024-05-16 PROCEDURE — 83880 ASSAY OF NATRIURETIC PEPTIDE: CPT | Performed by: INTERNAL MEDICINE

## 2024-05-16 PROCEDURE — 80053 COMPREHEN METABOLIC PANEL: CPT | Performed by: INTERNAL MEDICINE

## 2024-05-16 PROCEDURE — 71275 CT ANGIOGRAPHY CHEST: CPT

## 2024-05-16 PROCEDURE — 36415 COLL VENOUS BLD VENIPUNCTURE: CPT

## 2024-05-16 PROCEDURE — 84484 ASSAY OF TROPONIN QUANT: CPT | Performed by: EMERGENCY MEDICINE

## 2024-05-16 PROCEDURE — 85025 COMPLETE CBC W/AUTO DIFF WBC: CPT | Performed by: INTERNAL MEDICINE

## 2024-05-16 PROCEDURE — 93005 ELECTROCARDIOGRAM TRACING: CPT | Performed by: EMERGENCY MEDICINE

## 2024-05-16 PROCEDURE — 93010 ELECTROCARDIOGRAM REPORT: CPT | Performed by: INTERNAL MEDICINE

## 2024-05-16 PROCEDURE — G0378 HOSPITAL OBSERVATION PER HR: HCPCS

## 2024-05-16 PROCEDURE — 99285 EMERGENCY DEPT VISIT HI MDM: CPT

## 2024-05-16 PROCEDURE — 25510000001 IOPAMIDOL PER 1 ML: Performed by: EMERGENCY MEDICINE

## 2024-05-16 PROCEDURE — 25010000002 FUROSEMIDE PER 20 MG: Performed by: EMERGENCY MEDICINE

## 2024-05-16 RX ORDER — ACETAMINOPHEN 500 MG
1000 TABLET ORAL ONCE
Status: COMPLETED | OUTPATIENT
Start: 2024-05-16 | End: 2024-05-16

## 2024-05-16 RX ORDER — SODIUM CHLORIDE 9 MG/ML
40 INJECTION, SOLUTION INTRAVENOUS AS NEEDED
Status: DISCONTINUED | OUTPATIENT
Start: 2024-05-16 | End: 2024-05-20 | Stop reason: HOSPADM

## 2024-05-16 RX ORDER — SODIUM CHLORIDE 0.9 % (FLUSH) 0.9 %
10 SYRINGE (ML) INJECTION EVERY 12 HOURS SCHEDULED
Status: DISCONTINUED | OUTPATIENT
Start: 2024-05-16 | End: 2024-05-20 | Stop reason: HOSPADM

## 2024-05-16 RX ORDER — AMOXICILLIN 250 MG
2 CAPSULE ORAL 2 TIMES DAILY PRN
Status: DISCONTINUED | OUTPATIENT
Start: 2024-05-16 | End: 2024-05-20 | Stop reason: HOSPADM

## 2024-05-16 RX ORDER — FUROSEMIDE 10 MG/ML
80 INJECTION INTRAMUSCULAR; INTRAVENOUS ONCE
Status: COMPLETED | OUTPATIENT
Start: 2024-05-16 | End: 2024-05-16

## 2024-05-16 RX ORDER — POLYETHYLENE GLYCOL 3350 17 G/17G
17 POWDER, FOR SOLUTION ORAL DAILY PRN
Status: DISCONTINUED | OUTPATIENT
Start: 2024-05-16 | End: 2024-05-20 | Stop reason: HOSPADM

## 2024-05-16 RX ORDER — BISACODYL 5 MG/1
5 TABLET, DELAYED RELEASE ORAL DAILY PRN
Status: DISCONTINUED | OUTPATIENT
Start: 2024-05-16 | End: 2024-05-20 | Stop reason: HOSPADM

## 2024-05-16 RX ORDER — BISACODYL 10 MG
10 SUPPOSITORY, RECTAL RECTAL DAILY PRN
Status: DISCONTINUED | OUTPATIENT
Start: 2024-05-16 | End: 2024-05-20 | Stop reason: HOSPADM

## 2024-05-16 RX ORDER — ACETAMINOPHEN 325 MG/1
650 TABLET ORAL EVERY 4 HOURS PRN
Status: DISCONTINUED | OUTPATIENT
Start: 2024-05-16 | End: 2024-05-20 | Stop reason: HOSPADM

## 2024-05-16 RX ORDER — SODIUM CHLORIDE 0.9 % (FLUSH) 0.9 %
10 SYRINGE (ML) INJECTION AS NEEDED
Status: DISCONTINUED | OUTPATIENT
Start: 2024-05-16 | End: 2024-05-20 | Stop reason: HOSPADM

## 2024-05-16 RX ADMIN — Medication 10 ML: at 22:39

## 2024-05-16 RX ADMIN — ACETAMINOPHEN 650 MG: 325 TABLET, FILM COATED ORAL at 22:37

## 2024-05-16 RX ADMIN — IOPAMIDOL 85 ML: 755 INJECTION, SOLUTION INTRAVENOUS at 18:21

## 2024-05-16 RX ADMIN — FUROSEMIDE 80 MG: 10 INJECTION, SOLUTION INTRAMUSCULAR; INTRAVENOUS at 20:53

## 2024-05-16 RX ADMIN — ACETAMINOPHEN 500 MG: 500 TABLET ORAL at 16:58

## 2024-05-16 NOTE — ED NOTES
Nursing report ED to floor  Darlene Pfeiffer  83 y.o.  female    HPI :  HPI (Adult)  Stated Reason for Visit: sob, sent to ED from MD Castillo, cardiologist due to possible lunch infection  History Obtained From: patient    Chief Complaint  Chief Complaint   Patient presents with    Shortness of Breath       Admitting doctor:   Torey Rodriguez MD    Admitting diagnosis:   The primary encounter diagnosis was Chest pain, unspecified type. Diagnoses of Dyspnea, unspecified type, Malignant neoplasm of lung, unspecified laterality, unspecified part of lung, and Pericardial effusion were also pertinent to this visit.    Code status:   Current Code Status       Date Active Code Status Order ID Comments User Context       Prior            Allergies:   Sulfa antibiotics    Isolation:   No active isolations    Intake and Output  No intake or output data in the 24 hours ending 05/16/24 1920    Weight:       05/16/24  1515   Weight: 42.6 kg (94 lb)       Most recent vitals:   Vitals:    05/16/24 1646 05/16/24 1701 05/16/24 1803 05/16/24 1831   BP: 119/65 140/63 129/78 135/68   Pulse: 84 85 84 89   Resp: 18 18 16 16   Temp:       TempSrc:       SpO2: 97% 96% 98% 94%   Weight:       Height:           Active LDAs/IV Access:   Lines, Drains & Airways       Active LDAs       Name Placement date Placement time Site Days    Peripheral IV 05/16/24 1554 Anterior;Right Forearm 05/16/24  1554  Forearm  less than 1    Single Lumen Implantable Port Right Subclavian --  --  Subclavian  --                    Labs (abnormal labs have a star):   Labs Reviewed   TROPONIN - Abnormal; Notable for the following components:       Result Value    HS Troponin T 98 (*)     All other components within normal limits    Narrative:     High Sensitive Troponin T Reference Range:  <14.0 ng/L- Negative Female for AMI  <22.0 ng/L- Negative Male for AMI  >=14 - Abnormal Female indicating possible myocardial injury.  >=22 - Abnormal Male indicating possible myocardial  injury.   Clinicians would have to utilize clinical acumen, EKG, Troponin, and serial changes to determine if it is an Acute Myocardial Infarction or myocardial injury due to an underlying chronic condition.        HIGH SENSITIVITIY TROPONIN T 2HR - Abnormal; Notable for the following components:    HS Troponin T 106 (*)     Troponin T Delta 8 (*)     All other components within normal limits    Narrative:     High Sensitive Troponin T Reference Range:  <14.0 ng/L- Negative Female for AMI  <22.0 ng/L- Negative Male for AMI  >=14 - Abnormal Female indicating possible myocardial injury.  >=22 - Abnormal Male indicating possible myocardial injury.   Clinicians would have to utilize clinical acumen, EKG, Troponin, and serial changes to determine if it is an Acute Myocardial Infarction or myocardial injury due to an underlying chronic condition.            EKG:   ECG 12 Lead Dyspnea   Final Result   HEART RATE= 88  bpm   RR Interval= 682  ms   MA Interval= 121  ms   P Horizontal Axis= 41  deg   P Front Axis= 62  deg   QRSD Interval= 97  ms   QT Interval= 327  ms   QTcB= 396  ms   QRS Axis= 12  deg   T Wave Axis= 119  deg   - ABNORMAL ECG -   Sinus rhythm - rate has slowed   Ventricular premature complex   Nonspecific repol abnormality, diffuse leads   Non-Specific STT wave changes   Electronically Signed By: Ancelmo Rodriguez ([facility]) 16-May-2024 16:29:17   Date and Time of Study: 2024-05-16 15:56:42      Telemetry Scan   Final Result          Meds given in ED:   Medications   furosemide (LASIX) injection 80 mg (has no administration in time range)   acetaminophen (TYLENOL) tablet 1,000 mg (500 mg Oral Given 5/16/24 1389)   iopamidol (ISOVUE-370) 76 % injection 100 mL (85 mL Intravenous Given by Other 5/16/24 7540)       Imaging results:  CT Angiogram Chest Pulmonary Embolism    Result Date: 5/16/2024  1. There is no evidence for pulmonary embolism.  2. There has been interval progression of areas of pulmonary consolidation in  the anterior aspect of the left upper lobe as well as in the right lower lobe. This is seen in conjunction with enlargement of an area of soft tissue opacification posterior to the left mainstem bronchus with narrowing of the left mainstem bronchus as described. Additionally, there is a slightly larger small pericardial effusion with loss of a fatty plane between the pericardium and the consolidation in the left upper lobe. This is concerning for localized mediastinal invasion and progression of malignancy.   Radiation dose reduction techniques were utilized, including automated exposure control and exposure modulation based on body size.        Ambulatory status:   -     Social issues:   Social History     Socioeconomic History    Marital status:    Tobacco Use    Smoking status: Former     Current packs/day: 0.00     Types: Cigarettes     Quit date: 2015     Years since quittin.2     Passive exposure: Never    Smokeless tobacco: Never    Tobacco comments:     less than a pack per day, no smoking since , Quit 2015   Vaping Use    Vaping status: Never Used   Substance and Sexual Activity    Alcohol use: Not Currently     Comment: Socially -A few times a month    Drug use: No    Sexual activity: Defer       Peripheral Neurovascular  Peripheral Neurovascular (Adult)  Peripheral Neurovascular WDL: .WDL except  Additional Documentation: Edema (Group)  Edema  Edema: arm, left  Arm, Left Edema: 2+ (Mild)    Neuro Cognitive  Neuro Cognitive (Adult)  Cognitive/Neuro/Behavioral WDL: WDL  Additional Documentation: Headache Assessment (Group)  Headache Assessment  Headache Location: generalized  Severity Rating (0-10): 3  Description/Character: dull    Learning  Learning Assessment (Adult)  Learning Readiness and Ability: no barriers identified    Respiratory  Respiratory (Adult)  Airway WDL: WDL  Additional Documentation: Breath Sounds (Group)  Respiratory WDL  Respiratory WDL: .WDL except, cough  Cough  Frequency: frequent  Cough Type: congested  Breath Sounds  Breath Sounds: All Fields  All Lung Fields Breath Sounds: Anterior:, coarse    Abdominal Pain       Pain Assessments  Pain (Adult)  (0-10) Pain Rating: Rest: 4  (0-10) Pain Rating: Activity: 0  Pain Location: head  Pain Side/Orientation: generalized  Response to Pain Interventions: interventions effective per patient    NIH Stroke Scale       Prabhjot Mahoney RN  05/16/24 19:20 EDT

## 2024-05-16 NOTE — H&P
Patient Name:  Darlene Pfeiffer  YOB: 1940  MRN:  [id number]  Admit Date:  5/16/2024  Patient Care Team:  Kehrer, Meredith Lea, MD as PCP - General (Family Medicine)  Linker, Arias BROWN III, MD as Referring Physician (Thoracic Surgery)  Henry Escobar MD as Consulting Physician (Hematology and Oncology)  Zoila Clinton RD, LD as Dietitian (Nutrition)  Nguyễn Bran MD as Consulting Physician (Radiation Oncology)  Claire Orr MD as Consulting Physician (Cardiology)      Subjective   History Present Illness     Chief Complaint   Patient presents with    Shortness of Breath     This is an 83-year-old woman with a history of lung cancer, coronary artery disease, COPD on 3 L of oxygen via nasal cannula at home, hypertension/hyperlipidemia, paroxysmal atrial fibrillation on Eliquis, CHF as well as a left PCA intracranial aneurysm who presents to the hospital from her cardiologist office for acute onset of shortness of air.  Blood work was done that was concerning for leukocytosis at her cardiologist office and so she was sent to the hospital for further evaluation.  CT scan in the emergency department showed progression of the areas of pulmonary consolidation in the left upper and right lower lobe.  Narrowing of the left mainstem bronchus due to soft tissue obstruction was also noted, along with a small pericardial effusion, concerning for progression of malignancy.      Personal History     Past Medical History:   Diagnosis Date    Allergic rhinitis     Aneurysm     Asthma     Cancer of floor of mouth 2014    Cerebral aneurysm     COPD (chronic obstructive pulmonary disease)     COVID 2020    Esophageal reflux     History of adenocarcinoma of lung     History of anemia     History of carcinoma in situ of skin     History of lung cancer     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 from [facility]    History of squamous cell carcinoma     Tongue    Hyperlipidemia      Hypertension     since early 60s    Intractable back pain 2022    Low vitamin B12 level     Lung cancer     Systolic CHF, acute 2023    Thyromegaly     UTI (urinary tract infection)     Vitamin D deficiency      Past Surgical History:   Procedure Laterality Date    BRONCHOSCOPY Bilateral 10/17/2022    Procedure: BRONCHOSCOPY WITH FLUORO with biopsy and BAL;  Surgeon: India Flores MD;  Location: [facility] ENDOSCOPY;  Service: Pulmonary;  Laterality: Bilateral;  PRE/POST - lung mass    CHOLECYSTECTOMY      COLONOSCOPY      EMBOLIZATION CEREBRAL Left 2022    Procedure: 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;  Surgeon: Bradley Dowell MD;  Location: [facility] HYBRID OR ;  Service: Interventional Radiology;  Laterality: Left;    EYE SURGERY  2020    Took a muscle out of right eye    GLOSSECTOMY PARTIAL      less than one half tongue    LUNG SURGERY      ORAL LESION EXCISION/BIOPSY       and 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    TUBAL ABDOMINAL LIGATION      VENOUS ACCESS DEVICE (PORT) INSERTION N/A 2022    Procedure: MEDIPORT PLACEMENT;  Surgeon: Jason Villaseñor MD;  Location: [facility] MAIN OR;  Service: Vascular;  Laterality: N/A;     Family History   Problem Relation Age of Onset    Ovarian cancer Mother          at age 27    No Known Problems Father     Ovarian cancer Sister     Colon cancer Brother     No Known Problems Other     Malig Hyperthermia Neg Hx      Social History     Tobacco Use    Smoking status: Former     Current packs/day: 0.00     Types: Cigarettes     Quit date: 2015     Years since quittin.2     Passive exposure: Never    Smokeless tobacco: Never    Tobacco comments:     less than a pack per day, no smoking since , Quit 2015   Vaping Use    Vaping status: Never Used   Substance Use Topics    Alcohol use: Not Currently     Comment: Socially -A few times a month    Drug use: No     No current  facility-administered medications on file prior to encounter.     Current Outpatient Medications on File Prior to Encounter   Medication Sig Dispense Refill    apixaban (ELIQUIS) 2.5 MG tablet tablet Take 1 tablet by mouth 2 (Two) Times a Day. 60 tablet 3    atorvastatin (LIPITOR) 20 MG tablet Take 1 tablet by mouth Every Night. Indications: High Amount of Fats in the Blood 90 tablet 1    cetirizine (zyrTEC) 10 MG tablet Take 1 tablet by mouth Daily. Indications: Perennial Allergic Rhinitis      Cholecalciferol (Vitamin D3) 50 MCG (2000 UT) tablet Take 1,000 Units by mouth Daily. Indications: Vitamin D Deficiency      Cyanocobalamin (VITAMIN B12 PO) Take 1,000 mcg by mouth Daily. Indications: vitamin b12 deficiency       folic acid (FOLVITE) 1 MG tablet Take 1 tablet by mouth Daily. Start at least 7 days prior to chemotherapy until at least 3 weeks after all chemotherapy. 30 tablet 6    furosemide (LASIX) 40 MG tablet Take 1.5 tablets by mouth Daily. Indications: Cardiac Failure, High Blood Pressure Disorder 45 tablet 2    metoprolol succinate XL (TOPROL-XL) 25 MG 24 hr tablet Take 1 whole tablet in a.m. and half tablet in p.m.  Indications: Cardiac Failure 45 tablet 3    mirtazapine (REMERON) 7.5 MG tablet TAKE 1 TABLET BY MOUTH EVERY NIGHT AT BEDTIME 30 tablet 1    montelukast (SINGULAIR) 10 MG tablet Take 1 tablet by mouth Every Night. Indications: Hayfever, Perennial Allergic Rhinitis 90 tablet 3    predniSONE (DELTASONE) 10 MG tablet TAKE 1 TABLET BY MOUTH EVERY DAY 30 tablet 1    sacubitril-valsartan (Entresto) 24-26 MG tablet Take 0.5 tablets by mouth 2 (Two) Times a Day. Indications: Cardiac Failure 28 tablet 0    zolpidem (AMBIEN) 5 MG tablet TAKE 1 TABLET BY MOUTH EVERY NIGHT AT BEDTIME AS NEEDED FOR SLEEP 30 tablet 0    albuterol (PROVENTIL) (2.5 MG/3ML) 0.083% nebulizer solution Inhale the contents of 1 vial by nebulization Every 4 (Four) Hours As Needed for Wheezing. 90 mL 0    arformoterol (BROVANA) 15  MCG/2ML nebulizer solution USE 2 ML VIA NEBULIZER TWICE DAILY      budesonide (PULMICORT) 0.5 MG/2ML nebulizer solution INHALE 2 MLS VIA NEBULIZER EVERY 12 HOURS      budesonide 0.5 MG/2ML suspension 0.5 mg, arformoterol 15 MCG/2ML nebulizer solution 15 mcg Inhale 1 nebule 2 (Two) Times a Day.      dronabinol (Marinol) 2.5 MG capsule Take 1 capsule by mouth 2 (Two) Times a Day Before Meals. 60 capsule 0    empagliflozin (Jardiance) 10 MG tablet tablet Take 1 tablet by mouth Daily. 30 tablet 5    ipratropium (ATROVENT) 0.06 % nasal spray 2 sprays into the nostril(s) as directed by provider 4 (Four) Times a Day. 15 mL 0    ipratropium-albuterol (DUO-NEB) 0.5-2.5 mg/3 ml nebulizer Inhale the contents of 1 vial by nebulization 2 (Two) Times a Day As Needed for Wheezing. 180 mL 0    O2 (OXYGEN) Inhale 3 L/min Continuous.      pantoprazole (PROTONIX) 40 MG EC tablet TAKE 1 TABLET BY MOUTH EVERY MORNING 30 tablet 2    potassium chloride (K-DUR,KLOR-CON) 10 MEQ CR tablet Take 2 tablets by mouth Daily. 1 tablet in the PM  Indications: Low Amount of Potassium in the Blood 90 tablet 2    predniSONE (DELTASONE) 20 MG tablet TAKE 2 TABLETS BY MOUTH DAILY. DO NOT. STOP ABRUPTLY       Allergies   Allergen Reactions    Sulfa Antibiotics Rash       Objective    Objective     Vital Signs  Temp:  [98.2 °F (36.8 °C)-98.5 °F (36.9 °C)] 98.2 °F (36.8 °C)  Heart Rate:  [84-96] 90  Resp:  [16-18] 18  BP: (102-140)/(58-89) 121/59  SpO2:  [92 %-99 %] 93 %  on  Flow (L/min):  [3-4] 3;   Device (Oxygen Therapy): nasal cannula  Body mass index is 16.65 kg/m².    Physical Exam  Constitutional:       Appearance: She is ill-appearing.   HENT:      Head: Normocephalic and atraumatic.   Cardiovascular:      Rate and Rhythm: Normal rate and regular rhythm.   Pulmonary:      Effort: Pulmonary effort is normal. No respiratory distress.   Abdominal:      General: There is no distension.      Palpations: Abdomen is soft.      Tenderness: There is no  abdominal tenderness.   Musculoskeletal:      Comments: Left arm swelling    Skin:     General: Skin is warm and dry.   Neurological:      Mental Status: She is alert and oriented to person, place, and time.      Motor: Weakness present.         Results Review:  I reviewed the patient's new clinical results.  I reviewed the patient's new imaging results and agree with the interpretation.  I reviewed the patient's other test results and agree with the interpretation  I personally viewed and interpreted the patient's EKG/Telemetry data  Discussed with ED provider.    Lab Results (last 24 hours)       Procedure Component Value Units Date/Time    proBNP [[id number]]  (Abnormal) Collected: 05/16/24 1006    Specimen: Blood Updated: 05/16/24 1151     proBNP 3,983.0 pg/mL     Narrative:      This assay is used as an aid in the diagnosis of individuals suspected of having heart failure. It can be used as an aid in the diagnosis of acute decompensated heart failure (ADHF) in patients presenting with signs and symptoms of ADHF to the emergency department (ED). In addition, NT-proBNP of <300 pg/mL indicates ADHF is not likely.    Age Range Result Interpretation  NT-proBNP Concentration (pg/mL:      <50             Positive            >450                   Gray                 300-450                    Negative             <300    50-75           Positive            >900                  Gray                300-900                  Negative            <300      >75             Positive            >1800                  Gray                300-1800                  Negative            <300    Comprehensive Metabolic Panel [[id number]]  (Abnormal) Collected: 05/16/24 1006    Specimen: Blood Updated: 05/16/24 1152     Glucose 216 mg/dL      BUN 40 mg/dL      Creatinine 0.78 mg/dL      Sodium 136 mmol/L      Potassium 4.5 mmol/L      Chloride 93 mmol/L      CO2 35.2 mmol/L      Calcium 9.3 mg/dL      Total Protein 5.6 g/dL       Albumin 3.2 g/dL      ALT (SGPT) 15 U/L      AST (SGOT) 16 U/L      Alkaline Phosphatase 105 U/L      Total Bilirubin 0.4 mg/dL      Globulin 2.4 gm/dL      A/G Ratio 1.3 g/dL      BUN/Creatinine Ratio 51.3     Anion Gap 7.8 mmol/L      eGFR 76.4 mL/min/1.73     Narrative:      GFR Normal >60  Chronic Kidney Disease <60  Kidney Failure <15    The GFR formula is only valid for adults with stable renal function between ages 18 and 70.    CBC & Differential [[id number]]  (Abnormal) Collected: 05/16/24 1006    Specimen: Blood Updated: 05/16/24 1130    Narrative:      The following orders were created for panel order CBC & Differential.  Procedure                               Abnormality         Status                     ---------                               -----------         ------                     CBC Auto Differential[[id number]]        Abnormal            Final result                 Please view results for these tests on the individual orders.    CBC Auto Differential [[id number]]  (Abnormal) Collected: 05/16/24 1006    Specimen: Blood Updated: 05/16/24 1130     WBC 14.49 10*3/mm3      RBC 4.08 10*6/mm3      Hemoglobin 12.7 g/dL      Hematocrit 38.7 %      MCV 94.9 fL      MCH 31.1 pg      MCHC 32.8 g/dL      RDW 13.8 %      RDW-SD 47.7 fl      MPV 10.2 fL      Platelets 267 10*3/mm3      Neutrophil % 92.7 %      Lymphocyte % 3.0 %      Monocyte % 3.5 %      Eosinophil % 0.0 %      Basophil % 0.1 %      Immature Grans % 0.7 %      Neutrophils, Absolute 13.44 10*3/mm3      Lymphocytes, Absolute 0.44 10*3/mm3      Monocytes, Absolute 0.50 10*3/mm3      Eosinophils, Absolute 0.00 10*3/mm3      Basophils, Absolute 0.01 10*3/mm3      Immature Grans, Absolute 0.10 10*3/mm3      nRBC 0.0 /100 WBC     High Sensitivity Troponin T [[id number]]  (Abnormal) Collected: 05/16/24 1552    Specimen: Blood from Arm, Right Updated: 05/16/24 1622     HS Troponin T 98 ng/L     Narrative:      High Sensitive Troponin T Reference  Range:  <14.0 ng/L- Negative Female for AMI  <22.0 ng/L- Negative Male for AMI  >=14 - Abnormal Female indicating possible myocardial injury.  >=22 - Abnormal Male indicating possible myocardial injury.   Clinicians would have to utilize clinical acumen, EKG, Troponin, and serial changes to determine if it is an Acute Myocardial Infarction or myocardial injury due to an underlying chronic condition.         High Sensitivity Troponin T 2Hr [[id number]]  (Abnormal) Collected: 05/16/24 1803    Specimen: Blood from Arm, Right Updated: 05/16/24 1840     HS Troponin T 106 ng/L      Troponin T Delta 8 ng/L     Narrative:      High Sensitive Troponin T Reference Range:  <14.0 ng/L- Negative Female for AMI  <22.0 ng/L- Negative Male for AMI  >=14 - Abnormal Female indicating possible myocardial injury.  >=22 - Abnormal Male indicating possible myocardial injury.   Clinicians would have to utilize clinical acumen, EKG, Troponin, and serial changes to determine if it is an Acute Myocardial Infarction or myocardial injury due to an underlying chronic condition.                 Results for orders placed or performed during the hospital encounter of 01/18/24   Blood Culture - Blood, Arm, Right    Specimen: Arm, Right; Blood   Result Value Ref Range    Blood Culture No growth at 5 days        Imaging Results (Last 24 Hours)       Procedure Component Value Units Date/Time    CT Angiogram Chest Pulmonary Embolism [[id number]] Collected: 05/16/24 1901     Updated: 05/16/24 1930    Narrative:      EXAMINATION: CT ANGIOGRAM OF THE CHEST WITH CONTRAST     HISTORY: 83-year-old female with history of lung carcinoma, CHF and  worsening shortness of air undergoing evaluation for possible pulmonary  embolism     TECHNIQUE: Contiguous axial images were obtained through the chest  following bolus intravenous administration of nonionic contrast. Three-D  reformatted images were produced and presented for interpretation.     COMPARISON: CT of the  chest with contrast, 04/19/2024     FINDINGS: No filling defects are seen within the pulmonary arterial  system to the level of the subsegmental pulmonary arteries. Reidentified  is extensive consolidation within the left upper lobe with air  bronchograms. The region of consolidation appears unchanged. Severe  chronic interstitial fibrotic change in conjunction with background  changes of emphysema are reidentified and appear stable.     In the posteromedial aspect of the right upper lobe there is a 2.0 x 1.5  cm area of pleural-based consolidation with parenchymal distortion that  previously measured 1.6 x 1.1 cm. In the right lower lobe posteriorly  there is an area of consolidation that measures 4.7 x 1.7 x 1.2 cm that  previously measured 4.3 x 1.7 x 0.7 cm. In the posterior aspect of the  right lower lobe there is a pleural-based nodule that measures 0.7 cm  that is not seen on the prior examination.     There has been interval development of consolidation that abuts the  mediastinum in the left upper lobe that appears to partially invade the  mediastinum with loss of a fatty soft tissue plane between the area of  consolidation and the pericardium. There is a pericardial effusion that  measures up to 1.1 cm in thickness compared to 0.7 cm previously. Soft  tissue posterior to the left mainstem bronchus and anterior to the  descending thoracic aorta measures 3.2 x 1.2 cm compared to prior  measurements of 2.4 x 0.8 cm previously. The left mainstem bronchus is  also narrowed to a cross-sectional diameter of 0.4 cm compared to 0.8 cm  on the prior examination.     Moderate multilevel abutting endplate osteophytic change is seen  throughout the thoracic spine. No suspicious osseous lesions are  visualized. The visualized soft tissue structures of the upper abdomen  appear normal. Stable moderate atheromatous calcification within the  descending thoracic aorta with severe atheromatous calcification in the  aortic  arch is noted.       Impression:      1. There is no evidence for pulmonary embolism.     2. There has been interval progression of areas of pulmonary  consolidation in the anterior aspect of the left upper lobe as well as  in the right lower lobe. This is seen in conjunction with enlargement of  an area of soft tissue opacification posterior to the left mainstem  bronchus with narrowing of the left mainstem bronchus as described.  Additionally, there is a slightly larger small pericardial effusion with  loss of a fatty plane between the pericardium and the consolidation in  the left upper lobe. This is concerning for localized mediastinal  invasion and progression of malignancy.        Radiation dose reduction techniques were utilized, including automated  exposure control and exposure modulation based on body size.        This report was finalized on 5/16/2024 7:27 PM by Dr. Vern Zargaoza M.D on Workstation: [id number]               Results for orders placed during the hospital encounter of 03/14/24    Adult Transthoracic Echo Limited W/ Cont if Necessary Per Protocol    Interpretation Summary    Left ventricular systolic function is mildly decreased. Calculated left ventricular EF = 48% Left ventricular ejection fraction appears to be 46 - 50%. Abnormal global longitudinal LV strain (GLS) = -16.8%. Left ventricle strain data was reviewed by the physician and found to be accurate. Normal left ventricular wall thickness noted. The left ventricular cavity is mild to moderately dilated. There is left ventricular global hypokinesis noted. Left ventricular diastolic function was indeterminate.    There is moderate calcification of the aortic valve. The aortic valve appears trileaflet. Mild aortic valve regurgitation is present.    Moderate mitral annular calcification is present. Trace mitral valve regurgitation is present. No significant mitral valve stenosis is present.    Trace tricuspid valve regurgitation is  present. Estimated right ventricular systolic pressure from tricuspid regurgitation is normal (<35 mmHg). Calculated right ventricular systolic pressure from tricuspid regurgitation is 33.1 mmHg.    To prior study of 11/14/2023 left ventricular ejection fraction and strain have improved though not back to baseline study 8/23/2023      ECG 12 Lead Dyspnea   Final Result   HEART RATE= 88  bpm   RR Interval= 682  ms   NJ Interval= 121  ms   P Horizontal Axis= 41  deg   P Front Axis= 62  deg   QRSD Interval= 97  ms   QT Interval= 327  ms   QTcB= 396  ms   QRS Axis= 12  deg   T Wave Axis= 119  deg   - ABNORMAL ECG -   Sinus rhythm - rate has slowed   Ventricular premature complex   Nonspecific repol abnormality, diffuse leads   Non-Specific STT wave changes   Electronically Signed By: Ancelmo Rodriguez ([facility]) 16-May-2024 16:29:17   Date and Time of Study: 2024-05-16 15:56:42      Telemetry Scan   Final Result                 Assessment/Plan     Active Hospital Problems    Diagnosis  POA    **Dyspnea [R06.00]  Yes    Acute on chronic HFrEF (heart failure with reduced ejection fraction) [I50.23]  Yes    Paroxysmal atrial fibrillation [I48.0]  Yes    Lung cancer [C34.90]  Yes    Chronic obstructive pulmonary disease [J44.9]  Yes    Hyperlipidemia [E78.5]  Yes    Essential hypertension [I10]  Yes      Resolved Hospital Problems   No resolved problems to display.       Lung adenocarcinoma   CT scan concern for worsening malignancy  Pulmonology and oncology consult.    Acute on chronic systolic heart failure  Coronary artery disease  Atrial fibrillation  Troponin elevation  Has been noted that this was an issue that began secondary to chemotherapy  Echocardiogram in March 2024 showed an EF of 48% with an indeterminate diastolic function.  Will ask cardiology to see in consultation  Continue home Entresto, metoprolol, atorvastatin, Eliquis  Status post Lasix 80 mg IV x 1.  Monitor urine output and reassess further need for diuresis  the morning. She doesn't really look fluid overloaded.     COPD  Acute on chronic hypoxic respiratory failure  Continue home inhalers    LUE swelling  Check venous duplex       I discussed the patient's findings and my recommendations with patient and ED provider.    VTE Prophylaxis - Eliquis (home med).  Code Status - Full code.       Torey Rodriguez MD  [facility]  05/16/24  22:28 EDT

## 2024-05-16 NOTE — ED NOTES
"Pt to ED, sent from dr harris's office for \"possible lung infection\". Pt reports began having pain below bilat posterior shoulder blades, increased sob, mild nasal congestion, dry cough.     Pt has hx of chf and lung CA. Sees dr moody, has not done chemo since December. Pt on 34L nc at baseline   "

## 2024-05-16 NOTE — ED PROVIDER NOTES
EMERGENCY DEPARTMENT ENCOUNTER  Room Number:  17/17  PCP: Kehrer, Meredith Lea, MD  Independent Historians: Patient      HPI:  Chief Complaint: had concerns including Shortness of Breath.     A complete HPI/ROS/PMH/PSH/SH/FH are unobtainable due to: Nothing      Context: Darlene Pfeiffer is a 83 y.o. female with a medical history of CHF, paroxysmal A-fib, lung cancer, COPD, chronic hypoxic respiratory failure who presents to the ED c/o acute shortness of breath.  She saw her cardiologist today regarding the symptoms.  Patient states that she had lab work obtained that suggested possible infection and she was sent to the ER for further evaluation.  She denies chest pain.  She denies recent fever.  She has had no chills.  She states that she fell yesterday and bumped her arm in her bathroom and it has been swollen since.  She denies head trauma or loss of consciousness.  She typically wears 3 L nasal cannula oxygen at home.      Review of prior external notes (non-ED) -and- Review of prior external test results outside of this encounter: See ED course below        PAST MEDICAL HISTORY  Active Ambulatory Problems     Diagnosis Date Noted    Essential hypertension 11/20/2019    Hyperlipidemia 11/20/2019    Esophageal reflux 11/20/2019    Chronic obstructive pulmonary disease 03/11/2020    Seasonal allergic rhinitis due to pollen 03/11/2020    Abnormal glucose 05/21/2020    Clinical diagnosis of severe acute respiratory syndrome coronavirus 2 (SARS-CoV-2) disease 12/21/2020    Lung cancer     Cerebral aneurysm, nonruptured 06/23/2022    Cerebral aneurysm without rupture 06/29/2022    Shortness of breath 10/14/2022    Hypokalemia 10/15/2022    History of lung cancer     Long-term use of high-risk medication 11/16/2022    Intractable back pain 11/29/2022    Hypokalemia 11/30/2022    Fitting and adjustment of vascular catheter 12/13/2022    Weakness 07/25/2023    Cystitis 07/26/2023    Fever 07/26/2023    Acute respiratory  failure with hypoxia 11/14/2023    Respiratory failure 11/14/2023    Systolic CHF, acute 11/14/2023    Acute HFrEF (heart failure with reduced ejection fraction) 11/16/2023    Severe malnutrition 11/16/2023    Acute hypoxemic respiratory failure 12/28/2023    Chronic systolic CHF (congestive heart failure) 12/28/2023    Paroxysmal atrial fibrillation 12/28/2023    COVID-19 virus infection 12/28/2023    Cytokine release syndrome, grade 2 01/02/2024    Acute on chronic HFrEF (heart failure with reduced ejection fraction) 01/02/2024    Bilateral pneumonia 01/18/2024    Acute hypoxic respiratory failure 01/18/2024     Resolved Ambulatory Problems     Diagnosis Date Noted    Pneumonia, unspecified organism 01/21/2024     Past Medical History:   Diagnosis Date    Allergic rhinitis     Aneurysm     Asthma     Cancer of floor of mouth 2014    Cerebral aneurysm     COPD (chronic obstructive pulmonary disease)     COVID 2020    History of adenocarcinoma of lung     History of anemia     History of carcinoma in situ of skin     History of oral hairy leukoplakia     History of squamous cell carcinoma     Hypertension     Low vitamin B12 level     Thyromegaly     UTI (urinary tract infection)     Vitamin D deficiency          PAST SURGICAL HISTORY  Past Surgical History:   Procedure Laterality Date    BRONCHOSCOPY Bilateral 10/17/2022    Procedure: BRONCHOSCOPY WITH FLUORO with biopsy and BAL;  Surgeon: India Flores MD;  Location: [facility] ENDOSCOPY;  Service: Pulmonary;  Laterality: Bilateral;  PRE/POST - lung mass    CHOLECYSTECTOMY  1999    COLONOSCOPY      EMBOLIZATION CEREBRAL Left 06/29/2022    Procedure: 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;  Surgeon: Bradley Dowell MD;  Location: [facility] HYBRID OR 18/19;  Service: Interventional Radiology;  Laterality: Left;    EYE SURGERY  11/24/2020    Took a muscle out of right eye    GLOSSECTOMY PARTIAL      less than one half tongue    LUNG SURGERY       ORAL LESION EXCISION/BIOPSY       and 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    TUBAL ABDOMINAL LIGATION  1970    VENOUS ACCESS DEVICE (PORT) INSERTION N/A 2022    Procedure: MEDIPORT PLACEMENT;  Surgeon: Jason Villaseñor MD;  Location: [facility] OR;  Service: Vascular;  Laterality: N/A;         FAMILY HISTORY  Family History   Problem Relation Age of Onset    Ovarian cancer Mother          at age 27    No Known Problems Father     Ovarian cancer Sister     Colon cancer Brother     No Known Problems Other     Malig Hyperthermia Neg Hx          SOCIAL HISTORY  Social History     Socioeconomic History    Marital status:    Tobacco Use    Smoking status: Former     Current packs/day: 0.00     Types: Cigarettes     Quit date: 2015     Years since quittin.2     Passive exposure: Never    Smokeless tobacco: Never    Tobacco comments:     less than a pack per day, no smoking since , Quit 2015   Vaping Use    Vaping status: Never Used   Substance and Sexual Activity    Alcohol use: Not Currently     Comment: Socially -A few times a month    Drug use: No    Sexual activity: Defer         ALLERGIES  Sulfa antibiotics      REVIEW OF SYSTEMS  Review of all 14 systems is negative other than stated in the HPI above.      PHYSICAL EXAM    I have reviewed the triage vital signs and nursing notes.    ED Triage Vitals   Temp Heart Rate Resp BP SpO2   24 1514 24 1514 24 1514 24 1519 24 1514   98.5 °F (36.9 °C) 87 18 102/58 92 %      Temp src Heart Rate Source Patient Position BP Location FiO2 (%)   24 1514 24 1514 -- -- --   Tympanic Monitor            GENERAL: awake and alert, chronically ill-appearing, no acute distress  HENT: Normocephalic, atraumatic  EYES: no scleral icterus, EOMI, pupils 3 mm bilaterally  CV: regular rhythm, regular rate, no significant edema bilateral lower extremities.  There is pitting edema present in the left upper  extremity particularly on the medial aspect of the left elbow without associated erythema or warmth.  RESPIRATORY: normal effort, mild rhonchi present throughout, no active wheezing, patient currently wearing 3 L nasal cannula oxygen  ABDOMEN: soft, nontender throughout  MUSCULOSKELETAL: no deformity, no calf tenderness bilateral lower extremities  NEURO: alert, moves all extremities, follows commands  PSYCH: calm, cooperative  SKIN: Warm, dry          LAB RESULTS  Recent Results (from the past 24 hour(s))   proBNP    Collection Time: 05/16/24 10:06 AM    Specimen: Blood   Result Value Ref Range    proBNP 3,983.0 (H) 0.0 - 1,800.0 pg/mL   Comprehensive Metabolic Panel    Collection Time: 05/16/24 10:06 AM    Specimen: Blood   Result Value Ref Range    Glucose 216 (H) 65 - 99 mg/dL    BUN 40 (H) 8 - 23 mg/dL    Creatinine 0.78 0.57 - 1.00 mg/dL    Sodium 136 136 - 145 mmol/L    Potassium 4.5 3.5 - 5.2 mmol/L    Chloride 93 (L) 98 - 107 mmol/L    CO2 35.2 (H) 22.0 - 29.0 mmol/L    Calcium 9.3 8.6 - 10.5 mg/dL    Total Protein 5.6 (L) 6.0 - 8.5 g/dL    Albumin 3.2 (L) 3.5 - 5.2 g/dL    ALT (SGPT) 15 1 - 33 U/L    AST (SGOT) 16 1 - 32 U/L    Alkaline Phosphatase 105 39 - 117 U/L    Total Bilirubin 0.4 0.0 - 1.2 mg/dL    Globulin 2.4 gm/dL    A/G Ratio 1.3 g/dL    BUN/Creatinine Ratio 51.3 (H) 7.0 - 25.0    Anion Gap 7.8 5.0 - 15.0 mmol/L    eGFR 76.4 >60.0 mL/min/1.73   CBC Auto Differential    Collection Time: 05/16/24 10:06 AM    Specimen: Blood   Result Value Ref Range    WBC 14.49 (H) 3.40 - 10.80 10*3/mm3    RBC 4.08 3.77 - 5.28 10*6/mm3    Hemoglobin 12.7 12.0 - 15.9 g/dL    Hematocrit 38.7 34.0 - 46.6 %    MCV 94.9 79.0 - 97.0 fL    MCH 31.1 26.6 - 33.0 pg    MCHC 32.8 31.5 - 35.7 g/dL    RDW 13.8 12.3 - 15.4 %    RDW-SD 47.7 37.0 - 54.0 fl    MPV 10.2 6.0 - 12.0 fL    Platelets 267 140 - 450 10*3/mm3    Neutrophil % 92.7 (H) 42.7 - 76.0 %    Lymphocyte % 3.0 (L) 19.6 - 45.3 %    Monocyte % 3.5 (L) 5.0 - 12.0 %     Eosinophil % 0.0 (L) 0.3 - 6.2 %    Basophil % 0.1 0.0 - 1.5 %    Immature Grans % 0.7 (H) 0.0 - 0.5 %    Neutrophils, Absolute 13.44 (H) 1.70 - 7.00 10*3/mm3    Lymphocytes, Absolute 0.44 (L) 0.70 - 3.10 10*3/mm3    Monocytes, Absolute 0.50 0.10 - 0.90 10*3/mm3    Eosinophils, Absolute 0.00 0.00 - 0.40 10*3/mm3    Basophils, Absolute 0.01 0.00 - 0.20 10*3/mm3    Immature Grans, Absolute 0.10 (H) 0.00 - 0.05 10*3/mm3    nRBC 0.0 0.0 - 0.2 /100 WBC   High Sensitivity Troponin T    Collection Time: 05/16/24  3:52 PM    Specimen: Arm, Right; Blood   Result Value Ref Range    HS Troponin T 98 (C) <14 ng/L   ECG 12 Lead Dyspnea    Collection Time: 05/16/24  3:56 PM   Result Value Ref Range    QT Interval 327 ms    QTC Interval 396 ms   High Sensitivity Troponin T 2Hr    Collection Time: 05/16/24  6:03 PM    Specimen: Arm, Right; Blood   Result Value Ref Range    HS Troponin T 106 (C) <14 ng/L    Troponin T Delta 8 (C) >=-4 - <+4 ng/L       The above labs were ordered by me and independently reviewed by me.     RADIOLOGY  CT Angiogram Chest Pulmonary Embolism    Result Date: 5/16/2024  EXAMINATION: CT ANGIOGRAM OF THE CHEST WITH CONTRAST  HISTORY: 83-year-old female with history of lung carcinoma, CHF and worsening shortness of air undergoing evaluation for possible pulmonary embolism  TECHNIQUE: Contiguous axial images were obtained through the chest following bolus intravenous administration of nonionic contrast. Three-D reformatted images were produced and presented for interpretation.  COMPARISON: CT of the chest with contrast, 04/19/2024  FINDINGS: No filling defects are seen within the pulmonary arterial system to the level of the subsegmental pulmonary arteries. Reidentified is extensive consolidation within the left upper lobe with air bronchograms. The region of consolidation appears unchanged. Severe chronic interstitial fibrotic change in conjunction with background changes of emphysema are reidentified and  appear stable.  In the posteromedial aspect of the right upper lobe there is a 2.0 x 1.5 cm area of pleural-based consolidation with parenchymal distortion that previously measured 1.6 x 1.1 cm. In the right lower lobe posteriorly there is an area of consolidation that measures 4.7 x 1.7 x 1.2 cm that previously measured 4.3 x 1.7 x 0.7 cm. In the posterior aspect of the right lower lobe there is a pleural-based nodule that measures 0.7 cm that is not seen on the prior examination.  There has been interval development of consolidation that abuts the mediastinum in the left upper lobe that appears to partially invade the mediastinum with loss of a fatty soft tissue plane between the area of consolidation and the pericardium. There is a pericardial effusion that measures up to 1.1 cm in thickness compared to 0.7 cm previously. Soft tissue posterior to the left mainstem bronchus and anterior to the descending thoracic aorta measures 3.2 x 1.2 cm compared to prior measurements of 2.4 x 0.8 cm previously. The left mainstem bronchus is also narrowed to a cross-sectional diameter of 0.4 cm compared to 0.8 cm on the prior examination.  Moderate multilevel abutting endplate osteophytic change is seen throughout the thoracic spine. No suspicious osseous lesions are visualized. The visualized soft tissue structures of the upper abdomen appear normal. Stable moderate atheromatous calcification within the descending thoracic aorta with severe atheromatous calcification in the aortic arch is noted.      1. There is no evidence for pulmonary embolism.  2. There has been interval progression of areas of pulmonary consolidation in the anterior aspect of the left upper lobe as well as in the right lower lobe. This is seen in conjunction with enlargement of an area of soft tissue opacification posterior to the left mainstem bronchus with narrowing of the left mainstem bronchus as described. Additionally, there is a slightly larger  small pericardial effusion with loss of a fatty plane between the pericardium and the consolidation in the left upper lobe. This is concerning for localized mediastinal invasion and progression of malignancy.   Radiation dose reduction techniques were utilized, including automated exposure control and exposure modulation based on body size.        The above radiology studies were ordered by me.  See ED course for independent interpretations.     MEDICATIONS GIVEN IN ER  Medications   furosemide (LASIX) injection 80 mg (has no administration in time range)   acetaminophen (TYLENOL) tablet 1,000 mg (500 mg Oral Given 5/16/24 9843)   iopamidol (ISOVUE-370) 76 % injection 100 mL (85 mL Intravenous Given by Other 5/16/24 7975)         ORDERS PLACED DURING THIS VISIT:  Orders Placed This Encounter   Procedures    CT Angiogram Chest Pulmonary Embolism    High Sensitivity Troponin T    High Sensitivity Troponin T 2Hr    [facility] (on-call MD unless specified) Details    ECG 12 Lead Dyspnea    Telemetry Scan    Initiate Observation Status         OUTPATIENT MEDICATION MANAGEMENT:  Current Facility-Administered Medications Ordered in Epic   Medication Dose Route Frequency Provider Last Rate Last Admin    furosemide (LASIX) injection 80 mg  80 mg Intravenous Once Harjit Apple MD         Current Outpatient Medications Ordered in Epic   Medication Sig Dispense Refill    albuterol (PROVENTIL) (2.5 MG/3ML) 0.083% nebulizer solution Inhale the contents of 1 vial by nebulization Every 4 (Four) Hours As Needed for Wheezing. 90 mL 0    apixaban (ELIQUIS) 2.5 MG tablet tablet Take 1 tablet by mouth 2 (Two) Times a Day. 60 tablet 3    arformoterol (BROVANA) 15 MCG/2ML nebulizer solution USE 2 ML VIA NEBULIZER TWICE DAILY      atorvastatin (LIPITOR) 20 MG tablet Take 1 tablet by mouth Every Night. Indications: High Amount of Fats in the Blood 90 tablet 1    budesonide (PULMICORT) 0.5 MG/2ML nebulizer solution INHALE 2 MLS VIA  NEBULIZER EVERY 12 HOURS      budesonide 0.5 MG/2ML suspension 0.5 mg, arformoterol 15 MCG/2ML nebulizer solution 15 mcg Inhale 1 nebule 2 (Two) Times a Day.      cetirizine (zyrTEC) 10 MG tablet Take 1 tablet by mouth Daily. Indications: Perennial Allergic Rhinitis      Cholecalciferol (Vitamin D3) 50 MCG (2000 UT) tablet Take 1,000 Units by mouth Daily. Indications: Vitamin D Deficiency      Cyanocobalamin (VITAMIN B12 PO) Take 1,000 mcg by mouth Daily. Indications: vitamin b12 deficiency       dronabinol (Marinol) 2.5 MG capsule Take 1 capsule by mouth 2 (Two) Times a Day Before Meals. 60 capsule 0    empagliflozin (Jardiance) 10 MG tablet tablet Take 1 tablet by mouth Daily. 30 tablet 5    folic acid (FOLVITE) 1 MG tablet Take 1 tablet by mouth Daily. Start at least 7 days prior to chemotherapy until at least 3 weeks after all chemotherapy. 30 tablet 6    furosemide (LASIX) 40 MG tablet Take 1.5 tablets by mouth Daily. Indications: Cardiac Failure, High Blood Pressure Disorder 45 tablet 2    ipratropium (ATROVENT) 0.06 % nasal spray 2 sprays into the nostril(s) as directed by provider 4 (Four) Times a Day. 15 mL 0    ipratropium-albuterol (DUO-NEB) 0.5-2.5 mg/3 ml nebulizer Inhale the contents of 1 vial by nebulization 2 (Two) Times a Day As Needed for Wheezing. 180 mL 0    metoprolol succinate XL (TOPROL-XL) 25 MG 24 hr tablet Take 1 whole tablet in a.m. and half tablet in p.m.  Indications: Cardiac Failure 45 tablet 3    mirtazapine (REMERON) 7.5 MG tablet TAKE 1 TABLET BY MOUTH EVERY NIGHT AT BEDTIME 30 tablet 1    montelukast (SINGULAIR) 10 MG tablet Take 1 tablet by mouth Every Night. Indications: Hayfever, Perennial Allergic Rhinitis 90 tablet 3    O2 (OXYGEN) Inhale 3 L/min Continuous.      pantoprazole (PROTONIX) 40 MG EC tablet TAKE 1 TABLET BY MOUTH EVERY MORNING 30 tablet 2    potassium chloride (K-DUR,KLOR-CON) 10 MEQ CR tablet Take 2 tablets by mouth Daily. 1 tablet in the PM  Indications: Low  Amount of Potassium in the Blood 90 tablet 2    predniSONE (DELTASONE) 10 MG tablet TAKE 1 TABLET BY MOUTH EVERY DAY 30 tablet 1    predniSONE (DELTASONE) 20 MG tablet TAKE 2 TABLETS BY MOUTH DAILY. DO NOT. STOP ABRUPTLY      sacubitril-valsartan (Entresto) 24-26 MG tablet Take 0.5 tablets by mouth 2 (Two) Times a Day. Indications: Cardiac Failure 28 tablet 0    zolpidem (AMBIEN) 5 MG tablet TAKE 1 TABLET BY MOUTH EVERY NIGHT AT BEDTIME AS NEEDED FOR SLEEP 30 tablet 0         PROCEDURES  Procedures            PROGRESS, DATA ANALYSIS, CONSULTS, AND MEDICAL DECISION MAKING  All labs have been independently interpreted by me.  All radiology studies have been reviewed by me. All EKG's have been independently viewed and interpreted by me.  Discussion below represents my analysis of pertinent findings related to patient's condition, differential diagnosis, treatment plan and final disposition.    Differential diagnosis includes but is not limited to:  Pneumonia  PE  CHF  Acute coronary syndrome  Progression of lung cancer  Pleural effusion      Clinical Scores: HEART Score: 6                  ED Course as of 05/16/24 1909   Thu May 16, 2024   1538 Record review: I reviewed office visit with cardiology from earlier today.  Patient has had recent worsening dyspnea and cough.  She does have a history of heart failure with ejection fraction 39% in November 2023, improved to 45 to 50% in March 2024.  She does have a history of paroxysmal atrial fibrillation on metoprolol and Eliquis.  She has lung cancer with some progression on recent CT scan.  Lab work today had revealed an elevated white count and proBNP and also hyperglycemia.  She was sent to the ER due to concern for possible lung infection contributing to her increased dyspnea.  Labs obtained earlier today remarkable for white count 14.4 with 92% neutrophils,, glucose 216, creatinine 0.78, proBNP 3093. [JR]   1641 EKG          EKG time: 1556  Rhythm/Rate: Sinus rhythm,  88  P waves and WA: Normal  QRS, axis: Normal axis, single PVC  ST and T waves: No acute ischemic changes    Interpreted Contemporaneously by me, independently viewed  Similar compared to prior 1/18/2024       [JR]   1848 HS Troponin T(!!): 106 [JR]   1849 Troponin T Delta(!!): 8 [JR]   1905 Discussed with Dr. Rodriguez, [facility] hospitalist, who agrees to admit patient for further evaluation of her increased dyspnea and chest pain. [JR]   1906 CTA chest demonstrates no pulmonary embolus.  There appears to be progression of patient's lung cancer with significant narrowing of the left mainstem bronchus which may be contributing to her dyspnea.  There is also small pericardial effusion. [JR]      ED Course User Index  [JR] Harjit Apple MD             AS OF 19:09 EDT VITALS:    BP - 135/68  HR - 89  TEMP - 98.5 °F (36.9 °C) (Tympanic)  O2 SATS - 94%    COMPLEXITY OF CARE  The patient requires admission.      Chronic or social conditions impacting patient care (Social Determinants of Health):     DIAGNOSIS  Final diagnoses:   Chest pain, unspecified type   Dyspnea, unspecified type   Malignant neoplasm of lung, unspecified laterality, unspecified part of lung   Pericardial effusion           DISPOSITION  Admit      Prescription drug monitoring program review:           Please note that portions of this document were completed with a voice recognition program.    Note Disclaimer: At [facility], we believe that sharing information builds trust and better relationships. You are receiving this note because you recently visited [facility]. It is possible you will see health information before a provider has talked with you about it. This kind of information can be easy to misunderstand. To help you fully understand what it means for your health, we urge you to discuss this note with your provider.         Harjit Apple MD  05/16/24 6799

## 2024-05-16 NOTE — TELEPHONE ENCOUNTER
I cannot find any way to order that in the system.  Do they want the order to go to [facility] or to a local pharmacy?  Can you help me figure out how to order it please #

## 2024-05-16 NOTE — TELEPHONE ENCOUNTER
Caller: MAYELIN STAHL    Relationship: Emergency Contact    Best call back number: [phone]     Equipment requested: RAILS FOR THE COMMODE TO HELP WITH GETTING UP AND DOWN SAFELY    Reason for the request: SUPPORT    Prescribing Provider: DR MEREDITH KEHRER    Additional information or concerns:     [employer] IS READING ON LINE THAT THEY NEED A SCRIPT FROM THE PATIENTS PROVIDER FOR THE INSURANCE TO COVER THESE MEDICAL SUPPLIES.    PATIENT IS WANTING TO KNOW IF YOU CAN WRITE A SCRIPT FOR THEM.      PLEASE ADVISE

## 2024-05-16 NOTE — TELEPHONE ENCOUNTER
I talked to patient and family/spouse regarding blood work findings recommended to go to the emergency room for further evaluation.  Make additional recommendations after this is completed.  Have also discussed this with Dr. Fitzgerald.

## 2024-05-16 NOTE — TELEPHONE ENCOUNTER
Spoke to pts  I faxed order over to [facility] to see if they have the rails for the pt.  May have to order a beside commode

## 2024-05-16 NOTE — ED NOTES
Pt called out stating she still feels like the pill is still stuck in her chest. Pt given applesauce. After eating states it feels better. HA improved.

## 2024-05-16 NOTE — ED NOTES
"Pt states she does have hx of issues with swallowing pills, normally takes them with ensure at home. \" Feel like I got the first one down ok\". Pt positioned upright   "

## 2024-05-16 NOTE — PROGRESS NOTES
Date of Office Visit: 2024  Encounter Provider: Claire Orr MD  Place of Service: [facility] CARDIOLOGY  Patient Name: Darlene Pfeiffer  :1940    Chief complaint  Cardio oncology care, hypoxia, coronary artery disease, paroxysmal atrial fibrillation, cardiomyopathy    History of Present Illness  Patient is a 83-year-old female with a history of COPD, hypertension, hyperlipidemia, intracranial aneurysm left PCA.  Lung cancer, adenocarcinoma of the tongue, asthma and coronary artery calcification.  2015 patient underwent left upper lobectomy.  By  she was diagnosed with tongue cancer underwent resection.  No evidence of recurrence.  However by 2021 she was found to have adenocarcinoma in 2 different regions.  She completed radiation to the left chest on 2021.  By 10/2022 she was admitted with pneumonia and by 2022 had abnormal PET scan felt to be suggestive of metastatic changes left apex.  On 2022 she started Keytruda.  On 2023 however, there is evidence of progressive disease in the left upper thorax and ribs.  Therefore this was discontinued and she was to do with palliative radiation therapy 2023.  She then developed significant nausea and failure to thrive.  On 2023 echo showed normal systolic function and she was changed to dabrafenib and trametinib.  By 2023 she was admitted with heart failure and found to have developed cardiomyopathy with an ejection fraction 39.2% GLS -11.1% with global hypokinesis with aortic valve calcification with trivial valve regurgitation normal right-sided pressures.  She recalls the morning of admission that she woke up with sudden shortness of breath without chest pain.  She was started on goal-directed medical therapy for heart failure which was limited by hypotension.  Following this both chemotherapeutic agents were discontinued and she started Alimta on 2023.  When seen in the office on 2023 she  developed atrial fibrillation with rapid rates.  Toprol was increased and she was started on low-dose Eliquis.  She subsequently converted to sinus rhythm between them and her current visit.  She had a preordered coronary CT angiogram which showed an ejection fraction of 27% with global hypokinesis with evidence of multivessel coronary artery disease most significant in the with probable significant ostial RCA stenosis.  There was moderate disease in the mid LAD.  Motion and pressure limited disease 3 circumflex vessel..  Left main was felt to be free of significant obstructive disease.  Mild proximal LAD stenosis was noted moderate mid LAD stenosis was present.  After further discussion with Dr. Kothari as her 1 year prognosis was suboptimal medical therapy was pursued.  Fortunately clinically she had improved.  However on 12/28/2023 she was admitted with hypoxia and COVID-pneumonia.  Possibly with superimposed bacterial pneumonia.  She was treated with remdesivir cefepime and Decadron with slow improvement.  However she was admitted on 1/18/2024 and discharged on 1/21/2024 with acute respiratory failure and rhinovirus infection with exacerbation of heart failure treated medically.  She was dismissed home on O2 at 4 L.  She continued medical therapy by 3/2024 ejection fraction was 45 to 50%, GLS also improved to -16.8% with indeterminate diastolic function RVSP was 33 mmHg.  CT scan demonstrated progression of disease and at the last visit on 4/26/2024 patient was to consider possible treatment with Alimta    Patient has had intermittent palpitations.  She has believes her dyspnea is worse and she has had a cough with yellow sputum production intermittently.  She has had no edema.  She complains of worsening dizziness and weakness.  She has had no chest pain.  She complained of nausea and vomiting 2 days ago.  She is in sinus rhythm.  Blood pressure has been controlled as it is today.      Past Medical History:    Diagnosis Date    Allergic rhinitis     Aneurysm     Asthma     Cancer of floor of mouth 2014    Cerebral aneurysm     COPD (chronic obstructive pulmonary disease)     COVID 2020    Esophageal reflux     History of adenocarcinoma of lung     History of anemia     History of carcinoma in situ of skin     History of lung cancer     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 from [facility]    History of squamous cell carcinoma     Tongue    Hyperlipidemia     Hypertension     since early 60s    Intractable back pain 11/29/2022    Low vitamin B12 level     Lung cancer     Systolic CHF, acute 11/14/2023    Thyromegaly     UTI (urinary tract infection)     Vitamin D deficiency      Past Surgical History:   Procedure Laterality Date    BRONCHOSCOPY Bilateral 10/17/2022    Procedure: BRONCHOSCOPY WITH FLUORO with biopsy and BAL;  Surgeon: India Flores MD;  [place] [facility] [place];  Service: Pulmonary;  Laterality: Bilateral;  PRE/POST - lung mass    CHOLECYSTECTOMY  1999    COLONOSCOPY      EMBOLIZATION CEREBRAL Left 06/29/2022    Procedure: 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;  Surgeon: Bradley Dowell MD;  [place] [facility] [place] 18/19;  Service: Interventional Radiology;  Laterality: Left;    EYE SURGERY  11/24/2020    Took a muscle out of right eye    GLOSSECTOMY PARTIAL      less than one half tongue    LUNG SURGERY  2012    ORAL LESION EXCISION/BIOPSY      June and August 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    TUBAL ABDOMINAL LIGATION  1970    VENOUS ACCESS DEVICE (PORT) INSERTION N/A 12/12/2022    Procedure: MEDIPORT PLACEMENT;  Surgeon: Jason Villaseñor MD;  [place] [facility] [place];  Service: Vascular;  Laterality: N/A;     Outpatient Medications Prior to Visit   Medication Sig Dispense Refill    albuterol (PROVENTIL) (2.5 MG/3ML) 0.083% nebulizer solution Inhale the contents of 1 vial by nebulization Every 4 (Four) Hours As Needed for  Wheezing. 90 mL 0    apixaban (ELIQUIS) 2.5 MG tablet tablet Take 1 tablet by mouth 2 (Two) Times a Day. 60 tablet 3    arformoterol (BROVANA) 15 MCG/2ML nebulizer solution USE 2 ML VIA NEBULIZER TWICE DAILY      atorvastatin (LIPITOR) 20 MG tablet Take 1 tablet by mouth Every Night. Indications: High Amount of Fats in the Blood 90 tablet 1    budesonide (PULMICORT) 0.5 MG/2ML nebulizer solution INHALE 2 MLS VIA NEBULIZER EVERY 12 HOURS      budesonide 0.5 MG/2ML suspension 0.5 mg, arformoterol 15 MCG/2ML nebulizer solution 15 mcg Inhale 1 nebule 2 (Two) Times a Day.      cetirizine (zyrTEC) 10 MG tablet Take 1 tablet by mouth Daily. Indications: Perennial Allergic Rhinitis      Cholecalciferol (Vitamin D3) 50 MCG (2000 UT) tablet Take 1,000 Units by mouth Daily. Indications: Vitamin D Deficiency      Cyanocobalamin (VITAMIN B12 PO) Take 1,000 mcg by mouth Daily. Indications: vitamin b12 deficiency       dronabinol (Marinol) 2.5 MG capsule Take 1 capsule by mouth 2 (Two) Times a Day Before Meals. 60 capsule 0    empagliflozin (Jardiance) 10 MG tablet tablet Take 1 tablet by mouth Daily. 30 tablet 5    folic acid (FOLVITE) 1 MG tablet Take 1 tablet by mouth Daily. Start at least 7 days prior to chemotherapy until at least 3 weeks after all chemotherapy. 30 tablet 6    furosemide (LASIX) 40 MG tablet Take 1.5 tablets by mouth Daily. Indications: Cardiac Failure, High Blood Pressure Disorder 45 tablet 2    ipratropium (ATROVENT) 0.06 % nasal spray 2 sprays into the nostril(s) as directed by provider 4 (Four) Times a Day. 15 mL 0    ipratropium-albuterol (DUO-NEB) 0.5-2.5 mg/3 ml nebulizer Inhale the contents of 1 vial by nebulization 2 (Two) Times a Day As Needed for Wheezing. 180 mL 0    metoprolol succinate XL (TOPROL-XL) 25 MG 24 hr tablet Take 1 whole tablet in a.m. and half tablet in p.m.  Indications: Cardiac Failure 45 tablet 3    mirtazapine (REMERON) 7.5 MG tablet TAKE 1 TABLET BY MOUTH EVERY NIGHT AT BEDTIME  30 tablet 1    montelukast (SINGULAIR) 10 MG tablet Take 1 tablet by mouth Every Night. Indications: Hayfever, Perennial Allergic Rhinitis 90 tablet 3    O2 (OXYGEN) Inhale 2 L/min Continuous.      pantoprazole (PROTONIX) 40 MG EC tablet TAKE 1 TABLET BY MOUTH EVERY MORNING 30 tablet 2    potassium chloride (K-DUR,KLOR-CON) 10 MEQ CR tablet Take 2 tablets by mouth Daily. 1 tablet in the PM  Indications: Low Amount of Potassium in the Blood 90 tablet 2    predniSONE (DELTASONE) 10 MG tablet TAKE 1 TABLET BY MOUTH EVERY DAY 30 tablet 1    predniSONE (DELTASONE) 20 MG tablet TAKE 2 TABLETS BY MOUTH DAILY. DO NOT. STOP ABRUPTLY      sacubitril-valsartan (Entresto) 24-26 MG tablet Take 0.5 tablets by mouth 2 (Two) Times a Day. Indications: Cardiac Failure 28 tablet 0    zolpidem (AMBIEN) 5 MG tablet TAKE 1 TABLET BY MOUTH EVERY NIGHT AT BEDTIME AS NEEDED FOR SLEEP 30 tablet 0     No facility-administered medications prior to visit.       Allergies as of 2024 - Reviewed 2024   Allergen Reaction Noted    Sulfa antibiotics Rash 10/13/2016     Social History     Socioeconomic History    Marital status:    Tobacco Use    Smoking status: Former     Current packs/day: 0.00     Types: Cigarettes     Quit date: 2015     Years since quittin.2     Passive exposure: Never    Smokeless tobacco: Never    Tobacco comments:     less than a pack per day, no smoking since , Quit 2015   Vaping Use    Vaping status: Never Used   Substance and Sexual Activity    Alcohol use: Not Currently     Comment: Socially -A few times a month    Drug use: No    Sexual activity: Defer     Family History   Problem Relation Age of Onset    Ovarian cancer Mother          at age 27    No Known Problems Father     Ovarian cancer Sister     Colon cancer Brother     No Known Problems Other     Malig Hyperthermia Neg Hx      Review of Systems   Constitutional: Positive for weight loss. Negative for chills, fever and  "weight gain.   Cardiovascular:  Negative for leg swelling.   Respiratory:  Positive for cough. Negative for snoring and wheezing.    Hematologic/Lymphatic: Negative for bleeding problem. Does not bruise/bleed easily.   Skin:  Negative for color change.   Musculoskeletal:  Positive for joint pain and myalgias. Negative for falls.   Gastrointestinal:  Negative for melena.   Genitourinary:  Negative for hematuria.   Neurological:  Positive for excessive daytime sleepiness.   Psychiatric/Behavioral:  Negative for depression. The patient is not nervous/anxious.         Objective:     Vitals:    05/16/24 0842   BP: 122/74   Pulse: 96   Weight: 43.5 kg (96 lb)   Height: 154.9 cm (61\")     Body mass index is 18.14 kg/m².    Vitals reviewed.   Constitutional:       Appearance: Well-developed.   Eyes:      General: No scleral icterus.        Right eye: No discharge.      Conjunctiva/sclera: Conjunctivae normal.      Pupils: Pupils are equal, round, and reactive to light.   HENT:      Head: Normocephalic.      Nose: Nose normal.   Neck:      Thyroid: No thyromegaly.      Vascular: No JVD.   Pulmonary:      Effort: Pulmonary effort is normal. No respiratory distress.      Breath sounds: Normal breath sounds. No wheezing. No rales.      Comments: Dullness left base.  Cardiovascular:      Normal rate. Regular rhythm. Normal S1. Normal S2.       Murmurs: There is no murmur.      No gallop.    Pulses:     Intact distal pulses.      Carotid: 2+ bilaterally.     Radial: 2+ bilaterally.     Femoral: 2+ bilaterally.     Popliteal: 2+ bilaterally.     Dorsalis pedis: 2+ bilaterally.     Posterior tibial: 2+ bilaterally.  Edema:     Peripheral edema absent.   Abdominal:      General: Bowel sounds are normal. There is no distension.      Palpations: Abdomen is soft.      Tenderness: There is no abdominal tenderness. There is no rebound.   Musculoskeletal: Normal range of motion.         General: No tenderness.      Cervical back: Normal " range of motion and neck supple. Skin:     General: Skin is warm and dry.      Findings: No erythema or rash.   Neurological:      Mental Status: Alert and oriented to person, place, and time.   Psychiatric:         Behavior: Behavior normal.         Thought Content: Thought content normal.         Judgment: Judgment normal.       Lab Review:   Lab Results - Last 18 Months   Lab Units 05/16/24  1006 04/26/24  1136 01/25/23  0825 01/09/23  0836   WBC 10*3/mm3 14.49* 12.57*   < > 7.44   RBC 10*6/mm3 4.08 4.14   < > 4.64   HEMOGLOBIN g/dL 12.7 13.0   < > 13.3   HEMATOCRIT % 38.7 41.6   < > 40.6   MCV fL 94.9 100.5*   < > 87.5   MCH pg 31.1 31.4   < > 28.7   MCHC g/dL 32.8 31.3*   < > 32.8   RDW % 13.8 15.5*   < > 13.1   PLATELETS 10*3/mm3 267 217   < > 287   NEUTROPHIL % % 92.7* 93.0*   < > 54.4   LYMPHOCYTE % % 3.0* 4.5*   < > 37.4   MONOCYTES % % 3.5* 1.8*   < > 7.4   EOSINOPHIL % % 0.0* 0.0*   < > 0.0*   BASOPHIL % % 0.1 0.2   < > 0.7   NEUTROS ABS 10*3/mm3 13.44* 11.70*   < > 4.05   LYMPHS ABS 10*3/mm3 0.44* 0.57*   < > 2.78   MONOS ABS 10*3/mm3 0.50 0.22   < > 0.55   EOS ABS 10*3/mm3 0.00 0.00   < > 0.00   BASOS ABS 10*3/mm3 0.01 0.02   < > 0.05   IMM GRAN % %  --   --   --  0.1   IMMATURE GRANS (ABS) 10*3/mm3  --   --   --  0.01   RDW-SD fl 47.7 58.0*   < >  --    MPV fL 10.2 9.4   < >  --     < > = values in this interval not displayed.       Lab Results - Last 18 Months   Lab Units 05/16/24  1006 04/26/24  1136   GLUCOSE mg/dL 216* 171*   BUN mg/dL 40* 29*   CREATININE mg/dL 0.78 0.62   SODIUM mmol/L 136 145   POTASSIUM mmol/L 4.5 4.4   CHLORIDE mmol/L 93* 99   CO2 mmol/L 35.2* 34.5*   CALCIUM mg/dL 9.3 9.2   TOTAL PROTEIN g/dL 5.6* 5.8*   ALBUMIN g/dL 3.2* 3.3*   ALT (SGPT) U/L 15 8   AST (SGOT) U/L 16 19   ALK PHOS U/L 105 64   BILIRUBIN mg/dL 0.4 0.4   GLOBULIN gm/dL 2.4 2.5   A/G RATIO g/dL 1.3 1.3   BUN / CREAT RATIO  51.3* 46.8*   ANION GAP mmol/L 7.8 11.5   EGFR mL/min/1.73 76.4 89.6     Lab Results -  Last 18 Months   Lab Units 10/02/23  0808 01/09/23  0836   TRIGLYCERIDES mg/dL 97 135   HDL CHOL mg/dL 73* 46   LDL CHOL mg/dL 90 133*   VLDL CHOLESTEROL ROBERT mg/dL 17 24     Lab Results - Last 18 Months   Lab Units 05/16/24  1006 04/26/24  1136   PROBNP pg/mL 3,983.0* 1,375.0     Lab Results - Last 18 Months   Lab Units 01/18/24  1707 01/18/24  1431   HSTROP T ng/L 50* 48*     Lab Results - Last 18 Months   Lab Units 11/15/23  0412 07/26/23  0622   TSH uIU/mL 1.000 2.160     Lab Results - Last 18 Months   Lab Units 11/14/23  0238   PROTIME Seconds 13.7   APTT seconds 27.7           ECG 12 Lead    Date/Time: 5/16/2024 9:02 AM  Performed by: Claire Orr MD    Authorized by: Claire Orr MD  Comparison: compared with previous ECG   Similar to previous ECG  Rhythm: sinus rhythm  Other findings: non-specific ST-T wave changes    Clinical impression: abnormal EKG            Diagnosis Plan   1. Nausea and vomiting, unspecified vomiting type  ECG 12 Lead    Comprehensive Metabolic Panel    CBC & Differential      2. COBB (dyspnea on exertion)  ECG 12 Lead    proBNP    Comprehensive Metabolic Panel    CBC & Differential      3. Malignant neoplasm of upper lobe of right lung        4. Paroxysmal atrial fibrillation        5. Chronic systolic CHF (congestive heart failure)          Plan:       1.  Recurrent worsening dyspnea and cough.  Patient does not appear overly wet though exam is compromised.  Will check proBNP CBC CMP.  2.  History of recurrent respiratory compromise with COVID and pneumonia and infection 12/28/2023  with rhinovirus and bacterial pneumonia 1/18/2024.  Care complicated by superimposed cardiomyopathy.  As above.  2.  HFrEF.  Ejection fraction 39% in 11/2023 in the setting of atrial fibrillation.  Now improved in 3/2024 to 45 to 50% with improvement and global longitudinal LV strain.  With worsening symptoms, will check proBNP.  3.  Paroxysmal atrial fibrillation.  As above.  Maintaining sinus rhythm on  metoprolol and tolerating Eliquis.   4.  Multivessel coronary artery disease.  Troponin was elevated in the setting of heart failure and infection.  However she was felt to not be a surgical candidate and angioplasty/percutaneous intervention was felt to be very high risk.  Therefore medical therapy was pursued.  5.  Lung cancer with some progression on recent CT scan.  Treatment with Alimta was discussed by oncology with the patient and family and they still remain undecided.  However she continues to be frail.  7.  History of hypertension.  Controlled  8.  History of cerebral aneurysm.  Followed by Dr. Calvin  9.  Dyslipidemia  10.  COPD/asthma    Addendum: White count elevated, proBNP also elevated along with glucose.  I have discussed these findings with Dr. Kothari and at this point best to proceed with ER evaluation and possible CT imaging of her chest for infectious etiology.  Suspect she is having worsening heart failure secondary to infectious process.  Again this can be further addressed after ER evaluation.  I contacted patient and her  and they are agreeable and will go to the emergency room.    Time Spent: I spent 45 minutes caring for Darlene on this date of service. This time includes time spent by me in the following activities: preparing for the visit, reviewing tests, obtaining and/or reviewing a separately obtained history, performing a medically appropriate examination and/or evaluation, counseling and educating the patient/family/caregiver, ordering medications, tests, or procedures, referring and communicating with other health care professionals, documenting information in the medical record, independently interpreting results and communicating that information with the patient/family/caregiver, and care coordination.   I spent 1 minutes on the separately reported service of ECG. This time is not included in the time used to support the E/M service also reported today.        Your  medication list            Accurate as of May 16, 2024  2:17 PM. If you have any questions, ask your nurse or doctor.                CONTINUE taking these medications        Instructions Last Dose Given Next Dose Due   albuterol (2.5 MG/3ML) 0.083% nebulizer solution  Commonly known as: PROVENTIL      Inhale the contents of 1 vial by nebulization Every 4 (Four) Hours As Needed for Wheezing.       apixaban 2.5 MG tablet tablet  Commonly known as: ELIQUIS      Take 1 tablet by mouth 2 (Two) Times a Day.       arformoterol 15 MCG/2ML nebulizer solution  Commonly known as: BROVANA      USE 2 ML VIA NEBULIZER TWICE DAILY       atorvastatin 20 MG tablet  Commonly known as: LIPITOR      Take 1 tablet by mouth Every Night. Indications: High Amount of Fats in the Blood       budesonide 0.5 MG/2ML nebulizer solution  Commonly known as: PULMICORT      INHALE 2 MLS VIA NEBULIZER EVERY 12 HOURS       budesonide 0.5 MG/2ML suspension 0.5 mg, arformoterol 15 MCG/2ML nebulizer solution 15 mcg      Inhale 1 nebule 2 (Two) Times a Day.       cetirizine 10 MG tablet  Commonly known as: zyrTEC      Take 1 tablet by mouth Daily. Indications: Perennial Allergic Rhinitis       dronabinol 2.5 MG capsule  Commonly known as: Marinol      Take 1 capsule by mouth 2 (Two) Times a Day Before Meals.       empagliflozin 10 MG tablet tablet  Commonly known as: Jardiance      Take 1 tablet by mouth Daily.       Entresto 24-26 MG tablet  Generic drug: sacubitril-valsartan      Take 0.5 tablets by mouth 2 (Two) Times a Day. Indications: Cardiac Failure       folic acid 1 MG tablet  Commonly known as: FOLVITE      Take 1 tablet by mouth Daily. Start at least 7 days prior to chemotherapy until at least 3 weeks after all chemotherapy.       furosemide 40 MG tablet  Commonly known as: LASIX      Take 1.5 tablets by mouth Daily. Indications: Cardiac Failure, High Blood Pressure Disorder       ipratropium 0.06 % nasal spray  Commonly known as: ATROVENT       2 sprays into the nostril(s) as directed by provider 4 (Four) Times a Day.       ipratropium-albuterol 0.5-2.5 mg/3 ml nebulizer  Commonly known as: DUO-NEB      Inhale the contents of 1 vial by nebulization 2 (Two) Times a Day As Needed for Wheezing.       metoprolol succinate XL 25 MG 24 hr tablet  Commonly known as: TOPROL-XL      Take 1 whole tablet in a.m. and half tablet in p.m.  Indications: Cardiac Failure       mirtazapine 7.5 MG tablet  Commonly known as: REMERON      TAKE 1 TABLET BY MOUTH EVERY NIGHT AT BEDTIME       montelukast 10 MG tablet  Commonly known as: SINGULAIR      Take 1 tablet by mouth Every Night. Indications: Hayfever, Perennial Allergic Rhinitis       O2  Commonly known as: OXYGEN      Inhale 2 L/min Continuous.       pantoprazole 40 MG EC tablet  Commonly known as: PROTONIX      TAKE 1 TABLET BY MOUTH EVERY MORNING       potassium chloride 10 MEQ CR tablet  Commonly known as: KLOR-CON M10      Take 2 tablets by mouth Daily. 1 tablet in the PM  Indications: Low Amount of Potassium in the Blood       predniSONE 20 MG tablet  Commonly known as: DELTASONE      TAKE 2 TABLETS BY MOUTH DAILY. DO NOT. STOP ABRUPTLY       predniSONE 10 MG tablet  Commonly known as: DELTASONE      TAKE 1 TABLET BY MOUTH EVERY DAY       VITAMIN B12 PO      Take 1,000 mcg by mouth Daily. Indications: vitamin b12 deficiency       Vitamin D3 50 MCG (2000 UT) tablet      Take 1,000 Units by mouth Daily. Indications: Vitamin D Deficiency       zolpidem 5 MG tablet  Commonly known as: AMBIEN      TAKE 1 TABLET BY MOUTH EVERY NIGHT AT BEDTIME AS NEEDED FOR SLEEP                Patient is no longer taking -.  I corrected the med list to reflect this.  I did not stop these medications.      Dictated utilizing Dragon dictation

## 2024-05-17 ENCOUNTER — APPOINTMENT (OUTPATIENT)
Dept: CARDIOLOGY | Facility: HOSPITAL | Age: 84
DRG: 291 | End: 2024-05-17
Payer: MEDICARE

## 2024-05-17 LAB
ALBUMIN SERPL-MCNC: 3 G/DL (ref 3.5–5.2)
ALBUMIN/GLOB SERPL: 1.5 G/DL
ALP SERPL-CCNC: 91 U/L (ref 39–117)
ALT SERPL W P-5'-P-CCNC: 14 U/L (ref 1–33)
ANION GAP SERPL CALCULATED.3IONS-SCNC: 11 MMOL/L (ref 5–15)
AST SERPL-CCNC: 20 U/L (ref 1–32)
B PARAPERT DNA SPEC QL NAA+PROBE: NOT DETECTED
B PERT DNA SPEC QL NAA+PROBE: NOT DETECTED
BH CV UPPER VENOUS LEFT AXILLARY AUGMENT: NORMAL
BH CV UPPER VENOUS LEFT AXILLARY COMPRESS: NORMAL
BH CV UPPER VENOUS LEFT AXILLARY PHASIC: NORMAL
BH CV UPPER VENOUS LEFT AXILLARY SPONT: NORMAL
BH CV UPPER VENOUS LEFT BASILIC FOREARM COMPRESS: NORMAL
BH CV UPPER VENOUS LEFT BASILIC UPPER COMPRESS: NORMAL
BH CV UPPER VENOUS LEFT BRACHIAL COMPRESS: NORMAL
BH CV UPPER VENOUS LEFT CEPHALIC FOREARM COMPRESS: NORMAL
BH CV UPPER VENOUS LEFT CEPHALIC UPPER COMPRESS: NORMAL
BH CV UPPER VENOUS LEFT INTERNAL JUGULAR AUGMENT: NORMAL
BH CV UPPER VENOUS LEFT INTERNAL JUGULAR COLOR: 1
BH CV UPPER VENOUS LEFT INTERNAL JUGULAR COMPRESS: NORMAL
BH CV UPPER VENOUS LEFT INTERNAL JUGULAR PHASIC: NORMAL
BH CV UPPER VENOUS LEFT INTERNAL JUGULAR SPONT: NORMAL
BH CV UPPER VENOUS LEFT RADIAL COMPRESS: NORMAL
BH CV UPPER VENOUS LEFT SUBCLAVIAN AUGMENT: NORMAL
BH CV UPPER VENOUS LEFT SUBCLAVIAN COLOR: 1
BH CV UPPER VENOUS LEFT SUBCLAVIAN COMPRESS: NORMAL
BH CV UPPER VENOUS LEFT SUBCLAVIAN PHASIC: NORMAL
BH CV UPPER VENOUS LEFT SUBCLAVIAN SPONT: NORMAL
BH CV UPPER VENOUS LEFT ULNAR COMPRESS: NORMAL
BH CV UPPER VENOUS RIGHT AXILLARY AUGMENT: NORMAL
BH CV UPPER VENOUS RIGHT AXILLARY COMPRESS: NORMAL
BH CV UPPER VENOUS RIGHT AXILLARY PHASIC: NORMAL
BH CV UPPER VENOUS RIGHT AXILLARY SPONT: NORMAL
BH CV UPPER VENOUS RIGHT BASILIC FOREARM COMPRESS: NORMAL
BH CV UPPER VENOUS RIGHT BASILIC UPPER COMPRESS: NORMAL
BH CV UPPER VENOUS RIGHT BRACHIAL COMPRESS: NORMAL
BH CV UPPER VENOUS RIGHT CEPHALIC FOREARM COMPRESS: NORMAL
BH CV UPPER VENOUS RIGHT CEPHALIC UPPER COMPRESS: NORMAL
BH CV UPPER VENOUS RIGHT INTERNAL JUGULAR AUGMENT: NORMAL
BH CV UPPER VENOUS RIGHT INTERNAL JUGULAR COMPRESS: NORMAL
BH CV UPPER VENOUS RIGHT INTERNAL JUGULAR PHASIC: NORMAL
BH CV UPPER VENOUS RIGHT INTERNAL JUGULAR SPONT: NORMAL
BH CV UPPER VENOUS RIGHT RADIAL COMPRESS: NORMAL
BH CV UPPER VENOUS RIGHT SUBCLAVIAN AUGMENT: NORMAL
BH CV UPPER VENOUS RIGHT SUBCLAVIAN COMPRESS: NORMAL
BH CV UPPER VENOUS RIGHT SUBCLAVIAN PHASIC: NORMAL
BH CV UPPER VENOUS RIGHT SUBCLAVIAN SPONT: NORMAL
BH CV UPPER VENOUS RIGHT ULNAR COMPRESS: NORMAL
BH CV VAS PRELIMINARY FINDINGS SCRIPTING: 1
BILIRUB SERPL-MCNC: 0.4 MG/DL (ref 0–1.2)
BUN SERPL-MCNC: 33 MG/DL (ref 8–23)
BUN/CREAT SERPL: 62.3 (ref 7–25)
C PNEUM DNA NPH QL NAA+NON-PROBE: NOT DETECTED
CALCIUM SPEC-SCNC: 8.4 MG/DL (ref 8.6–10.5)
CHLORIDE SERPL-SCNC: 96 MMOL/L (ref 98–107)
CHOLEST SERPL-MCNC: 141 MG/DL (ref 0–200)
CO2 SERPL-SCNC: 32 MMOL/L (ref 22–29)
CREAT SERPL-MCNC: 0.53 MG/DL (ref 0.57–1)
DEPRECATED RDW RBC AUTO: 45.6 FL (ref 37–54)
EGFRCR SERPLBLD CKD-EPI 2021: 93 ML/MIN/1.73
ERYTHROCYTE [DISTWIDTH] IN BLOOD BY AUTOMATED COUNT: 13.7 % (ref 12.3–15.4)
FLUAV SUBTYP SPEC NAA+PROBE: NOT DETECTED
FLUBV RNA ISLT QL NAA+PROBE: NOT DETECTED
GLOBULIN UR ELPH-MCNC: 2 GM/DL
GLUCOSE SERPL-MCNC: 129 MG/DL (ref 65–99)
HADV DNA SPEC NAA+PROBE: NOT DETECTED
HBA1C MFR BLD: 7.2 % (ref 4.8–5.6)
HCOV 229E RNA SPEC QL NAA+PROBE: NOT DETECTED
HCOV HKU1 RNA SPEC QL NAA+PROBE: NOT DETECTED
HCOV NL63 RNA SPEC QL NAA+PROBE: NOT DETECTED
HCOV OC43 RNA SPEC QL NAA+PROBE: NOT DETECTED
HCT VFR BLD AUTO: 39 % (ref 34–46.6)
HDLC SERPL-MCNC: 46 MG/DL (ref 40–60)
HGB BLD-MCNC: 12.8 G/DL (ref 12–15.9)
HMPV RNA NPH QL NAA+NON-PROBE: NOT DETECTED
HPIV1 RNA ISLT QL NAA+PROBE: NOT DETECTED
HPIV2 RNA SPEC QL NAA+PROBE: NOT DETECTED
HPIV3 RNA NPH QL NAA+PROBE: NOT DETECTED
HPIV4 P GENE NPH QL NAA+PROBE: NOT DETECTED
LDLC SERPL CALC-MCNC: 61 MG/DL (ref 0–100)
LDLC/HDLC SERPL: 1.16 {RATIO}
M PNEUMO IGG SER IA-ACNC: NOT DETECTED
MAGNESIUM SERPL-MCNC: 2.1 MG/DL (ref 1.6–2.4)
MCH RBC QN AUTO: 30.6 PG (ref 26.6–33)
MCHC RBC AUTO-ENTMCNC: 32.8 G/DL (ref 31.5–35.7)
MCV RBC AUTO: 93.3 FL (ref 79–97)
PHOSPHATE SERPL-MCNC: 2.4 MG/DL (ref 2.5–4.5)
PLATELET # BLD AUTO: 241 10*3/MM3 (ref 140–450)
PMV BLD AUTO: 9.9 FL (ref 6–12)
POTASSIUM SERPL-SCNC: 3.3 MMOL/L (ref 3.5–5.2)
PROT SERPL-MCNC: 5 G/DL (ref 6–8.5)
RBC # BLD AUTO: 4.18 10*6/MM3 (ref 3.77–5.28)
RHINOVIRUS RNA SPEC NAA+PROBE: NOT DETECTED
RSV RNA NPH QL NAA+NON-PROBE: NOT DETECTED
SARS-COV-2 RNA NPH QL NAA+NON-PROBE: NOT DETECTED
SODIUM SERPL-SCNC: 139 MMOL/L (ref 136–145)
TRIGL SERPL-MCNC: 208 MG/DL (ref 0–150)
VLDLC SERPL-MCNC: 34 MG/DL (ref 5–40)
WBC NRBC COR # BLD AUTO: 14.57 10*3/MM3 (ref 3.4–10.8)

## 2024-05-17 PROCEDURE — 94799 UNLISTED PULMONARY SVC/PX: CPT

## 2024-05-17 PROCEDURE — 85027 COMPLETE CBC AUTOMATED: CPT | Performed by: STUDENT IN AN ORGANIZED HEALTH CARE EDUCATION/TRAINING PROGRAM

## 2024-05-17 PROCEDURE — 94640 AIRWAY INHALATION TREATMENT: CPT

## 2024-05-17 PROCEDURE — 0202U NFCT DS 22 TRGT SARS-COV-2: CPT | Performed by: INTERNAL MEDICINE

## 2024-05-17 PROCEDURE — 25010000002 HYDROMORPHONE PER 4 MG: Performed by: STUDENT IN AN ORGANIZED HEALTH CARE EDUCATION/TRAINING PROGRAM

## 2024-05-17 PROCEDURE — 83735 ASSAY OF MAGNESIUM: CPT | Performed by: STUDENT IN AN ORGANIZED HEALTH CARE EDUCATION/TRAINING PROGRAM

## 2024-05-17 PROCEDURE — 99232 SBSQ HOSP IP/OBS MODERATE 35: CPT | Performed by: INTERNAL MEDICINE

## 2024-05-17 PROCEDURE — 94761 N-INVAS EAR/PLS OXIMETRY MLT: CPT

## 2024-05-17 PROCEDURE — 93970 EXTREMITY STUDY: CPT | Performed by: SURGERY

## 2024-05-17 PROCEDURE — 80061 LIPID PANEL: CPT | Performed by: INTERNAL MEDICINE

## 2024-05-17 PROCEDURE — 80053 COMPREHEN METABOLIC PANEL: CPT | Performed by: STUDENT IN AN ORGANIZED HEALTH CARE EDUCATION/TRAINING PROGRAM

## 2024-05-17 PROCEDURE — 83036 HEMOGLOBIN GLYCOSYLATED A1C: CPT | Performed by: INTERNAL MEDICINE

## 2024-05-17 PROCEDURE — 93970 EXTREMITY STUDY: CPT

## 2024-05-17 PROCEDURE — 84100 ASSAY OF PHOSPHORUS: CPT | Performed by: STUDENT IN AN ORGANIZED HEALTH CARE EDUCATION/TRAINING PROGRAM

## 2024-05-17 PROCEDURE — 63710000001 PREDNISONE PER 5 MG: Performed by: NURSE PRACTITIONER

## 2024-05-17 RX ORDER — FOLIC ACID 1 MG/1
1 TABLET ORAL DAILY
Status: DISCONTINUED | OUTPATIENT
Start: 2024-05-17 | End: 2024-05-17

## 2024-05-17 RX ORDER — MORPHINE SULFATE 20 MG/ML
10 SOLUTION ORAL
Status: DISCONTINUED | OUTPATIENT
Start: 2024-05-17 | End: 2024-05-20 | Stop reason: HOSPADM

## 2024-05-17 RX ORDER — TRAMADOL HYDROCHLORIDE 50 MG/1
25 TABLET ORAL ONCE
Status: COMPLETED | OUTPATIENT
Start: 2024-05-17 | End: 2024-05-17

## 2024-05-17 RX ORDER — LORAZEPAM 2 MG/ML
0.5 CONCENTRATE ORAL
Status: DISCONTINUED | OUTPATIENT
Start: 2024-05-17 | End: 2024-05-20 | Stop reason: HOSPADM

## 2024-05-17 RX ORDER — METOPROLOL SUCCINATE 25 MG/1
25 TABLET, EXTENDED RELEASE ORAL
Status: DISCONTINUED | OUTPATIENT
Start: 2024-05-17 | End: 2024-05-20 | Stop reason: HOSPADM

## 2024-05-17 RX ORDER — CETIRIZINE HYDROCHLORIDE 10 MG/1
10 TABLET ORAL DAILY
Status: DISCONTINUED | OUTPATIENT
Start: 2024-05-17 | End: 2024-05-17

## 2024-05-17 RX ORDER — MORPHINE SULFATE 20 MG/ML
20 SOLUTION ORAL
Status: DISCONTINUED | OUTPATIENT
Start: 2024-05-17 | End: 2024-05-20 | Stop reason: HOSPADM

## 2024-05-17 RX ORDER — POTASSIUM CHLORIDE 1.5 G/1.58G
40 POWDER, FOR SOLUTION ORAL EVERY 4 HOURS
Status: DISCONTINUED | OUTPATIENT
Start: 2024-05-17 | End: 2024-05-17

## 2024-05-17 RX ORDER — MIDAZOLAM HYDROCHLORIDE 1 MG/ML
4 INJECTION INTRAMUSCULAR; INTRAVENOUS
Status: DISCONTINUED | OUTPATIENT
Start: 2024-05-17 | End: 2024-05-20 | Stop reason: HOSPADM

## 2024-05-17 RX ORDER — IPRATROPIUM BROMIDE AND ALBUTEROL SULFATE 2.5; .5 MG/3ML; MG/3ML
3 SOLUTION RESPIRATORY (INHALATION) 2 TIMES DAILY PRN
Status: DISCONTINUED | OUTPATIENT
Start: 2024-05-17 | End: 2024-05-20 | Stop reason: HOSPADM

## 2024-05-17 RX ORDER — HYDROMORPHONE HYDROCHLORIDE 1 MG/ML
0.5 INJECTION, SOLUTION INTRAMUSCULAR; INTRAVENOUS; SUBCUTANEOUS
Status: DISCONTINUED | OUTPATIENT
Start: 2024-05-17 | End: 2024-05-20 | Stop reason: HOSPADM

## 2024-05-17 RX ORDER — ACETAMINOPHEN 325 MG/1
650 TABLET ORAL EVERY 4 HOURS PRN
Status: DISCONTINUED | OUTPATIENT
Start: 2024-05-17 | End: 2024-05-20 | Stop reason: HOSPADM

## 2024-05-17 RX ORDER — LORAZEPAM 0.5 MG/1
0.5 TABLET ORAL
Status: DISCONTINUED | OUTPATIENT
Start: 2024-05-17 | End: 2024-05-20 | Stop reason: HOSPADM

## 2024-05-17 RX ORDER — MORPHINE SULFATE 2 MG/ML
6 INJECTION, SOLUTION INTRAMUSCULAR; INTRAVENOUS
Status: DISCONTINUED | OUTPATIENT
Start: 2024-05-17 | End: 2024-05-20 | Stop reason: HOSPADM

## 2024-05-17 RX ORDER — BUDESONIDE 0.5 MG/2ML
0.5 INHALANT ORAL
Status: DISCONTINUED | OUTPATIENT
Start: 2024-05-17 | End: 2024-05-20 | Stop reason: HOSPADM

## 2024-05-17 RX ORDER — ACETAMINOPHEN 650 MG/1
650 SUPPOSITORY RECTAL EVERY 4 HOURS PRN
Status: DISCONTINUED | OUTPATIENT
Start: 2024-05-17 | End: 2024-05-20 | Stop reason: HOSPADM

## 2024-05-17 RX ORDER — MIDAZOLAM HYDROCHLORIDE 1 MG/ML
2 INJECTION INTRAMUSCULAR; INTRAVENOUS
Status: DISCONTINUED | OUTPATIENT
Start: 2024-05-17 | End: 2024-05-20 | Stop reason: HOSPADM

## 2024-05-17 RX ORDER — MORPHINE SULFATE 2 MG/ML
4 INJECTION, SOLUTION INTRAMUSCULAR; INTRAVENOUS
Status: DISCONTINUED | OUTPATIENT
Start: 2024-05-17 | End: 2024-05-20 | Stop reason: HOSPADM

## 2024-05-17 RX ORDER — OXYCODONE HYDROCHLORIDE AND ACETAMINOPHEN 5; 325 MG/1; MG/1
2 TABLET ORAL EVERY 4 HOURS PRN
Status: DISCONTINUED | OUTPATIENT
Start: 2024-05-17 | End: 2024-05-20 | Stop reason: HOSPADM

## 2024-05-17 RX ORDER — MORPHINE SULFATE 20 MG/ML
5 SOLUTION ORAL
Status: DISCONTINUED | OUTPATIENT
Start: 2024-05-17 | End: 2024-05-20 | Stop reason: HOSPADM

## 2024-05-17 RX ORDER — KETOROLAC TROMETHAMINE 15 MG/ML
15 INJECTION, SOLUTION INTRAMUSCULAR; INTRAVENOUS EVERY 6 HOURS PRN
Status: DISCONTINUED | OUTPATIENT
Start: 2024-05-17 | End: 2024-05-20 | Stop reason: HOSPADM

## 2024-05-17 RX ORDER — MIDAZOLAM HYDROCHLORIDE 1 MG/ML
1 INJECTION INTRAMUSCULAR; INTRAVENOUS
Status: DISCONTINUED | OUTPATIENT
Start: 2024-05-17 | End: 2024-05-20 | Stop reason: HOSPADM

## 2024-05-17 RX ORDER — ACETAMINOPHEN 160 MG/5ML
650 SOLUTION ORAL EVERY 4 HOURS PRN
Status: DISCONTINUED | OUTPATIENT
Start: 2024-05-17 | End: 2024-05-20 | Stop reason: HOSPADM

## 2024-05-17 RX ORDER — LORAZEPAM 1 MG/1
1 TABLET ORAL
Status: DISCONTINUED | OUTPATIENT
Start: 2024-05-17 | End: 2024-05-20 | Stop reason: HOSPADM

## 2024-05-17 RX ORDER — ATORVASTATIN CALCIUM 20 MG/1
20 TABLET, FILM COATED ORAL NIGHTLY
Status: DISCONTINUED | OUTPATIENT
Start: 2024-05-17 | End: 2024-05-17

## 2024-05-17 RX ORDER — MIRTAZAPINE 15 MG/1
7.5 TABLET, FILM COATED ORAL NIGHTLY
Status: DISCONTINUED | OUTPATIENT
Start: 2024-05-17 | End: 2024-05-20 | Stop reason: HOSPADM

## 2024-05-17 RX ORDER — LORAZEPAM 2 MG/ML
1 CONCENTRATE ORAL
Status: DISCONTINUED | OUTPATIENT
Start: 2024-05-17 | End: 2024-05-20 | Stop reason: HOSPADM

## 2024-05-17 RX ORDER — MONTELUKAST SODIUM 10 MG/1
10 TABLET ORAL NIGHTLY
Status: DISCONTINUED | OUTPATIENT
Start: 2024-05-17 | End: 2024-05-17

## 2024-05-17 RX ORDER — DIPHENOXYLATE HYDROCHLORIDE AND ATROPINE SULFATE 2.5; .025 MG/1; MG/1
1 TABLET ORAL
Status: DISCONTINUED | OUTPATIENT
Start: 2024-05-17 | End: 2024-05-20 | Stop reason: HOSPADM

## 2024-05-17 RX ORDER — PREDNISONE 10 MG/1
10 TABLET ORAL DAILY
Status: DISCONTINUED | OUTPATIENT
Start: 2024-05-17 | End: 2024-05-17

## 2024-05-17 RX ORDER — LORAZEPAM 2 MG/ML
2 CONCENTRATE ORAL
Status: DISCONTINUED | OUTPATIENT
Start: 2024-05-17 | End: 2024-05-20 | Stop reason: HOSPADM

## 2024-05-17 RX ORDER — ARFORMOTEROL TARTRATE 15 UG/2ML
15 SOLUTION RESPIRATORY (INHALATION)
Status: DISCONTINUED | OUTPATIENT
Start: 2024-05-17 | End: 2024-05-20 | Stop reason: HOSPADM

## 2024-05-17 RX ORDER — LORAZEPAM 1 MG/1
2 TABLET ORAL
Status: DISCONTINUED | OUTPATIENT
Start: 2024-05-17 | End: 2024-05-20 | Stop reason: HOSPADM

## 2024-05-17 RX ORDER — PANTOPRAZOLE SODIUM 40 MG/1
40 TABLET, DELAYED RELEASE ORAL EVERY MORNING
Status: DISCONTINUED | OUTPATIENT
Start: 2024-05-17 | End: 2024-05-20 | Stop reason: HOSPADM

## 2024-05-17 RX ORDER — MORPHINE SULFATE 2 MG/ML
2 INJECTION, SOLUTION INTRAMUSCULAR; INTRAVENOUS
Status: DISCONTINUED | OUTPATIENT
Start: 2024-05-17 | End: 2024-05-20 | Stop reason: HOSPADM

## 2024-05-17 RX ADMIN — ARFORMOTEROL TARTRATE 15 MCG: 15 SOLUTION RESPIRATORY (INHALATION) at 08:25

## 2024-05-17 RX ADMIN — ACETAMINOPHEN 650 MG: 325 TABLET, FILM COATED ORAL at 09:40

## 2024-05-17 RX ADMIN — APIXABAN 2.5 MG: 2.5 TABLET, FILM COATED ORAL at 09:40

## 2024-05-17 RX ADMIN — TRAMADOL HYDROCHLORIDE 25 MG: 50 TABLET ORAL at 02:08

## 2024-05-17 RX ADMIN — OXYCODONE AND ACETAMINOPHEN 2 TABLET: 5; 325 TABLET ORAL at 21:56

## 2024-05-17 RX ADMIN — OXYCODONE AND ACETAMINOPHEN 2 TABLET: 5; 325 TABLET ORAL at 14:04

## 2024-05-17 RX ADMIN — PANTOPRAZOLE SODIUM 40 MG: 40 TABLET, DELAYED RELEASE ORAL at 06:18

## 2024-05-17 RX ADMIN — BUDESONIDE 0.5 MG: 0.5 INHALANT ORAL at 08:26

## 2024-05-17 RX ADMIN — APIXABAN 10 MG: 5 TABLET, FILM COATED ORAL at 12:43

## 2024-05-17 RX ADMIN — METOPROLOL SUCCINATE 25 MG: 25 TABLET, EXTENDED RELEASE ORAL at 09:41

## 2024-05-17 RX ADMIN — LORAZEPAM 0.5 MG: 0.5 TABLET ORAL at 21:59

## 2024-05-17 RX ADMIN — APIXABAN 10 MG: 5 TABLET, FILM COATED ORAL at 21:52

## 2024-05-17 RX ADMIN — POLYETHYLENE GLYCOL 3350 17 G: 17 POWDER, FOR SOLUTION ORAL at 15:18

## 2024-05-17 RX ADMIN — ARFORMOTEROL TARTRATE 15 MCG: 15 SOLUTION RESPIRATORY (INHALATION) at 21:25

## 2024-05-17 RX ADMIN — PREDNISONE 10 MG: 10 TABLET ORAL at 09:41

## 2024-05-17 RX ADMIN — Medication 10 ML: at 10:07

## 2024-05-17 RX ADMIN — IPRATROPIUM BROMIDE AND ALBUTEROL SULFATE 3 ML: .5; 3 SOLUTION RESPIRATORY (INHALATION) at 21:18

## 2024-05-17 RX ADMIN — MONTELUKAST SODIUM 10 MG: 10 TABLET, FILM COATED ORAL at 06:18

## 2024-05-17 RX ADMIN — FOLIC ACID 1 MG: 1 TABLET ORAL at 09:41

## 2024-05-17 RX ADMIN — SENNOSIDES AND DOCUSATE SODIUM 2 TABLET: 50; 8.6 TABLET ORAL at 15:19

## 2024-05-17 RX ADMIN — BUDESONIDE 0.5 MG: 0.5 INHALANT ORAL at 21:21

## 2024-05-17 RX ADMIN — MIRTAZAPINE 7.5 MG: 15 TABLET, FILM COATED ORAL at 06:18

## 2024-05-17 RX ADMIN — SACUBITRIL AND VALSARTAN 0.5 TABLET: 24; 26 TABLET, FILM COATED ORAL at 09:41

## 2024-05-17 RX ADMIN — POTASSIUM CHLORIDE 40 MEQ: 1.5 POWDER, FOR SOLUTION ORAL at 12:43

## 2024-05-17 RX ADMIN — Medication 10 ML: at 21:52

## 2024-05-17 RX ADMIN — ATORVASTATIN CALCIUM 20 MG: 20 TABLET, FILM COATED ORAL at 02:08

## 2024-05-17 RX ADMIN — MIRTAZAPINE 7.5 MG: 15 TABLET, FILM COATED ORAL at 21:52

## 2024-05-17 RX ADMIN — HYDROMORPHONE HYDROCHLORIDE 0.5 MG: 1 INJECTION, SOLUTION INTRAMUSCULAR; INTRAVENOUS; SUBCUTANEOUS at 11:28

## 2024-05-17 NOTE — PROGRESS NOTES
Nutrition Services    Patient Name:  Darlene Pfeiffer  YOB: 1940  MRN: [id number]  Admit Date:  5/16/2024Assessment Date:  05/17/24    Summary: BMI 16.65. 83 yoF admitted with SOA. PMH: lung cancer, COPD, CHF. Pt with decreased appetite, significant weight loss in the last 6 months. She is drinking boost shakes and likes these. She would also like ice cream and sherbet with meals- RD to order. He explains that her stomach often feels full and when she goes to the bathroom is does not feel like she gets all of her urine out. She denies current constipation or diarrhea. Her family explains that the MD said it would be possible to dc lipitor as this can cause some upset stomach. Lipitor was given this AM- RD alerted RN about this conversation and she will follow up with MD. Pt denied chewing/swallowing difficulty or nausea currently. She has had some taste changes that makes food not taste right- likely related to hx of chemo with last treatment in December 2023. Labs and meds reviewed. Noted hypokalemia on replacement. Noted remeron ordered for appetite and she is on a steroid. Will continue to monitor intakes.     Patient meets ASPEN/AND criteria for nutrition diagnosis of severe malnutrition of chronic illness based on: significant weight loss 11.1% in 5 months, decreased oral intake <50% of needs in the last 5 days, subcutaneous fat loss, muscle wasting, fluid accumulation     PLAN  - continue boost shakes TID   - ordered ice cream/sherbet with meals   - Encourage intakes      CLINICAL NUTRITION ASSESSMENT      Reason for Assessment BMI     Diagnosis/Problem   SOA, lung cancer   Medical/Surgical History Past Medical History:   Diagnosis Date    Allergic rhinitis     Aneurysm     Asthma     Cancer of floor of mouth 2014    Cerebral aneurysm     COPD (chronic obstructive pulmonary disease)     COVID 2020    Esophageal reflux     History of adenocarcinoma of lung     History of anemia     History of  "carcinoma in situ of skin     History of lung cancer     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 from [facility]    History of squamous cell carcinoma     Tongue    Hyperlipidemia     Hypertension     since early 60s    Intractable back pain 11/29/2022    Low vitamin B12 level     Lung cancer     Systolic CHF, acute 11/14/2023    Thyromegaly     UTI (urinary tract infection)     Vitamin D deficiency        Past Surgical History:   Procedure Laterality Date    BRONCHOSCOPY Bilateral 10/17/2022    Procedure: BRONCHOSCOPY WITH FLUORO with biopsy and BAL;  Surgeon: India Flores MD;  Location: [facility] ENDOSCOPY;  Service: Pulmonary;  Laterality: Bilateral;  PRE/POST - lung mass    CHOLECYSTECTOMY  1999    COLONOSCOPY      EMBOLIZATION CEREBRAL Left 06/29/2022    Procedure: 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;  Surgeon: Bradley Dowell MD;  Location: [facility] HYBRID OR 18/19;  Service: Interventional Radiology;  Laterality: Left;    EYE SURGERY  11/24/2020    Took a muscle out of right eye    GLOSSECTOMY PARTIAL      less than one half tongue    LUNG SURGERY  2012    ORAL LESION EXCISION/BIOPSY      June and August 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    TUBAL ABDOMINAL LIGATION  1970    VENOUS ACCESS DEVICE (PORT) INSERTION N/A 12/12/2022    Procedure: MEDIPORT PLACEMENT;  Surgeon: Jason Villaseñor MD;  Location: [facility] MAIN OR;  Service: Vascular;  Laterality: N/A;        Anthropometrics        Current Height  Current Weight  BMI kg/m2 Height: 160 cm (63\")  Weight: 42.6 kg (94 lb) (05/16/24 1515)  Body mass index is 16.65 kg/m².   Adjusted BMI (if applicable)    BMI Category Underweight (18.4 or below)   Ideal Body Weight (IBW) 115#   Usual Body Weight (UBW) 105#   Weight Trend Loss   Weight History Wt Readings from Last 30 Encounters:   05/16/24 1515 42.6 kg (94 lb)   05/16/24 0842 43.5 kg (96 lb)   04/26/24 1144 43.5 kg (95 lb 14.4 oz)   04/11/24 1003 " 45.8 kg (101 lb)   03/14/24 0931 45.4 kg (100 lb)   03/07/24 0822 45.4 kg (100 lb)   03/01/24 0931 45.4 kg (100 lb)   02/08/24 0754 45.4 kg (100 lb)   02/06/24 1406 45 kg (99 lb 3.2 oz)   01/29/24 0846 45.3 kg (99 lb 12.8 oz)   01/23/24 1128 45.8 kg (101 lb)   01/21/24 0510 49 kg (108 lb 0.4 oz)   01/20/24 0500 47.7 kg (105 lb 2.6 oz)   01/19/24 0300 47.9 kg (105 lb 9.6 oz)   01/18/24 2352 47.9 kg (105 lb 9.6 oz)   01/18/24 1403 46.3 kg (102 lb)   01/04/24 1412 46.4 kg (102 lb 3.2 oz)   01/02/24 0534 46.9 kg (103 lb 8 oz)   01/01/24 0600 47.5 kg (104 lb 11.2 oz)   12/31/23 0646 47.6 kg (105 lb)   12/30/23 0500 47.9 kg (105 lb 11.2 oz)   12/29/23 0459 47.2 kg (104 lb)   12/28/23 1659 47.4 kg (104 lb 8 oz)   12/27/23 0957 48.1 kg (106 lb)   12/21/23 1032 47.6 kg (105 lb)   12/18/23 1503 47.5 kg (104 lb 12.8 oz)   12/12/23 0800 49 kg (108 lb)   12/06/23 1025 49.9 kg (110 lb)   12/04/23 1134 49.2 kg (108 lb 8 oz)   11/28/23 1456 48.4 kg (106 lb 9.6 oz)   11/27/23 1158 48.5 kg (106 lb 14.4 oz)   11/21/23 1356 48 kg (105 lb 12.8 oz)   11/14/23 1117 48.5 kg (107 lb)   11/14/23 0528 48.8 kg (107 lb 9.4 oz)   11/14/23 0218 47.2 kg (104 lb)   10/16/23 0806 47.6 kg (104 lb 14.4 oz)   10/12/23 1101 45.8 kg (101 lb)   09/29/23 0715 44.5 kg (98 lb)   09/22/23 0815 44.8 kg (98 lb 12.8 oz)   09/08/23 1512 44 kg (96 lb 14.4 oz)   09/05/23 0951 44 kg (97 lb)        Estimated/Assessed Needs        Energy Requirements    Weight for Calculation 42.6kg   Method for Estimation  35-40 kcal/kg   EST Needs (kcal/day) 4818-9253       Protein Requirements    Weight for Calculation 42.6kg   EST Protein Needs (g/kg) 1.2 - 1.5 gm/kg   EST Daily Needs (g/day) 51-77       Fluid Requirements     Method for Estimation 1 mL/kcal    Estimated Needs (mL/day) 6990-5463     Labs       Pertinent Labs    Results from last 7 days   Lab Units 05/17/24  0643 05/16/24  1006   SODIUM mmol/L 139 136   POTASSIUM mmol/L 3.3* 4.5   CHLORIDE mmol/L 96* 93*   CO2  mmol/L 32.0* 35.2*   BUN mg/dL 33* 40*   CREATININE mg/dL 0.53* 0.78   CALCIUM mg/dL 8.4* 9.3   BILIRUBIN mg/dL 0.4 0.4   ALK PHOS U/L 91 105   ALT (SGPT) U/L 14 15   AST (SGOT) U/L 20 16   GLUCOSE mg/dL 129* 216*     Results from last 7 days   Lab Units 05/17/24  0643   MAGNESIUM mg/dL 2.1   PHOSPHORUS mg/dL 2.4*   HEMOGLOBIN g/dL 12.8   HEMATOCRIT % 39.0   WBC 10*3/mm3 14.57*   ALBUMIN g/dL 3.0*     Results from last 7 days   Lab Units 05/17/24  0643 05/16/24  1006   PLATELETS 10*3/mm3 241 267     COVID19   Date Value Ref Range Status   05/17/2024 Not Detected Not Detected - Ref. Range Final     Lab Results   Component Value Date    HGBA1C 5.60 11/15/2023          Medications           Scheduled Medications apixaban, 10 mg, Oral, Q12H   Followed by  [START ON 5/24/2024] apixaban, 5 mg, Oral, Q12H  arformoterol, 15 mcg, Nebulization, BID - RT  atorvastatin, 20 mg, Oral, Nightly  budesonide, 0.5 mg, Nebulization, BID - RT  folic acid, 1 mg, Oral, Daily  metoprolol succinate XL, 25 mg, Oral, Q24H  mirtazapine, 7.5 mg, Oral, Nightly  pantoprazole, 40 mg, Oral, QAM  potassium chloride, 40 mEq, Oral, Q4H  predniSONE, 10 mg, Oral, Daily  sacubitril-valsartan, 0.5 tablet, Oral, BID  sodium chloride, 10 mL, Intravenous, Q12H       Infusions     PRN Medications   acetaminophen    senna-docusate sodium **AND** polyethylene glycol **AND** bisacodyl **AND** bisacodyl    Calcium Replacement - Follow Nurse / BPA Driven Protocol    HYDROmorphone    ipratropium-albuterol    Magnesium Standard Dose Replacement - Follow Nurse / BPA Driven Protocol    oxyCODONE-acetaminophen    Phosphorus Replacement - Follow Nurse / BPA Driven Protocol    Potassium Replacement - Follow Nurse / BPA Driven Protocol    sodium chloride    sodium chloride     Physical Findings          General Findings alert, frail, generalized wasting, loss of muscle mass, loss of subcutaneous fat, oriented, underweight   Oral/Mouth Cavity tooth or teeth missing   Edema   2+ (mild)   Gastrointestinal last bowel movement: 5/14   Skin  skin intact   Tubes/Drains/Lines none   NFPE See Malnutrition Severity Assessment, Date Completed: 5/17      Malnutrition Severity Assessment      Patient meets criteria for : Severe Malnutrition  Malnutrition Type (Last 8 Hours)       Malnutrition Severity Assessment       Row Name 05/17/24 1352       Malnutrition Severity Assessment    Malnutrition Type Chronic Disease - Related Malnutrition      Row Name 05/17/24 1352       Insufficient Energy Intake     Insufficient Energy Intake Findings Severe    Insufficient Energy Intake  < or equal to 50% of est. energy requirement for > or equal to 5d)      Row Name 05/17/24 1352       Unintentional Weight Loss     Unintentional Weight Loss Findings Severe    Unintentional Weight Loss  Weight loss greater than 10% in six months      Row Name 05/17/24 1352       Muscle Loss    Loss of Muscle Mass Findings Severe    Zoroastrianism Region Severe - deep hollowing/scooping, lack of muscle to touch, facial bones well defined    Clavicle Bone Region Severe - protruding prominent bone    Acromion Bone Region Severe - squared shoulders, bones, and acromion process protrusion prominent    Dorsal Hand Region Severe - prominent depression      Row Name 05/17/24 1352       Fat Loss    Subcutaneous Fat Loss Findings Severe    Orbital Region  Severe - pronounced hollowness/depression, dark circles, loose saggy skin    Upper Arm Region Severe - mostly skin, very little space between folds, fingers touch      Row Name 05/17/24 1352       Fluid Accumulation (Edema)    Fluid Accumulation  Moderate equals 2+ pitting edema      Row Name 05/17/24 1352       Criteria Met (Must meet criteria for severity in at least 2 of these categories: M Wasting, Fat Loss, Fluid, Secondary Signs, Wt. Status, Intake)    Patient meets criteria for  Severe Malnutrition                        Current Nutrition Orders & Evaluation of Intake       Oral  Nutrition     Food Allergies NKFA   Current PO Diet Diet: Regular/House; Fluid Consistency: Thin (IDDSI 0)   Supplement Boost Plus, TID   PO Evaluation     % PO Intake minimal    Factors Affecting Intake: decreased appetite, dislikes hospital food, early satiety , limited food preferences, taste changes     PES STATEMENT / NUTRITION DIAGNOSIS      Nutrition Dx Problem  Problem: Inadequate Oral Intake  Etiology: Medical Diagnosis - lung cancer    Signs/Symptoms: Report of Minimal PO Intake, NFPE Results, BMI, and Unintended Weight Change   --  NUTRITION INTERVENTION / PLAN OF CARE      Intervention Goal(s) Meet estimated needs, Increase intake, Accepts oral nutrition supplement, Appropriate weight gain, and PO intake goal %: 75         RD Intervention/Action Interview for preferences, Supplement provided, Encourage intake, Continue to monitor, Care plan reviewed, and Recommend/order: ice cream/sherbet with meals          Prescription/Orders:       PO Diet regular      Supplements Boost TID, ice cream/sherbet      Enteral Nutrition       Parenteral Nutrition    New Prescription Ordered? Yes   --      Monitor/Evaluation Per protocol, I&O, PO intake, Supplement intake, Pertinent labs, Weight   Discharge Plan/Needs Pending clinical course   --    RD to follow per protocol.      Electronically signed by:  Neha Powers RD  05/17/24 13:52 EDT

## 2024-05-17 NOTE — SIGNIFICANT NOTE
05/17/24 0732   OTHER   Discipline physical therapist   Therapy Assessment/Plan (PT)   Criteria for Skilled Interventions Met (PT) no problems identified which require skilled intervention  (Nsg doc pt is up [facility] as well as [facility].  No indication for acute skilled PT.  Please continue to facilitate supervised activity.)

## 2024-05-17 NOTE — PROGRESS NOTES
REASON FOR FOLLOW-UP: Recurrent lung cancer.    History of Present Illness    The patient is a 83 y.o. female with the above-mentioned history who returned 9/22/2023 continuing on Mekinist 0.5 mg daily and Tafinlar at 50 mg twice daily.  She also continues on prednisone only taking 10 mg daily.  She reports that she is feeling quite good.  She is tolerating things well.  She has a decent appetite denies issues with nausea, vomiting, diarrhea, or constipation.  She denies any issues with increased shortness of breath.    Patient did see ENT Dr. Topete, who has ordered an ultrasound of her neck, which will be done at [facility] on the 27th.  She is also scheduled for a cerebral angiogram by Dr. Calvin on 29 September.      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  Dr. Dowell of neurosurgery saw the patient 10/12/2023 and felt the patient was stable without neurologic symptoms, yearly follow-up planned.  The patient had started seeing a new ENT physician leading to the referral back to neurosurgery.  When seen 10/16/2023 she is doing very well on her current Mekinist and Tafinlar doses having improved her weight, appetite and general performance status.  We have discussed at least a chest x-ray today and repeat staging in the next 5 to 6 weeks as she continues her current dosing.    The patient required admission 11/14 - 11/16/2023 having presented with shortness of breath and found to be pulmonary edema with CBC, lactate and procalcitonin all normal.  There was a concern that her chemotherapy agents could have produced her cardiomyopathy and these were stopped 11/14/2023.  She was diuresed and echocardiogram demonstrated LV SF mildly decreased at 39.2% with GLS of -11.1%, left ventricular cavity mildly dilated, left  ventricular wall thickness consistent with mild concentric hypertrophy, left ventricular global hypokinesis, aortic valve abnormal with moderate calcification, valve was trileaflet, trace tricuspid valve regurgitation with right VSP at less than 35 mmHg.  The findings for LV systolic function, diastolic function and global longitudinal LV strain had all worsened.    CTA of chest 11/14/2023 demonstrating confluent soft tissue mass possibly measuring larger in current study at 6.5 x 5.0 previously 4.9 x 4.8, left supraclavicular node to decrease in size measuring 1.1 cm previously 1.4 cm, extensive bilateral interstitial and groundglass infiltrates.    The patient is seen 11/26/2023.  As per the discharge we are requested to have a BMP and CMP assessed.  She is seen with her son and daughter in a lengthy conversation held about her recent hospitalization with CHF-cardiomyopathy felt elicited, in part, from her targeted therapy with her Mekinist and Tafinlar discontinued altogether.    The patient is relatively stable currently having continued her diuretics and to be seen in cardiology in follow-up.  At the time of this dictation her CBC is found to be essentially normal with mild leukocytosis, CMP normal except for mildly elevated glucose at 125 and BNP reduced to 6126 from 9763.  She feels well with no additional shortness of breath chest pain lower extremity edema.  In reviewing the the possible treatment options she is next best treated with single agent chemotherapy moving to Alimta.    The patient proceeded to treatment teaching and seen back 12/4/2023.  She is ready to proceed with chemotherapy with fortunately a recent good performance status still in place.    Patient returns 12/18/2023 for toxicity check.  She started treatment with Alimta on 12/4/2023.  Patient states that she did have an episode following treatment where she felt extremely itchy however she took Benadryl which helped, and her symptoms  resolved.  She has ongoing issues with fatigue.  She is being followed closely by cardio oncology.  She also reports being short of breath but denies chest pain, or swelling.  She does struggle with her appetite.  Otherwise she feels like she has tolerated the change in treatment well.      The patient required hospitalization 1/18-21/24 presenting with increasing shortness of breath, findings of chronic respiratory failure, positive for rhinovirus on admission.  She is treated with supportive treatments including for previous COVID infection as well as antibiotic therapies.  She completed 5 days of Augmentin, continue nebulizer and breathing treatments.  Upon discharge she rescheduled appointments though was seen by cardiology 1/23/2024 not felt to be in heart failure, treated supportively for epistaxis.    There was concern as to whether she should continue treatment and she has seen back in office 1/29/2024 to discuss potential options.  She is seen with her son and daughter both indicating that she drops her O2 saturation quite quickly and is very inactive as result of difficulty with rapidly becoming shortness of breath and weak when she tries to move.  This is led to a lengthy conversation about the extent of her disease including both her cardiovascular status and her respiratory status.  She is worsening quickly and is not, currently, a candidate for chemotherapy.    The patient is doing poorly enough that we have had a cristy conversation today about end-of-life concerns.  She is not interested in hospice at this point but hopes to improve further.    The patient is next evaluated 4/26/2024 with recent scans demonstrating progression of disease with enlarged mediastinal hilar lymphadenopathy, creased consolidation apex left upper lobe, new subcentimeter low-density lesion right lobe of the liver, right adrenal nodule, pleural thickening left apex, decreased patchy consolidation apical segment of right lower  lobe.6/2024.  Recent CT chest, abdomen, pelvis demonstrates progression of disease.  She is seen with her sister-in-law both indicating that her oxygen requirements are increasing and her appetite reducing.  We have again discussed restarting chemotherapy but also concerned about her performance status.  She has recently started using THC Gummies and is encouraged to continue to do so to improve her performance status, appetite and weight before we try to reinstitute chemotherapy treatment with Alimta.    Interval history:  5/17/2024  T97.9, pulse 120, respirations 20, /53  Patient admitted to continue therapy through cardiology, undergo diuresis and continue supportive care.  H&H 12.39.0, white count of 14,570, platelet count 241,000, being creatinine 33 and 0.53  Additional studies demonstrate acute left upper extremity DVT in the internal jugular and subclavian  I have met with the patient and her family members and advised palliative care.  We will move to transfer her this afternoon.          ONCOLOGY HISTORY:      The patient is an 83-year-old female with the below medical history including chronic obstructive pulmonary disease who was seen in the [facility] multidisciplinary thoracic oncology clinic July 1, 2021.  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      She had undergone a CT of the chest at [facility] 6/16/2021 showing postsurgical changes in the left chest from right upper lobectomy, 8 mm irregular nodule medial segment of the right lower lobe and diffuse changes of emphysema with fibrotic changes in the periphery, no suspicious hilar or mediastinal nodes, no pleural effusion.  New finding was suspicious for new malignancy with the patient felt to be a poor candidate for surgical  resection.  A PET CT and PFTs were requested thereafter.  The PET/CT demonstrated ill-defined FDG avid soft tissue thickening left aspect mediastinum within the operative bed, 1 cm hypermetabolic pulmonary nodule right lower lobe and asymmetric thickening patchy uptake involving the right base of tongue.  There is subtle uptake within the L1 vertebral body which is indeterminate and a sub-6 mm pulmonary nodule of right lung and subcentimeter hypodense hepatic lesion which was below PET resolution.       The patient's case was discussed in thoracic conference 7/22/2021 with consideration of the patient undergoing L1 vertebral body MRI, confirmatory biopsy left mediastinal and assessment by ENT (Dr. Caballero) who had worked with the patient previously.  She, incidentally, indicates that radiation therapy was given apparently intraoperatively at her recent surgery?  She still has issues with difficulty chewing and swallowing post procedures and was to be followed up with Dr. Caballero in the near future as planned.                                                            She was seen in the thoracic clinic on the same day with plans to proceed with MRI of the lumbar spine, biopsy left mediastinal nodular area of potential disease and follow-up of her ENT assessment as well as undergo a guardant 360 assessment in our office.  She underwent the biopsy 8/13/2021 found to be consistent with invasive moderately differentiated adenocarcinoma with PD-L1 TPS score 40%.  MRI of the lumbar spine revealed no evidence of metastatic disease though the patient did have multilevel degenerative disease throughout the lumbar spine.  She had an office follow-up with Dr. 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  She underwent laryngoscopy 8/18/2021 with right base of tongue without evidence of lesions or masses in the  region.     She was seen by Dr. Hernandez 8/19/2021 and, her findings, had been presented in lung conference.  Her findings were consistent with 2 separate lesions including a nodule in the superior segment of the right lower lobe and a mass in the upper portion of the left chest with the left chest lesion biopsy positive for adenocarcinoma.  The consensus was that these were to separate-synchronous primaries.  Stereotactic radiation each lesions were felt to be the appropriate way to proceed and the patient was referred to radiation therapy.     The patient is seen in office 8/23/2021 agreeable to the radiation therapy as well as additional assessments including Caris molecular assessment of her biopsy.  Again her guardant 360 is currently pending and we would follow her after she completes radiation therapy with subsequent scans.    She was able to proceed with SBRT to the right lung at primary #1 with 5 treatments at 1000 cGy per fraction in the left lung primary similarly at 1000 cGy per fraction x5.  Her treatment ranged between 8/31-9/13/2021.  She is now scheduled for follow-up 11/23/2021.    The patient subsequent genomic testing is discussed with her as she is is seen back 9/23/2021.  Her guardant 360 did not determine any new abnormalities but her Caris-MI profile-does reveal sensitivity to immunotherapy as well as a BRAF mutation.  This could be extremely important as we follow the patient from this point.  Fortunately she is feeling extremely well and quite pleased about her treatments.    Patient had subsequent PET/CT 11/23/2021 demonstrates decrease in size and avidity of the previously noted intensely FDG avid nodules left lobectomy operative site and right lower lobe.  Surrounding groundglass and pulmonary opacification is consistent with postradiation changes, a few new subcentimeter pulmonary nodules are present in the right upper lobe and indeterminant, stable subcentimeter hepatic lesion is below  PET resolution no other findings of FDG-avid disease are seen in neck abdomen or pelvis.  The patient seen in office 12/1/2021 with a relatively good performance status stating she is not having any new symptoms and very pleased about her results on her PET/CT.  She realizes we'll have to follow this further but subsequent scans in 6 months.  She is also hoping to see an oral surgeon that previously helped her-Dr. Wu.    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  There is evolution of presumed postradiation changes in the right midlung with soft tissue mass within the left lumpectomy operative bed minimally decreased in size.  There is a sub-6 mm nodule in the right upper lobe not definitively seen, indeterminate and an indeterminate left renal lesion at 1.5 cm unchanged from 7/13/2021.  The patient is currently scheduled to see Dr. Dowell on 6/23/2022.  She is feeling well without any additional symptoms.    The patient required admission 10/14 - 10/18/2022 presenting with shortness of breath and cough that was nonproductive.  She had been on oral antibiotics and did not improved and was admitted for therapy for apparent pneumonia.  This eventually led to bronchoscopy after initial CT revealed postsurgical changes, new masslike 6.7 cm area of consolidation anterior left lung raising concern for potential malignancy and a follow-up PET/CT planned.  Bronchoscopy and biopsy left lower lung failed to show evidence of malignancy.  The patient's PET/CT, however, demonstrated an irregular pleural-based soft tissue density thickening in the left upper lobe that was intensely FDG avid new from 6/8/2022 thought to be a malignant recurrence with spread into the left lung parenchyma.  There is intensely pleural-based avidity within the left lower lobe  thought to be metastatic disease with postradiation of the left apex as well.  There is a small focus of uptake in the right hilum grossly unchanged in size and post radiation changes in the right lower lobe.  There is indeterminate density left renal that was grossly unchanged.  The patient is seen back in office 11/15/2022 indicating that she still has chest discomfort that is slowly worsening and that she has a chronic paroxysmal cough but has not improved.  We discussed that she very likely has recurrent disease and plan to proceed with a guardant 360 examination initially as we determine whether we should try to proceed with therapy particularly immunotherapy versus targeted therapy recognizing the above findings.    Patient's guardant 360 returned as again significant for BRAF V600E and the potential use of BRAF inhibitor/MET inhibitor dabrafenib and trametinib.  Additional information [id number] and use of alpelisib.    Unfortunately she required admission 11/28-12/1 with worsening back pain.  Fortunately she did not demonstrate evidence of metastatic disease but did have moderate degenerative changes which could cause radiculopathy.  Medications were altered to include subsequently MSR 15 mg p.o. every 12 hours, Norco as needed.    The patient now presents back 12/14/2022 to initiate therapy- initially with immunotherapy considering Caris study with PD-L1 TPS of 70% on 22 C3 and 28-8 assays.    Patient seen with her  and we discussed pain medications and adjustments thereafter and that she stopped taking MSIR having only a short supply and having to use Norco more frequently.  She is willing to initiate Keytruda today we have discussed that considering her genomics treatment versus her BRAF mutation is also an option.    C1 Keytruda 12/14/2022    Patient is seen back 1/4/2023 in follow-up due for cycle 2 Keytruda.  Patient states that since receiving her first cycle she has had decreased appetite and  decreased energy.  She has not been able to bring herself to eat much and she has notably lost 7 pounds.  She has also been struggling with constipation in relation to ongoing narcotics for pain control.  She has been taking senna S2 tabs twice a day.  We discussed adding daily MiraLAX.    Patient did just last week meet with dietitian, Zoila Clinton, to discuss ways to improve caloric intake.  She has not been able to implement any of these things yet though.    The patient is next seen 1/25/2023 with mild weight gain.  She is seen with family member both indicating that she has actually felt somewhat better in terms of performance status and general function.  We have discussed proceeding with a third cycle treatment and reevaluating by scan in 2 weeks.    The patient proceeded with treatment 1/25/2023 and underwent follow-up CT chest abdomen pelvis 2/8/2023 demonstrating small pericardial effusion that is decreased in size from 11/20/2022, ill-defined consolidation and pleural-based thickening in the left apex and upper lung has reduced slightly, additional index nodule soft tissue in the aspect the pericardium has not changed substantially, no evidence of metastatic disease in the abdomen pelvis.    The patient is seen with her daughter 2/15/2023 both indicating that she has definitely improved, stable weight, appetite and performance status.  She is also using her pain medication much less frequently and wonders whether she might begin to taper from her twice a day MS Contin.    Patient is seen back 3/8/2023 due for ongoing pembrolizumab.  She continues on low-dose prednisone 10 mg daily and has noted significant improvement in her energy and appetite with this.  She is reluctant to taper this any further we discussed that it is fine for her to stay on daily dosing.    She did try to taper her pain medication going down to just MS Contin 15 mg every 12 hours while holding her hydrocodone.  However over the last  few days she has had some intermittent pain in the left upper back alleviated with hydrocodone 2-3 times a day.  She currently is denying any pain.  Monitor.    She is next evaluated 3/29/2023 for ongoing Keytruda.  Evidently her pain medication was sent to the wrong pharmacy and will need to be represcribed today-long-acting MS Contin.  Otherwise she is doing reasonably well at present.    Patient seen 5/31/2023 with gradual development of left shoulder and left scapular pain.  Concurrent CT chest, abdomen and pelvis demonstrate interval increasing consolidation left upper lobe with extension anterior to the left upper ribs with sclerosis anterior left second rib.  This is suspicious for worsening malignancy.  Plans were made for subsequent PET/CT where the patient's pain medications were adjusted.PET/CT 6/5/2023 reveals progression of disease in left upper thorax, left supraclavicular adenopathy new metastasis left posterior fourth ribs, no evidence of metastatic disease in the abdomen or pelvis.    The patient is seen with her son and daughter 6/12/2023 and it is clear that she is not developing a Pancoast like syndrome with progression of her malignancy in the left upper thorax and associated symptoms.  We have discussed discontinuance of her immunotherapy and moving to targeted therapy with dabrafenib and trametinib as we also request radiation therapy repeat consultation and try to manage her pain more effectively.    Patient seen 7/5/2023 with plans to start palliative radiotherapy and to initiate concurrently dabrafenib and trametinib.    As a result of toxicity (nausea and vomiting) the patient was taken off of dabrafenib and trametinib when seen 7/21/2023, given additional IV hydration and further supportive care.  Plans were made for follow-up on recovery to determine as to whether to redose the medications.    Unfortunately she required admission 7/25-30/2023 with failure to thrive.  Subsequent  assessments including blood cultures were negative and patient was treated briefly with IV antibiotic therapy, started PT and OT, medications adjusted for hypotension and treated symptomatically for nausea and vomiting.  She was able to improve enough to be discharged home as she continued radiation therapy started 7/17/2023 and completed 7/31/2023 to the left chest wall.    She is next seen back 8/4/2023.  We have reviewed that she had been on dabrafenib and trametinib at 150 mg p.o. every 12 hours and 2 mg p.o. daily respectively and dosing could be changed considerably such as 50 mg twice daily and 0.5 mg daily.  Determination made to have her undergo repeat scans at 4 weeks and review her response to radiation therapy as we make application for lower dose targeted therapy, addition of Remeron p.o. nightly, tapering of her MS Contin to daily rather than twice daily, use of low-dose Ativan to undergo scans.    Subsequent scans 8/25/2023, fortunately, with stable left apical mass with mediastinal chest wall involvement unchanged 1.4 cm supraclavicular lymph node.  No new findings of metastatic disease.    The patient is seen back 9/8/2023.  Fortunately she is improved dramatically off therapy and is gratified that his studies have not worsened in any substantial way.  We have discussed both her scan reports and echocardiogram that does not show significant reduction of her ejection fraction.  As result of her current stable status we have asked her to restart Mekinist at 0.5 mg daily and Tafinlar at 50 mg twice daily to be advanced as tolerated.  Patient seen 10/16/2023 with follow-up chest x-ray planned and CT of chest abdomen pelvis at 5 weeks -approximately 3 months of therapy.    The patient required admission 11/14 - 11/16/2023 having presented with shortness of breath and found to be pulmonary edema with CBC, lactate and procalcitonin all normal.  There was a concern that her chemotherapy agents could have  produced her cardiomyopathy and these were stopped 11/14/2023.  She was diuresed and echocardiogram demonstrated LV SF mildly decreased at 39.2% with GLS of -11.1%, left ventricular cavity mildly dilated, left ventricular wall thickness consistent with mild concentric hypertrophy, left ventricular global hypokinesis, aortic valve abnormal with moderate calcification, valve was trileaflet, trace tricuspid valve regurgitation with right VSP at less than 35 mmHg.  The findings for LV systolic function, diastolic function and global longitudinal LV strain had all worsened.    CTA of chest 11/14/2023 demonstrating confluent soft tissue mass possibly measuring larger in current study at 6.5 x 5.0 previously 4.9 x 4.8, left supraclavicular node to decrease in size measuring 1.1 cm previously 1.4 cm, extensive bilateral interstitial and groundglass infiltrates.      The patient is seen 11/26/2023.  As per the discharge we are requested to have a BMP and CMP assessed.  She is seen with her son and daughter in a lengthy conversation held about her recent hospitalization with CHF-cardiomyopathy felt elicited, in part, from her targeted therapy with her Mekinist and Tafinlar discontinued altogether.    The patient is relatively stable currently having continued her diuretics and to be seen in cardiology in follow-up.  At the time of this dictation her CBC is found to be essentially normal with mild leukocytosis, CMP normal except for mildly elevated glucose at 125 and BNP reduced to 6126 from 9763.  She feels well with no additional shortness of breath chest pain lower extremity edema.  In reviewing the the possible treatment options she is next best treated with single agent chemotherapy moving to Alimta.    Patient seen 12/4/2023 with plans to initiate Alimta chemotherapy-cycle 1 day 1    The patient required hospitalization 1/18-21/24 presenting with increasing shortness of breath, findings of chronic respiratory failure,  positive for rhinovirus on admission.  She is treated with supportive treatments including for previous COVID infection as well as antibiotic therapies.  She completed 5 days of Augmentin, continue nebulizer and breathing treatments.  Upon discharge she rescheduled appointments though was seen by cardiology 1/23/2024 not felt to be in heart failure, treated supportively for epistaxis.    There was concern as to whether she should continue treatment and she has seen back in office 1/29/2024 to discuss potential options.  She is seen with her son and daughter both indicating that she drops her O2 saturation quite quickly and is very inactive as result of difficulty with rapidly becoming shortness of breath and weak when she tries to move.  This is led to a lengthy conversation about the extent of her disease including both her cardiovascular status and her respiratory status.  She is worsening quickly and is not, currently, a candidate for chemotherapy.    The patient is doing poorly enough that we have had a cristy conversation today about end-of-life concerns.  She is not interested in hospice at this point but hopes to improve further.  The patient had follow-up with pulmonary medicine.  She was seen 2/9/2024 with groundglass infiltrates since November 2023 suggestive of pneumonitis and he had restarted prednisone at 40 mg daily.    A repeat CT of chest 2/26/2024 revealed decreased consolidation in superior segment right lower lobe, stable consolidation in the left upper lobe and apex, stable coarsened interstitial lung opacities elsewhere, stable soft tissue mass along the left upper mediastinum, small pericardial effusion, no pleural effusion, no clear abnormalities in the liver, stable left renal cyst and no acute bony abnormality.    The patient is seen with her son and both indicate that her general performance status has improved.  They are concerned about her response to chemotherapy previously and, though we  have scheduled her for chemotherapy, are not certain they wish to proceed.  Now with her improved scan we could follow her for period of time and then try to reevaluate offering chemotherapy potentially with Alimta.    A follow-up CT scan series 4/19/2024 shows overall progression with enlarged mediastinal hilar lymphadenopathy, creased consolidation apex left upper lobe, new subcentimeter low-density lesion right lobe of the liver, right adrenal nodule, pleural thickening left apex, decreased patchy consolidation apical segment of right lower lobe.    She is seen back formally 4/26/2024.  The progression of her disease was discussed and it was determined that she would use THC Gummies or Marinol to try to improve her appetite before we restart Alimta chemotherapy.  She will be seen back in 3 weeks to possibly start that treatment.    Unfortunately the patient did not improve and found little improvement with THC Gummies.  She is seen by her cardiologist 5/16/2024 clearly having increasing shortness of breath and progressive weakness.  She was seen in the emergency room with no evidence of PE on CTA but interval progression of pulmonary consolidation in the anterior aspect the left upper lobe as well as the right lower lobe.  There is enlargement of soft tissue opacification posterior left mainstem with narrowing the left mainstem bronchus, slightly larger pericardial effusion and loss of a plane in the pericardial/left upper lobe region that would be consistent with localized mediastinal progression.    Patient seen 5/17/2024 after clear progression of symptoms and hospitalization for shortness of breath and failure to thrive.  Follow-up scans have demonstrated progressive disease.  At this point palliative care was felt appropriate.  Past Medical History:   Diagnosis Date    Allergic rhinitis     Aneurysm     Asthma     Cancer of floor of mouth 2014    Cerebral aneurysm     COPD (chronic obstructive pulmonary  disease)     COVID 2020    Esophageal reflux     History of adenocarcinoma of lung     History of anemia     History of carcinoma in situ of skin     History of lung cancer     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 from [facility]    History of squamous cell carcinoma     Tongue    Hyperlipidemia     Hypertension     since early 60s    Intractable back pain 11/29/2022    Low vitamin B12 level     Lung cancer     Systolic CHF, acute 11/14/2023    Thyromegaly     UTI (urinary tract infection)     Vitamin D deficiency         Past Surgical History:   Procedure Laterality Date    BRONCHOSCOPY Bilateral 10/17/2022    Procedure: BRONCHOSCOPY WITH FLUORO with biopsy and BAL;  Surgeon: India Flores MD;  Location: [facility] ENDOSCOPY;  Service: Pulmonary;  Laterality: Bilateral;  PRE/POST - lung mass    CHOLECYSTECTOMY  1999    COLONOSCOPY      EMBOLIZATION CEREBRAL Left 06/29/2022    Procedure: 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;  Surgeon: Bradley Dowell MD;  Location: [facility] HYBRID OR 18/19;  Service: Interventional Radiology;  Laterality: Left;    EYE SURGERY  11/24/2020    Took a muscle out of right eye    GLOSSECTOMY PARTIAL      less than one half tongue    LUNG SURGERY  2012    ORAL LESION EXCISION/BIOPSY      June and August 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    TUBAL ABDOMINAL LIGATION  1970    VENOUS ACCESS DEVICE (PORT) INSERTION N/A 12/12/2022    Procedure: MEDIPORT PLACEMENT;  Surgeon: Jason Villaseñor MD;  Location: [facility] MAIN OR;  Service: Vascular;  Laterality: N/A;        No current facility-administered medications on file prior to encounter.     Current Outpatient Medications on File Prior to Encounter   Medication Sig Dispense Refill    apixaban (ELIQUIS) 2.5 MG tablet tablet Take 1 tablet by mouth 2 (Two) Times a Day. 60 tablet 3    atorvastatin (LIPITOR) 20 MG tablet Take 1 tablet by mouth Every Night. Indications: High Amount of  Fats in the Blood 90 tablet 1    cetirizine (zyrTEC) 10 MG tablet Take 1 tablet by mouth Daily. Indications: Perennial Allergic Rhinitis      Cholecalciferol (Vitamin D3) 50 MCG (2000 UT) tablet Take 1,000 Units by mouth Daily. Indications: Vitamin D Deficiency      Cyanocobalamin (VITAMIN B12 PO) Take 1,000 mcg by mouth Daily. Indications: vitamin b12 deficiency       folic acid (FOLVITE) 1 MG tablet Take 1 tablet by mouth Daily. Start at least 7 days prior to chemotherapy until at least 3 weeks after all chemotherapy. 30 tablet 6    furosemide (LASIX) 40 MG tablet Take 1.5 tablets by mouth Daily. Indications: Cardiac Failure, High Blood Pressure Disorder 45 tablet 2    metoprolol succinate XL (TOPROL-XL) 25 MG 24 hr tablet Take 1 whole tablet in a.m. and half tablet in p.m.  Indications: Cardiac Failure 45 tablet 3    mirtazapine (REMERON) 7.5 MG tablet TAKE 1 TABLET BY MOUTH EVERY NIGHT AT BEDTIME 30 tablet 1    montelukast (SINGULAIR) 10 MG tablet Take 1 tablet by mouth Every Night. Indications: Hayfever, Perennial Allergic Rhinitis 90 tablet 3    predniSONE (DELTASONE) 10 MG tablet TAKE 1 TABLET BY MOUTH EVERY DAY 30 tablet 1    sacubitril-valsartan (Entresto) 24-26 MG tablet Take 0.5 tablets by mouth 2 (Two) Times a Day. Indications: Cardiac Failure 28 tablet 0    zolpidem (AMBIEN) 5 MG tablet TAKE 1 TABLET BY MOUTH EVERY NIGHT AT BEDTIME AS NEEDED FOR SLEEP 30 tablet 0    albuterol (PROVENTIL) (2.5 MG/3ML) 0.083% nebulizer solution Inhale the contents of 1 vial by nebulization Every 4 (Four) Hours As Needed for Wheezing. 90 mL 0    arformoterol (BROVANA) 15 MCG/2ML nebulizer solution USE 2 ML VIA NEBULIZER TWICE DAILY      budesonide (PULMICORT) 0.5 MG/2ML nebulizer solution INHALE 2 MLS VIA NEBULIZER EVERY 12 HOURS      budesonide 0.5 MG/2ML suspension 0.5 mg, arformoterol 15 MCG/2ML nebulizer solution 15 mcg Inhale 1 nebule 2 (Two) Times a Day.      dronabinol (Marinol) 2.5 MG capsule Take 1 capsule by  mouth 2 (Two) Times a Day Before Meals. 60 capsule 0    empagliflozin (Jardiance) 10 MG tablet tablet Take 1 tablet by mouth Daily. 30 tablet 5    ipratropium (ATROVENT) 0.06 % nasal spray 2 sprays into the nostril(s) as directed by provider 4 (Four) Times a Day. 15 mL 0    ipratropium-albuterol (DUO-NEB) 0.5-2.5 mg/3 ml nebulizer Inhale the contents of 1 vial by nebulization 2 (Two) Times a Day As Needed for Wheezing. 180 mL 0    O2 (OXYGEN) Inhale 3 L/min Continuous.      pantoprazole (PROTONIX) 40 MG EC tablet TAKE 1 TABLET BY MOUTH EVERY MORNING 30 tablet 2    potassium chloride (K-DUR,KLOR-CON) 10 MEQ CR tablet Take 2 tablets by mouth Daily. 1 tablet in the PM  Indications: Low Amount of Potassium in the Blood 90 tablet 2    predniSONE (DELTASONE) 20 MG tablet TAKE 2 TABLETS BY MOUTH DAILY. DO NOT. STOP ABRUPTLY          ALLERGIES:    Allergies   Allergen Reactions    Sulfa Antibiotics Rash        Social History     Socioeconomic History    Marital status:    Tobacco Use    Smoking status: Former     Current packs/day: 0.00     Types: Cigarettes     Quit date: 2015     Years since quittin.2     Passive exposure: Never    Smokeless tobacco: Never    Tobacco comments:     less than a pack per day, no smoking since , Quit 2015   Vaping Use    Vaping status: Never Used   Substance and Sexual Activity    Alcohol use: Not Currently     Comment: Socially -A few times a month    Drug use: No    Sexual activity: Defer     Family History   Problem Relation Age of Onset    Ovarian cancer Mother          at age 27    No Known Problems Father     Ovarian cancer Sister     Colon cancer Brother     No Known Problems Other     Malig Hyperthermia Neg Hx         Review of Systems  ROS as per HPI     Objective      Vitals:    24 0725 24 0826 24 0835 24 1250   BP: 107/59   108/53   BP Location: Right arm   Right arm   Patient Position: Lying   Lying   Pulse: 119 119 114 120    Resp: 18 20 20 20   Temp: 97.5 °F (36.4 °C)   97.9 °F (36.6 °C)   TempSrc: Oral   Oral   SpO2: 97% 99% 100% 97%   Weight:       Height:                         4/26/2024    11:45 AM   Current Status   ECOG score 0      Physical Exam  Vitals reviewed.   Constitutional:       General: She is not in acute distress.     Appearance: Normal appearance. She is well-developed.   HENT:      Head: Normocephalic and atraumatic.      Mouth/Throat:      Pharynx: No oropharyngeal exudate.   Eyes:      Pupils: Pupils are equal, round, and reactive to light.   Cardiovascular:      Rate and Rhythm: Normal rate and regular rhythm.      Heart sounds: Normal heart sounds. No murmur heard.  Pulmonary:      Effort: Pulmonary effort is normal. No respiratory distress.      Breath sounds: No wheezing, rhonchi or rales.      Comments: Decreased breath sounds throughout, occasional rales  Abdominal:      General: Bowel sounds are normal. There is no distension.      Palpations: Abdomen is soft.   Musculoskeletal:         General: No swelling. Normal range of motion.      Cervical back: Normal range of motion.   Skin:     General: Skin is warm and dry.      Findings: No rash.   Neurological:      Mental Status: She is alert and oriented to person, place, and time.         Physical exam preformed and reviewed. No changes noted from previous exam. Henry Escobar MD ; 05/16/2024      RECENT LABS:  Results from last 7 days   Lab Units 05/17/24  0643 05/16/24  1006   WBC 10*3/mm3 14.57* 14.49*   NEUTROS ABS 10*3/mm3  --  13.44*   HEMOGLOBIN g/dL 12.8 12.7   HEMATOCRIT % 39.0 38.7   PLATELETS 10*3/mm3 241 267                           Assessment & Plan     1.  Carcinoma of the lung  May 2015, status post previous left upper lobectomy for carcinoma of the lung.    2.  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    She underwent laryngoscopy 8/18/2021 with right base of tongue without evidence of lesions or masses in the region.    3.  Moderately differentiated adenocarcinoma of the lung.  CT of the chest 6/16/2021 demonstrated postsurgical changes, 8 mm irregular nodule medial segment of right lower lobe and diffuse changes of emphysema.    She is seen in thoracic clinic with findings suspicious for new malignancy and concern of the patient being a poor operative candidate.    7/13/2021 PET CT demonstrated ill-defined avidity and soft tissue left aspect of the mediastinum, 1 cm hypermetabolic pulmonary nodule and asymmetric thickening involving the right base of tongue as well as subtle uptake in the L1 vertebral body.    This patient's case was discussed in thoracic clinic and plans were made to request an MRI of the lumbar spine, obtain a biopsy of the mediastinal involvement of the left had the patient reviewed by ENT.  Biopsy 8/13/2021 found to be consistent with invasive moderately differentiated adenocarcinoma with PD-L1 TPS score 40%.    7/24/2021 MRI of the lumbar spine revealed no evidence of metastatic disease though the patient did have multilevel degenerative disease throughout the lumbar spine.    Her findings were consistent with 2 separate lesions including a nodule in the superior segment of the right lower lobe and a mass in the upper portion of the left chest with the left chest lesion biopsy positive for adenocarcinoma.  The consensus was that these were to separate-synchronous primaries.  Stereotactic radiation each lesions were felt to be the appropriate way to proceed and the patient was referred to radiation therapy.  The patient was referred for radiation therapy and she was able to proceed with SBRT to the right lung at primary #1 with 5 treatments at 1000 cGy per fraction in the left lung primary similarly at 1000 cGy per fraction x5.  Her treatment ranged between 8/31-9/13/2021.    Her guardant  360 did not determine any new abnormalities but her Caris-MI profile-does reveal sensitivity to immunotherapy as well as a BRAF mutation.  PET/CT 11/23/2021 demonstrates decrease in size and avidity of the previously noted intensely FDG avid nodules left lobectomy operative site and right lower lobe.  Surrounding groundglass and pulmonary opacification is consistent with postradiation changes, a few new subcentimeter pulmonary nodules are present in the right upper lobe and indeterminant, stable subcentimeter hepatic lesion is below PET resolution no other findings of FDG-avid disease are seen in neck abdomen or pelvis.  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  There is evolution of presumed postradiation changes in the right midlung with soft tissue mass within the left lumpectomy operative bed minimally decreased in size.  There is a sub-6 mm nodule in the right upper lobe not definitively seen, indeterminate and an indeterminate left renal lesion at 1.5 cm unchanged from 7/13/2021.  Admission 10/14 - 10/18/2022 presenting with shortness of breath and cough that was nonproductive.  She had been on oral antibiotics and did not improved and was admitted for therapy for apparent pneumonia.  This eventually led to bronchoscopy after initial CT revealed postsurgical changes, new masslike 6.7 cm area of consolidation anterior left lung raising concern for potential malignancy and a follow-up PET/CT planned.   Bronchoscopy and biopsy left lower lung failed to show evidence of malignancy.    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  There is intensely pleural-based avidity within the left lower lobe thought to  be metastatic disease with postradiation of the left apex as well.  There is a small focus of uptake in the right hilum grossly unchanged in size and post radiation changes in the right lower lobe.  There is indeterminate density left renal that was grossly unchanged.    Patient's guardant 360 returned as again significant for BRAF V600E and the potential use of BRAF inhibitor/MET inhibitor dabrafenib and trametinib.  Additional information [id number] and use of alpelisib.  The patient now presents back 12/14/2022 to initiate therapy- initially with immunotherapy considering Caris study with PD-L1 TPS of 70% on 22 C3 and 28-8 assays.  Single agent Keytruda initiated 12/14/2022 2/8/2023 CT of the chest, abdomen pelviswith consolidation and masslike pleural thickening in the left apex and upper lung index areas have decreased somewhat.  There is no evidence of metastatic disease otherwise and a few indeterminate left renal lesions are stable.  After lengthy discussion with the patient and her daughter as to the stability to mild improvement with immunotherapy it is agreed that we would continue treatment at this point.  Proceed today, 4/19/2023 with cycle 7 pembrolizumab which is continued every 3 weeks  The patient proceeded to 8 cycles of pembrolizumab and underwent follow-up chest CT chest, abdomen pelvis revealing evolution of dense consolidation left upper lobe and extension of soft tissue mass anterior to left upper ribs with increase sclerosis anterior left second rib.  This was concerning for progression of disease versus radiation change and the patient was scheduled for subsequent PET/CT.  PET/CT 6/5/2023  reveals progression of disease in left upper thorax, left supraclavicular adenopathy new metastasis left posterior fourth ribs, no evidence of metastatic disease in the abdomen or pelvis.  Patient seen 6/12/2023 with plans to move her Norco to every 4 hours, discontinue Keytruda, make application for  dabrafenib and trametinib at 150 mg p.o. every 12 hours and 2 mg p.o. daily respectively.  Patient referred for radiation therapy consultation concurrently.  Last dose of Keytruda 5/31/2023.  6/23/2023: Patient seen for triage visit.  She has not yet started dabrafenib or trametinib, she has not yet received the medications.  Unfortunately she has been experiencing uncontrolled nausea/vomiting as further detailed below.   Patient seen 6/27/2023 reporting that her nausea has resolved.  She was unable to complete the MRI of the brain however Dr. Escobar did review the images patient did have and there was no evidence of edema or metastatic disease.  Patient can start her new oral medication once she receives the medication.  Patient seen 7/5/2023 with plans to start palliative radiotherapy x10 fractions and initiate dabrafenib and trametinib as soon as medications received.  7/17/2023 patient initiated radiation with 10 fractions planned.  Patient has received dabrafenib and trametinib.  Brought in for triage visit due to nausea associated with dosing.  She is premedicating with ondansetron however only waiting 15 minutes.  We discussed today that she needs to wait at least 30 minutes to 1 hour prior to taking the dabrafenib and trametinib.  The patient will do so.  We discussed she could also take this again if she feels the need 8 hours later for nausea control.  If she is having breakthrough nausea then she should call our office.  I have encouraged her to utilize electrolyte drinks.  She will get 1L normal liter normal saline in the office today.She was not nauseas while in the office so we did not give additional nausea medication.  We will have her return in 1 week repeat labs, review by nurse practitioner, and possible IV fluids.  I stressed the importance of calling sooner if she finds that the premedication is not controlling her nausea at which time we would have her come in for additional IV fluids and  evaluation.  She is continuing daily radiation this week.  Case was discussed with Dr. Escobar today.  7/21/2023: Patient returns for short interval follow-up due to very poor appetite and sleeping the majority of the day.  Her nausea has been improved with premedicating 30 minutes prior to dabrafenib and trametinib.  She however has been sleeping the majority of the day and is not eating when she is awake.  She does report taking ensures but she is only sipping on these.  Have given her samples of some of the office today and encouraged her to drink when while she is here.  Her performance status continues to decline and her daughter states that she was fixing dinner nightly just 2 weeks ago.  With performance status of 3 today I discussed the case with Dr. Escobar and we will hold her dabrafenib and  TRAMETINIB.  Her liver enzymes are elevated today as well.  We will monitor this next week and have her evaluated by Dr. Escobar at which time we could consider restarting her dabrafenib and trametinib at reduced doses pending performance status and laboratory evaluation.  Unfortunately she required admission 7/25-30/2023 with failure to thrive.  Subsequent assessments including blood cultures were negative and patient was treated briefly with IV antibiotic therapy, started PT and OT, medications adjusted for hypotension and treated symptomatically for nausea and vomiting.  She was able to improve enough to be discharged home as she continued radiation therapy started 7/17/2023 and completed 7/31/2023 to the left chest wall.  She is next seen back 8/4/2023.  Lengthy discussion as to reevaluation   Plans made for CT chest, abdomen, pelvis in 4 weeks  Patient seen 8/21/2023 with complaints of worsening fatigue and shortness of breath.  Patient scheduled for follow up CT scans this Friday. Lungs clear on exam,  Sats 97%.  Hgb stable at 11.7.  Will check BNP today.  Discussed may be related to disease and will need to see  what scan shows.   Subsequent scans 8/25/2023, fortunately, with stable left apical mass with mediastinal chest wall involvement unchanged 1.4 cm supraclavicular lymph node.  No new findings of metastatic disease.  The patient is seen back 9/8/2023.  Fortunately she is improved dramatically off therapy and is gratified that his studies have not worsened in any substantial way.  We have discussed both her scan reports and echocardiogram that does not show significant reduction of her ejection fraction.  As result of her current stable status we have asked her to restart Mekinist at 0.5 mg daily and Tafinlar at 50 mg twice daily  9/22/2023 tolerating Mekinist 0.5 mg daily and tafinlar 50 mg twice daily quite well. Continues on prednisone 10 mg daily which will now be reduced to 5 mg daily when seen 10/16/2023  Plans made to continue current dosing of Mekinist and Tafinlar and repeat evaluation with chest x-ray 10/16 and repeat CT chest abdomen pelvis in 5 weeks, MD in 6 weeks.  The patient required admission 11/14 - 11/16/2023 having presented with shortness of breath and found to be pulmonary edema with CBC, lactate and procalcitonin all normal.  There was a concern that her chemotherapy agents could have produced her cardiomyopathy and these were stopped 11/14/2023.  She was diuresed and echocardiogram demonstrated LV SF mildly decreased at 39.2% with GLS of -11.1%, left ventricular cavity mildly dilated, left ventricular wall thickness consistent with mild concentric hypertrophy, left ventricular global hypokinesis, aortic valve abnormal with moderate calcification, valve was trileaflet, trace tricuspid valve regurgitation with right VSP at less than 35 mmHg.  The findings for LV systolic function, diastolic function and global longitudinal LV strain had all worsened.  CTA of chest 11/14/2023 demonstrating confluent soft tissue mass possibly measuring larger in current study at 6.5 x 5.0 previously 4.9 x 4.8, left  supraclavicular node to decrease in size measuring 1.1 cm previously 1.4 cm, extensive bilateral interstitial and groundglass infiltrates.  The patient is seen 11/26/2023.  As per the discharge we are requested to have a BMP and CMP assessed.  She is seen with her son and daughter in a lengthy conversation held about her recent hospitalization with CHF-cardiomyopathy felt elicited, in part, from her targeted therapy with her Mekinist and Tafinlar discontinued altogether.  The patient is relatively stable currently having continued her diuretics and to be seen in cardiology in follow-up.  At the time of this dictation her CBC is found to be essentially normal with mild leukocytosis, CMP normal except for mildly elevated glucose at 125 and BNP reduced to 6126 from 9763.  She feels well with no additional shortness of breath chest pain lower extremity edema.  In reviewing the the possible treatment options she is next best treated with single agent chemotherapy moving to Alimta.  Patient proceeded through teaching and presents back 12/4/2023 to initiate chemotherapy with Alimta-cycle 1 day 1  Seen 12/18/2023 for toxicity check, overall has tolerated well.  Continues to follow closely with cardiology.  The patient required hospitalization 1/18-21/24 presenting with increasing shortness of breath, findings of chronic respiratory failure, positive for rhinovirus on admission.  She is treated with supportive treatments including for previous COVID infection as well as antibiotic therapies.  She completed 5 days of Augmentin, continue nebulizer and breathing treatments.  Upon discharge she rescheduled appointments though was seen by cardiology 1/23/2024 not felt to be in heart failure, treated supportively for epistaxis.  There was concern as to whether she should continue treatment and she has seen back in office 1/29/2024 to discuss potential options.  She is seen with her son and daughter both indicating that she drops  her O2 saturation quite quickly and is very inactive as result of difficulty with rapidly becoming shortness of breath and weak when she tries to move.  This is led to a lengthy conversation about the extent of her disease including both her cardiovascular status and her respiratory status.  She is worsening quickly and is not, currently, a candidate for chemotherapy.  The patient is doing poorly enough that we have had a cristy conversation today-1/29/2024 about end-of-life concerns.  She is not interested in hospice at this point but hopes to improve further.  At this point holding chemotherapy.  She was seen 2/9/2024 with groundglass infiltrates since November 2023 suggestive of pneumonitis and he had restarted prednisone at 40 mg daily.  A repeat CT of chest 2/26/2024 revealed decreased consolidation in superior segment right lower lobe, stable consolidation in the left upper lobe and apex, stable coarsened interstitial lung opacities elsewhere, stable soft tissue mass along the left upper mediastinum, small pericardial effusion, no pleural effusion, no clear abnormalities in the liver, stable left renal cyst and no acute bony abnormality  The patient is seen with her son and both indicate that her general performance status has improved.  They are concerned about her response to chemotherapy previously and, though we have scheduled her for chemotherapy, are not certain they wish to proceed.  Now with her improved scan we could follow her for period of time and then try to reevaluate offering chemotherapy potentially with Alimta.  Patient seen next 4/26/2024 with recent imaging demonstrating progression of disease.  Patient remains a candidate, potentially, for Alimta though it is hoped that her performance status to improve before that started.  THC Gummies were initiated versus Marinol.  Patient been to 5/17/2024 with worsening shortness of breath, poor appetite and back pain.  Scans demonstrated progression of  disease.  Plan of care consultation pursued and transfer to palliative care initiated.      4.  Worsening back pain  Admission 11/28/2022 through 12/1/2022.  MRI of the cervical and thoracic spine fortunately failed to demonstrate metastatic disease.  Moderate degenerative changes noted which could cause radiculopathy.  Medication altered to include MS Contin 15 mg every 12 hours and Norco for breakthrough pain  She reports today her pain is adequately controlled  Patient with increasing pain 5/31/2023 with pain level of 6.  MS Contin was increased to 50 mg p.o. every 8 hours and Norco 10/325 #101 p.o. every 6 hours to move to every 4 hours as needed-E scribed to pharmacy  Patient seen 6/12/2023 with Norco moved to every 4 hours.  6/23/2023: Seen for triage visit.  Has been using oxycodone 20 mg every 3 hours as needed for pain.  Reports she has not been using the hydrocodone 10/325.  Was experiencing dizziness, so reduced her oxycodone use to half a tablet every 3 hours as needed.  Reports pain is uncontrolled during the night so she is having to wake at night time to take pain medicine.  They are questioning resuming long-acting pain medication, as she is waking throughout the night to take pain medicine.  She has already been prescribed MS Contin and has this available at home, did let her know it would be okay to resume this.  Assess 7/5/2023 with pain reduced to 2/10.  Plan to continue current therapy  Darlene Pfeiffer reports a pain score of .  Given her pain assessment as noted, treatment options were discussed and the following options were decided upon as a follow-up plan to address the patient's pain: continuation of current treatment plan for pain. Currently not requiring pain medication.   Refilled both the patient's Remeron and Ambien 10/16/2023  12/18/2023 requesting a refill of her Ambien.    5.  Poor appetite and malnutrition  Placed on prednisone 10 mg daily 1/4/2023 with significant improvement in her  symptoms  Weight is stable today at just below 106 pounds  Patient assessed 6/12/23 with possible gummy therapy.  Stable performance status including weight and appetite 7/5/2023  Weight has declined as of 7/17/2023 due to nausea and vomiting that started this past weekend.  After further discussion the patient did stop her prednisone 10 mg while she was not feeling well.  We discussed today the importance of continuing this as it can help with her nausea and appetite and therefore she will restart tomorrow.  7/21/2023: Weight is stable today though albumin is declining at 3.  Patient is sleeping the majority of the day and not eating.  Yesterday she had a small piece of chicken and that is all.  She states she is drinking ensures however her daughter thinks that she is just sipping on these and not completing them.  Hopefully holding her dabrafenib and trametinib will be helpful with her being awake more and appetite.  I encouraged her to make sure she is taking her prednisone daily at 10 mg daily.  Remeron 7.5 mg p.o. nightly E scribed to pharmacy  9/22/2023 weight is up to 98 pounds.  Further improvement in weight 10/16/2020 3 to 104 pounds, continue Remeron at current dose, prednisone reduced to 5 mg daily.  Patient placed on prednisone from pulmonary medicine for pneumonitis, seen 3/1/2420 mg daily, requesting continuance of this over the next month and then drop to 10 mg over 2-week, then 2 5 mg over 2 weeks at which time she will be seen back after repeat scans.  Patient seen 4/26/2024, THC Gummies versus Marinol initiated.  Readmission 5/17/2020 with progression of disease, palliative care initiated    Plan:  *Palliative care consultation, transfer to palliative care floor today.  *Palliative care order set placed

## 2024-05-17 NOTE — PROGRESS NOTES
[facility]    Patient Name: Darlene Pfeiffer  :1940  83 y.o.  LOS: 0  Encounter Provider: Sukh Tolliver MD      Patient Care Team:  Kehrer, Meredith Lea, MD as PCP - General (Family Medicine)  Linker, Arias BROWN III, MD as Referring Physician (Thoracic Surgery)  Henry Escobar MD as Consulting Physician (Hematology and Oncology)  Zoila Clinton RD, SALONI as Dietitian (Nutrition)  Nguyễn Bran MD as Consulting Physician (Radiation Oncology)  Claire Orr MD as Consulting Physician (Cardiology)      Chief complaint: Cardio oncology care, hypoxia, CAD, paroxysmal atrial fibrillation, cardiomyopathy     History of Present Illness:    Patient is a 83-year-old female with a history of multiple cancers (lung cancer, adenocarcinoma of the tongue), HFrEF (35-40% likely chemo induced), COPD, hypertension, hyperlipidemia, intracranial aneurysm left PCA, asthma and coronary artery calcification.      Summary of cardiovascular history:  - 2015: patient underwent left upper lobectomy.    - 2016: she was diagnosed with tongue cancer underwent resection.  No evidence of recurrence.    - 2021: she was found to have adenocarcinoma in 2 different regions.  She completed radiation to the left chest on 2021.    - 10/2022: she was admitted with pneumonia   - 2022: abnormal PET scan felt to be suggestive of metastatic changes left apex.    - 2022 she started Keytruda.    - 2023: evidence of progressive disease in the left upper thorax and ribs.  Therefore this was discontinued and she was to do with palliative radiation therapy 2023.    - 2023: echo showed normal systolic function and she was changed to dabrafenib and trametinib.   - 2023: admitted with heart failure and found to have developed cardiomyopathy with an ejection fraction 39.2% GLS -11.1% with global hypokinesis with aortic valve calcification with trivial valve regurgitation normal right-sided pressures. She was started on  "goal-directed medical therapy for heart failure which was limited by hypotension.  Following this both chemotherapeutic agents were discontinued and she started Alimta on 12/4/2023.    - 12/2023: she developed atrial fibrillation with rapid rates.  Toprol was increased and she was started on low-dose Eliquis.  She had a preordered coronary CT angiogram which showed an ejection fraction of 27% with global hypokinesis with evidence of multivessel coronary artery disease most significant in the with probable significant ostial RCA stenosis.  Later that month pt developed hypoxia and COVID with superimposed bacterial pneumonia.    - 1/2024: re-admitted with acute respiratory failure and rhinovirus infection with exacerbation of heart failure treated medically.  Discharged home on O2 at 4 L.    - 3/2024: repeat echo showed LVEF 45 to 50%, GLS also improved to -16.8% with indeterminate diastolic function RVSP was 33 mmHg.      Pt was admitted for dyspnea, malaise and dizziness.     Interval History: No acute events, CTA chest yesterday showed no PE but was concerning for cancer progression. Oncology recommended hospice and patient has agreed to the plan with comfort-centered approach.       Objective   Vital Signs  Temp:  [97.5 °F (36.4 °C)-98.5 °F (36.9 °C)] 97.9 °F (36.6 °C)  Heart Rate:  [] 120  Resp:  [16-20] 20  BP: ()/(49-89) 108/53  No intake or output data in the 24 hours ending 05/17/24 1439  Flowsheet Rows      Flowsheet Row First Filed Value   Admission Height 160 cm (63\") Documented at 05/16/2024 1515   Admission Weight 42.6 kg (94 lb) Documented at 05/16/2024 1515              Vitals and nursing note reviewed.   Constitutional:       Appearance: Not in distress. Frail. Chronically ill-appearing.   Pulmonary:      Effort: Pulmonary effort is normal.   Cardiovascular:      PMI at left midclavicular line. Normal rate. Regular rhythm.      Murmurs: There is no murmur.   Edema:     Peripheral edema " "absent.   Abdominal:      General: Bowel sounds are normal. There is no distension.           Pertinent Test Results:  Results from last 7 days   Lab Units 05/17/24  0643 05/16/24  1006   SODIUM mmol/L 139 136   POTASSIUM mmol/L 3.3* 4.5   CHLORIDE mmol/L 96* 93*   CO2 mmol/L 32.0* 35.2*   BUN mg/dL 33* 40*   CREATININE mg/dL 0.53* 0.78   GLUCOSE mg/dL 129* 216*   CALCIUM mg/dL 8.4* 9.3   AST (SGOT) U/L 20 16   ALT (SGPT) U/L 14 15     Results from last 7 days   Lab Units 05/16/24  1803 05/16/24  1552   HSTROP T ng/L 106* 98*     Results from last 7 days   Lab Units 05/17/24  0643 05/16/24  1006   WBC 10*3/mm3 14.57* 14.49*   HEMOGLOBIN g/dL 12.8 12.7   HEMATOCRIT % 39.0 38.7   PLATELETS 10*3/mm3 241 267         Results from last 7 days   Lab Units 05/17/24  0643   MAGNESIUM mg/dL 2.1           Invalid input(s): \"LDLCALC\"  Results from last 7 days   Lab Units 05/16/24  1006   PROBNP pg/mL 3,983.0*               Medication Review:   apixaban, 10 mg, Oral, Q12H   Followed by  [START ON 5/24/2024] apixaban, 5 mg, Oral, Q12H  arformoterol, 15 mcg, Nebulization, BID - RT  budesonide, 0.5 mg, Nebulization, BID - RT  metoprolol succinate XL, 25 mg, Oral, Q24H  mirtazapine, 7.5 mg, Oral, Nightly  pantoprazole, 40 mg, Oral, QAM  potassium chloride, 40 mEq, Oral, Q4H  predniSONE, 10 mg, Oral, Daily  sodium chloride, 10 mL, Intravenous, Q12H              Assessment & Plan     Active Hospital Problems    Diagnosis  POA    **Dyspnea [R06.00]  Yes    Acute on chronic HFrEF (heart failure with reduced ejection fraction) [I50.23]  Yes    Paroxysmal atrial fibrillation [I48.0]  Yes    Lung cancer [C34.90]  Yes    Chronic obstructive pulmonary disease [J44.9]  Yes    Hyperlipidemia [E78.5]  Yes    Essential hypertension [I10]  Yes      Resolved Hospital Problems   No resolved problems to display.        1.  Recurrent worsening dyspnea and cough/Lung cancer. History of recurrent respiratory compromise with COVID and pneumonia and " infection 12/28/2023  with rhinovirus and bacterial pneumonia 1/18/2024.  Euvolemic on exam. CT chest negative for PE but showed likely tumor progression which likely accounts for her symptoms. She has now enrolled in hospice.  2.  HFrEF.  Ejection fraction 39% in 11/2023 in the setting of atrial fibrillation.  Now improved in 3/2024 to 45-50% with improvement and global longitudinal LV strain.      Goal-directed medical therapy:  - ionotrope: N/A  - BB: on Toprol XL 25 daily  - ACE/ARB/ARNI: on Entresto 24-26 bid at home  - MRA: not on  - SGLT2 inhibitor: on Jardiance 10 daily at home  - loop diuretic: on Lasix 60 daily at home    Most meds are held given low BP, plan to discontinue most hospice. No need for repeat testing at this time.    3.  Paroxysmal atrial fibrillation.  As above.  Maintaining sinus rhythm on metoprolol and tolerating Eliquis (dose increased for DVT).   4.  Multivessel CAD.  Troponin was elevated in the setting of heart failure and infection.  Previously deemed not be a surgical candidate and angioplasty/percutaneous intervention was felt to be very high risk.    6.  History of hypertension.   8.  History of cerebral aneurysm.    9.  Dyslipidemia: On atorvastatin 20 daily at home, may discontinue given goals of care  10.  COPD/asthma    Thank you for involving us in the care of Ms. Pfeiffer.  Cardiology will sign off at this time but please do not hesitate to reach back out to us if additional questions arise.    Sukh Tolliver MD  [facility]  05/17/24  14:39 EDT

## 2024-05-17 NOTE — PLAN OF CARE
Problem: Adult Inpatient Plan of Care  Goal: Absence of Hospital-Acquired Illness or Injury  Intervention: Identify and Manage Fall Risk  Recent Flowsheet Documentation  Taken 5/17/2024 0600 by Mk Llamas RN  Safety Promotion/Fall Prevention:   safety round/check completed   room organization consistent   nonskid shoes/slippers when out of bed   lighting adjusted   fall prevention program maintained   clutter free environment maintained   assistive device/personal items within reach  Taken 5/17/2024 0400 by Mk Llamas RN  Safety Promotion/Fall Prevention:   safety round/check completed   room organization consistent   nonskid shoes/slippers when out of bed   lighting adjusted   fall prevention program maintained   clutter free environment maintained   assistive device/personal items within reach  Taken 5/17/2024 0208 by Mk Llamas RN  Safety Promotion/Fall Prevention:   room organization consistent   safety round/check completed   nonskid shoes/slippers when out of bed   lighting adjusted   fall prevention program maintained   clutter free environment maintained   assistive device/personal items within reach  Taken 5/17/2024 0000 by Mk Llamas RN  Safety Promotion/Fall Prevention:   safety round/check completed   room organization consistent   nonskid shoes/slippers when out of bed   lighting adjusted   fall prevention program maintained   clutter free environment maintained   assistive device/personal items within reach  Taken 5/16/2024 2237 by Mk Llamas, RN  Safety Promotion/Fall Prevention:   safety round/check completed   room organization consistent   nonskid shoes/slippers when out of bed   lighting adjusted   fall prevention program maintained   clutter free environment maintained   assistive device/personal items within reach   Goal Outcome Evaluation:  Plan of Care Reviewed With: patient           Outcome Evaluation: AOx4, AFiB on  telemetry monitor, O2 at 3-4 lpm via NC, w/ chest port but not access, assist x1 to bedside commode, c/o pain -PRN medication given, no c/o sob and distress, bed alarm on, call light within reach.

## 2024-05-17 NOTE — PLAN OF CARE
Goal Outcome Evaluation:     Problem: Malnutrition  Goal: Improved Nutritional Intake  Outcome: Ongoing, Progressing      Boost TID, discussed food preferences

## 2024-05-17 NOTE — PROGRESS NOTES
[facility]     Mar/chart reviewed  Follow up lung cancer, copd  Some cough and shortness of breath    Vital Sign Min/Max for last 24 hours  Temp  Min: 97.5 °F (36.4 °C)  Max: 98.5 °F (36.9 °C)   BP  Min: 99/49  Max: 140/63   Pulse  Min: 84  Max: 116   Resp  Min: 16  Max: 18   SpO2  Min: 92 %  Max: 99 %   Flow (L/min)  Min: 3  Max: 4   Weight  Min: 42.6 kg (94 lb)  Max: 43.5 kg (96 lb)     Appears ill axox3,   perrl, eomi, normal sclera  mmm, no jvd, trachea midline, neck supple,  chest decreased ae bilaterally, no crackles, no wheezes,   rrr,   soft, nt, nd +bs,  no c/c/ e  Skin warm, dry no rashes    Labs: 5/17: reviewed:  Wbc 14  Hgb 12.8  Ptls 241    Ct chest: 5/16: reviewed: extensive emphysema and inifiltrates. As per dictated report    A/P:  Metastatic cancer -- invading critical structures,  This appears terminal to me unless oncology has something to offer.  I suspect she is at high risk for deterioration of some of these critical structures even if she had some response to oncologic therapy.  COPD -- she has very extensive emphysema on ct -- continue bronchodilators  CAD  Afib  Chronic systolic chf    Agree with prior pulmonary MD that palliative care is appropriate. Limited amount that can be offered from a pulmonary standpoint.   Patient is new to me today

## 2024-05-17 NOTE — PROGRESS NOTES
Name: Darlene Pfeiffer ADMIT: 2024   : 1940  PCP: Kehrer, Meredith Lea, MD    MRN: [id number] LOS: 0 days   AGE/SEX: 83 y.o. female  ROOM: [facility]     Subjective   Subjective   Resting in bed, appears comfortable but chronically ill.  She does not have any complaints at the time.   voices concern that she has almost no appetite/has lost a significant amount of weight.       Objective   Objective   Vital Signs  Temp:  [97.5 °F (36.4 °C)-98.2 °F (36.8 °C)] 97.9 °F (36.6 °C)  Heart Rate:  [] 91  Resp:  [16-20] 20  BP: ()/(49-86) 108/53  SpO2:  [92 %-100 %] 97 %  on  Flow (L/min):  [3-4] 3.5;   Device (Oxygen Therapy): nasal cannula  Body mass index is 16.65 kg/m².  Physical Exam  Constitutional:       General: She is not in acute distress.     Appearance: She is ill-appearing.      Comments: Cachectic   Cardiovascular:      Rate and Rhythm: Normal rate and regular rhythm.   Pulmonary:      Effort: Pulmonary effort is normal. No respiratory distress.      Breath sounds: Normal breath sounds.   Abdominal:      General: Abdomen is flat.      Palpations: Abdomen is soft.      Tenderness: There is no abdominal tenderness.   Musculoskeletal:         General: No swelling or tenderness.      Right lower leg: No edema.      Left lower leg: No edema.      Comments: Left upper extremity edema   Skin:     General: Skin is warm and dry.      Coloration: Skin is pale.   Neurological:      General: No focal deficit present.      Mental Status: She is alert and oriented to person, place, and time. Mental status is at baseline.         Results Review     I reviewed the patient's new clinical results.  Results from last 7 days   Lab Units 24  0643 24  1006   WBC 10*3/mm3 14.57* 14.49*   HEMOGLOBIN g/dL 12.8 12.7   PLATELETS 10*3/mm3 241 267     Results from last 7 days   Lab Units 24  0643 24  1006   SODIUM mmol/L 139 136   POTASSIUM mmol/L 3.3* 4.5   CHLORIDE mmol/L 96* 93*   CO2  mmol/L 32.0* 35.2*   BUN mg/dL 33* 40*   CREATININE mg/dL 0.53* 0.78   GLUCOSE mg/dL 129* 216*   Estimated Creatinine Clearance: 56 mL/min (A) (by C-G formula based on SCr of 0.53 mg/dL (L)).  Results from last 7 days   Lab Units 05/17/24  0643 05/16/24  1006   ALBUMIN g/dL 3.0* 3.2*   BILIRUBIN mg/dL 0.4 0.4   ALK PHOS U/L 91 105   AST (SGOT) U/L 20 16   ALT (SGPT) U/L 14 15     Results from last 7 days   Lab Units 05/17/24  0643 05/16/24  1006   CALCIUM mg/dL 8.4* 9.3   ALBUMIN g/dL 3.0* 3.2*   MAGNESIUM mg/dL 2.1  --    PHOSPHORUS mg/dL 2.4*  --        COVID19   Date Value Ref Range Status   05/17/2024 Not Detected Not Detected - Ref. Range Final   01/18/2024 Not Detected Not Detected - Ref. Range Final     Hemoglobin A1C   Date/Time Value Ref Range Status   05/17/2024 0643 7.20 (H) 4.80 - 5.60 % Final       Duplex Venous Upper Extremity - Bilateral CAR    Acute left upper extremity deep vein thrombosis noted in the internal   jugular and subclavian.    All other vessels appear normal.    Scheduled Medications  apixaban, 10 mg, Oral, Q12H   Followed by  [START ON 5/24/2024] apixaban, 5 mg, Oral, Q12H  arformoterol, 15 mcg, Nebulization, BID - RT  budesonide, 0.5 mg, Nebulization, BID - RT  metoprolol succinate XL, 25 mg, Oral, Q24H  mirtazapine, 7.5 mg, Oral, Nightly  pantoprazole, 40 mg, Oral, QAM  sodium chloride, 10 mL, Intravenous, Q12H    Infusions   Diet  Diet: Regular/House; Fluid Consistency: Thin (IDDSI 0)         I have personally reviewed:  [x]  Laboratory   []  Microbiology   [x]  Radiology   [x]  EKG/Telemetry   []  Cardiology/Vascular   []  Pathology   [x]  Records    Assessment/Plan     Active Hospital Problems    Diagnosis  POA    **Dyspnea [R06.00]  Yes    Acute on chronic HFrEF (heart failure with reduced ejection fraction) [I50.23]  Yes    Paroxysmal atrial fibrillation [I48.0]  Yes    Lung cancer [C34.90]  Yes    Chronic obstructive pulmonary disease [J44.9]  Yes    Hyperlipidemia [E78.5]  Yes     Essential hypertension [I10]  Yes      Resolved Hospital Problems   No resolved problems to display.       83 y.o. female admitted with Dyspnea.    Lung adenocarcinoma   Pulmonology and oncology consult  -Imaging reveals progression of disease  -Dr. Escobar, the patient's primary oncologist met with her/her  and the decision was made to transition to palliative measures  -Palliative order set has been initiated     Acute on chronic systolic heart failure  Coronary artery disease  Atrial fibrillation  Troponin elevation  Has been noted that this was an issue that began secondary to chemotherapy  Echocardiogram in March 2024 showed an EF of 48% with an indeterminate diastolic function.  Cardiology consulted but they have signed off  Continue home Entresto, metoprolol, atorvastatin, Eliquis  -Agree she does not appear to be volume overloaded, hold on additional diuretic at this time     COPD  Acute on chronic hypoxic respiratory failure  Continue home inhalers     Left IJ DVT  - + on US  - increase Eliquis 2.5 BID to DVT dose 10mg BID; d/w Dr Escobar-he did recommend continuing this while on palliative care    Transition to comfort care today after discussion with oncology  Discussed with patient, family, and nursing staff.  And consulting provider Dr. Escobar  Anticipated discharge TBD,  TBD        Trish Anderson MD  [facility]  05/17/24  16:23 EDT    I wore protective equipment throughout this patient encounter including gloves.  Hand hygiene was performed before donning protective equipment and after removal when leaving the room.    Expected Discharge Date and Time       Expected Discharge Date Expected Discharge Time    May 21, 2024              Copied text in this note has been reviewed and is accurate as of 05/17/24.

## 2024-05-17 NOTE — CONSULTS
Group: [facility]         CONSULT NOTE    Patient Identification:  Darlene Pfeiffer  83 y.o.  female  1940  [id number]            Requesting physician: Dr. Rodriguez    Reason for Consultation: Obstructive lung cancer    CC: Shortness of breath    History of Present Illness:  Darlene Pfeiffer is an 83-year-old female with a past medical history of lung cancer, coronary disease, COPD, chronic hypoxic respiratory failure on 3 L nasal cannula, hypertension, hyperlipidemia, paroxysmal atrial fibrillation on Eliquis, and congestive heart failure.  She has a history of former tobacco abuse, quit 2015.    She previously had a left upper lobe lobectomy for lung cancer and May 2012, was diagnosed with carcinoma of the tongue in 2015 with resection.  In 2021, patient was found to have a 8 mm irregular nodule in the medial segment of the right lower lobe.  Biopsy confirmed invasive moderately differentiated adenocarcinoma.  Since 2021, patient has undergone multiple radiation treatments, chemotherapy.  In January 2024, patient was doing poorly enough that there was a conversation about end-of-life/hospice.  Patient deferred at this time.  On 4/26/2024, imaging demonstrated progression of disease.  Patient remained a candidate for Alimta, however they wanted patient's functional status to improve prior to starting chemotherapy.    Patient presented to the emergency department from her cardiologist office on 5/16/2024.  Blood work was performed which revealed leukocytosis (WBC 14.49) and patient was sent to the hospital for further evaluation.  CT scan in the emergency department showed progression of the areas of pulmonary consolidation in the left upper and right lower lobe as well as narrowing of the left mainstem bronchus due to soft tissue obstruction.    Review of Systems:  CONSTITUTIONAL:  Denies fevers or chills, positive for dizziness and weakness  EYE:  No new vision changes  EAR:  No change in hearing  CARDIAC:   No chest pain, positive for intermittent palpitations  PULMONARY:  No productive cough, positive for shortness of breath  GI:  No diarrhea, hematemesis or hematochezia, positive for nausea, vomiting  RENAL:  No dysuria or urinary frequency  MUSCULOSKELETAL:  No musculoskeletal complaints  ENDOCRINE:  No heat or cold intolerance  INTEGUMENTARY: No skin rashes  NEUROLOGICAL:  No dizziness or confusion.  No seizure activity  PSYCHIATRIC:  No new anxiety or depression  12 system review of systems performed and all else negative     Past Medical History:  Past Medical History:   Diagnosis Date    Allergic rhinitis     Aneurysm     Asthma     Cancer of floor of mouth 2014    Cerebral aneurysm     COPD (chronic obstructive pulmonary disease)     COVID 2020    Esophageal reflux     History of adenocarcinoma of lung     History of anemia     History of carcinoma in situ of skin     History of lung cancer     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 from [facility]    History of squamous cell carcinoma     Tongue    Hyperlipidemia     Hypertension     since early 60s    Intractable back pain 11/29/2022    Low vitamin B12 level     Lung cancer     Systolic CHF, acute 11/14/2023    Thyromegaly     UTI (urinary tract infection)     Vitamin D deficiency        Past Surgical History:  Past Surgical History:   Procedure Laterality Date    BRONCHOSCOPY Bilateral 10/17/2022    Procedure: BRONCHOSCOPY WITH FLUORO with biopsy and BAL;  Surgeon: India Flores MD;  Location: [facility] ENDOSCOPY;  Service: Pulmonary;  Laterality: Bilateral;  PRE/POST - lung mass    CHOLECYSTECTOMY  1999    COLONOSCOPY      EMBOLIZATION CEREBRAL Left 06/29/2022    Procedure: 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;  Surgeon: Bradley Dowell MD;  Location: [facility] HYBRID OR 18/19;  Service: Interventional Radiology;  Laterality: Left;    EYE SURGERY  11/24/2020    Took a muscle out of right eye     GLOSSECTOMY PARTIAL      less than one half tongue    LUNG SURGERY  2012    ORAL LESION EXCISION/BIOPSY      June and August 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    TUBAL ABDOMINAL LIGATION  1970    VENOUS ACCESS DEVICE (PORT) INSERTION N/A 12/12/2022    Procedure: MEDIPORT PLACEMENT;  Surgeon: Jason Villaseñor MD;  Location: [facility] OR;  Service: Vascular;  Laterality: N/A;        Home Meds:  Medications Prior to Admission   Medication Sig Dispense Refill Last Dose    apixaban (ELIQUIS) 2.5 MG tablet tablet Take 1 tablet by mouth 2 (Two) Times a Day. 60 tablet 3 5/16/2024 at 0700    atorvastatin (LIPITOR) 20 MG tablet Take 1 tablet by mouth Every Night. Indications: High Amount of Fats in the Blood 90 tablet 1 5/15/2024 at 2100    cetirizine (zyrTEC) 10 MG tablet Take 1 tablet by mouth Daily. Indications: Perennial Allergic Rhinitis   5/16/2024 at 0700    Cholecalciferol (Vitamin D3) 50 MCG (2000 UT) tablet Take 1,000 Units by mouth Daily. Indications: Vitamin D Deficiency   5/16/2024 at 0700    Cyanocobalamin (VITAMIN B12 PO) Take 1,000 mcg by mouth Daily. Indications: vitamin b12 deficiency    5/16/2024 at 0700    folic acid (FOLVITE) 1 MG tablet Take 1 tablet by mouth Daily. Start at least 7 days prior to chemotherapy until at least 3 weeks after all chemotherapy. 30 tablet 6 5/16/2024 at 44366    furosemide (LASIX) 40 MG tablet Take 1.5 tablets by mouth Daily. Indications: Cardiac Failure, High Blood Pressure Disorder 45 tablet 2 5/16/2024 at 0700    metoprolol succinate XL (TOPROL-XL) 25 MG 24 hr tablet Take 1 whole tablet in a.m. and half tablet in p.m.  Indications: Cardiac Failure 45 tablet 3 5/16/2024 at 0700    mirtazapine (REMERON) 7.5 MG tablet TAKE 1 TABLET BY MOUTH EVERY NIGHT AT BEDTIME 30 tablet 1 5/15/2024 at 2100    montelukast (SINGULAIR) 10 MG tablet Take 1 tablet by mouth Every Night. Indications: Hayfever, Perennial Allergic Rhinitis 90 tablet 3 5/15/2024 at 2100    predniSONE (DELTASONE) 10  MG tablet TAKE 1 TABLET BY MOUTH EVERY DAY 30 tablet 1 5/16/2024 at 0700    sacubitril-valsartan (Entresto) 24-26 MG tablet Take 0.5 tablets by mouth 2 (Two) Times a Day. Indications: Cardiac Failure 28 tablet 0 5/16/2024 at 0700    zolpidem (AMBIEN) 5 MG tablet TAKE 1 TABLET BY MOUTH EVERY NIGHT AT BEDTIME AS NEEDED FOR SLEEP 30 tablet 0 5/15/2024 at 2100    albuterol (PROVENTIL) (2.5 MG/3ML) 0.083% nebulizer solution Inhale the contents of 1 vial by nebulization Every 4 (Four) Hours As Needed for Wheezing. 90 mL 0     arformoterol (BROVANA) 15 MCG/2ML nebulizer solution USE 2 ML VIA NEBULIZER TWICE DAILY       budesonide (PULMICORT) 0.5 MG/2ML nebulizer solution INHALE 2 MLS VIA NEBULIZER EVERY 12 HOURS       budesonide 0.5 MG/2ML suspension 0.5 mg, arformoterol 15 MCG/2ML nebulizer solution 15 mcg Inhale 1 nebule 2 (Two) Times a Day.       dronabinol (Marinol) 2.5 MG capsule Take 1 capsule by mouth 2 (Two) Times a Day Before Meals. 60 capsule 0     empagliflozin (Jardiance) 10 MG tablet tablet Take 1 tablet by mouth Daily. 30 tablet 5     ipratropium (ATROVENT) 0.06 % nasal spray 2 sprays into the nostril(s) as directed by provider 4 (Four) Times a Day. 15 mL 0     ipratropium-albuterol (DUO-NEB) 0.5-2.5 mg/3 ml nebulizer Inhale the contents of 1 vial by nebulization 2 (Two) Times a Day As Needed for Wheezing. 180 mL 0     O2 (OXYGEN) Inhale 3 L/min Continuous.       pantoprazole (PROTONIX) 40 MG EC tablet TAKE 1 TABLET BY MOUTH EVERY MORNING 30 tablet 2     potassium chloride (K-DUR,KLOR-CON) 10 MEQ CR tablet Take 2 tablets by mouth Daily. 1 tablet in the PM  Indications: Low Amount of Potassium in the Blood 90 tablet 2     predniSONE (DELTASONE) 20 MG tablet TAKE 2 TABLETS BY MOUTH DAILY. DO NOT. STOP ABRUPTLY          Allergies:  Allergies   Allergen Reactions    Sulfa Antibiotics Rash       Social History:   Social History     Socioeconomic History    Marital status:    Tobacco Use    Smoking status:  "Former     Current packs/day: 0.00     Types: Cigarettes     Quit date: 2015     Years since quittin.2     Passive exposure: Never    Smokeless tobacco: Never    Tobacco comments:     less than a pack per day, no smoking since , Quit 2015   Vaping Use    Vaping status: Never Used   Substance and Sexual Activity    Alcohol use: Not Currently     Comment: Socially -A few times a month    Drug use: No    Sexual activity: Defer       Family History:  Family History   Problem Relation Age of Onset    Ovarian cancer Mother          at age 27    No Known Problems Father     Ovarian cancer Sister     Colon cancer Brother     No Known Problems Other     Malig Hyperthermia Neg Hx        Physical Exam:  /59 (BP Location: Right arm, Patient Position: Lying)   Pulse 90   Temp 98.2 °F (36.8 °C) (Oral)   Resp 18   Ht 160 cm (63\")   Wt 42.6 kg (94 lb)   LMP  (LMP Unknown)   SpO2 93%   BMI 16.65 kg/m²  Body mass index is 16.65 kg/m². 93% 42.6 kg (94 lb)    Constitutional: Elderly female, chronically ill-appearing pt in bed, No acute respiratory distress, no accessory muscle use  Head: - NCAT  Eyes: No pallor, Anicteric conjunctiva, EOMI.  ENMT:  Mallampati 3, no oral thrush. Dry MM.   NECK: Trachea midline, No thyromegaly, no palpable cervical LNpathy  Heart: RRR, no murmur. No pedal edema   Lungs: BUSTER +, No wheezes/ crackles heard    Abdomen: Soft. No tenderness, guarding or rigidity. No palpable masses  Extremities: Extremities warm and well perfused. No cyanosis/ clubbing, weak 4 out of 5 strength in all 4 extremities  Neuro: Conscious, answers appropriately, no gross focal neuro deficits  Psych: Mood and affect appropriate    PPE recommended per [facility] infectious disease Isolation protocol for the current clinical scenario(as mentioned below) was followed.        LABS:  COVID19   Date Value Ref Range Status   2024 Not Detected Not Detected - Ref. Range Final       Lab Results "   Component Value Date    CALCIUM 9.3 05/16/2024    PHOS 5.7 (H) 12/29/2023     Results from last 7 days   Lab Units 05/16/24  1006   SODIUM mmol/L 136   POTASSIUM mmol/L 4.5   CHLORIDE mmol/L 93*   CO2 mmol/L 35.2*   BUN mg/dL 40*   CREATININE mg/dL 0.78   GLUCOSE mg/dL 216*   CALCIUM mg/dL 9.3   WBC 10*3/mm3 14.49*   HEMOGLOBIN g/dL 12.7   PLATELETS 10*3/mm3 267   ALT (SGPT) U/L 15   AST (SGOT) U/L 16   PROBNP pg/mL 3,983.0*     Lab Results   Component Value Date    TROPONINT 106 (C) 05/16/2024     Results from last 7 days   Lab Units 05/16/24  1803 05/16/24  1552   HSTROP T ng/L 106* 98*                             Lab Results   Component Value Date    TSH 1.000 11/15/2023     Estimated Creatinine Clearance: 38 mL/min (by C-G formula based on SCr of 0.78 mg/dL).         Imaging: I personally visualized the images of scans/x-rays performed within last 3 days.      Assessment:  Lung adenocarcinoma  Acute on chronic systolic heart failure  Coronary artery disease  Atrial fibrillation  Elevated troponin  COPD  Left upper extremity swelling    Recommendations:  Patient's chart was reviewed-hospice/goals of care were discussed at multiple appointments, first in January and then again in April.  In discussion with patient, she is aware of her progression of illness.  Oncology should also weigh in on patient's prognosis, at this time, patient appears that she is appropriate for discussions regarding end-of-life care.    Continue patient's Brovana and Pulmicort with as needed DuoNebs.    Patient was placed in face mask upon entering room and kept mask on throughout our encounter. I wore full protective equipment throughout this patient encounter including a face mask, gown and gloves. Hand hygiene was performed before donning protective equipment and after removal when leaving the room.    Isabel Phipps, APRN  5/16/2024  23:26 EDT      Much of this encounter note is an electronic transcription/translation of spoken  language to printed text using Dragon Software.

## 2024-05-18 PROCEDURE — 25010000002 HYDROMORPHONE PER 4 MG: Performed by: INTERNAL MEDICINE

## 2024-05-18 PROCEDURE — 25010000002 HYDROMORPHONE PER 4 MG: Performed by: STUDENT IN AN ORGANIZED HEALTH CARE EDUCATION/TRAINING PROGRAM

## 2024-05-18 PROCEDURE — 99231 SBSQ HOSP IP/OBS SF/LOW 25: CPT | Performed by: INTERNAL MEDICINE

## 2024-05-18 PROCEDURE — 94664 DEMO&/EVAL PT USE INHALER: CPT

## 2024-05-18 PROCEDURE — 94799 UNLISTED PULMONARY SVC/PX: CPT

## 2024-05-18 PROCEDURE — 94760 N-INVAS EAR/PLS OXIMETRY 1: CPT

## 2024-05-18 PROCEDURE — 25010000002 MIDAZOLAM PER 1 MG: Performed by: INTERNAL MEDICINE

## 2024-05-18 PROCEDURE — 94761 N-INVAS EAR/PLS OXIMETRY MLT: CPT

## 2024-05-18 RX ADMIN — OXYCODONE AND ACETAMINOPHEN 2 TABLET: 5; 325 TABLET ORAL at 09:03

## 2024-05-18 RX ADMIN — APIXABAN 10 MG: 5 TABLET, FILM COATED ORAL at 09:03

## 2024-05-18 RX ADMIN — BUDESONIDE 0.5 MG: 0.5 INHALANT ORAL at 07:02

## 2024-05-18 RX ADMIN — HYDROMORPHONE HYDROCHLORIDE 0.5 MG: 1 INJECTION, SOLUTION INTRAMUSCULAR; INTRAVENOUS; SUBCUTANEOUS at 20:21

## 2024-05-18 RX ADMIN — HYDROMORPHONE HYDROCHLORIDE 0.5 MG: 1 INJECTION, SOLUTION INTRAMUSCULAR; INTRAVENOUS; SUBCUTANEOUS at 11:55

## 2024-05-18 RX ADMIN — METOPROLOL SUCCINATE 25 MG: 25 TABLET, EXTENDED RELEASE ORAL at 09:03

## 2024-05-18 RX ADMIN — MIDAZOLAM 1 MG: 1 INJECTION INTRAMUSCULAR; INTRAVENOUS at 20:21

## 2024-05-18 RX ADMIN — HYDROMORPHONE HYDROCHLORIDE 0.5 MG: 1 INJECTION, SOLUTION INTRAMUSCULAR; INTRAVENOUS; SUBCUTANEOUS at 17:55

## 2024-05-18 RX ADMIN — MIDAZOLAM 1 MG: 1 INJECTION INTRAMUSCULAR; INTRAVENOUS at 11:53

## 2024-05-18 RX ADMIN — PANTOPRAZOLE SODIUM 40 MG: 40 TABLET, DELAYED RELEASE ORAL at 09:03

## 2024-05-18 RX ADMIN — Medication 10 ML: at 09:05

## 2024-05-18 RX ADMIN — MIDAZOLAM 1 MG: 1 INJECTION INTRAMUSCULAR; INTRAVENOUS at 17:55

## 2024-05-18 NOTE — PROGRESS NOTES
"                                              LOS: 1 day   Patient Care Team:  Kehrer, Meredith Lea, MD as PCP - General (Family Medicine)  Mary, Arias BROWN III, MD as Referring Physician (Thoracic Surgery)  Henry Escobar MD as Consulting Physician (Hematology and Oncology)  Zoila Clinton RD, SALONI as Dietitian (Nutrition)  Nguyễn Bran MD as Consulting Physician (Radiation Oncology)  Claire Orr MD as Consulting Physician (Cardiology)    Chief Complaint:  F/up COPD and lung cancer    Subjective   Interval History  I reviewed the admission note, progress notes, PMH, PSH, Family hx, social history, imagings and prior records on this admission, summarized the finding in my note and formulated a transition of care plan.      On 3 L oxygen.  Intermittent dyspnea improves with the use of narcotics.    REVIEW OF SYSTEMS:   CARDIOVASCULAR: No chest pain, chest pressure or chest discomfort. No palpitations or edema.   GASTROINTESTINAL: No anorexia, nausea, vomiting or diarrhea. No abdominal pain.  CONSTITUTIONAL: No fever or chills.     Ventilator/Non-Invasive Ventilation Settings (From admission, onward)      None                  Physical Exam:     Vital Signs  Temp:  [98 °F (36.7 °C)] 98 °F (36.7 °C)  Heart Rate:  [88-90] 90  Resp:  [18-22] 18  BP: (119)/(65) 119/65  No intake or output data in the 24 hours ending 05/18/24 1932  Flowsheet Rows      Flowsheet Row First Filed Value   Admission Height 160 cm (63\") Documented at 05/16/2024 1515   Admission Weight 42.6 kg (94 lb) Documented at 05/16/2024 1515            PPE used per hospital policy    General Appearance:   Alert, cooperative, in no acute distress   ENMT:  Mallampati score 3, moist mucous membrane   Eyes:  Pupils equal and reactive to light. EOMI   Neck:   . Trachea midline. No thyromegaly.   Lungs:   Equal but significantly diminished air entry throughout.    Heart:   Regular rhythm and normal rate, normal S1 and S2, no         murmur   Skin:   No rash " or ecchymosis   Abdomen:     Soft. No tenderness. No HSM.   Neuro/psych:  Conscious, alert, oriented x3. Strength 5/5 in upper and lower  ext.  Appropriate mood and affect   Extremities:  No cyanosis, clubbing or edema.  Warm extremities and well-perfused          Results Review:        Results from last 7 days   Lab Units 05/17/24  0643 05/16/24  1006   SODIUM mmol/L 139 136   POTASSIUM mmol/L 3.3* 4.5   CHLORIDE mmol/L 96* 93*   CO2 mmol/L 32.0* 35.2*   BUN mg/dL 33* 40*   CREATININE mg/dL 0.53* 0.78   GLUCOSE mg/dL 129* 216*   CALCIUM mg/dL 8.4* 9.3     Results from last 7 days   Lab Units 05/16/24  1803 05/16/24  1552   HSTROP T ng/L 106* 98*     Results from last 7 days   Lab Units 05/17/24  0643 05/16/24  1006   WBC 10*3/mm3 14.57* 14.49*   HEMOGLOBIN g/dL 12.8 12.7   HEMATOCRIT % 39.0 38.7   PLATELETS 10*3/mm3 241 267         Results from last 7 days   Lab Units 05/16/24  1006   PROBNP pg/mL 3,983.0*                           I reviewed the patient's new clinical results.        Medication Review:   apixaban, 10 mg, Oral, Q12H   Followed by  [START ON 5/24/2024] apixaban, 5 mg, Oral, Q12H  arformoterol, 15 mcg, Nebulization, BID - RT  budesonide, 0.5 mg, Nebulization, BID - RT  metoprolol succinate XL, 25 mg, Oral, Q24H  mirtazapine, 7.5 mg, Oral, Nightly  pantoprazole, 40 mg, Oral, QAM  sodium chloride, 10 mL, Intravenous, Q12H             Diagnostic imaging:  I personally and independently reviewed the following images:  CT chest 5/16/2024: Worsening lung consolidations.    Assessment   AdenoCarcinoma of the lung s/p left upper lobectomy 2012 then recurrence in 2021, worse  Severe COPD, FEV1 44% May 2024  Chronic respiratory failure, on oxygen 3 L/man  Pneumonitis since March 2023    Acute on chronic systolic CHF  CAD  A-fib      All problems new to me    Plan     Pulmicort and Brovana twice daily  DuoNeb 4 times a day as needed  Eliquis 10 mg twice daily  Mirtazapine for appetite  Oxygen by NC and titrate  keep SpO2 >90%        India Flores MD  05/18/24  19:32 EDT        This note was dictated utilizing Dragon dictation

## 2024-05-18 NOTE — PROGRESS NOTES
Name: Darlene Pfeiffer ADMIT: 2024   : 1940  PCP: Kehrer, Meredith Lea, MD    MRN: [id number] LOS: 1 days   AGE/SEX: 83 y.o. female  ROOM: [facility]     Subjective   Subjective   Resting in bed, tearful, just threw up. Just took a pain pill and is hoping it helps.        Objective   Objective   Vital Signs  Temp:  [98 °F (36.7 °C)-98.1 °F (36.7 °C)] 98 °F (36.7 °C)  Heart Rate:  [88-94] 90  Resp:  [18-22] 18  BP: (119-129)/(65-67) 119/65  SpO2:  [97 %-99 %] 97 %  on  Flow (L/min):  [3.5] 3.5;   Device (Oxygen Therapy): nasal cannula  Body mass index is 16.65 kg/m².  Physical Exam  Constitutional:       General: She is not in acute distress.     Appearance: She is ill-appearing.      Comments: Cachectic   Cardiovascular:      Rate and Rhythm: Normal rate and regular rhythm.   Pulmonary:      Effort: Pulmonary effort is normal. No respiratory distress.      Breath sounds: Normal breath sounds.   Abdominal:      General: Abdomen is flat.      Palpations: Abdomen is soft.      Tenderness: There is no abdominal tenderness.   Musculoskeletal:         General: No swelling or tenderness.      Right lower leg: No edema.      Left lower leg: No edema.      Comments: Left upper extremity edema   Skin:     General: Skin is warm and dry.      Coloration: Skin is pale.   Neurological:      General: No focal deficit present.      Mental Status: She is alert and oriented to person, place, and time. Mental status is at baseline.         Results Review     I reviewed the patient's new clinical results.  Results from last 7 days   Lab Units 24  0643 24  1006   WBC 10*3/mm3 14.57* 14.49*   HEMOGLOBIN g/dL 12.8 12.7   PLATELETS 10*3/mm3 241 267     Results from last 7 days   Lab Units 24  0643 24  1006   SODIUM mmol/L 139 136   POTASSIUM mmol/L 3.3* 4.5   CHLORIDE mmol/L 96* 93*   CO2 mmol/L 32.0* 35.2*   BUN mg/dL 33* 40*   CREATININE mg/dL 0.53* 0.78   GLUCOSE mg/dL 129* 216*   Estimated Creatinine  Clearance: 56 mL/min (A) (by C-G formula based on SCr of 0.53 mg/dL (L)).  Results from last 7 days   Lab Units 05/17/24  0643 05/16/24  1006   ALBUMIN g/dL 3.0* 3.2*   BILIRUBIN mg/dL 0.4 0.4   ALK PHOS U/L 91 105   AST (SGOT) U/L 20 16   ALT (SGPT) U/L 14 15     Results from last 7 days   Lab Units 05/17/24  0643 05/16/24  1006   CALCIUM mg/dL 8.4* 9.3   ALBUMIN g/dL 3.0* 3.2*   MAGNESIUM mg/dL 2.1  --    PHOSPHORUS mg/dL 2.4*  --        COVID19   Date Value Ref Range Status   05/17/2024 Not Detected Not Detected - Ref. Range Final   01/18/2024 Not Detected Not Detected - Ref. Range Final     Hemoglobin A1C   Date/Time Value Ref Range Status   05/17/2024 0643 7.20 (H) 4.80 - 5.60 % Final       Duplex Venous Upper Extremity - Bilateral CAR    Acute left upper extremity deep vein thrombosis noted in the internal   jugular and subclavian.    All other vessels appear normal.    Scheduled Medications  apixaban, 10 mg, Oral, Q12H   Followed by  [START ON 5/24/2024] apixaban, 5 mg, Oral, Q12H  arformoterol, 15 mcg, Nebulization, BID - RT  budesonide, 0.5 mg, Nebulization, BID - RT  metoprolol succinate XL, 25 mg, Oral, Q24H  mirtazapine, 7.5 mg, Oral, Nightly  pantoprazole, 40 mg, Oral, QAM  sodium chloride, 10 mL, Intravenous, Q12H    Infusions   Diet  Diet: Regular/House; Fluid Consistency: Thin (IDDSI 0)         I have personally reviewed:  [x]  Laboratory   []  Microbiology   []  Radiology   []  EKG/Telemetry   []  Cardiology/Vascular   []  Pathology   []  Records    Assessment/Plan     Active Hospital Problems    Diagnosis  POA    **Dyspnea [R06.00]  Yes    Acute on chronic HFrEF (heart failure with reduced ejection fraction) [I50.23]  Yes    Paroxysmal atrial fibrillation [I48.0]  Yes    Lung cancer [C34.90]  Yes    Chronic obstructive pulmonary disease [J44.9]  Yes    Hyperlipidemia [E78.5]  Yes    Essential hypertension [I10]  Yes      Resolved Hospital Problems   No resolved problems to display.       83 y.o.  female admitted with Dyspnea.    Lung adenocarcinoma   Pulmonology and oncology consult  -Imaging reveals progression of disease  -Dr. Escobar, the patient's primary oncologist met with her/her  and the decision was made to transition to palliative measures  -Palliative order set has been initiated  - Await hospice meeting- final plan pending but likely DC with hospice services early next week.      Acute on chronic systolic heart failure  Coronary artery disease  Atrial fibrillation  Troponin elevation  Has been noted that this was an issue that began secondary to chemotherapy  Echocardiogram in March 2024 showed an EF of 48% with an indeterminate diastolic function.  Cardiology consulted but they have signed off  Continue home metoprolol, Eliquis  -Agree she does not appear to be volume overloaded, hold on additional diuretic at this time     COPD  Acute on chronic hypoxic respiratory failure  Continue home inhalers     Left IJ DVT  - + on US  - increase Eliquis 2.5 BID to DVT dose 10mg BID; d/w Dr Escobar-he did recommend continuing this while on palliative care    Transition to comfort care after discussion with oncology  Discussed with patient, family, and nursing staff.  And consulting provider Dr. Escobar  Anticipated discharge TBD,  TBD likely early next week after patient meets with hospice and arrangements are made to discharge home        Trish Anderson MD  [facility]  05/18/24  13:21 EDT    I wore protective equipment throughout this patient encounter including gloves.  Hand hygiene was performed before donning protective equipment and after removal when leaving the room.    Expected Discharge Date and Time       Expected Discharge Date Expected Discharge Time    May 21, 2024              Copied text in this note has been reviewed and is accurate as of 05/18/24.

## 2024-05-18 NOTE — CONSULTS
Name: Darlene Pfeiffer ADMIT: 2024   : 1940  PCP: Kehrer, Meredith Lea, MD    MRN: [id number] LOS: 1 days   AGE/SEX: 83 y.o. female  ROOM: [facility]     I arrived to unit and spoke with WIL Sam on pts updated status. Records reviewed and faxed to HMR. Met with pt and family at bedside, Detailed EOS provided, all questions answered. [facility] elected. Called and spoke with Dr. Antoine Colunga, pt is medical eligible for home with [facility]. Pt/Family wants comfort focused care with quality of life and no aggressive treatments. Pt/family wants to possible have discharge Monday via private transport, will need RAC visit schedule to ensure medication and equipment prior to discharge. Pt is PPS 50%, DNR, awaiting discharge home with hospice.      Lyly Kerr RN  [facility]  [phone]

## 2024-05-18 NOTE — PROGRESS NOTES
REASON FOR FOLLOW-UP: Recurrent lung cancer.    History of Present Illness    The patient is a 83 y.o. female with the above-mentioned history who returned 9/22/2023 continuing on Mekinist 0.5 mg daily and Tafinlar at 50 mg twice daily.  She also continues on prednisone only taking 10 mg daily.  She reports that she is feeling quite good.  She is tolerating things well.  She has a decent appetite denies issues with nausea, vomiting, diarrhea, or constipation.  She denies any issues with increased shortness of breath.    Patient did see ENT Dr. Topete, who has ordered an ultrasound of her neck, which will be done at [facility] on the 27th.  She is also scheduled for a cerebral angiogram by Dr. Calvin on 29 September.      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  Dr. Dowell of neurosurgery saw the patient 10/12/2023 and felt the patient was stable without neurologic symptoms, yearly follow-up planned.  The patient had started seeing a new ENT physician leading to the referral back to neurosurgery.  When seen 10/16/2023 she is doing very well on her current Mekinist and Tafinlar doses having improved her weight, appetite and general performance status.  We have discussed at least a chest x-ray today and repeat staging in the next 5 to 6 weeks as she continues her current dosing.    The patient required admission 11/14 - 11/16/2023 having presented with shortness of breath and found to be pulmonary edema with CBC, lactate and procalcitonin all normal.  There was a concern that her chemotherapy agents could have produced her cardiomyopathy and these were stopped 11/14/2023.  She was diuresed and echocardiogram demonstrated LV SF mildly decreased at 39.2% with GLS of -11.1%, left ventricular cavity mildly dilated, left  ventricular wall thickness consistent with mild concentric hypertrophy, left ventricular global hypokinesis, aortic valve abnormal with moderate calcification, valve was trileaflet, trace tricuspid valve regurgitation with right VSP at less than 35 mmHg.  The findings for LV systolic function, diastolic function and global longitudinal LV strain had all worsened.    CTA of chest 11/14/2023 demonstrating confluent soft tissue mass possibly measuring larger in current study at 6.5 x 5.0 previously 4.9 x 4.8, left supraclavicular node to decrease in size measuring 1.1 cm previously 1.4 cm, extensive bilateral interstitial and groundglass infiltrates.    The patient is seen 11/26/2023.  As per the discharge we are requested to have a BMP and CMP assessed.  She is seen with her son and daughter in a lengthy conversation held about her recent hospitalization with CHF-cardiomyopathy felt elicited, in part, from her targeted therapy with her Mekinist and Tafinlar discontinued altogether.    The patient is relatively stable currently having continued her diuretics and to be seen in cardiology in follow-up.  At the time of this dictation her CBC is found to be essentially normal with mild leukocytosis, CMP normal except for mildly elevated glucose at 125 and BNP reduced to 6126 from 9763.  She feels well with no additional shortness of breath chest pain lower extremity edema.  In reviewing the the possible treatment options she is next best treated with single agent chemotherapy moving to Alimta.    The patient proceeded to treatment teaching and seen back 12/4/2023.  She is ready to proceed with chemotherapy with fortunately a recent good performance status still in place.    Patient returns 12/18/2023 for toxicity check.  She started treatment with Alimta on 12/4/2023.  Patient states that she did have an episode following treatment where she felt extremely itchy however she took Benadryl which helped, and her symptoms  resolved.  She has ongoing issues with fatigue.  She is being followed closely by cardio oncology.  She also reports being short of breath but denies chest pain, or swelling.  She does struggle with her appetite.  Otherwise she feels like she has tolerated the change in treatment well.      The patient required hospitalization 1/18-21/24 presenting with increasing shortness of breath, findings of chronic respiratory failure, positive for rhinovirus on admission.  She is treated with supportive treatments including for previous COVID infection as well as antibiotic therapies.  She completed 5 days of Augmentin, continue nebulizer and breathing treatments.  Upon discharge she rescheduled appointments though was seen by cardiology 1/23/2024 not felt to be in heart failure, treated supportively for epistaxis.    There was concern as to whether she should continue treatment and she has seen back in office 1/29/2024 to discuss potential options.  She is seen with her son and daughter both indicating that she drops her O2 saturation quite quickly and is very inactive as result of difficulty with rapidly becoming shortness of breath and weak when she tries to move.  This is led to a lengthy conversation about the extent of her disease including both her cardiovascular status and her respiratory status.  She is worsening quickly and is not, currently, a candidate for chemotherapy.    The patient is doing poorly enough that we have had a cristy conversation today about end-of-life concerns.  She is not interested in hospice at this point but hopes to improve further.    The patient is next evaluated 4/26/2024 with recent scans demonstrating progression of disease with enlarged mediastinal hilar lymphadenopathy, creased consolidation apex left upper lobe, new subcentimeter low-density lesion right lobe of the liver, right adrenal nodule, pleural thickening left apex, decreased patchy consolidation apical segment of right lower  lobe.6/2024.  Recent CT chest, abdomen, pelvis demonstrates progression of disease.  She is seen with her sister-in-law both indicating that her oxygen requirements are increasing and her appetite reducing.  We have again discussed restarting chemotherapy but also concerned about her performance status.  She has recently started using THC Gummies and is encouraged to continue to do so to improve her performance status, appetite and weight before we try to reinstitute chemotherapy treatment with Alimta.    Interval history:  5/17/2024  T97.9, pulse 120, respirations 20, /53  Patient admitted to continue therapy through cardiology, undergo diuresis and continue supportive care.  H&H 12.39.0, white count of 14,570, platelet count 241,000, being creatinine 33 and 0.53  Additional studies demonstrate acute left upper extremity DVT in the internal jugular and subclavian  I have met with the patient and her family members and advised palliative care.  We will move to transfer her this afternoon.    5/18/2024  90 degrees, pulse 90, respirations 18, /65  Patient transferred to [facility]-palliative care.  She indicates that she is comfortable and is hopeful for discharge possibly to begin the week under hospice care.          ONCOLOGY HISTORY:      The patient is an 83-year-old female with the below medical history including chronic obstructive pulmonary disease who was seen in the [facility] multidisciplinary thoracic oncology clinic July 1, 2021.  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      She had undergone a CT of the chest at [facility] 6/16/2021 showing postsurgical changes in the left chest from right upper lobectomy, 8 mm irregular nodule medial segment of the right lower lobe and diffuse  changes of emphysema with fibrotic changes in the periphery, no suspicious hilar or mediastinal nodes, no pleural effusion.  New finding was suspicious for new malignancy with the patient felt to be a poor candidate for surgical resection.  A PET CT and PFTs were requested thereafter.  The PET/CT demonstrated ill-defined FDG avid soft tissue thickening left aspect mediastinum within the operative bed, 1 cm hypermetabolic pulmonary nodule right lower lobe and asymmetric thickening patchy uptake involving the right base of tongue.  There is subtle uptake within the L1 vertebral body which is indeterminate and a sub-6 mm pulmonary nodule of right lung and subcentimeter hypodense hepatic lesion which was below PET resolution.       The patient's case was discussed in thoracic conference 7/22/2021 with consideration of the patient undergoing L1 vertebral body MRI, confirmatory biopsy left mediastinal and assessment by ENT (Dr. Caballero) who had worked with the patient previously.  She, incidentally, indicates that radiation therapy was given apparently intraoperatively at her recent surgery?  She still has issues with difficulty chewing and swallowing post procedures and was to be followed up with Dr. Caballero in the near future as planned.                                                            She was seen in the thoracic clinic on the same day with plans to proceed with MRI of the lumbar spine, biopsy left mediastinal nodular area of potential disease and follow-up of her ENT assessment as well as undergo a guardant 360 assessment in our office.  She underwent the biopsy 8/13/2021 found to be consistent with invasive moderately differentiated adenocarcinoma with PD-L1 TPS score 40%.  MRI of the lumbar spine revealed no evidence of metastatic disease though the patient did have multilevel degenerative disease throughout the lumbar spine.  She had an office follow-up with Dr. 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  She underwent laryngoscopy 8/18/2021 with right base of tongue without evidence of lesions or masses in the region.     She was seen by Dr. Hernandez 8/19/2021 and, her findings, had been presented in lung conference.  Her findings were consistent with 2 separate lesions including a nodule in the superior segment of the right lower lobe and a mass in the upper portion of the left chest with the left chest lesion biopsy positive for adenocarcinoma.  The consensus was that these were to separate-synchronous primaries.  Stereotactic radiation each lesions were felt to be the appropriate way to proceed and the patient was referred to radiation therapy.     The patient is seen in office 8/23/2021 agreeable to the radiation therapy as well as additional assessments including Caris molecular assessment of her biopsy.  Again her guardant 360 is currently pending and we would follow her after she completes radiation therapy with subsequent scans.    She was able to proceed with SBRT to the right lung at primary #1 with 5 treatments at 1000 cGy per fraction in the left lung primary similarly at 1000 cGy per fraction x5.  Her treatment ranged between 8/31-9/13/2021.  She is now scheduled for follow-up 11/23/2021.    The patient subsequent genomic testing is discussed with her as she is is seen back 9/23/2021.  Her guardant 360 did not determine any new abnormalities but her Caris-MI profile-does reveal sensitivity to immunotherapy as well as a BRAF mutation.  This could be extremely important as we follow the patient from this point.  Fortunately she is feeling extremely well and quite pleased about her treatments.    Patient had subsequent PET/CT 11/23/2021 demonstrates decrease in size and avidity of the previously noted intensely FDG avid nodules left lobectomy operative site and right lower lobe.   Surrounding groundglass and pulmonary opacification is consistent with postradiation changes, a few new subcentimeter pulmonary nodules are present in the right upper lobe and indeterminant, stable subcentimeter hepatic lesion is below PET resolution no other findings of FDG-avid disease are seen in neck abdomen or pelvis.  The patient seen in office 12/1/2021 with a relatively good performance status stating she is not having any new symptoms and very pleased about her results on her PET/CT.  She realizes we'll have to follow this further but subsequent scans in 6 months.  She is also hoping to see an oral surgeon that previously helped her-Dr. Wu.    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  There is evolution of presumed postradiation changes in the right midlung with soft tissue mass within the left lumpectomy operative bed minimally decreased in size.  There is a sub-6 mm nodule in the right upper lobe not definitively seen, indeterminate and an indeterminate left renal lesion at 1.5 cm unchanged from 7/13/2021.  The patient is currently scheduled to see Dr. Dowell on 6/23/2022.  She is feeling well without any additional symptoms.    The patient required admission 10/14 - 10/18/2022 presenting with shortness of breath and cough that was nonproductive.  She had been on oral antibiotics and did not improved and was admitted for therapy for apparent pneumonia.  This eventually led to bronchoscopy after initial CT revealed postsurgical changes, new masslike 6.7 cm area of consolidation anterior left lung raising concern for potential malignancy and a follow-up PET/CT planned.  Bronchoscopy and biopsy left lower lung failed to show evidence of malignancy.  The patient's PET/CT, however, demonstrated an irregular pleural-based soft tissue  density thickening in the left upper lobe that was intensely FDG avid new from 6/8/2022 thought to be a malignant recurrence with spread into the left lung parenchyma.  There is intensely pleural-based avidity within the left lower lobe thought to be metastatic disease with postradiation of the left apex as well.  There is a small focus of uptake in the right hilum grossly unchanged in size and post radiation changes in the right lower lobe.  There is indeterminate density left renal that was grossly unchanged.  The patient is seen back in office 11/15/2022 indicating that she still has chest discomfort that is slowly worsening and that she has a chronic paroxysmal cough but has not improved.  We discussed that she very likely has recurrent disease and plan to proceed with a guardant 360 examination initially as we determine whether we should try to proceed with therapy particularly immunotherapy versus targeted therapy recognizing the above findings.    Patient's guardant 360 returned as again significant for BRAF V600E and the potential use of BRAF inhibitor/MET inhibitor dabrafenib and trametinib.  Additional information [id number] and use of alpelisib.    Unfortunately she required admission 11/28-12/1 with worsening back pain.  Fortunately she did not demonstrate evidence of metastatic disease but did have moderate degenerative changes which could cause radiculopathy.  Medications were altered to include subsequently MSR 15 mg p.o. every 12 hours, Norco as needed.    The patient now presents back 12/14/2022 to initiate therapy- initially with immunotherapy considering Caris study with PD-L1 TPS of 70% on 22 C3 and 28-8 assays.    Patient seen with her  and we discussed pain medications and adjustments thereafter and that she stopped taking MSIR having only a short supply and having to use Norco more frequently.  She is willing to initiate Keytruda today we have discussed that considering her genomics  treatment versus her BRAF mutation is also an option.    C1 Keytruda 12/14/2022    Patient is seen back 1/4/2023 in follow-up due for cycle 2 Keytruda.  Patient states that since receiving her first cycle she has had decreased appetite and decreased energy.  She has not been able to bring herself to eat much and she has notably lost 7 pounds.  She has also been struggling with constipation in relation to ongoing narcotics for pain control.  She has been taking senna S2 tabs twice a day.  We discussed adding daily MiraLAX.    Patient did just last week meet with dietitian, Zoila Clinton, to discuss ways to improve caloric intake.  She has not been able to implement any of these things yet though.    The patient is next seen 1/25/2023 with mild weight gain.  She is seen with family member both indicating that she has actually felt somewhat better in terms of performance status and general function.  We have discussed proceeding with a third cycle treatment and reevaluating by scan in 2 weeks.    The patient proceeded with treatment 1/25/2023 and underwent follow-up CT chest abdomen pelvis 2/8/2023 demonstrating small pericardial effusion that is decreased in size from 11/20/2022, ill-defined consolidation and pleural-based thickening in the left apex and upper lung has reduced slightly, additional index nodule soft tissue in the aspect the pericardium has not changed substantially, no evidence of metastatic disease in the abdomen pelvis.    The patient is seen with her daughter 2/15/2023 both indicating that she has definitely improved, stable weight, appetite and performance status.  She is also using her pain medication much less frequently and wonders whether she might begin to taper from her twice a day MS Contin.    Patient is seen back 3/8/2023 due for ongoing pembrolizumab.  She continues on low-dose prednisone 10 mg daily and has noted significant improvement in her energy and appetite with this.  She is  reluctant to taper this any further we discussed that it is fine for her to stay on daily dosing.    She did try to taper her pain medication going down to just MS Contin 15 mg every 12 hours while holding her hydrocodone.  However over the last few days she has had some intermittent pain in the left upper back alleviated with hydrocodone 2-3 times a day.  She currently is denying any pain.  Monitor.    She is next evaluated 3/29/2023 for ongoing Keytruda.  Evidently her pain medication was sent to the wrong pharmacy and will need to be represcribed today-long-acting MS Contin.  Otherwise she is doing reasonably well at present.    Patient seen 5/31/2023 with gradual development of left shoulder and left scapular pain.  Concurrent CT chest, abdomen and pelvis demonstrate interval increasing consolidation left upper lobe with extension anterior to the left upper ribs with sclerosis anterior left second rib.  This is suspicious for worsening malignancy.  Plans were made for subsequent PET/CT where the patient's pain medications were adjusted.PET/CT 6/5/2023 reveals progression of disease in left upper thorax, left supraclavicular adenopathy new metastasis left posterior fourth ribs, no evidence of metastatic disease in the abdomen or pelvis.    The patient is seen with her son and daughter 6/12/2023 and it is clear that she is not developing a Pancoast like syndrome with progression of her malignancy in the left upper thorax and associated symptoms.  We have discussed discontinuance of her immunotherapy and moving to targeted therapy with dabrafenib and trametinib as we also request radiation therapy repeat consultation and try to manage her pain more effectively.    Patient seen 7/5/2023 with plans to start palliative radiotherapy and to initiate concurrently dabrafenib and trametinib.    As a result of toxicity (nausea and vomiting) the patient was taken off of dabrafenib and trametinib when seen 7/21/2023, given  additional IV hydration and further supportive care.  Plans were made for follow-up on recovery to determine as to whether to redose the medications.    Unfortunately she required admission 7/25-30/2023 with failure to thrive.  Subsequent assessments including blood cultures were negative and patient was treated briefly with IV antibiotic therapy, started PT and OT, medications adjusted for hypotension and treated symptomatically for nausea and vomiting.  She was able to improve enough to be discharged home as she continued radiation therapy started 7/17/2023 and completed 7/31/2023 to the left chest wall.    She is next seen back 8/4/2023.  We have reviewed that she had been on dabrafenib and trametinib at 150 mg p.o. every 12 hours and 2 mg p.o. daily respectively and dosing could be changed considerably such as 50 mg twice daily and 0.5 mg daily.  Determination made to have her undergo repeat scans at 4 weeks and review her response to radiation therapy as we make application for lower dose targeted therapy, addition of Remeron p.o. nightly, tapering of her MS Contin to daily rather than twice daily, use of low-dose Ativan to undergo scans.    Subsequent scans 8/25/2023, fortunately, with stable left apical mass with mediastinal chest wall involvement unchanged 1.4 cm supraclavicular lymph node.  No new findings of metastatic disease.    The patient is seen back 9/8/2023.  Fortunately she is improved dramatically off therapy and is gratified that his studies have not worsened in any substantial way.  We have discussed both her scan reports and echocardiogram that does not show significant reduction of her ejection fraction.  As result of her current stable status we have asked her to restart Mekinist at 0.5 mg daily and Tafinlar at 50 mg twice daily to be advanced as tolerated.  Patient seen 10/16/2023 with follow-up chest x-ray planned and CT of chest abdomen pelvis at 5 weeks -approximately 3 months of  therapy.    The patient required admission 11/14 - 11/16/2023 having presented with shortness of breath and found to be pulmonary edema with CBC, lactate and procalcitonin all normal.  There was a concern that her chemotherapy agents could have produced her cardiomyopathy and these were stopped 11/14/2023.  She was diuresed and echocardiogram demonstrated LV SF mildly decreased at 39.2% with GLS of -11.1%, left ventricular cavity mildly dilated, left ventricular wall thickness consistent with mild concentric hypertrophy, left ventricular global hypokinesis, aortic valve abnormal with moderate calcification, valve was trileaflet, trace tricuspid valve regurgitation with right VSP at less than 35 mmHg.  The findings for LV systolic function, diastolic function and global longitudinal LV strain had all worsened.    CTA of chest 11/14/2023 demonstrating confluent soft tissue mass possibly measuring larger in current study at 6.5 x 5.0 previously 4.9 x 4.8, left supraclavicular node to decrease in size measuring 1.1 cm previously 1.4 cm, extensive bilateral interstitial and groundglass infiltrates.      The patient is seen 11/26/2023.  As per the discharge we are requested to have a BMP and CMP assessed.  She is seen with her son and daughter in a lengthy conversation held about her recent hospitalization with CHF-cardiomyopathy felt elicited, in part, from her targeted therapy with her Mekinist and Tafinlar discontinued altogether.    The patient is relatively stable currently having continued her diuretics and to be seen in cardiology in follow-up.  At the time of this dictation her CBC is found to be essentially normal with mild leukocytosis, CMP normal except for mildly elevated glucose at 125 and BNP reduced to 6126 from 9763.  She feels well with no additional shortness of breath chest pain lower extremity edema.  In reviewing the the possible treatment options she is next best treated with single agent  chemotherapy moving to [facility].    Patient seen 12/4/2023 with plans to initiate Alimta chemotherapy-cycle 1 day 1    The patient required hospitalization 1/18-21/24 presenting with increasing shortness of breath, findings of chronic respiratory failure, positive for rhinovirus on admission.  She is treated with supportive treatments including for previous COVID infection as well as antibiotic therapies.  She completed 5 days of Augmentin, continue nebulizer and breathing treatments.  Upon discharge she rescheduled appointments though was seen by cardiology 1/23/2024 not felt to be in heart failure, treated supportively for epistaxis.    There was concern as to whether she should continue treatment and she has seen back in office 1/29/2024 to discuss potential options.  She is seen with her son and daughter both indicating that she drops her O2 saturation quite quickly and is very inactive as result of difficulty with rapidly becoming shortness of breath and weak when she tries to move.  This is led to a lengthy conversation about the extent of her disease including both her cardiovascular status and her respiratory status.  She is worsening quickly and is not, currently, a candidate for chemotherapy.    The patient is doing poorly enough that we have had a cristy conversation today about end-of-life concerns.  She is not interested in hospice at this point but hopes to improve further.  The patient had follow-up with pulmonary medicine.  She was seen 2/9/2024 with groundglass infiltrates since November 2023 suggestive of pneumonitis and he had restarted prednisone at 40 mg daily.    A repeat CT of chest 2/26/2024 revealed decreased consolidation in superior segment right lower lobe, stable consolidation in the left upper lobe and apex, stable coarsened interstitial lung opacities elsewhere, stable soft tissue mass along the left upper mediastinum, small pericardial effusion, no pleural effusion, no clear  abnormalities in the liver, stable left renal cyst and no acute bony abnormality.    The patient is seen with her son and both indicate that her general performance status has improved.  They are concerned about her response to chemotherapy previously and, though we have scheduled her for chemotherapy, are not certain they wish to proceed.  Now with her improved scan we could follow her for period of time and then try to reevaluate offering chemotherapy potentially with Alimta.    A follow-up CT scan series 4/19/2024 shows overall progression with enlarged mediastinal hilar lymphadenopathy, creased consolidation apex left upper lobe, new subcentimeter low-density lesion right lobe of the liver, right adrenal nodule, pleural thickening left apex, decreased patchy consolidation apical segment of right lower lobe.    She is seen back formally 4/26/2024.  The progression of her disease was discussed and it was determined that she would use THC Gummies or Marinol to try to improve her appetite before we restart Alimta chemotherapy.  She will be seen back in 3 weeks to possibly start that treatment.    Unfortunately the patient did not improve and found little improvement with THC Gummies.  She is seen by her cardiologist 5/16/2024 clearly having increasing shortness of breath and progressive weakness.  She was seen in the emergency room with no evidence of PE on CTA but interval progression of pulmonary consolidation in the anterior aspect the left upper lobe as well as the right lower lobe.  There is enlargement of soft tissue opacification posterior left mainstem with narrowing the left mainstem bronchus, slightly larger pericardial effusion and loss of a plane in the pericardial/left upper lobe region that would be consistent with localized mediastinal progression.    Patient seen 5/17/2024 after clear progression of symptoms and hospitalization for shortness of breath and failure to thrive.  Follow-up scans have  demonstrated progressive disease.  At this point palliative care was felt appropriate.  Past Medical History:   Diagnosis Date    Allergic rhinitis     Aneurysm     Asthma     Cancer of floor of mouth 2014    Cerebral aneurysm     COPD (chronic obstructive pulmonary disease)     COVID 2020    Esophageal reflux     History of adenocarcinoma of lung     History of anemia     History of carcinoma in situ of skin     History of lung cancer     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 from [facility]    History of squamous cell carcinoma     Tongue    Hyperlipidemia     Hypertension     since early 60s    Intractable back pain 11/29/2022    Low vitamin B12 level     Lung cancer     Systolic CHF, acute 11/14/2023    Thyromegaly     UTI (urinary tract infection)     Vitamin D deficiency         Past Surgical History:   Procedure Laterality Date    BRONCHOSCOPY Bilateral 10/17/2022    Procedure: BRONCHOSCOPY WITH FLUORO with biopsy and BAL;  Surgeon: India Flores MD;  Location: [facility] ENDOSCOPY;  Service: Pulmonary;  Laterality: Bilateral;  PRE/POST - lung mass    CHOLECYSTECTOMY  1999    COLONOSCOPY      EMBOLIZATION CEREBRAL Left 06/29/2022    Procedure: 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;  Surgeon: Bradley Dowell MD;  Location: [facility] HYBRID OR 18/19;  Service: Interventional Radiology;  Laterality: Left;    EYE SURGERY  11/24/2020    Took a muscle out of right eye    GLOSSECTOMY PARTIAL      less than one half tongue    LUNG SURGERY  2012    ORAL LESION EXCISION/BIOPSY      June and August 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    TUBAL ABDOMINAL LIGATION  1970    VENOUS ACCESS DEVICE (PORT) INSERTION N/A 12/12/2022    Procedure: MEDIPORT PLACEMENT;  Surgeon: Jason Villaseñor MD;  Location: [facility] MAIN OR;  Service: Vascular;  Laterality: N/A;        No current facility-administered medications on file prior to encounter.     Current Outpatient  Medications on File Prior to Encounter   Medication Sig Dispense Refill    apixaban (ELIQUIS) 2.5 MG tablet tablet Take 1 tablet by mouth 2 (Two) Times a Day. 60 tablet 3    atorvastatin (LIPITOR) 20 MG tablet Take 1 tablet by mouth Every Night. Indications: High Amount of Fats in the Blood 90 tablet 1    cetirizine (zyrTEC) 10 MG tablet Take 1 tablet by mouth Daily. Indications: Perennial Allergic Rhinitis      Cholecalciferol (Vitamin D3) 50 MCG (2000 UT) tablet Take 1,000 Units by mouth Daily. Indications: Vitamin D Deficiency      Cyanocobalamin (VITAMIN B12 PO) Take 1,000 mcg by mouth Daily. Indications: vitamin b12 deficiency       folic acid (FOLVITE) 1 MG tablet Take 1 tablet by mouth Daily. Start at least 7 days prior to chemotherapy until at least 3 weeks after all chemotherapy. 30 tablet 6    furosemide (LASIX) 40 MG tablet Take 1.5 tablets by mouth Daily. Indications: Cardiac Failure, High Blood Pressure Disorder 45 tablet 2    metoprolol succinate XL (TOPROL-XL) 25 MG 24 hr tablet Take 1 whole tablet in a.m. and half tablet in p.m.  Indications: Cardiac Failure 45 tablet 3    mirtazapine (REMERON) 7.5 MG tablet TAKE 1 TABLET BY MOUTH EVERY NIGHT AT BEDTIME 30 tablet 1    montelukast (SINGULAIR) 10 MG tablet Take 1 tablet by mouth Every Night. Indications: Hayfever, Perennial Allergic Rhinitis 90 tablet 3    predniSONE (DELTASONE) 10 MG tablet TAKE 1 TABLET BY MOUTH EVERY DAY 30 tablet 1    sacubitril-valsartan (Entresto) 24-26 MG tablet Take 0.5 tablets by mouth 2 (Two) Times a Day. Indications: Cardiac Failure 28 tablet 0    zolpidem (AMBIEN) 5 MG tablet TAKE 1 TABLET BY MOUTH EVERY NIGHT AT BEDTIME AS NEEDED FOR SLEEP 30 tablet 0    albuterol (PROVENTIL) (2.5 MG/3ML) 0.083% nebulizer solution Inhale the contents of 1 vial by nebulization Every 4 (Four) Hours As Needed for Wheezing. 90 mL 0    arformoterol (BROVANA) 15 MCG/2ML nebulizer solution USE 2 ML VIA NEBULIZER TWICE DAILY      budesonide  (PULMICORT) 0.5 MG/2ML nebulizer solution INHALE 2 MLS VIA NEBULIZER EVERY 12 HOURS      budesonide 0.5 MG/2ML suspension 0.5 mg, arformoterol 15 MCG/2ML nebulizer solution 15 mcg Inhale 1 nebule 2 (Two) Times a Day.      dronabinol (Marinol) 2.5 MG capsule Take 1 capsule by mouth 2 (Two) Times a Day Before Meals. 60 capsule 0    empagliflozin (Jardiance) 10 MG tablet tablet Take 1 tablet by mouth Daily. 30 tablet 5    ipratropium (ATROVENT) 0.06 % nasal spray 2 sprays into the nostril(s) as directed by provider 4 (Four) Times a Day. 15 mL 0    ipratropium-albuterol (DUO-NEB) 0.5-2.5 mg/3 ml nebulizer Inhale the contents of 1 vial by nebulization 2 (Two) Times a Day As Needed for Wheezing. 180 mL 0    O2 (OXYGEN) Inhale 3 L/min Continuous.      pantoprazole (PROTONIX) 40 MG EC tablet TAKE 1 TABLET BY MOUTH EVERY MORNING 30 tablet 2    potassium chloride (K-DUR,KLOR-CON) 10 MEQ CR tablet Take 2 tablets by mouth Daily. 1 tablet in the PM  Indications: Low Amount of Potassium in the Blood 90 tablet 2    predniSONE (DELTASONE) 20 MG tablet TAKE 2 TABLETS BY MOUTH DAILY. DO NOT. STOP ABRUPTLY          ALLERGIES:    Allergies   Allergen Reactions    Sulfa Antibiotics Rash        Social History     Socioeconomic History    Marital status:    Tobacco Use    Smoking status: Former     Current packs/day: 0.00     Types: Cigarettes     Quit date: 2015     Years since quittin.2     Passive exposure: Never    Smokeless tobacco: Never    Tobacco comments:     less than a pack per day, no smoking since , Quit 2015   Vaping Use    Vaping status: Never Used   Substance and Sexual Activity    Alcohol use: Not Currently     Comment: Socially -A few times a month    Drug use: No    Sexual activity: Defer     Family History   Problem Relation Age of Onset    Ovarian cancer Mother          at age 27    No Known Problems Father     Ovarian cancer Sister     Colon cancer Brother     No Known Problems Other      Malig Hyperthermia Neg Hx         Review of Systems  ROS as per HPI     Objective      Vitals:    05/17/24 1921 05/17/24 2118 05/18/24 0320 05/18/24 0702   BP: 129/67  119/65    BP Location: Right arm  Right arm    Patient Position: Lying  Lying    Pulse: 94 88 90    Resp: 20 22 20 18   Temp: 98.1 °F (36.7 °C)  98 °F (36.7 °C)    TempSrc: Oral  Oral    SpO2: 99% 99% 98% 97%   Weight:       Height:                         4/26/2024    11:45 AM   Current Status   ECOG score 0      Physical Exam  Vitals reviewed.   Constitutional:       General: She is not in acute distress.     Appearance: Normal appearance. She is well-developed.   HENT:      Head: Normocephalic and atraumatic.      Mouth/Throat:      Pharynx: No oropharyngeal exudate.   Eyes:      Pupils: Pupils are equal, round, and reactive to light.   Cardiovascular:      Rate and Rhythm: Normal rate and regular rhythm.      Heart sounds: Normal heart sounds. No murmur heard.  Pulmonary:      Effort: Pulmonary effort is normal. No respiratory distress.      Breath sounds: No wheezing, rhonchi or rales.      Comments: Decreased breath sounds throughout, occasional rales  Abdominal:      General: Bowel sounds are normal. There is no distension.      Palpations: Abdomen is soft.   Musculoskeletal:         General: No swelling. Normal range of motion.      Cervical back: Normal range of motion.   Skin:     General: Skin is warm and dry.      Findings: No rash.   Neurological:      Mental Status: She is alert and oriented to person, place, and time.         Physical exam preformed and reviewed. No changes noted from previous exam. Henry Escobar MD ; 05/16/2024      RECENT LABS:  Results from last 7 days   Lab Units 05/17/24  0643 05/16/24  1006   WBC 10*3/mm3 14.57* 14.49*   NEUTROS ABS 10*3/mm3  --  13.44*   HEMOGLOBIN g/dL 12.8 12.7   HEMATOCRIT % 39.0 38.7   PLATELETS 10*3/mm3 241 267                           Assessment & Plan     1.  Carcinoma of the  lung  May 2015, status post previous left upper lobectomy for carcinoma of the lung.    2.  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   She underwent laryngoscopy 8/18/2021 with right base of tongue without evidence of lesions or masses in the region.    3.  Moderately differentiated adenocarcinoma of the lung.  CT of the chest 6/16/2021 demonstrated postsurgical changes, 8 mm irregular nodule medial segment of right lower lobe and diffuse changes of emphysema.    She is seen in thoracic clinic with findings suspicious for new malignancy and concern of the patient being a poor operative candidate.    7/13/2021 PET CT demonstrated ill-defined avidity and soft tissue left aspect of the mediastinum, 1 cm hypermetabolic pulmonary nodule and asymmetric thickening involving the right base of tongue as well as subtle uptake in the L1 vertebral body.    This patient's case was discussed in thoracic clinic and plans were made to request an MRI of the lumbar spine, obtain a biopsy of the mediastinal involvement of the left had the patient reviewed by ENT.  Biopsy 8/13/2021 found to be consistent with invasive moderately differentiated adenocarcinoma with PD-L1 TPS score 40%.    7/24/2021 MRI of the lumbar spine revealed no evidence of metastatic disease though the patient did have multilevel degenerative disease throughout the lumbar spine.    Her findings were consistent with 2 separate lesions including a nodule in the superior segment of the right lower lobe and a mass in the upper portion of the left chest with the left chest lesion biopsy positive for adenocarcinoma.  The consensus was that these were to separate-synchronous primaries.  Stereotactic radiation each lesions were felt to be the appropriate way to proceed and the patient was referred to radiation therapy.  The patient was  referred for radiation therapy and she was able to proceed with SBRT to the right lung at primary #1 with 5 treatments at 1000 cGy per fraction in the left lung primary similarly at 1000 cGy per fraction x5.  Her treatment ranged between 8/31-9/13/2021.    Her guardant 360 did not determine any new abnormalities but her Caris-MI profile-does reveal sensitivity to immunotherapy as well as a BRAF mutation.  PET/CT 11/23/2021 demonstrates decrease in size and avidity of the previously noted intensely FDG avid nodules left lobectomy operative site and right lower lobe.  Surrounding groundglass and pulmonary opacification is consistent with postradiation changes, a few new subcentimeter pulmonary nodules are present in the right upper lobe and indeterminant, stable subcentimeter hepatic lesion is below PET resolution no other findings of FDG-avid disease are seen in neck abdomen or pelvis.  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  There is evolution of presumed postradiation changes in the right midlung with soft tissue mass within the left lumpectomy operative bed minimally decreased in size.  There is a sub-6 mm nodule in the right upper lobe not definitively seen, indeterminate and an indeterminate left renal lesion at 1.5 cm unchanged from 7/13/2021.  Admission 10/14 - 10/18/2022 presenting with shortness of breath and cough that was nonproductive.  She had been on oral antibiotics and did not improved and was admitted for therapy for apparent pneumonia.  This eventually led to bronchoscopy after initial CT revealed postsurgical changes, new masslike 6.7 cm area of consolidation anterior left lung raising concern for potential malignancy and a follow-up PET/CT planned.   Bronchoscopy and biopsy left lower lung failed to show evidence of malignancy.    11/9/2022 PET/CT, demonstrated an irregular  pleural-based soft tissue density thickening in the left upper lobe that was intensely FDG avid new from 6/8/2022 thought to be a malignant recurrence with spread into the left lung parenchyma.  There is intensely pleural-based avidity within the left lower lobe thought to be metastatic disease with postradiation of the left apex as well.  There is a small focus of uptake in the right hilum grossly unchanged in size and post radiation changes in the right lower lobe.  There is indeterminate density left renal that was grossly unchanged.    Patient's guardant 360 returned as again significant for BRAF V600E and the potential use of BRAF inhibitor/MET inhibitor dabrafenib and trametinib.  Additional information [id number] and use of alpelisib.  The patient now presents back 12/14/2022 to initiate therapy- initially with immunotherapy considering Caris study with PD-L1 TPS of 70% on 22 C3 and 28-8 assays.  Single agent Keytruda initiated 12/14/2022 2/8/2023 CT of the chest, abdomen pelviswith consolidation and masslike pleural thickening in the left apex and upper lung index areas have decreased somewhat.  There is no evidence of metastatic disease otherwise and a few indeterminate left renal lesions are stable.  After lengthy discussion with the patient and her daughter as to the stability to mild improvement with immunotherapy it is agreed that we would continue treatment at this point.  Proceed today, 4/19/2023 with cycle 7 pembrolizumab which is continued every 3 weeks  The patient proceeded to 8 cycles of pembrolizumab and underwent follow-up chest CT chest, abdomen pelvis revealing evolution of dense consolidation left upper lobe and extension of soft tissue mass anterior to left upper ribs with increase sclerosis anterior left second rib.  This was concerning for progression of disease versus radiation change and the patient was scheduled for subsequent PET/CT.  PET/CT 6/5/2023  reveals progression of disease in  left upper thorax, left supraclavicular adenopathy new metastasis left posterior fourth ribs, no evidence of metastatic disease in the abdomen or pelvis.  Patient seen 6/12/2023 with plans to move her Norco to every 4 hours, discontinue Keytruda, make application for dabrafenib and trametinib at 150 mg p.o. every 12 hours and 2 mg p.o. daily respectively.  Patient referred for radiation therapy consultation concurrently.  Last dose of Keytruda 5/31/2023.  6/23/2023: Patient seen for triage visit.  She has not yet started dabrafenib or trametinib, she has not yet received the medications.  Unfortunately she has been experiencing uncontrolled nausea/vomiting as further detailed below.   Patient seen 6/27/2023 reporting that her nausea has resolved.  She was unable to complete the MRI of the brain however Dr. Escobar did review the images patient did have and there was no evidence of edema or metastatic disease.  Patient can start her new oral medication once she receives the medication.  Patient seen 7/5/2023 with plans to start palliative radiotherapy x10 fractions and initiate dabrafenib and trametinib as soon as medications received.  7/17/2023 patient initiated radiation with 10 fractions planned.  Patient has received dabrafenib and trametinib.  Brought in for triage visit due to nausea associated with dosing.  She is premedicating with ondansetron however only waiting 15 minutes.  We discussed today that she needs to wait at least 30 minutes to 1 hour prior to taking the dabrafenib and trametinib.  The patient will do so.  We discussed she could also take this again if she feels the need 8 hours later for nausea control.  If she is having breakthrough nausea then she should call our office.  I have encouraged her to utilize electrolyte drinks.  She will get 1L normal liter normal saline in the office today.She was not nauseas while in the office so we did not give additional nausea medication.  We will have her  return in 1 week repeat labs, review by nurse practitioner, and possible IV fluids.  I stressed the importance of calling sooner if she finds that the premedication is not controlling her nausea at which time we would have her come in for additional IV fluids and evaluation.  She is continuing daily radiation this week.  Case was discussed with Dr. Escobar today.  7/21/2023: Patient returns for short interval follow-up due to very poor appetite and sleeping the majority of the day.  Her nausea has been improved with premedicating 30 minutes prior to dabrafenib and trametinib.  She however has been sleeping the majority of the day and is not eating when she is awake.  She does report taking ensures but she is only sipping on these.  Have given her samples of some of the office today and encouraged her to drink when while she is here.  Her performance status continues to decline and her daughter states that she was fixing dinner nightly just 2 weeks ago.  With performance status of 3 today I discussed the case with Dr. Escobar and we will hold her dabrafenib and  TRAMETINIB.  Her liver enzymes are elevated today as well.  We will monitor this next week and have her evaluated by Dr. Escobar at which time we could consider restarting her dabrafenib and trametinib at reduced doses pending performance status and laboratory evaluation.  Unfortunately she required admission 7/25-30/2023 with failure to thrive.  Subsequent assessments including blood cultures were negative and patient was treated briefly with IV antibiotic therapy, started PT and OT, medications adjusted for hypotension and treated symptomatically for nausea and vomiting.  She was able to improve enough to be discharged home as she continued radiation therapy started 7/17/2023 and completed 7/31/2023 to the left chest wall.  She is next seen back 8/4/2023.  Lengthy discussion as to reevaluation   Plans made for CT chest, abdomen, pelvis in 4 weeks  Patient seen  8/21/2023 with complaints of worsening fatigue and shortness of breath.  Patient scheduled for follow up CT scans this Friday. Lungs clear on exam,  Sats 97%.  Hgb stable at 11.7.  Will check BNP today.  Discussed may be related to disease and will need to see what scan shows.   Subsequent scans 8/25/2023, fortunately, with stable left apical mass with mediastinal chest wall involvement unchanged 1.4 cm supraclavicular lymph node.  No new findings of metastatic disease.  The patient is seen back 9/8/2023.  Fortunately she is improved dramatically off therapy and is gratified that his studies have not worsened in any substantial way.  We have discussed both her scan reports and echocardiogram that does not show significant reduction of her ejection fraction.  As result of her current stable status we have asked her to restart Mekinist at 0.5 mg daily and Tafinlar at 50 mg twice daily  9/22/2023 tolerating Mekinist 0.5 mg daily and tafinlar 50 mg twice daily quite well. Continues on prednisone 10 mg daily which will now be reduced to 5 mg daily when seen 10/16/2023  Plans made to continue current dosing of Mekinist and Tafinlar and repeat evaluation with chest x-ray 10/16 and repeat CT chest abdomen pelvis in 5 weeks, MD in 6 weeks.  The patient required admission 11/14 - 11/16/2023 having presented with shortness of breath and found to be pulmonary edema with CBC, lactate and procalcitonin all normal.  There was a concern that her chemotherapy agents could have produced her cardiomyopathy and these were stopped 11/14/2023.  She was diuresed and echocardiogram demonstrated LV SF mildly decreased at 39.2% with GLS of -11.1%, left ventricular cavity mildly dilated, left ventricular wall thickness consistent with mild concentric hypertrophy, left ventricular global hypokinesis, aortic valve abnormal with moderate calcification, valve was trileaflet, trace tricuspid valve regurgitation with right VSP at less than 35 mmHg.   The findings for LV systolic function, diastolic function and global longitudinal LV strain had all worsened.  CTA of chest 11/14/2023 demonstrating confluent soft tissue mass possibly measuring larger in current study at 6.5 x 5.0 previously 4.9 x 4.8, left supraclavicular node to decrease in size measuring 1.1 cm previously 1.4 cm, extensive bilateral interstitial and groundglass infiltrates.  The patient is seen 11/26/2023.  As per the discharge we are requested to have a BMP and CMP assessed.  She is seen with her son and daughter in a lengthy conversation held about her recent hospitalization with CHF-cardiomyopathy felt elicited, in part, from her targeted therapy with her Mekinist and Tafinlar discontinued altogether.  The patient is relatively stable currently having continued her diuretics and to be seen in cardiology in follow-up.  At the time of this dictation her CBC is found to be essentially normal with mild leukocytosis, CMP normal except for mildly elevated glucose at 125 and BNP reduced to 6126 from 9763.  She feels well with no additional shortness of breath chest pain lower extremity edema.  In reviewing the the possible treatment options she is next best treated with single agent chemotherapy moving to [place].  Patient proceeded through teaching and presents back 12/4/2023 to initiate chemotherapy with Alimta-cycle 1 day 1  Seen 12/18/2023 for toxicity check, overall has tolerated well.  Continues to follow closely with cardiology.  The patient required hospitalization 1/18-21/24 presenting with increasing shortness of breath, findings of chronic respiratory failure, positive for rhinovirus on admission.  She is treated with supportive treatments including for previous COVID infection as well as antibiotic therapies.  She completed 5 days of Augmentin, continue nebulizer and breathing treatments.  Upon discharge she rescheduled appointments though was seen by cardiology 1/23/2024 not felt to be  in heart failure, treated supportively for epistaxis.  There was concern as to whether she should continue treatment and she has seen back in office 1/29/2024 to discuss potential options.  She is seen with her son and daughter both indicating that she drops her O2 saturation quite quickly and is very inactive as result of difficulty with rapidly becoming shortness of breath and weak when she tries to move.  This is led to a lengthy conversation about the extent of her disease including both her cardiovascular status and her respiratory status.  She is worsening quickly and is not, currently, a candidate for chemotherapy.  The patient is doing poorly enough that we have had a cristy conversation today-1/29/2024 about end-of-life concerns.  She is not interested in hospice at this point but hopes to improve further.  At this point holding chemotherapy.  She was seen 2/9/2024 with groundglass infiltrates since November 2023 suggestive of pneumonitis and he had restarted prednisone at 40 mg daily.  A repeat CT of chest 2/26/2024 revealed decreased consolidation in superior segment right lower lobe, stable consolidation in the left upper lobe and apex, stable coarsened interstitial lung opacities elsewhere, stable soft tissue mass along the left upper mediastinum, small pericardial effusion, no pleural effusion, no clear abnormalities in the liver, stable left renal cyst and no acute bony abnormality  The patient is seen with her son and both indicate that her general performance status has improved.  They are concerned about her response to chemotherapy previously and, though we have scheduled her for chemotherapy, are not certain they wish to proceed.  Now with her improved scan we could follow her for period of time and then try to reevaluate offering chemotherapy potentially with Alimta.  Patient seen next 4/26/2024 with recent imaging demonstrating progression of disease.  Patient remains a candidate, potentially,  for [place] though it is hoped that her performance status to improve before that started.  THC Gummies were initiated versus Marinol.  Patient been to 5/17/2024 with worsening shortness of breath, poor appetite and back pain.  Scans demonstrated progression of disease.  Plan of care consultation pursued and transfer to palliative care initiated.  Patient seen 5/18/2024, symptoms improved, hopes for discharge at the beginning of the week under hospice care      4.  Worsening back pain  Admission 11/28/2022 through 12/1/2022.  MRI of the cervical and thoracic spine fortunately failed to demonstrate metastatic disease.  Moderate degenerative changes noted which could cause radiculopathy.  Medication altered to include MS Contin 15 mg every 12 hours and Norco for breakthrough pain  She reports today her pain is adequately controlled  Patient with increasing pain 5/31/2023 with pain level of 6.  MS Contin was increased to 50 mg p.o. every 8 hours and Norco 10/325 #101 p.o. every 6 hours to move to every 4 hours as needed-E scribed to pharmacy  Patient seen 6/12/2023 with Norco moved to every 4 hours.  6/23/2023: Seen for triage visit.  Has been using oxycodone 20 mg every 3 hours as needed for pain.  Reports she has not been using the hydrocodone 10/325.  Was experiencing dizziness, so reduced her oxycodone use to half a tablet every 3 hours as needed.  Reports pain is uncontrolled during the night so she is having to wake at night time to take pain medicine.  They are questioning resuming long-acting pain medication, as she is waking throughout the night to take pain medicine.  She has already been prescribed MS Contin and has this available at home, did let her know it would be okay to resume this.  Assess 7/5/2023 with pain reduced to 2/10.  Plan to continue current therapy  Darlene Pfeiffer reports a pain score of .  Given her pain assessment as noted, treatment options were discussed and the following options were  decided upon as a follow-up plan to address the patient's pain: continuation of current treatment plan for pain. Currently not requiring pain medication.   Refilled both the patient's Remeron and Ambien 10/16/2023  12/18/2023 requesting a refill of her Ambien.    5.  Poor appetite and malnutrition  Placed on prednisone 10 mg daily 1/4/2023 with significant improvement in her symptoms  Weight is stable today at just below 106 pounds  Patient assessed 6/12/23 with possible gummy therapy.  Stable performance status including weight and appetite 7/5/2023  Weight has declined as of 7/17/2023 due to nausea and vomiting that started this past weekend.  After further discussion the patient did stop her prednisone 10 mg while she was not feeling well.  We discussed today the importance of continuing this as it can help with her nausea and appetite and therefore she will restart tomorrow.  7/21/2023: Weight is stable today though albumin is declining at 3.  Patient is sleeping the majority of the day and not eating.  Yesterday she had a small piece of chicken and that is all.  She states she is drinking ensures however her daughter thinks that she is just sipping on these and not completing them.  Hopefully holding her dabrafenib and trametinib will be helpful with her being awake more and appetite.  I encouraged her to make sure she is taking her prednisone daily at 10 mg daily.  Remeron 7.5 mg p.o. nightly E scribed to pharmacy  9/22/2023 weight is up to 98 pounds.  Further improvement in weight 10/16/2020 3 to 104 pounds, continue Remeron at current dose, prednisone reduced to 5 mg daily.  Patient placed on prednisone from pulmonary medicine for pneumonitis, seen 3/1/2420 mg daily, requesting continuance of this over the next month and then drop to 10 mg over 2-week, then 2 5 mg over 2 weeks at which time she will be seen back after repeat scans.  Patient seen 4/26/2024, THC Gummies versus Marinol initiated.  Readmission  5/17/2020 with progression of disease, palliative care initiated    Plan:  *Palliative care consultation, transfer to palliative care floor today.  *Palliative care order set placed  *Hospice consult plan, possible discharge at the beginning the week

## 2024-05-19 PROCEDURE — 99231 SBSQ HOSP IP/OBS SF/LOW 25: CPT | Performed by: INTERNAL MEDICINE

## 2024-05-19 PROCEDURE — 94799 UNLISTED PULMONARY SVC/PX: CPT

## 2024-05-19 PROCEDURE — 94761 N-INVAS EAR/PLS OXIMETRY MLT: CPT

## 2024-05-19 PROCEDURE — 94664 DEMO&/EVAL PT USE INHALER: CPT

## 2024-05-19 PROCEDURE — 63710000001 ONDANSETRON ODT 4 MG TABLET DISPERSIBLE: Performed by: STUDENT IN AN ORGANIZED HEALTH CARE EDUCATION/TRAINING PROGRAM

## 2024-05-19 PROCEDURE — 94760 N-INVAS EAR/PLS OXIMETRY 1: CPT

## 2024-05-19 RX ORDER — ONDANSETRON 4 MG/1
4 TABLET, ORALLY DISINTEGRATING ORAL EVERY 6 HOURS PRN
Status: DISCONTINUED | OUTPATIENT
Start: 2024-05-19 | End: 2024-05-19

## 2024-05-19 RX ORDER — ONDANSETRON 2 MG/ML
4 INJECTION INTRAMUSCULAR; INTRAVENOUS EVERY 6 HOURS PRN
Status: DISCONTINUED | OUTPATIENT
Start: 2024-05-19 | End: 2024-05-19

## 2024-05-19 RX ORDER — ONDANSETRON 2 MG/ML
4 INJECTION INTRAMUSCULAR; INTRAVENOUS EVERY 4 HOURS PRN
Status: DISCONTINUED | OUTPATIENT
Start: 2024-05-19 | End: 2024-05-20 | Stop reason: HOSPADM

## 2024-05-19 RX ORDER — ONDANSETRON 4 MG/1
4 TABLET, ORALLY DISINTEGRATING ORAL EVERY 4 HOURS PRN
Status: DISCONTINUED | OUTPATIENT
Start: 2024-05-19 | End: 2024-05-20 | Stop reason: HOSPADM

## 2024-05-19 RX ORDER — PROMETHAZINE HYDROCHLORIDE 25 MG/1
12.5 TABLET ORAL EVERY 6 HOURS PRN
Status: DISCONTINUED | OUTPATIENT
Start: 2024-05-19 | End: 2024-05-20 | Stop reason: HOSPADM

## 2024-05-19 RX ORDER — PROMETHAZINE HYDROCHLORIDE 12.5 MG/1
12.5 SUPPOSITORY RECTAL EVERY 6 HOURS PRN
Status: DISCONTINUED | OUTPATIENT
Start: 2024-05-19 | End: 2024-05-20 | Stop reason: HOSPADM

## 2024-05-19 RX ADMIN — MIRTAZAPINE 7.5 MG: 15 TABLET, FILM COATED ORAL at 01:34

## 2024-05-19 RX ADMIN — MORPHINE SULFATE 10 MG: 20 SOLUTION ORAL at 06:18

## 2024-05-19 RX ADMIN — BUDESONIDE 0.5 MG: 0.5 INHALANT ORAL at 20:37

## 2024-05-19 RX ADMIN — ARFORMOTEROL TARTRATE 15 MCG: 15 SOLUTION RESPIRATORY (INHALATION) at 08:46

## 2024-05-19 RX ADMIN — ONDANSETRON 4 MG: 4 TABLET, ORALLY DISINTEGRATING ORAL at 19:33

## 2024-05-19 RX ADMIN — BUDESONIDE 0.5 MG: 0.5 INHALANT ORAL at 08:46

## 2024-05-19 RX ADMIN — ACETAMINOPHEN 650 MG: 325 TABLET, FILM COATED ORAL at 12:27

## 2024-05-19 RX ADMIN — MORPHINE SULFATE 20 MG: 20 SOLUTION ORAL at 10:39

## 2024-05-19 RX ADMIN — MIRTAZAPINE 7.5 MG: 15 TABLET, FILM COATED ORAL at 21:37

## 2024-05-19 RX ADMIN — ONDANSETRON 4 MG: 4 TABLET, ORALLY DISINTEGRATING ORAL at 10:06

## 2024-05-19 RX ADMIN — OXYCODONE AND ACETAMINOPHEN 2 TABLET: 5; 325 TABLET ORAL at 19:23

## 2024-05-19 RX ADMIN — Medication 10 ML: at 01:34

## 2024-05-19 RX ADMIN — MORPHINE SULFATE 20 MG: 20 SOLUTION ORAL at 12:26

## 2024-05-19 RX ADMIN — APIXABAN 10 MG: 5 TABLET, FILM COATED ORAL at 01:33

## 2024-05-19 RX ADMIN — MORPHINE SULFATE 20 MG: 20 SOLUTION ORAL at 08:20

## 2024-05-19 RX ADMIN — Medication 10 ML: at 09:00

## 2024-05-19 RX ADMIN — METOPROLOL SUCCINATE 25 MG: 25 TABLET, EXTENDED RELEASE ORAL at 08:20

## 2024-05-19 RX ADMIN — APIXABAN 10 MG: 5 TABLET, FILM COATED ORAL at 08:20

## 2024-05-19 RX ADMIN — PANTOPRAZOLE SODIUM 40 MG: 40 TABLET, DELAYED RELEASE ORAL at 06:18

## 2024-05-19 RX ADMIN — ARFORMOTEROL TARTRATE 15 MCG: 15 SOLUTION RESPIRATORY (INHALATION) at 20:37

## 2024-05-19 RX ADMIN — APIXABAN 10 MG: 5 TABLET, FILM COATED ORAL at 21:38

## 2024-05-19 RX ADMIN — OXYCODONE AND ACETAMINOPHEN 2 TABLET: 5; 325 TABLET ORAL at 14:54

## 2024-05-19 NOTE — PROGRESS NOTES
REASON FOR FOLLOW-UP: Recurrent lung cancer.    History of Present Illness    The patient is a 83 y.o. female with the above-mentioned history who returned 9/22/2023 continuing on Mekinist 0.5 mg daily and Tafinlar at 50 mg twice daily.  She also continues on prednisone only taking 10 mg daily.  She reports that she is feeling quite good.  She is tolerating things well.  She has a decent appetite denies issues with nausea, vomiting, diarrhea, or constipation.  She denies any issues with increased shortness of breath.    Patient did see ENT Dr. Topete, who has ordered an ultrasound of her neck, which will be done at [facility] on the 27th.  She is also scheduled for a cerebral angiogram by Dr. Calvin on 29 September.      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  Dr. Dowell of neurosurgery saw the patient 10/12/2023 and felt the patient was stable without neurologic symptoms, yearly follow-up planned.  The patient had started seeing a new ENT physician leading to the referral back to neurosurgery.  When seen 10/16/2023 she is doing very well on her current Mekinist and Tafinlar doses having improved her weight, appetite and general performance status.  We have discussed at least a chest x-ray today and repeat staging in the next 5 to 6 weeks as she continues her current dosing.    The patient required admission 11/14 - 11/16/2023 having presented with shortness of breath and found to be pulmonary edema with CBC, lactate and procalcitonin all normal.  There was a concern that her chemotherapy agents could have produced her cardiomyopathy and these were stopped 11/14/2023.  She was diuresed and echocardiogram demonstrated LV SF mildly decreased at 39.2% with GLS of -11.1%, left ventricular cavity mildly dilated, left  ventricular wall thickness consistent with mild concentric hypertrophy, left ventricular global hypokinesis, aortic valve abnormal with moderate calcification, valve was trileaflet, trace tricuspid valve regurgitation with right VSP at less than 35 mmHg.  The findings for LV systolic function, diastolic function and global longitudinal LV strain had all worsened.    CTA of chest 11/14/2023 demonstrating confluent soft tissue mass possibly measuring larger in current study at 6.5 x 5.0 previously 4.9 x 4.8, left supraclavicular node to decrease in size measuring 1.1 cm previously 1.4 cm, extensive bilateral interstitial and groundglass infiltrates.    The patient is seen 11/26/2023.  As per the discharge we are requested to have a BMP and CMP assessed.  She is seen with her son and daughter in a lengthy conversation held about her recent hospitalization with CHF-cardiomyopathy felt elicited, in part, from her targeted therapy with her Mekinist and Tafinlar discontinued altogether.    The patient is relatively stable currently having continued her diuretics and to be seen in cardiology in follow-up.  At the time of this dictation her CBC is found to be essentially normal with mild leukocytosis, CMP normal except for mildly elevated glucose at 125 and BNP reduced to 6126 from 9763.  She feels well with no additional shortness of breath chest pain lower extremity edema.  In reviewing the the possible treatment options she is next best treated with single agent chemotherapy moving to Alimta.    The patient proceeded to treatment teaching and seen back 12/4/2023.  She is ready to proceed with chemotherapy with fortunately a recent good performance status still in place.    Patient returns 12/18/2023 for toxicity check.  She started treatment with Alimta on 12/4/2023.  Patient states that she did have an episode following treatment where she felt extremely itchy however she took Benadryl which helped, and her symptoms  resolved.  She has ongoing issues with fatigue.  She is being followed closely by cardio oncology.  She also reports being short of breath but denies chest pain, or swelling.  She does struggle with her appetite.  Otherwise she feels like she has tolerated the change in treatment well.      The patient required hospitalization 1/18-21/24 presenting with increasing shortness of breath, findings of chronic respiratory failure, positive for rhinovirus on admission.  She is treated with supportive treatments including for previous COVID infection as well as antibiotic therapies.  She completed 5 days of Augmentin, continue nebulizer and breathing treatments.  Upon discharge she rescheduled appointments though was seen by cardiology 1/23/2024 not felt to be in heart failure, treated supportively for epistaxis.    There was concern as to whether she should continue treatment and she has seen back in office 1/29/2024 to discuss potential options.  She is seen with her son and daughter both indicating that she drops her O2 saturation quite quickly and is very inactive as result of difficulty with rapidly becoming shortness of breath and weak when she tries to move.  This is led to a lengthy conversation about the extent of her disease including both her cardiovascular status and her respiratory status.  She is worsening quickly and is not, currently, a candidate for chemotherapy.    The patient is doing poorly enough that we have had a cristy conversation today about end-of-life concerns.  She is not interested in hospice at this point but hopes to improve further.    The patient is next evaluated 4/26/2024 with recent scans demonstrating progression of disease with enlarged mediastinal hilar lymphadenopathy, creased consolidation apex left upper lobe, new subcentimeter low-density lesion right lobe of the liver, right adrenal nodule, pleural thickening left apex, decreased patchy consolidation apical segment of right lower  lobe.6/2024.  Recent CT chest, abdomen, pelvis demonstrates progression of disease.  She is seen with her sister-in-law both indicating that her oxygen requirements are increasing and her appetite reducing.  We have again discussed restarting chemotherapy but also concerned about her performance status.  She has recently started using THC Gummies and is encouraged to continue to do so to improve her performance status, appetite and weight before we try to reinstitute chemotherapy treatment with Alimta.    Interval history:  5/17/2024  T97.9, pulse 120, respirations 20, /53  Patient admitted to continue therapy through cardiology, undergo diuresis and continue supportive care.  H&H 12.39.0, white count of 14,570, platelet count 241,000, being creatinine 33 and 0.53  Additional studies demonstrate acute left upper extremity DVT in the internal jugular and subclavian  I have met with the patient and her family members and advised palliative care.  We will move to transfer her this afternoon.    5/18/2024  98 degrees, pulse 90, respirations 18, /65  Patient transferred to [address] [place], [place] [address]palliative care.  She indicates that she is comfortable and is hopeful for discharge possibly to begin the week under hospice care.    5/19/2024  T 97 degrees, pulse 107, respirations 16, /70  The patient has been seen by hospice and [facility] now elected.  Discharge Monday anticipated.  Symptoms currently managed.          ONCOLOGY HISTORY:      The patient is an 83-year-old female with the below medical history including chronic obstructive pulmonary disease who was seen in the [facility] multidisciplinary thoracic oncology clinic July 1, 2021.  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      She had undergone a CT of  the chest at [facility] 6/16/2021 showing postsurgical changes in the left chest from right upper lobectomy, 8 mm irregular nodule medial segment of the right lower lobe and diffuse changes of emphysema with fibrotic changes in the periphery, no suspicious hilar or mediastinal nodes, no pleural effusion.  New finding was suspicious for new malignancy with the patient felt to be a poor candidate for surgical resection.  A PET CT and PFTs were requested thereafter.  The PET/CT demonstrated ill-defined FDG avid soft tissue thickening left aspect mediastinum within the operative bed, 1 cm hypermetabolic pulmonary nodule right lower lobe and asymmetric thickening patchy uptake involving the right base of tongue.  There is subtle uptake within the L1 vertebral body which is indeterminate and a sub-6 mm pulmonary nodule of right lung and subcentimeter hypodense hepatic lesion which was below PET resolution.       The patient's case was discussed in thoracic conference 7/22/2021 with consideration of the patient undergoing L1 vertebral body MRI, confirmatory biopsy left mediastinal and assessment by ENT (Dr. Caballero) who had worked with the patient previously.  She, incidentally, indicates that radiation therapy was given apparently intraoperatively at her recent surgery?  She still has issues with difficulty chewing and swallowing post procedures and was to be followed up with Dr. Caballero in the near future as planned.                                                            She was seen in the thoracic clinic on the same day with plans to proceed with MRI of the lumbar spine, biopsy left mediastinal nodular area of potential disease and follow-up of her ENT assessment as well as undergo a guardant 360 assessment in our office.  She underwent the biopsy 8/13/2021 found to be consistent with invasive moderately differentiated adenocarcinoma with PD-L1 TPS score 40%.  MRI of the lumbar spine revealed no evidence of  metastatic disease though the patient did have multilevel degenerative disease throughout the lumbar spine.  She had an office follow-up with Dr. 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  She underwent laryngoscopy 8/18/2021 with right base of tongue without evidence of lesions or masses in the region.     She was seen by Dr. Hernandez 8/19/2021 and, her findings, had been presented in lung conference.  Her findings were consistent with 2 separate lesions including a nodule in the superior segment of the right lower lobe and a mass in the upper portion of the left chest with the left chest lesion biopsy positive for adenocarcinoma.  The consensus was that these were to separate-synchronous primaries.  Stereotactic radiation each lesions were felt to be the appropriate way to proceed and the patient was referred to radiation therapy.     The patient is seen in office 8/23/2021 agreeable to the radiation therapy as well as additional assessments including Caris molecular assessment of her biopsy.  Again her guardant 360 is currently pending and we would follow her after she completes radiation therapy with subsequent scans.    She was able to proceed with SBRT to the right lung at primary #1 with 5 treatments at 1000 cGy per fraction in the left lung primary similarly at 1000 cGy per fraction x5.  Her treatment ranged between 8/31-9/13/2021.  She is now scheduled for follow-up 11/23/2021.    The patient subsequent genomic testing is discussed with her as she is is seen back 9/23/2021.  Her guardant 360 did not determine any new abnormalities but her Caris-MI profile-does reveal sensitivity to immunotherapy as well as a BRAF mutation.  This could be extremely important as we follow the patient from this point.  Fortunately she is feeling extremely well and quite pleased about her treatments.    Patient had  subsequent PET/CT 11/23/2021 demonstrates decrease in size and avidity of the previously noted intensely FDG avid nodules left lobectomy operative site and right lower lobe.  Surrounding groundglass and pulmonary opacification is consistent with postradiation changes, a few new subcentimeter pulmonary nodules are present in the right upper lobe and indeterminant, stable subcentimeter hepatic lesion is below PET resolution no other findings of FDG-avid disease are seen in neck abdomen or pelvis.  The patient seen in office 12/1/2021 with a relatively good performance status stating she is not having any new symptoms and very pleased about her results on her PET/CT.  She realizes we'll have to follow this further but subsequent scans in 6 months.  She is also hoping to see an oral surgeon that previously helped her-Dr. Wu.    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  There is evolution of presumed postradiation changes in the right midlung with soft tissue mass within the left lumpectomy operative bed minimally decreased in size.  There is a sub-6 mm nodule in the right upper lobe not definitively seen, indeterminate and an indeterminate left renal lesion at 1.5 cm unchanged from 7/13/2021.  The patient is currently scheduled to see Dr. Dowell on 6/23/2022.  She is feeling well without any additional symptoms.    The patient required admission 10/14 - 10/18/2022 presenting with shortness of breath and cough that was nonproductive.  She had been on oral antibiotics and did not improved and was admitted for therapy for apparent pneumonia.  This eventually led to bronchoscopy after initial CT revealed postsurgical changes, new masslike 6.7 cm area of consolidation anterior left lung raising concern for potential malignancy and a follow-up PET/CT  planned.  Bronchoscopy and biopsy left lower lung failed to show evidence of malignancy.  The patient's PET/CT, however, demonstrated an irregular pleural-based soft tissue density thickening in the left upper lobe that was intensely FDG avid new from 6/8/2022 thought to be a malignant recurrence with spread into the left lung parenchyma.  There is intensely pleural-based avidity within the left lower lobe thought to be metastatic disease with postradiation of the left apex as well.  There is a small focus of uptake in the right hilum grossly unchanged in size and post radiation changes in the right lower lobe.  There is indeterminate density left renal that was grossly unchanged.  The patient is seen back in office 11/15/2022 indicating that she still has chest discomfort that is slowly worsening and that she has a chronic paroxysmal cough but has not improved.  We discussed that she very likely has recurrent disease and plan to proceed with a guardant 360 examination initially as we determine whether we should try to proceed with therapy particularly immunotherapy versus targeted therapy recognizing the above findings.    Patient's guardant 360 returned as again significant for BRAF V600E and the potential use of BRAF inhibitor/MET inhibitor dabrafenib and trametinib.  Additional information [id number] and use of alpelisib.    Unfortunately she required admission 11/28-12/1 with worsening back pain.  Fortunately she did not demonstrate evidence of metastatic disease but did have moderate degenerative changes which could cause radiculopathy.  Medications were altered to include subsequently MSR 15 mg p.o. every 12 hours, Norco as needed.    The patient now presents back 12/14/2022 to initiate therapy- initially with immunotherapy considering Caris study with PD-L1 TPS of 70% on 22 C3 and 28-8 assays.    Patient seen with her  and we discussed pain medications and adjustments thereafter and that she stopped  taking MSIR having only a short supply and having to use Norco more frequently.  She is willing to initiate Keytruda today we have discussed that considering her genomics treatment versus her BRAF mutation is also an option.    C1 Keytruda 12/14/2022    Patient is seen back 1/4/2023 in follow-up due for cycle 2 Keytruda.  Patient states that since receiving her first cycle she has had decreased appetite and decreased energy.  She has not been able to bring herself to eat much and she has notably lost 7 pounds.  She has also been struggling with constipation in relation to ongoing narcotics for pain control.  She has been taking senna S2 tabs twice a day.  We discussed adding daily MiraLAX.    Patient did just last week meet with dietitian, Zoila Clinton, to discuss ways to improve caloric intake.  She has not been able to implement any of these things yet though.    The patient is next seen 1/25/2023 with mild weight gain.  She is seen with family member both indicating that she has actually felt somewhat better in terms of performance status and general function.  We have discussed proceeding with a third cycle treatment and reevaluating by scan in 2 weeks.    The patient proceeded with treatment 1/25/2023 and underwent follow-up CT chest abdomen pelvis 2/8/2023 demonstrating small pericardial effusion that is decreased in size from 11/20/2022, ill-defined consolidation and pleural-based thickening in the left apex and upper lung has reduced slightly, additional index nodule soft tissue in the aspect the pericardium has not changed substantially, no evidence of metastatic disease in the abdomen pelvis.    The patient is seen with her daughter 2/15/2023 both indicating that she has definitely improved, stable weight, appetite and performance status.  She is also using her pain medication much less frequently and wonders whether she might begin to taper from her twice a day MS Contin.    Patient is seen back 3/8/2023  due for ongoing pembrolizumab.  She continues on low-dose prednisone 10 mg daily and has noted significant improvement in her energy and appetite with this.  She is reluctant to taper this any further we discussed that it is fine for her to stay on daily dosing.    She did try to taper her pain medication going down to just MS Contin 15 mg every 12 hours while holding her hydrocodone.  However over the last few days she has had some intermittent pain in the left upper back alleviated with hydrocodone 2-3 times a day.  She currently is denying any pain.  Monitor.    She is next evaluated 3/29/2023 for ongoing Keytruda.  Evidently her pain medication was sent to the wrong pharmacy and will need to be represcribed today-long-acting MS Contin.  Otherwise she is doing reasonably well at present.    Patient seen 5/31/2023 with gradual development of left shoulder and left scapular pain.  Concurrent CT chest, abdomen and pelvis demonstrate interval increasing consolidation left upper lobe with extension anterior to the left upper ribs with sclerosis anterior left second rib.  This is suspicious for worsening malignancy.  Plans were made for subsequent PET/CT where the patient's pain medications were adjusted.PET/CT 6/5/2023 reveals progression of disease in left upper thorax, left supraclavicular adenopathy new metastasis left posterior fourth ribs, no evidence of metastatic disease in the abdomen or pelvis.    The patient is seen with her son and daughter 6/12/2023 and it is clear that she is not developing a Pancoast like syndrome with progression of her malignancy in the left upper thorax and associated symptoms.  We have discussed discontinuance of her immunotherapy and moving to targeted therapy with dabrafenib and trametinib as we also request radiation therapy repeat consultation and try to manage her pain more effectively.    Patient seen 7/5/2023 with plans to start palliative radiotherapy and to initiate  concurrently dabrafenib and trametinib.    As a result of toxicity (nausea and vomiting) the patient was taken off of dabrafenib and trametinib when seen 7/21/2023, given additional IV hydration and further supportive care.  Plans were made for follow-up on recovery to determine as to whether to redose the medications.    Unfortunately she required admission 7/25-30/2023 with failure to thrive.  Subsequent assessments including blood cultures were negative and patient was treated briefly with IV antibiotic therapy, started PT and OT, medications adjusted for hypotension and treated symptomatically for nausea and vomiting.  She was able to improve enough to be discharged home as she continued radiation therapy started 7/17/2023 and completed 7/31/2023 to the left chest wall.    She is next seen back 8/4/2023.  We have reviewed that she had been on dabrafenib and trametinib at 150 mg p.o. every 12 hours and 2 mg p.o. daily respectively and dosing could be changed considerably such as 50 mg twice daily and 0.5 mg daily.  Determination made to have her undergo repeat scans at 4 weeks and review her response to radiation therapy as we make application for lower dose targeted therapy, addition of Remeron p.o. nightly, tapering of her MS Contin to daily rather than twice daily, use of low-dose Ativan to undergo scans.    Subsequent scans 8/25/2023, fortunately, with stable left apical mass with mediastinal chest wall involvement unchanged 1.4 cm supraclavicular lymph node.  No new findings of metastatic disease.    The patient is seen back 9/8/2023.  Fortunately she is improved dramatically off therapy and is gratified that his studies have not worsened in any substantial way.  We have discussed both her scan reports and echocardiogram that does not show significant reduction of her ejection fraction.  As result of her current stable status we have asked her to restart Mekinist at 0.5 mg daily and Tafinlar at 50 mg twice  daily to be advanced as tolerated.  Patient seen 10/16/2023 with follow-up chest x-ray planned and CT of chest abdomen pelvis at 5 weeks -approximately 3 months of therapy.    The patient required admission 11/14 - 11/16/2023 having presented with shortness of breath and found to be pulmonary edema with CBC, lactate and procalcitonin all normal.  There was a concern that her chemotherapy agents could have produced her cardiomyopathy and these were stopped 11/14/2023.  She was diuresed and echocardiogram demonstrated LV SF mildly decreased at 39.2% with GLS of -11.1%, left ventricular cavity mildly dilated, left ventricular wall thickness consistent with mild concentric hypertrophy, left ventricular global hypokinesis, aortic valve abnormal with moderate calcification, valve was trileaflet, trace tricuspid valve regurgitation with right VSP at less than 35 mmHg.  The findings for LV systolic function, diastolic function and global longitudinal LV strain had all worsened.    CTA of chest 11/14/2023 demonstrating confluent soft tissue mass possibly measuring larger in current study at 6.5 x 5.0 previously 4.9 x 4.8, left supraclavicular node to decrease in size measuring 1.1 cm previously 1.4 cm, extensive bilateral interstitial and groundglass infiltrates.      The patient is seen 11/26/2023.  As per the discharge we are requested to have a BMP and CMP assessed.  She is seen with her son and daughter in a lengthy conversation held about her recent hospitalization with CHF-cardiomyopathy felt elicited, in part, from her targeted therapy with her Mekinist and Tafinlar discontinued altogether.    The patient is relatively stable currently having continued her diuretics and to be seen in cardiology in follow-up.  At the time of this dictation her CBC is found to be essentially normal with mild leukocytosis, CMP normal except for mildly elevated glucose at 125 and BNP reduced to 6126 from 9763.  She feels well with no  additional shortness of breath chest pain lower extremity edema.  In reviewing the the possible treatment options she is next best treated with single agent chemotherapy moving to Alimta.    Patient seen 12/4/2023 with plans to initiate Alimta chemotherapy-cycle 1 day 1    The patient required hospitalization 1/18-21/24 presenting with increasing shortness of breath, findings of chronic respiratory failure, positive for rhinovirus on admission.  She is treated with supportive treatments including for previous COVID infection as well as antibiotic therapies.  She completed 5 days of Augmentin, continue nebulizer and breathing treatments.  Upon discharge she rescheduled appointments though was seen by cardiology 1/23/2024 not felt to be in heart failure, treated supportively for epistaxis.    There was concern as to whether she should continue treatment and she has seen back in office 1/29/2024 to discuss potential options.  She is seen with her son and daughter both indicating that she drops her O2 saturation quite quickly and is very inactive as result of difficulty with rapidly becoming shortness of breath and weak when she tries to move.  This is led to a lengthy conversation about the extent of her disease including both her cardiovascular status and her respiratory status.  She is worsening quickly and is not, currently, a candidate for chemotherapy.    The patient is doing poorly enough that we have had a cristy conversation today about end-of-life concerns.  She is not interested in hospice at this point but hopes to improve further.  The patient had follow-up with pulmonary medicine.  She was seen 2/9/2024 with groundglass infiltrates since November 2023 suggestive of pneumonitis and he had restarted prednisone at 40 mg daily.    A repeat CT of chest 2/26/2024 revealed decreased consolidation in superior segment right lower lobe, stable consolidation in the left upper lobe and apex, stable coarsened  interstitial lung opacities elsewhere, stable soft tissue mass along the left upper mediastinum, small pericardial effusion, no pleural effusion, no clear abnormalities in the liver, stable left renal cyst and no acute bony abnormality.    The patient is seen with her son and both indicate that her general performance status has improved.  They are concerned about her response to chemotherapy previously and, though we have scheduled her for chemotherapy, are not certain they wish to proceed.  Now with her improved scan we could follow her for period of time and then try to reevaluate offering chemotherapy potentially with Alimta.    A follow-up CT scan series 4/19/2024 shows overall progression with enlarged mediastinal hilar lymphadenopathy, creased consolidation apex left upper lobe, new subcentimeter low-density lesion right lobe of the liver, right adrenal nodule, pleural thickening left apex, decreased patchy consolidation apical segment of right lower lobe.    She is seen back formally 4/26/2024.  The progression of her disease was discussed and it was determined that she would use THC Gummies or Marinol to try to improve her appetite before we restart Alimta chemotherapy.  She will be seen back in 3 weeks to possibly start that treatment.    Unfortunately the patient did not improve and found little improvement with THC Gummies.  She is seen by her cardiologist 5/16/2024 clearly having increasing shortness of breath and progressive weakness.  She was seen in the emergency room with no evidence of PE on CTA but interval progression of pulmonary consolidation in the anterior aspect the left upper lobe as well as the right lower lobe.  There is enlargement of soft tissue opacification posterior left mainstem with narrowing the left mainstem bronchus, slightly larger pericardial effusion and loss of a plane in the pericardial/left upper lobe region that would be consistent with localized mediastinal  progression.    Patient seen 5/17/2024 after clear progression of symptoms and hospitalization for shortness of breath and failure to thrive.  Follow-up scans have demonstrated progressive disease.  At this point palliative care was felt appropriate.  Past Medical History:   Diagnosis Date    Allergic rhinitis     Aneurysm     Asthma     Cancer of floor of mouth 2014    Cerebral aneurysm     COPD (chronic obstructive pulmonary disease)     COVID 2020    Esophageal reflux     History of adenocarcinoma of lung     History of anemia     History of carcinoma in situ of skin     History of lung cancer     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 from [facility]    History of squamous cell carcinoma     Tongue    Hyperlipidemia     Hypertension     since early 60s    Intractable back pain 11/29/2022    Low vitamin B12 level     Lung cancer     Systolic CHF, acute 11/14/2023    Thyromegaly     UTI (urinary tract infection)     Vitamin D deficiency         Past Surgical History:   Procedure Laterality Date    BRONCHOSCOPY Bilateral 10/17/2022    Procedure: BRONCHOSCOPY WITH FLUORO with biopsy and BAL;  Surgeon: India Flores MD;  Location: [facility] ENDOSCOPY;  Service: Pulmonary;  Laterality: Bilateral;  PRE/POST - lung mass    CHOLECYSTECTOMY  1999    COLONOSCOPY      EMBOLIZATION CEREBRAL Left 06/29/2022    Procedure: 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;  Surgeon: Bradley Dowell MD;  Location: [facility] HYBRID OR 18/19;  Service: Interventional Radiology;  Laterality: Left;    EYE SURGERY  11/24/2020    Took a muscle out of right eye    GLOSSECTOMY PARTIAL      less than one half tongue    LUNG SURGERY  2012    ORAL LESION EXCISION/BIOPSY      June and August 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    TUBAL ABDOMINAL LIGATION  1970    VENOUS ACCESS DEVICE (PORT) INSERTION N/A 12/12/2022    Procedure: MEDIPORT PLACEMENT;  Surgeon: Jason Villaseñor MD;  Location:   [facility];  Service: Vascular;  Laterality: N/A;        No current facility-administered medications on file prior to encounter.     Current Outpatient Medications on File Prior to Encounter   Medication Sig Dispense Refill    apixaban (ELIQUIS) 2.5 MG tablet tablet Take 1 tablet by mouth 2 (Two) Times a Day. 60 tablet 3    atorvastatin (LIPITOR) 20 MG tablet Take 1 tablet by mouth Every Night. Indications: High Amount of Fats in the Blood 90 tablet 1    cetirizine (zyrTEC) 10 MG tablet Take 1 tablet by mouth Daily. Indications: Perennial Allergic Rhinitis      Cholecalciferol (Vitamin D3) 50 MCG (2000 UT) tablet Take 1,000 Units by mouth Daily. Indications: Vitamin D Deficiency      Cyanocobalamin (VITAMIN B12 PO) Take 1,000 mcg by mouth Daily. Indications: vitamin b12 deficiency       folic acid (FOLVITE) 1 MG tablet Take 1 tablet by mouth Daily. Start at least 7 days prior to chemotherapy until at least 3 weeks after all chemotherapy. 30 tablet 6    furosemide (LASIX) 40 MG tablet Take 1.5 tablets by mouth Daily. Indications: Cardiac Failure, High Blood Pressure Disorder 45 tablet 2    metoprolol succinate XL (TOPROL-XL) 25 MG 24 hr tablet Take 1 whole tablet in a.m. and half tablet in p.m.  Indications: Cardiac Failure 45 tablet 3    mirtazapine (REMERON) 7.5 MG tablet TAKE 1 TABLET BY MOUTH EVERY NIGHT AT BEDTIME 30 tablet 1    montelukast (SINGULAIR) 10 MG tablet Take 1 tablet by mouth Every Night. Indications: Hayfever, Perennial Allergic Rhinitis 90 tablet 3    predniSONE (DELTASONE) 10 MG tablet TAKE 1 TABLET BY MOUTH EVERY DAY 30 tablet 1    sacubitril-valsartan (Entresto) 24-26 MG tablet Take 0.5 tablets by mouth 2 (Two) Times a Day. Indications: Cardiac Failure 28 tablet 0    zolpidem (AMBIEN) 5 MG tablet TAKE 1 TABLET BY MOUTH EVERY NIGHT AT BEDTIME AS NEEDED FOR SLEEP 30 tablet 0    albuterol (PROVENTIL) (2.5 MG/3ML) 0.083% nebulizer solution Inhale the contents of 1 vial by nebulization Every 4  (Four) Hours As Needed for Wheezing. 90 mL 0    arformoterol (BROVANA) 15 MCG/2ML nebulizer solution USE 2 ML VIA NEBULIZER TWICE DAILY      budesonide (PULMICORT) 0.5 MG/2ML nebulizer solution INHALE 2 MLS VIA NEBULIZER EVERY 12 HOURS      budesonide 0.5 MG/2ML suspension 0.5 mg, arformoterol 15 MCG/2ML nebulizer solution 15 mcg Inhale 1 nebule 2 (Two) Times a Day.      dronabinol (Marinol) 2.5 MG capsule Take 1 capsule by mouth 2 (Two) Times a Day Before Meals. 60 capsule 0    empagliflozin (Jardiance) 10 MG tablet tablet Take 1 tablet by mouth Daily. 30 tablet 5    ipratropium (ATROVENT) 0.06 % nasal spray 2 sprays into the nostril(s) as directed by provider 4 (Four) Times a Day. 15 mL 0    ipratropium-albuterol (DUO-NEB) 0.5-2.5 mg/3 ml nebulizer Inhale the contents of 1 vial by nebulization 2 (Two) Times a Day As Needed for Wheezing. 180 mL 0    O2 (OXYGEN) Inhale 3 L/min Continuous.      pantoprazole (PROTONIX) 40 MG EC tablet TAKE 1 TABLET BY MOUTH EVERY MORNING 30 tablet 2    potassium chloride (K-DUR,KLOR-CON) 10 MEQ CR tablet Take 2 tablets by mouth Daily. 1 tablet in the PM  Indications: Low Amount of Potassium in the Blood 90 tablet 2    predniSONE (DELTASONE) 20 MG tablet TAKE 2 TABLETS BY MOUTH DAILY. DO NOT. STOP ABRUPTLY          ALLERGIES:    Allergies   Allergen Reactions    Sulfa Antibiotics Rash        Social History     Socioeconomic History    Marital status:    Tobacco Use    Smoking status: Former     Current packs/day: 0.00     Types: Cigarettes     Quit date: 2015     Years since quittin.3     Passive exposure: Never    Smokeless tobacco: Never    Tobacco comments:     less than a pack per day, no smoking since , Quit 2015   Vaping Use    Vaping status: Never Used   Substance and Sexual Activity    Alcohol use: Not Currently     Comment: Socially -A few times a month    Drug use: No    Sexual activity: Defer     Family History   Problem Relation Age of Onset    Ovarian  cancer Mother          at age 27    No Known Problems Father     Ovarian cancer Sister     Colon cancer Brother     No Known Problems Other     Malig Hyperthermia Neg Hx         Review of Systems  ROS as per HPI     Objective      Vitals:    24 2118 24 0320 24 0702 24 0614   BP:  119/65  114/69   BP Location:  Right arm  Right arm   Patient Position:  Lying  Sitting   Pulse: 88 90  107   Resp: 22 20 18 16   Temp:  98 °F (36.7 °C)  97 °F (36.1 °C)   TempSrc:  Oral  Oral   SpO2: 99% 98% 97% 94%   Weight:       Height:                         2024    11:45 AM   Current Status   ECOG score 0      Physical Exam  Vitals reviewed.   Constitutional:       General: She is not in acute distress.     Appearance: Normal appearance. She is well-developed.   HENT:      Head: Normocephalic and atraumatic.      Mouth/Throat:      Pharynx: No oropharyngeal exudate.   Eyes:      Pupils: Pupils are equal, round, and reactive to light.   Cardiovascular:      Rate and Rhythm: Normal rate and regular rhythm.      Heart sounds: Normal heart sounds. No murmur heard.  Pulmonary:      Effort: Pulmonary effort is normal. No respiratory distress.      Breath sounds: No wheezing, rhonchi or rales.      Comments: Decreased breath sounds throughout, occasional rales  Abdominal:      General: Bowel sounds are normal. There is no distension.      Palpations: Abdomen is soft.   Musculoskeletal:         General: No swelling. Normal range of motion.      Cervical back: Normal range of motion.   Skin:     General: Skin is warm and dry.      Findings: No rash.   Neurological:      Mental Status: She is alert and oriented to person, place, and time.         Physical exam preformed and reviewed. No changes noted from previous exam. Henry Escobar MD ; 2024      RECENT LABS:  Results from last 7 days   Lab Units 24  0643 24  1006   WBC 10*3/mm3 14.57* 14.49*   NEUTROS ABS 10*3/mm3  --  13.44*    HEMOGLOBIN g/dL 12.8 12.7   HEMATOCRIT % 39.0 38.7   PLATELETS 10*3/mm3 241 267                           Assessment & Plan     1.  Carcinoma of the lung  May 2015, status post previous left upper lobectomy for carcinoma of the lung.    2.  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   She underwent laryngoscopy 8/18/2021 with right base of tongue without evidence of lesions or masses in the region.    3.  Moderately differentiated adenocarcinoma of the lung.  CT of the chest 6/16/2021 demonstrated postsurgical changes, 8 mm irregular nodule medial segment of right lower lobe and diffuse changes of emphysema.    She is seen in thoracic clinic with findings suspicious for new malignancy and concern of the patient being a poor operative candidate.    7/13/2021 PET CT demonstrated ill-defined avidity and soft tissue left aspect of the mediastinum, 1 cm hypermetabolic pulmonary nodule and asymmetric thickening involving the right base of tongue as well as subtle uptake in the L1 vertebral body.    This patient's case was discussed in thoracic clinic and plans were made to request an MRI of the lumbar spine, obtain a biopsy of the mediastinal involvement of the left had the patient reviewed by ENT.  Biopsy 8/13/2021 found to be consistent with invasive moderately differentiated adenocarcinoma with PD-L1 TPS score 40%.    7/24/2021 MRI of the lumbar spine revealed no evidence of metastatic disease though the patient did have multilevel degenerative disease throughout the lumbar spine.    Her findings were consistent with 2 separate lesions including a nodule in the superior segment of the right lower lobe and a mass in the upper portion of the left chest with the left chest lesion biopsy positive for adenocarcinoma.  The consensus was that these were to separate-synchronous primaries.   Stereotactic radiation each lesions were felt to be the appropriate way to proceed and the patient was referred to radiation therapy.  The patient was referred for radiation therapy and she was able to proceed with SBRT to the right lung at primary #1 with 5 treatments at 1000 cGy per fraction in the left lung primary similarly at 1000 cGy per fraction x5.  Her treatment ranged between 8/31-9/13/2021.    Her guardant 360 did not determine any new abnormalities but her Caris-MI profile-does reveal sensitivity to immunotherapy as well as a BRAF mutation.  PET/CT 11/23/2021 demonstrates decrease in size and avidity of the previously noted intensely FDG avid nodules left lobectomy operative site and right lower lobe.  Surrounding groundglass and pulmonary opacification is consistent with postradiation changes, a few new subcentimeter pulmonary nodules are present in the right upper lobe and indeterminant, stable subcentimeter hepatic lesion is below PET resolution no other findings of FDG-avid disease are seen in neck abdomen or pelvis.  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  There is evolution of presumed postradiation changes in the right midlung with soft tissue mass within the left lumpectomy operative bed minimally decreased in size.  There is a sub-6 mm nodule in the right upper lobe not definitively seen, indeterminate and an indeterminate left renal lesion at 1.5 cm unchanged from 7/13/2021.  Admission 10/14 - 10/18/2022 presenting with shortness of breath and cough that was nonproductive.  She had been on oral antibiotics and did not improved and was admitted for therapy for apparent pneumonia.  This eventually led to bronchoscopy after initial CT revealed postsurgical changes, new masslike 6.7 cm area of consolidation anterior left lung raising concern for potential malignancy and a  follow-up PET/CT planned.   Bronchoscopy and biopsy left lower lung failed to show evidence of malignancy.    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  There is intensely pleural-based avidity within the left lower lobe thought to be metastatic disease with postradiation of the left apex as well.  There is a small focus of uptake in the right hilum grossly unchanged in size and post radiation changes in the right lower lobe.  There is indeterminate density left renal that was grossly unchanged.    Patient's guardant 360 returned as again significant for BRAF V600E and the potential use of BRAF inhibitor/MET inhibitor dabrafenib and trametinib.  Additional information [id number] and use of alpelisib.  The patient now presents back 12/14/2022 to initiate therapy- initially with immunotherapy considering Caris study with PD-L1 TPS of 70% on 22 C3 and 28-8 assays.  Single agent Keytruda initiated 12/14/2022 2/8/2023 CT of the chest, abdomen pelviswith consolidation and masslike pleural thickening in the left apex and upper lung index areas have decreased somewhat.  There is no evidence of metastatic disease otherwise and a few indeterminate left renal lesions are stable.  After lengthy discussion with the patient and her daughter as to the stability to mild improvement with immunotherapy it is agreed that we would continue treatment at this point.  Proceed today, 4/19/2023 with cycle 7 pembrolizumab which is continued every 3 weeks  The patient proceeded to 8 cycles of pembrolizumab and underwent follow-up chest CT chest, abdomen pelvis revealing evolution of dense consolidation left upper lobe and extension of soft tissue mass anterior to left upper ribs with increase sclerosis anterior left second rib.  This was concerning for  progression of disease versus radiation change and the patient was scheduled for subsequent PET/CT.  PET/CT 6/5/2023  reveals progression of disease in left upper thorax, left supraclavicular adenopathy new metastasis left posterior fourth ribs, no evidence of metastatic disease in the abdomen or pelvis.  Patient seen 6/12/2023 with plans to move her Norco to every 4 hours, discontinue Keytruda, make application for dabrafenib and trametinib at 150 mg p.o. every 12 hours and 2 mg p.o. daily respectively.  Patient referred for radiation therapy consultation concurrently.  Last dose of Keytruda 5/31/2023.  6/23/2023: Patient seen for triage visit.  She has not yet started dabrafenib or trametinib, she has not yet received the medications.  Unfortunately she has been experiencing uncontrolled nausea/vomiting as further detailed below.   Patient seen 6/27/2023 reporting that her nausea has resolved.  She was unable to complete the MRI of the brain however Dr. Escobar did review the images patient did have and there was no evidence of edema or metastatic disease.  Patient can start her new oral medication once she receives the medication.  Patient seen 7/5/2023 with plans to start palliative radiotherapy x10 fractions and initiate dabrafenib and trametinib as soon as medications received.  7/17/2023 patient initiated radiation with 10 fractions planned.  Patient has received dabrafenib and trametinib.  Brought in for triage visit due to nausea associated with dosing.  She is premedicating with ondansetron however only waiting 15 minutes.  We discussed today that she needs to wait at least 30 minutes to 1 hour prior to taking the dabrafenib and trametinib.  The patient will do so.  We discussed she could also take this again if she feels the need 8 hours later for nausea control.  If she is having breakthrough nausea then she should call our office.  I have encouraged her to utilize electrolyte drinks.  She will get 1L  normal liter normal saline in the office today.She was not nauseas while in the office so we did not give additional nausea medication.  We will have her return in 1 week repeat labs, review by nurse practitioner, and possible IV fluids.  I stressed the importance of calling sooner if she finds that the premedication is not controlling her nausea at which time we would have her come in for additional IV fluids and evaluation.  She is continuing daily radiation this week.  Case was discussed with Dr. Escobar today.  7/21/2023: Patient returns for short interval follow-up due to very poor appetite and sleeping the majority of the day.  Her nausea has been improved with premedicating 30 minutes prior to dabrafenib and trametinib.  She however has been sleeping the majority of the day and is not eating when she is awake.  She does report taking ensures but she is only sipping on these.  Have given her samples of some of the office today and encouraged her to drink when while she is here.  Her performance status continues to decline and her daughter states that she was fixing dinner nightly just 2 weeks ago.  With performance status of 3 today I discussed the case with Dr. Escobar and we will hold her dabrafenib and  TRAMETINIB.  Her liver enzymes are elevated today as well.  We will monitor this next week and have her evaluated by Dr. Escobar at which time we could consider restarting her dabrafenib and trametinib at reduced doses pending performance status and laboratory evaluation.  Unfortunately she required admission 7/25-30/2023 with failure to thrive.  Subsequent assessments including blood cultures were negative and patient was treated briefly with IV antibiotic therapy, started PT and OT, medications adjusted for hypotension and treated symptomatically for nausea and vomiting.  She was able to improve enough to be discharged home as she continued radiation therapy started 7/17/2023 and completed 7/31/2023 to the  left chest wall.  She is next seen back 8/4/2023.  Lengthy discussion as to reevaluation   Plans made for CT chest, abdomen, pelvis in 4 weeks  Patient seen 8/21/2023 with complaints of worsening fatigue and shortness of breath.  Patient scheduled for follow up CT scans this Friday. Lungs clear on exam,  Sats 97%.  Hgb stable at 11.7.  Will check BNP today.  Discussed may be related to disease and will need to see what scan shows.   Subsequent scans 8/25/2023, fortunately, with stable left apical mass with mediastinal chest wall involvement unchanged 1.4 cm supraclavicular lymph node.  No new findings of metastatic disease.  The patient is seen back 9/8/2023.  Fortunately she is improved dramatically off therapy and is gratified that his studies have not worsened in any substantial way.  We have discussed both her scan reports and echocardiogram that does not show significant reduction of her ejection fraction.  As result of her current stable status we have asked her to restart Mekinist at 0.5 mg daily and Tafinlar at 50 mg twice daily  9/22/2023 tolerating Mekinist 0.5 mg daily and tafinlar 50 mg twice daily quite well. Continues on prednisone 10 mg daily which will now be reduced to 5 mg daily when seen 10/16/2023  Plans made to continue current dosing of Mekinist and Tafinlar and repeat evaluation with chest x-ray 10/16 and repeat CT chest abdomen pelvis in 5 weeks, MD in 6 weeks.  The patient required admission 11/14 - 11/16/2023 having presented with shortness of breath and found to be pulmonary edema with CBC, lactate and procalcitonin all normal.  There was a concern that her chemotherapy agents could have produced her cardiomyopathy and these were stopped 11/14/2023.  She was diuresed and echocardiogram demonstrated LV SF mildly decreased at 39.2% with GLS of -11.1%, left ventricular cavity mildly dilated, left ventricular wall thickness consistent with mild concentric hypertrophy, left ventricular global  hypokinesis, aortic valve abnormal with moderate calcification, valve was trileaflet, trace tricuspid valve regurgitation with right VSP at less than 35 mmHg.  The findings for LV systolic function, diastolic function and global longitudinal LV strain had all worsened.  CTA of chest 11/14/2023 demonstrating confluent soft tissue mass possibly measuring larger in current study at 6.5 x 5.0 previously 4.9 x 4.8, left supraclavicular node to decrease in size measuring 1.1 cm previously 1.4 cm, extensive bilateral interstitial and groundglass infiltrates.  The patient is seen 11/26/2023.  As per the discharge we are requested to have a BMP and CMP assessed.  She is seen with her son and daughter in a lengthy conversation held about her recent hospitalization with CHF-cardiomyopathy felt elicited, in part, from her targeted therapy with her Mekinist and Tafinlar discontinued altogether.  The patient is relatively stable currently having continued her diuretics and to be seen in cardiology in follow-up.  At the time of this dictation her CBC is found to be essentially normal with mild leukocytosis, CMP normal except for mildly elevated glucose at 125 and BNP reduced to 6126 from 9763.  She feels well with no additional shortness of breath chest pain lower extremity edema.  In reviewing the the possible treatment options she is next best treated with single agent chemotherapy moving to Alimta.  Patient proceeded through teaching and presents back 12/4/2023 to initiate chemotherapy with Alimta-cycle 1 day 1  Seen 12/18/2023 for toxicity check, overall has tolerated well.  Continues to follow closely with cardiology.  The patient required hospitalization 1/18-21/24 presenting with increasing shortness of breath, findings of chronic respiratory failure, positive for rhinovirus on admission.  She is treated with supportive treatments including for previous COVID infection as well as antibiotic therapies.  She completed 5 days  of Augmentin, continue nebulizer and breathing treatments.  Upon discharge she rescheduled appointments though was seen by cardiology 1/23/2024 not felt to be in heart failure, treated supportively for epistaxis.  There was concern as to whether she should continue treatment and she has seen back in office 1/29/2024 to discuss potential options.  She is seen with her son and daughter both indicating that she drops her O2 saturation quite quickly and is very inactive as result of difficulty with rapidly becoming shortness of breath and weak when she tries to move.  This is led to a lengthy conversation about the extent of her disease including both her cardiovascular status and her respiratory status.  She is worsening quickly and is not, currently, a candidate for chemotherapy.  The patient is doing poorly enough that we have had a cristy conversation today-1/29/2024 about end-of-life concerns.  She is not interested in hospice at this point but hopes to improve further.  At this point holding chemotherapy.  She was seen 2/9/2024 with groundglass infiltrates since November 2023 suggestive of pneumonitis and he had restarted prednisone at 40 mg daily.  A repeat CT of chest 2/26/2024 revealed decreased consolidation in superior segment right lower lobe, stable consolidation in the left upper lobe and apex, stable coarsened interstitial lung opacities elsewhere, stable soft tissue mass along the left upper mediastinum, small pericardial effusion, no pleural effusion, no clear abnormalities in the liver, stable left renal cyst and no acute bony abnormality  The patient is seen with her son and both indicate that her general performance status has improved.  They are concerned about her response to chemotherapy previously and, though we have scheduled her for chemotherapy, are not certain they wish to proceed.  Now with her improved scan we could follow her for period of time and then try to reevaluate offering  chemotherapy potentially with Alimta.  Patient seen next 4/26/2024 with recent imaging demonstrating progression of disease.  Patient remains a candidate, potentially, for Alimta though it is hoped that her performance status to improve before that started.  THC Gummies were initiated versus Marinol.  Patient been to 5/17/2024 with worsening shortness of breath, poor appetite and back pain.  Scans demonstrated progression of disease.  Plan of care consultation pursued and transfer to palliative care initiated.  Patient seen 5/18/2024, symptoms improved, hopes for discharge at the beginning of the week under hospice care      4.  Worsening back pain  Admission 11/28/2022 through 12/1/2022.  MRI of the cervical and thoracic spine fortunately failed to demonstrate metastatic disease.  Moderate degenerative changes noted which could cause radiculopathy.  Medication altered to include MS Contin 15 mg every 12 hours and Norco for breakthrough pain  She reports today her pain is adequately controlled  Patient with increasing pain 5/31/2023 with pain level of 6.  MS Contin was increased to 50 mg p.o. every 8 hours and Norco 10/325 #101 p.o. every 6 hours to move to every 4 hours as needed-E scribed to pharmacy  Patient seen 6/12/2023 with Norco moved to every 4 hours.  6/23/2023: Seen for triage visit.  Has been using oxycodone 20 mg every 3 hours as needed for pain.  Reports she has not been using the hydrocodone 10/325.  Was experiencing dizziness, so reduced her oxycodone use to half a tablet every 3 hours as needed.  Reports pain is uncontrolled during the night so she is having to wake at night time to take pain medicine.  They are questioning resuming long-acting pain medication, as she is waking throughout the night to take pain medicine.  She has already been prescribed MS Contin and has this available at home, did let her know it would be okay to resume this.  Assess 7/5/2023 with pain reduced to 2/10.  Plan to  continue current therapy  Darlene Pfeiffer reports a pain score of .  Given her pain assessment as noted, treatment options were discussed and the following options were decided upon as a follow-up plan to address the patient's pain: continuation of current treatment plan for pain. Currently not requiring pain medication.   Refilled both the patient's Remeron and Ambien 10/16/2023  12/18/2023 requesting a refill of her Ambien.    5.  Poor appetite and malnutrition  Placed on prednisone 10 mg daily 1/4/2023 with significant improvement in her symptoms  Weight is stable today at just below 106 pounds  Patient assessed 6/12/23 with possible gummy therapy.  Stable performance status including weight and appetite 7/5/2023  Weight has declined as of 7/17/2023 due to nausea and vomiting that started this past weekend.  After further discussion the patient did stop her prednisone 10 mg while she was not feeling well.  We discussed today the importance of continuing this as it can help with her nausea and appetite and therefore she will restart tomorrow.  7/21/2023: Weight is stable today though albumin is declining at 3.  Patient is sleeping the majority of the day and not eating.  Yesterday she had a small piece of chicken and that is all.  She states she is drinking ensures however her daughter thinks that she is just sipping on these and not completing them.  Hopefully holding her dabrafenib and trametinib will be helpful with her being awake more and appetite.  I encouraged her to make sure she is taking her prednisone daily at 10 mg daily.  Remeron 7.5 mg p.o. nightly E scribed to pharmacy  9/22/2023 weight is up to 98 pounds.  Further improvement in weight 10/16/2020 3 to 104 pounds, continue Remeron at current dose, prednisone reduced to 5 mg daily.  Patient placed on prednisone from pulmonary medicine for pneumonitis, seen 3/1/2420 mg daily, requesting continuance of this over the next month and then drop to 10 mg over  2-week, then 2 5 mg over 2 weeks at which time she will be seen back after repeat scans.  Patient seen 4/26/2024, THC Gummies versus Marinol initiated.  Readmission 5/17/2020 with progression of disease, palliative care initiated    Plan:  *Palliative care consultation, transfer to palliative care floor today.  *Palliative care order set placed  *Hospice in place, discharge plan Monday, 5/20/2024

## 2024-05-19 NOTE — PROGRESS NOTES
Name: Darlene Pfeiffer ADMIT: 2024   : 1940  PCP: Kehrer, Meredith Lea, MD    MRN: [id number] LOS: 2 days   AGE/SEX: 83 y.o. female  ROOM: [facility]     Subjective   Subjective   Patient continues to have intermittent pain.  Has had some nausea today that Zofran helped but no vomiting.    Review of Systems  As above     Objective   Objective   Vital Signs  Temp:  [97 °F (36.1 °C)] 97 °F (36.1 °C)  Heart Rate:  [107] 107  Resp:  [16] 16  BP: (114)/(69) 114/69  SpO2:  [94 %-95 %] 95 %  on  Flow (L/min):  [3.5] 3.5;   Device (Oxygen Therapy): nasal cannula  Body mass index is 16.65 kg/m².  Physical Exam  Constitutional:       General: She is not in acute distress.     Appearance: She is ill-appearing.   Cardiovascular:      Rate and Rhythm: Normal rate and regular rhythm.   Pulmonary:      Effort: Pulmonary effort is normal. No respiratory distress.   Abdominal:      General: Abdomen is flat. There is no distension.      Tenderness: There is no abdominal tenderness.   Musculoskeletal:         General: No swelling or deformity. Normal range of motion.      Comments: Left upper extremity swelling   Skin:     General: Skin is warm and dry.   Neurological:      General: No focal deficit present.      Mental Status: She is alert. Mental status is at baseline.         Results Review     I reviewed the patient's new clinical results.  Results from last 7 days   Lab Units 24  0643 24  1006   WBC 10*3/mm3 14.57* 14.49*   HEMOGLOBIN g/dL 12.8 12.7   PLATELETS 10*3/mm3 241 267     Results from last 7 days   Lab Units 24  0643 24  1006   SODIUM mmol/L 139 136   POTASSIUM mmol/L 3.3* 4.5   CHLORIDE mmol/L 96* 93*   CO2 mmol/L 32.0* 35.2*   BUN mg/dL 33* 40*   CREATININE mg/dL 0.53* 0.78   GLUCOSE mg/dL 129* 216*   Estimated Creatinine Clearance: 56 mL/min (A) (by C-G formula based on SCr of 0.53 mg/dL (L)).  Results from last 7 days   Lab Units 24  0643 24  1006   ALBUMIN g/dL 3.0* 3.2*    BILIRUBIN mg/dL 0.4 0.4   ALK PHOS U/L 91 105   AST (SGOT) U/L 20 16   ALT (SGPT) U/L 14 15     Results from last 7 days   Lab Units 05/17/24  0643 05/16/24  1006   CALCIUM mg/dL 8.4* 9.3   ALBUMIN g/dL 3.0* 3.2*   MAGNESIUM mg/dL 2.1  --    PHOSPHORUS mg/dL 2.4*  --        COVID19   Date Value Ref Range Status   05/17/2024 Not Detected Not Detected - Ref. Range Final   01/18/2024 Not Detected Not Detected - Ref. Range Final     Hemoglobin A1C   Date/Time Value Ref Range Status   05/17/2024 0643 7.20 (H) 4.80 - 5.60 % Final       Duplex Venous Upper Extremity - Bilateral CAR    Acute left upper extremity deep vein thrombosis noted in the internal   jugular and subclavian.    All other vessels appear normal.    I reviewed the patient's daily medications.  Scheduled Medications  apixaban, 10 mg, Oral, Q12H   Followed by  [START ON 5/24/2024] apixaban, 5 mg, Oral, Q12H  arformoterol, 15 mcg, Nebulization, BID - RT  budesonide, 0.5 mg, Nebulization, BID - RT  metoprolol succinate XL, 25 mg, Oral, Q24H  mirtazapine, 7.5 mg, Oral, Nightly  pantoprazole, 40 mg, Oral, QAM  sodium chloride, 10 mL, Intravenous, Q12H    Infusions   Diet  Diet: Regular/House; Fluid Consistency: Thin (IDDSI 0)         I have personally reviewed:  [x]  Laboratory   []  Microbiology   []  Radiology   []  EKG/Telemetry   []  Cardiology/Vascular   []  Pathology   [x]  Records     Assessment/Plan     Active Hospital Problems    Diagnosis  POA    **Dyspnea [R06.00]  Yes    Acute on chronic HFrEF (heart failure with reduced ejection fraction) [I50.23]  Yes    Paroxysmal atrial fibrillation [I48.0]  Yes    Lung cancer [C34.90]  Yes    Chronic obstructive pulmonary disease [J44.9]  Yes    Hyperlipidemia [E78.5]  Yes    Essential hypertension [I10]  Yes      Resolved Hospital Problems   No resolved problems to display.       83 y.o. female admitted with Dyspnea.    Lung adenocarcinoma     -Imaging reveals progression of disease  -Dr. Escobar, the  patient's primary oncologist met with her/her  and the decision was made to transition to palliative measures  -Palliative order set has been initiated.  Antiemetics started today  -Plan discharge home with hospice tomorrow.  Appreciate their help, patient may benefit from extended release forms of medications     Acute on chronic systolic heart failure  Coronary artery disease  Atrial fibrillation  Troponin elevation  Has been noted that this was an issue that began secondary to chemotherapy  Echocardiogram in March 2024 showed an EF of 48% with an indeterminate diastolic function.  Cardiology consulted but they have signed off  Continue home metoprolol, Eliquis  -Agree she does not appear to be volume overloaded, hold on additional diuretic at this time     COPD  Acute on chronic hypoxic respiratory failure  Continue home inhalers     Left IJ DVT  - + on US  -Continue Eliquis        Discussed with patient, family, and nursing staff.  Anticipate discharge home tomorrow.    Expected Discharge Date: 5/20/2024; Expected Discharge Time:       Thomas Franks MD  [facility]  05/19/24  15:58 EDT

## 2024-05-19 NOTE — PLAN OF CARE
Problem: Adult Inpatient Plan of Care  Goal: Plan of Care Review  Outcome: Ongoing, Progressing  Flowsheets (Taken 5/19/2024 1436)  Plan of Care Reviewed With:   patient   spouse  Outcome Evaluation: Alert and oriented.  at bedside, supportive, involved in care. Attempting to medicate with PO Morphine and adjust for effective pain control as plan is to go home with hospice tomorrow. 20 mg SL Morphine Q2H appears effective for pain control with Tylenol x 1 for headache and disolvable Zofran tab x 1 for nausea. Family in and also supportive, assist iwith repositioning. Minimal PO taken. Declined both breakfast and lunch today. Supplements provided. Monitoring.  Goal: Patient-Specific Goal (Individualized)  Outcome: Ongoing, Progressing  Goal: Absence of Hospital-Acquired Illness or Injury  Outcome: Ongoing, Progressing  Intervention: Identify and Manage Fall Risk  Description: Perform standard risk assessment on admission using a validated tool or comprehensive approach appropriate to the patient; reassess fall risk frequently, with change in status or transfer to another level of care.  Communicate fall injury risk to interprofessional healthcare team.  Determine need for increased observation, equipment and environmental modification, such as low bed, signage and supportive, nonskid footwear.  Adjust safety measures to individual developmental age, stage and identified risk factors.  Reinforce the importance of safety and physical activity with patient and family.  Perform regular intentional rounding to assess need for position change, pain assessment and personal needs, including assistance with toileting.  Recent Flowsheet Documentation  Taken 5/19/2024 1400 by Trena Sevlila, RN  Safety Promotion/Fall Prevention:   assistive device/personal items within reach   clutter free environment maintained   fall prevention program maintained   nonskid shoes/slippers when out of bed   room organization  consistent   safety round/check completed  Taken 5/19/2024 1200 by Trena Sevilla RN  Safety Promotion/Fall Prevention:   assistive device/personal items within reach   clutter free environment maintained   fall prevention program maintained   nonskid shoes/slippers when out of bed   room organization consistent   safety round/check completed  Taken 5/19/2024 1000 by Trean Sevilla RN  Safety Promotion/Fall Prevention:   assistive device/personal items within reach   clutter free environment maintained   fall prevention program maintained   nonskid shoes/slippers when out of bed   room organization consistent   safety round/check completed  Taken 5/19/2024 0822 by Trena Sevilla RN  Safety Promotion/Fall Prevention:   assistive device/personal items within reach   clutter free environment maintained   fall prevention program maintained   nonskid shoes/slippers when out of bed   room organization consistent   safety round/check completed  Intervention: Prevent Skin Injury  Description: Perform a screening for skin injury risk, such as pressure or moisture associated skin damage on admission and at regular intervals throughout hospital stay.  Keep all areas of skin (especially folds) clean and dry.  Maintain adequate skin hydration.  Relieve and redistribute pressure and protect bony prominences; implement measures based on patient-specific risk factors.  Match turning and repositioning schedule to clinical condition.  Encourage weight shift frequently; assist with reposition if unable to complete independently.  Float heels off bed; avoid pressure on the Achilles tendon.  Keep skin free from extended contact with medical devices.  Encourage functional activity and mobility, as early as tolerated.  Use aids (e.g., slide boards, mechanical lift) during transfer.  Recent Flowsheet Documentation  Taken 5/19/2024 1400 by Trena Sevilla, RN  Body Position:   neutral body alignment   neutral head position   supine, legs  elevated   weight shifting  Taken 5/19/2024 1200 by Trena Sevilla RN  Body Position:   neutral body alignment   neutral head position   supine, legs elevated   weight shifting  Taken 5/19/2024 1000 by Trena Sevilla RN  Body Position:   neutral body alignment   neutral head position   supine, legs elevated   weight shifting  Taken 5/19/2024 0822 by Trena Sevilla RN  Body Position:   neutral body alignment   neutral head position   supine, legs elevated   weight shifting  Intervention: Prevent Infection  Description: Maintain skin and mucous membrane integrity; promote hand, oral and pulmonary hygiene.  Optimize fluid balance, nutrition, sleep and glycemic control to maximize infection resistance.  Identify potential sources of infection early to prevent or mitigate progression of infection (e.g., wound, lines, devices).  Evaluate ongoing need for invasive devices; remove promptly when no longer indicated.  Recent Flowsheet Documentation  Taken 5/19/2024 1400 by Trena Sevilla RN  Infection Prevention:   environmental surveillance performed   equipment surfaces disinfected   hand hygiene promoted   single patient room provided  Taken 5/19/2024 1200 by Trena Sevilla RN  Infection Prevention:   environmental surveillance performed   equipment surfaces disinfected   hand hygiene promoted   single patient room provided  Taken 5/19/2024 1000 by Trena Sevlila RN  Infection Prevention:   environmental surveillance performed   equipment surfaces disinfected   hand hygiene promoted   single patient room provided  Taken 5/19/2024 0822 by Trena Sevilla RN  Infection Prevention:   environmental surveillance performed   equipment surfaces disinfected   hand hygiene promoted   single patient room provided  Goal: Optimal Comfort and Wellbeing  Outcome: Ongoing, Progressing  Intervention: Monitor Pain and Promote Comfort  Description: Assess pain level, treatment efficacy and patient response at regular intervals using a  consistent pain scale.  Consider the presence and impact of preexisting chronic pain.  Encourage patient and caregiver involvement in pain assessment, interventions and safety measures.  Recent Flowsheet Documentation  Taken 5/19/2024 0822 by Trena Sevilla RN  Pain Management Interventions:   care clustered   quiet environment facilitated   diversional activity provided   see MAR   pillow support provided   position adjusted  Intervention: Provide Person-Centered Care  Description: Use a family-focused approach to care.  Develop trust and rapport by proactively providing information, encouraging questions, addressing concerns and offering reassurance.  Acknowledge emotional response to hospitalization.  Recognize and utilize personal coping strategies.  Honor spiritual and cultural preferences.  Recent Flowsheet Documentation  Taken 5/19/2024 0822 by Trena Sevilla, RN  Trust Relationship/Rapport:   care explained   choices provided   emotional support provided   empathic listening provided   questions encouraged   questions answered   reassurance provided   thoughts/feelings acknowledged  Goal: Readiness for Transition of Care  Outcome: Ongoing, Progressing     Problem: Hypertension Comorbidity  Goal: Blood Pressure in Desired Range  Outcome: Ongoing, Progressing  Intervention: Maintain Blood Pressure Management  Description: Evaluate adherence to home antihypertensive regimen (e.g., exercise and activity, diet modification, medication).  Provide scheduled antihypertensive medication; consider administration time and effects (e.g., avoid giving diuretic prior to bedtime).  Monitor response to antihypertensive medication therapy (e.g., blood pressure, electrolyte levels, medication effects).  Minimize risk of orthostatic hypotension; encourage caution with position changes, particularly if elderly.  Recent Flowsheet Documentation  Taken 5/19/2024 1400 by Trena Sevilla, RN  Medication Review/Management:  medications reviewed  Taken 5/19/2024 1200 by Trena Sevilla RN  Medication Review/Management: medications reviewed  Taken 5/19/2024 1000 by Trena Sevilla RN  Medication Review/Management: medications reviewed  Taken 5/19/2024 0822 by Trena Sevilla RN  Medication Review/Management: medications reviewed     Problem: Malnutrition  Goal: Improved Nutritional Intake  Outcome: Ongoing, Progressing     Problem: Fall Injury Risk  Goal: Absence of Fall and Fall-Related Injury  Outcome: Ongoing, Progressing  Intervention: Identify and Manage Contributors  Description: Develop a fall prevention plan with the patient and caregiver/family.  Provide reorientation, appropriate sensory stimulation and routines with changes in mental status to decrease risk of fall.  Promote use of personal vision and auditory aids.  Assess assistance level required for safe and effective self-care; provide support as needed, such as toileting, mobilization. For age 65 and older, implement timed toileting with assistance.  Encourage physical activity, such as performance of mobility and self-care at highest level of patient ability, multicomponent exercise program and provision of appropriate assistive devices.  If fall occurs, assess the severity of injury; implement fall injury protocol. Determine the cause and revise fall injury prevention plan.  Regularly review medication contribution to fall risk; adjust medication administration times to minimize risk of falling.  Consider risk related to polypharmacy and age.  Balance adequate pain management with potential for oversedation.  Recent Flowsheet Documentation  Taken 5/19/2024 1400 by Trena Sevilla RN  Medication Review/Management: medications reviewed  Taken 5/19/2024 1200 by Trena Sevilla RN  Medication Review/Management: medications reviewed  Taken 5/19/2024 1000 by Trena Sevilla RN  Medication Review/Management: medications reviewed  Taken 5/19/2024 0822 by Trena Sevilla  RN  Medication Review/Management: medications reviewed  Intervention: Promote Injury-Free Environment  Description: Provide a safe, barrier-free environment that encourages independent activity.  Keep care area uncluttered and well-lighted.  Determine need for increased observation or monitoring.  Avoid use of devices that minimize mobility, such as restraints or indwelling urinary catheter.  Recent Flowsheet Documentation  Taken 5/19/2024 1400 by Trena Sevilla, RN  Safety Promotion/Fall Prevention:   assistive device/personal items within reach   clutter free environment maintained   fall prevention program maintained   nonskid shoes/slippers when out of bed   room organization consistent   safety round/check completed  Taken 5/19/2024 1200 by Trena Sevilla, RN  Safety Promotion/Fall Prevention:   assistive device/personal items within reach   clutter free environment maintained   fall prevention program maintained   nonskid shoes/slippers when out of bed   room organization consistent   safety round/check completed  Taken 5/19/2024 1000 by Trena Sevilla, RN  Safety Promotion/Fall Prevention:   assistive device/personal items within reach   clutter free environment maintained   fall prevention program maintained   nonskid shoes/slippers when out of bed   room organization consistent   safety round/check completed  Taken 5/19/2024 0822 by Trena Sevilla, RN  Safety Promotion/Fall Prevention:   assistive device/personal items within reach   clutter free environment maintained   fall prevention program maintained   nonskid shoes/slippers when out of bed   room organization consistent   safety round/check completed     Problem: Skin Injury Risk Increased  Goal: Skin Health and Integrity  Outcome: Ongoing, Progressing  Intervention: Optimize Skin Protection  Description: Perform a full pressure injury risk assessment, as indicated by screening, upon admission to care unit.  Reassess skin (injury risk, full  inspection) frequently (e.g., scheduled interval, with change in condition) to provide optimal early detection and prevention.  Maintain adequate tissue perfusion (e.g., encourage fluid balance; avoid crossing legs, constrictive clothing or devices) to promote tissue oxygenation.  Maintain head of bed at lowest degree of elevation tolerated, considering medical condition and other restrictions.  Avoid positioning onto an area that remains reddened.  Minimize incontinence and moisture (e.g., toileting schedule; moisture-wicking pad, diaper or incontinence collection device; skin moisture barrier).  Cleanse skin promptly and gently when soiled utilizing a pH-balanced cleanser.  Relieve and redistribute pressure (e.g., scheduled position changes, weight shifts, use of support surface, medical device repositioning, protective dressing application, use of positioning device, microclimate control, use of pressure-injury-monitor  Encourage increased activity, such as sitting in a chair at the bedside or early mobilization, when able to tolerate.  Recent Flowsheet Documentation  Taken 5/19/2024 1400 by Trena Sevilla RN  Head of Bed (HOB) Positioning: HOB at 20 degrees  Taken 5/19/2024 1200 by Trena Sevilla RN  Head of Bed (HOB) Positioning: HOB at 20 degrees  Taken 5/19/2024 1000 by Trena Sevilla RN  Head of Bed (HOB) Positioning: HOB at 20 degrees  Taken 5/19/2024 0822 by Trena Sevilla RN  Head of Bed (HOB) Positioning: HOB at 20 degrees   Goal Outcome Evaluation:  Plan of Care Reviewed With: patient, spouse           Outcome Evaluation: Alert and oriented.  at bedside, supportive, involved in care. Attempting to medicate with PO Morphine and adjust for effective pain control as plan is to go home with hospice tomorrow. 20 mg SL Morphine Q2H appears effective for pain control with Tylenol x 1 for headache and disolvable Zofran tab x 1 for nausea. Family in and also supportive, assist iwith repositioning.  Minimal PO taken. Declined both breakfast and lunch today. Supplements provided. Monitoring.

## 2024-05-19 NOTE — NURSING NOTE
Attempting Percocet 2 tabs PO Q6H around the clock with SL Morphine PRN for breakthrough pain as patient plan to return home tomorrow with  and hospice support. Appears effective with decrease dosing, more applicable for home.

## 2024-05-19 NOTE — PLAN OF CARE
Goal Outcome Evaluation:  Plan of Care Reviewed With: patient           Outcome Evaluation:  at bedside through the night assisting patient with needs. Dilaudid and versed given once for pain and anxiety. Safety measures observed. Will continue palliatve care.

## 2024-05-20 VITALS
RESPIRATION RATE: 18 BRPM | WEIGHT: 94 LBS | HEART RATE: 138 BPM | TEMPERATURE: 96.7 F | OXYGEN SATURATION: 98 % | BODY MASS INDEX: 16.66 KG/M2 | DIASTOLIC BLOOD PRESSURE: 53 MMHG | HEIGHT: 63 IN | SYSTOLIC BLOOD PRESSURE: 80 MMHG

## 2024-05-20 PROCEDURE — 94761 N-INVAS EAR/PLS OXIMETRY MLT: CPT

## 2024-05-20 PROCEDURE — 94664 DEMO&/EVAL PT USE INHALER: CPT

## 2024-05-20 PROCEDURE — 94799 UNLISTED PULMONARY SVC/PX: CPT

## 2024-05-20 PROCEDURE — 63710000001 ONDANSETRON ODT 4 MG TABLET DISPERSIBLE: Performed by: STUDENT IN AN ORGANIZED HEALTH CARE EDUCATION/TRAINING PROGRAM

## 2024-05-20 RX ORDER — PROMETHAZINE HYDROCHLORIDE 12.5 MG/1
12.5 TABLET ORAL EVERY 6 HOURS PRN
Qty: 30 TABLET | Refills: 0 | Status: SHIPPED | OUTPATIENT
Start: 2024-05-20 | End: 2024-05-28

## 2024-05-20 RX ORDER — POLYETHYLENE GLYCOL 3350 17 G/17G
17 POWDER, FOR SOLUTION ORAL DAILY
Qty: 510 G | Refills: 0 | Status: SHIPPED | OUTPATIENT
Start: 2024-05-20 | End: 2024-06-19

## 2024-05-20 RX ORDER — ONDANSETRON 4 MG/1
4 TABLET, FILM COATED ORAL EVERY 8 HOURS PRN
Qty: 28 TABLET | Refills: 0 | Status: SHIPPED | OUTPATIENT
Start: 2024-05-20 | End: 2024-05-20 | Stop reason: HOSPADM

## 2024-05-20 RX ORDER — OXYCODONE HYDROCHLORIDE AND ACETAMINOPHEN 5; 325 MG/1; MG/1
2 TABLET ORAL EVERY 6 HOURS PRN
Qty: 56 TABLET | Refills: 0 | Status: SHIPPED | OUTPATIENT
Start: 2024-05-20 | End: 2024-05-27

## 2024-05-20 RX ORDER — NALOXONE HYDROCHLORIDE 4 MG/.1ML
SPRAY NASAL
Qty: 2 EACH | Refills: 0 | Status: SHIPPED | OUTPATIENT
Start: 2024-05-20

## 2024-05-20 RX ORDER — AMOXICILLIN 250 MG
2 CAPSULE ORAL 2 TIMES DAILY
Qty: 120 TABLET | Refills: 0 | Status: SHIPPED | OUTPATIENT
Start: 2024-05-20 | End: 2024-06-19

## 2024-05-20 RX ORDER — LORAZEPAM 0.5 MG/1
1 TABLET ORAL EVERY 6 HOURS PRN
Qty: 28 TABLET | Refills: 0 | Status: SHIPPED | OUTPATIENT
Start: 2024-05-20 | End: 2024-05-27

## 2024-05-20 RX ORDER — MORPHINE SULFATE 20 MG/ML
20 SOLUTION ORAL
Qty: 180 ML | Refills: 0 | Status: SHIPPED | OUTPATIENT
Start: 2024-05-20 | End: 2024-05-28

## 2024-05-20 RX ORDER — LORAZEPAM 1 MG/1
1 TABLET ORAL EVERY 6 HOURS PRN
Qty: 28 TABLET | Refills: 0 | Status: SHIPPED | OUTPATIENT
Start: 2024-05-20 | End: 2024-05-20

## 2024-05-20 RX ORDER — ONDANSETRON 4 MG/1
4 TABLET, FILM COATED ORAL EVERY 6 HOURS PRN
Qty: 24 TABLET | Refills: 0 | Status: SHIPPED | OUTPATIENT
Start: 2024-05-20 | End: 2024-05-27

## 2024-05-20 RX ADMIN — OXYCODONE AND ACETAMINOPHEN 2 TABLET: 5; 325 TABLET ORAL at 08:48

## 2024-05-20 RX ADMIN — ONDANSETRON 4 MG: 4 TABLET, ORALLY DISINTEGRATING ORAL at 07:42

## 2024-05-20 RX ADMIN — Medication 10 ML: at 08:49

## 2024-05-20 RX ADMIN — ONDANSETRON 4 MG: 4 TABLET, ORALLY DISINTEGRATING ORAL at 01:18

## 2024-05-20 RX ADMIN — APIXABAN 10 MG: 5 TABLET, FILM COATED ORAL at 08:48

## 2024-05-20 RX ADMIN — PANTOPRAZOLE SODIUM 40 MG: 40 TABLET, DELAYED RELEASE ORAL at 07:42

## 2024-05-20 RX ADMIN — ARFORMOTEROL TARTRATE 15 MCG: 15 SOLUTION RESPIRATORY (INHALATION) at 07:32

## 2024-05-20 RX ADMIN — OXYCODONE AND ACETAMINOPHEN 2 TABLET: 5; 325 TABLET ORAL at 02:06

## 2024-05-20 RX ADMIN — BUDESONIDE 0.5 MG: 0.5 INHALANT ORAL at 07:32

## 2024-05-20 RX ADMIN — OXYCODONE AND ACETAMINOPHEN 2 TABLET: 5; 325 TABLET ORAL at 14:20

## 2024-05-20 RX ADMIN — Medication 10 ML: at 01:11

## 2024-05-20 RX ADMIN — ONDANSETRON 4 MG: 4 TABLET, ORALLY DISINTEGRATING ORAL at 14:20

## 2024-05-20 NOTE — PLAN OF CARE
Goal Outcome Evaluation:  Plan of Care Reviewed With: patient, spouse      Oxycodone x 2 and Zofran Q6H effective for pain and nausea control. Alert and oriented.  at bedside, supportive. Continues to have poor PO intake. Supplement drinks provided. Plan to return home today with hospice support.

## 2024-05-20 NOTE — CONSULTS
Arrived on unit and spoke with primary RN, Rosanne for updated pt status. Reviewed all documents and faxed to [facility]. Met with pt and pt's spouse, Selvin. Detailed explanation of services given and all questions answered. Goals of care discussion held with pt and spouse. Pt wants to go home with hospice and only receive comfort measures. Spoke with Carolin WEINBERG about discharge plan including transportation and meds for pt. Call made to [facility] to review pt, meds, and care plan. Verbal certification previously given for pt for home services with hospice on 5/18/24 by Dr. Antoine Colunga. Called [facility] and profiled all meds and ordered Morphine and Ativan to [facility], [facility]. Spouse has planned to take pt home via private car. Hospital bed with upper side rails, mattress, over-the-bed table, BSC, 5 L O2 concentrator, O2 tanks, tx W/C and nebulizer machine to be delivered to home today by [facility]. Admission folder left for pt/spouse and let them know to notify [facility] when they get home so nurse can come to their home to finish admission. Spouse/pt instructed to not leave until they receive D/C paperwork and meds to beds delivery. Thank you for allowing [facility] to participate in this pt and family's care.    Zeynep Israel  Referral and Admissions  [facility]  [phone]

## 2024-05-20 NOTE — PROGRESS NOTES
Case Management Discharge Note      Final Note: Home with [facility] on 5/20/24. Spouse to transport. EDVIN Mcfarlane RN, CCP.         Selected Continued Care - Admitted Since 5/16/2024       Destination    No services have been selected for the patient.                Durable Medical Equipment    No services have been selected for the patient.                Dialysis/Infusion    No services have been selected for the patient.                Home Medical Care Coordination complete.      Service Provider Selected Services Address Phone Fax Patient Preferred    [facility] [address] [id number] [phone] --              Therapy    No services have been selected for the patient.                Community Resources    No services have been selected for the patient.                Community & DME    No services have been selected for the patient.                    Transportation Services  Private: Car    Final Discharge Disposition Code: 50 - home with hospice

## 2024-05-20 NOTE — PLAN OF CARE
Problem: Adult Inpatient Plan of Care  Goal: Plan of Care Review  Outcome: Ongoing, Progressing  Flowsheets (Taken 5/20/2024 0437)  Progress: improving  Plan of Care Reviewed With: patient  Outcome Evaluation: Pt is A&Ox4, can be forgetful at times, spouse at bedside, pt is calm and cooperative, she had episode of N/V at beginning of shift after taking pain meds, pt given zofran ODT and it was effective, for next dose of pain meds, gave zofran first and then gave meds in bite of AS, pt tolerated well, pt on 3.5 L O2, pt cont of B/B, given pain pills q6h per pt request, pt pain is more controlled, she is resting well, plan is for pt to d/c home in am w/ spouse and hospice, plan of care ongoing.   Goal Outcome Evaluation:  Plan of Care Reviewed With: patient        Progress: improving  Outcome Evaluation: Pt is A&Ox4, can be forgetful at times, spouse at bedside, pt is calm and cooperative, she had episode of N/V at beginning of shift after taking pain meds, pt given zofran ODT and it was effective, for next dose of pain meds, gave zofran first and then gave meds in bite of AS, pt tolerated well, pt on 3.5 L O2, pt cont of B/B, given pain pills q6h per pt request, pt pain is more controlled, she is resting well, plan is for pt to d/c home in am w/ spouse and hospice, plan of care ongoing.

## 2024-05-20 NOTE — DISCHARGE SUMMARY
Patient Name: Darlene Pfeiffer  : 1940  MRN: [id number]    Date of Admission: 2024  Date of Discharge:  2024  Primary Care Physician: Kehrer, Meredith Lea, MD      Chief Complaint:   Shortness of Breath      Discharge Diagnoses     Active Hospital Problems    Diagnosis  POA    **Dyspnea [R06.00]  Yes    Acute on chronic HFrEF (heart failure with reduced ejection fraction) [I50.23]  Yes    Paroxysmal atrial fibrillation [I48.0]  Yes    Lung cancer [C34.90]  Yes    Chronic obstructive pulmonary disease [J44.9]  Yes    Hyperlipidemia [E78.5]  Yes    Essential hypertension [I10]  Yes      Resolved Hospital Problems   No resolved problems to display.        Hospital Course     Ms. Pfeiffer is a 83 y.o. female with a history of coronary artery disease, atrial fibrillation, COPD, chronic hypoxic respiratory failure from COPD presented with dyspnea found to have progressive lung cancer now invading nearby structures.  Oncology had goals of care discussion with patient and family and patient was transition to comfort care.  Hospice is evaluated and plan to go home with home hospice.  Patient was found to have a left IJ DVT with associated left upper extremity swelling.  Patient has been started on apixaban.    Patient to go home on comfort medications including Ativan, Percocet, sublingual morphine for breakthrough pain, bowel regimen, Zofran, Phenergan.  Plan to discharge later today after hospice reevaluation.      At the time of discharge patient was told to take all medications as prescribed, keep all follow-up appointments, and call their doctor or return to the hospital with any worsening or concerning symptoms.    Day of Discharge     Subjective:  Patient lying comfortably in bed.   at bedside.  She was able to get some sleep last night with scheduled Percocet and breakthrough morphine.  Zofran ODT working well for her.  No current nausea or pain at this time.    Review of Systems    Constitutional:  Positive for appetite change and fatigue. Negative for chills and fever.   Respiratory:  Negative for cough and shortness of breath.    Cardiovascular:  Negative for chest pain and leg swelling.   Gastrointestinal:  Negative for abdominal pain, diarrhea and nausea.       Physical Exam:  Temp:  [96.7 °F (35.9 °C)] 96.7 °F (35.9 °C)  Heart Rate:  [138] 138  Resp:  [16-18] 18  BP: (80)/(53) 80/53  Body mass index is 16.65 kg/m².  Physical Exam  Constitutional:       General: She is not in acute distress.     Appearance: Chronically ill-appearing, frail, elderly  Cardiovascular:      Rate and Rhythm: Normal rate and regular rhythm.   Pulmonary:      Effort: Pulmonary effort is normal. No respiratory distress.   Abdominal:      General: Abdomen is flat. There is no distension.      Tenderness: There is no abdominal tenderness.   Musculoskeletal:         General: No swelling or deformity. Normal range of motion.      Comments: Left upper extremity swelling   Skin:     General: Skin is warm and dry.   Neurological:      General: No focal deficit present.      Mental Status: She is alert. Mental status is at baseline.   Consultants     Consult Orders (all) (From admission, onward)       Start     Ordered    05/18/24 0817  Inpatient Hospice / [facility] Consult  Once        Specialty:  Hospice and Palliative Medicine  Provider:  (Not yet assigned)    05/18/24 0817    05/16/24 2228  Inpatient Hematology & Oncology Consult  Once        Specialty:  Hematology and Oncology  Provider:  Stephen Angel MD    05/16/24 2228 05/16/24 2227  Inpatient Cardiology Consult  Once        Specialty:  Cardiology  Provider:  Claire Orr MD    05/16/24 2228 05/16/24 2227  Inpatient Pulmonology Consult  Once        Specialty:  Pulmonary Disease  Provider:  Maik Conley MD    05/16/24 2228 05/16/24 1852  [facility] (on-call MD unless specified) Details  Once        Specialty:  Hospitalist  Provider:  (Not yet assigned)     05/16/24 1852                  Procedures     Imaging Results (All)       Procedure Component Value Units Date/Time    CT Angiogram Chest Pulmonary Embolism [[id number]] Collected: 05/16/24 1901     Updated: 05/16/24 1930    Narrative:      EXAMINATION: CT ANGIOGRAM OF THE CHEST WITH CONTRAST     HISTORY: 83-year-old female with history of lung carcinoma, CHF and  worsening shortness of air undergoing evaluation for possible pulmonary  embolism     TECHNIQUE: Contiguous axial images were obtained through the chest  following bolus intravenous administration of nonionic contrast. Three-D  reformatted images were produced and presented for interpretation.     COMPARISON: CT of the chest with contrast, 04/19/2024     FINDINGS: No filling defects are seen within the pulmonary arterial  system to the level of the subsegmental pulmonary arteries. Reidentified  is extensive consolidation within the left upper lobe with air  bronchograms. The region of consolidation appears unchanged. Severe  chronic interstitial fibrotic change in conjunction with background  changes of emphysema are reidentified and appear stable.     In the posteromedial aspect of the right upper lobe there is a 2.0 x 1.5  cm area of pleural-based consolidation with parenchymal distortion that  previously measured 1.6 x 1.1 cm. In the right lower lobe posteriorly  there is an area of consolidation that measures 4.7 x 1.7 x 1.2 cm that  previously measured 4.3 x 1.7 x 0.7 cm. In the posterior aspect of the  right lower lobe there is a pleural-based nodule that measures 0.7 cm  that is not seen on the prior examination.     There has been interval development of consolidation that abuts the  mediastinum in the left upper lobe that appears to partially invade the  mediastinum with loss of a fatty soft tissue plane between the area of  consolidation and the pericardium. There is a pericardial effusion that  measures up to 1.1 cm in thickness compared to 0.7  cm previously. Soft  tissue posterior to the left mainstem bronchus and anterior to the  descending thoracic aorta measures 3.2 x 1.2 cm compared to prior  measurements of 2.4 x 0.8 cm previously. The left mainstem bronchus is  also narrowed to a cross-sectional diameter of 0.4 cm compared to 0.8 cm  on the prior examination.     Moderate multilevel abutting endplate osteophytic change is seen  throughout the thoracic spine. No suspicious osseous lesions are  visualized. The visualized soft tissue structures of the upper abdomen  appear normal. Stable moderate atheromatous calcification within the  descending thoracic aorta with severe atheromatous calcification in the  aortic arch is noted.       Impression:      1. There is no evidence for pulmonary embolism.     2. There has been interval progression of areas of pulmonary  consolidation in the anterior aspect of the left upper lobe as well as  in the right lower lobe. This is seen in conjunction with enlargement of  an area of soft tissue opacification posterior to the left mainstem  bronchus with narrowing of the left mainstem bronchus as described.  Additionally, there is a slightly larger small pericardial effusion with  loss of a fatty plane between the pericardium and the consolidation in  the left upper lobe. This is concerning for localized mediastinal  invasion and progression of malignancy.        Radiation dose reduction techniques were utilized, including automated  exposure control and exposure modulation based on body size.        This report was finalized on 5/16/2024 7:27 PM by Dr. Vern Zaragoza M.D on Workstation: [id number]               Pertinent Labs     Results from last 7 days   Lab Units 05/17/24  0643 05/16/24  1006   WBC 10*3/mm3 14.57* 14.49*   HEMOGLOBIN g/dL 12.8 12.7   PLATELETS 10*3/mm3 241 267     Results from last 7 days   Lab Units 05/17/24  0643 05/16/24  1006   SODIUM mmol/L 139 136   POTASSIUM mmol/L 3.3* 4.5   CHLORIDE mmol/L  96* 93*   CO2 mmol/L 32.0* 35.2*   BUN mg/dL 33* 40*   CREATININE mg/dL 0.53* 0.78   GLUCOSE mg/dL 129* 216*   Estimated Creatinine Clearance: 56 mL/min (A) (by C-G formula based on SCr of 0.53 mg/dL (L)).  Results from last 7 days   Lab Units 05/17/24  0643 05/16/24  1006   ALBUMIN g/dL 3.0* 3.2*   BILIRUBIN mg/dL 0.4 0.4   ALK PHOS U/L 91 105   AST (SGOT) U/L 20 16   ALT (SGPT) U/L 14 15     Results from last 7 days   Lab Units 05/17/24  0643 05/16/24  1006   CALCIUM mg/dL 8.4* 9.3   ALBUMIN g/dL 3.0* 3.2*   MAGNESIUM mg/dL 2.1  --    PHOSPHORUS mg/dL 2.4*  --        Results from last 7 days   Lab Units 05/16/24  1803 05/16/24  1552 05/16/24  1006   HSTROP T ng/L 106* 98*  --    PROBNP pg/mL  --   --  3,983.0*       Results from last 7 days   Lab Units 05/17/24  0643   CHOLESTEROL mg/dL 141   TRIGLYCERIDES mg/dL 208*   HDL CHOL mg/dL 46   LDL CHOL mg/dL 61           Test Results Pending at Discharge       Discharge Details        Discharge Medications        New Medications        Instructions Start Date   LORazepam 1 MG tablet  Commonly known as: Ativan   1 mg, Oral, Every 6 Hours PRN      morphine concentrated solution 20mg/ml   20 mg, Oral, Every 1 Hour PRN      naloxone 4 MG/0.1ML nasal spray  Commonly known as: NARCAN   Call 911. Don't prime. [place] in 1 nostril for overdose. Repeat in 2-3 minutes in other nostril if no or minimal breathing/responsiveness.      ondansetron ODT 4 MG disintegrating tablet  Commonly known as: ZOFRAN-ODT   4 mg, Translingual, Every 6 Hours PRN      oxyCODONE-acetaminophen 5-325 MG per tablet  Commonly known as: PERCOCET   2 tablets, Oral, Every 6 Hours PRN      polyethylene glycol 17 GM/SCOOP powder  Commonly known as: MIRALAX   17 g, Oral, Daily      promethazine 12.5 MG tablet  Commonly known as: PHENERGAN   12.5 mg, Oral, Every 6 Hours PRN      sennosides-docusate 8.6-50 MG per tablet  Commonly known as: PERICOLACE   2 tablets, Oral, 2 Times Daily             Changes to  Medications        Instructions Start Date   apixaban 5 MG tablet tablet  Commonly known as: JULIANA  What changed:   medication strength  See the new instructions.   Take 2 tablets by mouth Every 12 (Twelve) Hours for 4 days, THEN 1 tablet Every 12 (Twelve) Hours for 30 days. Indications: DVT/PE (active thrombosis)   Start Date: May 20, 2024            Continue These Medications        Instructions Start Date   albuterol (2.5 MG/3ML) 0.083% nebulizer solution  Commonly known as: PROVENTIL   Inhale the contents of 1 vial by nebulization Every 4 (Four) Hours As Needed for Wheezing.      arformoterol 15 MCG/2ML nebulizer solution  Commonly known as: BROVANA   USE 2 ML VIA NEBULIZER TWICE DAILY      budesonide 0.5 MG/2ML nebulizer solution  Commonly known as: PULMICORT   INHALE 2 MLS VIA NEBULIZER EVERY 12 HOURS      dronabinol 2.5 MG capsule  Commonly known as: Marinol   2.5 mg, Oral, 2 Times Daily Before Meals      ipratropium 0.06 % nasal spray  Commonly known as: ATROVENT   2 sprays, Nasal, 4 Times Daily      ipratropium-albuterol 0.5-2.5 mg/3 ml nebulizer  Commonly known as: DUO-NEB   Inhale the contents of 1 vial by nebulization 2 (Two) Times a Day As Needed for Wheezing.      metoprolol succinate XL 25 MG 24 hr tablet  Commonly known as: TOPROL-XL   Take 1 whole tablet in a.m. and half tablet in p.m.      mirtazapine 7.5 MG tablet  Commonly known as: REMERON   7.5 mg, Oral, Every Night at Bedtime      montelukast 10 MG tablet  Commonly known as: SINGULAIR   10 mg, Oral, Nightly      O2  Commonly known as: OXYGEN   3 L/min, Inhalation, Continuous      pantoprazole 40 MG EC tablet  Commonly known as: PROTONIX   40 mg, Oral, Every Morning      zolpidem 5 MG tablet  Commonly known as: AMBIEN   5 mg, Oral, Nightly PRN, for sleep             Stop These Medications      atorvastatin 20 MG tablet  Commonly known as: LIPITOR     budesonide 0.5 MG/2ML suspension 0.5 mg, arformoterol 15 MCG/2ML nebulizer solution 15 mcg      cetirizine 10 MG tablet  Commonly known as: zyrTEC     empagliflozin 10 MG tablet tablet  Commonly known as: Jardiance     Entresto 24-26 MG tablet  Generic drug: sacubitril-valsartan     folic acid 1 MG tablet  Commonly known as: FOLVITE     furosemide 40 MG tablet  Commonly known as: LASIX     potassium chloride 10 MEQ CR tablet  Commonly known as: KLOR-CON M10     predniSONE 10 MG tablet  Commonly known as: DELTASONE     predniSONE 20 MG tablet  Commonly known as: DELTASONE     VITAMIN B12 PO     Vitamin D3 50 MCG (2000 UT) tablet              Allergies   Allergen Reactions    Sulfa Antibiotics Rash         Discharge Disposition:  Hospice/Home    Discharge Diet:  Diet Order   Procedures    Diet: Regular/House; Fluid Consistency: Thin (IDDSI 0)       Discharge Activity:   As tolerated    CODE STATUS:    Code Status and Medical Interventions:   Ordered at: 05/17/24 1438     Level Of Support Discussed With:    Patient     Code Status (Patient has no pulse and is not breathing):    No CPR (Do Not Attempt to Resuscitate)     Medical Interventions (Patient has pulse or is breathing):    Comfort Measures       Future Appointments   Date Time Provider Department Center   7/16/2024  8:30 AM Kehrer, Meredith Lea, MD [facility]   10/8/2024 11:30 AM Bradley Dowell MD [facility]      Follow-up Information       Kehrer, Meredith Lea, MD .    Specialty: Family Medicine  Contact information:  [address]  [address]  [place] [id number] [phone]                             Time Spent on Discharge:  Greater than 30 minutes      Thomas Franks MD  [facility]  05/20/24  09:18 EDT

## 2024-05-20 NOTE — PROGRESS NOTES
Discharge Planning Assessment  [facility]     Patient Name: Darlene Pfeiffer  MRN: [id number]  Today's Date: 5/20/2024    Admit Date: 5/16/2024    Plan: Home with [facility] on 5/20/24. EDVIN Mcfarlane RN, CCP.   Discharge Needs Assessment    No documentation.                  Discharge Plan       Row Name 05/20/24 1138       Plan    Final Discharge Disposition Code 50 - home with hospice    Final Note Home with [facility] on 5/20/24. Spouse to transport. EDVIN Mcfarlane RN, CCP.      Row Name 05/20/24 1136       Plan    Plan Home with [facility] on 5/20/24. EDVIN Mcfarlane RN, CCP.    Plan Comments The patient transferred to [facility] from [address] [place] [address] on 5/17/24. The patient is palliative. Spoke with the patient's spouse Selvin and he plans on transporting the patient to home and he brought her portable oxygen. [facility] is here to assist with the discharge planning today. Meds to beds in system. CCP will continue to follow for any needs. EDVIN Mcfarlane RN, CCP.                  Continued Care and Services - Admitted Since 5/16/2024       Home Medical Care Coordination complete.      Service Provider Request Status Selected Services Address Phone Fax Patient Preferred    [facility]  [facility] [address] [place] [address] [phone] --                  Expected Discharge Date and Time       Expected Discharge Date Expected Discharge Time    May 20, 2024            Demographic Summary    No documentation.                  Functional Status    No documentation.                  Psychosocial    No documentation.                  Abuse/Neglect    No documentation.                  Legal    No documentation.                  Substance Abuse    No documentation.                  Patient Forms    No documentation.                     Carolin Mcfarlane RN

## 2024-05-20 NOTE — NURSING NOTE
able to verbalize understanding of discharge instructions, hospice support and medication changes. Awaiting meds to bed.

## 2024-05-30 RX ORDER — POTASSIUM CHLORIDE 750 MG/1
TABLET, EXTENDED RELEASE ORAL
Qty: 90 TABLET | Refills: 2 | OUTPATIENT
Start: 2024-05-30

## 2024-06-05 RX ORDER — MIRTAZAPINE 7.5 MG/1
7.5 TABLET, FILM COATED ORAL
Qty: 30 TABLET | Refills: 1 | OUTPATIENT
Start: 2024-06-05

## 2024-07-18 ENCOUNTER — TELEPHONE (OUTPATIENT)
Dept: CARDIOLOGY | Facility: CLINIC | Age: 84
End: 2024-07-18
Payer: MEDICARE

## 2024-09-03 RX ORDER — SACUBITRIL AND VALSARTAN 24; 26 MG/1; MG/1
0.5 TABLET, FILM COATED ORAL DAILY
Qty: 30 TABLET | Refills: 3 | OUTPATIENT
Start: 2024-09-03

## (undated) DEVICE — DECANTER BAG 9": Brand: MEDLINE INDUSTRIES, INC.

## (undated) DEVICE — DRP C/ARM 41X74IN

## (undated) DEVICE — 0.2 MICRON INTRAVENOUS FILTER FOR 96 HOUR USE WITH MICRO-BORE EXTENSION TUBING AN LUER-LOCK ADAPTER: Brand: PALL POSIDYNE® ELD FILTER

## (undated) DEVICE — Device: Brand: SOFIA EX 5F - 115CM STR

## (undated) DEVICE — SUT PROLN 3/0 SH D/A 36IN 8522H

## (undated) DEVICE — PK ANGIO CERBRL RAD 40

## (undated) DEVICE — TRAP FLD MINIVAC MEGADYNE 100ML

## (undated) DEVICE — CVR PROB 96IN LF STRL

## (undated) DEVICE — GW AMPLTZ SUPERSTIFF STR .035IN 260CM

## (undated) DEVICE — SUT SILK 2/0 FS BLK 18IN 685G

## (undated) DEVICE — GLV SURG SENSICARE PI MIC PF SZ7.5 LF STRL

## (undated) DEVICE — DEV ANGIO FLOSWITCH HP BX24

## (undated) DEVICE — SYR MEDALLION CONTRAST PLUNGR/YEL 10CC

## (undated) DEVICE — MANIFLD BLCK 200PSI 2PORT OFF RT

## (undated) DEVICE — Device

## (undated) DEVICE — SOL NACL 0.9PCT 1000ML

## (undated) DEVICE — TBG PRESS 96IN M/F ROT BRAID: Brand: MEDLINE INDUSTRIES, INC.

## (undated) DEVICE — SYR MEDALLION SALINE PLUNGR/WHITE 10ML

## (undated) DEVICE — TOWEL,OR,DSP,ST,BLUE,STD,4/PK,20PK/CS: Brand: MEDLINE

## (undated) DEVICE — PATIENT RETURN ELECTRODE, SINGLE-USE, CONTACT QUALITY MONITORING, ADULT, WITH 9FT CORD, FOR PATIENTS WEIGING OVER 33LBS. (15KG): Brand: MEGADYNE

## (undated) DEVICE — FRCP BX RADJAW4 PULM WO NDL STD1.8X2 100

## (undated) DEVICE — GUIDEWIRES: Brand: TRAXCESS EX GUIDEWIRE

## (undated) DEVICE — ANTIBACTERIAL UNDYED BRAIDED (POLYGLACTIN 910), SYNTHETIC ABSORBABLE SUTURE: Brand: COATED VICRYL

## (undated) DEVICE — RADIFOCUS GLIDEWIRE: Brand: GLIDEWIRE

## (undated) DEVICE — TBG PENCL TELESCP MEGADYNE SMOKE EVAC 10FT

## (undated) DEVICE — BG WAST DISPOSABLE DEPOT W/TBG48IN S/COCK SPK1400

## (undated) DEVICE — INTENDED FOR TISSUE SEPARATION, AND OTHER PROCEDURES THAT REQUIRE A SHARP SURGICAL BLADE TO PUNCTURE OR CUT.: Brand: BARD-PARKER ® CARBON RIB-BACK BLADES

## (undated) DEVICE — SHEATH GUIDE CEREBASE DIST/ACC 8F 95CM LNG

## (undated) DEVICE — SYR LUERLOK 20CC BX/50

## (undated) DEVICE — NDL HYPO PRECISIONGLIDE REG 25G 1 1/2

## (undated) DEVICE — SUT SILK 2/0 TIES 18IN A185H

## (undated) DEVICE — GLV SURG SIGNATURE ESSENTIAL PF LTX SZ7.5

## (undated) DEVICE — DRSNG SURESITE WNDW 4X4.5

## (undated) DEVICE — STPCK 3/WY HP M/RA W/OFF/HNDL 1050PSI STRL

## (undated) DEVICE — CONTRL DETACH WEB FOR ANEUR EMB SYS

## (undated) DEVICE — RADIFOCUS TORQUE DEVICE MULTI-TORQUE VISE: Brand: RADIFOCUS TORQUE DEVICE

## (undated) DEVICE — WIPE INST MEROCEL

## (undated) DEVICE — NDL BLNT 18G 1 1/2IN

## (undated) DEVICE — SYR LUERLOK 5CC

## (undated) DEVICE — CATH TEMPO 5F VER 135 Â° 100CM: Brand: TEMPO

## (undated) DEVICE — ANGIO-SEAL VIP VASCULAR CLOSURE DEVICE: Brand: ANGIO-SEAL

## (undated) DEVICE — LOU MINOR PROCEDURE: Brand: MEDLINE INDUSTRIES, INC.

## (undated) DEVICE — ADAPT Y ROT GATEWAY PLS

## (undated) DEVICE — APPL CHLORAPREP HI/LITE 26ML ORNG

## (undated) DEVICE — TTL1LYR 16FR10ML 100%SIL TMPST TR: Brand: MEDLINE

## (undated) DEVICE — ST ACC MICROPUNCTURE STFF .018 ECHO/PLDM/TP 4F/10CM 21G/7CM